# Patient Record
Sex: MALE | URBAN - METROPOLITAN AREA
[De-identification: names, ages, dates, MRNs, and addresses within clinical notes are randomized per-mention and may not be internally consistent; named-entity substitution may affect disease eponyms.]

---

## 2023-10-04 ENCOUNTER — APPOINTMENT (OUTPATIENT)
Dept: RADIOLOGY | Facility: HOSPITAL | Age: 58
DRG: 291 | End: 2023-10-04
Payer: MEDICARE

## 2023-10-04 ENCOUNTER — HOSPITAL ENCOUNTER (INPATIENT)
Facility: HOSPITAL | Age: 58
LOS: 13 days | Discharge: HOME | DRG: 291 | End: 2023-10-17
Attending: SPECIALIST | Admitting: SPECIALIST
Payer: MEDICARE

## 2023-10-04 DIAGNOSIS — I25.10 CORONARY ARTERY DISEASE INVOLVING NATIVE CORONARY ARTERY OF NATIVE HEART WITHOUT ANGINA PECTORIS: ICD-10-CM

## 2023-10-04 DIAGNOSIS — E11.65 TYPE 2 DIABETES MELLITUS WITH HYPERGLYCEMIA, WITH LONG-TERM CURRENT USE OF INSULIN (MULTI): ICD-10-CM

## 2023-10-04 DIAGNOSIS — Z79.4 TYPE 2 DIABETES MELLITUS WITHOUT COMPLICATION, WITH LONG-TERM CURRENT USE OF INSULIN (MULTI): ICD-10-CM

## 2023-10-04 DIAGNOSIS — I50.23 ACUTE ON CHRONIC SYSTOLIC HEART FAILURE (MULTI): Chronic | ICD-10-CM

## 2023-10-04 DIAGNOSIS — I82.412 DVT OF DEEP FEMORAL VEIN, LEFT (MULTI): ICD-10-CM

## 2023-10-04 DIAGNOSIS — Z79.4 TYPE 2 DIABETES MELLITUS WITH OTHER CIRCULATORY COMPLICATION, WITH LONG-TERM CURRENT USE OF INSULIN (MULTI): ICD-10-CM

## 2023-10-04 DIAGNOSIS — R09.89 OTHER SPECIFIED SYMPTOMS AND SIGNS INVOLVING THE CIRCULATORY AND RESPIRATORY SYSTEMS: ICD-10-CM

## 2023-10-04 DIAGNOSIS — I25.5 ISCHEMIC CARDIOMYOPATHY: ICD-10-CM

## 2023-10-04 DIAGNOSIS — E11.9 TYPE 2 DIABETES MELLITUS WITHOUT COMPLICATION, UNSPECIFIED WHETHER LONG TERM INSULIN USE (MULTI): ICD-10-CM

## 2023-10-04 DIAGNOSIS — I50.9 ACUTE DECOMPENSATED HEART FAILURE (MULTI): ICD-10-CM

## 2023-10-04 DIAGNOSIS — Z95.810 ICD (IMPLANTABLE CARDIOVERTER-DEFIBRILLATOR) IN PLACE: ICD-10-CM

## 2023-10-04 DIAGNOSIS — Z01.818 PRE-TRANSPLANT EVALUATION FOR HEART TRANSPLANT: Primary | ICD-10-CM

## 2023-10-04 DIAGNOSIS — I50.22 CHRONIC SYSTOLIC HEART FAILURE (MULTI): ICD-10-CM

## 2023-10-04 DIAGNOSIS — Z79.4 TYPE 2 DIABETES MELLITUS WITH HYPERGLYCEMIA, WITH LONG-TERM CURRENT USE OF INSULIN (MULTI): ICD-10-CM

## 2023-10-04 DIAGNOSIS — Z01.810 ENCOUNTER FOR PREPROCEDURAL CARDIOVASCULAR EXAMINATION: ICD-10-CM

## 2023-10-04 DIAGNOSIS — E11.9 TYPE 2 DIABETES MELLITUS WITHOUT COMPLICATION, WITH LONG-TERM CURRENT USE OF INSULIN (MULTI): ICD-10-CM

## 2023-10-04 DIAGNOSIS — I71.40 ABDOMINAL AORTIC ANEURYSM, WITHOUT RUPTURE, UNSPECIFIED (CMS-HCC): ICD-10-CM

## 2023-10-04 DIAGNOSIS — I73.9 PERIPHERAL VASCULAR DISEASE, UNSPECIFIED (CMS-HCC): ICD-10-CM

## 2023-10-04 DIAGNOSIS — R60.0 LOCALIZED EDEMA: ICD-10-CM

## 2023-10-04 DIAGNOSIS — I50.23 ACUTE ON CHRONIC HFREF (HEART FAILURE WITH REDUCED EJECTION FRACTION) (MULTI): ICD-10-CM

## 2023-10-04 DIAGNOSIS — E11.59 TYPE 2 DIABETES MELLITUS WITH OTHER CIRCULATORY COMPLICATION, WITH LONG-TERM CURRENT USE OF INSULIN (MULTI): ICD-10-CM

## 2023-10-04 LAB
ALBUMIN SERPL BCP-MCNC: 4.2 G/DL (ref 3.4–5)
ANION GAP SERPL CALC-SCNC: 20 MMOL/L (ref 10–20)
BASE EXCESS BLDMV CALC-SCNC: 8.3 MMOL/L (ref -2–3)
BNP SERPL-MCNC: 160 PG/ML (ref 0–99)
BODY TEMPERATURE: 37 DEGREES CELSIUS
BUN SERPL-MCNC: 58 MG/DL (ref 6–23)
CALCIUM SERPL-MCNC: 10.1 MG/DL (ref 8.6–10.6)
CHLORIDE SERPL-SCNC: 93 MMOL/L (ref 98–107)
CO2 SERPL-SCNC: 28 MMOL/L (ref 21–32)
CREAT SERPL-MCNC: 1.79 MG/DL (ref 0.5–1.3)
ERYTHROCYTE [DISTWIDTH] IN BLOOD BY AUTOMATED COUNT: 19.2 % (ref 11.5–14.5)
EST. AVERAGE GLUCOSE BLD GHB EST-MCNC: 186 MG/DL
FERRITIN SERPL-MCNC: 379 NG/ML (ref 20–300)
GFR SERPL CREATININE-BSD FRML MDRD: 44 ML/MIN/1.73M*2
GLUCOSE BLD MANUAL STRIP-MCNC: 220 MG/DL (ref 74–99)
GLUCOSE BLD MANUAL STRIP-MCNC: 245 MG/DL (ref 74–99)
GLUCOSE SERPL-MCNC: 198 MG/DL (ref 74–99)
HBA1C MFR BLD: 8.1 %
HCO3 BLDMV-SCNC: 34 MMOL/L (ref 22–26)
HCT VFR BLD AUTO: 42 % (ref 41–52)
HGB BLD-MCNC: 12.8 G/DL (ref 13.5–17.5)
IRON SATN MFR SERPL: 15 % (ref 25–45)
IRON SERPL-MCNC: 51 UG/DL (ref 35–150)
LACTATE SERPL-SCNC: 1.6 MMOL/L (ref 0.4–2)
MCH RBC QN AUTO: 23.7 PG (ref 26–34)
MCHC RBC AUTO-ENTMCNC: 30.5 G/DL (ref 32–36)
MCV RBC AUTO: 78 FL (ref 80–100)
NRBC BLD-RTO: 0 /100 WBCS (ref 0–0)
OXYHGB MFR BLDMV: 68.7 % (ref 45–75)
PCO2 BLDMV: 50 MM HG (ref 41–51)
PH BLDMV: 7.44 PH (ref 7.33–7.43)
PHOSPHATE SERPL-MCNC: 5.6 MG/DL (ref 2.5–4.9)
PLATELET # BLD AUTO: 275 X10*3/UL (ref 150–450)
PMV BLD AUTO: 10.2 FL (ref 7.5–11.5)
PO2 BLDMV: 49 MM HG (ref 35–45)
POTASSIUM SERPL-SCNC: 4.1 MMOL/L (ref 3.5–5.3)
RBC # BLD AUTO: 5.4 X10*6/UL (ref 4.5–5.9)
SAO2 % BLDMV: 71 % (ref 45–75)
SODIUM SERPL-SCNC: 137 MMOL/L (ref 136–145)
T4 FREE SERPL-MCNC: 1.17 NG/DL (ref 0.78–1.48)
TIBC SERPL-MCNC: 335 UG/DL (ref 240–445)
TSH SERPL-ACNC: 9.31 MIU/L (ref 0.44–3.98)
UIBC SERPL-MCNC: 284 UG/DL (ref 110–370)
VIT B12 SERPL-MCNC: 408 PG/ML (ref 211–911)
WBC # BLD AUTO: 5 X10*3/UL (ref 4.4–11.3)

## 2023-10-04 PROCEDURE — 83550 IRON BINDING TEST: CPT | Performed by: STUDENT IN AN ORGANIZED HEALTH CARE EDUCATION/TRAINING PROGRAM

## 2023-10-04 PROCEDURE — 96372 THER/PROPH/DIAG INJ SC/IM: CPT | Performed by: STUDENT IN AN ORGANIZED HEALTH CARE EDUCATION/TRAINING PROGRAM

## 2023-10-04 PROCEDURE — 82805 BLOOD GASES W/O2 SATURATION: CPT | Performed by: STUDENT IN AN ORGANIZED HEALTH CARE EDUCATION/TRAINING PROGRAM

## 2023-10-04 PROCEDURE — 82746 ASSAY OF FOLIC ACID SERUM: CPT | Performed by: STUDENT IN AN ORGANIZED HEALTH CARE EDUCATION/TRAINING PROGRAM

## 2023-10-04 PROCEDURE — 83605 ASSAY OF LACTIC ACID: CPT | Performed by: STUDENT IN AN ORGANIZED HEALTH CARE EDUCATION/TRAINING PROGRAM

## 2023-10-04 PROCEDURE — 71045 X-RAY EXAM CHEST 1 VIEW: CPT | Performed by: RADIOLOGY

## 2023-10-04 PROCEDURE — 83036 HEMOGLOBIN GLYCOSYLATED A1C: CPT | Performed by: STUDENT IN AN ORGANIZED HEALTH CARE EDUCATION/TRAINING PROGRAM

## 2023-10-04 PROCEDURE — 82607 VITAMIN B-12: CPT | Performed by: STUDENT IN AN ORGANIZED HEALTH CARE EDUCATION/TRAINING PROGRAM

## 2023-10-04 PROCEDURE — 84443 ASSAY THYROID STIM HORMONE: CPT | Performed by: STUDENT IN AN ORGANIZED HEALTH CARE EDUCATION/TRAINING PROGRAM

## 2023-10-04 PROCEDURE — 82728 ASSAY OF FERRITIN: CPT | Performed by: STUDENT IN AN ORGANIZED HEALTH CARE EDUCATION/TRAINING PROGRAM

## 2023-10-04 PROCEDURE — 83880 ASSAY OF NATRIURETIC PEPTIDE: CPT | Performed by: STUDENT IN AN ORGANIZED HEALTH CARE EDUCATION/TRAINING PROGRAM

## 2023-10-04 PROCEDURE — 2500000004 HC RX 250 GENERAL PHARMACY W/ HCPCS (ALT 636 FOR OP/ED)

## 2023-10-04 PROCEDURE — 85027 COMPLETE CBC AUTOMATED: CPT | Performed by: STUDENT IN AN ORGANIZED HEALTH CARE EDUCATION/TRAINING PROGRAM

## 2023-10-04 PROCEDURE — 2500000001 HC RX 250 WO HCPCS SELF ADMINISTERED DRUGS (ALT 637 FOR MEDICARE OP): Performed by: STUDENT IN AN ORGANIZED HEALTH CARE EDUCATION/TRAINING PROGRAM

## 2023-10-04 PROCEDURE — 84439 ASSAY OF FREE THYROXINE: CPT | Performed by: STUDENT IN AN ORGANIZED HEALTH CARE EDUCATION/TRAINING PROGRAM

## 2023-10-04 PROCEDURE — 2020000001 HC ICU ROOM DAILY

## 2023-10-04 PROCEDURE — 80069 RENAL FUNCTION PANEL: CPT | Performed by: STUDENT IN AN ORGANIZED HEALTH CARE EDUCATION/TRAINING PROGRAM

## 2023-10-04 PROCEDURE — 2500000004 HC RX 250 GENERAL PHARMACY W/ HCPCS (ALT 636 FOR OP/ED): Performed by: STUDENT IN AN ORGANIZED HEALTH CARE EDUCATION/TRAINING PROGRAM

## 2023-10-04 PROCEDURE — 71045 X-RAY EXAM CHEST 1 VIEW: CPT

## 2023-10-04 PROCEDURE — 83540 ASSAY OF IRON: CPT | Performed by: STUDENT IN AN ORGANIZED HEALTH CARE EDUCATION/TRAINING PROGRAM

## 2023-10-04 PROCEDURE — 99291 CRITICAL CARE FIRST HOUR: CPT | Performed by: SPECIALIST

## 2023-10-04 PROCEDURE — 82947 ASSAY GLUCOSE BLOOD QUANT: CPT

## 2023-10-04 RX ORDER — GABAPENTIN 300 MG/1
600 CAPSULE ORAL NIGHTLY
Status: DISCONTINUED | OUTPATIENT
Start: 2023-10-04 | End: 2023-10-17 | Stop reason: HOSPADM

## 2023-10-04 RX ORDER — GABAPENTIN 300 MG/1
300 CAPSULE ORAL EVERY MORNING
Status: DISCONTINUED | OUTPATIENT
Start: 2023-10-05 | End: 2023-10-17 | Stop reason: HOSPADM

## 2023-10-04 RX ORDER — ALBUTEROL SULFATE 0.83 MG/ML
2.5 SOLUTION RESPIRATORY (INHALATION) EVERY 6 HOURS PRN
Status: DISCONTINUED | OUTPATIENT
Start: 2023-10-04 | End: 2023-10-17 | Stop reason: HOSPADM

## 2023-10-04 RX ORDER — NAPROXEN SODIUM 220 MG/1
81 TABLET, FILM COATED ORAL DAILY
Status: DISCONTINUED | OUTPATIENT
Start: 2023-10-04 | End: 2023-10-17 | Stop reason: HOSPADM

## 2023-10-04 RX ORDER — ATORVASTATIN CALCIUM 80 MG/1
80 TABLET, FILM COATED ORAL DAILY
Status: DISCONTINUED | OUTPATIENT
Start: 2023-10-04 | End: 2023-10-17 | Stop reason: HOSPADM

## 2023-10-04 RX ORDER — LEVOTHYROXINE SODIUM 50 UG/1
50 TABLET ORAL
Status: DISCONTINUED | OUTPATIENT
Start: 2023-10-04 | End: 2023-10-17 | Stop reason: HOSPADM

## 2023-10-04 RX ORDER — DOBUTAMINE HYDROCHLORIDE 400 MG/100ML
INJECTION INTRAVENOUS
Status: COMPLETED
Start: 2023-10-04 | End: 2023-10-04

## 2023-10-04 RX ORDER — HEPARIN SODIUM 5000 [USP'U]/ML
5000 INJECTION, SOLUTION INTRAVENOUS; SUBCUTANEOUS EVERY 8 HOURS
Status: DISCONTINUED | OUTPATIENT
Start: 2023-10-04 | End: 2023-10-12

## 2023-10-04 RX ORDER — DOCUSATE SODIUM 100 MG/1
100 CAPSULE, LIQUID FILLED ORAL 2 TIMES DAILY PRN
Status: DISCONTINUED | OUTPATIENT
Start: 2023-10-04 | End: 2023-10-07

## 2023-10-04 RX ORDER — QUETIAPINE FUMARATE 25 MG/1
50 TABLET, FILM COATED ORAL NIGHTLY
Status: DISCONTINUED | OUTPATIENT
Start: 2023-10-04 | End: 2023-10-17 | Stop reason: HOSPADM

## 2023-10-04 RX ORDER — PANTOPRAZOLE SODIUM 40 MG/1
40 TABLET, DELAYED RELEASE ORAL
Status: DISCONTINUED | OUTPATIENT
Start: 2023-10-05 | End: 2023-10-17 | Stop reason: HOSPADM

## 2023-10-04 RX ADMIN — DEXTROSE MONOHYDRATE 3.75 MCG/KG/MIN: 50 INJECTION, SOLUTION INTRAVENOUS at 17:00

## 2023-10-04 RX ADMIN — DOBUTAMINE HYDROCHLORIDE: 400 INJECTION INTRAVENOUS at 12:00

## 2023-10-04 RX ADMIN — FUROSEMIDE 5 MG/HR: 10 INJECTION, SOLUTION INTRAMUSCULAR; INTRAVENOUS at 15:15

## 2023-10-04 RX ADMIN — HEPARIN SODIUM 5000 UNITS: 5000 INJECTION INTRAVENOUS; SUBCUTANEOUS at 14:00

## 2023-10-04 RX ADMIN — HEPARIN SODIUM 5000 UNITS: 5000 INJECTION INTRAVENOUS; SUBCUTANEOUS at 21:34

## 2023-10-04 RX ADMIN — QUETIAPINE FUMARATE 50 MG: 25 TABLET ORAL at 20:21

## 2023-10-04 RX ADMIN — GABAPENTIN 600 MG: 300 CAPSULE ORAL at 20:20

## 2023-10-04 SDOH — ECONOMIC STABILITY: HOUSING INSECURITY
IN THE LAST 12 MONTHS, WAS THERE A TIME WHEN YOU DID NOT HAVE A STEADY PLACE TO SLEEP OR SLEPT IN A SHELTER (INCLUDING NOW)?: NO

## 2023-10-04 SDOH — SOCIAL STABILITY: SOCIAL INSECURITY: DO YOU FEEL ANYONE HAS EXPLOITED OR TAKEN ADVANTAGE OF YOU FINANCIALLY OR OF YOUR PERSONAL PROPERTY?: NO

## 2023-10-04 SDOH — ECONOMIC STABILITY: TRANSPORTATION INSECURITY: IN THE PAST 12 MONTHS, HAS LACK OF TRANSPORTATION KEPT YOU FROM MEDICAL APPOINTMENTS OR FROM GETTING MEDICATIONS?: NO

## 2023-10-04 SDOH — ECONOMIC STABILITY: INCOME INSECURITY: IN THE LAST 12 MONTHS, WAS THERE A TIME WHEN YOU WERE NOT ABLE TO PAY THE MORTGAGE OR RENT ON TIME?: NO

## 2023-10-04 SDOH — ECONOMIC STABILITY: FOOD INSECURITY: HOW HARD IS IT FOR YOU TO PAY FOR THE VERY BASICS LIKE FOOD, HOUSING, MEDICAL CARE, AND HEATING?: NOT VERY HARD

## 2023-10-04 SDOH — SOCIAL STABILITY: SOCIAL INSECURITY: HAVE YOU HAD THOUGHTS OF HARMING ANYONE ELSE?: NO

## 2023-10-04 SDOH — ECONOMIC STABILITY: HOUSING INSECURITY: IN THE LAST 12 MONTHS, HOW MANY PLACES HAVE YOU LIVED?: 1

## 2023-10-04 SDOH — ECONOMIC STABILITY: HOUSING INSECURITY: IN THE LAST 12 MONTHS, WAS THERE A TIME WHEN YOU WERE NOT ABLE TO PAY THE MORTGAGE OR RENT ON TIME?: NO

## 2023-10-04 SDOH — SOCIAL STABILITY: SOCIAL INSECURITY: ABUSE: ADULT

## 2023-10-04 SDOH — ECONOMIC STABILITY: INCOME INSECURITY: HOW HARD IS IT FOR YOU TO PAY FOR THE VERY BASICS LIKE FOOD, HOUSING, MEDICAL CARE, AND HEATING?: NOT VERY HARD

## 2023-10-04 SDOH — SOCIAL STABILITY: SOCIAL INSECURITY: ARE THERE ANY APPARENT SIGNS OF INJURIES/BEHAVIORS THAT COULD BE RELATED TO ABUSE/NEGLECT?: NO

## 2023-10-04 SDOH — SOCIAL STABILITY: SOCIAL INSECURITY: ARE YOU OR HAVE YOU BEEN THREATENED OR ABUSED PHYSICALLY, EMOTIONALLY, OR SEXUALLY BY ANYONE?: NO

## 2023-10-04 SDOH — SOCIAL STABILITY: SOCIAL INSECURITY: HAS ANYONE EVER THREATENED TO HURT YOUR FAMILY OR YOUR PETS?: NO

## 2023-10-04 SDOH — SOCIAL STABILITY: SOCIAL INSECURITY: DOES ANYONE TRY TO KEEP YOU FROM HAVING/CONTACTING OTHER FRIENDS OR DOING THINGS OUTSIDE YOUR HOME?: NO

## 2023-10-04 SDOH — SOCIAL STABILITY: SOCIAL INSECURITY: DO YOU FEEL UNSAFE GOING BACK TO THE PLACE WHERE YOU ARE LIVING?: NO

## 2023-10-04 SDOH — ECONOMIC STABILITY: TRANSPORTATION INSECURITY
IN THE PAST 12 MONTHS, HAS THE LACK OF TRANSPORTATION KEPT YOU FROM MEDICAL APPOINTMENTS OR FROM GETTING MEDICATIONS?: NO

## 2023-10-04 ASSESSMENT — PAIN - FUNCTIONAL ASSESSMENT
PAIN_FUNCTIONAL_ASSESSMENT: 0-10

## 2023-10-04 ASSESSMENT — PAIN SCALES - GENERAL
PAINLEVEL_OUTOF10: 0 - NO PAIN

## 2023-10-04 ASSESSMENT — ENCOUNTER SYMPTOMS
DIARRHEA: 0
HEMATURIA: 0
DIZZINESS: 0
SEIZURES: 0
ABDOMINAL DISTENTION: 1
PALPITATIONS: 0
CHILLS: 0
WHEEZING: 0
NAUSEA: 0
FREQUENCY: 0
FLANK PAIN: 0
COUGH: 0
DYSPHORIC MOOD: 1
SHORTNESS OF BREATH: 1
FEVER: 0
ABDOMINAL PAIN: 0
DIAPHORESIS: 0
LIGHT-HEADEDNESS: 0
DYSURIA: 0

## 2023-10-04 ASSESSMENT — LIFESTYLE VARIABLES
HOW OFTEN DO YOU HAVE 6 OR MORE DRINKS ON ONE OCCASION: NEVER
AUDIT-C TOTAL SCORE: 0
PRESCIPTION_ABUSE_PAST_12_MONTHS: NO
SKIP TO QUESTIONS 9-10: 1
AUDIT-C TOTAL SCORE: 0
HOW MANY STANDARD DRINKS CONTAINING ALCOHOL DO YOU HAVE ON A TYPICAL DAY: PATIENT DOES NOT DRINK
HOW OFTEN DO YOU HAVE A DRINK CONTAINING ALCOHOL: NEVER
SUBSTANCE_ABUSE_PAST_12_MONTHS: NO

## 2023-10-04 ASSESSMENT — ACTIVITIES OF DAILY LIVING (ADL)
ADEQUATE_TO_COMPLETE_ADL: YES
BATHING: INDEPENDENT
WALKS IN HOME: INDEPENDENT
FEEDING YOURSELF: INDEPENDENT
DRESSING YOURSELF: INDEPENDENT
TOILETING: INDEPENDENT
HEARING - LEFT EAR: FUNCTIONAL
GROOMING: INDEPENDENT
HEARING - RIGHT EAR: FUNCTIONAL
JUDGMENT_ADEQUATE_SAFELY_COMPLETE_DAILY_ACTIVITIES: YES
PATIENT'S MEMORY ADEQUATE TO SAFELY COMPLETE DAILY ACTIVITIES?: YES

## 2023-10-04 ASSESSMENT — COGNITIVE AND FUNCTIONAL STATUS - GENERAL
HELP NEEDED FOR BATHING: A LITTLE
PATIENT BASELINE BEDBOUND: NO
PERSONAL GROOMING: A LITTLE
MOVING TO AND FROM BED TO CHAIR: A LITTLE
DRESSING REGULAR LOWER BODY CLOTHING: A LITTLE
TURNING FROM BACK TO SIDE WHILE IN FLAT BAD: A LITTLE
MOVING FROM LYING ON BACK TO SITTING ON SIDE OF FLAT BED WITH BEDRAILS: A LITTLE
DRESSING REGULAR UPPER BODY CLOTHING: A LITTLE
TOILETING: A LITTLE
STANDING UP FROM CHAIR USING ARMS: A LITTLE
MOBILITY SCORE: 18
WALKING IN HOSPITAL ROOM: A LITTLE
CLIMB 3 TO 5 STEPS WITH RAILING: A LITTLE
DAILY ACTIVITIY SCORE: 18
EATING MEALS: A LITTLE

## 2023-10-04 ASSESSMENT — PATIENT HEALTH QUESTIONNAIRE - PHQ9
7. TROUBLE CONCENTRATING ON THINGS, SUCH AS READING THE NEWSPAPER OR WATCHING TELEVISION: NOT AT ALL
9. THOUGHTS THAT YOU WOULD BE BETTER OFF DEAD, OR OF HURTING YOURSELF: NOT AT ALL
5. POOR APPETITE OR OVEREATING: NOT AT ALL
2. FEELING DOWN, DEPRESSED OR HOPELESS: MORE THAN HALF THE DAYS
SUM OF ALL RESPONSES TO PHQ9 QUESTIONS 1 & 2: 4
8. MOVING OR SPEAKING SO SLOWLY THAT OTHER PEOPLE COULD HAVE NOTICED. OR THE OPPOSITE, BEING SO FIGETY OR RESTLESS THAT YOU HAVE BEEN MOVING AROUND A LOT MORE THAN USUAL: NOT AT ALL
4. FEELING TIRED OR HAVING LITTLE ENERGY: NOT AT ALL
3. TROUBLE FALLING OR STAYING ASLEEP OR SLEEPING TOO MUCH: NOT AT ALL
1. LITTLE INTEREST OR PLEASURE IN DOING THINGS: MORE THAN HALF THE DAYS
6. FEELING BAD ABOUT YOURSELF - OR THAT YOU ARE A FAILURE OR HAVE LET YOURSELF OR YOUR FAMILY DOWN: SEVERAL DAYS
SUM OF ALL RESPONSES TO PHQ QUESTIONS 1-9: 5
10. IF YOU CHECKED OFF ANY PROBLEMS, HOW DIFFICULT HAVE THESE PROBLEMS MADE IT FOR YOU TO DO YOUR WORK, TAKE CARE OF THINGS AT HOME, OR GET ALONG WITH OTHER PEOPLE: SOMEWHAT DIFFICULT

## 2023-10-04 ASSESSMENT — COLUMBIA-SUICIDE SEVERITY RATING SCALE - C-SSRS
4. HAVE YOU HAD THESE THOUGHTS AND HAD SOME INTENTION OF ACTING ON THEM?: NO
5. HAVE YOU STARTED TO WORK OUT OR WORKED OUT THE DETAILS OF HOW TO KILL YOURSELF? DO YOU INTEND TO CARRY OUT THIS PLAN?: NO
1. IN THE PAST MONTH, HAVE YOU WISHED YOU WERE DEAD OR WISHED YOU COULD GO TO SLEEP AND NOT WAKE UP?: NO
2. HAVE YOU ACTUALLY HAD ANY THOUGHTS OF KILLING YOURSELF?: NO
6. HAVE YOU EVER DONE ANYTHING, STARTED TO DO ANYTHING, OR PREPARED TO DO ANYTHING TO END YOUR LIFE?: NO

## 2023-10-04 NOTE — H&P
Referring/Heart failure Cardiologist: [facility]/Dr. José Luis Cortes    HPI:  58 year old Iranian male with history of HFrEF/ICM (LVEF 20% 8/14/23), CAD s/p PCI and CABG (LIMA-LAD with balloon pump support April 2023), Hx of cardiac arrest s/p VT ablation + ICD, HTN/HLD, CKD, Hx of DVT (L femoral) on Eliquis, hypothyroidism, pulmonary hypertension, T2DM, JAKE/OHS, former tobacco abuse (24 pack year, quit 2017), presented to [facility] on 10/1 with exertional left sided chest pain and SOB x 3 days. He reported orthopnea  as well. His home diuretic regimen with lasix and diamox was not controlling his symptoms. He noticed swelling up to his thighs and abdominal distention without pain. He also reports having worsening urine output for a few days before admission as well.      He has been admitted 8 times this year for acutely decompensated heart failure. His last hospital stay was 9/16-9/26, and weight on discharge was 230lbs.  He was admitted to [facility] this presentation in cardiogenic shock despite reporting compliance with his HF medications. His oral GDMT regimen was held due to worsening kidney function. His home regimen includes Entresto 97/103mg, Toprol XL 150mg, Spironolactone 25mg, Dapagliflozin 10mg. Weight on admission was 246lbs. He was treated IV lasix 80mg and with IV bumex 4mg BID + metolazone with some response, but was transitioned to lasix infusion 5mg/hr.  He was also placed on dobutamine infusion as evidence of cardiogenic shock was noted to be worsening. Advanced heart failure service was consulted while he was admitted there and they recommended SGC-guided therapy. RHC on dobutamine 2.5mcg showed RA pressure: 20, RV: 55/20, PCWP: 35, PA pressure: 52/37 (40 - 42), CO/CI 3.6/1.5, SVR: 1373 dynes, PVR: 414 dynes, AO: 84, and lactates were increasing up to 2.4 during that admission.  ACS was ruled out with a non-ischemic appearing EKG and troponin levels (43->63->72->66). Despite inotrope therapy,  his clinical status was not significantly improving and he referred for transfer to [facility] for advanced therapy evaluation.     Cardiac Tests    EKG: Rhythm, QRS    Echo ([facility] 8/23)  Left ventricular systolic function is severely globally reduced.  The left ventricular ejection fraction (LVEF) is 20% +/- 5%.  Globally abnormal RV systolic function.  Noninvasive hemodynamic assessment is consistent with severe pulmonary  hypertension (>60 mmHg), an elevated CVP, an elevated LVEDP.     LHC/RHC: 12/22  1. Severe diffuse three-vessel coronary artery disease.  2. Severe proximal diffuse LAD disease up to 70%; IFR measured 0.8 and  hemodynamically significant.  3. Severe diffuse proximal and mid first diagonal disease. IFR measured at  0.81 consistent with hemodynamically significant disease.  4. Chronic total occlusion of OM1 with left-to-left collaterals.  5. Chronic total occlusion of mid RCA with left-to-right collaterals.  6. Markedly elevated LVEDP at 40 mm of Hg.  7. Findings consistent with ischemic cardiomyopathy.     PMH:   Past Medical History:   Diagnosis Date    CHF (congestive heart failure) (CMS/Formerly [facility])     COPD (chronic obstructive pulmonary disease) (CMS/Formerly [facility])     Coronary artery disease     Diabetes mellitus (CMS/Formerly [facility])     Disease of thyroid gland     Hypertension         PSH:  Past Surgical History:   Procedure Laterality Date    CARDIAC DEFIBRILLATOR PLACEMENT      CORONARY ANGIOPLASTY WITH STENT PLACEMENT      CORONARY ARTERY BYPASS GRAFT           Allergies:  Allergies   Allergen Reactions    Azithromycin Dizziness        Social Hx:  Social History     Socioeconomic History    Marital status: Unknown     Spouse name: Not on file    Number of children: Not on file    Years of education: Not on file    Highest education level: Not on file   Occupational History    Not on file   Tobacco Use    Smoking status: Former     Packs/day: 1.00     Years: 24.00     Additional pack years: 0.00     Total pack years:  24.00     Types: Cigarettes     Quit date:      Years since quittin.7    Smokeless tobacco: Not on file   Vaping Use    Vaping Use: Never used   Substance and Sexual Activity    Alcohol use: Never    Drug use: Never    Sexual activity: Not on file   Other Topics Concern    Not on file   Social History Narrative    Not on file     Social Determinants of Health     Financial Resource Strain: Not on file   Food Insecurity: Not on file   Transportation Needs: Not on file   Physical Activity: Not on file   Stress: Not on file   Social Connections: Not on file   Intimate Partner Violence: Not on file   Housing Stability: Not on file      Substance abuse disorder (Mild/moderate/severe-daily or withdrawal symptoms)    FHx:  No family history on file.     Home Medication:  Prior to Admission medications    Not on File        Review of Systems   Constitutional:  Negative for chills, diaphoresis and fever.   Respiratory:  Positive for shortness of breath. Negative for cough and wheezing.    Cardiovascular:  Positive for chest pain and leg swelling. Negative for palpitations.   Gastrointestinal:  Positive for abdominal distention. Negative for abdominal pain, diarrhea and nausea.   Genitourinary:  Positive for decreased urine volume. Negative for dysuria, flank pain, frequency, hematuria and scrotal swelling.   Neurological:  Negative for dizziness, seizures and light-headedness.   Psychiatric/Behavioral:  Positive for dysphoric mood. Negative for self-injury and suicidal ideas.         Physical Exam  Constitutional:       General: He is not in acute distress.     Appearance: Normal appearance. He is not toxic-appearing.   HENT:      Head: Normocephalic and atraumatic.      Mouth/Throat:      Mouth: Mucous membranes are moist.      Pharynx: Oropharynx is clear. No posterior oropharyngeal erythema.   Cardiovascular:      Rate and Rhythm: Normal rate and regular rhythm.      Heart sounds: No murmur heard.     No friction  rub. No gallop.   Pulmonary:      Effort: Pulmonary effort is normal. No respiratory distress.      Breath sounds: No wheezing or rhonchi.      Comments: Diminished breath sounds bibasilarly  Abdominal:      General: Bowel sounds are normal. There is distension.      Tenderness: There is no abdominal tenderness. There is no guarding or rebound.      Hernia: No hernia is present.   Musculoskeletal:         General: No tenderness. Normal range of motion.      Right lower leg: Edema (2+ pitting) present.      Left lower leg: Edema (2+ pitting) present.   Skin:     General: Skin is warm and dry.      Capillary Refill: Capillary refill takes less than 2 seconds.      Coloration: Skin is not jaundiced.      Findings: Lesion (LLE venous stasis) present.      Comments: Peripherally warm   Neurological:      General: No focal deficit present.      Mental Status: He is alert and oriented to person, place, and time. Mental status is at baseline.        Assessment & Plan:    Neuro:  #Depression  - Will need a detailed med review to determine if medical therapy is needed.  - Serial neuro and pain assessments   - PO Tylenol PRN for pain  - PT/OT Consult, OOB to chair  - CAM ICU score every shift  - Sleep/wake cycle normalization    # Physical Status  -Obesity  -Functional at home.   -Weight on admission to [facility] 246lbs (up 16lbs from dry weight).  -Weight today 235lbs.  -Obtain daily weights in ICU.      Cardiovascular:  # Acute on chronic HFrEF/ICM systolic and diastolic heart failure  - TTE at [facility] 8/2023 with EF ~20%, globally abnormal RV systolic function. Noninvasive hemodynamic assessment is consistent with severe pulmonary hypertension (>60 mmHg), an elevated CVP, an elevated LVEDP.  - admit weight 246lbs.  - admit BNP here pending.  - ASA and atorvastatin 80mg daily are home meds.  - Holding ARNI, BB, MRA, SGLT2i in setting of worsening renal function, GFR <30.  - Diuresis with lasix gtt 5mg/hr for now.  - Continue  inotropic support with dobutamine 3.75mcg for now.  - Daily SGC #s: CVP 12, PA 32/23, W 20, CO/CI 6.41/2.84, SVR 1010, PVR 87, SvO2 71%.  - Daily standing weights, 2gm sodium diet, 2L fluid restriction, strict I&Os  - Plan to initiate advanced therapy evaluation today.     #CAD s/p CABG LIMA-LAD 4/2023  -[facility] December 2022: severe diffuse three-vessel coronary artery disease. Severe proximal diffuse LAD disease up to 70%; IFR measured 0.8 and hemodynamically significant. Severe diffuse proximal and mid first diagonal disease. IFR measured at 0.81 consistent with hemodynamically significant disease. Chronic total occlusion of OM1 with left-to-left collaterals. Chronic total occlusion of mid RCA with left-to-right collaterals. Markedly elevated LVEDP at 40 mm of Hg.  - Continue Aspirin 81mg.  - Continue Atorvastatin 80mg.      #Arrhythmias - Hx of VT s/p ablation + ICD  -Device - Medtronic ICD; interrogation ordered.   -Not on antiarrhythmic outpatient.    #Electrolytes  -No electrolyte abnormalities on BMP from [facility] on transfer.  -Admit RFP pending.  -Obtain daily RFP.    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  -Monitor PA pressures continuously via SGC. Titrating inotrope therapy, should improve pulmonary pressures.    GI:  - Colace BID PRN ordered.  - Continue Protonix 40mg daily.     :  #CKD  -No records in [facility] for prior labs.  -Admit BUN/Cr 67/2.00  -strict I/Os  -avoid hypotension and nephrotoxic agents    Heme:  #Anemia in the setting of CKD  - Labs: CBC, TIBC, ferritin, serum Fe, folate, B12     - If %sat low, order venofer    #Hx of L femoral DVT  - Holding Eliquis for this.  - subcutaneous heparin 5000 units q8h.      Endo:  #T2DM  - Chemistry panel pending.  - Check accuchecks AC/HS.  - Insulin requirement at home is Glargine 85 units qAM, Humalog 28-34 units TID,  Trulicity 1.5mg weekly.  - Will consult endocrine to assist with glycemic control.   - hgbA1c ordered.    #Thyroid  -Repeat TSH with T4 pending     ID:  -afebrile, nontoxic   -no s/s infx  -trend temps q4h      Feeding/fluids: 2g Na diet, 2L fluid restriction  Thromboprophylaxis: SCDs   VAP bundle: n/a  Ulcer prophylaxis: PPI  Glycemic control: n/a  Bowel care: Colace & miralax prn  Indwelling catheter: None    Lines:   PIVs   [place] RIJ    PT/OT: consulted    Code Status: Full Code    Family Primary Contact/Next of Kin: Mother.    Dispo: Admit to HF ICU    A. -G. reviewed     Dr. Barlow aware of patient's admission

## 2023-10-05 ENCOUNTER — APPOINTMENT (OUTPATIENT)
Dept: RADIOLOGY | Facility: HOSPITAL | Age: 58
DRG: 291 | End: 2023-10-05
Payer: MEDICARE

## 2023-10-05 PROBLEM — I25.84 CORONARY ARTERY DISEASE DUE TO CALCIFIED CORONARY LESION: Status: ACTIVE | Noted: 2022-12-01

## 2023-10-05 PROBLEM — G47.33 OSA (OBSTRUCTIVE SLEEP APNEA): Status: ACTIVE | Noted: 2023-10-05

## 2023-10-05 PROBLEM — I25.10 CORONARY ARTERY DISEASE DUE TO CALCIFIED CORONARY LESION: Status: ACTIVE | Noted: 2022-12-01

## 2023-10-05 PROBLEM — E03.9 HYPOTHYROID: Chronic | Status: ACTIVE | Noted: 2023-10-05

## 2023-10-05 PROBLEM — R57.0 CARDIOGENIC SHOCK (MULTI): Status: ACTIVE | Noted: 2023-10-01

## 2023-10-05 PROBLEM — R26.2 DIFFICULTY IN WALKING INVOLVING ANKLE AND FOOT JOINT: Status: ACTIVE | Noted: 2020-02-18

## 2023-10-05 PROBLEM — H25.13 NUCLEAR AGE-RELATED CATARACT, BOTH EYES: Status: ACTIVE | Noted: 2022-02-18

## 2023-10-05 PROBLEM — N20.0 NEPHROLITHIASIS: Status: ACTIVE | Noted: 2019-10-18

## 2023-10-05 PROBLEM — I50.23 ACUTE ON CHRONIC SYSTOLIC HEART FAILURE (MULTI): Chronic | Status: ACTIVE | Noted: 2019-08-01

## 2023-10-05 PROBLEM — E78.2 MIXED HYPERLIPIDEMIA: Status: ACTIVE | Noted: 2022-12-01

## 2023-10-05 PROBLEM — R06.3 CENTRAL SLEEP APNEA WITH CHEYNE-STOKES RESPIRATION: Chronic | Status: ACTIVE | Noted: 2020-02-05

## 2023-10-05 PROBLEM — E55.9 VITAMIN D DEFICIENCY: Status: ACTIVE | Noted: 2019-10-22

## 2023-10-05 PROBLEM — N18.30 STAGE 3 CHRONIC KIDNEY DISEASE (MULTI): Status: ACTIVE | Noted: 2019-10-01

## 2023-10-05 PROBLEM — R07.9 CHEST PAIN: Status: ACTIVE | Noted: 2023-10-01

## 2023-10-05 PROBLEM — K80.20 CALCULUS OF GALLBLADDER WITHOUT CHOLECYSTITIS WITHOUT OBSTRUCTION: Status: ACTIVE | Noted: 2021-02-10

## 2023-10-05 PROBLEM — Z95.5 S/P DRUG ELUTING CORONARY STENT PLACEMENT: Status: ACTIVE | Noted: 2019-08-01

## 2023-10-05 PROBLEM — K74.60 HEPATIC CIRRHOSIS (MULTI): Chronic | Status: ACTIVE | Noted: 2021-02-10

## 2023-10-05 PROBLEM — E66.9 CLASS 1 OBESITY: Status: ACTIVE | Noted: 2022-12-01

## 2023-10-05 PROBLEM — D50.9 MICROCYTIC ANEMIA: Status: ACTIVE | Noted: 2019-10-18

## 2023-10-05 PROBLEM — G47.00 INSOMNIA: Status: ACTIVE | Noted: 2023-10-05

## 2023-10-05 PROBLEM — Z95.810 ICD (IMPLANTABLE CARDIOVERTER-DEFIBRILLATOR) IN PLACE: Status: ACTIVE | Noted: 2019-08-01

## 2023-10-05 PROBLEM — J44.9 COPD (CHRONIC OBSTRUCTIVE PULMONARY DISEASE) (MULTI): Chronic | Status: ACTIVE | Noted: 2023-10-05

## 2023-10-05 PROBLEM — K62.5 RECTAL BLEEDING: Status: ACTIVE | Noted: 2021-02-10

## 2023-10-05 PROBLEM — M21.372 FOOT DROP, BILATERAL: Status: ACTIVE | Noted: 2020-02-18

## 2023-10-05 PROBLEM — K76.0 FATTY LIVER: Status: ACTIVE | Noted: 2021-08-11

## 2023-10-05 PROBLEM — I27.20 PULMONARY HYPERTENSION (MULTI): Status: ACTIVE | Noted: 2021-08-11

## 2023-10-05 PROBLEM — I10 HTN (HYPERTENSION): Chronic | Status: ACTIVE | Noted: 2023-10-05

## 2023-10-05 PROBLEM — G62.9 NEUROPATHY: Status: ACTIVE | Noted: 2023-10-05

## 2023-10-05 PROBLEM — R31.29 MICROSCOPIC HEMATURIA: Status: ACTIVE | Noted: 2020-02-11

## 2023-10-05 PROBLEM — H40.003 GLAUCOMA SUSPECT OF BOTH EYES: Status: ACTIVE | Noted: 2019-08-30

## 2023-10-05 PROBLEM — E66.811 CLASS 1 OBESITY: Status: ACTIVE | Noted: 2022-12-01

## 2023-10-05 PROBLEM — Z95.1 S/P CABG X 1: Status: ACTIVE | Noted: 2023-04-17

## 2023-10-05 PROBLEM — Z86.74 HX OF CARDIAC ARREST: Status: ACTIVE | Noted: 2019-08-01

## 2023-10-05 PROBLEM — R79.89 ELEVATED TROPONIN LEVEL NOT DUE MYOCARDIAL INFARCTION: Status: ACTIVE | Noted: 2023-08-14

## 2023-10-05 PROBLEM — R06.02 SOB (SHORTNESS OF BREATH): Status: ACTIVE | Noted: 2023-10-01

## 2023-10-05 PROBLEM — E11.9 DIABETES MELLITUS (MULTI): Status: ACTIVE | Noted: 2023-05-03

## 2023-10-05 PROBLEM — I82.412 DVT OF DEEP FEMORAL VEIN, LEFT (MULTI): Status: ACTIVE | Noted: 2023-08-14

## 2023-10-05 PROBLEM — I21.A1 TYPE 2 MI (MYOCARDIAL INFARCTION) (MULTI): Status: ACTIVE | Noted: 2023-07-04

## 2023-10-05 PROBLEM — M21.371 FOOT DROP, BILATERAL: Status: ACTIVE | Noted: 2020-02-18

## 2023-10-05 PROBLEM — I25.5 ISCHEMIC CARDIOMYOPATHY: Status: ACTIVE | Noted: 2022-10-11

## 2023-10-05 LAB
ALBUMIN SERPL BCP-MCNC: 3.9 G/DL (ref 3.4–5)
ALBUMIN SERPL BCP-MCNC: 4.1 G/DL (ref 3.4–5)
ALP SERPL-CCNC: 63 U/L (ref 33–120)
ALT SERPL W P-5'-P-CCNC: 11 U/L (ref 10–52)
ANION GAP SERPL CALC-SCNC: 17 MMOL/L (ref 10–20)
ANION GAP SERPL CALC-SCNC: 18 MMOL/L (ref 10–20)
APTT PPP: 32 SECONDS (ref 27–38)
AST SERPL W P-5'-P-CCNC: 13 U/L (ref 9–39)
B-HCG SERPL-ACNC: <3 MIU/ML
BASE EXCESS BLDMV CALC-SCNC: 4 MMOL/L (ref -2–3)
BASE EXCESS BLDMV CALC-SCNC: 6.1 MMOL/L (ref -2–3)
BASE EXCESS BLDMV CALC-SCNC: 8.8 MMOL/L (ref -2–3)
BASOPHILS # BLD AUTO: 0.03 X10*3/UL (ref 0–0.1)
BASOPHILS NFR BLD AUTO: 0.4 %
BILIRUB SERPL-MCNC: 1.8 MG/DL (ref 0–1.2)
BNP SERPL-MCNC: 157 PG/ML (ref 0–99)
BODY TEMPERATURE: 37 DEGREES CELSIUS
BUN SERPL-MCNC: 55 MG/DL (ref 6–23)
BUN SERPL-MCNC: 60 MG/DL (ref 6–23)
CALCIUM SERPL-MCNC: 10 MG/DL (ref 8.6–10.6)
CALCIUM SERPL-MCNC: 10.2 MG/DL (ref 8.6–10.6)
CARDIOLIPIN IGA SERPL-ACNC: 0.9 APL U/ML
CARDIOLIPIN IGG SER IA-ACNC: <1.6 GPL U/ML
CARDIOLIPIN IGM SER IA-ACNC: 1.5 MPL U/ML
CHLORIDE SERPL-SCNC: 90 MMOL/L (ref 98–107)
CHLORIDE SERPL-SCNC: 93 MMOL/L (ref 98–107)
CHOLEST SERPL-MCNC: 106 MG/DL (ref 0–199)
CHOLESTEROL/HDL RATIO: 4.3
CO2 SERPL-SCNC: 28 MMOL/L (ref 21–32)
CO2 SERPL-SCNC: 31 MMOL/L (ref 21–32)
CREAT SERPL-MCNC: 1.65 MG/DL (ref 0.5–1.3)
CREAT SERPL-MCNC: 1.87 MG/DL (ref 0.5–1.3)
EOSINOPHIL # BLD AUTO: 0.21 X10*3/UL (ref 0–0.7)
EOSINOPHIL NFR BLD AUTO: 2.7 %
ERYTHROCYTE [DISTWIDTH] IN BLOOD BY AUTOMATED COUNT: 18.8 % (ref 11.5–14.5)
ERYTHROCYTE [DISTWIDTH] IN BLOOD BY AUTOMATED COUNT: 19.2 % (ref 11.5–14.5)
FOLATE SERPL-MCNC: 11.7 NG/ML
GFR SERPL CREATININE-BSD FRML MDRD: 41 ML/MIN/1.73M*2
GFR SERPL CREATININE-BSD FRML MDRD: 48 ML/MIN/1.73M*2
GLUCOSE BLD MANUAL STRIP-MCNC: 227 MG/DL (ref 74–99)
GLUCOSE BLD MANUAL STRIP-MCNC: 228 MG/DL (ref 74–99)
GLUCOSE BLD MANUAL STRIP-MCNC: 267 MG/DL (ref 74–99)
GLUCOSE BLD MANUAL STRIP-MCNC: 307 MG/DL (ref 74–99)
GLUCOSE SERPL-MCNC: 240 MG/DL (ref 74–99)
GLUCOSE SERPL-MCNC: 296 MG/DL (ref 74–99)
HBV SURFACE AG SERPL QL IA: NONREACTIVE
HCO3 BLDMV-SCNC: 29.2 MMOL/L (ref 22–26)
HCO3 BLDMV-SCNC: 31.2 MMOL/L (ref 22–26)
HCO3 BLDMV-SCNC: 34.6 MMOL/L (ref 22–26)
HCT VFR BLD AUTO: 40.8 % (ref 41–52)
HCT VFR BLD AUTO: 41 % (ref 41–52)
HCV AB SER QL: NONREACTIVE
HDLC SERPL-MCNC: 24.5 MG/DL
HERPES SIMPLEX VIRUS 1 IGG: >8 INDEX
HERPES SIMPLEX VIRUS 2 IGG: <0.2 INDEX
HGB BLD-MCNC: 12.8 G/DL (ref 13.5–17.5)
HGB BLD-MCNC: 12.9 G/DL (ref 13.5–17.5)
HIV 1+2 AB+HIV1 P24 AG SERPL QL IA: NONREACTIVE
IMM GRANULOCYTES # BLD AUTO: 0.03 X10*3/UL (ref 0–0.7)
IMM GRANULOCYTES NFR BLD AUTO: 0.4 % (ref 0–0.9)
INHALED O2 CONCENTRATION: 21 %
INR PPP: 1.2 (ref 0.9–1.1)
LACTATE SERPL-SCNC: 0.9 MMOL/L (ref 0.4–2)
LDH SERPL L TO P-CCNC: 240 U/L (ref 84–246)
LDLC SERPL CALC-MCNC: 32 MG/DL (ref 140–190)
LYMPHOCYTES # BLD AUTO: 0.57 X10*3/UL (ref 1.2–4.8)
LYMPHOCYTES NFR BLD AUTO: 7.3 %
MAGNESIUM SERPL-MCNC: 1.76 MG/DL (ref 1.6–2.4)
MCH RBC QN AUTO: 24.1 PG (ref 26–34)
MCH RBC QN AUTO: 24.2 PG (ref 26–34)
MCHC RBC AUTO-ENTMCNC: 31.2 G/DL (ref 32–36)
MCHC RBC AUTO-ENTMCNC: 31.6 G/DL (ref 32–36)
MCV RBC AUTO: 76 FL (ref 80–100)
MCV RBC AUTO: 77 FL (ref 80–100)
MEV IGG SER QL IA: POSITIVE
MONOCYTES # BLD AUTO: 0.81 X10*3/UL (ref 0.1–1)
MONOCYTES NFR BLD AUTO: 10.4 %
MUMPS IGG ANTIBODY INDEX: 4 IA
MUV IGG SER IA-ACNC: POSITIVE
NEUTROPHILS # BLD AUTO: 6.17 X10*3/UL (ref 1.2–7.7)
NEUTROPHILS NFR BLD AUTO: 78.8 %
NON HDL CHOLESTEROL: 82 MG/DL (ref 0–149)
NRBC BLD-RTO: 0 /100 WBCS (ref 0–0)
NRBC BLD-RTO: 0 /100 WBCS (ref 0–0)
OXYHGB MFR BLDMV: 56.2 % (ref 45–75)
OXYHGB MFR BLDMV: 60.1 % (ref 45–75)
OXYHGB MFR BLDMV: 71 % (ref 45–75)
PCO2 BLDMV: 45 MM HG (ref 41–51)
PCO2 BLDMV: 46 MM HG (ref 41–51)
PCO2 BLDMV: 51 MM HG (ref 41–51)
PH BLDMV: 7.42 PH (ref 7.33–7.43)
PH BLDMV: 7.44 PH (ref 7.33–7.43)
PH BLDMV: 7.44 PH (ref 7.33–7.43)
PHOSPHATE SERPL-MCNC: 5.5 MG/DL (ref 2.5–4.9)
PLATELET # BLD AUTO: 255 X10*3/UL (ref 150–450)
PLATELET # BLD AUTO: 258 X10*3/UL (ref 150–450)
PMV BLD AUTO: 10 FL (ref 7.5–11.5)
PMV BLD AUTO: 10.4 FL (ref 7.5–11.5)
PO2 BLDMV: 42 MM HG (ref 35–45)
PO2 BLDMV: 44 MM HG (ref 35–45)
PO2 BLDMV: 50 MM HG (ref 35–45)
POTASSIUM SERPL-SCNC: 4 MMOL/L (ref 3.5–5.3)
POTASSIUM SERPL-SCNC: 4.1 MMOL/L (ref 3.5–5.3)
PREALB SERPL-MCNC: 25.1 MG/DL (ref 18–40)
PROT SERPL-MCNC: 7.9 G/DL (ref 6.4–8.2)
PROTHROMBIN TIME: 13.7 SECONDS (ref 9.8–12.8)
PSA SERPL-MCNC: 0.22 NG/ML
RBC # BLD AUTO: 5.32 X10*6/UL (ref 4.5–5.9)
RBC # BLD AUTO: 5.34 X10*6/UL (ref 4.5–5.9)
RUBEOLA IGG ANTIBODY INDEX: >8 IA
RUBV IGG SERPL IA-ACNC: 2.3 IA
RUBV IGG SERPL QL IA: POSITIVE
SAO2 % BLDMV: 58 % (ref 45–75)
SAO2 % BLDMV: 62 % (ref 45–75)
SAO2 % BLDMV: 72 % (ref 45–75)
SODIUM SERPL-SCNC: 132 MMOL/L (ref 136–145)
SODIUM SERPL-SCNC: 137 MMOL/L (ref 136–145)
T GONDII IGG SER-ACNC: REACTIVE
T3 SERPL-MCNC: 107 NG/DL (ref 60–200)
T4 SERPL-MCNC: 8.7 UG/DL (ref 4.5–11.1)
TRIGL SERPL-MCNC: 247 MG/DL (ref 0–149)
VARICELLA ZOSTER IGG INDEX: >8 IA
VLDL: 49 MG/DL (ref 0–40)
VZV IGG SER QL IA: POSITIVE
WBC # BLD AUTO: 6.5 X10*3/UL (ref 4.4–11.3)
WBC # BLD AUTO: 7.8 X10*3/UL (ref 4.4–11.3)

## 2023-10-05 PROCEDURE — 86481 TB AG RESPONSE T-CELL SUSP: CPT | Performed by: INTERNAL MEDICINE

## 2023-10-05 PROCEDURE — 96372 THER/PROPH/DIAG INJ SC/IM: CPT | Performed by: STUDENT IN AN ORGANIZED HEALTH CARE EDUCATION/TRAINING PROGRAM

## 2023-10-05 PROCEDURE — 86765 RUBEOLA ANTIBODY: CPT | Performed by: INTERNAL MEDICINE

## 2023-10-05 PROCEDURE — 81001 URINALYSIS AUTO W/SCOPE: CPT | Performed by: INTERNAL MEDICINE

## 2023-10-05 PROCEDURE — 76700 US EXAM ABDOM COMPLETE: CPT

## 2023-10-05 PROCEDURE — 86645 CMV ANTIBODY IGM: CPT | Performed by: INTERNAL MEDICINE

## 2023-10-05 PROCEDURE — 86778 TOXOPLASMA ANTIBODY IGM: CPT | Performed by: INTERNAL MEDICINE

## 2023-10-05 PROCEDURE — 82805 BLOOD GASES W/O2 SATURATION: CPT | Performed by: STUDENT IN AN ORGANIZED HEALTH CARE EDUCATION/TRAINING PROGRAM

## 2023-10-05 PROCEDURE — 99291 CRITICAL CARE FIRST HOUR: CPT | Performed by: INTERNAL MEDICINE

## 2023-10-05 PROCEDURE — 86695 HERPES SIMPLEX TYPE 1 TEST: CPT | Performed by: INTERNAL MEDICINE

## 2023-10-05 PROCEDURE — 86147 CARDIOLIPIN ANTIBODY EA IG: CPT | Performed by: INTERNAL MEDICINE

## 2023-10-05 PROCEDURE — 86787 VARICELLA-ZOSTER ANTIBODY: CPT | Performed by: INTERNAL MEDICINE

## 2023-10-05 PROCEDURE — 71250 CT THORAX DX C-: CPT | Performed by: RADIOLOGY

## 2023-10-05 PROCEDURE — 84702 CHORIONIC GONADOTROPIN TEST: CPT | Performed by: INTERNAL MEDICINE

## 2023-10-05 PROCEDURE — 97130 THER IVNTJ EA ADDL 15 MIN: CPT | Mod: GO

## 2023-10-05 PROCEDURE — 86780 TREPONEMA PALLIDUM: CPT | Performed by: INTERNAL MEDICINE

## 2023-10-05 PROCEDURE — 84480 ASSAY TRIIODOTHYRONINE (T3): CPT | Performed by: INTERNAL MEDICINE

## 2023-10-05 PROCEDURE — 86317 IMMUNOASSAY INFECTIOUS AGENT: CPT | Performed by: INTERNAL MEDICINE

## 2023-10-05 PROCEDURE — 2500000001 HC RX 250 WO HCPCS SELF ADMINISTERED DRUGS (ALT 637 FOR MEDICARE OP): Performed by: NURSE PRACTITIONER

## 2023-10-05 PROCEDURE — 2500000004 HC RX 250 GENERAL PHARMACY W/ HCPCS (ALT 636 FOR OP/ED): Performed by: STUDENT IN AN ORGANIZED HEALTH CARE EDUCATION/TRAINING PROGRAM

## 2023-10-05 PROCEDURE — 86704 HEP B CORE ANTIBODY TOTAL: CPT | Performed by: INTERNAL MEDICINE

## 2023-10-05 PROCEDURE — 81241 F5 GENE: CPT | Performed by: INTERNAL MEDICINE

## 2023-10-05 PROCEDURE — 86663 EPSTEIN-BARR ANTIBODY: CPT | Performed by: INTERNAL MEDICINE

## 2023-10-05 PROCEDURE — 82784 ASSAY IGA/IGD/IGG/IGM EACH: CPT | Performed by: INTERNAL MEDICINE

## 2023-10-05 PROCEDURE — 96372 THER/PROPH/DIAG INJ SC/IM: CPT | Performed by: PHYSICIAN ASSISTANT

## 2023-10-05 PROCEDURE — 86318 IA INFECTIOUS AGENT ANTIBODY: CPT | Performed by: INTERNAL MEDICINE

## 2023-10-05 PROCEDURE — 71045 X-RAY EXAM CHEST 1 VIEW: CPT | Performed by: RADIOLOGY

## 2023-10-05 PROCEDURE — 80307 DRUG TEST PRSMV CHEM ANLYZR: CPT | Performed by: INTERNAL MEDICINE

## 2023-10-05 PROCEDURE — 85302 CLOT INHIBIT PROT C ANTIGEN: CPT | Performed by: INTERNAL MEDICINE

## 2023-10-05 PROCEDURE — 82805 BLOOD GASES W/O2 SATURATION: CPT

## 2023-10-05 PROCEDURE — 70355 PANORAMIC X-RAY OF JAWS: CPT | Performed by: INTERNAL MEDICINE

## 2023-10-05 PROCEDURE — 86665 EPSTEIN-BARR CAPSID VCA: CPT | Performed by: INTERNAL MEDICINE

## 2023-10-05 PROCEDURE — 2500000002 HC RX 250 W HCPCS SELF ADMINISTERED DRUGS (ALT 637 FOR MEDICARE OP, ALT 636 FOR OP/ED): Performed by: INTERNAL MEDICINE

## 2023-10-05 PROCEDURE — 2500000002 HC RX 250 W HCPCS SELF ADMINISTERED DRUGS (ALT 637 FOR MEDICARE OP, ALT 636 FOR OP/ED): Performed by: STUDENT IN AN ORGANIZED HEALTH CARE EDUCATION/TRAINING PROGRAM

## 2023-10-05 PROCEDURE — 97165 OT EVAL LOW COMPLEX 30 MIN: CPT | Mod: GO

## 2023-10-05 PROCEDURE — 97129 THER IVNTJ 1ST 15 MIN: CPT | Mod: GO

## 2023-10-05 PROCEDURE — 83735 ASSAY OF MAGNESIUM: CPT | Performed by: INTERNAL MEDICINE

## 2023-10-05 PROCEDURE — 84153 ASSAY OF PSA TOTAL: CPT | Performed by: INTERNAL MEDICINE

## 2023-10-05 PROCEDURE — 85305 CLOT INHIBIT PROT S TOTAL: CPT | Performed by: INTERNAL MEDICINE

## 2023-10-05 PROCEDURE — 2500000004 HC RX 250 GENERAL PHARMACY W/ HCPCS (ALT 636 FOR OP/ED)

## 2023-10-05 PROCEDURE — 80323 ALKALOIDS NOS: CPT | Performed by: INTERNAL MEDICINE

## 2023-10-05 PROCEDURE — 86777 TOXOPLASMA ANTIBODY: CPT | Performed by: INTERNAL MEDICINE

## 2023-10-05 PROCEDURE — 86706 HEP B SURFACE ANTIBODY: CPT | Performed by: INTERNAL MEDICINE

## 2023-10-05 PROCEDURE — 71045 X-RAY EXAM CHEST 1 VIEW: CPT

## 2023-10-05 PROCEDURE — 2020000001 HC ICU ROOM DAILY

## 2023-10-05 PROCEDURE — 85027 COMPLETE CBC AUTOMATED: CPT | Performed by: STUDENT IN AN ORGANIZED HEALTH CARE EDUCATION/TRAINING PROGRAM

## 2023-10-05 PROCEDURE — 83615 LACTATE (LD) (LDH) ENZYME: CPT | Performed by: INTERNAL MEDICINE

## 2023-10-05 PROCEDURE — 2500000001 HC RX 250 WO HCPCS SELF ADMINISTERED DRUGS (ALT 637 FOR MEDICARE OP): Performed by: STUDENT IN AN ORGANIZED HEALTH CARE EDUCATION/TRAINING PROGRAM

## 2023-10-05 PROCEDURE — 86644 CMV ANTIBODY: CPT | Performed by: INTERNAL MEDICINE

## 2023-10-05 PROCEDURE — 84436 ASSAY OF TOTAL THYROXINE: CPT | Performed by: INTERNAL MEDICINE

## 2023-10-05 PROCEDURE — 87389 HIV-1 AG W/HIV-1&-2 AB AG IA: CPT | Performed by: INTERNAL MEDICINE

## 2023-10-05 PROCEDURE — 84134 ASSAY OF PREALBUMIN: CPT | Performed by: INTERNAL MEDICINE

## 2023-10-05 PROCEDURE — 86708 HEPATITIS A ANTIBODY: CPT | Performed by: INTERNAL MEDICINE

## 2023-10-05 PROCEDURE — 83605 ASSAY OF LACTIC ACID: CPT

## 2023-10-05 PROCEDURE — 86803 HEPATITIS C AB TEST: CPT | Performed by: INTERNAL MEDICINE

## 2023-10-05 PROCEDURE — 85610 PROTHROMBIN TIME: CPT | Performed by: INTERNAL MEDICINE

## 2023-10-05 PROCEDURE — 86901 BLOOD TYPING SEROLOGIC RH(D): CPT | Performed by: INTERNAL MEDICINE

## 2023-10-05 PROCEDURE — 83880 ASSAY OF NATRIURETIC PEPTIDE: CPT | Performed by: INTERNAL MEDICINE

## 2023-10-05 PROCEDURE — 99221 1ST HOSP IP/OBS SF/LOW 40: CPT | Performed by: PHYSICIAN ASSISTANT

## 2023-10-05 PROCEDURE — 2500000002 HC RX 250 W HCPCS SELF ADMINISTERED DRUGS (ALT 637 FOR MEDICARE OP, ALT 636 FOR OP/ED): Performed by: PHYSICIAN ASSISTANT

## 2023-10-05 PROCEDURE — 96372 THER/PROPH/DIAG INJ SC/IM: CPT | Performed by: INTERNAL MEDICINE

## 2023-10-05 PROCEDURE — 80061 LIPID PANEL: CPT | Performed by: STUDENT IN AN ORGANIZED HEALTH CARE EDUCATION/TRAINING PROGRAM

## 2023-10-05 PROCEDURE — 80053 COMPREHEN METABOLIC PANEL: CPT | Performed by: STUDENT IN AN ORGANIZED HEALTH CARE EDUCATION/TRAINING PROGRAM

## 2023-10-05 PROCEDURE — 85025 COMPLETE CBC W/AUTO DIFF WBC: CPT | Performed by: INTERNAL MEDICINE

## 2023-10-05 PROCEDURE — 87522 HEPATITIS C REVRS TRNSCRPJ: CPT | Performed by: INTERNAL MEDICINE

## 2023-10-05 PROCEDURE — 87340 HEPATITIS B SURFACE AG IA: CPT | Performed by: INTERNAL MEDICINE

## 2023-10-05 PROCEDURE — 86735 MUMPS ANTIBODY: CPT | Performed by: INTERNAL MEDICINE

## 2023-10-05 PROCEDURE — 82947 ASSAY GLUCOSE BLOOD QUANT: CPT

## 2023-10-05 PROCEDURE — 76700 US EXAM ABDOM COMPLETE: CPT | Performed by: RADIOLOGY

## 2023-10-05 RX ORDER — ACETAMINOPHEN 325 MG/1
650 TABLET ORAL EVERY 4 HOURS PRN
Status: DISCONTINUED | OUTPATIENT
Start: 2023-10-05 | End: 2023-10-06

## 2023-10-05 RX ORDER — INSULIN LISPRO 100 [IU]/ML
0-15 INJECTION, SOLUTION INTRAVENOUS; SUBCUTANEOUS
Status: DISCONTINUED | OUTPATIENT
Start: 2023-10-05 | End: 2023-10-06

## 2023-10-05 RX ORDER — DULAGLUTIDE 1.5 MG/.5ML
0.5 INJECTION, SOLUTION SUBCUTANEOUS
COMMUNITY
Start: 2023-07-19 | End: 2023-10-23 | Stop reason: DRUGHIGH

## 2023-10-05 RX ORDER — LANCETS
EACH MISCELLANEOUS
COMMUNITY
Start: 2023-03-10 | End: 2024-04-08 | Stop reason: SDUPTHER

## 2023-10-05 RX ORDER — ACETAMINOPHEN 160 MG/5ML
650 SOLUTION ORAL EVERY 4 HOURS PRN
Status: DISCONTINUED | OUTPATIENT
Start: 2023-10-05 | End: 2023-10-06

## 2023-10-05 RX ORDER — DEXTROSE MONOHYDRATE 100 MG/ML
0.3 INJECTION, SOLUTION INTRAVENOUS ONCE AS NEEDED
Status: DISCONTINUED | OUTPATIENT
Start: 2023-10-05 | End: 2023-10-17 | Stop reason: HOSPADM

## 2023-10-05 RX ORDER — METOPROLOL SUCCINATE 50 MG/1
150 TABLET, EXTENDED RELEASE ORAL
COMMUNITY
Start: 2023-09-27 | End: 2023-10-17 | Stop reason: HOSPADM

## 2023-10-05 RX ORDER — INSULIN LISPRO 100 [IU]/ML
0-5 INJECTION, SOLUTION INTRAVENOUS; SUBCUTANEOUS
Status: DISCONTINUED | OUTPATIENT
Start: 2023-10-05 | End: 2023-10-05

## 2023-10-05 RX ORDER — SIMETHICONE 80 MG
80 TABLET,CHEWABLE ORAL EVERY 6 HOURS PRN
COMMUNITY
Start: 2023-03-10 | End: 2023-12-19 | Stop reason: WASHOUT

## 2023-10-05 RX ORDER — INSULIN GLARGINE 100 [IU]/ML
40 INJECTION, SOLUTION SUBCUTANEOUS EVERY 24 HOURS
Status: DISCONTINUED | OUTPATIENT
Start: 2023-10-06 | End: 2023-10-06

## 2023-10-05 RX ORDER — ACETAMINOPHEN 650 MG/1
650 SUPPOSITORY RECTAL EVERY 4 HOURS PRN
Status: DISCONTINUED | OUTPATIENT
Start: 2023-10-05 | End: 2023-10-06

## 2023-10-05 RX ORDER — HYDRALAZINE HYDROCHLORIDE 25 MG/1
25 TABLET, FILM COATED ORAL 3 TIMES DAILY
Status: DISCONTINUED | OUTPATIENT
Start: 2023-10-05 | End: 2023-10-06

## 2023-10-05 RX ORDER — INSULIN GLARGINE 100 [IU]/ML
20 INJECTION, SOLUTION SUBCUTANEOUS ONCE
Status: COMPLETED | OUTPATIENT
Start: 2023-10-05 | End: 2023-10-05

## 2023-10-05 RX ORDER — BLOOD SUGAR DIAGNOSTIC
STRIP MISCELLANEOUS
COMMUNITY
Start: 2023-06-23 | End: 2024-04-08 | Stop reason: ALTCHOICE

## 2023-10-05 RX ORDER — SODIUM NITROPRUSSIDE IN 0.9% SODIUM CHLORIDE 0.5 MG/ML
0-5 INJECTION INTRAVENOUS CONTINUOUS
Status: DISCONTINUED | OUTPATIENT
Start: 2023-10-05 | End: 2023-10-06

## 2023-10-05 RX ORDER — INSULIN LISPRO 100 [IU]/ML
5 INJECTION, SOLUTION INTRAVENOUS; SUBCUTANEOUS
Status: DISCONTINUED | OUTPATIENT
Start: 2023-10-05 | End: 2023-10-07

## 2023-10-05 RX ORDER — DEXTROSE 50 % IN WATER (D50W) INTRAVENOUS SYRINGE
25
Status: DISCONTINUED | OUTPATIENT
Start: 2023-10-05 | End: 2023-10-17 | Stop reason: HOSPADM

## 2023-10-05 RX ORDER — FUROSEMIDE 40 MG/1
80 TABLET ORAL 2 TIMES DAILY
COMMUNITY
Start: 2023-09-26 | End: 2023-10-17 | Stop reason: HOSPADM

## 2023-10-05 RX ORDER — SPIRONOLACTONE 25 MG/1
25 TABLET ORAL
COMMUNITY
Start: 2023-09-27 | End: 2023-12-19 | Stop reason: SDUPTHER

## 2023-10-05 RX ORDER — INSULIN GLARGINE 100 [IU]/ML
10 INJECTION, SOLUTION SUBCUTANEOUS ONCE
Status: DISCONTINUED | OUTPATIENT
Start: 2023-10-05 | End: 2023-10-05

## 2023-10-05 RX ADMIN — SODIUM NITROPRUSSIDE 0.5 MCG/KG/MIN: 0.5 INJECTION, SOLUTION INTRAVENOUS at 08:07

## 2023-10-05 RX ADMIN — GABAPENTIN 300 MG: 300 CAPSULE ORAL at 08:08

## 2023-10-05 RX ADMIN — HEPARIN SODIUM 5000 UNITS: 5000 INJECTION INTRAVENOUS; SUBCUTANEOUS at 21:31

## 2023-10-05 RX ADMIN — HEPARIN SODIUM 5000 UNITS: 5000 INJECTION INTRAVENOUS; SUBCUTANEOUS at 06:20

## 2023-10-05 RX ADMIN — INSULIN LISPRO 5 UNITS: 100 INJECTION, SOLUTION INTRAVENOUS; SUBCUTANEOUS at 13:14

## 2023-10-05 RX ADMIN — ACETAMINOPHEN 650 MG: 325 TABLET ORAL at 18:25

## 2023-10-05 RX ADMIN — ASPIRIN 81 MG CHEWABLE TABLET 81 MG: 81 TABLET CHEWABLE at 08:08

## 2023-10-05 RX ADMIN — LEVOTHYROXINE SODIUM 50 MCG: 50 TABLET ORAL at 06:20

## 2023-10-05 RX ADMIN — INSULIN LISPRO 6 UNITS: 100 INJECTION, SOLUTION INTRAVENOUS; SUBCUTANEOUS at 11:44

## 2023-10-05 RX ADMIN — ATORVASTATIN CALCIUM 80 MG: 80 TABLET ORAL at 08:08

## 2023-10-05 RX ADMIN — INSULIN LISPRO 5 UNITS: 100 INJECTION, SOLUTION INTRAVENOUS; SUBCUTANEOUS at 17:13

## 2023-10-05 RX ADMIN — INSULIN GLARGINE 20 UNITS: 100 INJECTION, SOLUTION SUBCUTANEOUS at 18:42

## 2023-10-05 RX ADMIN — GABAPENTIN 600 MG: 300 CAPSULE ORAL at 21:31

## 2023-10-05 RX ADMIN — HEPARIN SODIUM 5000 UNITS: 5000 INJECTION INTRAVENOUS; SUBCUTANEOUS at 14:00

## 2023-10-05 RX ADMIN — INSULIN LISPRO 12 UNITS: 100 INJECTION, SOLUTION INTRAVENOUS; SUBCUTANEOUS at 17:15

## 2023-10-05 RX ADMIN — HYDRALAZINE HYDROCHLORIDE 25 MG: 25 TABLET, FILM COATED ORAL at 21:32

## 2023-10-05 RX ADMIN — SODIUM NITROPRUSSIDE 1.5 MCG/KG/MIN: 0.5 INJECTION, SOLUTION INTRAVENOUS at 21:30

## 2023-10-05 RX ADMIN — QUETIAPINE FUMARATE 50 MG: 25 TABLET ORAL at 21:32

## 2023-10-05 RX ADMIN — INSULIN GLARGINE 20 UNITS: 100 INJECTION, SOLUTION SUBCUTANEOUS at 09:30

## 2023-10-05 RX ADMIN — PANTOPRAZOLE SODIUM 40 MG: 40 TABLET, DELAYED RELEASE ORAL at 06:20

## 2023-10-05 ASSESSMENT — COGNITIVE AND FUNCTIONAL STATUS - GENERAL
DAILY ACTIVITIY SCORE: 21
DRESSING REGULAR LOWER BODY CLOTHING: A LITTLE
TOILETING: A LITTLE
HELP NEEDED FOR BATHING: A LITTLE

## 2023-10-05 ASSESSMENT — PAIN SCALES - GENERAL
PAINLEVEL_OUTOF10: 0 - NO PAIN
PAINLEVEL_OUTOF10: 4
PAINLEVEL_OUTOF10: 4
PAINLEVEL_OUTOF10: 0 - NO PAIN

## 2023-10-05 ASSESSMENT — PAIN - FUNCTIONAL ASSESSMENT
PAIN_FUNCTIONAL_ASSESSMENT: 0-10

## 2023-10-05 ASSESSMENT — ENCOUNTER SYMPTOMS
MUSCULOSKELETAL NEGATIVE: 1
CARDIOVASCULAR NEGATIVE: 1
GASTROINTESTINAL NEGATIVE: 1
FATIGUE: 1
EYES NEGATIVE: 1
ENDOCRINE NEGATIVE: 1
SHORTNESS OF BREATH: 1
APPETITE CHANGE: 1

## 2023-10-05 ASSESSMENT — ACTIVITIES OF DAILY LIVING (ADL)
ADL_ASSISTANCE: INDEPENDENT
BATHING_ASSISTANCE: STAND BY

## 2023-10-05 NOTE — PROGRESS NOTES
Transplant/LVAD Education Consent:   Topic: Pre Advanced Therapy Patient Education   Attendees: Ralph Law (patient); Mesha Sotelo (RN Coordinator)     Patient given the [facility] Pre Advanced Therapy Education binder. Patient received education regarding the following topics for their pre Heart Transplant and pre Left Ventricular Assist Device evaluation:    The evaluation process including advanced therapy team members and roles, required testing and consults, selection criteria and suitability for OHT and LVAD, OHT listing process and organ offer, hands on LVAD equipment review, psychological and financial considerations for a successful outcome with advanced therapies, and patient responsibilities including adherence to a strict medical regimen.    An overview of the surgical procedure for OHT and LVAD.    The potential for certain risk factors including infection, pneumonia, blood clot formation, organ rejection, failure, and possibility of re transplantation, lifetime immunosuppression and associated risks with OHT, arrhythmias and cardiovascular collapse, multi-organ system failure, depression, post- traumatic stress disorder, anxiety, issues of dependence or feelings of guilt, right ventricular failure with LVAD, and death.   National and Transplant Reading outcomes for one year patient and graft survival from the most recent SRTR program specific report.    Donor risk factors that could affect the success of the transplant and health of the patient including donor age, donor medical and social history, condition of the organ, and the risk of oswaldo cancer, HIV, Hepatitis B or C, and/or malaria if the infection is not detectable at the time of donation.    Transplants not performed in a Medicare-approved transplant center could affect the patient's ability to have immunosuppression medication paid for under Medicare part B.    STS Intermacs and  LVAD implant outcomes for one year  patient survival.    The medical necessity of electricity and a working telephone with implantation of the LVAD.    Available alternatives to OHT and LVAD.    The patient's right to withdraw consent for the evaluations at any time during the process.      Patient was given the opportunity to have questions answered.   Consent signed for Heart Transplant and LVAD? : Yes  If no, which consent was signed? : OHT/VAD  Current barriers: language; education given via MARRTI  Consent obtained by: Mesha Sotelo RN BSN

## 2023-10-05 NOTE — PROGRESS NOTES
Occupational Therapy    Evaluation/Treatment    Patient Name: Felice Law  MRN: [id number]  : 1965  Today's Date: 10/05/23  Time Calculation  Start Time: 1203  Stop Time: 1251  Time Calculation (min): 48 min       Assessment:  OT Assessment: impaired ADLs/transfers  Prognosis: Excellent  Medical Staff Made Aware: Yes (RN)  OT Assessment Results: Decreased ADL status, Decreased upper extremity strength, Decreased cognition, Decreased endurance, Decreased functional mobility, Decreased IADLs  Prognosis: Excellent  Medical Staff Made Aware: Yes (RN)  Plan:  Treatment Interventions: ADL retraining, Functional transfer training, UE strengthening/ROM, Endurance training, Cognitive reorientation, Patient/family training  OT Frequency: 2 times per week  OT Discharge Recommendations: Low intensity level of continued care  OT Recommended Transfer Status:  (CGA x1)  Treatment Interventions: ADL retraining, Functional transfer training, UE strengthening/ROM, Endurance training, Cognitive reorientation, Patient/family training    Subjective   Current Problem:  1. Acute decompensated heart failure (CMS/HCC)  Vascular US carotid artery duplex bilateral    Vascular US aorta iliac duplex limited    Vascular US carotid artery duplex bilateral    Vascular US aorta iliac duplex limited    Vascular US Ankle Brachial Index (MARK) Without Exercise    Vascular US Ankle Brachial Index (MARK) Without Exercise    CANCELED: Vascular US lower extremity arterial duplex bilateral with MARK    CANCELED: Vascular US lower extremity arterial duplex bilateral with MARK      2. Other specified symptoms and signs involving the circulatory and respiratory systems  Vascular US carotid artery duplex bilateral    Vascular US aorta iliac duplex limited    Vascular US carotid artery duplex bilateral    Vascular US aorta iliac duplex limited    Vascular US Ankle Brachial Index (MARK) Without Exercise    Vascular US Ankle Brachial Index (MARK) Without  Exercise    CANCELED: Vascular US lower extremity arterial duplex bilateral with MARK    CANCELED: Vascular US lower extremity arterial duplex bilateral with MARK        General:   OT Received On: 10/05/23  General  Reason for Referral: SOB, LE edema, advanced therapy work-up  Past Medical History Relevant to Rehab: HFrEF/ICM (LVEF 20% 8/14/23), CAD s/p PCI and CABG (LIMA-LAD with balloon pump support April 2023), Hx of cardiac arrest s/p VT ablation + ICD, HTN/HLD, CKD, Hx of DVT (L femoral) on Eliquis, hypothyroidism, pulmonary hypertension, T2DM, JAKE/OHS, former tobacco abuse (24 pack year, quit 2017)  Family/Caregiver Present: No  Prior to Session Communication: Bedside nurse (HF team)  Patient Position Received: Alarm off, not on at start of session (sitting in recliner chair)  General Comment: Patient pleasant and agreeable to OT. Patient able to communicate with basic English, utilized rest[place]polis translate to assist with Surinamese translation during session as needed  Precautions:  Hearing/Visual Limitations: hearing WFL  Medical Precautions: Cardiac precautions, Fall precautions  Vital Signs:  Heart Rate:  (pre 115, post 117)  Resp:  (pre 14, post 18)  SpO2:  (pre 91, post 95; on room air)  BP:  (pre 99/62, during 90/72, post 102/69)  MAP (mmHg):  (pre 74)  BP Location: Left arm  BP Method: Automatic  Pain:  Pain Assessment  Pain Assessment: 0-10  Pain Score: 0 - No pain    Objective   Cognition:  Overall Cognitive Status: Within Functional Limits  Orientation Level:  (oriented x3)     Confusion Assessment Method (CAM)  Acute Onset and Fluctuating Course (1A):  (CAM-ICU (-))     Home Living:  Type of Home: House  Lives With:  (mother, brother and sister)  Home Adaptive Equipment:  (cane, walker, shower chair, (B)LE AFOs)  Home Layout: One level  Home Access:  (3 SHEA, no rail)  Bathroom Shower/Tub: Tub/shower unit  Prior Function:  Level of [place]: Independent with ADLs and functional transfers, Independent  with homemaking with ambulation  Receives Help From: Family  ADL Assistance: Independent  Homemaking Assistance:  (patient is independent in cooking and cleaning, reports able to assist with yard work using the anuj but unable to use the lawnmower because of reducted activity tolerance)  Ambulatory Assistance: Independent (no AD in his house, uses cane in community, wears (B)LE AFOs in shoes)  Vocational:  (does not work)  Leisure: enjoys cooking  Hand Dominance: Right  IADL History:  Current License:  (patient reports he drives)  ADL:  Eating Assistance: Independent  Eating Deficit: Setup  Grooming Assistance: Independent  Grooming Deficit: Setup (sitting)  Bathing Assistance: Stand by  Bathing Deficit: Setup, Supervision/safety (anticipated)  UE Dressing Assistance: Independent  UE Dressing Deficit:  (to manage gown)  LE Dressing Assistance: Stand by  LE Dressing Deficit: Don/doff R sock, Don/doff L sock (sitting in chair)  Toileting Assistance with Device: Stand by  Toileting Deficit: Supervison/safety (anticipated)  Activities of Daily Living:   Activity Tolerance:  Activity Tolerance Comments: static sitting: good, dynamic sitting: good, static standing: CGA-SBA, dynamic standing: CGA, x1 episode of min A for slight LOB  Functional Standing Tolerance:     Bed Mobility/Transfers:      Transfers  Transfer: No (anticipate SBA)  Transfer 1  Transfer From 1: Sit to, Stand to  Transfer to 1: Sit, Stand  Technique 1: Sit to stand, Stand to sit  Transfer Device 1:  (patient used IV pole)  Transfer Level of Assistance 1: Contact guard, Minimal verbal cues  Trials/Comments 1: patient completed functional mobility short household distances with patient pushing IV pole and CGA, x1 episode of slight LOB requiring min A       Cognitive Skill Development:  Cognitive Skill Development Activity 1: TREATMENT: patient completed MOCA v 8.3 as part of advanced therapies work-up; normal cognition is a score of >/= 26/30;  "patient's score = 20/30 indicating deficit; patient with deficits in visuospatial/exective, attention, language fluency and delayed recall; utilized English and use of google translate Romanian to complete MOCA and patient demonstrated understanding when using google translate. Increased time to complete to use Romanian translation. Patient stated not being able to complete serial 7's due to not having paper or a calculator and when given language fluency task patient stated 3 words then said \"they all left my brain\" and could not think of any other words. Patient was able to recall 4/5 words in delayed recall activity and recalled 5th word with multiple choice. Patient would benefit from cognitive retraining activities.  Community/Work Reintegration Training:     Community Mobility:     Vision:Vision - Basic Assessment  Current Vision: Wears glasses all the time  Sensation:     Strength:  Strength Comments:  (Using dynamometer: (R)  avg = 30 kg, (L)  avg = 20.3 kg; normal for males ages 55-59 is 30.7-48.5 kg; patient with weakness (B) )  Other Activity:     Home Environment:     Perception:     Coordination:  Movements are Fluid and Coordinated: Yes   Hand Function:  Hand Function  Gross Grasp:  ((B)  weak however functional)  Extremities: RUE   RUE :  ((R) shoulder 0-90 degrees 2/2 [place], (R) distal joints WFL), LUE   LUE: Within Functional Limits (AROM), RLE   RLE :  (patient unable to dorsiflex (R) ankle), and LLE   LLE :  (patient unable to dorsiflex (L) ankle)      Outcome Measures: [facility] Daily Activity  Putting on and taking off regular lower body clothing: A little  Bathing (including washing, rinsing, drying): A little  Putting on and taking off regular upper body clothing: None  Toileting, which includes using toilet, bedpan or urinal: A little  Taking care of personal grooming such as brushing teeth: None  Eating Meals: None  Daily Activity - Total Score: 21        , Brief Confusion " Assessment Method (bCAM)  CAM Result: CAM -    ,  ,    ,      , and E = Exercise and Early Mobility  Current Activity: Ambulating in room    Education Documentation  ADL Training, taught by Yumi Ram OT at 10/5/2023  3:36 PM.  Learner: Patient  Readiness: Eager  Method: Explanation  Response: Verbalizes Understanding, Needs Reinforcement    Education Comments  No comments found.        OP EDUCATION:       Goals:  Encounter Problems       Encounter Problems (Active)       ADLs       Patient with complete lower body dressing with modified independent level of assistance donning and doffing all LE clothes  with PRN adaptive equipment. (Progressing)       Start:  10/05/23    Expected End:  10/19/23            Patient will complete daily grooming tasks with independent level of assistance and PRN adaptive equipment while standing. (Progressing)       Start:  10/05/23    Expected End:  10/19/23            Patient will complete toileting including hygiene clothing management/hygiene with independent level of assistance. (Progressing)       Start:  10/05/23    Expected End:  10/19/23            Patient will perform simulated IADL/homemaking tasks including item retrieval and transport utilizing EC/WS principles as needed with mod (I). (Progressing)       Start:  10/05/23    Expected End:  10/19/23               BALANCE       Pt will maintain dynamic standing balance during ADL task with modified independent level of assistance in order to demonstrate decreased risk of falling and improved postural control. (Progressing)       Start:  10/05/23    Expected End:  10/19/23               COGNITION/SAFETY       Patient will participate in cognitive activities to score WFL on standardized cognitive assessment with no cues and within reasonable time frame (Progressing)       Start:  10/05/23    Expected End:  10/19/23               EXERCISE/STRENGTHENING       Patient with increase (B)  to WFL strength. (Progressing)        Start:  10/05/23    Expected End:  10/19/23               MOBILITY       Patient will perform Functional mobility Household distances/Community Distances with modified independent level of assistance and least restrictive device in order to improve safety and functional mobility. (Progressing)       Start:  10/05/23    Expected End:  10/19/23               TRANSFERS       Patient will perform bed mobility independent level of assistance in order to improve safety and independence with mobility (Progressing)       Start:  10/05/23    Expected End:  10/19/23            Patient will complete functional transfers with least restrictive device with modified independent level of assistance. (Progressing)       Start:  10/05/23    Expected End:  10/19/23

## 2023-10-05 NOTE — CARE PLAN
Problem: COGNITION/SAFETY  Goal: Patient will participate in cognitive activities to score WFL on standardized cognitive assessment with no cues and within reasonable time frame  Outcome: Progressing     Problem: ADLs  Goal: Patient with complete lower body dressing with modified independent level of assistance donning and doffing all LE clothes  with PRN adaptive equipment.  Outcome: Progressing  Goal: Patient will complete daily grooming tasks with independent level of assistance and PRN adaptive equipment while standing.  Outcome: Progressing  Goal: Patient will complete toileting including hygiene clothing management/hygiene with independent level of assistance.  Outcome: Progressing  Goal: Patient will perform simulated IADL/homemaking tasks including item retrieval and transport utilizing EC/WS principles as needed with mod (I).  Outcome: Progressing     Problem: EXERCISE/STRENGTHENING  Goal: Patient with increase (B)  to WFL strength.  Outcome: Progressing     Problem: TRANSFERS  Goal: Patient will perform bed mobility independent level of assistance in order to improve safety and independence with mobility  Outcome: Progressing  Goal: Patient will complete functional transfers with least restrictive device with modified independent level of assistance.  Outcome: Progressing     Problem: MOBILITY  Goal: Patient will perform Functional mobility Household distances/Community Distances with modified independent level of assistance and least restrictive device in order to improve safety and functional mobility.  Outcome: Progressing     Problem: BALANCE  Goal: Pt will maintain dynamic standing balance during ADL task with modified independent level of assistance in order to demonstrate decreased risk of falling and improved postural control.  Outcome: Progressing

## 2023-10-05 NOTE — CONSULTS
Inpatient consult to Endocrinology  Consult performed by: Angelic Grier PA-C  Consult ordered by: Omar Barlow MD  Reason for consult: Daiabetes management          Reason For Consult  DM2 with hyperglycemia  - A1C 8.1% 10/4/23  - home DM regimen : trulicity 1.5mg qweek  - managed by: PCP    History Of Present Illness  Felice Law is a 58 y.o. male presenting with 58 year old [place] male with history of HFrEF/ICM (LVEF 20% 8/14/23), CAD s/p PCI and CABG (LIMA-LAD with balloon pump support April 2023), Hx of cardiac arrest s/p VT ablation + ICD, HTN/HLD, CKD, Hx of DVT (L femoral) on Eliquis, hypothyroidism, pulmonary hypertension, T2DM, JAKE/OHS, former tobacco abuse (24 pack year, quit 2017), presented to [facility] on 10/1 with exertional left sided chest pain and SOB x 3 days. He reported orthopnea  as well. His home diuretic regimen with lasix and diamox was not controlling his symptoms. He noticed swelling up to his thighs and abdominal distention without pain. He also reports having worsening urine output for a few days before admission as well.       He referred for transfer to [facility] for advanced therapy evaluation.        Past Medical History  He has a past medical history of CHF (congestive heart failure) (CMS/[facility]), COPD (chronic obstructive pulmonary disease) (CMS/[facility]), Coronary artery disease, Diabetes mellitus (CMS/[facility]), Disease of thyroid gland, and Hypertension.    Surgical History  He has a past surgical history that includes Coronary artery bypass graft; Cardiac defibrillator placement; and Coronary angioplasty with stent.     Social History  He reports that he quit smoking about 6 years ago. His smoking use included cigarettes. He has a 24.00 pack-year smoking history. He does not have any smokeless tobacco history on file. He reports that he does not drink alcohol and does not use drugs.    Family History  Mother and sister have type 2 diabetes, father had depression, sister had  multiple hemorrhaging strokes     Allergies  Azithromycin    Review of Systems   Constitutional:  Positive for appetite change and fatigue.   HENT: Negative.     Eyes: Negative.    Respiratory:  Positive for shortness of breath.    Cardiovascular: Negative.    Gastrointestinal: Negative.    Endocrine: Negative.    Genitourinary: Negative.    Musculoskeletal: Negative.    Skin: Negative.         Physical Exam  Constitutional:       Appearance: Normal appearance. He is normal weight. He is ill-appearing.      Comments: Interview conducted in English, patient was specifically asked if he prefers to use  service when he has medical questions. he does prefer use of [employer]  service to address more complex medical questions   HENT:      Head: Normocephalic and atraumatic.      Nose: Nose normal.      Mouth/Throat:      Mouth: Mucous membranes are moist.      Pharynx: Oropharynx is clear.   Eyes:      Extraocular Movements: Extraocular movements intact.      Conjunctiva/sclera: Conjunctivae normal.   Neck:      Comments: Catheter in right neck  Cardiovascular:      Rate and Rhythm: Normal rate and regular rhythm.      Pulses: Normal pulses.      Heart sounds: Normal heart sounds.   Pulmonary:      Effort: Respiratory distress present.      Breath sounds: Normal breath sounds.   Abdominal:      General: Abdomen is flat.      Palpations: Abdomen is soft.   Musculoskeletal:         General: No swelling. Normal range of motion.      Cervical back: Neck supple.   Skin:     General: Skin is warm and dry.   Neurological:      General: No focal deficit present.      Mental Status: He is alert and oriented to person, place, and time. Mental status is at baseline.   Psychiatric:         Mood and Affect: Mood normal.         Behavior: Behavior normal.          ROS, PMH, FH/SH, surgical history and allergies have been reviewed.    Last Recorded Vitals  Blood pressure 94/71, pulse (!) 123, temperature 36.2 °C (97.2  "°F), resp. rate 18, height 1.803 m (5' 11\"), weight 107 kg (234 lb 12.6 oz), SpO2 93 %.    Relevant Results  Results from last 7 days   Lab Units 10/05/23  1713 10/05/23  1647 10/05/23  1131 10/05/23  0829 10/05/23  0354 10/05/23  0013 10/04/23  1950 10/04/23  1538 10/04/23  1518   POCT GLUCOSE mg/dL 307*  --  227* 228*  --  267* 245*   < >  --    GLUCOSE mg/dL  --  296*  --   --  240*  --   --   --  198*    < > = values in this interval not displayed.        Scheduled medications  aspirin, 81 mg, oral, Daily  atorvastatin, 80 mg, oral, Daily  gabapentin, 300 mg, oral, q AM  gabapentin, 600 mg, oral, Nightly  heparin (porcine), 5,000 Units, subcutaneous, q8h  hydrALAZINE, 25 mg, oral, TID  [START ON 10/6/2023] insulin glargine, 40 Units, subcutaneous, q24h  insulin lispro, 0-15 Units, subcutaneous, TID with meals  insulin lispro, 5 Units, subcutaneous, TID with meals  levothyroxine, 50 mcg, oral, Daily  pantoprazole, 40 mg, oral, Daily before breakfast  QUEtiapine, 50 mg, oral, Nightly      Continuous medications  DOBUTamine, 2.5 mcg/kg/min, Last Rate: Stopped (10/05/23 1754)  furosemide, 5 mg/hr, Last Rate: 5 mg/hr (10/05/23 1754)  nitroprusside, 0-5 mcg/kg/min, Last Rate: 1.5 mcg/kg/min (10/05/23 1744)      PRN medications  PRN medications: acetaminophen **OR** acetaminophen **OR** acetaminophen, albuterol, dextrose 10 % in water (D10W), dextrose, docusate sodium, glucagon    Results for orders placed or performed during the hospital encounter of 10/04/23 (from the past 24 hour(s))   POCT GLUCOSE   Result Value Ref Range    POCT Glucose 267 (H) 74 - 99 mg/dL   Renal function panel   Result Value Ref Range    Glucose 240 (H) 74 - 99 mg/dL    Sodium 137 136 - 145 mmol/L    Potassium 4.0 3.5 - 5.3 mmol/L    Chloride 93 (L) 98 - 107 mmol/L    Bicarbonate 31 21 - 32 mmol/L    Anion Gap 17 10 - 20 mmol/L    Urea Nitrogen 55 (H) 6 - 23 mg/dL    Creatinine 1.65 (H) 0.50 - 1.30 mg/dL    eGFR 48 (L) >60 mL/min/1.73m*2    " Calcium 10.0 8.6 - 10.6 mg/dL    Phosphorus 5.5 (H) 2.5 - 4.9 mg/dL    Albumin 3.9 3.4 - 5.0 g/dL   CBC   Result Value Ref Range    WBC 6.5 4.4 - 11.3 x10*3/uL    nRBC 0.0 0.0 - 0.0 /100 WBCs    RBC 5.32 4.50 - 5.90 x10*6/uL    Hemoglobin 12.8 (L) 13.5 - 17.5 g/dL    Hematocrit 41.0 41.0 - 52.0 %    MCV 77 (L) 80 - 100 fL    MCH 24.1 (L) 26.0 - 34.0 pg    MCHC 31.2 (L) 32.0 - 36.0 g/dL    RDW 18.8 (H) 11.5 - 14.5 %    Platelets 258 150 - 450 x10*3/uL    MPV 10.0 7.5 - 11.5 fL   Lipid Panel   Result Value Ref Range    Cholesterol 106 0 - 199 mg/dL    HDL-Cholesterol 24.5 mg/dL    Cholesterol/HDL Ratio 4.3     LDL Calculated 32 (L) 140 - 190 mg/dL    VLDL 49 (H) 0 - 40 mg/dL    Triglycerides 247 (H) 0 - 149 mg/dL    Non HDL Cholesterol 82 0 - 149 mg/dL   Lactate   Result Value Ref Range    Lactate 0.9 0.4 - 2.0 mmol/L   BLOOD GAS MIXED VENOUS   Result Value Ref Range    POCT pH, Mixed 7.44 (H) 7.33 - 7.43 pH    POCT pCO2, Mixed 51 41 - 51 mm Hg    POCT pO2, Mixed 50 (H) 35 - 45 mm Hg    POCT SO2, Mixed 72 45 - 75 %    POCT Oxy Hemoglobin, Mixed 71.0 45.0 - 75.0 %    POCT Base Excess, Mixed 8.8 (H) -2.0 - 3.0 mmol/L    POCT HCO3 Calculated, Mixed 34.6 (H) 22.0 - 26.0 mmol/L    Patient Temperature 37.0 degrees Celsius    FiO2 21 %   POCT GLUCOSE   Result Value Ref Range    POCT Glucose 228 (H) 74 - 99 mg/dL   POCT GLUCOSE   Result Value Ref Range    POCT Glucose 227 (H) 74 - 99 mg/dL   Blood Gas Mixed Venous Unsolicited   Result Value Ref Range    POCT pH, Mixed 7.44 (H) 7.33 - 7.43 pH    POCT pCO2, Mixed 46 41 - 51 mm Hg    POCT pO2, Mixed 44 35 - 45 mm Hg    POCT SO2, Mixed 62 45 - 75 %    POCT Oxy Hemoglobin, Mixed 60.1 45.0 - 75.0 %    POCT Base Excess, Mixed 6.1 (H) -2.0 - 3.0 mmol/L    POCT HCO3 Calculated, Mixed 31.2 (H) 22.0 - 26.0 mmol/L    Patient Temperature 37.0 degrees Celsius   B-Type Natriuretic Peptide   Result Value Ref Range     (H) 0 - 99 pg/mL   CBC and Auto Differential   Result Value Ref  Range    WBC 7.8 4.4 - 11.3 x10*3/uL    nRBC 0.0 0.0 - 0.0 /100 WBCs    RBC 5.34 4.50 - 5.90 x10*6/uL    Hemoglobin 12.9 (L) 13.5 - 17.5 g/dL    Hematocrit 40.8 (L) 41.0 - 52.0 %    MCV 76 (L) 80 - 100 fL    MCH 24.2 (L) 26.0 - 34.0 pg    MCHC 31.6 (L) 32.0 - 36.0 g/dL    RDW 19.2 (H) 11.5 - 14.5 %    Platelets 255 150 - 450 x10*3/uL    MPV 10.4 7.5 - 11.5 fL    Neutrophils % 78.8 40.0 - 80.0 %    Immature Granulocytes %, Automated 0.4 0.0 - 0.9 %    Lymphocytes % 7.3 13.0 - 44.0 %    Monocytes % 10.4 2.0 - 10.0 %    Eosinophils % 2.7 0.0 - 6.0 %    Basophils % 0.4 0.0 - 2.0 %    Neutrophils Absolute 6.17 1.20 - 7.70 x10*3/uL    Immature Granulocytes Absolute, Automated 0.03 0.00 - 0.70 x10*3/uL    Lymphocytes Absolute 0.57 (L) 1.20 - 4.80 x10*3/uL    Monocytes Absolute 0.81 0.10 - 1.00 x10*3/uL    Eosinophils Absolute 0.21 0.00 - 0.70 x10*3/uL    Basophils Absolute 0.03 0.00 - 0.10 x10*3/uL   Coagulation Screen   Result Value Ref Range    Protime 13.7 (H) 9.8 - 12.8 seconds    INR 1.2 (H) 0.9 - 1.1    aPTT 32 27 - 38 seconds   Comprehensive Metabolic Panel   Result Value Ref Range    Glucose 296 (H) 74 - 99 mg/dL    Sodium 132 (L) 136 - 145 mmol/L    Potassium 4.1 3.5 - 5.3 mmol/L    Chloride 90 (L) 98 - 107 mmol/L    Bicarbonate 28 21 - 32 mmol/L    Anion Gap 18 10 - 20 mmol/L    Urea Nitrogen 60 (H) 6 - 23 mg/dL    Creatinine 1.87 (H) 0.50 - 1.30 mg/dL    eGFR 41 (L) >60 mL/min/1.73m*2    Calcium 10.2 8.6 - 10.6 mg/dL    Albumin 4.1 3.4 - 5.0 g/dL    Alkaline Phosphatase 63 33 - 120 U/L    Total Protein 7.9 6.4 - 8.2 g/dL    AST 13 9 - 39 U/L    Bilirubin, Total 1.8 (H) 0.0 - 1.2 mg/dL    ALT 11 10 - 52 U/L   Hepatitis B Surface Antigen   Result Value Ref Range    Hepatitis B Surface AG Nonreactive Nonreactive   Hepatitis C Antibody   Result Value Ref Range    Hepatitis C AB Nonreactive Nonreactive   HIV 1/2 Antigen/Antibody Screen with Reflex to Confirmation   Result Value Ref Range    HIV 1/2 Antigen/Antibody  Screen with Reflex to Confirmation Nonreactive Nonreactive   Human Chorionic Gonadotropin, Serum Quantitative   Result Value Ref Range    HCG, Beta-Quantitative <3 <5 mIU/mL   Lactate Dehydrogenase   Result Value Ref Range     84 - 246 U/L   Magnesium   Result Value Ref Range    Magnesium 1.76 1.60 - 2.40 mg/dL   Prealbumin   Result Value Ref Range    Prealbumin 25.1 18.0 - 40.0 mg/dL   Prostate Specific Antigen, Screen   Result Value Ref Range    Prostate Specific Antigen,Screen 0.22 <=4.00 ng/mL   Triiodothyronine, Total   Result Value Ref Range    Triiodothyronine 107 60 - 200 ng/dL   Thyroxine, Total   Result Value Ref Range    Thyroxine 8.7 4.5 - 11.1 ug/dL   Toxoplasma IgG   Result Value Ref Range    Toxoplasma IgG Reactive (A) Nonreactive   POCT GLUCOSE   Result Value Ref Range    POCT Glucose 307 (H) 74 - 99 mg/dL   Blood Gas Mixed Venous Unsolicited   Result Value Ref Range    POCT pH, Mixed 7.42 7.33 - 7.43 pH    POCT pCO2, Mixed 45 41 - 51 mm Hg    POCT pO2, Mixed 42 35 - 45 mm Hg    POCT SO2, Mixed 58 45 - 75 %    POCT Oxy Hemoglobin, Mixed 56.2 45.0 - 75.0 %    POCT Base Excess, Mixed 4.0 (H) -2.0 - 3.0 mmol/L    POCT HCO3 Calculated, Mixed 29.2 (H) 22.0 - 26.0 mmol/L    Patient Temperature 37.0 degrees Celsius        Assessment/Plan   Principal Problem:    Acute decompensated heart failure (CMS/[facility])      Type 2 Diabetes   PLAN  - Goal BG <180  - continue glargine 20 units once daily  - start lispro 5 units with meals plus scale, do not hold unless pt is not eating or glucoses less than 90 mg/dL within 1 hour before mealtime  - continue lispro corrective scale #3 with meals   = 0u  151-200 = 3u  201-250 = 6u  251-300 = 9u  301-350 = 12u  351-400 = 15u    -Accuchecks (not BMP) TIDAC and QHS- kindly ensure QHS Accucheck is drawn; it is often missed   -Hypoglycemia protocol  -Diabetes Diet- low carb 60 CHO  -Will continue to follow and titrate insulin accordingly         I spent 45 minutes in  the professional and overall care of this patient.      Angelic Grier PA-C

## 2023-10-05 NOTE — PROGRESS NOTES
SOCIAL WORK NOTE  SW attempted to meet with patient on multiple occasions, but he was with other treatment providers. Per team, patient presents for advanced therapy work up. Social work to follow.  DONNA Bishop, KEL-S

## 2023-10-05 NOTE — PROGRESS NOTES
"            Felice Law is a 58 y.o. male on day 1 of admission presenting with Acute decompensated heart failure (CMS/HCC).    Subjective   Patient seen and examined at bedside. No acute events overnight. Reports feeling ok, denies chest pain, fever, chills, headache, nausea, vomiting, diarrhea.       Objective     Physical Exam  Constitutional:       General: He is not in acute distress.     Appearance: Normal appearance.   HENT:      Head: Normocephalic.      Mouth/Throat:      Mouth: Mucous membranes are moist.   Eyes:      Extraocular Movements: Extraocular movements intact.      Conjunctiva/sclera: Conjunctivae normal.   Neck:      Comments: Right internal jugular swan in place  Cardiovascular:      Rate and Rhythm: Regular rhythm. Tachycardia present.      Heart sounds: No murmur heard.  Pulmonary:      Effort: Pulmonary effort is normal.      Breath sounds: No wheezing or rales.   Abdominal:      General: Abdomen is flat.      Palpations: Abdomen is soft.   Musculoskeletal:         General: Normal range of motion.      Cervical back: Neck supple.   Skin:     General: Skin is warm and dry.   Neurological:      General: No focal deficit present.      Mental Status: He is alert.   Psychiatric:         Mood and Affect: Mood normal.         Last Recorded Vitals  Blood pressure 100/63, pulse (!) 119, temperature 35.9 °C (96.7 °F), resp. rate 18, height 1.803 m (5' 11\"), weight 107 kg (234 lb 12.6 oz), SpO2 99 %.  PAP: (32-59)/(20-37) 41/25  PCWP:  [16 mmHg-20 mmHg] 16 mmHg  CO:  [5.8 L/min-6.41 L/min] 5.8 L/min  CI:  [2.6 L/min/m2-2.84 L/min/m2] 2.6 L/min/m2     Intake/Output last 3 Shifts:  I/O last 3 completed shifts:  In: 1105.6 (10.4 mL/kg) [P.O.:880; I.V.:225.6 (2.1 mL/kg)]  Out: 1900 (17.8 mL/kg) [Urine:1900 (0.5 mL/kg/hr)]  Weight: 106.5 kg     Relevant Results  Results for orders placed or performed during the hospital encounter of 10/04/23 (from the past 24 hour(s))   BLOOD GAS MIXED VENOUS "   Result Value Ref Range    POCT pH, Mixed 7.44 (H) 7.33 - 7.43 pH    POCT pCO2, Mixed 50 41 - 51 mm Hg    POCT pO2, Mixed 49 (H) 35 - 45 mm Hg    POCT SO2, Mixed 71 45 - 75 %    POCT Oxy Hemoglobin, Mixed 68.7 45.0 - 75.0 %    POCT Base Excess, Mixed 8.3 (H) -2.0 - 3.0 mmol/L    POCT HCO3 Calculated, Mixed 34.0 (H) 22.0 - 26.0 mmol/L    Patient Temperature 37.0 degrees Celsius   Renal function panel   Result Value Ref Range    Glucose 198 (H) 74 - 99 mg/dL    Sodium 137 136 - 145 mmol/L    Potassium 4.1 3.5 - 5.3 mmol/L    Chloride 93 (L) 98 - 107 mmol/L    Bicarbonate 28 21 - 32 mmol/L    Anion Gap 20 10 - 20 mmol/L    Urea Nitrogen 58 (H) 6 - 23 mg/dL    Creatinine 1.79 (H) 0.50 - 1.30 mg/dL    eGFR 44 (L) >60 mL/min/1.73m*2    Calcium 10.1 8.6 - 10.6 mg/dL    Phosphorus 5.6 (H) 2.5 - 4.9 mg/dL    Albumin 4.2 3.4 - 5.0 g/dL   CBC   Result Value Ref Range    WBC 5.0 4.4 - 11.3 x10*3/uL    nRBC 0.0 0.0 - 0.0 /100 WBCs    RBC 5.40 4.50 - 5.90 x10*6/uL    Hemoglobin 12.8 (L) 13.5 - 17.5 g/dL    Hematocrit 42.0 41.0 - 52.0 %    MCV 78 (L) 80 - 100 fL    MCH 23.7 (L) 26.0 - 34.0 pg    MCHC 30.5 (L) 32.0 - 36.0 g/dL    RDW 19.2 (H) 11.5 - 14.5 %    Platelets 275 150 - 450 x10*3/uL    MPV 10.2 7.5 - 11.5 fL   Iron and TIBC   Result Value Ref Range    Iron 51 35 - 150 ug/dL    UIBC 284 110 - 370 ug/dL    TIBC 335 240 - 445 ug/dL    % Saturation 15 (L) 25 - 45 %   Ferritin   Result Value Ref Range    Ferritin 379 (H) 20 - 300 ng/mL   Folate   Result Value Ref Range    Folate, Serum 11.7 >5.0 ng/mL   Vitamin B12   Result Value Ref Range    Vitamin B12 408 211 - 911 pg/mL   TSH   Result Value Ref Range    Thyroid Stimulating Hormone 9.31 (H) 0.44 - 3.98 mIU/L   T4, free   Result Value Ref Range    Thyroxine, Free 1.17 0.78 - 1.48 ng/dL   Hemoglobin A1c   Result Value Ref Range    Hemoglobin A1C 8.1 (H) see below %    Estimated Average Glucose 186 Not Established mg/dL   B-type natriuretic peptide   Result Value Ref Range      (H) 0 - 99 pg/mL   Lactate   Result Value Ref Range    Lactate 1.6 0.4 - 2.0 mmol/L   POCT GLUCOSE   Result Value Ref Range    POCT Glucose 220 (H) 74 - 99 mg/dL   POCT GLUCOSE   Result Value Ref Range    POCT Glucose 245 (H) 74 - 99 mg/dL   POCT GLUCOSE   Result Value Ref Range    POCT Glucose 267 (H) 74 - 99 mg/dL   Renal function panel   Result Value Ref Range    Glucose 240 (H) 74 - 99 mg/dL    Sodium 137 136 - 145 mmol/L    Potassium 4.0 3.5 - 5.3 mmol/L    Chloride 93 (L) 98 - 107 mmol/L    Bicarbonate 31 21 - 32 mmol/L    Anion Gap 17 10 - 20 mmol/L    Urea Nitrogen 55 (H) 6 - 23 mg/dL    Creatinine 1.65 (H) 0.50 - 1.30 mg/dL    eGFR 48 (L) >60 mL/min/1.73m*2    Calcium 10.0 8.6 - 10.6 mg/dL    Phosphorus 5.5 (H) 2.5 - 4.9 mg/dL    Albumin 3.9 3.4 - 5.0 g/dL   CBC   Result Value Ref Range    WBC 6.5 4.4 - 11.3 x10*3/uL    nRBC 0.0 0.0 - 0.0 /100 WBCs    RBC 5.32 4.50 - 5.90 x10*6/uL    Hemoglobin 12.8 (L) 13.5 - 17.5 g/dL    Hematocrit 41.0 41.0 - 52.0 %    MCV 77 (L) 80 - 100 fL    MCH 24.1 (L) 26.0 - 34.0 pg    MCHC 31.2 (L) 32.0 - 36.0 g/dL    RDW 18.8 (H) 11.5 - 14.5 %    Platelets 258 150 - 450 x10*3/uL    MPV 10.0 7.5 - 11.5 fL   Lipid Panel   Result Value Ref Range    Cholesterol 106 0 - 199 mg/dL    HDL-Cholesterol 24.5 mg/dL    Cholesterol/HDL Ratio 4.3     LDL Calculated 32 (L) 140 - 190 mg/dL    VLDL 49 (H) 0 - 40 mg/dL    Triglycerides 247 (H) 0 - 149 mg/dL    Non HDL Cholesterol 82 0 - 149 mg/dL   Lactate   Result Value Ref Range    Lactate 0.9 0.4 - 2.0 mmol/L   BLOOD GAS MIXED VENOUS   Result Value Ref Range    POCT pH, Mixed 7.44 (H) 7.33 - 7.43 pH    POCT pCO2, Mixed 51 41 - 51 mm Hg    POCT pO2, Mixed 50 (H) 35 - 45 mm Hg    POCT SO2, Mixed 72 45 - 75 %    POCT Oxy Hemoglobin, Mixed 71.0 45.0 - 75.0 %    POCT Base Excess, Mixed 8.8 (H) -2.0 - 3.0 mmol/L    POCT HCO3 Calculated, Mixed 34.6 (H) 22.0 - 26.0 mmol/L    Patient Temperature 37.0 degrees Celsius    FiO2 21 %   POCT GLUCOSE    Result Value Ref Range    POCT Glucose 228 (H) 74 - 99 mg/dL          Assessment & Plan:  58 year old Portuguese male with history of HFrEF/ICM (LVEF 20% 8/14/23), CAD s/p PCI and CABG (LIMA-LAD with balloon pump support April 2023), Hx of cardiac arrest s/p VT ablation + ICD, HTN/HLD, CKD, Hx of DVT (L femoral) on Eliquis, hypothyroidism, pulmonary hypertension, T2DM, JAKE/OHS, former tobacco abuse (24 pack year, quit 2017), presented to [facility] on 10/1 with exertional left sided chest pain and SOB x 3 days. He reported orthopnea  as well. His home diuretic regimen with lasix and diamox was not controlling his symptoms. He has been admitted 8 times this year for acutely decompensated heart failure. He was admitted to [facility] this presentation in cardiogenic shock despite reporting compliance with his HF medications. Advanced heart failure service was consulted while he was admitted there and they recommended SGC-guided therapy. RHC on dobutamine 2.5mcg showed RA pressure: 20, RV: 55/20, PCWP: 35, PA pressure: 52/37 (40 - 42), CO/CI 3.6/1.5, SVR: 1373 dynes, PVR: 414 dynes, AO: 84, and lactates were increasing up to 2.4 during that admission. Despite inotrope therapy, his clinical status was not significantly improving and he referred for transfer to [facility] for advanced therapy evaluation.    Neuro:  #Depression  - Will need a detailed med review to determine if medical therapy is needed.  - Serial neuro and pain assessments   - PO Tylenol PRN for pain  - PT/OT Consult, OOB to chair  - CAM ICU score every shift  - Sleep/wake cycle normalization     # Physical Status  -Obesity  -Functional at home.   -Weight on admission to [facility] 246lbs (up 16lbs from dry weight).  -Weight today 235lbs.  -Obtain daily weights in ICU.      Cardiovascular:  # Acute on chronic HFrEF/ICM systolic and diastolic heart failure  - TTE at [facility] 8/2023 with EF ~20%, globally abnormal RV systolic function. Noninvasive hemodynamic assessment is  consistent with severe pulmonary hypertension (>60 mmHg), an elevated CVP, an elevated LVEDP.  - admit weight 246lbs.  - admit BNP here pending.  - ASA and atorvastatin 80mg daily are home meds.  - Holding ARNI, BB, MRA, SGLT2i in setting of worsening renal function, GFR <30.  - Diuresis with lasix gtt 5mg/hr for now.  - Continue inotropic support with dobutamine, reduce to 2.5 mcg now.  - Daily SGC #s: CVP 9, PA 48/29, W 16, CO/CI 5.8/2.6, SVR 1103, .  - Daily standing weights, 2gm sodium diet, 2L fluid restriction, strict I&Os  - Plan to initiate advanced therapy evaluation today after initial education and consents signed     #CAD s/p CABG LIMA-LAD 4/2023  -[facility] December 2022: severe diffuse three-vessel coronary artery disease. Severe proximal diffuse LAD disease up to 70%; IFR measured 0.8 and hemodynamically significant. Severe diffuse proximal and mid first diagonal disease. IFR measured at 0.81 consistent with hemodynamically significant disease. Chronic total occlusion of OM1 with left-to-left collaterals. Chronic total occlusion of mid RCA with left-to-right collaterals. Markedly elevated LVEDP at 40 mm of Hg.  - Continue Aspirin 81mg.  - Continue Atorvastatin 80mg.      #Arrhythmias - Hx of VT s/p ablation + ICD  -Device - Medtronic ICD; interrogation ordered.   -Not on antiarrhythmic outpatient.     #Electrolytes  -No electrolyte abnormalities on BMP from [facility] on transfer.  -Admit RFP pending.  -Obtain daily RFP.     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  -Monitor PA pressures continuously via SGC. Titrating inotrope therapy, should improve pulmonary pressures.     GI:  - Colace BID PRN ordered.  - Continue Protonix 40mg daily.     :  #CKD  -No records in [facility] for prior labs.  -Admit BUN/Cr 67/2.00  -strict I/Os  -avoid hypotension and nephrotoxic agents      Heme:  #Anemia in the setting of CKD  - Labs: CBC, TIBC, ferritin, serum Fe, folate, B12     - If %sat low, order venofer     #Hx of L femoral DVT  - Holding Eliquis for this.  - subcutaneous heparin 5000 units q8h.      Endo:  #IDDM2  - Check accuchecks AC/HS.  - Insulin requirement at home is Glargine 85 units qAM, Humalog 28-34 units TID, Trulicity 1.5mg weekly.  - hgbA1c ordered.  -Will start lantus 20 units and ssi, assess response and adjust as needed     #Thyroid  -Repeat TSH with T4 pending      ID:  -afebrile, nontoxic   -no s/s infx  -trend temps q4h        Feeding/fluids: 2g Na diet, 2L fluid restriction  Thromboprophylaxis: SCDs   VAP bundle: n/a  Ulcer prophylaxis: PPI  Glycemic control: Resume lantus 20 and prandial sliding scale  Bowel care: Colace & miralax prn  Indwelling catheter: None     Lines:   PIVs   [place] RIJ     PT/OT: consulted     Code Status: Full Code     Family Primary Contact/Next of Kin: Mother.    Patient seen and assessed with Dr. Yen Salinas DO PGY-4  Heart Failure Fellow

## 2023-10-05 NOTE — PROGRESS NOTES
ENCOUNTER    Visit Type Initial Visit  Location: [facility]  room 14    Barriers to Communication / Understanding:   [x] Language [] Vision [] Hearing [] Other      [x]  Present via sfilatino System     Accompanied By: mom and sister via speaker phone    Organ For Transplant: Heart    Device For Implant: VAD    Ethnicity:   /     ADLs Fully Independent      Instrumental ADLs Fully Independent      Level of Activity Active      DME has a walker, cane and shower chair available to use    Knowledge of Health Fair  Good recall of health hx.    Why Do You Have End Stage Organ Disease hx of heart attack in 2017    Knowledge of Transplant / VAD:  Yes Patient Is Able To Make An Informed Decision      Patient Understands the Risks of Transplant / VAD  Yes Rejection  Yes Infection Yes Complications  Yes Death  Pt had a good understanding overall of the process, could benefit from further education as process continues    Patient Understands Recovery and Follow-Up from Transplant / VAD  Yes Length Of State Yes Appointments  Yes Labs  Yes Rehabilitation  Pt potentially has transportation concerns for follow up appts. There are specific times when he is able to get a ride from a support person. Pt shared he typically uses medical transport to get to dr appts now    Patient Has Identified Goals of Transplant / VAD Yes  Pt shared better health and better quality of life    Any Potential Donors? N/a    Overall Compliance  fair       Compliance With Medications good    Managed By Patient    Understanding Of Medication Poor does not know the names of medications, what they are for, or the dosage.   Compliance With Appointments Good rarely misses appts    How Does Patient Handle Health Problems  Pt would go about his usual daily activities. If continues, pt would let sister or mom know what he is feeling and they may call the dr office.     Pt demonstrated understanding that post advanced therapy, it is  expected for pt to call team with new symptoms.     Organ Heart    Heart    Cardiac Rehab: Active was partaking at [facility]     IOP: Not Needed    Fluid Restriction:   Yes [] Compliant  Is challenging for pt    Anticoagulation Service:   No [] Compliant   [] INR    Medical And Clinic Appointment Compliant Yes      SOCIAL HISTORY  No   ?  No    Education:  education: Some College    Literate Yes ESL Pt is literate but in Maltese, pt does not speak much English  Computer literate Yes  Internet access Yes   Pt shared he is so so with using a computer    Sources of Income: SSD, pt has been unemployed since he was 41 years old.   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    Will patient have paid status from employment during recovery No  None    Spouse / SO Current Employment Pt does not have and SO        Other Sources of Income Other Snap program      Does patient have financial concerns Yes Pt shared concerns regarding rent, food. Pt stated he is able to pay for things yet new bills come.      Is patient able to meet current monthly expenses Pt shared it comes and goes, has times where he is able and times where he is not.     Resources:  Food Applegate    Patient was provided information on transplant fundraising No      Insurance:  Primary Insurance Self [employer] Dual Complete    Secondary Insurance none    Prescription Coverage unknown    Medicare coverage due to Disabled    Medicare Part A unknown Effective date    Medicare Part B unknown Effective date    Medicare Part C unknown Deductable  Out of Pocket Max    Medicare Part D unknown Extra Help?    Medicaid No Waiver No Redetermination Date    HMO       FAMILY SYSTEM    Single Yes   No How long   Describe  Relationship   No How long   Describe Relationship   No When   No When  In a Relationship No  How Long  Describe Relationship    Spouse / SO Name   Age   Health   Other Caregiver Responsibilities  Spouse / Significant others reaction to donation    Children:  No # Biological   # Adopted   No # Step Children        Child #1 Name  Age   Health    Lives   How Much Contact       Child #2 Name  Age   Health    Lives   How Much Contact     Parents:  Raised By One Biological Parent    Did the patient have contact with the other parent No    Mother  No Age   Cause of Death   Father  unknown Age   Cause of Death    Living Parent #1 Sabiha turns 80 this month, lives with pt  Living Parent #2  Living Parent #3  Living Parent #4    Additional Information    Siblings:  [x] # Biological 4 siblings [] # Half Siblings [] # Step Siblings         Sibling #1 Zahraa lives with pt  Sibling #2 Any lives in [place]  Sibling #3 Solis lives in [place]  Sibling #4 Luis lives in [place]      Support & Recovery Plan:  No Adequate    Primary Support:  Name Dale Phone [phone]  Age   Relationship to Patient uncle  If employed, can they take time off work   If so, is it paid time off   If not, will this impact your finances   Did they attend education classes   Do they have other caregiver responsibilities (child or eldercare)   Do they have their own conditions which may prevent them from providing care for you   (Medical, psychological, physical limitations)    Are they available on short notice   Are they reliable   Are they responsible   Are they able to understand and process new information   Do they have reliable transportation or will you allow them to use your vehicle   Are they currently involved in your care   Comments  speaks English, only available after 330 pm    Secondary Support Name Robinson Phone   Age   Relationship to Patient friend  If employed, can they take time off work   If  so, is it paid time off   If not, will this impact your finances   Did they attend education classes   Do they have other caregiver responsibilities (child or eldercare)   Do they have their own conditions which may prevent them from providing care for you   (Medical, psychological, physical limitations)    Are they available on short notice   Are they reliable   Are they responsible   Are they able to understand and process new information   Do they have reliable transportation or will you allow them to use your vehicle   Are they currently involved in your care   Comments would be available before 330 other contact information not available at this time. Phone number in not active.     Alternate Support Pt mom, only speaks Romanian, 80 years old, does not drive  Alternate Support Pt sister, only speaks Romanian, does not drive    Housing:  Yes Adequate Rents home  Type of Home House  Distance to [facility] 20 minutes  Pets 2 cats he calls his babies. Jade and Jaelyn  Does Patient Feel Safe in Home Yes   1 floor home    Transportation:  Yes Adequate  # Licensed Drivers in the Home 1  Does Patient Drive Yes If not, why  # Reliable Vehicles 1  Does Patient use Public Transportation No  Does Patient use Medical Transportation Yes  Comments    MENTAL HEALTH  The patient reports their mood as better, feeling depressed.     Reported Mental Health Diagnosis  Depression  Family History of Mental Health Concerns believes sister also has depression, not diagnosed  What are patient psychosocial stressors being in the hospital again, going thru this evaluation process    Cognition:  No impairment observed / reported       Current Medications:  Yes  Mental Health Meds   Rx'd by PCP  Sleep Meds none  Rx'd by   Pain Meds none  Rx'd by     OTC Meds Tylenol  Past Medications none    Counseling Never      IOP  Has patient ever been hospitalized for mental health reasons No   Was the hospitalization voluntary  Duration   Where     When  Describe situation    Discharge Plan for Follow Up  Was Discharge Plan Completed   Referral to Transplant Psych Yes  Mental Health Follow Up Required No     Suicide Assessment:  History of Suicide Ideation No  [] Timeframe [] Frequency   Frequency   Plan Created  Intent to Follow Through  Outcome      History of Suicide Attempt No     History of Suicidal Ideation in the past 3 months No Intensity   Duration     Description of Plan      Plans thought of  Intent to Follow Through  Highest Level of Intent to Follow Through    Current Plan for Safety    Plan for Follow-Up    Patient's Reported Trauma History Health concerns he is going thru right now.    What are patient's coping behaviors watch tv, playing on the computer, and cooking any kind of meal    Holiness / Spirituality none    Attitude toward interviewer Cooperative    Eye Contact Patient maintained good eye contact throughout appointment    Appearance The patient was neatly groomed, appropriately dressed and adequately nourished    Affect Appropriate    Thought Process Appropriate    Substance Use /Abuse History:  Current Tobacco User No    Patient uses   Tobacco Frequency   For How Long  Is Patient Required to Quit   Former Tobacco User Yes  Describe past tobacco use and date quit worked up to a ppd, quit in 2017 when he had his heart attack    Current Alcohol User No  Type of Alcohol Used   Amount  Frequency   Pattern of Alcohol Use    Is Patient Required to Quit   Continued to use the substance despite being told the substance is affecting their health none    History of problems at work, school or home due to substance use none      Former Alcohol User No  Describe past alcohol use and date quit      Has patient ever gone to CD treatment No  If yes, When, Where and What type of Program  Attends AA meetings    Sponsor  Do support people drink alcohol No  If yes, describe support people's use  Is alcohol kept in the home No  Does Patient need to sign  a CD contract No    Current Illegal / Unprescribed Drug User No  Type of Illegal Drug Used   Frequency  Pattern of Drug Use    Is Patient Required to Quit     Illegal / Unprescribed Drug #2  Type of Illegal Drug Used   Frequency  Pattern of Drug Use      Continue to use the substance despite being told the substance is affecting their health none    History of problems at work, school or home due to substance use none      Former Illegal / Unprescribed Drug User No  Describe past illegal drug use and date quit    Has patient ever gone to CD treatment No  If yes, When, Where and What type of Program   Attends AA/NA  meetings    Is patient on a Methadone / Suboxone regiment No  Do support people use illegal drugs No  If yes, describe support people's use  Are illegal drugs kept in the home No  Does Patient need to sign a CD contract No    Illegal / Unprescribed Drug #2  Type of Illegal Drug Used   Frequency  Pattern of Drug Use    Prescription Drug Abuse:  No Has patient experienced feelings of addiction  No Has patient experienced symptoms of withdrawal  No Has patient experienced any side effects? e.g.  hallucinations or delusions    Does Patient Meet the Criteria for Alcohol Use Disorder No Diagnosis  Does Patient Meet [facility]OTC guidelines Yes  Does Patient Meet the Criteria for Illegal Drug Use Disorder No Diagnosis  Does Patient Meet OSOTC guidelines Yes    [facility]OTC Substance Relapse Risk Factors   DSM-5 Severity Factors:     Plan    LEGAL ISSUES  Yes Arrests  No Currently probation or parole    senior care No  When   How long   Where   Pt shared when he was in [place], he took the blame for a crime his bother committed. Pt stated he is no longer on probation for this crime, everything he needed to complete is done.     Citizenship:  Yes [place] Citizen  No Green Card No Visa    Advance Directives: No  HCPOA       Guardian:    PARMINDER PENDLETON met with pt at bedside on [place] [address] room 14 for initial evaluation for  potential heart transplant of LVAD placement. There was a  available for the entire visit via the Kleen Extreme System. Pt was awake and sitting up in a chair at bedside. Pt confirmed he was fully independent at home, did have a walker, cane, and shower chair available for him to utilize as needed. Pt confirmed completing tasks takes him a little longer than it use to. Pt demonstrated good understanding of risks and follow up for advanced therapy. Pt shared hi manages his medications himself using the bottles. Pt stated he doesn't really know what the names of his medications are, what they are for or the dosage of them. Pt shared he rarely misses his dr baxter, he uses his insurance transportation. Pt shared if he has a new health problem, he would go about his day as usual and if symptoms continue, and shared with his mom or sister if symptoms continue. Pt shared he was taking part in cardiac [facility]hab [facility] and was on a fluid restriction at home but had some trouble keeping to it. Pt confirmed he completed some college in [place], is able to read and write in Croatian. Pt confirmed he is unemployed currently and has been receiving disability since he was 41 years old. 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 Pt confirmed he is not  and has no children. Pt named his uncle Dale and mom and sister are his primary supports. SW to follow up with pt uncle who pt stated speaks English. SW attempted to contact pt uncle, phone did not connect. SW to follow up with pt for additional contact information. Pt confirmed he stays in a house that he rents which is a one story home. Pt confirmed he lives about 20 minutes away and has 2 cats at home that he considers to be his babies. Pt confirmed he has his drivers license and one car, he is the only one that has his drivers license in his home. Pt confirmed he does use insurance transportation sometimes to get to his   appts. Pt shared his mood has been better, he does have depression. Pt shares he takes medication from his pcp. SW had pt complete the PHQ-9 and EROS. Pt scored a 5 on PHQ-9 and 10 on EROS meaning pt demonstrated mild depression and moderate anxiety at this time. Pt shared his biggest stress at this time is being hospitalized and going thru this evaluation process. Pt denied any memory or focus concerns. Pt denied ever attending counseling services. Pt denied any hx of being hospitalized for mental health reasons, hx of SI, hx of suicide attempt or thoughts in the past 3 months. Pt shared he enjoys watching tv, playing on the computer, and cooking many different meals. Pt denied any Quaker or spirituality. Pt denied any current tobacco, alcohol, or illegal drug use. Pt shared he use to smoke cigarettes, would have about 1 ppd. Pt stated he stopped using in 2017 when he had his heart attack. Pt denied having any feelings of addiction, withdrawal or side effects from prescribed medications. Pt shared he was previously on probation in [place], stated he had taken blame for a crime his bother had committed. Pt confirmed he has no current legal concerns going on. Pt stated he has not completed advanced directives at this time. SW shared can work to provide forms for pt to complete. SW confirmed pt has no further questions or concerns at this time. SW provided contact information.     PLAN  SW to follow up with pt as evaluation continues. SW to present pt to committee. SIPAT score is 31 which means minimally acceptable. Pt is a moderate psychosocial risk at this time. Pt has potential support concerns, financial concerns, moderate anxiety, mild depression, and language barrier.     Znia LOPEZ

## 2023-10-06 ENCOUNTER — APPOINTMENT (OUTPATIENT)
Dept: RADIOLOGY | Facility: HOSPITAL | Age: 58
DRG: 291 | End: 2023-10-06
Payer: MEDICARE

## 2023-10-06 ENCOUNTER — APPOINTMENT (OUTPATIENT)
Dept: VASCULAR MEDICINE | Facility: HOSPITAL | Age: 58
DRG: 291 | End: 2023-10-06
Payer: MEDICARE

## 2023-10-06 ENCOUNTER — APPOINTMENT (OUTPATIENT)
Dept: CARDIOLOGY | Facility: HOSPITAL | Age: 58
DRG: 291 | End: 2023-10-06
Payer: MEDICARE

## 2023-10-06 LAB
ABO GROUP (TYPE) IN BLOOD: NORMAL
ALBUMIN SERPL BCP-MCNC: 3.6 G/DL (ref 3.4–5)
ANION GAP SERPL CALC-SCNC: 12 MMOL/L (ref 10–20)
APPEARANCE UR: CLEAR
BASE EXCESS BLDMV CALC-SCNC: 2.8 MMOL/L (ref -2–3)
BASE EXCESS BLDMV CALC-SCNC: 3.2 MMOL/L (ref -2–3)
BASE EXCESS BLDMV CALC-SCNC: 5.7 MMOL/L (ref -2–3)
BILIRUB UR STRIP.AUTO-MCNC: NEGATIVE MG/DL
BODY TEMPERATURE: 37 DEGREES CELSIUS
BUN SERPL-MCNC: 65 MG/DL (ref 6–23)
CALCIUM SERPL-MCNC: 9.5 MG/DL (ref 8.6–10.6)
CHLORIDE SERPL-SCNC: 92 MMOL/L (ref 98–107)
CMV IGG AVIDITY SERPL IA-RTO: REACTIVE %
CO2 SERPL-SCNC: 30 MMOL/L (ref 21–32)
COLOR UR: YELLOW
CREAT SERPL-MCNC: 2.09 MG/DL (ref 0.5–1.3)
EBV EA IGG SER QL: NEGATIVE
EBV NA AB SER QL: POSITIVE
EBV VCA IGG SER IA-ACNC: POSITIVE
EBV VCA IGM SER IA-ACNC: NEGATIVE
ERYTHROCYTE [DISTWIDTH] IN BLOOD BY AUTOMATED COUNT: 18.3 % (ref 11.5–14.5)
GFR SERPL CREATININE-BSD FRML MDRD: 36 ML/MIN/1.73M*2
GLUCOSE BLD MANUAL STRIP-MCNC: 278 MG/DL (ref 74–99)
GLUCOSE BLD MANUAL STRIP-MCNC: 316 MG/DL (ref 74–99)
GLUCOSE BLD MANUAL STRIP-MCNC: 330 MG/DL (ref 74–99)
GLUCOSE BLD MANUAL STRIP-MCNC: 359 MG/DL (ref 74–99)
GLUCOSE SERPL-MCNC: 287 MG/DL (ref 74–99)
GLUCOSE UR STRIP.AUTO-MCNC: ABNORMAL MG/DL
HAV AB SER QL IA: REACTIVE
HBV CORE AB SER QL: NONREACTIVE
HBV SURFACE AB SER-ACNC: 11.2 MIU/ML
HCO3 BLDMV-SCNC: 28.5 MMOL/L (ref 22–26)
HCO3 BLDMV-SCNC: 28.5 MMOL/L (ref 22–26)
HCO3 BLDMV-SCNC: 32.1 MMOL/L (ref 22–26)
HCT VFR BLD AUTO: 36.8 % (ref 41–52)
HCV RNA SERPL NAA+PROBE-ACNC: NOT DETECTED K[IU]/ML
HCV RNA SERPL NAA+PROBE-LOG IU: NORMAL {LOG_IU}/ML
HGB BLD-MCNC: 11.6 G/DL (ref 13.5–17.5)
HYALINE CASTS #/AREA URNS AUTO: ABNORMAL /LPF
IGA SERPL-MCNC: 173 MG/DL (ref 70–400)
IGG SERPL-MCNC: 1620 MG/DL (ref 700–1600)
IGG1 SER-MCNC: 1050 MG/DL (ref 490–1140)
IGG2 SER-MCNC: 417 MG/DL (ref 150–640)
IGG3 SER-MCNC: 64 MG/DL (ref 11–85)
IGG4 SER-MCNC: 139 MG/DL (ref 3–200)
IGM SERPL-MCNC: 76 MG/DL (ref 40–230)
KETONES UR STRIP.AUTO-MCNC: NEGATIVE MG/DL
LEUKOCYTE ESTERASE UR QL STRIP.AUTO: NEGATIVE
MCH RBC QN AUTO: 24.4 PG (ref 26–34)
MCHC RBC AUTO-ENTMCNC: 31.5 G/DL (ref 32–36)
MCV RBC AUTO: 77 FL (ref 80–100)
NITRITE UR QL STRIP.AUTO: NEGATIVE
NRBC BLD-RTO: 0 /100 WBCS (ref 0–0)
OXYHGB MFR BLDMV: 54.3 % (ref 45–75)
OXYHGB MFR BLDMV: 58.4 % (ref 45–75)
OXYHGB MFR BLDMV: 65.1 % (ref 45–75)
PCO2 BLDMV: 45 MM HG (ref 41–51)
PCO2 BLDMV: 47 MM HG (ref 41–51)
PCO2 BLDMV: 53 MM HG (ref 41–51)
PH BLDMV: 7.39 PH (ref 7.33–7.43)
PH BLDMV: 7.39 PH (ref 7.33–7.43)
PH BLDMV: 7.41 PH (ref 7.33–7.43)
PH UR STRIP.AUTO: 5 [PH]
PHOSPHATE SERPL-MCNC: 4.7 MG/DL (ref 2.5–4.9)
PLATELET # BLD AUTO: 215 X10*3/UL (ref 150–450)
PMV BLD AUTO: 9.9 FL (ref 7.5–11.5)
PO2 BLDMV: 41 MM HG (ref 35–45)
PO2 BLDMV: 43 MM HG (ref 35–45)
PO2 BLDMV: 46 MM HG (ref 35–45)
POTASSIUM SERPL-SCNC: 4.3 MMOL/L (ref 3.5–5.3)
PROT UR STRIP.AUTO-MCNC: ABNORMAL MG/DL
RBC # BLD AUTO: 4.76 X10*6/UL (ref 4.5–5.9)
RBC # UR STRIP.AUTO: NEGATIVE /UL
RBC #/AREA URNS AUTO: ABNORMAL /HPF
RH FACTOR (ANTIGEN D): NORMAL
SAO2 % BLDMV: 56 % (ref 45–75)
SAO2 % BLDMV: 60 % (ref 45–75)
SAO2 % BLDMV: 67 % (ref 45–75)
SODIUM SERPL-SCNC: 130 MMOL/L (ref 136–145)
SP GR UR STRIP.AUTO: 1.01
SQUAMOUS #/AREA URNS AUTO: ABNORMAL /HPF
UROBILINOGEN UR STRIP.AUTO-MCNC: <2 MG/DL
WBC # BLD AUTO: 6.5 X10*3/UL (ref 4.4–11.3)
WBC #/AREA URNS AUTO: ABNORMAL /HPF

## 2023-10-06 PROCEDURE — 71250 CT THORAX DX C-: CPT | Mod: MG

## 2023-10-06 PROCEDURE — 96372 THER/PROPH/DIAG INJ SC/IM: CPT | Performed by: STUDENT IN AN ORGANIZED HEALTH CARE EDUCATION/TRAINING PROGRAM

## 2023-10-06 PROCEDURE — 2500000002 HC RX 250 W HCPCS SELF ADMINISTERED DRUGS (ALT 637 FOR MEDICARE OP, ALT 636 FOR OP/ED): Performed by: INTERNAL MEDICINE

## 2023-10-06 PROCEDURE — 82947 ASSAY GLUCOSE BLOOD QUANT: CPT

## 2023-10-06 PROCEDURE — 99222 1ST HOSP IP/OBS MODERATE 55: CPT | Performed by: INTERNAL MEDICINE

## 2023-10-06 PROCEDURE — 70355 PANORAMIC X-RAY OF JAWS: CPT

## 2023-10-06 PROCEDURE — 2020000001 HC ICU ROOM DAILY

## 2023-10-06 PROCEDURE — 2500000001 HC RX 250 WO HCPCS SELF ADMINISTERED DRUGS (ALT 637 FOR MEDICARE OP): Performed by: STUDENT IN AN ORGANIZED HEALTH CARE EDUCATION/TRAINING PROGRAM

## 2023-10-06 PROCEDURE — 86833 HLA CLASS II HIGH DEFIN QUAL: CPT | Performed by: INTERNAL MEDICINE

## 2023-10-06 PROCEDURE — 2500000004 HC RX 250 GENERAL PHARMACY W/ HCPCS (ALT 636 FOR OP/ED)

## 2023-10-06 PROCEDURE — 82805 BLOOD GASES W/O2 SATURATION: CPT

## 2023-10-06 PROCEDURE — 2500000004 HC RX 250 GENERAL PHARMACY W/ HCPCS (ALT 636 FOR OP/ED): Performed by: INTERNAL MEDICINE

## 2023-10-06 PROCEDURE — 71045 X-RAY EXAM CHEST 1 VIEW: CPT | Performed by: RADIOLOGY

## 2023-10-06 PROCEDURE — 2500000002 HC RX 250 W HCPCS SELF ADMINISTERED DRUGS (ALT 637 FOR MEDICARE OP, ALT 636 FOR OP/ED): Performed by: PHYSICIAN ASSISTANT

## 2023-10-06 PROCEDURE — 93289 INTERROG DEVICE EVAL HEART: CPT | Performed by: STUDENT IN AN ORGANIZED HEALTH CARE EDUCATION/TRAINING PROGRAM

## 2023-10-06 PROCEDURE — 80069 RENAL FUNCTION PANEL: CPT | Performed by: STUDENT IN AN ORGANIZED HEALTH CARE EDUCATION/TRAINING PROGRAM

## 2023-10-06 PROCEDURE — 99232 SBSQ HOSP IP/OBS MODERATE 35: CPT | Performed by: PHYSICIAN ASSISTANT

## 2023-10-06 PROCEDURE — 2500000001 HC RX 250 WO HCPCS SELF ADMINISTERED DRUGS (ALT 637 FOR MEDICARE OP): Performed by: NURSE PRACTITIONER

## 2023-10-06 PROCEDURE — 2500000001 HC RX 250 WO HCPCS SELF ADMINISTERED DRUGS (ALT 637 FOR MEDICARE OP): Performed by: INTERNAL MEDICINE

## 2023-10-06 PROCEDURE — 85027 COMPLETE CBC AUTOMATED: CPT | Performed by: STUDENT IN AN ORGANIZED HEALTH CARE EDUCATION/TRAINING PROGRAM

## 2023-10-06 PROCEDURE — 71045 X-RAY EXAM CHEST 1 VIEW: CPT | Mod: FY

## 2023-10-06 PROCEDURE — 71046 X-RAY EXAM CHEST 2 VIEWS: CPT | Mod: FY

## 2023-10-06 PROCEDURE — 96372 THER/PROPH/DIAG INJ SC/IM: CPT | Performed by: PHYSICIAN ASSISTANT

## 2023-10-06 PROCEDURE — 71046 X-RAY EXAM CHEST 2 VIEWS: CPT | Performed by: RADIOLOGY

## 2023-10-06 PROCEDURE — 96372 THER/PROPH/DIAG INJ SC/IM: CPT | Performed by: INTERNAL MEDICINE

## 2023-10-06 PROCEDURE — 93289 INTERROG DEVICE EVAL HEART: CPT

## 2023-10-06 PROCEDURE — 97162 PT EVAL MOD COMPLEX 30 MIN: CPT | Mod: GP

## 2023-10-06 PROCEDURE — 2500000004 HC RX 250 GENERAL PHARMACY W/ HCPCS (ALT 636 FOR OP/ED): Performed by: STUDENT IN AN ORGANIZED HEALTH CARE EDUCATION/TRAINING PROGRAM

## 2023-10-06 PROCEDURE — 86832 HLA CLASS I HIGH DEFIN QUAL: CPT | Performed by: INTERNAL MEDICINE

## 2023-10-06 RX ORDER — HYDRALAZINE HYDROCHLORIDE 50 MG/1
50 TABLET, FILM COATED ORAL 3 TIMES DAILY
Status: DISCONTINUED | OUTPATIENT
Start: 2023-10-06 | End: 2023-10-07

## 2023-10-06 RX ORDER — METOPROLOL TARTRATE 25 MG/1
12.5 TABLET, FILM COATED ORAL ONCE
Status: COMPLETED | OUTPATIENT
Start: 2023-10-06 | End: 2023-10-06

## 2023-10-06 RX ORDER — METOPROLOL TARTRATE 25 MG/1
12.5 TABLET, FILM COATED ORAL 3 TIMES DAILY
Status: DISCONTINUED | OUTPATIENT
Start: 2023-10-06 | End: 2023-10-07

## 2023-10-06 RX ORDER — HYDRALAZINE HYDROCHLORIDE 10 MG/1
10 TABLET, FILM COATED ORAL 3 TIMES DAILY
Status: DISCONTINUED | OUTPATIENT
Start: 2023-10-06 | End: 2023-10-06

## 2023-10-06 RX ORDER — INSULIN GLARGINE 100 [IU]/ML
60 INJECTION, SOLUTION SUBCUTANEOUS EVERY 24 HOURS
Status: DISCONTINUED | OUTPATIENT
Start: 2023-10-06 | End: 2023-10-06

## 2023-10-06 RX ORDER — INSULIN GLARGINE 100 [IU]/ML
40 INJECTION, SOLUTION SUBCUTANEOUS EVERY 24 HOURS
Status: DISCONTINUED | OUTPATIENT
Start: 2023-10-07 | End: 2023-10-08

## 2023-10-06 RX ORDER — INSULIN LISPRO 100 [IU]/ML
0-15 INJECTION, SOLUTION INTRAVENOUS; SUBCUTANEOUS EVERY 4 HOURS
Status: DISCONTINUED | OUTPATIENT
Start: 2023-10-06 | End: 2023-10-07

## 2023-10-06 RX ORDER — ACETAMINOPHEN 325 MG/1
975 TABLET ORAL EVERY 6 HOURS PRN
Status: DISCONTINUED | OUTPATIENT
Start: 2023-10-06 | End: 2023-10-17 | Stop reason: HOSPADM

## 2023-10-06 RX ADMIN — GABAPENTIN 600 MG: 300 CAPSULE ORAL at 20:35

## 2023-10-06 RX ADMIN — METOPROLOL TARTRATE 12.5 MG: 25 TABLET, FILM COATED ORAL at 12:15

## 2023-10-06 RX ADMIN — INSULIN LISPRO 5 UNITS: 100 INJECTION, SOLUTION INTRAVENOUS; SUBCUTANEOUS at 08:38

## 2023-10-06 RX ADMIN — PANTOPRAZOLE SODIUM 40 MG: 40 TABLET, DELAYED RELEASE ORAL at 08:38

## 2023-10-06 RX ADMIN — GABAPENTIN 300 MG: 300 CAPSULE ORAL at 08:38

## 2023-10-06 RX ADMIN — METOPROLOL TARTRATE 12.5 MG: 25 TABLET, FILM COATED ORAL at 16:27

## 2023-10-06 RX ADMIN — INSULIN LISPRO 12 UNITS: 100 INJECTION, SOLUTION INTRAVENOUS; SUBCUTANEOUS at 12:00

## 2023-10-06 RX ADMIN — HEPARIN SODIUM 5000 UNITS: 5000 INJECTION INTRAVENOUS; SUBCUTANEOUS at 22:37

## 2023-10-06 RX ADMIN — INSULIN LISPRO 5 UNITS: 100 INJECTION, SOLUTION INTRAVENOUS; SUBCUTANEOUS at 16:27

## 2023-10-06 RX ADMIN — INSULIN LISPRO 12 UNITS: 100 INJECTION, SOLUTION INTRAVENOUS; SUBCUTANEOUS at 16:30

## 2023-10-06 RX ADMIN — IRON SUCROSE 100 MG: 20 INJECTION, SOLUTION INTRAVENOUS at 12:14

## 2023-10-06 RX ADMIN — HYDRALAZINE HYDROCHLORIDE 50 MG: 50 TABLET, FILM COATED ORAL at 16:27

## 2023-10-06 RX ADMIN — METOPROLOL TARTRATE 12.5 MG: 25 TABLET, FILM COATED ORAL at 20:35

## 2023-10-06 RX ADMIN — HEPARIN SODIUM 5000 UNITS: 5000 INJECTION INTRAVENOUS; SUBCUTANEOUS at 05:16

## 2023-10-06 RX ADMIN — ASPIRIN 81 MG CHEWABLE TABLET 81 MG: 81 TABLET CHEWABLE at 08:38

## 2023-10-06 RX ADMIN — INSULIN LISPRO 5 UNITS: 100 INJECTION, SOLUTION INTRAVENOUS; SUBCUTANEOUS at 13:10

## 2023-10-06 RX ADMIN — INSULIN GLARGINE 60 UNITS: 100 INJECTION, SOLUTION SUBCUTANEOUS at 08:40

## 2023-10-06 RX ADMIN — ATORVASTATIN CALCIUM 80 MG: 80 TABLET ORAL at 08:38

## 2023-10-06 RX ADMIN — HYDRALAZINE HYDROCHLORIDE 25 MG: 25 TABLET, FILM COATED ORAL at 08:38

## 2023-10-06 RX ADMIN — QUETIAPINE FUMARATE 50 MG: 25 TABLET ORAL at 20:35

## 2023-10-06 RX ADMIN — INSULIN LISPRO 15 UNITS: 100 INJECTION, SOLUTION INTRAVENOUS; SUBCUTANEOUS at 20:40

## 2023-10-06 RX ADMIN — HEPARIN SODIUM 5000 UNITS: 5000 INJECTION INTRAVENOUS; SUBCUTANEOUS at 13:12

## 2023-10-06 RX ADMIN — SODIUM NITROPRUSSIDE 1 MCG/KG/MIN: 0.5 INJECTION, SOLUTION INTRAVENOUS at 15:18

## 2023-10-06 RX ADMIN — INSULIN LISPRO 9 UNITS: 100 INJECTION, SOLUTION INTRAVENOUS; SUBCUTANEOUS at 08:39

## 2023-10-06 RX ADMIN — LEVOTHYROXINE SODIUM 50 MCG: 50 TABLET ORAL at 06:00

## 2023-10-06 RX ADMIN — SODIUM NITROPRUSSIDE 1.5 MCG/KG/MIN: 0.5 INJECTION, SOLUTION INTRAVENOUS at 07:32

## 2023-10-06 RX ADMIN — HYDRALAZINE HYDROCHLORIDE 50 MG: 50 TABLET, FILM COATED ORAL at 20:35

## 2023-10-06 RX ADMIN — SODIUM NITROPRUSSIDE 1.5 MCG/KG/MIN: 0.5 INJECTION, SOLUTION INTRAVENOUS at 02:31

## 2023-10-06 SDOH — ECONOMIC STABILITY: FOOD INSECURITY: WITHIN THE PAST 12 MONTHS, YOU WORRIED THAT YOUR FOOD WOULD RUN OUT BEFORE YOU GOT MONEY TO BUY MORE.: NEVER TRUE

## 2023-10-06 SDOH — SOCIAL STABILITY: SOCIAL INSECURITY
WITHIN THE LAST YEAR, HAVE YOU BEEN RAPED OR FORCED TO HAVE ANY KIND OF SEXUAL ACTIVITY BY YOUR PARTNER OR EX-PARTNER?: NO

## 2023-10-06 SDOH — SOCIAL STABILITY: SOCIAL INSECURITY: WITHIN THE LAST YEAR, HAVE YOU BEEN HUMILIATED OR EMOTIONALLY ABUSED IN OTHER WAYS BY YOUR PARTNER OR EX-PARTNER?: NO

## 2023-10-06 SDOH — SOCIAL STABILITY: SOCIAL INSECURITY
WITHIN THE LAST YEAR, HAVE TO BEEN RAPED OR FORCED TO HAVE ANY KIND OF SEXUAL ACTIVITY BY YOUR PARTNER OR EX-PARTNER?: NO

## 2023-10-06 SDOH — ECONOMIC STABILITY: HOUSING INSECURITY: IN THE LAST 12 MONTHS, HOW MANY PLACES HAVE YOU LIVED?: 1

## 2023-10-06 SDOH — SOCIAL STABILITY: SOCIAL INSECURITY: WITHIN THE LAST YEAR, HAVE YOU BEEN AFRAID OF YOUR PARTNER OR EX-PARTNER?: NO

## 2023-10-06 SDOH — ECONOMIC STABILITY: HOUSING INSECURITY: IN THE LAST 12 MONTHS, WAS THERE A TIME WHEN YOU WERE NOT ABLE TO PAY THE MORTGAGE OR RENT ON TIME?: NO

## 2023-10-06 SDOH — SOCIAL STABILITY: SOCIAL INSECURITY
WITHIN THE LAST YEAR, HAVE YOU BEEN KICKED, HIT, SLAPPED, OR OTHERWISE PHYSICALLY HURT BY YOUR PARTNER OR EX-PARTNER?: NO

## 2023-10-06 SDOH — ECONOMIC STABILITY: INCOME INSECURITY: IN THE PAST 12 MONTHS, HAS THE ELECTRIC, GAS, OIL, OR WATER COMPANY THREATENED TO SHUT OFF SERVICE IN YOUR HOME?: NO

## 2023-10-06 SDOH — ECONOMIC STABILITY: FOOD INSECURITY: WITHIN THE PAST 12 MONTHS, THE FOOD YOU BOUGHT JUST DIDN'T LAST AND YOU DIDN'T HAVE MONEY TO GET MORE.: NEVER TRUE

## 2023-10-06 SDOH — ECONOMIC STABILITY: INCOME INSECURITY: IN THE PAST 12 MONTHS HAS THE ELECTRIC, GAS, OIL, OR WATER COMPANY THREATENED TO SHUT OFF SERVICES IN YOUR HOME?: NO

## 2023-10-06 SDOH — ECONOMIC STABILITY: FOOD INSECURITY: WITHIN THE PAST 12 MONTHS, YOU WORRIED THAT YOUR FOOD WOULD RUN OUT BEFORE YOU GOT THE MONEY TO BUY MORE.: NEVER TRUE

## 2023-10-06 SDOH — ECONOMIC STABILITY: INCOME INSECURITY: IN THE LAST 12 MONTHS, WAS THERE A TIME WHEN YOU WERE NOT ABLE TO PAY THE MORTGAGE OR RENT ON TIME?: NO

## 2023-10-06 SDOH — ECONOMIC STABILITY: TRANSPORTATION INSECURITY
IN THE PAST 12 MONTHS, HAS LACK OF TRANSPORTATION KEPT YOU FROM MEETINGS, WORK, OR FROM GETTING THINGS NEEDED FOR DAILY LIVING?: NO

## 2023-10-06 SDOH — ECONOMIC STABILITY: TRANSPORTATION INSECURITY: IN THE PAST 12 MONTHS, HAS LACK OF TRANSPORTATION KEPT YOU FROM MEDICAL APPOINTMENTS OR FROM GETTING MEDICATIONS?: NO

## 2023-10-06 ASSESSMENT — PAIN SCALES - GENERAL
PAINLEVEL_OUTOF10: 0 - NO PAIN

## 2023-10-06 ASSESSMENT — ANXIETY QUESTIONNAIRES
4. TROUBLE RELAXING: NOT AT ALL
5. BEING SO RESTLESS THAT IT IS HARD TO SIT STILL: NOT AT ALL
2. NOT BEING ABLE TO STOP OR CONTROL WORRYING: NEARLY EVERY DAY
6. BECOMING EASILY ANNOYED OR IRRITABLE: NOT AT ALL
7. FEELING AFRAID AS IF SOMETHING AWFUL MIGHT HAPPEN: SEVERAL DAYS
1. FEELING NERVOUS, ANXIOUS, OR ON EDGE: NEARLY EVERY DAY
3. WORRYING TOO MUCH ABOUT DIFFERENT THINGS: NEARLY EVERY DAY
GAD7 TOTAL SCORE: 10
IF YOU CHECKED OFF ANY PROBLEMS ON THIS QUESTIONNAIRE, HOW DIFFICULT HAVE THESE PROBLEMS MADE IT FOR YOU TO DO YOUR WORK, TAKE CARE OF THINGS AT HOME, OR GET ALONG WITH OTHER PEOPLE: SOMEWHAT DIFFICULT

## 2023-10-06 ASSESSMENT — COGNITIVE AND FUNCTIONAL STATUS - GENERAL
MOVING FROM LYING ON BACK TO SITTING ON SIDE OF FLAT BED WITH BEDRAILS: A LITTLE
CLIMB 3 TO 5 STEPS WITH RAILING: A LOT
WALKING IN HOSPITAL ROOM: A LITTLE
EATING MEALS: A LITTLE
MOBILITY SCORE: 20
WALKING IN HOSPITAL ROOM: A LITTLE
TURNING FROM BACK TO SIDE WHILE IN FLAT BAD: A LITTLE
MOVING TO AND FROM BED TO CHAIR: A LITTLE
CLIMB 3 TO 5 STEPS WITH RAILING: A LITTLE
STANDING UP FROM CHAIR USING ARMS: A LITTLE
DAILY ACTIVITIY SCORE: 17
DRESSING REGULAR LOWER BODY CLOTHING: A LITTLE
MOBILITY SCORE: 18
PERSONAL GROOMING: A LITTLE
HELP NEEDED FOR BATHING: A LITTLE
STANDING UP FROM CHAIR USING ARMS: A LITTLE
DRESSING REGULAR UPPER BODY CLOTHING: TOTAL

## 2023-10-06 ASSESSMENT — ENCOUNTER SYMPTOMS
FATIGUE: 1
ENDOCRINE NEGATIVE: 1
MUSCULOSKELETAL NEGATIVE: 1
CARDIOVASCULAR NEGATIVE: 1
ALLERGIC/IMMUNOLOGIC NEGATIVE: 1
PSYCHIATRIC NEGATIVE: 1
GASTROINTESTINAL NEGATIVE: 1
SHORTNESS OF BREATH: 1
APPETITE CHANGE: 0
EYES NEGATIVE: 1
NEUROLOGICAL NEGATIVE: 1

## 2023-10-06 ASSESSMENT — PAIN - FUNCTIONAL ASSESSMENT
PAIN_FUNCTIONAL_ASSESSMENT: 0-10

## 2023-10-06 ASSESSMENT — ACTIVITIES OF DAILY LIVING (ADL)
LACK_OF_TRANSPORTATION: NO
ADL_ASSISTANCE: INDEPENDENT

## 2023-10-06 NOTE — CONSULTS
"Nutrition Assessment Note  Assessment    Assessment:  Reason for Assessment  Reason for Assessment: Provider consult order as part of pre-VAD/transplant orderset.    History:     Pt admitted from an OSH for advanced therapy work-up.  He went to OSH on 10/1 for chest pain, SOB, orthopnea and lower extremity edema.  On nitroprusside drip.    Pt is on a cardiac diet.  Malnutrition Screening Tool was negative.      Attempted to meet with patient a few times today but was other providers were with patient and then RN reported that he was exhausted this afternoon from the busy day.  Will re-attempt in-person visit at follow-up.        Biochemical data:  Na+ 130  K+ 4.3  Chloride 92  Bicarb 30  BUN 65  Creatinine 2.09  Calcium 9.5  Phos 4.7  Sugars 287, 296, 240  A1c 8.1%    Anthropometrics:  Height: 180.3 cm (5' 10.98\")  Weight: 108 kg (238 lb 1.6 oz)  BMI (Calculated): 33.22    Weight Change: 0              IBW/kg (Dietitian Calculated): 78.2 kg  Percent of IBW: 138 %                Energy Needs:  Calculated Energy Needs Using Equations  Height: 180.3 cm (5' 10.98\")  Temp: 36.4 °C (97.5 °F)    Estimated Energy Needs  Total Energy Estimated Needs (kCal):  (6742-5760)  Method for Estimating Needs:  (MSJ= 1930)    Nutrition Focused Physical Findings:    Visual only from outside of room:    Subcutaneous Fat Loss  Orbital Fat Pads: Well nourshed (slightly bulging fat pads)  Buccal Fat Pads: Well nourished (full, rounded cheeks)  Triceps: Well nourished (ample fat tissue)    Muscle Wasting  Temporalis: Well nourished (well-defined muscle)  Pectoralis (Clavicular Region): Well nourished (clavicle not visible)  Quadriceps: Well nourished (well developed, well rounded)  Gastrocnemius: Well nourished (well developed bulbous muscle)    Edema  Edema: +2 mild  Edema Location:  (generalized)              Diagnosis   Diagnosis:           Based on anthropometrics and brief visual assessment, does not appear that pt is malnourished.  " Will continue to follow while admitted.        Interventions/Recommendations   Interventions/Recommendations:       Food and/or Nutrient Delivery Interventions            Education Documentation  No documentation found.        Monitoring and Evaluation   Monitoring/Evaluation:  Food and Nutrient Related History                 Follow Up  Last Date of Nutrition Visit: 10/06/23  Nutrition Follow-Up Needed?: Dietitian to reassess per policy  Follow up Comment: unable to see in-person today

## 2023-10-06 NOTE — PROGRESS NOTES
Physical Therapy    Physical Therapy Evaluation    Patient Name: Felice Law  MRN: [id number]  Today's Date: 10/6/2023   Time Calculation  Start Time: 1201  Stop Time: 1232  Time Calculation (min): 31 min    Assessment/Plan   PT Assessment  PT Assessment Results: Decreased strength, Decreased range of motion, Decreased endurance, Impaired balance, Decreased mobility  Rehab Prognosis: Good  End of Session Communication: Bedside nurse (HF team)  End of Session Patient Position: Bed, 4 rail up, Alarm off, caregiver present  IP OR SWING BED PT PLAN  Inpatient or Swing Bed: Inpatient  PT Plan  Treatment/Interventions: Bed mobility, Transfer training, Gait training, Stair training, Balance training  PT Plan: Skilled PT  PT Frequency: 5 times per week  PT Recommended Transfer Status: Assist x1      Subjective   General Visit Information:  General  Reason for Referral: SOB, LE edema, advanced therapy work-up  Past Medical History Relevant to Rehab: HFrEF/ICM (LVEF 20% 8/14/23), CAD s/p PCI and CABG (LIMA-LAD with balloon pump support April 2023), Hx of cardiac arrest s/p VT ablation + ICD, HTN/HLD, CKD, Hx of DVT (L femoral) on Eliquis, hypothyroidism, pulmonary hypertension, T2DM, JAKE/OHS, former tobacco abuse (24 pack year, quit 2017)  Family/Caregiver Present: No  Prior to Session Communication: Bedside nurse  Patient Position Received: Alarm off, not on at start of session, Up in chair  General Comment: Patient pleasant and agreeable to PT. Patient able to communicate with basic English, utilized MARTII in beginning of session to ensure good understanding of PT  POC.    Home Living:  Home Living  Type of Home: House  Lives With:  (family)  Home Layout: One level  Bathroom Shower/Tub: Tub/shower unit    Prior Level of Function:  Prior Function Per Pt/Caregiver Report  Level of [place]: Independent with ADLs and functional transfers, Independent with homemaking with ambulation. Does not use a device for  ambulation. No falls.   Receives Help From: Family  ADL Assistance: Independent  Ambulatory Assistance: Independent  Leisure: enjoys cooking, not working.  Hand Dominance: Right    Precautions:  Precautions  Medical Precautions: Cardiac precautions, Fall precautions  Precautions Comment: Pt usually wears B/L AFO's 2/2 chronic foot drop. Pt reports he does not need them for shorter distances, only out in the community.    Vital Signs:  Vital Signs  Heart Rate:  (PRE: 112 bpm; DURING: max  bpm; POST : 117)  SpO2:  (PRE: 91%; DURING: range 91-95%; POST 94%)  BP:  (PRE: 98/72; POST: 116/87)  BP Method: Automatic  Patient Position: Sitting    Objective   Pain:  Pain Assessment  Pain Assessment:  (incr L side sciatic pain. did not limit participation)  Cognition:  Cognition  Overall Cognitive Status: Within Functional Limits  Orientation Level: Oriented X4    General Assessments:      Activity Tolerance  Endurance: Tolerates 10 - 20 min exercise with multiple rests    Static Sitting Balance  Static Sitting-Level of Assistance:  (indep)  Dynamic Sitting Balance  Dynamic Sitting-Balance:  (indep)    Static Standing Balance  Static Standing-Level of Assistance:  (CGA)  Dynamic Standing Balance  Dynamic Standing-Balance:  (CGA to Min A)  Functional Assessments:  Bed Mobility  Bed Mobility:  (indep)    Transfers  Transfer:  (sit<>stand, SBA with no AD)    Ambulation/Gait Training  Ambulation/Gait Training Performed:  (CGA to  Min A (during turns) x 110 ft, pt managed IV pole, and used it to assist with balance. PT provided Min A during turns 2/2 pt with decr heel strike and some 'shuffling' gait pattern likely 2/2 chronic foot drop. Pt compensated with incr hip flex.)    Extremity/Trunk Assessments:  RLE   RLE :  (R LE WFL, except AROM ankle (unable 2/2 foot drop/decr strength). PROM WFL)  LLE   LLE :  (L LE WFL, except AROM ankle (unable 2/2 foot drop/decr strength). PROM WFL)    MMT:  B/L hip flex 4/5, B/L knee ext  5/5, B/L knee flex 4/5  R ankle DF/PF 0/5  L ankle DF/PF 0/5  *chronic foot drop since 2017 as per pt, from surgery (?). Unclear etiology.     Outcome Measures:  [facility] Basic Mobility  Turning from your back to your side while in a flat bed without using bedrails: None  Moving from lying on your back to sitting on the side of a flat bed without using bedrails: None  Moving to and from bed to chair (including a wheelchair): None  Standing up from a chair using your arms (e.g. wheelchair or bedside chair): A little  To walk in hospital room: A little  Climbing 3-5 steps with railing: A lot  Basic Mobility - Total Score: 20    FSS-ICU  Ambulation: Walks <50 feet with any assistance x1 or walks any distance with assistance x2 people  Rolling: Complete independence  Sitting: Complete independence  Transfer Sit-to-Stand: Minimal assistance (performs 75% or more of task)  Transfer Supine-to-Sit: Supervision or set-up only  Total Score: 24         E = Exercise and Early Mobility  Current Activity: Ambulating in phan    Other Measures  5x Sit to Stand: Pt required use of UE to assist with stand.                   time: 13.72,   bpm, spO2 92% on room air.    Encounter Problems       Encounter Problems (Active)       General       Pt will amb x 550 ft, no rest breaks, with stable vitals, RPD 3/10 or less, RPE 13/20 or less      indep with transfers: sit <>stand, with LRD (Progressing)       Start:  10/06/23    Expected End:  10/20/23            Score 24/28 on Tinetti  (Progressing)       Start:  10/06/23    Expected End:  10/20/23               General        Pt will perform a 5x STS, in 10 seconds or less, with RPD 3/10 or less, RPE 13/20 or less with stable vitals.       Pt will perform a 6MWT, with distance greater than 950 ft  with stable vitals, RPD 3/10 or less, RPE 13/20 or less.  (Progressing)       Start:  10/06/23    Expected End:  10/20/23               Pain - Adult          Transfers       Pt will perform a 5x  STS, in 10 seconds or less, with RPD 3/10 or less, RPE 13/20 or less with stable vitals.  (Progressing)       Start:  10/06/23    Expected End:  10/20/23            Goal 2       Start:  10/06/23

## 2023-10-06 NOTE — CARE PLAN
Problem: General   Description: Pt will perform a 5x STS, in 10 seconds or less, with RPD 3/10 or less, RPE 13/20 or less with stable vitals.   Goal: Pt will perform a 6MWT, with distance greater than 950 ft  with stable vitals, RPD 3/10 or less, RPE 13/20 or less.   Outcome: Progressing     Problem: General  Description: Pt will amb x 550 ft, no rest breaks, with stable vitals, RPD 3/10 or less, RPE 13/20 or less  Goal: indep with transfers: sit <>stand, with LRD  Outcome: Progressing  Goal: Score 24/28 on Tinetti   Outcome: Progressing     Problem: Transfers  Goal: Pt will perform a 5x STS, in 10 seconds or less, with RPD 3/10 or less, RPE 13/20 or less with stable vitals.   Outcome: Progressing

## 2023-10-06 NOTE — PROGRESS NOTES
10/06/23 1346   Discharge Planning   Living Arrangements Alone   Support Systems Friends/neighbors;Parent   Assistance Needed indedenedent   Type of Residence Private residence   Home or Post Acute Services   (Patient presents for advanced therapy evaluation, TBD)   Patient expects to be discharged to: To be determined         SW met with patient at bedside to complete assessment. ISABEL assisted (Bengali). Patient lives at home alone and is independent at baseline. Patient denied current needs for social work. Social work to follow.   Deanna Medrano, DONNA, LISW-S ([id number])

## 2023-10-06 NOTE — PROGRESS NOTES
Felice Law is a 58 y.o. male on day 2 of admission presenting with Acute decompensated heart failure (CMS/HCC).    Subjective   Patient was informed of plan to increase frequency of sliding scale doses, and reduce dose of Lantus tomorrow morning to 40 units.  Patient reports his appetite is roughly normal  I have reviewed histories, allergies and medications have been reviewed        Objective   Review of Systems   Constitutional:  Positive for fatigue. Negative for appetite change.   HENT: Negative.     Eyes: Negative.    Respiratory:  Positive for shortness of breath.    Cardiovascular: Negative.    Gastrointestinal: Negative.    Endocrine: Negative.    Genitourinary: Negative.    Musculoskeletal: Negative.    Allergic/Immunologic: Negative.    Neurological: Negative.    Psychiatric/Behavioral: Negative.       Physical Exam  Constitutional:       Appearance: He is normal weight. He is ill-appearing.   HENT:      Head: Normocephalic and atraumatic.      Nose: Nose normal.      Mouth/Throat:      Mouth: Mucous membranes are moist.      Pharynx: Oropharynx is clear.   Eyes:      Extraocular Movements: Extraocular movements intact.      Conjunctiva/sclera: Conjunctivae normal.   Cardiovascular:      Rate and Rhythm: Normal rate and regular rhythm.      Pulses: Normal pulses.      Heart sounds: Normal heart sounds.   Pulmonary:      Breath sounds: Normal breath sounds.   Abdominal:      General: Abdomen is flat.      Palpations: Abdomen is soft.   Musculoskeletal:      Cervical back: Normal range of motion.   Skin:     General: Skin is warm and dry.   Neurological:      General: No focal deficit present.      Mental Status: He is alert and oriented to person, place, and time. Mental status is at baseline.   Psychiatric:         Mood and Affect: Mood normal.         Behavior: Behavior normal.         Last Recorded Vitals  Blood pressure 102/77, pulse (!) 114, temperature 36.4 °C (97.5 °F), resp. rate 13,  "height 1.803 m (5' 10.98\"), weight 108 kg (238 lb 1.6 oz), SpO2 93 %.  Intake/Output last 3 Shifts:  I/O last 3 completed shifts:  In: 1223.8 (11.3 mL/kg) [P.O.:640; I.V.:583.8 (5.4 mL/kg)]  Out: 1350 (12.5 mL/kg) [Urine:1350 (0.3 mL/kg/hr)]  Weight: 108 kg     Relevant Results  Results from last 7 days   Lab Units 10/06/23  1259 10/06/23  0819 10/06/23  0515 10/05/23  1713 10/05/23  1647 10/05/23  1131 10/05/23  0829 10/05/23  0354 10/04/23  1538 10/04/23  1518   POCT GLUCOSE mg/dL 330* 278*  --  307*  --  227* 228*  --    < >  --    GLUCOSE mg/dL  --   --  287*  --  296*  --   --  240*  --  198*    < > = values in this interval not displayed.     Scheduled medications  aspirin, 81 mg, oral, Daily  atorvastatin, 80 mg, oral, Daily  gabapentin, 300 mg, oral, q AM  gabapentin, 600 mg, oral, Nightly  heparin (porcine), 5,000 Units, subcutaneous, q8h  hydrALAZINE, 50 mg, oral, TID  [START ON 10/7/2023] insulin glargine, 40 Units, subcutaneous, q24h  insulin lispro, 0-15 Units, subcutaneous, q4h  insulin lispro, 5 Units, subcutaneous, TID with meals  iron sucrose, 100 mg, intravenous, Daily  levothyroxine, 50 mcg, oral, Daily  metoprolol tartrate, 12.5 mg, oral, TID  pantoprazole, 40 mg, oral, Daily before breakfast  QUEtiapine, 50 mg, oral, Nightly      Continuous medications  nitroprusside, 0-5 mcg/kg/min, Last Rate: 1 mcg/kg/min (10/06/23 1153)      PRN medications  PRN medications: acetaminophen, albuterol, dextrose 10 % in water (D10W), dextrose, docusate sodium, glucagon      Results from last 7 days   Lab Units 10/06/23  0515 10/05/23  1647 10/05/23  0354 10/04/23  1518   HEMOGLOBIN A1C %  --   --   --  8.1*   SODIUM mmol/L 130* 132* 137 137   POTASSIUM mmol/L 4.3 4.1 4.0 4.1   CHLORIDE mmol/L 92* 90* 93* 93*   CO2 mmol/L 30 28 31 28   BUN mg/dL 65* 60* 55* 58*   CREATININE mg/dL 2.09* 1.87* 1.65* 1.79*   CALCIUM mg/dL 9.5 10.2 10.0 10.1   ALBUMIN g/dL 3.6 4.1 3.9 4.2   PROTEIN TOTAL g/dL  --  7.9  --   --  "   BILIRUBIN TOTAL mg/dL  --  1.8*  --   --    ALK PHOS U/L  --  63  --   --    ALT U/L  --  11  --   --    AST U/L  --  13  --   --    GLUCOSE mg/dL 287* 296* 240* 198*   CHOLESTEROL mg/dL  --   --  106  --    TRIGLYCERIDES mg/dL  --   --  247*  --    HDL mg/dL  --   --  24.5  --                     Assessment/Plan   Principal Problem:    Acute decompensated heart failure (CMS/LTAC, located within [facility])    Principal Problem:    Acute decompensated heart failure (CMS/LTAC, located within [facility])        Type 2 Diabetes   PLAN  - Goal BG <180  - decrease glargine to 40 units once daily  - continue lispro 5 units with meals plus scale, do not hold unless pt is not eating or glucoses less than 90 mg/dL within 1 hour before mealtime  -  adjust lispro corrective scale #3 to q4hr, when glucose is consistently <200 please reduce frequency to ACHS   = 0u  151-200 = 3u  201-250 = 6u  251-300 = 9u  301-350 = 12u  351-400 = 15u     -Accuchecks (not BMP) TIDAC and QHS- kindly ensure QHS Accucheck is drawn; it is often missed   -Hypoglycemia protocol  -Diabetes Diet- low carb 60 CHO  -Will continue to follow and titrate insulin accordingly        I spent 30 minutes in the professional and overall care of this patient.      Angelic Grier PA-C

## 2023-10-06 NOTE — CONSULTS
"Consults    Reason For Consult  Reason for Consult: other and Advanced Therapies evaluation and preparedness plan     History Of Present Illness  Felice Law is a 58 y.o. male with past medical history of Congestive Heart Failure is presenting with ADHF. PMH significant for CAD with recent CABG in 4/2023 at [facility], IDDM2, HTN, HLD,  and recurrent admissions for ADHF refractory to medical management.      Symptoms (0 - 10, Best to Worst)  [place] Symptom Assessment System  Pain Score: 0 - No pain    BM in last 48 hours? yes    Emotional/Psychological/Spiritual Needs  Over the past two weeks, how often has the patient been bothered by having little interest or pleasure in doing things?  occurrence (last two weeks): not at all    Over the past two weeks, how often has the patient been bothered by feeling down, depressed, or hopeless?  occurrence (last two weeks): several days    Screening for spiritual needs?  Yes  Amish and importance of Rastafari: Not Temple  Source for spiritual support/meaning: Private reflection with God and interpersonal connections.     Spiritual Hx:  Are you spiritual or Temple?  No but I believe in God.   What’s your carl background? Judaism, not Temple.   During difficult times in your life, what have you relied on for strength?    Music Hx:   Do you enjoy music? What type of music do you enjoy?  All types of music, no preference.      Serious Illness Conversation  What is your understanding now of where you are with your illness:  Reported as \"very good\" but poor to fair.   How much information about what is likely to be ahead with your illness would you like from me:  As much as I need to make my own decisions. I want to know about the amount of time I have left.   What are your most important goals if your health situation worsens:  Not losing my independence and not being a burden on my family.   What are your biggest fears and worries about the future with your " health:  Being a burden on my family.   What gives you strength as you think about the future with your illness:  Wanting more time with my loved ones   What abilities are so critical to your life that you can’t imagine living without them:  Walking is critical. 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   If you become sicker, how much are you willing to go through for the possibility of gaining more time:  LVAD or Heart transplant  How much does your family know about your priorities and wishes:  Some, he doesn't want to worry his mother and his sister has had 4 strokes.     Preparedness Plan:   I had an extensive conversation with the patient in the presence of virtual . Palliative care was introduced to both the patient and  and it was explained what a preparedness plan entails. All questions were addressed and answered. Patient gave a verbal understanding of the answers provided.     Goals/expectations of LVAD implantation: To have more time with his mother.     Hemodialysis: Patient verbalized understanding that preexisting renal disease or renal injury during/after VAD implantation puts patients at risk for dialysis dependent renal failure.  He is accepting of longterm dialysis if need arises.     ICH/stroke: Patient is aware of risk for bleeding or stroke.    LVAD failure: Patient verbalized he would NEEDS TIME TO REFLECT regarding heroic measures, compressions and intubation, in the event of LVAD failure.     LVAD infection/need for long term antibiotics: Patient is aware of the risk for infection of driveline, device, and blood. He is..... to being treated with long term antibiotics if this were to occur     Artifical nutrition and hydration: Patient would ACCEPT a long term feeding tube in the event complications would occur that would require placement .    Blood transfusions: Patient ACCEPTS receiving blood  transfusions if needed.     Organ Donation: NOT DISCUSSED    Mechanical ventilation: Patient ACCEPTS short term ventilation. He only wants to be on mechainical ventilation if there is a chance for recovery and liberation.     Postoperative rehab plan: Patient states he has been informed about what his post-op period would look like. He accepts the level of monitoring, increased medication list and possible post-discharge SNF/Acute Rehab were he to receive a procedure.     Spiritual preferences: None Scientology but spiritual.     If device implantation over time does not help him meet his goals of care and complications affect his view on quality of life, he was asked if he would be comfortable in discontinuing therapy. He stated that HE NEEDS MORE TIME TO REFLECT about this.     Family History:  Denies heart disease in his siblings or parents.   Father:  from complications of high blood pressure (stroke after transfusion). DM2 and HTN.   Mother: Alive, controlled DM2 and HTN.     Personal/Social History  Lives in [address] [place] home with mother and sister. Performs all IADLs/ADLs, and drives his mother to her appointments. 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   Former Smoker  He reports that he quit smoking about 6 years ago. His smoking use included cigarettes. He has a 24.00 pack-year smoking history. He does not have any smokeless tobacco history on file. He reports that he does not drink alcohol and does not use drugs.    Functional Status  Activities of Daily Living:  Basic ADLs: (I= independent, A= assistance, D= dependent)  Bathing: I, Dressing: I, Toileting: I, Transferring: I, Continence: I, Feeding: I    Rahman Index:  A  Instrumental ADLs: (I= independent, A= assistance, D= dependent)  Ability to use phone: 3, Shoppin, Cookin, Housekeepin, Laundry: 2, Transportation: 2, Medications: I, Handle Finances: 3   [place] Scale:  Mostly independent.        Caregiving/Caregiver Support  Does the patient require assistance in some or all components of his care, including coordination of medical care? No  If Yes, which person serves that role?  friend, mother, and neighbor   Caregiver emotional or practical needs:      Past Medical History  He has a past medical history of CHF (congestive heart failure) (CMS/[facility]), COPD (chronic obstructive pulmonary disease) (CMS/[facility]), Coronary artery disease, Diabetes mellitus (CMS/[facility]), Disease of thyroid gland, and Hypertension.    Surgical History  He has a past surgical history that includes Coronary artery bypass graft; Cardiac defibrillator placement; and Coronary angioplasty with stent.     Family History  No family history on file.  Allergies  Azithromycin    Review of Systems     Physical Exam  Vitals reviewed.   Constitutional:       Appearance: Normal appearance. He is obese.   HENT:      Head: Normocephalic and atraumatic.      Mouth/Throat:      Mouth: Mucous membranes are moist.      Pharynx: Oropharynx is clear.   Eyes:      Extraocular Movements: Extraocular movements intact.      Conjunctiva/sclera: Conjunctivae normal.      Pupils: Pupils are equal, round, and reactive to light.   Cardiovascular:      Rate and Rhythm: Regular rhythm. Tachycardia present.      Pulses: Normal pulses.   Abdominal:      General: Bowel sounds are normal.      Palpations: Abdomen is soft.   Musculoskeletal:      Cervical back: Neck supple.      Right lower leg: Edema present.      Left lower leg: Edema present.   Skin:     General: Skin is warm and dry.   Neurological:      General: No focal deficit present.      Mental Status: He is alert. Mental status is at baseline.   Psychiatric:         Mood and Affect: Mood normal.         Thought Content: Thought content normal.       Current Facility-Administered Medications   Medication Dose Route Frequency Provider Last Rate Last Admin    acetaminophen (Tylenol) tablet 975 mg  975 mg oral  q6h PRN Phillip T Brad, DO        albuterol 2.5 mg /3 mL (0.083 %) nebulizer solution 2.5 mg  2.5 mg nebulization q6h PRN Radisa Tosanovic, DO        aspirin chewable tablet 81 mg  81 mg oral Daily Radisa Tosanovic, DO   81 mg at 10/06/23 0838    atorvastatin (Lipitor) tablet 80 mg  80 mg oral Daily Radisa Tosanovic, DO   80 mg at 10/06/23 0838    dextrose 10 % in water (D10W) infusion  0.3 g/kg/hr intravenous Once PRN Phillip T Brad, DO        dextrose 50 % injection 25 g  25 g intravenous q15 min PRN Jordan T Felice, DO        docusate sodium (Colace) capsule 100 mg  100 mg oral BID PRN Radisa Tosanovic, DO        gabapentin (Neurontin) capsule 300 mg  300 mg oral q AM Radisa Tosanovic, DO   300 mg at 10/06/23 0838    gabapentin (Neurontin) capsule 600 mg  600 mg oral Nightly Radisa Tosanovic, DO   600 mg at 10/05/23 2131    glucagon (Glucagen) injection 1 mg  1 mg intramuscular q15 min PRN Phillip T Brad, DO        heparin (porcine) injection 5,000 Units  5,000 Units subcutaneous q8h Radisa Tosanovic, DO   5,000 Units at 10/06/23 0516    hydrALAZINE (Apresoline) tablet 25 mg  25 mg oral TID MARLON Velazquez-CNP   25 mg at 10/06/23 0838    insulin glargine (Lantus) injection 60 Units  60 Units subcutaneous q24h Jordan T Felice, DO   60 Units at 10/06/23 0840    insulin lispro (HumaLOG) injection 0-15 Units  0-15 Units subcutaneous q4h Angelic Grier PA-C        insulin lispro (HumaLOG) injection 5 Units  5 Units subcutaneous TID with meals Angelic Grier PA-C   5 Units at 10/06/23 0838    iron sucrose (Venofer) injection 100 mg  100 mg intravenous Daily Jordan T Felice, DO   Stopped at 10/06/23 1219    levothyroxine (Synthroid, Levoxyl) tablet 50 mcg  50 mcg oral Daily Radisa Tosanovic, DO   50 mcg at 10/06/23 0600    nitroprusside (Nipride) infusion 500 mcg/mL (100 mL vial) (adult)  0-5 mcg/kg/min intravenous Continuous BRAD Delcid 12.84 mL/hr at 10/06/23 1153 1 mcg/kg/min at  "10/06/23 1153    pantoprazole (ProtoNix) EC tablet 40 mg  40 mg oral Daily before breakfast Radisa Tosanovic, DO   40 mg at 10/06/23 0838    QUEtiapine (SEROquel) tablet 50 mg  50 mg oral Nightly Radisa Tosanovic, DO   50 mg at 10/05/23 2132       Last Recorded Vitals  Blood pressure 100/72, pulse (!) 115, temperature 36.5 °C (97.7 °F), resp. rate 14, height 1.803 m (5' 11\"), weight 108 kg (238 lb 1.6 oz), SpO2 95 %.    Relevant Results  Results for orders placed or performed during the hospital encounter of 10/04/23 (from the past 24 hour(s))   POCT GLUCOSE   Result Value Ref Range    POCT Glucose 227 (H) 74 - 99 mg/dL   Blood Gas Mixed Venous Unsolicited   Result Value Ref Range    POCT pH, Mixed 7.44 (H) 7.33 - 7.43 pH    POCT pCO2, Mixed 46 41 - 51 mm Hg    POCT pO2, Mixed 44 35 - 45 mm Hg    POCT SO2, Mixed 62 45 - 75 %    POCT Oxy Hemoglobin, Mixed 60.1 45.0 - 75.0 %    POCT Base Excess, Mixed 6.1 (H) -2.0 - 3.0 mmol/L    POCT HCO3 Calculated, Mixed 31.2 (H) 22.0 - 26.0 mmol/L    Patient Temperature 37.0 degrees Celsius   Abo/Rh   Result Value Ref Range    ABO TYPE A     Rh TYPE POS    Anti-Cardiolipin Antibody (IgA, IgG, and IgM)   Result Value Ref Range    Anticardiolipin IgA 0.9 <20.0 APL U/mL    Anticardiolipin IgG <1.6 <20.0 GPL U/mL    Anticardiolipin IgM 1.5 <20.0 MPL U/mL   B-Type Natriuretic Peptide   Result Value Ref Range     (H) 0 - 99 pg/mL   CBC and Auto Differential   Result Value Ref Range    WBC 7.8 4.4 - 11.3 x10*3/uL    nRBC 0.0 0.0 - 0.0 /100 WBCs    RBC 5.34 4.50 - 5.90 x10*6/uL    Hemoglobin 12.9 (L) 13.5 - 17.5 g/dL    Hematocrit 40.8 (L) 41.0 - 52.0 %    MCV 76 (L) 80 - 100 fL    MCH 24.2 (L) 26.0 - 34.0 pg    MCHC 31.6 (L) 32.0 - 36.0 g/dL    RDW 19.2 (H) 11.5 - 14.5 %    Platelets 255 150 - 450 x10*3/uL    MPV 10.4 7.5 - 11.5 fL    Neutrophils % 78.8 40.0 - 80.0 %    Immature Granulocytes %, Automated 0.4 0.0 - 0.9 %    Lymphocytes % 7.3 13.0 - 44.0 %    Monocytes % 10.4 2.0 - " 10.0 %    Eosinophils % 2.7 0.0 - 6.0 %    Basophils % 0.4 0.0 - 2.0 %    Neutrophils Absolute 6.17 1.20 - 7.70 x10*3/uL    Immature Granulocytes Absolute, Automated 0.03 0.00 - 0.70 x10*3/uL    Lymphocytes Absolute 0.57 (L) 1.20 - 4.80 x10*3/uL    Monocytes Absolute 0.81 0.10 - 1.00 x10*3/uL    Eosinophils Absolute 0.21 0.00 - 0.70 x10*3/uL    Basophils Absolute 0.03 0.00 - 0.10 x10*3/uL   Coagulation Screen   Result Value Ref Range    Protime 13.7 (H) 9.8 - 12.8 seconds    INR 1.2 (H) 0.9 - 1.1    aPTT 32 27 - 38 seconds   Comprehensive Metabolic Panel   Result Value Ref Range    Glucose 296 (H) 74 - 99 mg/dL    Sodium 132 (L) 136 - 145 mmol/L    Potassium 4.1 3.5 - 5.3 mmol/L    Chloride 90 (L) 98 - 107 mmol/L    Bicarbonate 28 21 - 32 mmol/L    Anion Gap 18 10 - 20 mmol/L    Urea Nitrogen 60 (H) 6 - 23 mg/dL    Creatinine 1.87 (H) 0.50 - 1.30 mg/dL    eGFR 41 (L) >60 mL/min/1.73m*2    Calcium 10.2 8.6 - 10.6 mg/dL    Albumin 4.1 3.4 - 5.0 g/dL    Alkaline Phosphatase 63 33 - 120 U/L    Total Protein 7.9 6.4 - 8.2 g/dL    AST 13 9 - 39 U/L    Bilirubin, Total 1.8 (H) 0.0 - 1.2 mg/dL    ALT 11 10 - 52 U/L   Hepatitis B Surface Antigen   Result Value Ref Range    Hepatitis B Surface AG Nonreactive Nonreactive   Hepatitis C Antibody   Result Value Ref Range    Hepatitis C AB Nonreactive Nonreactive   HIV 1/2 Antigen/Antibody Screen with Reflex to Confirmation   Result Value Ref Range    HIV 1/2 Antigen/Antibody Screen with Reflex to Confirmation Nonreactive Nonreactive   HSV1 IgG and HSV2 IgG   Result Value Ref Range    HSV 1, IgG >8.0 (H) <0.9 INDEX    HSV 2, IgG <0.2 <0.9 INDEX   Human Chorionic Gonadotropin, Serum Quantitative   Result Value Ref Range    HCG, Beta-Quantitative <3 <5 mIU/mL   Lactate Dehydrogenase   Result Value Ref Range     84 - 246 U/L   Magnesium   Result Value Ref Range    Magnesium 1.76 1.60 - 2.40 mg/dL   Mumps Antibody, IgG   Result Value Ref Range    Mumps, IgG Positive (A)  Negative    Mumps, IgG Index 4.0 (H) <=0.8 IA   Prealbumin   Result Value Ref Range    Prealbumin 25.1 18.0 - 40.0 mg/dL   Prostate Specific Antigen, Screen   Result Value Ref Range    Prostate Specific Antigen,Screen 0.22 <=4.00 ng/mL   Rubella Antibody, IgG   Result Value Ref Range    Rubella, IgG Positive (A) Negative    Rubella, IgG Index 2.3 (H) <=0.7 IA   Rubeola Antibody, IgG   Result Value Ref Range    Rubeola, IgG Positive     Rubeola, IgG Index >8.0 (H) <=0.8 IA   Syphilis Screen with Reflex   Result Value Ref Range    Syphilis Total Ab Reactive (A) Nonreactive   Triiodothyronine, Total   Result Value Ref Range    Triiodothyronine 107 60 - 200 ng/dL   Thyroxine, Total   Result Value Ref Range    Thyroxine 8.7 4.5 - 11.1 ug/dL   Toxoplasma IgG   Result Value Ref Range    Toxoplasma IgG Reactive (A) Nonreactive   Varicella Zoster Antibody, IgG   Result Value Ref Range    Varicella Zoster, IgG Positive (A) Negative    Varicella Zoster, IgG Index >8.0 (H) <=0.8 IA   POCT GLUCOSE   Result Value Ref Range    POCT Glucose 307 (H) 74 - 99 mg/dL   Blood Gas Mixed Venous Unsolicited   Result Value Ref Range    POCT pH, Mixed 7.42 7.33 - 7.43 pH    POCT pCO2, Mixed 45 41 - 51 mm Hg    POCT pO2, Mixed 42 35 - 45 mm Hg    POCT SO2, Mixed 58 45 - 75 %    POCT Oxy Hemoglobin, Mixed 56.2 45.0 - 75.0 %    POCT Base Excess, Mixed 4.0 (H) -2.0 - 3.0 mmol/L    POCT HCO3 Calculated, Mixed 29.2 (H) 22.0 - 26.0 mmol/L    Patient Temperature 37.0 degrees Celsius   Urinalysis with Reflex Microscopic and Culture   Result Value Ref Range    Color, Urine Yellow Straw, Yellow    Appearance, Urine Clear Clear    Specific Gravity, Urine 1.013 1.005 - 1.035    pH, Urine 5.0 5.0, 5.5, 6.0, 6.5, 7.0, 7.5, 8.0    Protein, Urine 30 (1+) (N) NEGATIVE mg/dL    Glucose, Urine >=500 (3+) (A) NEGATIVE mg/dL    Blood, Urine NEGATIVE NEGATIVE    Ketones, Urine NEGATIVE NEGATIVE mg/dL    Bilirubin, Urine NEGATIVE NEGATIVE    Urobilinogen, Urine <2.0  <2.0 mg/dL    Nitrite, Urine NEGATIVE NEGATIVE    Leukocyte Esterase, Urine NEGATIVE NEGATIVE   Urinalysis Microscopic   Result Value Ref Range    WBC, Urine 1-5 1-5, NONE /HPF    RBC, Urine 1-2 NONE, 1-2, 3-5 /HPF    Squamous Epithelial Cells, Urine 1-9 (SPARSE) Reference range not established. /HPF    Hyaline Casts, Urine 2+ (A) NONE /LPF   Cytomegalovirus IgG   Result Value Ref Range    Cytomegalovirus IgG Reactive (A) Nonreactive   Hepatitis A Antibody, Total   Result Value Ref Range    Hepatitis A  AB-Total Reactive (A) Nonreactive   Hepatitis B Core Antibody, Total   Result Value Ref Range    Hepatitis B Core AB- Total Nonreactive Nonreactive   Hepatitis B Surface Antibody   Result Value Ref Range    Hepatitis B Surface AB 11.2 (H) <10.0 mIU/mL   Renal function panel   Result Value Ref Range    Glucose 287 (H) 74 - 99 mg/dL    Sodium 130 (L) 136 - 145 mmol/L    Potassium 4.3 3.5 - 5.3 mmol/L    Chloride 92 (L) 98 - 107 mmol/L    Bicarbonate 30 21 - 32 mmol/L    Anion Gap 12 10 - 20 mmol/L    Urea Nitrogen 65 (H) 6 - 23 mg/dL    Creatinine 2.09 (H) 0.50 - 1.30 mg/dL    eGFR 36 (L) >60 mL/min/1.73m*2    Calcium 9.5 8.6 - 10.6 mg/dL    Phosphorus 4.7 2.5 - 4.9 mg/dL    Albumin 3.6 3.4 - 5.0 g/dL   CBC   Result Value Ref Range    WBC 6.5 4.4 - 11.3 x10*3/uL    nRBC 0.0 0.0 - 0.0 /100 WBCs    RBC 4.76 4.50 - 5.90 x10*6/uL    Hemoglobin 11.6 (L) 13.5 - 17.5 g/dL    Hematocrit 36.8 (L) 41.0 - 52.0 %    MCV 77 (L) 80 - 100 fL    MCH 24.4 (L) 26.0 - 34.0 pg    MCHC 31.5 (L) 32.0 - 36.0 g/dL    RDW 18.3 (H) 11.5 - 14.5 %    Platelets 215 150 - 450 x10*3/uL    MPV 9.9 7.5 - 11.5 fL   Blood Gas Mixed Venous Unsolicited   Result Value Ref Range    POCT pH, Mixed 7.39 7.33 - 7.43 pH    POCT pCO2, Mixed 53 (H) 41 - 51 mm Hg    POCT pO2, Mixed 46 (H) 35 - 45 mm Hg    POCT SO2, Mixed 67 45 - 75 %    POCT Oxy Hemoglobin, Mixed 65.1 45.0 - 75.0 %    POCT Base Excess, Mixed 5.7 (H) -2.0 - 3.0 mmol/L    POCT HCO3 Calculated, Mixed  32.1 (H) 22.0 - 26.0 mmol/L    Patient Temperature 37.0 degrees Celsius   POCT GLUCOSE   Result Value Ref Range    POCT Glucose 278 (H) 74 - 99 mg/dL        Assessment/Plan   IMP:    Felice Law is a 58 y.o. male with past medical history of Congestive Heart Failure is presenting with ADHF. PMH significant for CAD with recent CABG in 4/2023 at [facility], IDDM2, HTN, HLD,  and recurrent admissions for ADHF refractory to medical management.  Palliative care was  consulted for advanced therapy palliative evaluation and preparedness plan discussion.   Mr Mona Law is a quiet man who lives with his mother and sister. He is developing an understanding for his disease course and pathology and prefers large concept explanations. He is very close to his mother  and spending time with her brings him the most katie. HE want s to withhold information from her because he doesn't want to have her worry. Also, if his health deteriorates, he does not want to burden her. He became tearful when discussing this with me.  WE will need to discuss this further as she is his NOK and his sister may not be a good proxy since she had several strokes.   No obvious barriers to compliance from a Palliative perspective.     Problems:  ADHF, improving  Ischemic cardiomyopathy with chronic systolic HF  DM2, sub-optimal control and neuropathy  HLD  HTN  Former Smoker (quit in 2017)  Sciatic pain       Symptom Management  Pain: Sciatica, does not want any medications currently.   Medications recommended for pain?  Yes  Tiredness: 5  Nausea: Denies  Depression: Worries about the future.   Anxiety: Related to his mother and the future.  Drowsiness: Denies  Appetite: Good  Wellbeing: Stressed about disease but otherwise OK.   Dyspnea: Denies when at rest. Otherwise positive  Intervention recommended for dyspnea?  no  Other:   Intervention recommended for constipation?  Yes,   Please start Miralax daily and add bisacodyl once now and then after  breakfast everyday until bowel movements return to normal nick and consistency.      Provider estimate of survival: unknown  Patient Prognostic Awareness: Yes - congruent with provider estimation    Patient/proxy preference for information  Mother: Prefers limited information    Goals of Care  Currently Full Code while being evaluated for AT.     Is the patient hospice-eligible?   No  Was a discussion held re hospice services?   no  Was a decision made re hospice services?  No        Other Palliative Support Recommendations  Music Therapy requested.  Declines Spiritual Care presently.   Constipation: Regimen as above  We will continue to follow Mr Dunn during his inpatient evaluation.   Please page or reach out via halo with any specific questions you may have.         I spent 90 minutes in the professional and overall care of this patient.      Lobo Milian MD

## 2023-10-06 NOTE — PROGRESS NOTES
Felice Law is a 58 y.o. male on day 2 of admission presenting with Acute decompensated heart failure (CMS/HCC).       Subjective Data:  Patient seen and examined at bedside. No acute events overnight. Reports feeling ok, denies chest pain, fever, chills, headache, nausea, vomiting, diarrhea.    Plan today:  -Hold diuretics for today  -Wean off nipride  -Start PO hydralazine  -Trial metoprolol  -Advanced therapy evaluation ongoing    Overnight Events:    No acute events overnight.     Objective Data:  Last Recorded Vitals:  Vitals:    10/06/23 0300 10/06/23 0400 10/06/23 0500 10/06/23 0600   BP: 98/65 101/70 99/67 98/67   Pulse: (!) 118 (!) 121 (!) 116 (!) 114   Resp: 14 14 14 15   Temp: 36.1 °C (97 °F)      TempSrc: Temporal      SpO2: 94% 91% 94% 96%   Weight:    108 kg (238 lb 1.6 oz)   Height:           Last Labs:  CBC - 10/6/2023:  5:15 AM  6.5 11.6 215    36.8      CMP - 10/6/2023:  5:15 AM  9.5 7.9 13 --- 1.8   4.7 3.6 11 63      PTT - 10/5/2023:  4:47 PM  1.2   13.7 32     BNP   Date/Time Value Ref Range Status   10/05/2023 04:47  0 - 99 pg/mL Final     Comment:     result   10/04/2023 03:18  0 - 99 pg/mL Final     Comment:     result     HGBA1C   Date/Time Value Ref Range Status   10/04/2023 03:18 PM 8.1 see below % Final   05/26/2023 02:03 AM 7.9 4.0 - 5.6 % Final   05/23/2023 08:38 PM 7.8 4.0 - 5.6 % Final     LDLCALC   Date/Time Value Ref Range Status   10/05/2023 03:54 AM 32 140 - 190 mg/dL Final     Comment:                                 Near   Borderline      AGE      Desirable  Optimal    High     High     Very High     0-19 Y     0 - 109     ---    110-129   >/= 130     ----    20-24 Y     0 - 119     ---    120-159   >/= 160     ----      >24 Y     0 -  99   100-129  130-159   160-189     >/=190       VLDL   Date/Time Value Ref Range Status   10/05/2023 03:54 AM 49 0 - 40 mg/dL Final      Last I/O:  I/O last 3 completed shifts:  In: 1223.8 (11.3 mL/kg) [P.O.:640; I.V.:583.8  "(5.4 mL/kg)]  Out: 1350 (12.5 mL/kg) [Urine:1350 (0.3 mL/kg/hr)]  Weight: 108 kg     Past Cardiology Tests (Last 3 Years):  EKG:  No results found for this or any previous visit from the past 1095 days.    Echo:  No results found for this or any previous visit from the past 1095 days.    Ejection Fractions:  No results found for: \"EF\"  Cath:  No results found for this or any previous visit from the past 1095 days.    Stress Test:  No results found for this or any previous visit from the past 1095 days.    Cardiac Imaging:  MR cardiac morphology and function w and wo IV contrast 10/4/2023      Inpatient Medications:  Scheduled medications   Medication Dose Route Frequency    aspirin  81 mg oral Daily    atorvastatin  80 mg oral Daily    gabapentin  300 mg oral q AM    gabapentin  600 mg oral Nightly    heparin (porcine)  5,000 Units subcutaneous q8h    hydrALAZINE  25 mg oral TID    insulin glargine  40 Units subcutaneous q24h    insulin lispro  0-15 Units subcutaneous TID with meals    insulin lispro  5 Units subcutaneous TID with meals    levothyroxine  50 mcg oral Daily    pantoprazole  40 mg oral Daily before breakfast    QUEtiapine  50 mg oral Nightly     PRN medications   Medication    acetaminophen    Or    acetaminophen    Or    acetaminophen    albuterol    dextrose 10 % in water (D10W)    dextrose    docusate sodium    glucagon     Continuous Medications   Medication Dose Last Rate    DOBUTamine  2.5 mcg/kg/min Stopped (10/05/23 1797)    furosemide  5 mg/hr 5 mg/hr (10/06/23 0653)    nitroprusside  0-5 mcg/kg/min 1.5 mcg/kg/min (10/06/23 0732)       Physical Exam:  Physical Exam  Constitutional:       General: He is not in acute distress.     Appearance: Normal appearance.   HENT:      Head: Normocephalic.      Mouth/Throat:      Mouth: Mucous membranes are moist.   Eyes:      Extraocular Movements: Extraocular movements intact.      Conjunctiva/sclera: Conjunctivae normal.   Neck:      Comments: Right " internal jugular swan in place  Cardiovascular:      Rate and Rhythm: Regular rhythm. Tachycardia present.      Heart sounds: No murmur heard.  Pulmonary:      Effort: Pulmonary effort is normal.      Breath sounds: No wheezing or rales.   Abdominal:      General: Abdomen is flat.      Palpations: Abdomen is soft.   Musculoskeletal:         General: Normal range of motion.      Cervical back: Neck supple.   Skin:     General: Skin is warm and dry.   Neurological:      General: No focal deficit present.      Mental Status: He is alert.   Psychiatric:         Mood and Affect: Mood normal.      Assessment/Plan   58 year old [place] male with history of HFrEF/ICM (LVEF 20% 8/14/23), CAD s/p PCI and CABG (LIMA-LAD with balloon pump support April 2023), Hx of cardiac arrest s/p VT ablation + ICD, HTN/HLD, CKD, Hx of DVT (L femoral) on Eliquis, hypothyroidism, pulmonary hypertension, T2DM, JAKE/OHS, former tobacco abuse (24 pack year, quit 2017), presented to [facility] on 10/1 with exertional left sided chest pain and SOB x 3 days. He reported orthopnea  as well. His home diuretic regimen with lasix and diamox was not controlling his symptoms. He has been admitted 8 times this year for acutely decompensated heart failure. He was admitted to [facility] this presentation in cardiogenic shock despite reporting compliance with his HF medications. Advanced heart failure service was consulted while he was admitted there and they recommended SGC-guided therapy. RHC on dobutamine 2.5mcg showed RA pressure: 20, RV: 55/20, PCWP: 35, PA pressure: 52/37 (40 - 42), CO/CI 3.6/1.5, SVR: 1373 dynes, PVR: 414 dynes, AO: 84, and lactates were increasing up to 2.4 during that admission. Despite inotrope therapy, his clinical status was not significantly improving and he referred for transfer to [facility] for advanced therapy evaluation.    Neuro:  #Depression  - Serial neuro and pain assessments   - PO Tylenol PRN for pain  - PT/OT Consult, OOB to  chair  - CAM ICU score every shift  - Sleep/wake cycle normalization     # Physical Status  -Obesity  -Functional at home.   -Weight on admission to [facility] 246lbs (up 16lbs from dry weight).  -Weight today 238 lbs.  -Obtain daily weights in ICU.      Cardiovascular:  # Acute on chronic HFrEF/ICM systolic and diastolic heart failure  - TTE at [facility] 8/2023 with EF ~20%, globally abnormal RV systolic function. Noninvasive hemodynamic assessment is consistent with severe pulmonary hypertension (>60 mmHg), an elevated CVP, an elevated LVEDP.  - admit weight 246lbs.  - admit   - ASA and atorvastatin 80mg daily are home meds.  - Holding ARNI, BB, MRA, SGLT2i in setting of worsening renal function, GFR <30.  - Diuretic holiday today  - Dobutamine discontinued last evening  - Daily SGC #s: CVP 6, PA 40/18, W 16, CO/CI 6.2/2.7, , .  - Daily standing weights, 2gm sodium diet, 2L fluid restriction, strict I&Os  - Advanced therapy evaluation started     #CAD s/p CABG LIMA-LAD 4/2023  -[facility] December 2022: severe diffuse three-vessel coronary artery disease. Severe proximal diffuse LAD disease up to 70%. Severe diffuse proximal and mid first diagonal disease. Chronic total occlusion of OM1 with left-to-left collaterals. Chronic total occlusion of mid RCA with left-to-right collaterals. Markedly elevated LVEDP at 40 mm of Hg.  - Continue Aspirin 81mg.  - Continue Atorvastatin 80mg.      #Arrhythmias - Hx of VT s/p ablation + ICD  -Device - Medtronic ICD; interrogation ordered.   -Not on antiarrhythmic outpatient.     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  -Monitor PA pressures continuously via SGC.      GI:  - Colace BID PRN ordered.  - Continue Protonix 40mg daily.     :  #CKD  -Admit BUN/Cr 67/2.00  -strict I/Os  -avoid hypotension and nephrotoxic agents     Heme:  #Anemia  in the setting of CKD  - Labs: Iron saturation 15%  - Venofer ordered     #Hx of L femoral DVT  - Holding Eliquis for now  - subcutaneous heparin 5000 units q8h.      Endo:  #IDDM2  - Check accuchecks AC/HS.  - Insulin requirement at home is Glargine 85 units qAM, Humalog 28-34 units TID, Trulicity 1.5mg weekly.  - hgbA1c 8.1  -Increase lantus to 60 units and lispro 5 units + ssi, assess response and adjust as needed     #Thyroid  -Repeat TSH with T4 pending      ID:  -afebrile, nontoxic   -no s/s infx  -trend temps q4h     Feeding/fluids: 2g Na diet, 2L fluid restriction  Thromboprophylaxis: SCDs   VAP bundle: n/a  Ulcer prophylaxis: PPI  Glycemic control: Resume lantus 60 and prandial sliding scale  Bowel care: Colace & miralax prn  Indwelling catheter: None     Lines:   PIVs   Central/[place] RIJ     PT/OT: consulted     Code Status: Full Code     Family Primary Contact/Next of Kin: Mother.    Patient seen and assessed with Dr. Yen Salinas DO   Peripheral IV 10/04/23 20 G Left;Anterior Forearm (Active)   Site Assessment Clean;Dry;Intact 10/06/23 0400   Dressing Type Transparent 10/06/23 0400   Dressing Status Clean;Dry;Occlusive 10/06/23 0400   Number of days: 2       Introducer 10/04/23 Internal jugular Right (Active)   Specific Qualities [place]ita in place;Flushed;Infusing 10/06/23 0400   Dressing Status Clean;Dry;Occlusive 10/06/23 0400   Number of days: 2       Code Status:  Full Code      Phillip Salinas DO PGY-4  Heart Failure Fellow

## 2023-10-06 NOTE — CONSULTS
"Reason For Consult  Presurgical Clearance    History Of Present Illness  Felice Law is a 58 y.o. male presenting with CHF. Dental consulted for presurgical clearance. Imaging ([facility]) shows no sign of Periapical Radiolucency. Clinical Exam showed Extraoral and intraoral soft tissue had no significant findings, no sign of swelling infection, or purulence. Patient with several missing teeth and an upper partial, negative to percussion and palpation on all surfaces with no mobility other than #12. Positive to percussion, palpation and presents with Fracture of the crown running Mesial distal through the entire crown surface.       Past Medical History  He has a past medical history of CHF (congestive heart failure) (CMS/[facility]), COPD (chronic obstructive pulmonary disease) (CMS/HCC), Coronary artery disease, Diabetes mellitus (CMS/HCC), Disease of thyroid gland, and Hypertension.    Surgical History  He has a past surgical history that includes Coronary artery bypass graft; Cardiac defibrillator placement; and Coronary angioplasty with stent.     Social History  He reports that he quit smoking about 6 years ago. His smoking use included cigarettes. He has a 24.00 pack-year smoking history. He does not have any smokeless tobacco history on file. He reports that he does not drink alcohol and does not use drugs.    Family History  No family history on file.     Allergies  Azithromycin    Review of Systems       Physical Exam       Last Recorded Vitals  Blood pressure 94/71, pulse (!) 123, temperature 36.2 °C (97.2 °F), resp. rate 18, height 1.803 m (5' 11\"), weight 107 kg (234 lb 12.6 oz), SpO2 93 %.    Relevant Results  Patient has a fracture through the mesial distal of #12 cauing pain to percussion and palpation, Otherwise theres no sign of infection clinically or radiographically.     Assessment/Plan   Recommend Pt see a general dentist in outpatient setting for a Periapical radiograph and evaluation to " determine the restorability of #12. Pt is cleared by dental to undergo cardiac procedure at this time.        Cliff Godwin DDS

## 2023-10-07 ENCOUNTER — APPOINTMENT (OUTPATIENT)
Dept: RADIOLOGY | Facility: HOSPITAL | Age: 58
DRG: 291 | End: 2023-10-07
Payer: MEDICARE

## 2023-10-07 LAB
ALBUMIN SERPL BCP-MCNC: 3.6 G/DL (ref 3.4–5)
ALBUMIN SERPL BCP-MCNC: 3.7 G/DL (ref 3.4–5)
ANION GAP SERPL CALC-SCNC: 15 MMOL/L (ref 10–20)
ANION GAP SERPL CALC-SCNC: 16 MMOL/L (ref 10–20)
BASE EXCESS BLDMV CALC-SCNC: 1 MMOL/L (ref -2–3)
BODY TEMPERATURE: 37 DEGREES CELSIUS
BUN SERPL-MCNC: 75 MG/DL (ref 6–23)
BUN SERPL-MCNC: 81 MG/DL (ref 6–23)
CALCIUM SERPL-MCNC: 8.8 MG/DL (ref 8.6–10.6)
CALCIUM SERPL-MCNC: 9 MG/DL (ref 8.6–10.6)
CHLORIDE SERPL-SCNC: 91 MMOL/L (ref 98–107)
CHLORIDE SERPL-SCNC: 93 MMOL/L (ref 98–107)
CO2 SERPL-SCNC: 26 MMOL/L (ref 21–32)
CO2 SERPL-SCNC: 28 MMOL/L (ref 21–32)
CREAT SERPL-MCNC: 1.78 MG/DL (ref 0.5–1.3)
CREAT SERPL-MCNC: 2.17 MG/DL (ref 0.5–1.3)
ERYTHROCYTE [DISTWIDTH] IN BLOOD BY AUTOMATED COUNT: 18.7 % (ref 11.5–14.5)
GFR SERPL CREATININE-BSD FRML MDRD: 35 ML/MIN/1.73M*2
GFR SERPL CREATININE-BSD FRML MDRD: 44 ML/MIN/1.73M*2
GLUCOSE BLD MANUAL STRIP-MCNC: 150 MG/DL (ref 74–99)
GLUCOSE BLD MANUAL STRIP-MCNC: 196 MG/DL (ref 74–99)
GLUCOSE BLD MANUAL STRIP-MCNC: 270 MG/DL (ref 74–99)
GLUCOSE BLD MANUAL STRIP-MCNC: 294 MG/DL (ref 74–99)
GLUCOSE BLD MANUAL STRIP-MCNC: 319 MG/DL (ref 74–99)
GLUCOSE BLD MANUAL STRIP-MCNC: 320 MG/DL (ref 74–99)
GLUCOSE SERPL-MCNC: 258 MG/DL (ref 74–99)
GLUCOSE SERPL-MCNC: 342 MG/DL (ref 74–99)
HCO3 BLDMV-SCNC: 26.6 MMOL/L (ref 22–26)
HCT VFR BLD AUTO: 36.3 % (ref 41–52)
HGB BLD-MCNC: 11.7 G/DL (ref 13.5–17.5)
INHALED O2 CONCENTRATION: 21 %
MAGNESIUM SERPL-MCNC: 1.96 MG/DL (ref 1.6–2.4)
MAGNESIUM SERPL-MCNC: 2.08 MG/DL (ref 1.6–2.4)
MCH RBC QN AUTO: 24.4 PG (ref 26–34)
MCHC RBC AUTO-ENTMCNC: 32.2 G/DL (ref 32–36)
MCV RBC AUTO: 76 FL (ref 80–100)
NRBC BLD-RTO: 0 /100 WBCS (ref 0–0)
OXYHGB MFR BLDMV: 50 % (ref 45–75)
PCO2 BLDMV: 45 MM HG (ref 41–51)
PH BLDMV: 7.38 PH (ref 7.33–7.43)
PHOSPHATE SERPL-MCNC: 3.3 MG/DL (ref 2.5–4.9)
PHOSPHATE SERPL-MCNC: 4 MG/DL (ref 2.5–4.9)
PLATELET # BLD AUTO: 200 X10*3/UL (ref 150–450)
PMV BLD AUTO: 10.9 FL (ref 7.5–11.5)
PO2 BLDMV: 39 MM HG (ref 35–45)
POTASSIUM SERPL-SCNC: 3.7 MMOL/L (ref 3.5–5.3)
POTASSIUM SERPL-SCNC: 3.8 MMOL/L (ref 3.5–5.3)
RBC # BLD AUTO: 4.8 X10*6/UL (ref 4.5–5.9)
SAO2 % BLDMV: 51 % (ref 45–75)
SODIUM SERPL-SCNC: 130 MMOL/L (ref 136–145)
SODIUM SERPL-SCNC: 131 MMOL/L (ref 136–145)
WBC # BLD AUTO: 5.9 X10*3/UL (ref 4.4–11.3)

## 2023-10-07 PROCEDURE — 2500000004 HC RX 250 GENERAL PHARMACY W/ HCPCS (ALT 636 FOR OP/ED)

## 2023-10-07 PROCEDURE — 85027 COMPLETE CBC AUTOMATED: CPT | Performed by: STUDENT IN AN ORGANIZED HEALTH CARE EDUCATION/TRAINING PROGRAM

## 2023-10-07 PROCEDURE — 2500000002 HC RX 250 W HCPCS SELF ADMINISTERED DRUGS (ALT 637 FOR MEDICARE OP, ALT 636 FOR OP/ED)

## 2023-10-07 PROCEDURE — 71045 X-RAY EXAM CHEST 1 VIEW: CPT | Performed by: STUDENT IN AN ORGANIZED HEALTH CARE EDUCATION/TRAINING PROGRAM

## 2023-10-07 PROCEDURE — 96372 THER/PROPH/DIAG INJ SC/IM: CPT | Performed by: STUDENT IN AN ORGANIZED HEALTH CARE EDUCATION/TRAINING PROGRAM

## 2023-10-07 PROCEDURE — 2020000001 HC ICU ROOM DAILY

## 2023-10-07 PROCEDURE — 2500000001 HC RX 250 WO HCPCS SELF ADMINISTERED DRUGS (ALT 637 FOR MEDICARE OP): Performed by: STUDENT IN AN ORGANIZED HEALTH CARE EDUCATION/TRAINING PROGRAM

## 2023-10-07 PROCEDURE — 80069 RENAL FUNCTION PANEL: CPT | Performed by: STUDENT IN AN ORGANIZED HEALTH CARE EDUCATION/TRAINING PROGRAM

## 2023-10-07 PROCEDURE — 99232 SBSQ HOSP IP/OBS MODERATE 35: CPT

## 2023-10-07 PROCEDURE — 82947 ASSAY GLUCOSE BLOOD QUANT: CPT

## 2023-10-07 PROCEDURE — 96372 THER/PROPH/DIAG INJ SC/IM: CPT

## 2023-10-07 PROCEDURE — 2500000002 HC RX 250 W HCPCS SELF ADMINISTERED DRUGS (ALT 637 FOR MEDICARE OP, ALT 636 FOR OP/ED): Performed by: PHYSICIAN ASSISTANT

## 2023-10-07 PROCEDURE — 82805 BLOOD GASES W/O2 SATURATION: CPT | Performed by: STUDENT IN AN ORGANIZED HEALTH CARE EDUCATION/TRAINING PROGRAM

## 2023-10-07 PROCEDURE — 2500000004 HC RX 250 GENERAL PHARMACY W/ HCPCS (ALT 636 FOR OP/ED): Performed by: STUDENT IN AN ORGANIZED HEALTH CARE EDUCATION/TRAINING PROGRAM

## 2023-10-07 PROCEDURE — 96372 THER/PROPH/DIAG INJ SC/IM: CPT | Performed by: INTERNAL MEDICINE

## 2023-10-07 PROCEDURE — 2500000001 HC RX 250 WO HCPCS SELF ADMINISTERED DRUGS (ALT 637 FOR MEDICARE OP)

## 2023-10-07 PROCEDURE — 99291 CRITICAL CARE FIRST HOUR: CPT | Performed by: SPECIALIST

## 2023-10-07 PROCEDURE — 80069 RENAL FUNCTION PANEL: CPT

## 2023-10-07 PROCEDURE — 83735 ASSAY OF MAGNESIUM: CPT

## 2023-10-07 PROCEDURE — 99233 SBSQ HOSP IP/OBS HIGH 50: CPT

## 2023-10-07 PROCEDURE — 96372 THER/PROPH/DIAG INJ SC/IM: CPT | Performed by: PHYSICIAN ASSISTANT

## 2023-10-07 PROCEDURE — 71045 X-RAY EXAM CHEST 1 VIEW: CPT

## 2023-10-07 PROCEDURE — 2500000002 HC RX 250 W HCPCS SELF ADMINISTERED DRUGS (ALT 637 FOR MEDICARE OP, ALT 636 FOR OP/ED): Performed by: INTERNAL MEDICINE

## 2023-10-07 RX ORDER — DOCUSATE SODIUM 100 MG/1
200 CAPSULE, LIQUID FILLED ORAL 2 TIMES DAILY
Status: DISCONTINUED | OUTPATIENT
Start: 2023-10-07 | End: 2023-10-17 | Stop reason: HOSPADM

## 2023-10-07 RX ORDER — INSULIN LISPRO 100 [IU]/ML
0-15 INJECTION, SOLUTION INTRAVENOUS; SUBCUTANEOUS
Status: DISCONTINUED | OUTPATIENT
Start: 2023-10-07 | End: 2023-10-17 | Stop reason: HOSPADM

## 2023-10-07 RX ORDER — SODIUM NITROPRUSSIDE IN 0.9% SODIUM CHLORIDE 0.5 MG/ML
.25-5 INJECTION INTRAVENOUS CONTINUOUS
Status: DISCONTINUED | OUTPATIENT
Start: 2023-10-07 | End: 2023-10-12

## 2023-10-07 RX ORDER — DIGOXIN 125 MCG
125 TABLET ORAL DAILY
Status: DISCONTINUED | OUTPATIENT
Start: 2023-10-07 | End: 2023-10-17 | Stop reason: HOSPADM

## 2023-10-07 RX ORDER — INSULIN LISPRO 100 [IU]/ML
12 INJECTION, SOLUTION INTRAVENOUS; SUBCUTANEOUS
Status: DISCONTINUED | OUTPATIENT
Start: 2023-10-07 | End: 2023-10-08

## 2023-10-07 RX ORDER — HYDRALAZINE HYDROCHLORIDE 25 MG/1
75 TABLET, FILM COATED ORAL 3 TIMES DAILY
Status: DISCONTINUED | OUTPATIENT
Start: 2023-10-07 | End: 2023-10-08

## 2023-10-07 RX ORDER — POLYETHYLENE GLYCOL 3350 17 G/17G
17 POWDER, FOR SOLUTION ORAL DAILY PRN
Status: DISCONTINUED | OUTPATIENT
Start: 2023-10-07 | End: 2023-10-17 | Stop reason: HOSPADM

## 2023-10-07 RX ORDER — CALCIUM CARBONATE 200(500)MG
500 TABLET,CHEWABLE ORAL 2 TIMES DAILY PRN
Status: DISCONTINUED | OUTPATIENT
Start: 2023-10-07 | End: 2023-10-17 | Stop reason: HOSPADM

## 2023-10-07 RX ORDER — METOPROLOL TARTRATE 25 MG/1
12.5 TABLET, FILM COATED ORAL 2 TIMES DAILY
Status: DISCONTINUED | OUTPATIENT
Start: 2023-10-07 | End: 2023-10-11

## 2023-10-07 RX ADMIN — INSULIN LISPRO 12 UNITS: 100 INJECTION, SOLUTION INTRAVENOUS; SUBCUTANEOUS at 12:00

## 2023-10-07 RX ADMIN — METOPROLOL TARTRATE 12.5 MG: 25 TABLET, FILM COATED ORAL at 20:14

## 2023-10-07 RX ADMIN — GABAPENTIN 600 MG: 300 CAPSULE ORAL at 20:13

## 2023-10-07 RX ADMIN — HYDRALAZINE HYDROCHLORIDE 75 MG: 25 TABLET ORAL at 09:00

## 2023-10-07 RX ADMIN — HYDRALAZINE HYDROCHLORIDE 75 MG: 25 TABLET ORAL at 15:00

## 2023-10-07 RX ADMIN — HYDRALAZINE HYDROCHLORIDE 75 MG: 25 TABLET ORAL at 20:13

## 2023-10-07 RX ADMIN — HEPARIN SODIUM 5000 UNITS: 5000 INJECTION INTRAVENOUS; SUBCUTANEOUS at 06:22

## 2023-10-07 RX ADMIN — QUETIAPINE FUMARATE 50 MG: 25 TABLET ORAL at 20:14

## 2023-10-07 RX ADMIN — METOPROLOL TARTRATE 12.5 MG: 25 TABLET, FILM COATED ORAL at 10:30

## 2023-10-07 RX ADMIN — DOCUSATE SODIUM 200 MG: 100 CAPSULE, LIQUID FILLED ORAL at 20:14

## 2023-10-07 RX ADMIN — INSULIN LISPRO 9 UNITS: 100 INJECTION, SOLUTION INTRAVENOUS; SUBCUTANEOUS at 00:58

## 2023-10-07 RX ADMIN — DIGOXIN 125 MCG: 125 TABLET ORAL at 13:36

## 2023-10-07 RX ADMIN — ASPIRIN 81 MG CHEWABLE TABLET 81 MG: 81 TABLET CHEWABLE at 09:00

## 2023-10-07 RX ADMIN — ATORVASTATIN CALCIUM 80 MG: 80 TABLET ORAL at 09:00

## 2023-10-07 RX ADMIN — SODIUM NITROPRUSSIDE 0.25 MCG/KG/MIN: 0.5 INJECTION, SOLUTION INTRAVENOUS at 13:39

## 2023-10-07 RX ADMIN — INSULIN LISPRO 12 UNITS: 100 INJECTION, SOLUTION INTRAVENOUS; SUBCUTANEOUS at 18:32

## 2023-10-07 RX ADMIN — PANTOPRAZOLE SODIUM 40 MG: 40 TABLET, DELAYED RELEASE ORAL at 06:27

## 2023-10-07 RX ADMIN — LEVOTHYROXINE SODIUM 50 MCG: 50 TABLET ORAL at 06:23

## 2023-10-07 RX ADMIN — INSULIN LISPRO 12 UNITS: 100 INJECTION, SOLUTION INTRAVENOUS; SUBCUTANEOUS at 16:11

## 2023-10-07 RX ADMIN — HEPARIN SODIUM 5000 UNITS: 5000 INJECTION INTRAVENOUS; SUBCUTANEOUS at 22:03

## 2023-10-07 RX ADMIN — GABAPENTIN 300 MG: 300 CAPSULE ORAL at 09:00

## 2023-10-07 RX ADMIN — INSULIN LISPRO 9 UNITS: 100 INJECTION, SOLUTION INTRAVENOUS; SUBCUTANEOUS at 04:05

## 2023-10-07 RX ADMIN — INSULIN GLARGINE 40 UNITS: 100 INJECTION, SOLUTION SUBCUTANEOUS at 09:00

## 2023-10-07 RX ADMIN — HEPARIN SODIUM 5000 UNITS: 5000 INJECTION INTRAVENOUS; SUBCUTANEOUS at 13:36

## 2023-10-07 ASSESSMENT — PAIN SCALES - GENERAL
PAINLEVEL_OUTOF10: 0 - NO PAIN
PAINLEVEL_OUTOF10: 0 - NO PAIN

## 2023-10-07 ASSESSMENT — COGNITIVE AND FUNCTIONAL STATUS - GENERAL
MOBILITY SCORE: 24
DAILY ACTIVITIY SCORE: 24

## 2023-10-07 ASSESSMENT — PAIN - FUNCTIONAL ASSESSMENT
PAIN_FUNCTIONAL_ASSESSMENT: 0-10
PAIN_FUNCTIONAL_ASSESSMENT: 0-10

## 2023-10-07 NOTE — PROGRESS NOTES
"Felice Law is a 58 y.o. male on day 3 of admission presenting with Acute decompensated heart failure (CMS/[facility]).       Subjective Data:  Patient seen and examined at bedside. No acute events overnight. No acute complaints, denies chest pain, fever, chills, headache, nausea, vomiting, diarrhea.    Plan today:  - Continue to hold diuresis   - Keep PO hydralazine at 75 mg Q8 hrs   - Add low dose nipride gtt   - If hemos do not improve by afternoon STOP metoprolol   - Add digoxin 125 mcg daily with a level check on Monday (ordered)   - Advanced therapy evaluation ongoing, discuss on Tuesday     Overnight Events:    No acute events overnight.     Objective Data:    Latest [facility]#  [id number]  MAP 80  CVP 8  PAP 59/29 (40)  CO/CI 4.24/1.87  SVR 1358  SVO2 51%   On Hydralazine 75 Q8, Metoprolol 12.5 mg BID    Last Recorded Vitals:  Blood pressure 107/80, pulse (!) 113, temperature 35.9 °C (96.6 °F), temperature source Temporal, resp. rate 16, height 1.803 m (5' 10.98\"), weight 109 kg (240 lb 4.8 oz), SpO2 92 %.     PAP: (42-70)/(22-50) 62/32  PCWP:  [16 mmHg-18 mmHg] 16 mmHg  CO:  [3.5 L/min-5.3 L/min] 4.2 L/min  CI:  [1.9 L/min/m2-2.3 L/min/m2] 1.9 L/min/m2     Last Labs:  Results for orders placed or performed during the hospital encounter of 10/04/23 (from the past 24 hour(s))   POCT GLUCOSE   Result Value Ref Range    POCT Glucose 316 (H) 74 - 99 mg/dL   Blood Gas Mixed Venous Unsolicited   Result Value Ref Range    POCT pH, Mixed 7.39 7.33 - 7.43 pH    POCT pCO2, Mixed 47 41 - 51 mm Hg    POCT pO2, Mixed 41 35 - 45 mm Hg    POCT SO2, Mixed 56 45 - 75 %    POCT Oxy Hemoglobin, Mixed 54.3 45.0 - 75.0 %    POCT Base Excess, Mixed 2.8 -2.0 - 3.0 mmol/L    POCT HCO3 Calculated, Mixed 28.5 (H) 22.0 - 26.0 mmol/L    Patient Temperature 37.0 degrees Celsius   POCT GLUCOSE   Result Value Ref Range    POCT Glucose 359 (H) 74 - 99 mg/dL   POCT GLUCOSE   Result Value Ref Range    POCT Glucose 270 (H) 74 - 99 mg/dL   Renal " function panel   Result Value Ref Range    Glucose 258 (H) 74 - 99 mg/dL    Sodium 131 (L) 136 - 145 mmol/L    Potassium 3.8 3.5 - 5.3 mmol/L    Chloride 91 (L) 98 - 107 mmol/L    Bicarbonate 28 21 - 32 mmol/L    Anion Gap 16 10 - 20 mmol/L    Urea Nitrogen 75 (H) 6 - 23 mg/dL    Creatinine 1.78 (H) 0.50 - 1.30 mg/dL    eGFR 44 (L) >60 mL/min/1.73m*2    Calcium 9.0 8.6 - 10.6 mg/dL    Phosphorus 3.3 2.5 - 4.9 mg/dL    Albumin 3.7 3.4 - 5.0 g/dL   CBC   Result Value Ref Range    WBC 5.9 4.4 - 11.3 x10*3/uL    nRBC 0.0 0.0 - 0.0 /100 WBCs    RBC 4.80 4.50 - 5.90 x10*6/uL    Hemoglobin 11.7 (L) 13.5 - 17.5 g/dL    Hematocrit 36.3 (L) 41.0 - 52.0 %    MCV 76 (L) 80 - 100 fL    MCH 24.4 (L) 26.0 - 34.0 pg    MCHC 32.2 32.0 - 36.0 g/dL    RDW 18.7 (H) 11.5 - 14.5 %    Platelets 200 150 - 450 x10*3/uL    MPV 10.9 7.5 - 11.5 fL   Magnesium   Result Value Ref Range    Magnesium 2.08 1.60 - 2.40 mg/dL   POCT GLUCOSE   Result Value Ref Range    POCT Glucose 196 (H) 74 - 99 mg/dL   POCT GLUCOSE   Result Value Ref Range    POCT Glucose 150 (H) 74 - 99 mg/dL   POCT GLUCOSE   Result Value Ref Range    POCT Glucose 294 (H) 74 - 99 mg/dL   BLOOD GAS MIXED VENOUS   Result Value Ref Range    POCT pH, Mixed 7.38 7.33 - 7.43 pH    POCT pCO2, Mixed 45 41 - 51 mm Hg    POCT pO2, Mixed 39 35 - 45 mm Hg    POCT SO2, Mixed 51 45 - 75 %    POCT Oxy Hemoglobin, Mixed 50.0 45.0 - 75.0 %    POCT Base Excess, Mixed 1.0 -2.0 - 3.0 mmol/L    POCT HCO3 Calculated, Mixed 26.6 (H) 22.0 - 26.0 mmol/L    Patient Temperature 37.0 degrees Celsius    FiO2 21 %       Last I/O:  I/O last 3 completed shifts:  In: 1214.2 (11.1 mL/kg) [P.O.:880; I.V.:334.2 (3.1 mL/kg)]  Out: 900 (8.3 mL/kg) [Urine:900 (0.2 mL/kg/hr)]  Weight: 109 kg       Inpatient Medications:  Scheduled medications   Medication Dose Route Frequency    aspirin  81 mg oral Daily    atorvastatin  80 mg oral Daily    digoxin  125 mcg oral Daily    gabapentin  300 mg oral q AM    gabapentin  600  mg oral Nightly    heparin (porcine)  5,000 Units subcutaneous q8h    hydrALAZINE  75 mg oral TID    insulin glargine  40 Units subcutaneous q24h    insulin lispro  0-15 Units subcutaneous TID with meals    insulin lispro  12 Units subcutaneous TID with meals    iron sucrose  100 mg intravenous Daily    levothyroxine  50 mcg oral Daily    metoprolol tartrate  12.5 mg oral BID    pantoprazole  40 mg oral Daily before breakfast    QUEtiapine  50 mg oral Nightly     PRN medications   Medication    acetaminophen    albuterol    calcium carbonate    dextrose 10 % in water (D10W)    dextrose    docusate sodium    glucagon     Continuous Medications   Medication Dose Last Rate    nitroprusside  0.25-5 mcg/kg/min 0.25 mcg/kg/min (10/07/23 1339)       Physical Exam  Constitutional:       Appearance: Normal appearance.   Eyes:      Pupils: Pupils are equal, round, and reactive to light.   Neck:      Comments: RIJ SGC in place, c/d/i  Cardiovascular:      Rate and Rhythm: Regular rhythm. Tachycardia present.      Pulses: Normal pulses.      Heart sounds: Normal heart sounds.   Pulmonary:      Effort: Pulmonary effort is normal.      Breath sounds: Normal breath sounds.   Abdominal:      General: Bowel sounds are normal. There is no distension.      Palpations: Abdomen is soft.   Musculoskeletal:         General: Normal range of motion.      Cervical back: Normal range of motion.      Right lower leg: Edema present.      Left lower leg: Edema present.   Skin:     General: Skin is warm and dry.      Capillary Refill: Capillary refill takes 2 to 3 seconds.   Neurological:      General: No focal deficit present.      Mental Status: He is alert and oriented to person, place, and time.      Motor: No weakness.   Psychiatric:         Mood and Affect: Mood normal.         Behavior: Behavior normal.           Assessment/Plan   58 year old [place] male with history of HFrEF/ICM (LVEF 20% 8/14/23), CAD s/p PCI and CABG (LIMA-LAD  with balloon pump support April 2023), Hx of cardiac arrest s/p VT ablation + ICD, HTN/HLD, CKD, Hx of DVT (L femoral) on Eliquis, hypothyroidism, pulmonary hypertension, T2DM, JAKE/OHS, former tobacco abuse (24 pack year, quit 2017), presented to [facility] on 10/1 with exertional left sided chest pain and SOB x 3 days. He was admitted in cardiogenic shock, started on dobutamine under the guidance of SGC therapy. Patient was than transferred to [facility] for advanced therapy evaluation on 10/4. Advanced therapies evaluation was initiated on 10/4 with plans to discuss on 10/10 during committee meeting.     Neuro:  #Depression  - Serial neuro and pain assessments   - PO Tylenol PRN for pain  - PT/OT Consult, OOB to chair  - CAM ICU score every shift  - Sleep/wake cycle normalization     # Physical Status  -Obesity  -Functional at home.   -Weight on admission to [facility] 246lbs (up 16lbs from dry weight).       Cardiovascular:  # Acute on chronic HFrEF/ICM systolic and diastolic heart failure  - TTE at [facility] 8/2023 with EF ~20%, globally abnormal RV systolic function. Noninvasive hemodynamic assessment is consistent with severe pulmonary hypertension (>60 mmHg), an elevated CVP, an elevated LVEDP.  - admit weight 246lbs  - Daily weight 10/7: 109 kg   - admit   - Holding ARNI, MRA, SGLT2i in setting of worsening renal function, GFR <30.  - Continue diuretic holiday   - Dobutamine discontinued 10/5   - Metoprolol tartrate 12.5 BID started 10/6 -> discontinue if evening numbers do not improve per Dr Barlow 10/7  - Initiate digoxin 125 mcg daily, dig level check on Monday 10/9 (ordered)   - C/w PO hydral 75 mg Q8   - Initiate low dose nipride gtt 10/7   - Daily SGC #s 10/7: MAP 80, CVP 8, PA 59/29 (40), CO/CI 4.24/1.87, SVR 1358, SvO2 51% on hydral 75, metop 12.5   - Daily standing weights, 2gm sodium diet, 2L fluid restriction, strict I&Os  - Advanced therapy evaluation started 10/4     #Advanced Therapies Evaluation   LVAD  or Transplant   -Email sent on 10/4   -Labs 10/5  -RHC complete  -C 12/22  -ECHO 8/23  -CT chest 10/5  -US abdomen/aorta/iliac/IVC 10/5  -Carotid US 10/5  -MARK (lower) 10/5    -2 view CXR 10/5   -Device interrogation 10/6   -PFTs 10/6   -Colonoscopy/Occult stool - needs completed, liquid diet tomorrow 10/8   -LVAD/TX education 10/5  -CT surgery consult - needs completed   -Palliative consult 10/5  -Nutrition consult 10/6    -Dental consult/Orthopantogram 10/6 no lucency   -Physical and Occupational Therapy ordered and following      #CAD s/p CABG LIMA-LAD 4/2023  -[facility] December 2022: severe diffuse three-vessel coronary artery disease. Severe proximal diffuse LAD disease up to 70%. Severe diffuse proximal and mid first diagonal disease. Chronic total occlusion of OM1 with left-to-left collaterals. Chronic total occlusion of mid RCA with left-to-right collaterals. Markedly elevated LVEDP at 40 mm of Hg.  - Continue Aspirin 81mg.  - Continue Atorvastatin 80mg.      #Arrhythmias - Hx of VT s/p ablation + ICD  -Device - Medtronic ICD; interrogation completed 10/6   -Not on antiarrhythmic outpatient.     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  #?COPD (24 pack year smoking hx)  -Monitor and maintain SpO2 > 92%  - PRN albuterol nebs for wheezing      GI:  - Colace BID, Miralax PRN ordered for bowel regimen   - Continue Protonix 40mg daily     :  #CKD  -Admit BUN/Cr 67/2.00  - C/w diuretic holiday 10/7   - Strict I/Os  - Avoid hypotension and nephrotoxic agents     Heme:  #Anemia in the setting of CKD  - Labs: Iron saturation 15%  - Venofer ordered 200 mg x3 days      #Hx of L femoral DVT  - Holding Eliquis for now  - Subcutaneous heparin 5000 units q8h for prophylaxis       Endo:  #IDDM2  - A1C 8.1% 10/4/23  - home DM regimen : trulicity 1.5mg qweek, glargine 85u once daily, lispro 34u with meals and 26u at bedtime  - Goal BG <180  - C/w glargine to 40 units once daily  - Increase lispro to 12u with meals   -  Adjust lispro corrective scale #3 to with meals  - Adjust diet to low carb 75 g   - Endo following, appreciate recs     #Thyroid  -TSH 9.31 on 10/4, ordered T4 on 10/7 pending       ID:  -afebrile, nontoxic   -no s/s infx  -trend temps q4h     Feeding/fluids: low carb 75g diet, 2L fluid restriction  Thromboprophylaxis: SCDs   VAP bundle: n/a  Ulcer prophylaxis: PPI  Glycemic control: Lantus and SSI   Bowel care: Colace & miralax prn  Indwelling catheter: None     Lines:   PIVs   Central/[place] RIJ from OSH 10/3     PT/OT: consulted and following      Code Status: Full Code     Family Primary Contact/Next of Kin: Mother    Patient seen and assessed with MARLON Peres-CNP    Peripheral IV 10/04/23 20 G Left;Anterior Forearm (Active)   Site Assessment Clean;Dry;Intact 10/06/23 0400   Dressing Type Transparent 10/06/23 0400   Dressing Status Clean;Dry;Occlusive 10/06/23 0400   Number of days: 2       Introducer 10/04/23 Internal jugular Right (Active)   Specific Qualities [place] in place;Flushed;Infusing 10/06/23 0400   Dressing Status Clean;Dry;Occlusive 10/06/23 0400   Number of days: 2       Code Status:  Full Code

## 2023-10-07 NOTE — PROGRESS NOTES
"Felice Law is a 58 y.o. male on day 3 of admission presenting with Acute decompensated heart failure (CMS/HCC).    Subjective   Patient was informed of plan to increase mealtime lispro as his appetite is strong, will observe glargine decrease today  Reviewed plan with pt, all questions answered  I have reviewed histories, allergies and medications have been reviewed        Objective   Review of Systems   Constitutional:  Positive for fatigue. Negative for appetite change.   HENT: Negative.     Eyes: Negative.    Respiratory:  Positive for shortness of breath.    Cardiovascular: Negative.    Gastrointestinal: Negative.    Endocrine: Negative.    Genitourinary: Negative.    Musculoskeletal: Negative.    Allergic/Immunologic: Negative.    Neurological: Negative.    Psychiatric/Behavioral: Negative.     Constitutional:       Appearance: He is normal weight. He is ill-appearing.   HENT:      Head: Normocephalic and atraumatic.      Nose: Nose normal.      Mouth: Mucous membranes are moist.      Pharynx: Oropharynx is clear.   Eyes:      Extraocular Movements: Extraocular movements intact.      Conjunctiva/sclera: Conjunctivae normal.   Cardiovascular:       Pulses: Normal pulses.     Pulmonary:      Breath sounds: Normal breath sounds.   Abdominal:      General: Abdomen is flat.      Palpations: Abdomen is soft.   Musculoskeletal:      Cervical back: Normal range of motion.   Skin:     General: Skin is warm and dry.   Neurological:      General: No focal deficit present.      Mental Status: He is alert and oriented to person, place, and time. Mental status is at baseline.   Psychiatric:         Mood and Affect: Mood normal.         Behavior: Behavior normal.       Last Recorded Vitals  Blood pressure 107/80, pulse (!) 113, temperature 35.9 °C (96.6 °F), temperature source Temporal, resp. rate 16, height 1.803 m (5' 10.98\"), weight 109 kg (240 lb 4.8 oz), SpO2 92 %.  Intake/Output last 3 Shifts:  I/O last 3 " completed shifts:  In: 1214.2 (11.1 mL/kg) [P.O.:880; I.V.:334.2 (3.1 mL/kg)]  Out: 900 (8.3 mL/kg) [Urine:900 (0.2 mL/kg/hr)]  Weight: 109 kg     Relevant Results  Results from last 7 days   Lab Units 10/07/23  1139 10/07/23  0817 10/07/23  0355 10/07/23  0105 10/07/23  0031 10/06/23  2014 10/06/23  0819 10/06/23  0515 10/05/23  1713 10/05/23  1647 10/05/23  0829 10/05/23  0354 10/04/23  1538 10/04/23  1518   POCT GLUCOSE mg/dL 294* 150* 196*  --  270* 359*   < >  --    < >  --    < >  --    < >  --    GLUCOSE mg/dL  --   --   --  258*  --   --   --  287*  --  296*  --  240*  --  198*    < > = values in this interval not displayed.       Scheduled medications  aspirin, 81 mg, oral, Daily  atorvastatin, 80 mg, oral, Daily  digoxin, 125 mcg, oral, Daily  gabapentin, 300 mg, oral, q AM  gabapentin, 600 mg, oral, Nightly  heparin (porcine), 5,000 Units, subcutaneous, q8h  hydrALAZINE, 75 mg, oral, TID  insulin glargine, 40 Units, subcutaneous, q24h  insulin lispro, 0-15 Units, subcutaneous, TID with meals  insulin lispro, 12 Units, subcutaneous, TID with meals  iron sucrose, 100 mg, intravenous, Daily  levothyroxine, 50 mcg, oral, Daily  metoprolol tartrate, 12.5 mg, oral, BID  pantoprazole, 40 mg, oral, Daily before breakfast  QUEtiapine, 50 mg, oral, Nightly      Continuous medications  nitroprusside, 0.25-5 mcg/kg/min, Last Rate: 0.25 mcg/kg/min (10/07/23 1339)      PRN medications  PRN medications: acetaminophen, albuterol, calcium carbonate, dextrose 10 % in water (D10W), dextrose, docusate sodium, glucagon      Results from last 7 days   Lab Units 10/07/23  0105 10/06/23  0515 10/05/23  1647 10/05/23  0354 10/04/23  1518   HEMOGLOBIN A1C %  --   --   --   --  8.1*   SODIUM mmol/L 131*   < > 132* 137 137   POTASSIUM mmol/L 3.8   < > 4.1 4.0 4.1   CHLORIDE mmol/L 91*   < > 90* 93* 93*   CO2 mmol/L 28   < > 28 31 28   BUN mg/dL 75*   < > 60* 55* 58*   CREATININE mg/dL 1.78*   < > 1.87* 1.65* 1.79*   CALCIUM mg/dL  9.0   < > 10.2 10.0 10.1   ALBUMIN g/dL 3.7   < > 4.1 3.9 4.2   PROTEIN TOTAL g/dL  --   --  7.9  --   --    BILIRUBIN TOTAL mg/dL  --   --  1.8*  --   --    ALK PHOS U/L  --   --  63  --   --    ALT U/L  --   --  11  --   --    AST U/L  --   --  13  --   --    GLUCOSE mg/dL 258*   < > 296* 240* 198*   CHOLESTEROL mg/dL  --   --   --  106  --    TRIGLYCERIDES mg/dL  --   --   --  247*  --    HDL mg/dL  --   --   --  24.5  --     < > = values in this interval not displayed.                    Assessment/Plan   Principal Problem:    Acute decompensated heart failure (CMS/HCC)    Principal Problem:    Acute decompensated heart failure (CMS/[facility])        Type 2 Diabetes   DM2 with hyperglycemia  - A1C 8.1% 10/4/23  - home DM regimen : trulicity 1.5mg qweek, glargine 85u once daily, lispro 34u with meals and 26u at bedtime  - managed by: PCP  PLAN  - Goal BG <180  - decrease glargine to 40 units once daily  -increase lispro to 12u with meals   - adjust lispro corrective scale #3 to with meals  151-200 = 3u  201-250 = 6u  251-300 = 9u  301-350 = 12u  351-400 = 15u     -Accuchecks TIDAC and QHS- kindly ensure QHS Accucheck is drawn; it is often missed   -Hypoglycemia protocol  -Start Diabetes Diet- low carb 75g CHO  -Will continue to follow and titrate insulin accordingly        I spent 30 minutes in the professional and overall care of this patient.      Tomasa Larry PA-C

## 2023-10-08 ENCOUNTER — APPOINTMENT (OUTPATIENT)
Dept: RADIOLOGY | Facility: HOSPITAL | Age: 58
DRG: 291 | End: 2023-10-08
Payer: MEDICARE

## 2023-10-08 LAB
ALBUMIN SERPL BCP-MCNC: 3.7 G/DL (ref 3.4–5)
AMPHETAMINES SERPL QL SCN: NEGATIVE NG/ML
ANION GAP SERPL CALC-SCNC: 14 MMOL/L (ref 10–20)
ANNOTATION COMMENT IMP: NORMAL
BARBITURATES SERPL QL SCN: NEGATIVE NG/ML
BASE EXCESS BLDMV CALC-SCNC: 0.3 MMOL/L (ref -2–3)
BASE EXCESS BLDMV CALC-SCNC: 0.3 MMOL/L (ref -2–3)
BASE EXCESS BLDMV CALC-SCNC: 2.5 MMOL/L (ref -2–3)
BENZODIAZ SERPL QL SCN: NEGATIVE NG/ML
BODY TEMPERATURE: 37 DEGREES CELSIUS
BUN SERPL-MCNC: 78 MG/DL (ref 6–23)
BUPRENORPHINE SERPL-MCNC: NEGATIVE NG/ML
CALCIUM SERPL-MCNC: 8.8 MG/DL (ref 8.6–10.6)
CANNABINOIDS SERPL QL SCN: NEGATIVE NG/ML
CHLORIDE SERPL-SCNC: 95 MMOL/L (ref 98–107)
CMV IGM SERPL-ACNC: 9.5 AU/ML
CO2 SERPL-SCNC: 29 MMOL/L (ref 21–32)
COCAINE SERPL QL SCN: NEGATIVE NG/ML
CREAT SERPL-MCNC: 1.74 MG/DL (ref 0.5–1.3)
ERYTHROCYTE [DISTWIDTH] IN BLOOD BY AUTOMATED COUNT: 18.5 % (ref 11.5–14.5)
GFR SERPL CREATININE-BSD FRML MDRD: 45 ML/MIN/1.73M*2
GLUCOSE BLD MANUAL STRIP-MCNC: 136 MG/DL (ref 74–99)
GLUCOSE BLD MANUAL STRIP-MCNC: 210 MG/DL (ref 74–99)
GLUCOSE BLD MANUAL STRIP-MCNC: 234 MG/DL (ref 74–99)
GLUCOSE BLD MANUAL STRIP-MCNC: 266 MG/DL (ref 74–99)
GLUCOSE BLD MANUAL STRIP-MCNC: 368 MG/DL (ref 74–99)
GLUCOSE SERPL-MCNC: 205 MG/DL (ref 74–99)
HCO3 BLDMV-SCNC: 26 MMOL/L (ref 22–26)
HCO3 BLDMV-SCNC: 26 MMOL/L (ref 22–26)
HCO3 BLDMV-SCNC: 28.4 MMOL/L (ref 22–26)
HCT VFR BLD AUTO: 36.7 % (ref 41–52)
HGB BLD-MCNC: 11.7 G/DL (ref 13.5–17.5)
INHALED O2 CONCENTRATION: 21 %
INHALED O2 CONCENTRATION: 21 %
INHALED O2 CONCENTRATION: 32 %
MAGNESIUM SERPL-MCNC: 2.18 MG/DL (ref 1.6–2.4)
MCH RBC QN AUTO: 24.2 PG (ref 26–34)
MCHC RBC AUTO-ENTMCNC: 31.9 G/DL (ref 32–36)
MCV RBC AUTO: 76 FL (ref 80–100)
METHADONE SERPL QL SCN: NEGATIVE NG/ML
METHAMPHET SERPL QL: NEGATIVE NG/ML
NRBC BLD-RTO: 0 /100 WBCS (ref 0–0)
OPIATES SERPL QL SCN: NEGATIVE NG/ML
OXYCODONE SERPL QL: NEGATIVE NG/ML
OXYHGB MFR BLDMV: 55.5 % (ref 45–75)
OXYHGB MFR BLDMV: 59.4 % (ref 45–75)
OXYHGB MFR BLDMV: 61.3 % (ref 45–75)
PCO2 BLDMV: 45 MM HG (ref 41–51)
PCO2 BLDMV: 45 MM HG (ref 41–51)
PCO2 BLDMV: 48 MM HG (ref 41–51)
PCP SERPL QL SCN: NEGATIVE NG/ML
PH BLDMV: 7.37 PH (ref 7.33–7.43)
PH BLDMV: 7.37 PH (ref 7.33–7.43)
PH BLDMV: 7.38 PH (ref 7.33–7.43)
PHOSPHATE SERPL-MCNC: 3.6 MG/DL (ref 2.5–4.9)
PLATELET # BLD AUTO: 211 X10*3/UL (ref 150–450)
PMV BLD AUTO: 10.1 FL (ref 7.5–11.5)
PO2 BLDMV: 42 MM HG (ref 35–45)
PO2 BLDMV: 43 MM HG (ref 35–45)
PO2 BLDMV: 44 MM HG (ref 35–45)
POTASSIUM SERPL-SCNC: 3.7 MMOL/L (ref 3.5–5.3)
PROT C AG ACT/NOR PPP IA: 95 % (ref 63–153)
PROT S AG ACT/NOR PPP IA: 152 % (ref 84–134)
RBC # BLD AUTO: 4.83 X10*6/UL (ref 4.5–5.9)
SAO2 % BLDMV: 57 % (ref 45–75)
SAO2 % BLDMV: 61 % (ref 45–75)
SAO2 % BLDMV: 63 % (ref 45–75)
SARS-COV-2 RNA RESP QL NAA+PROBE: NOT DETECTED
SODIUM SERPL-SCNC: 134 MMOL/L (ref 136–145)
T GONDII IGM SER-ACNC: <3 AU/ML
T4 FREE SERPL-MCNC: 1.04 NG/DL (ref 0.78–1.48)
WBC # BLD AUTO: 5.6 X10*3/UL (ref 4.4–11.3)

## 2023-10-08 PROCEDURE — 2500000004 HC RX 250 GENERAL PHARMACY W/ HCPCS (ALT 636 FOR OP/ED): Performed by: NURSE PRACTITIONER

## 2023-10-08 PROCEDURE — 2500000004 HC RX 250 GENERAL PHARMACY W/ HCPCS (ALT 636 FOR OP/ED)

## 2023-10-08 PROCEDURE — 71045 X-RAY EXAM CHEST 1 VIEW: CPT | Performed by: STUDENT IN AN ORGANIZED HEALTH CARE EDUCATION/TRAINING PROGRAM

## 2023-10-08 PROCEDURE — 82805 BLOOD GASES W/O2 SATURATION: CPT | Performed by: STUDENT IN AN ORGANIZED HEALTH CARE EDUCATION/TRAINING PROGRAM

## 2023-10-08 PROCEDURE — 82947 ASSAY GLUCOSE BLOOD QUANT: CPT

## 2023-10-08 PROCEDURE — 2500000004 HC RX 250 GENERAL PHARMACY W/ HCPCS (ALT 636 FOR OP/ED): Performed by: STUDENT IN AN ORGANIZED HEALTH CARE EDUCATION/TRAINING PROGRAM

## 2023-10-08 PROCEDURE — 2500000001 HC RX 250 WO HCPCS SELF ADMINISTERED DRUGS (ALT 637 FOR MEDICARE OP)

## 2023-10-08 PROCEDURE — 83735 ASSAY OF MAGNESIUM: CPT

## 2023-10-08 PROCEDURE — 2500000002 HC RX 250 W HCPCS SELF ADMINISTERED DRUGS (ALT 637 FOR MEDICARE OP, ALT 636 FOR OP/ED)

## 2023-10-08 PROCEDURE — 99232 SBSQ HOSP IP/OBS MODERATE 35: CPT

## 2023-10-08 PROCEDURE — 2500000001 HC RX 250 WO HCPCS SELF ADMINISTERED DRUGS (ALT 637 FOR MEDICARE OP): Performed by: STUDENT IN AN ORGANIZED HEALTH CARE EDUCATION/TRAINING PROGRAM

## 2023-10-08 PROCEDURE — 99291 CRITICAL CARE FIRST HOUR: CPT | Performed by: SPECIALIST

## 2023-10-08 PROCEDURE — 96372 THER/PROPH/DIAG INJ SC/IM: CPT

## 2023-10-08 PROCEDURE — 96372 THER/PROPH/DIAG INJ SC/IM: CPT | Performed by: NURSE PRACTITIONER

## 2023-10-08 PROCEDURE — 80069 RENAL FUNCTION PANEL: CPT | Performed by: STUDENT IN AN ORGANIZED HEALTH CARE EDUCATION/TRAINING PROGRAM

## 2023-10-08 PROCEDURE — 71045 X-RAY EXAM CHEST 1 VIEW: CPT

## 2023-10-08 PROCEDURE — 87635 SARS-COV-2 COVID-19 AMP PRB: CPT | Performed by: NURSE PRACTITIONER

## 2023-10-08 PROCEDURE — 99233 SBSQ HOSP IP/OBS HIGH 50: CPT | Performed by: NURSE PRACTITIONER

## 2023-10-08 PROCEDURE — 2500000002 HC RX 250 W HCPCS SELF ADMINISTERED DRUGS (ALT 637 FOR MEDICARE OP, ALT 636 FOR OP/ED): Performed by: NURSE PRACTITIONER

## 2023-10-08 PROCEDURE — 2020000001 HC ICU ROOM DAILY

## 2023-10-08 PROCEDURE — 85027 COMPLETE CBC AUTOMATED: CPT | Performed by: STUDENT IN AN ORGANIZED HEALTH CARE EDUCATION/TRAINING PROGRAM

## 2023-10-08 PROCEDURE — 96372 THER/PROPH/DIAG INJ SC/IM: CPT | Performed by: STUDENT IN AN ORGANIZED HEALTH CARE EDUCATION/TRAINING PROGRAM

## 2023-10-08 PROCEDURE — 84439 ASSAY OF FREE THYROXINE: CPT

## 2023-10-08 RX ORDER — ONDANSETRON HYDROCHLORIDE 2 MG/ML
4 INJECTION, SOLUTION INTRAVENOUS EVERY 4 HOURS PRN
Status: DISCONTINUED | OUTPATIENT
Start: 2023-10-08 | End: 2023-10-17 | Stop reason: HOSPADM

## 2023-10-08 RX ORDER — POLYETHYLENE GLYCOL 3350, SODIUM CHLORIDE, SODIUM BICARBONATE, POTASSIUM CHLORIDE 420; 11.2; 5.72; 1.48 G/4L; G/4L; G/4L; G/4L
4000 POWDER, FOR SOLUTION ORAL ONCE
Status: DISCONTINUED | OUTPATIENT
Start: 2023-10-08 | End: 2023-10-17 | Stop reason: HOSPADM

## 2023-10-08 RX ORDER — INSULIN LISPRO 100 [IU]/ML
20 INJECTION, SOLUTION INTRAVENOUS; SUBCUTANEOUS
Status: DISCONTINUED | OUTPATIENT
Start: 2023-10-08 | End: 2023-10-11

## 2023-10-08 RX ORDER — INSULIN GLARGINE 100 [IU]/ML
50 INJECTION, SOLUTION SUBCUTANEOUS EVERY 24 HOURS
Status: DISCONTINUED | OUTPATIENT
Start: 2023-10-08 | End: 2023-10-13

## 2023-10-08 RX ORDER — HYDRALAZINE HYDROCHLORIDE 50 MG/1
50 TABLET, FILM COATED ORAL 3 TIMES DAILY
Status: DISCONTINUED | OUTPATIENT
Start: 2023-10-08 | End: 2023-10-11

## 2023-10-08 RX ORDER — FUROSEMIDE 10 MG/ML
80 INJECTION INTRAMUSCULAR; INTRAVENOUS ONCE
Status: COMPLETED | OUTPATIENT
Start: 2023-10-08 | End: 2023-10-08

## 2023-10-08 RX ORDER — POLYETHYLENE GLYCOL 3350 17 G/17G
238 POWDER, FOR SOLUTION ORAL ONCE AS NEEDED
Status: DISCONTINUED | OUTPATIENT
Start: 2023-10-08 | End: 2023-10-11

## 2023-10-08 RX ORDER — POLYETHYLENE GLYCOL 3350, SODIUM CHLORIDE, SODIUM BICARBONATE, POTASSIUM CHLORIDE 420; 11.2; 5.72; 1.48 G/4L; G/4L; G/4L; G/4L
2000 POWDER, FOR SOLUTION ORAL ONCE AS NEEDED
Status: DISCONTINUED | OUTPATIENT
Start: 2023-10-08 | End: 2023-10-11

## 2023-10-08 RX ORDER — POTASSIUM CHLORIDE 20 MEQ/1
40 TABLET, EXTENDED RELEASE ORAL DAILY
Status: DISCONTINUED | OUTPATIENT
Start: 2023-10-08 | End: 2023-10-16

## 2023-10-08 RX ADMIN — HYDRALAZINE HYDROCHLORIDE 50 MG: 50 TABLET, FILM COATED ORAL at 17:28

## 2023-10-08 RX ADMIN — GABAPENTIN 600 MG: 300 CAPSULE ORAL at 20:43

## 2023-10-08 RX ADMIN — LEVOTHYROXINE SODIUM 50 MCG: 50 TABLET ORAL at 06:31

## 2023-10-08 RX ADMIN — INSULIN LISPRO 15 UNITS: 100 INJECTION, SOLUTION INTRAVENOUS; SUBCUTANEOUS at 11:28

## 2023-10-08 RX ADMIN — INSULIN LISPRO 6 UNITS: 100 INJECTION, SOLUTION INTRAVENOUS; SUBCUTANEOUS at 08:38

## 2023-10-08 RX ADMIN — DOCUSATE SODIUM 200 MG: 100 CAPSULE, LIQUID FILLED ORAL at 20:44

## 2023-10-08 RX ADMIN — POTASSIUM CHLORIDE 40 MEQ: 1500 TABLET, EXTENDED RELEASE ORAL at 08:44

## 2023-10-08 RX ADMIN — HYDRALAZINE HYDROCHLORIDE 50 MG: 50 TABLET, FILM COATED ORAL at 20:43

## 2023-10-08 RX ADMIN — INSULIN LISPRO 20 UNITS: 100 INJECTION, SOLUTION INTRAVENOUS; SUBCUTANEOUS at 18:55

## 2023-10-08 RX ADMIN — HYDRALAZINE HYDROCHLORIDE 75 MG: 25 TABLET ORAL at 08:43

## 2023-10-08 RX ADMIN — INSULIN LISPRO 6 UNITS: 100 INJECTION, SOLUTION INTRAVENOUS; SUBCUTANEOUS at 18:56

## 2023-10-08 RX ADMIN — INSULIN LISPRO 20 UNITS: 100 INJECTION, SOLUTION INTRAVENOUS; SUBCUTANEOUS at 11:35

## 2023-10-08 RX ADMIN — SODIUM NITROPRUSSIDE 0.25 MCG/KG/MIN: 0.5 INJECTION, SOLUTION INTRAVENOUS at 08:45

## 2023-10-08 RX ADMIN — PANTOPRAZOLE SODIUM 40 MG: 40 TABLET, DELAYED RELEASE ORAL at 06:31

## 2023-10-08 RX ADMIN — INSULIN GLARGINE 50 UNITS: 100 INJECTION, SOLUTION SUBCUTANEOUS at 08:37

## 2023-10-08 RX ADMIN — HEPARIN SODIUM 5000 UNITS: 5000 INJECTION INTRAVENOUS; SUBCUTANEOUS at 22:44

## 2023-10-08 RX ADMIN — INSULIN LISPRO 12 UNITS: 100 INJECTION, SOLUTION INTRAVENOUS; SUBCUTANEOUS at 08:40

## 2023-10-08 RX ADMIN — IRON SUCROSE 200 MG: 20 INJECTION, SOLUTION INTRAVENOUS at 09:00

## 2023-10-08 RX ADMIN — METOPROLOL TARTRATE 12.5 MG: 25 TABLET, FILM COATED ORAL at 20:45

## 2023-10-08 RX ADMIN — QUETIAPINE FUMARATE 50 MG: 25 TABLET ORAL at 20:44

## 2023-10-08 RX ADMIN — HEPARIN SODIUM 5000 UNITS: 5000 INJECTION INTRAVENOUS; SUBCUTANEOUS at 06:31

## 2023-10-08 RX ADMIN — SODIUM NITROPRUSSIDE 0.5 MCG/KG/MIN: 0.5 INJECTION, SOLUTION INTRAVENOUS at 23:27

## 2023-10-08 RX ADMIN — METOPROLOL TARTRATE 12.5 MG: 25 TABLET, FILM COATED ORAL at 08:44

## 2023-10-08 RX ADMIN — DIGOXIN 125 MCG: 125 TABLET ORAL at 11:28

## 2023-10-08 RX ADMIN — FUROSEMIDE 80 MG: 10 INJECTION, SOLUTION INTRAMUSCULAR; INTRAVENOUS at 11:27

## 2023-10-08 RX ADMIN — ASPIRIN 81 MG CHEWABLE TABLET 81 MG: 81 TABLET CHEWABLE at 08:43

## 2023-10-08 RX ADMIN — ATORVASTATIN CALCIUM 80 MG: 80 TABLET ORAL at 08:44

## 2023-10-08 RX ADMIN — GABAPENTIN 300 MG: 300 CAPSULE ORAL at 08:44

## 2023-10-08 ASSESSMENT — PAIN - FUNCTIONAL ASSESSMENT
PAIN_FUNCTIONAL_ASSESSMENT: 0-10

## 2023-10-08 ASSESSMENT — PAIN SCALES - GENERAL
PAINLEVEL_OUTOF10: 0 - NO PAIN

## 2023-10-08 NOTE — PROGRESS NOTES
HFICU Attending Note    Subjectively, he feels well.  He feels back to what he describes as his baseline.  However, his hemodynamics are not ideal.  He continues to have relatively high filling pressures, and is persistently tachycardic.  In the past 24 hours, attempts of beta-blockade have caused his cardiac indices to drop.  In addition, his creatinine appears to be rising but is close to his baseline.  Plan.  He continues to have hemodynamic instability, with the persistent tachycardia being a marker of advanced heart failure.  We will attempt to treat him over the next 24 hours with afterload reduction, combined with low-dose beta-blockade.  We will complete the transplant/ventricular device evaluation.  If his numbers do not improve, we will have a low threshold to escalate to mechanical circulatory support.    This critically ill patient continues to be at-risk for clinically significant deterioration / failure due to the above mentioned dysfunctional, unstable organ systems.  I have personally identified and managed all complex critical care issues to prevent aforementioned clinical deterioration.  Critical care time is spent at bedside and/or the immediate area and has included, but is not limited to, the review of diagnostic tests, labs, radiographs, serial assessments of hemodynamics, respiratory status, ventilatory management, and family updates.  Time spent in procedures and teaching are reported separately.    Critical care time: ____ minutes

## 2023-10-08 NOTE — CARE PLAN
Problem: Pain - Adult  Goal: Verbalizes/displays adequate comfort level or baseline comfort level  Outcome: Progressing     Problem: Safety - Adult  Goal: Free from fall injury  Outcome: Progressing     Problem: Discharge Planning  Goal: Discharge to home or other facility with appropriate resources  Outcome: Progressing     Problem: Chronic Conditions and Co-morbidities  Goal: Patient's chronic conditions and co-morbidity symptoms are monitored and maintained or improved  Outcome: Progressing     Problem: Heart Failure  Goal: Improved gas exchange this shift  Outcome: Progressing  Goal: Improved urinary output this shift  Outcome: Progressing  Goal: Reduction in peripheral edema within 24 hours  Outcome: Progressing  Goal: Report improvement of dyspnea/breathlessness this shift  Outcome: Progressing  Goal: Weight from fluid excess reduced over 2-3 days, then stabilize  Outcome: Progressing  Goal: Increase self care and/or family involvement in 24 hours  Outcome: Progressing   The patient's goals for the shift include      The clinical goals for the shift include get sleep tonight. O2 as needed for JAKE.

## 2023-10-08 NOTE — PROGRESS NOTES
HFICU Attending Note    Feels well subjectively,, but on exam still has some volume overload.  He continues to have hemodynamic instability, with the persistent tachycardia being a marker of advanced heart failure.  In addition, his BUN is high.  Plan  Overall, I feel that he is at high risk for decompensation, with few remaining decompensations before there is permanent additional organ dysfunction, particularly his kidneys.   We will resume uptitrating his nitroprusside, combined with low-dose beta-blockade for heart rate control.  We will complete the transplant/ventricular device evaluation.  If his numbers do not improve, we will have a low threshold to escalate to mechanical circulatory support.    This critically ill patient continues to be at-risk for clinically significant deterioration / failure due to the above mentioned dysfunctional, unstable organ systems.  I have personally identified and managed all complex critical care issues to prevent aforementioned clinical deterioration.  Critical care time is spent at bedside and/or the immediate area and has included, but is not limited to, the review of diagnostic tests, labs, radiographs, serial assessments of hemodynamics, respiratory status, ventilatory management, and family updates.  Time spent in procedures and teaching are reported separately.    Critical care time: 45 minutes

## 2023-10-08 NOTE — PROGRESS NOTES
Felice Law is a 58 y.o. male on day 5 of admission presenting with Acute decompensated heart failure (CMS/HCC).    Subjective   Pt on CLD for colonoscopy tomorrow. Hyperglycemia persists with multiple glasses of juice and high sugar intake with CLD, encouraged pt to please choose clear liquids with lower carb content (broth, diet sodas).   Pt family member brought in Broad[employer]heet 2 + reader, will place on pt prior to discharge  Reviewed plan with pt, all questions answered  I have reviewed histories, allergies and medications have been reviewed        Objective   Review of Systems   Constitutional:  Positive for fatigue. Negative for appetite change.   HENT: Negative.     Eyes: Negative.    Respiratory:  Positive for shortness of breath.    Cardiovascular: Negative.    Gastrointestinal: Negative.    Endocrine: Negative.    Genitourinary: Negative.    Musculoskeletal: Negative.    Allergic/Immunologic: Negative.    Neurological: Negative.    Psychiatric/Behavioral: Negative.     Constitutional:       Appearance: He is normal weight. He is ill-appearing.   HENT:      Head: Normocephalic and atraumatic.      Nose: Nose normal.      Mouth: Mucous membranes are moist.      Pharynx: Oropharynx is clear.   Eyes:      Extraocular Movements: Extraocular movements intact.      Conjunctiva/sclera: Conjunctivae normal.   Cardiovascular:       Pulses: Normal pulses.     Pulmonary:      Breath sounds: Normal breath sounds.   Abdominal:      General: Abdomen is flat.      Palpations: Abdomen is soft.   Musculoskeletal:      Cervical back: Normal range of motion.   Skin:     General: Skin is warm and dry.   Neurological:      General: No focal deficit present.      Mental Status: He is alert and oriented to person, place, and time. Mental status is at baseline.   Psychiatric:         Mood and Affect: Mood normal.         Behavior: Behavior normal.       Last Recorded Vitals  Blood pressure 111/84, pulse 107, temperature 36.1 °C  "(97 °F), temperature source Temporal, resp. rate 16, height 1.803 m (5' 10.98\"), weight 109 kg (239 lb 6.7 oz), SpO2 96 %.  Intake/Output last 3 Shifts:  I/O last 3 completed shifts:  In: 1258.4 (11.6 mL/kg) [P.O.:960; I.V.:298.4 (2.7 mL/kg)]  Out: 1800 (16.6 mL/kg) [Urine:1800 (0.5 mL/kg/hr)]  Weight: 108.6 kg     Relevant Results  Results from last 7 days   Lab Units 10/09/23  0802 10/09/23  0338 10/08/23  1938 10/08/23  1820 10/08/23  1643 10/08/23  1126 10/08/23  0747 10/08/23  0258 10/07/23  1928 10/07/23  1704 10/07/23  0355 10/07/23  0105 10/06/23  0819 10/06/23  0515   POCT GLUCOSE mg/dL 140*  --  210* 266* 136* 368*   < >  --    < >  --    < >  --    < >  --    GLUCOSE mg/dL  --  90  --   --   --   --   --  205*  --  342*  --  258*  --  287*    < > = values in this interval not displayed.     Scheduled medications  aspirin, 81 mg, oral, Daily  atorvastatin, 80 mg, oral, Daily  digoxin, 125 mcg, oral, Daily  docusate sodium, 200 mg, oral, BID  gabapentin, 300 mg, oral, q AM  gabapentin, 600 mg, oral, Nightly  heparin (porcine), 5,000 Units, subcutaneous, q8h  hydrALAZINE, 50 mg, oral, TID  insulin glargine, 50 Units, subcutaneous, q24h  insulin lispro, 0-15 Units, subcutaneous, TID with meals  insulin lispro, 20 Units, subcutaneous, TID with meals  iron sucrose (Venofer) 200 mg in sodium chloride 0.9% 100 mL IVPB, 200 mg, intravenous, q24h  levothyroxine, 50 mcg, oral, Daily  metoprolol tartrate, 12.5 mg, oral, BID  pantoprazole, 40 mg, oral, Daily before breakfast  polyethylene glycol-electrolytes, 4,000 mL, oral, Once  potassium chloride CR, 40 mEq, oral, Daily  QUEtiapine, 50 mg, oral, Nightly      Continuous medications  nitroprusside, 0.25-5 mcg/kg/min, Last Rate: 0.5 mcg/kg/min (10/09/23 0600)      PRN medications  PRN medications: acetaminophen, albuterol, calcium carbonate, dextrose 10 % in water (D10W), dextrose, glucagon, ondansetron, polyethylene glycol, polyethylene glycol, polyethylene " glycol-electrolytes      Results from last 7 days   Lab Units 10/09/23  0338 10/06/23  0515 10/05/23  1647 10/05/23  0354 10/04/23  1518   HEMOGLOBIN A1C %  --   --   --   --  8.1*   SODIUM mmol/L 135*   < > 132* 137 137   POTASSIUM mmol/L 3.8   < > 4.1 4.0 4.1   CHLORIDE mmol/L 97*   < > 90* 93* 93*   CO2 mmol/L 28   < > 28 31 28   BUN mg/dL 68*   < > 60* 55* 58*   CREATININE mg/dL 1.35*   < > 1.87* 1.65* 1.79*   CALCIUM mg/dL 9.2   < > 10.2 10.0 10.1   ALBUMIN g/dL 3.8   < > 4.1 3.9 4.2   PROTEIN TOTAL g/dL  --   --  7.9  --   --    BILIRUBIN TOTAL mg/dL  --   --  1.8*  --   --    ALK PHOS U/L  --   --  63  --   --    ALT U/L  --   --  11  --   --    AST U/L  --   --  13  --   --    GLUCOSE mg/dL 90   < > 296* 240* 198*   CHOLESTEROL mg/dL  --   --   --  106  --    TRIGLYCERIDES mg/dL  --   --   --  247*  --    HDL mg/dL  --   --   --  24.5  --     < > = values in this interval not displayed.                  Assessment/Plan   Principal Problem:    Acute decompensated heart failure (CMS/[facility])    Principal Problem:    Acute decompensated heart failure (CMS/[facility])        Type 2 Diabetes   DM2 with hyperglycemia  - A1C 8.1% 10/4/23  - home DM regimen : trulicity 1.5mg qweek, glargine 85u once daily, lispro 34u with meals and 26u at bedtime  - managed by: PCP  PLAN  - Goal BG <180  - increase glargine to 50 units once daily- please reduce to 40u if pt is NPO  -increase lispro to 20u with meals - may hold if glc prior to meal is <90 or NPO  - continue lispro corrective scale #3 to with meals- adjust to lispro scale #2 q6h if pt is NPO  151-200 = 3u  201-250 = 6u  251-300 = 9u  301-350 = 12u  351-400 = 15u       -Accuchecks TIDAC and QHS- kindly ensure QHS Accucheck is drawn; it is often missed   -Hypoglycemia protocol  -adjust clear liquid diet to sugar free if possible please   -Will continue to follow and titrate insulin accordingly    Discharge planning-   Insulin dose TBD by titration, may resume home trulicity at  d/c  Pt sees PCP at [facility], recommend  or [facility] endocrinology follow up at d/c        I spent 30 minutes in the professional and overall care of this patient.      OMAR CouchC

## 2023-10-08 NOTE — PROGRESS NOTES
"Felice Law is a 58 y.o. male on day 4 of admission presenting with Acute decompensated heart failure (CMS/HCC).       Subjective Data:  No acute events overnight, no c/o chest pain or SOB.  States he slept well overnight     Plan today:  -80 IV lasix  -Colonoscopy tentatively for Monday, NPO at MN  -Lantus increased to 50 and lispro increased to 20   -decrease hydralazine to 50, utilize nipride      Objective Data:    Last Recorded Vitals:  Blood pressure 115/79, pulse 103, temperature 35.9 °C (96.6 °F), temperature source Temporal, resp. rate 18, height 1.803 m (5' 10.98\"), weight 109 kg (239 lb 6.7 oz), SpO2 96 %.     PAP: (44-69)/(20-46) 58/30  CO:  [4.46 L/min-5.3 L/min] 4.46 L/min  CI:  [1.96 L/min/m2-2.3 L/min/m2] 1.96 L/min/m2     Last Labs:  Results for orders placed or performed during the hospital encounter of 10/04/23 (from the past 24 hour(s))   POCT GLUCOSE   Result Value Ref Range    POCT Glucose 319 (H) 74 - 99 mg/dL   Renal Function Panel   Result Value Ref Range    Glucose 342 (H) 74 - 99 mg/dL    Sodium 130 (L) 136 - 145 mmol/L    Potassium 3.7 3.5 - 5.3 mmol/L    Chloride 93 (L) 98 - 107 mmol/L    Bicarbonate 26 21 - 32 mmol/L    Anion Gap 15 10 - 20 mmol/L    Urea Nitrogen 81 (H) 6 - 23 mg/dL    Creatinine 2.17 (H) 0.50 - 1.30 mg/dL    eGFR 35 (L) >60 mL/min/1.73m*2    Calcium 8.8 8.6 - 10.6 mg/dL    Phosphorus 4.0 2.5 - 4.9 mg/dL    Albumin 3.6 3.4 - 5.0 g/dL   Magnesium   Result Value Ref Range    Magnesium 1.96 1.60 - 2.40 mg/dL   POCT GLUCOSE   Result Value Ref Range    POCT Glucose 320 (H) 74 - 99 mg/dL   Renal function panel   Result Value Ref Range    Glucose 205 (H) 74 - 99 mg/dL    Sodium 134 (L) 136 - 145 mmol/L    Potassium 3.7 3.5 - 5.3 mmol/L    Chloride 95 (L) 98 - 107 mmol/L    Bicarbonate 29 21 - 32 mmol/L    Anion Gap 14 10 - 20 mmol/L    Urea Nitrogen 78 (H) 6 - 23 mg/dL    Creatinine 1.74 (H) 0.50 - 1.30 mg/dL    eGFR 45 (L) >60 mL/min/1.73m*2    Calcium 8.8 8.6 - " 10.6 mg/dL    Phosphorus 3.6 2.5 - 4.9 mg/dL    Albumin 3.7 3.4 - 5.0 g/dL   CBC   Result Value Ref Range    WBC 5.6 4.4 - 11.3 x10*3/uL    nRBC 0.0 0.0 - 0.0 /100 WBCs    RBC 4.83 4.50 - 5.90 x10*6/uL    Hemoglobin 11.7 (L) 13.5 - 17.5 g/dL    Hematocrit 36.7 (L) 41.0 - 52.0 %    MCV 76 (L) 80 - 100 fL    MCH 24.2 (L) 26.0 - 34.0 pg    MCHC 31.9 (L) 32.0 - 36.0 g/dL    RDW 18.5 (H) 11.5 - 14.5 %    Platelets 211 150 - 450 x10*3/uL    MPV 10.1 7.5 - 11.5 fL   BLOOD GAS MIXED VENOUS   Result Value Ref Range    POCT pH, Mixed 7.38 7.33 - 7.43 pH    POCT pCO2, Mixed 48 41 - 51 mm Hg    POCT pO2, Mixed 44 35 - 45 mm Hg    POCT SO2, Mixed 63 45 - 75 %    POCT Oxy Hemoglobin, Mixed 61.3 45.0 - 75.0 %    POCT Base Excess, Mixed 2.5 -2.0 - 3.0 mmol/L    POCT HCO3 Calculated, Mixed 28.4 (H) 22.0 - 26.0 mmol/L    Patient Temperature 37.0 degrees Celsius    FiO2 32 %   T4, free   Result Value Ref Range    Thyroxine, Free 1.04 0.78 - 1.48 ng/dL   Magnesium   Result Value Ref Range    Magnesium 2.18 1.60 - 2.40 mg/dL   POCT GLUCOSE   Result Value Ref Range    POCT Glucose 234 (H) 74 - 99 mg/dL   POCT GLUCOSE   Result Value Ref Range    POCT Glucose 368 (H) 74 - 99 mg/dL   BLOOD GAS MIXED VENOUS   Result Value Ref Range    POCT pH, Mixed 7.37 7.33 - 7.43 pH    POCT pCO2, Mixed 45 41 - 51 mm Hg    POCT pO2, Mixed 42 35 - 45 mm Hg    POCT SO2, Mixed 57 45 - 75 %    POCT Oxy Hemoglobin, Mixed 55.5 45.0 - 75.0 %    POCT Base Excess, Mixed 0.3 -2.0 - 3.0 mmol/L    POCT HCO3 Calculated, Mixed 26.0 22.0 - 26.0 mmol/L    Patient Temperature 37.0 degrees Celsius    FiO2 21 %       Last I/O:  I/O last 3 completed shifts:  In: 553.1 (5.1 mL/kg) [P.O.:400; I.V.:153.1 (1.4 mL/kg)]  Out: 3150 (28.9 mL/kg) [Urine:3150 (0.8 mL/kg/hr)]  Weight: 109 kg       Inpatient Medications:  Scheduled medications   Medication Dose Route Frequency    aspirin  81 mg oral Daily    atorvastatin  80 mg oral Daily    digoxin  125 mcg oral Daily    docusate sodium   200 mg oral BID    gabapentin  300 mg oral q AM    gabapentin  600 mg oral Nightly    heparin (porcine)  5,000 Units subcutaneous q8h    hydrALAZINE  50 mg oral TID    insulin glargine  50 Units subcutaneous q24h    insulin lispro  0-15 Units subcutaneous TID with meals    insulin lispro  20 Units subcutaneous TID with meals    iron sucrose (Venofer) 200 mg in sodium chloride 0.9% 100 mL IVPB  200 mg intravenous q24h    levothyroxine  50 mcg oral Daily    metoprolol tartrate  12.5 mg oral BID    pantoprazole  40 mg oral Daily before breakfast    potassium chloride CR  40 mEq oral Daily    QUEtiapine  50 mg oral Nightly     PRN medications   Medication    acetaminophen    albuterol    calcium carbonate    dextrose 10 % in water (D10W)    dextrose    glucagon    polyethylene glycol     Continuous Medications   Medication Dose Last Rate    nitroprusside  0.25-5 mcg/kg/min 0.5 mcg/kg/min (10/08/23 0900)         Physical Exam  Constitutional:       Appearance: Normal appearance.   Eyes:      Pupils: Pupils are equal, round, and reactive to light.   Neck:      Comments: [facility] in place, c/d/i  Cardiovascular:      Rate and Rhythm: Regular rhythm. Tachycardia present.      Pulses: Normal pulses.      Heart sounds: Normal heart sounds.   Pulmonary:      Effort: Pulmonary effort is normal.      Breath sounds: Normal breath sounds.   Abdominal:      General: Bowel sounds are normal. There is no distension.      Palpations: Abdomen is soft.   Musculoskeletal:         General: Normal range of motion.      Cervical back: Normal range of motion.      Right lower leg: Edema present.      Left lower leg: Edema present.   Skin:     General: Skin is warm and dry.      Capillary Refill: Capillary refill takes 2 to 3 seconds.   Neurological:      General: No focal deficit present.      Mental Status: He is alert and oriented to person, place, and time.      Motor: No weakness.   Psychiatric:         Mood and Affect: Mood normal.          Behavior: Behavior normal.       Diagnostics:  === 10/08/23 ===    XR CHEST 1 VIEW    - Impression -  1. Similar mild interstitial pulmonary edema and mild bibasilar  atelectasis.  2. Medical devices as above. No pneumothorax.    Signed by: Claudio Yancey 10/8/2023 9:37 AM  Dictation workstation:   [id number]      Assessment/Plan   58 year old [place]ese male with history of HFrEF/ICM (LVEF 20% 8/14/23), CAD s/p PCI and CABG (LIMA-LAD with balloon pump support April 2023), Hx of cardiac arrest s/p VT ablation + ICD, HTN/HLD, CKD, Hx of DVT (L femoral) on Eliquis, hypothyroidism, pulmonary hypertension, T2DM, JAKE/OHS, former tobacco abuse (24 pack year, quit 2017), presented to [facility] on 10/1 with exertional left sided chest pain and SOB x 3 days. He was admitted in cardiogenic shock, started on dobutamine under the guidance of SGC therapy. Patient was than transferred to [facility] for advanced therapy evaluation on 10/4. Advanced therapies evaluation was initiated on 10/4 with plans to discuss in committee meeting on 10/10.     Neuro:  #Depression  - Serial neuro and pain assessments   - PO Tylenol PRN for pain  - c/w renally dosed gabapentin   - PT/OT Consult, OOB to chair  - CAM ICU score every shift  - Sleep/wake cycle normalization     # Physical Status  -Obesity  -Functional at home.   -Weight on admission to [facility] 246lbs (up 16lbs from dry weight).       Cardiovascular:  # Acute on chronic HFrEF/ICM systolic and diastolic heart failure  - TTE at [facility] 8/2023 with EF ~20%, globally abnormal RV systolic function. Noninvasive hemodynamic assessment is consistent with severe pulmonary hypertension (>60 mmHg), an elevated CVP, an elevated LVEDP.  - admit weight 246lbs  - Daily weight 10/8: 108.6 kg   - admit   - Holding ARNI, MRA, SGLT2i in setting of worsening renal function, GFR <30.  - Daily SGC #s 10/8: /77(86), CVP 13, PA 58/28 (37), CO/CI 5.3/2.3, SVR 1099, SvO2 63% on Nipride 0.25mcg/kg/min,  hydral 75mg TID, digoxin 125mcg daily, metop 12.5mg BID   - increase nipride as neded to maintain MAP 65-70  - decreased hydral to 50mg TID to make room for increased nipride  - c/w po metoprolol   - c/w po digoixn   - Daily standing weights, 2gm sodium diet, 2L fluid restriction, strict I&Os  - Advanced therapy evaluation started 10/4     #Advanced Therapies Evaluation   LVAD or Transplant   -Email sent on 10/4   -Labs 10/5  -RHC complete  -[facility] 12/22  -ECHO 8/23  -CT chest 10/5  -US abdomen/aorta/iliac/IVC 10/5  -Carotid US 10/5  -MARK (lower) 10/5    -2 view CXR 10/5   -Device interrogation 10/6   -PFTs 10/6   -Colonoscopy/Occult stool - needs completed, liquid diet today for colonoscopy Monday   -LVAD/TX education 10/5  -CT surgery consult - needs completed   -Palliative consult 10/5  -Nutrition consult 10/6    -Dental consult/Orthopantogram 10/6 no lucency   -Physical and Occupational Therapy ordered and following      #CAD s/p CABG LIMA-LAD 4/2023  -[facility] December 2022: severe diffuse three-vessel coronary artery disease. Severe proximal diffuse LAD disease up to 70%. Severe diffuse proximal and mid first diagonal disease. Chronic total occlusion of OM1 with left-to-left collaterals. Chronic total occlusion of mid RCA with left-to-right collaterals. Markedly elevated LVEDP at 40 mm of Hg.  - Continue Aspirin 81mg.  - Continue Atorvastatin 80mg.      #Arrhythmias - Hx of VT s/p ablation + ICD  -Device - Medtronic ICD; interrogation completed 10/6   -Not on antiarrhythmic outpatient.     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  #?COPD (24 pack year smoking hx)  -Monitor and maintain SpO2 > 92%  - PRN albuterol nebs for wheezing      GI:  - Colace BID, Miralax PRN ordered for bowel regimen   - Continue Protonix 40mg daily  - clear liquid diet today, NPO at MN   - bowel prep to begin this evening      :  #CKD  -Admit BUN/Cr 67/2.00  - daily BUN/Cr (10/8): 78/1.74  - diurese as hemodynamics and physical exam  dictate   - Strict I/Os  - Avoid hypotension and nephrotoxic agents     Heme:  #Anemia in the setting of CKD  - Labs: Iron saturation 15%  - Venofer ordered 200 mg x3 days      #Hx of L femoral DVT  - Holding Eliquis for now  - Subcutaneous heparin 5000 units q8h for prophylaxis       Endo:  #IDDM2  - A1C 8.1% 10/4/23  - home DM regimen : trulicity 1.5mg qweek, glargine 85u once daily, lispro 34u with meals and 26u at bedtime  - Goal BG <180  - glargine increased to 50 units once daily  - Increase lispro to 20u with meals   - c/w lispro corrective scale #3 to with meals  - Adjust diet to low carb 75 g   - Endo following, appreciate recs     #Thyroid  -TSH 9.31 on 10/4, T4 10/8 1.04      ID:  -afebrile, nontoxic   -no s/s infx  -trend temps q4h     Feeding/fluids: low carb 75g diet, 2L fluid restriction  Thromboprophylaxis: SCDs   VAP bundle: n/a  Ulcer prophylaxis: PPI  Glycemic control: Lantus and SSI   Bowel care: Colace & miralax prn  Indwelling catheter: None     Lines:   PIVs   [facility] from [facility] 10/3     PT/OT: consulted and following      Code Status: Full Code     Family Primary Contact/Next of Kin: Mother    Patient seen and assessed with Dr. Yen Uribe, APRN-CNP

## 2023-10-08 NOTE — CONSULTS
Gastroenterology Consult Service  Department of Gastroenterology & Hepatology  [facility]    [facility]  Date of Service  October 8, 2023   Patient: Felice Law    Medical Record: [id number]  Reason for Consult: Colorectal cancer screening for preheart transplant/LVAD placement  Requesting Service: Heart failure    History of Present Illness:  Felice Law is a 58 y.o. male with a past medical history of HFrEF/ICM (LVEF 20% 8/14/23), CAD s/p PCI and CABG (LIMA-LAD with balloon pump support April 2023), Hx of cardiac arrest s/p VT ablation + ICD, HTN/HLD, CKD, Hx of DVT (L femoral) on Eliquis, hypothyroidism, pulmonary hypertension, T2DM, JAKE/OHS, former tobacco abuse (24 pack year, quit 2017), presented to [facility] on 10/1 with exertional left sided chest pain and SOB x 3 days. He was admitted in cardiogenic shock, started on dobutamine under the guidance of SGC therapy. Patient was than transferred to [facility] for advanced therapy evaluation on 10/4. Advanced therapies evaluation was initiated on 10/4 with plans to discuss on 10/10 during committee meeting for possible heart transplant/LVAD placement.    Gastroenterology is consulted for colorectal cancer screening for pre-heart transplant/LVAD placement work-up    Patient speaks Faroese and history is obtained using [employer] ???? translation service.  Patient denied any family history of colorectal cancer, he had colonoscopy more than 10 years ago in [place] which was normal according to him.  He denied any active or past GI issues.  He denied any nausea, vomiting, abdominal pain, dysphagia, anorexia, heartburn, regurgitation, change in bowel habits, early satiety, unintentional weight loss, fatigue, hematochezia, hematemesis, melena or fevers/chills.    Prev endoscopic eval: Colonoscopy 10 years ago in [place].    Past Medical History:    Past Medical History:   Diagnosis Date    CHF (congestive heart  "failure) (CMS/[facility])     COPD (chronic obstructive pulmonary disease) (CMS/[facility])     Coronary artery disease     Diabetes mellitus (CMS/[facility])     Disease of thyroid gland     Hypertension        Past Surgical History:  Past Surgical History:   Procedure Laterality Date    CARDIAC DEFIBRILLATOR PLACEMENT      CORONARY ANGIOPLASTY WITH STENT PLACEMENT      CORONARY ARTERY BYPASS GRAFT         Family History:  No family history on file.    Social History:  Social History     Tobacco Use    Smoking status: Former     Packs/day: 1.00     Years: 24.00     Additional pack years: 0.00     Total pack years: 24.00     Types: Cigarettes     Quit date:      Years since quittin.7    Smokeless tobacco: Not on file   Substance Use Topics    Alcohol use: Never       Allergies:  Allergies   Allergen Reactions    Azithromycin Dizziness       Medications:  Prior to Admission Medications:  Current Outpatient Medications   Medication Instructions    dulaglutide (Trulicity) 1.5 mg/0.5 mL pen injector injection 0.5 mL, subcutaneous, Weekly    furosemide (LASIX) 80 mg, oral, 2 times daily    lancets (Lancets,Thin) misc  1 Each 4 times daily (before meals and at bedtime). use as directed to test blood sugar<BR>    metoprolol succinate XL (TOPROL-XL) 150 mg, oral, Daily RT    OneTouch Ultra Test strip  REVISE EL NIVEL DE AZUCAR EN LA PEARL CUATRO VECES AL CUBA<BR>    pen needle, diabetic, safety 29 gauge x 1/2\" needle  Three times before meals and at bedtime    sacubitriL-valsartan (Entresto)  mg tablet 1 tablet, oral, 2 times daily    simethicone (MYLICON) 80 mg, oral, Every 6 hours PRN    spironolactone (ALDACTONE) 25 mg, oral, Daily RT         Current Facility-Administered Medications:     acetaminophen (Tylenol) tablet 975 mg, 975 mg, oral, q6h PRN, Phillip Salinas, DO    albuterol 2.5 mg /3 mL (0.083 %) nebulizer solution 2.5 mg, 2.5 mg, nebulization, q6h PRN, Jose Quiroga, DO    aspirin chewable tablet 81 mg, 81 mg, oral, " Daily, Radisa Tosanovic, DO, 81 mg at 10/08/23 0843    atorvastatin (Lipitor) tablet 80 mg, 80 mg, oral, Daily, Radisa Tosanovic, DO, 80 mg at 10/08/23 0844    calcium carbonate (Tums) chewable tablet 500 mg, 500 mg, oral, BID PRN, BRAD Delcid    dextrose 10 % in water (D10W) infusion, 0.3 g/kg/hr, intravenous, Once PRN, Phillip Salinas DO    dextrose 50 % injection 25 g, 25 g, intravenous, q15 min PRN, Phillip Salinas DO    digoxin (Lanoxin) tablet 125 mcg, 125 mcg, oral, Daily, BRAD Delcid, 125 mcg at 10/07/23 1336    docusate sodium (Colace) capsule 200 mg, 200 mg, oral, BID, BRAD Delcid, 200 mg at 10/07/23 2014    gabapentin (Neurontin) capsule 300 mg, 300 mg, oral, q AM, Nayelysa Tosanovic, DO, 300 mg at 10/08/23 0844    gabapentin (Neurontin) capsule 600 mg, 600 mg, oral, Nightly, Nayelysa Tosanovic, DO, 600 mg at 10/07/23 2013    glucagon (Glucagen) injection 1 mg, 1 mg, intramuscular, q15 min PRN, Phillip Salinas DO    heparin (porcine) injection 5,000 Units, 5,000 Units, subcutaneous, q8h, Jose Quiroga, DO, 5,000 Units at 10/08/23 0631    hydrALAZINE (Apresoline) tablet 50 mg, 50 mg, oral, TID, Emmett Oliver PA-C    insulin glargine (Lantus) injection 50 Units, 50 Units, subcutaneous, q24h, BRAD Velazquez, 50 Units at 10/08/23 0837    insulin lispro (HumaLOG) injection 0-15 Units, 0-15 Units, subcutaneous, TID with meals, BRAD Delcid, 15 Units at 10/08/23 1128    insulin lispro (HumaLOG) injection 20 Units, 20 Units, subcutaneous, TID with meals, BRAD Velazquez, 20 Units at 10/08/23 1135    iron sucrose (Venofer) 200 mg in sodium chloride 0.9% 100 mL IVPB, 200 mg, intravenous, q24h, Vivienne Ferrell, APRN-CNP, Stopped at 10/08/23 0930    levothyroxine (Synthroid, Levoxyl) tablet 50 mcg, 50 mcg, oral, Daily, Radisa Tosanovic, DO, 50 mcg at 10/08/23 0631    metoprolol tartrate (Lopressor) tablet 12.5 mg, 12.5  mg, oral, BID, Lonnie West PA-C, 12.5 mg at 10/08/23 0844    nitroprusside (Nipride) infusion 500 mcg/mL (100 mL vial) (adult), 0.25-5 mcg/kg/min, intravenous, Continuous, BRAD Delcid, Last Rate: 3.27 mL/hr at 10/08/23 0845, 0.25 mcg/kg/min at 10/08/23 0845    pantoprazole (ProtoNix) EC tablet 40 mg, 40 mg, oral, Daily before breakfast, Radisa Tosanovic, DO, 40 mg at 10/08/23 0631    polyethylene glycol (Glycolax, Miralax) packet 17 g, 17 g, oral, Daily PRN, BRAD Delcid    potassium chloride CR (Klor-Con M20) ER tablet 40 mEq, 40 mEq, oral, Daily, BRAD Velazquez, 40 mEq at 10/08/23 0844    QUEtiapine (SEROquel) tablet 50 mg, 50 mg, oral, Nightly, Radisa Tosanovic, DO, 50 mg at 10/07/23 2014    Pertinent Review of Systems:    Review of Systems    Physical Exam:    Vital Signs:    Vitals:    10/08/23 1100 10/08/23 1130 10/08/23 1200 10/08/23 1300   BP: 106/73  116/79 120/86   Pulse: 105  104 102   Resp: 19  17 21   Temp:  35.9 °C (96.6 °F)     TempSrc:  Temporal     SpO2: 95%  92% 99%   Weight:       Height:           Physical Exam  Vitals signs reviewed.    General: not in acute distress  HEENT: atraumatic, normocephalic, no oral lesions, CVP lines in place  CV: regular rate and rhythm, no murmur  Resp: clear to auscultation bilaterally  GI: soft, active bowel sounds, non-tender to palpation, no rebound or guarding, no ascites  Rectal: no perianal lesion or purulence  Msk: mild lower extremity edema  Derm: no jaundice  Psych: normal affect      Diagnostic Testing:    Labs:  Lab Results   Component Value Date    WBC 5.6 10/08/2023    HGB 11.7 (L) 10/08/2023    HCT 36.7 (L) 10/08/2023    MCV 76 (L) 10/08/2023     10/08/2023     Lab Results   Component Value Date    GLUCOSE 205 (H) 10/08/2023    CALCIUM 8.8 10/08/2023     (L) 10/08/2023    K 3.7 10/08/2023    CO2 29 10/08/2023    CL 95 (L) 10/08/2023    BUN 78 (H) 10/08/2023    CREATININE 1.74 (H) 10/08/2023        Reviewed:  Recent pertinent imaging/procedures    Assessment:     Felice Law is a 58 y.o. male with a past medical history of HFrEF/ICM (LVEF 20% 8/14/23), CAD s/p PCI and CABG (LIMA-LAD with balloon pump support April 2023), Hx of cardiac arrest s/p VT ablation + ICD, HTN/HLD, CKD, Hx of DVT (L femoral) on Eliquis, hypothyroidism, pulmonary hypertension, T2DM, JAKE/OHS, former tobacco abuse (24 pack year, quit 2017), presented to [facility] on 10/1 with exertional left sided chest pain and SOB x 3 days. He was admitted in cardiogenic shock, started on dobutamine under the guidance of SGC therapy. Patient was than transferred to [facility] for advanced therapy evaluation on 10/4. Advanced therapies evaluation was initiated on 10/4 with plans to discuss on 10/10 during committee meeting for possible heart transplant/LVAD placement.    Gastroenterology is consulted for colorectal cancer screening for pre-heart transplant/LVAD placement work-up    Plan:        -Plan for colonoscopy tomorrow.  - If on heparin, hold at 4am the day of the procedure  - If on lovenox, omit the dose on the night prior to the colonoscopy and on the morning of colonoscopy  - If no COVID within 7 days of procedure date, please obtain repeat testing  - Clear liquid diet on the day prior to the procedure  - Prep with GoLytely, 2L around 5-6p on the day prior to the procedure and another 2L around 10-11pm on the day prior to the procedure  - NPO to clear liquid diet at midnight before the procedure. NPO to bowel prep at 6am the day of the procedure  - Helpful tips for successful prep:  --- Please preemptively order an antiemetic (unless medically contraindicated) such as zofran 4mg IV q4-8 hr prn for nausea that may occur while prepping, and inform bedside RN and patient that this is available.  --- Please remind the patient and bedside RN they can mix GoLytely with crystal light powder for improved tolerance  --- Please sign out to your  night coverage to check with patient or bedside RN 2-4 hours after they start prepping to see if they tolerated the recommended amount. If not, please order Miralax/Gatorade prep (238gm of miralax mixed into 2L of Gatorade. Drink 1.5L tonight and 0.5L at 4am)    Patient staffed with Dr. Arguello. And will be seen by Dr. Hyman next AM with GI team.   Thank you for the consultation. Gastroenterology will continue to the follow the patient.   Please do not hesitate to contact me or page [phone] if there are any further questions between the weekday hours of 7 AM - 5 PM.   If there is an urgent concern during the weekend, after-hours, or holidays; then please page the [place] GI [place] [phone]. Thank you.    SIGNATURE: Mary Arana MD PATIENT NAME: Felice Law   DATE: October 8, 2023 MRN: [id number]

## 2023-10-09 ENCOUNTER — APPOINTMENT (OUTPATIENT)
Dept: RADIOLOGY | Facility: HOSPITAL | Age: 58
DRG: 291 | End: 2023-10-09
Payer: MEDICARE

## 2023-10-09 ENCOUNTER — APPOINTMENT (OUTPATIENT)
Dept: GASTROENTEROLOGY | Facility: HOSPITAL | Age: 58
DRG: 291 | End: 2023-10-09
Payer: MEDICARE

## 2023-10-09 ENCOUNTER — APPOINTMENT (OUTPATIENT)
Dept: VASCULAR MEDICINE | Facility: HOSPITAL | Age: 58
DRG: 291 | End: 2023-10-09
Payer: MEDICARE

## 2023-10-09 PROBLEM — R06.02 SOB (SHORTNESS OF BREATH): Status: RESOLVED | Noted: 2023-10-01 | Resolved: 2023-10-09

## 2023-10-09 PROBLEM — N18.30 CKD (CHRONIC KIDNEY DISEASE) STAGE 3, GFR 30-59 ML/MIN (MULTI): Status: ACTIVE | Noted: 2019-10-01

## 2023-10-09 PROBLEM — I50.23 ACUTE ON CHRONIC HFREF (HEART FAILURE WITH REDUCED EJECTION FRACTION) (MULTI): Status: ACTIVE | Noted: 2021-01-04

## 2023-10-09 PROBLEM — E66.9 OBESITY, CLASS I, BMI 30-34.9: Status: ACTIVE | Noted: 2020-11-19

## 2023-10-09 PROBLEM — E78.00 HIGH BLOOD CHOLESTEROL: Status: ACTIVE | Noted: 2020-11-19

## 2023-10-09 PROBLEM — D64.9 ANEMIA: Status: ACTIVE | Noted: 2019-10-18

## 2023-10-09 PROBLEM — I25.10 CORONARY ARTERY DISEASE INVOLVING NATIVE CORONARY ARTERY OF NATIVE HEART WITHOUT ANGINA PECTORIS: Status: ACTIVE | Noted: 2019-09-05

## 2023-10-09 PROBLEM — E11.9 TYPE 2 DIABETES MELLITUS (MULTI): Status: ACTIVE | Noted: 2021-01-04

## 2023-10-09 PROBLEM — I50.22 CHRONIC SYSTOLIC HEART FAILURE (MULTI): Status: ACTIVE | Noted: 2019-08-01

## 2023-10-09 PROBLEM — R79.89 ELEVATED TROPONIN LEVEL NOT DUE MYOCARDIAL INFARCTION: Status: RESOLVED | Noted: 2023-08-14 | Resolved: 2023-10-09

## 2023-10-09 PROBLEM — E66.9 OBESITY (BMI 30-39.9): Status: ACTIVE | Noted: 2019-10-01

## 2023-10-09 PROBLEM — E66.811 OBESITY, CLASS I, BMI 30-34.9: Status: ACTIVE | Noted: 2020-11-19

## 2023-10-09 LAB
ALBUMIN SERPL BCP-MCNC: 3.8 G/DL (ref 3.4–5)
ANION GAP BLDMV CALCULATED.4IONS-SCNC: 7 MMO/L (ref 10–25)
ANION GAP SERPL CALC-SCNC: 14 MMOL/L (ref 10–20)
BASE EXCESS BLDMV CALC-SCNC: -2.6 MMOL/L (ref -2–3)
BASE EXCESS BLDMV CALC-SCNC: 2.5 MMOL/L (ref -2–3)
BASE EXCESS BLDMV CALC-SCNC: 3.4 MMOL/L (ref -2–3)
BASE EXCESS BLDMV CALC-SCNC: 3.6 MMOL/L (ref -2–3)
BASE EXCESS BLDMV CALC-SCNC: 4 MMOL/L (ref -2–3)
BASE EXCESS BLDMV CALC-SCNC: 5.3 MMOL/L (ref -2–3)
BASE EXCESS BLDMV CALC-SCNC: 5.7 MMOL/L (ref -2–3)
BODY TEMPERATURE: 37 DEGREES CELSIUS
BUN SERPL-MCNC: 68 MG/DL (ref 6–23)
CA-I BLDMV-SCNC: 0.98 MMOL/L (ref 1.1–1.33)
CALCIUM SERPL-MCNC: 9.2 MG/DL (ref 8.6–10.6)
CHLORIDE BLD-SCNC: 108 MMOL/L (ref 98–107)
CHLORIDE SERPL-SCNC: 97 MMOL/L (ref 98–107)
CO2 SERPL-SCNC: 28 MMOL/L (ref 21–32)
CREAT SERPL-MCNC: 1.35 MG/DL (ref 0.5–1.3)
DIGOXIN SERPL-MCNC: 0.47 NG/ML (ref 0.8–?)
ELECTRONICALLY SIGNED BY: NORMAL
ERYTHROCYTE [DISTWIDTH] IN BLOOD BY AUTOMATED COUNT: 18.6 % (ref 11.5–14.5)
F5 P.R506Q BLD/T QL: NORMAL
F5 P.R506Q BLD/T QL: NORMAL
FLOW: 5 LPM
GFR SERPL CREATININE-BSD FRML MDRD: 61 ML/MIN/1.73M*2
GLUCOSE BLD MANUAL STRIP-MCNC: 140 MG/DL (ref 74–99)
GLUCOSE BLD MANUAL STRIP-MCNC: 144 MG/DL (ref 74–99)
GLUCOSE BLD MANUAL STRIP-MCNC: 213 MG/DL (ref 74–99)
GLUCOSE BLD MANUAL STRIP-MCNC: 222 MG/DL (ref 74–99)
GLUCOSE BLD-MCNC: 87 MG/DL (ref 74–99)
GLUCOSE SERPL-MCNC: 90 MG/DL (ref 74–99)
HCO3 BLDMV-SCNC: 21.4 MMOL/L (ref 22–26)
HCO3 BLDMV-SCNC: 27.9 MMOL/L (ref 22–26)
HCO3 BLDMV-SCNC: 28.5 MMOL/L (ref 22–26)
HCO3 BLDMV-SCNC: 29.2 MMOL/L (ref 22–26)
HCO3 BLDMV-SCNC: 30.1 MMOL/L (ref 22–26)
HCO3 BLDMV-SCNC: 30.5 MMOL/L (ref 22–26)
HCO3 BLDMV-SCNC: 32.1 MMOL/L (ref 22–26)
HCT VFR BLD AUTO: 38.6 % (ref 41–52)
HCT VFR BLD EST: 30 % (ref 41–52)
HGB BLD-MCNC: 11.8 G/DL (ref 13.5–17.5)
HGB BLDMV-MCNC: 9.9 G/DL (ref 13.5–17.5)
INHALED O2 CONCENTRATION: 0 %
INHALED O2 CONCENTRATION: 21 %
INHALED O2 CONCENTRATION: 4 %
INHALED O2 CONCENTRATION: 4 %
LACTATE BLDMV-SCNC: 0.9 MMOL/L (ref 0.4–2)
MAGNESIUM SERPL-MCNC: 2.2 MG/DL (ref 1.6–2.4)
MCH RBC QN AUTO: 24.2 PG (ref 26–34)
MCHC RBC AUTO-ENTMCNC: 30.6 G/DL (ref 32–36)
MCV RBC AUTO: 79 FL (ref 80–100)
NIL(NEG) CONTROL SPOT COUNT: NORMAL
NRBC BLD-RTO: 0 /100 WBCS (ref 0–0)
OXYHGB MFR BLDMV: 58.5 % (ref 45–75)
OXYHGB MFR BLDMV: 58.6 % (ref 45–75)
OXYHGB MFR BLDMV: 58.9 % (ref 45–75)
OXYHGB MFR BLDMV: 60 % (ref 45–75)
OXYHGB MFR BLDMV: 60 % (ref 45–75)
OXYHGB MFR BLDMV: 68.3 % (ref 45–75)
OXYHGB MFR BLDMV: ABNORMAL %
PANEL A SPOT COUNT: 0
PANEL B SPOT COUNT: 0
PCO2 BLDMV: 33 MM HG (ref 41–51)
PCO2 BLDMV: 44 MM HG (ref 41–51)
PCO2 BLDMV: 45 MM HG (ref 41–51)
PCO2 BLDMV: 45 MM HG (ref 41–51)
PCO2 BLDMV: 46 MM HG (ref 41–51)
PCO2 BLDMV: 52 MM HG (ref 41–51)
PCO2 BLDMV: 53 MM HG (ref 41–51)
PH BLDMV: 7.37 PH (ref 7.33–7.43)
PH BLDMV: 7.39 PH (ref 7.33–7.43)
PH BLDMV: 7.4 PH (ref 7.33–7.43)
PH BLDMV: 7.42 PH (ref 7.33–7.43)
PH BLDMV: 7.43 PH (ref 7.33–7.43)
PHOSPHATE SERPL-MCNC: 3.5 MG/DL (ref 2.5–4.9)
PLATELET # BLD AUTO: 240 X10*3/UL (ref 150–450)
PMV BLD AUTO: 10.2 FL (ref 7.5–11.5)
PO2 BLDMV: 40 MM HG (ref 35–45)
PO2 BLDMV: 41 MM HG (ref 35–45)
PO2 BLDMV: 42 MM HG (ref 35–45)
PO2 BLDMV: 42 MM HG (ref 35–45)
PO2 BLDMV: 43 MM HG (ref 35–45)
PO2 BLDMV: 44 MM HG (ref 35–45)
PO2 BLDMV: 63 MM HG (ref 35–45)
POS CONTROL SPOT COUNT: NORMAL
POTASSIUM BLDMV-SCNC: 2.8 MMOL/L (ref 3.5–5.3)
POTASSIUM SERPL-SCNC: 3.8 MMOL/L (ref 3.5–5.3)
RBC # BLD AUTO: 4.88 X10*6/UL (ref 4.5–5.9)
SAO2 % BLDMV: 60 % (ref 45–75)
SAO2 % BLDMV: 60 % (ref 45–75)
SAO2 % BLDMV: 61 % (ref 45–75)
SAO2 % BLDMV: 62 % (ref 45–75)
SAO2 % BLDMV: 62 % (ref 45–75)
SAO2 % BLDMV: 69 % (ref 45–75)
SAO2 % BLDMV: ABNORMAL %
SODIUM BLDMV-SCNC: 134 MMOL/L (ref 136–145)
SODIUM SERPL-SCNC: 135 MMOL/L (ref 136–145)
T-SPOT. TB INTERPRETATION: NEGATIVE
WBC # BLD AUTO: 5.6 X10*3/UL (ref 4.4–11.3)

## 2023-10-09 PROCEDURE — 93922 UPR/L XTREMITY ART 2 LEVELS: CPT

## 2023-10-09 PROCEDURE — 37799 UNLISTED PX VASCULAR SURGERY: CPT | Performed by: STUDENT IN AN ORGANIZED HEALTH CARE EDUCATION/TRAINING PROGRAM

## 2023-10-09 PROCEDURE — 88305 TISSUE EXAM BY PATHOLOGIST: CPT | Mod: TC,SUR,CMCLAB | Performed by: INTERNAL MEDICINE

## 2023-10-09 PROCEDURE — 96372 THER/PROPH/DIAG INJ SC/IM: CPT | Performed by: STUDENT IN AN ORGANIZED HEALTH CARE EDUCATION/TRAINING PROGRAM

## 2023-10-09 PROCEDURE — 0DJD8ZZ INSPECTION OF LOWER INTESTINAL TRACT, VIA NATURAL OR ARTIFICIAL OPENING ENDOSCOPIC: ICD-10-PCS | Performed by: STUDENT IN AN ORGANIZED HEALTH CARE EDUCATION/TRAINING PROGRAM

## 2023-10-09 PROCEDURE — 2500000002 HC RX 250 W HCPCS SELF ADMINISTERED DRUGS (ALT 637 FOR MEDICARE OP, ALT 636 FOR OP/ED): Performed by: NURSE PRACTITIONER

## 2023-10-09 PROCEDURE — 71045 X-RAY EXAM CHEST 1 VIEW: CPT | Performed by: RADIOLOGY

## 2023-10-09 PROCEDURE — 82947 ASSAY GLUCOSE BLOOD QUANT: CPT

## 2023-10-09 PROCEDURE — 2500000004 HC RX 250 GENERAL PHARMACY W/ HCPCS (ALT 636 FOR OP/ED)

## 2023-10-09 PROCEDURE — 2500000004 HC RX 250 GENERAL PHARMACY W/ HCPCS (ALT 636 FOR OP/ED): Performed by: NURSE PRACTITIONER

## 2023-10-09 PROCEDURE — 99291 CRITICAL CARE FIRST HOUR: CPT | Performed by: STUDENT IN AN ORGANIZED HEALTH CARE EDUCATION/TRAINING PROGRAM

## 2023-10-09 PROCEDURE — 80162 ASSAY OF DIGOXIN TOTAL: CPT

## 2023-10-09 PROCEDURE — 3700000013 HC SEDATION LEVEL 5+ TIME - EACH ADDITIONAL 15 MINUTES: Performed by: STUDENT IN AN ORGANIZED HEALTH CARE EDUCATION/TRAINING PROGRAM

## 2023-10-09 PROCEDURE — 2500000001 HC RX 250 WO HCPCS SELF ADMINISTERED DRUGS (ALT 637 FOR MEDICARE OP)

## 2023-10-09 PROCEDURE — 82805 BLOOD GASES W/O2 SATURATION: CPT | Performed by: STUDENT IN AN ORGANIZED HEALTH CARE EDUCATION/TRAINING PROGRAM

## 2023-10-09 PROCEDURE — 96372 THER/PROPH/DIAG INJ SC/IM: CPT | Performed by: NURSE PRACTITIONER

## 2023-10-09 PROCEDURE — 93922 UPR/L XTREMITY ART 2 LEVELS: CPT | Performed by: SURGERY

## 2023-10-09 PROCEDURE — 88305 TISSUE EXAM BY PATHOLOGIST: CPT | Performed by: STUDENT IN AN ORGANIZED HEALTH CARE EDUCATION/TRAINING PROGRAM

## 2023-10-09 PROCEDURE — 2500000001 HC RX 250 WO HCPCS SELF ADMINISTERED DRUGS (ALT 637 FOR MEDICARE OP): Performed by: STUDENT IN AN ORGANIZED HEALTH CARE EDUCATION/TRAINING PROGRAM

## 2023-10-09 PROCEDURE — 85027 COMPLETE CBC AUTOMATED: CPT | Performed by: STUDENT IN AN ORGANIZED HEALTH CARE EDUCATION/TRAINING PROGRAM

## 2023-10-09 PROCEDURE — 2020000001 HC ICU ROOM DAILY

## 2023-10-09 PROCEDURE — 93880 EXTRACRANIAL BILAT STUDY: CPT | Performed by: INTERNAL MEDICINE

## 2023-10-09 PROCEDURE — 99222 1ST HOSP IP/OBS MODERATE 55: CPT | Performed by: INTERNAL MEDICINE

## 2023-10-09 PROCEDURE — 93880 EXTRACRANIAL BILAT STUDY: CPT

## 2023-10-09 PROCEDURE — 71045 X-RAY EXAM CHEST 1 VIEW: CPT | Mod: FY

## 2023-10-09 PROCEDURE — 84132 ASSAY OF SERUM POTASSIUM: CPT | Performed by: SPECIALIST

## 2023-10-09 PROCEDURE — 3700000012 HC SEDATION LEVEL 5+ TIME - INITIAL 15 MINUTES 5/> YEARS: Performed by: STUDENT IN AN ORGANIZED HEALTH CARE EDUCATION/TRAINING PROGRAM

## 2023-10-09 PROCEDURE — 99232 SBSQ HOSP IP/OBS MODERATE 35: CPT | Performed by: INTERNAL MEDICINE

## 2023-10-09 PROCEDURE — 2500000001 HC RX 250 WO HCPCS SELF ADMINISTERED DRUGS (ALT 637 FOR MEDICARE OP): Performed by: INTERNAL MEDICINE

## 2023-10-09 PROCEDURE — 2500000004 HC RX 250 GENERAL PHARMACY W/ HCPCS (ALT 636 FOR OP/ED): Performed by: STUDENT IN AN ORGANIZED HEALTH CARE EDUCATION/TRAINING PROGRAM

## 2023-10-09 PROCEDURE — 80069 RENAL FUNCTION PANEL: CPT | Performed by: STUDENT IN AN ORGANIZED HEALTH CARE EDUCATION/TRAINING PROGRAM

## 2023-10-09 PROCEDURE — 83735 ASSAY OF MAGNESIUM: CPT

## 2023-10-09 PROCEDURE — 74176 CT ABD & PELVIS W/O CONTRAST: CPT | Performed by: RADIOLOGY

## 2023-10-09 PROCEDURE — 45380 COLONOSCOPY AND BIOPSY: CPT

## 2023-10-09 PROCEDURE — 45380 COLONOSCOPY AND BIOPSY: CPT | Performed by: INTERNAL MEDICINE

## 2023-10-09 PROCEDURE — 99232 SBSQ HOSP IP/OBS MODERATE 35: CPT | Performed by: PHYSICIAN ASSISTANT

## 2023-10-09 RX ORDER — FENTANYL CITRATE 50 UG/ML
INJECTION, SOLUTION INTRAMUSCULAR; INTRAVENOUS
Status: COMPLETED
Start: 2023-10-09 | End: 2023-10-09

## 2023-10-09 RX ORDER — POTASSIUM CHLORIDE 20 MEQ/1
40 TABLET, EXTENDED RELEASE ORAL ONCE
Status: COMPLETED | OUTPATIENT
Start: 2023-10-09 | End: 2023-10-09

## 2023-10-09 RX ORDER — FENTANYL CITRATE 50 UG/ML
100 INJECTION, SOLUTION INTRAMUSCULAR; INTRAVENOUS ONCE
Status: COMPLETED | OUTPATIENT
Start: 2023-10-09 | End: 2023-10-09

## 2023-10-09 RX ORDER — ISOSORBIDE DINITRATE 20 MG/1
20 TABLET ORAL
Status: DISCONTINUED | OUTPATIENT
Start: 2023-10-09 | End: 2023-10-11

## 2023-10-09 RX ORDER — MIDAZOLAM HYDROCHLORIDE 1 MG/ML
4 INJECTION INTRAMUSCULAR; INTRAVENOUS ONCE
Status: COMPLETED | OUTPATIENT
Start: 2023-10-09 | End: 2023-10-09

## 2023-10-09 RX ORDER — MIDAZOLAM HYDROCHLORIDE 1 MG/ML
INJECTION INTRAMUSCULAR; INTRAVENOUS
Status: COMPLETED
Start: 2023-10-09 | End: 2023-10-09

## 2023-10-09 RX ADMIN — GABAPENTIN 300 MG: 300 CAPSULE ORAL at 08:52

## 2023-10-09 RX ADMIN — HYDRALAZINE HYDROCHLORIDE 50 MG: 50 TABLET, FILM COATED ORAL at 08:53

## 2023-10-09 RX ADMIN — INSULIN LISPRO 6 UNITS: 100 INJECTION, SOLUTION INTRAVENOUS; SUBCUTANEOUS at 17:28

## 2023-10-09 RX ADMIN — ASPIRIN 81 MG CHEWABLE TABLET 81 MG: 81 TABLET CHEWABLE at 08:52

## 2023-10-09 RX ADMIN — ATORVASTATIN CALCIUM 80 MG: 80 TABLET ORAL at 08:52

## 2023-10-09 RX ADMIN — MIDAZOLAM HYDROCHLORIDE 4 MG: 1 INJECTION, SOLUTION INTRAMUSCULAR; INTRAVENOUS at 12:15

## 2023-10-09 RX ADMIN — SODIUM NITROPRUSSIDE 1 MCG/KG/MIN: 0.5 INJECTION, SOLUTION INTRAVENOUS at 20:13

## 2023-10-09 RX ADMIN — ISOSORBIDE DINITRATE 20 MG: 20 TABLET ORAL at 20:23

## 2023-10-09 RX ADMIN — POTASSIUM CHLORIDE 40 MEQ: 1500 TABLET, EXTENDED RELEASE ORAL at 08:53

## 2023-10-09 RX ADMIN — QUETIAPINE FUMARATE 50 MG: 25 TABLET ORAL at 20:10

## 2023-10-09 RX ADMIN — FENTANYL CITRATE 100 MCG: 50 INJECTION, SOLUTION INTRAMUSCULAR; INTRAVENOUS at 12:15

## 2023-10-09 RX ADMIN — HEPARIN SODIUM 5000 UNITS: 5000 INJECTION INTRAVENOUS; SUBCUTANEOUS at 20:23

## 2023-10-09 RX ADMIN — METOPROLOL TARTRATE 12.5 MG: 25 TABLET, FILM COATED ORAL at 08:53

## 2023-10-09 RX ADMIN — POTASSIUM CHLORIDE 40 MEQ: 1500 TABLET, EXTENDED RELEASE ORAL at 21:10

## 2023-10-09 RX ADMIN — Medication 2 L/MIN: at 23:57

## 2023-10-09 RX ADMIN — GABAPENTIN 600 MG: 300 CAPSULE ORAL at 20:10

## 2023-10-09 RX ADMIN — HYDRALAZINE HYDROCHLORIDE 50 MG: 50 TABLET, FILM COATED ORAL at 20:13

## 2023-10-09 RX ADMIN — HEPARIN SODIUM 5000 UNITS: 5000 INJECTION INTRAVENOUS; SUBCUTANEOUS at 15:43

## 2023-10-09 RX ADMIN — DIGOXIN 125 MCG: 125 TABLET ORAL at 12:00

## 2023-10-09 RX ADMIN — HYDRALAZINE HYDROCHLORIDE 50 MG: 50 TABLET, FILM COATED ORAL at 15:43

## 2023-10-09 RX ADMIN — PANTOPRAZOLE SODIUM 40 MG: 40 TABLET, DELAYED RELEASE ORAL at 08:06

## 2023-10-09 RX ADMIN — MIDAZOLAM HYDROCHLORIDE 4 MG: 1 INJECTION INTRAMUSCULAR; INTRAVENOUS at 12:15

## 2023-10-09 RX ADMIN — INSULIN GLARGINE 50 UNITS: 100 INJECTION, SOLUTION SUBCUTANEOUS at 09:00

## 2023-10-09 RX ADMIN — HEPARIN SODIUM 5000 UNITS: 5000 INJECTION INTRAVENOUS; SUBCUTANEOUS at 06:38

## 2023-10-09 RX ADMIN — IRON SUCROSE 200 MG: 20 INJECTION, SOLUTION INTRAVENOUS at 09:00

## 2023-10-09 RX ADMIN — ISOSORBIDE DINITRATE 20 MG: 20 TABLET ORAL at 15:43

## 2023-10-09 RX ADMIN — INSULIN LISPRO 20 UNITS: 100 INJECTION, SOLUTION INTRAVENOUS; SUBCUTANEOUS at 17:30

## 2023-10-09 RX ADMIN — METOPROLOL TARTRATE 12.5 MG: 25 TABLET, FILM COATED ORAL at 20:10

## 2023-10-09 ASSESSMENT — COGNITIVE AND FUNCTIONAL STATUS - GENERAL
MOBILITY SCORE: 23
DAILY ACTIVITIY SCORE: 24
CLIMB 3 TO 5 STEPS WITH RAILING: A LITTLE

## 2023-10-09 ASSESSMENT — PAIN SCALES - GENERAL
PAINLEVEL_OUTOF10: 0 - NO PAIN
PAINLEVEL_OUTOF10: 0 - NO PAIN

## 2023-10-09 ASSESSMENT — PAIN - FUNCTIONAL ASSESSMENT
PAIN_FUNCTIONAL_ASSESSMENT: 0-10
PAIN_FUNCTIONAL_ASSESSMENT: 0-10

## 2023-10-09 NOTE — PROGRESS NOTES
[facility]  HFICU PROGRESS NOTE    Felice Law/[id number]    Admit Date: 10/4/2023  Hospital Length of Stay: 5   ICU Length of Stay: 5    INTERVAL EVENTS / PERTINENT ROS:   Patient seen and examined at bedside. Reports feeling well. Tolerated bowel prep for colonoscopy well. Denied fever, chills, headache, nausea, vomiting, chest pain.    Plan:  -Start isordil 20 mg TID  -Colonoscopy today  -Reschedule PFTs  -To be presented to selection committee tomorrow    MEDICATIONS  Infusions:  nitroprusside, Last Rate: 0.5 mcg/kg/min (10/09/23 0600)      Scheduled:  aspirin, 81 mg, Daily  atorvastatin, 80 mg, Daily  digoxin, 125 mcg, Daily  docusate sodium, 200 mg, BID  gabapentin, 300 mg, q AM  gabapentin, 600 mg, Nightly  heparin (porcine), 5,000 Units, q8h  hydrALAZINE, 50 mg, TID  insulin glargine, 50 Units, q24h  insulin lispro, 0-15 Units, TID with meals  insulin lispro, 20 Units, TID with meals  iron sucrose (Venofer) 200 mg in sodium chloride 0.9% 100 mL IVPB, 200 mg, q24h  levothyroxine, 50 mcg, Daily  metoprolol tartrate, 12.5 mg, BID  pantoprazole, 40 mg, Daily before breakfast  polyethylene glycol-electrolytes, 4,000 mL, Once  potassium chloride CR, 40 mEq, Daily  QUEtiapine, 50 mg, Nightly      PRN:  acetaminophen, 975 mg, q6h PRN  albuterol, 2.5 mg, q6h PRN  calcium carbonate, 500 mg, BID PRN  dextrose 10 % in water (D10W), 0.3 g/kg/hr, Once PRN  dextrose, 25 g, q15 min PRN  glucagon, 1 mg, q15 min PRN  ondansetron, 4 mg, q4h PRN  polyethylene glycol, 17 g, Daily PRN  polyethylene glycol, 238 g, Once PRN  polyethylene glycol-electrolytes, 2,000 mL, Once PRN      Invasive Hemodynamics:    Most Recent Range Past 24hrs   BP (Art)   No data recorded   MAP(Art)   No data recorded   RA/CVP 17 mmHg CVP (mean)  Min: 15 mmHg  Max: 17 mmHg   PA 20/10 PAP  Min: 13/6  Max: 69/42   PA(mean) 15 mmHg PAP (Mean)  Min: 10 mmHg  Max: 52 mmHg   PCWP 16 mmHg No data recorded   CO 4.82 L/min  "(BLANCA) CO (L/min)  Min: 4.46 L/min  Max: 4.82 L/min   CI 2.12 L/min/m2 (BLANCA) CI (L/min/m2)  Min: 1.96 L/min/m2  Max: 2.12 L/min/m2   Mixed Venous   No data recorded   SVR  979 (dyne*sec)/cm5 SVR (dyne*sec)/cm5  Min: 979 (dyne*sec)/cm5  Max: 1327 (dyne*sec)/cm5    (dyne*sec)/cm5 No data recorded     PHYSICAL EXAM:   Visit Vitals  BP 98/77   Pulse 99   Temp 36.1 °C (97 °F) (Temporal)   Resp 12   Ht 1.803 m (5' 10.98\")   Wt 109 kg (239 lb 6.7 oz)   SpO2 96%   BMI 33.41 kg/m²   Smoking Status Former   BSA 2.34 m²     Wt Readings from Last 5 Encounters:   10/08/23 109 kg (239 lb 6.7 oz)     INTAKE/OUTPUT:  I/O last 3 completed shifts:  In: 1258.4 (11.6 mL/kg) [P.O.:960; I.V.:298.4 (2.7 mL/kg)]  Out: 1800 (16.6 mL/kg) [Urine:1800 (0.5 mL/kg/hr)]  Weight: 108.6 kg      Physical Exam  Constitutional:       General: He is not in acute distress.     Appearance: Normal appearance.   HENT:      Head: Normocephalic.      Mouth/Throat:      Mouth: Mucous membranes are moist.   Eyes:      Extraocular Movements: Extraocular movements intact.      Conjunctiva/sclera: Conjunctivae normal.   Cardiovascular:      Rate and Rhythm: Regular rhythm. Tachycardia present.      Heart sounds: Normal heart sounds. No murmur heard.  Pulmonary:      Effort: Pulmonary effort is normal.      Breath sounds: Normal breath sounds.   Abdominal:      General: Bowel sounds are normal.      Palpations: Abdomen is soft.   Musculoskeletal:         General: Normal range of motion.   Skin:     General: Skin is warm and dry.   Neurological:      General: No focal deficit present.      Mental Status: He is alert and oriented to person, place, and time.   Psychiatric:         Mood and Affect: Mood normal.       DATA:  CMP:  Results from last 7 days   Lab Units 10/09/23  0338 10/08/23  0258 10/07/23  1704 10/07/23  0105 10/06/23  0515 10/05/23  1647 10/05/23  0354 10/04/23  1518   SODIUM mmol/L 135* 134* 130* 131* 130* 132* 137 137   POTASSIUM mmol/L 3.8 " "3.7 3.7 3.8 4.3 4.1 4.0 4.1   CHLORIDE mmol/L 97* 95* 93* 91* 92* 90* 93* 93*   CO2 mmol/L 28 29 26 28 30 28 31 28   ANION GAP mmol/L 14 14 15 16 12 18 17 20   BUN mg/dL 68* 78* 81* 75* 65* 60* 55* 58*   CREATININE mg/dL 1.35* 1.74* 2.17* 1.78* 2.09* 1.87* 1.65* 1.79*   EGFR mL/min/1.73m*2 61 45* 35* 44* 36* 41* 48* 44*   MAGNESIUM mg/dL 2.20 2.18 1.96 2.08  --  1.76  --   --    ALBUMIN g/dL 3.8 3.7 3.6 3.7 3.6 4.1 3.9 4.2   ALT U/L  --   --   --   --   --  11  --   --    AST U/L  --   --   --   --   --  13  --   --    BILIRUBIN TOTAL mg/dL  --   --   --   --   --  1.8*  --   --      CBC:  Results from last 7 days   Lab Units 10/09/23  0338 10/08/23  0258 10/07/23  0105 10/06/23  0515 10/05/23  1647 10/05/23  0354 10/04/23  1518   WBC AUTO x10*3/uL 5.6 5.6 5.9 6.5 7.8 6.5 5.0   HEMOGLOBIN g/dL 11.8* 11.7* 11.7* 11.6* 12.9* 12.8* 12.8*   HEMATOCRIT % 38.6* 36.7* 36.3* 36.8* 40.8* 41.0 42.0   PLATELETS AUTO x10*3/uL 240 211 200 215 255 258 275   MCV fL 79* 76* 76* 77* 76* 77* 78*     COAG:   Results from last 7 days   Lab Units 10/05/23  1647   INR  1.2*     ABO: No results found for: \"ABO\"  HEME/ENDO:  Results from last 7 days   Lab Units 10/04/23  1518   FERRITIN ng/mL 379*   IRON SATURATION % 15*   TSH mIU/L 9.31*   HEMOGLOBIN A1C % 8.1*      CARDIAC:   Results from last 7 days   Lab Units 10/05/23  1647 10/04/23  1518   LD U/L 240  --    BNP pg/mL 157* 160*     Chest Radiograph  === 10/04/23 ===    XR CHEST 1 VIEW    - Impression -  1. Similar mild interstitial pulmonary edema and mild bibasilar  atelectasis.  2. Medical devices as above. No pneumothorax.    Signed by: Claudio Yancey 10/8/2023 9:37 AM  Dictation workstation:   [id number]    CT:   === 10/04/23 ===    CT CHEST WO IV CONTRAST    - Impression -  1.  Mild cardiomegaly with severe coronary artery calcification.  Status post median sternotomy and CABG.  2. Close proximity of the inner table of sternum and cardiovascular  structures as described above. " "Medical devices as described above.  3. Bandlike atelectasis in bilateral mid and lower lungs. Trace  bilateral pleural effusion. No focal consolidation in the lungs.    MACRO:  None    Signed by: Ke Lau 10/6/2023 10:45 AM  Dictation workstation:   [id number]     Prior to Admission Meds:  Medications Prior to Admission   Medication Sig Dispense Refill Last Dose    dulaglutide (Trulicity) 1.5 mg/0.5 mL pen injector injection Inject 1.5 mg under the skin 1 (one) time per week.   Unknown    furosemide (Lasix) 40 mg tablet Take 2 tablets (80 mg) by mouth twice a day.   Unknown    lancets (Lancets,Thin) misc 1 Each 4 times daily (before meals and at bedtime). use as directed to test blood sugar   Unknown    metoprolol succinate XL (Toprol-XL) 50 mg 24 hr tablet Take 3 tablets (150 mg) by mouth once daily.   Unknown    OneTouch Ultra Test strip REVISE EL NIVEL DE AZUCAR EN LA PEARL CUATRO VECES AL CUBA   Unknown    pen needle, diabetic, safety 29 gauge x 1/2\" needle Three times before meals and at bedtime   Unknown    sacubitriL-valsartan (Entresto)  mg tablet Take 1 tablet by mouth twice a day.   Unknown    simethicone (Mylicon) 80 mg chewable tablet Chew 1 tablet (80 mg) every 6 hours if needed for flatulence.   Unknown    spironolactone (Aldactone) 25 mg tablet Take 1 tablet (25 mg) by mouth once daily.   Unknown       ASSESSMENT AND PLAN:     58 year old Citizen of [place] male with history of HFrEF/ICM (LVEF 20% 8/14/23), CAD s/p PCI and CABG (LIMA-LAD with balloon pump support April 2023), Hx of cardiac arrest s/p VT ablation + ICD, HTN/HLD, CKD, Hx of DVT (L femoral) on Eliquis, hypothyroidism, pulmonary hypertension, T2DM, JAKE/OHS, former tobacco abuse (24 pack year, quit 2017), presented to [facility] on 10/1 with exertional left sided chest pain and SOB x 3 days. He was admitted in cardiogenic shock, started on dobutamine under the guidance of SGC therapy. Patient was than transferred to [facility] for " advanced therapy evaluation on 10/4. Advanced therapies evaluation was initiated on 10/4 with plans to discuss in committee meeting on 10/10.      Neuro:  #Depression  - Serial neuro and pain assessments   - PO Tylenol PRN for pain  - c/w renally dosed gabapentin   - PT/OT Consult, OOB to chair  - CAM ICU score every shift  - Sleep/wake cycle normalization     # Physical Status  -Obesity  -Functional at home.   -Weight on admission to [facility] 246lbs (up 16lbs from dry weight).     Cardiovascular:  # Acute on chronic HFrEF/ICM systolic and diastolic heart failure  - TTE at [facility] 8/2023 with EF ~20%, globally abnormal RV systolic function. Noninvasive hemodynamic assessment is consistent with severe pulmonary hypertension (>60 mmHg), an elevated CVP, an elevated LVEDP.  - admit weight 246lbs  - Daily weight 10/8: 108.6 kg   - admit   - Holding ARNI, MRA, SGLT2i in setting of worsening renal function, GFR <30.  - Daily SGC #s 10/9: /84 (93), CVP 13, PA 22/8 (15), CO/CI , SVR , SvO2 63% on Nipride 0.25mcg/kg/min, hydral 75mg TID, digoxin 125mcg daily, metop 12.5mg BID   - increase nipride as neded to maintain MAP 65-70  - Continue hydral 50mg TID, start isordil 20 mg TID today  - Continue po metoprolol tartrate 12.5 mg BID   - Continue po digoixn 125 mcg   - Daily standing weights, 2gm sodium diet, 2L fluid restriction, strict I&Os  - Advanced therapy evaluation started 10/4      #Advanced Therapies Evaluation   LVAD or Transplant   -Email sent on 10/4   -Labs 10/5  -RHC complete  -LHC 12/22  -ECHO 8/23  -CT chest 10/5  -US abdomen/aorta/iliac/IVC 10/5  -Carotid US 10/5  -MARK (lower) 10/5    -2 view CXR 10/5   -Device interrogation 10/6   -PFTs 10/6, will need rescheduled  -Colonoscopy/Occult stool - to be completed today   -LVAD/TX education 10/5  -CT surgery consult - needs completed   -Palliative consult 10/5  -Nutrition consult 10/6    -Dental consult/Orthopantogram 10/6 no lucency   -Physical and  Occupational Therapy ordered and following      #CAD s/p CABG LIMA-LAD 4/2023  -[facility] December 2022: severe diffuse three-vessel coronary artery disease. Severe proximal diffuse LAD disease up to 70%. Severe diffuse proximal and mid first diagonal disease. Chronic total occlusion of OM1 with left-to-left collaterals. Chronic total occlusion of mid RCA with left-to-right collaterals. Markedly elevated LVEDP at 40 mm of Hg.  - Continue Aspirin 81mg.  - Continue Atorvastatin 80mg.      #Arrhythmias - Hx of VT s/p ablation + ICD  -Device - Medtronic ICD; interrogation completed 10/6   -Not on antiarrhythmic outpatient.     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  #?COPD (24 pack year smoking hx)  -Monitor and maintain SpO2 > 92%  - PRN albuterol nebs for wheezing      GI:  - Colace BID, Miralax PRN ordered for bowel regimen   - Continue Protonix 40mg daily  - clear liquid diet today, NPO at MN   - bowel prep to begin this evening      :  #CKD  -Admit BUN/Cr 67/2.00  - daily BUN/Cr (10/8): 78/1.74  - diurese as hemodynamics and physical exam dictate   - Strict I/Os  - Avoid hypotension and nephrotoxic agents     Heme:  #Anemia in the setting of CKD  - Labs: Iron saturation 15%  - Venofer ordered 200 mg x3 days      #Hx of L femoral DVT  - Holding Eliquis for now  - Subcutaneous heparin 5000 units q8h for prophylaxis       Endo:  #IDDM2  - A1C 8.1% 10/4/23  - home DM regimen : trulicity 1.5mg qweek, glargine 85u once daily, lispro 34u with meals and 26u at bedtime  - Goal BG <180  - glargine 50 units once daily  - lispro to 20u with meals   - c/w lispro corrective scale #3 to with meals  - Low carb 75 g diet  - Endo following, appreciate recs     #Thyroid  -TSH 9.31 on 10/4, T4 10/8 1.04      ID:  -afebrile, nontoxic   -no s/s infx  -trend temps q4h     Feeding/fluids: low carb 75g diet, 2L fluid restriction  Thromboprophylaxis: SCDs, subQ heparin  VAP bundle: n/a  Ulcer prophylaxis: PPI  Glycemic control: Lantus and  SSI   Bowel care: Colace & miralax prn  Indwelling catheter: None     Lines:   PIVs   Central/Chromo RIJ from OSH 10/3    DVT:  NUTRITION: NPO Diet; Effective 0500 (breakfast)  EMERGENCY CONTACT: Extended Emergency Contact Information  Primary Emergency Contact: mother,mother  Mobile Phone: [phone]  Relation: Mother  Secondary Emergency Contact: Robinson Contreras  Mobile Phone: [phone]  Relation: Friend  CODE STATUS: Full Code  DISPO:  Continue care in HF ICU  FOLLOWUP:   Future Appointments   Date Time Provider Department Center   10/9/2023  4:30 PM [facility] PORTABLE [id number] [facility]   10/9/2023  5:00 PM [facility] [id number] [facility]   10/9/2023  5:30 PM [facility] VASC INPATIENT PORTABLE [id number] [facility]     _________________________________________________  Phillip Salinas DO PGY-4  Heart Failure Fellow

## 2023-10-09 NOTE — PROGRESS NOTES
SW met with pt at bedside on [facility] 7 room 14.  utilized via [employer]. Pt shared he has some questions and concerns regarding his medical insurance. Pt shared concerns about being able to afford his hospital stay. SW provided encouragement and support to pt. Pt confirmed his uncle is not available to be a support person and his friend Robinson is not able to move around employment schedule. Pt confirmed his sister,  whom he lives with, is also not employed and is not able to work currently. SW confirmed with pt that sw will reach out to  to review his insurance. SW confirmed pt has no other questions or concerns at this time. SW to follow up routinely with pt.       Zina LOPEZ

## 2023-10-09 NOTE — PROGRESS NOTES
"Felice Law is a 58 y.o. male on day 5 of admission presenting with Acute decompensated heart failure (CMS/HCC).    Subjective   Patient scheduled to have bedside colonoscopy today. Has been sleeping well and appetite has been appropriate. Patient endorsing some sadness with everything that has been going on with his health, but said he was okay. Requested to have SW come by and see him again later today. Denies any nausea, vomiting, SOB, and pain.       Objective     Vitals reviewed.   Constitutional:       Appearance: Normal appearance. He is obese.   HENT:      Head: Normocephalic and atraumatic.      Mouth/Throat:      Mouth: Mucous membranes are moist.      Pharynx: Oropharynx is clear.   Eyes:      Extraocular Movements: Extraocular movements intact.      Conjunctiva/sclera: Conjunctivae normal.      Pupils: Pupils are equal, round, and reactive to light.   Cardiovascular:      Rate and Rhythm: Regular rhythm. Tachycardia present.      Pulses: Normal pulses.   Abdominal:      General: Bowel sounds are normal.      Palpations: Abdomen is soft.   Musculoskeletal:      Cervical back: Neck supple.      Right lower leg: Edema present.      Left lower leg: Edema present.   Skin:     General: Skin is warm and dry.   Neurological:      General: No focal deficit present.      Mental Status: He is alert. Mental status is at baseline.   Psychiatric:         Mood and Affect: Mood normal.         Thought Content: Thought content normal.        Last Recorded Vitals  Blood pressure (!) 139/94, pulse 107, temperature 36.1 °C (97 °F), temperature source Temporal, resp. rate 11, height 1.803 m (5' 10.98\"), weight 109 kg (239 lb 6.7 oz), SpO2 96 %.  Intake/Output last 3 Shifts:  I/O last 3 completed shifts:  In: 1258.4 (11.6 mL/kg) [P.O.:960; I.V.:298.4 (2.7 mL/kg)]  Out: 1800 (16.6 mL/kg) [Urine:1800 (0.5 mL/kg/hr)]  Weight: 108.6 kg     Relevant Results    Current Facility-Administered Medications:     acetaminophen " (Tylenol) tablet 975 mg, 975 mg, oral, q6h PRN, Phillip RENDON Brad, DO    albuterol 2.5 mg /3 mL (0.083 %) nebulizer solution 2.5 mg, 2.5 mg, nebulization, q6h PRN, Jose Tosanovic, DO    aspirin chewable tablet 81 mg, 81 mg, oral, Daily, Radisa Tosanovic, DO, 81 mg at 10/09/23 0852    atorvastatin (Lipitor) tablet 80 mg, 80 mg, oral, Daily, Radisa Tosanovic, DO, 80 mg at 10/09/23 0852    calcium carbonate (Tums) chewable tablet 500 mg, 500 mg, oral, BID PRN, BRAD Delcid    dextrose 10 % in water (D10W) infusion, 0.3 g/kg/hr, intravenous, Once PRN, Phillip Salinas,     dextrose 50 % injection 25 g, 25 g, intravenous, q15 min PRN, Phillip Salinas DO    digoxin (Lanoxin) tablet 125 mcg, 125 mcg, oral, Daily, BRAD Delcid, 125 mcg at 10/08/23 1128    docusate sodium (Colace) capsule 200 mg, 200 mg, oral, BID, BRAD Delcid, 200 mg at 10/08/23 2044    fentaNYL PF (Sublimaze) 50 mcg/mL injection  - Omnice Override Pull, , , ,     gabapentin (Neurontin) capsule 300 mg, 300 mg, oral, q AM, Radisa Tosanovic, DO, 300 mg at 10/09/23 0852    gabapentin (Neurontin) capsule 600 mg, 600 mg, oral, Nightly, Radisa Tosanovic, DO, 600 mg at 10/08/23 2043    glucagon (Glucagen) injection 1 mg, 1 mg, intramuscular, q15 min PRN, Phillip Salinas, DO    heparin (porcine) injection 5,000 Units, 5,000 Units, subcutaneous, q8h, Radisa Tosanovic, DO, 5,000 Units at 10/09/23 0638    hydrALAZINE (Apresoline) tablet 50 mg, 50 mg, oral, TID, Emmett Oliver PA-C, 50 mg at 10/09/23 0853    insulin glargine (Lantus) injection 50 Units, 50 Units, subcutaneous, q24h, Mary Uribe, MARLON-CNP, 50 Units at 10/09/23 0900    insulin lispro (HumaLOG) injection 0-15 Units, 0-15 Units, subcutaneous, TID with meals, Vivienne Ferrell, MARLON-CNP, 6 Units at 10/08/23 1856    insulin lispro (HumaLOG) injection 20 Units, 20 Units, subcutaneous, TID with meals, Mary Uribe, BRAD, 20 Units at  10/08/23 1855    iron sucrose (Venofer) 200 mg in sodium chloride 0.9% 100 mL IVPB, 200 mg, intravenous, q24h, BRAD Delcid, Stopped at 10/09/23 0930    isosorbide dinitrate (Isordil) tablet 20 mg, 20 mg, oral, TID, Phillip RENDON Brad, DO    levothyroxine (Synthroid, Levoxyl) tablet 50 mcg, 50 mcg, oral, Daily, Radisa Tosanovic, DO, 50 mcg at 10/08/23 0631    metoprolol tartrate (Lopressor) tablet 12.5 mg, 12.5 mg, oral, BID, Lonnie West PA-C, 12.5 mg at 10/09/23 0853    midazolam (Versed) 1 mg/mL injection  - Omnicell Override Pull, , , ,     nitroprusside (Nipride) infusion 500 mcg/mL (100 mL vial) (adult), 0.25-5 mcg/kg/min, intravenous, Continuous, BRAD Delcid, Last Rate: 6.54 mL/hr at 10/09/23 0600, 0.5 mcg/kg/min at 10/09/23 0600    ondansetron (Zofran) injection 4 mg, 4 mg, intravenous, q4h PRN, BRAD Velazquez    pantoprazole (ProtoNix) EC tablet 40 mg, 40 mg, oral, Daily before breakfast, Radisa Tosanovic, DO, 40 mg at 10/09/23 0806    polyethylene glycol (Glycolax, Miralax) packet 17 g, 17 g, oral, Daily PRN, BRAD Delcid    polyethylene glycol (Glycolax, Miralax) powder 238 g, 238 g, oral, Once PRN, BRAD Velazquez    polyethylene glycol-electrolytes (Nulytely) solution 2,000 mL, 2,000 mL, oral, Once PRN, BRAD Velazquez    polyethylene glycol-electrolytes (Nulytely) solution 4,000 mL, 4,000 mL, oral, Once, BRAD Velazquez    potassium chloride CR (Klor-Con M20) ER tablet 40 mEq, 40 mEq, oral, Daily, MARLON Velazquez-CNP, 40 mEq at 10/09/23 0853    QUEtiapine (SEROquel) tablet 50 mg, 50 mg, oral, Nightly, Jose Quiroga DO, 50 mg at 10/08/23 2044     Results for orders placed or performed during the hospital encounter of 10/04/23 (from the past 24 hour(s))   POCT GLUCOSE   Result Value Ref Range    POCT Glucose 136 (H) 74 - 99 mg/dL   SARS-CoV-2 RT PCR   Result Value Ref Range    Coronavirus 2019, PCR Not  Detected Not Detected   POCT GLUCOSE   Result Value Ref Range    POCT Glucose 266 (H) 74 - 99 mg/dL   BLOOD GAS MIXED VENOUS   Result Value Ref Range    POCT pH, Mixed 7.37 7.33 - 7.43 pH    POCT pCO2, Mixed 45 41 - 51 mm Hg    POCT pO2, Mixed 43 35 - 45 mm Hg    POCT SO2, Mixed 61 45 - 75 %    POCT Oxy Hemoglobin, Mixed 59.4 45.0 - 75.0 %    POCT Base Excess, Mixed 0.3 -2.0 - 3.0 mmol/L    POCT HCO3 Calculated, Mixed 26.0 22.0 - 26.0 mmol/L    Patient Temperature 37.0 degrees Celsius    FiO2 21 %   POCT GLUCOSE   Result Value Ref Range    POCT Glucose 210 (H) 74 - 99 mg/dL   Renal function panel   Result Value Ref Range    Glucose 90 74 - 99 mg/dL    Sodium 135 (L) 136 - 145 mmol/L    Potassium 3.8 3.5 - 5.3 mmol/L    Chloride 97 (L) 98 - 107 mmol/L    Bicarbonate 28 21 - 32 mmol/L    Anion Gap 14 10 - 20 mmol/L    Urea Nitrogen 68 (H) 6 - 23 mg/dL    Creatinine 1.35 (H) 0.50 - 1.30 mg/dL    eGFR 61 >60 mL/min/1.73m*2    Calcium 9.2 8.6 - 10.6 mg/dL    Phosphorus 3.5 2.5 - 4.9 mg/dL    Albumin 3.8 3.4 - 5.0 g/dL   CBC   Result Value Ref Range    WBC 5.6 4.4 - 11.3 x10*3/uL    nRBC 0.0 0.0 - 0.0 /100 WBCs    RBC 4.88 4.50 - 5.90 x10*6/uL    Hemoglobin 11.8 (L) 13.5 - 17.5 g/dL    Hematocrit 38.6 (L) 41.0 - 52.0 %    MCV 79 (L) 80 - 100 fL    MCH 24.2 (L) 26.0 - 34.0 pg    MCHC 30.6 (L) 32.0 - 36.0 g/dL    RDW 18.6 (H) 11.5 - 14.5 %    Platelets 240 150 - 450 x10*3/uL    MPV 10.2 7.5 - 11.5 fL   Digoxin   Result Value Ref Range    Digoxin  0.47 (L) 0.80 - <2.00 ng/mL   Magnesium   Result Value Ref Range    Magnesium 2.20 1.60 - 2.40 mg/dL   BLOOD GAS MIXED VENOUS   Result Value Ref Range    POCT pH, Mixed 7.43 7.33 - 7.43 pH    POCT pCO2, Mixed 46 41 - 51 mm Hg    POCT pO2, Mixed 63 (H) 35 - 45 mm Hg    POCT SO2, Mixed      POCT Oxy Hemoglobin, Mixed      POCT Base Excess, Mixed 5.3 (H) -2.0 - 3.0 mmol/L    POCT HCO3 Calculated, Mixed 30.5 (H) 22.0 - 26.0 mmol/L    Patient Temperature 37.0 degrees Celsius    FiO2 21  %    Flow 5.0 LPM   BLOOD GAS MIXED VENOUS FULL PANEL   Result Value Ref Range    POCT pH, Mixed 7.42 7.33 - 7.43 pH    POCT pCO2, Mixed 33 (L) 41 - 51 mm Hg    POCT pO2, Mixed 44 35 - 45 mm Hg    POCT SO2, Mixed 69 45 - 75 %    POCT Oxy Hemoglobin, Mixed 68.3 45.0 - 75.0 %    POCT Hematocrit Calculated, Mixed 30.0 (L) 41.0 - 52.0 %    POCT Sodium, Mixed 134 (L) 136 - 145 mmol/L    POCT Potassium, Mixed 2.8 (LL) 3.5 - 5.3 mmol/L    POCT Chloride, Mixed 108 (H) 98 - 107 mmol/L    POCT Ionized Calcium, Mixed 0.98 (L) 1.10 - 1.33 mmol/L    POCT Glucose, Mixed 87 74 - 99 mg/dL    POCT Lactate, Mixed 0.9 0.4 - 2.0 mmol/L    POCT Base Excess, Mixed -2.6 (L) -2.0 - 3.0 mmol/L    POCT HCO3 Calculated, Mixed 21.4 (L) 22.0 - 26.0 mmol/L    POCT Hemoglobin, Mixed 9.9 (L) 13.5 - 17.5 g/dL    POCT Anion Gap, Mixed 7 (L) 10 - 25 mmo/L    Patient Temperature 37.0 degrees Celsius    FiO2 21 %   POCT GLUCOSE   Result Value Ref Range    POCT Glucose 140 (H) 74 - 99 mg/dL      Assessment/Plan   Felice Law is a 58 y.o. male with past medical history of Congestive Heart Failure is presenting with ADHF. PMH significant for CAD with recent CABG in 4/2023 at [facility], IDDM2, HTN, HLD,  and recurrent admissions for ADHF refractory to medical management.  Palliative care was  consulted for advanced therapy palliative evaluation and preparedness plan discussion.   Mr Mona Law is a quiet man who lives with his mother and sister. He is developing an understanding for his disease course and pathology and prefers large concept explanations. He is very close to his mother  and spending time with her brings him the most katie. HE want s to withhold information from her because he doesn't want to have her worry. Also, if his health deteriorates, he does not want to burden her. He became tearful when discussing this with me.  WE will need to discuss this further as she is his NOK and his sister may not be a good proxy since she had several  strokes.   No obvious barriers to compliance from a Palliative perspective.      Problems:  ADHF, improving  Ischemic cardiomyopathy with chronic systolic HF  DM2, sub-optimal control and neuropathy  HLD  HTN  Former Smoker (quit in 2017)  Sciatic pain     Symptom Management  Pain: Sciatica, does not want any medications currently.   Medications recommended for pain?  Yes  Tiredness: 5  Nausea: Denies  Depression: Worries about the future.   Anxiety: Related to his mother and the future.  Drowsiness: Denies  Appetite: Good  Wellbeing: Stressed about disease but otherwise OK.   Dyspnea: Denies when at rest. Otherwise positive  Intervention recommended for dyspnea?  no     Recommendations:  - ACP planning per SW  - Continue with miralax daily and bisacodyl for constipation.   - Continue with Seroquel per primary, patient sleeping well.   - Continue with Music therapy as needed per patient request.       Will sign off. Please reach out for any concerns or additional questions you may have.     Bertin Fournier MD    I saw and evaluated the patient. I personally obtained the key and critical portions of the history and physical exam or was physically present for key and critical portions performed by the resident/fellow. I reviewed the resident/fellow's documentation and discussed the patient with the resident/fellow. I agree with the resident/fellow's medical decision making as documented in the note.    Salina Salazar MD

## 2023-10-09 NOTE — PROGRESS NOTES
Physical Therapy                 Therapy Communication Note    Patient Name: Felice Law  MRN: [id number]  Today's Date: 10/9/2023     Discipline: Physical Therapy    Missed Visit Reason: Missed Visit Reason: Patient in a medical procedure (PT attempted 2x today. First attempt , pt getting bedside colonoscopy. Second attempt: still feeling effects of anesthesia, will defer therapy until tomorrow.)    Missed Time: Attempt    Comment:

## 2023-10-10 ENCOUNTER — APPOINTMENT (OUTPATIENT)
Dept: RADIOLOGY | Facility: HOSPITAL | Age: 58
DRG: 291 | End: 2023-10-10
Payer: MEDICARE

## 2023-10-10 ENCOUNTER — COMMITTEE REVIEW (OUTPATIENT)
Dept: TRANSPLANT | Facility: HOSPITAL | Age: 58
End: 2023-10-10
Payer: COMMERCIAL

## 2023-10-10 ENCOUNTER — APPOINTMENT (OUTPATIENT)
Dept: VASCULAR MEDICINE | Facility: HOSPITAL | Age: 58
DRG: 291 | End: 2023-10-10
Payer: MEDICARE

## 2023-10-10 LAB
ALBUMIN SERPL BCP-MCNC: 3.6 G/DL (ref 3.4–5)
ANION GAP BLDMV CALCULATED.4IONS-SCNC: 7 MMO/L (ref 10–25)
ANION GAP SERPL CALC-SCNC: 15 MMOL/L (ref 10–20)
BASE EXCESS BLDA CALC-SCNC: -0.1 MMOL/L (ref -2–3)
BASE EXCESS BLDA CALC-SCNC: -0.1 MMOL/L (ref -2–3)
BASE EXCESS BLDMV CALC-SCNC: -0.2 MMOL/L (ref -2–3)
BASE EXCESS BLDMV CALC-SCNC: -1.6 MMOL/L (ref -2–3)
BASE EXCESS BLDMV CALC-SCNC: 3.1 MMOL/L (ref -2–3)
BASE EXCESS BLDMV CALC-SCNC: 3.9 MMOL/L (ref -2–3)
BASE EXCESS BLDMV CALC-SCNC: 6.1 MMOL/L (ref -2–3)
BODY TEMPERATURE: 37 DEGREES CELSIUS
BODY TEMPERATURE: ABNORMAL
BUN SERPL-MCNC: 64 MG/DL (ref 6–23)
CA-I BLDMV-SCNC: 1.02 MMOL/L (ref 1.1–1.33)
CALCIUM SERPL-MCNC: 8.6 MG/DL (ref 8.6–10.6)
CHLORIDE BLD-SCNC: 108 MMOL/L (ref 98–107)
CHLORIDE SERPL-SCNC: 100 MMOL/L (ref 98–107)
CO2 SERPL-SCNC: 27 MMOL/L (ref 21–32)
COHGB MFR BLDA: 1.7 %
CREAT SERPL-MCNC: 1.47 MG/DL (ref 0.5–1.3)
DO-HGB MFR BLDA: 4.7 % (ref 0–5)
ERYTHROCYTE [DISTWIDTH] IN BLOOD BY AUTOMATED COUNT: 18.9 % (ref 11.5–14.5)
GFR SERPL CREATININE-BSD FRML MDRD: 55 ML/MIN/1.73M*2
GLUCOSE BLD MANUAL STRIP-MCNC: 102 MG/DL (ref 74–99)
GLUCOSE BLD MANUAL STRIP-MCNC: 104 MG/DL (ref 74–99)
GLUCOSE BLD MANUAL STRIP-MCNC: 157 MG/DL (ref 74–99)
GLUCOSE BLD MANUAL STRIP-MCNC: 213 MG/DL (ref 74–99)
GLUCOSE BLD-MCNC: 173 MG/DL (ref 74–99)
GLUCOSE SERPL-MCNC: 156 MG/DL (ref 74–99)
HCO3 BLDA-SCNC: 23.9 MMOL/L (ref 22–26)
HCO3 BLDA-SCNC: 23.9 MMOL/L (ref 22–26)
HCO3 BLDMV-SCNC: 22.9 MMOL/L (ref 22–26)
HCO3 BLDMV-SCNC: 24.8 MMOL/L (ref 22–26)
HCO3 BLDMV-SCNC: 27.9 MMOL/L (ref 22–26)
HCO3 BLDMV-SCNC: 28.5 MMOL/L (ref 22–26)
HCO3 BLDMV-SCNC: 31.8 MMOL/L (ref 22–26)
HCT VFR BLD AUTO: 29.9 % (ref 41–52)
HCT VFR BLD EST: 29 % (ref 41–52)
HGB BLD-MCNC: 9 G/DL (ref 13.5–17.5)
HGB BLDA-MCNC: 11.8 G/DL (ref 13.5–17.5)
HGB BLDMV-MCNC: 9.7 G/DL (ref 13.5–17.5)
HLA RESULTS: NORMAL
HLA-A+B+C AB NFR SER: NORMAL %
HLA-DP+DQ+DR AB NFR SER: NORMAL %
INHALED O2 CONCENTRATION: 0 %
INHALED O2 CONCENTRATION: 2 %
INHALED O2 CONCENTRATION: 21 %
LACTATE BLDMV-SCNC: 1.1 MMOL/L (ref 0.4–2)
MAGNESIUM SERPL-MCNC: 2.19 MG/DL (ref 1.6–2.4)
MCH RBC QN AUTO: 23.9 PG (ref 26–34)
MCHC RBC AUTO-ENTMCNC: 30.1 G/DL (ref 32–36)
MCV RBC AUTO: 80 FL (ref 80–100)
METHGB MFR BLDA: 0.9 % (ref 0–1.5)
NRBC BLD-RTO: 0 /100 WBCS (ref 0–0)
OXYHGB MFR BLDA: 92.7 % (ref 94–98)
OXYHGB MFR BLDA: 92.7 % (ref 94–98)
OXYHGB MFR BLDMV: 56.5 % (ref 45–75)
OXYHGB MFR BLDMV: 60.2 % (ref 45–75)
OXYHGB MFR BLDMV: 60.4 % (ref 45–75)
OXYHGB MFR BLDMV: 63.6 % (ref 45–75)
OXYHGB MFR BLDMV: 66.7 % (ref 45–75)
PCO2 BLDA: 36 MM HG (ref 38–42)
PCO2 BLDA: 36 MM HG (ref 38–42)
PCO2 BLDMV: 37 MM HG (ref 41–51)
PCO2 BLDMV: 41 MM HG (ref 41–51)
PCO2 BLDMV: 42 MM HG (ref 41–51)
PCO2 BLDMV: 42 MM HG (ref 41–51)
PCO2 BLDMV: 49 MM HG (ref 41–51)
PH BLDA: 7.43 PH (ref 7.38–7.42)
PH BLDA: 7.43 PH (ref 7.38–7.42)
PH BLDMV: 7.39 PH (ref 7.33–7.43)
PH BLDMV: 7.4 PH (ref 7.33–7.43)
PH BLDMV: 7.42 PH (ref 7.33–7.43)
PH BLDMV: 7.43 PH (ref 7.33–7.43)
PH BLDMV: 7.44 PH (ref 7.33–7.43)
PHOSPHATE SERPL-MCNC: 3.3 MG/DL (ref 2.5–4.9)
PLATELET # BLD AUTO: 252 X10*3/UL (ref 150–450)
PMV BLD AUTO: 10.2 FL (ref 7.5–11.5)
PO2 BLDA: 75 MM HG (ref 85–95)
PO2 BLDA: 75 MM HG (ref 85–95)
PO2 BLDMV: 40 MM HG (ref 35–45)
PO2 BLDMV: 43 MM HG (ref 35–45)
PO2 BLDMV: 44 MM HG (ref 35–45)
PO2 BLDMV: 44 MM HG (ref 35–45)
PO2 BLDMV: 47 MM HG (ref 35–45)
POTASSIUM BLDMV-SCNC: 3.8 MMOL/L (ref 3.5–5.3)
POTASSIUM SERPL-SCNC: 4.5 MMOL/L (ref 3.5–5.3)
RBC # BLD AUTO: 3.76 X10*6/UL (ref 4.5–5.9)
SAO2 % BLDA: 95 % (ref 94–100)
SAO2 % BLDA: 95 % (ref 94–100)
SAO2 % BLDMV: 58 % (ref 45–75)
SAO2 % BLDMV: 62 % (ref 45–75)
SAO2 % BLDMV: 62 % (ref 45–75)
SAO2 % BLDMV: 65 % (ref 45–75)
SAO2 % BLDMV: 69 % (ref 45–75)
SODIUM BLDMV-SCNC: 134 MMOL/L (ref 136–145)
SODIUM SERPL-SCNC: 137 MMOL/L (ref 136–145)
WBC # BLD AUTO: 6.1 X10*3/UL (ref 4.4–11.3)

## 2023-10-10 PROCEDURE — 2500000001 HC RX 250 WO HCPCS SELF ADMINISTERED DRUGS (ALT 637 FOR MEDICARE OP)

## 2023-10-10 PROCEDURE — 80069 RENAL FUNCTION PANEL: CPT | Performed by: STUDENT IN AN ORGANIZED HEALTH CARE EDUCATION/TRAINING PROGRAM

## 2023-10-10 PROCEDURE — 2500000004 HC RX 250 GENERAL PHARMACY W/ HCPCS (ALT 636 FOR OP/ED): Performed by: STUDENT IN AN ORGANIZED HEALTH CARE EDUCATION/TRAINING PROGRAM

## 2023-10-10 PROCEDURE — 2020000001 HC ICU ROOM DAILY

## 2023-10-10 PROCEDURE — 93979 VASCULAR STUDY: CPT | Performed by: SURGERY

## 2023-10-10 PROCEDURE — 71045 X-RAY EXAM CHEST 1 VIEW: CPT | Performed by: RADIOLOGY

## 2023-10-10 PROCEDURE — 2500000004 HC RX 250 GENERAL PHARMACY W/ HCPCS (ALT 636 FOR OP/ED): Performed by: NURSE PRACTITIONER

## 2023-10-10 PROCEDURE — 82805 BLOOD GASES W/O2 SATURATION: CPT | Performed by: NURSE PRACTITIONER

## 2023-10-10 PROCEDURE — 82805 BLOOD GASES W/O2 SATURATION: CPT | Performed by: INTERNAL MEDICINE

## 2023-10-10 PROCEDURE — 96372 THER/PROPH/DIAG INJ SC/IM: CPT | Performed by: NURSE PRACTITIONER

## 2023-10-10 PROCEDURE — 85027 COMPLETE CBC AUTOMATED: CPT | Performed by: STUDENT IN AN ORGANIZED HEALTH CARE EDUCATION/TRAINING PROGRAM

## 2023-10-10 PROCEDURE — 82805 BLOOD GASES W/O2 SATURATION: CPT | Performed by: STUDENT IN AN ORGANIZED HEALTH CARE EDUCATION/TRAINING PROGRAM

## 2023-10-10 PROCEDURE — 82947 ASSAY GLUCOSE BLOOD QUANT: CPT

## 2023-10-10 PROCEDURE — 97110 THERAPEUTIC EXERCISES: CPT | Mod: GP

## 2023-10-10 PROCEDURE — 97116 GAIT TRAINING THERAPY: CPT | Mod: GP

## 2023-10-10 PROCEDURE — 93979 VASCULAR STUDY: CPT

## 2023-10-10 PROCEDURE — 2500000001 HC RX 250 WO HCPCS SELF ADMINISTERED DRUGS (ALT 637 FOR MEDICARE OP): Performed by: STUDENT IN AN ORGANIZED HEALTH CARE EDUCATION/TRAINING PROGRAM

## 2023-10-10 PROCEDURE — 96372 THER/PROPH/DIAG INJ SC/IM: CPT | Performed by: STUDENT IN AN ORGANIZED HEALTH CARE EDUCATION/TRAINING PROGRAM

## 2023-10-10 PROCEDURE — 2500000001 HC RX 250 WO HCPCS SELF ADMINISTERED DRUGS (ALT 637 FOR MEDICARE OP): Performed by: NURSE PRACTITIONER

## 2023-10-10 PROCEDURE — 2500000001 HC RX 250 WO HCPCS SELF ADMINISTERED DRUGS (ALT 637 FOR MEDICARE OP): Performed by: INTERNAL MEDICINE

## 2023-10-10 PROCEDURE — 2500000002 HC RX 250 W HCPCS SELF ADMINISTERED DRUGS (ALT 637 FOR MEDICARE OP, ALT 636 FOR OP/ED): Performed by: NURSE PRACTITIONER

## 2023-10-10 PROCEDURE — 74176 CT ABD & PELVIS W/O CONTRAST: CPT | Mod: MG

## 2023-10-10 PROCEDURE — 84132 ASSAY OF SERUM POTASSIUM: CPT | Performed by: STUDENT IN AN ORGANIZED HEALTH CARE EDUCATION/TRAINING PROGRAM

## 2023-10-10 PROCEDURE — 71045 X-RAY EXAM CHEST 1 VIEW: CPT

## 2023-10-10 PROCEDURE — 37799 UNLISTED PX VASCULAR SURGERY: CPT | Performed by: NURSE PRACTITIONER

## 2023-10-10 PROCEDURE — 82375 ASSAY CARBOXYHB QUANT: CPT | Performed by: STUDENT IN AN ORGANIZED HEALTH CARE EDUCATION/TRAINING PROGRAM

## 2023-10-10 PROCEDURE — 99291 CRITICAL CARE FIRST HOUR: CPT | Performed by: STUDENT IN AN ORGANIZED HEALTH CARE EDUCATION/TRAINING PROGRAM

## 2023-10-10 PROCEDURE — 83735 ASSAY OF MAGNESIUM: CPT

## 2023-10-10 PROCEDURE — 2500000004 HC RX 250 GENERAL PHARMACY W/ HCPCS (ALT 636 FOR OP/ED)

## 2023-10-10 PROCEDURE — 85018 HEMOGLOBIN: CPT | Performed by: STUDENT IN AN ORGANIZED HEALTH CARE EDUCATION/TRAINING PROGRAM

## 2023-10-10 RX ADMIN — INSULIN LISPRO 20 UNITS: 100 INJECTION, SOLUTION INTRAVENOUS; SUBCUTANEOUS at 10:07

## 2023-10-10 RX ADMIN — HYDRALAZINE HYDROCHLORIDE 50 MG: 50 TABLET, FILM COATED ORAL at 21:20

## 2023-10-10 RX ADMIN — ATORVASTATIN CALCIUM 80 MG: 80 TABLET ORAL at 10:03

## 2023-10-10 RX ADMIN — HEPARIN SODIUM 5000 UNITS: 5000 INJECTION INTRAVENOUS; SUBCUTANEOUS at 06:12

## 2023-10-10 RX ADMIN — METOPROLOL TARTRATE 12.5 MG: 25 TABLET, FILM COATED ORAL at 10:04

## 2023-10-10 RX ADMIN — LEVOTHYROXINE SODIUM 50 MCG: 50 TABLET ORAL at 06:12

## 2023-10-10 RX ADMIN — DOCUSATE SODIUM 200 MG: 100 CAPSULE, LIQUID FILLED ORAL at 21:19

## 2023-10-10 RX ADMIN — SACUBITRIL AND VALSARTAN 0.5 TABLET: 24; 26 TABLET, FILM COATED ORAL at 21:19

## 2023-10-10 RX ADMIN — INSULIN LISPRO 20 UNITS: 100 INJECTION, SOLUTION INTRAVENOUS; SUBCUTANEOUS at 18:40

## 2023-10-10 RX ADMIN — HYDRALAZINE HYDROCHLORIDE 50 MG: 50 TABLET, FILM COATED ORAL at 10:05

## 2023-10-10 RX ADMIN — GABAPENTIN 600 MG: 300 CAPSULE ORAL at 21:19

## 2023-10-10 RX ADMIN — POTASSIUM CHLORIDE 40 MEQ: 1500 TABLET, EXTENDED RELEASE ORAL at 10:03

## 2023-10-10 RX ADMIN — ISOSORBIDE DINITRATE 20 MG: 20 TABLET ORAL at 14:41

## 2023-10-10 RX ADMIN — SODIUM NITROPRUSSIDE 2.5 MCG/KG/MIN: 0.5 INJECTION, SOLUTION INTRAVENOUS at 05:43

## 2023-10-10 RX ADMIN — GABAPENTIN 300 MG: 300 CAPSULE ORAL at 10:03

## 2023-10-10 RX ADMIN — ISOSORBIDE DINITRATE 20 MG: 20 TABLET ORAL at 21:20

## 2023-10-10 RX ADMIN — DIGOXIN 125 MCG: 125 TABLET ORAL at 11:03

## 2023-10-10 RX ADMIN — SODIUM NITROPRUSSIDE 2.5 MCG/KG/MIN: 0.5 INJECTION, SOLUTION INTRAVENOUS at 14:41

## 2023-10-10 RX ADMIN — METOPROLOL TARTRATE 12.5 MG: 25 TABLET, FILM COATED ORAL at 21:21

## 2023-10-10 RX ADMIN — INSULIN GLARGINE 50 UNITS: 100 INJECTION, SOLUTION SUBCUTANEOUS at 10:06

## 2023-10-10 RX ADMIN — HEPARIN SODIUM 5000 UNITS: 5000 INJECTION INTRAVENOUS; SUBCUTANEOUS at 21:21

## 2023-10-10 RX ADMIN — SODIUM NITROPRUSSIDE 2 MCG/KG/MIN: 0.5 INJECTION, SOLUTION INTRAVENOUS at 01:00

## 2023-10-10 RX ADMIN — INSULIN LISPRO 3 UNITS: 100 INJECTION, SOLUTION INTRAVENOUS; SUBCUTANEOUS at 18:40

## 2023-10-10 RX ADMIN — QUETIAPINE FUMARATE 50 MG: 25 TABLET ORAL at 21:20

## 2023-10-10 RX ADMIN — HEPARIN SODIUM 5000 UNITS: 5000 INJECTION INTRAVENOUS; SUBCUTANEOUS at 14:42

## 2023-10-10 RX ADMIN — SODIUM NITROPRUSSIDE 2.5 MCG/KG/MIN: 0.5 INJECTION, SOLUTION INTRAVENOUS at 19:41

## 2023-10-10 RX ADMIN — ISOSORBIDE DINITRATE 20 MG: 20 TABLET ORAL at 10:05

## 2023-10-10 RX ADMIN — HYDRALAZINE HYDROCHLORIDE 50 MG: 50 TABLET, FILM COATED ORAL at 14:42

## 2023-10-10 RX ADMIN — SODIUM NITROPRUSSIDE 2.5 MCG/KG/MIN: 0.5 INJECTION, SOLUTION INTRAVENOUS at 22:33

## 2023-10-10 RX ADMIN — ASPIRIN 81 MG CHEWABLE TABLET 81 MG: 81 TABLET CHEWABLE at 10:03

## 2023-10-10 RX ADMIN — IRON SUCROSE 200 MG: 20 INJECTION, SOLUTION INTRAVENOUS at 10:10

## 2023-10-10 RX ADMIN — PANTOPRAZOLE SODIUM 40 MG: 40 TABLET, DELAYED RELEASE ORAL at 06:12

## 2023-10-10 RX ADMIN — SACUBITRIL AND VALSARTAN 0.5 TABLET: 24; 26 TABLET, FILM COATED ORAL at 11:01

## 2023-10-10 RX ADMIN — INSULIN LISPRO 3 UNITS: 100 INJECTION, SOLUTION INTRAVENOUS; SUBCUTANEOUS at 10:09

## 2023-10-10 ASSESSMENT — COGNITIVE AND FUNCTIONAL STATUS - GENERAL
MOVING TO AND FROM BED TO CHAIR: A LITTLE
MOBILITY SCORE: 20
CLIMB 3 TO 5 STEPS WITH RAILING: A LITTLE
WALKING IN HOSPITAL ROOM: A LITTLE
STANDING UP FROM CHAIR USING ARMS: A LITTLE

## 2023-10-10 ASSESSMENT — PAIN - FUNCTIONAL ASSESSMENT
PAIN_FUNCTIONAL_ASSESSMENT: 0-10

## 2023-10-10 ASSESSMENT — PAIN SCALES - GENERAL
PAINLEVEL_OUTOF10: 0 - NO PAIN

## 2023-10-10 NOTE — PROGRESS NOTES
SW met with pt at bedside on [place] [address] room 14 during tx/heart failure rounds. Pt was awake and sitting up in bed.  was utilized via Naytev system. Team shared with pt next steps regarding evaluation status. Pt confirmed he understood. SW shared with pt regarding his insurance, is correct and up to date in system, should cover most of costs of hospital stays and medications. Pt demonstrated good understanding and relief with this information. SW shared that pt support is a potential barrier at this time for advanced therapies. Pt confirmed he is concerned about his mom, and if will have support from her. SW inquired about pt sister that is local. Pt stated she suffered from 4 strokes and has a few other medical problems that have arose. Pt confirmed his other siblings do not visit or help provide support and there are no other family or friends or communities they are part of that are available to help at this time.  SW provided encouragement and support, reminding pt that it is one thing at a time, and we will continue to work towards his health. SW confirmed pt has no further questions or concerns at this time. SW to follow up with pt routinely.    Zina LOPEZ

## 2023-10-10 NOTE — SIGNIFICANT EVENT
Clinical update:    Pt underwent colonoscopy this morning. No polyps or masses found, appendiceal orifice biopsied.      Felice Law is a 58 y.o. male with a past medical history of HFrEF/ICM (LVEF 20% 8/14/23), CAD s/p PCI and CABG (LIMA-LAD with balloon pump support April 2023), Hx of cardiac arrest s/p VT ablation + ICD, HTN/HLD, CKD, Hx of DVT (L femoral) on Eliquis, hypothyroidism, pulmonary hypertension, T2DM, JAKE/OHS, former tobacco abuse (24 pack year, quit 2017), presented to [facility] on 10/1 with exertional left sided chest pain and SOB x 3 days. He was admitted in cardiogenic shock, started on dobutamine under the guidance of SGC therapy. Patient was than transferred to [facility] for advanced therapy evaluation on 10/4. Advanced therapies evaluation was initiated on 10/4 with plans to discuss on 10/10 during committee meeting for possible heart transplant/LVAD placement.     Gastroenterology is consulted for colorectal cancer screening for pre-heart transplant/LVAD placement work-up     Plan:        -s/p screening colonoscopy today   - repeat screening in 10 years, based on co morbidities and clinical status   - resume diet  - we will follow up pathology     Thank you for the consultation.  The consulting team will sign off now.  Please do not hesitate to contact us again on by paging the consultation team again between the weekday hours of 7 AM - 5 PM.  If there is an urgent concern during the weekend, after-hours, or holidays; then please page the on-call GI fellow at [phone]. Thank you.

## 2023-10-10 NOTE — PROGRESS NOTES
SOCIAL WORK NOTE   SW met with HFICU team for updates. Pateint is pending continues work up for potential transplant. Social work to follow.  DONNA Bishop, LISW-S ([id number])

## 2023-10-10 NOTE — COMMITTEE REVIEW
[facility] Advanced Heart Failure Therapeutics Committee     Patient's name: Felice Law  Referring provider: [facility]  Primary HF cardiologist: Jono Duque   Primary diagnosis: Ischemic cardiomyopathy and Stage D HFrEF    Background information: He is a 58 year old [place] male with history of HFrEF/ICM (LVEF 20% 8/14/23), CAD s/p PCI and 1vCABG (LIMA-LAD with balloon pump support April 2023), Hx of cardiac arrest s/p VT ablation + ICD, HTN/HLD, CKD, Hx of DVT (L femoral) on Eliquis, hypothyroidism, pulmonary hypertension, T2DM, JAKE/OHS, former tobacco abuse (24 pack year, quit 2017), who presented to [facility] on 10/1 with exertional left sided chest pain and SOB x 3 days.    He has been admitted 8 times this year for acutely decompensated heart failure. His last hospital stay was 9/16-9/26, and weight on discharge was 230lbs.  He was admitted to [facility] this presentation in cardiogenic shock despite reporting compliance with his HF medications. His oral GDMT regimen was held due to worsening kidney function. His home regimen includes Entresto 97/103mg, Toprol XL 150mg, Spironolactone 25mg, Dapagliflozin 10mg. Weight on admission was 246lbs. He was treated IV lasix 80mg and with IV bumex 4mg BID + metolazone with some response, but was transitioned to lasix infusion 5mg/hr.  He was also placed on dobutamine infusion as evidence of cardiogenic shock was noted to be worsening. Advanced heart failure service was consulted while he was admitted there and they recommended SGC-guided therapy. RHC on dobutamine 2.5mcg showed RA pressure: 20, RV: 55/20, PCWP: 35, PA pressure: 52/37 (40 - 42), CO/CI 3.6/1.5, SVR: 1373 dynes, PVR: 414 dynes, AO: 84, and lactates were increasing up to 2.4 during that admission. He was referred to us by Dr. Cortes at [facility] for advanced therapy evaluation. He was transferred to us on dobutamine 3.75mcg and lasix gtt 5mg/hr.       Committee decision and plan of care  (Please discuss modifiable/non-modifiable barriers, plan to address barriers, and any relevant plan for reassessment if applicable): Declined for LVAD implant and cardiac transplantation at this time. Patient is not approved d/t poorly controlled diabetes, poor kidney function, bilateral foot drop, and currently lacking adequate financial and social support. ICU team will attempt weaning off vasoactive infused medications and switch to oral GDMT for HFrEF, and see Dr. Duque in the outpatient setting to further assess social support.     The Committee is open to re-evaluating his candidacy again in the future assuming several of these issues are addressed and better understood. At that time he would also need completion of the following tests: vascular abdominal US, PFTs, and abdominal and pelvic CT.

## 2023-10-10 NOTE — PROGRESS NOTES
Physical Therapy    Physical Therapy Treatment    Patient Name: Felice Law  MRN: [id number]  Today's Date: 10/10/2023  Time Calculation  Start Time: 1207  Stop Time: 1247  Time Calculation (min): 40 min        PT Plan  Treatment/Interventions: Bed mobility, Transfer training, Gait training, Stair training, Balance training  PT Plan: Skilled PT  PT Frequency: 5 times per week  PT Recommended Transfer Status: Assist x1      General Visit Information:   PT  Visit  PT Received On: 10/10/23  Response to Previous Treatment: Patient with no complaints from previous session.    General  Reason for Referral: SOB, LE edema, advanced therapy work-up  Past Medical History Relevant to Rehab: HFrEF/ICM (LVEF 20% 23), CAD s/p PCI and CABG (LIMA-LAD with balloon pump support 2023), Hx of cardiac arrest s/p VT ablation + ICD, HTN/HLD, CKD, Hx of DVT (L femoral) on Eliquis, hypothyroidism, pulmonary hypertension, T2DM, JAKE/OHS, former tobacco abuse (24 pack year, quit )  Family/Caregiver Present: No  Prior to Session Communication: Bedside nurse  Patient Position Received: Up in chair, Alarm off, not on at start of session  General Comment: Patient pleasant and agreeable to PT. Patient able to communicate with basic English, did not need to utilze MARTII today, able to follow and return demo exercises with no issues.    Subjective     Precautions:  Precautions  Medical Precautions: Cardiac precautions  Precautions Comment: Pt usually wears B/L AFO's 2/2 chronic foot drop. Pt reports he does not need them for shorter distances, only out in the community.    Vital Signs:  Vital Signs  Heart Rate:  (PRE: 108; DURING ambulation/bike ride: max  ; POST: 107)  Resp:  (PRE: 15; POST: 27)  SpO2:  (PRE: 96%; DURIN% and greater; POST: 92%)  BP:  (PRE (seated): 97/71; POST (seated): 99/66)  BP Method: Automatic  Patient Position: Sitting    Objective     Pain:  Pain Assessment  Pain Assessment: 0-10  Pain  Score:  (reporting some 'gas pain', likely 2/2 colonoscopy yesterday.)    Cognition:  Cognition  Overall Cognitive Status: Within Functional Limits  Orientation Level: Oriented X4       Activity Tolerance:  Activity Tolerance  Endurance: Tolerates 10 - 20 min exercise with multiple rests  Activity Tolerance Comments: able to tolerate all activity today with no issues. VSS    Treatments:  Therapeutic Exercise  Therapeutic Exercise Performed: Yes  Therapeutic Exercise Activity 1: LAQ, B/L LE x 10 reps, 2 sets  Therapeutic Exercise Activity 2: Recumbent bike x 7 min: Pt used bike, with assist from PT to place feet in pedals 2/2 foot drop. level 2.0. HR max 121 bpm, spO2 95% and greater on room air. Pt reported feeling 'good' while using the bike. No complaints of chest pain or dizziness during session. vitals stable throughout.    Bed Mobility  Bed Mobility:  (not performed today 2/2 pt  being OOB pre/post visit.)    Ambulation/Gait Training  Ambulation/Gait Training Performed: Yes  Ambulation/Gait Training 1  Surface 1: Level tile  Device 1: No device  Comments/Distance (ft) 1: Pt amb x 160 ft, and managed IV pole. Pt amb with B/L decr heel strike and toe off 2/2 chronic foot drop. Able to compensate with incr B/L hip flex to clear foot.  Some unsteady gait noted; however, likely pts baseline level of ambulation, and no acute LOB noted during therapy.  Transfers  Transfer:  (sit <>stand CGA)    Outcome Measures:  [facility] Basic Mobility  Turning from your back to your side while in a flat bed without using bedrails: None  Moving from lying on your back to sitting on the side of a flat bed without using bedrails: None  Moving to and from bed to chair (including a wheelchair): A little  Standing up from a chair using your arms (e.g. wheelchair or bedside chair): A little  To walk in hospital room: A little  Climbing 3-5 steps with railing: A little  Basic Mobility - Total Score: 20    FSS-ICU  Ambulation: Walks >/ or equal  to 150 feet with minimal assistance x1  Rolling: Complete independence  Sitting: Complete independence  Transfer Sit-to-Stand: Supervision or set-up only  Transfer Supine-to-Sit: Complete independence  Total Score: 30       E = Exercise and Early Mobility  Current Activity: Ambulating in phan        EDUCATION:  Education  Individual(s) Educated: Patient  Education Provided: Fall Risk, Body Mechanics  Patient Response to Education: Patient/Caregiver Verbalized Understanding of Information, Patient/Caregiver Performed Return Demonstration of Exercises/Activities    Encounter Problems       Encounter Problems (Active)       General       Pt will amb x 550 ft, no rest breaks, with stable vitals, RPD 3/10 or less, RPE 13/20 or less      indep with transfers: sit <>stand, with LRD (Progressing)       Start:  10/06/23    Expected End:  10/20/23            Score 24/28 on Tinetti  (Progressing)       Start:  10/06/23    Expected End:  10/20/23               General        Pt will perform a 5x STS, in 10 seconds or less, with RPD 3/10 or less, RPE 13/20 or less with stable vitals.       Pt will perform a 6MWT, with distance greater than 950 ft  with stable vitals, RPD 3/10 or less, RPE 13/20 or less.  (Progressing)       Start:  10/06/23    Expected End:  10/20/23               Pain - Adult          Transfers       Pt will perform a 5x STS, in 10 seconds or less, with RPD 3/10 or less, RPE 13/20 or less with stable vitals.  (Progressing)       Start:  10/06/23    Expected End:  10/20/23            Goal 2       Start:  10/06/23

## 2023-10-10 NOTE — LETTER
October 10, 2023    Felice Law  [address] [facility] [address]      Dear Mr. Mona Law:    Our multi-disciplinary transplant team completed a review of your medical records on 10/10/2023.  I regret to inform you that the decision was not to proceed with placing you on the [facility] ([facility]) waiting list for a Heart transplant.    Our transplant program consists of surgeons and medical doctors who provide coverage 365 days a year, 24 hours a day.     If you have any questions or concerns regarding your insurance coverage or billing issues, a  is available to speak with you.     It is important to keep us updated of any major changes in your medical condition, contact information and health insurance coverage.     Please don't hesitate to contact us at Dept: [phone] with any questions or concerns. We look forward to working with you through this process.      Sincerely,      Jacinto Orozco MD MPH          The UNOS Toll-free Patient Services Line:  Your Resource for Organ Transplant Information    If you have a question regarding your own medical care, you always should call your transplant hospital first. However, for general organ transplant-related information, you should call the [facility] [employer] [facility] (UNOS) toll-free patient services line at [phone].  Anyone, including potential transplant candidates, candidates, recipients, family members, friends, living donors, and donor family members, can call this number to:    Talk about organ donation, living donation, the transplant process, the donation process, and transplant policies.  Get a free patient information kit with helpful booklets, waiting list and transplant information, and a list of all transplant hospitals.  Ask questions about the Organ Procurement and Transplantation Network (OPTN) web site (http://optn.transplant.hrsa.gov/), the UNOS Web site (http://unos.org/),  or the UNOS web site for living donors and transplant recipients. (http://www.transplantliving.org/).  Learn how [facility] and the OPTN can help you.  Talk about any concerns that you may have with a transplant hospital.    [facility] is a not-for-profit organization that provides the administrative services for the national OPTN under federal contract to the Health Resources and Services Administration (HRSA), an agency under the U.S. Department of Health and Human Services (HHS).    [facility] and the OPTN are responsible for:    Providing educational material for patients, the public, and professionals.  Raising awareness of the need for donated organs and tissue.  Writing organ transplant policy with help from transplant professionals, transplant patients, transplant candidates, donor families, living donors, and the public.  Coordinating organ procurement, matching, and placement.  Collecting information about every organ transplant and donation that occurs in the [place].    Remember, you should contact your transplant hospital directly if you have questions or concerns about your own medical care including medical records, work-up progress, and test results.    [facility] is not your transplant hospital, and staff at [facility] will not be able to transfer you to your transplant hospital, so keep your transplant hospital’s phone number handy.    However, while you research your transplant needs and learn as much as you can about transplantation and donation, we welcome your call to our toll-free patient services line at [phone].      [facility] PIL Final Rev 1-

## 2023-10-10 NOTE — PROGRESS NOTES
[facility]  HFICU PROGRESS NOTE    Felice Law/[id number]    Admit Date: 10/4/2023  Hospital Length of Stay: 6   ICU Length of Stay: 6    INTERVAL EVENTS / PERTINENT ROS:   Patient seen and examined at bedside. Reports feeling well. Denied fever, chills, headache, nausea, vomiting, chest pain.    Plan:  -Discuss PVR results with VM  -CT abdomen, UA aorta, PFTs today  -Start entresto 12-13 mg BID    MEDICATIONS  Infusions:  nitroprusside, Last Rate: 2.5 mcg/kg/min (10/10/23 0543)      Scheduled:  aspirin, 81 mg, Daily  atorvastatin, 80 mg, Daily  digoxin, 125 mcg, Daily  docusate sodium, 200 mg, BID  gabapentin, 300 mg, q AM  gabapentin, 600 mg, Nightly  heparin (porcine), 5,000 Units, q8h  hydrALAZINE, 50 mg, TID  insulin glargine, 50 Units, q24h  insulin lispro, 0-15 Units, TID with meals  insulin lispro, 20 Units, TID with meals  isosorbide dinitrate, 20 mg, TID  levothyroxine, 50 mcg, Daily  metoprolol tartrate, 12.5 mg, BID  pantoprazole, 40 mg, Daily before breakfast  polyethylene glycol-electrolytes, 4,000 mL, Once  potassium chloride CR, 40 mEq, Daily  QUEtiapine, 50 mg, Nightly  sacubitriL-valsartan, 0.5 tablet, BID      PRN:  acetaminophen, 975 mg, q6h PRN  albuterol, 2.5 mg, q6h PRN  calcium carbonate, 500 mg, BID PRN  dextrose 10 % in water (D10W), 0.3 g/kg/hr, Once PRN  dextrose, 25 g, q15 min PRN  glucagon, 1 mg, q15 min PRN  ondansetron, 4 mg, q4h PRN  oxygen, , Continuous PRN - O2/gases  polyethylene glycol, 17 g, Daily PRN  polyethylene glycol, 238 g, Once PRN  polyethylene glycol-electrolytes, 2,000 mL, Once PRN      Invasive Hemodynamics:    Most Recent Range Past 24hrs   BP (Art)   No data recorded   MAP(Art)   No data recorded   RA/CVP 12 mmHg CVP (mean)  Min: 10 mmHg  Max: 14 mmHg   PA 34/18 PAP  Min: 13/0  Max: 56/53   PA(mean) 27 mmHg PAP (Mean)  Min: 7 mmHg  Max: 41 mmHg   PCWP 19 mmHg PCWP (mmHg)  Min: 19 mmHg  Max: 23 mmHg   CO 5.8 L/min (thermo) CO  "(L/min)  Min: 5.07 L/min  Max: 6.3 L/min   CI 2.5 L/min/m2 (thermo) CI (L/min/m2)  Min: 2.22 L/min/m2  Max: 2.8 L/min/m2   Mixed Venous   No data recorded   SVR  1057 (dyne*sec)/cm5 SVR (dyne*sec)/cm5  Min: 1057 (dyne*sec)/cm5  Max: 1151 (dyne*sec)/cm5    (dyne*sec)/cm5 PVR (dyne*sec)/cm5  Min: 153 (dyne*sec)/cm5  Max: 252 (dyne*sec)/cm5     PHYSICAL EXAM:   Visit Vitals  /86   Pulse 109   Temp 37.1 °C (98.8 °F) (Temporal)   Resp 15   Ht 1.803 m (5' 10.98\")   Wt 110 kg (241 lb 6.5 oz)   SpO2 96%   BMI 33.68 kg/m²   Smoking Status Former   BSA 2.35 m²     Wt Readings from Last 5 Encounters:   10/09/23 110 kg (241 lb 6.5 oz)     INTAKE/OUTPUT:  I/O last 3 completed shifts:  In: 1299.8 (11.9 mL/kg) [P.O.:960; I.V.:339.8 (3.1 mL/kg)]  Out: 500 (4.6 mL/kg) [Urine:500 (0.1 mL/kg/hr)]  Weight: 109.5 kg      Physical Exam  Constitutional:       General: He is not in acute distress.     Appearance: Normal appearance.   HENT:      Head: Normocephalic.      Mouth/Throat:      Mouth: Mucous membranes are moist.   Eyes:      Extraocular Movements: Extraocular movements intact.      Conjunctiva/sclera: Conjunctivae normal.   Cardiovascular:      Rate and Rhythm: Regular rhythm. Tachycardia present.      Heart sounds: Normal heart sounds. No murmur heard.  Pulmonary:      Effort: Pulmonary effort is normal.      Breath sounds: Normal breath sounds.   Abdominal:      General: Bowel sounds are normal.      Palpations: Abdomen is soft.   Musculoskeletal:         General: Normal range of motion.   Skin:     General: Skin is warm and dry.   Neurological:      General: No focal deficit present.      Mental Status: He is alert and oriented to person, place, and time.   Psychiatric:         Mood and Affect: Mood normal.       DATA:  CMP:  Results from last 7 days   Lab Units 10/10/23  0539 10/09/23  0338 10/08/23  0258 10/07/23  1704 10/07/23  0105 10/06/23  0515 10/05/23  1647 10/05/23  0354 10/04/23  1518   SODIUM mmol/L " "137 135* 134* 130* 131* 130* 132* 137 137   POTASSIUM mmol/L 4.5 3.8 3.7 3.7 3.8 4.3 4.1 4.0 4.1   CHLORIDE mmol/L 100 97* 95* 93* 91* 92* 90* 93* 93*   CO2 mmol/L 27 28 29 26 28 30 28 31 28   ANION GAP mmol/L 15 14 14 15 16 12 18 17 20   BUN mg/dL 64* 68* 78* 81* 75* 65* 60* 55* 58*   CREATININE mg/dL 1.47* 1.35* 1.74* 2.17* 1.78* 2.09* 1.87* 1.65* 1.79*   EGFR mL/min/1.73m*2 55* 61 45* 35* 44* 36* 41* 48* 44*   MAGNESIUM mg/dL 2.19 2.20 2.18 1.96 2.08  --  1.76  --   --    ALBUMIN g/dL 3.6 3.8 3.7 3.6 3.7 3.6 4.1 3.9 4.2   ALT U/L  --   --   --   --   --   --  11  --   --    AST U/L  --   --   --   --   --   --  13  --   --    BILIRUBIN TOTAL mg/dL  --   --   --   --   --   --  1.8*  --   --      CBC:  Results from last 7 days   Lab Units 10/10/23  0539 10/09/23  0338 10/08/23  0258 10/07/23  0105 10/06/23  0515 10/05/23  1647 10/05/23  0354 10/04/23  1518   WBC AUTO x10*3/uL 6.1 5.6 5.6 5.9 6.5 7.8 6.5 5.0   HEMOGLOBIN g/dL 9.0* 11.8* 11.7* 11.7* 11.6* 12.9* 12.8* 12.8*   HEMATOCRIT % 29.9* 38.6* 36.7* 36.3* 36.8* 40.8* 41.0 42.0   PLATELETS AUTO x10*3/uL 252 240 211 200 215 255 258 275   MCV fL 80 79* 76* 76* 77* 76* 77* 78*     COAG:   Results from last 7 days   Lab Units 10/05/23  1647   INR  1.2*     ABO: No results found for: \"ABO\"  HEME/ENDO:  Results from last 7 days   Lab Units 10/04/23  1518   FERRITIN ng/mL 379*   IRON SATURATION % 15*   TSH mIU/L 9.31*   HEMOGLOBIN A1C % 8.1*      CARDIAC:   Results from last 7 days   Lab Units 10/05/23  1647 10/04/23  1518   LD U/L 240  --    BNP pg/mL 157* 160*     Chest Radiograph  === 10/04/23 ===    XR CHEST 1 VIEW    - Impression -  1. Similar mild interstitial pulmonary edema and mild bibasilar  atelectasis.  2. Medical devices as above. No pneumothorax.    Signed by: Claudio Yancey 10/8/2023 9:37 AM  Dictation workstation:   [id number]    CT:   === 10/04/23 ===    CT CHEST WO IV CONTRAST    - Impression -  1.  Mild cardiomegaly with severe coronary artery " "calcification.  Status post median sternotomy and CABG.  2. Close proximity of the inner table of sternum and cardiovascular  structures as described above. Medical devices as described above.  3. Bandlike atelectasis in bilateral mid and lower lungs. Trace  bilateral pleural effusion. No focal consolidation in the lungs.    MACRO:  None    Signed by: Ke Lau 10/6/2023 10:45 AM  Dictation workstation:   [id number]     Prior to Admission Meds:  Medications Prior to Admission   Medication Sig Dispense Refill Last Dose    dulaglutide (Trulicity) 1.5 mg/0.5 mL pen injector injection Inject 1.5 mg under the skin 1 (one) time per week.   Unknown    furosemide (Lasix) 40 mg tablet Take 2 tablets (80 mg) by mouth twice a day.   Unknown    lancets (Lancets,Thin) misc 1 Each 4 times daily (before meals and at bedtime). use as directed to test blood sugar   Unknown    metoprolol succinate XL (Toprol-XL) 50 mg 24 hr tablet Take 3 tablets (150 mg) by mouth once daily.   Unknown    OneTouch Ultra Test strip REVISE EL NIVEL DE AZUCAR EN LA PEARL CUATRO VECES AL CUBA   Unknown    pen needle, diabetic, safety 29 gauge x 1/2\" needle Three times before meals and at bedtime   Unknown    sacubitriL-valsartan (Entresto)  mg tablet Take 1 tablet by mouth twice a day.   Unknown    simethicone (Mylicon) 80 mg chewable tablet Chew 1 tablet (80 mg) every 6 hours if needed for flatulence.   Unknown    spironolactone (Aldactone) 25 mg tablet Take 1 tablet (25 mg) by mouth once daily.   Unknown       ASSESSMENT AND PLAN:     58 year old French male with history of HFrEF/ICM (LVEF 20% 8/14/23), CAD s/p CABG (LIMA-LAD with balloon pump support April 2023), Hx of cardiac arrest s/p VT ablation + ICD, HTN/HLD, CKD, Hx of DVT (L femoral) on Eliquis, hypothyroidism, pulmonary hypertension, T2DM, JAKE/OHS, former tobacco abuse (24 pack year, quit 2017), presented to [facility] on 10/1 with exertional left sided chest pain and SOB x 3 " days. He was admitted in cardiogenic shock, started on dobutamine under the guidance of SGC therapy. Patient was than transferred to [facility] for advanced therapy evaluation on 10/4. Advanced therapies evaluation was initiated on 10/4 and presented in committee meeting on 10/10. Not approved for LVAD or transplant at this time. Plan to optimize medical therapy and follow up closely in HF clinic. May be brought back to the committee in the future.     Neuro:  #Depression  - Serial neuro and pain assessments   - PO Tylenol PRN for pain  - c/w renally dosed gabapentin   - PT/OT Consult, OOB to chair  - CAM ICU score every shift  - Sleep/wake cycle normalization     # Physical Status  -Obesity  -Functional at home.   -Weight on admission to [facility] 246lbs (up 16lbs from dry weight).     Cardiovascular:  # Acute on chronic HFrEF/ICM systolic and diastolic heart failure  - TTE at [facility] 8/2023 with EF ~20%, globally abnormal RV systolic function. Noninvasive hemodynamic assessment is consistent with severe pulmonary hypertension (>60 mmHg), an elevated CVP, an elevated LVEDP.  - admit weight 246lbs  - Daily weight 10/9: 110 kg   - admit   - Daily SGC #s 10/10: BP 89/58, CVP 12, PA 40/28 (35), CO/CI 5.8/2.5  - Wean Nipride as neded to maintain MAP 65-70  - Continue hydral 50mg TID and isordil 20 mg TID  - Continue po metoprolol tartrate 12.5 mg BID   - Continue po digoixn 125 mcg   - Start Entresto 12-13 mg BID  - Daily standing weights, 2gm sodium diet, 2L fluid restriction, strict I&Os  - Advanced therapy evaluation started 10/4      #Advanced Therapies Evaluation   -Presented to selection committee 10/10. Determined to be very high risk for advanced therapies at this time given poorly controlled diabetes, renal function, peripheral vascular questions, questionable psychosocial support, financial concerns. Plan will be to medically optimize him this admission. Follow up with HF clinic for further decision on LVAD  candidacy in the future.  -CT abdomen and pelvis, Aortic US, and PFTs still pending completion.  -Discussed with Vascular medicine about the PVR report. Poor perfusion likely from his heart failure, no evidence of PAD noted.     #H/o VT arrest s/o VT ablation, ICD (2017 in [facility])  #CAD s/p CABG LIMA-LAD 4/2023 Dr Logan   -[facility] December 2022: severe diffuse three-vessel coronary artery disease. Severe proximal diffuse LAD disease up to 70%. Severe diffuse proximal and mid first diagonal disease. Chronic total occlusion of OM1 with left-to-left collaterals. Chronic total occlusion of mid RCA with left-to-right collaterals. Markedly elevated LVEDP at 40 mm of Hg.  -Elective CABG (LIMA-LAD) 4/2023 by Dr Logan   - Continue Aspirin 81mg.  - Continue Atorvastatin 80mg.      #Arrhythmias - Hx of VT s/p ablation + ICD  -Device - Medtronic ICD; interrogation completed 10/6   -Not on antiarrhythmic outpatient.     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  #Tobacco use history (24 pack year smoking hx)  -Monitor and maintain SpO2 > 92%  - PRN albuterol nebs for wheezing      GI:  - Colace BID, Miralax PRN ordered for bowel regimen   - Continue Protonix 40mg daily  - clear liquid diet today, NPO at MN   - bowel prep to begin this evening      :  #CKD  -Admit BUN/Cr 67/2.00  - daily BUN/Cr (10/10): 64/1.47  - diurese as hemodynamics and physical exam dictate   - Strict I/Os  - Avoid hypotension and nephrotoxic agents     Heme:  #Anemia in the setting of CKD  - Labs: Iron saturation 15%  - Venofer ordered 200 mg x3 days      #Hx of L femoral DVT  - Holding Eliquis for now  - Subcutaneous heparin 5000 units q8h for prophylaxis       Endo:  #IDDM2  - A1C 8.1% 10/4/23  - home DM regimen : trulicity 1.5mg qweek, glargine 85u once daily, lispro 34u with meals and 26u at bedtime  - Goal BG <180  - glargine 50 units once daily  - lispro to 20u with meals   - c/w lispro corrective scale #3 to with meals  - Low carb 75 g  diet  - Endo following, appreciate recs     #Hypothyroidism  -TSH 9.31 on 10/4, T4 10/8 1.04  -Continue home levothyroxine 50 mcg      ID:  -afebrile, nontoxic   -no s/s infx  -trend temps q4h     Feeding/fluids: low carb 75g diet, 2L fluid restriction  Thromboprophylaxis: SCDs, subQ heparin  VAP bundle: n/a  Ulcer prophylaxis: PPI  Glycemic control: Lantus and SSI   Bowel care: Colace & miralax prn  Indwelling catheter: None     Lines:   PIVs   Central/Deal RIJ from OSH 10/3    DVT:  NUTRITION: Adult diet Carb Controlled; 75 gram carb/meal, 45 gram Carb evening snack  EMERGENCY CONTACT: Extended Emergency Contact Information  Primary Emergency Contact: mother,mother  Mobile Phone: [phone]  Relation: Mother  Secondary Emergency Contact: Robinson Contreras  Mobile Phone: [phone]  Relation: Friend  CODE STATUS: Full Code  DISPO:  Continue care in HF ICU  FOLLOWUP:   No future appointments.    _________________________________________________  Phillip Salinas DO PGY-4  Heart Failure Fellow

## 2023-10-11 ENCOUNTER — APPOINTMENT (OUTPATIENT)
Dept: RADIOLOGY | Facility: HOSPITAL | Age: 58
DRG: 291 | End: 2023-10-11
Payer: MEDICARE

## 2023-10-11 LAB
ALBUMIN SERPL BCP-MCNC: 3.5 G/DL (ref 3.4–5)
ANABASINE UR-MCNC: <5 NG/ML
ANION GAP BLDMV CALCULATED.4IONS-SCNC: 9 MMO/L (ref 10–25)
ANION GAP SERPL CALC-SCNC: 11 MMOL/L (ref 10–20)
BASE EXCESS BLDMV CALC-SCNC: -0.2 MMOL/L (ref -2–3)
BODY TEMPERATURE: 37 DEGREES CELSIUS
BUN SERPL-MCNC: 60 MG/DL (ref 6–23)
CA-I BLDMV-SCNC: 1.14 MMOL/L (ref 1.1–1.33)
CALCIUM SERPL-MCNC: 8.3 MG/DL (ref 8.6–10.6)
CHLORIDE BLD-SCNC: 102 MMOL/L (ref 98–107)
CHLORIDE SERPL-SCNC: 103 MMOL/L (ref 98–107)
CO2 SERPL-SCNC: 26 MMOL/L (ref 21–32)
COTININE UR-MCNC: <15 NG/ML
CREAT SERPL-MCNC: 1.4 MG/DL (ref 0.5–1.3)
ERYTHROCYTE [DISTWIDTH] IN BLOOD BY AUTOMATED COUNT: 19.1 % (ref 11.5–14.5)
GFR SERPL CREATININE-BSD FRML MDRD: 59 ML/MIN/1.73M*2
GLUCOSE BLD MANUAL STRIP-MCNC: 184 MG/DL (ref 74–99)
GLUCOSE BLD MANUAL STRIP-MCNC: 195 MG/DL (ref 74–99)
GLUCOSE BLD MANUAL STRIP-MCNC: 205 MG/DL (ref 74–99)
GLUCOSE BLD MANUAL STRIP-MCNC: 233 MG/DL (ref 74–99)
GLUCOSE BLD-MCNC: 237 MG/DL (ref 74–99)
GLUCOSE SERPL-MCNC: 163 MG/DL (ref 74–99)
HCO3 BLDMV-SCNC: 24.8 MMOL/L (ref 22–26)
HCT VFR BLD AUTO: 33.6 % (ref 41–52)
HCT VFR BLD EST: 35 % (ref 41–52)
HGB BLD-MCNC: 10.2 G/DL (ref 13.5–17.5)
HGB BLDMV-MCNC: 11.5 G/DL (ref 13.5–17.5)
INHALED O2 CONCENTRATION: 0 %
LACTATE BLDMV-SCNC: 1.2 MMOL/L (ref 0.4–2)
MAGNESIUM SERPL-MCNC: 2.05 MG/DL (ref 1.6–2.4)
MCH RBC QN AUTO: 23.7 PG (ref 26–34)
MCHC RBC AUTO-ENTMCNC: 30.4 G/DL (ref 32–36)
MCV RBC AUTO: 78 FL (ref 80–100)
NICOTINE UR-MCNC: <15 NG/ML
NRBC BLD-RTO: 0 /100 WBCS (ref 0–0)
OXYHGB MFR BLDMV: 58.9 % (ref 45–75)
PCO2 BLDMV: 41 MM HG (ref 41–51)
PH BLDMV: 7.39 PH (ref 7.33–7.43)
PHOSPHATE SERPL-MCNC: 2.6 MG/DL (ref 2.5–4.9)
PLATELET # BLD AUTO: 206 X10*3/UL (ref 150–450)
PMV BLD AUTO: 10.1 FL (ref 7.5–11.5)
PO2 BLDMV: 42 MM HG (ref 35–45)
POTASSIUM BLDMV-SCNC: 4.8 MMOL/L (ref 3.5–5.3)
POTASSIUM SERPL-SCNC: 4.3 MMOL/L (ref 3.5–5.3)
RBC # BLD AUTO: 4.3 X10*6/UL (ref 4.5–5.9)
SAO2 % BLDMV: 61 % (ref 45–75)
SODIUM BLDMV-SCNC: 131 MMOL/L (ref 136–145)
SODIUM SERPL-SCNC: 136 MMOL/L (ref 136–145)
TRANS-3-OH-COTININE UR-MCNC: <50 NG/ML
WBC # BLD AUTO: 4.9 X10*3/UL (ref 4.4–11.3)

## 2023-10-11 PROCEDURE — 84132 ASSAY OF SERUM POTASSIUM: CPT | Performed by: STUDENT IN AN ORGANIZED HEALTH CARE EDUCATION/TRAINING PROGRAM

## 2023-10-11 PROCEDURE — 2500000001 HC RX 250 WO HCPCS SELF ADMINISTERED DRUGS (ALT 637 FOR MEDICARE OP): Performed by: STUDENT IN AN ORGANIZED HEALTH CARE EDUCATION/TRAINING PROGRAM

## 2023-10-11 PROCEDURE — 85027 COMPLETE CBC AUTOMATED: CPT | Performed by: STUDENT IN AN ORGANIZED HEALTH CARE EDUCATION/TRAINING PROGRAM

## 2023-10-11 PROCEDURE — 2500000004 HC RX 250 GENERAL PHARMACY W/ HCPCS (ALT 636 FOR OP/ED): Performed by: NURSE PRACTITIONER

## 2023-10-11 PROCEDURE — 2500000001 HC RX 250 WO HCPCS SELF ADMINISTERED DRUGS (ALT 637 FOR MEDICARE OP)

## 2023-10-11 PROCEDURE — 2500000004 HC RX 250 GENERAL PHARMACY W/ HCPCS (ALT 636 FOR OP/ED): Performed by: STUDENT IN AN ORGANIZED HEALTH CARE EDUCATION/TRAINING PROGRAM

## 2023-10-11 PROCEDURE — 71045 X-RAY EXAM CHEST 1 VIEW: CPT | Performed by: RADIOLOGY

## 2023-10-11 PROCEDURE — 99291 CRITICAL CARE FIRST HOUR: CPT | Performed by: STUDENT IN AN ORGANIZED HEALTH CARE EDUCATION/TRAINING PROGRAM

## 2023-10-11 PROCEDURE — 96372 THER/PROPH/DIAG INJ SC/IM: CPT | Performed by: STUDENT IN AN ORGANIZED HEALTH CARE EDUCATION/TRAINING PROGRAM

## 2023-10-11 PROCEDURE — 83735 ASSAY OF MAGNESIUM: CPT

## 2023-10-11 PROCEDURE — 82947 ASSAY GLUCOSE BLOOD QUANT: CPT

## 2023-10-11 PROCEDURE — 2500000002 HC RX 250 W HCPCS SELF ADMINISTERED DRUGS (ALT 637 FOR MEDICARE OP, ALT 636 FOR OP/ED): Performed by: NURSE PRACTITIONER

## 2023-10-11 PROCEDURE — 96372 THER/PROPH/DIAG INJ SC/IM: CPT | Performed by: NURSE PRACTITIONER

## 2023-10-11 PROCEDURE — 80069 RENAL FUNCTION PANEL: CPT | Performed by: STUDENT IN AN ORGANIZED HEALTH CARE EDUCATION/TRAINING PROGRAM

## 2023-10-11 PROCEDURE — 96372 THER/PROPH/DIAG INJ SC/IM: CPT

## 2023-10-11 PROCEDURE — 2500000004 HC RX 250 GENERAL PHARMACY W/ HCPCS (ALT 636 FOR OP/ED): Performed by: INTERNAL MEDICINE

## 2023-10-11 PROCEDURE — 2020000001 HC ICU ROOM DAILY

## 2023-10-11 PROCEDURE — 2500000002 HC RX 250 W HCPCS SELF ADMINISTERED DRUGS (ALT 637 FOR MEDICARE OP, ALT 636 FOR OP/ED)

## 2023-10-11 PROCEDURE — 37799 UNLISTED PX VASCULAR SURGERY: CPT | Performed by: STUDENT IN AN ORGANIZED HEALTH CARE EDUCATION/TRAINING PROGRAM

## 2023-10-11 PROCEDURE — 99232 SBSQ HOSP IP/OBS MODERATE 35: CPT | Performed by: PHYSICIAN ASSISTANT

## 2023-10-11 PROCEDURE — 84132 ASSAY OF SERUM POTASSIUM: CPT | Mod: CMCLAB | Performed by: STUDENT IN AN ORGANIZED HEALTH CARE EDUCATION/TRAINING PROGRAM

## 2023-10-11 PROCEDURE — 2500000004 HC RX 250 GENERAL PHARMACY W/ HCPCS (ALT 636 FOR OP/ED)

## 2023-10-11 PROCEDURE — 71045 X-RAY EXAM CHEST 1 VIEW: CPT | Mod: FY

## 2023-10-11 PROCEDURE — 2500000001 HC RX 250 WO HCPCS SELF ADMINISTERED DRUGS (ALT 637 FOR MEDICARE OP): Performed by: INTERNAL MEDICINE

## 2023-10-11 RX ORDER — FUROSEMIDE 10 MG/ML
80 INJECTION INTRAMUSCULAR; INTRAVENOUS 3 TIMES DAILY
Status: DISCONTINUED | OUTPATIENT
Start: 2023-10-11 | End: 2023-10-12

## 2023-10-11 RX ORDER — INSULIN LISPRO 100 [IU]/ML
22 INJECTION, SOLUTION INTRAVENOUS; SUBCUTANEOUS
Status: DISCONTINUED | OUTPATIENT
Start: 2023-10-11 | End: 2023-10-12

## 2023-10-11 RX ORDER — DAPAGLIFLOZIN 10 MG/1
10 TABLET, FILM COATED ORAL DAILY
Status: DISCONTINUED | OUTPATIENT
Start: 2023-10-11 | End: 2023-10-17 | Stop reason: HOSPADM

## 2023-10-11 RX ORDER — METOLAZONE 5 MG/1
2.5 TABLET ORAL ONCE
Status: COMPLETED | OUTPATIENT
Start: 2023-10-11 | End: 2023-10-11

## 2023-10-11 RX ORDER — METOPROLOL TARTRATE 25 MG/1
25 TABLET, FILM COATED ORAL 2 TIMES DAILY
Status: DISCONTINUED | OUTPATIENT
Start: 2023-10-11 | End: 2023-10-13

## 2023-10-11 RX ORDER — FUROSEMIDE 10 MG/ML
40 INJECTION INTRAMUSCULAR; INTRAVENOUS ONCE
Status: COMPLETED | OUTPATIENT
Start: 2023-10-11 | End: 2023-10-11

## 2023-10-11 RX ADMIN — SACUBITRIL AND VALSARTAN 1 TABLET: 24; 26 TABLET, FILM COATED ORAL at 20:11

## 2023-10-11 RX ADMIN — ISOSORBIDE DINITRATE 30 MG: 20 TABLET ORAL at 13:23

## 2023-10-11 RX ADMIN — ATORVASTATIN CALCIUM 80 MG: 80 TABLET ORAL at 08:20

## 2023-10-11 RX ADMIN — PANTOPRAZOLE SODIUM 40 MG: 40 TABLET, DELAYED RELEASE ORAL at 06:34

## 2023-10-11 RX ADMIN — INSULIN LISPRO 22 UNITS: 100 INJECTION, SOLUTION INTRAVENOUS; SUBCUTANEOUS at 18:22

## 2023-10-11 RX ADMIN — HEPARIN SODIUM 5000 UNITS: 5000 INJECTION INTRAVENOUS; SUBCUTANEOUS at 06:34

## 2023-10-11 RX ADMIN — HYDRALAZINE HYDROCHLORIDE 75 MG: 50 TABLET, FILM COATED ORAL at 16:31

## 2023-10-11 RX ADMIN — DIGOXIN 125 MCG: 125 TABLET ORAL at 11:48

## 2023-10-11 RX ADMIN — HYDRALAZINE HYDROCHLORIDE 75 MG: 50 TABLET, FILM COATED ORAL at 20:11

## 2023-10-11 RX ADMIN — GABAPENTIN 300 MG: 300 CAPSULE ORAL at 08:20

## 2023-10-11 RX ADMIN — SODIUM NITROPRUSSIDE 3 MCG/KG/MIN: 0.5 INJECTION, SOLUTION INTRAVENOUS at 08:19

## 2023-10-11 RX ADMIN — INSULIN LISPRO 3 UNITS: 100 INJECTION, SOLUTION INTRAVENOUS; SUBCUTANEOUS at 08:25

## 2023-10-11 RX ADMIN — INSULIN LISPRO 20 UNITS: 100 INJECTION, SOLUTION INTRAVENOUS; SUBCUTANEOUS at 08:24

## 2023-10-11 RX ADMIN — SODIUM NITROPRUSSIDE 2.5 MCG/KG/MIN: 0.5 INJECTION, SOLUTION INTRAVENOUS at 11:15

## 2023-10-11 RX ADMIN — SACUBITRIL AND VALSARTAN 1 TABLET: 24; 26 TABLET, FILM COATED ORAL at 08:31

## 2023-10-11 RX ADMIN — METOLAZONE 2.5 MG: 5 TABLET ORAL at 18:21

## 2023-10-11 RX ADMIN — HEPARIN SODIUM 5000 UNITS: 5000 INJECTION INTRAVENOUS; SUBCUTANEOUS at 13:23

## 2023-10-11 RX ADMIN — FUROSEMIDE 40 MG: 10 INJECTION, SOLUTION INTRAMUSCULAR; INTRAVENOUS at 08:20

## 2023-10-11 RX ADMIN — FUROSEMIDE 80 MG: 10 INJECTION, SOLUTION INTRAMUSCULAR; INTRAVENOUS at 13:23

## 2023-10-11 RX ADMIN — GABAPENTIN 600 MG: 300 CAPSULE ORAL at 20:12

## 2023-10-11 RX ADMIN — DOCUSATE SODIUM 200 MG: 100 CAPSULE, LIQUID FILLED ORAL at 20:12

## 2023-10-11 RX ADMIN — SODIUM NITROPRUSSIDE 2 MCG/KG/MIN: 0.5 INJECTION, SOLUTION INTRAVENOUS at 20:40

## 2023-10-11 RX ADMIN — INSULIN LISPRO 22 UNITS: 100 INJECTION, SOLUTION INTRAVENOUS; SUBCUTANEOUS at 13:22

## 2023-10-11 RX ADMIN — SODIUM NITROPRUSSIDE 2 MCG/KG/MIN: 0.5 INJECTION, SOLUTION INTRAVENOUS at 18:29

## 2023-10-11 RX ADMIN — ASPIRIN 81 MG CHEWABLE TABLET 81 MG: 81 TABLET CHEWABLE at 08:21

## 2023-10-11 RX ADMIN — FUROSEMIDE 80 MG: 10 INJECTION, SOLUTION INTRAMUSCULAR; INTRAVENOUS at 20:11

## 2023-10-11 RX ADMIN — LEVOTHYROXINE SODIUM 50 MCG: 50 TABLET ORAL at 06:34

## 2023-10-11 RX ADMIN — SODIUM NITROPRUSSIDE 2.5 MCG/KG/MIN: 0.5 INJECTION, SOLUTION INTRAVENOUS at 01:47

## 2023-10-11 RX ADMIN — METOPROLOL TARTRATE 25 MG: 25 TABLET, FILM COATED ORAL at 20:12

## 2023-10-11 RX ADMIN — SODIUM NITROPRUSSIDE 3 MCG/KG/MIN: 0.5 INJECTION, SOLUTION INTRAVENOUS at 05:01

## 2023-10-11 RX ADMIN — ISOSORBIDE DINITRATE 30 MG: 20 TABLET ORAL at 20:12

## 2023-10-11 RX ADMIN — INSULIN LISPRO 6 UNITS: 100 INJECTION, SOLUTION INTRAVENOUS; SUBCUTANEOUS at 13:24

## 2023-10-11 RX ADMIN — INSULIN GLARGINE 50 UNITS: 100 INJECTION, SOLUTION SUBCUTANEOUS at 08:26

## 2023-10-11 RX ADMIN — POTASSIUM CHLORIDE 40 MEQ: 1500 TABLET, EXTENDED RELEASE ORAL at 08:20

## 2023-10-11 RX ADMIN — INSULIN LISPRO 3 UNITS: 100 INJECTION, SOLUTION INTRAVENOUS; SUBCUTANEOUS at 18:24

## 2023-10-11 RX ADMIN — ISOSORBIDE DINITRATE 20 MG: 20 TABLET ORAL at 08:21

## 2023-10-11 RX ADMIN — METOPROLOL TARTRATE 25 MG: 25 TABLET, FILM COATED ORAL at 08:20

## 2023-10-11 RX ADMIN — SODIUM NITROPRUSSIDE 2 MCG/KG/MIN: 0.5 INJECTION, SOLUTION INTRAVENOUS at 13:32

## 2023-10-11 RX ADMIN — HYDRALAZINE HYDROCHLORIDE 50 MG: 50 TABLET, FILM COATED ORAL at 08:20

## 2023-10-11 RX ADMIN — QUETIAPINE FUMARATE 50 MG: 25 TABLET ORAL at 20:12

## 2023-10-11 RX ADMIN — HEPARIN SODIUM 5000 UNITS: 5000 INJECTION INTRAVENOUS; SUBCUTANEOUS at 22:00

## 2023-10-11 RX ADMIN — SODIUM NITROPRUSSIDE 2.5 MCG/KG/MIN: 0.5 INJECTION, SOLUTION INTRAVENOUS at 23:50

## 2023-10-11 RX ADMIN — DAPAGLIFLOZIN 10 MG: 10 TABLET, FILM COATED ORAL at 08:20

## 2023-10-11 ASSESSMENT — COGNITIVE AND FUNCTIONAL STATUS - GENERAL
CLIMB 3 TO 5 STEPS WITH RAILING: A LITTLE
MOVING TO AND FROM BED TO CHAIR: A LITTLE
WALKING IN HOSPITAL ROOM: A LITTLE
STANDING UP FROM CHAIR USING ARMS: A LITTLE
MOBILITY SCORE: 20

## 2023-10-11 ASSESSMENT — ENCOUNTER SYMPTOMS
FATIGUE: 1
ENDOCRINE NEGATIVE: 1
EYES NEGATIVE: 1
ALLERGIC/IMMUNOLOGIC NEGATIVE: 1

## 2023-10-11 ASSESSMENT — PAIN - FUNCTIONAL ASSESSMENT
PAIN_FUNCTIONAL_ASSESSMENT: 0-10

## 2023-10-11 ASSESSMENT — PAIN SCALES - GENERAL
PAINLEVEL_OUTOF10: 0 - NO PAIN

## 2023-10-11 NOTE — PROGRESS NOTES
[facility]  HFICU PROGRESS NOTE    Felice Law/[id number]    Admit Date: 10/4/2023  Hospital Length of Stay: 7   ICU Length of Stay: 7    INTERVAL EVENTS / PERTINENT ROS:   Patient seen and examined at bedside. Overnight had some transient discomfort near his ICD but it self resolved. This AM he is feeling well. Denied fever, chills, headache, nausea, vomiting, chest pain.    Plan:  -Increased entresto 24-26 mg BID  -Increase metoprolol tartrate 25 mg BID  -Increase hydralazine to 75 mg TID, isordil to 30 mg TID  -Start dapagliflozin 10 mg  -Wean Nipride  -Remove swan this afternoon    MEDICATIONS  Infusions:  nitroprusside, Last Rate: 2.5 mcg/kg/min (10/11/23 0900)      Scheduled:  aspirin, 81 mg, Daily  atorvastatin, 80 mg, Daily  dapagliflozin propanediol, 10 mg, Daily  digoxin, 125 mcg, Daily  docusate sodium, 200 mg, BID  gabapentin, 300 mg, q AM  gabapentin, 600 mg, Nightly  heparin (porcine), 5,000 Units, q8h  hydrALAZINE, 50 mg, TID  insulin glargine, 50 Units, q24h  insulin lispro, 0-15 Units, TID with meals  insulin lispro, 20 Units, TID with meals  isosorbide dinitrate, 20 mg, TID  levothyroxine, 50 mcg, Daily  metoprolol tartrate, 25 mg, BID  pantoprazole, 40 mg, Daily before breakfast  polyethylene glycol-electrolytes, 4,000 mL, Once  potassium chloride CR, 40 mEq, Daily  QUEtiapine, 50 mg, Nightly  sacubitriL-valsartan, 1 tablet, BID      PRN:  acetaminophen, 975 mg, q6h PRN  albuterol, 2.5 mg, q6h PRN  calcium carbonate, 500 mg, BID PRN  dextrose 10 % in water (D10W), 0.3 g/kg/hr, Once PRN  dextrose, 25 g, q15 min PRN  glucagon, 1 mg, q15 min PRN  ondansetron, 4 mg, q4h PRN  oxygen, , Continuous PRN - O2/gases  polyethylene glycol, 17 g, Daily PRN      Invasive Hemodynamics:    Most Recent Range Past 24hrs   BP (Art)   No data recorded   MAP(Art)   No data recorded   RA/CVP 16 mmHg CVP (mean)  Min: 12 mmHg  Max: 16 mmHg   PA 61/33 PAP  Min: 41/17  Max: 69/39  "  PA(mean) 42 mmHg PAP (Mean)  Min: 27 mmHg  Max: 50 mmHg   PCWP 23 mmHg PCWP (mmHg)  Min: 14 mmHg  Max: 25 mmHg   CO 7.9 L/min CO (L/min)  Min: 6.32 L/min  Max: 7.9 L/min   CI 3.5 L/min/m2 CI (L/min/m2)  Min: 3.1 L/min/m2  Max: 3.5 L/min/m2   Mixed Venous   No data recorded   SVR  544 (dyne*sec)/cm5 SVR (dyne*sec)/cm5  Min: 544 (dyne*sec)/cm5  Max: 860 (dyne*sec)/cm5    (dyne*sec)/cm5 PVR (dyne*sec)/cm5  Min: 127 (dyne*sec)/cm5  Max: 151 (dyne*sec)/cm5     PHYSICAL EXAM:   Visit Vitals  BP 94/54   Pulse 106   Temp 37 °C (98.6 °F) (Temporal)   Resp 15   Ht 1.803 m (5' 10.98\")   Wt 110 kg (241 lb 6.5 oz)   SpO2 93%   BMI 33.68 kg/m²   Smoking Status Former   BSA 2.35 m²     Wt Readings from Last 5 Encounters:   10/09/23 110 kg (241 lb 6.5 oz)     INTAKE/OUTPUT:  I/O last 3 completed shifts:  In: 1303.6 (11.9 mL/kg) [I.V.:1003.6 (9.2 mL/kg); IV Piggyback:300]  Out: 1252 (11.4 mL/kg) [Urine:1252 (0.3 mL/kg/hr)]  Weight: 109.5 kg      Physical Exam  Constitutional:       General: He is not in acute distress.     Appearance: Normal appearance.   HENT:      Head: Normocephalic.      Mouth/Throat:      Mouth: Mucous membranes are moist.   Eyes:      Extraocular Movements: Extraocular movements intact.      Conjunctiva/sclera: Conjunctivae normal.   Cardiovascular:      Rate and Rhythm: Regular rhythm. Tachycardia present.      Heart sounds: Normal heart sounds. No murmur heard.  Pulmonary:      Effort: Pulmonary effort is normal.      Breath sounds: Normal breath sounds.   Abdominal:      General: Bowel sounds are normal.      Palpations: Abdomen is soft.   Musculoskeletal:         General: Normal range of motion.   Skin:     General: Skin is warm and dry.   Neurological:      General: No focal deficit present.      Mental Status: He is alert and oriented to person, place, and time.   Psychiatric:         Mood and Affect: Mood normal.       DATA:  CMP:  Results from last 7 days   Lab Units 10/11/23  0223 " "10/10/23  0539 10/09/23  0338 10/08/23  0258 10/07/23  1704 10/07/23  0105 10/06/23  0515 10/05/23  1647 10/05/23  0354 10/04/23  1518   SODIUM mmol/L 136 137 135* 134* 130* 131* 130* 132* 137 137   POTASSIUM mmol/L 4.3 4.5 3.8 3.7 3.7 3.8 4.3 4.1 4.0 4.1   CHLORIDE mmol/L 103 100 97* 95* 93* 91* 92* 90* 93* 93*   CO2 mmol/L 26 27 28 29 26 28 30 28 31 28   ANION GAP mmol/L 11 15 14 14 15 16 12 18 17 20   BUN mg/dL 60* 64* 68* 78* 81* 75* 65* 60* 55* 58*   CREATININE mg/dL 1.40* 1.47* 1.35* 1.74* 2.17* 1.78* 2.09* 1.87* 1.65* 1.79*   EGFR mL/min/1.73m*2 59* 55* 61 45* 35* 44* 36* 41* 48* 44*   MAGNESIUM mg/dL 2.05 2.19 2.20 2.18 1.96 2.08  --  1.76  --   --    ALBUMIN g/dL 3.5 3.6 3.8 3.7 3.6 3.7 3.6 4.1 3.9 4.2   ALT U/L  --   --   --   --   --   --   --  11  --   --    AST U/L  --   --   --   --   --   --   --  13  --   --    BILIRUBIN TOTAL mg/dL  --   --   --   --   --   --   --  1.8*  --   --      CBC:  Results from last 7 days   Lab Units 10/11/23  0223 10/10/23  0539 10/09/23  0338 10/08/23  0258 10/07/23  0105 10/06/23  0515 10/05/23  1647 10/05/23  0354   WBC AUTO x10*3/uL 4.9 6.1 5.6 5.6 5.9 6.5 7.8 6.5   HEMOGLOBIN g/dL 10.2* 9.0* 11.8* 11.7* 11.7* 11.6* 12.9* 12.8*   HEMATOCRIT % 33.6* 29.9* 38.6* 36.7* 36.3* 36.8* 40.8* 41.0   PLATELETS AUTO x10*3/uL 206 252 240 211 200 215 255 258   MCV fL 78* 80 79* 76* 76* 77* 76* 77*     COAG:   Results from last 7 days   Lab Units 10/05/23  1647   INR  1.2*     ABO: No results found for: \"ABO\"  HEME/ENDO:  Results from last 7 days   Lab Units 10/04/23  1518   FERRITIN ng/mL 379*   IRON SATURATION % 15*   TSH mIU/L 9.31*   HEMOGLOBIN A1C % 8.1*      CARDIAC:   Results from last 7 days   Lab Units 10/05/23  1647 10/04/23  1518   LD U/L 240  --    BNP pg/mL 157* 160*     Chest Radiograph  === 10/04/23 ===    XR CHEST 1 VIEW    - Impression -  1. Similar mild interstitial pulmonary edema and mild bibasilar  atelectasis.  2. Medical devices as above. No " "pneumothorax.    Signed by: Claudio Yancey 10/8/2023 9:37 AM  Dictation workstation:   [id number]    CT:   === 10/04/23 ===    CT CHEST WO IV CONTRAST    - Impression -  1.  Mild cardiomegaly with severe coronary artery calcification.  Status post median sternotomy and CABG.  2. Close proximity of the inner table of sternum and cardiovascular  structures as described above. Medical devices as described above.  3. Bandlike atelectasis in bilateral mid and lower lungs. Trace  bilateral pleural effusion. No focal consolidation in the lungs.    MACRO:  None    Signed by: Ke Lau 10/6/2023 10:45 AM  Dictation workstation:   [id number]     Prior to Admission Meds:  Medications Prior to Admission   Medication Sig Dispense Refill Last Dose    dulaglutide (Trulicity) 1.5 mg/0.5 mL pen injector injection Inject 1.5 mg under the skin 1 (one) time per week.   Unknown    furosemide (Lasix) 40 mg tablet Take 2 tablets (80 mg) by mouth twice a day.   Unknown    lancets (Lancets,Thin) misc 1 Each 4 times daily (before meals and at bedtime). use as directed to test blood sugar   Unknown    metoprolol succinate XL (Toprol-XL) 50 mg 24 hr tablet Take 3 tablets (150 mg) by mouth once daily.   Unknown    OneTouch Ultra Test strip REVISE EL NIVEL DE AZUCAR EN LA PEARL CUATRO VECES AL CUBA   Unknown    pen needle, diabetic, safety 29 gauge x 1/2\" needle Three times before meals and at bedtime   Unknown    sacubitriL-valsartan (Entresto)  mg tablet Take 1 tablet by mouth twice a day.   Unknown    simethicone (Mylicon) 80 mg chewable tablet Chew 1 tablet (80 mg) every 6 hours if needed for flatulence.   Unknown    spironolactone (Aldactone) 25 mg tablet Take 1 tablet (25 mg) by mouth once daily.   Unknown       ASSESSMENT AND PLAN:     58 year old [place] male with history of HFrEF/ICM (LVEF 20% 8/14/23), CAD s/p CABG (LIMA-LAD with balloon pump support April 2023), Hx of cardiac arrest s/p VT ablation + ICD, HTN/HLD, " CKD, Hx of DVT (L femoral) on Eliquis, hypothyroidism, pulmonary hypertension, T2DM, JAKE/OHS, former tobacco abuse (24 pack year, quit 2017), presented to [facility] on 10/1 with exertional left sided chest pain and SOB x 3 days. He was admitted in cardiogenic shock, started on dobutamine under the guidance of SGC therapy. Patient was than transferred to [facility] for advanced therapy evaluation on 10/4. Advanced therapies evaluation was initiated on 10/4 and presented in committee meeting on 10/10. Not approved for LVAD or transplant at this time. Plan to optimize medical therapy and follow up closely in HF clinic. May be brought back to the committee in the future.     Neuro:  #Depression  - Serial neuro and pain assessments   - PO Tylenol PRN for pain  - c/w renally dosed gabapentin   - PT/OT Consult, OOB to chair  - CAM ICU score every shift  - Sleep/wake cycle normalization     # Physical Status  -Obesity  -Functional at home.      Cardiovascular:  # Acute on chronic HFrEF/ICM systolic and diastolic heart failure  - TTE at [facility] 8/2023 with EF ~20%, globally abnormal RV systolic function. Noninvasive hemodynamic assessment is consistent with severe pulmonary hypertension (>60 mmHg), an elevated CVP, an elevated LVEDP.  - admit weight 246lbs  - Daily weight 10/9: 110 kg   - admit   - Daily SGC #s 10/11: BP MAP 77, CVP 16, PA 58/25 (38), CO/CI 7.04/3.47  - Wean Nipride  - Increase hydralazine 75 mg TID and isordil 30 mg TID  - Increase metoprolol tartrate to 25 mg BID   - Continue po digoixn 125 mcg   - Increased Entresto 24-26 mg BID  - Start dapagliflozin 10 mg  - Lasix 80 TID mg  - Daily standing weights, 2gm sodium diet, 2L fluid restriction, strict I&Os     #Advanced Therapies Evaluation   -Presented to selection committee 10/10. Determined to be very high risk for advanced therapies at this time given poorly controlled diabetes, renal function, peripheral vascular questions, questionable psychosocial  support, financial concerns. Plan will be to medically optimize him this admission. Follow up with HF clinic for further decision on LVAD candidacy in the future.  -Completed evaluation 10/10.  -Discussed with Vascular medicine about the PVR report. Poor perfusion likely from his heart failure, no evidence of PAD noted.     #H/o VT arrest s/o VT ablation, ICD (2017 in [facility])  #CAD s/p CABG LIMA-LAD 4/2023 Dr Logan   - [facility] December 2022: severe diffuse three-vessel coronary artery disease. Severe proximal diffuse LAD disease up to 70%. Severe diffuse proximal and mid first diagonal disease. Chronic total occlusion of OM1 with left-to-left collaterals. Chronic total occlusion of mid RCA with left-to-right collaterals. Markedly elevated LVEDP at 40 mm of Hg.  - Elective CABG (LIMA-LAD) 4/2023 by Dr Logan   - Continue Aspirin 81mg.  - Continue Atorvastatin 80mg.      #Arrhythmias - Hx of VT s/p ablation + ICD  -Device - Medtronic ICD; interrogation completed 10/6   -Not on antiarrhythmic outpatient.     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  #Tobacco use history (24 pack year smoking hx)  -Monitor and maintain SpO2 > 92%  - PRN albuterol nebs for wheezing      GI:  - Colace BID, Miralax PRN ordered for bowel regimen   - Continue Protonix 40mg daily     :  #CKD  -Admit BUN/Cr 67/2.00  - daily BUN/Cr (10/11): 60/1.40  - diurese as hemodynamics and physical exam dictate   - Strict I/Os  - Avoid hypotension and nephrotoxic agents     Heme:  #Anemia in the setting of CKD  - Labs: Iron saturation 15%  - Venofer ordered 200 mg x3 days      #Hx of L femoral DVT  - Holding Eliquis for now  - Subcutaneous heparin 5000 units q8h for prophylaxis       Endo:  #IDDM2  - A1C 8.1% 10/4/23  - home DM regimen : trulicity 1.5mg qweek, glargine 85u once daily, lispro 34u with meals and 26u at bedtime  - Goal BG <180  - glargine 50 units once daily  - lispro to 20u with meals   - c/w lispro corrective scale #3 to with  meals  - Low carb 75 g diet  - Endo following, appreciate recs     #Hypothyroidism  -TSH 9.31 on 10/4, T4 10/8 1.04  -Continue home levothyroxine 50 mcg      ID:  -afebrile, nontoxic   -no s/s infx  -trend temps q4h     Feeding/fluids: low carb 75g diet, 2L fluid restriction  Thromboprophylaxis: SCDs, subQ heparin  VAP bundle: n/a  Ulcer prophylaxis: PPI  Glycemic control: Lantus and SSI   Bowel care: Colace & miralax prn  Indwelling catheter: None     Lines:   PIVs   [facility] RIJ from OSH 10/3    DVT:  NUTRITION: Adult diet Carb Controlled; 75 gram carb/meal, 45 gram Carb evening snack  EMERGENCY CONTACT: Extended Emergency Contact Information  Primary Emergency Contact: mother,mother  Mobile Phone: [phone]  Relation: Mother  Secondary Emergency Contact: Robinson Contreras  Mobile Phone: [phone]  Relation: Friend  CODE STATUS: Full Code  DISPO:    FOLLOWUP:   No future appointments.    _________________________________________________  Phillip Salinas,  PGY-4  Heart Failure Fellow

## 2023-10-11 NOTE — PROGRESS NOTES
SW met with pt on [place] [address] room 14 at bedside.  utilized via [employer] system. Pt inquired sw about committee team decision. SW provided information and plan for pt next steps. SW explained to pt that he was denied at this time  but can be represented once some barriers are met and overcome. Team is working to optimize him at this time. Pt then described how he has not been urinating like like he has been and feeling extra puffy on his left side, potential fluid. SW to follow up with team. SW confirmed pt has no other questions at this time. SW to follow up routinely.    Zina LOPEZ

## 2023-10-11 NOTE — PROGRESS NOTES
"Felice Law is a 58 y.o. male on day 7 of admission presenting with Acute decompensated heart failure (CMS/HCC).    Subjective   Pt requires Diabetes Management following his admission for heart failure.  Patient was informed of plan for modest increase in his Premeal lispro insulin     I have reviewed histories, allergies and medications have been reviewed        Objective   Review of Systems   Constitutional:  Positive for fatigue.   HENT: Negative.     Eyes: Negative.    Endocrine: Negative.    Genitourinary: Negative.    Skin: Negative.    Allergic/Immunologic: Negative.      Physical Exam  Vitals reviewed.   HENT:      Head: Normocephalic.      Nose: Nose normal.   Eyes:      Pupils: Pupils are equal, round, and reactive to light.   Cardiovascular:      Rate and Rhythm: Normal rate and regular rhythm.   Pulmonary:      Effort: Pulmonary effort is normal.      Breath sounds: Normal breath sounds.   Abdominal:      General: Abdomen is flat.   Neurological:      General: No focal deficit present.      Mental Status: He is alert.   Psychiatric:         Mood and Affect: Mood normal.         Behavior: Behavior normal.         Last Recorded Vitals  Blood pressure 94/54, pulse 106, temperature 37 °C (98.6 °F), temperature source Temporal, resp. rate 15, height 1.803 m (5' 10.98\"), weight 110 kg (241 lb 6.5 oz), SpO2 93 %.  Intake/Output last 3 Shifts:  I/O last 3 completed shifts:  In: 1303.6 (11.9 mL/kg) [I.V.:1003.6 (9.2 mL/kg); IV Piggyback:300]  Out: 1252 (11.4 mL/kg) [Urine:1252 (0.3 mL/kg/hr)]  Weight: 109.5 kg     Relevant Results  Results from last 7 days   Lab Units 10/11/23  0824 10/11/23  0223 10/10/23  1812 10/10/23  1440 10/10/23  1243 10/10/23  0821 10/10/23  0539 10/09/23  0802 10/09/23  0338 10/08/23  0747 10/08/23  0258 10/07/23  1928 10/07/23  1704   POCT GLUCOSE mg/dL 195*  --  213* 102* 104* 157*  --    < >  --    < >  --    < >  --    GLUCOSE mg/dL  --  163*  --   --   --   --  156*  --  " 90  --  205*  --  342*    < > = values in this interval not displayed.     Scheduled medications  aspirin, 81 mg, oral, Daily  atorvastatin, 80 mg, oral, Daily  dapagliflozin propanediol, 10 mg, oral, Daily  digoxin, 125 mcg, oral, Daily  docusate sodium, 200 mg, oral, BID  gabapentin, 300 mg, oral, q AM  gabapentin, 600 mg, oral, Nightly  heparin (porcine), 5,000 Units, subcutaneous, q8h  hydrALAZINE, 50 mg, oral, TID  insulin glargine, 50 Units, subcutaneous, q24h  insulin lispro, 0-15 Units, subcutaneous, TID with meals  insulin lispro, 20 Units, subcutaneous, TID with meals  isosorbide dinitrate, 20 mg, oral, TID  levothyroxine, 50 mcg, oral, Daily  metoprolol tartrate, 25 mg, oral, BID  pantoprazole, 40 mg, oral, Daily before breakfast  polyethylene glycol-electrolytes, 4,000 mL, oral, Once  potassium chloride CR, 40 mEq, oral, Daily  QUEtiapine, 50 mg, oral, Nightly  sacubitriL-valsartan, 1 tablet, oral, BID      Continuous medications  nitroprusside, 0.25-5 mcg/kg/min, Last Rate: 2.5 mcg/kg/min (10/11/23 0900)      PRN medications  PRN medications: acetaminophen, albuterol, calcium carbonate, dextrose 10 % in water (D10W), dextrose, glucagon, ondansetron, oxygen, polyethylene glycol    Results for orders placed or performed during the hospital encounter of 10/04/23 (from the past 24 hour(s))   BLOOD GAS MIXED VENOUS   Result Value Ref Range    POCT pH, Mixed 7.44 (H) 7.33 - 7.43 pH    POCT pCO2, Mixed 42 41 - 51 mm Hg    POCT pO2, Mixed 40 35 - 45 mm Hg    POCT SO2, Mixed 58 45 - 75 %    POCT Oxy Hemoglobin, Mixed 56.5 45.0 - 75.0 %    POCT Base Excess, Mixed 3.9 (H) -2.0 - 3.0 mmol/L    POCT HCO3 Calculated, Mixed 28.5 (H) 22.0 - 26.0 mmol/L    Patient Temperature 37.0 degrees Celsius    FiO2 21 %   POCT GLUCOSE   Result Value Ref Range    POCT Glucose 104 (H) 74 - 99 mg/dL   POCT GLUCOSE   Result Value Ref Range    POCT Glucose 102 (H) 74 - 99 mg/dL   Blood Gas Arterial   Result Value Ref Range    POCT pH,  Arterial 7.43 (H) 7.38 - 7.42 pH    POCT pCO2, Arterial 36 (L) 38 - 42 mm Hg    POCT pO2, Arterial 75 (L) 85 - 95 mm Hg    POCT SO2, Arterial 95 94 - 100 %    POCT Oxy Hemoglobin, Arterial 92.7 (L) 94.0 - 98.0 %    POCT Base Excess, Arterial -0.1 -2.0 - 3.0 mmol/L    POCT HCO3 Calculated, Arterial 23.9 22.0 - 26.0 mmol/L    Patient Temperature      FiO2 21 %   BLOOD GAS ARTERIAL, COOX   Result Value Ref Range    POCT pH, Arterial 7.43 (H) 7.38 - 7.42 pH    POCT pCO2, Arterial 36 (L) 38 - 42 mm Hg    POCT pO2, Arterial 75 (L) 85 - 95 mm Hg    POCT SO2, Arterial 95 94 - 100 %    POCT Oxy Hemoglobin, Arterial 92.7 (L) 94.0 - 98.0 %    POCT Base Excess, Arterial -0.1 -2.0 - 3.0 mmol/L    POCT HCO3 Calculated, Arterial 23.9 22.0 - 26.0 mmol/L    POCT Hemoglobin, Arterial 11.8 (L) 13.5 - 17.5 g/dL    POCT Carboxyhemoglobin, Arterial 1.7 %    POCT Methemoglobin, Arterial 0.9 0.0 - 1.5 %    POCT Deoxy Hemoglobin, Arterial 4.7 0.0 - 5.0 %    Patient Temperature 37.0 degrees Celsius    FiO2 21 %   POCT GLUCOSE   Result Value Ref Range    POCT Glucose 213 (H) 74 - 99 mg/dL   BLOOD GAS MIXED VENOUS   Result Value Ref Range    POCT pH, Mixed 7.39 7.33 - 7.43 pH    POCT pCO2, Mixed 41 41 - 51 mm Hg    POCT pO2, Mixed 44 35 - 45 mm Hg    POCT SO2, Mixed 62 45 - 75 %    POCT Oxy Hemoglobin, Mixed 60.2 45.0 - 75.0 %    POCT Base Excess, Mixed -0.2 -2.0 - 3.0 mmol/L    POCT HCO3 Calculated, Mixed 24.8 22.0 - 26.0 mmol/L    Patient Temperature 37.0 degrees Celsius    FiO2 21 %   BLOOD GAS MIXED VENOUS FULL PANEL   Result Value Ref Range    POCT pH, Mixed 7.40 7.33 - 7.43 pH    POCT pCO2, Mixed 37 (L) 41 - 51 mm Hg    POCT pO2, Mixed 43 35 - 45 mm Hg    POCT SO2, Mixed 62 45 - 75 %    POCT Oxy Hemoglobin, Mixed 60.4 45.0 - 75.0 %    POCT Hematocrit Calculated, Mixed 29.0 (L) 41.0 - 52.0 %    POCT Sodium, Mixed 134 (L) 136 - 145 mmol/L    POCT Potassium, Mixed 3.8 3.5 - 5.3 mmol/L    POCT Chloride, Mixed 108 (H) 98 - 107 mmol/L    POCT  Ionized Calcium, Mixed 1.02 (L) 1.10 - 1.33 mmol/L    POCT Glucose, Mixed 173 (H) 74 - 99 mg/dL    POCT Lactate, Mixed 1.1 0.4 - 2.0 mmol/L    POCT Base Excess, Mixed -1.6 -2.0 - 3.0 mmol/L    POCT HCO3 Calculated, Mixed 22.9 22.0 - 26.0 mmol/L    POCT Hemoglobin, Mixed 9.7 (L) 13.5 - 17.5 g/dL    POCT Anion Gap, Mixed 7 (L) 10 - 25 mmo/L    Patient Temperature 37.0 degrees Celsius    FiO2 0 %   Renal function panel   Result Value Ref Range    Glucose 163 (H) 74 - 99 mg/dL    Sodium 136 136 - 145 mmol/L    Potassium 4.3 3.5 - 5.3 mmol/L    Chloride 103 98 - 107 mmol/L    Bicarbonate 26 21 - 32 mmol/L    Anion Gap 11 10 - 20 mmol/L    Urea Nitrogen 60 (H) 6 - 23 mg/dL    Creatinine 1.40 (H) 0.50 - 1.30 mg/dL    eGFR 59 (L) >60 mL/min/1.73m*2    Calcium 8.3 (L) 8.6 - 10.6 mg/dL    Phosphorus 2.6 2.5 - 4.9 mg/dL    Albumin 3.5 3.4 - 5.0 g/dL   CBC   Result Value Ref Range    WBC 4.9 4.4 - 11.3 x10*3/uL    nRBC 0.0 0.0 - 0.0 /100 WBCs    RBC 4.30 (L) 4.50 - 5.90 x10*6/uL    Hemoglobin 10.2 (L) 13.5 - 17.5 g/dL    Hematocrit 33.6 (L) 41.0 - 52.0 %    MCV 78 (L) 80 - 100 fL    MCH 23.7 (L) 26.0 - 34.0 pg    MCHC 30.4 (L) 32.0 - 36.0 g/dL    RDW 19.1 (H) 11.5 - 14.5 %    Platelets 206 150 - 450 x10*3/uL    MPV 10.1 7.5 - 11.5 fL   Magnesium   Result Value Ref Range    Magnesium 2.05 1.60 - 2.40 mg/dL   POCT GLUCOSE   Result Value Ref Range    POCT Glucose 195 (H) 74 - 99 mg/dL       Results from last 7 days   Lab Units 10/11/23  0223 10/06/23  0515 10/05/23  1647 10/05/23  0354 10/04/23  1518   HEMOGLOBIN A1C %  --   --   --   --  8.1*   SODIUM mmol/L 136   < > 132* 137 137   POTASSIUM mmol/L 4.3   < > 4.1 4.0 4.1   CHLORIDE mmol/L 103   < > 90* 93* 93*   CO2 mmol/L 26   < > 28 31 28   BUN mg/dL 60*   < > 60* 55* 58*   CREATININE mg/dL 1.40*   < > 1.87* 1.65* 1.79*   CALCIUM mg/dL 8.3*   < > 10.2 10.0 10.1   ALBUMIN g/dL 3.5   < > 4.1 3.9 4.2   PROTEIN TOTAL g/dL  --   --  7.9  --   --    BILIRUBIN TOTAL mg/dL  --   --   1.8*  --   --    ALK PHOS U/L  --   --  63  --   --    ALT U/L  --   --  11  --   --    AST U/L  --   --  13  --   --    GLUCOSE mg/dL 163*   < > 296* 240* 198*   CHOLESTEROL mg/dL  --   --   --  106  --    TRIGLYCERIDES mg/dL  --   --   --  247*  --    HDL mg/dL  --   --   --  24.5  --     < > = values in this interval not displayed.       Assessment/Plan   Principal Problem:    Acute decompensated heart failure (CMS/HCC)  Active Problems:    Cardiogenic shock (CMS/HCC)    Central sleep apnea with Cheyne-Chirinos respiration    Coronary artery disease due to calcified coronary lesion    Diabetes mellitus (CMS/HCC)    JAKE (obstructive sleep apnea)    Ischemic cardiomyopathy    Type 2 Diabetes   DM2 with hyperglycemia  - A1C 8.1% 10/4/23    - home DM regimen : trulicity 1.5mg qweek, glargine 85u once daily, lispro 34u with meals and 26u at bedtime  - managed by: PCP    PLAN  - Goal BG <180  - continue glargine 50 units once daily- reduce to 40u if pt is NPO  - increase lispro to 22u with meals - may hold if glc prior to meal is <90 or NPO  - continue lispro corrective scale #3 to with meals- adjust to lispro scale #2 q6h if pt is NPO  151-200 = 3u  201-250 = 6u  251-300 = 9u  301-350 = 12u  351-400 = 15u        -Accuchecks TIDAC and QHS- kindly ensure QHS Accucheck is drawn; it is often missed   -Hypoglycemia protocol  -pt should choose clears and have meals with lower carb content (ex. Broth/diet soda)   -Will continue to follow and titrate insulin accordingly     Discharge planning-   Insulin dose TBD by titration, may resume home trulicity at d/c  Pt sees PCP at [facility] [place], recommend  or [facility] [place] endocrinology follow up at d/c      SAADIA Ventura-S2  OMAR BenderC

## 2023-10-12 LAB
ALBUMIN SERPL BCP-MCNC: 3.5 G/DL (ref 3.4–5)
ALBUMIN SERPL BCP-MCNC: 4.1 G/DL (ref 3.4–5)
ANION GAP BLDMV CALCULATED.4IONS-SCNC: 4 MMO/L (ref 10–25)
ANION GAP BLDMV CALCULATED.4IONS-SCNC: 9 MMO/L (ref 10–25)
ANION GAP SERPL CALC-SCNC: 10 MMOL/L (ref 10–20)
ANION GAP SERPL CALC-SCNC: 16 MMOL/L (ref 10–20)
BASE EXCESS BLDMV CALC-SCNC: 0.3 MMOL/L (ref -2–3)
BASE EXCESS BLDMV CALC-SCNC: 2.9 MMOL/L (ref -2–3)
BODY TEMPERATURE: 37 DEGREES CELSIUS
BODY TEMPERATURE: 37 DEGREES CELSIUS
BUN SERPL-MCNC: 60 MG/DL (ref 6–23)
BUN SERPL-MCNC: 66 MG/DL (ref 6–23)
CA-I BLDMV-SCNC: 1.11 MMOL/L (ref 1.1–1.33)
CA-I BLDMV-SCNC: 1.15 MMOL/L (ref 1.1–1.33)
CALCIUM SERPL-MCNC: 8.7 MG/DL (ref 8.6–10.6)
CALCIUM SERPL-MCNC: 9 MG/DL (ref 8.6–10.6)
CHLORIDE BLD-SCNC: 103 MMOL/L (ref 98–107)
CHLORIDE BLD-SCNC: 107 MMOL/L (ref 98–107)
CHLORIDE SERPL-SCNC: 101 MMOL/L (ref 98–107)
CHLORIDE SERPL-SCNC: 103 MMOL/L (ref 98–107)
CO2 SERPL-SCNC: 24 MMOL/L (ref 21–32)
CO2 SERPL-SCNC: 26 MMOL/L (ref 21–32)
CREAT SERPL-MCNC: 1.54 MG/DL (ref 0.5–1.3)
CREAT SERPL-MCNC: 1.6 MG/DL (ref 0.5–1.3)
ERYTHROCYTE [DISTWIDTH] IN BLOOD BY AUTOMATED COUNT: 19.3 % (ref 11.5–14.5)
GFR SERPL CREATININE-BSD FRML MDRD: 50 ML/MIN/1.73M*2
GFR SERPL CREATININE-BSD FRML MDRD: 52 ML/MIN/1.73M*2
GLUCOSE BLD MANUAL STRIP-MCNC: 154 MG/DL (ref 74–99)
GLUCOSE BLD MANUAL STRIP-MCNC: 158 MG/DL (ref 74–99)
GLUCOSE BLD MANUAL STRIP-MCNC: 238 MG/DL (ref 74–99)
GLUCOSE BLD-MCNC: 176 MG/DL (ref 74–99)
GLUCOSE BLD-MCNC: 183 MG/DL (ref 74–99)
GLUCOSE SERPL-MCNC: 135 MG/DL (ref 74–99)
GLUCOSE SERPL-MCNC: 138 MG/DL (ref 74–99)
HCO3 BLDMV-SCNC: 25.4 MMOL/L (ref 22–26)
HCO3 BLDMV-SCNC: 27.1 MMOL/L (ref 22–26)
HCT VFR BLD AUTO: 33 % (ref 41–52)
HCT VFR BLD EST: 32 % (ref 41–52)
HCT VFR BLD EST: 34 % (ref 41–52)
HGB BLD-MCNC: 10.2 G/DL (ref 13.5–17.5)
HGB BLDMV-MCNC: 10.5 G/DL (ref 13.5–17.5)
HGB BLDMV-MCNC: 11.2 G/DL (ref 13.5–17.5)
INHALED O2 CONCENTRATION: 0 %
INHALED O2 CONCENTRATION: 2 %
LACTATE BLDMV-SCNC: 1 MMOL/L (ref 0.4–2)
LACTATE BLDMV-SCNC: 1.3 MMOL/L (ref 0.4–2)
MAGNESIUM SERPL-MCNC: 2.01 MG/DL (ref 1.6–2.4)
MCH RBC QN AUTO: 23.9 PG (ref 26–34)
MCHC RBC AUTO-ENTMCNC: 30.9 G/DL (ref 32–36)
MCV RBC AUTO: 78 FL (ref 80–100)
NRBC BLD-RTO: 0 /100 WBCS (ref 0–0)
OXYHGB MFR BLDMV: 58.7 % (ref 45–75)
OXYHGB MFR BLDMV: 66.3 % (ref 45–75)
PCO2 BLDMV: 39 MM HG (ref 41–51)
PCO2 BLDMV: 42 MM HG (ref 41–51)
PH BLDMV: 7.39 PH (ref 7.33–7.43)
PH BLDMV: 7.45 PH (ref 7.33–7.43)
PHOSPHATE SERPL-MCNC: 3.7 MG/DL (ref 2.5–4.9)
PHOSPHATE SERPL-MCNC: 3.9 MG/DL (ref 2.5–4.9)
PLATELET # BLD AUTO: 226 X10*3/UL (ref 150–450)
PMV BLD AUTO: 9.7 FL (ref 7.5–11.5)
PO2 BLDMV: 39 MM HG (ref 35–45)
PO2 BLDMV: 42 MM HG (ref 35–45)
POTASSIUM BLDMV-SCNC: 4.6 MMOL/L (ref 3.5–5.3)
POTASSIUM BLDMV-SCNC: 4.9 MMOL/L (ref 3.5–5.3)
POTASSIUM SERPL-SCNC: 4.2 MMOL/L (ref 3.5–5.3)
POTASSIUM SERPL-SCNC: 4.3 MMOL/L (ref 3.5–5.3)
RBC # BLD AUTO: 4.26 X10*6/UL (ref 4.5–5.9)
SAO2 % BLDMV: 60 % (ref 45–75)
SAO2 % BLDMV: 68 % (ref 45–75)
SODIUM BLDMV-SCNC: 132 MMOL/L (ref 136–145)
SODIUM BLDMV-SCNC: 133 MMOL/L (ref 136–145)
SODIUM SERPL-SCNC: 135 MMOL/L (ref 136–145)
SODIUM SERPL-SCNC: 137 MMOL/L (ref 136–145)
WBC # BLD AUTO: 5.3 X10*3/UL (ref 4.4–11.3)

## 2023-10-12 PROCEDURE — 80069 RENAL FUNCTION PANEL: CPT | Performed by: STUDENT IN AN ORGANIZED HEALTH CARE EDUCATION/TRAINING PROGRAM

## 2023-10-12 PROCEDURE — 36415 COLL VENOUS BLD VENIPUNCTURE: CPT | Performed by: INTERNAL MEDICINE

## 2023-10-12 PROCEDURE — 82947 ASSAY GLUCOSE BLOOD QUANT: CPT

## 2023-10-12 PROCEDURE — 83735 ASSAY OF MAGNESIUM: CPT

## 2023-10-12 PROCEDURE — 96372 THER/PROPH/DIAG INJ SC/IM: CPT | Performed by: STUDENT IN AN ORGANIZED HEALTH CARE EDUCATION/TRAINING PROGRAM

## 2023-10-12 PROCEDURE — 99232 SBSQ HOSP IP/OBS MODERATE 35: CPT

## 2023-10-12 PROCEDURE — 2500000001 HC RX 250 WO HCPCS SELF ADMINISTERED DRUGS (ALT 637 FOR MEDICARE OP): Performed by: INTERNAL MEDICINE

## 2023-10-12 PROCEDURE — 85027 COMPLETE CBC AUTOMATED: CPT | Performed by: STUDENT IN AN ORGANIZED HEALTH CARE EDUCATION/TRAINING PROGRAM

## 2023-10-12 PROCEDURE — 76937 US GUIDE VASCULAR ACCESS: CPT

## 2023-10-12 PROCEDURE — 36415 COLL VENOUS BLD VENIPUNCTURE: CPT | Performed by: STUDENT IN AN ORGANIZED HEALTH CARE EDUCATION/TRAINING PROGRAM

## 2023-10-12 PROCEDURE — 2500000004 HC RX 250 GENERAL PHARMACY W/ HCPCS (ALT 636 FOR OP/ED): Performed by: STUDENT IN AN ORGANIZED HEALTH CARE EDUCATION/TRAINING PROGRAM

## 2023-10-12 PROCEDURE — 2020000001 HC ICU ROOM DAILY

## 2023-10-12 PROCEDURE — 2500000001 HC RX 250 WO HCPCS SELF ADMINISTERED DRUGS (ALT 637 FOR MEDICARE OP)

## 2023-10-12 PROCEDURE — 2500000001 HC RX 250 WO HCPCS SELF ADMINISTERED DRUGS (ALT 637 FOR MEDICARE OP): Performed by: STUDENT IN AN ORGANIZED HEALTH CARE EDUCATION/TRAINING PROGRAM

## 2023-10-12 PROCEDURE — 99291 CRITICAL CARE FIRST HOUR: CPT | Performed by: STUDENT IN AN ORGANIZED HEALTH CARE EDUCATION/TRAINING PROGRAM

## 2023-10-12 PROCEDURE — 97530 THERAPEUTIC ACTIVITIES: CPT | Mod: GP,CQ

## 2023-10-12 PROCEDURE — 2500000004 HC RX 250 GENERAL PHARMACY W/ HCPCS (ALT 636 FOR OP/ED): Performed by: NURSE PRACTITIONER

## 2023-10-12 PROCEDURE — 2500000004 HC RX 250 GENERAL PHARMACY W/ HCPCS (ALT 636 FOR OP/ED)

## 2023-10-12 PROCEDURE — 80069 RENAL FUNCTION PANEL: CPT | Performed by: INTERNAL MEDICINE

## 2023-10-12 RX ORDER — LEVOTHYROXINE SODIUM 50 UG/1
50 TABLET ORAL
COMMUNITY
Start: 2023-07-12 | End: 2024-04-08 | Stop reason: ALTCHOICE

## 2023-10-12 RX ORDER — LANCETS 33 GAUGE
EACH MISCELLANEOUS
COMMUNITY
Start: 2023-07-20

## 2023-10-12 RX ORDER — HYDRALAZINE HYDROCHLORIDE 25 MG/1
TABLET, FILM COATED ORAL
COMMUNITY
Start: 2023-08-01 | End: 2023-10-17 | Stop reason: HOSPADM

## 2023-10-12 RX ORDER — APIXABAN 5 MG/1
TABLET, FILM COATED ORAL
COMMUNITY
Start: 2023-08-18 | End: 2023-10-20 | Stop reason: ALTCHOICE

## 2023-10-12 RX ORDER — FUROSEMIDE 10 MG/ML
80 INJECTION INTRAMUSCULAR; INTRAVENOUS ONCE
Status: COMPLETED | OUTPATIENT
Start: 2023-10-12 | End: 2023-10-12

## 2023-10-12 RX ORDER — ISOSORBIDE DINITRATE 20 MG/1
40 TABLET ORAL
Status: DISCONTINUED | OUTPATIENT
Start: 2023-10-12 | End: 2023-10-17 | Stop reason: HOSPADM

## 2023-10-12 RX ORDER — HYDRALAZINE HYDROCHLORIDE 100 MG/1
100 TABLET, FILM COATED ORAL 3 TIMES DAILY
Status: DISCONTINUED | OUTPATIENT
Start: 2023-10-12 | End: 2023-10-17 | Stop reason: HOSPADM

## 2023-10-12 RX ORDER — METOLAZONE 5 MG/1
5 TABLET ORAL ONCE
Status: COMPLETED | OUTPATIENT
Start: 2023-10-12 | End: 2023-10-12

## 2023-10-12 RX ORDER — IPRATROPIUM BROMIDE AND ALBUTEROL SULFATE 2.5; .5 MG/3ML; MG/3ML
3 SOLUTION RESPIRATORY (INHALATION) 3 TIMES DAILY PRN
COMMUNITY
End: 2024-04-26 | Stop reason: SDUPTHER

## 2023-10-12 RX ORDER — ALBUTEROL SULFATE 0.83 MG/ML
SOLUTION RESPIRATORY (INHALATION)
COMMUNITY
Start: 2023-08-04 | End: 2024-04-26 | Stop reason: SDUPTHER

## 2023-10-12 RX ORDER — ISOSORBIDE MONONITRATE 30 MG/1
30 TABLET, EXTENDED RELEASE ORAL
COMMUNITY
End: 2023-10-17 | Stop reason: HOSPADM

## 2023-10-12 RX ORDER — ATORVASTATIN CALCIUM 80 MG/1
80 TABLET, FILM COATED ORAL DAILY
COMMUNITY
Start: 2023-07-20 | End: 2023-12-19 | Stop reason: SDUPTHER

## 2023-10-12 RX ORDER — SODIUM NITROPRUSSIDE IN 0.9% SODIUM CHLORIDE 0.5 MG/ML
0-5 INJECTION INTRAVENOUS CONTINUOUS
Status: DISCONTINUED | OUTPATIENT
Start: 2023-10-12 | End: 2023-10-14

## 2023-10-12 RX ORDER — INSULIN LISPRO 100 [IU]/ML
24 INJECTION, SOLUTION INTRAVENOUS; SUBCUTANEOUS
Status: DISCONTINUED | OUTPATIENT
Start: 2023-10-12 | End: 2023-10-14

## 2023-10-12 RX ORDER — DAPAGLIFLOZIN 10 MG/1
10 TABLET, FILM COATED ORAL DAILY
COMMUNITY
Start: 2023-08-02 | End: 2023-12-19 | Stop reason: SDUPTHER

## 2023-10-12 RX ORDER — METOPROLOL TARTRATE 50 MG/1
TABLET ORAL
COMMUNITY
Start: 2023-07-11 | End: 2023-10-17 | Stop reason: HOSPADM

## 2023-10-12 RX ORDER — QUETIAPINE FUMARATE 50 MG/1
50 TABLET, FILM COATED ORAL NIGHTLY
COMMUNITY
Start: 2023-08-04 | End: 2023-12-29 | Stop reason: SDUPTHER

## 2023-10-12 RX ORDER — BUMETANIDE 2 MG/1
TABLET ORAL
COMMUNITY
Start: 2023-08-23 | End: 2023-10-24 | Stop reason: SDUPTHER

## 2023-10-12 RX ORDER — GABAPENTIN 300 MG/1
300 CAPSULE ORAL EVERY MORNING
COMMUNITY
Start: 2023-08-04 | End: 2023-12-29 | Stop reason: SDUPTHER

## 2023-10-12 RX ORDER — SERTRALINE HYDROCHLORIDE 50 MG/1
50 TABLET, FILM COATED ORAL
COMMUNITY
End: 2023-10-17 | Stop reason: HOSPADM

## 2023-10-12 RX ADMIN — POTASSIUM CHLORIDE 40 MEQ: 1500 TABLET, EXTENDED RELEASE ORAL at 09:08

## 2023-10-12 RX ADMIN — SODIUM NITROPRUSSIDE 2.5 MCG/KG/MIN: 0.5 INJECTION, SOLUTION INTRAVENOUS at 05:57

## 2023-10-12 RX ADMIN — INSULIN LISPRO 3 UNITS: 100 INJECTION, SOLUTION INTRAVENOUS; SUBCUTANEOUS at 18:10

## 2023-10-12 RX ADMIN — SODIUM NITROPRUSSIDE 1 MCG/KG/MIN: 0.5 INJECTION, SOLUTION INTRAVENOUS at 16:56

## 2023-10-12 RX ADMIN — LEVOTHYROXINE SODIUM 50 MCG: 50 TABLET ORAL at 05:50

## 2023-10-12 RX ADMIN — FUROSEMIDE 20 MG/HR: 10 INJECTION, SOLUTION INTRAMUSCULAR; INTRAVENOUS at 09:00

## 2023-10-12 RX ADMIN — FUROSEMIDE 80 MG: 10 INJECTION, SOLUTION INTRAMUSCULAR; INTRAVENOUS at 09:22

## 2023-10-12 RX ADMIN — INSULIN GLARGINE 50 UNITS: 100 INJECTION, SOLUTION SUBCUTANEOUS at 09:26

## 2023-10-12 RX ADMIN — APIXABAN 5 MG: 5 TABLET, FILM COATED ORAL at 21:41

## 2023-10-12 RX ADMIN — ASPIRIN 81 MG CHEWABLE TABLET 81 MG: 81 TABLET CHEWABLE at 09:09

## 2023-10-12 RX ADMIN — PANTOPRAZOLE SODIUM 40 MG: 40 TABLET, DELAYED RELEASE ORAL at 09:23

## 2023-10-12 RX ADMIN — INSULIN LISPRO 24 UNITS: 100 INJECTION, SOLUTION INTRAVENOUS; SUBCUTANEOUS at 09:26

## 2023-10-12 RX ADMIN — GABAPENTIN 300 MG: 300 CAPSULE ORAL at 09:24

## 2023-10-12 RX ADMIN — INSULIN LISPRO 24 UNITS: 100 INJECTION, SOLUTION INTRAVENOUS; SUBCUTANEOUS at 18:10

## 2023-10-12 RX ADMIN — QUETIAPINE FUMARATE 50 MG: 25 TABLET ORAL at 21:41

## 2023-10-12 RX ADMIN — DOCUSATE SODIUM 200 MG: 100 CAPSULE, LIQUID FILLED ORAL at 09:08

## 2023-10-12 RX ADMIN — METOLAZONE 5 MG: 5 TABLET ORAL at 09:08

## 2023-10-12 RX ADMIN — SACUBITRIL AND VALSARTAN 1 TABLET: 24; 26 TABLET, FILM COATED ORAL at 09:08

## 2023-10-12 RX ADMIN — INSULIN LISPRO 24 UNITS: 100 INJECTION, SOLUTION INTRAVENOUS; SUBCUTANEOUS at 12:39

## 2023-10-12 RX ADMIN — METOPROLOL TARTRATE 25 MG: 25 TABLET, FILM COATED ORAL at 21:41

## 2023-10-12 RX ADMIN — ISOSORBIDE DINITRATE 40 MG: 20 TABLET ORAL at 09:08

## 2023-10-12 RX ADMIN — HYDRALAZINE HYDROCHLORIDE 100 MG: 100 TABLET, FILM COATED ORAL at 21:41

## 2023-10-12 RX ADMIN — DAPAGLIFLOZIN 10 MG: 10 TABLET, FILM COATED ORAL at 09:08

## 2023-10-12 RX ADMIN — DIGOXIN 125 MCG: 125 TABLET ORAL at 13:23

## 2023-10-12 RX ADMIN — SODIUM NITROPRUSSIDE 1.5 MCG/KG/MIN: 0.5 INJECTION, SOLUTION INTRAVENOUS at 18:30

## 2023-10-12 RX ADMIN — GABAPENTIN 600 MG: 300 CAPSULE ORAL at 21:41

## 2023-10-12 RX ADMIN — HEPARIN SODIUM 5000 UNITS: 5000 INJECTION INTRAVENOUS; SUBCUTANEOUS at 05:50

## 2023-10-12 RX ADMIN — ISOSORBIDE DINITRATE 40 MG: 20 TABLET ORAL at 20:13

## 2023-10-12 RX ADMIN — SODIUM NITROPRUSSIDE 2.5 MCG/KG/MIN: 0.5 INJECTION, SOLUTION INTRAVENOUS at 03:20

## 2023-10-12 RX ADMIN — INSULIN LISPRO 6 UNITS: 100 INJECTION, SOLUTION INTRAVENOUS; SUBCUTANEOUS at 12:40

## 2023-10-12 RX ADMIN — HYDRALAZINE HYDROCHLORIDE 100 MG: 100 TABLET, FILM COATED ORAL at 09:08

## 2023-10-12 RX ADMIN — ATORVASTATIN CALCIUM 80 MG: 80 TABLET ORAL at 09:08

## 2023-10-12 RX ADMIN — SACUBITRIL AND VALSARTAN 1 TABLET: 49; 51 TABLET, FILM COATED ORAL at 21:41

## 2023-10-12 RX ADMIN — INSULIN LISPRO 3 UNITS: 100 INJECTION, SOLUTION INTRAVENOUS; SUBCUTANEOUS at 09:28

## 2023-10-12 RX ADMIN — HYDRALAZINE HYDROCHLORIDE 100 MG: 100 TABLET, FILM COATED ORAL at 16:56

## 2023-10-12 RX ADMIN — APIXABAN 5 MG: 5 TABLET, FILM COATED ORAL at 09:09

## 2023-10-12 RX ADMIN — ISOSORBIDE DINITRATE 40 MG: 20 TABLET ORAL at 13:23

## 2023-10-12 RX ADMIN — METOPROLOL TARTRATE 25 MG: 25 TABLET, FILM COATED ORAL at 09:08

## 2023-10-12 RX ADMIN — FUROSEMIDE 20 MG/HR: 10 INJECTION, SOLUTION INTRAMUSCULAR; INTRAVENOUS at 23:58

## 2023-10-12 ASSESSMENT — COGNITIVE AND FUNCTIONAL STATUS - GENERAL
CLIMB 3 TO 5 STEPS WITH RAILING: A LOT
STANDING UP FROM CHAIR USING ARMS: A LITTLE
EATING MEALS: A LITTLE
MOVING FROM LYING ON BACK TO SITTING ON SIDE OF FLAT BED WITH BEDRAILS: A LITTLE
WALKING IN HOSPITAL ROOM: A LITTLE
CLIMB 3 TO 5 STEPS WITH RAILING: A LITTLE
DRESSING REGULAR UPPER BODY CLOTHING: A LITTLE
WALKING IN HOSPITAL ROOM: A LOT
MOVING TO AND FROM BED TO CHAIR: A LITTLE
DAILY ACTIVITIY SCORE: 18
PERSONAL GROOMING: A LITTLE
TOILETING: A LITTLE
MOBILITY SCORE: 20
STANDING UP FROM CHAIR USING ARMS: A LITTLE
TURNING FROM BACK TO SIDE WHILE IN FLAT BAD: A LITTLE
MOBILITY SCORE: 16
MOVING TO AND FROM BED TO CHAIR: A LITTLE
DRESSING REGULAR LOWER BODY CLOTHING: A LITTLE
HELP NEEDED FOR BATHING: A LITTLE

## 2023-10-12 ASSESSMENT — PAIN SCALES - GENERAL
PAINLEVEL_OUTOF10: 0 - NO PAIN

## 2023-10-12 ASSESSMENT — ENCOUNTER SYMPTOMS
ALLERGIC/IMMUNOLOGIC NEGATIVE: 1
EYES NEGATIVE: 1
ENDOCRINE NEGATIVE: 1
FATIGUE: 1

## 2023-10-12 ASSESSMENT — PAIN - FUNCTIONAL ASSESSMENT
PAIN_FUNCTIONAL_ASSESSMENT: 0-10

## 2023-10-12 ASSESSMENT — ACTIVITIES OF DAILY LIVING (ADL)
BATHING_ASSISTANCE: STAND BY
BATHING_DEFICIT: SETUP;SUPERVISION/SAFETY

## 2023-10-12 NOTE — PROGRESS NOTES
Felice Law is a 58 y.o. male on day 8 of admission presenting with Acute decompensated heart failure (CMS/HCC).    Subjective   Pt requires Diabetes Management following his admission for heart failure.  Patient was informed of plan to increase in his Premeal lispro insulin  Started on dapagliflozin by cardiology team, this will aid in HF and DM glc control  I have reviewed histories, allergies and medications have been reviewed        Objective   Review of Systems   Constitutional:  Positive for fatigue.   HENT: Negative.     Eyes: Negative.    Endocrine: Negative.    Genitourinary: Negative.    Skin: Negative.    Allergic/Immunologic: Negative.    Respiratory: SOB     Physical Exam  Vitals reviewed.   Constitutional:       General: He is not in acute distress.     Appearance: Normal appearance. He is obese.   HENT:      Head: Normocephalic and atraumatic.      Nose: Nose normal.      Mouth/Throat:      Mouth: Mucous membranes are moist.   Eyes:      Extraocular Movements: Extraocular movements intact.      Conjunctiva/sclera: Conjunctivae normal.      Pupils: Pupils are equal, round, and reactive to light.   Cardiovascular:      Rate and Rhythm: Normal rate and regular rhythm.      Pulses: Normal pulses.   Pulmonary:      Effort: Pulmonary effort is normal. No respiratory distress.      Breath sounds: Normal breath sounds.      Comments: SOB  Abdominal:      General: Abdomen is flat. There is no distension.   Musculoskeletal:         General: Normal range of motion.      Right lower leg: Edema present.      Left lower leg: Edema present.   Skin:     General: Skin is warm and dry.      Findings: No rash.   Neurological:      General: No focal deficit present.      Mental Status: He is alert and oriented to person, place, and time.   Psychiatric:         Mood and Affect: Mood normal.         Behavior: Behavior normal.         Last Recorded Vitals  Blood pressure 106/78, pulse 96, temperature 36.1 °C (97  "°F), resp. rate 17, height 1.803 m (5' 10.98\"), weight 122 kg (269 lb 6.4 oz), SpO2 90 %.  Intake/Output last 3 Shifts:  I/O last 3 completed shifts:  In: 964.9 (7.9 mL/kg) [P.O.:120; I.V.:844.9 (6.9 mL/kg)]  Out: 2550 (20.9 mL/kg) [Urine:2550 (0.6 mL/kg/hr)]  Weight: 122.2 kg     Relevant Results  Results from last 7 days   Lab Units 10/12/23  1236 10/12/23  0745 10/12/23  0309 10/11/23  1952 10/11/23  1623 10/11/23  1318 10/11/23  0824 10/11/23  0223 10/10/23  0821 10/10/23  0539 10/09/23  0802 10/09/23  0338 10/08/23  0747 10/08/23  0258   POCT GLUCOSE mg/dL 238* 158*  --  205* 184* 233*   < >  --    < >  --    < >  --    < >  --    GLUCOSE mg/dL  --   --  138*  --   --   --   --  163*  --  156*  --  90  --  205*    < > = values in this interval not displayed.       Scheduled medications  apixaban, 5 mg, oral, BID  aspirin, 81 mg, oral, Daily  atorvastatin, 80 mg, oral, Daily  dapagliflozin propanediol, 10 mg, oral, Daily  digoxin, 125 mcg, oral, Daily  docusate sodium, 200 mg, oral, BID  gabapentin, 300 mg, oral, q AM  gabapentin, 600 mg, oral, Nightly  hydrALAZINE, 100 mg, oral, TID  insulin glargine, 50 Units, subcutaneous, q24h  insulin lispro, 0-15 Units, subcutaneous, TID with meals  insulin lispro, 24 Units, subcutaneous, TID with meals  isosorbide dinitrate, 40 mg, oral, TID  levothyroxine, 50 mcg, oral, Daily  metoprolol tartrate, 25 mg, oral, BID  pantoprazole, 40 mg, oral, Daily before breakfast  polyethylene glycol-electrolytes, 4,000 mL, oral, Once  potassium chloride CR, 40 mEq, oral, Daily  QUEtiapine, 50 mg, oral, Nightly  sacubitriL-valsartan, 1 tablet, oral, BID      Continuous medications  furosemide, 20 mg/hr, Last Rate: 20 mg/hr (10/12/23 1317)  nitroprusside, 0.25-5 mcg/kg/min, Last Rate: 0.5 mcg/kg/min (10/12/23 1317)      PRN medications  PRN medications: acetaminophen, albuterol, calcium carbonate, dextrose 10 % in water (D10W), dextrose, glucagon, ondansetron, oxygen, polyethylene " glycol    Results for orders placed or performed during the hospital encounter of 10/04/23 (from the past 24 hour(s))   POCT GLUCOSE   Result Value Ref Range    POCT Glucose 184 (H) 74 - 99 mg/dL   BLOOD GAS MIXED VENOUS FULL PANEL   Result Value Ref Range    POCT pH, Mixed 7.39 7.33 - 7.43 pH    POCT pCO2, Mixed 42 41 - 51 mm Hg    POCT pO2, Mixed 39 35 - 45 mm Hg    POCT SO2, Mixed 60 45 - 75 %    POCT Oxy Hemoglobin, Mixed 58.7 45.0 - 75.0 %    POCT Hematocrit Calculated, Mixed 34.0 (L) 41.0 - 52.0 %    POCT Sodium, Mixed 132 (L) 136 - 145 mmol/L    POCT Potassium, Mixed 4.9 3.5 - 5.3 mmol/L    POCT Chloride, Mixed 103 98 - 107 mmol/L    POCT Ionized Calcium, Mixed 1.11 1.10 - 1.33 mmol/L    POCT Glucose, Mixed 183 (H) 74 - 99 mg/dL    POCT Lactate, Mixed 1.3 0.4 - 2.0 mmol/L    POCT Base Excess, Mixed 0.3 -2.0 - 3.0 mmol/L    POCT HCO3 Calculated, Mixed 25.4 22.0 - 26.0 mmol/L    POCT Hemoglobin, Mixed 11.2 (L) 13.5 - 17.5 g/dL    POCT Anion Gap, Mixed 9 (L) 10 - 25 mmo/L    Patient Temperature 37.0 degrees Celsius    FiO2 0 %   POCT GLUCOSE   Result Value Ref Range    POCT Glucose 205 (H) 74 - 99 mg/dL   Renal function panel   Result Value Ref Range    Glucose 138 (H) 74 - 99 mg/dL    Sodium 135 (L) 136 - 145 mmol/L    Potassium 4.3 3.5 - 5.3 mmol/L    Chloride 103 98 - 107 mmol/L    Bicarbonate 26 21 - 32 mmol/L    Anion Gap 10 10 - 20 mmol/L    Urea Nitrogen 66 (H) 6 - 23 mg/dL    Creatinine 1.60 (H) 0.50 - 1.30 mg/dL    eGFR 50 (L) >60 mL/min/1.73m*2    Calcium 8.7 8.6 - 10.6 mg/dL    Phosphorus 3.7 2.5 - 4.9 mg/dL    Albumin 3.5 3.4 - 5.0 g/dL   CBC   Result Value Ref Range    WBC 5.3 4.4 - 11.3 x10*3/uL    nRBC 0.0 0.0 - 0.0 /100 WBCs    RBC 4.26 (L) 4.50 - 5.90 x10*6/uL    Hemoglobin 10.2 (L) 13.5 - 17.5 g/dL    Hematocrit 33.0 (L) 41.0 - 52.0 %    MCV 78 (L) 80 - 100 fL    MCH 23.9 (L) 26.0 - 34.0 pg    MCHC 30.9 (L) 32.0 - 36.0 g/dL    RDW 19.3 (H) 11.5 - 14.5 %    Platelets 226 150 - 450 x10*3/uL     MPV 9.7 7.5 - 11.5 fL   Magnesium   Result Value Ref Range    Magnesium 2.01 1.60 - 2.40 mg/dL   POCT GLUCOSE   Result Value Ref Range    POCT Glucose 158 (H) 74 - 99 mg/dL   POCT GLUCOSE   Result Value Ref Range    POCT Glucose 238 (H) 74 - 99 mg/dL       Results from last 7 days   Lab Units 10/12/23  0309 10/06/23  0515 10/05/23  1647   SODIUM mmol/L 135*   < > 132*   POTASSIUM mmol/L 4.3   < > 4.1   CHLORIDE mmol/L 103   < > 90*   CO2 mmol/L 26   < > 28   BUN mg/dL 66*   < > 60*   CREATININE mg/dL 1.60*   < > 1.87*   CALCIUM mg/dL 8.7   < > 10.2   ALBUMIN g/dL 3.5   < > 4.1   PROTEIN TOTAL g/dL  --   --  7.9   BILIRUBIN TOTAL mg/dL  --   --  1.8*   ALK PHOS U/L  --   --  63   ALT U/L  --   --  11   AST U/L  --   --  13   GLUCOSE mg/dL 138*   < > 296*    < > = values in this interval not displayed.         Assessment/Plan   Principal Problem:    Acute decompensated heart failure (CMS/HCC)  Active Problems:    Cardiogenic shock (CMS/HCC)    Central sleep apnea with Cheyne-Chirinos respiration    Coronary artery disease due to calcified coronary lesion    Diabetes mellitus (CMS/HCC)    JAKE (obstructive sleep apnea)    Ischemic cardiomyopathy    Type 2 Diabetes   DM2 with hyperglycemia  - A1C 8.1% 10/4/23    - home DM regimen : trulicity 1.5mg qweek, glargine 85u once daily, lispro 34u with meals and 26u at bedtime  - managed by: PCP    PLAN  - Goal BG <180  -continue farxiga 10mg once daily  - continue glargine 50 units once daily- reduce to 40u if pt is NPO  - increase lispro to 25u with meals - may hold if glc prior to meal is <90 or NPO  - continue lispro corrective scale #3 to with meals- adjust to lispro scale #2 q6h if pt is NPO  151-200 = 3u  201-250 = 6u  251-300 = 9u  301-350 = 12u  351-400 = 15u        -Accuchecks TIDAC and QHS- kindly ensure QHS Accucheck is drawn; it is often missed   -Hypoglycemia protocol  -DM diet 75g CHO  -Will continue to follow and titrate insulin accordingly     Discharge planning-    Insulin dose TBD by titration, may resume home trulicity at d/c  Pt sees PCP at [facility], recommend  or [facility] endocrinology follow up at discharge- pt states he would like  endo follow up, will ensure central sched request is placed prior to discharge    I spent 30 minutes on the care and coordination of this patient.     OMAR AlvaradoC

## 2023-10-12 NOTE — PROGRESS NOTES
[facility]  HFICU PROGRESS NOTE    Felice Law/[id number]    Admit Date: 10/4/2023  Hospital Length of Stay: 8   ICU Length of Stay: 8    INTERVAL EVENTS / PERTINENT ROS:   Patient seen and examined at bedside. Had some transient shortness of breath today. Otherwise he feels well.    Plan:  -Lasix 80 mg IV push then start infusion at 20 mg/hr  -Increase entresto to 49-51 mg BID  -Increase hydralazine to 100 mg TID, isordil to 40 mg TID  -Start dapagliflozin 10 mg  -Wean Nipride    MEDICATIONS  Infusions:  furosemide  nitroprusside, Last Rate: 2.5 mcg/kg/min (10/12/23 0926)      Scheduled:  apixaban, 5 mg, BID  aspirin, 81 mg, Daily  atorvastatin, 80 mg, Daily  dapagliflozin propanediol, 10 mg, Daily  digoxin, 125 mcg, Daily  docusate sodium, 200 mg, BID  gabapentin, 300 mg, q AM  gabapentin, 600 mg, Nightly  hydrALAZINE, 100 mg, TID  insulin glargine, 50 Units, q24h  insulin lispro, 0-15 Units, TID with meals  insulin lispro, 24 Units, TID with meals  isosorbide dinitrate, 40 mg, TID  levothyroxine, 50 mcg, Daily  metoprolol tartrate, 25 mg, BID  pantoprazole, 40 mg, Daily before breakfast  polyethylene glycol-electrolytes, 4,000 mL, Once  potassium chloride CR, 40 mEq, Daily  QUEtiapine, 50 mg, Nightly  sacubitriL-valsartan, 1 tablet, BID      PRN:  acetaminophen, 975 mg, q6h PRN  albuterol, 2.5 mg, q6h PRN  calcium carbonate, 500 mg, BID PRN  dextrose 10 % in water (D10W), 0.3 g/kg/hr, Once PRN  dextrose, 25 g, q15 min PRN  glucagon, 1 mg, q15 min PRN  ondansetron, 4 mg, q4h PRN  oxygen, , Continuous PRN - O2/gases  polyethylene glycol, 17 g, Daily PRN      Invasive Hemodynamics:    Most Recent Range Past 24hrs   BP (Art)   No data recorded   MAP(Art)   No data recorded   RA/CVP 16 mmHg CVP (mean)  Min: 16 mmHg  Max: 16 mmHg   PA 48/28 PAP  Min: 48/28  Max: 48/28   PA(mean) 35 mmHg PAP (Mean)  Min: 35 mmHg  Max: 35 mmHg   PCWP 16 mmHg PCWP (mmHg)  Min: 16 mmHg  Max: 16 mmHg  "  CO 6.3 L/min CO (L/min)  Min: 6.3 L/min  Max: 6.3 L/min   CI 2.75 L/min/m2 CI (L/min/m2)  Min: 2.75 L/min/m2  Max: 2.75 L/min/m2   Mixed Venous   No data recorded   SVR  711 (dyne*sec)/cm5 SVR (dyne*sec)/cm5  Min: 711 (dyne*sec)/cm5  Max: 711 (dyne*sec)/cm5    (dyne*sec)/cm5 PVR (dyne*sec)/cm5  Min: 241 (dyne*sec)/cm5  Max: 241 (dyne*sec)/cm5     PHYSICAL EXAM:   Visit Vitals  /71   Pulse 102   Temp 36.1 °C (97 °F)   Resp 18   Ht 1.803 m (5' 10.98\")   Wt 122 kg (269 lb 6.4 oz)   SpO2 94%   BMI 37.59 kg/m²   Smoking Status Former   BSA 2.47 m²     Wt Readings from Last 5 Encounters:   10/12/23 122 kg (269 lb 6.4 oz)     INTAKE/OUTPUT:  I/O last 3 completed shifts:  In: 964.9 (7.9 mL/kg) [P.O.:120; I.V.:844.9 (6.9 mL/kg)]  Out: 2550 (20.9 mL/kg) [Urine:2550 (0.6 mL/kg/hr)]  Weight: 122.2 kg      Physical Exam  Constitutional:       General: He is not in acute distress.     Appearance: Normal appearance.   HENT:      Head: Normocephalic.      Mouth/Throat:      Mouth: Mucous membranes are moist.   Eyes:      Extraocular Movements: Extraocular movements intact.      Conjunctiva/sclera: Conjunctivae normal.   Cardiovascular:      Rate and Rhythm: Normal rate and regular rhythm.      Heart sounds: Normal heart sounds. No murmur heard.  Pulmonary:      Effort: Pulmonary effort is normal.      Breath sounds: Normal breath sounds.   Abdominal:      General: Bowel sounds are normal.      Palpations: Abdomen is soft.   Musculoskeletal:         General: Normal range of motion.   Skin:     General: Skin is warm and dry.   Neurological:      General: No focal deficit present.      Mental Status: He is alert and oriented to person, place, and time.   Psychiatric:         Mood and Affect: Mood normal.       DATA:  CMP:  Results from last 7 days   Lab Units 10/12/23  0309 10/11/23  0223 10/10/23  0539 10/09/23  0338 10/08/23  0258 10/07/23  1704 10/07/23  0105 10/06/23  0515 10/05/23  1647   SODIUM mmol/L 135* 136 137 " "135* 134* 130* 131* 130* 132*   POTASSIUM mmol/L 4.3 4.3 4.5 3.8 3.7 3.7 3.8 4.3 4.1   CHLORIDE mmol/L 103 103 100 97* 95* 93* 91* 92* 90*   CO2 mmol/L 26 26 27 28 29 26 28 30 28   ANION GAP mmol/L 10 11 15 14 14 15 16 12 18   BUN mg/dL 66* 60* 64* 68* 78* 81* 75* 65* 60*   CREATININE mg/dL 1.60* 1.40* 1.47* 1.35* 1.74* 2.17* 1.78* 2.09* 1.87*   EGFR mL/min/1.73m*2 50* 59* 55* 61 45* 35* 44* 36* 41*   MAGNESIUM mg/dL 2.01 2.05 2.19 2.20 2.18 1.96 2.08  --  1.76   ALBUMIN g/dL 3.5 3.5 3.6 3.8 3.7 3.6 3.7 3.6 4.1   ALT U/L  --   --   --   --   --   --   --   --  11   AST U/L  --   --   --   --   --   --   --   --  13   BILIRUBIN TOTAL mg/dL  --   --   --   --   --   --   --   --  1.8*       CBC:  Results from last 7 days   Lab Units 10/12/23  0309 10/11/23  0223 10/10/23  0539 10/09/23  0338 10/08/23  0258 10/07/23  0105 10/06/23  0515 10/05/23  1647   WBC AUTO x10*3/uL 5.3 4.9 6.1 5.6 5.6 5.9 6.5 7.8   HEMOGLOBIN g/dL 10.2* 10.2* 9.0* 11.8* 11.7* 11.7* 11.6* 12.9*   HEMATOCRIT % 33.0* 33.6* 29.9* 38.6* 36.7* 36.3* 36.8* 40.8*   PLATELETS AUTO x10*3/uL 226 206 252 240 211 200 215 255   MCV fL 78* 78* 80 79* 76* 76* 77* 76*       COAG:   Results from last 7 days   Lab Units 10/05/23  1647   INR  1.2*       ABO: No results found for: \"ABO\"  HEME/ENDO:         CARDIAC:   Results from last 7 days   Lab Units 10/05/23  1647   LD U/L 240   BNP pg/mL 157*       Chest Radiograph  === 10/04/23 ===    XR CHEST 1 VIEW    - Impression -  1. Similar mild interstitial pulmonary edema and mild bibasilar  atelectasis.  2. Medical devices as above. No pneumothorax.    Signed by: Claudio Yancey 10/8/2023 9:37 AM  Dictation workstation:   [id number]    CT:   === 10/04/23 ===    CT CHEST WO IV CONTRAST    - Impression -  1.  Mild cardiomegaly with severe coronary artery calcification.  Status post median sternotomy and CABG.  2. Close proximity of the inner table of sternum and cardiovascular  structures as described above. Medical devices " "as described above.  3. Bandlike atelectasis in bilateral mid and lower lungs. Trace  bilateral pleural effusion. No focal consolidation in the lungs.    MACRO:  None    Signed by: Ke Lau 10/6/2023 10:45 AM  Dictation workstation:   [id number]     Prior to Admission Meds:  Medications Prior to Admission   Medication Sig Dispense Refill Last Dose    dulaglutide (Trulicity) 1.5 mg/0.5 mL pen injector injection Inject 1.5 mg under the skin 1 (one) time per week.   Unknown    furosemide (Lasix) 40 mg tablet Take 2 tablets (80 mg) by mouth twice a day.   Unknown    lancets (Lancets,Thin) misc 1 Each 4 times daily (before meals and at bedtime). use as directed to test blood sugar   Unknown    metoprolol succinate XL (Toprol-XL) 50 mg 24 hr tablet Take 3 tablets (150 mg) by mouth once daily.   Unknown    OneTouch Ultra Test strip REVISE EL NIVEL DE AZUCAR EN LA PEARL CUATRO VECES AL CUBA   Unknown    pen needle, diabetic, safety 29 gauge x 1/2\" needle Three times before meals and at bedtime   Unknown    sacubitriL-valsartan (Entresto)  mg tablet Take 1 tablet by mouth twice a day.   Unknown    simethicone (Mylicon) 80 mg chewable tablet Chew 1 tablet (80 mg) every 6 hours if needed for flatulence.   Unknown    spironolactone (Aldactone) 25 mg tablet Take 1 tablet (25 mg) by mouth once daily.   Unknown       ASSESSMENT AND PLAN:     58 year old [place] male with history of HFrEF/ICM (LVEF 20% 8/14/23), CAD s/p CABG (LIMA-LAD with balloon pump support April 2023), Hx of cardiac arrest s/p VT ablation + ICD, HTN/HLD, CKD, Hx of DVT (L femoral) on Eliquis, hypothyroidism, pulmonary hypertension, T2DM, JAKE/OHS, former tobacco abuse (24 pack year, quit 2017), presented to [facility] on 10/1 with exertional left sided chest pain and SOB x 3 days. He was admitted in cardiogenic shock, started on dobutamine under the guidance of SGC therapy. Patient was than transferred to [facility] for advanced therapy evaluation " on 10/4. Advanced therapies evaluation was initiated on 10/4 and presented in committee meeting on 10/10. Not approved for LVAD or transplant at this time. Plan to optimize medical therapy and follow up closely in HF clinic. May be brought back to the committee in the future.     Neuro:  #Depression  - Serial neuro and pain assessments   - PO Tylenol PRN for pain  - c/w renally dosed gabapentin   - PT/OT Consult, OOB to chair  - CAM ICU score every shift  - Sleep/wake cycle normalization     # Physical Status  -Obesity  -Functional at home.      Cardiovascular:  # Acute on chronic HFrEF/ICM systolic and diastolic heart failure  - TTE at [facility] 8/2023 with EF ~20%, globally abnormal RV systolic function. Noninvasive hemodynamic assessment is consistent with severe pulmonary hypertension (>60 mmHg), an elevated CVP, an elevated LVEDP.  - admit weight 246lbs  - Daily weight   - admit   - Wean Nipride  - Increase hydralazine 100 mg TID and isordil 40 mg TID  - Continue metoprolol tartrate 25 mg BID   - Continue po digoixn 125 mcg   - Increased Entresto 49-51 mg BID  - Start dapagliflozin 10 mg  - Lasix 80 mg iv once and then start infusion at 20 mg/hr  - Daily standing weights, 2gm sodium diet, 2L fluid restriction, strict I&Os     #Advanced Therapies Evaluation   -Presented to selection committee 10/10. Determined to be very high risk for advanced therapies at this time given poorly controlled diabetes, renal function, peripheral vascular questions, questionable psychosocial support, financial concerns. Plan will be to medically optimize him this admission. Follow up with HF clinic for further decision on LVAD candidacy in the future.  -Completed evaluation 10/10.  -Discussed with Vascular medicine about the PVR report. Poor perfusion likely from his heart failure, no evidence of PAD noted.     #H/o VT arrest s/o VT ablation, ICD (2017 in [facility])  #CAD s/p CABG LIMA-LAD 4/2023 Dr Logan   - [facility] December  2022: severe diffuse three-vessel coronary artery disease. Severe proximal diffuse LAD disease up to 70%. Severe diffuse proximal and mid first diagonal disease. Chronic total occlusion of OM1 with left-to-left collaterals. Chronic total occlusion of mid RCA with left-to-right collaterals. Markedly elevated LVEDP at 40 mm of Hg.  - Elective CABG (LIMA-LAD) 4/2023 by Dr Logan   - Continue Aspirin 81mg.  - Continue Atorvastatin 80mg.      #Arrhythmias - Hx of VT s/p ablation + ICD  -Device - Medtronic ICD; interrogation completed 10/6   -Not on antiarrhythmic outpatient.     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  #Tobacco use history (24 pack year smoking hx)  -Monitor and maintain SpO2 > 92%  - PRN albuterol nebs for wheezing      GI:  - Colace BID, Miralax PRN ordered for bowel regimen   - Continue Protonix 40mg daily     :  #CKD  -Admit BUN/Cr 67/2.00  - daily BUN/Cr (10/11): 60/1.40  - diurese as hemodynamics and physical exam dictate   - Strict I/Os  - Avoid hypotension and nephrotoxic agents     Heme:  #Anemia in the setting of CKD  - Labs: Iron saturation 15%  - Venofer ordered 200 mg x3 days      #Hx of L femoral DVT  - Resume Eliquis today after swan was removed      Endo:  #IDDM2  - A1C 8.1% 10/4/23  - home DM regimen : trulicity 1.5mg qweek, glargine 85u once daily, lispro 34u with meals and 26u at bedtime  - Goal BG <180  - glargine 50 units once daily  - lispro to 24u with meals   - c/w lispro corrective scale #3 to with meals  - Low carb 75 g diet  - Endo following, appreciate recs     #Hypothyroidism  -TSH 9.31 on 10/4, T4 10/8 1.04  -Continue home levothyroxine 50 mcg      ID:  -afebrile, nontoxic   -no s/s infx  -trend temps q4h     Feeding/fluids: low carb 75g diet, 2L fluid restriction  Thromboprophylaxis: SCDs, eliquis  VAP bundle: n/a  Ulcer prophylaxis: PPI  Glycemic control: Lantus and SSI   Bowel care: Colace & miralax prn  Indwelling catheter: None     Lines:   PIVs      DVT:  NUTRITION: Adult diet Carb Controlled; 75 gram carb/meal, 45 gram Carb evening snack  EMERGENCY CONTACT: Extended Emergency Contact Information  Primary Emergency Contact: mother,mother  Mobile Phone: [phone]  Relation: Mother  Secondary Emergency Contact: FriendRobinson  Mobile Phone: [phone]  Relation: Friend  CODE STATUS: Full Code  DISPO:  Continue HF ICU management  FOLLOWUP:   No future appointments.    _________________________________________________  Phillip Salinas, DO PGY-4  Heart Failure Fellow

## 2023-10-12 NOTE — CARE PLAN
Problem: Pain - Adult  Goal: Verbalizes/displays adequate comfort level or baseline comfort level  Outcome: Progressing     Problem: Safety - Adult  Goal: Free from fall injury  Outcome: Progressing     Problem: Discharge Planning  Goal: Discharge to home or other facility with appropriate resources  Outcome: Progressing     Problem: Chronic Conditions and Co-morbidities  Goal: Patient's chronic conditions and co-morbidity symptoms are monitored and maintained or improved  Outcome: Progressing     Problem: Heart Failure  Goal: Improved gas exchange this shift  Outcome: Progressing  Goal: Improved urinary output this shift  Outcome: Progressing  Goal: Reduction in peripheral edema within 24 hours  Outcome: Progressing  Goal: Report improvement of dyspnea/breathlessness this shift  Outcome: Progressing  Goal: Weight from fluid excess reduced over 2-3 days, then stabilize  Outcome: Progressing  Goal: Increase self care and/or family involvement in 24 hours  Outcome: Progressing   The patient's goals for the shift include      The clinical goals for the shift include sleep and o2 as needed

## 2023-10-12 NOTE — PROGRESS NOTES
"Physical Therapy    Physical Therapy Treatment    Patient Name: Felice Law  MRN: [id number]  Today's Date: 10/12/2023  Time Calculation  Start Time: 1102  Stop Time: 1120  Time Calculation (min): 18 min       Assessment/Plan   PT Assessment  PT Assessment Results: Decreased strength, Decreased endurance, Impaired balance, Decreased mobility  Rehab Prognosis: Good  End of Session Communication: Bedside nurse  End of Session Patient Position: Up in chair, Alarm off, not on at start of session  PT Plan  Inpatient/Swing Bed or Outpatient: Inpatient  PT Plan  Treatment/Interventions: Bed mobility, Transfer training, Gait training, Balance training, Strengthening  PT Plan: Skilled PT  PT Frequency: 5 times per week  PT Discharge Recommendations: Low intensity level of continued care  PT Recommended Transfer Status: Assist x1      General Visit Information:   PT  Visit  PT Received On: 11/02/23  Response to Previous Treatment: Patient with no complaints from previous session.  General  Prior to Session Communication: Bedside nurse  Patient Position Received: Up in chair, Alarm off, not on at start of session  General Comment: Upon standing and ambulating in room, pt reports significant dizziness and states \"I can't walk today.\" Returned to chair and dizziness subsided, but pt continued to decline to amb, CHEIKH, RN notified.    Subjective   Precautions:  Precautions  Medical Precautions: Cardiac precautions  Precautions Comment: Pt usually wears B/L AFO's 2/2 chronic foot drop. Pt reports he does not need them for shorter distances, only out in the community.  Vital Signs:  Vital Signs  Heart Rate: 103  Heart Rate Source: Monitor  Resp: 21  BP: 103/72  BP Method: Automatic    Objective   Pain:  Pain Assessment  Pain Assessment: 0-10  Pain Score: 0 - No pain    Treatments:  Therapeutic Exercise  Therapeutic Exercise Performed: Yes  Therapeutic Exercise Activity 1: B LE seated AP, LAQs 2x10, alt LE seated march " 8x/LE    Balance/Neuromuscular Re-Education  Balance/Neuromuscular Re-Education Activity Performed: Yes  Balance/Neuromuscular Re-Education Activity 1: Static stand with single UE support on IV pole 2x~3min with SBA    Ambulation/Gait Training  Ambulation/Gait Training Performed: Yes  Ambulation/Gait Training 1  Surface 1: Level tile  Device 1: No device  Assistance 1: Close supervision, Contact guard  Comments/Distance (ft) 1: 8' away from chair, pt then c/o dizziness and reports need to return to chair. CGA provided after c/o dizziness for safety, but no LOB noted.  Transfer 1  Transfer From 1: Sit to, Stand to  Transfer to 1: Sit  Technique 1: Sit to stand, Stand to sit  Transfer Level of Assistance 1: Close supervision       Outcome Measures:       [facility] Basic Mobility  Turning from your back to your side while in a flat bed without using bedrails: None  Moving from lying on your back to sitting on the side of a flat bed without using bedrails: None  Moving to and from bed to chair (including a wheelchair): A little  Standing up from a chair using your arms (e.g. wheelchair or bedside chair): A little  To walk in hospital room: A little  Climbing 3-5 steps with railing: A little  Basic Mobility - Total Score: 20    FSS-ICU  Ambulation: Walks <50 feet with any assistance x1 or walks any distance with assistance x2 people (Limited by c/o dizziness this date.)  Rolling: Complete independence  Sitting: Complete independence  Transfer Sit-to-Stand: Supervision or set-up only  Transfer Supine-to-Sit: Modified independence, requires use of assistive device  Total Score: 26    Education Documentation  No documentation found.  Education Comments  No comments found.        OP EDUCATION:  Education  Individual(s) Educated: Patient  Education Provided: Body Mechanics  Patient Response to Education: Patient/Caregiver Verbalized Understanding of Information, Patient/Caregiver Performed Return Demonstration of  Exercises/Activities    Encounter Problems       Encounter Problems (Active)       General       Pt will amb x 550 ft, no rest breaks, with stable vitals, RPD 3/10 or less, RPE 13/20 or less      indep with transfers: sit <>stand, with LRD (Progressing)       Start:  10/06/23    Expected End:  10/20/23            Score 24/28 on Tinetti  (Progressing)       Start:  10/06/23    Expected End:  10/20/23               General        Pt will perform a 5x STS, in 10 seconds or less, with RPD 3/10 or less, RPE 13/20 or less with stable vitals.       Pt will perform a 6MWT, with distance greater than 950 ft  with stable vitals, RPD 3/10 or less, RPE 13/20 or less.  (Progressing)       Start:  10/06/23    Expected End:  10/20/23               Pain - Adult          Transfers       Pt will perform a 5x STS, in 10 seconds or less, with RPD 3/10 or less, RPE 13/20 or less with stable vitals.  (Progressing)       Start:  10/06/23    Expected End:  10/20/23            Goal 2 (Progressing)       Start:  10/06/23

## 2023-10-12 NOTE — CARE PLAN
Problem: General   Description: Pt will perform a 5x STS, in 10 seconds or less, with RPD 3/10 or less, RPE 13/20 or less with stable vitals.   Goal: Pt will perform a 6MWT, with distance greater than 950 ft  with stable vitals, RPD 3/10 or less, RPE 13/20 or less.   Outcome: Progressing     Problem: General  Description: Pt will amb x 550 ft, no rest breaks, with stable vitals, RPD 3/10 or less, RPE 13/20 or less  Goal: indep with transfers: sit <>stand, with LRD  Outcome: Progressing  Goal: Score 24/28 on Tinetti   Outcome: Progressing     Problem: Transfers  Goal: Pt will perform a 5x STS, in 10 seconds or less, with RPD 3/10 or less, RPE 13/20 or less with stable vitals.   Outcome: Progressing  Goal: Goal 2  Outcome: Progressing

## 2023-10-12 NOTE — CARE PLAN
The patient's goals for the shift include      The clinical goals for the shift include sleep and o2 as needed    Over the shift, the patient did not make progress toward the following goals. Barriers to progression include . Recommendations to address these barriers include .

## 2023-10-13 LAB
ALBUMIN SERPL BCP-MCNC: 3.8 G/DL (ref 3.4–5)
ANION GAP SERPL CALC-SCNC: 13 MMOL/L (ref 10–20)
BUN SERPL-MCNC: 64 MG/DL (ref 6–23)
CALCIUM SERPL-MCNC: 9.2 MG/DL (ref 8.6–10.6)
CHLORIDE SERPL-SCNC: 98 MMOL/L (ref 98–107)
CO2 SERPL-SCNC: 28 MMOL/L (ref 21–32)
CREAT SERPL-MCNC: 1.66 MG/DL (ref 0.5–1.3)
DIGOXIN SERPL-MCNC: 0.39 NG/ML (ref 0.8–?)
ERYTHROCYTE [DISTWIDTH] IN BLOOD BY AUTOMATED COUNT: 19.1 % (ref 11.5–14.5)
GFR SERPL CREATININE-BSD FRML MDRD: 48 ML/MIN/1.73M*2
GLUCOSE BLD MANUAL STRIP-MCNC: 197 MG/DL (ref 74–99)
GLUCOSE BLD MANUAL STRIP-MCNC: 245 MG/DL (ref 74–99)
GLUCOSE BLD MANUAL STRIP-MCNC: 277 MG/DL (ref 74–99)
GLUCOSE SERPL-MCNC: 218 MG/DL (ref 74–99)
HCT VFR BLD AUTO: 35.6 % (ref 41–52)
HGB BLD-MCNC: 10.7 G/DL (ref 13.5–17.5)
MAGNESIUM SERPL-MCNC: 2.03 MG/DL (ref 1.6–2.4)
MCH RBC QN AUTO: 24 PG (ref 26–34)
MCHC RBC AUTO-ENTMCNC: 30.1 G/DL (ref 32–36)
MCV RBC AUTO: 80 FL (ref 80–100)
NRBC BLD-RTO: 0 /100 WBCS (ref 0–0)
PHOSPHATE SERPL-MCNC: 4.7 MG/DL (ref 2.5–4.9)
PLATELET # BLD AUTO: 264 X10*3/UL (ref 150–450)
PMV BLD AUTO: 9.8 FL (ref 7.5–11.5)
POTASSIUM SERPL-SCNC: 4.1 MMOL/L (ref 3.5–5.3)
RBC # BLD AUTO: 4.46 X10*6/UL (ref 4.5–5.9)
SODIUM SERPL-SCNC: 135 MMOL/L (ref 136–145)
WBC # BLD AUTO: 5.8 X10*3/UL (ref 4.4–11.3)

## 2023-10-13 PROCEDURE — 2500000001 HC RX 250 WO HCPCS SELF ADMINISTERED DRUGS (ALT 637 FOR MEDICARE OP): Performed by: INTERNAL MEDICINE

## 2023-10-13 PROCEDURE — 2020000001 HC ICU ROOM DAILY

## 2023-10-13 PROCEDURE — 36415 COLL VENOUS BLD VENIPUNCTURE: CPT | Performed by: STUDENT IN AN ORGANIZED HEALTH CARE EDUCATION/TRAINING PROGRAM

## 2023-10-13 PROCEDURE — 2500000004 HC RX 250 GENERAL PHARMACY W/ HCPCS (ALT 636 FOR OP/ED): Performed by: STUDENT IN AN ORGANIZED HEALTH CARE EDUCATION/TRAINING PROGRAM

## 2023-10-13 PROCEDURE — 83735 ASSAY OF MAGNESIUM: CPT

## 2023-10-13 PROCEDURE — 2500000001 HC RX 250 WO HCPCS SELF ADMINISTERED DRUGS (ALT 637 FOR MEDICARE OP): Performed by: STUDENT IN AN ORGANIZED HEALTH CARE EDUCATION/TRAINING PROGRAM

## 2023-10-13 PROCEDURE — 82947 ASSAY GLUCOSE BLOOD QUANT: CPT

## 2023-10-13 PROCEDURE — 80069 RENAL FUNCTION PANEL: CPT | Performed by: STUDENT IN AN ORGANIZED HEALTH CARE EDUCATION/TRAINING PROGRAM

## 2023-10-13 PROCEDURE — 97110 THERAPEUTIC EXERCISES: CPT | Mod: GP

## 2023-10-13 PROCEDURE — 2500000004 HC RX 250 GENERAL PHARMACY W/ HCPCS (ALT 636 FOR OP/ED): Performed by: NURSE PRACTITIONER

## 2023-10-13 PROCEDURE — 80162 ASSAY OF DIGOXIN TOTAL: CPT | Performed by: INTERNAL MEDICINE

## 2023-10-13 PROCEDURE — 85027 COMPLETE CBC AUTOMATED: CPT | Mod: CMCLAB | Performed by: STUDENT IN AN ORGANIZED HEALTH CARE EDUCATION/TRAINING PROGRAM

## 2023-10-13 PROCEDURE — 2500000001 HC RX 250 WO HCPCS SELF ADMINISTERED DRUGS (ALT 637 FOR MEDICARE OP)

## 2023-10-13 PROCEDURE — 97116 GAIT TRAINING THERAPY: CPT | Mod: GP

## 2023-10-13 PROCEDURE — 99231 SBSQ HOSP IP/OBS SF/LOW 25: CPT

## 2023-10-13 PROCEDURE — 99291 CRITICAL CARE FIRST HOUR: CPT | Performed by: STUDENT IN AN ORGANIZED HEALTH CARE EDUCATION/TRAINING PROGRAM

## 2023-10-13 RX ORDER — INSULIN GLARGINE 100 [IU]/ML
55 INJECTION, SOLUTION SUBCUTANEOUS EVERY 24 HOURS
Status: DISCONTINUED | OUTPATIENT
Start: 2023-10-13 | End: 2023-10-17 | Stop reason: HOSPADM

## 2023-10-13 RX ORDER — BUMETANIDE 2 MG/1
2 TABLET ORAL DAILY
Status: DISCONTINUED | OUTPATIENT
Start: 2023-10-14 | End: 2023-10-17 | Stop reason: HOSPADM

## 2023-10-13 RX ADMIN — METOPROLOL SUCCINATE 75 MG: 50 TABLET, EXTENDED RELEASE ORAL at 21:21

## 2023-10-13 RX ADMIN — SODIUM NITROPRUSSIDE 1.6 MCG/KG/MIN: 0.5 INJECTION, SOLUTION INTRAVENOUS at 14:35

## 2023-10-13 RX ADMIN — POTASSIUM CHLORIDE 40 MEQ: 1500 TABLET, EXTENDED RELEASE ORAL at 09:01

## 2023-10-13 RX ADMIN — METOPROLOL TARTRATE 25 MG: 25 TABLET, FILM COATED ORAL at 09:00

## 2023-10-13 RX ADMIN — INSULIN GLARGINE 55 UNITS: 100 INJECTION, SOLUTION SUBCUTANEOUS at 09:02

## 2023-10-13 RX ADMIN — SODIUM NITROPRUSSIDE 1.5 MCG/KG/MIN: 0.5 INJECTION, SOLUTION INTRAVENOUS at 09:11

## 2023-10-13 RX ADMIN — SACUBITRIL AND VALSARTAN 1 TABLET: 97; 103 TABLET, FILM COATED ORAL at 21:21

## 2023-10-13 RX ADMIN — INSULIN LISPRO 24 UNITS: 100 INJECTION, SOLUTION INTRAVENOUS; SUBCUTANEOUS at 17:00

## 2023-10-13 RX ADMIN — APIXABAN 5 MG: 5 TABLET, FILM COATED ORAL at 09:01

## 2023-10-13 RX ADMIN — DIGOXIN 125 MCG: 125 TABLET ORAL at 12:32

## 2023-10-13 RX ADMIN — HYDRALAZINE HYDROCHLORIDE 100 MG: 100 TABLET, FILM COATED ORAL at 14:35

## 2023-10-13 RX ADMIN — APIXABAN 5 MG: 5 TABLET, FILM COATED ORAL at 21:21

## 2023-10-13 RX ADMIN — QUETIAPINE FUMARATE 50 MG: 25 TABLET ORAL at 21:21

## 2023-10-13 RX ADMIN — PANTOPRAZOLE SODIUM 40 MG: 40 TABLET, DELAYED RELEASE ORAL at 07:00

## 2023-10-13 RX ADMIN — INSULIN LISPRO 24 UNITS: 100 INJECTION, SOLUTION INTRAVENOUS; SUBCUTANEOUS at 09:04

## 2023-10-13 RX ADMIN — INSULIN LISPRO 9 UNITS: 100 INJECTION, SOLUTION INTRAVENOUS; SUBCUTANEOUS at 17:00

## 2023-10-13 RX ADMIN — INSULIN LISPRO 3 UNITS: 100 INJECTION, SOLUTION INTRAVENOUS; SUBCUTANEOUS at 09:02

## 2023-10-13 RX ADMIN — SODIUM NITROPRUSSIDE 1.5 MCG/KG/MIN: 0.5 INJECTION, SOLUTION INTRAVENOUS at 05:14

## 2023-10-13 RX ADMIN — ISOSORBIDE DINITRATE 40 MG: 20 TABLET ORAL at 09:00

## 2023-10-13 RX ADMIN — HYDRALAZINE HYDROCHLORIDE 100 MG: 100 TABLET, FILM COATED ORAL at 09:01

## 2023-10-13 RX ADMIN — HYDRALAZINE HYDROCHLORIDE 100 MG: 100 TABLET, FILM COATED ORAL at 21:21

## 2023-10-13 RX ADMIN — ISOSORBIDE DINITRATE 40 MG: 20 TABLET ORAL at 19:50

## 2023-10-13 RX ADMIN — ISOSORBIDE DINITRATE 40 MG: 20 TABLET ORAL at 14:35

## 2023-10-13 RX ADMIN — GABAPENTIN 600 MG: 300 CAPSULE ORAL at 21:21

## 2023-10-13 RX ADMIN — SACUBITRIL AND VALSARTAN 1 TABLET: 97; 103 TABLET, FILM COATED ORAL at 09:01

## 2023-10-13 RX ADMIN — ATORVASTATIN CALCIUM 80 MG: 80 TABLET ORAL at 09:00

## 2023-10-13 RX ADMIN — DAPAGLIFLOZIN 10 MG: 10 TABLET, FILM COATED ORAL at 09:00

## 2023-10-13 RX ADMIN — INSULIN LISPRO 9 UNITS: 100 INJECTION, SOLUTION INTRAVENOUS; SUBCUTANEOUS at 12:32

## 2023-10-13 RX ADMIN — ASPIRIN 81 MG CHEWABLE TABLET 81 MG: 81 TABLET CHEWABLE at 09:01

## 2023-10-13 RX ADMIN — GABAPENTIN 300 MG: 300 CAPSULE ORAL at 09:00

## 2023-10-13 RX ADMIN — LEVOTHYROXINE SODIUM 50 MCG: 50 TABLET ORAL at 05:14

## 2023-10-13 RX ADMIN — INSULIN LISPRO 24 UNITS: 100 INJECTION, SOLUTION INTRAVENOUS; SUBCUTANEOUS at 12:36

## 2023-10-13 RX ADMIN — SODIUM NITROPRUSSIDE 1.6 MCG/KG/MIN: 0.5 INJECTION, SOLUTION INTRAVENOUS at 19:50

## 2023-10-13 ASSESSMENT — COGNITIVE AND FUNCTIONAL STATUS - GENERAL
MOBILITY SCORE: 21
STANDING UP FROM CHAIR USING ARMS: A LITTLE
WALKING IN HOSPITAL ROOM: A LITTLE
CLIMB 3 TO 5 STEPS WITH RAILING: A LITTLE

## 2023-10-13 ASSESSMENT — PAIN - FUNCTIONAL ASSESSMENT
PAIN_FUNCTIONAL_ASSESSMENT: 0-10

## 2023-10-13 ASSESSMENT — ENCOUNTER SYMPTOMS
ENDOCRINE NEGATIVE: 1
FATIGUE: 1
EYES NEGATIVE: 1
ALLERGIC/IMMUNOLOGIC NEGATIVE: 1

## 2023-10-13 ASSESSMENT — PAIN SCALES - GENERAL
PAINLEVEL_OUTOF10: 0 - NO PAIN

## 2023-10-13 NOTE — PROGRESS NOTES
[facility]  HFICU PROGRESS NOTE    Felice Law/[id number]    Admit Date: 10/4/2023  Hospital Length of Stay: 9   ICU Length of Stay: 9    INTERVAL EVENTS / PERTINENT ROS:   Patient seen and examined at bedside. He feels well today.    Plan:  -Continue Lasix infusion at 20 mg/hr  -Increase entresto to  mg BID  -Change metoprolol to succinate 75 mg daily  -Wean Nipride    MEDICATIONS  Infusions:  furosemide, Last Rate: 20 mg/hr (10/13/23 0800)  nitroprusside, Last Rate: 2 mcg/kg/min (10/13/23 1011)      Scheduled:  apixaban, 5 mg, BID  aspirin, 81 mg, Daily  atorvastatin, 80 mg, Daily  dapagliflozin propanediol, 10 mg, Daily  digoxin, 125 mcg, Daily  docusate sodium, 200 mg, BID  gabapentin, 300 mg, q AM  gabapentin, 600 mg, Nightly  hydrALAZINE, 100 mg, TID  insulin glargine, 55 Units, q24h  insulin lispro, 0-15 Units, TID with meals  insulin lispro, 24 Units, TID with meals  isosorbide dinitrate, 40 mg, TID  levothyroxine, 50 mcg, Daily  metoprolol succinate XL, 75 mg, Daily  pantoprazole, 40 mg, Daily before breakfast  polyethylene glycol-electrolytes, 4,000 mL, Once  potassium chloride CR, 40 mEq, Daily  QUEtiapine, 50 mg, Nightly  sacubitriL-valsartan, 1 tablet, BID      PRN:  acetaminophen, 975 mg, q6h PRN  albuterol, 2.5 mg, q6h PRN  calcium carbonate, 500 mg, BID PRN  dextrose 10 % in water (D10W), 0.3 g/kg/hr, Once PRN  dextrose, 25 g, q15 min PRN  glucagon, 1 mg, q15 min PRN  ondansetron, 4 mg, q4h PRN  oxygen, , Continuous PRN - O2/gases  polyethylene glycol, 17 g, Daily PRN      Invasive Hemodynamics:    Most Recent Range Past 24hrs   BP (Art)   No data recorded   MAP(Art)   No data recorded   RA/CVP 16 mmHg No data recorded   PA 48/28 No data recorded   PA(mean) 35 mmHg No data recorded   PCWP 16 mmHg No data recorded   CO 6.3 L/min No data recorded   CI 2.75 L/min/m2 No data recorded   Mixed Venous   No data recorded   SVR  711 (dyne*sec)/cm5 No data recorded  "   (dyne*sec)/cm5 No data recorded     PHYSICAL EXAM:   Visit Vitals  BP 97/75   Pulse 106   Temp 35.8 °C (96.4 °F)   Resp 18   Ht 1.803 m (5' 10.98\")   Wt 122 kg (269 lb 6.4 oz)   SpO2 93%   BMI 37.59 kg/m²   Smoking Status Former   BSA 2.47 m²     Wt Readings from Last 5 Encounters:   10/12/23 122 kg (269 lb 6.4 oz)     INTAKE/OUTPUT:  I/O last 3 completed shifts:  In: 562.9 (4.6 mL/kg) [P.O.:120; I.V.:442.9 (3.6 mL/kg)]  Out: 6775 (55.4 mL/kg) [Urine:6775 (1.5 mL/kg/hr)]  Weight: 122.2 kg      Physical Exam  Constitutional:       General: He is not in acute distress.     Appearance: Normal appearance.   HENT:      Head: Normocephalic.      Mouth/Throat:      Mouth: Mucous membranes are moist.   Eyes:      Extraocular Movements: Extraocular movements intact.      Conjunctiva/sclera: Conjunctivae normal.   Cardiovascular:      Rate and Rhythm: Normal rate and regular rhythm.      Heart sounds: Normal heart sounds. No murmur heard.  Pulmonary:      Effort: Pulmonary effort is normal.      Breath sounds: Normal breath sounds.   Abdominal:      General: Bowel sounds are normal.      Palpations: Abdomen is soft.   Musculoskeletal:         General: Normal range of motion.   Skin:     General: Skin is warm and dry.   Neurological:      General: No focal deficit present.      Mental Status: He is alert and oriented to person, place, and time.   Psychiatric:         Mood and Affect: Mood normal.       DATA:  CMP:  Results from last 7 days   Lab Units 10/13/23  0257 10/12/23  1815 10/12/23  0309 10/11/23  0223 10/10/23  0539 10/09/23  0338 10/08/23  0258 10/07/23  1704 10/07/23  0105   SODIUM mmol/L 135* 137 135* 136 137 135* 134* 130* 131*   POTASSIUM mmol/L 4.1 4.2 4.3 4.3 4.5 3.8 3.7 3.7 3.8   CHLORIDE mmol/L 98 101 103 103 100 97* 95* 93* 91*   CO2 mmol/L 28 24 26 26 27 28 29 26 28   ANION GAP mmol/L 13 16 10 11 15 14 14 15 16   BUN mg/dL 64* 60* 66* 60* 64* 68* 78* 81* 75*   CREATININE mg/dL 1.66* 1.54* 1.60* " "1.40* 1.47* 1.35* 1.74* 2.17* 1.78*   EGFR mL/min/1.73m*2 48* 52* 50* 59* 55* 61 45* 35* 44*   MAGNESIUM mg/dL 2.03  --  2.01 2.05 2.19 2.20 2.18 1.96 2.08   ALBUMIN g/dL 3.8 4.1 3.5 3.5 3.6 3.8 3.7 3.6 3.7       CBC:  Results from last 7 days   Lab Units 10/13/23  0257 10/12/23  0309 10/11/23  0223 10/10/23  0539 10/09/23  0338 10/08/23  0258 10/07/23  0105   WBC AUTO x10*3/uL 5.8 5.3 4.9 6.1 5.6 5.6 5.9   HEMOGLOBIN g/dL 10.7* 10.2* 10.2* 9.0* 11.8* 11.7* 11.7*   HEMATOCRIT % 35.6* 33.0* 33.6* 29.9* 38.6* 36.7* 36.3*   PLATELETS AUTO x10*3/uL 264 226 206 252 240 211 200   MCV fL 80 78* 78* 80 79* 76* 76*       COAG:         ABO: No results found for: \"ABO\"  HEME/ENDO:         CARDIAC:         No lab exists for component: \"CK\", \"CKMBP\"    Chest Radiograph  === 10/04/23 ===    XR CHEST 1 VIEW    - Impression -  1. Similar mild interstitial pulmonary edema and mild bibasilar  atelectasis.  2. Medical devices as above. No pneumothorax.    Signed by: Claudio Yancey 10/8/2023 9:37 AM  Dictation workstation:   [id number]    CT:   === 10/04/23 ===    CT CHEST WO IV CONTRAST    - Impression -  1.  Mild cardiomegaly with severe coronary artery calcification.  Status post median sternotomy and CABG.  2. Close proximity of the inner table of sternum and cardiovascular  structures as described above. Medical devices as described above.  3. Bandlike atelectasis in bilateral mid and lower lungs. Trace  bilateral pleural effusion. No focal consolidation in the lungs.    MACRO:  None    Signed by: Ke Lau 10/6/2023 10:45 AM  Dictation workstation:   [id number]     Prior to Admission Meds:  Medications Prior to Admission   Medication Sig Dispense Refill Last Dose    albuterol 2.5 mg /3 mL (0.083 %) nebulizer solution    Unknown    atorvastatin (Lipitor) 80 mg tablet    Unknown    bumetanide (Bumex) 2 mg tablet TAKE ONE TABLET BY MOUTH ONCE DAILY **TAKE AN ADDITIONAL TABLET I...  (REFER TO PRESCRIPTION NOTES).   Unknown " "   dulaglutide (Trulicity) 1.5 mg/0.5 mL pen injector injection Inject 1.5 mg under the skin 1 (one) time per week.   Unknown    Eliquis 5 mg tablet    Unknown    Farxiga 10 mg    Unknown    furosemide (Lasix) 40 mg tablet Take 2 tablets (80 mg) by mouth twice a day.   Unknown    gabapentin (Neurontin) 300 mg capsule    Unknown    hydrALAZINE (Apresoline) 25 mg tablet    Unknown    ipratropium-albuteroL (Duo-Neb) 0.5-2.5 mg/3 mL nebulizer solution Inhale 3 mL 3 times a day.   Unknown    isosorbide mononitrate ER (Imdur) 30 mg 24 hr tablet Take 1 tablet (30 mg) by mouth once daily.   Unknown    lancets (Lancets,Thin) misc 1 Each 4 times daily (before meals and at bedtime). use as directed to test blood sugar   Unknown    levothyroxine (Synthroid, Levoxyl) 50 mcg tablet    Unknown    metoprolol succinate XL (Toprol-XL) 50 mg 24 hr tablet Take 3 tablets (150 mg) by mouth once daily.   Unknown    metoprolol tartrate (Lopressor) 50 mg tablet    Unknown    OneTouch Delica Plus Lancet 30 gauge misc    Unknown    OneTouch Ultra Test strip REVISE EL NIVEL DE AZUCAR EN LA PEARL CUATRO VECES AL CUBA   Unknown    pen needle, diabetic, safety 29 gauge x 1/2\" needle Three times before meals and at bedtime   Unknown    QUEtiapine (SEROquel) 50 mg tablet    Unknown    sacubitriL-valsartan (Entresto)  mg tablet Take 1 tablet by mouth twice a day.   Unknown    sertraline (Zoloft) 50 mg tablet Take 1 tablet (50 mg) by mouth once daily.   Unknown    simethicone (Mylicon) 80 mg chewable tablet Chew 1 tablet (80 mg) every 6 hours if needed for flatulence.   Unknown    spironolactone (Aldactone) 25 mg tablet Take 1 tablet (25 mg) by mouth once daily.   Unknown       ASSESSMENT AND PLAN:     58 year old [place] male with history of HFrEF/ICM (LVEF 20% 8/14/23), CAD s/p CABG (LIMA-LAD with balloon pump support April 2023), Hx of cardiac arrest s/p VT ablation + ICD, HTN/HLD, CKD, Hx of DVT (L femoral) on Eliquis, hypothyroidism, " pulmonary hypertension, T2DM, JAKE/OHS, former tobacco abuse (24 pack year, quit 2017), presented to [facility], [place], operated by [facility] on 10/1 with exertional left sided chest pain and SOB x 3 days. He was admitted in cardiogenic shock, started on dobutamine under the guidance of [facility] therapy. Patient was than transferred to [facility] for advanced therapy evaluation on 10/4. Advanced therapies evaluation was initiated on 10/4 and presented in committee meeting on 10/10. Not approved for LVAD or transplant at this time. Plan to optimize medical therapy and follow up closely in HF clinic. May be brought back to the committee in the future.     Neuro:  #Depression  - Serial neuro and pain assessments   - PO Tylenol PRN for pain  - c/w renally dosed gabapentin   - PT/OT Consult, OOB to chair  - CAM ICU score every shift  - Sleep/wake cycle normalization     # Physical Status  -Obesity  -Functional at home.      Cardiovascular:  # Acute on chronic HFrEF/ICM systolic and diastolic heart failure  - TTE at [facility], [place], operated by [facility] 8/2023 with EF ~20%, globally abnormal RV systolic function. Noninvasive hemodynamic assessment is consistent with severe pulmonary hypertension (>60 mmHg), an elevated CVP, an elevated LVEDP.  - admit weight 246lbs  - Daily weight   - admit   - Wean Nipride  - Continue hydralazine 100 mg TID and isordil 40 mg TID  - Change metoprolol tartrate to metoprolol succinate 75 mg daily   - Continue po digoixn 125 mcg   - Increased Entresto  mg BID  - Continue dapagliflozin 10 mg  - Lasix infusion at 20 mg/hr  - Daily standing weights, 2gm sodium diet, 2L fluid restriction, strict I&Os     #Advanced Therapies Evaluation   -Presented to selection committee 10/10. Determined to be very high risk for advanced therapies at this time given poorly controlled diabetes, renal function, peripheral vascular questions, questionable psychosocial support, financial concerns. Plan will be to medically optimize him this admission. Follow up with HF clinic  for further decision on LVAD candidacy in the future.  -Completed evaluation 10/10. To be followed up as OP in HF clinic  -Discussed with Vascular medicine about the PVR report. Poor perfusion likely from his heart failure, no evidence of PAD noted.     #H/o VT arrest s/o VT ablation, ICD (2017 in [facility])  #CAD s/p CABG LIMA-LAD 4/2023 Dr Logan   - [facility] December 2022: severe diffuse three-vessel coronary artery disease. Severe proximal diffuse LAD disease up to 70%. Severe diffuse proximal and mid first diagonal disease. Chronic total occlusion of OM1 with left-to-left collaterals. Chronic total occlusion of mid RCA with left-to-right collaterals. Markedly elevated LVEDP at 40 mm of Hg.  - Elective CABG (LIMA-LAD) 4/2023 by Dr Logan   - Continue Aspirin 81mg.  - Continue Atorvastatin 80mg.      #Arrhythmias - Hx of VT s/p ablation + ICD  -Device - Medtronic ICD; interrogation completed 10/6   -Not on antiarrhythmic outpatient.     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  #Tobacco use history (24 pack year smoking hx)  -Monitor and maintain SpO2 > 92%  - PRN albuterol nebs for wheezing      GI:  - Colace BID, Miralax PRN ordered for bowel regimen   - Continue Protonix 40mg daily     :  #CKD  -Admit BUN/Cr 67/2.00  - daily BUN/Cr (10/13): 64/1.66  - diurese as hemodynamics and physical exam dictate   - Strict I/Os  - Avoid hypotension and nephrotoxic agents     Heme:  #Anemia in the setting of CKD  - Labs: Iron saturation 15%  - Venofer ordered 200 mg x3 days completed     #Hx of L femoral DVT  - Resume Eliquis      Endo:  #IDDM2  - A1C 8.1% 10/4/23  - home DM regimen : trulicity 1.5mg qweek, glargine 85u once daily, lispro 34u with meals and 26u at bedtime  - Goal BG <180  - glargine 55 units once daily  - lispro to 24u with meals   - c/w lispro corrective scale #3 to with meals  - Low carb 75 g diet  - Endo following, appreciate recs  - Refer to  endocrine for follow up after discharge      #Hypothyroidism  -TSH 9.31 on 10/4, T4 10/8 1.04  -Continue home levothyroxine 50 mcg      ID:  -afebrile, nontoxic   -no s/s infx  -trend temps q4h     VAP bundle: n/a  Ulcer prophylaxis: PPI  Glycemic control: Lantus and SSI   Bowel care: Colace & miralax prn  Indwelling catheter: None     Lines:   PIVs     DVT: Eliquis  NUTRITION: Adult diet Carb Controlled; 75 gram carb/meal, 45 gram Carb evening snack  EMERGENCY CONTACT: Extended Emergency Contact Information  Primary Emergency Contact: mother,mother  Mobile Phone: [phone]  Relation: Mother  Secondary Emergency Contact: FriendRobinson  Mobile Phone: [phone]  Relation: Friend  CODE STATUS: Full Code  DISPO:  Continue HF ICU management while on nipride infusion.  FOLLOWUP:   No future appointments.    _________________________________________________  Phillip Salinas DO PGY-4  Heart Failure Fellow

## 2023-10-13 NOTE — PROGRESS NOTES
Felice Law is a 58 y.o. male on day 9 of admission presenting with Acute decompensated heart failure (CMS/HCC).    Subjective   Pt requires Diabetes Management following his admission for heart failure.  Appetite good, patient was informed of plan to increase in his glargine  Started on dapagliflozin by cardiology team, this will aid in HF and DM glc control  Pt brought freestyle abbi 2 CGM from home to place prior to discharge  I have reviewed histories, allergies and medications have been reviewed        Objective   Review of Systems   Constitutional:  Positive for fatigue.   HENT: Negative.     Eyes: Negative.    Endocrine: Negative.    Genitourinary: Negative.    Skin: Negative.    Allergic/Immunologic: Negative.    Respiratory: SOB     Physical Exam  Vitals reviewed.   Constitutional:       General: He is not in acute distress.     Appearance: Normal appearance. He is obese.   HENT:      Head: Normocephalic and atraumatic.      Nose: Nose normal.      Mouth/Throat:      Mouth: Mucous membranes are moist.   Eyes:      Extraocular Movements: Extraocular movements intact.      Conjunctiva/sclera: Conjunctivae normal.      Pupils: Pupils are equal, round, and reactive to light.   Cardiovascular:      Rate and Rhythm: Normal rate and regular rhythm.      Pulses: Normal pulses.   Pulmonary:      Effort: Pulmonary effort is normal. No respiratory distress.      Breath sounds: Normal breath sounds.      Comments: SOB  Abdominal:      General: Abdomen is flat. There is no distension.   Musculoskeletal:         General: Normal range of motion.      Right lower leg: Edema present.      Left lower leg: Edema present.   Skin:     General: Skin is warm and dry.      Findings: No rash.   Neurological:      General: No focal deficit present.      Mental Status: He is alert and oriented to person, place, and time.   Psychiatric:         Mood and Affect: Mood normal.         Behavior: Behavior normal.         Last  "Recorded Vitals  Blood pressure 97/63, pulse 96, temperature 36.3 °C (97.3 °F), resp. rate 18, height 1.803 m (5' 10.98\"), weight 122 kg (269 lb 6.4 oz), SpO2 93 %.  Intake/Output last 3 Shifts:  I/O last 3 completed shifts:  In: 562.9 (4.6 mL/kg) [P.O.:120; I.V.:442.9 (3.6 mL/kg)]  Out: 6775 (55.4 mL/kg) [Urine:6775 (1.5 mL/kg/hr)]  Weight: 122.2 kg     Relevant Results  Results from last 7 days   Lab Units 10/13/23  1543 10/13/23  1223 10/13/23  0836 10/13/23  0257 10/12/23  1815 10/12/23  1708 10/12/23  1236 10/12/23  0745 10/12/23  0309 10/11/23  0824 10/11/23  0223 10/10/23  0821 10/10/23  0539   POCT GLUCOSE mg/dL 245* 277* 197*  --   --  154* 238*   < >  --    < >  --    < >  --    GLUCOSE mg/dL  --   --   --  218* 135*  --   --   --  138*  --  163*  --  156*    < > = values in this interval not displayed.     Scheduled medications  apixaban, 5 mg, oral, BID  aspirin, 81 mg, oral, Daily  atorvastatin, 80 mg, oral, Daily  dapagliflozin propanediol, 10 mg, oral, Daily  digoxin, 125 mcg, oral, Daily  docusate sodium, 200 mg, oral, BID  gabapentin, 300 mg, oral, q AM  gabapentin, 600 mg, oral, Nightly  hydrALAZINE, 100 mg, oral, TID  insulin glargine, 55 Units, subcutaneous, q24h  insulin lispro, 0-15 Units, subcutaneous, TID with meals  insulin lispro, 24 Units, subcutaneous, TID with meals  isosorbide dinitrate, 40 mg, oral, TID  levothyroxine, 50 mcg, oral, Daily  metoprolol succinate XL, 75 mg, oral, Daily  pantoprazole, 40 mg, oral, Daily before breakfast  polyethylene glycol-electrolytes, 4,000 mL, oral, Once  potassium chloride CR, 40 mEq, oral, Daily  QUEtiapine, 50 mg, oral, Nightly  sacubitriL-valsartan, 1 tablet, oral, BID      Continuous medications  furosemide, 20 mg/hr, Last Rate: 20 mg/hr (10/13/23 0800)  nitroprusside, 0-5 mcg/kg/min, Last Rate: 1.2 mcg/kg/min (10/13/23 1459)      PRN medications  PRN medications: acetaminophen, albuterol, calcium carbonate, dextrose 10 % in water (D10W), " dextrose, glucagon, ondansetron, oxygen, polyethylene glycol    Results for orders placed or performed during the hospital encounter of 10/04/23 (from the past 24 hour(s))   POCT GLUCOSE   Result Value Ref Range    POCT Glucose 154 (H) 74 - 99 mg/dL   Renal function panel   Result Value Ref Range    Glucose 135 (H) 74 - 99 mg/dL    Sodium 137 136 - 145 mmol/L    Potassium 4.2 3.5 - 5.3 mmol/L    Chloride 101 98 - 107 mmol/L    Bicarbonate 24 21 - 32 mmol/L    Anion Gap 16 10 - 20 mmol/L    Urea Nitrogen 60 (H) 6 - 23 mg/dL    Creatinine 1.54 (H) 0.50 - 1.30 mg/dL    eGFR 52 (L) >60 mL/min/1.73m*2    Calcium 9.0 8.6 - 10.6 mg/dL    Phosphorus 3.9 2.5 - 4.9 mg/dL    Albumin 4.1 3.4 - 5.0 g/dL   Renal function panel   Result Value Ref Range    Glucose 218 (H) 74 - 99 mg/dL    Sodium 135 (L) 136 - 145 mmol/L    Potassium 4.1 3.5 - 5.3 mmol/L    Chloride 98 98 - 107 mmol/L    Bicarbonate 28 21 - 32 mmol/L    Anion Gap 13 10 - 20 mmol/L    Urea Nitrogen 64 (H) 6 - 23 mg/dL    Creatinine 1.66 (H) 0.50 - 1.30 mg/dL    eGFR 48 (L) >60 mL/min/1.73m*2    Calcium 9.2 8.6 - 10.6 mg/dL    Phosphorus 4.7 2.5 - 4.9 mg/dL    Albumin 3.8 3.4 - 5.0 g/dL   CBC   Result Value Ref Range    WBC 5.8 4.4 - 11.3 x10*3/uL    nRBC 0.0 0.0 - 0.0 /100 WBCs    RBC 4.46 (L) 4.50 - 5.90 x10*6/uL    Hemoglobin 10.7 (L) 13.5 - 17.5 g/dL    Hematocrit 35.6 (L) 41.0 - 52.0 %    MCV 80 80 - 100 fL    MCH 24.0 (L) 26.0 - 34.0 pg    MCHC 30.1 (L) 32.0 - 36.0 g/dL    RDW 19.1 (H) 11.5 - 14.5 %    Platelets 264 150 - 450 x10*3/uL    MPV 9.8 7.5 - 11.5 fL   Magnesium   Result Value Ref Range    Magnesium 2.03 1.60 - 2.40 mg/dL   Digoxin   Result Value Ref Range    Digoxin  0.39 (L) 0.80 - <2.00 ng/mL   POCT GLUCOSE   Result Value Ref Range    POCT Glucose 197 (H) 74 - 99 mg/dL   POCT GLUCOSE   Result Value Ref Range    POCT Glucose 277 (H) 74 - 99 mg/dL   POCT GLUCOSE   Result Value Ref Range    POCT Glucose 245 (H) 74 - 99 mg/dL       Results from last 7  days   Lab Units 10/13/23  0257   SODIUM mmol/L 135*   POTASSIUM mmol/L 4.1   CHLORIDE mmol/L 98   CO2 mmol/L 28   BUN mg/dL 64*   CREATININE mg/dL 1.66*   CALCIUM mg/dL 9.2   ALBUMIN g/dL 3.8   GLUCOSE mg/dL 218*       Assessment/Plan   Principal Problem:    Acute decompensated heart failure (CMS/HCC)  Active Problems:    Cardiogenic shock (CMS/HCC)    Central sleep apnea with Cheyne-Chirinos respiration    Coronary artery disease due to calcified coronary lesion    Diabetes mellitus (CMS/HCC)    JAKE (obstructive sleep apnea)    Ischemic cardiomyopathy    Type 2 Diabetes   DM2 with hyperglycemia  - A1C 8.1% 10/4/23    - home DM regimen : trulicity 1.5mg qweek, glargine 85u once daily, lispro 34u with meals and 26u at bedtime  - managed by: PCP    PLAN  - Goal BG <180  -continue farxiga 10mg once daily  - increase glargine 55 units once daily- reduce to 45u if pt is NPO  - continue lispro to 25u with meals - may hold if glc prior to meal is <90 or NPO  - continue lispro corrective scale #3 to with meals- adjust to lispro scale #2 q6h if pt is NPO  151-200 = 3u  201-250 = 6u  251-300 = 9u  301-350 = 12u  351-400 = 15u        -Accuchecks TIDAC and QHS- kindly ensure QHS Accucheck is drawn; it is often missed   -Hypoglycemia protocol  -DM diet 75g CHO  -Will continue to follow and titrate insulin accordingly     Discharge planning-   Insulin dose TBD by titration, may resume home trulicity at d/c  Pt sees PCP at [facility], [place], operated by [facility], recommend  or [facility] [place], [facility] by [facility] endocrinology follow up at discharge- pt states he would like  [facility] follow up, primary team to please place central sched request for  [facility] close to home for pt    I spent 30 minutes on the care and coordination of this patient.     OMAR AlvaradoC

## 2023-10-13 NOTE — PROGRESS NOTES
Physical Therapy    Physical Therapy Treatment    Patient Name: Felice Law  MRN: [id number]  Today's Date: 10/13/2023  Time Calculation  Start Time: 1138  Stop Time: 1206  Time Calculation (min): 28 min       Assessment/Plan   PT Assessment  PT Assessment Results: Decreased strength, Decreased endurance, Impaired balance, Decreased mobility  Rehab Prognosis: Good  End of Session Communication: Bedside nurse  End of Session Patient Position: Up in chair, Alarm off, not on at start of session  PT Plan  Inpatient/Swing Bed or Outpatient: Inpatient  PT Plan  Treatment/Interventions: Bed mobility, Transfer training, Gait training, Balance training, Strengthening  PT Plan: Skilled PT  PT Frequency: 5 times per week  PT Discharge Recommendations: Low intensity level of continued care  PT Recommended Transfer Status: Assist x1      General Visit Information:   PT  Visit  PT Received On: 10/13/23  Response to Previous Treatment: Patient with no complaints from previous session.  General  Reason for Referral: SOB, LE edema, advanced therapy work-up  Past Medical History Relevant to Rehab: HFrEF/ICM (LVEF 20% 8/14/23), CAD s/p PCI and CABG (LIMA-LAD with balloon pump support April 2023), Hx of cardiac arrest s/p VT ablation + ICD, HTN/HLD, CKD, Hx of DVT (L femoral) on Eliquis, hypothyroidism, pulmonary hypertension, T2DM, JAKE/OHS, former tobacco abuse (24 pack year, quit 2017)    Family/Caregiver Present: No  Prior to Session Communication: Bedside nurse  Patient Position Received: Up in chair, Alarm off, not on at start of session  General Comment: Patient pleasant and agreeable to PT. Patient able to communicate with basic English, did not need to utilze MARTII today, able to follow and return demo exercises with no issues.    Subjective   Precautions:  Precautions  Medical Precautions: Cardiac precautions  Precautions Comment: Pt usually wears B/L AFO's 2/2 chronic foot drop. Pt reports he does not need them for  shorter distances, only out in the community.       On Nipride and Lasix IV.    Vital Signs:  Vital Signs  Heart Rate:  (PRE: 98; DURING exercise 112; POST; 98)  Resp:  (PRE: 22; POST: 21)  SpO2:  (PRE: 96%; DURING: spo2 min 93% on room air; POST: 96%)  BP:  (PRE: 93/64; POST: 95/61)  BP Method: Automatic  Patient Position: Sitting    Objective   Pain:  Pain Assessment  Pain Assessment: 0-10  Pain Score: 0 - No pain    Cognition:  Cognition  Overall Cognitive Status: Within Functional Limits  Orientation Level: Oriented X4  Attention: Within Functional Limits       Activity Tolerance:  Activity Tolerance  Endurance: Tolerates 10 - 20 min exercise with multiple rests  Activity Tolerance Comments: able to tolerate all activity today with no issues. VSS    Treatments:  Therapeutic Exercise  Therapeutic Exercise Performed: Yes  Therapeutic Exercise Activity 1: LAQ with no added weight, 5 sec hold in full ext, x 10 reps, 2 sets  Therapeutic Exercise Activity 2: Recumbent bike x 6 min (rest break after 4 min): Pt used bike, with assist from PT to place feet in pedals 2/2 foot drop. level 2.0-2.6. HR max 112 bpm, spO2 93% and greater on room air. Pt reported feeling 'good' while using the bike. No complaints of chest pain or dizziness during session. vitals stable throughout.    Bed Mobility  Bed Mobility:  (not performed today 2/2 pt  being OOB pre/post visit.)    Ambulation/Gait Training  Ambulation/Gait Training Performed: Yes  Ambulation/Gait Training 1  Surface 1: Level tile  Device 1: No device  Assistance 1: Contact guard  Quality of Gait 1: Foot slap (chronic foot drop.)  Comments/Distance (ft) 1: 210 ft, no rest breaks. pt managed IV pole.  Transfers  Transfer:  (sit <>stand CGA)    Outcome Measures:  [facility] Basic Mobility  Turning from your back to your side while in a flat bed without using bedrails: None  Moving from lying on your back to sitting on the side of a flat bed without using bedrails: None  Moving to  and from bed to chair (including a wheelchair): None  Standing up from a chair using your arms (e.g. wheelchair or bedside chair): A little  To walk in hospital room: A little  Climbing 3-5 steps with railing: A little  Basic Mobility - Total Score: 21    FSS-ICU  Ambulation: Walks >/ or equal to 150 feet with minimal assistance x1  Rolling: Complete independence  Sitting: Complete independence  Transfer Sit-to-Stand: Complete independence  Transfer Supine-to-Sit: Complete independence  Total Score: 32         E = Exercise and Early Mobility  Current Activity: Ambulating in phan      EDUCATION:  Education  Individual(s) Educated: Patient  Education Provided: Body Mechanics  Patient Response to Education: Patient/Caregiver Verbalized Understanding of Information, Patient/Caregiver Performed Return Demonstration of Exercises/Activities    Encounter Problems       Encounter Problems (Active)       General       Pt will amb x 550 ft, no rest breaks, with stable vitals, RPD 3/10 or less, RPE 13/20 or less      indep with transfers: sit <>stand, with LRD (Progressing)       Start:  10/06/23    Expected End:  10/20/23            Score 24/28 on Tinetti  (Progressing)       Start:  10/06/23    Expected End:  10/20/23               General        Pt will perform a 5x STS, in 10 seconds or less, with RPD 3/10 or less, RPE 13/20 or less with stable vitals.       Pt will perform a 6MWT, with distance greater than 950 ft  with stable vitals, RPD 3/10 or less, RPE 13/20 or less.  (Progressing)       Start:  10/06/23    Expected End:  10/20/23               Pain - Adult          Transfers       Pt will perform a 5x STS, in 10 seconds or less, with RPD 3/10 or less, RPE 13/20 or less with stable vitals.  (Progressing)       Start:  10/06/23    Expected End:  10/20/23            Goal 2 (Progressing)       Start:  10/06/23

## 2023-10-13 NOTE — CARE PLAN
The patient's goals for the shift include      The clinical goals for the shift include sleep and o2 as needed      Problem: Diabetes  Goal: Achieve decreasing blood glucose levels by end of shift  Outcome: Progressing  Goal: Increase stability of blood glucose readings by end of shift  Outcome: Progressing  Goal: Decrease in ketones present in urine by end of shift  Outcome: Met  Goal: Maintain electrolyte levels within acceptable range throughout shift  Outcome: Progressing  Goal: Maintain glucose levels >70mg/dl to <250mg/dl throughout shift  Outcome: Met  Goal: No changes in neurological exam by end of shift  Outcome: Met  Goal: Learn about and adhere to nutrition recommendations by end of shift  Outcome: Progressing  Goal: Vital signs within normal range for age by end of shift  Outcome: Met  Goal: Increase self care and/or family involovement by end of shift  Outcome: Progressing  Goal: Receive DSME education by end of shift  Outcome: Progressing

## 2023-10-14 ENCOUNTER — APPOINTMENT (OUTPATIENT)
Dept: CARDIOLOGY | Facility: HOSPITAL | Age: 58
DRG: 291 | End: 2023-10-14
Payer: MEDICARE

## 2023-10-14 LAB
ALBUMIN SERPL BCP-MCNC: 3.9 G/DL (ref 3.4–5)
ALBUMIN SERPL BCP-MCNC: 4 G/DL (ref 3.4–5)
ANION GAP SERPL CALC-SCNC: 13 MMOL/L (ref 10–20)
ANION GAP SERPL CALC-SCNC: 14 MMOL/L (ref 10–20)
APTT PPP: 35 SECONDS (ref 27–38)
BODY SURFACE AREA: 2.35 M2
BUN SERPL-MCNC: 69 MG/DL (ref 6–23)
BUN SERPL-MCNC: 75 MG/DL (ref 6–23)
CALCIUM SERPL-MCNC: 9.3 MG/DL (ref 8.6–10.6)
CALCIUM SERPL-MCNC: 9.8 MG/DL (ref 8.6–10.6)
CHLORIDE SERPL-SCNC: 96 MMOL/L (ref 98–107)
CHLORIDE SERPL-SCNC: 97 MMOL/L (ref 98–107)
CO2 SERPL-SCNC: 28 MMOL/L (ref 21–32)
CO2 SERPL-SCNC: 29 MMOL/L (ref 21–32)
CREAT SERPL-MCNC: 2.05 MG/DL (ref 0.5–1.3)
CREAT SERPL-MCNC: 2.23 MG/DL (ref 0.5–1.3)
EJECTION FRACTION APICAL 4 CHAMBER: 19.3
ERYTHROCYTE [DISTWIDTH] IN BLOOD BY AUTOMATED COUNT: 18.6 % (ref 11.5–14.5)
ERYTHROCYTE [DISTWIDTH] IN BLOOD BY AUTOMATED COUNT: 18.8 % (ref 11.5–14.5)
GFR SERPL CREATININE-BSD FRML MDRD: 34 ML/MIN/1.73M*2
GFR SERPL CREATININE-BSD FRML MDRD: 37 ML/MIN/1.73M*2
GLUCOSE BLD MANUAL STRIP-MCNC: 143 MG/DL (ref 74–99)
GLUCOSE BLD MANUAL STRIP-MCNC: 256 MG/DL (ref 74–99)
GLUCOSE SERPL-MCNC: 125 MG/DL (ref 74–99)
GLUCOSE SERPL-MCNC: 296 MG/DL (ref 74–99)
HCT VFR BLD AUTO: 36.5 % (ref 41–52)
HCT VFR BLD AUTO: 38.6 % (ref 41–52)
HGB BLD-MCNC: 11.1 G/DL (ref 13.5–17.5)
HGB BLD-MCNC: 11.9 G/DL (ref 13.5–17.5)
MAGNESIUM SERPL-MCNC: 1.99 MG/DL (ref 1.6–2.4)
MCH RBC QN AUTO: 23.9 PG (ref 26–34)
MCH RBC QN AUTO: 24.1 PG (ref 26–34)
MCHC RBC AUTO-ENTMCNC: 30.4 G/DL (ref 32–36)
MCHC RBC AUTO-ENTMCNC: 30.8 G/DL (ref 32–36)
MCV RBC AUTO: 78 FL (ref 80–100)
MCV RBC AUTO: 79 FL (ref 80–100)
NRBC BLD-RTO: 0 /100 WBCS (ref 0–0)
NRBC BLD-RTO: 0 /100 WBCS (ref 0–0)
PHOSPHATE SERPL-MCNC: 4.6 MG/DL (ref 2.5–4.9)
PHOSPHATE SERPL-MCNC: 4.9 MG/DL (ref 2.5–4.9)
PLATELET # BLD AUTO: 292 X10*3/UL (ref 150–450)
PLATELET # BLD AUTO: 305 X10*3/UL (ref 150–450)
PMV BLD AUTO: 10 FL (ref 7.5–11.5)
PMV BLD AUTO: 10 FL (ref 7.5–11.5)
POTASSIUM SERPL-SCNC: 4.2 MMOL/L (ref 3.5–5.3)
POTASSIUM SERPL-SCNC: 4.3 MMOL/L (ref 3.5–5.3)
RBC # BLD AUTO: 4.6 X10*6/UL (ref 4.5–5.9)
RBC # BLD AUTO: 4.97 X10*6/UL (ref 4.5–5.9)
SODIUM SERPL-SCNC: 134 MMOL/L (ref 136–145)
SODIUM SERPL-SCNC: 135 MMOL/L (ref 136–145)
UFH PPP CHRO-ACNC: 0.9 IU/ML
WBC # BLD AUTO: 5.8 X10*3/UL (ref 4.4–11.3)
WBC # BLD AUTO: 6.7 X10*3/UL (ref 4.4–11.3)

## 2023-10-14 PROCEDURE — 2500000004 HC RX 250 GENERAL PHARMACY W/ HCPCS (ALT 636 FOR OP/ED): Performed by: STUDENT IN AN ORGANIZED HEALTH CARE EDUCATION/TRAINING PROGRAM

## 2023-10-14 PROCEDURE — 99291 CRITICAL CARE FIRST HOUR: CPT | Performed by: STUDENT IN AN ORGANIZED HEALTH CARE EDUCATION/TRAINING PROGRAM

## 2023-10-14 PROCEDURE — 36415 COLL VENOUS BLD VENIPUNCTURE: CPT | Mod: CMCLAB | Performed by: STUDENT IN AN ORGANIZED HEALTH CARE EDUCATION/TRAINING PROGRAM

## 2023-10-14 PROCEDURE — 2500000004 HC RX 250 GENERAL PHARMACY W/ HCPCS (ALT 636 FOR OP/ED)

## 2023-10-14 PROCEDURE — 2020000001 HC ICU ROOM DAILY

## 2023-10-14 PROCEDURE — 2500000001 HC RX 250 WO HCPCS SELF ADMINISTERED DRUGS (ALT 637 FOR MEDICARE OP): Performed by: INTERNAL MEDICINE

## 2023-10-14 PROCEDURE — 36415 COLL VENOUS BLD VENIPUNCTURE: CPT

## 2023-10-14 PROCEDURE — 2500000002 HC RX 250 W HCPCS SELF ADMINISTERED DRUGS (ALT 637 FOR MEDICARE OP, ALT 636 FOR OP/ED): Performed by: INTERNAL MEDICINE

## 2023-10-14 PROCEDURE — 82947 ASSAY GLUCOSE BLOOD QUANT: CPT

## 2023-10-14 PROCEDURE — 96372 THER/PROPH/DIAG INJ SC/IM: CPT | Performed by: INTERNAL MEDICINE

## 2023-10-14 PROCEDURE — 80069 RENAL FUNCTION PANEL: CPT | Mod: MUE

## 2023-10-14 PROCEDURE — 93306 TTE W/DOPPLER COMPLETE: CPT | Performed by: INTERNAL MEDICINE

## 2023-10-14 PROCEDURE — 2500000004 HC RX 250 GENERAL PHARMACY W/ HCPCS (ALT 636 FOR OP/ED): Performed by: NURSE PRACTITIONER

## 2023-10-14 PROCEDURE — 93306 TTE W/DOPPLER COMPLETE: CPT

## 2023-10-14 PROCEDURE — 85027 COMPLETE CBC AUTOMATED: CPT

## 2023-10-14 PROCEDURE — 85520 HEPARIN ASSAY: CPT | Mod: CMCLAB

## 2023-10-14 PROCEDURE — 99233 SBSQ HOSP IP/OBS HIGH 50: CPT

## 2023-10-14 PROCEDURE — 80069 RENAL FUNCTION PANEL: CPT | Mod: CMCLAB | Performed by: STUDENT IN AN ORGANIZED HEALTH CARE EDUCATION/TRAINING PROGRAM

## 2023-10-14 PROCEDURE — 85730 THROMBOPLASTIN TIME PARTIAL: CPT

## 2023-10-14 PROCEDURE — 2500000001 HC RX 250 WO HCPCS SELF ADMINISTERED DRUGS (ALT 637 FOR MEDICARE OP): Performed by: STUDENT IN AN ORGANIZED HEALTH CARE EDUCATION/TRAINING PROGRAM

## 2023-10-14 PROCEDURE — 2500000001 HC RX 250 WO HCPCS SELF ADMINISTERED DRUGS (ALT 637 FOR MEDICARE OP)

## 2023-10-14 PROCEDURE — 83735 ASSAY OF MAGNESIUM: CPT

## 2023-10-14 PROCEDURE — 85027 COMPLETE CBC AUTOMATED: CPT | Performed by: STUDENT IN AN ORGANIZED HEALTH CARE EDUCATION/TRAINING PROGRAM

## 2023-10-14 PROCEDURE — 99232 SBSQ HOSP IP/OBS MODERATE 35: CPT | Performed by: PHYSICIAN ASSISTANT

## 2023-10-14 RX ORDER — HEPARIN SODIUM 10000 [USP'U]/100ML
0-4500 INJECTION, SOLUTION INTRAVENOUS CONTINUOUS
Status: DISCONTINUED | OUTPATIENT
Start: 2023-10-14 | End: 2023-10-15

## 2023-10-14 RX ORDER — INSULIN LISPRO 100 [IU]/ML
28 INJECTION, SOLUTION INTRAVENOUS; SUBCUTANEOUS
Status: DISCONTINUED | OUTPATIENT
Start: 2023-10-14 | End: 2023-10-15

## 2023-10-14 RX ORDER — HEPARIN SODIUM 5000 [USP'U]/ML
3000-6000 INJECTION, SOLUTION INTRAVENOUS; SUBCUTANEOUS EVERY 4 HOURS PRN
Status: DISCONTINUED | OUTPATIENT
Start: 2023-10-14 | End: 2023-10-15

## 2023-10-14 RX ADMIN — INSULIN LISPRO 28 UNITS: 100 INJECTION, SOLUTION INTRAVENOUS; SUBCUTANEOUS at 12:00

## 2023-10-14 RX ADMIN — HYDRALAZINE HYDROCHLORIDE 100 MG: 100 TABLET, FILM COATED ORAL at 15:12

## 2023-10-14 RX ADMIN — DAPAGLIFLOZIN 10 MG: 10 TABLET, FILM COATED ORAL at 08:05

## 2023-10-14 RX ADMIN — DIGOXIN 125 MCG: 125 TABLET ORAL at 13:13

## 2023-10-14 RX ADMIN — ISOSORBIDE DINITRATE 40 MG: 20 TABLET ORAL at 20:07

## 2023-10-14 RX ADMIN — PERFLUTREN 3 ML OF DILUTION: 6.52 INJECTION, SUSPENSION INTRAVENOUS at 14:57

## 2023-10-14 RX ADMIN — INSULIN LISPRO 24 UNITS: 100 INJECTION, SOLUTION INTRAVENOUS; SUBCUTANEOUS at 08:10

## 2023-10-14 RX ADMIN — INSULIN GLARGINE 55 UNITS: 100 INJECTION, SOLUTION SUBCUTANEOUS at 08:06

## 2023-10-14 RX ADMIN — GABAPENTIN 600 MG: 300 CAPSULE ORAL at 20:07

## 2023-10-14 RX ADMIN — INSULIN LISPRO 28 UNITS: 100 INJECTION, SOLUTION INTRAVENOUS; SUBCUTANEOUS at 16:57

## 2023-10-14 RX ADMIN — ATORVASTATIN CALCIUM 80 MG: 80 TABLET ORAL at 08:05

## 2023-10-14 RX ADMIN — ACETAMINOPHEN 975 MG: 325 TABLET ORAL at 08:16

## 2023-10-14 RX ADMIN — INSULIN LISPRO 6 UNITS: 100 INJECTION, SOLUTION INTRAVENOUS; SUBCUTANEOUS at 12:40

## 2023-10-14 RX ADMIN — INSULIN LISPRO 9 UNITS: 100 INJECTION, SOLUTION INTRAVENOUS; SUBCUTANEOUS at 08:05

## 2023-10-14 RX ADMIN — GABAPENTIN 300 MG: 300 CAPSULE ORAL at 08:05

## 2023-10-14 RX ADMIN — SACUBITRIL AND VALSARTAN 1 TABLET: 97; 103 TABLET, FILM COATED ORAL at 20:07

## 2023-10-14 RX ADMIN — APIXABAN 5 MG: 5 TABLET, FILM COATED ORAL at 08:05

## 2023-10-14 RX ADMIN — HYDRALAZINE HYDROCHLORIDE 100 MG: 100 TABLET, FILM COATED ORAL at 08:05

## 2023-10-14 RX ADMIN — BUMETANIDE 2 MG: 2 TABLET ORAL at 10:18

## 2023-10-14 RX ADMIN — QUETIAPINE FUMARATE 50 MG: 25 TABLET ORAL at 20:07

## 2023-10-14 RX ADMIN — POTASSIUM CHLORIDE 40 MEQ: 1500 TABLET, EXTENDED RELEASE ORAL at 08:05

## 2023-10-14 RX ADMIN — PANTOPRAZOLE SODIUM 40 MG: 40 TABLET, DELAYED RELEASE ORAL at 08:05

## 2023-10-14 RX ADMIN — ISOSORBIDE DINITRATE 40 MG: 20 TABLET ORAL at 08:05

## 2023-10-14 RX ADMIN — ISOSORBIDE DINITRATE 40 MG: 20 TABLET ORAL at 13:13

## 2023-10-14 RX ADMIN — HEPARIN SODIUM 2000 UNITS/HR: 10000 INJECTION, SOLUTION INTRAVENOUS at 18:11

## 2023-10-14 RX ADMIN — SODIUM NITROPRUSSIDE 1.3 MCG/KG/MIN: 0.5 INJECTION, SOLUTION INTRAVENOUS at 00:28

## 2023-10-14 RX ADMIN — ASPIRIN 81 MG CHEWABLE TABLET 81 MG: 81 TABLET CHEWABLE at 08:05

## 2023-10-14 RX ADMIN — HYDRALAZINE HYDROCHLORIDE 100 MG: 100 TABLET, FILM COATED ORAL at 20:07

## 2023-10-14 RX ADMIN — CALCIUM CARBONATE (ANTACID) CHEW TAB 500 MG 500 MG: 500 CHEW TAB at 08:06

## 2023-10-14 RX ADMIN — SACUBITRIL AND VALSARTAN 1 TABLET: 97; 103 TABLET, FILM COATED ORAL at 08:05

## 2023-10-14 RX ADMIN — LEVOTHYROXINE SODIUM 50 MCG: 50 TABLET ORAL at 06:10

## 2023-10-14 ASSESSMENT — ENCOUNTER SYMPTOMS
GASTROINTESTINAL NEGATIVE: 1
HEMATOLOGIC/LYMPHATIC NEGATIVE: 1
ALLERGIC/IMMUNOLOGIC NEGATIVE: 1
CONSTITUTIONAL NEGATIVE: 1
MUSCULOSKELETAL NEGATIVE: 1
CARDIOVASCULAR NEGATIVE: 1
NEUROLOGICAL NEGATIVE: 1
EYES NEGATIVE: 1
RESPIRATORY NEGATIVE: 1

## 2023-10-14 ASSESSMENT — PAIN SCALES - GENERAL
PAINLEVEL_OUTOF10: 0 - NO PAIN
PAINLEVEL_OUTOF10: 3
PAINLEVEL_OUTOF10: 0 - NO PAIN

## 2023-10-14 ASSESSMENT — PAIN - FUNCTIONAL ASSESSMENT
PAIN_FUNCTIONAL_ASSESSMENT: 0-10

## 2023-10-14 ASSESSMENT — PAIN DESCRIPTION - DESCRIPTORS: DESCRIPTORS: ACHING

## 2023-10-14 NOTE — PROGRESS NOTES
[facility]  HFICU PROGRESS NOTE    Felice Law/[id number]    Admit Date: 10/4/2023  Hospital Length of Stay: 10   ICU Length of Stay: 9    INTERVAL EVENTS / PERTINENT ROS:   No acute events overnight. Patient states he feels better today than yesterday.     Plan:  - Discontinue nipride gtt   - DVT scan to reassess LLE DVT   - Repeat ECHO   - Afternoon labs to reassess kidney function, if worsening STOP apixiban and START heparin gtt   - If Cr improving can transfer to [facility]     MEDICATIONS  Infusions:       Scheduled:  apixaban, 5 mg, BID  aspirin, 81 mg, Daily  atorvastatin, 80 mg, Daily  bumetanide, 2 mg, Daily  dapagliflozin propanediol, 10 mg, Daily  digoxin, 125 mcg, Daily  docusate sodium, 200 mg, BID  gabapentin, 300 mg, q AM  gabapentin, 600 mg, Nightly  hydrALAZINE, 100 mg, TID  insulin glargine, 55 Units, q24h  insulin lispro, 0-15 Units, TID with meals  insulin lispro, 28 Units, TID with meals  isosorbide dinitrate, 40 mg, TID  levothyroxine, 50 mcg, Daily  metoprolol succinate XL, 75 mg, Daily  pantoprazole, 40 mg, Daily before breakfast  polyethylene glycol-electrolytes, 4,000 mL, Once  potassium chloride CR, 40 mEq, Daily  QUEtiapine, 50 mg, Nightly  sacubitriL-valsartan, 1 tablet, BID      PRN:  acetaminophen, 975 mg, q6h PRN  albuterol, 2.5 mg, q6h PRN  calcium carbonate, 500 mg, BID PRN  dextrose 10 % in water (D10W), 0.3 g/kg/hr, Once PRN  dextrose, 25 g, q15 min PRN  glucagon, 1 mg, q15 min PRN  ondansetron, 4 mg, q4h PRN  oxygen, , Continuous PRN - O2/gases  polyethylene glycol, 17 g, Daily PRN      Invasive Hemodynamics:    Most Recent Range Past 24hrs   BP (Art)   No data recorded   MAP(Art)   No data recorded   RA/CVP 16 mmHg No data recorded   PA 48/28 No data recorded   PA(mean) 35 mmHg No data recorded   PCWP 16 mmHg No data recorded   CO 6.3 L/min No data recorded   CI 2.75 L/min/m2 No data recorded   Mixed Venous   No data recorded   SVR  711  "(dyne*sec)/cm5 No data recorded    (dyne*sec)/cm5 No data recorded     PHYSICAL EXAM:   Visit Vitals  /63   Pulse 91   Temp 36.1 °C (97 °F)   Resp 16   Ht 1.803 m (5' 10.98\")   Wt 111 kg (244 lb 0.8 oz)   SpO2 99%   BMI 34.05 kg/m²   Smoking Status Former   BSA 2.36 m²     Wt Readings from Last 5 Encounters:   10/14/23 111 kg (244 lb 0.8 oz)     INTAKE/OUTPUT:  I/O last 3 completed shifts:  In: 711.6 (6.4 mL/kg) [I.V.:711.6 (6.4 mL/kg)]  Out: 6525 (58.9 mL/kg) [Urine:6525 (1.6 mL/kg/hr)]  Weight: 110.7 kg      Physical Exam  Constitutional:       General: He is not in acute distress.     Appearance: Normal appearance.   HENT:      Head: Normocephalic.      Mouth/Throat:      Mouth: Mucous membranes are moist.   Eyes:      Extraocular Movements: Extraocular movements intact.      Conjunctiva/sclera: Conjunctivae normal.   Cardiovascular:      Rate and Rhythm: Normal rate and regular rhythm.      Heart sounds: Normal heart sounds. No murmur heard.  Pulmonary:      Effort: Pulmonary effort is normal.      Breath sounds: Normal breath sounds.   Abdominal:      General: Bowel sounds are normal.      Palpations: Abdomen is soft.   Musculoskeletal:         General: Normal range of motion.   Skin:     General: Skin is warm and dry.   Neurological:      General: No focal deficit present.      Mental Status: He is alert and oriented to person, place, and time.   Psychiatric:         Mood and Affect: Mood normal.     DATA:  CMP:  Results from last 7 days   Lab Units 10/14/23  0257 10/13/23  0257 10/12/23  1815 10/12/23  0309 10/11/23  0223 10/10/23  0539 10/09/23  0338 10/08/23  0258 10/07/23  1704   SODIUM mmol/L 134* 135* 137 135* 136 137 135* 134* 130*   POTASSIUM mmol/L 4.3 4.1 4.2 4.3 4.3 4.5 3.8 3.7 3.7   CHLORIDE mmol/L 96* 98 101 103 103 100 97* 95* 93*   CO2 mmol/L 29 28 24 26 26 27 28 29 26   ANION GAP mmol/L 13 13 16 10 11 15 14 14 15   BUN mg/dL 69* 64* 60* 66* 60* 64* 68* 78* 81*   CREATININE mg/dL " 2.05* 1.66* 1.54* 1.60* 1.40* 1.47* 1.35* 1.74* 2.17*   EGFR mL/min/1.73m*2 37* 48* 52* 50* 59* 55* 61 45* 35*   MAGNESIUM mg/dL 1.99 2.03  --  2.01 2.05 2.19 2.20 2.18 1.96   ALBUMIN g/dL 3.9 3.8 4.1 3.5 3.5 3.6 3.8 3.7 3.6     CBC:  Results from last 7 days   Lab Units 10/14/23  0257 10/13/23  0257 10/12/23  0309 10/11/23  0223 10/10/23  0539 10/09/23  0338 10/08/23  0258   WBC AUTO x10*3/uL 5.8 5.8 5.3 4.9 6.1 5.6 5.6   HEMOGLOBIN g/dL 11.1* 10.7* 10.2* 10.2* 9.0* 11.8* 11.7*   HEMATOCRIT % 36.5* 35.6* 33.0* 33.6* 29.9* 38.6* 36.7*   PLATELETS AUTO x10*3/uL 292 264 226 206 252 240 211   MCV fL 79* 80 78* 78* 80 79* 76*       Chest Radiograph  === 10/04/23 ===    XR CHEST 1 VIEW    - Impression -  1. Similar mild interstitial pulmonary edema and mild bibasilar  atelectasis.  2. Medical devices as above. No pneumothorax.    Signed by: Claudio Yancey 10/8/2023 9:37 AM  Dictation workstation:   [id number]    CT:   === 10/04/23 ===    CT CHEST WO IV CONTRAST    - Impression -  1.  Mild cardiomegaly with severe coronary artery calcification.  Status post median sternotomy and CABG.  2. Close proximity of the inner table of sternum and cardiovascular  structures as described above. Medical devices as described above.  3. Bandlike atelectasis in bilateral mid and lower lungs. Trace  bilateral pleural effusion. No focal consolidation in the lungs.    MACRO:  None    Signed by: Ke Lau 10/6/2023 10:45 AM  Dictation workstation:   [id number]     Prior to Admission Meds:  Medications Prior to Admission   Medication Sig Dispense Refill Last Dose   • albuterol 2.5 mg /3 mL (0.083 %) nebulizer solution    Unknown   • atorvastatin (Lipitor) 80 mg tablet    Unknown   • bumetanide (Bumex) 2 mg tablet TAKE ONE TABLET BY MOUTH ONCE DAILY **TAKE AN ADDITIONAL TABLET I...  (REFER TO PRESCRIPTION NOTES).   Unknown   • dulaglutide (Trulicity) 1.5 mg/0.5 mL pen injector injection Inject 1.5 mg under the skin 1 (one) time per  "week.   Unknown   • Eliquis 5 mg tablet    Unknown   • Farxiga 10 mg    Unknown   • furosemide (Lasix) 40 mg tablet Take 2 tablets (80 mg) by mouth twice a day.   Unknown   • gabapentin (Neurontin) 300 mg capsule    Unknown   • hydrALAZINE (Apresoline) 25 mg tablet    Unknown   • ipratropium-albuteroL (Duo-Neb) 0.5-2.5 mg/3 mL nebulizer solution Inhale 3 mL 3 times a day.   Unknown   • isosorbide mononitrate ER (Imdur) 30 mg 24 hr tablet Take 1 tablet (30 mg) by mouth once daily.   Unknown   • lancets (Lancets,Thin) misc 1 Each 4 times daily (before meals and at bedtime). use as directed to test blood sugar   Unknown   • levothyroxine (Synthroid, Levoxyl) 50 mcg tablet    Unknown   • metoprolol succinate XL (Toprol-XL) 50 mg 24 hr tablet Take 3 tablets (150 mg) by mouth once daily.   Unknown   • metoprolol tartrate (Lopressor) 50 mg tablet    Unknown   • OneTouch Delica Plus Lancet 30 gauge misc    Unknown   • OneTouch Ultra Test strip REVISE EL NIVEL DE AZUCAR EN LA PEARL CUATRO VECES AL CUBA   Unknown   • pen needle, diabetic, safety 29 gauge x 1/2\" needle Three times before meals and at bedtime   Unknown   • QUEtiapine (SEROquel) 50 mg tablet    Unknown   • sacubitriL-valsartan (Entresto)  mg tablet Take 1 tablet by mouth twice a day.   Unknown   • sertraline (Zoloft) 50 mg tablet Take 1 tablet (50 mg) by mouth once daily.   Unknown   • simethicone (Mylicon) 80 mg chewable tablet Chew 1 tablet (80 mg) every 6 hours if needed for flatulence.   Unknown   • spironolactone (Aldactone) 25 mg tablet Take 1 tablet (25 mg) by mouth once daily.   Unknown       ASSESSMENT AND PLAN:     58 year old [place] male with history of HFrEF/ICM (LVEF 20% 8/14/23), CAD s/p CABG (LIMA-LAD with balloon pump support April 2023), Hx of cardiac arrest s/p VT ablation + ICD, HTN/HLD, CKD, Hx of DVT (L femoral) on Eliquis, hypothyroidism, pulmonary hypertension, T2DM, JAKE/OHS, former tobacco abuse (24 pack year, quit 2017), " presented to [facility] on 10/1 with exertional left sided chest pain and SOB x 3 days. He was admitted in cardiogenic shock, started on dobutamine under the guidance of SGC therapy. Patient was than transferred to [facility] for advanced therapy evaluation on 10/4. Advanced therapies evaluation was initiated on 10/4 and presented in committee meeting on 10/10. Not approved for LVAD or transplant at this time. Plan to optimize medical therapy and follow up closely in HF clinic. May be brought back to the committee in the future.     Neuro:  #Depression  - Serial neuro and pain assessments   - PO Tylenol PRN for pain  - c/w renally dosed gabapentin   - PT/OT Consult, OOB to chair  - CAM ICU score every shift  - Sleep/wake cycle normalization     # Physical Status  -Obesity  -Functional at home.      Cardiovascular:  # Acute on chronic HFrEF/ICM systolic and diastolic heart failure  - TTE at [facility] 8/2023 with EF ~20%, globally abnormal RV systolic function. Noninvasive hemodynamic assessment is consistent with severe pulmonary hypertension (>60 mmHg), an elevated CVP, an elevated LVEDP.  - admit weight 246lbs  - Daily weight: 10/14: 111 kg   - admit   - STOP nipride gtt 10/14  - C/w Hydralazine 100 mg TID and isordil 40 mg TID  - C/w Metoprolol tartrate to metoprolol succinate 75 mg daily   - C/w Digoixn 125 mcg daily, level 10/14 0.39   - C/w Entresto  mg BID  - C/w Dapagliflozin 10 mg  - STOP lasix infusion, c/w bumex 2 mg daily - increase as tolerated   - Repeat ECHO ordered 10/14 pending results   - Daily standing weights, 2gm sodium diet, 2L fluid restriction, strict I&Os     #Advanced Therapies Evaluation   -Presented to selection committee 10/10. Determined to be very high risk for advanced therapies at this time given poorly controlled diabetes, renal function, peripheral vascular questions, questionable psychosocial support, financial concerns. Plan will be to medically optimize him this admission. Follow  up with HF clinic for further decision on LVAD candidacy in the future.  -Completed evaluation 10/10. To be followed up as OP in HF clinic  -Discussed with Vascular medicine about the PVR report. Poor perfusion likely from his heart failure, no evidence of PAD noted.     #H/o VT arrest s/o VT ablation, ICD (2017 in [facility])  #CAD s/p CABG LIMA-LAD 4/2023 Dr Logan   - [facility] December 2022: severe diffuse three-vessel coronary artery disease. Severe proximal diffuse LAD disease up to 70%. Severe diffuse proximal and mid first diagonal disease. Chronic total occlusion of OM1 with left-to-left collaterals. Chronic total occlusion of mid RCA with left-to-right collaterals. Markedly elevated LVEDP at 40 mm of Hg.  - Elective CABG (LIMA-LAD) 4/2023 by Dr Logan   - Continue Aspirin 81mg.  - Continue Atorvastatin 80mg.      #Arrhythmias - Hx of VT s/p ablation + ICD  -Device - Medtronic ICD; interrogation completed 10/6   -Not on antiarrhythmic outpatient.     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  #Tobacco use history (24 pack year smoking hx)  -Monitor and maintain SpO2 > 92%  - PRN albuterol nebs for wheezing      GI:  - Colace BID, Miralax PRN ordered for bowel regimen   - Continue Protonix 40mg daily     :  #CKD  -Admit BUN/Cr 67/2.00  - daily BUN/Cr (10/13): 64/1.66 -> 10/14 69/2.05  - Diurese as hemodynamics and physical exam dictate   - C/w PO Bumex 2 mg daily, increase as tolerated   - Strict I/Os  - Avoid hypotension and nephrotoxic agents     Heme:  #Anemia in the setting of CKD  - Labs: Iron saturation 15%  - Venofer ordered 200 mg x3 days completed     #Hx of L femoral DVT  - C/w Eliquis  - Repeat DVT scan ordered 10/14 pending results       Endo:  #IDDM2  - A1C 8.1% 10/4/23  - home DM regimen : trulicity 1.5mg qweek, glargine 85u once daily, lispro 34u with meals and 26u at bedtime  - Goal BG <180  - glargine 55 units once daily  - lispro increased to 28u with meals 10/14   - c/w lispro corrective  scale #3 to with meals  - Low carb 75 g diet  - Endo following, appreciate recs  - Refer to  endocrine for follow up after discharge     #Hypothyroidism  -TSH 9.31 on 10/4, T4 10/8 1.04  -Continue home levothyroxine 50 mcg      ID:  -afebrile, nontoxic   -no s/s infx  -trend temps q4h     VAP bundle: n/a  Ulcer prophylaxis: PPI  Glycemic control: Lantus and SSI   Bowel care: Colace & miralax prn  Indwelling catheter: None     Lines:   PIVs     DVT: Eliquis  NUTRITION: Adult diet Carb Controlled; 75 gram carb/meal, 45 gram Carb evening snack  EMERGENCY CONTACT: Extended Emergency Contact Information  Primary Emergency Contact: mother,mother  Mobile Phone: [phone]  Relation: Mother  Secondary Emergency Contact: FriendRobinson  Mobile Phone: [phone]  Relation: Friend  CODE STATUS: Full Code  DISPO:  HFICU   FOLLOWUP:   No future appointments.    _________________________________________________  Vivienne Ferrell, APRN-CNP

## 2023-10-14 NOTE — PROGRESS NOTES
HFICU Attending Note    58 y.o. male [place] referred from [facility] for management of decompensated HFrEF.  Recurrent HF undergoing evaluation for advanced therapies with current concerns of poorly controlled DM and social/financial support(his own concerns-though insurance would cover) identified.  He is improving with medical therapy with robust cardiac output though remains with high filling pressures and may be optimized with close followup for consideration of VAD as an outpatient if continues progressing. If failing medical therapy LVAD strong consideration.     - Discontinue nipride  - DVT scan/echocardiogram in coming days  - Monitor renal function  - ?transition to heparin from apixaban if renal function  - apprecaite endocrine evaluation    This critically ill patient continues to be at-risk for clinically significant deterioration / failure due to the above mentioned dysfunctional, unstable organ systems.  I have personally identified and managed all complex critical care issues to prevent aforementioned clinical deterioration.  Critical care time is spent at bedside and/or the immediate area and has included, but is not limited to, the review of diagnostic tests, labs, radiographs, serial assessments of hemodynamics, respiratory status, ventilatory management, and family updates.  Time spent in procedures and teaching are reported separately.    Critical care time: ___35_ minutes     _________________________________________________  Jono Duque MD     Objective   Admit Date: 10/4/2023  Hospital Length of Stay: 10   ICU Length of Stay: 9d 19h     PROBLEMS:  Active Hospital Problems    JAKE (obstructive sleep apnea)      *Acute decompensated heart failure (CMS/HCC)      Cardiogenic shock (CMS/HCC)      Diabetes mellitus (CMS/HCC)      Coronary artery disease due to calcified coronary lesion      Ischemic cardiomyopathy      Central sleep apnea with Cheyne-Chirinos  respiration        MEDICATIONS  Infusions:  nitroprusside, Last Rate: Stopped (10/14/23 0800)      Scheduled:  apixaban, 5 mg, BID  aspirin, 81 mg, Daily  atorvastatin, 80 mg, Daily  bumetanide, 2 mg, Daily  dapagliflozin propanediol, 10 mg, Daily  digoxin, 125 mcg, Daily  docusate sodium, 200 mg, BID  gabapentin, 300 mg, q AM  gabapentin, 600 mg, Nightly  hydrALAZINE, 100 mg, TID  insulin glargine, 55 Units, q24h  insulin lispro, 0-15 Units, TID with meals  insulin lispro, 24 Units, TID with meals  isosorbide dinitrate, 40 mg, TID  levothyroxine, 50 mcg, Daily  metoprolol succinate XL, 75 mg, Daily  pantoprazole, 40 mg, Daily before breakfast  polyethylene glycol-electrolytes, 4,000 mL, Once  potassium chloride CR, 40 mEq, Daily  QUEtiapine, 50 mg, Nightly  sacubitriL-valsartan, 1 tablet, BID      PRN:  acetaminophen, 975 mg, q6h PRN  albuterol, 2.5 mg, q6h PRN  calcium carbonate, 500 mg, BID PRN  dextrose 10 % in water (D10W), 0.3 g/kg/hr, Once PRN  dextrose, 25 g, q15 min PRN  glucagon, 1 mg, q15 min PRN  ondansetron, 4 mg, q4h PRN  oxygen, , Continuous PRN - O2/gases  polyethylene glycol, 17 g, Daily PRN      Invasive Hemodynamics:    Most Recent Range Past 24hrs   BP (Art)   No data recorded   MAP(Art)   No data recorded   RA/CVP 16 mmHg No data recorded   PA 48/28 No data recorded   PA(mean) 35 mmHg No data recorded   PCWP 16 mmHg No data recorded   CO 6.3 L/min No data recorded   CI 2.75 L/min/m2 No data recorded   Mixed Venous   No data recorded   SVR  711 (dyne*sec)/cm5 No data recorded    (dyne*sec)/cm5 No data recorded     PHYSICAL EXAM:   Vitals:    10/14/23 0400 10/14/23 0500 10/14/23 0600 10/14/23 0800   BP: 96/59 85/64 84/65    Pulse: 92 92 94    Resp: 13      Temp: 36 °C (96.8 °F)   36.3 °C (97.3 °F)   TempSrc:       SpO2: 95% 92% 93%    Weight: 111 kg (244 lb 0.8 oz)      Height:         Wt Readings from Last 5 Encounters:   10/14/23 111 kg (244 lb 0.8 oz)     INTAKE/OUTPUT:  I/O last 3  completed shifts:  In: 711.6 (6.4 mL/kg) [I.V.:711.6 (6.4 mL/kg)]  Out: 6525 (58.9 mL/kg) [Urine:6525 (1.6 mL/kg/hr)]  Weight: 110.7 kg      DATA:  CMP:  Recent Labs     10/05/23  1647 10/06/23  0515 10/07/23  0105 10/07/23  1704 10/08/23  0258 10/09/23  0338 10/10/23  0539 10/11/23  0223 10/12/23  0309 10/12/23  1815 10/13/23  0257 10/14/23  0257   *   < > 131* 130* 134* 135* 137 136 135* 137 135* 134*   K 4.1   < > 3.8 3.7 3.7 3.8 4.5 4.3 4.3 4.2 4.1 4.3   CL 90*   < > 91* 93* 95* 97* 100 103 103 101 98 96*   CO2 28   < > 28 26 29 28 27 26 26 24 28 29   ANIONGAP 18   < > 16 15 14 14 15 11 10 16 13 13   BUN 60*   < > 75* 81* 78* 68* 64* 60* 66* 60* 64* 69*   CREATININE 1.87*   < > 1.78* 2.17* 1.74* 1.35* 1.47* 1.40* 1.60* 1.54* 1.66* 2.05*   EGFR 41*   < > 44* 35* 45* 61 55* 59* 50* 52* 48* 37*   MG 1.76  --  2.08 1.96 2.18 2.20 2.19 2.05 2.01  --  2.03 1.99    < > = values in this interval not displayed.     Recent Labs     10/05/23  1647 10/06/23  0515 10/08/23  0258 10/09/23  0338 10/10/23  0539 10/11/23  0223 10/12/23  0309 10/12/23  1815 10/13/23  0257 10/14/23  0257   ALBUMIN 4.1   < > 3.7 3.8 3.6 3.5 3.5 4.1 3.8 3.9   ALT 11  --   --   --   --   --   --   --   --   --    AST 13  --   --   --   --   --   --   --   --   --    BILITOT 1.8*  --   --   --   --   --   --   --   --   --     < > = values in this interval not displayed.     CBC:  Recent Labs     10/07/23  0105 10/08/23  0258 10/09/23  0338 10/10/23  0539 10/11/23  0223 10/12/23  0309 10/13/23  0257 10/14/23  0257   WBC 5.9 5.6 5.6 6.1 4.9 5.3 5.8 5.8   HGB 11.7* 11.7* 11.8* 9.0* 10.2* 10.2* 10.7* 11.1*   HCT 36.3* 36.7* 38.6* 29.9* 33.6* 33.0* 35.6* 36.5*    211 240 252 206 226 264 292   MCV 76* 76* 79* 80 78* 78* 80 79*     COAG:   Recent Labs     10/05/23  1647   INR 1.2*     ABO:   Recent Labs     10/05/23  1647   ABO A     HEME/ENDO:  Recent Labs     05/26/23  0203 10/04/23  1518   FERRITIN  --  379*   IRONSAT  --  15*   TSH  --   "9.31*   HGBA1C 7.9* 8.1*      CARDIAC:   Recent Labs     10/04/23  1518 10/05/23  1647   LDH  --  240   * 157*     Recent Labs     10/09/23  0645 10/09/23  1755 10/10/23  1815 10/10/23  2330 10/11/23  0527 10/11/23  1143 10/11/23  1632   LACMX 0.9  --   --  1.1 1.0 1.2 1.3   SO2MV 69   < > 62 62 68 61 60   O2CMX 68.3   < > 60.2 60.4 66.3 58.9 58.7    < > = values in this interval not displayed.     No results for input(s): \"TACROLIMUS\", \"CYCLOSPORINE\" in the last 30236 hours.    Prior to Admission Meds:  Medications Prior to Admission   Medication Sig Dispense Refill Last Dose    albuterol 2.5 mg /3 mL (0.083 %) nebulizer solution    Unknown    atorvastatin (Lipitor) 80 mg tablet    Unknown    bumetanide (Bumex) 2 mg tablet TAKE ONE TABLET BY MOUTH ONCE DAILY **TAKE AN ADDITIONAL TABLET I...  (REFER TO PRESCRIPTION NOTES).   Unknown    dulaglutide (Trulicity) 1.5 mg/0.5 mL pen injector injection Inject 1.5 mg under the skin 1 (one) time per week.   Unknown    Eliquis 5 mg tablet    Unknown    Farxiga 10 mg    Unknown    furosemide (Lasix) 40 mg tablet Take 2 tablets (80 mg) by mouth twice a day.   Unknown    gabapentin (Neurontin) 300 mg capsule    Unknown    hydrALAZINE (Apresoline) 25 mg tablet    Unknown    ipratropium-albuteroL (Duo-Neb) 0.5-2.5 mg/3 mL nebulizer solution Inhale 3 mL 3 times a day.   Unknown    isosorbide mononitrate ER (Imdur) 30 mg 24 hr tablet Take 1 tablet (30 mg) by mouth once daily.   Unknown    lancets (Lancets,Thin) misc 1 Each 4 times daily (before meals and at bedtime). use as directed to test blood sugar   Unknown    levothyroxine (Synthroid, Levoxyl) 50 mcg tablet    Unknown    metoprolol succinate XL (Toprol-XL) 50 mg 24 hr tablet Take 3 tablets (150 mg) by mouth once daily.   Unknown    metoprolol tartrate (Lopressor) 50 mg tablet    Unknown    OneTouch Delica Plus Lancet 30 gauge misc    Unknown    OneTouch Ultra Test strip REVISE EL NIVEL DE AZUCAR EN LA PEARL CUATRO " "DAVID AL CUBA   Unknown    pen needle, diabetic, safety 29 gauge x 1/2\" needle Three times before meals and at bedtime   Unknown    QUEtiapine (SEROquel) 50 mg tablet    Unknown    sacubitriL-valsartan (Entresto)  mg tablet Take 1 tablet by mouth twice a day.   Unknown    sertraline (Zoloft) 50 mg tablet Take 1 tablet (50 mg) by mouth once daily.   Unknown    simethicone (Mylicon) 80 mg chewable tablet Chew 1 tablet (80 mg) every 6 hours if needed for flatulence.   Unknown    spironolactone (Aldactone) 25 mg tablet Take 1 tablet (25 mg) by mouth once daily.   Unknown       NUTRITION: Adult diet Carb Controlled; 75 gram carb/meal, 45 gram Carb evening snack  EMERGENCY CONTACT: Extended Emergency Contact Information  Primary Emergency Contact: mother,mother  Mobile Phone: [phone]  Relation: Mother  Secondary Emergency Contact: Robinson Contreras  Mobile Phone: [phone]  Relation: Friend  CODE STATUS: Full Code  FOLLOWUP: No future appointments.    "

## 2023-10-14 NOTE — PROGRESS NOTES
"Felice Law is a 58 y.o. male on day 10 of admission presenting with Acute decompensated heart failure (CMS/HCC).      Subjective   Pt requires Diabetes Management following his admission for heart failure. Patient was informed of plan to increase in his lispro.     I have reviewed histories, allergies and medications have been reviewed and there are no changes       Objective   Review of Systems   Constitutional: Negative.    HENT: Negative.     Eyes: Negative.    Respiratory: Negative.     Cardiovascular: Negative.    Gastrointestinal: Negative.    Genitourinary: Negative.    Musculoskeletal: Negative.    Allergic/Immunologic: Negative.    Neurological: Negative.    Hematological: Negative.      Physical Exam  Constitutional:       Appearance: He is obese.   HENT:      Head: Normocephalic.      Nose: Nose normal.      Mouth/Throat:      Mouth: Mucous membranes are dry.   Eyes:      Pupils: Pupils are equal, round, and reactive to light.   Cardiovascular:      Rate and Rhythm: Normal rate and regular rhythm.   Pulmonary:      Effort: Pulmonary effort is normal.      Breath sounds: Normal breath sounds.   Abdominal:      General: Abdomen is flat.      Palpations: Abdomen is soft.   Musculoskeletal:      Cervical back: Normal range of motion and neck supple.   Skin:     General: Skin is warm and dry.   Neurological:      General: No focal deficit present.      Mental Status: He is alert.   Psychiatric:         Mood and Affect: Mood normal.         Behavior: Behavior normal.         Judgment: Judgment normal.         Last Recorded Vitals  Blood pressure 102/61, pulse 95, temperature 36.3 °C (97.3 °F), resp. rate 13, height 1.803 m (5' 10.98\"), weight 111 kg (244 lb 0.8 oz), SpO2 91 %.  Intake/Output last 3 Shifts:  I/O last 3 completed shifts:  In: 711.6 (6.4 mL/kg) [I.V.:711.6 (6.4 mL/kg)]  Out: 6525 (58.9 mL/kg) [Urine:6525 (1.6 mL/kg/hr)]  Weight: 110.7 kg     Relevant Results  Results from last 7 days "   Lab Units 10/14/23  0732 10/14/23  0257 10/13/23  1543 10/13/23  1223 10/13/23  0836 10/13/23  0257 10/12/23  1815 10/12/23  1708 10/12/23  0745 10/12/23  0309 10/11/23  0824 10/11/23  0223   POCT GLUCOSE mg/dL 256*  --  245* 277* 197*  --   --  154*   < >  --    < >  --    GLUCOSE mg/dL  --  296*  --   --   --  218* 135*  --   --  138*  --  163*    < > = values in this interval not displayed.     No current facility-administered medications on file prior to encounter.     Current Outpatient Medications on File Prior to Encounter   Medication Sig Dispense Refill    albuterol 2.5 mg /3 mL (0.083 %) nebulizer solution       atorvastatin (Lipitor) 80 mg tablet       bumetanide (Bumex) 2 mg tablet TAKE ONE TABLET BY MOUTH ONCE DAILY **TAKE AN ADDITIONAL TABLET I...  (REFER TO PRESCRIPTION NOTES).      dulaglutide (Trulicity) 1.5 mg/0.5 mL pen injector injection Inject 1.5 mg under the skin 1 (one) time per week.      Eliquis 5 mg tablet       Farxiga 10 mg       furosemide (Lasix) 40 mg tablet Take 2 tablets (80 mg) by mouth twice a day.      gabapentin (Neurontin) 300 mg capsule       hydrALAZINE (Apresoline) 25 mg tablet       ipratropium-albuteroL (Duo-Neb) 0.5-2.5 mg/3 mL nebulizer solution Inhale 3 mL 3 times a day.      isosorbide mononitrate ER (Imdur) 30 mg 24 hr tablet Take 1 tablet (30 mg) by mouth once daily.      levothyroxine (Synthroid, Levoxyl) 50 mcg tablet       metoprolol succinate XL (Toprol-XL) 50 mg 24 hr tablet Take 3 tablets (150 mg) by mouth once daily.      metoprolol tartrate (Lopressor) 50 mg tablet       OneTouch Delica Plus Lancet 30 gauge misc       QUEtiapine (SEROquel) 50 mg tablet       sacubitriL-valsartan (Entresto)  mg tablet Take 1 tablet by mouth twice a day.      sertraline (Zoloft) 50 mg tablet Take 1 tablet (50 mg) by mouth once daily.      simethicone (Mylicon) 80 mg chewable tablet Chew 1 tablet (80 mg) every 6 hours if needed for flatulence.      spironolactone  (Aldactone) 25 mg tablet Take 1 tablet (25 mg) by mouth once daily.       Results for orders placed or performed during the hospital encounter of 10/04/23 (from the past 24 hour(s))   POCT GLUCOSE   Result Value Ref Range    POCT Glucose 277 (H) 74 - 99 mg/dL   POCT GLUCOSE   Result Value Ref Range    POCT Glucose 245 (H) 74 - 99 mg/dL   Renal function panel   Result Value Ref Range    Glucose 296 (H) 74 - 99 mg/dL    Sodium 134 (L) 136 - 145 mmol/L    Potassium 4.3 3.5 - 5.3 mmol/L    Chloride 96 (L) 98 - 107 mmol/L    Bicarbonate 29 21 - 32 mmol/L    Anion Gap 13 10 - 20 mmol/L    Urea Nitrogen 69 (H) 6 - 23 mg/dL    Creatinine 2.05 (H) 0.50 - 1.30 mg/dL    eGFR 37 (L) >60 mL/min/1.73m*2    Calcium 9.3 8.6 - 10.6 mg/dL    Phosphorus 4.9 2.5 - 4.9 mg/dL    Albumin 3.9 3.4 - 5.0 g/dL   CBC   Result Value Ref Range    WBC 5.8 4.4 - 11.3 x10*3/uL    nRBC 0.0 0.0 - 0.0 /100 WBCs    RBC 4.60 4.50 - 5.90 x10*6/uL    Hemoglobin 11.1 (L) 13.5 - 17.5 g/dL    Hematocrit 36.5 (L) 41.0 - 52.0 %    MCV 79 (L) 80 - 100 fL    MCH 24.1 (L) 26.0 - 34.0 pg    MCHC 30.4 (L) 32.0 - 36.0 g/dL    RDW 18.8 (H) 11.5 - 14.5 %    Platelets 292 150 - 450 x10*3/uL    MPV 10.0 7.5 - 11.5 fL   Magnesium   Result Value Ref Range    Magnesium 1.99 1.60 - 2.40 mg/dL   POCT GLUCOSE   Result Value Ref Range    POCT Glucose 256 (H) 74 - 99 mg/dL     Results from last 7 days   Lab Units 10/14/23  0257   SODIUM mmol/L 134*   POTASSIUM mmol/L 4.3   CHLORIDE mmol/L 96*   CO2 mmol/L 29   BUN mg/dL 69*   CREATININE mg/dL 2.05*   CALCIUM mg/dL 9.3   ALBUMIN g/dL 3.9   GLUCOSE mg/dL 296*       Assessment/Plan   Principal Problem:    Acute decompensated heart failure (CMS/HCC)  Active Problems:    Cardiogenic shock (CMS/HCC)    Central sleep apnea with Cheyne-Chirinos respiration    Coronary artery disease due to calcified coronary lesion    Diabetes mellitus (CMS/HCC)    JAKE (obstructive sleep apnea)    Ischemic cardiomyopathy    Type 2 Diabetes   DM2 with  hyperglycemia  - A1C 8.1% 10/4/23     - home DM regimen : trulicity 1.5mg qweek, glargine 85u once daily, lispro 34u with meals and 26u at bedtime  - managed by: PCP     PLAN  - Goal BG <180  - continue farxiga 10mg once daily  - continue glargine 55 units once daily- reduce to 45u if pt is NPO  - increase lispro to 28u with meals - may hold if glc prior to meal is <90 or NPO  - continue lispro corrective scale #3 to with meals- adjust to lispro scale #2 q6h if pt is NPO  151-200 = 3u  201-250 = 6u  251-300 = 9u  301-350 = 12u  351-400 = 15u        -Accuchecks TIDAC and QHS- kindly ensure QHS Accucheck is drawn; it is often missed   -Hypoglycemia protocol  -DM diet 75g CHO  -Will continue to follow and titrate insulin accordingly     Discharge planning-   Insulin dose TBD by titration, may resume home trulicity at d/c  Pt sees PCP at [facility], recommend  or [facility] endocrinology follow up at discharge- pt states he would like  endo follow up, placed central sched request for  endo close to home for pt     I spent 30 minutes on the care and coordination of this patient.       SAADIA Ventura-S2  Angelic Grier PA-C

## 2023-10-15 LAB
ALBUMIN SERPL BCP-MCNC: 3.6 G/DL (ref 3.4–5)
ANION GAP SERPL CALC-SCNC: 13 MMOL/L (ref 10–20)
BUN SERPL-MCNC: 77 MG/DL (ref 6–23)
CALCIUM SERPL-MCNC: 9 MG/DL (ref 8.6–10.6)
CHLORIDE SERPL-SCNC: 97 MMOL/L (ref 98–107)
CO2 SERPL-SCNC: 28 MMOL/L (ref 21–32)
CREAT SERPL-MCNC: 1.88 MG/DL (ref 0.5–1.3)
ERYTHROCYTE [DISTWIDTH] IN BLOOD BY AUTOMATED COUNT: 18.6 % (ref 11.5–14.5)
GFR SERPL CREATININE-BSD FRML MDRD: 41 ML/MIN/1.73M*2
GLUCOSE BLD MANUAL STRIP-MCNC: 225 MG/DL (ref 74–99)
GLUCOSE BLD MANUAL STRIP-MCNC: 225 MG/DL (ref 74–99)
GLUCOSE BLD MANUAL STRIP-MCNC: 227 MG/DL (ref 74–99)
GLUCOSE BLD MANUAL STRIP-MCNC: 234 MG/DL (ref 74–99)
GLUCOSE BLD MANUAL STRIP-MCNC: 301 MG/DL (ref 74–99)
GLUCOSE SERPL-MCNC: 263 MG/DL (ref 74–99)
HCT VFR BLD AUTO: 36.3 % (ref 41–52)
HGB BLD-MCNC: 11.2 G/DL (ref 13.5–17.5)
MAGNESIUM SERPL-MCNC: 1.98 MG/DL (ref 1.6–2.4)
MCH RBC QN AUTO: 23.9 PG (ref 26–34)
MCHC RBC AUTO-ENTMCNC: 30.9 G/DL (ref 32–36)
MCV RBC AUTO: 78 FL (ref 80–100)
NRBC BLD-RTO: 0 /100 WBCS (ref 0–0)
PHOSPHATE SERPL-MCNC: 4.9 MG/DL (ref 2.5–4.9)
PLATELET # BLD AUTO: 298 X10*3/UL (ref 150–450)
PMV BLD AUTO: 9.6 FL (ref 7.5–11.5)
POTASSIUM SERPL-SCNC: 4.2 MMOL/L (ref 3.5–5.3)
RBC # BLD AUTO: 4.68 X10*6/UL (ref 4.5–5.9)
SODIUM SERPL-SCNC: 134 MMOL/L (ref 136–145)
UFH PPP CHRO-ACNC: 0.7 IU/ML
UFH PPP CHRO-ACNC: 0.8 IU/ML
WBC # BLD AUTO: 5.5 X10*3/UL (ref 4.4–11.3)

## 2023-10-15 PROCEDURE — 85027 COMPLETE CBC AUTOMATED: CPT | Mod: CMCLAB | Performed by: STUDENT IN AN ORGANIZED HEALTH CARE EDUCATION/TRAINING PROGRAM

## 2023-10-15 PROCEDURE — 36415 COLL VENOUS BLD VENIPUNCTURE: CPT | Performed by: STUDENT IN AN ORGANIZED HEALTH CARE EDUCATION/TRAINING PROGRAM

## 2023-10-15 PROCEDURE — 99233 SBSQ HOSP IP/OBS HIGH 50: CPT

## 2023-10-15 PROCEDURE — 36415 COLL VENOUS BLD VENIPUNCTURE: CPT | Mod: CMCLAB

## 2023-10-15 PROCEDURE — 2500000002 HC RX 250 W HCPCS SELF ADMINISTERED DRUGS (ALT 637 FOR MEDICARE OP, ALT 636 FOR OP/ED): Performed by: INTERNAL MEDICINE

## 2023-10-15 PROCEDURE — 99291 CRITICAL CARE FIRST HOUR: CPT | Performed by: STUDENT IN AN ORGANIZED HEALTH CARE EDUCATION/TRAINING PROGRAM

## 2023-10-15 PROCEDURE — 83735 ASSAY OF MAGNESIUM: CPT | Mod: CMCLAB

## 2023-10-15 PROCEDURE — 85520 HEPARIN ASSAY: CPT | Performed by: STUDENT IN AN ORGANIZED HEALTH CARE EDUCATION/TRAINING PROGRAM

## 2023-10-15 PROCEDURE — 1200000002 HC GENERAL ROOM WITH TELEMETRY DAILY

## 2023-10-15 PROCEDURE — 2500000004 HC RX 250 GENERAL PHARMACY W/ HCPCS (ALT 636 FOR OP/ED): Performed by: STUDENT IN AN ORGANIZED HEALTH CARE EDUCATION/TRAINING PROGRAM

## 2023-10-15 PROCEDURE — 96372 THER/PROPH/DIAG INJ SC/IM: CPT | Performed by: INTERNAL MEDICINE

## 2023-10-15 PROCEDURE — 2500000001 HC RX 250 WO HCPCS SELF ADMINISTERED DRUGS (ALT 637 FOR MEDICARE OP): Performed by: INTERNAL MEDICINE

## 2023-10-15 PROCEDURE — 2500000001 HC RX 250 WO HCPCS SELF ADMINISTERED DRUGS (ALT 637 FOR MEDICARE OP): Performed by: STUDENT IN AN ORGANIZED HEALTH CARE EDUCATION/TRAINING PROGRAM

## 2023-10-15 PROCEDURE — 2500000004 HC RX 250 GENERAL PHARMACY W/ HCPCS (ALT 636 FOR OP/ED)

## 2023-10-15 PROCEDURE — 82947 ASSAY GLUCOSE BLOOD QUANT: CPT | Mod: CMCLAB

## 2023-10-15 PROCEDURE — 85520 HEPARIN ASSAY: CPT

## 2023-10-15 PROCEDURE — 80069 RENAL FUNCTION PANEL: CPT | Performed by: STUDENT IN AN ORGANIZED HEALTH CARE EDUCATION/TRAINING PROGRAM

## 2023-10-15 PROCEDURE — 2500000004 HC RX 250 GENERAL PHARMACY W/ HCPCS (ALT 636 FOR OP/ED): Performed by: NURSE PRACTITIONER

## 2023-10-15 PROCEDURE — 2500000001 HC RX 250 WO HCPCS SELF ADMINISTERED DRUGS (ALT 637 FOR MEDICARE OP)

## 2023-10-15 RX ORDER — INSULIN LISPRO 100 [IU]/ML
34 INJECTION, SOLUTION INTRAVENOUS; SUBCUTANEOUS
Status: DISCONTINUED | OUTPATIENT
Start: 2023-10-15 | End: 2023-10-17 | Stop reason: HOSPADM

## 2023-10-15 RX ORDER — LANOLIN ALCOHOL/MO/W.PET/CERES
400 CREAM (GRAM) TOPICAL DAILY
Status: CANCELLED | OUTPATIENT
Start: 2023-10-15 | End: 2023-10-20

## 2023-10-15 RX ADMIN — ATORVASTATIN CALCIUM 80 MG: 80 TABLET ORAL at 09:39

## 2023-10-15 RX ADMIN — INSULIN LISPRO 34 UNITS: 100 INJECTION, SOLUTION INTRAVENOUS; SUBCUTANEOUS at 12:00

## 2023-10-15 RX ADMIN — ISOSORBIDE DINITRATE 40 MG: 20 TABLET ORAL at 09:40

## 2023-10-15 RX ADMIN — HEPARIN SODIUM 1600 UNITS/HR: 10000 INJECTION, SOLUTION INTRAVENOUS at 06:31

## 2023-10-15 RX ADMIN — APIXABAN 5 MG: 5 TABLET, FILM COATED ORAL at 11:42

## 2023-10-15 RX ADMIN — HYDRALAZINE HYDROCHLORIDE 100 MG: 100 TABLET, FILM COATED ORAL at 20:31

## 2023-10-15 RX ADMIN — INSULIN LISPRO 6 UNITS: 100 INJECTION, SOLUTION INTRAVENOUS; SUBCUTANEOUS at 09:03

## 2023-10-15 RX ADMIN — INSULIN LISPRO 28 UNITS: 100 INJECTION, SOLUTION INTRAVENOUS; SUBCUTANEOUS at 08:30

## 2023-10-15 RX ADMIN — HYDRALAZINE HYDROCHLORIDE 100 MG: 100 TABLET, FILM COATED ORAL at 09:40

## 2023-10-15 RX ADMIN — ASPIRIN 81 MG CHEWABLE TABLET 81 MG: 81 TABLET CHEWABLE at 09:40

## 2023-10-15 RX ADMIN — SACUBITRIL AND VALSARTAN 1 TABLET: 97; 103 TABLET, FILM COATED ORAL at 09:40

## 2023-10-15 RX ADMIN — METOPROLOL SUCCINATE 75 MG: 50 TABLET, EXTENDED RELEASE ORAL at 09:40

## 2023-10-15 RX ADMIN — BUMETANIDE 2 MG: 2 TABLET ORAL at 09:40

## 2023-10-15 RX ADMIN — INSULIN LISPRO 34 UNITS: 100 INJECTION, SOLUTION INTRAVENOUS; SUBCUTANEOUS at 16:53

## 2023-10-15 RX ADMIN — INSULIN LISPRO 12 UNITS: 100 INJECTION, SOLUTION INTRAVENOUS; SUBCUTANEOUS at 12:48

## 2023-10-15 RX ADMIN — LEVOTHYROXINE SODIUM 50 MCG: 50 TABLET ORAL at 06:33

## 2023-10-15 RX ADMIN — GABAPENTIN 300 MG: 300 CAPSULE ORAL at 09:39

## 2023-10-15 RX ADMIN — PANTOPRAZOLE SODIUM 40 MG: 40 TABLET, DELAYED RELEASE ORAL at 07:00

## 2023-10-15 RX ADMIN — DIGOXIN 125 MCG: 125 TABLET ORAL at 11:42

## 2023-10-15 RX ADMIN — SACUBITRIL AND VALSARTAN 1 TABLET: 97; 103 TABLET, FILM COATED ORAL at 20:21

## 2023-10-15 RX ADMIN — GABAPENTIN 600 MG: 300 CAPSULE ORAL at 20:21

## 2023-10-15 RX ADMIN — HYDRALAZINE HYDROCHLORIDE 100 MG: 100 TABLET, FILM COATED ORAL at 15:00

## 2023-10-15 RX ADMIN — ISOSORBIDE DINITRATE 40 MG: 20 TABLET ORAL at 14:43

## 2023-10-15 RX ADMIN — INSULIN LISPRO 6 UNITS: 100 INJECTION, SOLUTION INTRAVENOUS; SUBCUTANEOUS at 16:55

## 2023-10-15 RX ADMIN — DAPAGLIFLOZIN 10 MG: 10 TABLET, FILM COATED ORAL at 09:40

## 2023-10-15 RX ADMIN — QUETIAPINE FUMARATE 50 MG: 25 TABLET ORAL at 20:21

## 2023-10-15 RX ADMIN — APIXABAN 5 MG: 5 TABLET, FILM COATED ORAL at 20:21

## 2023-10-15 RX ADMIN — POTASSIUM CHLORIDE 40 MEQ: 1500 TABLET, EXTENDED RELEASE ORAL at 09:40

## 2023-10-15 RX ADMIN — ISOSORBIDE DINITRATE 40 MG: 20 TABLET ORAL at 18:49

## 2023-10-15 RX ADMIN — INSULIN GLARGINE 55 UNITS: 100 INJECTION, SOLUTION SUBCUTANEOUS at 09:01

## 2023-10-15 ASSESSMENT — ENCOUNTER SYMPTOMS
GASTROINTESTINAL NEGATIVE: 1
CARDIOVASCULAR NEGATIVE: 1
MUSCULOSKELETAL NEGATIVE: 1
RESPIRATORY NEGATIVE: 1
CONSTITUTIONAL NEGATIVE: 1
NEUROLOGICAL NEGATIVE: 1
ENDOCRINE NEGATIVE: 1
EYES NEGATIVE: 1
PSYCHIATRIC NEGATIVE: 1

## 2023-10-15 ASSESSMENT — PAIN SCALES - GENERAL
PAINLEVEL_OUTOF10: 0 - NO PAIN

## 2023-10-15 ASSESSMENT — PAIN - FUNCTIONAL ASSESSMENT
PAIN_FUNCTIONAL_ASSESSMENT: 0-10

## 2023-10-15 NOTE — PROGRESS NOTES
"Felice Law is a 58 y.o. male on day 11 of admission presenting with Acute decompensated heart failure (CMS/HCC) and type 2 diabetes.     Subjective   Pt requires Diabetes Management following his admission for heart failure. Patient was informed of plan to increase in his premeal lispro. We discussed his travel plans for 11/14-12/14 to be in [place] - he requests that outpt appointments not be made during that time period.    I have reviewed histories, allergies and medications have been reviewed        Objective   Review of Systems   Constitutional: Negative.    HENT: Negative.     Eyes: Negative.    Respiratory: Negative.     Cardiovascular: Negative.    Gastrointestinal: Negative.    Endocrine: Negative.    Genitourinary: Negative.    Musculoskeletal: Negative.    Neurological: Negative.    Psychiatric/Behavioral: Negative.       Physical Exam  Constitutional:       Appearance: Normal appearance.   HENT:      Head: Normocephalic.      Mouth/Throat:      Mouth: Mucous membranes are moist.   Eyes:      Extraocular Movements: Extraocular movements intact.      Pupils: Pupils are equal, round, and reactive to light.   Cardiovascular:      Rate and Rhythm: Normal rate and regular rhythm.      Pulses: Normal pulses.      Heart sounds: Normal heart sounds.   Pulmonary:      Effort: Pulmonary effort is normal.      Breath sounds: Normal breath sounds.   Abdominal:      General: Abdomen is flat.      Palpations: Abdomen is soft.   Skin:     General: Skin is warm and dry.   Neurological:      General: No focal deficit present.      Mental Status: He is alert and oriented to person, place, and time.   Psychiatric:         Mood and Affect: Mood normal.         Judgment: Judgment normal.         Last Recorded Vitals  Blood pressure 81/68, pulse 99, temperature 36.5 °C (97.7 °F), temperature source Temporal, resp. rate 15, height 1.803 m (5' 10.98\"), weight 109 kg (239 lb 3.2 oz), SpO2 98 %.  Intake/Output last " 3 Shifts:  I/O last 3 completed shifts:  In: 749 (6.9 mL/kg) [P.O.:120; I.V.:629 (5.8 mL/kg)]  Out: 4700 (43.3 mL/kg) [Urine:4700 (1.2 mL/kg/hr)]  Weight: 108.5 kg     Relevant Results  Results from last 7 days   Lab Units 10/15/23  0309 10/14/23  1651 10/14/23  1516 10/14/23  0732 10/14/23  0257 10/13/23  1543 10/13/23  1223 10/13/23  0836 10/13/23  0257 10/12/23  1815   POCT GLUCOSE mg/dL  --  143*  --  256*  --  245* 277* 197*  --   --    GLUCOSE mg/dL 263*  --  125*  --  296*  --   --   --  218* 135*     Scheduled medications  apixaban, 5 mg, oral, BID  aspirin, 81 mg, oral, Daily  atorvastatin, 80 mg, oral, Daily  bumetanide, 2 mg, oral, Daily  dapagliflozin propanediol, 10 mg, oral, Daily  digoxin, 125 mcg, oral, Daily  docusate sodium, 200 mg, oral, BID  gabapentin, 300 mg, oral, q AM  gabapentin, 600 mg, oral, Nightly  hydrALAZINE, 100 mg, oral, TID  insulin glargine, 55 Units, subcutaneous, q24h  insulin lispro, 0-15 Units, subcutaneous, TID with meals  insulin lispro, 28 Units, subcutaneous, TID with meals  isosorbide dinitrate, 40 mg, oral, TID  levothyroxine, 50 mcg, oral, Daily  metoprolol succinate XL, 75 mg, oral, Daily  pantoprazole, 40 mg, oral, Daily before breakfast  polyethylene glycol-electrolytes, 4,000 mL, oral, Once  potassium chloride CR, 40 mEq, oral, Daily  QUEtiapine, 50 mg, oral, Nightly  sacubitriL-valsartan, 1 tablet, oral, BID      Continuous medications     PRN medications  PRN medications: acetaminophen, albuterol, calcium carbonate, dextrose 10 % in water (D10W), dextrose, glucagon, ondansetron, oxygen, polyethylene glycol     Results for orders placed or performed during the hospital encounter of 10/04/23 (from the past 24 hour(s))   Transthoracic Echo (TTE) Complete   Result Value Ref Range    LV A4C EF 19.3     BSA 2.35 m2   Renal function panel   Result Value Ref Range    Glucose 125 (H) 74 - 99 mg/dL    Sodium 135 (L) 136 - 145 mmol/L    Potassium 4.2 3.5 - 5.3 mmol/L     Chloride 97 (L) 98 - 107 mmol/L    Bicarbonate 28 21 - 32 mmol/L    Anion Gap 14 10 - 20 mmol/L    Urea Nitrogen 75 (H) 6 - 23 mg/dL    Creatinine 2.23 (H) 0.50 - 1.30 mg/dL    eGFR 34 (L) >60 mL/min/1.73m*2    Calcium 9.8 8.6 - 10.6 mg/dL    Phosphorus 4.6 2.5 - 4.9 mg/dL    Albumin 4.0 3.4 - 5.0 g/dL   POCT GLUCOSE   Result Value Ref Range    POCT Glucose 143 (H) 74 - 99 mg/dL   aPTT - baseline   Result Value Ref Range    aPTT 35 27 - 38 seconds   CBC   Result Value Ref Range    WBC 6.7 4.4 - 11.3 x10*3/uL    nRBC 0.0 0.0 - 0.0 /100 WBCs    RBC 4.97 4.50 - 5.90 x10*6/uL    Hemoglobin 11.9 (L) 13.5 - 17.5 g/dL    Hematocrit 38.6 (L) 41.0 - 52.0 %    MCV 78 (L) 80 - 100 fL    MCH 23.9 (L) 26.0 - 34.0 pg    MCHC 30.8 (L) 32.0 - 36.0 g/dL    RDW 18.6 (H) 11.5 - 14.5 %    Platelets 305 150 - 450 x10*3/uL    MPV 10.0 7.5 - 11.5 fL   Heparin Assay, UFH   Result Value Ref Range    Heparin Unfractionated 0.9 See Comment Below for Therapeutic Ranges IU/mL   Renal function panel   Result Value Ref Range    Glucose 263 (H) 74 - 99 mg/dL    Sodium 134 (L) 136 - 145 mmol/L    Potassium 4.2 3.5 - 5.3 mmol/L    Chloride 97 (L) 98 - 107 mmol/L    Bicarbonate 28 21 - 32 mmol/L    Anion Gap 13 10 - 20 mmol/L    Urea Nitrogen 77 (H) 6 - 23 mg/dL    Creatinine 1.88 (H) 0.50 - 1.30 mg/dL    eGFR 41 (L) >60 mL/min/1.73m*2    Calcium 9.0 8.6 - 10.6 mg/dL    Phosphorus 4.9 2.5 - 4.9 mg/dL    Albumin 3.6 3.4 - 5.0 g/dL   CBC   Result Value Ref Range    WBC 5.5 4.4 - 11.3 x10*3/uL    nRBC 0.0 0.0 - 0.0 /100 WBCs    RBC 4.68 4.50 - 5.90 x10*6/uL    Hemoglobin 11.2 (L) 13.5 - 17.5 g/dL    Hematocrit 36.3 (L) 41.0 - 52.0 %    MCV 78 (L) 80 - 100 fL    MCH 23.9 (L) 26.0 - 34.0 pg    MCHC 30.9 (L) 32.0 - 36.0 g/dL    RDW 18.6 (H) 11.5 - 14.5 %    Platelets 298 150 - 450 x10*3/uL    MPV 9.6 7.5 - 11.5 fL   Magnesium   Result Value Ref Range    Magnesium 1.98 1.60 - 2.40 mg/dL   Heparin Assay, UFH   Result Value Ref Range    Heparin Unfractionated  0.8 See Comment Below for Therapeutic Ranges IU/mL   Heparin Assay   Result Value Ref Range    Heparin Unfractionated 0.7 See Comment Below for Therapeutic Ranges IU/mL       Results from last 7 days   Lab Units 10/15/23  0309   SODIUM mmol/L 134*   POTASSIUM mmol/L 4.2   CHLORIDE mmol/L 97*   CO2 mmol/L 28   BUN mg/dL 77*   CREATININE mg/dL 1.88*   CALCIUM mg/dL 9.0   ALBUMIN g/dL 3.6   GLUCOSE mg/dL 263*       Assessment/Plan   Principal Problem:    Acute decompensated heart failure (CMS/HCC)  Active Problems:    Cardiogenic shock (CMS/HCC)    Central sleep apnea with Cheyne-Chirinos respiration    Coronary artery disease due to calcified coronary lesion    Diabetes mellitus (CMS/HCC)    JAKE (obstructive sleep apnea)    Ischemic cardiomyopathy     Type 2 Diabetes   DM2 with hyperglycemia  - A1C 8.1% 10/4/23     - home DM regimen : trulicity 1.5mg qweek, glargine 85u once daily, lispro 34u with meals and 26u at bedtime  - managed by: PCP     PLAN  - Goal BG <180  - continue farxiga 10mg once daily  - continue glargine 55 units once daily- reduce to 45u if pt is NPO  - increase lispro to 34u with meals - may hold if glc prior to meal is <90 or NPO  - continue lispro corrective scale #3 to with meals- adjust to lispro scale #2 q6h if pt is NPO  151-200 = 3u  201-250 = 6u  251-300 = 9u  301-350 = 12u  351-400 = 15u        -Accuchecks TIDAC and QHS- kindly ensure QHS Accucheck is drawn; it is often missed   -Hypoglycemia protocol  -DM diet 75g CHO  -Will continue to follow and titrate insulin accordingly     Discharge planning-   Insulin dose TBD by titration, may resume home trulicity at d/c  Pt sees PCP at [facility], recommend  or [facility] endocrinology follow up at discharge- pt states he would like  endo follow up, placed central sched request for  endo close to home for pt     I spent 30 minutes on the care and coordination of this patient.         Dawn Ray PA-S2  OMAR BenderC

## 2023-10-15 NOTE — PROGRESS NOTES
[facility]  HFICU PROGRESS NOTE    Felice Law/[id number]    Admit Date: 10/4/2023  Hospital Length of Stay: 11   ICU Length of Stay: 9    INTERVAL EVENTS / PERTINENT ROS:   Pt. Endorses one episode of diarrhea overnight. Denies chest pain, shortness of breath, nausea and vomiting.      Plan:  - Discontinue heparin drip.   - Restart home Eliquis 5mg BID  - Transfer to [facility].     MEDICATIONS  Infusions:       Scheduled:  apixaban, 5 mg, BID  aspirin, 81 mg, Daily  atorvastatin, 80 mg, Daily  bumetanide, 2 mg, Daily  dapagliflozin propanediol, 10 mg, Daily  digoxin, 125 mcg, Daily  docusate sodium, 200 mg, BID  gabapentin, 300 mg, q AM  gabapentin, 600 mg, Nightly  hydrALAZINE, 100 mg, TID  insulin glargine, 55 Units, q24h  insulin lispro, 0-15 Units, TID with meals  insulin lispro, 28 Units, TID with meals  isosorbide dinitrate, 40 mg, TID  levothyroxine, 50 mcg, Daily  metoprolol succinate XL, 75 mg, Daily  pantoprazole, 40 mg, Daily before breakfast  polyethylene glycol-electrolytes, 4,000 mL, Once  potassium chloride CR, 40 mEq, Daily  QUEtiapine, 50 mg, Nightly  sacubitriL-valsartan, 1 tablet, BID      PRN:  acetaminophen, 975 mg, q6h PRN  albuterol, 2.5 mg, q6h PRN  calcium carbonate, 500 mg, BID PRN  dextrose 10 % in water (D10W), 0.3 g/kg/hr, Once PRN  dextrose, 25 g, q15 min PRN  glucagon, 1 mg, q15 min PRN  ondansetron, 4 mg, q4h PRN  oxygen, , Continuous PRN - O2/gases  polyethylene glycol, 17 g, Daily PRN      Invasive Hemodynamics:    Most Recent Range Past 24hrs   BP (Art)   No data recorded   MAP(Art)   No data recorded   RA/CVP 16 mmHg No data recorded   PA 48/28 No data recorded   PA(mean) 35 mmHg No data recorded   PCWP 16 mmHg No data recorded   CO 6.3 L/min No data recorded   CI 2.75 L/min/m2 No data recorded   Mixed Venous   No data recorded   SVR  711 (dyne*sec)/cm5 No data recorded    (dyne*sec)/cm5 No data recorded     PHYSICAL EXAM:   Visit Vitals  BP  "81/68   Pulse 99   Temp 36.5 °C (97.7 °F) (Temporal)   Resp 15   Ht 1.803 m (5' 10.98\")   Wt 109 kg (239 lb 3.2 oz)   SpO2 98%   BMI 33.38 kg/m²   Smoking Status Former   BSA 2.34 m²     Wt Readings from Last 5 Encounters:   10/15/23 109 kg (239 lb 3.2 oz)     INTAKE/OUTPUT:  I/O last 3 completed shifts:  In: 749 (6.9 mL/kg) [P.O.:120; I.V.:629 (5.8 mL/kg)]  Out: 4700 (43.3 mL/kg) [Urine:4700 (1.2 mL/kg/hr)]  Weight: 108.5 kg      Physical Exam  Constitutional:       General: He is not in acute distress.     Appearance: Normal appearance.   HENT:      Head: Normocephalic.      Mouth/Throat:      Mouth: Mucous membranes are moist.   Eyes:      Extraocular Movements: Extraocular movements intact.      Conjunctiva/sclera: Conjunctivae normal.   Cardiovascular:      Rate and Rhythm: Normal rate and regular rhythm.      Heart sounds: Normal heart sounds. No murmur heard.  Pulmonary:      Effort: Pulmonary effort is normal.      Breath sounds: Normal breath sounds.   Abdominal:      General: Bowel sounds are normal.      Palpations: Abdomen is soft.   Musculoskeletal:         General: Normal range of motion.   Skin:     General: Skin is warm and dry.   Neurological:      General: No focal deficit present.      Mental Status: He is alert and oriented to person, place, and time.   Psychiatric:         Mood and Affect: Mood normal.       DATA:  CMP:  Results from last 7 days   Lab Units 10/15/23  0309 10/14/23  1516 10/14/23  0257 10/13/23  0257 10/12/23  1815 10/12/23  0309 10/11/23  0223 10/10/23  0539 10/09/23  0338   SODIUM mmol/L 134* 135* 134* 135* 137 135* 136 137 135*   POTASSIUM mmol/L 4.2 4.2 4.3 4.1 4.2 4.3 4.3 4.5 3.8   CHLORIDE mmol/L 97* 97* 96* 98 101 103 103 100 97*   CO2 mmol/L 28 28 29 28 24 26 26 27 28   ANION GAP mmol/L 13 14 13 13 16 10 11 15 14   BUN mg/dL 77* 75* 69* 64* 60* 66* 60* 64* 68*   CREATININE mg/dL 1.88* 2.23* 2.05* 1.66* 1.54* 1.60* 1.40* 1.47* 1.35*   EGFR mL/min/1.73m*2 41* 34* 37* 48* " 52* 50* 59* 55* 61   MAGNESIUM mg/dL 1.98  --  1.99 2.03  --  2.01 2.05 2.19 2.20   ALBUMIN g/dL 3.6 4.0 3.9 3.8 4.1 3.5 3.5 3.6 3.8     CBC:  Results from last 7 days   Lab Units 10/15/23  0309 10/14/23  1747 10/14/23  0257 10/13/23  0257 10/12/23  0309 10/11/23  0223 10/10/23  0539 10/09/23  0338   WBC AUTO x10*3/uL 5.5 6.7 5.8 5.8 5.3 4.9 6.1 5.6   HEMOGLOBIN g/dL 11.2* 11.9* 11.1* 10.7* 10.2* 10.2* 9.0* 11.8*   HEMATOCRIT % 36.3* 38.6* 36.5* 35.6* 33.0* 33.6* 29.9* 38.6*   PLATELETS AUTO x10*3/uL 298 305 292 264 226 206 252 240   MCV fL 78* 78* 79* 80 78* 78* 80 79*       Chest Radiograph  === 10/04/23 ===    XR CHEST 1 VIEW    - Impression -  1. Similar mild interstitial pulmonary edema and mild bibasilar  atelectasis.  2. Medical devices as above. No pneumothorax.    Signed by: Claudio Yancey 10/8/2023 9:37 AM  Dictation workstation:   [id number]    CT:   === 10/04/23 ===    CT CHEST WO IV CONTRAST    - Impression -  1.  Mild cardiomegaly with severe coronary artery calcification.  Status post median sternotomy and CABG.  2. Close proximity of the inner table of sternum and cardiovascular  structures as described above. Medical devices as described above.  3. Bandlike atelectasis in bilateral mid and lower lungs. Trace  bilateral pleural effusion. No focal consolidation in the lungs.    MACRO:  None    Signed by: Ke Lau 10/6/2023 10:45 AM  Dictation workstation:   [id number]     Prior to Admission Meds:  Medications Prior to Admission   Medication Sig Dispense Refill Last Dose    albuterol 2.5 mg /3 mL (0.083 %) nebulizer solution    Unknown    atorvastatin (Lipitor) 80 mg tablet    Unknown    bumetanide (Bumex) 2 mg tablet TAKE ONE TABLET BY MOUTH ONCE DAILY **TAKE AN ADDITIONAL TABLET I...  (REFER TO PRESCRIPTION NOTES).   Unknown    dulaglutide (Trulicity) 1.5 mg/0.5 mL pen injector injection Inject 1.5 mg under the skin 1 (one) time per week.   Unknown    Eliquis 5 mg tablet    Unknown     "Farxiga 10 mg    Unknown    furosemide (Lasix) 40 mg tablet Take 2 tablets (80 mg) by mouth twice a day.   Unknown    gabapentin (Neurontin) 300 mg capsule    Unknown    hydrALAZINE (Apresoline) 25 mg tablet    Unknown    ipratropium-albuteroL (Duo-Neb) 0.5-2.5 mg/3 mL nebulizer solution Inhale 3 mL 3 times a day.   Unknown    isosorbide mononitrate ER (Imdur) 30 mg 24 hr tablet Take 1 tablet (30 mg) by mouth once daily.   Unknown    lancets (Lancets,Thin) misc 1 Each 4 times daily (before meals and at bedtime). use as directed to test blood sugar   Unknown    levothyroxine (Synthroid, Levoxyl) 50 mcg tablet    Unknown    metoprolol succinate XL (Toprol-XL) 50 mg 24 hr tablet Take 3 tablets (150 mg) by mouth once daily.   Unknown    metoprolol tartrate (Lopressor) 50 mg tablet    Unknown    OneTouch Delica Plus Lancet 30 gauge misc    Unknown    OneTouch Ultra Test strip REVISE EL NIVEL DE AZUCAR EN LA PEARL CUATRO VECES AL CUBA   Unknown    pen needle, diabetic, safety 29 gauge x 1/2\" needle Three times before meals and at bedtime   Unknown    QUEtiapine (SEROquel) 50 mg tablet    Unknown    sacubitriL-valsartan (Entresto)  mg tablet Take 1 tablet by mouth twice a day.   Unknown    sertraline (Zoloft) 50 mg tablet Take 1 tablet (50 mg) by mouth once daily.   Unknown    simethicone (Mylicon) 80 mg chewable tablet Chew 1 tablet (80 mg) every 6 hours if needed for flatulence.   Unknown    spironolactone (Aldactone) 25 mg tablet Take 1 tablet (25 mg) by mouth once daily.   Unknown       ASSESSMENT AND PLAN:     58 year old [place] male with history of HFrEF/ICM (LVEF 20% 8/14/23), CAD s/p CABG (LIMA-LAD with balloon pump support April 2023), Hx of cardiac arrest s/p VT ablation + ICD, HTN/HLD, CKD, Hx of DVT (L femoral) on Eliquis, hypothyroidism, pulmonary hypertension, T2DM, JAKE/OHS, former tobacco abuse (24 pack year, quit 2017), presented to [facility] on 10/1 with exertional left sided chest pain and SOB x 3 " days. He was admitted in cardiogenic shock, started on dobutamine under the guidance of S[facility] therapy. Patient was than transferred to [facility] for advanced therapy evaluation on 10/4. Advanced therapies evaluation was initiated on 10/4 and presented in committee meeting on 10/10. Not approved for LVAD or transplant at this time. Determined to be very high risk for advanced therapies at this time given poorly controlled diabetes, renal function, peripheral vascular questions, questionable psychosocial support, and financial concerns. Plan to optimize medical therapy and follow up closely in HF clinic. May be brought back to the committee in the future. Over the past couple of days, pt's renal function worsened due to likely over diuresis. Lasix drip was discontinued yesterday and pt.'s renal function has since begun to improve. Plan to transfer to [facility].     Neuro:  #Depression  - Serial neuro and pain assessments   - PO Tylenol PRN for pain  - c/w renally dosed gabapentin   - PT/OT Consult, OOB to chair  - CAM ICU score every shift  - Sleep/wake cycle normalization     # Physical Status  -Obesity  -Functional at home.      Cardiovascular:  # Acute on chronic HFrEF/ICM systolic and diastolic heart failure  - TTE at [facility] 8/2023 with EF ~20%, globally abnormal RV systolic function. Noninvasive hemodynamic assessment is consistent with severe pulmonary hypertension (>60 mmHg), an elevated CVP, an elevated LVEDP.  - admit weight 246lbs  - Daily weight: 10/14: 111 kg   - admit   - nipride gtt discontinued 10/14  - C/w Hydralazine 100 mg TID and isordil 40 mg TID  - C/w metoprolol succinate 75 mg daily   - C/w Digoixn 125 mcg daily, level 10/14 0.39   - C/w Entresto  mg BID  - C/w Dapagliflozin 10 mg  - STOP lasix infusion, c/w bumex 2 mg daily - increase as tolerated   - Repeat ECHO ordered 10/14 shows LVEF 20%. Abnormal septal motion consistent with post-operative status. Left ventricular cavity size is moderate  to severely dilated. No obvious LV thrombus seen with the use of ultrasound enhancing agent. There is global hypokinesis of the left ventricle with minor regional variations. There is mild eccentric left ventricular hypertrophy. There is mildly reduced right ventricular systolic function. The left atrium is moderate to severely dilated. Moderately elevated right ventricular systolic pressure. While there are no prior echocardiograms in our system, notes from OSH indicate EF known to be approximately 20%.  - Daily standing weights, 2gm sodium diet, 2L fluid restriction, strict I&Os     #Advanced Therapies Evaluation   -Presented to selection committee 10/10. Determined to be very high risk for advanced therapies at this time given poorly controlled diabetes, renal function, peripheral vascular questions, questionable psychosocial support, financial concerns. Plan will be to medically optimize him this admission. Follow up with HF clinic for further decision on LVAD candidacy in the future.  -Completed evaluation 10/10. To be followed up as OP in HF clinic  -Discussed with Vascular medicine about the PVR report. Poor perfusion likely from his heart failure, no evidence of PAD noted.     #H/o VT arrest s/o VT ablation, ICD (2017 in [facility])  #CAD s/p CABG LIMA-LAD 4/2023 Dr Logan   - [facility] December 2022: severe diffuse three-vessel coronary artery disease. Severe proximal diffuse LAD disease up to 70%. Severe diffuse proximal and mid first diagonal disease. Chronic total occlusion of OM1 with left-to-left collaterals. Chronic total occlusion of mid RCA with left-to-right collaterals. Markedly elevated LVEDP at 40 mm of Hg.  - Elective CABG (LIMA-LAD) 4/2023 by Dr Logan   - Continue Aspirin 81mg.  - Continue Atorvastatin 80mg.      #Arrhythmias - Hx of VT s/p ablation + ICD  -Device - Med[employer] ICD; interrogation completed 10/6   -Not on antiarrhythmic outpatient.     Pulmonary:   #Pulm HTN, likely WHO group  II  #Tobacco use history (24 pack year smoking hx)  -Monitor and maintain SpO2 > 92%  - PRN albuterol nebs for wheezing      GI:  - Colace BID, Miralax PRN ordered for bowel regimen   - Continue Protonix 40mg daily     :  #CKD  -Admit BUN/Cr 67/2.00  - daily BUN/Cr (10/13): 64/1.66 -> 10/14 69/2.05 --> 10/15 77/1.88  - Diurese as hemodynamics and physical exam dictate   - C/w PO Bumex 2 mg daily, increase as tolerated   - Strict I/Os  - Avoid hypotension and nephrotoxic agents     Heme:  #Anemia in the setting of CKD  - Labs: Iron saturation 15%  - Venofer ordered 200 mg x3 days completed     #Hx of L femoral DVT  - C/w Eliquis  - Repeat DVT scan ordered 10/14 pending completion.       Endo:  #IDDM2  - A1C 8.1% 10/4/23  - home DM regimen : trulicity 1.5mg qweek, glargine 85u once daily, lispro 34u with meals and 26u at bedtime  - Goal BG <180  - glargine 55 units once daily  - c/w lispro 28u with meals (started 10/14)  - c/w lispro corrective scale #3 to with meals  - Low carb 75 g diet  - Endo following, appreciate recs  - Refer to  endocrine for follow up after discharge     #Hypothyroidism  -TSH 9.31 on 10/4, T4 10/8 1.04  -Continue home levothyroxine 50 mcg      ID:  -afebrile, nontoxic   -no s/s infx  -trend temps q4h     VAP bundle: n/a  Ulcer prophylaxis: PPI  Glycemic control: Lantus and SSI   Bowel care: Colace & miralax prn  Indwelling catheter: None     Lines:   PIVs     DVT: Eliquis  NUTRITION: Adult diet Carb Controlled; 75 gram carb/meal, 45 gram Carb evening snack  EMERGENCY CONTACT: Extended Emergency Contact Information  Primary Emergency Contact: mother,mother  Mobile Phone: [phone]  Relation: Mother  Secondary Emergency Contact: Robinson Contreras  Mobile Phone: [phone]  Relation: Friend  CODE STATUS: Full Code  DISPO:  HFICU   FOLLOWUP:   No future appointments.    _________________________________________________  BRAD Smith

## 2023-10-15 NOTE — PROGRESS NOTES
HFICU Attending Note    58 y.o. male [place] referred from [facility] for management of decompensated HFrEF.  Recurrent HF undergoing evaluation for advanced therapies with current concerns of poorly controlled DM and social/financial support(his own concerns-though insurance would cover) identified.  He is improving with medical therapy with robust cardiac output though remains with high filling pressures and may be optimized with close followup for consideration of VAD as an outpatient if continues progressing. If failing medical therapy LVAD strong consideration.    - Monitor renal function  - continue current medical therapy  - If renal function stable, initiate MRA  - escalate beta-blocker gradually  - apprecaite endocrine evaluation as A1c elevated and remains dysglycemic in hospital  - will need close interval followup on discharge.     This critically ill patient continues to be at-risk for clinically significant deterioration / failure due to the above mentioned dysfunctional, unstable organ systems.  I have personally identified and managed all complex critical care issues to prevent aforementioned clinical deterioration.  Critical care time is spent at bedside and/or the immediate area and has included, but is not limited to, the review of diagnostic tests, labs, radiographs, serial assessments of hemodynamics, respiratory status, ventilatory management, and family updates.  Time spent in procedures and teaching are reported separately.    Critical care time: ___35_ minutes     _________________________________________________  Jono Duque MD     Objective   Admit Date: 10/4/2023  Hospital Length of Stay: 11   ICU Length of Stay: 10d 20h     PROBLEMS:  Active Hospital Problems    JAKE (obstructive sleep apnea)      *Acute decompensated heart failure (CMS/HCC)      Cardiogenic shock (CMS/HCC)      Diabetes mellitus (CMS/[facility])      Coronary artery disease due to calcified coronary lesion       Ischemic cardiomyopathy      Central sleep apnea with Cheyne-Chirinos respiration        MEDICATIONS  Infusions:  heparin, Last Rate: 1,600 Units/hr (10/15/23 0909)      Scheduled:  [Held by provider] apixaban, 5 mg, BID  aspirin, 81 mg, Daily  atorvastatin, 80 mg, Daily  bumetanide, 2 mg, Daily  dapagliflozin propanediol, 10 mg, Daily  digoxin, 125 mcg, Daily  docusate sodium, 200 mg, BID  gabapentin, 300 mg, q AM  gabapentin, 600 mg, Nightly  hydrALAZINE, 100 mg, TID  insulin glargine, 55 Units, q24h  insulin lispro, 0-15 Units, TID with meals  insulin lispro, 28 Units, TID with meals  isosorbide dinitrate, 40 mg, TID  levothyroxine, 50 mcg, Daily  metoprolol succinate XL, 75 mg, Daily  pantoprazole, 40 mg, Daily before breakfast  polyethylene glycol-electrolytes, 4,000 mL, Once  potassium chloride CR, 40 mEq, Daily  QUEtiapine, 50 mg, Nightly  sacubitriL-valsartan, 1 tablet, BID      PRN:  acetaminophen, 975 mg, q6h PRN  albuterol, 2.5 mg, q6h PRN  calcium carbonate, 500 mg, BID PRN  dextrose 10 % in water (D10W), 0.3 g/kg/hr, Once PRN  dextrose, 25 g, q15 min PRN  glucagon, 1 mg, q15 min PRN  heparin, 3,000-6,000 Units, q4h PRN  ondansetron, 4 mg, q4h PRN  oxygen, , Continuous PRN - O2/gases  polyethylene glycol, 17 g, Daily PRN      Invasive Hemodynamics:    Most Recent Range Past 24hrs   BP (Art)   No data recorded   MAP(Art)   No data recorded   RA/CVP 16 mmHg No data recorded   PA 48/28 No data recorded   PA(mean) 35 mmHg No data recorded   PCWP 16 mmHg No data recorded   CO 6.3 L/min No data recorded   CI 2.75 L/min/m2 No data recorded   Mixed Venous   No data recorded   SVR  711 (dyne*sec)/cm5 No data recorded    (dyne*sec)/cm5 No data recorded     PHYSICAL EXAM:   Vitals:    10/15/23 0815 10/15/23 0830 10/15/23 0845 10/15/23 0900   BP:    81/68   Pulse: 103 98 99 101   Resp: 18 16 13 14   Temp:       TempSrc:       SpO2: 96% 97% 98% 98%   Weight:       Height:         Wt Readings from Last 5  Encounters:   10/15/23 109 kg (239 lb 3.2 oz)     INTAKE/OUTPUT:  I/O last 3 completed shifts:  In: 749 (6.9 mL/kg) [P.O.:120; I.V.:629 (5.8 mL/kg)]  Out: 4700 (43.3 mL/kg) [Urine:4700 (1.2 mL/kg/hr)]  Weight: 108.5 kg      DATA:  CMP:  Recent Labs     10/07/23  0105 10/07/23  1704 10/08/23  0258 10/09/23  0338 10/10/23  0539 10/11/23  0223 10/12/23  0309 10/12/23  1815 10/13/23  0257 10/14/23  0257 10/14/23  1516 10/15/23  0309   * 130* 134* 135* 137 136 135* 137 135* 134* 135* 134*   K 3.8 3.7 3.7 3.8 4.5 4.3 4.3 4.2 4.1 4.3 4.2 4.2   CL 91* 93* 95* 97* 100 103 103 101 98 96* 97* 97*   CO2 28 26 29 28 27 26 26 24 28 29 28 28   ANIONGAP 16 15 14 14 15 11 10 16 13 13 14 13   BUN 75* 81* 78* 68* 64* 60* 66* 60* 64* 69* 75* 77*   CREATININE 1.78* 2.17* 1.74* 1.35* 1.47* 1.40* 1.60* 1.54* 1.66* 2.05* 2.23* 1.88*   EGFR 44* 35* 45* 61 55* 59* 50* 52* 48* 37* 34* 41*   MG 2.08 1.96 2.18 2.20 2.19 2.05 2.01  --  2.03 1.99  --  1.98       Recent Labs     10/05/23  1647 10/06/23  0515 10/10/23  0539 10/11/23  0223 10/12/23  0309 10/12/23  1815 10/13/23  0257 10/14/23  0257 10/14/23  1516 10/15/23  0309   ALBUMIN 4.1   < > 3.6 3.5 3.5 4.1 3.8 3.9 4.0 3.6   ALT 11  --   --   --   --   --   --   --   --   --    AST 13  --   --   --   --   --   --   --   --   --    BILITOT 1.8*  --   --   --   --   --   --   --   --   --     < > = values in this interval not displayed.       CBC:  Recent Labs     10/09/23  0338 10/10/23  0539 10/11/23  0223 10/12/23  0309 10/13/23  0257 10/14/23  0257 10/14/23  1747 10/15/23  0309   WBC 5.6 6.1 4.9 5.3 5.8 5.8 6.7 5.5   HGB 11.8* 9.0* 10.2* 10.2* 10.7* 11.1* 11.9* 11.2*   HCT 38.6* 29.9* 33.6* 33.0* 35.6* 36.5* 38.6* 36.3*    252 206 226 264 292 305 298   MCV 79* 80 78* 78* 80 79* 78* 78*       COAG:   Recent Labs     10/05/23  1647   INR 1.2*       ABO:   Recent Labs     10/05/23  1647   ABO A       HEME/ENDO:  Recent Labs     05/26/23  0203 10/04/23  1518   FERRITIN  --  379*  "  IRONSAT  --  15*   TSH  --  9.31*   HGBA1C 7.9* 8.1*        CARDIAC:   Recent Labs     10/04/23  1518 10/05/23  1647   LDH  --  240   * 157*       Recent Labs     10/09/23  0645 10/09/23  1755 10/10/23  1815 10/10/23  2330 10/11/23  0527 10/11/23  1143 10/11/23  1632   LACMX 0.9  --   --  1.1 1.0 1.2 1.3   SO2MV 69   < > 62 62 68 61 60   O2CMX 68.3   < > 60.2 60.4 66.3 58.9 58.7    < > = values in this interval not displayed.       No results for input(s): \"TACROLIMUS\", \"CYCLOSPORINE\" in the last 95865 hours.    Prior to Admission Meds:  Medications Prior to Admission   Medication Sig Dispense Refill Last Dose    albuterol 2.5 mg /3 mL (0.083 %) nebulizer solution    Unknown    atorvastatin (Lipitor) 80 mg tablet    Unknown    bumetanide (Bumex) 2 mg tablet TAKE ONE TABLET BY MOUTH ONCE DAILY **TAKE AN ADDITIONAL TABLET I...  (REFER TO PRESCRIPTION NOTES).   Unknown    dulaglutide (Trulicity) 1.5 mg/0.5 mL pen injector injection Inject 1.5 mg under the skin 1 (one) time per week.   Unknown    Eliquis 5 mg tablet    Unknown    Farxiga 10 mg    Unknown    furosemide (Lasix) 40 mg tablet Take 2 tablets (80 mg) by mouth twice a day.   Unknown    gabapentin (Neurontin) 300 mg capsule    Unknown    hydrALAZINE (Apresoline) 25 mg tablet    Unknown    ipratropium-albuteroL (Duo-Neb) 0.5-2.5 mg/3 mL nebulizer solution Inhale 3 mL 3 times a day.   Unknown    isosorbide mononitrate ER (Imdur) 30 mg 24 hr tablet Take 1 tablet (30 mg) by mouth once daily.   Unknown    lancets (Lancets,Thin) misc 1 Each 4 times daily (before meals and at bedtime). use as directed to test blood sugar   Unknown    levothyroxine (Synthroid, Levoxyl) 50 mcg tablet    Unknown    metoprolol succinate XL (Toprol-XL) 50 mg 24 hr tablet Take 3 tablets (150 mg) by mouth once daily.   Unknown    metoprolol tartrate (Lopressor) 50 mg tablet    Unknown    OneTouch Delica Plus Lancet 30 gauge misc    Unknown    OneTouch Ultra Test strip REVISE EL " "NIVEL DE AZUCAR EN LA PEARL CUATRO VECES AL CUBA   Unknown    pen needle, diabetic, safety 29 gauge x 1/2\" needle Three times before meals and at bedtime   Unknown    QUEtiapine (SEROquel) 50 mg tablet    Unknown    sacubitriL-valsartan (Entresto)  mg tablet Take 1 tablet by mouth twice a day.   Unknown    sertraline (Zoloft) 50 mg tablet Take 1 tablet (50 mg) by mouth once daily.   Unknown    simethicone (Mylicon) 80 mg chewable tablet Chew 1 tablet (80 mg) every 6 hours if needed for flatulence.   Unknown    spironolactone (Aldactone) 25 mg tablet Take 1 tablet (25 mg) by mouth once daily.   Unknown       NUTRITION: Adult diet Carb Controlled; 75 gram carb/meal, 45 gram Carb evening snack  EMERGENCY CONTACT: Extended Emergency Contact Information  Primary Emergency Contact: mother,mother  Mobile Phone: [phone]  Relation: Mother  Secondary Emergency Contact: FriendRobinson  Mobile Phone: [phone]  Relation: Friend  CODE STATUS: Full Code  FOLLOWUP: No future appointments.    "

## 2023-10-16 ENCOUNTER — APPOINTMENT (OUTPATIENT)
Dept: VASCULAR MEDICINE | Facility: HOSPITAL | Age: 58
DRG: 291 | End: 2023-10-16
Payer: MEDICARE

## 2023-10-16 LAB
ALBUMIN SERPL BCP-MCNC: 3.6 G/DL (ref 3.4–5)
ANION GAP SERPL CALC-SCNC: 12 MMOL/L (ref 10–20)
BUN SERPL-MCNC: 70 MG/DL (ref 6–23)
CALCIUM SERPL-MCNC: 9.4 MG/DL (ref 8.6–10.6)
CHLORIDE SERPL-SCNC: 98 MMOL/L (ref 98–107)
CO2 SERPL-SCNC: 30 MMOL/L (ref 21–32)
CREAT SERPL-MCNC: 1.54 MG/DL (ref 0.5–1.3)
ERYTHROCYTE [DISTWIDTH] IN BLOOD BY AUTOMATED COUNT: 18.5 % (ref 11.5–14.5)
GFR SERPL CREATININE-BSD FRML MDRD: 52 ML/MIN/1.73M*2
GLUCOSE BLD MANUAL STRIP-MCNC: 150 MG/DL (ref 74–99)
GLUCOSE BLD MANUAL STRIP-MCNC: 155 MG/DL (ref 74–99)
GLUCOSE BLD MANUAL STRIP-MCNC: 160 MG/DL (ref 74–99)
GLUCOSE SERPL-MCNC: 150 MG/DL (ref 74–99)
HCT VFR BLD AUTO: 36.6 % (ref 41–52)
HGB BLD-MCNC: 11.4 G/DL (ref 13.5–17.5)
MAGNESIUM SERPL-MCNC: 2 MG/DL (ref 1.6–2.4)
MCH RBC QN AUTO: 24.1 PG (ref 26–34)
MCHC RBC AUTO-ENTMCNC: 31.1 G/DL (ref 32–36)
MCV RBC AUTO: 77 FL (ref 80–100)
NRBC BLD-RTO: 0 /100 WBCS (ref 0–0)
PHOSPHATE SERPL-MCNC: 4.2 MG/DL (ref 2.5–4.9)
PLATELET # BLD AUTO: 300 X10*3/UL (ref 150–450)
PMV BLD AUTO: 10.2 FL (ref 7.5–11.5)
POTASSIUM SERPL-SCNC: 4.2 MMOL/L (ref 3.5–5.3)
RBC # BLD AUTO: 4.73 X10*6/UL (ref 4.5–5.9)
SODIUM SERPL-SCNC: 136 MMOL/L (ref 136–145)
WBC # BLD AUTO: 5.9 X10*3/UL (ref 4.4–11.3)

## 2023-10-16 PROCEDURE — 96372 THER/PROPH/DIAG INJ SC/IM: CPT | Performed by: INTERNAL MEDICINE

## 2023-10-16 PROCEDURE — 2500000002 HC RX 250 W HCPCS SELF ADMINISTERED DRUGS (ALT 637 FOR MEDICARE OP, ALT 636 FOR OP/ED): Performed by: INTERNAL MEDICINE

## 2023-10-16 PROCEDURE — 83735 ASSAY OF MAGNESIUM: CPT | Mod: CMCLAB

## 2023-10-16 PROCEDURE — 2500000001 HC RX 250 WO HCPCS SELF ADMINISTERED DRUGS (ALT 637 FOR MEDICARE OP): Performed by: INTERNAL MEDICINE

## 2023-10-16 PROCEDURE — 93970 EXTREMITY STUDY: CPT | Performed by: INTERNAL MEDICINE

## 2023-10-16 PROCEDURE — 93970 EXTREMITY STUDY: CPT

## 2023-10-16 PROCEDURE — 2500000004 HC RX 250 GENERAL PHARMACY W/ HCPCS (ALT 636 FOR OP/ED): Performed by: NURSE PRACTITIONER

## 2023-10-16 PROCEDURE — 85027 COMPLETE CBC AUTOMATED: CPT | Performed by: STUDENT IN AN ORGANIZED HEALTH CARE EDUCATION/TRAINING PROGRAM

## 2023-10-16 PROCEDURE — 2500000004 HC RX 250 GENERAL PHARMACY W/ HCPCS (ALT 636 FOR OP/ED)

## 2023-10-16 PROCEDURE — 36415 COLL VENOUS BLD VENIPUNCTURE: CPT | Performed by: STUDENT IN AN ORGANIZED HEALTH CARE EDUCATION/TRAINING PROGRAM

## 2023-10-16 PROCEDURE — 1200000002 HC GENERAL ROOM WITH TELEMETRY DAILY

## 2023-10-16 PROCEDURE — 82947 ASSAY GLUCOSE BLOOD QUANT: CPT

## 2023-10-16 PROCEDURE — 82947 ASSAY GLUCOSE BLOOD QUANT: CPT | Mod: CMCLAB

## 2023-10-16 PROCEDURE — 2500000001 HC RX 250 WO HCPCS SELF ADMINISTERED DRUGS (ALT 637 FOR MEDICARE OP)

## 2023-10-16 PROCEDURE — 99232 SBSQ HOSP IP/OBS MODERATE 35: CPT

## 2023-10-16 PROCEDURE — 2500000001 HC RX 250 WO HCPCS SELF ADMINISTERED DRUGS (ALT 637 FOR MEDICARE OP): Performed by: STUDENT IN AN ORGANIZED HEALTH CARE EDUCATION/TRAINING PROGRAM

## 2023-10-16 PROCEDURE — 80069 RENAL FUNCTION PANEL: CPT | Mod: CMCLAB | Performed by: STUDENT IN AN ORGANIZED HEALTH CARE EDUCATION/TRAINING PROGRAM

## 2023-10-16 PROCEDURE — 2500000004 HC RX 250 GENERAL PHARMACY W/ HCPCS (ALT 636 FOR OP/ED): Performed by: STUDENT IN AN ORGANIZED HEALTH CARE EDUCATION/TRAINING PROGRAM

## 2023-10-16 RX ORDER — METOPROLOL SUCCINATE 50 MG/1
100 TABLET, EXTENDED RELEASE ORAL DAILY
Status: DISCONTINUED | OUTPATIENT
Start: 2023-10-17 | End: 2023-10-17 | Stop reason: HOSPADM

## 2023-10-16 RX ORDER — METOPROLOL SUCCINATE 25 MG/1
25 TABLET, EXTENDED RELEASE ORAL ONCE
Status: COMPLETED | OUTPATIENT
Start: 2023-10-16 | End: 2023-10-16

## 2023-10-16 RX ORDER — SPIRONOLACTONE 25 MG/1
25 TABLET ORAL DAILY
Status: DISCONTINUED | OUTPATIENT
Start: 2023-10-16 | End: 2023-10-17 | Stop reason: HOSPADM

## 2023-10-16 RX ADMIN — INSULIN LISPRO 3 UNITS: 100 INJECTION, SOLUTION INTRAVENOUS; SUBCUTANEOUS at 13:00

## 2023-10-16 RX ADMIN — LEVOTHYROXINE SODIUM 50 MCG: 50 TABLET ORAL at 06:15

## 2023-10-16 RX ADMIN — APIXABAN 5 MG: 5 TABLET, FILM COATED ORAL at 20:29

## 2023-10-16 RX ADMIN — ISOSORBIDE DINITRATE 40 MG: 20 TABLET ORAL at 08:54

## 2023-10-16 RX ADMIN — GABAPENTIN 600 MG: 300 CAPSULE ORAL at 20:29

## 2023-10-16 RX ADMIN — DAPAGLIFLOZIN 10 MG: 10 TABLET, FILM COATED ORAL at 08:54

## 2023-10-16 RX ADMIN — SACUBITRIL AND VALSARTAN 1 TABLET: 97; 103 TABLET, FILM COATED ORAL at 20:29

## 2023-10-16 RX ADMIN — HYDRALAZINE HYDROCHLORIDE 100 MG: 100 TABLET, FILM COATED ORAL at 20:30

## 2023-10-16 RX ADMIN — INSULIN LISPRO 34 UNITS: 100 INJECTION, SOLUTION INTRAVENOUS; SUBCUTANEOUS at 17:00

## 2023-10-16 RX ADMIN — INSULIN GLARGINE 55 UNITS: 100 INJECTION, SOLUTION SUBCUTANEOUS at 09:30

## 2023-10-16 RX ADMIN — QUETIAPINE FUMARATE 50 MG: 25 TABLET ORAL at 20:29

## 2023-10-16 RX ADMIN — SPIRONOLACTONE 25 MG: 25 TABLET ORAL at 11:00

## 2023-10-16 RX ADMIN — APIXABAN 5 MG: 5 TABLET, FILM COATED ORAL at 08:54

## 2023-10-16 RX ADMIN — ISOSORBIDE DINITRATE 40 MG: 20 TABLET ORAL at 14:16

## 2023-10-16 RX ADMIN — PANTOPRAZOLE SODIUM 40 MG: 40 TABLET, DELAYED RELEASE ORAL at 08:00

## 2023-10-16 RX ADMIN — ASPIRIN 81 MG CHEWABLE TABLET 81 MG: 81 TABLET CHEWABLE at 08:53

## 2023-10-16 RX ADMIN — METOPROLOL SUCCINATE 75 MG: 50 TABLET, EXTENDED RELEASE ORAL at 08:54

## 2023-10-16 RX ADMIN — HYDRALAZINE HYDROCHLORIDE 100 MG: 100 TABLET, FILM COATED ORAL at 08:53

## 2023-10-16 RX ADMIN — ISOSORBIDE DINITRATE 40 MG: 20 TABLET ORAL at 18:45

## 2023-10-16 RX ADMIN — METOPROLOL SUCCINATE 25 MG: 25 TABLET, EXTENDED RELEASE ORAL at 12:08

## 2023-10-16 RX ADMIN — BUMETANIDE 2 MG: 2 TABLET ORAL at 09:04

## 2023-10-16 RX ADMIN — HYDRALAZINE HYDROCHLORIDE 100 MG: 100 TABLET, FILM COATED ORAL at 15:30

## 2023-10-16 RX ADMIN — DIGOXIN 125 MCG: 125 TABLET ORAL at 12:08

## 2023-10-16 RX ADMIN — GABAPENTIN 300 MG: 300 CAPSULE ORAL at 08:54

## 2023-10-16 RX ADMIN — SACUBITRIL AND VALSARTAN 1 TABLET: 97; 103 TABLET, FILM COATED ORAL at 08:53

## 2023-10-16 RX ADMIN — INSULIN LISPRO 3 UNITS: 100 INJECTION, SOLUTION INTRAVENOUS; SUBCUTANEOUS at 08:30

## 2023-10-16 RX ADMIN — INSULIN LISPRO 34 UNITS: 100 INJECTION, SOLUTION INTRAVENOUS; SUBCUTANEOUS at 08:30

## 2023-10-16 RX ADMIN — INSULIN LISPRO 34 UNITS: 100 INJECTION, SOLUTION INTRAVENOUS; SUBCUTANEOUS at 13:00

## 2023-10-16 RX ADMIN — ATORVASTATIN CALCIUM 80 MG: 80 TABLET ORAL at 08:53

## 2023-10-16 RX ADMIN — POTASSIUM CHLORIDE 40 MEQ: 1500 TABLET, EXTENDED RELEASE ORAL at 08:54

## 2023-10-16 ASSESSMENT — PAIN - FUNCTIONAL ASSESSMENT
PAIN_FUNCTIONAL_ASSESSMENT: 0-10

## 2023-10-16 ASSESSMENT — PAIN SCALES - GENERAL
PAINLEVEL_OUTOF10: 0 - NO PAIN

## 2023-10-16 NOTE — PROGRESS NOTES
[facility]  HFICU PROGRESS NOTE    Felice Law/[id number]    Admit Date: 10/4/2023  Hospital Length of Stay: 12   ICU Length of Stay: 9    INTERVAL EVENTS / PERTINENT ROS:   No acute events overnight. Denies chest pain, shortness of breath, nausea, vomiting and diarrhea.      Plan:  - Restart Spironolactone at 25mg PO daily  - Discontinue PO daily Potassium  - Transfer to [facility].     MEDICATIONS  Infusions:       Scheduled:  apixaban, 5 mg, BID  aspirin, 81 mg, Daily  atorvastatin, 80 mg, Daily  bumetanide, 2 mg, Daily  dapagliflozin propanediol, 10 mg, Daily  digoxin, 125 mcg, Daily  docusate sodium, 200 mg, BID  gabapentin, 300 mg, q AM  gabapentin, 600 mg, Nightly  hydrALAZINE, 100 mg, TID  insulin glargine, 55 Units, q24h  insulin lispro, 0-15 Units, TID with meals  insulin lispro, 34 Units, TID with meals  isosorbide dinitrate, 40 mg, TID  levothyroxine, 50 mcg, Daily  metoprolol succinate XL, 75 mg, Daily  pantoprazole, 40 mg, Daily before breakfast  polyethylene glycol-electrolytes, 4,000 mL, Once  QUEtiapine, 50 mg, Nightly  sacubitriL-valsartan, 1 tablet, BID  spironolactone, 25 mg, Daily      PRN:  acetaminophen, 975 mg, q6h PRN  albuterol, 2.5 mg, q6h PRN  calcium carbonate, 500 mg, BID PRN  dextrose 10 % in water (D10W), 0.3 g/kg/hr, Once PRN  dextrose, 25 g, q15 min PRN  glucagon, 1 mg, q15 min PRN  ondansetron, 4 mg, q4h PRN  oxygen, , Continuous PRN - O2/gases  polyethylene glycol, 17 g, Daily PRN      Invasive Hemodynamics:    Most Recent Range Past 24hrs   BP (Art)   No data recorded   MAP(Art)   No data recorded   RA/CVP 16 mmHg No data recorded   PA 48/28 No data recorded   PA(mean) 35 mmHg No data recorded   PCWP 16 mmHg No data recorded   CO 6.3 L/min No data recorded   CI 2.75 L/min/m2 No data recorded   Mixed Venous   No data recorded   SVR  711 (dyne*sec)/cm5 No data recorded    (dyne*sec)/cm5 No data recorded     PHYSICAL EXAM:   Visit Vitals  BP  "106/69   Pulse 97   Temp 36.1 °C (97 °F)   Resp 11   Ht 1.803 m (5' 10.98\")   Wt 110 kg (241 lb 6.5 oz)   SpO2 100%   BMI 33.68 kg/m²   Smoking Status Former   BSA 2.35 m²     Wt Readings from Last 5 Encounters:   10/16/23 110 kg (241 lb 6.5 oz)     INTAKE/OUTPUT:  I/O last 3 completed shifts:  In: 1466.8 (13.4 mL/kg) [P.O.:1170; I.V.:296.8 (2.7 mL/kg)]  Out: 3560 (32.5 mL/kg) [Urine:3560 (0.9 mL/kg/hr)]  Weight: 109.5 kg      Physical Exam  Constitutional:       General: He is not in acute distress.     Appearance: Normal appearance.   HENT:      Head: Normocephalic.      Mouth/Throat:      Mouth: Mucous membranes are moist.   Eyes:      Extraocular Movements: Extraocular movements intact.      Conjunctiva/sclera: Conjunctivae normal.   Cardiovascular:      Rate and Rhythm: Normal rate and regular rhythm.      Heart sounds: Normal heart sounds. No murmur heard.  Pulmonary:      Effort: Pulmonary effort is normal.      Breath sounds: Normal breath sounds.   Abdominal:      General: Bowel sounds are normal.      Palpations: Abdomen is soft.   Musculoskeletal:         General: Normal range of motion.   Skin:     General: Skin is warm and dry.   Neurological:      General: No focal deficit present.      Mental Status: He is alert and oriented to person, place, and time.   Psychiatric:         Mood and Affect: Mood normal.       DATA:  CMP:  Results from last 7 days   Lab Units 10/16/23  0416 10/15/23  0309 10/14/23  1516 10/14/23  0257 10/13/23  0257 10/12/23  1815 10/12/23  0309 10/11/23  0223 10/10/23  0539   SODIUM mmol/L 136 134* 135* 134* 135* 137 135* 136 137   POTASSIUM mmol/L 4.2 4.2 4.2 4.3 4.1 4.2 4.3 4.3 4.5   CHLORIDE mmol/L 98 97* 97* 96* 98 101 103 103 100   CO2 mmol/L 30 28 28 29 28 24 26 26 27   ANION GAP mmol/L 12 13 14 13 13 16 10 11 15   BUN mg/dL 70* 77* 75* 69* 64* 60* 66* 60* 64*   CREATININE mg/dL 1.54* 1.88* 2.23* 2.05* 1.66* 1.54* 1.60* 1.40* 1.47*   EGFR mL/min/1.73m*2 52* 41* 34* 37* 48* 52* " 50* 59* 55*   MAGNESIUM mg/dL 2.00 1.98  --  1.99 2.03  --  2.01 2.05 2.19   ALBUMIN g/dL 3.6 3.6 4.0 3.9 3.8 4.1 3.5 3.5 3.6     CBC:  Results from last 7 days   Lab Units 10/16/23  0416 10/15/23  0309 10/14/23  1747 10/14/23  0257 10/13/23  0257 10/12/23  0309 10/11/23  0223 10/10/23  0539   WBC AUTO x10*3/uL 5.9 5.5 6.7 5.8 5.8 5.3 4.9 6.1   HEMOGLOBIN g/dL 11.4* 11.2* 11.9* 11.1* 10.7* 10.2* 10.2* 9.0*   HEMATOCRIT % 36.6* 36.3* 38.6* 36.5* 35.6* 33.0* 33.6* 29.9*   PLATELETS AUTO x10*3/uL 300 298 305 292 264 226 206 252   MCV fL 77* 78* 78* 79* 80 78* 78* 80       Chest Radiograph  === 10/04/23 ===    XR CHEST 1 VIEW    - Impression -  1. Similar mild interstitial pulmonary edema and mild bibasilar  atelectasis.  2. Medical devices as above. No pneumothorax.    Signed by: Claudio Yancey 10/8/2023 9:37 AM  Dictation workstation:   [id number]    CT:   === 10/04/23 ===    CT CHEST WO IV CONTRAST    - Impression -  1.  Mild cardiomegaly with severe coronary artery calcification.  Status post median sternotomy and CABG.  2. Close proximity of the inner table of sternum and cardiovascular  structures as described above. Medical devices as described above.  3. Bandlike atelectasis in bilateral mid and lower lungs. Trace  bilateral pleural effusion. No focal consolidation in the lungs.    MACRO:  None    Signed by: Ke Lau 10/6/2023 10:45 AM  Dictation workstation:   [id number]     Prior to Admission Meds:  Medications Prior to Admission   Medication Sig Dispense Refill Last Dose    albuterol 2.5 mg /3 mL (0.083 %) nebulizer solution    Unknown    atorvastatin (Lipitor) 80 mg tablet    Unknown    bumetanide (Bumex) 2 mg tablet TAKE ONE TABLET BY MOUTH ONCE DAILY **TAKE AN ADDITIONAL TABLET I...  (REFER TO PRESCRIPTION NOTES).   Unknown    dulaglutide (Trulicity) 1.5 mg/0.5 mL pen injector injection Inject 1.5 mg under the skin 1 (one) time per week.   Unknown    Eliquis 5 mg tablet    Unknown    Farxiga 10  "mg    Unknown    furosemide (Lasix) 40 mg tablet Take 2 tablets (80 mg) by mouth twice a day.   Unknown    gabapentin (Neurontin) 300 mg capsule    Unknown    hydrALAZINE (Apresoline) 25 mg tablet    Unknown    ipratropium-albuteroL (Duo-Neb) 0.5-2.5 mg/3 mL nebulizer solution Inhale 3 mL 3 times a day.   Unknown    isosorbide mononitrate ER (Imdur) 30 mg 24 hr tablet Take 1 tablet (30 mg) by mouth once daily.   Unknown    lancets (Lancets,Thin) misc 1 Each 4 times daily (before meals and at bedtime). use as directed to test blood sugar   Unknown    levothyroxine (Synthroid, Levoxyl) 50 mcg tablet    Unknown    metoprolol succinate XL (Toprol-XL) 50 mg 24 hr tablet Take 3 tablets (150 mg) by mouth once daily.   Unknown    metoprolol tartrate (Lopressor) 50 mg tablet    Unknown    OneTouch Delica Plus Lancet 30 gauge misc    Unknown    OneTouch Ultra Test strip REVISE EL NIVEL DE AZUCAR EN LA PEARL CUATRO VECES AL CUBA   Unknown    pen needle, diabetic, safety 29 gauge x 1/2\" needle Three times before meals and at bedtime   Unknown    QUEtiapine (SEROquel) 50 mg tablet    Unknown    sacubitriL-valsartan (Entresto)  mg tablet Take 1 tablet by mouth twice a day.   Unknown    sertraline (Zoloft) 50 mg tablet Take 1 tablet (50 mg) by mouth once daily.   Unknown    simethicone (Mylicon) 80 mg chewable tablet Chew 1 tablet (80 mg) every 6 hours if needed for flatulence.   Unknown    spironolactone (Aldactone) 25 mg tablet Take 1 tablet (25 mg) by mouth once daily.   Unknown       ASSESSMENT AND PLAN:     58 year old [place]ian male with history of HFrEF/ICM (LVEF 20% 8/14/23), CAD s/p CABG (LIMA-LAD with balloon pump support April 2023), Hx of cardiac arrest s/p VT ablation + ICD, HTN/HLD, CKD, Hx of DVT (L femoral) on Eliquis, hypothyroidism, pulmonary hypertension, T2DM, JAKE/OHS, former tobacco abuse (24 pack year, quit 2017), presented to [facility] on 10/1 with exertional left sided chest pain and SOB x 3 days. He " was admitted in cardiogenic shock, started on dobutamine under the guidance of S[facility] therapy. Patient was than transferred to [facility] for advanced therapy evaluation on 10/4. Advanced therapies evaluation was initiated on 10/4 and presented in committee meeting on 10/10. Not approved for LVAD or transplant at this time. Determined to be very high risk for advanced therapies at this time given poorly controlled diabetes, renal function, peripheral vascular questions, questionable psychosocial support, and financial concerns. Plan to optimize medical therapy and follow up closely in HF clinic. May be brought back to the committee in the future. Over the past couple of days, pt's renal function worsened due to likely over diuresis. Lasix drip was discontinued 10/14 and pt.'s renal function has since begun to improve. Added spironolactone to medication regimen and discontinued PO potassium. Plan to transfer to [facility].     Neuro:  #Depression  - Serial neuro and pain assessments   - PO Tylenol PRN for pain  - c/w renally dosed gabapentin   - PT/OT Consult, OOB to chair  - CAM ICU score every shift  - Sleep/wake cycle normalization     # Physical Status  -Obesity  -Functional at home.      Cardiovascular:  # Acute on chronic HFrEF/ICM systolic and diastolic heart failure  - TTE at [facility] 8/2023 with EF ~20%, globally abnormal RV systolic function. Noninvasive hemodynamic assessment is consistent with severe pulmonary hypertension (>60 mmHg), an elevated CVP, an elevated LVEDP.  - admit weight 246lbs  - Daily weight: 10/14: 111 kg   - admit   - nipride gtt discontinued 10/14  - C/w Hydralazine 100 mg TID and isordil 40 mg TID  - C/w metoprolol succinate 75 mg daily   - C/w Digoixn 125 mcg daily, level 10/14 0.39   - C/w Entresto  mg BID  - C/w Dapagliflozin 10 mg  - c/w bumex 2 mg daily   - Closing [facility] numbers 10/11: BP 94/54, CVP 16, PAP 48/28, PCWP 16, CO/CI (F) 6.3/2.75, , SvO2 60% on Nipride, hydralazine  50mg PO TID, Isordil 20mg PO TID, Entresto 24-26mg PO BID.    - Repeat ECHO ordered 10/14 shows LVEF 20%. Abnormal septal motion consistent with post-operative status. Left ventricular cavity size is moderate to severely dilated. No obvious LV thrombus seen with the use of ultrasound enhancing agent. There is global hypokinesis of the left ventricle with minor regional variations. There is mild eccentric left ventricular hypertrophy. There is mildly reduced right ventricular systolic function. The left atrium is moderate to severely dilated. Moderately elevated right ventricular systolic pressure. While there are no prior echocardiograms in our system, notes from OSH indicate EF known to be approximately 20%.  - Daily standing weights, 2gm sodium diet, 2L fluid restriction, strict I&Os     #Advanced Therapies Evaluation   -Presented to selection committee 10/10. Determined to be very high risk for advanced therapies at this time given poorly controlled diabetes, renal function, peripheral vascular questions, questionable psychosocial support, financial concerns. Plan will be to medically optimize him this admission. Follow up with HF clinic for further decision on LVAD candidacy in the future.  -Completed evaluation 10/10. To be followed up as OP in HF clinic  -Discussed with Vascular medicine about the PVR report. Poor perfusion likely from his heart failure, no evidence of PAD noted.     #H/o VT arrest s/o VT ablation, ICD (2017 in [facility])  #CAD s/p CABG LIMA-LAD 4/2023 Dr Logan   - [facility] December 2022: severe diffuse three-vessel coronary artery disease. Severe proximal diffuse LAD disease up to 70%. Severe diffuse proximal and mid first diagonal disease. Chronic total occlusion of OM1 with left-to-left collaterals. Chronic total occlusion of mid RCA with left-to-right collaterals. Markedly elevated LVEDP at 40 mm of Hg.  - Elective CABG (LIMA-LAD) 4/2023 by Dr Logan   - Continue Aspirin 81mg.  - Continue  Atorvastatin 80mg.      #Arrhythmias - Hx of VT s/p ablation + ICD  -Device - Medtronic ICD; interrogation completed 10/6   -Not on antiarrhythmic outpatient.     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  #Tobacco use history (24 pack year smoking hx)  #JAKE  -consider outpatient sleep study after discharge  -Monitor and maintain SpO2 > 92%  - PRN albuterol nebs for wheezing      GI:  - Colace BID, Miralax PRN ordered for bowel regimen   - Continue Protonix 40mg daily     :  #CKD  -Admit BUN/Cr 67/2.00  - daily BUN/Cr: 10/14 69/2.05 --> 10/16 70/1.54  - Diurese as hemodynamics and physical exam dictate   - C/w PO Bumex 2 mg daily, increase as tolerated   - Strict I/Os  - Avoid hypotension and nephrotoxic agents     Heme:  #Anemia in the setting of CKD  - Labs: Iron saturation 15%  - Venofer ordered 200 mg x3 days completed     #Hx of L femoral DVT  - C/w Eliquis  - Repeat DVT scan ordered 10/14 pending completion.       Endo:  #IDDM2  - A1C 8.1% 10/4/23  - home DM regimen : trulicity 1.5mg qweek, glargine 85u once daily, lispro 34u with meals and 26u at bedtime  - Goal BG <180  - glargine 55 units once daily  - c/w lispro 34u with meals (started 10/15)  - c/w lispro corrective scale #3 to with meals  - Low carb 75 g diet  - Endo following, appreciate recs  - Refer to  endocrine for follow up after discharge     #Hypothyroidism  -TSH 9.31 on 10/4, T4 10/8 1.04  -Continue home levothyroxine 50 mcg      ID:  -afebrile, nontoxic   -no s/s infx  -trend temps q4h     VAP bundle: n/a  Ulcer prophylaxis: PPI  Glycemic control: Lantus and SSI   Bowel care: Colace & miralax prn  Indwelling catheter: None     Lines:   PIVs     DVT: Eliquis  NUTRITION: Adult diet Carb Controlled; 75 gram carb/meal, 45 gram Carb evening snack  EMERGENCY CONTACT: Extended Emergency Contact Information  Primary Emergency Contact: mother,mother  Mobile Phone: [phone]  Relation: Mother  Secondary Emergency Contact: Friend,Robinson  Mobile  Phone: [phone]  Relation: Friend  CODE STATUS: Full Code  DISPO:  Transfer to [facility]  FOLLOWUP:   No future appointments.    Seen and assessed with Dr. Joe  _________________________________________________  Maile Rahman, APRN-CNP

## 2023-10-16 NOTE — SIGNIFICANT EVENT
Closing [facility] Numbers 10/11/23    BP 94/54, CVP 16, PAP 48/28, PCWP 16, CO/CI (F) 6.3/2.75, , SvO2 60% on Nipride, hydralazine 50mg PO TID, Isordil 20mg PO TID, Entresto 24-26mg PO BID.

## 2023-10-17 ENCOUNTER — HOME HEALTH ADMISSION (OUTPATIENT)
Dept: HOME HEALTH SERVICES | Facility: HOME HEALTH | Age: 58
End: 2023-10-17
Payer: COMMERCIAL

## 2023-10-17 DIAGNOSIS — E11.65 TYPE 2 DIABETES MELLITUS WITH HYPERGLYCEMIA, WITH LONG-TERM CURRENT USE OF INSULIN (MULTI): Primary | ICD-10-CM

## 2023-10-17 DIAGNOSIS — Z79.4 TYPE 2 DIABETES MELLITUS WITH HYPERGLYCEMIA, WITH LONG-TERM CURRENT USE OF INSULIN (MULTI): Primary | ICD-10-CM

## 2023-10-17 PROBLEM — R57.0 CARDIOGENIC SHOCK (MULTI): Status: RESOLVED | Noted: 2023-10-01 | Resolved: 2023-10-17

## 2023-10-17 PROBLEM — R06.3 CENTRAL SLEEP APNEA WITH CHEYNE-STOKES RESPIRATION: Chronic | Status: RESOLVED | Noted: 2020-02-05 | Resolved: 2023-10-17

## 2023-10-17 LAB
ALBUMIN SERPL BCP-MCNC: 3.7 G/DL (ref 3.4–5)
ANION GAP SERPL CALC-SCNC: 13 MMOL/L (ref 10–20)
BUN SERPL-MCNC: 69 MG/DL (ref 6–23)
CALCIUM SERPL-MCNC: 9.4 MG/DL (ref 8.6–10.6)
CHLORIDE SERPL-SCNC: 100 MMOL/L (ref 98–107)
CO2 SERPL-SCNC: 29 MMOL/L (ref 21–32)
CREAT SERPL-MCNC: 1.48 MG/DL (ref 0.5–1.3)
ERYTHROCYTE [DISTWIDTH] IN BLOOD BY AUTOMATED COUNT: 18.7 % (ref 11.5–14.5)
GFR SERPL CREATININE-BSD FRML MDRD: 55 ML/MIN/1.73M*2
GLUCOSE BLD MANUAL STRIP-MCNC: 225 MG/DL (ref 74–99)
GLUCOSE BLD MANUAL STRIP-MCNC: 275 MG/DL (ref 74–99)
GLUCOSE SERPL-MCNC: 166 MG/DL (ref 74–99)
HCT VFR BLD AUTO: 36.5 % (ref 41–52)
HGB BLD-MCNC: 11.6 G/DL (ref 13.5–17.5)
MAGNESIUM SERPL-MCNC: 1.97 MG/DL (ref 1.6–2.4)
MCH RBC QN AUTO: 24.2 PG (ref 26–34)
MCHC RBC AUTO-ENTMCNC: 31.8 G/DL (ref 32–36)
MCV RBC AUTO: 76 FL (ref 80–100)
NRBC BLD-RTO: 0 /100 WBCS (ref 0–0)
PHOSPHATE SERPL-MCNC: 4.6 MG/DL (ref 2.5–4.9)
PLATELET # BLD AUTO: 275 X10*3/UL (ref 150–450)
PMV BLD AUTO: 9.2 FL (ref 7.5–11.5)
POTASSIUM SERPL-SCNC: 4.6 MMOL/L (ref 3.5–5.3)
RBC # BLD AUTO: 4.79 X10*6/UL (ref 4.5–5.9)
SODIUM SERPL-SCNC: 137 MMOL/L (ref 136–145)
WBC # BLD AUTO: 5.2 X10*3/UL (ref 4.4–11.3)

## 2023-10-17 PROCEDURE — 2500000001 HC RX 250 WO HCPCS SELF ADMINISTERED DRUGS (ALT 637 FOR MEDICARE OP)

## 2023-10-17 PROCEDURE — 2500000001 HC RX 250 WO HCPCS SELF ADMINISTERED DRUGS (ALT 637 FOR MEDICARE OP): Performed by: STUDENT IN AN ORGANIZED HEALTH CARE EDUCATION/TRAINING PROGRAM

## 2023-10-17 PROCEDURE — 2500000002 HC RX 250 W HCPCS SELF ADMINISTERED DRUGS (ALT 637 FOR MEDICARE OP, ALT 636 FOR OP/ED): Performed by: INTERNAL MEDICINE

## 2023-10-17 PROCEDURE — 2500000001 HC RX 250 WO HCPCS SELF ADMINISTERED DRUGS (ALT 637 FOR MEDICARE OP): Performed by: INTERNAL MEDICINE

## 2023-10-17 PROCEDURE — 99233 SBSQ HOSP IP/OBS HIGH 50: CPT

## 2023-10-17 PROCEDURE — 80069 RENAL FUNCTION PANEL: CPT | Mod: CMCLAB | Performed by: STUDENT IN AN ORGANIZED HEALTH CARE EDUCATION/TRAINING PROGRAM

## 2023-10-17 PROCEDURE — 97116 GAIT TRAINING THERAPY: CPT | Mod: GP

## 2023-10-17 PROCEDURE — 99232 SBSQ HOSP IP/OBS MODERATE 35: CPT

## 2023-10-17 PROCEDURE — 82947 ASSAY GLUCOSE BLOOD QUANT: CPT | Mod: CMCLAB

## 2023-10-17 PROCEDURE — 36415 COLL VENOUS BLD VENIPUNCTURE: CPT | Performed by: STUDENT IN AN ORGANIZED HEALTH CARE EDUCATION/TRAINING PROGRAM

## 2023-10-17 PROCEDURE — 2500000004 HC RX 250 GENERAL PHARMACY W/ HCPCS (ALT 636 FOR OP/ED): Performed by: STUDENT IN AN ORGANIZED HEALTH CARE EDUCATION/TRAINING PROGRAM

## 2023-10-17 PROCEDURE — 96372 THER/PROPH/DIAG INJ SC/IM: CPT | Performed by: INTERNAL MEDICINE

## 2023-10-17 PROCEDURE — 2500000004 HC RX 250 GENERAL PHARMACY W/ HCPCS (ALT 636 FOR OP/ED)

## 2023-10-17 PROCEDURE — 83735 ASSAY OF MAGNESIUM: CPT | Mod: CMCLAB

## 2023-10-17 PROCEDURE — 85027 COMPLETE CBC AUTOMATED: CPT | Mod: CMCLAB | Performed by: STUDENT IN AN ORGANIZED HEALTH CARE EDUCATION/TRAINING PROGRAM

## 2023-10-17 PROCEDURE — 97530 THERAPEUTIC ACTIVITIES: CPT | Mod: GP

## 2023-10-17 RX ORDER — HYDRALAZINE HYDROCHLORIDE 100 MG/1
100 TABLET, FILM COATED ORAL 3 TIMES DAILY
Qty: 90 TABLET | Refills: 0 | Status: SHIPPED | OUTPATIENT
Start: 2023-10-17 | End: 2023-10-17 | Stop reason: SDUPTHER

## 2023-10-17 RX ORDER — PANTOPRAZOLE SODIUM 40 MG/1
40 TABLET, DELAYED RELEASE ORAL
Qty: 30 TABLET | Refills: 0 | Status: SHIPPED | OUTPATIENT
Start: 2023-10-18 | End: 2023-10-17 | Stop reason: SDUPTHER

## 2023-10-17 RX ORDER — INSULIN GLARGINE 100 [IU]/ML
55 INJECTION, SOLUTION SUBCUTANEOUS EVERY 24 HOURS
Qty: 16.5 ML | Refills: 0 | Status: SHIPPED | OUTPATIENT
Start: 2023-10-18 | End: 2023-12-29 | Stop reason: SDUPTHER

## 2023-10-17 RX ORDER — INSULIN LISPRO 100 [IU]/ML
0-15 INJECTION, SOLUTION INTRAVENOUS; SUBCUTANEOUS
Qty: 13.5 ML | Refills: 0 | Status: SHIPPED | OUTPATIENT
Start: 2023-10-17 | End: 2023-10-17 | Stop reason: SDUPTHER

## 2023-10-17 RX ORDER — INSULIN LISPRO 100 [IU]/ML
34 INJECTION, SOLUTION INTRAVENOUS; SUBCUTANEOUS
Qty: 30.6 ML | Refills: 0 | Status: SHIPPED | OUTPATIENT
Start: 2023-10-17 | End: 2023-10-17 | Stop reason: SDUPTHER

## 2023-10-17 RX ORDER — ISOSORBIDE DINITRATE 40 MG/1
40 TABLET ORAL
Qty: 90 TABLET | Refills: 0 | Status: SHIPPED | OUTPATIENT
Start: 2023-10-17 | End: 2023-10-20 | Stop reason: SDUPTHER

## 2023-10-17 RX ORDER — INSULIN LISPRO 100 [IU]/ML
0-15 INJECTION, SOLUTION INTRAVENOUS; SUBCUTANEOUS
Qty: 13.5 ML | Refills: 0 | Status: SHIPPED | OUTPATIENT
Start: 2023-10-17 | End: 2023-12-29 | Stop reason: SDUPTHER

## 2023-10-17 RX ORDER — HYDRALAZINE HYDROCHLORIDE 100 MG/1
100 TABLET, FILM COATED ORAL 3 TIMES DAILY
Qty: 90 TABLET | Refills: 0 | Status: SHIPPED | OUTPATIENT
Start: 2023-10-17 | End: 2023-10-20 | Stop reason: SDUPTHER

## 2023-10-17 RX ORDER — METOPROLOL SUCCINATE 100 MG/1
100 TABLET, EXTENDED RELEASE ORAL DAILY
Qty: 30 TABLET | Refills: 0 | Status: SHIPPED | OUTPATIENT
Start: 2023-10-17 | End: 2023-10-17 | Stop reason: SDUPTHER

## 2023-10-17 RX ORDER — METOPROLOL SUCCINATE 100 MG/1
100 TABLET, EXTENDED RELEASE ORAL DAILY
Qty: 30 TABLET | Refills: 0 | Status: SHIPPED | OUTPATIENT
Start: 2023-10-17 | End: 2023-12-19 | Stop reason: SDUPTHER

## 2023-10-17 RX ORDER — ISOSORBIDE DINITRATE 40 MG/1
40 TABLET ORAL
Qty: 90 TABLET | Refills: 0 | Status: CANCELLED | OUTPATIENT
Start: 2023-10-17 | End: 2023-11-16

## 2023-10-17 RX ORDER — INSULIN GLARGINE 100 [IU]/ML
55 INJECTION, SOLUTION SUBCUTANEOUS EVERY 24 HOURS
Qty: 16.5 ML | Refills: 0 | Status: SHIPPED | OUTPATIENT
Start: 2023-10-18 | End: 2023-10-17 | Stop reason: SDUPTHER

## 2023-10-17 RX ORDER — NAPROXEN SODIUM 220 MG/1
81 TABLET, FILM COATED ORAL DAILY
Qty: 30 TABLET | Refills: 0 | Status: SHIPPED | OUTPATIENT
Start: 2023-10-18 | End: 2023-11-17

## 2023-10-17 RX ORDER — DIGOXIN 125 MCG
125 TABLET ORAL DAILY
Qty: 30 TABLET | Refills: 0 | Status: SHIPPED | OUTPATIENT
Start: 2023-10-17 | End: 2023-11-03 | Stop reason: SDUPTHER

## 2023-10-17 RX ORDER — INSULIN LISPRO 100 [IU]/ML
34 INJECTION, SOLUTION INTRAVENOUS; SUBCUTANEOUS
Qty: 30.6 ML | Refills: 0 | Status: SHIPPED | OUTPATIENT
Start: 2023-10-17 | End: 2023-12-29 | Stop reason: SDUPTHER

## 2023-10-17 RX ORDER — ISOSORBIDE DINITRATE 20 MG/1
40 TABLET ORAL
Status: DISCONTINUED | OUTPATIENT
Start: 2023-10-17 | End: 2023-10-24

## 2023-10-17 RX ORDER — NAPROXEN SODIUM 220 MG/1
81 TABLET, FILM COATED ORAL DAILY
Qty: 30 TABLET | Refills: 0 | Status: SHIPPED | OUTPATIENT
Start: 2023-10-18 | End: 2023-10-17 | Stop reason: SDUPTHER

## 2023-10-17 RX ORDER — ISOSORBIDE DINITRATE 40 MG/1
40 TABLET ORAL
Qty: 90 TABLET | Refills: 0 | Status: SHIPPED | OUTPATIENT
Start: 2023-10-17 | End: 2023-10-17 | Stop reason: SDUPTHER

## 2023-10-17 RX ORDER — DIGOXIN 125 MCG
125 TABLET ORAL DAILY
Qty: 30 TABLET | Refills: 0 | Status: SHIPPED | OUTPATIENT
Start: 2023-10-17 | End: 2023-10-17 | Stop reason: SDUPTHER

## 2023-10-17 RX ORDER — ISOSORBIDE DINITRATE 20 MG/1
40 TABLET ORAL
Status: DISCONTINUED | OUTPATIENT
Start: 2023-10-17 | End: 2023-10-17

## 2023-10-17 RX ORDER — PANTOPRAZOLE SODIUM 40 MG/1
40 TABLET, DELAYED RELEASE ORAL
Qty: 30 TABLET | Refills: 0 | Status: SHIPPED | OUTPATIENT
Start: 2023-10-18 | End: 2023-11-03 | Stop reason: SDUPTHER

## 2023-10-17 RX ADMIN — HYDRALAZINE HYDROCHLORIDE 100 MG: 100 TABLET, FILM COATED ORAL at 09:00

## 2023-10-17 RX ADMIN — INSULIN LISPRO 34 UNITS: 100 INJECTION, SOLUTION INTRAVENOUS; SUBCUTANEOUS at 12:08

## 2023-10-17 RX ADMIN — SPIRONOLACTONE 25 MG: 25 TABLET ORAL at 09:00

## 2023-10-17 RX ADMIN — DAPAGLIFLOZIN 10 MG: 10 TABLET, FILM COATED ORAL at 09:00

## 2023-10-17 RX ADMIN — HYDRALAZINE HYDROCHLORIDE 100 MG: 100 TABLET, FILM COATED ORAL at 15:21

## 2023-10-17 RX ADMIN — INSULIN LISPRO 34 UNITS: 100 INJECTION, SOLUTION INTRAVENOUS; SUBCUTANEOUS at 09:09

## 2023-10-17 RX ADMIN — LEVOTHYROXINE SODIUM 50 MCG: 50 TABLET ORAL at 06:28

## 2023-10-17 RX ADMIN — CALCIUM CARBONATE (ANTACID) CHEW TAB 500 MG 500 MG: 500 CHEW TAB at 07:30

## 2023-10-17 RX ADMIN — INSULIN LISPRO 6 UNITS: 100 INJECTION, SOLUTION INTRAVENOUS; SUBCUTANEOUS at 09:10

## 2023-10-17 RX ADMIN — SACUBITRIL AND VALSARTAN 1 TABLET: 97; 103 TABLET, FILM COATED ORAL at 09:00

## 2023-10-17 RX ADMIN — GABAPENTIN 300 MG: 300 CAPSULE ORAL at 09:00

## 2023-10-17 RX ADMIN — INSULIN GLARGINE 55 UNITS: 100 INJECTION, SOLUTION SUBCUTANEOUS at 09:09

## 2023-10-17 RX ADMIN — ISOSORBIDE DINITRATE 40 MG: 20 TABLET ORAL at 09:00

## 2023-10-17 RX ADMIN — ISOSORBIDE DINITRATE 40 MG: 20 TABLET ORAL at 15:21

## 2023-10-17 RX ADMIN — INSULIN LISPRO 9 UNITS: 100 INJECTION, SOLUTION INTRAVENOUS; SUBCUTANEOUS at 12:07

## 2023-10-17 RX ADMIN — DOCUSATE SODIUM 200 MG: 100 CAPSULE, LIQUID FILLED ORAL at 09:00

## 2023-10-17 RX ADMIN — ATORVASTATIN CALCIUM 80 MG: 80 TABLET ORAL at 09:00

## 2023-10-17 RX ADMIN — ASPIRIN 81 MG CHEWABLE TABLET 81 MG: 81 TABLET CHEWABLE at 09:00

## 2023-10-17 RX ADMIN — APIXABAN 5 MG: 5 TABLET, FILM COATED ORAL at 09:00

## 2023-10-17 RX ADMIN — METOPROLOL SUCCINATE 100 MG: 50 TABLET, EXTENDED RELEASE ORAL at 12:07

## 2023-10-17 RX ADMIN — DIGOXIN 125 MCG: 125 TABLET ORAL at 12:07

## 2023-10-17 RX ADMIN — BUMETANIDE 2 MG: 2 TABLET ORAL at 09:00

## 2023-10-17 RX ADMIN — PANTOPRAZOLE SODIUM 40 MG: 40 TABLET, DELAYED RELEASE ORAL at 06:28

## 2023-10-17 ASSESSMENT — COGNITIVE AND FUNCTIONAL STATUS - GENERAL
MOBILITY SCORE: 23
PERSONAL GROOMING: A LITTLE
MOBILITY SCORE: 21
DRESSING REGULAR UPPER BODY CLOTHING: A LITTLE
EATING MEALS: A LITTLE
STANDING UP FROM CHAIR USING ARMS: A LITTLE
CLIMB 3 TO 5 STEPS WITH RAILING: A LITTLE
TOILETING: A LITTLE
HELP NEEDED FOR BATHING: A LITTLE
CLIMB 3 TO 5 STEPS WITH RAILING: A LITTLE
DAILY ACTIVITIY SCORE: 18
DRESSING REGULAR LOWER BODY CLOTHING: A LITTLE
WALKING IN HOSPITAL ROOM: A LITTLE

## 2023-10-17 ASSESSMENT — PAIN SCALES - GENERAL
PAINLEVEL_OUTOF10: 0 - NO PAIN

## 2023-10-17 ASSESSMENT — ENCOUNTER SYMPTOMS
PSYCHIATRIC NEGATIVE: 1
CARDIOVASCULAR NEGATIVE: 1
MUSCULOSKELETAL NEGATIVE: 1
CONSTITUTIONAL NEGATIVE: 1
ENDOCRINE NEGATIVE: 1
NEUROLOGICAL NEGATIVE: 1
EYES NEGATIVE: 1
RESPIRATORY NEGATIVE: 1
GASTROINTESTINAL NEGATIVE: 1

## 2023-10-17 ASSESSMENT — PAIN - FUNCTIONAL ASSESSMENT
PAIN_FUNCTIONAL_ASSESSMENT: 0-10

## 2023-10-17 NOTE — PROGRESS NOTES
SOCIAL WORK NOTE   Patient to be discharged.  Recommended for Home Care. Patient is agreeable. [facility] P[facility] Briseyda. Patient to follow up with  HF team, but interested in  PCP. Provider to place referral to [facility]. Referrals placed via [facility]. Social work to follow.  DONNA Bishop, KEL-S ([id number])     UPDATE: no response from outside HC agencies [facility] following up. PCP scheduled 10/20 Dr. Humphrey and HF [facility] 10/24  DONNA Bishop, KEL-S ([id number])

## 2023-10-17 NOTE — NURSING NOTE
Pt removed IV x2, tolerated well. Discharge instructions reviewed with pt and roni (friend) - pt has all personal belongings in his backpack. All questions answered, 8 meds to [facility], he will  at home

## 2023-10-17 NOTE — DISCHARGE SUMMARY
Discharge Diagnosis  Acute decompensated heart failure (CMS/[facility])    Issues Requiring Follow-Up  HFrEF  Diabetes  JAKE    Test Results Pending At Discharge  Pending Labs       Order Current Status    Extra Urine Gray Tube In process    Surgical Pathology Exam In process    Urinalysis with Reflex Microscopic and Culture In process            Hospital Course   58 year old [place] male with history of HFrEF/ICM (LVEF 20% 8/14/23), CAD s/p CABG (LIMA-LAD with balloon pump support April 2023), Hx of cardiac arrest s/p VT ablation + ICD, HTN/HLD, CKD, Hx of DVT (L femoral) on Eliquis, hypothyroidism, pulmonary hypertension, T2DM, JAKE/OHS, former tobacco abuse (24 pack year, quit 2017), presented to [facility] on 10/1 with exertional left sided chest pain and SOB x 3 days. He was admitted in cardiogenic shock, started on dobutamine under the guidance of [facility] therapy. Patient was than transferred to [facility] for advanced therapy evaluation on 10/4. Advanced therapies evaluation was initiated on 10/4 and presented in committee meeting on 10/10. Not approved for LVAD or transplant at this time. Determined to be very high risk for advanced therapies at this time given poorly controlled diabetes, renal function, peripheral vascular questions, questionable psychosocial support, and financial concerns. Plan to optimize medical therapy and follow up closely in HF clinic. May be brought back to the committee in the future. Over the past couple of days, pt's renal function worsened due to likely over diuresis. Lasix drip was discontinued 10/14 and pt.'s renal function has since begun to improve. Added spironolactone to medication regimen and discontinued PO potassium. Increased metoprolol to 100mg PO daily. Pt. Is hemodynamically stable for discharge at this time.     Pertinent Physical Exam At Time of Discharge  Physical Exam  Constitutional:       Appearance: Normal appearance.   HENT:      Head: Normocephalic and atraumatic.   Eyes:       Pupils: Pupils are equal, round, and reactive to light.   Cardiovascular:      Rate and Rhythm: Normal rate and regular rhythm.      Pulses: Normal pulses.      Heart sounds: Normal heart sounds.   Pulmonary:      Effort: Pulmonary effort is normal.      Breath sounds: Normal breath sounds.   Abdominal:      General: Abdomen is flat. Bowel sounds are normal.      Palpations: Abdomen is soft.   Musculoskeletal:      Right lower leg: Edema present.   Skin:     General: Skin is warm and dry.   Neurological:      General: No focal deficit present.      Mental Status: He is alert and oriented to person, place, and time.   Psychiatric:         Mood and Affect: Mood normal.         Behavior: Behavior normal.         Thought Content: Thought content normal.         Judgment: Judgment normal.         Home Medications     Medication List      START taking these medications     aspirin 81 mg chewable tablet; Chew 1 tablet (81 mg) once daily. Do not   start before October 18, 2023.; Start taking on: October 18, 2023   digoxin 125 MCG tablet; Commonly known as: Lanoxin; Take 1 tablet (125   mcg) by mouth once daily.   insulin glargine 100 unit/mL injection; Commonly known as: Lantus;   Inject 55 Units under the skin once every 24 hours. Take as directed per   insulin instructions. Do not start before October 18, 2023.; Start taking   on: October 18, 2023   * insulin lispro 100 unit/mL injection; Commonly known as: HumaLOG;   Inject 0.34 mL (34 Units) under the skin 3 times a day with meals. Take as   directed per insulin instructions.   * insulin lispro 100 unit/mL injection; Commonly known as: HumaLOG;   Inject 0-0.15 mL (0-15 Units) under the skin 3 times a day with meals.   Take as directed per insulin instructions.; Notes to patient: With meals /   con comida   isosorbide dinitrate 40 mg tablet; Commonly known as: Isordil; Take 1   tablet (40 mg) by mouth 3 times a day.   pantoprazole 40 mg EC tablet; Commonly known as:  "ProtoNix; Take 1 tablet   (40 mg) by mouth once daily in the morning. Take before meals. Do not   crush, chew, or split. Do not start before October 18, 2023.; Start taking   on: October 18, 2023  * This list has 2 medication(s) that are the same as other medications   prescribed for you. Read the directions carefully, and ask your doctor or   other care provider to review them with you.     CHANGE how you take these medications     hydrALAZINE 100 mg tablet; Commonly known as: Apresoline; Take 1 tablet   (100 mg) by mouth 3 times a day.; What changed: medication strength, See   the new instructions.   metoprolol succinate  mg 24 hr tablet; Commonly known as:   Toprol-XL; Take 1 tablet (100 mg) by mouth once daily. Do not crush or   chew.; What changed: medication strength, how much to take, when to take   this, additional instructions     CONTINUE taking these medications     albuterol 2.5 mg /3 mL (0.083 %) nebulizer solution   atorvastatin 80 mg tablet; Commonly known as: Lipitor   bumetanide 2 mg tablet; Commonly known as: Bumex   Eliquis 5 mg tablet; Generic drug: apixaban   Farxiga 10 mg; Generic drug: dapagliflozin propanediol   gabapentin 300 mg capsule; Commonly known as: Neurontin   ipratropium-albuteroL 0.5-2.5 mg/3 mL nebulizer solution; Commonly known   as: Duo-Neb   * Lancets,Thin misc; Generic drug: lancets; Notes to patient: Use if   monitor in arm is not working   * OneTouch Delica Plus Lancet 30 gauge misc; Generic drug: lancets   levothyroxine 50 mcg tablet; Commonly known as: Synthroid, Levoxyl   OneTouch Ultra Test strip; Generic drug: blood sugar diagnostic   pen needle, diabetic, safety 29 gauge x 1/2\" needle   QUEtiapine 50 mg tablet; Commonly known as: SEROquel   sacubitriL-valsartan  mg tablet; Commonly known as: Entresto   simethicone 80 mg chewable tablet; Commonly known as: Mylicon; Notes to   patient: For gas   spironolactone 25 mg tablet; Commonly known as: Aldactone   " Trulicity 1.5 mg/0.5 mL pen injector injection; Generic drug:   dulaglutide  * This list has 2 medication(s) that are the same as other medications   prescribed for you. Read the directions carefully, and ask your doctor or   other care provider to review them with you.     STOP taking these medications     furosemide 40 mg tablet; Commonly known as: Lasix   isosorbide mononitrate ER 30 mg 24 hr tablet; Commonly known as: Imdur   metoprolol tartrate 50 mg tablet; Commonly known as: Lopressor   sertraline 50 mg tablet; Commonly known as: Zoloft       Outpatient Follow-Up  Please schedule Heart Failure follow up with [facility] through  scheduling department to follow up on your kidney function and your heart failure.   Please schedule an endocrinology appointment with  scheduling department to further manage your diabetes.  Please schedule a PCP appointment with  scheduling department to further diagnose possible sleep apnea.     Maile Rahman, APRN-CNP

## 2023-10-17 NOTE — PROGRESS NOTES
"Felice Law is a 58 y.o. male on day 13 of admission presenting with Acute decompensated heart failure (CMS/HCC) and type 2 diabetes.     Subjective   Placed cgm on pt  Provided pt with handout for homegoing insulin plan  All questions answered  I spent 30 mins on DM education with pt   We discussed his travel plans for 11/14-12/14 to be in [place] - he requests that outpt appointments not be made during that time period.        I have reviewed histories, allergies and medications have been reviewed        Objective   Review of Systems   Constitutional: Negative.    HENT: Negative.     Eyes: Negative.    Respiratory: Negative.     Cardiovascular: Negative.    Gastrointestinal: Negative.    Endocrine: Negative.    Genitourinary: Negative.    Musculoskeletal: Negative.    Neurological: Negative.    Psychiatric/Behavioral: Negative.       Physical Exam  Constitutional:       Appearance: Normal appearance.   HENT:      Head: Normocephalic.      Mouth/Throat:      Mouth: Mucous membranes are moist.   Eyes:      Extraocular Movements: Extraocular movements intact.      Pupils: Pupils are equal, round, and reactive to light.   Cardiovascular:      Rate and Rhythm: Normal rate and regular rhythm.      Pulses: Normal pulses.      Heart sounds: Normal heart sounds.   Pulmonary:      Effort: Pulmonary effort is normal.      Breath sounds: Normal breath sounds.   Abdominal:      General: Abdomen is flat.      Palpations: Abdomen is soft.   Skin:     General: Skin is warm and dry.   Neurological:      General: No focal deficit present.      Mental Status: He is alert and oriented to person, place, and time.   Psychiatric:         Mood and Affect: Mood normal.         Judgment: Judgment normal.         Last Recorded Vitals  Blood pressure 110/77, pulse 97, temperature 36.4 °C (97.5 °F), temperature source Temporal, resp. rate 16, height 1.803 m (5' 10.98\"), weight 110 kg (241 lb 6.5 oz), SpO2 93 %.  Intake/Output " last 3 Shifts:  I/O last 3 completed shifts:  In: 500 (4.6 mL/kg) [P.O.:500]  Out: 3035 (27.7 mL/kg) [Urine:3035 (0.8 mL/kg/hr)]  Weight: 109.5 kg     Relevant Results  Results from last 7 days   Lab Units 10/17/23  1115 10/17/23  0745 10/17/23  0356 10/16/23  0940 10/16/23  0748 10/16/23  0505 10/16/23  0416 10/15/23  0744 10/15/23  0309 10/14/23  1651 10/14/23  1516 10/14/23  0732 10/14/23  0257   POCT GLUCOSE mg/dL 275* 225*  --  155* 160* 150*  --    < >  --    < >  --    < >  --    GLUCOSE mg/dL  --   --  166*  --   --   --  150*  --  263*  --  125*  --  296*    < > = values in this interval not displayed.       Scheduled medications  apixaban, 5 mg, oral, BID  aspirin, 81 mg, oral, Daily  atorvastatin, 80 mg, oral, Daily  bumetanide, 2 mg, oral, Daily  dapagliflozin propanediol, 10 mg, oral, Daily  digoxin, 125 mcg, oral, Daily  docusate sodium, 200 mg, oral, BID  gabapentin, 300 mg, oral, q AM  gabapentin, 600 mg, oral, Nightly  hydrALAZINE, 100 mg, oral, TID  insulin glargine, 55 Units, subcutaneous, q24h  insulin lispro, 0-15 Units, subcutaneous, TID with meals  insulin lispro, 34 Units, subcutaneous, TID with meals  isosorbide dinitrate, 40 mg, oral, TID  levothyroxine, 50 mcg, oral, Daily  metoprolol succinate XL, 100 mg, oral, Daily  pantoprazole, 40 mg, oral, Daily before breakfast  polyethylene glycol-electrolytes, 4,000 mL, oral, Once  QUEtiapine, 50 mg, oral, Nightly  sacubitriL-valsartan, 1 tablet, oral, BID  spironolactone, 25 mg, oral, Daily      Continuous medications     PRN medications  PRN medications: acetaminophen, albuterol, calcium carbonate, dextrose 10 % in water (D10W), dextrose, glucagon, ondansetron, oxygen, polyethylene glycol     Results for orders placed or performed during the hospital encounter of 10/04/23 (from the past 24 hour(s))   Renal function panel   Result Value Ref Range    Glucose 166 (H) 74 - 99 mg/dL    Sodium 137 136 - 145 mmol/L    Potassium 4.6 3.5 - 5.3 mmol/L     Chloride 100 98 - 107 mmol/L    Bicarbonate 29 21 - 32 mmol/L    Anion Gap 13 10 - 20 mmol/L    Urea Nitrogen 69 (H) 6 - 23 mg/dL    Creatinine 1.48 (H) 0.50 - 1.30 mg/dL    eGFR 55 (L) >60 mL/min/1.73m*2    Calcium 9.4 8.6 - 10.6 mg/dL    Phosphorus 4.6 2.5 - 4.9 mg/dL    Albumin 3.7 3.4 - 5.0 g/dL   CBC   Result Value Ref Range    WBC 5.2 4.4 - 11.3 x10*3/uL    nRBC 0.0 0.0 - 0.0 /100 WBCs    RBC 4.79 4.50 - 5.90 x10*6/uL    Hemoglobin 11.6 (L) 13.5 - 17.5 g/dL    Hematocrit 36.5 (L) 41.0 - 52.0 %    MCV 76 (L) 80 - 100 fL    MCH 24.2 (L) 26.0 - 34.0 pg    MCHC 31.8 (L) 32.0 - 36.0 g/dL    RDW 18.7 (H) 11.5 - 14.5 %    Platelets 275 150 - 450 x10*3/uL    MPV 9.2 7.5 - 11.5 fL   Magnesium   Result Value Ref Range    Magnesium 1.97 1.60 - 2.40 mg/dL   POCT GLUCOSE   Result Value Ref Range    POCT Glucose 225 (H) 74 - 99 mg/dL   POCT GLUCOSE   Result Value Ref Range    POCT Glucose 275 (H) 74 - 99 mg/dL       Results from last 7 days   Lab Units 10/17/23  0356   SODIUM mmol/L 137   POTASSIUM mmol/L 4.6   CHLORIDE mmol/L 100   CO2 mmol/L 29   BUN mg/dL 69*   CREATININE mg/dL 1.48*   CALCIUM mg/dL 9.4   ALBUMIN g/dL 3.7   GLUCOSE mg/dL 166*         Assessment/Plan   Principal Problem:    Acute decompensated heart failure (CMS/HCC)  Active Problems:    Cardiogenic shock (CMS/HCC)    Central sleep apnea with Cheyne-Chirinos respiration    Coronary artery disease due to calcified coronary lesion    Diabetes mellitus (CMS/HCC)    JAKE (obstructive sleep apnea)    Ischemic cardiomyopathy     Type 2 Diabetes   DM2 with hyperglycemia  - A1C 8.1% 10/4/23     - home DM regimen : trulicity 1.5mg qweek, glargine 85u once daily, lispro 34u with meals and 26u at bedtime  - managed by: PCP     PLAN  - Goal BG <180  - continue farxiga 10mg once daily  - continue glargine 55 units once daily- reduce to 45u if pt is NPO  - continue lispro to 34u with meals - may hold if glc prior to meal is <90 or NPO  - continue lispro corrective scale  #3 to with meals- adjust to lispro scale #2 q6h if pt is NPO  151-200 = 3u  201-250 = 6u  251-300 = 9u  301-350 = 12u  351-400 = 15u        -Accuchecks TIDAC and QHS- kindly ensure QHS Accucheck is drawn; it is often missed   -Hypoglycemia protocol  -DM diet 75g CHO  -Will continue to follow and titrate insulin accordingly     Discharge planning:   -pt may discharge home on current insulin regimen, glargine 55u daily, lispro 34u + scale #3 with meals, and farxiga 10mg daily. he can resume home trulicity 1.5mg weekly. Libre2 cgm placed on pt, he uses reader   -please placing central sched request for  endo follow up at [facility] first available  -referring to clinical pharmacy for follow up, pt aware and agreeable to  [place] Plan Pharmacist follow-up  Pt would benefit from the following:  -optimization of insulin regimen   -GLP-1 titration     I spent 40 minutes on the care and coordination of this patient.      Tomasa Larry PA-C

## 2023-10-18 VITALS
RESPIRATION RATE: 16 BRPM | OXYGEN SATURATION: 93 % | TEMPERATURE: 97.5 F | SYSTOLIC BLOOD PRESSURE: 110 MMHG | BODY MASS INDEX: 33.8 KG/M2 | WEIGHT: 241.4 LBS | DIASTOLIC BLOOD PRESSURE: 77 MMHG | HEART RATE: 97 BPM | HEIGHT: 71 IN

## 2023-10-18 LAB
GLUCOSE BLD MANUAL STRIP-MCNC: 115 MG/DL (ref 74–99)
GLUCOSE BLD MANUAL STRIP-MCNC: 193 MG/DL (ref 74–99)

## 2023-10-18 NOTE — PROGRESS NOTES
Physical Therapy    Physical Therapy Evaluation & Treatment    Patient Name: Felice Law  MRN: [id number]  Today's Date: 10/18/2023   Time Calculation  Start Time: 1041  Stop Time: 1104  Time Calculation (min): 23 min    Assessment/Plan   PT Assessment  PT Assessment Results: Decreased strength, Decreased endurance, Impaired balance, Decreased mobility  Rehab Prognosis: Good  End of Session Communication: Bedside nurse  End of Session Patient Position: Up in chair, Alarm off, not on at start of session  IP OR SWING BED PT PLAN  Inpatient or Swing Bed: Inpatient  PT Plan  Treatment/Interventions: Bed mobility, Transfer training, Gait training, Balance training, Strengthening  PT Plan: Skilled PT  PT Frequency: 5 times per week  PT Discharge Recommendations: Low intensity level of continued care  PT Recommended Transfer Status: Assist x1  PT - OK to Discharge: Yes      Subjective     General Visit Information:  General  Reason for Referral: SOB, LE edema, advanced therapy work-up  Past Medical History Relevant to Rehab: HFrEF/ICM (LVEF 20% 8/14/23), CAD s/p PCI and CABG (LIMA-LAD with balloon pump support April 2023), Hx of cardiac arrest s/p VT ablation + ICD, HTN/HLD, CKD, Hx of DVT (L femoral) on Eliquis, hypothyroidism, pulmonary hypertension, T2DM, JAKE/OHS, former tobacco abuse (24 pack year, quit 2017)    Home Living:  Home Living  Type of Home: House  Lives With:  (family)  Home Adaptive Equipment:  (cane, walker, shower chair, (B)LE AFOs)  Home Layout: One level  Home Access:  (3 SHEA, no rail)  Bathroom Shower/Tub: Tub/shower unit    Prior Level of Function:  Prior Function Per Pt/Caregiver Report  Level of [place]: Independent with ADLs and functional transfers, Independent with homemaking with ambulation  Receives Help From: Family  ADL Assistance: Independent  Homemaking Assistance:  (patient is independent in cooking and cleaning, reports able to assist with yard work using the anuj but  unable to use the lawnmower because of reducted activity tolerance)  Ambulatory Assistance: Independent  Vocational:  (does not work)  Leisure: enjoys cooking  Hand Dominance: Right    Precautions:  Precautions  Medical Precautions: Cardiac precautions  Precautions Comment: Pt usually wears B/L AFO's 2/2 chronic foot drop. Pt reports he does not need them for shorter distances, only out in the community.           Vital Signs:  Vital Signs  Heart Rate:  (PRE: 96; DURIN; POST: 95)  Resp:  (PRE: 22; POST: 21)  SpO2:  (PRE: 93%; DURIN-96%; POST: 94%)  BP:  (PRE: 101/69; POST 108/70)  BP Method: Automatic  Patient Position: Sitting    Objective   Pain:  Pain Assessment  Pain Assessment: 0-10  Pain Score: 0 - No pain    Cognition:  Cognition  Overall Cognitive Status: Within Functional Limits  Orientation Level: Oriented X4  Attention: Within Functional Limits    General Assessments:      Activity Tolerance  Endurance: Tolerates 10 - 20 min exercise with multiple rests  Activity Tolerance Comments: able to tolerate all activity today with no issues. VSS    Functional Assessments:  Bed Mobility  Bed Mobility:  (indep)    Transfers  Transfer:  (indep)    Ambulation/Gait Training  Ambulation/Gait Training Performed: Yes  Ambulation/Gait Training 1  Surface 1: Level tile  Device 1: Single point cane  Assistance 1: Close supervision  Quality of Gait 1: Foot slap  Comments/Distance (ft) 1: 220 ft    Treatments:  Therapeutic Activity  Therapeutic Activity Performed: Yes  Therapeutic Activity 1: Pt negotiated stairs x 3, 2 sets, in preparation for d/c home today. Unilateral HR, with step to pattern. PT provided SBA and cues for safety.    Bed Mobility  Bed Mobility:  (indep)    Ambulation/Gait Training  Ambulation/Gait Training Performed: Yes  Ambulation/Gait Training 1  Surface 1: Level tile  Device 1: Single point cane  Assistance 1: Close supervision  Quality of Gait 1: Foot slap  Comments/Distance (ft) 1: 220  ft  Transfers  Transfer:  (indep)    Outcome Measures:  [facility] Basic Mobility  Turning from your back to your side while in a flat bed without using bedrails: None  Moving from lying on your back to sitting on the side of a flat bed without using bedrails: None  Moving to and from bed to chair (including a wheelchair): None  Standing up from a chair using your arms (e.g. wheelchair or bedside chair): None  To walk in hospital room: None  Climbing 3-5 steps with railing: A little  Basic Mobility - Total Score: 23    FSS-ICU  Ambulation: Walks >/ or equal to 150 feet with minimal assistance x1  Rolling: Complete independence  Sitting: Complete independence  Transfer Sit-to-Stand: Complete independence  Transfer Supine-to-Sit: Complete independence  Total Score: 32       E = Exercise and Early Mobility  Current Activity: Ambulating in phan    Encounter Problems       Encounter Problems (Active)       General       Pt will amb x 550 ft, no rest breaks, with stable vitals, RPD 3/10 or less, RPE 13/20 or less      indep with transfers: sit <>stand, with LRD (Progressing)       Start:  10/06/23    Expected End:  10/20/23            Score 24/28 on Tinetti  (Progressing)       Start:  10/06/23    Expected End:  10/20/23               General        Pt will perform a 5x STS, in 10 seconds or less, with RPD 3/10 or less, RPE 13/20 or less with stable vitals.       Pt will perform a 6MWT, with distance greater than 950 ft  with stable vitals, RPD 3/10 or less, RPE 13/20 or less.  (Progressing)       Start:  10/06/23    Expected End:  10/20/23               Pain - Adult          Transfers       Pt will perform a 5x STS, in 10 seconds or less, with RPD 3/10 or less, RPE 13/20 or less with stable vitals.  (Progressing)       Start:  10/06/23    Expected End:  10/20/23            Goal 2 (Progressing)       Start:  10/06/23

## 2023-10-19 LAB
LABORATORY COMMENT REPORT: NORMAL
PATH REPORT.FINAL DX SPEC: NORMAL
PATH REPORT.GROSS SPEC: NORMAL
PATH REPORT.TOTAL CANCER: NORMAL

## 2023-10-20 ENCOUNTER — OFFICE VISIT (OUTPATIENT)
Dept: PRIMARY CARE | Facility: HOSPITAL | Age: 58
DRG: 291 | End: 2023-10-20
Payer: MEDICARE

## 2023-10-20 VITALS
SYSTOLIC BLOOD PRESSURE: 107 MMHG | TEMPERATURE: 96.1 F | DIASTOLIC BLOOD PRESSURE: 73 MMHG | OXYGEN SATURATION: 95 % | WEIGHT: 235.8 LBS | HEIGHT: 71 IN | BODY MASS INDEX: 33.01 KG/M2 | HEART RATE: 98 BPM

## 2023-10-20 DIAGNOSIS — I50.23 ACUTE ON CHRONIC HFREF (HEART FAILURE WITH REDUCED EJECTION FRACTION) (MULTI): ICD-10-CM

## 2023-10-20 DIAGNOSIS — E03.9 HYPOTHYROIDISM, UNSPECIFIED TYPE: Chronic | ICD-10-CM

## 2023-10-20 DIAGNOSIS — N18.30 STAGE 3 CHRONIC KIDNEY DISEASE, UNSPECIFIED WHETHER STAGE 3A OR 3B CKD (MULTI): ICD-10-CM

## 2023-10-20 DIAGNOSIS — I50.22 CHRONIC SYSTOLIC HEART FAILURE (MULTI): ICD-10-CM

## 2023-10-20 DIAGNOSIS — G47.33 OSA (OBSTRUCTIVE SLEEP APNEA): Primary | ICD-10-CM

## 2023-10-20 DIAGNOSIS — E11.9 TYPE 2 DIABETES MELLITUS WITHOUT COMPLICATION, UNSPECIFIED WHETHER LONG TERM INSULIN USE (MULTI): ICD-10-CM

## 2023-10-20 DIAGNOSIS — Z13.5 DIABETIC RETINOPATHY SCREENING: ICD-10-CM

## 2023-10-20 PROBLEM — R07.9 CHEST PAIN: Status: RESOLVED | Noted: 2023-10-01 | Resolved: 2023-10-20

## 2023-10-20 PROBLEM — I50.9 ACUTE DECOMPENSATED HEART FAILURE (MULTI): Status: RESOLVED | Noted: 2023-10-04 | Resolved: 2023-10-20

## 2023-10-20 PROBLEM — I25.10 CORONARY ARTERY DISEASE INVOLVING NATIVE CORONARY ARTERY OF NATIVE HEART WITHOUT ANGINA PECTORIS: Status: RESOLVED | Noted: 2019-09-05 | Resolved: 2023-10-20

## 2023-10-20 LAB — GLUCOSE BLD MANUAL STRIP-MCNC: 188 MG/DL (ref 74–99)

## 2023-10-20 PROCEDURE — 36416 COLLJ CAPILLARY BLOOD SPEC: CPT

## 2023-10-20 PROCEDURE — 3048F LDL-C <100 MG/DL: CPT

## 2023-10-20 PROCEDURE — 99213 OFFICE O/P EST LOW 20 MIN: CPT | Mod: GC

## 2023-10-20 PROCEDURE — 1036F TOBACCO NON-USER: CPT

## 2023-10-20 PROCEDURE — 82947 ASSAY GLUCOSE BLOOD QUANT: CPT

## 2023-10-20 PROCEDURE — 99203 OFFICE O/P NEW LOW 30 MIN: CPT

## 2023-10-20 PROCEDURE — 3078F DIAST BP <80 MM HG: CPT

## 2023-10-20 PROCEDURE — 3052F HG A1C>EQUAL 8.0%<EQUAL 9.0%: CPT

## 2023-10-20 PROCEDURE — 36416 COLLJ CAPILLARY BLOOD SPEC: CPT | Mod: GC

## 2023-10-20 PROCEDURE — 3074F SYST BP LT 130 MM HG: CPT

## 2023-10-20 RX ORDER — GABAPENTIN 600 MG/1
600 TABLET ORAL NIGHTLY
COMMUNITY
End: 2023-12-29 | Stop reason: SDUPTHER

## 2023-10-20 RX ORDER — HYDRALAZINE HYDROCHLORIDE 100 MG/1
100 TABLET, FILM COATED ORAL 3 TIMES DAILY
Qty: 90 TABLET | Refills: 0 | Status: SHIPPED | OUTPATIENT
Start: 2023-10-20 | End: 2023-12-19 | Stop reason: SDUPTHER

## 2023-10-20 RX ORDER — ISOSORBIDE DINITRATE 40 MG/1
40 TABLET ORAL
Qty: 90 TABLET | Refills: 0 | Status: SHIPPED | OUTPATIENT
Start: 2023-10-20 | End: 2023-12-19 | Stop reason: SDUPTHER

## 2023-10-20 SDOH — ECONOMIC STABILITY: FOOD INSECURITY: WITHIN THE PAST 12 MONTHS, YOU WORRIED THAT YOUR FOOD WOULD RUN OUT BEFORE YOU GOT MONEY TO BUY MORE.: NEVER TRUE

## 2023-10-20 SDOH — ECONOMIC STABILITY: FOOD INSECURITY: WITHIN THE PAST 12 MONTHS, THE FOOD YOU BOUGHT JUST DIDN'T LAST AND YOU DIDN'T HAVE MONEY TO GET MORE.: NEVER TRUE

## 2023-10-20 ASSESSMENT — LIFESTYLE VARIABLES
HOW MANY STANDARD DRINKS CONTAINING ALCOHOL DO YOU HAVE ON A TYPICAL DAY: PATIENT DOES NOT DRINK
AUDIT-C TOTAL SCORE: 0
HOW OFTEN DO YOU HAVE SIX OR MORE DRINKS ON ONE OCCASION: NEVER
SKIP TO QUESTIONS 9-10: 1
HOW OFTEN DO YOU HAVE A DRINK CONTAINING ALCOHOL: NEVER

## 2023-10-20 ASSESSMENT — ENCOUNTER SYMPTOMS
BACK PAIN: 0
OCCASIONAL FEELINGS OF UNSTEADINESS: 0
SORE THROAT: 0
DEPRESSION: 0
PSYCHIATRIC NEGATIVE: 1
FEVER: 0
DIZZINESS: 0
WHEEZING: 0
ABDOMINAL PAIN: 0
WEAKNESS: 0
RHINORRHEA: 0
ABDOMINAL DISTENTION: 1
SHORTNESS OF BREATH: 0
LOSS OF SENSATION IN FEET: 0
DIFFICULTY URINATING: 0
CHEST TIGHTNESS: 0
COLOR CHANGE: 0

## 2023-10-20 ASSESSMENT — COLUMBIA-SUICIDE SEVERITY RATING SCALE - C-SSRS
1. IN THE PAST MONTH, HAVE YOU WISHED YOU WERE DEAD OR WISHED YOU COULD GO TO SLEEP AND NOT WAKE UP?: NO
2. HAVE YOU ACTUALLY HAD ANY THOUGHTS OF KILLING YOURSELF?: NO
5. HAVE YOU STARTED TO WORK OUT OR WORKED OUT THE DETAILS OF HOW TO KILL YOURSELF? DO YOU INTEND TO CARRY OUT THIS PLAN?: NO
4. HAVE YOU HAD THESE THOUGHTS AND HAD SOME INTENTION OF ACTING ON THEM?: NO
6. HAVE YOU EVER DONE ANYTHING, STARTED TO DO ANYTHING, OR PREPARED TO DO ANYTHING TO END YOUR LIFE?: NO

## 2023-10-20 ASSESSMENT — PAIN SCALES - GENERAL: PAINLEVEL: 0-NO PAIN

## 2023-10-20 ASSESSMENT — PATIENT HEALTH QUESTIONNAIRE - PHQ9
2. FEELING DOWN, DEPRESSED OR HOPELESS: NOT AT ALL
SUM OF ALL RESPONSES TO PHQ9 QUESTIONS 1 AND 2: 0
1. LITTLE INTEREST OR PLEASURE IN DOING THINGS: NOT AT ALL

## 2023-10-20 NOTE — PATIENT INSTRUCTIONS
Dear Felice,     You were seen at today at the [facility]. We discussed the following items:    You have been referred to a nephrologist for chronic kidney disease  You have been referred to sleep medicine for obstructive sleep apnea  You have been referred to ophthalmology for diabetic retinal exam and general eye exam  Please complete blood test for thyroid before your next visit  I have sent scripts for isosorbide dinitrate 40 mg 3 times a day and for hydralazine 100 mg 3 times a day to your pharmacy  Paperwork for home health care will be completed  Here is a copy of the insulin regimen scale you requested  Please return to clinic in 2 months    Glucose - insulin units  151-200 = 3u  201-250 = 6u  251-300 = 9u  301-350 = 12u  351-400 = 15u    Umer Humphrey MD  Office # [phone]    Lo atendieron hoy en la [facility]. Discutimos los siguientes elementos:    1. Le frausto remitido a un nefrólogo por enfermedad renal crónica.  2. Le frausto remitido a un medicamento para dormir por apnea obstructiva del sueño.  3. Ha sido remitido a oftalmología para un examen de retina para diabéticos y un examen general de la vista.  4. Complete un análisis de emily para detectar tiroides antes de rutherford próxima visita.  5. He enviado recetas de dinitrato de isosorbida 40 mg 3 veces al día y de hidralazina 100 mg 3 veces al día a rutherford farmacia.  6. Se completarán los trámites para la atención médica domiciliaria.  7. Aquí tienes vivienne copia de la escala del régimen de insulina que solicitaste.  8. Por favor regrese a la clínica en 2 meses.    Glucosa - unidades de insulina  151-200 = 3u  201-250 = 6u  251-300 = 9u  301-350 = 12u  351-400 = 15u

## 2023-10-20 NOTE — PROGRESS NOTES
Subjective   Patient ID: Felice Law is a 58 y.o. male who presents to [facility].    Felice Law 58 Y M  PMH: T2DM, HFrEF, CAD, hypothyroidism, asthma, COPD, HTN/HLD, central sleep apnea, CKD III    Visit required virtual [place] interpretation. Previous PCP Dr. Rain at [facility], [place], operated by [facility]. Patient wanted to transfer care here, as he is being followed by  cardiology and endocrinology. Recently hospitalized on 10/1 for cardiogenic shock admitted at [facility], [place], operated by [facility] then transferred to  10/4-10/17 for advanced therapies. HF/cardiology committee meeting on 10/10 and patient was approved for LVAD or heart transplant at that time due to being very high risk with poorly controlled diabetes, renal function, and lack of adequate support and finances. The plan was to optimize medical therapy and follow patient in HF clinic. Appointments with Cardiology on 10/24/23 for heart failure and kidney function and Endocrinology on 2024 for DM2 insulin regimen optimization and GLP-1 titration.    Today, patient requests a referral to nephrologist to follow his chronic kidney disease. He also would like scripts sent to pharmacy for isosorbide dinitrate 40 mg TID and hydralazine 100 mg TID for which he was instructed to take at hospital discharge. Full medication reconciliation completed with patient this visit. Patient reports he feels fine otherwise sent discharged from hospital with no acute complaints. He uses BIPAP at home       Family history significant for father  due to HTN at 55, also had diabetes. Mother is alive and has diabetes and hypertension    Social history: tobacco quit 2017, never etoh, drug use    Lives at home with sister, mom, and friends, good social support at home, has not worked in 40 years         Review of Systems   Constitutional:  Negative for fever.   HENT:  Negative for congestion, rhinorrhea and sore throat.    Eyes:  Negative for visual disturbance.   Respiratory:  Negative for  "chest tightness, shortness of breath and wheezing.    Cardiovascular:  Positive for leg swelling. Negative for chest pain.   Gastrointestinal:  Positive for abdominal distention. Negative for abdominal pain.   Genitourinary:  Negative for difficulty urinating.   Musculoskeletal:  Negative for back pain and gait problem.        Neuropathy in both legs with numbness and tingling   Skin:  Negative for color change.   Neurological:  Negative for dizziness and weakness.   Psychiatric/Behavioral: Negative.         Objective   /73 (BP Location: Left arm, Patient Position: Sitting, BP Cuff Size: Large adult)   Pulse 98   Temp 35.6 °C (96.1 °F) (Temporal)   Ht 1.803 m (5' 11\")   Wt 107 kg (235 lb 12.8 oz)   SpO2 95%   BMI 32.89 kg/m²     Physical Exam  Vitals and nursing note reviewed.   Constitutional:       General: He is not in acute distress.     Appearance: He is not toxic-appearing or diaphoretic.   HENT:      Head: Normocephalic.      Nose: Nose normal.      Mouth/Throat:      Mouth: Mucous membranes are moist.   Eyes:      Extraocular Movements: Extraocular movements intact.      Conjunctiva/sclera: Conjunctivae normal.      Pupils: Pupils are equal, round, and reactive to light.   Cardiovascular:      Rate and Rhythm: Normal rate and regular rhythm.      Pulses: Normal pulses.      Heart sounds: Normal heart sounds.   Pulmonary:      Effort: Pulmonary effort is normal.      Breath sounds: Normal breath sounds.   Abdominal:      General: Bowel sounds are normal. There is distension.      Palpations: Abdomen is soft.      Tenderness: There is no abdominal tenderness. There is no guarding or rebound.   Musculoskeletal:      Right lower leg: Edema (+1 pitting edema) present.      Left lower leg: Edema (+1 pitting edema) present.   Skin:     General: Skin is warm and dry.      Coloration: Skin is not jaundiced.   Neurological:      General: No focal deficit present.      Mental Status: He is alert. "   Psychiatric:         Mood and Affect: Mood normal.         Behavior: Behavior normal.         Assessment/Plan   Felice Law 58 Y M here to establish care. Following  cardiology and endocrinology.    JAKE (central and obstructive sleep apnea)  ::PSG 2/2/2020: evidence of severe sleep apnea, predominantly obstructive but central respiratory events were also noted-Cheyne Chirinos Breathing with prolonged cycle length and circulation time consistent with patient's known HFrEF.   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 Noninvasive HD assessment consistent with severe pHTN >60mmHg, elevated CVP, elevated LVEDP  ::Spirometry: no showed appointment in 10/2023  -albuterol 2.5 mg/3 mL nebulizer solution  -ipratropium-albuterol nebulizer    HFrEF/Ischemic Cardiomyopathy (LVEF 20%, 8/14/23)  CAD s/p PCI and CABG LIMA-LAD 4/2023 Dr. Logan  Hx VT arrest s/p VT ablation + ICD 2017 in [place], [place]  Hyperlipidemia  ::LHC 12/2022: severe diffuse 3-vessel CAD. Severe prox diffuse LAD up to 70%.  ::Device- Medtronic ICD; interrogation completed 10/6  ::Repeat ECHO 10/2023: dilated LV cavity, global hypokinesis of LV, mildly reduced RV sys func. LA mod to sev dilated.   -Follows  cardiology HF clinic. Appt 10/24.  -Lipid Panel 10/2023: LDL 32. TRIG 247  -ASA 81 mg and atorvastatin 80mg  -bumex 2 mg   -digoxin 125 mcg daily  -isosorbide dinitrate 40 mg TID  -metoprolol succinate  mg  -entresto  mg BID  -spironolactone 25 mg    HTN  /73 today HR 98  RFP 10/2023  -hydralazine 100 mg TID    T2DM insulin-dependent  Chronic bilateral leg neuropathy  A1c 8.1% on 10/2023. BG monitored and well controlled at home per patient  Foot exam: scheduled appt 12/22/23  Retinal exam: hx of diabetic retinopathy  U Alb/Cr:    -Follow Endocrinology 1/2024  -Referral to Ophth for retinal eval  -glargine 55u daily  -lispro 34u + scale #3 with meals  -farxiga 10mg daily  -Trulicity 1.5 mg weekly  -gabapentin 300 mg AM and 600 mg HS    Stage 3 chronic kidney disease, unspecified whether stage 3a or 3b CKD  Chronic Anemia, microcytic, iron deficiency  Cr: 1.48, at discharge on 10/17, baseline  Hb 11.6, MCV 76  Ferritin H, Iron 51 N, TIBC 335 N, % saturation L (15)  -Referral to Nephrology per pt request    Hypothyroidism, unspecified type  TSH 9.3H; T4 1.04N 10/2023  -Levothyroxine 50 mcg  -Repeat TSH With Reflex To Free T4 to be completed before next office visit    History of Deep Venous Thrombosis, Left Femoral  -Eliquis 5mg BID    GERD  -Prilosec 20 mg  -simethicone 80 mg    Insomnia  -Seroquel 50 mg    Screening  HIV/Hep C: neg 10/2023  Colonoscopy 10/9/2023: negative  Lung cancer with CT scan: will review    Vaccines  Flu 9/2023  Covid: 3 of 4 doses in 9/2021  HAV: 1 of 2 doses in 2022  HBV: 2 of 3 doses 2019  PCV-20: 10/5/2022  TDAP: 7/2019  Zoster 2-dose: 2019

## 2023-10-21 LAB
RPR SER QL: REACTIVE
RPR SER-TITR: ABNORMAL {TITER}
T PALLIDUM AB SER QL: REACTIVE

## 2023-10-21 NOTE — PROGRESS NOTES
I saw and evaluated the patient. I personally obtained the key and critical portions of the history and physical exam or was physically present for key and critical portions performed by the resident/fellow. I reviewed the resident/fellow's documentation and discussed the patient with the resident/fellow. I agree with the resident/fellow's medical decision making as documented in the note.    Rivera Morales MD MPH

## 2023-10-23 ENCOUNTER — TELEMEDICINE (OUTPATIENT)
Dept: PHARMACY | Facility: HOSPITAL | Age: 58
End: 2023-10-23
Payer: COMMERCIAL

## 2023-10-23 ENCOUNTER — HOME CARE VISIT (OUTPATIENT)
Dept: HOME HEALTH SERVICES | Facility: HOME HEALTH | Age: 58
End: 2023-10-23
Payer: COMMERCIAL

## 2023-10-23 VITALS — TEMPERATURE: 98.1 F | DIASTOLIC BLOOD PRESSURE: 70 MMHG | SYSTOLIC BLOOD PRESSURE: 106 MMHG | HEART RATE: 92 BPM

## 2023-10-23 DIAGNOSIS — E11.65 TYPE 2 DIABETES MELLITUS WITH HYPERGLYCEMIA, WITH LONG-TERM CURRENT USE OF INSULIN (MULTI): Primary | ICD-10-CM

## 2023-10-23 DIAGNOSIS — Z79.4 TYPE 2 DIABETES MELLITUS WITH HYPERGLYCEMIA, WITH LONG-TERM CURRENT USE OF INSULIN (MULTI): Primary | ICD-10-CM

## 2023-10-23 PROCEDURE — 0023 HH SOC

## 2023-10-23 PROCEDURE — G0151 HHCP-SERV OF PT,EA 15 MIN: HCPCS

## 2023-10-23 ASSESSMENT — BALANCE ASSESSMENTS
STANDING UNSUPPORTED ONE FOOT IN FRONT: 0 - LOSES BALANCE WHILE STEPPING OR STANDING
SITTING TO STANDING: 1
STANDING UNSUPPORTED: 1
STANDING TO SITTING: 3 - CONTROLS DESCENT BY USING HANDS
LONG VERSION TOTAL SCORE (MAX 56): 12
STANDING ON ONE LEG: 0 - UNABLE TO TRY OR NEEDS ASSIST TO PREVENT FALL
STANDING UNSUPPORTED: 1 - NEEDS SEVERAL TRIES TO STAND 30 SECONDS UNSUPPORTED
STANDING UNSUPPORTED WITH FEET TOGETHER: 1
STANDING TO SITTING: 3
STANDING ON ONE LEG: 0
STANDING UNSUPPORTED WITH EYES CLOSED: 0
STANDING UNSUPPORTED ONE FOOT IN FRONT: 0
TRANSFERS: 2
TURN 360 DEGREES: 1
TURN 360 DEGREES: 1 - NEEDS CLOSE SUPERVISION OR VERBAL CUING
REACHING FORWARD WITH OUTSTRETCHED ARM WHILE STANDING: 2 - CAN REACH FORWARD 5 CM (2 INCHES)
TRANSFERS: 2 - ABLE TO TRANSFER WITH VERBAL CUING AND/OR SUPERVISION
STANDING UNSUPPORTED WITH FEET TOGETHER: 1 - NEEDS HELP TO ATTAIN POSITION BUT ABLE TO STAND 15 SECONDS FEET TOGETHER
PICK UP OBJECT FROM THE FLOOR FROM A STANDING POSITION: 0 - UNABLE TO TRY/NEEDS ASSIST TO KEEP FROM LOSING BALANCE OR FALLING
STANDING UNSUPPORTED WITH EYES CLOSED: 0 - NEEDS HELP TO KEEP FROM FALLING
SITTING TO STANDING: 1 - NEEDS MINIMAL AID TO STAND OR STABILIZE

## 2023-10-23 ASSESSMENT — ENCOUNTER SYMPTOMS
DENIES PAIN: 1
PERSON REPORTING PAIN: PATIENT
MUSCLE WEAKNESS: 1
DIABETIC ASSOCIATED SYMPTOMS: 0
DIZZINESS: 1

## 2023-10-23 ASSESSMENT — ACTIVITIES OF DAILY LIVING (ADL)
OASIS_M1830: 03
AMBULATION ASSISTANCE ON FLAT SURFACES: 1
ENTERING_EXITING_HOME: SUPERVISION

## 2023-10-23 NOTE — PROGRESS NOTES
Pharmacy Post-Discharge Visit  Felice Law is a 58 y.o. male was referred to Clinical Pharmacy Team to complete a post-discharge medication optimization and monitoring visit.  The patient was referred for their Diabetes. Patient did have medication bottles in front of him during medication reconciliation portion of visit today.  services was used to complete the visit over the phone with patient today.     Admission Date: 10/04/2023  Discharge Date: 10/17/2023  Discharge Diagnosis: Acute decompensated heart failure      Referring Provider: Umer Humphrey MD    Subjective   Allergies   Allergen Reactions    Azithromycin Dizziness and Unknown     Dizziness, Vertigo, Cold, Clammy, Nausea, Sweating, trouble breathing       [id number] - [facility] [address] [address] [address]  Phone: [phone] Fax: [phone]      Social History     Social History Narrative    Not on file      Notable Medication changes following discharge:  Start:    Aspirin 81 mg chewable once daily   Digoxin 125 mcg once daily   Insulin glargine 100 unit/mL 55 units every 24 hours  Insulin lispro 100 unit/mL 0-15 units under the skin 3 times a day with meals per sliding scale   Isosorbide dinitrate 40 mg 3 times a day   Pantoprazole 40 mc once daily in the morning before meals    Stop:   Furosemide 40 mg   Isosorbide mononitrate ER 30 mg   Metoprolol tartrate 50 mg   Sertraline 50 mg   Change:   Hydralazine 100 mg 3 times a day (changed from hydralazine 25 mg)   Metoprolol succinate  mg once daily (changed from metoprolol tartrate 50 mg)    Adherence:   When asked how - never misses doses     Affordability:   Entresto, Eliquis, Trulicity, Farxiga - no issues with costs     Diabetes  He presents for his initial diabetic visit. He has type 2 diabetes mellitus. The initial diagnosis of diabetes was made 40 years ago. His disease course has been worsening. Hypoglycemia symptoms  include dizziness. There are no diabetic associated symptoms. There are no hypoglycemic complications. Symptoms are improving. Diabetic complications include heart disease, nephropathy and peripheral neuropathy. Risk factors for coronary artery disease include diabetes mellitus, dyslipidemia, hypertension, male sex and obesity. Current diabetic treatment includes insulin injections (and GLP-1 once weekly therapy and oral agent (monotherapy)). He is compliant with treatment all of the time. He is currently taking insulin pre-breakfast, pre-lunch, pre-dinner and at bedtime. Insulin injections are given by patient. Rotation sites for injection include the abdominal wall and thighs. When asked about meal planning, he reported none. An ACE inhibitor/angiotensin II receptor blocker is being taken.     Patient rotates injection sites between the abdomen and his thighs     Patient does have a family history of diabetes (mother, father, siblings)     Patient is using: glucometer (Franco 2 system)  The patient is currently checking the blood glucose throughout the day   Patient unsure how to read the Franco reports   Fasting in the mornin mg/dL today, states this varies   Pre-meals: around 180s     Hypoglycemia frequency: has had 2 episodes of blood sugars being in the 50s both in the morning fasting (states did not eat dinner the night before)   Hypoglycemia awareness: Yes     Diet:   Breakfast: scrambled eggs with a cup of coffee (milk and sugar substitute)   Lunch: skips  Dinner: whatever is made for him   Snacks: does not snack  Fluids: water (not a lot, maybe 1 glass of water per day)     Physical Activity    Is currently having physical therapy, today will be his first session     Current Diabetes Medications:   Trulicity 1.5 mg once weekly   Farxiga 10 mg once daily   Insulin glargine 100 unit/mL 55 units every 24 hours  Insulin lispro 100 unit/mL 34 units plus sliding scale 3 times a day with meals    mg/dL:  0 units  151-200 mg/dL: 3 units  201-250 mg/dL: 6 units  251-300 mg/dL: 9 units  301-350 mg/dL: 12 units  351-400 mg/dL: 15 units     Historical Diabetes Medications:  Metformin 500 mg      Secondary Prevention  Statin? Yes, Atorvastatin 80 mg once daily   ACE-I/ARB? Yes, Entresto  mg twice daily   Aspirin? Yes, 81 mg daily     Pertinent PMH Review:  PMH of Pancreatitis: No  PMH of Retinopathy: Yes   PMH of Urinary Tract Infections: No  PMH of MTC: No    Health Maintenance:   Foot Exam: ~2 months ago (follows with podiatry at [facility])  Eye Exam: ~2 years ago   Lipid Panel: LDL 32 mg/dL,  mg/dL 10/05/23  Urine Albumin: None   Influenza Vaccine: 09/05/2023  Pneumonia Vaccine:   PPSV23: 07/18/2019  PCV20: 10/05/2023    Objective     LAB  Lab Results   Component Value Date    BILITOT 1.8 (H) 10/05/2023    CALCIUM 9.4 10/17/2023    CO2 29 10/17/2023     10/17/2023    CREATININE 1.48 (H) 10/17/2023    GLUCOSE 166 (H) 10/17/2023    ALKPHOS 63 10/05/2023    K 4.6 10/17/2023    PROT 7.9 10/05/2023     10/17/2023    AST 13 10/05/2023    ALT 11 10/05/2023    BUN 69 (H) 10/17/2023    ANIONGAP 13 10/17/2023    MG 1.97 10/17/2023    PHOS 4.6 10/17/2023     10/05/2023    ALBUMIN 3.7 10/17/2023     Lab Results   Component Value Date    TRIG 247 (H) 10/05/2023    CHOL 106 10/05/2023    LDLCALC 32 (L) 10/05/2023    HDL 24.5 10/05/2023     Lab Results   Component Value Date    HGBA1C 8.1 (H) 10/04/2023    HGBA1C 7.9 (H) 05/26/2023    HGBA1C 7.8 (H) 05/23/2023     The ASCVD Risk score (Dari DK, et al., 2019) failed to calculate for the following reasons:    The patient has a prior MI or stroke diagnosis    MEDICATION RECONCILIATION  Patient's medication list was confirmed accurate by patient, no changes were made to patient's medication list.   Notes:   Patient was unsure if he was taking isosorbide dinitrate 40 mg 3 times a day, did not have the prescription bottle in front of him     Current Outpatient  Medications on File Prior to Visit   Medication Sig Dispense Refill    albuterol 2.5 mg /3 mL (0.083 %) nebulizer solution       apixaban (Eliquis) 5 mg tablet Take 1 tablet (5 mg) by mouth 2 times a day.      aspirin 81 mg chewable tablet Chew 1 tablet (81 mg) once daily. Do not start before October 18, 2023. 30 tablet 0    atorvastatin (Lipitor) 80 mg tablet Take 1 tablet (80 mg) by mouth once daily.      bumetanide (Bumex) 2 mg tablet TAKE ONE TABLET BY MOUTH ONCE DAILY **TAKE AN ADDITIONAL TABLET I...  (REFER TO PRESCRIPTION NOTES).      digoxin (Lanoxin) 125 MCG tablet Take 1 tablet (125 mcg) by mouth once daily. 30 tablet 0    Farxiga 10 mg Take 1 tablet (10 mg) by mouth once daily.      gabapentin (Neurontin) 300 mg capsule Take 1 capsule (300 mg) by mouth once daily in the morning.      gabapentin (Neurontin) 600 mg tablet Take 1 tablet (600 mg) by mouth once daily at bedtime.      hydrALAZINE (Apresoline) 100 mg tablet Take 1 tablet (100 mg) by mouth 3 times a day. 90 tablet 0    insulin glargine (Lantus) 100 unit/mL injection Inject 55 Units under the skin once every 24 hours. Take as directed per insulin instructions. Do not start before October 18, 2023. 16.5 mL 0    insulin lispro (HumaLOG) 100 unit/mL injection Inject 0.34 mL (34 Units) under the skin 3 times a day with meals. Take as directed per insulin instructions. 30.6 mL 0    insulin lispro (HumaLOG) 100 unit/mL injection Inject 0-0.15 mL (0-15 Units) under the skin 3 times a day with meals. Take as directed per insulin instructions. 13.5 mL 0    ipratropium-albuteroL (Duo-Neb) 0.5-2.5 mg/3 mL nebulizer solution Take 3 mL by nebulization 3 times a day as needed.      lancets (Lancets,Thin) misc 1 Each 4 times daily (before meals and at bedtime). use as directed to test blood sugar      levothyroxine (Synthroid, Levoxyl) 50 mcg tablet Take 1 tablet (50 mcg) by mouth once daily in the morning. Take before meals.      metoprolol succinate XL  "(Toprol-XL) 100 mg 24 hr tablet Take 1 tablet (100 mg) by mouth once daily. Do not crush or chew. 30 tablet 0    OneTouch Delica Plus Lancet 30 gauge misc       OneTouch Ultra Test strip REVISE EL NIVEL DE AZUCAR EN LA PEARL CUATRO VECES AL CUBA      pantoprazole (ProtoNix) 40 mg EC tablet Take 1 tablet (40 mg) by mouth once daily in the morning. Take before meals. Do not crush, chew, or split. Do not start before October 18, 2023. 30 tablet 0    pen needle, diabetic, safety 29 gauge x 1/2\" needle Three times before meals and at bedtime      QUEtiapine (SEROquel) 50 mg tablet Take 1 tablet (50 mg) by mouth once daily at bedtime.      sacubitriL-valsartan (Entresto)  mg tablet Take 1 tablet by mouth twice a day.      simethicone (Mylicon) 80 mg chewable tablet Chew 1 tablet (80 mg) every 6 hours if needed for flatulence.      spironolactone (Aldactone) 25 mg tablet Take 1 tablet (25 mg) by mouth once daily.      [DISCONTINUED] dulaglutide (Trulicity) 1.5 mg/0.5 mL pen injector injection Inject 1.5 mg under the skin 1 (one) time per week.      isosorbide dinitrate (Isordil) 40 mg tablet Take 1 tablet (40 mg) by mouth 3 times a day. (Patient not taking: Reported on 10/23/2023) 90 tablet 0    [DISCONTINUED] aspirin 81 mg chewable tablet Chew 1 tablet (81 mg) once daily. Do not start before October 18, 2023. 30 tablet 0    [DISCONTINUED] digoxin (Lanoxin) 125 MCG tablet Take 1 tablet (125 mcg) by mouth once daily. 30 tablet 0    [DISCONTINUED] Eliquis 5 mg tablet       [DISCONTINUED] furosemide (Lasix) 40 mg tablet Take 2 tablets (80 mg) by mouth twice a day.      [DISCONTINUED] hydrALAZINE (Apresoline) 100 mg tablet Take 1 tablet (100 mg) by mouth 3 times a day. 90 tablet 0    [DISCONTINUED] hydrALAZINE (Apresoline) 100 mg tablet Take 1 tablet (100 mg) by mouth 3 times a day. 90 tablet 0    [DISCONTINUED] hydrALAZINE (Apresoline) 25 mg tablet       [DISCONTINUED] insulin glargine (Lantus) 100 unit/mL injection " Inject 55 Units under the skin once every 24 hours. Take as directed per insulin instructions. Do not start before 2023. 16.5 mL 0    [DISCONTINUED] insulin lispro (HumaLOG) 100 unit/mL injection Inject 0.34 mL (34 Units) under the skin 3 times a day with meals. Take as directed per insulin instructions. 30.6 mL 0    [DISCONTINUED] insulin lispro (HumaLOG) 100 unit/mL injection Inject 0-0.15 mL (0-15 Units) under the skin 3 times a day with meals. Take as directed per insulin instructions. 13.5 mL 0    [DISCONTINUED] isosorbide dinitrate (Isordil) 40 mg tablet Take 1 tablet (40 mg) by mouth 3 times a day. 90 tablet 0    [DISCONTINUED] isosorbide dinitrate (Isordil) 40 mg tablet Take 1 tablet (40 mg) by mouth 3 times a day. 90 tablet 0    [DISCONTINUED] isosorbide mononitrate ER (Imdur) 30 mg 24 hr tablet Take 1 tablet (30 mg) by mouth once daily.      [DISCONTINUED] metoprolol succinate XL (Toprol-XL) 100 mg 24 hr tablet Take 1 tablet (100 mg) by mouth once daily. Do not crush or chew. 30 tablet 0    [DISCONTINUED] metoprolol succinate XL (Toprol-XL) 50 mg 24 hr tablet Take 3 tablets (150 mg) by mouth once daily.      [DISCONTINUED] metoprolol tartrate (Lopressor) 50 mg tablet       [DISCONTINUED] pantoprazole (ProtoNix) 40 mg EC tablet Take 1 tablet (40 mg) by mouth once daily in the morning. Take before meals. Do not crush, chew, or split. Do not start before 2023. 30 tablet 0    [DISCONTINUED] sertraline (Zoloft) 50 mg tablet Take 1 tablet (50 mg) by mouth once daily.       Current Facility-Administered Medications on File Prior to Visit   Medication Dose Route Frequency Provider Last Rate Last Admin    isosorbide dinitrate (Isordil) tablet 40 mg  40 mg oral TID Maile Rahman, APRN-CNP          DRUG INTERACTIONS  There are no significant drug-drug interactions requiring an adjustment to medication therapy.     PATIENT GOALS   Fasting B - 130 mg/dL  Postprandial BG: less than 180  mg/dL  A1c: less than 7%     Assessment/Plan   Problem List Items Addressed This Visit       Diabetes mellitus (CMS/[facility]) - Primary     Felice Law has uncontrolled type 2 diabetes mellitus complicated by hypertension, hyperlipidemia, obesity, with complications including CKD, history of MI, neuropathy, and retinopathy as evidenced by his most recent A1c of 8.1% on 10/04/2023 which had increased from 7.9% on 05/26/2023. He is currently using the Freestyle Franco 2 CGM system for self monitoring of his blood glucose and he reports that his fasting blood sugar this morning was 123 mg/dL and before meals he is typically in the 180s. He uses the reader so I was unable to link my [employer] account to see his readings. While his glucose readings have likely improved since discharge, there is still benefit to increasing his Trulicity and decreasing his insulin doses as tolerated by his blood sugar levels. Patient was agreeable to increasing the Trulicity at this time.   CHANGE: Trulicity 3 mg under the skin once weekly (increased from 1.5 mg once weekly effective after patient finishes the four 1.5 mg doses he has at home)  CONTINUE:   Farxiga 10 mg 1 tablet by mouth once daily   Insulin glargine 100 unit/mL 55 units under the skin every 24 hours  Insulin lispro 100 unit/mL 34 units plus sliding scale under the skin 3 times a day before meals    mg/dL: 0 units  151-200 mg/dL: 3 units  201-250 mg/dL: 6 units  251-300 mg/dL: 9 units  301-350 mg/dL: 12 units  351-400 mg/dL: 15 units  Patient was informed that if he uses his Smart Phone as his Franco reader we can get it connected with my [employer] account so I am able to see his glucose reports as patient does not understand how to find this information in his reader. He stated he is not the best with technology but suggested having his brother look into it to see if they can get the F[employer] 2 anuj set up on his phone. He was encouraged to do this and informed that  he would have to use the phone to read a whole new sensor as the sensors will not allow both the reader and the phone to work on the same sensor at the same time.   Patient was instructed to work on avoiding carbohydrates and sugar and most importantly to drink more water. He reports only drinking ~1 regular size glass of water per day. He was educated on the importance of drinking water to avoid dehydration which can lead to the potential for euglycemic DKA with Farxiga. He will follow any restrictions placed by cardiology for his heart failure regarding restricting fluids if needed.   I recommended patient reach out to schedule his Ophthalmology referral placed by his primary care provider Dr. Humphrey. He was provided with the number to reach out and call. Since patient has a history of diabetic retinopathy he should be monitored during the increase in dose of Trulicity to ensure his retinopathy is not worsening.   I will follow-up with patient in ~6 weeks to assess tolerance of the 3 mg dose of Trulicity at that time and to assess if his insulin doses need adjusted.          Relevant Medications    dulaglutide 3 mg/0.5 mL pen injector    Other Relevant Orders    Follow Up In Clinical Pharmacy     Pharmacy Follow-Up: 12/04/2023  Endocrinology Appointment: 01/16/2024 with Dr. Greco     Continue all meds under the continuation of care with the referring provider and clinical pharmacy team.    Trey Lee PharmD     Verbal consent to manage patient's drug therapy was obtained from the patient. They were informed they may decline to participate or withdraw from participation in pharmacy services at any time.

## 2023-10-23 NOTE — ASSESSMENT & PLAN NOTE
Felice Law has uncontrolled type 2 diabetes mellitus complicated by hypertension, hyperlipidemia, obesity, with complications including CKD, history of MI, neuropathy, and retinopathy as evidenced by his most recent A1c of 8.1% on 10/04/2023 which had increased from 7.9% on 05/26/2023. He is currently using the Freestyle Franco 2 CGM system for self monitoring of his blood glucose and he reports that his fasting blood sugar this morning was 123 mg/dL and before meals he is typically in the 180s. He uses the reader so I was unable to link my [employer] account to see his readings. While his glucose readings have likely improved since discharge, there is still benefit to increasing his Trulicity and decreasing his insulin doses as tolerated by his blood sugar levels. Patient was agreeable to increasing the Trulicity at this time.   CHANGE: Trulicity 3 mg under the skin once weekly (increased from 1.5 mg once weekly effective after patient finishes the four 1.5 mg doses he has at home)  CONTINUE:   Farxiga 10 mg 1 tablet by mouth once daily   Insulin glargine 100 unit/mL 55 units under the skin every 24 hours  Insulin lispro 100 unit/mL 34 units plus sliding scale under the skin 3 times a day before meals    mg/dL: 0 units  151-200 mg/dL: 3 units  201-250 mg/dL: 6 units  251-300 mg/dL: 9 units  301-350 mg/dL: 12 units  351-400 mg/dL: 15 units  Patient was informed that if he uses his Smart Phone as his Franco reader we can get it connected with my [employer] account so I am able to see his glucose reports as patient does not understand how to find this information in his reader. He stated he is not the best with technology but suggested having his brother look into it to see if they can get the Franco 2 anuj set up on his phone. He was encouraged to do this and informed that he would have to use the phone to read a whole new sensor as the sensors will not allow both the reader and the phone to work on the  same sensor at the same time.   Patient was instructed to work on avoiding carbohydrates and sugar and most importantly to drink more water. He reports only drinking ~1 regular size glass of water per day. He was educated on the importance of drinking water to avoid dehydration which can lead to the potential for euglycemic DKA with Farxiga. He will follow any restrictions placed by cardiology for his heart failure regarding restricting fluids if needed.   I recommended patient reach out to schedule his Ophthalmology referral placed by his primary care provider Dr. Humphrey. He was provided with the number to reach out and call. Since patient has a history of diabetic retinopathy he should be monitored during the increase in dose of Trulicity to ensure his retinopathy is not worsening.   I will follow-up with patient in ~6 weeks to assess tolerance of the 3 mg dose of Trulicity at that time and to assess if his insulin doses need adjusted.

## 2023-10-24 ENCOUNTER — OFFICE VISIT (OUTPATIENT)
Dept: CARDIOLOGY | Facility: HOSPITAL | Age: 58
End: 2023-10-24
Payer: COMMERCIAL

## 2023-10-24 VITALS
HEIGHT: 71 IN | HEART RATE: 99 BPM | SYSTOLIC BLOOD PRESSURE: 100 MMHG | DIASTOLIC BLOOD PRESSURE: 67 MMHG | BODY MASS INDEX: 32.65 KG/M2 | WEIGHT: 233.2 LBS | OXYGEN SATURATION: 96 %

## 2023-10-24 DIAGNOSIS — I25.5 ISCHEMIC CARDIOMYOPATHY: ICD-10-CM

## 2023-10-24 DIAGNOSIS — I50.22 CHRONIC SYSTOLIC HEART FAILURE (MULTI): Primary | ICD-10-CM

## 2023-10-24 PROCEDURE — 3048F LDL-C <100 MG/DL: CPT | Performed by: NURSE PRACTITIONER

## 2023-10-24 PROCEDURE — 1036F TOBACCO NON-USER: CPT | Performed by: NURSE PRACTITIONER

## 2023-10-24 PROCEDURE — 3078F DIAST BP <80 MM HG: CPT | Performed by: NURSE PRACTITIONER

## 2023-10-24 PROCEDURE — 3074F SYST BP LT 130 MM HG: CPT | Performed by: NURSE PRACTITIONER

## 2023-10-24 PROCEDURE — 3052F HG A1C>EQUAL 8.0%<EQUAL 9.0%: CPT | Performed by: NURSE PRACTITIONER

## 2023-10-24 PROCEDURE — 99214 OFFICE O/P EST MOD 30 MIN: CPT | Performed by: NURSE PRACTITIONER

## 2023-10-24 RX ORDER — BUMETANIDE 2 MG/1
3 TABLET ORAL DAILY
Qty: 135 TABLET | Refills: 0 | Status: SHIPPED | OUTPATIENT
Start: 2023-10-24 | End: 2023-11-03 | Stop reason: SDUPTHER

## 2023-10-24 ASSESSMENT — PAIN SCALES - GENERAL: PAINLEVEL: 0-NO PAIN

## 2023-10-24 ASSESSMENT — PATIENT HEALTH QUESTIONNAIRE - PHQ9
2. FEELING DOWN, DEPRESSED OR HOPELESS: MORE THAN HALF THE DAYS
SUM OF ALL RESPONSES TO PHQ9 QUESTIONS 1 AND 2: 2
1. LITTLE INTEREST OR PLEASURE IN DOING THINGS: NOT AT ALL

## 2023-10-24 NOTE — PROGRESS NOTES
Chief Complaint:  59yo patient  of Dr. Duque here for post-hospitalization follow up for heart failure. Utilized a [employer] interrupter ([employer]) via telephone ([phone] for the visit.     HPI  59yo presented to [facility] on 10/1 with exertional left sided chest pain and SOB x 3 days. He was admitted in cardiogenic shock, started on dobutamine under the guidance of [facility] therapy. Patient was than transferred to [facility] for advanced therapy evaluation on 10/4. Advanced therapies evaluation was initiated on 10/4 and presented in committee meeting on 10/10. Not approved for LVAD or transplant at this time. Determined to be very high risk for advanced therapies at this time given poorly controlled diabetes, renal function, peripheral vascular questions, questionable psychosocial support, and financial concerns. Plan to optimize medical therapy and follow up closely in HF clinic. May be brought back to the committee in the future. Over the past couple of days, pt's renal function worsened due to likely over diuresis. Lasix drip was discontinued 10/14 and pt.'s renal function has since begun to improve. Added spironolactone to medication regimen and discontinued PO potassium. Increased metoprolol to 100mg PO daily. Pt. Is hemodynamically stable for discharge at this time.      Declined for advanced therapies due to poorly controlled DM, CKD, bilateral foot drop, and current inadequate financial and social support.     PMHx: HFrEF/ICM (LVEF 20% 8/14/23), CAD, Hx of cardiac arrest s/p VT ablation + ICD, HTN/HLD, CKD, Hx of DVT (L femoral) on Eliquis, hypothyroidism, pulmonary hypertension, T2DM, JAKE/OHS  PSHx: CABG (LIMA-LAD with balloon pump support April 2023)  Social Hx: Former tobacco abuse (24 pack year, quit 2017)    Presents alone via motorized wheelchair. Denies chest pain. Denies palpitations. Denies SOB on exertion. Denies orthopnea/PND. Denies lightheadedness, dizziness, and syncope. Denies edema. Medication  "adherent. Eating/drinking well. Denies N/V/D. No home BP monitor. Working with [facility]. Home weight steady at 232-233lbs.     Review of Systems   Constitutional: Negative for decreased appetite, weight gain and weight loss.   HENT:  Negative for nosebleeds.    Cardiovascular:  Negative for chest pain, dyspnea on exertion, irregular heartbeat, leg swelling, near-syncope, orthopnea, palpitations, paroxysmal nocturnal dyspnea and syncope.   Respiratory:  Negative for shortness of breath and sleep disturbances due to breathing.    Hematologic/Lymphatic: Negative for bleeding problem. Does not bruise/bleed easily.   Gastrointestinal:  Negative for anorexia, diarrhea, hematochezia, nausea and vomiting.   Genitourinary:  Negative for hematuria.   Neurological:  Negative for dizziness and light-headedness.       Visit Vitals  /67 (BP Location: Left arm, Patient Position: Sitting, BP Cuff Size: Large adult)   Pulse 99   Ht 1.803 m (5' 11\")   Wt 106 kg (233 lb 3.2 oz)   SpO2 96%   BMI 32.52 kg/m²   Smoking Status Former   BSA 2.3 m²       Objective   Vitals reviewed.   Neck:      Vascular: No hepatojugular reflux or JVD. JVD normal.   Pulmonary:      Effort: Pulmonary effort is normal.      Breath sounds: Normal breath sounds.   Cardiovascular:      Normal rate. Regular rhythm.      Murmurs: There is no murmur.      No gallop.  No click. No rub.   Edema:     Peripheral edema present.     Feet: bilateral 1+ edema of the feet.  Abdominal:      General: There is no distension.   Skin:     General: Skin is warm and dry.   Neurological:      Mental Status: Alert and oriented to person, place and time.         Lab Review:   Lab Results   Component Value Date     10/17/2023    K 4.6 10/17/2023     10/17/2023    CO2 29 10/17/2023    BUN 69 (H) 10/17/2023    CREATININE 1.48 (H) 10/17/2023    GLUCOSE 166 (H) 10/17/2023    CALCIUM 9.4 10/17/2023     Lab Results   Component Value Date    WBC 5.2 10/17/2023    HGB 11.6 (L) " 10/17/2023    HCT 36.5 (L) 10/17/2023    MCV 76 (L) 10/17/2023     10/17/2023         Current Outpatient Medications:     albuterol 2.5 mg /3 mL (0.083 %) nebulizer solution, , Disp: , Rfl:     apixaban (Eliquis) 5 mg tablet, Take 1 tablet (5 mg) by mouth 2 times a day., Disp: , Rfl:     aspirin 81 mg chewable tablet, Chew 1 tablet (81 mg) once daily. Do not start before October 18, 2023., Disp: 30 tablet, Rfl: 0    atorvastatin (Lipitor) 80 mg tablet, Take 1 tablet (80 mg) by mouth once daily., Disp: , Rfl:     bumetanide (Bumex) 2 mg tablet, TAKE ONE TABLET BY MOUTH ONCE DAILY **TAKE AN ADDITIONAL TABLET I...  (REFER TO PRESCRIPTION NOTES)., Disp: , Rfl:     digoxin (Lanoxin) 125 MCG tablet, Take 1 tablet (125 mcg) by mouth once daily., Disp: 30 tablet, Rfl: 0    dulaglutide 3 mg/0.5 mL pen injector, Inject 3 mg under the skin 1 (one) time per week., Disp: 2 mL, Rfl: 1    Farxiga 10 mg, Take 1 tablet (10 mg) by mouth once daily., Disp: , Rfl:     gabapentin (Neurontin) 300 mg capsule, Take 1 capsule (300 mg) by mouth once daily in the morning., Disp: , Rfl:     gabapentin (Neurontin) 600 mg tablet, Take 1 tablet (600 mg) by mouth once daily at bedtime., Disp: , Rfl:     hydrALAZINE (Apresoline) 100 mg tablet, Take 1 tablet (100 mg) by mouth 3 times a day., Disp: 90 tablet, Rfl: 0    insulin glargine (Lantus) 100 unit/mL injection, Inject 55 Units under the skin once every 24 hours. Take as directed per insulin instructions. Do not start before October 18, 2023., Disp: 16.5 mL, Rfl: 0    insulin lispro (HumaLOG) 100 unit/mL injection, Inject 0.34 mL (34 Units) under the skin 3 times a day with meals. Take as directed per insulin instructions., Disp: 30.6 mL, Rfl: 0    isosorbide dinitrate (Isordil) 40 mg tablet, Take 1 tablet (40 mg) by mouth 3 times a day., Disp: 90 tablet, Rfl: 0    lancets (Lancets,Thin) misc, 1 Each 4 times daily (before meals and at bedtime). use as directed to test blood sugar, Disp: ,  "Rfl:     levothyroxine (Synthroid, Levoxyl) 50 mcg tablet, Take 1 tablet (50 mcg) by mouth once daily in the morning. Take before meals., Disp: , Rfl:     metoprolol succinate XL (Toprol-XL) 100 mg 24 hr tablet, Take 1 tablet (100 mg) by mouth once daily. Do not crush or chew., Disp: 30 tablet, Rfl: 0    OneTouch Delica Plus Lancet 30 gauge misc, , Disp: , Rfl:     OneTouch Ultra Test strip, REVISE EL NIVEL DE AZUCAR EN LA PEARL CUATRO VECES AL CUBA, Disp: , Rfl:     pantoprazole (ProtoNix) 40 mg EC tablet, Take 1 tablet (40 mg) by mouth once daily in the morning. Take before meals. Do not crush, chew, or split. Do not start before October 18, 2023., Disp: 30 tablet, Rfl: 0    pen needle, diabetic, safety 29 gauge x 1/2\" needle, Three times before meals and at bedtime, Disp: , Rfl:     QUEtiapine (SEROquel) 50 mg tablet, Take 1 tablet (50 mg) by mouth once daily at bedtime., Disp: , Rfl:     sacubitriL-valsartan (Entresto)  mg tablet, Take 1 tablet by mouth twice a day., Disp: , Rfl:     simethicone (Mylicon) 80 mg chewable tablet, Chew 1 tablet (80 mg) every 6 hours if needed for flatulence., Disp: , Rfl:     spironolactone (Aldactone) 25 mg tablet, Take 1 tablet (25 mg) by mouth once daily., Disp: , Rfl:     insulin lispro (HumaLOG) 100 unit/mL injection, Inject 0-0.15 mL (0-15 Units) under the skin 3 times a day with meals. Take as directed per insulin instructions. (Patient not taking: Reported on 10/24/2023), Disp: 13.5 mL, Rfl: 0    ipratropium-albuteroL (Duo-Neb) 0.5-2.5 mg/3 mL nebulizer solution, Take 3 mL by nebulization 3 times a day as needed., Disp: , Rfl:   No current facility-administered medications for this visit.      Assessment/Plan   Systolic Heart Failure   +1 pitting BLE edema. Appears hypervolemic and normotensive on exam. Most recent TTE 10/14/23 LVEF 20% w/moderately to severely dilated LV. Mild LVH. NYHA Class II Stage C HFrEF s/p ICD. Continue Metoprolol Succiante 100mg every day, " Entresto 97-103mg BID, Spironolactone 25mg every day, and Farxiga 10mg every day. Increase Bumex 3mg QD. RFP in 7-10 days (to be drawn by [facility]).     Previously declined for advanced therapies due to poorly controlled DM, CKD, bilateral foot drop, and current inadequate financial and social support.     HTN  Continue Hydralazine 100mg TID and Isordil 40mg TID.

## 2023-10-24 NOTE — PATIENT INSTRUCTIONS
Increase Bumex 3mg daily.   Please have labs drawn in 7-10 days after you start medication change. We will call you with any abnormal results.   Continue all other medications as ordered.   Call [phone] to schedule 1 month follow up with Dr. Duque.

## 2023-10-25 ENCOUNTER — HOME CARE VISIT (OUTPATIENT)
Dept: HOME HEALTH SERVICES | Facility: HOME HEALTH | Age: 58
End: 2023-10-25
Payer: COMMERCIAL

## 2023-10-25 ENCOUNTER — TELEPHONE (OUTPATIENT)
Dept: PRIMARY CARE | Facility: HOSPITAL | Age: 58
End: 2023-10-25
Payer: COMMERCIAL

## 2023-10-25 PROCEDURE — G0151 HHCP-SERV OF PT,EA 15 MIN: HCPCS

## 2023-10-25 ASSESSMENT — ACTIVITIES OF DAILY LIVING (ADL)
AMBULATION ASSISTANCE ON FLAT SURFACES: 1
AMBULATION_DISTANCE/DURATION_TOLERATED: 60

## 2023-10-25 ASSESSMENT — ENCOUNTER SYMPTOMS
PERSON REPORTING PAIN: PATIENT
PAIN: 1
PAIN LOCATION: LEFT SHOULDER
PAIN LOCATION - PAIN SEVERITY: 5/10

## 2023-10-25 NOTE — TELEPHONE ENCOUNTER
[facility] sent prior authorization request for patients Isosorbide Dinitrate 40 mg   CoverMyMeds:  Key:[id number]  Pt Last Name: [email or web address]

## 2023-10-26 LAB — HOLD SPECIMEN: NORMAL

## 2023-10-27 ENCOUNTER — APPOINTMENT (OUTPATIENT)
Dept: HOME HEALTH SERVICES | Facility: HOME HEALTH | Age: 58
End: 2023-10-27
Payer: COMMERCIAL

## 2023-10-27 ASSESSMENT — ENCOUNTER SYMPTOMS
WEIGHT GAIN: 0
PND: 0
DYSPNEA ON EXERTION: 0
LIGHT-HEADEDNESS: 0
SHORTNESS OF BREATH: 0
DIZZINESS: 0
VOMITING: 0
HEMATOCHEZIA: 0
BRUISES/BLEEDS EASILY: 0
WEIGHT LOSS: 0
NEAR-SYNCOPE: 0
ANOREXIA: 0
NAUSEA: 0
SLEEP DISTURBANCES DUE TO BREATHING: 0
DIARRHEA: 0
DECREASED APPETITE: 0
IRREGULAR HEARTBEAT: 0
ORTHOPNEA: 0
SYNCOPE: 0
HEMATURIA: 0
PALPITATIONS: 0

## 2023-10-27 NOTE — PROGRESS NOTES
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 Time of initial infection is unknown. When he was seen in clinic he had no complaints of skin or genital lesions. No concern for neurological compromise. Plan discussed with attending to treat with Penicillin G benzathine 2.4 million units IM once weekly x 3 consecutive weeks in office. Office notified to contact patient to schedule nursing visit appointments for injection.

## 2023-10-31 ENCOUNTER — HOME CARE VISIT (OUTPATIENT)
Dept: HOME HEALTH SERVICES | Facility: HOME HEALTH | Age: 58
End: 2023-10-31
Payer: COMMERCIAL

## 2023-10-31 PROCEDURE — G0151 HHCP-SERV OF PT,EA 15 MIN: HCPCS

## 2023-11-02 ENCOUNTER — HOME CARE VISIT (OUTPATIENT)
Dept: HOME HEALTH SERVICES | Facility: HOME HEALTH | Age: 58
End: 2023-11-02
Payer: COMMERCIAL

## 2023-11-02 VITALS
DIASTOLIC BLOOD PRESSURE: 76 MMHG | RESPIRATION RATE: 16 BRPM | WEIGHT: 226 LBS | TEMPERATURE: 97.5 F | BODY MASS INDEX: 31.52 KG/M2 | SYSTOLIC BLOOD PRESSURE: 118 MMHG | OXYGEN SATURATION: 97 % | HEART RATE: 77 BPM

## 2023-11-02 DIAGNOSIS — I50.23 ACUTE ON CHRONIC HFREF (HEART FAILURE WITH REDUCED EJECTION FRACTION) (MULTI): Primary | ICD-10-CM

## 2023-11-02 PROCEDURE — G0151 HHCP-SERV OF PT,EA 15 MIN: HCPCS

## 2023-11-02 PROCEDURE — 80069 RENAL FUNCTION PANEL: CPT

## 2023-11-02 PROCEDURE — G0299 HHS/HOSPICE OF RN EA 15 MIN: HCPCS

## 2023-11-02 SDOH — ECONOMIC STABILITY: FOOD INSECURITY: MEALS PER DAY: 3

## 2023-11-02 SDOH — HEALTH STABILITY: PHYSICAL HEALTH: EXERCISE TYPE: HOME EXERCISE PROGRAM

## 2023-11-02 ASSESSMENT — PAIN SCALES - PAIN ASSESSMENT IN ADVANCED DEMENTIA (PAINAD)
FACIALEXPRESSION: 0 - SMILING OR INEXPRESSIVE.
BODYLANGUAGE: 0
BREATHING: 1
BODYLANGUAGE: 0 - RELAXED.
TOTALSCORE: 1
NEGVOCALIZATION: 0 - NONE.
CONSOLABILITY: 0 - NO NEED TO CONSOLE.
NEGVOCALIZATION: 0
FACIALEXPRESSION: 0
CONSOLABILITY: 0

## 2023-11-02 ASSESSMENT — ENCOUNTER SYMPTOMS
FATIGUES EASILY: 1
CHANGE IN APPETITE: UNCHANGED
OCCASIONAL FEELINGS OF UNSTEADINESS: 1
LOSS OF SENSATION IN FEET: 1
FATIGUE: 1
SUBJECTIVE PAIN PROGRESSION: GRADUALLY IMPROVING
MUSCLE WEAKNESS: 1
PERSON REPORTING PAIN: PATIENT
PAIN LOCATION: LEFT SHOULDER
HIGHEST PAIN SEVERITY IN PAST 24 HOURS: 6/10
DENIES PAIN: 1
APPETITE LEVEL: GOOD
DEPRESSION: 0

## 2023-11-02 ASSESSMENT — ACTIVITIES OF DAILY LIVING (ADL)
AMBULATION ASSISTANCE: 1
USING THE TELPHONE: INDEPENDENT
TELEPHONE USE ASSESSED: 1
FEEDING ASSESSED: 1
FEEDING: INDEPENDENT
AMBULATION ASSISTANCE: STAND BY ASSIST

## 2023-11-03 DIAGNOSIS — I50.23 ACUTE ON CHRONIC HFREF (HEART FAILURE WITH REDUCED EJECTION FRACTION) (MULTI): ICD-10-CM

## 2023-11-03 DIAGNOSIS — I50.22 CHRONIC SYSTOLIC HEART FAILURE (MULTI): ICD-10-CM

## 2023-11-06 ENCOUNTER — HOME CARE VISIT (OUTPATIENT)
Dept: HOME HEALTH SERVICES | Facility: HOME HEALTH | Age: 58
End: 2023-11-06
Payer: COMMERCIAL

## 2023-11-07 RX ORDER — BUMETANIDE 2 MG/1
2 TABLET ORAL DAILY
Qty: 30 TABLET | Refills: 0 | Status: SHIPPED | OUTPATIENT
Start: 2023-11-07 | End: 2023-12-19 | Stop reason: SDUPTHER

## 2023-11-07 RX ORDER — PANTOPRAZOLE SODIUM 40 MG/1
40 TABLET, DELAYED RELEASE ORAL
Qty: 30 TABLET | Refills: 0 | Status: SHIPPED | OUTPATIENT
Start: 2023-11-07 | End: 2023-12-19 | Stop reason: SDUPTHER

## 2023-11-07 RX ORDER — DIGOXIN 125 MCG
125 TABLET ORAL DAILY
Qty: 30 TABLET | Refills: 0 | Status: SHIPPED | OUTPATIENT
Start: 2023-11-07 | End: 2023-12-19 | Stop reason: SDUPTHER

## 2023-11-07 NOTE — PROGRESS NOTES
Per Home Care: Pt will be leaving the country to travel to [place] for 1 month, leaving November 14th, returning December 14th.Pt needs refills on digoxin, bumetanide, pantoprazole. Pt does not have office appointment with cardiology until he returns in December (19th).     Per Cardiology note, patient's bumax was increased to 3 mg and RFP obtained 7-10 days after for monitoring. Patient had worsening creatinine. Messaged cardiology regarding maintaining bumax at 3 mg or going back down to 2 mg. Also messaged cardiology regarding patient's isosorbide dinitrate 40 mg TID was denied PA. Responses pending.    Because patient leaves the country soon, refills made for digoxin, bumex 2mg (original dose), and pantoprazole.

## 2023-11-08 ENCOUNTER — HOME CARE VISIT (OUTPATIENT)
Dept: HOME HEALTH SERVICES | Facility: HOME HEALTH | Age: 58
End: 2023-11-08
Payer: COMMERCIAL

## 2023-11-09 ENCOUNTER — TELEPHONE (OUTPATIENT)
Dept: PRIMARY CARE | Facility: HOSPITAL | Age: 58
End: 2023-11-09
Payer: COMMERCIAL

## 2023-11-09 NOTE — TELEPHONE ENCOUNTER
Called patient today to schedule patient to come in for his shots. Patient phone has been busy for days now. Will keep trying to call, to get in touch with someone

## 2023-11-10 ENCOUNTER — TELEPHONE (OUTPATIENT)
Dept: PRIMARY CARE | Facility: HOSPITAL | Age: 58
End: 2023-11-10
Payer: COMMERCIAL

## 2023-11-10 NOTE — TELEPHONE ENCOUNTER
Called patient today to schedule an appointment again to come in to get some shots. Patient phone has been busy for days now, I don't know if its because of the areacode.  I will call patient back again Monday to see if I can get someone on the line.

## 2023-11-11 ENCOUNTER — HOME CARE VISIT (OUTPATIENT)
Dept: HOME HEALTH SERVICES | Facility: HOME HEALTH | Age: 58
End: 2023-11-11
Payer: COMMERCIAL

## 2023-11-11 VITALS
DIASTOLIC BLOOD PRESSURE: 72 MMHG | OXYGEN SATURATION: 98 % | RESPIRATION RATE: 16 BRPM | SYSTOLIC BLOOD PRESSURE: 124 MMHG | TEMPERATURE: 97.4 F | HEART RATE: 96 BPM

## 2023-11-11 PROCEDURE — G0299 HHS/HOSPICE OF RN EA 15 MIN: HCPCS

## 2023-11-11 SDOH — ECONOMIC STABILITY: FOOD INSECURITY: MEALS PER DAY: 3

## 2023-11-11 SDOH — HEALTH STABILITY: PHYSICAL HEALTH: EXERCISE TYPE: WALKING

## 2023-11-11 SDOH — ECONOMIC STABILITY: GENERAL

## 2023-11-11 ASSESSMENT — PAIN SCALES - PAIN ASSESSMENT IN ADVANCED DEMENTIA (PAINAD)
CONSOLABILITY: 0 - NO NEED TO CONSOLE.
BODYLANGUAGE: 0
NEGVOCALIZATION: 0
CONSOLABILITY: 0
BREATHING: 0
TOTALSCORE: 0
NEGVOCALIZATION: 0 - NONE.
FACIALEXPRESSION: 0
BODYLANGUAGE: 0 - RELAXED.
FACIALEXPRESSION: 0 - SMILING OR INEXPRESSIVE.

## 2023-11-11 ASSESSMENT — ACTIVITIES OF DAILY LIVING (ADL)
AMBULATION ASSISTANCE: 1
PHYSICAL TRANSFERS ASSESSED: 1
AMBULATION ASSISTANCE: STAND BY ASSIST
MONEY MANAGEMENT (EXPENSES/BILLS): INDEPENDENT
CURRENT_FUNCTION: SUPERVISION

## 2023-11-11 ASSESSMENT — ENCOUNTER SYMPTOMS
CHANGE IN APPETITE: UNCHANGED
LOSS OF SENSATION IN FEET: 1
HIGHEST PAIN SEVERITY IN PAST 24 HOURS: 0/10
APPETITE LEVEL: GOOD
PERSON REPORTING PAIN: PATIENT
DEPRESSION: 0
OCCASIONAL FEELINGS OF UNSTEADINESS: 1
LOWEST PAIN SEVERITY IN PAST 24 HOURS: 0/10
DENIES PAIN: 1
PAIN SEVERITY GOAL: 0/10

## 2023-11-13 ENCOUNTER — HOME CARE VISIT (OUTPATIENT)
Dept: HOME HEALTH SERVICES | Facility: HOME HEALTH | Age: 58
End: 2023-11-13
Payer: COMMERCIAL

## 2023-11-13 ASSESSMENT — ACTIVITIES OF DAILY LIVING (ADL)
OASIS_M1830: 00
HOME_HEALTH_OASIS: 00

## 2023-12-04 ENCOUNTER — APPOINTMENT (OUTPATIENT)
Dept: PHARMACY | Facility: HOSPITAL | Age: 58
End: 2023-12-04
Payer: COMMERCIAL

## 2023-12-11 ENCOUNTER — APPOINTMENT (OUTPATIENT)
Dept: PRIMARY CARE | Facility: HOSPITAL | Age: 58
End: 2023-12-11
Payer: COMMERCIAL

## 2023-12-15 PROBLEM — H25.13 AGE-RELATED NUCLEAR CATARACT OF BOTH EYES: Status: ACTIVE | Noted: 2022-02-18

## 2023-12-15 PROBLEM — R26.2 WALKING DIFFICULTY DUE TO ANKLE AND FOOT: Status: ACTIVE | Noted: 2020-02-18

## 2023-12-15 PROBLEM — E11.59: Status: ACTIVE | Noted: 2023-10-04

## 2023-12-15 PROBLEM — I71.40 ABDOMINAL AORTIC ANEURYSM, WITHOUT RUPTURE, UNSPECIFIED (CMS-HCC): Status: ACTIVE | Noted: 2023-10-04

## 2023-12-15 PROBLEM — M21.372 BILATERAL FOOT-DROP: Status: ACTIVE | Noted: 2020-02-18

## 2023-12-15 PROBLEM — M21.371 BILATERAL FOOT-DROP: Status: ACTIVE | Noted: 2020-02-18

## 2023-12-15 PROBLEM — J44.9 CHRONIC OBSTRUCTIVE PULMONARY DISEASE (MULTI): Status: ACTIVE | Noted: 2023-10-05

## 2023-12-15 PROBLEM — E66.9 OBESITY WITH BODY MASS INDEX 30 OR GREATER: Status: ACTIVE | Noted: 2019-10-01

## 2023-12-15 PROBLEM — I21.9 MYOCARDIAL INFARCTION (MULTI): Status: ACTIVE | Noted: 2023-07-04

## 2023-12-15 PROBLEM — H40.009 GLAUCOMA SUSPECT: Status: ACTIVE | Noted: 2019-08-30

## 2023-12-15 PROBLEM — N28.9 KIDNEY DISEASE: Status: ACTIVE | Noted: 2023-10-09

## 2023-12-15 PROBLEM — N20.0 CALCULUS OF KIDNEY: Status: ACTIVE | Noted: 2019-10-18

## 2023-12-15 PROBLEM — Z95.810 IMPLANTABLE CARDIOVERTER-DEFIBRILLATOR (ICD) IN SITU: Status: ACTIVE | Noted: 2019-08-01

## 2023-12-15 PROBLEM — E03.9 HYPOTHYROIDISM: Status: ACTIVE | Noted: 2023-10-05

## 2023-12-15 PROBLEM — I82.419: Status: ACTIVE | Noted: 2023-08-14

## 2023-12-15 PROBLEM — Z86.74 HISTORY OF CARDIAC ARREST: Status: ACTIVE | Noted: 2019-08-01

## 2023-12-15 PROBLEM — I10 HYPERTENSION: Status: ACTIVE | Noted: 2023-10-05

## 2023-12-15 PROBLEM — I99.9 CIRCULATORY DISORDER: Status: ACTIVE | Noted: 2023-10-04

## 2023-12-15 PROBLEM — R60.9 EDEMA: Status: ACTIVE | Noted: 2023-10-04

## 2023-12-15 PROBLEM — Z79.4 INSULIN LONG-TERM USE (MULTI): Status: ACTIVE | Noted: 2023-10-04

## 2023-12-15 PROBLEM — E11.9 DIABETES MELLITUS (MULTI): Status: ACTIVE | Noted: 2021-01-04

## 2023-12-15 PROBLEM — G47.33 OBSTRUCTIVE SLEEP APNEA SYNDROME: Status: ACTIVE | Noted: 2023-10-05

## 2023-12-15 PROBLEM — K62.5 RECTAL HEMORRHAGE: Status: ACTIVE | Noted: 2021-02-10

## 2023-12-15 PROBLEM — I73.9 PERIPHERAL VASCULAR DISEASE (CMS-HCC): Status: ACTIVE | Noted: 2023-10-04

## 2023-12-15 PROBLEM — K80.20 CHOLELITHIASIS WITHOUT OBSTRUCTION: Status: ACTIVE | Noted: 2021-02-10

## 2023-12-15 PROBLEM — K76.0 STEATOSIS OF LIVER: Status: ACTIVE | Noted: 2021-08-11

## 2023-12-15 RX ORDER — PEN NEEDLE, DIABETIC 31 GX3/16"
NEEDLE, DISPOSABLE MISCELLANEOUS
COMMUNITY
Start: 2023-10-25

## 2023-12-15 RX ORDER — INSULIN GLARGINE 100 [IU]/ML
INJECTION, SOLUTION SUBCUTANEOUS
COMMUNITY
Start: 2023-11-10 | End: 2024-04-08 | Stop reason: ALTCHOICE

## 2023-12-15 RX ORDER — AMMONIUM LACTATE 12 G/100G
LOTION TOPICAL
COMMUNITY
Start: 2023-11-05 | End: 2024-04-26 | Stop reason: SDUPTHER

## 2023-12-18 NOTE — PROGRESS NOTES
Primary Care Physician: Umer Humphrey MD  Primary Cardiologist: Sebastian at [facility]  Patient's Location: [facility] [id number]    Date of Visit: 12/19/2023 10:30 AM EST  Location of visit: [facility]   Type of Visit: Established    HPI / Summary:   Felice Law is a very pleasant 58 y.o. [place]ian male presenting for management of Stage C-D ICM/HFrEF (LVEF 20% 8/14/23). He otherwise has a history of HTN, HLD, Type II DM, prior tobacco use (24 pack year, quit 2017), obesity (BMI 31), CAD s/p PCI and 1vCABG (LIMA-LAD with balloon pump support April 2023), VT arrest s/p VT ablation + ICD 2017, DVT (L femoral) on Eliquis, hypothyroidism, pulmonary hypertension, central sleep apnea.      Initial CV History:   He presented to [facility] on 10/1 with exertional left sided chest pain and SOB x 3 days. He has been admitted 8 times this year for acutely decompensated heart failure. His last hospital stay was 9/16-9/26, and weight on discharge was 230lbs.  He was admitted to [facility] this presentation in cardiogenic shock despite reporting compliance with his HF medications. His oral GDMT regimen was held due to worsening kidney function. His home regimen includes Entresto 97/103mg, Toprol XL 150mg, Spironolactone 25mg, Dapagliflozin 10mg. Weight on admission was 246lbs. He was treated IV lasix 80mg and with IV bumex 4mg BID + metolazone with some response, but was transitioned to lasix infusion 5mg/hr.  He was also placed on dobutamine infusion as evidence of cardiogenic shock was noted to be worsening. Advanced heart failure service was consulted while he was admitted there and they recommended SGC-guided therapy. RHC on dobutamine 2.5mcg showed RA pressure: 20, RV: 55/20, PCWP: 35, PA pressure: 52/37 (40 - 42), CO/CI 3.6/1.5, SVR: 1373 dynes, PVR: 414 dynes, AO: 84, and lactates were increasing up to 2.4 during that admission. He was referred to us by Dr. Cortes at [facility] for advanced therapy evaluation. He was  transferred to us on dobutamine 3.75mcg and lasix gtt 5mg/hr.  Patient was than transferred to [facility] for advanced therapy evaluation on 10/4. Advanced therapies evaluation was initiated on 10/4 and presented in committee meeting on 10/10. Not approved for LVAD or transplant at this time. Determined to be very high risk for advanced therapies at this time given poorly controlled diabetes, renal function, peripheral vascular questions, questionable psychosocial support, and financial concerns. Plan to optimize medical therapy and follow up closely in HF clinic. May be brought back to the committee in the future. Over the past couple of days, pt's renal function worsened due to likely over diuresis. Lasix drip was discontinued 10/14 and pt.'s renal function has since begun to improve. Added spironolactone to medication regimen and discontinued PO potassium. Increased metoprolol to 100mg PO daily. Pt. Is hemodynamically stable for discharge at this time.      Interval history:   - Saw [facility]. Medications reconciled. Referred to PCP at . Estabilished    Today:  He is accompanied by: alone via motorized scooter.     He reports that he feel well. He had a fall and scraped his shin. Has some reproducible chest wall tenderness.     He denies: dyspnea at rest, LE swelling, syncope, PND, orthopnea, chest pain, palpitation.    ROS: Full 10 pt review of symptoms of negative unless discussed above.     PMHx: HFrEF/ICM (LVEF 20% 8/14/23), CAD, Hx of cardiac arrest s/p VT ablation + ICD, HTN/HLD, CKD, Hx of DVT (L femoral) on Eliquis, hypothyroidism, pulmonary hypertension, T2DM, JAKE/OHS  PSHx: CABG (LIMA-LAD with balloon pump support April 2023)  Social Hx: Former tobacco abuse (24 pack year, quit 2017)    Objective Problems:   Patient Active Problem List   Diagnosis    Calculus of gallbladder without cholecystitis without obstruction    Class 1 obesity    COPD (chronic obstructive pulmonary disease) (CMS/[facility])    Coronary  artery disease due to calcified coronary lesion    Diabetes mellitus (CMS/HCC)    Difficulty in walking involving ankle and foot joint    DVT of deep femoral vein, left (CMS/HCC)    Fatty liver    Foot drop, bilateral    Glaucoma suspect of both eyes    Hepatic cirrhosis (CMS/HCC)    HTN (hypertension)    Hx of cardiac arrest    Vitamin D deficiency    Type 2 MI (myocardial infarction) (CMS/HCC)    Stage 3 chronic kidney disease (CMS/HCC)    S/P drug eluting coronary stent placement    S/P CABG x 1    Rectal bleeding    Pulmonary hypertension (CMS/HCC)    JAKE (obstructive sleep apnea)    Nuclear age-related cataract, both eyes    Neuropathy    Nephrolithiasis    Mixed hyperlipidemia    Microscopic hematuria    Microcytic anemia    Ischemic cardiomyopathy    Insomnia    Hypothyroid    ICD (implantable cardioverter-defibrillator) in place    Chronic systolic heart failure (CMS/HCC)    Obesity (BMI 30-39.9)    Obesity, Class I, BMI 30-34.9    Type 2 diabetes mellitus (CMS/HCC)    Anemia    High blood cholesterol    CKD (chronic kidney disease) stage 3, GFR 30-59 ml/min (CMS/HCC)    Kidney disease    Abdominal aortic aneurysm, without rupture, unspecified (CMS/HCC)    Diabetes mellitus with circulatory complication (CMS/HCC)    Rectal hemorrhage    Obstructive sleep apnea syndrome    Obesity with body mass index 30 or greater    Age-related nuclear cataract of both eyes    Calculus of kidney    Hypothyroidism    History of cardiac arrest    Hypertension    Glaucoma suspect    Bilateral foot-drop    Steatosis of liver    Deep venous thrombosis of profunda femoris vein (CMS/HCC)    Walking difficulty due to ankle and foot    Chronic obstructive pulmonary disease (CMS/HCC)    Cholelithiasis without obstruction    Peripheral vascular disease (CMS/HCC)    Circulatory disorder    Insulin long-term use (CMS/HCC)    Edema    Implantable cardioverter-defibrillator (ICD) in situ    Myocardial infarction (CMS/HCC)     Medical  "History:   Past Medical History:   Diagnosis Date    Chest pain 10/01/2023    CHF (congestive heart failure) (CMS/[facility])     COPD (chronic obstructive pulmonary disease) (CMS/[facility])     Coronary artery disease     Diabetes mellitus (CMS/[facility])     Disease of thyroid gland     Elevated troponin level not due myocardial infarction 2023    Hypertension      Surgical Hx:   Past Surgical History:   Procedure Laterality Date    CARDIAC DEFIBRILLATOR PLACEMENT      CORONARY ANGIOPLASTY WITH STENT PLACEMENT      CORONARY ARTERY BYPASS GRAFT        Social Hx:   Social History     Tobacco Use    Smoking status: Former     Packs/day: 1.00     Years: 24.00     Additional pack years: 0.00     Total pack years: 24.00     Types: Cigarettes     Quit date:      Years since quittin.9    Smokeless tobacco: Never   Vaping Use    Vaping Use: Never used   Substance Use Topics    Alcohol use: Never    Drug use: Never     Family Hx:   No family history on file.  Allergies:   Allergies   Allergen Reactions    Azithromycin Dizziness and Unknown     Dizziness, Vertigo, Cold, Clammy, Nausea, Sweating, trouble breathing     Exam:       2023    10:07 AM 2023     5:18 PM 2023     5:14 PM 10/24/2023    10:58 AM 10/23/2023    12:51 PM 10/20/2023     1:29 PM   Vitals   Systolic 116 124 118 100 106 107   Diastolic 76 72 76 67 70 73   Heart Rate 89 96 77 99 92 98   Temp  36.3 °C (97.4 °F) 36.4 °C (97.5 °F)  36.7 °C (98.1 °F) 35.6 °C (96.1 °F)   Resp  16 16      Height (in) 1.8 m (5' 10.87\")   1.803 m (5' 11\")  1.803 m (5' 11\")   Weight (lb) 227  226 233.2  235.8   BMI 31.78 kg/m2  31.52 kg/m2 32.52 kg/m2  32.89 kg/m2   BSA (m2) 2.27 m2  2.27 m2 2.3 m2  2.32 m2   Visit Report Report   Report  Report     Wt Readings from Last 5 Encounters:   23 103 kg (227 lb)   23 103 kg (226 lb)   10/24/23 106 kg (233 lb 3.2 oz)   10/20/23 107 kg (235 lb 12.8 oz)   10/16/23 110 kg (241 lb 6.5 oz)     GEN: Pleasant, well-appearing, " no acute distress. He uses a mobility scooter. Bilateral foot drop with unsteady gait.   HEENT: JVP not elevated, no icterus. No HJR  CHEST: No wheeze, good air movement.  CV: Normal rate, regular rhythm. Normal S1, inspiratory split S2, no m/r/g  ABD: Soft, ND, NT  EXT: Warm, well perfused, No LE edema. L shin with small ulcer.   NEURO: Pleasant, Oriented to plan.     Labs:   CMP:  Recent Labs     12/19/23  1104 11/02/23  1700 10/17/23  0356 10/16/23  0416 10/15/23  0309 10/14/23  1516 10/14/23  0257 10/13/23  0257    135* 137 136 134*   < > 134* 135*   K 4.2 4.3 4.6 4.2 4.2   < > 4.3 4.1   CL 96* 99 100 98 97*   < > 96* 98   CO2 31 29 29 30 28   < > 29 28   ANIONGAP 13 11 13 12 13   < > 13 13   BUN 33* 64* 69* 70* 77*   < > 69* 64*   CREATININE 1.41* 1.80* 1.48* 1.54* 1.88*   < > 2.05* 1.66*   EGFR 58* 43* 55* 52* 41*   < > 37* 48*   MG  --   --  1.97 2.00 1.98  --  1.99 2.03    < > = values in this interval not displayed.     Recent Labs     12/19/23  1104 11/02/23  1700 10/17/23  0356 10/16/23  0416 10/15/23  0309 10/06/23  0515 10/05/23  1647   ALBUMIN 4.1 4.4 3.7 3.6 3.6   < > 4.1   ALKPHOS 62  --   --   --   --   --  63   ALT 13  --   --   --   --   --  11   AST 10  --   --   --   --   --  13   BILITOT 1.1  --   --   --   --   --  1.8*    < > = values in this interval not displayed.     CBC:  Recent Labs     12/19/23  1104 10/17/23  0356 10/16/23  0416 10/15/23  0309 10/14/23  1747   WBC 5.4 5.2 5.9 5.5 6.7   HGB 14.0 11.6* 11.4* 11.2* 11.9*   HCT 43.2 36.5* 36.6* 36.3* 38.6*    275 300 298 305   MCV 80 76* 77* 78* 78*     COAG:   Recent Labs     10/15/23  0833 10/15/23  0309 10/14/23  2208 10/05/23  1647   INR  --   --   --  1.2*   HAUF 0.7 0.8 0.9  --      ABO:   Recent Labs     10/05/23  1647   ABO A     HEME/ENDO:  Recent Labs     10/04/23  1518 05/26/23  0203 05/23/23 2038 04/05/23  1441   FERRITIN 379*  --   --   --    IRONSAT 15*  --   --   --    TSH 9.31*  --   --   --    HGBA1C 8.1*  "7.9* 7.8* 12.5*      CARDIAC:   Recent Labs     10/05/23  1647 10/04/23  1518     --    * 160*     Recent Labs     10/05/23  0354   CHOL 106   LDLCALC 32*   HDL 24.5   TRIG 247*     MICRO: No results for input(s): \"ESR\", \"CRP\", \"PROCAL\" in the last 30689 hours.  No results found for the last 90 days.    Notable Studies:   EKG: 10/10/2023      Echocardiogram: 10/14/2023   1. Left ventricular systolic function is severely decreased with a 20% estimated ejection fraction.   2. Abnormal septal motion consistent with post-operative status.   3. Left ventricular cavity size is moderate to severely dilated.   4. No obvious LV thrombus seen with the use of ultrasound enhancing agent.   5. There is global hypokinesis of the left ventricle with minor regional variations.   6. There is mild eccentric left ventricular hypertrophy.   7. There is mildly reduced right ventricular systolic function.   8. The left atrium is moderate to severely dilated.   9. Moderately elevated right ventricular systolic pressure.  10. While there are no prior echocardiograms in our system, notes from OSH indicate EF known to be approximately 20%.    IVSd:          0.97 cm    (0.6-1.1cm)  LVPWd:         0.93 cm    (0.6-1.1cm)  LVIDd:         6.60 cm    (3.9-5.9cm)  LA Vol Index BP:   51.9 ml/m2  EF-A4C View: 19.3 % (>=55%)  MV Peak E:    1.14 m/s    (0.7-1.2 m/s)  MV Peak A:    0.53 m/s    (0.42-0.7 m/s)  E/A Ratio:    2.16        (1.0-2.2)  MV e'         0.04 m/s    (>8.0)  MV lateral e' 0.05 m/s  MV medial e'  0.04 m/s  MV A Dur:     100.00 msec  E/e' Ratio:   25.33       (<8.0)  RV Basal 3.20 cm  TAPSE:   13.6 mm  RV s'    0.08 m/s  Est. RA Pressure: 15 mmHg  RV Syst Pressure: 48.6 mmHg (< 30mmHg)    Stress Testing:   Cardiac Catheterization:   [facility] December 2022: severe diffuse three-vessel coronary artery disease. Severe proximal diffuse LAD disease up to 70%. Severe diffuse proximal and mid first diagonal disease. Chronic total " occlusion of OM1 with left-to-left collaterals. Chronic total occlusion of mid RCA with left-to-right collaterals.     RHC: 10/11: BP 94/54, CVP 16, PAP 48/28, PCWP 16, CO/CI (F) 6.3/2.75, , SvO2 60% on Nipride, hydralazine 50mg PO TID, Isordil 20mg PO TID, Entresto 24-26mg PO BID.       PFTS:         Current Outpatient Medications   Medication Instructions    albuterol 2.5 mg /3 mL (0.083 %) nebulizer solution     ammonium lactate (Lac-Hydrin) 12 % lotion Topical    apixaban (ELIQUIS) 5 mg, oral, 2 times daily    atorvastatin (LIPITOR) 80 mg, oral, Daily    bumetanide (BUMEX) 2 mg, oral, Daily    digoxin (LANOXIN) 125 mcg, oral, Daily    dulaglutide 3 mg, subcutaneous, Weekly    Farxiga 10 mg, oral, Daily    gabapentin (Neurontin) 300 mg capsule Take 1 capsule (300 mg) by mouth once daily in the morning.    gabapentin (NEURONTIN) 600 mg, oral, Nightly    hydrALAZINE (APRESOLINE) 100 mg, oral, 3 times daily    insulin glargine (LANTUS) 55 Units, subcutaneous, Every 24 hours, Take as directed per insulin instructions.    insulin lispro (HUMALOG) 34 Units, subcutaneous, 3 times daily with meals, Take as directed per insulin instructions.    insulin lispro (HUMALOG) 0-15 Units, subcutaneous, 3 times daily with meals, Take as directed per insulin instructions.    ipratropium-albuteroL (Duo-Neb) 0.5-2.5 mg/3 mL nebulizer solution 3 mL, nebulization, 3 times daily PRN    isosorbide dinitrate (ISORDIL) 40 mg, oral, 3 times daily (0900,1400,1900)    lancets (Lancets,Thin) misc  1 Each 4 times daily (before meals and at bedtime). use as directed to test blood sugar<BR>    Lantus Solostar U-100 Insulin 100 unit/mL (3 mL) pen     levothyroxine (Synthroid, Levoxyl) 50 mcg tablet Take 1 tablet (50 mcg) by mouth once daily in the morning. Take before meals.    metoprolol succinate XL (TOPROL-XL) 150 mg, oral, Daily, Do not crush or chew.    OneTouch Delica Plus Lancet 30 gauge misc     OneTouch Ultra Test strip  REVISE EL  "NIVEL DE AZUCAR EN LA PEARL CUATRO VECES AL CUBA<BR>    pantoprazole (PROTONIX) 40 mg, oral, Daily before breakfast, Do not crush, chew, or split.    pen needle, diabetic, safety 29 gauge x 1/2\" needle  Three times before meals and at bedtime    QUEtiapine (SEROquel) 50 mg tablet Take 1 tablet (50 mg) by mouth once daily at bedtime.    sacubitriL-valsartan (Entresto)  mg tablet 1 tablet, oral, 2 times daily    spironolactone (ALDACTONE) 25 mg, oral, Daily    TechLITE Pen Needle 31 gauge x 3/16\" needle use 1 PEN NEEDLE to inject MEDICATION subcutaneously three times a day      Notable Therapies   Class  Agent    ARNI / ACE / ARB   Entresto 97-103mg BID    Beta-Blocker   Metoprolol XL 100mg daily -> 150mg daily    MRA   Spironolactone 25mg daily    SGLT2   Farxiga 10mg daily    Digoxin   125mcg daily, last dig level 0.39, 10/13/2023:  2:57 AM    Diuretic      Hydralazine/ISDN   100mg / 40mg TID     Anticoagulation   Apixaban 5mg BID (DVT)    Anti-arrhythmic      Antiplatelet   Aspirin 81mg daily    Lipid-Lowering   Atorvastatin 80mg daily    Other       Device(s)   ICD    Cardiac Rehab, if EF <35/MI/OHS   referred     Problems Addressed:   # HFrEF / Chronic Systolic Heart Failure, last EF 20%, dx'd 2017,   # Ischemic Cardiomyopathy, ACC/AHA Stage C-D, NYHA II, Warm, Euvolemic. Previously discussed for AT during [facility] October 2023 admission but declined with concerns for mobility/frailty (uses motorized scooter and has bilateral foot drop with neuropathy), CKD, poorly controlled diabetes as well as unclear social/financial support).   # CAD s/p PCI and 1v CABG (LIMA-LAD 04/2023).   # VT arrest s/p VT ablation + ICD 2017   # CKD Stage IIIb: Last BUN/Cr (GFR): 33/1.41 (58), 12/19/2023: 11:04 AM.  # Type II Diabetes Mellitus: Last A1c (8.1). With neuropathy/nephropathy  - Management of CV risk factors as a below  - GDMT as above. (Meds refilled)  - complete cardiac rehab    # DVT (L femoral) on Eliquis, " hypothyroidism, pulmonary hypertension, central sleep apnea.     CV Risk Factors:  # Hypertension : Last BP: 116/76. controlled  # Hyperlipidemia: Last Tchol 106 / LDL  32 / HDL 24.5 / TRIG 247 (10/5/2023:  3:54 AM). Continue ASA/statin  # Type II Diabetes Mellitus: Last A1c 8.1 (10/4/2023:  3:18 PM).  # Obesity: Last BMI: 31.78. On GLP1  # Prior tobacco use (24 pack year, quit 2017),     Patient Instructions   If you have any questions or need cardiac medication refills, please call the Heart Failure office at [phone], option 6.   You may also contact the  Heart Failure Nursing team via email at [email or web address] (Please include your name and date of birth).      To reach Dr. Duque's office please call [phone] / Fax: [phone].  To schedule an appointment call [phone].    - Continue current medications with the exception of:   -- incerse metoprolol XL to 150mg daily (from 100mg daily)  - Labwork: today  - Imaging/Procedures: none  - Results: I will send a [employer] message with any notable results. You may also call the HF nursing team (contact details above) for your results.   - Referrals: none  - Followup: 3 months     Orders:  Orders Placed This Encounter   Procedures    B-Type Natriuretic Peptide    CBC    Comprehensive Metabolic Panel      Followup Appts:  Future Appointments   Date Time Provider Department [place]   12/20/2023  3:30 PM Ghislaine Lamb MD [id number] [facility]   12/29/2023  2:00 PM Umer Humphrey MD [id number] [facility]   1/16/2024  2:20 PM Dolly Greco MD [id number] [facility]   3/19/2024 10:30 AM Jono Duque MD [id number] Academic       ____________________________________________________________  Jono Duque MD  Section of Advanced Heart Failure and Cardiac Transplantation  Division of Cardiovascular Medicine  [facility]

## 2023-12-19 ENCOUNTER — LAB (OUTPATIENT)
Dept: LAB | Facility: HOSPITAL | Age: 58
End: 2023-12-19
Payer: COMMERCIAL

## 2023-12-19 ENCOUNTER — OFFICE VISIT (OUTPATIENT)
Dept: CARDIOLOGY | Facility: HOSPITAL | Age: 58
End: 2023-12-19
Payer: COMMERCIAL

## 2023-12-19 VITALS
SYSTOLIC BLOOD PRESSURE: 116 MMHG | WEIGHT: 227 LBS | OXYGEN SATURATION: 93 % | DIASTOLIC BLOOD PRESSURE: 76 MMHG | HEIGHT: 71 IN | HEART RATE: 89 BPM | BODY MASS INDEX: 31.78 KG/M2

## 2023-12-19 DIAGNOSIS — I25.5 ISCHEMIC CARDIOMYOPATHY: ICD-10-CM

## 2023-12-19 DIAGNOSIS — I50.22 CHRONIC SYSTOLIC HEART FAILURE (MULTI): Primary | ICD-10-CM

## 2023-12-19 DIAGNOSIS — I50.23 ACUTE ON CHRONIC HFREF (HEART FAILURE WITH REDUCED EJECTION FRACTION) (MULTI): ICD-10-CM

## 2023-12-19 DIAGNOSIS — I50.22 CHRONIC SYSTOLIC HEART FAILURE (MULTI): ICD-10-CM

## 2023-12-19 DIAGNOSIS — I82.409 DEEP VEIN THROMBOSIS (DVT) OF LOWER EXTREMITY, UNSPECIFIED CHRONICITY, UNSPECIFIED LATERALITY, UNSPECIFIED VEIN (MULTI): ICD-10-CM

## 2023-12-19 LAB
ALBUMIN SERPL BCP-MCNC: 4.1 G/DL (ref 3.4–5)
ALP SERPL-CCNC: 62 U/L (ref 33–120)
ALT SERPL W P-5'-P-CCNC: 13 U/L (ref 10–52)
ANION GAP SERPL CALC-SCNC: 13 MMOL/L (ref 10–20)
AST SERPL W P-5'-P-CCNC: 10 U/L (ref 9–39)
BILIRUB SERPL-MCNC: 1.1 MG/DL (ref 0–1.2)
BNP SERPL-MCNC: 147 PG/ML (ref 0–99)
BUN SERPL-MCNC: 33 MG/DL (ref 6–23)
CALCIUM SERPL-MCNC: 9.3 MG/DL (ref 8.6–10.3)
CHLORIDE SERPL-SCNC: 96 MMOL/L (ref 98–107)
CO2 SERPL-SCNC: 31 MMOL/L (ref 21–32)
CREAT SERPL-MCNC: 1.41 MG/DL (ref 0.5–1.3)
ERYTHROCYTE [DISTWIDTH] IN BLOOD BY AUTOMATED COUNT: 16.8 % (ref 11.5–14.5)
GFR SERPL CREATININE-BSD FRML MDRD: 58 ML/MIN/1.73M*2
GLUCOSE SERPL-MCNC: 245 MG/DL (ref 74–99)
HCT VFR BLD AUTO: 43.2 % (ref 41–52)
HGB BLD-MCNC: 14 G/DL (ref 13.5–17.5)
MCH RBC QN AUTO: 26 PG (ref 26–34)
MCHC RBC AUTO-ENTMCNC: 32.4 G/DL (ref 32–36)
MCV RBC AUTO: 80 FL (ref 80–100)
NRBC BLD-RTO: 0 /100 WBCS (ref 0–0)
PLATELET # BLD AUTO: 224 X10*3/UL (ref 150–450)
POTASSIUM SERPL-SCNC: 4.2 MMOL/L (ref 3.5–5.3)
PROT SERPL-MCNC: 7.3 G/DL (ref 6.4–8.2)
RBC # BLD AUTO: 5.39 X10*6/UL (ref 4.5–5.9)
SODIUM SERPL-SCNC: 136 MMOL/L (ref 136–145)
WBC # BLD AUTO: 5.4 X10*3/UL (ref 4.4–11.3)

## 2023-12-19 PROCEDURE — 80053 COMPREHEN METABOLIC PANEL: CPT

## 2023-12-19 PROCEDURE — 3078F DIAST BP <80 MM HG: CPT | Performed by: STUDENT IN AN ORGANIZED HEALTH CARE EDUCATION/TRAINING PROGRAM

## 2023-12-19 PROCEDURE — 85027 COMPLETE CBC AUTOMATED: CPT

## 2023-12-19 PROCEDURE — 3052F HG A1C>EQUAL 8.0%<EQUAL 9.0%: CPT | Performed by: STUDENT IN AN ORGANIZED HEALTH CARE EDUCATION/TRAINING PROGRAM

## 2023-12-19 PROCEDURE — 3074F SYST BP LT 130 MM HG: CPT | Performed by: STUDENT IN AN ORGANIZED HEALTH CARE EDUCATION/TRAINING PROGRAM

## 2023-12-19 PROCEDURE — 3048F LDL-C <100 MG/DL: CPT | Performed by: STUDENT IN AN ORGANIZED HEALTH CARE EDUCATION/TRAINING PROGRAM

## 2023-12-19 PROCEDURE — 1036F TOBACCO NON-USER: CPT | Performed by: STUDENT IN AN ORGANIZED HEALTH CARE EDUCATION/TRAINING PROGRAM

## 2023-12-19 PROCEDURE — 99215 OFFICE O/P EST HI 40 MIN: CPT | Performed by: STUDENT IN AN ORGANIZED HEALTH CARE EDUCATION/TRAINING PROGRAM

## 2023-12-19 PROCEDURE — 36415 COLL VENOUS BLD VENIPUNCTURE: CPT

## 2023-12-19 PROCEDURE — 83880 ASSAY OF NATRIURETIC PEPTIDE: CPT

## 2023-12-19 RX ORDER — DAPAGLIFLOZIN 10 MG/1
10 TABLET, FILM COATED ORAL DAILY
Qty: 90 TABLET | Refills: 3 | Status: SHIPPED | OUTPATIENT
Start: 2023-12-19 | End: 2023-12-29 | Stop reason: SDUPTHER

## 2023-12-19 RX ORDER — DIGOXIN 125 MCG
125 TABLET ORAL DAILY
Qty: 30 TABLET | Refills: 0 | Status: SHIPPED | OUTPATIENT
Start: 2023-12-19 | End: 2024-02-12

## 2023-12-19 RX ORDER — SPIRONOLACTONE 25 MG/1
25 TABLET ORAL DAILY
Qty: 90 TABLET | Refills: 3 | Status: SHIPPED | OUTPATIENT
Start: 2023-12-19 | End: 2024-03-19 | Stop reason: SDUPTHER

## 2023-12-19 RX ORDER — ATORVASTATIN CALCIUM 80 MG/1
80 TABLET, FILM COATED ORAL DAILY
Qty: 90 TABLET | Refills: 3 | Status: SHIPPED | OUTPATIENT
Start: 2023-12-19 | End: 2023-12-29 | Stop reason: SDUPTHER

## 2023-12-19 RX ORDER — METOPROLOL SUCCINATE 100 MG/1
150 TABLET, EXTENDED RELEASE ORAL DAILY
Qty: 135 TABLET | Refills: 3 | Status: SHIPPED | OUTPATIENT
Start: 2023-12-19 | End: 2024-03-19 | Stop reason: SDUPTHER

## 2023-12-19 RX ORDER — PANTOPRAZOLE SODIUM 40 MG/1
40 TABLET, DELAYED RELEASE ORAL
Qty: 30 TABLET | Refills: 0 | Status: SHIPPED | OUTPATIENT
Start: 2023-12-19 | End: 2024-02-12

## 2023-12-19 RX ORDER — HYDRALAZINE HYDROCHLORIDE 100 MG/1
100 TABLET, FILM COATED ORAL 3 TIMES DAILY
Qty: 270 TABLET | Refills: 3 | Status: SHIPPED | OUTPATIENT
Start: 2023-12-19 | End: 2024-04-26 | Stop reason: SDUPTHER

## 2023-12-19 RX ORDER — ISOSORBIDE DINITRATE 40 MG/1
40 TABLET ORAL
Qty: 270 TABLET | Refills: 3 | Status: SHIPPED | OUTPATIENT
Start: 2023-12-19 | End: 2024-03-19 | Stop reason: SDUPTHER

## 2023-12-19 RX ORDER — BUMETANIDE 2 MG/1
2 TABLET ORAL DAILY
Qty: 90 TABLET | Refills: 3 | Status: SHIPPED | OUTPATIENT
Start: 2023-12-19 | End: 2024-12-18 | Stop reason: SDUPTHER

## 2023-12-19 ASSESSMENT — PAIN SCALES - GENERAL: PAINLEVEL: 0-NO PAIN

## 2023-12-19 NOTE — PATIENT INSTRUCTIONS
If you have any questions or need cardiac medication refills, please call the Heart Failure office at [phone], option 6.   You may also contact the  Heart Failure Nursing team via email at [email or web address] (Please include your name and date of birth).      To reach Dr. Duque's office please call [phone] / Fax: [phone].  To schedule an appointment call [phone].    - Continue current medications with the exception of:   -- incerse metoprolol XL to 150mg daily (from 100mg daily)  - Labwork: today  - Imaging/Procedures: none  - Results: I will send a [employer] message with any notable results. You may also call the HF nursing team (contact details above) for your results.   - Referrals: none  - Followup: 3 months

## 2023-12-20 ENCOUNTER — OFFICE VISIT (OUTPATIENT)
Dept: OPHTHALMOLOGY | Facility: CLINIC | Age: 58
End: 2023-12-20
Payer: COMMERCIAL

## 2023-12-20 DIAGNOSIS — Z79.4 TYPE 2 DIABETES MELLITUS WITH RIGHT EYE AFFECTED BY MILD NONPROLIFERATIVE RETINOPATHY AND MACULAR EDEMA, WITH LONG-TERM CURRENT USE OF INSULIN (MULTI): Primary | ICD-10-CM

## 2023-12-20 DIAGNOSIS — H40.003 GLAUCOMA SUSPECT OF BOTH EYES: ICD-10-CM

## 2023-12-20 DIAGNOSIS — E11.3211 TYPE 2 DIABETES MELLITUS WITH RIGHT EYE AFFECTED BY MILD NONPROLIFERATIVE RETINOPATHY AND MACULAR EDEMA, WITH LONG-TERM CURRENT USE OF INSULIN (MULTI): Primary | ICD-10-CM

## 2023-12-20 DIAGNOSIS — H25.813 COMBINED FORMS OF AGE-RELATED CATARACT OF BOTH EYES: ICD-10-CM

## 2023-12-20 PROCEDURE — 92004 COMPRE OPH EXAM NEW PT 1/>: CPT | Performed by: OPHTHALMOLOGY

## 2023-12-20 ASSESSMENT — VISUAL ACUITY
OS_PH_CC: 20/60
OD_CC: 20/50-2
OS_CC: 20/70
METHOD: SNELLEN - LINEAR

## 2023-12-20 ASSESSMENT — REFRACTION_WEARINGRX
OD_SPHERE: -0.50
OS_CYLINDER: -1.25
OD_AXIS: 125
OS_SPHERE: -0.50
OD_CYLINDER: -0.75
OS_ADD: +2.50
OD_ADD: +2.50
OS_AXIS: 052

## 2023-12-20 ASSESSMENT — REFRACTION_MANIFEST
OD_CYLINDER: -1.25
OD_AXIS: 125
OS_SPHERE: -0.50
OD_ADD: +2.50
OS_AXIS: 050
OS_ADD: +2.50
OS_CYLINDER: -1.00
OD_SPHERE: -0.50

## 2023-12-20 ASSESSMENT — TONOMETRY
OS_IOP_MMHG: 20
OD_IOP_MMHG: 19
IOP_METHOD: GOLDMANN APPLANATION

## 2023-12-20 ASSESSMENT — SLIT LAMP EXAM - LIDS
COMMENTS: NORMAL
COMMENTS: NORMAL

## 2023-12-20 ASSESSMENT — CUP TO DISC RATIO
OS_RATIO: 0.45
OD_RATIO: 0.45

## 2023-12-20 ASSESSMENT — EXTERNAL EXAM - LEFT EYE: OS_EXAM: NORMAL

## 2023-12-20 ASSESSMENT — EXTERNAL EXAM - RIGHT EYE: OD_EXAM: NORMAL

## 2023-12-20 NOTE — PROGRESS NOTES
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 Phillips for cataract evaluation and management  Co-management discussed with patient.  Patient prefers to return to my care for the post-operative management    Glaucoma suspect of both eyes  continue to monitor    Return in  6 month(s) for follow up or sooner if having any problems

## 2023-12-21 ENCOUNTER — APPOINTMENT (OUTPATIENT)
Dept: PRIMARY CARE | Facility: HOSPITAL | Age: 58
End: 2023-12-21
Payer: COMMERCIAL

## 2023-12-27 ENCOUNTER — APPOINTMENT (OUTPATIENT)
Dept: PRIMARY CARE | Facility: HOSPITAL | Age: 58
End: 2023-12-27
Payer: COMMERCIAL

## 2023-12-29 ENCOUNTER — OFFICE VISIT (OUTPATIENT)
Dept: PRIMARY CARE | Facility: HOSPITAL | Age: 58
End: 2023-12-29
Payer: COMMERCIAL

## 2023-12-29 VITALS
OXYGEN SATURATION: 94 % | HEIGHT: 71 IN | DIASTOLIC BLOOD PRESSURE: 74 MMHG | BODY MASS INDEX: 32.06 KG/M2 | WEIGHT: 229 LBS | HEART RATE: 102 BPM | TEMPERATURE: 97.4 F | SYSTOLIC BLOOD PRESSURE: 115 MMHG

## 2023-12-29 DIAGNOSIS — M54.42 LOW BACK PAIN WITH BILATERAL SCIATICA, UNSPECIFIED BACK PAIN LATERALITY, UNSPECIFIED CHRONICITY: Primary | ICD-10-CM

## 2023-12-29 DIAGNOSIS — I50.22 CHRONIC SYSTOLIC HEART FAILURE (MULTI): ICD-10-CM

## 2023-12-29 DIAGNOSIS — Z79.4 TYPE 2 DIABETES MELLITUS WITH HYPERGLYCEMIA, WITH LONG-TERM CURRENT USE OF INSULIN (MULTI): ICD-10-CM

## 2023-12-29 DIAGNOSIS — I25.5 ISCHEMIC CARDIOMYOPATHY: ICD-10-CM

## 2023-12-29 DIAGNOSIS — I82.409 DEEP VEIN THROMBOSIS (DVT) OF LOWER EXTREMITY, UNSPECIFIED CHRONICITY, UNSPECIFIED LATERALITY, UNSPECIFIED VEIN (MULTI): ICD-10-CM

## 2023-12-29 DIAGNOSIS — E11.65 TYPE 2 DIABETES MELLITUS WITH HYPERGLYCEMIA, WITH LONG-TERM CURRENT USE OF INSULIN (MULTI): ICD-10-CM

## 2023-12-29 DIAGNOSIS — I50.23 ACUTE ON CHRONIC HFREF (HEART FAILURE WITH REDUCED EJECTION FRACTION) (MULTI): ICD-10-CM

## 2023-12-29 DIAGNOSIS — M54.41 LOW BACK PAIN WITH BILATERAL SCIATICA, UNSPECIFIED BACK PAIN LATERALITY, UNSPECIFIED CHRONICITY: Primary | ICD-10-CM

## 2023-12-29 DIAGNOSIS — E11.9 TYPE 2 DIABETES MELLITUS WITHOUT COMPLICATION, UNSPECIFIED WHETHER LONG TERM INSULIN USE (MULTI): ICD-10-CM

## 2023-12-29 LAB
CREAT UR-MCNC: 38.8 MG/DL (ref 20–370)
MICROALBUMIN UR-MCNC: 117.8 MG/L
MICROALBUMIN/CREAT UR: 303.6 UG/MG CREAT
T4 FREE SERPL-MCNC: 1 NG/DL (ref 0.78–1.48)
TSH SERPL-ACNC: 7.49 MIU/L (ref 0.44–3.98)

## 2023-12-29 PROCEDURE — 3052F HG A1C>EQUAL 8.0%<EQUAL 9.0%: CPT

## 2023-12-29 PROCEDURE — 3078F DIAST BP <80 MM HG: CPT

## 2023-12-29 PROCEDURE — 86318 IA INFECTIOUS AGENT ANTIBODY: CPT

## 2023-12-29 PROCEDURE — 84439 ASSAY OF FREE THYROXINE: CPT

## 2023-12-29 PROCEDURE — 3060F POS MICROALBUMINURIA REV: CPT

## 2023-12-29 PROCEDURE — 99214 OFFICE O/P EST MOD 30 MIN: CPT

## 2023-12-29 PROCEDURE — 84443 ASSAY THYROID STIM HORMONE: CPT

## 2023-12-29 PROCEDURE — 1036F TOBACCO NON-USER: CPT

## 2023-12-29 PROCEDURE — 3048F LDL-C <100 MG/DL: CPT

## 2023-12-29 PROCEDURE — 36415 COLL VENOUS BLD VENIPUNCTURE: CPT

## 2023-12-29 PROCEDURE — 82043 UR ALBUMIN QUANTITATIVE: CPT

## 2023-12-29 PROCEDURE — 99214 OFFICE O/P EST MOD 30 MIN: CPT | Mod: GC

## 2023-12-29 PROCEDURE — 3074F SYST BP LT 130 MM HG: CPT

## 2023-12-29 RX ORDER — INSULIN LISPRO 100 [IU]/ML
0-15 INJECTION, SOLUTION INTRAVENOUS; SUBCUTANEOUS
Qty: 13.5 ML | Refills: 0 | Status: SHIPPED | OUTPATIENT
Start: 2023-12-29 | End: 2024-04-08 | Stop reason: ALTCHOICE

## 2023-12-29 RX ORDER — INSULIN LISPRO 100 [IU]/ML
34 INJECTION, SOLUTION INTRAVENOUS; SUBCUTANEOUS
Qty: 30.6 ML | Refills: 0 | Status: SHIPPED | OUTPATIENT
Start: 2023-12-29 | End: 2024-04-08 | Stop reason: ALTCHOICE

## 2023-12-29 RX ORDER — BACITRACIN 500 [USP'U]/G
1 OINTMENT TOPICAL ONCE
Status: SHIPPED | OUTPATIENT
Start: 2023-12-29

## 2023-12-29 RX ORDER — ATORVASTATIN CALCIUM 80 MG/1
80 TABLET, FILM COATED ORAL DAILY
Qty: 90 TABLET | Refills: 3 | Status: SHIPPED | OUTPATIENT
Start: 2023-12-29 | End: 2024-04-26 | Stop reason: SDUPTHER

## 2023-12-29 RX ORDER — INSULIN GLARGINE 100 [IU]/ML
55 INJECTION, SOLUTION SUBCUTANEOUS EVERY 24 HOURS
Qty: 16.5 ML | Refills: 0 | Status: SHIPPED | OUTPATIENT
Start: 2023-12-29 | End: 2024-04-08 | Stop reason: ALTCHOICE

## 2023-12-29 RX ORDER — GABAPENTIN 300 MG/1
300 CAPSULE ORAL EVERY MORNING
Qty: 90 CAPSULE | Refills: 0 | Status: SHIPPED | OUTPATIENT
Start: 2023-12-29 | End: 2024-04-26 | Stop reason: ALTCHOICE

## 2023-12-29 RX ORDER — GABAPENTIN 600 MG/1
600 TABLET ORAL NIGHTLY
Qty: 30 TABLET | Refills: 0 | Status: SHIPPED | OUTPATIENT
Start: 2023-12-29 | End: 2024-04-26 | Stop reason: ALTCHOICE

## 2023-12-29 RX ORDER — DAPAGLIFLOZIN 10 MG/1
10 TABLET, FILM COATED ORAL DAILY
Qty: 90 TABLET | Refills: 3 | Status: SHIPPED | OUTPATIENT
Start: 2023-12-29 | End: 2024-03-19 | Stop reason: SDUPTHER

## 2023-12-29 RX ORDER — QUETIAPINE FUMARATE 50 MG/1
50 TABLET, FILM COATED ORAL NIGHTLY
Qty: 90 TABLET | Refills: 0 | Status: SHIPPED | OUTPATIENT
Start: 2023-12-29 | End: 2024-04-26 | Stop reason: SDUPTHER

## 2023-12-29 RX ORDER — LIDOCAINE 50 MG/G
1 PATCH TOPICAL DAILY
Qty: 1 PATCH | Refills: 1 | Status: SHIPPED | OUTPATIENT
Start: 2023-12-29 | End: 2024-04-26 | Stop reason: SDUPTHER

## 2023-12-29 SDOH — ECONOMIC STABILITY: FOOD INSECURITY: WITHIN THE PAST 12 MONTHS, YOU WORRIED THAT YOUR FOOD WOULD RUN OUT BEFORE YOU GOT MONEY TO BUY MORE.: NEVER TRUE

## 2023-12-29 SDOH — ECONOMIC STABILITY: FOOD INSECURITY: WITHIN THE PAST 12 MONTHS, THE FOOD YOU BOUGHT JUST DIDN'T LAST AND YOU DIDN'T HAVE MONEY TO GET MORE.: NEVER TRUE

## 2023-12-29 ASSESSMENT — ENCOUNTER SYMPTOMS
OCCASIONAL FEELINGS OF UNSTEADINESS: 0
CONSTIPATION: 0
HEADACHES: 0
ABDOMINAL PAIN: 0
VOMITING: 0
COUGH: 0
DYSURIA: 0
NAUSEA: 0
DEPRESSION: 0
LOSS OF SENSATION IN FEET: 0
FEVER: 0
SORE THROAT: 0
CHEST TIGHTNESS: 0
DIZZINESS: 0
BACK PAIN: 1
CHILLS: 0
DIARRHEA: 0
WOUND: 1

## 2023-12-29 ASSESSMENT — PATIENT HEALTH QUESTIONNAIRE - PHQ9
SUM OF ALL RESPONSES TO PHQ9 QUESTIONS 1 & 2: 0
2. FEELING DOWN, DEPRESSED OR HOPELESS: NOT AT ALL
1. LITTLE INTEREST OR PLEASURE IN DOING THINGS: NOT AT ALL

## 2023-12-29 ASSESSMENT — PAIN SCALES - GENERAL: PAINLEVEL: 0-NO PAIN

## 2023-12-29 NOTE — PATIENT INSTRUCTIONS
Dear Felice,    It was a pleasure to care for you. As discussed,  -You were referred to the kidney doctor and to complete your sleep study  -You completed blood and urine tests today  -I ordered physical therapy and occupational therapy to come to your home  -Your medications have been refilled.    Please return to clinic in 6 months.

## 2023-12-29 NOTE — PROGRESS NOTES
Subjective   Patient ID: Felice Law is a 58 y.o. male who presents for No chief complaint on file..    Patient reports that he took a trip to [place] mid-November to mid-December. While there on 11/17 he fell on a mar[place]o landing. He denies trauma to head or loss of consciousness. He reports that since the fall, he has had some pain in lower back and right hip and his sciatica has worsened.     Patient otherwise reports he feels fine today. He requests refills on his medications.         Review of Systems   Constitutional:  Negative for chills and fever.   HENT:  Negative for sore throat.    Respiratory:  Negative for cough and chest tightness.    Cardiovascular:  Negative for chest pain and leg swelling.   Gastrointestinal:  Negative for abdominal pain, constipation, diarrhea, nausea and vomiting.   Genitourinary:  Negative for dysuria.   Musculoskeletal:  Positive for back pain and gait problem.   Skin:  Positive for wound.   Neurological:  Negative for dizziness and headaches.       Objective   There were no vitals taken for this visit.    Physical Exam  Constitutional:       Appearance: He is obese.   HENT:      Head: Normocephalic and atraumatic.      Right Ear: External ear normal.      Left Ear: External ear normal.      Nose: Nose normal.      Mouth/Throat:      Mouth: Mucous membranes are moist.   Eyes:      Extraocular Movements: Extraocular movements intact.      Conjunctiva/sclera: Conjunctivae normal.   Cardiovascular:      Rate and Rhythm: Normal rate and regular rhythm.      Pulses: Normal pulses.      Heart sounds: Normal heart sounds.   Pulmonary:      Effort: Pulmonary effort is normal.      Breath sounds: Normal breath sounds.   Abdominal:      General: There is no distension.      Tenderness: There is no abdominal tenderness. There is no guarding.   Musculoskeletal:         General: No deformity.      Cervical back: Normal range of motion. No tenderness.      Right lower leg: No  edema.      Left lower leg: No edema.   Skin:     General: Skin is warm and dry.   Neurological:      General: No focal deficit present.      Mental Status: He is alert. Mental status is at baseline.   Psychiatric:         Mood and Affect: Mood normal.         Behavior: Behavior normal.         Assessment/Plan   There are no diagnoses linked to this encounter.  Felice Law 58 Y M [place] male presenting for follow up primary care visit. He has a history of ICM/HFrEF (LVEF 20% 8/14/23), HTN, HLD, Type II DM, prior tobacco use (24 pack year, quit 2017), obesity (BMI 31), CAD s/p PCI and 1vCABG (LIMA-LAD with balloon pump support April 2023), VT arrest s/p VT ablation + ICD 2017, DVT (L femoral) on Eliquis, hypothyroidism, pulmonary hypertension, central sleep apnea.     #Syphilis, presumed late latent infection (asymptomatic, acquired >1 year ago)  ::10/05 RPR 1:2 and syphilis antibody reactive. Asymptomatic, no skin rash or genital lesion. Contact attempted on 11/09 and 11/10 for patient to come into office to receive shots. Per patient, he was treated at age 15 for syphilis.   PLAN  - RPR/VDRL monitoring    #T2DM insulin-dependent  #Chronic bilateral leg neuropathy  #Diabetic retinopathy  #Obesity, BMI 31   #Hyperlipidemia  ::BG monitored and well controlled at home per patient  -Follows Endocrinology, appointment scheduled for 1/2024  -Follows clinical pharmacy for diabetic medication optimization/monitoring  -Diabetic foot exam: seen by podiatry on 12/22/23  -Diabetic eye exam: seen by ophtho 12/20. RTC 6 months. Referred for cataract surgery evaluation  -A1c 8.1% (10/2023)  -Lipids 10/5: HDL 24.5; LDL 32; Trig 247; cholesterol 106  -Urine Alb/Cr: ordered 12/29  PLAN  -Glargine 55 units daily  -Lispro 34u + scale #3 with meals  -Farxiga 10mg daily  -Trulicity 3 mg weekly, increased from 1.5 mg on 10/23  -ASA 81 mg and atorvastatin 80mg  -Gabapentin 300 mg AM and 600 mg HS  -Needs home OT referral  for poor balance  -Ordered urine alb/cr ratio  -Repeat A1C by next visit    #Low back pain, w/sciatica bilaterally exacerbated by recent mechanical fall  PLAN  -Obtain XR lumbar spine and hips to rule out acute pathology  -Ordered lidocaine patches to apply in pain areas.    #JAKE (central and obstructive sleep apnea)  ::PSG 2/2/2020: evidence of severe sleep apnea, predominantly obstructive but central respiratory events were also noted-Cheyne Chirinos Breathing with prolonged cycle length and circulation time consistent with patient's known HFrEF.   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 Noninvasive HD assessment consistent with severe pHTN >60mmHg, elevated CVP, elevated LVEDP  ::Spirometry: no showed appointment in 10/2023  PLAN  -albuterol 2.5 mg/3 mL nebulizer solution  -ipratropium-albuterol nebulizer    #HFrEF/Ischemic Cardiomyopathy (LVEF 20%, 8/14/23)  #CAD s/p PCI and CABG LIMA-LAD 4/2023 Dr. Logan  #Hx VT arrest s/p VT ablation + ICD 2017 in [place], [place]  ::Presented in committee meeting on 10/10. Not approved for LVAD or transplant at this time. Determined to be very high risk for advanced therapies at this time given poorly controlled diabetes, renal function, peripheral vascular questions, questionable psychosocial support, and financial concerns. Plan to optimize medical therapy and follow up closely in HF clinic.   ::LHC 12/2022: severe diffuse 3-vessel CAD. Severe prox diffuse LAD up to 70%.  ::Device- [employer] ICD; interrogation completed 10/6  ::Repeat ECHO 10/2023: dilated LV cavity, global hypokinesis of LV, mildly reduced RV sys func. LA mod to sev dilated  PLAN  -Follows  HF clinic  -Plan for cardiac rehab starting  1/8 MWF  -bumex 2 mg, (messaged cardiology to restart 3 mg after creatinine bump)  -isosorbide dinitrate 40 mg TID  -metoprolol succinate  mg (increased from 100 mg)  -entresto  mg BID  -spironolactone 25 mg daily  -digoxin 125 mcg daily, patient states he does not take    #HTN  #CKD Stage IIIb  BP WNL   CMP 12/19 WNL.  Last BUN/Cr (GFR): 33/1.41 (58), 12/19/2023. Baseline Creatinine ~1.4  PLAN  -hydralazine 100 mg TID  -Previously referred to Nephrology per pt request, new referral sent    #Hypothyroidism, unspecified type  TSH 9.3H; T4 1.04N 10/2023  PLAN  -Levothyroxine 50 mcg  -Obtain TSH free T4, lab ordered last visit    #Hx DVT, Left Femoral  ::Repeat LE US 10/2023 appeared to show no acute DVTs in either lower extremity  PLAN  -Eliquis 5mg BID    #GERD  Controlled.  PLAN  -Prilosec 20 mg  -simethicone 80 mg    #Insomnia  PLAN  -Seroquel 50 mg QHS    Screening  HIV/Hep C: WNL 10/2023  Colonoscopy 10/9/2023: WNL  Lung cancer with CT scan: CT Chest w/o IV on 10/05/2023 did not report any abnormal lung nodules    Vaccines  Flu: 9/2023  Covid: 3 of 4 doses in 9/2021  HAV: 1 of 2 doses in 2022  HBV: 2 of 3 doses 2019  PCV-20: 10/5/2022  TDAP: 7/2019  Zoster 2-dose: 2019

## 2023-12-30 LAB
RPR SER QL: REACTIVE
RPR SER-TITR: ABNORMAL {TITER}

## 2023-12-30 NOTE — PROGRESS NOTES
I reviewed the resident/fellow's documentation and discussed the patient with the resident/fellow. I agree with the resident/fellow's medical decision making as documented in the note.     Rivera Morales MD MPH

## 2024-01-16 ENCOUNTER — APPOINTMENT (OUTPATIENT)
Dept: ENDOCRINOLOGY | Facility: CLINIC | Age: 59
End: 2024-01-16
Payer: COMMERCIAL

## 2024-02-12 DIAGNOSIS — I50.23 ACUTE ON CHRONIC HFREF (HEART FAILURE WITH REDUCED EJECTION FRACTION) (MULTI): ICD-10-CM

## 2024-02-12 DIAGNOSIS — I25.5 ISCHEMIC CARDIOMYOPATHY: ICD-10-CM

## 2024-02-12 DIAGNOSIS — I50.22 CHRONIC SYSTOLIC HEART FAILURE (MULTI): ICD-10-CM

## 2024-02-12 RX ORDER — PANTOPRAZOLE SODIUM 40 MG/1
TABLET, DELAYED RELEASE ORAL
Qty: 90 TABLET | Refills: 3 | Status: SHIPPED | OUTPATIENT
Start: 2024-02-12 | End: 2024-04-26 | Stop reason: SDUPTHER

## 2024-02-12 RX ORDER — DIGOXIN 125 MCG
125 TABLET ORAL DAILY
Qty: 90 TABLET | Refills: 3 | Status: SHIPPED | OUTPATIENT
Start: 2024-02-12 | End: 2024-04-26 | Stop reason: SDUPTHER

## 2024-03-15 NOTE — PROGRESS NOTES
Primary Care Physician: Umer Humphrey MD  Primary Cardiologist: Sebastian at [facility], [place], operated by [facility]  Patient's Location: [facility] [id number]    Date of Visit: 03/19/2024 10:30 AM EDT  Location of visit: [facility]   Type of Visit: Established  Last seen by me: 12/19/2023  Last seen in department: 12/19/2023     DAJUAN USED FOR CLINIC    HPI / Summary:   Felice Law is a very pleasant 59 y.o. [place] male presenting for management of Stage C-D ICM/HFrEF (LVEF 20% 8/14/23). He otherwise has a history of HTN, HLD, Type II DM, prior tobacco use (24 pack year, quit 2017), obesity (BMI 31), CAD s/p PCI and 1vCABG (LIMA-LAD with balloon pump support April 2023), VT arrest s/p VT ablation + ICD 2017, DVT (L femoral) on Eliquis, hypothyroidism, pulmonary hypertension, central sleep apnea.      Initial CV History:   He presented to [facility], [place], operated by [facility] on 10/1 with exertional left sided chest pain and SOB x 3 days. He has been admitted 8 times this year for acutely decompensated heart failure. His last hospital stay was 9/16-9/26, and weight on discharge was 230lbs.  He was admitted to [facility] [place] by [facility] this presentation in cardiogenic shock despite reporting compliance with his HF medications. His oral GDMT regimen was held due to worsening kidney function. His home regimen includes Entresto 97/103mg, Toprol XL 150mg, Spironolactone 25mg, Dapagliflozin 10mg. Weight on admission was 246lbs. He was treated IV lasix 80mg and with IV bumex 4mg BID + metolazone with some response, but was transitioned to lasix infusion 5mg/hr.  He was also placed on dobutamine infusion as evidence of cardiogenic shock was noted to be worsening. Advanced heart failure service was consulted while he was admitted there and they recommended SGC-guided therapy. RHC on dobutamine 2.5mcg showed RA pressure: 20, RV: 55/20, PCWP: 35, PA pressure: 52/37 (40 - 42), CO/CI 3.6/1.5, SVR: 1373 dynes, PVR: 414 dynes, AO: 84, and lactates were increasing up to 2.4 during that  admission. He was referred to us by Dr. Cortes at [facility] for advanced therapy evaluation. He was transferred to us on dobutamine 3.75mcg and lasix gtt 5mg/hr.  Patient was than transferred to [facility] for advanced therapy evaluation on 10/4. Advanced therapies evaluation was initiated on 10/4 and presented in committee meeting on 10/10. Not approved for LVAD or transplant at this time. Determined to be very high risk for advanced therapies at this time given poorly controlled diabetes, renal function, peripheral vascular questions, questionable psychosocial support, and financial concerns. Plan to optimize medical therapy and follow up closely in HF clinic. May be brought back to the committee in the future. Over the past couple of days, pt's renal function worsened due to likely over diuresis. Lasix drip was discontinued 10/14 and pt.'s renal function has since begun to improve. Added spironolactone to medication regimen and discontinued PO potassium. Increased metoprolol to 100mg PO daily. Pt. Is hemodynamically stable for discharge at this time.      - Saw AYDE Vincent. Medications reconciled. Referred to PCP at . Estabilished    Interval history: Last seen by me 12/19/2023.   - referred to cardiac rehab. Completing.     Today:  He is accompanied by: alone via motorized scooter.     He reports that he feel well. He had a fall and scraped his shin. Has some reproducible chest wall tenderness around the site of his ICD that is chronic since implant.     Asking for medication refills    He denies: dyspnea at rest, LE swelling, syncope, PND, orthopnea, chest pain, palpitation.    ROS: Full 10 pt review of symptoms of negative unless discussed above.     PMHx: HFrEF/ICM (LVEF 20% 8/14/23), CAD, Hx of cardiac arrest s/p VT ablation + ICD, HTN/HLD, CKD, Hx of DVT (L femoral) on Eliquis, hypothyroidism, pulmonary hypertension, T2DM, JAKE/OHS  PSHx: CABG (LIMA-LAD with balloon pump support April 2023)  Social Hx: Former  "tobacco abuse (24 pack year, quit 2017)    Objective   Medical History:   He has a past medical history of Chest pain (10/01/2023), CHF (congestive heart failure) (CMS/[facility]), COPD (chronic obstructive pulmonary disease) (CMS/[facility]), Coronary artery disease, Diabetes mellitus (CMS/[facility]), Disease of thyroid gland, Elevated troponin level not due myocardial infarction (08/14/2023), and Hypertension.  Surgical Hx:   He has a past surgical history that includes Coronary artery bypass graft; Cardiac defibrillator placement; and Coronary angioplasty with stent.   Social Hx:   He reports that he quit smoking about 7 years ago. His smoking use included cigarettes. He has a 24.00 pack-year smoking history. He has never used smokeless tobacco. He reports that he does not drink alcohol and does not use drugs.  Family Hx:   His family history is not on file.   Allergies:   Allergies   Allergen Reactions    Azithromycin Dizziness and Unknown     Dizziness, Vertigo, Cold, Clammy, Nausea, Sweating, trouble breathing     Exam:       3/19/2024    10:11 AM 12/29/2023     1:22 PM 12/19/2023    10:07 AM 11/11/2023     5:18 PM 11/2/2023     5:14 PM 10/24/2023    10:58 AM   Vitals   Systolic 100 115 116 124 118 100   Diastolic 65 74 76 72 76 67   Heart Rate 98 102 89 96 77 99   Temp  36.3 °C (97.4 °F)  36.3 °C (97.4 °F) 36.4 °C (97.5 °F)    Resp    16 16    Height (in) 1.803 m (5' 11\") 1.803 m (5' 11\") 1.8 m (5' 10.87\")   1.803 m (5' 11\")   Weight (lb) 235.2 229 227  226 233.2   BMI 32.8 kg/m2 31.94 kg/m2 31.78 kg/m2  31.52 kg/m2 32.52 kg/m2   BSA (m2) 2.32 m2 2.28 m2 2.27 m2  2.27 m2 2.3 m2   Visit Report Report Report Report   Report     Wt Readings from Last 5 Encounters:   03/19/24 107 kg (235 lb 3.2 oz)   12/29/23 104 kg (229 lb)   12/19/23 103 kg (227 lb)   11/02/23 103 kg (226 lb)   10/24/23 106 kg (233 lb 3.2 oz)     GEN: Pleasant, well-appearing, no acute distress. He uses a mobility scooter. Bilateral foot drop with unsteady gait. "   HEENT: JVP not elevated, no icterus. No HJR  CHEST: No wheeze, good air movement.  CV: Normal rate, regular rhythm. Normal S1, inspiratory split S2, no m/r/g  ABD: Soft, ND, NT  EXT: Warm, well perfused, trace LE edema. L shin with small ulcer.   NEURO: Pleasant, Oriented to plan.     Labs:   CMP:  Recent Labs     12/19/23  1104 11/02/23  1700 10/17/23  0356 10/16/23  0416 10/15/23  0309 10/14/23  1516 10/14/23  0257 10/13/23  0257    135* 137 136 134*   < > 134* 135*   K 4.2 4.3 4.6 4.2 4.2   < > 4.3 4.1   CL 96* 99 100 98 97*   < > 96* 98   CO2 31 29 29 30 28   < > 29 28   ANIONGAP 13 11 13 12 13   < > 13 13   BUN 33* 64* 69* 70* 77*   < > 69* 64*   CREATININE 1.41* 1.80* 1.48* 1.54* 1.88*   < > 2.05* 1.66*   EGFR 58* 43* 55* 52* 41*   < > 37* 48*   MG  --   --  1.97 2.00 1.98  --  1.99 2.03    < > = values in this interval not displayed.     Recent Labs     12/19/23  1104 11/02/23  1700 10/17/23  0356 10/16/23  0416 10/15/23  0309 10/06/23  0515 10/05/23  1647   ALBUMIN 4.1 4.4 3.7 3.6 3.6   < > 4.1   ALKPHOS 62  --   --   --   --   --  63   ALT 13  --   --   --   --   --  11   AST 10  --   --   --   --   --  13   BILITOT 1.1  --   --   --   --   --  1.8*    < > = values in this interval not displayed.     CBC:  Recent Labs     12/19/23  1104 10/17/23  0356 10/16/23  0416 10/15/23  0309 10/14/23  1747   WBC 5.4 5.2 5.9 5.5 6.7   HGB 14.0 11.6* 11.4* 11.2* 11.9*   HCT 43.2 36.5* 36.6* 36.3* 38.6*    275 300 298 305   MCV 80 76* 77* 78* 78*     COAG:   Recent Labs     10/15/23  0833 10/15/23  0309 10/14/23  2208 10/05/23  1647   INR  --   --   --  1.2*   HAUF 0.7 0.8 0.9  --      ABO:   Recent Labs     10/05/23  1647   ABO A     HEME/ENDO:  Recent Labs     12/29/23  1427 10/04/23  1518 05/26/23  0203 05/23/23  2038 04/05/23  1441   FERRITIN  --  379*  --   --   --    IRONSAT  --  15*  --   --   --    TSH 7.49* 9.31*  --   --   --    HGBA1C  --  8.1* 7.9* 7.8* 12.5*      CARDIAC:   Recent Labs      "12/19/23  1104 10/05/23  1647 10/04/23  1518   LDH  --  240  --    * 157* 160*     Recent Labs     10/05/23  0354   CHOL 106   LDLCALC 32*   HDL 24.5   TRIG 247*     MICRO: No results for input(s): \"ESR\", \"CRP\", \"PROCAL\" in the last 47280 hours.  No results found for the last 90 days.    Notable Studies:   EKG: 10/10/2023      Echocardiogram: 10/14/2023   1. Left ventricular systolic function is severely decreased with a 20% estimated ejection fraction.   2. Abnormal septal motion consistent with post-operative status.   3. Left ventricular cavity size is moderate to severely dilated.   4. No obvious LV thrombus seen with the use of ultrasound enhancing agent.   5. There is global hypokinesis of the left ventricle with minor regional variations.   6. There is mild eccentric left ventricular hypertrophy.   7. There is mildly reduced right ventricular systolic function.   8. The left atrium is moderate to severely dilated.   9. Moderately elevated right ventricular systolic pressure.  10. While there are no prior echocardiograms in our system, notes from OSH indicate EF known to be approximately 20%.    IVSd:          0.97 cm    (0.6-1.1cm)  LVPWd:         0.93 cm    (0.6-1.1cm)  LVIDd:         6.60 cm    (3.9-5.9cm)  LA Vol Index BP:   51.9 ml/m2  EF-A4C View: 19.3 % (>=55%)  MV Peak E:    1.14 m/s    (0.7-1.2 m/s)  MV Peak A:    0.53 m/s    (0.42-0.7 m/s)  E/A Ratio:    2.16        (1.0-2.2)  MV e'         0.04 m/s    (>8.0)  MV lateral e' 0.05 m/s  MV medial e'  0.04 m/s  MV A Dur:     100.00 msec  E/e' Ratio:   25.33       (<8.0)  RV Basal 3.20 cm  TAPSE:   13.6 mm  RV s'    0.08 m/s  Est. RA Pressure: 15 mmHg  RV Syst Pressure: 48.6 mmHg (< 30mmHg)    Stress Testing:   Cardiac Catheterization:   [facility] December 2022: severe diffuse three-vessel coronary artery disease. Severe proximal diffuse LAD disease up to 70%. Severe diffuse proximal and mid first diagonal disease. Chronic total occlusion of OM1 with " left-to-left collaterals. Chronic total occlusion of mid RCA with left-to-right collaterals.     RHC: 10/11: BP 94/54, CVP 16, PAP 48/28, PCWP 16, CO/CI (F) 6.3/2.75, , SvO2 60% on Nipride, hydralazine 50mg PO TID, Isordil 20mg PO TID, Entresto 24-26mg PO BID.       Vascular ultrsound:   Right Lower PVR: No evidence of arterial occlusive disease in the right lower extremity at rest. Right pressures of >220 mmHg suggest no compressibility of vessels and may make absolute Segmental Limb Pressures (SLP) unreliable. Decreased digital perfusion noted. Triphasic flow is noted in the right common femoral artery, right posterior tibial artery and right dorsalis pedis artery. Results called based on PVR and Doppler waveforms due to partially/ non-compressible vessels.  Left Lower PVR: No evidence of arterial occlusive disease in the left lower extremity at rest. Decreased digital perfusion noted. Triphasic flow is noted in the left common femoral artery, left posterior tibial artery and left dorsalis pedis artery.    PFTS:         Current Outpatient Medications   Medication Instructions    albuterol 2.5 mg /3 mL (0.083 %) nebulizer solution     ammonium lactate (Lac-Hydrin) 12 % lotion Topical    apixaban (ELIQUIS) 5 mg, oral, 2 times daily    atorvastatin (LIPITOR) 80 mg, oral, Daily    bumetanide (BUMEX) 2 mg, oral, Daily    digoxin (LANOXIN) 125 mcg, oral, Daily    dulaglutide 3 mg, subcutaneous, Weekly    Farxiga 10 mg, oral, Daily    gabapentin (NEURONTIN) 300 mg, oral, Every morning    gabapentin (NEURONTIN) 600 mg, oral, Nightly    hydrALAZINE (APRESOLINE) 100 mg, oral, 3 times daily    insulin glargine (LANTUS) 55 Units, subcutaneous, Every 24 hours, Take as directed per insulin instructions.    insulin lispro (HUMALOG) 34 Units, subcutaneous, 3 times daily with meals, Take as directed per insulin instructions.    insulin lispro (HUMALOG) 0-15 Units, subcutaneous, 3 times daily with meals, Take as directed per  "insulin instructions.    ipratropium-albuteroL (Duo-Neb) 0.5-2.5 mg/3 mL nebulizer solution 3 mL, nebulization, 3 times daily PRN    isosorbide dinitrate (ISORDIL) 40 mg, oral, 3 times daily (0900,1400,1900)    lancets (Lancets,Thin) misc  1 Each 4 times daily (before meals and at bedtime). use as directed to test blood sugar<BR>    Lantus Solostar U-100 Insulin 100 unit/mL (3 mL) pen     levothyroxine (Synthroid, Levoxyl) 50 mcg tablet Take 1 tablet (50 mcg) by mouth once daily in the morning. Take before meals.    lidocaine (Lidoderm) 5 % patch 1 patch, transdermal, Daily, Apply to painful area 12 hours per day, remove for 12 hours.    metoprolol succinate XL (TOPROL-XL) 200 mg, oral, Daily, Do not crush or chew.    OneTouch Delica Plus Lancet 30 gauge misc     OneTouch Ultra Test strip  REVISE EL NIVEL DE AZUCAR EN LA PEARL CUATRO VECES AL CUBA<BR>    pantoprazole (ProtoNix) 40 mg EC tablet TAKE 1 TABLET BY MOUTH ONCE DAILY IN THE MORNING TAKE BEFORE A MEAL DO NOT CRUSH, CHEW, AND/OR DIVIDE    pen needle, diabetic, safety 29 gauge x 1/2\" needle  Three times before meals and at bedtime    QUEtiapine (SEROQUEL) 50 mg, oral, Nightly    sacubitriL-valsartan (Entresto)  mg tablet 1 tablet, oral, 2 times daily    spironolactone (ALDACTONE) 25 mg, oral, Daily    TechLITE Pen Needle 31 gauge x 3/16\" needle use 1 PEN NEEDLE to inject MEDICATION subcutaneously three times a day      Notable Therapies   Class  Agent SAFETY    ARNI / ACE / ARB  Entresto 97-103mg BID Last BP: 100/65, Last BUN/Cr (GFR): 33/1.41 (58), 12/19/2023: 11:04 AM.    Beta-Blocker  Metoprolol XL 150mg daily Last HR: 98    MRA  Spironolactone 25mg daily Last K: 4.2     SGLT2  Farxiga 10mg daily Last A1c 8.1 (10/4/2023:  3:18 PM).    Diuretic  Bumex 2mg daily Last BNP: 147    Hydralazine/ISDN  100mg / 40mg TID      Digoxin 125mcg daily, Last digoxin: 0.39, 10/13/2023:  2:57 AM    Anticoagulation  Apixaban 5mg BID (DVT) Last Hgb 14.0 (12/19/2023: " 11:04 AM).    Anti-arrhythmic       Antiplatelet  Aspirin 81mg daily     Lipid-Lowering  Atorvastatin 80mg daily Last Tchol 106 / LDL  32 / HDL 24.5 / TRIG 247 (10/5/2023:  3:54 AM).    Other        Device(s)  ICD  GIDEON ~1 year, referral to device clinic    Cardiac Rehab,   if EF <35/MI/OHS  completing      Problems Addressed:   # HFrEF / Chronic Systolic Heart Failure, last EF 20%, dx'd 2017,   # Ischemic Cardiomyopathy, ACC/AHA Stage C-D, NYHA II, Warm, Euvolemic. Previously discussed for AT during [facility] October 2023 admission but declined with concerns for mobility/frailty (uses motorized scooter and has bilateral foot drop with neuropathy), CKD, poorly controlled diabetes as well as unclear social/financial support).   # CAD s/p PCI and 1v CABG (LIMA-LAD 04/2023).   # VT arrest s/p VT ablation + ICD 2017   # CKD Stage IIIb: Last BUN/Cr (GFR): 33/1.41 (58), 12/19/2023: 11:04 AM.  # Type II Diabetes Mellitus: Last A1c (8.1). With neuropathy/nephropathy  - Management of CV risk factors as a below  - GDMT as above. -- increase BB  - complete cardiac rehab  - consideration of ivabradine    # DVT (L femoral) on Eliquis,   # Hypothyroidism,   # Pulmonary hypertension, low threshhold for RHC with decompensation.   # Central sleep apnea.     # Bilateral foot drop with LE neuropathy: Able to walk two blocks. Uses motorized scoopter    CV Risk Factors:  # Hypertension : Last BP: 100/65. controlled  # Hyperlipidemia: Last Tchol 106 / LDL  32 / HDL 24.5 / TRIG 247 (10/5/2023:  3:54 AM). Continue ASA/statin  # Type II Diabetes Mellitus: Last A1c 8.1 (10/4/2023:  3:18 PM). On GLP1 + SGLT2  # Obesity: Last BMI: 32.82. On GLP1  # Prior tobacco use (24 pack year, quit 2017),     Patient Instructions   If you have any questions or need cardiac medication refills, please call the Heart Failure office at [phone], option 6.   You may also contact the  Heart Failure Nursing team via email at [email or web address] (Please  include your name and date of birth).      To reach Dr. Duque's office please call [phone] / Fax: [phone].  To schedule an appointment call [phone].    - Continue current medications with the exception of:   -- incerse metoprolol XL to 200mg daily  - Labwork: none today  - Imaging/Procedures: none  - Results: I will send a [employer] message with any notable results. You may also call the HF nursing team (contact details above) for your results.   - Referrals: Device clinic  - Followup: 3 months     Orders:  No orders of the defined types were placed in this encounter.     Followup Appts:  Future Appointments   Date Time Provider Department Center   4/8/2024  2:00 PM Diana Salazar MD [id number] Academic   4/26/2024  2:30 PM Umer Humphrey MD [id number] Academic   6/19/2024  3:15 PM Ghislaine Lamb MD [id number] [facility]     Time Spent: I spent 55 minutes reviewing medical testing, obtaining medical history and counselling and educating on diagnosis and documenting clinical encounter.     ____________________________________________________________  Jono Duque MD  Section of Advanced Heart Failure and Cardiac Transplantation  Division of Cardiovascular Medicine  [facility]

## 2024-03-19 ENCOUNTER — OFFICE VISIT (OUTPATIENT)
Dept: CARDIOLOGY | Facility: HOSPITAL | Age: 59
End: 2024-03-19
Payer: COMMERCIAL

## 2024-03-19 VITALS
SYSTOLIC BLOOD PRESSURE: 100 MMHG | HEIGHT: 71 IN | BODY MASS INDEX: 32.93 KG/M2 | OXYGEN SATURATION: 95 % | WEIGHT: 235.2 LBS | HEART RATE: 98 BPM | DIASTOLIC BLOOD PRESSURE: 65 MMHG

## 2024-03-19 DIAGNOSIS — Z95.810 IMPLANTABLE CARDIOVERTER-DEFIBRILLATOR (ICD) IN SITU: Primary | ICD-10-CM

## 2024-03-19 DIAGNOSIS — I50.23 ACUTE ON CHRONIC HFREF (HEART FAILURE WITH REDUCED EJECTION FRACTION) (MULTI): ICD-10-CM

## 2024-03-19 DIAGNOSIS — I25.5 ISCHEMIC CARDIOMYOPATHY: ICD-10-CM

## 2024-03-19 DIAGNOSIS — I50.22 CHRONIC SYSTOLIC HEART FAILURE (MULTI): ICD-10-CM

## 2024-03-19 DIAGNOSIS — I82.409 DEEP VEIN THROMBOSIS (DVT) OF LOWER EXTREMITY, UNSPECIFIED CHRONICITY, UNSPECIFIED LATERALITY, UNSPECIFIED VEIN (MULTI): ICD-10-CM

## 2024-03-19 PROCEDURE — 99215 OFFICE O/P EST HI 40 MIN: CPT | Performed by: STUDENT IN AN ORGANIZED HEALTH CARE EDUCATION/TRAINING PROGRAM

## 2024-03-19 PROCEDURE — 3074F SYST BP LT 130 MM HG: CPT | Performed by: STUDENT IN AN ORGANIZED HEALTH CARE EDUCATION/TRAINING PROGRAM

## 2024-03-19 PROCEDURE — 1036F TOBACCO NON-USER: CPT | Performed by: STUDENT IN AN ORGANIZED HEALTH CARE EDUCATION/TRAINING PROGRAM

## 2024-03-19 PROCEDURE — 3078F DIAST BP <80 MM HG: CPT | Performed by: STUDENT IN AN ORGANIZED HEALTH CARE EDUCATION/TRAINING PROGRAM

## 2024-03-19 RX ORDER — BUMETANIDE 2 MG/1
2 TABLET ORAL DAILY
Qty: 90 TABLET | Refills: 3 | Status: SHIPPED | OUTPATIENT
Start: 2024-03-19 | End: 2024-04-26 | Stop reason: SDUPTHER

## 2024-03-19 RX ORDER — METOPROLOL SUCCINATE 100 MG/1
200 TABLET, EXTENDED RELEASE ORAL DAILY
Qty: 180 TABLET | Refills: 3 | Status: SHIPPED | OUTPATIENT
Start: 2024-03-19 | End: 2024-04-26 | Stop reason: SDUPTHER

## 2024-03-19 RX ORDER — DAPAGLIFLOZIN 10 MG/1
10 TABLET, FILM COATED ORAL DAILY
Qty: 90 TABLET | Refills: 3 | Status: SHIPPED | OUTPATIENT
Start: 2024-03-19 | End: 2024-04-08 | Stop reason: SDUPTHER

## 2024-03-19 RX ORDER — ISOSORBIDE DINITRATE 40 MG/1
40 TABLET ORAL
Qty: 270 TABLET | Refills: 3 | Status: SHIPPED | OUTPATIENT
Start: 2024-03-19 | End: 2024-04-26 | Stop reason: SDUPTHER

## 2024-03-19 RX ORDER — SPIRONOLACTONE 25 MG/1
25 TABLET ORAL DAILY
Qty: 90 TABLET | Refills: 3 | Status: SHIPPED | OUTPATIENT
Start: 2024-03-19 | End: 2024-04-26 | Stop reason: SDUPTHER

## 2024-03-19 ASSESSMENT — COLUMBIA-SUICIDE SEVERITY RATING SCALE - C-SSRS
2. HAVE YOU ACTUALLY HAD ANY THOUGHTS OF KILLING YOURSELF?: NO
6. HAVE YOU EVER DONE ANYTHING, STARTED TO DO ANYTHING, OR PREPARED TO DO ANYTHING TO END YOUR LIFE?: NO
1. IN THE PAST MONTH, HAVE YOU WISHED YOU WERE DEAD OR WISHED YOU COULD GO TO SLEEP AND NOT WAKE UP?: NO

## 2024-03-19 ASSESSMENT — PAIN SCALES - GENERAL: PAINLEVEL: 0-NO PAIN

## 2024-03-19 ASSESSMENT — PATIENT HEALTH QUESTIONNAIRE - PHQ9
1. LITTLE INTEREST OR PLEASURE IN DOING THINGS: NOT AT ALL
SUM OF ALL RESPONSES TO PHQ9 QUESTIONS 1 AND 2: 0
2. FEELING DOWN, DEPRESSED OR HOPELESS: NOT AT ALL

## 2024-03-19 NOTE — PATIENT INSTRUCTIONS
If you have any questions or need cardiac medication refills, please call the Heart Failure office at [phone], option 6.   You may also contact the  Heart Failure Nursing team via email at [email or web address] (Please include your name and date of birth).      To reach Dr. Duque's office please call [phone] / Fax: [phone].  To schedule an appointment call [phone].    - Continue current medications with the exception of:   -- incerse metoprolol XL to 200mg daily  - Labwork: none today  - Imaging/Procedures: echocardiogram in 3 months  - Results: I will send a [employer] message with any notable results. You may also call the HF nursing team (contact details above) for your results.   - Referrals: Device clinic  - Followup: 3 months

## 2024-03-27 ENCOUNTER — TELEPHONE (OUTPATIENT)
Dept: CARDIOLOGY | Facility: HOSPITAL | Age: 59
End: 2024-03-27
Payer: COMMERCIAL

## 2024-03-27 NOTE — TELEPHONE ENCOUNTER
PA for Isosorbide dinitrate 40mg TID  sent to plan in [facility], KEY: [id number], still pending

## 2024-04-08 ENCOUNTER — OFFICE VISIT (OUTPATIENT)
Dept: ENDOCRINOLOGY | Facility: CLINIC | Age: 59
End: 2024-04-08
Payer: COMMERCIAL

## 2024-04-08 VITALS
BODY MASS INDEX: 31.94 KG/M2 | SYSTOLIC BLOOD PRESSURE: 108 MMHG | HEART RATE: 86 BPM | DIASTOLIC BLOOD PRESSURE: 69 MMHG | WEIGHT: 229 LBS

## 2024-04-08 DIAGNOSIS — E03.9 HYPOTHYROIDISM, UNSPECIFIED TYPE: ICD-10-CM

## 2024-04-08 DIAGNOSIS — I25.5 ISCHEMIC CARDIOMYOPATHY: ICD-10-CM

## 2024-04-08 DIAGNOSIS — E11.65 TYPE 2 DIABETES MELLITUS WITH HYPERGLYCEMIA, WITH LONG-TERM CURRENT USE OF INSULIN (MULTI): Primary | ICD-10-CM

## 2024-04-08 DIAGNOSIS — I50.22 CHRONIC SYSTOLIC HEART FAILURE (MULTI): ICD-10-CM

## 2024-04-08 DIAGNOSIS — Z79.4 TYPE 2 DIABETES MELLITUS WITH HYPERGLYCEMIA, WITH LONG-TERM CURRENT USE OF INSULIN (MULTI): Primary | ICD-10-CM

## 2024-04-08 DIAGNOSIS — E11.9 TYPE 2 DIABETES MELLITUS WITHOUT COMPLICATION, UNSPECIFIED WHETHER LONG TERM INSULIN USE (MULTI): ICD-10-CM

## 2024-04-08 DIAGNOSIS — I50.23 ACUTE ON CHRONIC HFREF (HEART FAILURE WITH REDUCED EJECTION FRACTION) (MULTI): ICD-10-CM

## 2024-04-08 LAB — POC HEMOGLOBIN A1C: 10.6 % (ref 4.2–6.5)

## 2024-04-08 PROCEDURE — 99214 OFFICE O/P EST MOD 30 MIN: CPT | Performed by: STUDENT IN AN ORGANIZED HEALTH CARE EDUCATION/TRAINING PROGRAM

## 2024-04-08 PROCEDURE — 1036F TOBACCO NON-USER: CPT | Performed by: STUDENT IN AN ORGANIZED HEALTH CARE EDUCATION/TRAINING PROGRAM

## 2024-04-08 PROCEDURE — 3078F DIAST BP <80 MM HG: CPT | Performed by: STUDENT IN AN ORGANIZED HEALTH CARE EDUCATION/TRAINING PROGRAM

## 2024-04-08 PROCEDURE — 83036 HEMOGLOBIN GLYCOSYLATED A1C: CPT | Performed by: STUDENT IN AN ORGANIZED HEALTH CARE EDUCATION/TRAINING PROGRAM

## 2024-04-08 PROCEDURE — 3074F SYST BP LT 130 MM HG: CPT | Performed by: STUDENT IN AN ORGANIZED HEALTH CARE EDUCATION/TRAINING PROGRAM

## 2024-04-08 RX ORDER — FLASH GLUCOSE SENSOR
KIT MISCELLANEOUS
Qty: 2 EACH | Refills: 3 | Status: SHIPPED | OUTPATIENT
Start: 2024-04-08

## 2024-04-08 RX ORDER — DULAGLUTIDE 4.5 MG/.5ML
4.5 INJECTION, SOLUTION SUBCUTANEOUS
Qty: 2 ML | Refills: 11 | Status: SHIPPED | OUTPATIENT
Start: 2024-04-08 | End: 2024-04-26 | Stop reason: SDUPTHER

## 2024-04-08 RX ORDER — LANCETS
EACH MISCELLANEOUS
Qty: 100 EACH | Refills: 3 | Status: SHIPPED | OUTPATIENT
Start: 2024-04-08

## 2024-04-08 RX ORDER — DAPAGLIFLOZIN 10 MG/1
10 TABLET, FILM COATED ORAL DAILY
Qty: 90 TABLET | Refills: 3 | Status: SHIPPED | OUTPATIENT
Start: 2024-04-08 | End: 2024-04-26 | Stop reason: SDUPTHER

## 2024-04-08 RX ORDER — LEVOTHYROXINE SODIUM 75 UG/1
75 TABLET ORAL DAILY
Qty: 90 TABLET | Refills: 2 | Status: SHIPPED | OUTPATIENT
Start: 2024-04-08 | End: 2024-04-26 | Stop reason: SDUPTHER

## 2024-04-08 RX ORDER — INSULIN GLARGINE 100 [IU]/ML
INJECTION, SOLUTION SUBCUTANEOUS
Qty: 30 ML | Refills: 3 | Status: SHIPPED | OUTPATIENT
Start: 2024-04-08

## 2024-04-08 RX ORDER — FLASH GLUCOSE SCANNING READER
EACH MISCELLANEOUS
Qty: 1 EACH | Refills: 0 | Status: SHIPPED | OUTPATIENT
Start: 2024-04-08

## 2024-04-08 RX ORDER — DULAGLUTIDE 4.5 MG/.5ML
4.5 INJECTION, SOLUTION SUBCUTANEOUS
Qty: 2 ML | Refills: 11 | Status: SHIPPED | OUTPATIENT
Start: 2024-04-14 | End: 2024-04-08 | Stop reason: SDUPTHER

## 2024-04-08 RX ORDER — INSULIN LISPRO 100 [IU]/ML
INJECTION, SOLUTION INTRAVENOUS; SUBCUTANEOUS
Qty: 30 ML | Refills: 3 | Status: SHIPPED | OUTPATIENT
Start: 2024-04-08

## 2024-04-08 RX ORDER — PEN NEEDLE, DIABETIC 30 GX3/16"
NEEDLE, DISPOSABLE MISCELLANEOUS
Qty: 100 EACH | Refills: 5 | Status: SHIPPED | OUTPATIENT
Start: 2024-04-08

## 2024-04-08 RX ORDER — INSULIN LISPRO 100 [IU]/ML
INJECTION, SUSPENSION SUBCUTANEOUS
COMMUNITY
End: 2024-04-08 | Stop reason: ALTCHOICE

## 2024-04-08 RX ORDER — BLOOD-GLUCOSE METER
EACH MISCELLANEOUS
Qty: 100 STRIP | Refills: 3 | Status: SHIPPED | OUTPATIENT
Start: 2024-04-08

## 2024-04-08 NOTE — PROGRESS NOTES
"59M PMH: DM2, COPD, CKD, CHF with ICD, pulm HTN, JAKE    Guyanese speaking     Diabetes History     DM diagnosed at 40 yo  Complications Micro and Macro-diabetes related foot ulcer, neuropathy, CVD, CKD   A1c:   Lab Results   Component Value Date    HGBA1C 10.6 (A) 2024   IO done today and was 10.6    Regimen   Glargine 55  Lispro 45   Farxiga   Trulicity 3mg     SMBG   No BG readings today  Had a CGM abbi 2     Hypoglycemia none known         Comorbidities and Screening  Eye Exam: 2023 NPDR right eye, had laser  Foot exam: sees podiatry for the foot ulcer     Lipid  Lab Results   Component Value Date    CHOL 106 10/05/2023     Lab Results   Component Value Date    HDL 24.5 10/05/2023     Lab Results   Component Value Date    LDLCALC 32 (L) 10/05/2023     Lab Results   Component Value Date    TRIG 247 (H) 10/05/2023     No components found for: \"CHOLHDL\"      Statin- atorvastatin 80mg   Cr and albuminuria- CKD3   Lab Results   Component Value Date    CREATININE 1.41 (H) 2023    EGFR 58 (L) 2023    ALBUMINUR 117.8 2023      ACE/ARB- entresto    Past Medical History:   Diagnosis Date    Chest pain 10/01/2023    CHF (congestive heart failure) (CMS/[facility])     COPD (chronic obstructive pulmonary disease) (CMS/[facility])     Coronary artery disease     Diabetes mellitus (CMS/[facility])     Disease of thyroid gland     Elevated troponin level not due myocardial infarction 2023    Hypertension      No family history on file.   Social History     Socioeconomic History    Marital status: Unknown     Spouse name: Not on file    Number of children: Not on file    Years of education: Not on file    Highest education level: Not on file   Occupational History    Not on file   Tobacco Use    Smoking status: Former     Packs/day: 1.00     Years: 24.00     Additional pack years: 0.00     Total pack years: 24.00     Types: Cigarettes     Quit date:      Years since quittin.2    Smokeless tobacco: Never "   Vaping Use    Vaping Use: Never used   Substance and Sexual Activity    Alcohol use: Never    Drug use: Never    Sexual activity: Not on file   Other Topics Concern    Not on file   Social History Narrative    Not on file     Social Determinants of Health     Financial Resource Strain: Low Risk  (10/4/2023)    Overall Financial Resource Strain (CARDIA)     Difficulty of Paying Living Expenses: Not very hard   Food Insecurity: No Food Insecurity (12/29/2023)    Hunger Vital Sign     Worried About Running Out of Food in the Last Year: Never true     Ran Out of Food in the Last Year: Never true   Transportation Needs: No Transportation Needs (11/13/2023)    OASIS : Transportation     Lack of Transportation (Medical): No     Lack of Transportation (Non-Medical): No     Patient Unable or Declines to Respond: No   Physical Activity: Not on file   Stress: Not on file   Social Connections: Feeling Socially Integrated (11/13/2023)    OASIS : Social Isolation     Frequency of experiencing loneliness or isolation: Never   Intimate Partner Violence: Not At Risk (10/6/2023)    Humiliation, Afraid, Rape, and Kick questionnaire     Fear of Current or Ex-Partner: No     Emotionally Abused: No     Physically Abused: No     Sexually Abused: No   Housing Stability: Low Risk  (10/6/2023)    Housing Stability Vital Sign     Unable to Pay for Housing in the Last Year: No     Number of Places Lived in the Last Year: 1     Unstable Housing in the Last Year: No        ROS:  Negative except those noted in current and interim history    Physical Exam  Constitutional:       Appearance: Normal appearance.   HENT:      Head: Normocephalic.   Cardiovascular:      Rate and Rhythm: Normal rate and regular rhythm.   Pulmonary:      Breath sounds: Normal breath sounds.   Abdominal:      Palpations: Abdomen is soft.   Musculoskeletal:      Comments: Wound on the left leg    Skin:     General: Skin is warm.      Comments: No lipodystrophy  "  Neurological:      General: No focal deficit present.      Mental Status: He is alert and oriented to person, place, and time.   Psychiatric:         Mood and Affect: Mood normal.         Behavior: Behavior normal.          labs and imaging reviewed, pertinent findings listed on HPI and Impression      Problem List Items Addressed This Visit       Diabetes mellitus (CMS/HCC) - Primary    Relevant Medications    pen needle, diabetic (BD Ultra-Fine Sarah Pen Needle) 32 gauge x 5/32\" needle    FreeStyle Franco 2 [place] misc    FreeStyle Franco 2 Sensor kit    insulin glargine (Lantus) 100 unit/mL injection    insulin lispro (HumaLOG KwikPen Insulin) 100 unit/mL injection    lancets (Lancets,Thin) misc    blood sugar diagnostic (OneTouch Verio test strips) strip    dulaglutide (Trulicity) 4.5 mg/0.5 mL pen injector    Other Relevant Orders    Albumin , Urine Random    Lipid Panel    Renal Function Panel    Hemoglobin A1C    POCT glycosylated hemoglobin (Hb A1C) manually resulted (Completed)    Ischemic cardiomyopathy    Relevant Medications    Farxiga 10 mg    Chronic systolic heart failure (CMS/[facility])    Relevant Medications    Farxiga 10 mg    Hypothyroidism    Relevant Medications    levothyroxine (Synthroid, Levoxyl) 75 mcg tablet    Other Relevant Orders    Thyroid Stimulating Hormone    T4, free     Other Visit Diagnoses       Acute on chronic HFrEF (heart failure with reduced ejection fraction) (CMS/[facility])        Relevant Medications    Farxiga 10 mg          1) DM2 uncontrolled with hyperglycemia     Has complications including neuropathy, CKD, CVD     Unable to make big insulin changes since he doesn't have any BG logs so will base it close to his inpatient requirements     Increase trulicity 4.5mg weekly  [id number]  Lispro 35-35-35 with ISS 3:30 >150  Farxiga  CGM franco 2 (phone not compatible for connection)    2) Hypothryoidism  Increase levothryoxine to 75mcg daily     Labs prior to next visit         "

## 2024-04-08 NOTE — PATIENT INSTRUCTIONS
Increase your trulicity to 4.5mg weekly   Follow the insulin instructions on your sheet  Continue farxiga   Increase levothyroxine to 75mcg daily     Bring your blood sugar logs for the next appointment     Get your labs done sometime in middle July, fasting from midnight    Follow up in 3 months    Diana Salazar MD  Divison of Endocrinology   [facility]   Phone: [phone]    option 4, then option 1  Fax: [phone]

## 2024-04-09 DIAGNOSIS — Z79.4 TYPE 2 DIABETES MELLITUS WITH HYPERGLYCEMIA, WITH LONG-TERM CURRENT USE OF INSULIN (MULTI): ICD-10-CM

## 2024-04-09 DIAGNOSIS — E11.65 TYPE 2 DIABETES MELLITUS WITH HYPERGLYCEMIA, WITH LONG-TERM CURRENT USE OF INSULIN (MULTI): ICD-10-CM

## 2024-04-10 RX ORDER — INSULIN GLARGINE 100 [IU]/ML
INJECTION, SOLUTION SUBCUTANEOUS
Qty: 30 ML | Refills: 11 | Status: SHIPPED | OUTPATIENT
Start: 2024-04-10

## 2024-04-26 ENCOUNTER — OFFICE VISIT (OUTPATIENT)
Dept: PRIMARY CARE | Facility: HOSPITAL | Age: 59
End: 2024-04-26
Payer: COMMERCIAL

## 2024-04-26 VITALS
TEMPERATURE: 97.2 F | BODY MASS INDEX: 32.48 KG/M2 | HEIGHT: 71 IN | OXYGEN SATURATION: 95 % | DIASTOLIC BLOOD PRESSURE: 80 MMHG | SYSTOLIC BLOOD PRESSURE: 121 MMHG | HEART RATE: 89 BPM | WEIGHT: 232 LBS

## 2024-04-26 DIAGNOSIS — Z79.4 TYPE 2 DIABETES MELLITUS WITH HYPERGLYCEMIA, WITH LONG-TERM CURRENT USE OF INSULIN (MULTI): ICD-10-CM

## 2024-04-26 DIAGNOSIS — I50.22 CHRONIC SYSTOLIC HEART FAILURE (MULTI): ICD-10-CM

## 2024-04-26 DIAGNOSIS — E03.9 HYPOTHYROIDISM, UNSPECIFIED TYPE: ICD-10-CM

## 2024-04-26 DIAGNOSIS — E11.65 TYPE 2 DIABETES MELLITUS WITH HYPERGLYCEMIA, WITH LONG-TERM CURRENT USE OF INSULIN (MULTI): ICD-10-CM

## 2024-04-26 DIAGNOSIS — N18.30 STAGE 3 CHRONIC KIDNEY DISEASE, UNSPECIFIED WHETHER STAGE 3A OR 3B CKD (MULTI): ICD-10-CM

## 2024-04-26 DIAGNOSIS — I82.409 DEEP VEIN THROMBOSIS (DVT) OF LOWER EXTREMITY, UNSPECIFIED CHRONICITY, UNSPECIFIED LATERALITY, UNSPECIFIED VEIN (MULTI): ICD-10-CM

## 2024-04-26 DIAGNOSIS — G47.33 OSA (OBSTRUCTIVE SLEEP APNEA): Primary | ICD-10-CM

## 2024-04-26 DIAGNOSIS — I25.5 ISCHEMIC CARDIOMYOPATHY: ICD-10-CM

## 2024-04-26 DIAGNOSIS — I50.23 ACUTE ON CHRONIC HFREF (HEART FAILURE WITH REDUCED EJECTION FRACTION) (MULTI): ICD-10-CM

## 2024-04-26 LAB — GLUCOSE BLD MANUAL STRIP-MCNC: 218 MG/DL (ref 74–99)

## 2024-04-26 PROCEDURE — 3074F SYST BP LT 130 MM HG: CPT

## 2024-04-26 PROCEDURE — 82947 ASSAY GLUCOSE BLOOD QUANT: CPT

## 2024-04-26 PROCEDURE — 99215 OFFICE O/P EST HI 40 MIN: CPT | Mod: GC

## 2024-04-26 PROCEDURE — G0439 PPPS, SUBSEQ VISIT: HCPCS

## 2024-04-26 PROCEDURE — 99214 OFFICE O/P EST MOD 30 MIN: CPT

## 2024-04-26 PROCEDURE — 36416 COLLJ CAPILLARY BLOOD SPEC: CPT | Mod: GC

## 2024-04-26 PROCEDURE — 3079F DIAST BP 80-89 MM HG: CPT

## 2024-04-26 PROCEDURE — 1036F TOBACCO NON-USER: CPT

## 2024-04-26 PROCEDURE — 99214 OFFICE O/P EST MOD 30 MIN: CPT | Mod: GC

## 2024-04-26 RX ORDER — IPRATROPIUM BROMIDE AND ALBUTEROL SULFATE 2.5; .5 MG/3ML; MG/3ML
3 SOLUTION RESPIRATORY (INHALATION) 3 TIMES DAILY PRN
Qty: 180 ML | Refills: 2 | Status: SHIPPED | OUTPATIENT
Start: 2024-04-26 | End: 2024-06-25

## 2024-04-26 RX ORDER — LIDOCAINE 50 MG/G
1 PATCH TOPICAL DAILY
Qty: 1 PATCH | Refills: 1 | Status: SHIPPED | OUTPATIENT
Start: 2024-04-26 | End: 2025-04-26

## 2024-04-26 RX ORDER — DULAGLUTIDE 4.5 MG/.5ML
4.5 INJECTION, SOLUTION SUBCUTANEOUS
Qty: 2 ML | Refills: 11 | Status: SHIPPED | OUTPATIENT
Start: 2024-04-28

## 2024-04-26 RX ORDER — PANTOPRAZOLE SODIUM 40 MG/1
TABLET, DELAYED RELEASE ORAL
Qty: 90 TABLET | Refills: 3 | Status: SHIPPED | OUTPATIENT
Start: 2024-04-26

## 2024-04-26 RX ORDER — ISOSORBIDE DINITRATE 40 MG/1
40 TABLET ORAL
Qty: 270 TABLET | Refills: 3 | Status: SHIPPED | OUTPATIENT
Start: 2024-04-26 | End: 2025-04-26

## 2024-04-26 RX ORDER — AMMONIUM LACTATE 12 G/100G
LOTION TOPICAL AS NEEDED
Qty: 10 G | Refills: 1 | Status: SHIPPED | OUTPATIENT
Start: 2024-04-26 | End: 2024-06-25

## 2024-04-26 RX ORDER — SPIRONOLACTONE 25 MG/1
25 TABLET ORAL DAILY
Qty: 90 TABLET | Refills: 3 | Status: SHIPPED | OUTPATIENT
Start: 2024-04-26 | End: 2025-04-26

## 2024-04-26 RX ORDER — GABAPENTIN 300 MG/1
300 CAPSULE ORAL EVERY MORNING
Qty: 90 CAPSULE | Refills: 0 | Status: SHIPPED | OUTPATIENT
Start: 2024-04-26

## 2024-04-26 RX ORDER — METOPROLOL SUCCINATE 100 MG/1
200 TABLET, EXTENDED RELEASE ORAL DAILY
Qty: 180 TABLET | Refills: 3 | Status: SHIPPED | OUTPATIENT
Start: 2024-04-26 | End: 2025-04-26

## 2024-04-26 RX ORDER — LEVOTHYROXINE SODIUM 75 UG/1
75 TABLET ORAL DAILY
Qty: 90 TABLET | Refills: 2 | Status: SHIPPED | OUTPATIENT
Start: 2024-04-26 | End: 2025-04-26

## 2024-04-26 RX ORDER — GABAPENTIN 600 MG/1
600 TABLET ORAL NIGHTLY
Qty: 30 TABLET | Refills: 0 | Status: SHIPPED | OUTPATIENT
Start: 2024-04-26 | End: 2024-05-26

## 2024-04-26 RX ORDER — DIGOXIN 125 MCG
125 TABLET ORAL DAILY
Qty: 90 TABLET | Refills: 3 | Status: SHIPPED | OUTPATIENT
Start: 2024-04-26

## 2024-04-26 RX ORDER — DAPAGLIFLOZIN 10 MG/1
10 TABLET, FILM COATED ORAL DAILY
Qty: 90 TABLET | Refills: 3 | Status: SHIPPED | OUTPATIENT
Start: 2024-04-26 | End: 2025-04-26

## 2024-04-26 RX ORDER — BUMETANIDE 2 MG/1
2 TABLET ORAL DAILY
Qty: 90 TABLET | Refills: 3 | Status: SHIPPED | OUTPATIENT
Start: 2024-04-26 | End: 2025-04-26

## 2024-04-26 RX ORDER — ATORVASTATIN CALCIUM 80 MG/1
80 TABLET, FILM COATED ORAL DAILY
Qty: 90 TABLET | Refills: 3 | Status: SHIPPED | OUTPATIENT
Start: 2024-04-26 | End: 2025-04-26

## 2024-04-26 RX ORDER — HYDRALAZINE HYDROCHLORIDE 100 MG/1
100 TABLET, FILM COATED ORAL 3 TIMES DAILY
Qty: 270 TABLET | Refills: 3 | Status: SHIPPED | OUTPATIENT
Start: 2024-04-26 | End: 2025-04-26

## 2024-04-26 RX ORDER — ALBUTEROL SULFATE 0.83 MG/ML
SOLUTION RESPIRATORY (INHALATION)
Qty: 75 ML | Refills: 1 | Status: SHIPPED | OUTPATIENT
Start: 2024-04-26

## 2024-04-26 RX ORDER — QUETIAPINE FUMARATE 50 MG/1
50 TABLET, FILM COATED ORAL NIGHTLY
Qty: 90 TABLET | Refills: 0 | Status: SHIPPED | OUTPATIENT
Start: 2024-04-26

## 2024-04-26 ASSESSMENT — ENCOUNTER SYMPTOMS
ACTIVITY CHANGE: 0
DIARRHEA: 0
POLYDIPSIA: 0
TROUBLE SWALLOWING: 0
WHEEZING: 0
DYSPHORIC MOOD: 0
DYSURIA: 0
CHILLS: 0
APPETITE CHANGE: 0
DEPRESSION: 0
CONFUSION: 0
LIGHT-HEADEDNESS: 0
NAUSEA: 0
VOMITING: 0
LOSS OF SENSATION IN FEET: 1
OCCASIONAL FEELINGS OF UNSTEADINESS: 1
DIZZINESS: 0
CONSTIPATION: 0
RHINORRHEA: 0
SORE THROAT: 0
SINUS PAIN: 0
COUGH: 0
SHORTNESS OF BREATH: 0
ABDOMINAL PAIN: 0
AGITATION: 0
FEVER: 0
FATIGUE: 0

## 2024-04-26 ASSESSMENT — PATIENT HEALTH QUESTIONNAIRE - PHQ9
2. FEELING DOWN, DEPRESSED OR HOPELESS: NOT AT ALL
1. LITTLE INTEREST OR PLEASURE IN DOING THINGS: NOT AT ALL
SUM OF ALL RESPONSES TO PHQ9 QUESTIONS 1 AND 2: 0
SUM OF ALL RESPONSES TO PHQ9 QUESTIONS 1 AND 2: 0
2. FEELING DOWN, DEPRESSED OR HOPELESS: NOT AT ALL
1. LITTLE INTEREST OR PLEASURE IN DOING THINGS: NOT AT ALL

## 2024-04-26 ASSESSMENT — ACTIVITIES OF DAILY LIVING (ADL)
DOING_HOUSEWORK: NEEDS ASSISTANCE
BATHING: INDEPENDENT
MANAGING_FINANCES: INDEPENDENT
GROCERY_SHOPPING: INDEPENDENT
DRESSING: INDEPENDENT
TAKING_MEDICATION: INDEPENDENT

## 2024-04-26 ASSESSMENT — PAIN SCALES - GENERAL: PAINLEVEL: 0-NO PAIN

## 2024-04-26 NOTE — PROGRESS NOTES
Subjective   Patient ID: Felice Law is a 59 y.o. male who presents for No chief complaint on file..    Felice Law 59 Y M [place] male presenting for follow up primary care visit. He has a history of ICM/HFrEF (LVEF 20% 10/2023), HTN, HLD, Type II DM, prior tobacco use (24 pack year, quit 2017), obesity (BMI 31), CAD s/p PCI and 1vCABG (LIMA-LAD with balloon pump support April 2023), VT arrest s/p VT ablation + ICD 2017, DVT (L femoral) on Eliquis, hypothyroidism, pulmonary hypertension, central sleep apnea.     He reports to be doing well today. No acute or worsening concerns today. He requests medication refills and new referrals to nephrology and sleep medicine (he was not able to get in touch with offices).           Review of Systems   Constitutional:  Negative for activity change, appetite change, chills, fatigue and fever.   HENT:  Negative for hearing loss, mouth sores, rhinorrhea, sinus pain, sore throat and trouble swallowing.    Eyes:  Negative for visual disturbance.   Respiratory:  Negative for cough, shortness of breath and wheezing.    Cardiovascular:  Negative for chest pain and leg swelling.   Gastrointestinal:  Negative for abdominal pain, constipation, diarrhea, nausea and vomiting.   Endocrine: Negative for polydipsia and polyuria.   Genitourinary:  Negative for dysuria.   Skin:  Negative for rash.   Neurological:  Negative for dizziness and light-headedness.   Psychiatric/Behavioral:  Negative for agitation, confusion and dysphoric mood.        Objective   There were no vitals taken for this visit.  Vitals:    04/26/24 1441   BP: 121/80   Pulse: 89   Temp: 36.2 °C (97.2 °F)   SpO2: 95%         Physical Exam  Vitals and nursing note reviewed.   Constitutional:       General: He is not in acute distress.     Appearance: He is not ill-appearing.   HENT:      Head: Normocephalic and atraumatic.      Right Ear: External ear normal.      Left Ear: External ear normal.       Nose: Nose normal.      Mouth/Throat:      Mouth: Mucous membranes are moist.   Eyes:      Extraocular Movements: Extraocular movements intact.      Conjunctiva/sclera: Conjunctivae normal.      Pupils: Pupils are equal, round, and reactive to light.   Cardiovascular:      Rate and Rhythm: Normal rate and regular rhythm.      Pulses: Normal pulses.      Heart sounds: Normal heart sounds. No murmur heard.  Pulmonary:      Effort: Pulmonary effort is normal. No respiratory distress.      Breath sounds: Normal breath sounds. No wheezing.   Abdominal:      General: There is no distension.      Tenderness: There is no abdominal tenderness. There is no guarding or rebound.   Musculoskeletal:      Right lower leg: No edema.      Left lower leg: No edema.      Comments: Chronic venous stasis in both lower extremity.   Skin:     General: Skin is warm and dry.   Neurological:      Mental Status: He is alert. Mental status is at baseline.   Psychiatric:         Mood and Affect: Mood normal.         Behavior: Behavior normal.         Assessment/Plan   Felice Law 59 Y M Lebanese male presenting for follow up primary care visit. He has a history of ICM/HFrEF (LVEF 20% 10/2023), HTN, HLD, Type II DM, prior tobacco use (24 pack year, quit 2017), obesity (BMI 31), CAD s/p PCI and 1vCABG (LIMA-LAD with balloon pump support April 2023), VT arrest s/p VT ablation + ICD 2017, DVT (L femoral) on Eliquis, hypothyroidism, pulmonary hypertension, central sleep apnea.      #T2DM insulin-dependent  #Chronic bilateral leg neuropathy  #Diabetic retinopathy  #Obesity, BMI 31   #Hyperlipidemia  :: today  -Follows Endocrinology, last seen 4/8  -Diabetic foot exam: seen by podiatry in 2024  -Diabetic eye exam: seen by ophtho 12/20. RTC 6 months. Referred for cataract surgery evaluation. He was contacted by [facility] to schedule appt with surgeon. Next appt 6/19  -A1c 10.6% 4/16/24. Increased from 8.1% (10/2023),   -Lipids  10/5/23: HDL 24.5; LDL 32; Trig 247; cholesterol 106  -Urine Alb/Cr: ordered 4/2024  PLAN  -Glargine 30-30  -Lispro 35-35-35 with ISS 3:30 >150  -Farxiga 10mg daily  -Trulicity 4.5 mg weekly, increased recently 4/8  -ASA 81 mg and atorvastatin 80mg  -Gabapentin 300 mg AM and 600 mg HS  -Active lab orders: A1c, RFP, lipid, urine alb, TSH/T4. Patient plans to complete labs 5 days before next appointment with endocrinology 8/9/24     #Low back pain, w/sciatica bilaterally exacerbated by recent mechanical fall, stable  PLAN  -Ordered XR lumbar spine and hips to rule out acute pathology 10/2023. Not completed patient states his pain is controlled.  -C/w lidocaine patches to apply in pain areas.     #JAKE (central and obstructive sleep apnea)  ::PSG 2/2/2020: evidence of severe sleep apnea, predominantly obstructive but central respiratory events were also noted-Cheyne Chirinos Breathing with prolonged cycle length and circulation time consistent with patient's known HFrEF.   PLAN  -BiPAP nightly at home with no difficulty  -Previously referred to Adult Sleep Medicine, new referral given 4/26 as patient states he never received a call     #COPD/Asthma  #Pulmonary HTN likely WHO group II  #Tobacco use hx (24 Pack-Yr, quit 2017)  ::TTE at [facility] 8/2023: EF 20% globally abnormal RV systolic function. Noninvasive HD assessment consistent with severe pHTN >60mmHg, elevated CVP, elevated LVEDP  ::Spirometry: completed 10/2023. FEV1 . FEV1/FVC:   PLAN  -albuterol 2.5 mg/3 mL nebulizer solution  -ipratropium-albuterol nebulizer     #HFrEF/Ischemic Cardiomyopathy (LVEF 20%, 10/23)  #CAD s/p PCI and CABG LIMA-LAD 4/2023 Dr. Logan  #Hx VT arrest s/p VT ablation + ICD 2017 in [place], [place]  ::Presented in committee meeting on 10/10. Not approved for LVAD or transplant at this time. Determined to be very high risk for advanced therapies at this time given poorly controlled diabetes, renal function, peripheral vascular questions,  questionable psychosocial support, and financial concerns. Plan to optimize medical therapy and follow up closely in HF clinic.   ::LHC 12/2022: severe diffuse 3-vessel CAD. Severe prox diffuse LAD up to 70%.  ::Device- Medtronic ICD; interrogation completed 10/6  ::Repeat ECHO 10/2023: dilated LV cavity, global hypokinesis of LV, mildly reduced RV sys func. LA mod to sev dilated  PLAN  -Follows  HF clinic, last seen 3/19  -Completing cardiac rehab since 1/8 MWF  -bumex 2 mg  -Farxiga 10 mg daily  -hydralazine 100 mg TID/isosorbide dinitrate 40 mg TID  -metoprolol succinate  mg (increased from 150 mg)  -entresto  mg BID  -spironolactone 25 mg daily  -digoxin 125 mcg daily     #HTN  #CKD Stage IIIb  BP WNL   CMP 12/19 WNL.  Last BUN/Cr (GFR): 33/1.41 (58), 12/19/2023. Baseline Creatinine ~1.4  PLAN  -hydralazine 100 mg TID  -Previously referred to Nephrology per pt request, new referral sent 4/26     #Hypothyroidism  TSH 9.3H; T4 1.04N 10/2023  PLAN  -Levothyroxine 75 mcg (increased from 50 mcg recently)  -TSH free T4, active order  -Endo appt 8/9     #Hx DVT, Left Femoral  ::Repeat LE US 10/2023 appeared to show no acute DVTs in either lower extremity. Peroneal vein unable to visualize due to edema at the time of exam.  PLAN  -Eliquis 5mg BID     #Syphilis, presumed late latent infection (asymptomatic, acquired >1 year ago), treated remotely  ::10/05/2023 RPR 1:2 and syphilis antibody reactive. Asymptomatic, no skin rash or genital lesion. Contact attempted on 11/09 and 11/10 for patient to come into office to receive shots. Per patient, he was treated for syphilis at age 15 in [place]. In this case, patient's NTT titers (RPR) can remain elevated despite successful treatment (serofast state). After infection, TT (syphilis antibodies) typically stay positive indefinitely. 12/2023 RPR 1:4  PLAN  - RPR monitoring annually (next 12/2024), if fourfold increase in RPR titer (>=1/8) then this indicates  re-infection and patient would require re-treatment with IM PCN G 2.4 million units x 3 weeks    #GERD  Controlled.  PLAN  -Prilosec 20 mg  -simethicone 80 mg     #Insomnia  PLAN  -Seroquel 50 mg QHS    Screening  HIV/Hep C: WNL 10/2023  Colonoscopy 10/9/2023: WNL. Repeat 2033  Lung cancer with CT scan: CT Chest w/o IV on 10/05/2023 did not report any abnormal lung nodules. Next due 10/2024.  AAA US: starting at age 65     Vaccines  Flu: 9/2023  Covid: 3 of 4 doses in 9/2021  HAV: 1 of 2 doses in 2022  HBV: 2 of 3 doses 2019  PCV-20: 10/5/2022  TDAP: 7/2019  Zoster 2-dose: 2019

## 2024-04-26 NOTE — PATIENT INSTRUCTIONS
Dear Felice,    It was a pleasure to see you today. Your medications were refilled and renewal of referrals to nephrology and sleep medicine were made as requested. We will see you in 6 months.    Chevy,  Umer Humphrey

## 2024-05-16 ENCOUNTER — TELEPHONE (OUTPATIENT)
Dept: CARDIOLOGY | Facility: HOSPITAL | Age: 59
End: 2024-05-16
Payer: COMMERCIAL

## 2024-05-17 ENCOUNTER — TELEPHONE (OUTPATIENT)
Dept: CARDIOLOGY | Facility: HOSPITAL | Age: 59
End: 2024-05-17
Payer: COMMERCIAL

## 2024-06-18 ENCOUNTER — HOSPITAL ENCOUNTER (OUTPATIENT)
Dept: CARDIOLOGY | Facility: HOSPITAL | Age: 59
Discharge: HOME | End: 2024-06-18
Payer: COMMERCIAL

## 2024-06-18 DIAGNOSIS — I82.409 DEEP VEIN THROMBOSIS (DVT) OF LOWER EXTREMITY, UNSPECIFIED CHRONICITY, UNSPECIFIED LATERALITY, UNSPECIFIED VEIN (MULTI): ICD-10-CM

## 2024-06-18 DIAGNOSIS — I50.23 ACUTE ON CHRONIC HFREF (HEART FAILURE WITH REDUCED EJECTION FRACTION) (MULTI): ICD-10-CM

## 2024-06-18 DIAGNOSIS — I50.22 CHRONIC SYSTOLIC HEART FAILURE (MULTI): ICD-10-CM

## 2024-06-18 DIAGNOSIS — I25.5 ISCHEMIC CARDIOMYOPATHY: ICD-10-CM

## 2024-06-18 PROCEDURE — 93283 PRGRMG EVAL IMPLANTABLE DFB: CPT

## 2024-06-18 PROCEDURE — 93283 PRGRMG EVAL IMPLANTABLE DFB: CPT | Performed by: INTERNAL MEDICINE

## 2024-06-19 ENCOUNTER — APPOINTMENT (OUTPATIENT)
Dept: OPHTHALMOLOGY | Facility: CLINIC | Age: 59
End: 2024-06-19
Payer: COMMERCIAL

## 2024-06-19 DIAGNOSIS — E11.3211 TYPE 2 DIABETES MELLITUS WITH RIGHT EYE AFFECTED BY MILD NONPROLIFERATIVE RETINOPATHY AND MACULAR EDEMA, WITH LONG-TERM CURRENT USE OF INSULIN (MULTI): Primary | ICD-10-CM

## 2024-06-19 DIAGNOSIS — Z79.4 TYPE 2 DIABETES MELLITUS WITH RIGHT EYE AFFECTED BY MILD NONPROLIFERATIVE RETINOPATHY AND MACULAR EDEMA, WITH LONG-TERM CURRENT USE OF INSULIN (MULTI): Primary | ICD-10-CM

## 2024-06-19 DIAGNOSIS — H25.813 COMBINED FORMS OF AGE-RELATED CATARACT OF BOTH EYES: ICD-10-CM

## 2024-06-19 DIAGNOSIS — H40.003 GLAUCOMA SUSPECT OF BOTH EYES: ICD-10-CM

## 2024-06-19 PROCEDURE — 92012 INTRM OPH EXAM EST PATIENT: CPT | Performed by: OPHTHALMOLOGY

## 2024-06-19 ASSESSMENT — EXTERNAL EXAM - LEFT EYE: OS_EXAM: NORMAL

## 2024-06-19 ASSESSMENT — TONOMETRY
OD_IOP_MMHG: 13
OS_IOP_MMHG: 14
IOP_METHOD: GOLDMANN APPLANATION

## 2024-06-19 ASSESSMENT — VISUAL ACUITY
OD_CC: 20/40
OS_CC: 20/60-1
METHOD: SNELLEN - LINEAR

## 2024-06-19 ASSESSMENT — EXTERNAL EXAM - RIGHT EYE: OD_EXAM: NORMAL

## 2024-06-19 ASSESSMENT — SLIT LAMP EXAM - LIDS
COMMENTS: NORMAL
COMMENTS: NORMAL

## 2024-06-19 NOTE — PROGRESS NOTES
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 Clarke for cataract evaluation and management  Co-management discussed with patient.  Patient prefers to return to my care for the post-operative management    Reschedule appointment with Dr. Clarke for cataract surgery    Glaucoma suspect of both eyes  continue to monitor    Return to see me after surgery as per Dr. clarke

## 2024-06-21 ENCOUNTER — TELEPHONE (OUTPATIENT)
Dept: ENDOCRINOLOGY | Facility: HOSPITAL | Age: 59
End: 2024-06-21
Payer: COMMERCIAL

## 2024-06-21 DIAGNOSIS — Z79.4 TYPE 2 DIABETES MELLITUS WITH OTHER SPECIFIED COMPLICATION, WITH LONG-TERM CURRENT USE OF INSULIN (MULTI): Primary | ICD-10-CM

## 2024-06-21 DIAGNOSIS — E11.69 TYPE 2 DIABETES MELLITUS WITH OTHER SPECIFIED COMPLICATION, WITH LONG-TERM CURRENT USE OF INSULIN (MULTI): Primary | ICD-10-CM

## 2024-06-21 RX ORDER — DULAGLUTIDE 3 MG/.5ML
INJECTION, SOLUTION SUBCUTANEOUS
Qty: 2 ML | Refills: 11 | Status: SHIPPED | OUTPATIENT
Start: 2024-06-21

## 2024-06-25 ENCOUNTER — OFFICE VISIT (OUTPATIENT)
Dept: CARDIOLOGY | Facility: HOSPITAL | Age: 59
End: 2024-06-25
Payer: COMMERCIAL

## 2024-06-25 ENCOUNTER — LAB (OUTPATIENT)
Dept: LAB | Facility: HOSPITAL | Age: 59
End: 2024-06-25
Payer: COMMERCIAL

## 2024-06-25 ENCOUNTER — HOSPITAL ENCOUNTER (OUTPATIENT)
Dept: CARDIOLOGY | Facility: HOSPITAL | Age: 59
Discharge: HOME | End: 2024-06-25
Payer: COMMERCIAL

## 2024-06-25 VITALS
SYSTOLIC BLOOD PRESSURE: 106 MMHG | HEIGHT: 71 IN | WEIGHT: 227.6 LBS | DIASTOLIC BLOOD PRESSURE: 69 MMHG | OXYGEN SATURATION: 95 % | BODY MASS INDEX: 31.86 KG/M2 | HEART RATE: 79 BPM

## 2024-06-25 DIAGNOSIS — I50.22 CHRONIC SYSTOLIC HEART FAILURE (MULTI): ICD-10-CM

## 2024-06-25 DIAGNOSIS — I25.5 ISCHEMIC CARDIOMYOPATHY: ICD-10-CM

## 2024-06-25 DIAGNOSIS — Z95.810 IMPLANTABLE CARDIOVERTER-DEFIBRILLATOR (ICD) IN SITU: ICD-10-CM

## 2024-06-25 DIAGNOSIS — N18.30 DIABETES MELLITUS DUE TO UNDERLYING CONDITION WITH STAGE 3 CHRONIC KIDNEY DISEASE, WITH LONG-TERM CURRENT USE OF INSULIN, UNSPECIFIED WHETHER STAGE 3A OR 3B CKD (MULTI): ICD-10-CM

## 2024-06-25 DIAGNOSIS — Z79.4 DIABETES MELLITUS DUE TO UNDERLYING CONDITION WITH STAGE 3 CHRONIC KIDNEY DISEASE, WITH LONG-TERM CURRENT USE OF INSULIN, UNSPECIFIED WHETHER STAGE 3A OR 3B CKD (MULTI): ICD-10-CM

## 2024-06-25 DIAGNOSIS — I82.409 DEEP VEIN THROMBOSIS (DVT) OF LOWER EXTREMITY, UNSPECIFIED CHRONICITY, UNSPECIFIED LATERALITY, UNSPECIFIED VEIN (MULTI): ICD-10-CM

## 2024-06-25 DIAGNOSIS — N18.30 STAGE 3 CHRONIC KIDNEY DISEASE, UNSPECIFIED WHETHER STAGE 3A OR 3B CKD (MULTI): ICD-10-CM

## 2024-06-25 DIAGNOSIS — I50.23 ACUTE ON CHRONIC HFREF (HEART FAILURE WITH REDUCED EJECTION FRACTION) (MULTI): ICD-10-CM

## 2024-06-25 DIAGNOSIS — Z79.4 TYPE 2 DIABETES MELLITUS WITH HYPERGLYCEMIA, WITH LONG-TERM CURRENT USE OF INSULIN (MULTI): ICD-10-CM

## 2024-06-25 DIAGNOSIS — E11.65 TYPE 2 DIABETES MELLITUS WITH HYPERGLYCEMIA, WITH LONG-TERM CURRENT USE OF INSULIN (MULTI): ICD-10-CM

## 2024-06-25 DIAGNOSIS — E08.22 DIABETES MELLITUS DUE TO UNDERLYING CONDITION WITH STAGE 3 CHRONIC KIDNEY DISEASE, WITH LONG-TERM CURRENT USE OF INSULIN, UNSPECIFIED WHETHER STAGE 3A OR 3B CKD (MULTI): ICD-10-CM

## 2024-06-25 DIAGNOSIS — I50.22 CHRONIC SYSTOLIC HEART FAILURE (MULTI): Primary | ICD-10-CM

## 2024-06-25 LAB
ALBUMIN SERPL BCP-MCNC: 4.3 G/DL (ref 3.4–5)
ALP SERPL-CCNC: 65 U/L (ref 33–120)
ALT SERPL W P-5'-P-CCNC: 15 U/L (ref 10–52)
ANION GAP SERPL CALC-SCNC: 14 MMOL/L (ref 10–20)
AORTIC VALVE PEAK VELOCITY: 1.5 M/S
AST SERPL W P-5'-P-CCNC: 10 U/L (ref 9–39)
AV PEAK GRADIENT: 9 MMHG
AVA (PEAK VEL): 2.49 CM2
BILIRUB SERPL-MCNC: 1.3 MG/DL (ref 0–1.2)
BNP SERPL-MCNC: 743 PG/ML (ref 0–99)
BUN SERPL-MCNC: 29 MG/DL (ref 6–23)
CALCIUM SERPL-MCNC: 9.5 MG/DL (ref 8.6–10.3)
CHLORIDE SERPL-SCNC: 95 MMOL/L (ref 98–107)
CO2 SERPL-SCNC: 33 MMOL/L (ref 21–32)
CREAT SERPL-MCNC: 1.44 MG/DL (ref 0.5–1.3)
EGFRCR SERPLBLD CKD-EPI 2021: 56 ML/MIN/1.73M*2
EJECTION FRACTION APICAL 4 CHAMBER: 35.5
EJECTION FRACTION: 30 %
ERYTHROCYTE [DISTWIDTH] IN BLOOD BY AUTOMATED COUNT: 16.2 % (ref 11.5–14.5)
EST. AVERAGE GLUCOSE BLD GHB EST-MCNC: 217 MG/DL
FERRITIN SERPL-MCNC: 332 NG/ML (ref 20–300)
GLUCOSE SERPL-MCNC: 304 MG/DL (ref 74–99)
HBA1C MFR BLD: 9.2 %
HCT VFR BLD AUTO: 42.9 % (ref 41–52)
HGB BLD-MCNC: 13.3 G/DL (ref 13.5–17.5)
IRON SATN MFR SERPL: 20 % (ref 25–45)
IRON SERPL-MCNC: 65 UG/DL (ref 35–150)
LEFT ATRIUM VOLUME AREA LENGTH INDEX BSA: 40.6 ML/M2
LEFT VENTRICLE INTERNAL DIMENSION DIASTOLE: 6.65 CM (ref 3.5–6)
LEFT VENTRICULAR OUTFLOW TRACT DIAMETER: 2.2 CM
MCH RBC QN AUTO: 25 PG (ref 26–34)
MCHC RBC AUTO-ENTMCNC: 31 G/DL (ref 32–36)
MCV RBC AUTO: 81 FL (ref 80–100)
MITRAL VALVE E/E' RATIO: 21.68
NRBC BLD-RTO: 0 /100 WBCS (ref 0–0)
PLATELET # BLD AUTO: 273 X10*3/UL (ref 150–450)
POTASSIUM SERPL-SCNC: 4.4 MMOL/L (ref 3.5–5.3)
PROT SERPL-MCNC: 7.5 G/DL (ref 6.4–8.2)
RBC # BLD AUTO: 5.31 X10*6/UL (ref 4.5–5.9)
RIGHT VENTRICLE FREE WALL PEAK S': 8.16 CM/S
RIGHT VENTRICLE PEAK SYSTOLIC PRESSURE: 54.4 MMHG
SODIUM SERPL-SCNC: 138 MMOL/L (ref 136–145)
T4 FREE SERPL-MCNC: 1.46 NG/DL (ref 0.78–1.48)
TIBC SERPL-MCNC: 322 UG/DL (ref 240–445)
TRICUSPID ANNULAR PLANE SYSTOLIC EXCURSION: 1 CM
TSH SERPL-ACNC: 4.23 MIU/L (ref 0.44–3.98)
UIBC SERPL-MCNC: 257 UG/DL (ref 110–370)
WBC # BLD AUTO: 5.4 X10*3/UL (ref 4.4–11.3)

## 2024-06-25 PROCEDURE — 93306 TTE W/DOPPLER COMPLETE: CPT

## 2024-06-25 PROCEDURE — 93306 TTE W/DOPPLER COMPLETE: CPT | Performed by: INTERNAL MEDICINE

## 2024-06-25 PROCEDURE — 83880 ASSAY OF NATRIURETIC PEPTIDE: CPT

## 2024-06-25 PROCEDURE — 3078F DIAST BP <80 MM HG: CPT | Performed by: STUDENT IN AN ORGANIZED HEALTH CARE EDUCATION/TRAINING PROGRAM

## 2024-06-25 PROCEDURE — 83036 HEMOGLOBIN GLYCOSYLATED A1C: CPT

## 2024-06-25 PROCEDURE — 84443 ASSAY THYROID STIM HORMONE: CPT

## 2024-06-25 PROCEDURE — 83540 ASSAY OF IRON: CPT

## 2024-06-25 PROCEDURE — 2500000004 HC RX 250 GENERAL PHARMACY W/ HCPCS (ALT 636 FOR OP/ED): Performed by: STUDENT IN AN ORGANIZED HEALTH CARE EDUCATION/TRAINING PROGRAM

## 2024-06-25 PROCEDURE — 85027 COMPLETE CBC AUTOMATED: CPT

## 2024-06-25 PROCEDURE — 36415 COLL VENOUS BLD VENIPUNCTURE: CPT

## 2024-06-25 PROCEDURE — G2211 COMPLEX E/M VISIT ADD ON: HCPCS | Performed by: STUDENT IN AN ORGANIZED HEALTH CARE EDUCATION/TRAINING PROGRAM

## 2024-06-25 PROCEDURE — 80053 COMPREHEN METABOLIC PANEL: CPT

## 2024-06-25 PROCEDURE — 3046F HEMOGLOBIN A1C LEVEL >9.0%: CPT | Performed by: STUDENT IN AN ORGANIZED HEALTH CARE EDUCATION/TRAINING PROGRAM

## 2024-06-25 PROCEDURE — 3074F SYST BP LT 130 MM HG: CPT | Performed by: STUDENT IN AN ORGANIZED HEALTH CARE EDUCATION/TRAINING PROGRAM

## 2024-06-25 PROCEDURE — 84439 ASSAY OF FREE THYROXINE: CPT

## 2024-06-25 PROCEDURE — 82728 ASSAY OF FERRITIN: CPT

## 2024-06-25 PROCEDURE — RXMED WILLOW AMBULATORY MEDICATION CHARGE

## 2024-06-25 PROCEDURE — 99215 OFFICE O/P EST HI 40 MIN: CPT | Performed by: STUDENT IN AN ORGANIZED HEALTH CARE EDUCATION/TRAINING PROGRAM

## 2024-06-25 RX ORDER — DIGOXIN 125 MCG
125 TABLET ORAL DAILY
Qty: 90 TABLET | Refills: 3 | Status: SHIPPED | OUTPATIENT
Start: 2024-06-25

## 2024-06-25 RX ORDER — ATORVASTATIN CALCIUM 80 MG/1
80 TABLET, FILM COATED ORAL DAILY
Qty: 90 TABLET | Refills: 3 | Status: SHIPPED | OUTPATIENT
Start: 2024-06-25 | End: 2025-06-25

## 2024-06-25 RX ORDER — BUMETANIDE 2 MG/1
2 TABLET ORAL
Qty: 180 TABLET | Refills: 3 | Status: SHIPPED | OUTPATIENT
Start: 2024-06-25 | End: 2025-06-25

## 2024-06-25 RX ORDER — SPIRONOLACTONE 25 MG/1
50 TABLET ORAL DAILY
Qty: 180 TABLET | Refills: 3 | Status: SHIPPED | OUTPATIENT
Start: 2024-06-25 | End: 2025-06-25

## 2024-06-25 RX ORDER — ISOSORBIDE DINITRATE 40 MG/1
40 TABLET ORAL
Qty: 270 TABLET | Refills: 3 | Status: SHIPPED | OUTPATIENT
Start: 2024-06-25 | End: 2025-06-25

## 2024-06-25 RX ORDER — METOPROLOL SUCCINATE 100 MG/1
200 TABLET, EXTENDED RELEASE ORAL DAILY
Qty: 180 TABLET | Refills: 3 | Status: SHIPPED | OUTPATIENT
Start: 2024-06-25 | End: 2025-06-25

## 2024-06-25 RX ORDER — DAPAGLIFLOZIN 10 MG/1
10 TABLET, FILM COATED ORAL DAILY
Qty: 90 TABLET | Refills: 3 | Status: SHIPPED | OUTPATIENT
Start: 2024-06-25 | End: 2025-06-25

## 2024-06-25 RX ORDER — HYDRALAZINE HYDROCHLORIDE 100 MG/1
100 TABLET, FILM COATED ORAL 3 TIMES DAILY
Qty: 270 TABLET | Refills: 3 | Status: SHIPPED | OUTPATIENT
Start: 2024-06-25 | End: 2025-06-25

## 2024-06-25 ASSESSMENT — PAIN SCALES - GENERAL: PAINLEVEL: 0-NO PAIN

## 2024-06-25 NOTE — PROGRESS NOTES
Accompanied by: Self    Denies chest pressure, palpitations, shortness of breath, orthopnea, PND. No edema noted in BLE.   Denies headaches, dizziness, lightheadedness, and falls.    He states he had chest pain last week for three days, but it has resolved itself since.   He reports TAY and fatigue daily.

## 2024-06-25 NOTE — PATIENT INSTRUCTIONS
If you have any questions or need cardiac medication refills, please call the Heart Failure office at [phone], option [phone].   You may also contact the  Heart Failure Nursing team via email at [email or web address] (Please include your name and date of birth).      To reach Dr. Duque's office please call [phone] / Fax: [phone].  To schedule an appointment call [phone].    You are cleared to get your cataract surgery without further cardiac testing.     - Continue current medications with the exception of:   -- start ivabradine 2.5mg daily  -- increase spironolactone to 50mg daily  - Labwork: today  - Imaging/Procedures: none  - Results: I will send a ADIKTIVO message with any notable results. You may also call the HF nursing team (contact details above) for your results.   - Referrals: none  - Followup: 3 months

## 2024-06-25 NOTE — PROGRESS NOTES
Primary Care Physician: Umer Humphrey MD  Primary Cardiologist: Sebastian at [facility]  Patient's Location: [facility] [address]    Date of Visit: 06/25/2024 10:30 AM EDT  Location of visit: [facility]   Type of Visit: Established  Last seen by me: 12/19/2023  Last seen in department: 12/19/2023     HPI / Summary:   Felice Law is a very pleasant 59 y.o. [place]er male presenting for management of Stage C-D ICM/HFrEF (LVEF 20% 8/14/23). He otherwise has a history of HTN, HLD, Type II DM, prior tobacco use (24 pack year, quit 2017), obesity (BMI 31), CAD s/p PCI and 1vCABG (LIMA-LAD with balloon pump support April 2023), VT arrest s/p VT ablation + ICD 2017, DVT (L femoral) on Eliquis, hypothyroidism, pulmonary hypertension, central sleep apnea.      Initial CV History:   He presented to [facility] on 10/1 with exertional left sided chest pain and SOB x 3 days. He has been admitted 8 times this year for acutely decompensated heart failure. His last hospital stay was 9/16-9/26, and weight on discharge was 230lbs.  He was admitted to [facility] this presentation in cardiogenic shock despite reporting compliance with his HF medications. His oral GDMT regimen was held due to worsening kidney function. His home regimen includes Entresto 97/103mg, Toprol XL 150mg, Spironolactone 25mg, Dapagliflozin 10mg. Weight on admission was 246lbs. He was treated IV lasix 80mg and with IV bumex 4mg BID + metolazone with some response, but was transitioned to lasix infusion 5mg/hr.  He was also placed on dobutamine infusion as evidence of cardiogenic shock was noted to be worsening. Advanced heart failure service was consulted while he was admitted there and they recommended SGC-guided therapy. RHC on dobutamine 2.5mcg showed RA pressure: 20, RV: 55/20, PCWP: 35, PA pressure: 52/37 (40 - 42), CO/CI 3.6/1.5, SVR: 1373 dynes, PVR: 414 dynes, AO: 84, and lactates were increasing up to 2.4 during that admission. He was referred to us  by Dr. Cortes at [facility] for advanced therapy evaluation. He was transferred to us on dobutamine 3.75mcg and lasix gtt 5mg/hr.  Patient was than transferred to [facility] for advanced therapy evaluation on 10/4. Advanced therapies evaluation was initiated on 10/4 and presented in committee meeting on 10/10. Not approved for LVAD or transplant at this time. Determined to be very high risk for advanced therapies at this time given poorly controlled diabetes, renal function, peripheral vascular questions, questionable psychosocial support, and financial concerns. Plan to optimize medical therapy and follow up closely in HF clinic. May be brought back to the committee in the future. Over the past couple of days, pt's renal function worsened due to likely over diuresis. Lasix drip was discontinued 10/14 and pt.'s renal function has since begun to improve. Added spironolactone to medication regimen and discontinued PO potassium. Increased metoprolol to 100mg PO daily. Pt. Is hemodynamically stable for discharge at this time.      - Saw AYDE Vincent. Medications reconciled. Referred to PCP at . Established 12/20203  - referred to cardiac rehab. Completed    Interval history: Last seen by me: 3/19/2024  Remains stable  Echo this AM  Planning cataract surgey    Today:  He is accompanied by: alone via motorized scooter.     He reports that he feel well.  HAd increase swelling and SOB 3 weeks ago, improved with increased diuretics. Has some reproducible chest wall tenderness around the site of his ICD that is chronic since implant.       He denies: dyspnea at rest, LE swelling, syncope, PND, orthopnea, chest pain, palpitation.    ROS: Full 10 pt review of symptoms of negative unless discussed above.     PMHx: HFrEF/ICM (LVEF 20% 8/14/23), CAD, Hx of cardiac arrest s/p VT ablation + ICD, HTN/HLD, CKD, Hx of DVT (L femoral) on Eliquis, hypothyroidism, pulmonary hypertension, T2DM, JAKE/OHS  PSHx: CABG (LIMA-LAD with balloon pump  "support April 2023)  Social Hx: Former tobacco abuse (24 pack year, quit 2017)    Objective   Medical History:   He has a past medical history of Chest pain (10/01/2023), CHF (congestive heart failure) (Multi), COPD (chronic obstructive pulmonary disease) (Multi), Coronary artery disease, Diabetes mellitus (Multi), Disease of thyroid gland, Elevated troponin level not due myocardial infarction (08/14/2023), and Hypertension.  Surgical Hx:   He has a past surgical history that includes Coronary artery bypass graft; Cardiac defibrillator placement; and Coronary angioplasty with stent.   Social Hx:   He reports that he quit smoking about 7 years ago. His smoking use included cigarettes. He started smoking about 31 years ago. He has a 24 pack-year smoking history. He has never used smokeless tobacco. He reports that he does not drink alcohol and does not use drugs.  Family Hx:   His family history is not on file.   Allergies:   Allergies   Allergen Reactions    Azithromycin Dizziness and Unknown     Dizziness, Vertigo, Cold, Clammy, Nausea, Sweating, trouble breathing     Exam:       6/25/2024    10:52 AM 4/26/2024     2:41 PM 4/8/2024     2:05 PM 3/19/2024    10:11 AM 12/29/2023     1:22 PM 12/19/2023    10:07 AM   Vitals   Systolic 106 121 108 100 115 116   Diastolic 69 80 69 65 74 76   Heart Rate 79 89 86 98 102 89   Temp  36.2 °C (97.2 °F)   36.3 °C (97.4 °F)    Height (in) 1.803 m (5' 11\") 1.803 m (5' 11\")  1.803 m (5' 11\") 1.803 m (5' 11\") 1.8 m (5' 10.87\")   Weight (lb) 227.6 232 229 235.2 229 227   BMI 31.74 kg/m2 32.36 kg/m2 31.94 kg/m2 32.8 kg/m2 31.94 kg/m2 31.78 kg/m2   BSA (m2) 2.27 m2 2.29 m2 2.28 m2 2.32 m2 2.28 m2 2.27 m2   Visit Report Report Report Report Report Report Report     Wt Readings from Last 5 Encounters:   06/25/24 103 kg (227 lb 9.6 oz)   04/26/24 105 kg (232 lb)   04/08/24 104 kg (229 lb)   03/19/24 107 kg (235 lb 3.2 oz)   12/29/23 104 kg (229 lb)     GEN: Pleasant, well-appearing, no " acute distress. He uses a mobility scooter. Bilateral foot drop with unsteady gait.   HEENT: JVP not elevated, no icterus. No HJR  CHEST: No wheeze, good air movement.  CV: Normal rate, regular rhythm. Normal S1, inspiratory split S2, no m/r/g  ABD: Soft, ND, NT  EXT: Warm, well perfused, trace LE edema. L shin with healed ulcer  NEURO: Pleasant, Oriented to plan.     Labs:   CMP:  Recent Labs     06/25/24  1139 12/19/23  1104 11/02/23  1700 10/17/23  0356 10/16/23  0416 10/15/23  0309 10/14/23  1516 10/14/23  0257 10/13/23  0257    136 135* 137 136 134*   < > 134* 135*   K 4.4 4.2 4.3 4.6 4.2 4.2   < > 4.3 4.1   CL 95* 96* 99 100 98 97*   < > 96* 98   CO2 33* 31 29 29 30 28   < > 29 28   ANIONGAP 14 13 11 13 12 13   < > 13 13   BUN 29* 33* 64* 69* 70* 77*   < > 69* 64*   CREATININE 1.44* 1.41* 1.80* 1.48* 1.54* 1.88*   < > 2.05* 1.66*   EGFR 56* 58* 43* 55* 52* 41*   < > 37* 48*   MG  --   --   --  1.97 2.00 1.98  --  1.99 2.03    < > = values in this interval not displayed.     Recent Labs     06/25/24  1139 12/19/23  1104 11/02/23  1700 10/17/23  0356 10/16/23  0416 10/06/23  0515 10/05/23  1647   ALBUMIN 4.3 4.1 4.4 3.7 3.6   < > 4.1   ALKPHOS 65 62  --   --   --   --  63   ALT 15 13  --   --   --   --  11   AST 10 10  --   --   --   --  13   BILITOT 1.3* 1.1  --   --   --   --  1.8*    < > = values in this interval not displayed.     CBC:  Recent Labs     06/25/24  1139 12/19/23  1104 10/17/23  0356 10/16/23  0416 10/15/23  0309   WBC 5.4 5.4 5.2 5.9 5.5   HGB 13.3* 14.0 11.6* 11.4* 11.2*   HCT 42.9 43.2 36.5* 36.6* 36.3*    224 275 300 298   MCV 81 80 76* 77* 78*     COAG:   Recent Labs     10/15/23  0833 10/15/23  0309 10/14/23  2208 10/05/23  1647   INR  --   --   --  1.2*   HAUF 0.7 0.8 0.9  --      ABO:   Recent Labs     10/05/23  1647   ABO A     HEME/ENDO:  Recent Labs     06/25/24  1139 04/08/24  1550 12/29/23  1427 10/04/23  1518 05/26/23  0203   FERRITIN 332*  --   --  379*  --    IRONSAT  "20*  --   --  15*  --    TSH 4.23*  --  7.49* 9.31*  --    HGBA1C 9.2* 10.6*  --  8.1* 7.9*      CARDIAC:   Recent Labs     06/25/24  1139 12/19/23  1104 10/05/23  1647 10/04/23  1518   LDH  --   --  240  --    * 147* 157* 160*     Recent Labs     10/05/23  0354   CHOL 106   LDLCALC 32*   HDL 24.5   TRIG 247*     MICRO: No results for input(s): \"ESR\", \"CRP\", \"PROCAL\" in the last 55258 hours.  No results found for the last 90 days.    Notable Studies:   EKG: 10/10/2023      Echocardiogram: 10/14/2023   1. Left ventricular systolic function is severely decreased with a 20% estimated ejection fraction.   2. Abnormal septal motion consistent with post-operative status.   3. Left ventricular cavity size is moderate to severely dilated.   4. No obvious LV thrombus seen with the use of ultrasound enhancing agent.   5. There is global hypokinesis of the left ventricle with minor regional variations.   6. There is mild eccentric left ventricular hypertrophy.   7. There is mildly reduced right ventricular systolic function.   8. The left atrium is moderate to severely dilated.   9. Moderately elevated right ventricular systolic pressure.  10. While there are no prior echocardiograms in our system, notes from OSH indicate EF known to be approximately 20%.    IVSd:          0.97 cm    (0.6-1.1cm)  LVPWd:         0.93 cm    (0.6-1.1cm)  LVIDd:         6.60 cm    (3.9-5.9cm)  LA Vol Index BP:   51.9 ml/m2  EF-A4C View: 19.3 % (>=55%)  MV Peak E:    1.14 m/s    (0.7-1.2 m/s)  MV Peak A:    0.53 m/s    (0.42-0.7 m/s)  E/A Ratio:    2.16        (1.0-2.2)  MV e'         0.04 m/s    (>8.0)  MV lateral e' 0.05 m/s  MV medial e'  0.04 m/s  MV A Dur:     100.00 msec  E/e' Ratio:   25.33       (<8.0)  RV Basal 3.20 cm  TAPSE:   13.6 mm  RV s'    0.08 m/s  Est. RA Pressure: 15 mmHg  RV Syst Pressure: 48.6 mmHg (< 30mmHg)    Stress Testing:   Cardiac Catheterization:   [facility] December 2022: severe diffuse three-vessel coronary artery " disease. Severe proximal diffuse LAD disease up to 70%. Severe diffuse proximal and mid first diagonal disease. Chronic total occlusion of OM1 with left-to-left collaterals. Chronic total occlusion of mid RCA with left-to-right collaterals.     RHC: 10/11: BP 94/54, CVP 16, PAP 48/28, PCWP 16, CO/CI (F) 6.3/2.75, , SvO2 60% on Nipride, hydralazine 50mg PO TID, Isordil 20mg PO TID, Entresto 24-26mg PO BID.       Vascular ultrsound:   Right Lower PVR: No evidence of arterial occlusive disease in the right lower extremity at rest. Right pressures of >220 mmHg suggest no compressibility of vessels and may make absolute Segmental Limb Pressures (SLP) unreliable. Decreased digital perfusion noted. Triphasic flow is noted in the right common femoral artery, right posterior tibial artery and right dorsalis pedis artery. Results called based on PVR and Doppler waveforms due to partially/ non-compressible vessels.  Left Lower PVR: No evidence of arterial occlusive disease in the left lower extremity at rest. Decreased digital perfusion noted. Triphasic flow is noted in the left common femoral artery, left posterior tibial artery and left dorsalis pedis artery.    PFTS:         Current Outpatient Medications   Medication Instructions    albuterol 2.5 mg /3 mL (0.083 %) nebulizer solution Use every 4-6 hours as needed for shortness of breath    apixaban (ELIQUIS) 5 mg, oral, 2 times daily    atorvastatin (LIPITOR) 80 mg, oral, Daily    blood sugar diagnostic (OneTouch Verio test strips) strip For BG check 4x/day    bumetanide (BUMEX) 2 mg, oral, 2 times daily (morning and late afternoon)    digoxin (LANOXIN) 125 mcg, oral, Daily    dulaglutide (Trulicity) 4.5 mg/0.5 mL pen injector Inject 0.5 mL under the skin 1 (one) time per week.    Farxiga 10 mg, oral, Daily    FreeStyle Franco 2 Burna misc Use as instructed    FreeStyle Franco 2 Sensor kit Change every 14 days    gabapentin (NEURONTIN) 300 mg, oral, Every morning     "gabapentin (NEURONTIN) 600 mg, oral, Nightly    hydrALAZINE (APRESOLINE) 100 mg, oral, 3 times daily    insulin glargine (Lantus Solostar U-100 Insulin) 100 unit/mL (3 mL) pen Inject 30 units in the am and 30 units in the evening as directed    insulin lispro (HumaLOG KwikPen Insulin) 100 unit/mL injection 35 units with meals plus sliding scale up to 150 units daily    ipratropium-albuteroL (Duo-Neb) 0.5-2.5 mg/3 mL nebulizer solution 3 mL, nebulization, 3 times daily PRN    isosorbide dinitrate (ISORDIL) 40 mg, oral, 3 times daily (0900,1400,1900)    ivabradine (CORLANOR) 2.5 mg, oral, 2 times daily    lancets (Lancets,Thin) misc 4 times daily (before meals and at bedtime). use as directed to test blood sugar    levothyroxine (SYNTHROID, LEVOXYL) 75 mcg, oral, Daily    lidocaine (Lidoderm) 5 % patch 1 patch, transdermal, Daily, Apply to painful area 12 hours per day, remove for 12 hours.    metoprolol succinate XL (TOPROL-XL) 200 mg, oral, Daily, Do not crush or chew.    OneTouch Delica Plus Lancet 30 gauge misc     pantoprazole (ProtoNix) 40 mg EC tablet TAKE 1 TABLET BY MOUTH ONCE DAILY IN THE MORNING TAKE BEFORE A MEAL DO NOT CRUSH, CHEW, AND/OR DIVIDE    pen needle, diabetic (BD Ultra-Fine Sarah Pen Needle) 32 gauge x 5/32\" needle For insulin injection 4x/day    pen needle, diabetic, safety 29 gauge x 1/2\" needle  Three times before meals and at bedtime    QUEtiapine (SEROQUEL) 50 mg, oral, Nightly    sacubitriL-valsartan (Entresto)  mg tablet 1 tablet, oral, 2 times daily    spironolactone (ALDACTONE) 50 mg, oral, Daily    TechLITE Pen Needle 31 gauge x 3/16\" needle use 1 PEN NEEDLE to inject MEDICATION subcutaneously three times a day      Notable Therapies   Class  Agent SAFETY    ARNI / ACE / ARB  Entresto 97-103mg BID Last BP: 106/69, Last BUN/Cr (GFR): 29/1.44 (56), 6/25/2024: 11:39 AM.    Beta-Blocker  Metoprolol  daily Last HR: 79    Ivabradine  Start ivabradine 2.5mg daily     MRA  " Spironolactone 25mg daily -> 50mg daily Last K: 4.4     SGLT2  Farxiga 10mg daily Last A1c 9.2 (6/25/2024: 11:39 AM).    Diuretic  Bumex 2mg daily Last BNP: 743    Hydralazine/ISDN  100mg / 40mg TID      Digoxin 125mcg daily, Last digoxin: 0.39, 10/13/2023:  2:57 AM    Anticoagulation  Apixaban 5mg BID (DVT) Last Hgb 13.3 (6/25/2024: 11:39 AM).    Anti-arrhythmic       Antiplatelet  Aspirin 81mg daily     Lipid-Lowering  Atorvastatin 80mg daily Last Tchol 106 / LDL  32 / HDL 24.5 / TRIG 247 (10/5/2023:  3:54 AM).    Other        Device(s)  ICD  GIDEON ~1 year, referral to device clinic    Cardiac Rehab,   if EF <35/MI/OHS  completing      Problems Addressed:   # HFrEF / Chronic Systolic Heart Failure, last EF 30%, dx'd 2017,   # Ischemic Cardiomyopathy, ACC/AHA Stage C-D, NYHA II, Warm, Euvolemic. Previously discussed for AT during [facility] October 2023 admission but declined with concerns for mobility/frailty (uses motorized scooter and has bilateral foot drop with neuropathy), CKD, poorly controlled diabetes as well as unclear social/financial support).   # CAD s/p PCI and 1v CABG (LIMA-LAD 04/2023).   # VT arrest s/p VT ablation + ICD 2017   # CKD Stage IIIb: Last BUN/Cr (GFR): 29/1.44 (56), 6/25/2024: 11:39 AM.  # Type II Diabetes Mellitus: Last A1c (9.2). With neuropathy/nephropathy  - Management of CV risk factors as a below  - GDMT as above.   - Continue current medications with the exception of:   -- start ivabradine 2.5mg daily  -- increase spironolactone to 50mg daily  - lab work,   - followup echo    # DVT (L femoral) on Eliquis,   # Hypothyroidism,   # Pulmonary hypertension, low threshhold for RHC with decompensation.   # Central sleep apnea.     # Bilateral foot drop with LE neuropathy: Able to walk two blocks. Uses motorized scoopter    # Cataracts: Patient is cleared from a cardiovascular standpoint for cataract surgery. No further cardiovascular testing required.     CV Risk Factors:  # Hypertension : Last  BP: 106/69. controlled  # Hyperlipidemia: Last Tchol 106 / LDL  32 / HDL 24.5 / TRIG 247 (10/5/2023:  3:54 AM). Continue ASA/statin  # Type II Diabetes Mellitus: Last A1c 9.2 (6/25/2024: 11:39 AM). On GLP1 + SGLT2  # Obesity: Last BMI: 31.76. On GLP1  # Prior tobacco use (24 pack year, quit 2017),     Patient Instructions   If you have any questions or need cardiac medication refills, please call the Heart Failure office at [phone], option [phone].   You may also contact the  Heart Failure Nursing team via email at [email or web address] (Please include your name and date of birth).      To reach Dr. Duque's office please call [phone] / Fax: [phone].  To schedule an appointment call [phone].    You are cleared to get your cataract surgery without further cardiac testing.     - Continue current medications with the exception of:   -- start ivabradine 2.5mg daily  -- increase spironolactone to 50mg daily  - Labwork: today  - Imaging/Procedures: none  - Results: I will send a [employer] message with any notable results. You may also call the HF nursing team (contact details above) for your results.   - Referrals: none  - Followup: 3 months     Orders:  Orders Placed This Encounter   Procedures    B-Type Natriuretic Peptide    Comprehensive Metabolic Panel    Ferritin    Hemoglobin A1C    Iron and TIBC    Lipid Panel    TSH with reflex to Free T4 if abnormal    CBC      Followup Appts:  Future Appointments   Date Time Provider Department Center   7/8/2024  2:45 PM Rell Robison MD [id number] [facility]   8/9/2024  1:00 PM Diana Salazar MD [id number] [facility]   8/19/2024  9:00 AM Desire Nails MD [id number] [facility]   9/24/2024 11:00 AM Jono Duque MD [id number] [facility]   12/17/2024  9:00 AM [facility] [id number] [facility]     Time Spent: I spent 55=2 minutes reviewing medical testing, obtaining medical history and counselling and educating  on diagnosis and documenting clinical encounter.     ____________________________________________________________  Jono Duque MD  Section of Advanced Heart Failure and Cardiac Transplantation  Division of Cardiovascular Medicine  [facility]

## 2024-06-26 ENCOUNTER — PHARMACY VISIT (OUTPATIENT)
Dept: PHARMACY | Facility: CLINIC | Age: 59
End: 2024-06-26
Payer: COMMERCIAL

## 2024-07-02 ENCOUNTER — APPOINTMENT (OUTPATIENT)
Dept: CARDIOLOGY | Facility: HOSPITAL | Age: 59
End: 2024-07-02
Payer: COMMERCIAL

## 2024-07-08 ENCOUNTER — APPOINTMENT (OUTPATIENT)
Dept: OPHTHALMOLOGY | Facility: CLINIC | Age: 59
End: 2024-07-08
Payer: COMMERCIAL

## 2024-07-08 DIAGNOSIS — Z79.4 TYPE 2 DIABETES MELLITUS WITH RIGHT EYE AFFECTED BY MILD NONPROLIFERATIVE RETINOPATHY AND MACULAR EDEMA, WITH LONG-TERM CURRENT USE OF INSULIN (MULTI): Primary | ICD-10-CM

## 2024-07-08 DIAGNOSIS — E11.3211 TYPE 2 DIABETES MELLITUS WITH RIGHT EYE AFFECTED BY MILD NONPROLIFERATIVE RETINOPATHY AND MACULAR EDEMA, WITH LONG-TERM CURRENT USE OF INSULIN (MULTI): Primary | ICD-10-CM

## 2024-07-08 PROCEDURE — 92134 CPTRZ OPH DX IMG PST SGM RTA: CPT | Performed by: OPHTHALMOLOGY

## 2024-07-08 PROCEDURE — 99203 OFFICE O/P NEW LOW 30 MIN: CPT | Performed by: OPHTHALMOLOGY

## 2024-07-08 ASSESSMENT — ENCOUNTER SYMPTOMS
CONSTITUTIONAL NEGATIVE: 0
ALLERGIC/IMMUNOLOGIC NEGATIVE: 0
NEUROLOGICAL NEGATIVE: 0
EYES NEGATIVE: 1
HEMATOLOGIC/LYMPHATIC NEGATIVE: 0
RESPIRATORY NEGATIVE: 0
CARDIOVASCULAR NEGATIVE: 0
GASTROINTESTINAL NEGATIVE: 0
MUSCULOSKELETAL NEGATIVE: 0
ENDOCRINE NEGATIVE: 0
PSYCHIATRIC NEGATIVE: 0

## 2024-07-08 ASSESSMENT — EXTERNAL EXAM - RIGHT EYE: OD_EXAM: NORMAL

## 2024-07-08 ASSESSMENT — VISUAL ACUITY
OD_CC: 20/50
OD_PH_CC: 20/40
OS_CC: 20/70
METHOD: SNELLEN - LINEAR
CORRECTION_TYPE: GLASSES

## 2024-07-08 ASSESSMENT — CONF VISUAL FIELD
OD_NORMAL: 1
OS_SUPERIOR_TEMPORAL_RESTRICTION: 0
OD_INFERIOR_NASAL_RESTRICTION: 0
OD_INFERIOR_TEMPORAL_RESTRICTION: 0
OS_NORMAL: 1
OD_SUPERIOR_NASAL_RESTRICTION: 0
OS_INFERIOR_NASAL_RESTRICTION: 0
OS_SUPERIOR_NASAL_RESTRICTION: 0
OD_SUPERIOR_TEMPORAL_RESTRICTION: 0
OS_INFERIOR_TEMPORAL_RESTRICTION: 0

## 2024-07-08 ASSESSMENT — TONOMETRY
IOP_METHOD: GOLDMANN APPLANATION
OS_IOP_MMHG: 16
OD_IOP_MMHG: 14

## 2024-07-08 ASSESSMENT — EXTERNAL EXAM - LEFT EYE: OS_EXAM: NORMAL

## 2024-07-08 ASSESSMENT — SLIT LAMP EXAM - LIDS
COMMENTS: NORMAL
COMMENTS: NORMAL

## 2024-07-08 NOTE — PROGRESS NOTES
Subjective   Patient ID: Felice Law is a 59 y.o. male.    Chief Complaint    Eye Exam       HPI       Eye Exam    In both eyes.  Characterized as blurry vision.  Severity is mild.             Comments    59 year old male present for Diabetic exam referred by Dr. Lamb pt is a type 2 iddm A1c 9.2 blood sugar 200 taken yesterday, pt states that he has laser sx in OD, no hx of any other eye SX, pt has no family hx of glaucoma or macular degeneration, pt has no hx of any Trauma, pt denies any pain, flashes,floaters no light sensitivity.          Last edited by Gali Soler on 7/8/2024  3:25 PM.        No current outpatient medications on file. (Ophthalmology pharm classes)       Current Outpatient Medications (Other)   Medication Sig Dispense Refill    albuterol 2.5 mg /3 mL (0.083 %) nebulizer solution Use every 4-6 hours as needed for shortness of breath 75 mL 1    apixaban (Eliquis) 5 mg tablet Take 1 tablet (5 mg) by mouth 2 times a day. 180 tablet 3    atorvastatin (Lipitor) 80 mg tablet Take 1 tablet (80 mg) by mouth once daily. 90 tablet 3    blood sugar diagnostic (OneTouch Verio test strips) strip For BG check 4x/day 100 strip 3    bumetanide (Bumex) 2 mg tablet Take 1 tablet (2 mg) by mouth 2 times daily (morning and late afternoon). 180 tablet 3    digoxin (Lanoxin) 125 MCG tablet Take 1 tablet (125 mcg) by mouth once daily. 90 tablet 3    dulaglutide (Trulicity) 4.5 mg/0.5 mL pen injector Inject 0.5 mL under the skin 1 (one) time per week. 2 mL 11    Farxiga 10 mg Take 1 tablet (10 mg) by mouth once daily. 90 tablet 3    FreeStyle Franco 2 [place] misc Use as instructed 1 each 0    FreeStyle Franco 2 Sensor kit Change every 14 days 2 each 3    gabapentin (Neurontin) 300 mg capsule Take 1 capsule (300 mg) by mouth once daily in the morning. 90 capsule 0    hydrALAZINE (Apresoline) 100 mg tablet Take 1 tablet (100 mg) by mouth 3 times a day. 270 tablet 3    insulin glargine (Lantus Solostar  "U-100 Insulin) 100 unit/mL (3 mL) pen Inject 30 units in the am and 30 units in the evening as directed 30 mL 11    insulin lispro (HumaLOG KwikPen Insulin) 100 unit/mL injection 35 units with meals plus sliding scale up to 150 units daily 30 mL 3    isosorbide dinitrate (Isordil) 40 mg tablet Take 1 tablet (40 mg) by mouth 3 times a day. 270 tablet 3    ivabradine (Corlanor) 5 mg tablet Take 0.5 tablets (2.5 mg) by mouth 2 times a day. 90 tablet 3    lancets (Lancets,Thin) misc 4 times daily (before meals and at bedtime). use as directed to test blood sugar 100 each 3    levothyroxine (Synthroid, Levoxyl) 75 mcg tablet Take 1 tablet (75 mcg) by mouth once daily. 90 tablet 2    lidocaine (Lidoderm) 5 % patch Place 1 patch over 12 hours on the skin once daily. Apply to painful area 12 hours per day, remove for 12 hours. 1 patch 1    metoprolol succinate XL (Toprol-XL) 100 mg 24 hr tablet Take 2 tablets (200 mg) by mouth once daily. Do not crush or chew. 180 tablet 3    OneTouch Delica Plus Lancet 30 gauge misc       pantoprazole (ProtoNix) 40 mg EC tablet TAKE 1 TABLET BY MOUTH ONCE DAILY IN THE MORNING TAKE BEFORE A MEAL DO NOT CRUSH, CHEW, AND/OR DIVIDE 90 tablet 3    pen needle, diabetic (BD Ultra-Fine Sarah Pen Needle) 32 gauge x 5/32\" needle For insulin injection 4x/day 100 each 5    pen needle, diabetic, safety 29 gauge x 1/2\" needle Three times before meals and at bedtime      QUEtiapine (SEROquel) 50 mg tablet Take 1 tablet (50 mg) by mouth once daily at bedtime. 90 tablet 0    sacubitriL-valsartan (Entresto)  mg tablet Take 1 tablet by mouth 2 times a day. 180 tablet 3    spironolactone (Aldactone) 25 mg tablet Take 2 tablets (50 mg) by mouth once daily. 180 tablet 3    TechLITE Pen Needle 31 gauge x 3/16\" needle use 1 PEN NEEDLE to inject MEDICATION subcutaneously three times a day      gabapentin (Neurontin) 600 mg tablet Take 1 tablet (600 mg) by mouth once daily at bedtime. 30 tablet 0    " ipratropium-albuteroL (Duo-Neb) 0.5-2.5 mg/3 mL nebulizer solution Take 3 mL by nebulization 3 times a day as needed for wheezing. 180 mL 2       Objective   Base Eye Exam       Visual Acuity (Snellen - Linear)         Right Left    Dist cc 20/50 20/70    Dist ph cc 20/40 NI      Correction: Glasses              Tonometry (Goldmann Applanation, 3:32 PM)         Right Left    Pressure 14 16              Pupils         Pupils    Right PERRL, No APD    Left PERRL, No APD              Visual Fields         Left Right     Full Full              Extraocular Movement         Right Left     Full Full              Neuro/Psych       Oriented x3: Yes              Dilation       Both eyes: 1% Mydriacyl & 2.5% Forrest  @ 3:32 PM                  Slit Lamp and Fundus Exam       External Exam         Right Left    External Normal Normal              Slit Lamp Exam         Right Left    Lids/Lashes Normal Normal    Conjunctiva/Sclera White and quiet White and quiet    Cornea Clear Clear    Anterior Chamber Deep and quiet Deep and quiet    Iris Round and reactive Round and reactive    Lens 2+ Nuclear sclerosis, 2+ Cortical cataract 2+ Nuclear sclerosis, 2+ Cortical cataract    Anterior Vitreous Normal Normal                    Imaging    Macula OCT 07/8/24  Right eye (OD): Normal contour and appearance, no subretinal fluid (SRF), trace/minimal IRF  Left eye (OS): Normal contour and appearance, no subretinal fluid (SRF), trace/minimal IRF      Assessment/Plan       Referral from Dr. 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 Lamb and Dr. Phillips

## 2024-07-12 ASSESSMENT — CUP TO DISC RATIO
OS_RATIO: 0.45
OD_RATIO: 0.45

## 2024-07-29 PROCEDURE — RXMED WILLOW AMBULATORY MEDICATION CHARGE

## 2024-07-30 ENCOUNTER — PHARMACY VISIT (OUTPATIENT)
Dept: PHARMACY | Facility: CLINIC | Age: 59
End: 2024-07-30
Payer: COMMERCIAL

## 2024-08-09 ENCOUNTER — APPOINTMENT (OUTPATIENT)
Dept: ENDOCRINOLOGY | Facility: CLINIC | Age: 59
End: 2024-08-09
Payer: COMMERCIAL

## 2024-08-09 VITALS
WEIGHT: 220 LBS | DIASTOLIC BLOOD PRESSURE: 61 MMHG | SYSTOLIC BLOOD PRESSURE: 93 MMHG | HEART RATE: 94 BPM | HEIGHT: 71 IN | BODY MASS INDEX: 30.8 KG/M2

## 2024-08-09 DIAGNOSIS — I25.5 ISCHEMIC CARDIOMYOPATHY: ICD-10-CM

## 2024-08-09 DIAGNOSIS — I50.23 ACUTE ON CHRONIC HFREF (HEART FAILURE WITH REDUCED EJECTION FRACTION) (MULTI): ICD-10-CM

## 2024-08-09 DIAGNOSIS — E03.9 HYPOTHYROIDISM, UNSPECIFIED TYPE: ICD-10-CM

## 2024-08-09 DIAGNOSIS — E11.65 TYPE 2 DIABETES MELLITUS WITH HYPERGLYCEMIA, WITH LONG-TERM CURRENT USE OF INSULIN (MULTI): ICD-10-CM

## 2024-08-09 DIAGNOSIS — E11.9 TYPE 2 DIABETES MELLITUS WITHOUT COMPLICATION, UNSPECIFIED WHETHER LONG TERM INSULIN USE (MULTI): ICD-10-CM

## 2024-08-09 DIAGNOSIS — Z79.4 TYPE 2 DIABETES MELLITUS WITH CHRONIC KIDNEY DISEASE, WITH LONG-TERM CURRENT USE OF INSULIN, UNSPECIFIED CKD STAGE (MULTI): Primary | ICD-10-CM

## 2024-08-09 DIAGNOSIS — I50.22 CHRONIC SYSTOLIC HEART FAILURE (MULTI): ICD-10-CM

## 2024-08-09 DIAGNOSIS — Z79.4 TYPE 2 DIABETES MELLITUS WITH HYPERGLYCEMIA, WITH LONG-TERM CURRENT USE OF INSULIN (MULTI): ICD-10-CM

## 2024-08-09 DIAGNOSIS — E11.22 TYPE 2 DIABETES MELLITUS WITH CHRONIC KIDNEY DISEASE, WITH LONG-TERM CURRENT USE OF INSULIN, UNSPECIFIED CKD STAGE (MULTI): Primary | ICD-10-CM

## 2024-08-09 LAB — POC FINGERSTICK BLOOD GLUCOSE: 323 MG/DL (ref 70–100)

## 2024-08-09 PROCEDURE — 99214 OFFICE O/P EST MOD 30 MIN: CPT | Performed by: STUDENT IN AN ORGANIZED HEALTH CARE EDUCATION/TRAINING PROGRAM

## 2024-08-09 PROCEDURE — G2211 COMPLEX E/M VISIT ADD ON: HCPCS | Performed by: STUDENT IN AN ORGANIZED HEALTH CARE EDUCATION/TRAINING PROGRAM

## 2024-08-09 PROCEDURE — 3008F BODY MASS INDEX DOCD: CPT | Performed by: STUDENT IN AN ORGANIZED HEALTH CARE EDUCATION/TRAINING PROGRAM

## 2024-08-09 PROCEDURE — 3046F HEMOGLOBIN A1C LEVEL >9.0%: CPT | Performed by: STUDENT IN AN ORGANIZED HEALTH CARE EDUCATION/TRAINING PROGRAM

## 2024-08-09 PROCEDURE — 3074F SYST BP LT 130 MM HG: CPT | Performed by: STUDENT IN AN ORGANIZED HEALTH CARE EDUCATION/TRAINING PROGRAM

## 2024-08-09 PROCEDURE — 82962 GLUCOSE BLOOD TEST: CPT | Performed by: STUDENT IN AN ORGANIZED HEALTH CARE EDUCATION/TRAINING PROGRAM

## 2024-08-09 PROCEDURE — 3078F DIAST BP <80 MM HG: CPT | Performed by: STUDENT IN AN ORGANIZED HEALTH CARE EDUCATION/TRAINING PROGRAM

## 2024-08-09 RX ORDER — BLOOD SUGAR DIAGNOSTIC
STRIP MISCELLANEOUS
Qty: 200 STRIP | Refills: 3 | Status: SHIPPED | OUTPATIENT
Start: 2024-08-09

## 2024-08-09 RX ORDER — INSULIN GLARGINE 300 [IU]/ML
INJECTION, SOLUTION SUBCUTANEOUS
Qty: 30 ML | Refills: 3 | Status: SHIPPED | OUTPATIENT
Start: 2024-08-09

## 2024-08-09 RX ORDER — DAPAGLIFLOZIN 10 MG/1
10 TABLET, FILM COATED ORAL DAILY
Qty: 90 TABLET | Refills: 3 | Status: SHIPPED | OUTPATIENT
Start: 2024-08-09 | End: 2025-08-09

## 2024-08-09 RX ORDER — DULAGLUTIDE 4.5 MG/.5ML
4.5 INJECTION, SOLUTION SUBCUTANEOUS
Qty: 2 ML | Refills: 11 | Status: SHIPPED | OUTPATIENT
Start: 2024-08-11

## 2024-08-09 RX ORDER — INSULIN LISPRO 100 [IU]/ML
INJECTION, SOLUTION INTRAVENOUS; SUBCUTANEOUS
Qty: 45 ML | Refills: 3 | Status: SHIPPED | OUTPATIENT
Start: 2024-08-09

## 2024-08-09 RX ORDER — PEN NEEDLE, DIABETIC 30 GX3/16"
NEEDLE, DISPOSABLE MISCELLANEOUS
Qty: 200 EACH | Refills: 5 | Status: SHIPPED | OUTPATIENT
Start: 2024-08-09

## 2024-08-09 ASSESSMENT — PAIN SCALES - GENERAL: PAINLEVEL: 0-NO PAIN

## 2024-08-09 NOTE — PATIENT INSTRUCTIONS
Continue trulicity 4.5mg weekly     Switch from lantus to toujeo 40 units twice a day   Continue the humalog with meals    Continue farxiga    Labs in October, fasting from midnight     We will send freestyle abbi 2 to a device company    Follow up next available    Diana Salazar MD  Divison of Endocrinology   [facility]   Phone: [phone]    option 4, then option 1  Fax: [phone]

## 2024-08-09 NOTE — PROGRESS NOTES
"59M PMH: DM2, COPD, CKD, CHF with ICD, pulm HTN, JAKE    [place]oz[place]ican speaking     Coming in today for DM2 follow up    Diabetes History     DM diagnosed at 42 yo  Complications Micro and Macro-diabetes related foot ulcer, neuropathy, CVD, CKD, NPDR  A1c:   Lab Results   Component Value Date    HGBA1C 9.2 (H) 06/25/2024       Regimen   Glargine 30-30  Lispro 35 with meals- only eats about twice a day  Farxiga   Trulicity 4.5mg    SMBG   No BG readings today  Had a CGM abbi 2 but was unable to get sensor refill     Hypoglycemia none known         Comorbidities and Screening  Eye Exam: 7/2024 NPDR of both eyes, had laser  Foot exam: sees podiatry for the foot ulcer     Lipid  Lab Results   Component Value Date    CHOL 106 10/05/2023     Lab Results   Component Value Date    HDL 24.5 10/05/2023     Lab Results   Component Value Date    LDLCALC 32 (L) 10/05/2023     Lab Results   Component Value Date    TRIG 247 (H) 10/05/2023     No components found for: \"CHOLHDL\"      Statin- atorvastatin 80mg   Cr and albuminuria- CKD3   Lab Results   Component Value Date    CREATININE 1.44 (H) 06/25/2024    EGFR 56 (L) 06/25/2024    ALBUMINUR 117.8 12/29/2023      ACE/ARB- entresto    Past Medical History:   Diagnosis Date    Chest pain 10/01/2023    CHF (congestive heart failure) (Multi)     COPD (chronic obstructive pulmonary disease) (Multi)     Coronary artery disease     Diabetes mellitus (Multi)     Disease of thyroid gland     Elevated troponin level not due myocardial infarction 08/14/2023    Hypertension      No family history on file.   Social History     Socioeconomic History    Marital status: Unknown     Spouse name: Not on file    Number of children: Not on file    Years of education: Not on file    Highest education level: Not on file   Occupational History    Not on file   Tobacco Use    Smoking status: Former     Current packs/day: 0.00     Average packs/day: 1 pack/day for 24.0 years (24.0 ttl pk-yrs)     Types: " Cigarettes     Start date:      Quit date: 2017     Years since quittin.6    Smokeless tobacco: Never   Vaping Use    Vaping status: Never Used   Substance and Sexual Activity    Alcohol use: Never    Drug use: Never    Sexual activity: Not on file   Other Topics Concern    Not on file   Social History Narrative    Not on file     Social Determinants of Health     Financial Resource Strain: Low Risk  (10/4/2023)    Overall Financial Resource Strain (CARDIA)     Difficulty of Paying Living Expenses: Not very hard   Food Insecurity: No Food Insecurity (2023)    Hunger Vital Sign     Worried About Running Out of Food in the Last Year: Never true     Ran Out of Food in the Last Year: Never true   Transportation Needs: No Transportation Needs (2023)    OASIS : Transportation     Lack of Transportation (Medical): No     Lack of Transportation (Non-Medical): No     Patient Unable or Declines to Respond: No   Physical Activity: Not on File (3/13/2021)    Received from [facility]    Physical Activity     Physical Activity: 0   Stress: No Stress Concern Present (2021)    [facility] from [facility]    [place] [employer] - Occupational Stress Questionnaire     Feeling of Stress : Only a little   Social Connections: Feeling Socially Integrated (2023)    OASIS : Social Isolation     Frequency of experiencing loneliness or isolation: Never   Intimate Partner Violence: Not At Risk (10/6/2023)    Humiliation, Afraid, Rape, and Kick questionnaire     Fear of Current or Ex-Partner: No     Emotionally Abused: No     Physically Abused: No     Sexually Abused: No   Housing Stability: Low Risk  (10/6/2023)    Housing Stability Vital Sign     Unable to Pay for Housing in the Last Year: No     Number of Places Lived in the Last Year: 1     Unstable Housing in the Last Year: No        ROS:  Negative except those noted in current and interim history    Physical  "Exam  Constitutional:       Appearance: Normal appearance.   HENT:      Head: Normocephalic.   Cardiovascular:      Rate and Rhythm: Normal rate and regular rhythm.   Pulmonary:      Breath sounds: Normal breath sounds.   Abdominal:      Palpations: Abdomen is soft.   Skin:     General: Skin is warm.      Comments: No lipodystrophy   Neurological:      General: No focal deficit present.      Mental Status: He is alert and oriented to person, place, and time.   Psychiatric:         Mood and Affect: Mood normal.         Behavior: Behavior normal.          labs and imaging reviewed, pertinent findings listed on HPI and Impression      Problem List Items Addressed This Visit       Diabetes mellitus (Multi) - Primary    Relevant Medications    dulaglutide (Trulicity) 4.5 mg/0.5 mL pen injector (Start on 8/11/2024)    Farxiga 10 mg    insulin glargine (Toujeo Max U-300 SoloStar) 300 unit/mL (3 mL) injection    insulin lispro (HumaLOG KwikPen Insulin) 100 unit/mL injection    pen needle, diabetic (BD Ultra-Fine Sarah Pen Needle) 32 gauge x 5/32\" needle    OneTouch Ultra Test strip    Other Relevant Orders    Albumin-Creatinine Ratio, Urine Random    Hemoglobin A1C    Lipid Panel    Renal Function Panel    Fructosamine    POCT fingerstick glucose manually resulted (Completed)    Ischemic cardiomyopathy    Relevant Medications    Farxiga 10 mg    Chronic systolic heart failure (Multi)    Relevant Medications    Farxiga 10 mg    Hypothyroidism    Relevant Orders    Thyroid Stimulating Hormone    Thyroxine, Free     Other Visit Diagnoses       Acute on chronic HFrEF (heart failure with reduced ejection fraction) (Multi)        Relevant Medications    Farxiga 10 mg          1) DM2 uncontrolled with hyperglycemia     Has complications including neuropathy, CKD, CVD     Has lost weight since trulicity was increased    Unable to make big insulin changes since he doesn't have any BG logs, he has also ran out of the short acting " insulin for at least a week so his fingerstick today was in the 300s.  He was unable to get CGM sensors      Continue trulicity 4.5mg weekly, if still with no significant improvement can try to switch to mounjaro   Switch to U300 toujeo, and increase to 40 units BID   Continue Lispro 35-35-35 with ISS 3:30 >150  Farxiga 10mg daily    Will send for CGM through DME    If still with no significant improvement of A1c will need to do return demo because he clinically does not have severe features of insulin resistance     2) Hypothryoidism  levothryoxine to 75mcg daily   Repeat TFTs prior to next visit     Labs prior to next visit

## 2024-08-15 ENCOUNTER — TELEPHONE (OUTPATIENT)
Dept: CARDIOLOGY | Facility: HOSPITAL | Age: 59
End: 2024-08-15
Payer: COMMERCIAL

## 2024-08-18 ASSESSMENT — EXTERNAL EXAM - RIGHT EYE: OD_EXAM: NORMAL

## 2024-08-18 ASSESSMENT — EXTERNAL EXAM - LEFT EYE: OS_EXAM: NORMAL

## 2024-08-19 ENCOUNTER — APPOINTMENT (OUTPATIENT)
Dept: OPHTHALMOLOGY | Facility: CLINIC | Age: 59
End: 2024-08-19
Payer: COMMERCIAL

## 2024-08-19 DIAGNOSIS — H52.203 ASTIGMATISM OF BOTH EYES, UNSPECIFIED TYPE: ICD-10-CM

## 2024-08-19 DIAGNOSIS — H40.003 GLAUCOMA SUSPECT OF BOTH EYES: ICD-10-CM

## 2024-08-19 DIAGNOSIS — H25.811 COMBINED FORM OF AGE-RELATED CATARACT, RIGHT EYE: Primary | ICD-10-CM

## 2024-08-19 DIAGNOSIS — H52.4 PRESBYOPIA: ICD-10-CM

## 2024-08-19 DIAGNOSIS — H25.812 COMBINED FORM OF AGE-RELATED CATARACT, LEFT EYE: ICD-10-CM

## 2024-08-19 DIAGNOSIS — H52.13 MYOPIA OF BOTH EYES: ICD-10-CM

## 2024-08-19 DIAGNOSIS — E11.3213 BOTH EYES AFFECTED BY MILD NONPROLIFERATIVE DIABETIC RETINOPATHY WITH MACULAR EDEMA, ASSOCIATED WITH TYPE 2 DIABETES MELLITUS (MULTI): ICD-10-CM

## 2024-08-19 RX ORDER — CYCLOPENTOLATE HYDROCHLORIDE 10 MG/ML
1 SOLUTION/ DROPS OPHTHALMIC
OUTPATIENT
Start: 2024-08-19 | End: 2024-08-19

## 2024-08-19 RX ORDER — TETRACAINE HYDROCHLORIDE 5 MG/ML
1 SOLUTION OPHTHALMIC ONCE
OUTPATIENT
Start: 2024-08-19 | End: 2024-08-19

## 2024-08-19 RX ORDER — SODIUM CHLORIDE, SODIUM LACTATE, POTASSIUM CHLORIDE, CALCIUM CHLORIDE 600; 310; 30; 20 MG/100ML; MG/100ML; MG/100ML; MG/100ML
20 INJECTION, SOLUTION INTRAVENOUS CONTINUOUS
OUTPATIENT
Start: 2024-08-19

## 2024-08-19 RX ORDER — PHENYLEPHRINE HYDROCHLORIDE 100 MG/ML
1 SOLUTION/ DROPS OPHTHALMIC
OUTPATIENT
Start: 2024-08-19 | End: 2024-08-19

## 2024-08-19 ASSESSMENT — CUP TO DISC RATIO
OS_RATIO: 0.7
OD_RATIO: 0.55

## 2024-08-19 ASSESSMENT — REFRACTION_WEARINGRX
OS_AXIS: 052
OD_AXIS: 125
OS_ADD: +2.50
OD_ADD: +2.50
OD_CYLINDER: -0.75
OS_CYLINDER: -1.25
OD_SPHERE: -0.50
OS_SPHERE: -0.50

## 2024-08-19 ASSESSMENT — SLIT LAMP EXAM - LIDS
COMMENTS: GOOD POSITION, DERMATOCHALASIS
COMMENTS: GOOD POSITION, DERMATOCHALASIS

## 2024-08-19 ASSESSMENT — TONOMETRY
OD_IOP_MMHG: 11
IOP_METHOD: GOLDMANN APPLANATION
OS_IOP_MMHG: 11

## 2024-08-19 ASSESSMENT — ENCOUNTER SYMPTOMS
ALLERGIC/IMMUNOLOGIC NEGATIVE: 0
NEUROLOGICAL NEGATIVE: 0
CARDIOVASCULAR NEGATIVE: 0
ENDOCRINE NEGATIVE: 0
MUSCULOSKELETAL NEGATIVE: 0
EYES NEGATIVE: 1
HEMATOLOGIC/LYMPHATIC NEGATIVE: 0
CONSTITUTIONAL NEGATIVE: 0
PSYCHIATRIC NEGATIVE: 0
RESPIRATORY NEGATIVE: 0
GASTROINTESTINAL NEGATIVE: 0

## 2024-08-19 ASSESSMENT — VISUAL ACUITY
CORRECTION_TYPE: GLASSES
OS_CC: 20/100
OD_CC: 20/70
METHOD: SNELLEN - LINEAR

## 2024-08-19 ASSESSMENT — REFRACTION_MANIFEST
OS_ADD: +2.50
OS_AXIS: 052
OS_CYLINDER: -1.25
OD_ADD: +2.50
OD_SPHERE: -0.50
OD_AXIS: 125
OD_CYLINDER: -0.75
OS_SPHERE: -0.50

## 2024-08-19 NOTE — PROGRESS NOTES
Combined form of age-related cataract, right eyeH25.811  Combined form of age-related cataract, left eyeH25.812  -Visually significant cataract left eye. BCVA: 20/100. Symptoms: Gradual decrease in vision x 2 years. Harder to read small print. More difficulty seeing small words/numbers on TV. Having more trouble seeing road signs when driving. Glare from headlights when driving at night. A change in glasses prescription will not result in significant visual improvement at this time.  Indication/anticipated outcome for cataract surgery: To potentially improve visual acuity and improve quality of life/reduce symptoms. To obtain a better view of the retina/optic nerve. To reduce anisometropia.  Based on a comprehensive eye exam performed 8/19/24, a visually significant cataract appears to be the source of decreased vision, diminished quality of life, and impairment of activities of daily living. Discussed option of cataract surgery vs observation. Patient can no longer function adequately with current best corrected visual acuity and wishes to have cataract surgery at this time. Discussed surgical procedure with patient. Discussed potential risks, benefits, and complications of cataract surgery including but not limited to pain, bleeding, infection, inflammation, edema, increased eye pressure, retinal tear/detachment, lens dislocation, ptosis, iris damage, need for additional surgery, need for glasses after surgery, loss of vision/loss of eye. Patient understands and wishes to proceed. All questions were answered. Will schedule cataract surgery left eye. Will evaluate other eye following cataract surgery left eye. Lenstar done 8/19/24.   -Pentacam (8/19/24) - OD: Irregular. 43.9/44.5 @ 49.1. OS: Irregular. 43.9/44.6 @ 124.0.   Discussed IOL options (standard monofocal, toric, multifocal). Lens chosen: Standard monofocal. Defer/decline toric/multifocal lens at this time.  Aim: [place] to -0.50. Had thorough discussion  with patient re: aim. Discussed that may potentially need glasses for best vision both at distance and at near.   Special considerations: Will plan to use trypan blue due to dense cataract and poor red reflex. May use epishugarcaine and possible Malyugin Ring/iris hooks if poor dilation on day of surgery. Guarded visual prognosis with cataract surgery due to macular pathology.   Guarded visual prognosis due to history of diabetic retinopathy with macular edema and glaucoma suspect.   Best contact number: [phone]  Drops:   -Ketorolac (or Diclofenac) and Ofloxacin 4x/day starting 1 day prior to surgery; Prednisolone acetate 1% 4x/day starting after surgery  -Offered patient option of post-operative care with me vs post-operative care with Dr. Lamb. Discussed that if there are any complications or post-operative concerns will recommend patient to follow under my care. Patient chooses to have post-operative care with Dr. Lamb due to patient choice. Patient understands that he/she and Dr. Lamb may always contact me in the future with any questions or concerns.   **Referred by Dr. Lamb    Mild nonproliferative diabetic retinopathy, mild, with macular edema, bilateral  -OCT macula (8/19/24) - SS: 4/10 OD and 6/10 OS. Normal thickness and contour OU. Patchy disruption of EZ OD>OS. No edema OU. 198/211.  -HbA1c= 9.2 (6/25/24). Mild nonproliferative diabetic retinopathy, bilateral, with minimal edema, both eyes. History of focal laser OD at [facility]. Continue close monitoring of blood glucose, blood pressure, and cholesterol.  -Followed by Dr. Robison - recommend observation. Guarded visual prognosis with cataract surgery due to macular pathology.     Glaucoma suspect, bilateral  -(+)FH glaucoma - sister  -OCT RNFL (8/19/24) - SS: 4/10 OU. OD: Thin S/I. OS: Thin I, bord S. 69/70.   -Per patient, history of using drops for glaucoma about 3 years ago.  -Will need to monitor IOP perioperatively, plan for repeat  OCT RNFL, and do pachymetry/HVF 24-2 following healing from CEIOL before consideration of restarting gtts. Will restart gtts if elevation of IOP in perioperative period.     Myopia  Astigmatism  Presbyopia  -Defer new Rx. No significant improvement in vision with refraction at this time.       No history of intraocular surgery/refractive surgery.   No FH of AMD/glaucoma

## 2024-08-26 ENCOUNTER — OFFICE VISIT (OUTPATIENT)
Dept: PRIMARY CARE | Facility: HOSPITAL | Age: 59
End: 2024-08-26
Payer: COMMERCIAL

## 2024-08-26 VITALS
DIASTOLIC BLOOD PRESSURE: 60 MMHG | HEIGHT: 71 IN | TEMPERATURE: 97.2 F | OXYGEN SATURATION: 99 % | HEART RATE: 95 BPM | SYSTOLIC BLOOD PRESSURE: 85 MMHG | WEIGHT: 216 LBS | BODY MASS INDEX: 30.24 KG/M2

## 2024-08-26 DIAGNOSIS — I50.23 ACUTE ON CHRONIC HFREF (HEART FAILURE WITH REDUCED EJECTION FRACTION) (MULTI): ICD-10-CM

## 2024-08-26 DIAGNOSIS — Z79.4 TYPE 2 DIABETES MELLITUS WITH HYPERGLYCEMIA, WITH LONG-TERM CURRENT USE OF INSULIN (MULTI): ICD-10-CM

## 2024-08-26 DIAGNOSIS — M54.41 LOW BACK PAIN WITH BILATERAL SCIATICA, UNSPECIFIED BACK PAIN LATERALITY, UNSPECIFIED CHRONICITY: ICD-10-CM

## 2024-08-26 DIAGNOSIS — I25.5 ISCHEMIC CARDIOMYOPATHY: ICD-10-CM

## 2024-08-26 DIAGNOSIS — E08.22 DIABETES MELLITUS DUE TO UNDERLYING CONDITION WITH STAGE 3 CHRONIC KIDNEY DISEASE, WITH LONG-TERM CURRENT USE OF INSULIN, UNSPECIFIED WHETHER STAGE 3A OR 3B CKD (MULTI): ICD-10-CM

## 2024-08-26 DIAGNOSIS — Z79.4 DIABETES MELLITUS DUE TO UNDERLYING CONDITION WITH STAGE 3 CHRONIC KIDNEY DISEASE, WITH LONG-TERM CURRENT USE OF INSULIN, UNSPECIFIED WHETHER STAGE 3A OR 3B CKD (MULTI): ICD-10-CM

## 2024-08-26 DIAGNOSIS — I82.409 DEEP VEIN THROMBOSIS (DVT) OF LOWER EXTREMITY, UNSPECIFIED CHRONICITY, UNSPECIFIED LATERALITY, UNSPECIFIED VEIN (MULTI): ICD-10-CM

## 2024-08-26 DIAGNOSIS — N18.30 STAGE 3 CHRONIC KIDNEY DISEASE, UNSPECIFIED WHETHER STAGE 3A OR 3B CKD (MULTI): ICD-10-CM

## 2024-08-26 DIAGNOSIS — L03.116 CELLULITIS AND ABSCESS OF LEFT LEG: ICD-10-CM

## 2024-08-26 DIAGNOSIS — Z95.810 IMPLANTABLE CARDIOVERTER-DEFIBRILLATOR (ICD) IN SITU: ICD-10-CM

## 2024-08-26 DIAGNOSIS — E03.9 HYPOTHYROIDISM, UNSPECIFIED TYPE: ICD-10-CM

## 2024-08-26 DIAGNOSIS — L02.416 CELLULITIS AND ABSCESS OF LEFT LEG: ICD-10-CM

## 2024-08-26 DIAGNOSIS — E11.69 TYPE 2 DIABETES MELLITUS WITH OTHER SPECIFIED COMPLICATION, WITH LONG-TERM CURRENT USE OF INSULIN (MULTI): Primary | ICD-10-CM

## 2024-08-26 DIAGNOSIS — E11.65 TYPE 2 DIABETES MELLITUS WITH HYPERGLYCEMIA, WITH LONG-TERM CURRENT USE OF INSULIN (MULTI): ICD-10-CM

## 2024-08-26 DIAGNOSIS — N18.30 DIABETES MELLITUS DUE TO UNDERLYING CONDITION WITH STAGE 3 CHRONIC KIDNEY DISEASE, WITH LONG-TERM CURRENT USE OF INSULIN, UNSPECIFIED WHETHER STAGE 3A OR 3B CKD (MULTI): ICD-10-CM

## 2024-08-26 DIAGNOSIS — Z79.4 TYPE 2 DIABETES MELLITUS WITH OTHER SPECIFIED COMPLICATION, WITH LONG-TERM CURRENT USE OF INSULIN (MULTI): Primary | ICD-10-CM

## 2024-08-26 DIAGNOSIS — I50.22 CHRONIC SYSTOLIC HEART FAILURE (MULTI): ICD-10-CM

## 2024-08-26 DIAGNOSIS — G47.33 OSA (OBSTRUCTIVE SLEEP APNEA): ICD-10-CM

## 2024-08-26 DIAGNOSIS — M54.42 LOW BACK PAIN WITH BILATERAL SCIATICA, UNSPECIFIED BACK PAIN LATERALITY, UNSPECIFIED CHRONICITY: ICD-10-CM

## 2024-08-26 LAB — GLUCOSE BLD MANUAL STRIP-MCNC: 348 MG/DL (ref 74–99)

## 2024-08-26 PROCEDURE — 3078F DIAST BP <80 MM HG: CPT

## 2024-08-26 PROCEDURE — 99215 OFFICE O/P EST HI 40 MIN: CPT | Mod: GC

## 2024-08-26 PROCEDURE — 87205 SMEAR GRAM STAIN: CPT

## 2024-08-26 PROCEDURE — 3008F BODY MASS INDEX DOCD: CPT

## 2024-08-26 PROCEDURE — 82947 ASSAY GLUCOSE BLOOD QUANT: CPT

## 2024-08-26 PROCEDURE — 99215 OFFICE O/P EST HI 40 MIN: CPT

## 2024-08-26 PROCEDURE — 3074F SYST BP LT 130 MM HG: CPT

## 2024-08-26 PROCEDURE — 3046F HEMOGLOBIN A1C LEVEL >9.0%: CPT

## 2024-08-26 PROCEDURE — 1036F TOBACCO NON-USER: CPT

## 2024-08-26 PROCEDURE — 36416 COLLJ CAPILLARY BLOOD SPEC: CPT | Mod: GC

## 2024-08-26 RX ORDER — GABAPENTIN 300 MG/1
300 CAPSULE ORAL EVERY MORNING
Qty: 90 CAPSULE | Refills: 0 | Status: SHIPPED | OUTPATIENT
Start: 2024-08-26

## 2024-08-26 RX ORDER — ALBUTEROL SULFATE 0.83 MG/ML
SOLUTION RESPIRATORY (INHALATION)
Qty: 75 ML | Refills: 1 | Status: SHIPPED | OUTPATIENT
Start: 2024-08-26

## 2024-08-26 RX ORDER — DIGOXIN 125 MCG
125 TABLET ORAL DAILY
Qty: 90 TABLET | Refills: 3 | Status: SHIPPED | OUTPATIENT
Start: 2024-08-26

## 2024-08-26 RX ORDER — ATORVASTATIN CALCIUM 80 MG/1
80 TABLET, FILM COATED ORAL DAILY
Qty: 90 TABLET | Refills: 3 | Status: SHIPPED | OUTPATIENT
Start: 2024-08-26 | End: 2025-08-26

## 2024-08-26 RX ORDER — ISOSORBIDE DINITRATE 40 MG/1
40 TABLET ORAL
Qty: 270 TABLET | Refills: 3 | Status: SHIPPED | OUTPATIENT
Start: 2024-08-26 | End: 2025-08-26

## 2024-08-26 RX ORDER — IPRATROPIUM BROMIDE AND ALBUTEROL SULFATE 2.5; .5 MG/3ML; MG/3ML
3 SOLUTION RESPIRATORY (INHALATION) 3 TIMES DAILY PRN
Qty: 180 ML | Refills: 2 | Status: SHIPPED | OUTPATIENT
Start: 2024-08-26 | End: 2024-10-25

## 2024-08-26 RX ORDER — METOPROLOL SUCCINATE 100 MG/1
200 TABLET, EXTENDED RELEASE ORAL DAILY
Qty: 180 TABLET | Refills: 3 | Status: SHIPPED | OUTPATIENT
Start: 2024-08-26 | End: 2025-08-26

## 2024-08-26 RX ORDER — DAPAGLIFLOZIN 10 MG/1
10 TABLET, FILM COATED ORAL DAILY
Qty: 90 TABLET | Refills: 3 | Status: SHIPPED | OUTPATIENT
Start: 2024-08-26 | End: 2025-08-26

## 2024-08-26 RX ORDER — CEPHALEXIN 500 MG/1
500 CAPSULE ORAL 4 TIMES DAILY
Qty: 20 CAPSULE | Refills: 0 | Status: SHIPPED | OUTPATIENT
Start: 2024-08-26 | End: 2024-08-26 | Stop reason: SDUPTHER

## 2024-08-26 RX ORDER — LIDOCAINE 50 MG/G
1 PATCH TOPICAL DAILY
Qty: 1 PATCH | Refills: 1 | Status: SHIPPED | OUTPATIENT
Start: 2024-08-26 | End: 2025-08-26

## 2024-08-26 RX ORDER — PANTOPRAZOLE SODIUM 40 MG/1
TABLET, DELAYED RELEASE ORAL
Qty: 90 TABLET | Refills: 3 | Status: SHIPPED | OUTPATIENT
Start: 2024-08-26

## 2024-08-26 RX ORDER — SPIRONOLACTONE 25 MG/1
50 TABLET ORAL DAILY
Qty: 180 TABLET | Refills: 3 | Status: SHIPPED | OUTPATIENT
Start: 2024-08-26 | End: 2025-08-26

## 2024-08-26 RX ORDER — LEVOTHYROXINE SODIUM 75 UG/1
75 TABLET ORAL DAILY
Qty: 90 TABLET | Refills: 2 | Status: SHIPPED | OUTPATIENT
Start: 2024-08-26 | End: 2025-08-26

## 2024-08-26 RX ORDER — GABAPENTIN 600 MG/1
600 TABLET ORAL NIGHTLY
Qty: 30 TABLET | Refills: 0 | Status: SHIPPED | OUTPATIENT
Start: 2024-08-26 | End: 2024-09-25

## 2024-08-26 RX ORDER — HYDRALAZINE HYDROCHLORIDE 100 MG/1
100 TABLET, FILM COATED ORAL 3 TIMES DAILY
Qty: 270 TABLET | Refills: 3 | Status: SHIPPED | OUTPATIENT
Start: 2024-08-26 | End: 2025-08-26

## 2024-08-26 RX ORDER — BUMETANIDE 2 MG/1
2 TABLET ORAL
Qty: 180 TABLET | Refills: 3 | Status: SHIPPED | OUTPATIENT
Start: 2024-08-26 | End: 2025-08-26

## 2024-08-26 RX ORDER — DULAGLUTIDE 4.5 MG/.5ML
4.5 INJECTION, SOLUTION SUBCUTANEOUS
Qty: 2 ML | Refills: 11 | Status: SHIPPED | OUTPATIENT
Start: 2024-08-26

## 2024-08-26 RX ORDER — CEPHALEXIN 500 MG/1
500 CAPSULE ORAL 4 TIMES DAILY
Qty: 20 CAPSULE | Refills: 0 | Status: SHIPPED | OUTPATIENT
Start: 2024-08-26 | End: 2024-08-31

## 2024-08-26 RX ORDER — QUETIAPINE FUMARATE 50 MG/1
50 TABLET, FILM COATED ORAL NIGHTLY
Qty: 90 TABLET | Refills: 0 | Status: SHIPPED | OUTPATIENT
Start: 2024-08-26

## 2024-08-26 ASSESSMENT — PATIENT HEALTH QUESTIONNAIRE - PHQ9
SUM OF ALL RESPONSES TO PHQ9 QUESTIONS 1 AND 2: 0
2. FEELING DOWN, DEPRESSED OR HOPELESS: NOT AT ALL
1. LITTLE INTEREST OR PLEASURE IN DOING THINGS: NOT AT ALL

## 2024-08-26 ASSESSMENT — ENCOUNTER SYMPTOMS
OCCASIONAL FEELINGS OF UNSTEADINESS: 1
DEPRESSION: 0
LOSS OF SENSATION IN FEET: 1

## 2024-08-26 ASSESSMENT — COLUMBIA-SUICIDE SEVERITY RATING SCALE - C-SSRS
6. HAVE YOU EVER DONE ANYTHING, STARTED TO DO ANYTHING, OR PREPARED TO DO ANYTHING TO END YOUR LIFE?: NO
1. IN THE PAST MONTH, HAVE YOU WISHED YOU WERE DEAD OR WISHED YOU COULD GO TO SLEEP AND NOT WAKE UP?: NO
2. HAVE YOU ACTUALLY HAD ANY THOUGHTS OF KILLING YOURSELF?: NO

## 2024-08-26 ASSESSMENT — PAIN SCALES - GENERAL: PAINLEVEL: 10-WORST PAIN EVER

## 2024-08-26 NOTE — PROGRESS NOTES
HPI:   Felice Law is a 59 y.o. year old male patient with PMHx of of ICM/HFrEF (LVEF 20% 10/2023), HTN, HLD, Type II DM, prior tobacco use (24 pack year, quit 2017), obesity (BMI 31), CAD s/p PCI and 1vCABG (LIMA-LAD with balloon pump support April 2023), VT arrest s/p VT ablation + ICD 2017, DVT (L femoral) on Eliquis, hypothyroidism, pulmonary hypertension, and central sleep apnea presents to [facility] clinic for FUV.    Patient presents to clinic with chief complaint of acute on chronic sciatica. He states that his pain has worsened over the past week and his medications are not working at all. He reports poor sleep as a result of his pain. He reports sharp shooting pain from left buttocks down his posterior leg and all the way. He denies any loss of bladder or bowel function.    Patient reports 1 week history of left anterior shin ulcer. He was seen by podiatry who dressed his ulcer with bacitracin, adaptic, les and coban. There is no purulence, however ulcer is erythematous and with evidence of skin breakdown. He reports pain with ambulation, otherwise denies recent trauma to the site.       Prior to Admission medications    Medication Sig Start Date End Date Taking? Authorizing Provider   albuterol 2.5 mg /3 mL (0.083 %) nebulizer solution Use every 4-6 hours as needed for shortness of breath 4/26/24   Umer Humphrey MD   apixaban (Eliquis) 5 mg tablet Take 1 tablet (5 mg) by mouth 2 times a day. 6/25/24 6/25/25  Jono Duque MD   atorvastatin (Lipitor) 80 mg tablet Take 1 tablet (80 mg) by mouth once daily. 6/25/24 6/25/25  Jono Duque MD   bumetanide (Bumex) 2 mg tablet Take 1 tablet (2 mg) by mouth 2 times daily (morning and late afternoon). 6/25/24 6/25/25  Jono Duque MD   digoxin (Lanoxin) 125 MCG tablet Take 1 tablet (125 mcg) by mouth once daily. 6/25/24   Jono Duque MD   dulaglutide (Trulicity) 4.5 mg/0.5 mL pen injector Inject 0.5 mL under the skin 1 (one)  time per week. 8/11/24   Diana Salazar MD   Farxiga 10 mg Take 1 tablet (10 mg) by mouth once daily. 8/9/24 8/9/25  Diana Salazar MD   FreeStyle Franco 2 [place] misc Use as instructed 4/8/24   Diana Salazar MD   FreeStyle Franco 2 Sensor kit Change every 14 days 4/8/24   Diana Salazar MD   gabapentin (Neurontin) 300 mg capsule Take 1 capsule (300 mg) by mouth once daily in the morning. 4/26/24   Umer Humphrey MD   gabapentin (Neurontin) 600 mg tablet Take 1 tablet (600 mg) by mouth once daily at bedtime. 4/26/24 5/26/24  Umer Humphrey MD   hydrALAZINE (Apresoline) 100 mg tablet Take 1 tablet (100 mg) by mouth 3 times a day. 6/25/24 6/25/25  Jono Duque MD   insulin glargine (Toujeo Max U-300 SoloStar) 300 unit/mL (3 mL) injection 40 units twice a day 8/9/24   Diana Salazar MD   insulin lispro (HumaLOG KwikPen Insulin) 100 unit/mL injection 35 units with meals plus sliding scale up to 150 units daily 8/9/24   Diana Salazar MD   ipratropium-albuteroL (Duo-Neb) 0.5-2.5 mg/3 mL nebulizer solution Take 3 mL by nebulization 3 times a day as needed for wheezing. 4/26/24 6/25/24  Umer Humphrey MD   isosorbide dinitrate (Isordil) 40 mg tablet Take 1 tablet (40 mg) by mouth 3 times a day. 6/25/24 6/25/25  Jono Duque MD   ivabradine (Corlanor) 5 mg tablet Take 0.5 tablets (2.5 mg) by mouth 2 times a day. 6/25/24 6/25/25  Jono Duque MD   lancets (Lancets,Thin) misc 4 times daily (before meals and at bedtime). use as directed to test blood sugar 4/8/24   Diana Salazar MD   levothyroxine (Synthroid, Levoxyl) 75 mcg tablet Take 1 tablet (75 mcg) by mouth once daily. 4/26/24 4/26/25  Umer Humphrey MD   lidocaine (Lidoderm) 5 % patch Place 1 patch over 12 hours on the skin once daily. Apply to painful area 12 hours per day, remove for 12 hours. 4/26/24 4/26/25  Umer Humphrey MD   metoprolol succinate XL (Toprol-XL) 100 mg 24 hr tablet Take 2 tablets (200 mg) by mouth  "once daily. Do not crush or chew. 6/25/24 6/25/25  Jono Duque MD   OneTouch Delica Plus Lancet 30 gauge misc  7/20/23   Historical Provider, MD Urbanuch Ultra Test strip For bg check 4x/day 8/9/24   Diana Salazar MD   pantoprazole (ProtoNix) 40 mg EC tablet TAKE 1 TABLET BY MOUTH ONCE DAILY IN THE MORNING TAKE BEFORE A MEAL DO NOT CRUSH, CHEW, AND/OR DIVIDE 4/26/24   Umer Humphrey MD   pen needle, diabetic (BD Ultra-Fine Sarah Pen Needle) 32 gauge x 5/32\" needle For insulin injection 4x/day 8/9/24   Diana Salazar MD   pen needle, diabetic, safety 29 gauge x 1/2\" needle Three times before meals and at bedtime 3/13/23   Historical Provider, MD   QUEtiapine (SEROquel) 50 mg tablet Take 1 tablet (50 mg) by mouth once daily at bedtime. 4/26/24   Umer Humphrey MD   sacubitriL-valsartan (Entresto)  mg tablet Take 1 tablet by mouth 2 times a day. 6/25/24 6/25/25  Jono Duque MD   spironolactone (Aldactone) 25 mg tablet Take 2 tablets (50 mg) by mouth once daily. 6/25/24 6/25/25  Jono Duque MD   TechLITE Pen Needle 31 gauge x 3/16\" needle use 1 PEN NEEDLE to inject MEDICATION subcutaneously three times a day 10/25/23   Historical Provider, MD           Physical Exam  Constitutional:       General: He is not in acute distress.     Appearance: Normal appearance. He is not ill-appearing.   HENT:      Head: Normocephalic and atraumatic.      Nose: Nose normal. No congestion or rhinorrhea.      Mouth/Throat:      Mouth: Mucous membranes are moist.      Pharynx: Oropharynx is clear.   Eyes:      General: No scleral icterus.     Extraocular Movements: Extraocular movements intact.      Conjunctiva/sclera: Conjunctivae normal.      Pupils: Pupils are equal, round, and reactive to light.   Cardiovascular:      Rate and Rhythm: Normal rate and regular rhythm.      Pulses: Normal pulses.      Heart sounds: Normal heart sounds. No murmur heard.     No gallop.   Pulmonary:      Effort: " "Pulmonary effort is normal.      Breath sounds: Normal breath sounds. No wheezing or rales.   Abdominal:      General: Abdomen is flat. Bowel sounds are normal. There is no distension.      Palpations: Abdomen is soft.      Tenderness: There is no abdominal tenderness. There is no guarding.   Musculoskeletal:         General: No swelling. Normal range of motion.      Cervical back: Normal range of motion and neck supple.      Right lower leg: No edema.      Left lower leg: No edema.   Feet:      Left foot:      Skin integrity: Ulcer, skin breakdown, erythema and warmth present.   Skin:     General: Skin is warm and dry.      Capillary Refill: Capillary refill takes less than 2 seconds.      Coloration: Skin is not jaundiced.      Findings: No lesion or rash.   Neurological:      General: No focal deficit present.      Mental Status: He is alert and oriented to person, place, and time.   Psychiatric:         Mood and Affect: Mood normal.         Behavior: Behavior normal.         Thought Content: Thought content normal.       Visit Vitals  BP 85/60 (BP Location: Left arm, Patient Position: Sitting, BP Cuff Size: Adult)   Pulse 95   Temp 36.2 °C (97.2 °F) (Temporal)   Ht 1.803 m (5' 11\")   Wt 98 kg (216 lb)   SpO2 99%   BMI 30.13 kg/m²   Smoking Status Former   BSA 2.22 m²       Assessment/Plan:   Felice Law 59 Y M PMHx of ICM/HFrEF (LVEF 20% 10/2023), HTN, HLD, Type II DM, prior tobacco use (24 pack year, quit 2017), obesity (BMI 31), CAD s/p PCI and 1vCABG (LIMA-LAD with balloon pump support April 2023), VT arrest s/p VT ablation + ICD 2017, DVT (L femoral) on Eliquis, hypothyroidism, pulmonary hypertension, and central sleep apnea presents to [facility] clinic for FUV.       #Cellulitis of left lower extremity   -Anterior shin wound, initially noticed 1 week ago. Was seen by podiatry on 8/16, ulcer was dressed with bacitracin, adaptic, les and coban. He was started on Keflex however never picked up " medication.  -today, ulcer remains unchanged, mildly warm, erythematous and without purulent drainage.  Plan:  -Wound culture swab obtained, will adjust abx based on culture studies   -Wound care clinic referral ordered  - Keflex 500 mg 4 times daily x 5 days      #T2DM insulin-dependent  #Chronic bilateral leg neuropathy  #Diabetic retinopathy  #Obesity, BMI 31   #HLD  :: today, 323 on 8/9/23  -Follows Endocrinology, last seen 4/8  -Diabetic foot exam: seen by podiatry in 2024  -Diabetic eye exam: seen by ophtho 12/20. RTC 6 months. Referred for cataract surgery evaluation. He was contacted by [facility] to schedule appt with surgeon. Next appt 6/19  -A1c 9.2% 6/25/24.   -Lipids 10/5/23: HDL 24.5; LDL 32; Trig 247; cholesterol 106  -Urine Alb/Cr: ordered 4/2024  PLAN  -Switched to U300 toujeo, and increased to 40 units BID (Switched by Endo 8/9)  -CGM sent through [facility]   -Lispro 35-35-35 with ISS 3:30 >150  -Farxiga 10mg daily  -Trulicity 4.5 mg weekly, increased recently 4/8  -ASA 81 mg and atorvastatin 80mg  -Gabapentin 300 mg AM and 600 mg HS    #Low back pain, w/sciatica bilaterally exacerbated by recent mechanical fall, stable  PLAN  -Ordered XR lumbar spine and hips to rule out acute pathology 10/2023. Not completed patient states his pain is controlled.  -C/w lidocaine patches to apply in pain areas.  -Pain medicine referral sent   -MRI Lumbar spine w/o IV con ordered      #JAKE (central and obstructive sleep apnea)  ::PSG 2/2/2020: evidence of severe sleep apnea, predominantly obstructive but central respiratory events were also noted-Cheyne Chirinos Breathing with prolonged cycle length and circulation time consistent with patient's known HFrEF.   PLAN  -BiPAP nightly at home with no difficulty  -Previously referred to Adult Sleep Medicine, new referral given 4/26 as patient states he never received a call     #COPD/Asthma  #Pulmonary HTN likely WHO group II  #Tobacco use hx (24 Pack-Yr, quit  2017)  ::TTE at [facility] 8/2023: EF 20% globally abnormal RV systolic function. Noninvasive HD assessment consistent with severe pHTN >60mmHg, elevated CVP, elevated LVEDP  ::Spirometry: completed 10/2023. FEV1 . FEV1/FVC:   PLAN  -albuterol 2.5 mg/3 mL nebulizer solution  -ipratropium-albuterol nebulizer     #HFrEF/Ischemic Cardiomyopathy (LVEF 20%, 10/23)  #CAD s/p PCI and CABG LIMA-LAD 4/2023 Dr. Logan  #Hx VT arrest s/p VT ablation + ICD 2017 in [place], [place]  ::Presented in committee meeting on 10/10. Not approved for LVAD or transplant at this time. Determined to be very high risk for advanced therapies at this time given poorly controlled diabetes, renal function, peripheral vascular questions, questionable psychosocial support, and financial concerns. Plan to optimize medical therapy and follow up closely in HF clinic.   ::[facility] 12/2022: severe diffuse 3-vessel CAD. Severe prox diffuse LAD up to 70%.  ::Device- Medtronic ICD; interrogation completed 10/6  ::Repeat ECHO 10/2023: dilated LV cavity, global hypokinesis of LV, mildly reduced RV sys func. LA mod to sev dilated  PLAN  -Follows  HF clinic, last seen 3/19  -Completing cardiac rehab since 1/8 MWF  -bumex 2 mg  -Farxiga 10 mg daily  -hydralazine 100 mg TID/isosorbide dinitrate 40 mg TID  -metoprolol succinate  mg (increased from 150 mg)  -entresto  mg BID  -spironolactone 25 mg daily  -digoxin 125 mcg daily   -Dr. Duque messaged regarding Hydralazine and ISDN, given patient also on GDMT.    #HTN  #CKD Stage IIIb  BP WNL   CMP 12/19 WNL.  Last BUN/Cr (GFR): 303/1.41 (58), 12/19/2023. Baseline Creatinine ~1.4  PLAN  -hydralazine 100 mg TID  -Previously referred to Nephrology per pt request, new referral sent 4/26     #Hypothyroidism  TSH 9.3H; T4 1.04N 10/2023  PLAN  -Levothyroxine 75 mcg (increased from 50 mcg recently)  -T4 1.46  -Endo appt 8/9     #Hx DVT, Left Femoral  ::Repeat LE US 10/2023 appeared to show no acute DVTs in either  lower extremity. Peroneal vein unable to visualize due to edema at the time of exam.  PLAN  -Eliquis 5mg BID     #Syphilis, presumed late latent infection (asymptomatic, acquired >1 year ago), treated remotely  ::10/05/2023 RPR 1:2 and syphilis antibody reactive. Asymptomatic, no skin rash or genital lesion. Contact attempted on 11/09 and 11/10 for patient to come into office to receive shots. Per patient, he was treated for syphilis at age 15 in [place]. In this case, patient's NTT titers (RPR) can remain elevated despite successful treatment (serofast state). After infection, TT (syphilis antibodies) typically stay positive indefinitely. 12/2023 RPR 1:4  PLAN  - RPR monitoring annually (next 12/2024), if fourfold increase in RPR titer (>=1/8) then this indicates re-infection and patient would require re-treatment with IM PCN G 2.4 million units x 3 weeks     #GERD  Controlled.  PLAN  -Prilosec 20 mg  -simethicone 80 mg     #Insomnia  PLAN  -Seroquel 50 mg QHS     Screening  HIV/Hep C: WNL 10/2023  Colonoscopy 10/9/2023: WNL. Repeat 2033  Lung cancer with CT scan: CT Chest w/o IV on 10/05/2023 did not report any abnormal lung nodules. Next due 10/2024.  AAA US: starting at age 65     Vaccines  Flu: 9/2023  Covid: 3 of 4 doses in 9/2021  HAV: 1 of 2 doses in 2022  HBV: 2 of 3 doses 2019  PCV-20: 10/5/2022  TDAP: 7/2019  Zoster 2-dose: 2019       Referrals: Pain Medicine  Return to clinic in 2 weeks for follow-up.     Monik Aponte MD. MPH  Internal Medicine  [facility]   [phone]

## 2024-08-26 NOTE — PATIENT INSTRUCTIONS
As discussed today, our plan is:     # Back pain/Sciatica: Referral to pain management, MRI Spine w/o contrast   #Concern for anterior shin cellulitis: wound culture sent, will start antibiotics keflex 500 mg 4 times per day for 5 days    Labs: Please complete all the labs ordered by your diabetes doctor (Endocrinology)   Medication changes: None  Consultations - you were referred to see the following specialists:     Please come back to see me in: 2 weeks   ------  If you have any problems or questions, please contact the clinic at [phone] to leave a message. Our fax number is [phone]. If your issue cannot wait until the next business day, please go to urgent care or the emergency department.     I also strongly urge all of my patients to register for [facility] by going to: [email or web address]  (The  staff can also send you a text/email link to register when you check out).    No shows: It is understandable if you are unable to make it to a visit, but please cancel your appointment instead of not showing up. This helps to give other patients access to primary care and keeps wait times low.      Monik Aponte MD, MPH  Internal Medicine   [facility]   [phone]    
General

## 2024-08-27 NOTE — PROGRESS NOTES
I saw and evaluated the patient. I personally obtained the key and critical portions of the history and physical exam or was physically present for key and critical portions performed by the resident/fellow. I reviewed the resident/fellow's documentation and discussed the patient with the resident/fellow. I agree with the resident/fellow's medical decision making as documented in the note.    Theodore Lozano MD

## 2024-08-28 LAB
BACTERIA SPEC CULT: NORMAL
GRAM STN SPEC: NORMAL
GRAM STN SPEC: NORMAL

## 2024-09-05 DIAGNOSIS — E11.65 TYPE 2 DIABETES MELLITUS WITH HYPERGLYCEMIA, WITH LONG-TERM CURRENT USE OF INSULIN: ICD-10-CM

## 2024-09-05 DIAGNOSIS — Z79.4 TYPE 2 DIABETES MELLITUS WITH HYPERGLYCEMIA, WITH LONG-TERM CURRENT USE OF INSULIN: ICD-10-CM

## 2024-09-09 ENCOUNTER — TELEPHONE (OUTPATIENT)
Dept: PRIMARY CARE | Facility: HOSPITAL | Age: 59
End: 2024-09-09
Payer: COMMERCIAL

## 2024-09-09 DIAGNOSIS — Z95.810 IMPLANTABLE CARDIOVERTER-DEFIBRILLATOR (ICD) IN SITU: Primary | ICD-10-CM

## 2024-09-09 DIAGNOSIS — Z79.4 TYPE 2 DIABETES MELLITUS WITH HYPERGLYCEMIA, WITH LONG-TERM CURRENT USE OF INSULIN (MULTI): ICD-10-CM

## 2024-09-09 DIAGNOSIS — E11.65 TYPE 2 DIABETES MELLITUS WITH HYPERGLYCEMIA, WITH LONG-TERM CURRENT USE OF INSULIN (MULTI): ICD-10-CM

## 2024-09-09 NOTE — PROGRESS NOTES
Ordered 2 view CXR to eval ICD prior to MRI. Tried to call patient to let her know that this order is in but unable to get through

## 2024-09-09 NOTE — TELEPHONE ENCOUNTER
THIS PATIENT IS CURRENTLY GOING THROUGH [facility]ICE [facility]AR[facility] FOR AN MRI AND WILL NEED TO HAVE A 2 VIEW CHEST XRAY DONE.  CAN YOUR OFFICE PLEASE PUT AN ORDER IN THE SYSTEM?

## 2024-09-10 RX ORDER — GABAPENTIN 600 MG/1
TABLET ORAL
Qty: 30 TABLET | Refills: 11 | Status: SHIPPED | OUTPATIENT
Start: 2024-09-10

## 2024-09-24 ENCOUNTER — APPOINTMENT (OUTPATIENT)
Dept: CARDIOLOGY | Facility: HOSPITAL | Age: 59
End: 2024-09-24
Payer: COMMERCIAL

## 2024-10-09 RX ORDER — DULAGLUTIDE 4.5 MG/.5ML
4.5 INJECTION, SOLUTION SUBCUTANEOUS
Qty: 2 ML | Refills: 0 | Status: SHIPPED | OUTPATIENT
Start: 2024-10-13

## 2024-10-15 DIAGNOSIS — E11.65 TYPE 2 DIABETES MELLITUS WITH HYPERGLYCEMIA, WITH LONG-TERM CURRENT USE OF INSULIN: ICD-10-CM

## 2024-10-15 DIAGNOSIS — Z79.4 TYPE 2 DIABETES MELLITUS WITH HYPERGLYCEMIA, WITH LONG-TERM CURRENT USE OF INSULIN: ICD-10-CM

## 2024-10-17 RX ORDER — INSULIN LISPRO 100 [IU]/ML
INJECTION, SOLUTION INTRAVENOUS; SUBCUTANEOUS
Qty: 150 ML | Refills: 2 | Status: SHIPPED | OUTPATIENT
Start: 2024-10-17

## 2024-10-18 DIAGNOSIS — H25.811 COMBINED FORM OF AGE-RELATED CATARACT, RIGHT EYE: Primary | ICD-10-CM

## 2024-10-18 DIAGNOSIS — H25.812 COMBINED FORM OF AGE-RELATED CATARACT, LEFT EYE: ICD-10-CM

## 2024-10-18 RX ORDER — PREDNISOLONE ACETATE 10 MG/ML
SUSPENSION/ DROPS OPHTHALMIC
Qty: 5 ML | Refills: 2 | Status: SHIPPED | OUTPATIENT
Start: 2024-10-18

## 2024-10-18 RX ORDER — OFLOXACIN 3 MG/ML
SOLUTION/ DROPS OPHTHALMIC
Qty: 5 ML | Refills: 2 | Status: SHIPPED | OUTPATIENT
Start: 2024-10-18

## 2024-10-18 RX ORDER — KETOROLAC TROMETHAMINE 5 MG/ML
SOLUTION OPHTHALMIC
Qty: 5 ML | Refills: 2 | Status: SHIPPED | OUTPATIENT
Start: 2024-10-18

## 2024-10-24 ENCOUNTER — APPOINTMENT (OUTPATIENT)
Dept: SLEEP MEDICINE | Facility: CLINIC | Age: 59
End: 2024-10-24
Payer: COMMERCIAL

## 2024-10-24 DIAGNOSIS — J44.9 CHRONIC OBSTRUCTIVE PULMONARY DISEASE, UNSPECIFIED COPD TYPE (MULTI): Chronic | ICD-10-CM

## 2024-10-24 DIAGNOSIS — I50.22 CHRONIC SYSTOLIC HEART FAILURE: Primary | ICD-10-CM

## 2024-10-24 DIAGNOSIS — G47.33 OSA (OBSTRUCTIVE SLEEP APNEA): ICD-10-CM

## 2024-10-24 PROCEDURE — G2211 COMPLEX E/M VISIT ADD ON: HCPCS | Performed by: INTERNAL MEDICINE

## 2024-10-24 PROCEDURE — 99205 OFFICE O/P NEW HI 60 MIN: CPT | Performed by: INTERNAL MEDICINE

## 2024-10-24 PROCEDURE — 3046F HEMOGLOBIN A1C LEVEL >9.0%: CPT | Performed by: INTERNAL MEDICINE

## 2024-10-24 PROCEDURE — 1036F TOBACCO NON-USER: CPT | Performed by: INTERNAL MEDICINE

## 2024-10-24 NOTE — ASSESSMENT & PLAN NOTE
Can contribute to his Cheyne-Chirinos respirations.  Did review the sleep study report myself but cannot review the raw data

## 2024-10-24 NOTE — ASSESSMENT & PLAN NOTE
Likely has overlap syndrome with COPD and JAKE.  If he needs retitration would recommend we do this in lab due to his comorbidities

## 2024-10-24 NOTE — LETTER
2024     Rivera Morales MD MPH  [address]  Department Of Medicine-General Internal  [facility] [address]    Patient: Felice Law   YOB: 1965   Date of Visit: 10/24/2024       Dear Dr. Rivera Morales MD MPH:    Thank you for referring Felice Law to me for evaluation. Below are my notes for this consultation.  If you have questions, please do not hesitate to call me. I look forward to following your patient along with you.       Sincerely,     Tapan Hammond MD      CC: Monik Aponte MD MPH  ______________________________________________________________________________________       Patient: Felice Law    [id number]  : 1965 -- AGE 59 y.o.    Provider: Tapan Hammond MD     Location [facility]   Service Date: 10/24/2024              [facility]  New Visit Note      Subjective    HISTORY OF PRESENT ILLNESS     The patient's referring provider is: Rivera Morales MD *    HISTORY OF PRESENT ILLNESS   Felice Law is a 59 y.o. male who presents to a [facility] for a sleep medicine evaluation with concerns of Sleep Apnea, Pap Adherence Followup, and Needs Pap Supplies/new Pap Machine.     The patient  has a past medical history of Cataract, Chest pain (10/01/2023), CHF (congestive heart failure), COPD (chronic obstructive pulmonary disease) (Multi), Coronary artery disease, Diabetes mellitus (Multi), Disease of thyroid gland, Elevated troponin level not due myocardial infarction (2023), and Hypertension..    PAST SLEEP HISTORY  Split-night sleep study: 2021: Diagnostic portion AHI 49.4, FARRAH 0.9.  ETCO2 did not demonstrate hypoventilation.  Cheyne-Chirinos breathing was not observed during diagnostic portion of study.  Titration portion: Cheyne-Chirinos breathing/periodic breathing present.  CPAP transition of bilevel PAP due to central events  with backup rate of 12 breaths/min added at bilevel PAP 13/9 cm H2O patient was titrated to bilevel PAP 19/13 cm H2O with backup rate of 12 patient slept propped up with 2-3 pillows behind him and took Ambien.  Recommendation bilevel PAP 18/13 cm H2O with backup rate of 12.  REM sleep was not captured at this setting, residual AHI 5.4.  Treatment emergent central apnea.  Auto triggering noted at 18/13 cm H2O with backup rate of 12 and 19/13 cm H2O with backup rate of 12 and persisted to 14/10.  Masslike increase with increase in pressure switch to CPAP 11 cm H2O to reset the breath rate titration essentially incomplete.  Recommended returning to full night bilevel titration beginning at bilevel PAP 15/9 cm H2O with backup rate of 14-16.  Encouraging physical positional therapy and a ResMed AirFit F20 fullface mask    CURRENT HISTORY    On today's visit, the patient presents to discuss Sleep Apnea, Pap Adherence Followup, and Needs Pap Supplies/new Pap Machine     The patient was seen with the assistance of the Christian: #:  [id number]  Referral request by patient's PCP Dr. Monik Aponte: Recommend referral to sleep medicine for history of central respiratory events and Cheyne-Chirinos breathing  He did not bring his machine with him. His doctor PCP he is on seroquel and neurontin and he is sleeping well otherwise  He reports that he takes Seroquel and Neurontin    REVIEW OF SYSTEMS     REVIEW OF SYSTEMS  Review of Systems    Sleep-related ROS:  snoring and witnessed apneas      RLS Screen:  - Sensations: Patient does not have unusual sensations in their extremities that cause an urge to move them     Sleep-related behaviors:   DENIES  dreams upon falling asleep  hypnagogic/hypnopompic hallucinations  sleep paralysis  symptoms of cataplexy  sleep walking  kicking, punching, or acting out dreams    Daytime Symptoms    Patient denies daytime symptoms including: Denies: excessive daytime sleepiness    ALLERGIES AND  MEDICATIONS     ALLERGIES  Allergies   Allergen Reactions   • Azithromycin Dizziness and Unknown     Dizziness, Vertigo, Cold, Clammy, Nausea, Sweating, trouble breathing       MEDICATIONS  Current Outpatient Medications   Medication Sig Dispense Refill   • albuterol 2.5 mg /3 mL (0.083 %) nebulizer solution Use every 4-6 hours as needed for shortness of breath 75 mL 1   • apixaban (Eliquis) 5 mg tablet Take 1 tablet (5 mg) by mouth 2 times a day. 180 tablet 3   • atorvastatin (Lipitor) 80 mg tablet Take 1 tablet (80 mg) by mouth once daily. 90 tablet 3   • bumetanide (Bumex) 2 mg tablet Take 1 tablet (2 mg) by mouth 2 times daily (morning and late afternoon). 180 tablet 3   • digoxin (Lanoxin) 125 MCG tablet Take 1 tablet (125 mcg) by mouth once daily. 90 tablet 3   • dulaglutide (Trulicity) 4.5 mg/0.5 mL pen injector Inject 0.5 mL under the skin 1 (one) time per week. 2 mL 11   • dulaglutide (Trulicity) 4.5 mg/0.5 mL pen injector Inject 0.5 mL under the skin 1 (one) time per week. 2 mL 0   • Farxiga 10 mg Take 1 tablet (10 mg) by mouth once daily. 90 tablet 3   • FreeStyle Franco 2 [place] misc Use as instructed 1 each 0   • FreeStyle Franco 2 Sensor kit Change every 14 days 2 each 3   • gabapentin (Neurontin) 300 mg capsule TOME 1 CAPSULA POR LA BOCA NITIN  VEZ AL CUBA EN LA MANANA 90 capsule 0   • gabapentin (Neurontin) 600 mg tablet TOME 1 TABLETA POR LA BOCA NITIN  VEZ AL CUBA AL ACOSTARSE 30 tablet 11   • hydrALAZINE (Apresoline) 100 mg tablet Take 1 tablet (100 mg) by mouth 3 times a day. 270 tablet 3   • insulin glargine (Toujeo Max U-300 SoloStar) 300 unit/mL (3 mL) injection 40 units twice a day 30 mL 3   • insulin lispro (HumaLOG KwikPen Insulin) 100 unit/mL injection INYECTE DEBAJO DE LA PIEL 35  UNIDADES CON LAS COMIDAS CON LA  ESCALA MOVIL HASTA 150 UNIDADES CADA CUBA 150 mL 2   • ipratropium-albuteroL (Duo-Neb) 0.5-2.5 mg/3 mL nebulizer solution Take 3 mL by nebulization 3 times a day as needed for wheezing.  "180 mL 2   • isosorbide dinitrate (Isordil) 40 mg tablet Take 1 tablet (40 mg) by mouth 3 times a day. 270 tablet 3   • ivabradine (Corlanor) 5 mg tablet Take 0.5 tablets (2.5 mg) by mouth 2 times a day. 90 tablet 3   • ketorolac (Acular) 0.5 % ophthalmic solution 1 drop to surgical eye 4 times a day starting 1 day before surgery 5 mL 2   • lancets (Lancets,Thin) misc 4 times daily (before meals and at bedtime). use as directed to test blood sugar 100 each 3   • levothyroxine (Synthroid, Levoxyl) 75 mcg tablet Take 1 tablet (75 mcg) by mouth once daily. 90 tablet 2   • lidocaine (Lidoderm) 5 % patch Place 1 patch over 12 hours on the skin once daily. Apply to painful area 12 hours per day, remove for 12 hours. 1 patch 1   • metoprolol succinate XL (Toprol-XL) 100 mg 24 hr tablet Take 2 tablets (200 mg) by mouth once daily. Do not crush or chew. 180 tablet 3   • ofloxacin (Ocuflox) 0.3 % ophthalmic solution 1 drop to operative eye 4 times a day starting 1 day before surgery 5 mL 2   • OneTouch Delica Plus Lancet 30 gauge misc      • OneTouch Ultra Test strip For bg check 4x/day 200 strip 3   • pantoprazole (ProtoNix) 40 mg EC tablet TAKE 1 TABLET BY MOUTH ONCE DAILY IN THE MORNING TAKE BEFORE A MEAL DO NOT CRUSH, CHEW, AND/OR DIVIDE 90 tablet 3   • pen needle, diabetic (BD Ultra-Fine Sarah Pen Needle) 32 gauge x 5/32\" needle For insulin injection 4x/day 200 each 5   • pen needle, diabetic, safety 29 gauge x 1/2\" needle Three times before meals and at bedtime     • prednisoLONE acetate (Pred-Forte) 1 % ophthalmic suspension 1 drop to operative eye 4 times a day starting the day of surgery (after surgery is done) 5 mL 2   • QUEtiapine (SEROquel) 50 mg tablet TOME 1 TABLETA POR LA BOCA NITIN  VEZ AL CUBA AL ACOSTARSE 90 tablet 0   • sacubitriL-valsartan (Entresto)  mg tablet Take 1 tablet by mouth 2 times a day. 180 tablet 3   • spironolactone (Aldactone) 25 mg tablet Take 2 tablets (50 mg) by mouth once daily. 180 " "tablet 3   • TechLITE Pen Needle 31 gauge x 3/16\" needle use 1 PEN NEEDLE to inject MEDICATION subcutaneously three times a day       Current Facility-Administered Medications   Medication Dose Route Frequency Provider Last Rate Last Admin   • bacitracin ointment 1 Application  1 Application Topical Once Umer Humphrey MD           PAST HISTORY     PAST MEDICAL HISTORY  He  has a past medical history of Cataract, Chest pain (10/01/2023), CHF (congestive heart failure), COPD (chronic obstructive pulmonary disease) (Multi), Coronary artery disease, Diabetes mellitus (Multi), Disease of thyroid gland, Elevated troponin level not due myocardial infarction (08/14/2023), and Hypertension.    PAST SURGICAL HISTORY:  Past Surgical History:   Procedure Laterality Date   • CARDIAC DEFIBRILLATOR PLACEMENT     • CORONARY ANGIOPLASTY WITH STENT PLACEMENT     • CORONARY ARTERY BYPASS GRAFT         FAMILY HISTORY  Family History   Family history unknown: Yes         SOCIAL HISTORY  He  reports that he quit smoking about 7 years ago. His smoking use included cigarettes. He started smoking about 31 years ago. He has a 24 pack-year smoking history. He has never used smokeless tobacco. He reports that he does not drink alcohol and does not use drugs.      PHYSICAL EXAM   Objective  VITAL SIGNS: There were no vitals taken for this visit.     CURRENT WEIGHT: There were no vitals filed for this visit.   BMI: There is no height or weight on file to calculate BMI.   PREVIOUS WEIGHTS:  Wt Readings from Last 3 Encounters:   08/26/24 98 kg (216 lb)   08/09/24 99.8 kg (220 lb)   06/25/24 103 kg (227 lb 9.6 oz)       Physical Exam  PHYSICAL EXAM: GENERAL: alert pleasant and cooperative no acute distress  nasal mucosa  and normal  MODIFIED PAUL SCORE: III  MODIFIED MALLAMPATI SCORE: IV (only hard palate visible)  UVULA: midline  TONSILLAR SCORE: 0  TONGUE SCALLOPING: enlarged with scalloping  PSYCH EXAM: alert,oriented, in NAD with a full " range of affect, normal behavior and no psychotic features      RESULTS/DATA     Bicarbonate (mmol/L)   Date Value   06/25/2024 33 (H)   12/19/2023 31   11/02/2023 29     Iron (ug/dL)   Date Value   06/25/2024 65   10/04/2023 51     % Saturation (%)   Date Value   06/25/2024 20 (L)   10/04/2023 15 (L)     TIBC (ug/dL)   Date Value   06/25/2024 322   10/04/2023 335     Ferritin (ng/mL)   Date Value   06/25/2024 332 (H)   10/04/2023 379 (H)           ASSESSMENT/PLAN     Mr. Mona Law is a 59 y.o. male and  has a past medical history of Cataract, Chest pain (10/01/2023), CHF (congestive heart failure), COPD (chronic obstructive pulmonary disease) (Multi), Coronary artery disease, Diabetes mellitus (Multi), Disease of thyroid gland, Elevated troponin level not due myocardial infarction (08/14/2023), and Hypertension. He was referred to the [facility] for evaluation of Sleep Apnea, Pap Adherence Followup, and Needs Pap Supplies/new Pap Machine       Problem List and Orders  Problem List Items Addressed This Visit             ICD-10-CM    COPD (chronic obstructive pulmonary disease) (Multi) (Chronic) J44.9     Likely has overlap syndrome with COPD and JAKE.  If he needs retitration would recommend we do this in lab due to his comorbidities         JAKE (obstructive sleep apnea) G47.33     I instructed Felice to bring his PAP therapy machine which I suspect is a bilevel PAP ST, however I do not have any indication of who his DME supplier is and he is not aware of who it is.  So I request that he bring back the machine with the power supply so I can obtain a download of the equipment to see how effective his PAP therapy is and whether or not he would need a retitration.  He reports sleeping well being his Seroquel and Neurontin.  There are some concerns that if he is not have adequate control of his sleep apnea can affect his heart failure in a negative way.  Will reevaluate follow-up  based on his compliance report.   If he requires in lab retitration then I would recommend that we Start with CPAP and titrate pressures to manage obstructive events and add oxygen therapy if central apneas or hypopneas occur prior to trying bilevel PAP ST.  He is not a candidate for auto servo ventilation due to his significant heart failure based on most recent echocardiogram.  He will follow-up with me after I get his compliance report, timing of follow-up is dependent on that report         Chronic systolic heart failure - Primary I50.22     Can contribute to his Cheyne-Chirinos respirations.  Did review the sleep study report myself but cannot review the raw data

## 2024-10-24 NOTE — ASSESSMENT & PLAN NOTE
I instructed Felice to bring his PAP therapy machine which I suspect is a bilevel PAP ST, however I do not have any indication of who his DME supplier is and he is not aware of who it is.  So I request that he bring back the machine with the power supply so I can obtain a download of the equipment to see how effective his PAP therapy is and whether or not he would need a retitration.  He reports sleeping well being his Seroquel and Neurontin.  There are some concerns that if he is not have adequate control of his sleep apnea can affect his heart failure in a negative way.  Will reevaluate follow-up based on his compliance report.   If he requires in lab retitration then I would recommend that we Start with CPAP and titrate pressures to manage obstructive events and add oxygen therapy if central apneas or hypopneas occur prior to trying bilevel PAP ST.  He is not a candidate for auto servo ventilation due to his significant heart failure based on most recent echocardiogram.  He will follow-up with me after I get his compliance report, timing of follow-up is dependent on that report

## 2024-10-24 NOTE — PROGRESS NOTES
Patient: Felice Law    [id number]  : 1965 -- AGE 59 y.o.    Provider: Tapan Hammond MD     Location [facility]   Service Date: 10/24/2024              [facility]  New Visit Note      Subjective     HISTORY OF PRESENT ILLNESS     The patient's referring provider is: Rivera Morales MD *    HISTORY OF PRESENT ILLNESS   Felice Law is a 59 y.o. male who presents to a [facility] for a sleep medicine evaluation with concerns of Sleep Apnea, Pap Adherence Followup, and Needs Pap Supplies/new Pap Machine.     The patient  has a past medical history of Cataract, Chest pain (10/01/2023), CHF (congestive heart failure), COPD (chronic obstructive pulmonary disease) (Multi), Coronary artery disease, Diabetes mellitus (Multi), Disease of thyroid gland, Elevated troponin level not due myocardial infarction (2023), and Hypertension..    PAST SLEEP HISTORY  Split-night sleep study: 2021: Diagnostic portion AHI 49.4, FARRAH 0.9.  ETCO2 did not demonstrate hypoventilation.  Cheyne-Chirinos breathing was not observed during diagnostic portion of study.  Titration portion: Cheyne-Chirinos breathing/periodic breathing present.  CPAP transition of bilevel PAP due to central events with backup rate of 12 breaths/min added at bilevel PAP 13/9 cm H2O patient was titrated to bilevel PAP 19/13 cm H2O with backup rate of 12 patient slept propped up with 2-3 pillows behind him and took Ambien.  Recommendation bilevel PAP 18/13 cm H2O with backup rate of 12.  REM sleep was not captured at this setting, residual AHI 5.4.  Treatment emergent central apnea.  Auto triggering noted at 18/13 cm H2O with backup rate of 12 and 19/13 cm H2O with backup rate of 12 and persisted to 14/10.  Masslike increase with increase in pressure switch to CPAP 11 cm H2O to reset the breath rate titration essentially incomplete.  Recommended  returning to full night bilevel titration beginning at bilevel PAP 15/9 cm H2O with backup rate of 14-16.  Encouraging physical positional therapy and a ResMed AirFit F20 fullface mask    CURRENT HISTORY    On today's visit, the patient presents to discuss Sleep Apnea, Pap Adherence Followup, and Needs Pap Supplies/new Pap Machine     The patient was seen with the assistance of the Christian: #:  [id number]  Referral request by patient's PCP Dr. Monik Aponte: Recommend referral to sleep medicine for history of central respiratory events and Cheyne-Chirinos breathing  He did not bring his machine with him. His doctor PCP he is on seroquel and neurontin and he is sleeping well otherwise  He reports that he takes Seroquel and Neurontin    REVIEW OF SYSTEMS     REVIEW OF SYSTEMS  Review of Systems    Sleep-related ROS:  snoring and witnessed apneas      RLS Screen:  - Sensations: Patient does not have unusual sensations in their extremities that cause an urge to move them     Sleep-related behaviors:   DENIES  dreams upon falling asleep  hypnagogic/hypnopompic hallucinations  sleep paralysis  symptoms of cataplexy  sleep walking  kicking, punching, or acting out dreams    Daytime Symptoms    Patient denies daytime symptoms including: Denies: excessive daytime sleepiness    ALLERGIES AND MEDICATIONS     ALLERGIES  Allergies   Allergen Reactions    Azithromycin Dizziness and Unknown     Dizziness, Vertigo, Cold, Clammy, Nausea, Sweating, trouble breathing       MEDICATIONS  Current Outpatient Medications   Medication Sig Dispense Refill    albuterol 2.5 mg /3 mL (0.083 %) nebulizer solution Use every 4-6 hours as needed for shortness of breath 75 mL 1    apixaban (Eliquis) 5 mg tablet Take 1 tablet (5 mg) by mouth 2 times a day. 180 tablet 3    atorvastatin (Lipitor) 80 mg tablet Take 1 tablet (80 mg) by mouth once daily. 90 tablet 3    bumetanide (Bumex) 2 mg tablet Take 1 tablet (2 mg) by mouth 2 times daily (morning  and late afternoon). 180 tablet 3    digoxin (Lanoxin) 125 MCG tablet Take 1 tablet (125 mcg) by mouth once daily. 90 tablet 3    dulaglutide (Trulicity) 4.5 mg/0.5 mL pen injector Inject 0.5 mL under the skin 1 (one) time per week. 2 mL 11    dulaglutide (Trulicity) 4.5 mg/0.5 mL pen injector Inject 0.5 mL under the skin 1 (one) time per week. 2 mL 0    Farxiga 10 mg Take 1 tablet (10 mg) by mouth once daily. 90 tablet 3    FreeStyle Franco 2 [place] misc Use as instructed 1 each 0    FreeStyle Franco 2 Sensor kit Change every 14 days 2 each 3    gabapentin (Neurontin) 300 mg capsule TOME 1 CAPSULA POR LA BOCA NITIN  VEZ AL CUBA EN LA MANANA 90 capsule 0    gabapentin (Neurontin) 600 mg tablet TOME 1 TABLETA POR LA BOCA NITIN  VEZ AL CUBA AL ACOSTARSE 30 tablet 11    hydrALAZINE (Apresoline) 100 mg tablet Take 1 tablet (100 mg) by mouth 3 times a day. 270 tablet 3    insulin glargine (Toujeo Max U-300 SoloStar) 300 unit/mL (3 mL) injection 40 units twice a day 30 mL 3    insulin lispro (HumaLOG KwikPen Insulin) 100 unit/mL injection INYECTE DEBAJO DE LA PIEL 35  UNIDADES CON LAS COMIDAS CON LA  ESCALA MOVIL HASTA 150 UNIDADES CADA CUBA 150 mL 2    ipratropium-albuteroL (Duo-Neb) 0.5-2.5 mg/3 mL nebulizer solution Take 3 mL by nebulization 3 times a day as needed for wheezing. 180 mL 2    isosorbide dinitrate (Isordil) 40 mg tablet Take 1 tablet (40 mg) by mouth 3 times a day. 270 tablet 3    ivabradine (Corlanor) 5 mg tablet Take 0.5 tablets (2.5 mg) by mouth 2 times a day. 90 tablet 3    ketorolac (Acular) 0.5 % ophthalmic solution 1 drop to surgical eye 4 times a day starting 1 day before surgery 5 mL 2    lancets (Lancets,Thin) misc 4 times daily (before meals and at bedtime). use as directed to test blood sugar 100 each 3    levothyroxine (Synthroid, Levoxyl) 75 mcg tablet Take 1 tablet (75 mcg) by mouth once daily. 90 tablet 2    lidocaine (Lidoderm) 5 % patch Place 1 patch over 12 hours on the skin once daily. Apply to  "painful area 12 hours per day, remove for 12 hours. 1 patch 1    metoprolol succinate XL (Toprol-XL) 100 mg 24 hr tablet Take 2 tablets (200 mg) by mouth once daily. Do not crush or chew. 180 tablet 3    ofloxacin (Ocuflox) 0.3 % ophthalmic solution 1 drop to operative eye 4 times a day starting 1 day before surgery 5 mL 2    OneTouch Delica Plus Lancet 30 gauge misc       OneTouch Ultra Test strip For bg check 4x/day 200 strip 3    pantoprazole (ProtoNix) 40 mg EC tablet TAKE 1 TABLET BY MOUTH ONCE DAILY IN THE MORNING TAKE BEFORE A MEAL DO NOT CRUSH, CHEW, AND/OR DIVIDE 90 tablet 3    pen needle, diabetic (BD Ultra-Fine Sarah Pen Needle) 32 gauge x 5/32\" needle For insulin injection 4x/day 200 each 5    pen needle, diabetic, safety 29 gauge x 1/2\" needle Three times before meals and at bedtime      prednisoLONE acetate (Pred-Forte) 1 % ophthalmic suspension 1 drop to operative eye 4 times a day starting the day of surgery (after surgery is done) 5 mL 2    QUEtiapine (SEROquel) 50 mg tablet TOME 1 TABLETA POR LA BOCA NITIN  VEZ AL CUBA AL ACOSTARSE 90 tablet 0    sacubitriL-valsartan (Entresto)  mg tablet Take 1 tablet by mouth 2 times a day. 180 tablet 3    spironolactone (Aldactone) 25 mg tablet Take 2 tablets (50 mg) by mouth once daily. 180 tablet 3    TechLITE Pen Needle 31 gauge x 3/16\" needle use 1 PEN NEEDLE to inject MEDICATION subcutaneously three times a day       Current Facility-Administered Medications   Medication Dose Route Frequency Provider Last Rate Last Admin    bacitracin ointment 1 Application  1 Application Topical Once Umer Humphrey MD           PAST HISTORY     PAST MEDICAL HISTORY  He  has a past medical history of Cataract, Chest pain (10/01/2023), CHF (congestive heart failure), COPD (chronic obstructive pulmonary disease) (Multi), Coronary artery disease, Diabetes mellitus (Multi), Disease of thyroid gland, Elevated troponin level not due myocardial infarction (08/14/2023), and " Hypertension.    PAST SURGICAL HISTORY:  Past Surgical History:   Procedure Laterality Date    CARDIAC DEFIBRILLATOR PLACEMENT      CORONARY ANGIOPLASTY WITH STENT PLACEMENT      CORONARY ARTERY BYPASS GRAFT         FAMILY HISTORY  Family History   Family history unknown: Yes         SOCIAL HISTORY  He  reports that he quit smoking about 7 years ago. His smoking use included cigarettes. He started smoking about 31 years ago. He has a 24 pack-year smoking history. He has never used smokeless tobacco. He reports that he does not drink alcohol and does not use drugs.      PHYSICAL EXAM   Objective   VITAL SIGNS: There were no vitals taken for this visit.     CURRENT WEIGHT: There were no vitals filed for this visit.   BMI: There is no height or weight on file to calculate BMI.   PREVIOUS WEIGHTS:  Wt Readings from Last 3 Encounters:   08/26/24 98 kg (216 lb)   08/09/24 99.8 kg (220 lb)   06/25/24 103 kg (227 lb 9.6 oz)       Physical Exam  PHYSICAL EXAM: GENERAL: alert pleasant and cooperative no acute distress  nasal mucosa  and normal  MODIFIED PAUL SCORE: III  MODIFIED MALLAMPATI SCORE: IV (only hard palate visible)  UVULA: midline  TONSILLAR SCORE: 0  TONGUE SCALLOPING: enlarged with scalloping  PSYCH EXAM: alert,oriented, in NAD with a full range of affect, normal behavior and no psychotic features      RESULTS/DATA     Bicarbonate (mmol/L)   Date Value   06/25/2024 33 (H)   12/19/2023 31   11/02/2023 29     Iron (ug/dL)   Date Value   06/25/2024 65   10/04/2023 51     % Saturation (%)   Date Value   06/25/2024 20 (L)   10/04/2023 15 (L)     TIBC (ug/dL)   Date Value   06/25/2024 322   10/04/2023 335     Ferritin (ng/mL)   Date Value   06/25/2024 332 (H)   10/04/2023 379 (H)           ASSESSMENT/PLAN     Mr. Mona Law is a 59 y.o. male and  has a past medical history of Cataract, Chest pain (10/01/2023), CHF (congestive heart failure), COPD (chronic obstructive pulmonary disease) (Multi), Coronary artery  disease, Diabetes mellitus (Multi), Disease of thyroid gland, Elevated troponin level not due myocardial infarction (08/14/2023), and Hypertension. He was referred to the [facility] for evaluation of Sleep Apnea, Pap Adherence Followup, and Needs Pap Supplies/new Pap Machine       Problem List and Orders  Problem List Items Addressed This Visit             ICD-10-CM    COPD (chronic obstructive pulmonary disease) (Multi) (Chronic) J44.9     Likely has overlap syndrome with COPD and JAKE.  If he needs retitration would recommend we do this in lab due to his comorbidities         JAKE (obstructive sleep apnea) G47.33     I instructed Felice to bring his PAP therapy machine which I suspect is a bilevel PAP ST, however I do not have any indication of who his DME supplier is and he is not aware of who it is.  So I request that he bring back the machine with the power supply so I can obtain a download of the equipment to see how effective his PAP therapy is and whether or not he would need a retitration.  He reports sleeping well being his Seroquel and Neurontin.  There are some concerns that if he is not have adequate control of his sleep apnea can affect his heart failure in a negative way.  Will reevaluate follow-up based on his compliance report.   If he requires in lab retitration then I would recommend that we Start with CPAP and titrate pressures to manage obstructive events and add oxygen therapy if central apneas or hypopneas occur prior to trying bilevel PAP ST.  He is not a candidate for auto servo ventilation due to his significant heart failure based on most recent echocardiogram.  He will follow-up with me after I get his compliance report, timing of follow-up is dependent on that report         Chronic systolic heart failure - Primary I50.22     Can contribute to his Cheyne-Chirinos respirations.  Did review the sleep study report myself but cannot review the raw data

## 2024-10-30 ENCOUNTER — ANESTHESIA EVENT (OUTPATIENT)
Dept: OPERATING ROOM | Facility: CLINIC | Age: 59
End: 2024-10-30
Payer: COMMERCIAL

## 2024-10-31 ENCOUNTER — ANESTHESIA (OUTPATIENT)
Dept: OPERATING ROOM | Facility: CLINIC | Age: 59
End: 2024-10-31
Payer: COMMERCIAL

## 2024-10-31 ENCOUNTER — HOSPITAL ENCOUNTER (OUTPATIENT)
Facility: CLINIC | Age: 59
Setting detail: OUTPATIENT SURGERY
Discharge: HOME | End: 2024-10-31
Attending: OPHTHALMOLOGY | Admitting: OPHTHALMOLOGY
Payer: COMMERCIAL

## 2024-10-31 VITALS
TEMPERATURE: 97.5 F | RESPIRATION RATE: 16 BRPM | WEIGHT: 212.96 LBS | BODY MASS INDEX: 29.81 KG/M2 | DIASTOLIC BLOOD PRESSURE: 64 MMHG | OXYGEN SATURATION: 100 % | SYSTOLIC BLOOD PRESSURE: 119 MMHG | HEART RATE: 90 BPM | HEIGHT: 71 IN

## 2024-10-31 DIAGNOSIS — H25.811 COMBINED FORM OF AGE-RELATED CATARACT, RIGHT EYE: Primary | ICD-10-CM

## 2024-10-31 DIAGNOSIS — H25.812 COMBINED FORM OF AGE-RELATED CATARACT, LEFT EYE: ICD-10-CM

## 2024-10-31 LAB — GLUCOSE BLD MANUAL STRIP-MCNC: 201 MG/DL (ref 74–99)

## 2024-10-31 PROCEDURE — 7100000010 HC PHASE TWO TIME - EACH INCREMENTAL 1 MINUTE: Performed by: OPHTHALMOLOGY

## 2024-10-31 PROCEDURE — 3700000002 HC GENERAL ANESTHESIA TIME - EACH INCREMENTAL 1 MINUTE: Performed by: OPHTHALMOLOGY

## 2024-10-31 PROCEDURE — 66984 XCAPSL CTRC RMVL W/O ECP: CPT | Performed by: OPHTHALMOLOGY

## 2024-10-31 PROCEDURE — 2500000001 HC RX 250 WO HCPCS SELF ADMINISTERED DRUGS (ALT 637 FOR MEDICARE OP): Performed by: OPHTHALMOLOGY

## 2024-10-31 PROCEDURE — V2632 POST CHMBR INTRAOCULAR LENS: HCPCS | Performed by: OPHTHALMOLOGY

## 2024-10-31 PROCEDURE — 3700000001 HC GENERAL ANESTHESIA TIME - INITIAL BASE CHARGE: Performed by: OPHTHALMOLOGY

## 2024-10-31 PROCEDURE — 2500000004 HC RX 250 GENERAL PHARMACY W/ HCPCS (ALT 636 FOR OP/ED): Performed by: OPHTHALMOLOGY

## 2024-10-31 PROCEDURE — 2760000001 HC OR 276 NO HCPCS: Performed by: OPHTHALMOLOGY

## 2024-10-31 PROCEDURE — 3600000003 HC OR TIME - INITIAL BASE CHARGE - PROCEDURE LEVEL THREE: Performed by: OPHTHALMOLOGY

## 2024-10-31 PROCEDURE — 82947 ASSAY GLUCOSE BLOOD QUANT: CPT

## 2024-10-31 PROCEDURE — 2500000005 HC RX 250 GENERAL PHARMACY W/O HCPCS: Performed by: OPHTHALMOLOGY

## 2024-10-31 PROCEDURE — 3600000008 HC OR TIME - EACH INCREMENTAL 1 MINUTE - PROCEDURE LEVEL THREE: Performed by: OPHTHALMOLOGY

## 2024-10-31 PROCEDURE — 7100000009 HC PHASE TWO TIME - INITIAL BASE CHARGE: Performed by: OPHTHALMOLOGY

## 2024-10-31 RX ORDER — CYCLOPENTOLATE HYDROCHLORIDE 10 MG/ML
1 SOLUTION/ DROPS OPHTHALMIC
Status: COMPLETED | OUTPATIENT
Start: 2024-10-31 | End: 2024-10-31

## 2024-10-31 RX ORDER — SODIUM CHLORIDE, SODIUM LACTATE, POTASSIUM CHLORIDE, CALCIUM CHLORIDE 600; 310; 30; 20 MG/100ML; MG/100ML; MG/100ML; MG/100ML
20 INJECTION, SOLUTION INTRAVENOUS CONTINUOUS
Status: DISCONTINUED | OUTPATIENT
Start: 2024-10-31 | End: 2024-10-31 | Stop reason: HOSPADM

## 2024-10-31 RX ORDER — POVIDONE-IODINE 5 %
SOLUTION, NON-ORAL OPHTHALMIC (EYE) AS NEEDED
Status: DISCONTINUED | OUTPATIENT
Start: 2024-10-31 | End: 2024-10-31 | Stop reason: HOSPADM

## 2024-10-31 RX ORDER — LIDOCAINE HYDROCHLORIDE 10 MG/ML
0.1 INJECTION, SOLUTION EPIDURAL; INFILTRATION; INTRACAUDAL; PERINEURAL ONCE
Status: DISCONTINUED | OUTPATIENT
Start: 2024-10-31 | End: 2024-10-31 | Stop reason: HOSPADM

## 2024-10-31 RX ORDER — EPINEPHRINE 1 MG/ML
INJECTION, SOLUTION, CONCENTRATE INTRAVENOUS AS NEEDED
Status: DISCONTINUED | OUTPATIENT
Start: 2024-10-31 | End: 2024-10-31 | Stop reason: HOSPADM

## 2024-10-31 RX ORDER — PHENYLEPHRINE HYDROCHLORIDE 100 MG/ML
1 SOLUTION/ DROPS OPHTHALMIC
Status: COMPLETED | OUTPATIENT
Start: 2024-10-31 | End: 2024-10-31

## 2024-10-31 RX ORDER — TETRACAINE HYDROCHLORIDE 5 MG/ML
SOLUTION OPHTHALMIC AS NEEDED
Status: DISCONTINUED | OUTPATIENT
Start: 2024-10-31 | End: 2024-10-31 | Stop reason: HOSPADM

## 2024-10-31 RX ORDER — METOCLOPRAMIDE HYDROCHLORIDE 5 MG/ML
10 INJECTION INTRAMUSCULAR; INTRAVENOUS ONCE AS NEEDED
Status: DISCONTINUED | OUTPATIENT
Start: 2024-10-31 | End: 2024-10-31 | Stop reason: HOSPADM

## 2024-10-31 RX ORDER — LABETALOL HYDROCHLORIDE 5 MG/ML
5 INJECTION, SOLUTION INTRAVENOUS ONCE AS NEEDED
Status: DISCONTINUED | OUTPATIENT
Start: 2024-10-31 | End: 2024-10-31 | Stop reason: HOSPADM

## 2024-10-31 RX ORDER — ALBUTEROL SULFATE 0.83 MG/ML
2.5 SOLUTION RESPIRATORY (INHALATION) ONCE AS NEEDED
Status: DISCONTINUED | OUTPATIENT
Start: 2024-10-31 | End: 2024-10-31 | Stop reason: HOSPADM

## 2024-10-31 RX ORDER — ACETAMINOPHEN 325 MG/1
650 TABLET ORAL EVERY 4 HOURS PRN
Status: DISCONTINUED | OUTPATIENT
Start: 2024-10-31 | End: 2024-10-31 | Stop reason: HOSPADM

## 2024-10-31 RX ORDER — FENTANYL CITRATE 50 UG/ML
25 INJECTION, SOLUTION INTRAMUSCULAR; INTRAVENOUS EVERY 5 MIN PRN
Status: DISCONTINUED | OUTPATIENT
Start: 2024-10-31 | End: 2024-10-31 | Stop reason: HOSPADM

## 2024-10-31 RX ORDER — FENTANYL CITRATE 50 UG/ML
50 INJECTION, SOLUTION INTRAMUSCULAR; INTRAVENOUS EVERY 5 MIN PRN
Status: DISCONTINUED | OUTPATIENT
Start: 2024-10-31 | End: 2024-10-31 | Stop reason: HOSPADM

## 2024-10-31 RX ORDER — ONDANSETRON HYDROCHLORIDE 2 MG/ML
4 INJECTION, SOLUTION INTRAVENOUS ONCE AS NEEDED
Status: DISCONTINUED | OUTPATIENT
Start: 2024-10-31 | End: 2024-10-31 | Stop reason: HOSPADM

## 2024-10-31 RX ORDER — TETRACAINE HYDROCHLORIDE 5 MG/ML
1 SOLUTION OPHTHALMIC ONCE
Status: COMPLETED | OUTPATIENT
Start: 2024-10-31 | End: 2024-10-31

## 2024-10-31 SDOH — HEALTH STABILITY: MENTAL HEALTH: CURRENT SMOKER: 0

## 2024-10-31 ASSESSMENT — PAIN SCALES - GENERAL
PAINLEVEL_OUTOF10: 0 - NO PAIN
PAINLEVEL_OUTOF10: 0 - NO PAIN
PAINLEVEL_OUTOF10: 3
PAINLEVEL_OUTOF10: 0 - NO PAIN

## 2024-10-31 ASSESSMENT — PAIN - FUNCTIONAL ASSESSMENT
PAIN_FUNCTIONAL_ASSESSMENT: 0-10

## 2024-11-01 ENCOUNTER — APPOINTMENT (OUTPATIENT)
Dept: OPHTHALMOLOGY | Facility: CLINIC | Age: 59
End: 2024-11-01
Payer: COMMERCIAL

## 2024-11-01 DIAGNOSIS — E11.3213 BOTH EYES AFFECTED BY MILD NONPROLIFERATIVE DIABETIC RETINOPATHY WITH MACULAR EDEMA, ASSOCIATED WITH TYPE 2 DIABETES MELLITUS: ICD-10-CM

## 2024-11-01 DIAGNOSIS — H52.203 ASTIGMATISM OF BOTH EYES, UNSPECIFIED TYPE: ICD-10-CM

## 2024-11-01 DIAGNOSIS — H52.13 MYOPIA OF BOTH EYES: ICD-10-CM

## 2024-11-01 DIAGNOSIS — H25.811 COMBINED FORM OF AGE-RELATED CATARACT, RIGHT EYE: ICD-10-CM

## 2024-11-01 DIAGNOSIS — H52.4 PRESBYOPIA: ICD-10-CM

## 2024-11-01 DIAGNOSIS — H25.812 COMBINED FORM OF AGE-RELATED CATARACT, LEFT EYE: ICD-10-CM

## 2024-11-01 DIAGNOSIS — H40.003 GLAUCOMA SUSPECT OF BOTH EYES: ICD-10-CM

## 2024-11-01 DIAGNOSIS — Z96.1 PSEUDOPHAKIA: Primary | ICD-10-CM

## 2024-11-01 PROCEDURE — 99024 POSTOP FOLLOW-UP VISIT: CPT | Performed by: OPHTHALMOLOGY

## 2024-11-01 RX ORDER — KETOROLAC TROMETHAMINE 5 MG/ML
SOLUTION OPHTHALMIC
Qty: 5 ML | Refills: 2 | Status: SHIPPED | OUTPATIENT
Start: 2024-11-01

## 2024-11-01 ASSESSMENT — VISUAL ACUITY
OS_PH_SC: 20/70
OS_SC+: -1
METHOD: SNELLEN - LINEAR
OS_SC: 20/100

## 2024-11-01 ASSESSMENT — EXTERNAL EXAM - LEFT EYE: OS_EXAM: NORMAL

## 2024-11-01 ASSESSMENT — TONOMETRY
OS_IOP_MMHG: 18
IOP_METHOD: GOLDMANN APPLANATION

## 2024-11-01 ASSESSMENT — EXTERNAL EXAM - RIGHT EYE: OD_EXAM: NORMAL

## 2024-11-01 ASSESSMENT — SLIT LAMP EXAM - LIDS
COMMENTS: GOOD POSITION, DERMATOCHALASIS
COMMENTS: GOOD POSITION, DERMATOCHALASIS

## 2024-11-01 ASSESSMENT — ENCOUNTER SYMPTOMS: EYES NEGATIVE: 1

## 2024-11-04 ENCOUNTER — HOSPITAL ENCOUNTER (OUTPATIENT)
Dept: RADIOLOGY | Facility: HOSPITAL | Age: 59
Discharge: HOME | End: 2024-11-04
Payer: COMMERCIAL

## 2024-11-04 ENCOUNTER — OFFICE VISIT (OUTPATIENT)
Dept: PAIN MEDICINE | Facility: CLINIC | Age: 59
End: 2024-11-04
Payer: COMMERCIAL

## 2024-11-04 VITALS
WEIGHT: 212 LBS | HEART RATE: 99 BPM | HEIGHT: 71 IN | SYSTOLIC BLOOD PRESSURE: 95 MMHG | DIASTOLIC BLOOD PRESSURE: 55 MMHG | BODY MASS INDEX: 29.68 KG/M2 | RESPIRATION RATE: 15 BRPM | OXYGEN SATURATION: 97 % | TEMPERATURE: 96.6 F

## 2024-11-04 DIAGNOSIS — G89.29 CHRONIC LEFT-SIDED LOW BACK PAIN WITH LEFT-SIDED SCIATICA: ICD-10-CM

## 2024-11-04 DIAGNOSIS — M54.16 LUMBAR NEURITIS: ICD-10-CM

## 2024-11-04 DIAGNOSIS — M54.42 LOW BACK PAIN WITH BILATERAL SCIATICA, UNSPECIFIED BACK PAIN LATERALITY, UNSPECIFIED CHRONICITY: ICD-10-CM

## 2024-11-04 DIAGNOSIS — Z95.810 IMPLANTABLE CARDIOVERTER-DEFIBRILLATOR (ICD) IN SITU: ICD-10-CM

## 2024-11-04 DIAGNOSIS — M54.42 CHRONIC LEFT-SIDED LOW BACK PAIN WITH LEFT-SIDED SCIATICA: ICD-10-CM

## 2024-11-04 DIAGNOSIS — R29.898 LEG WEAKNESS, BILATERAL: Primary | ICD-10-CM

## 2024-11-04 DIAGNOSIS — M54.41 LOW BACK PAIN WITH BILATERAL SCIATICA, UNSPECIFIED BACK PAIN LATERALITY, UNSPECIFIED CHRONICITY: ICD-10-CM

## 2024-11-04 PROCEDURE — 71046 X-RAY EXAM CHEST 2 VIEWS: CPT

## 2024-11-04 PROCEDURE — 99215 OFFICE O/P EST HI 40 MIN: CPT | Performed by: ANESTHESIOLOGY

## 2024-11-04 PROCEDURE — 71046 X-RAY EXAM CHEST 2 VIEWS: CPT | Performed by: RADIOLOGY

## 2024-11-04 RX ORDER — MELOXICAM 7.5 MG/1
7.5 TABLET ORAL DAILY PRN
Qty: 30 TABLET | Refills: 2 | Status: SHIPPED | OUTPATIENT
Start: 2024-11-04 | End: 2024-12-04

## 2024-11-04 ASSESSMENT — COLUMBIA-SUICIDE SEVERITY RATING SCALE - C-SSRS
1. IN THE PAST MONTH, HAVE YOU WISHED YOU WERE DEAD OR WISHED YOU COULD GO TO SLEEP AND NOT WAKE UP?: NO
2. HAVE YOU ACTUALLY HAD ANY THOUGHTS OF KILLING YOURSELF?: NO
6. HAVE YOU EVER DONE ANYTHING, STARTED TO DO ANYTHING, OR PREPARED TO DO ANYTHING TO END YOUR LIFE?: NO

## 2024-11-04 ASSESSMENT — ENCOUNTER SYMPTOMS
OCCASIONAL FEELINGS OF UNSTEADINESS: 1
LOSS OF SENSATION IN FEET: 0

## 2024-11-04 ASSESSMENT — PAIN DESCRIPTION - DESCRIPTORS: DESCRIPTORS: TIGHTNESS

## 2024-11-04 ASSESSMENT — PAIN SCALES - GENERAL
PAINLEVEL_OUTOF10: 10 - WORST POSSIBLE PAIN
PAINLEVEL_OUTOF10: 10-WORST PAIN EVER

## 2024-11-04 ASSESSMENT — PAIN - FUNCTIONAL ASSESSMENT: PAIN_FUNCTIONAL_ASSESSMENT: 0-10

## 2024-11-04 NOTE — PROGRESS NOTES
History Of Present Illness  Felice Law is a 59 y.o. male presenting with   Chief Complaint   Patient presents with    Pain       Patient presents with complaints of chronic low back pain to the L anterolateral LE. His pain started in approx May of 2024 and has gotten worse in August of 2024. Pt does report a fall in November of 2023. The pain is constant, worse with activity and better with laying on his side. The pain is sharp, stabbing into his left buttock with N/T in his bilateral LE since Dec of 2017 does have DM.     Denies weakness, saddle anesthesia, bowel or bladder incontinence. To manage this pain the patient has attempted Gabapentin and positional changes with minimal relief. The patients chronic CAD,  HTN, DLD, s/p Pacer implant in 2017 and is on ELIQUIS. May have had PCI x 2.     PAIN SCORE: 7/10.    PCP: Dr. Aponte      Past Medical History  He has a past medical history of Cataract, Chest pain (10/01/2023), CHF (congestive heart failure), Clotting disorder (Multi), COPD (chronic obstructive pulmonary disease) (Multi), Coronary artery disease, Diabetes mellitus (Multi), Disease of thyroid gland, Elevated troponin level not due myocardial infarction (08/14/2023), Hypertension, Ischemic cardiomyopathy, Pulmonary hypertension (Multi), and Venous ulcer of left leg (Multi).    Surgical History  He has a past surgical history that includes Coronary artery bypass graft; Cardiac defibrillator placement; and Coronary angioplasty with stent.     Social History  He reports that he quit smoking about 7 years ago. His smoking use included cigarettes. He started smoking about 31 years ago. He has a 24 pack-year smoking history. He has never used smokeless tobacco. He reports that he does not drink alcohol and does not use drugs.    Family History  Family History   Family history unknown: Yes        Allergies  Azithromycin    Review of Systems    All other systems reviewed and negative for any deficits.  Pertinent positives and negatives were considered in the medical decision making process.        Physical Exam  BP 95/55   Pulse 99   Temp 35.9 °C (96.6 °F)   Resp 15   Wt 96.2 kg (212 lb)   SpO2 97%     General: Pt appears stated age, using a mobility scooter     Eyes: Conjunctiva non-icteric and lids without obvious rash or drooping. Pupils are symmetric.    ENT: External ears and nose appear to be without deformity or rash. No lesions or masses noted. Hearing is grossly intact.    Respiratory: No gasping or shortness of breath noted. No use of accessory muscles noted.    CVS: Extremities show no edema or varicosities    Skin: No rashes or open lesions/ulcers identified on skin. No induration/tightening noted with palpation of skin.     Musculoskeletal: [place] is antalgic also has poor balance    Stability: No subluxation noted on movement of bilateral upper extremities or head/neck.     Strength: 4/5 in RLE and 3/5 in LLE     Range of Motion: DEC HIP FLEXION IN BILATERAL LE, unable to perform bilateral dorsiflexion      Sensation: DEC TO SHARP TOUCH IN HIS LLE along L4    Neurologic: Cranial Nerves II thru XII are grossly intact    Psychiatric: Pt is alert and oriented to time, person, and place       Assessment/Plan       1. Leg weakness, bilateral        2. Low back pain with bilateral sciatica, unspecified back pain laterality, unspecified chronicity  Referral to Pain Medicine      3. Lumbar neuritis        4. Chronic left-sided low back pain with left-sided sciatica             1. I have provided the patient with a list of physical therapy exercises to learn and perform to strengthen core, maintain stabilization, and reduce pain. We reviewed the exercises in detail and I encouraged them to perform them on a regular basis.    2. I would recommend the pt CONTINUE Gabapentin 300mg in the AM and 600mg PM to help with nerve related pain. We discussed the risks, benefits, and side effects to this medication  including the mechanism of action and the pt understands and agrees.    3. The pt had an MRI ordered on 8- and has not completed this as of yet. I advised pt to complete this MRI before his next visit.     4. The patient is a candidate for an LESI L5/S1 to treat low back and LEFT LEG radicular pain. I spent time with the patient discussing the risks, benefits, and alternatives to this measure including, but not limited to worsening pain with injection, no improvement/guarantee with the procedure, numbness in the lower extremity and risk of falls post procedure, vagal reaction/ hypotension, feeling dizzy / lightheaded, spinal infection, worsening pre-existing infections, epidural hematoma, epidural abscess, nerve injury, paralysis, spinal fluid leak and spinal headache. The patient understands all of these risks and agrees to proceed with the planned procedure.     We will contact the patients PCP to determine if it is safe to stop ELIQUIS for 5 days prior to procedure. If Pt is to be bridged on Lovenox they will need to be off of Lovenox for 24 hours prior to the procedure. They will also need to have their INR checked the day of the procedure. We did discuss the risks for stopping anticoagulation including, but not limited to any cardiovascular event, embolism, and even death. The patient understands these risks and would like to proceed with the procedure.    5. I have referred the patient to physical therapy/aqua therapy to learn and perform exercises to strengthen core/extremties, maintain stabilization, increase range of motion, and reduce pain.    6. I would recommend the pt start on Meloxicam to help with nerve related pain. We discussed the risks, benefits, and side effects to this medication including the mechanism of action and the pt understands and agrees.    7. I extensively reviewed the patients MRI findings in detail, including review of the actual images and provided a detailed explanation of  the findings using a spine model.     There is noted disc degeneration at the L4/5 level and retrolisthesis of L4 on L5 and degenerative facet arthropathy at the L5 level.           Madhav Perez MD    I spent time with the patient reviewing their imaging and discussing the risks benefits and alternatives to the above plan. A total of 45 minutes was spent reviewing the data and greater than 50% of that time was with the patient during the face to face encounter discussing treatment options both surgical, non-surgical, and minimally invasive techniques.

## 2024-11-05 ENCOUNTER — LAB (OUTPATIENT)
Dept: LAB | Facility: HOSPITAL | Age: 59
End: 2024-11-05
Payer: COMMERCIAL

## 2024-11-05 ENCOUNTER — OFFICE VISIT (OUTPATIENT)
Dept: CARDIOLOGY | Facility: HOSPITAL | Age: 59
End: 2024-11-05
Payer: COMMERCIAL

## 2024-11-05 VITALS
OXYGEN SATURATION: 96 % | BODY MASS INDEX: 31.04 KG/M2 | HEIGHT: 69 IN | SYSTOLIC BLOOD PRESSURE: 92 MMHG | HEART RATE: 108 BPM | WEIGHT: 209.6 LBS | DIASTOLIC BLOOD PRESSURE: 62 MMHG

## 2024-11-05 DIAGNOSIS — Z79.4 DIABETES MELLITUS DUE TO UNDERLYING CONDITION WITH STAGE 3 CHRONIC KIDNEY DISEASE, WITH LONG-TERM CURRENT USE OF INSULIN, UNSPECIFIED WHETHER STAGE 3A OR 3B CKD (MULTI): ICD-10-CM

## 2024-11-05 DIAGNOSIS — E08.22 DIABETES MELLITUS DUE TO UNDERLYING CONDITION WITH STAGE 3 CHRONIC KIDNEY DISEASE, WITH LONG-TERM CURRENT USE OF INSULIN, UNSPECIFIED WHETHER STAGE 3A OR 3B CKD (MULTI): ICD-10-CM

## 2024-11-05 DIAGNOSIS — Z79.4 TYPE 2 DIABETES MELLITUS WITH CHRONIC KIDNEY DISEASE, WITH LONG-TERM CURRENT USE OF INSULIN, UNSPECIFIED CKD STAGE (MULTI): ICD-10-CM

## 2024-11-05 DIAGNOSIS — I50.22 CHRONIC SYSTOLIC HEART FAILURE: Primary | ICD-10-CM

## 2024-11-05 DIAGNOSIS — E11.22 TYPE 2 DIABETES MELLITUS WITH CHRONIC KIDNEY DISEASE, WITH LONG-TERM CURRENT USE OF INSULIN, UNSPECIFIED CKD STAGE (MULTI): ICD-10-CM

## 2024-11-05 DIAGNOSIS — N18.30 STAGE 3 CHRONIC KIDNEY DISEASE, UNSPECIFIED WHETHER STAGE 3A OR 3B CKD (MULTI): ICD-10-CM

## 2024-11-05 DIAGNOSIS — I50.20 HFREF (HEART FAILURE WITH REDUCED EJECTION FRACTION): ICD-10-CM

## 2024-11-05 DIAGNOSIS — I25.5 CARDIOMYOPATHY, ISCHEMIC: ICD-10-CM

## 2024-11-05 DIAGNOSIS — N18.30 DIABETES MELLITUS DUE TO UNDERLYING CONDITION WITH STAGE 3 CHRONIC KIDNEY DISEASE, WITH LONG-TERM CURRENT USE OF INSULIN, UNSPECIFIED WHETHER STAGE 3A OR 3B CKD (MULTI): ICD-10-CM

## 2024-11-05 DIAGNOSIS — Z95.810 IMPLANTABLE CARDIOVERTER-DEFIBRILLATOR (ICD) IN SITU: ICD-10-CM

## 2024-11-05 DIAGNOSIS — I50.22 CHRONIC SYSTOLIC HEART FAILURE: ICD-10-CM

## 2024-11-05 DIAGNOSIS — E11.65 TYPE 2 DIABETES MELLITUS WITH HYPERGLYCEMIA, WITH LONG-TERM CURRENT USE OF INSULIN: ICD-10-CM

## 2024-11-05 DIAGNOSIS — Z79.4 TYPE 2 DIABETES MELLITUS WITH HYPERGLYCEMIA, WITH LONG-TERM CURRENT USE OF INSULIN: ICD-10-CM

## 2024-11-05 DIAGNOSIS — E03.9 HYPOTHYROIDISM, UNSPECIFIED TYPE: ICD-10-CM

## 2024-11-05 LAB
ALBUMIN SERPL BCP-MCNC: 4.4 G/DL (ref 3.4–5)
ALP SERPL-CCNC: 53 U/L (ref 33–120)
ALT SERPL W P-5'-P-CCNC: 13 U/L (ref 10–52)
ANION GAP SERPL CALC-SCNC: 19 MMOL/L (ref 10–20)
AST SERPL W P-5'-P-CCNC: <3 U/L (ref 9–39)
BILIRUB SERPL-MCNC: 0.5 MG/DL (ref 0–1.2)
BNP SERPL-MCNC: 57 PG/ML (ref 0–99)
BUN SERPL-MCNC: 78 MG/DL (ref 6–23)
CALCIUM SERPL-MCNC: 10.1 MG/DL (ref 8.6–10.3)
CHLORIDE SERPL-SCNC: 86 MMOL/L (ref 98–107)
CHOLEST SERPL-MCNC: 417 MG/DL (ref 0–199)
CHOLESTEROL/HDL RATIO: 11.3
CO2 SERPL-SCNC: 25 MMOL/L (ref 21–32)
CREAT SERPL-MCNC: 2.51 MG/DL (ref 0.5–1.3)
DIGOXIN SERPL-MCNC: 0.86 NG/ML (ref 0.8–?)
EGFRCR SERPLBLD CKD-EPI 2021: 29 ML/MIN/1.73M*2
ERYTHROCYTE [DISTWIDTH] IN BLOOD BY AUTOMATED COUNT: 16.2 % (ref 11.5–14.5)
EST. AVERAGE GLUCOSE BLD GHB EST-MCNC: 263 MG/DL
GLUCOSE SERPL-MCNC: 297 MG/DL (ref 74–99)
HBA1C MFR BLD: 10.8 %
HCT VFR BLD AUTO: 39.7 % (ref 41–52)
HDLC SERPL-MCNC: 36.9 MG/DL
HGB BLD-MCNC: 13 G/DL (ref 13.5–17.5)
IRON SATN MFR SERPL: 37 % (ref 25–45)
IRON SERPL-MCNC: 115 UG/DL (ref 35–150)
LDLC SERPL CALC-MCNC: ABNORMAL MG/DL
MCH RBC QN AUTO: 26.9 PG (ref 26–34)
MCHC RBC AUTO-ENTMCNC: 32.7 G/DL (ref 32–36)
MCV RBC AUTO: 82 FL (ref 80–100)
NON HDL CHOLESTEROL: 380 MG/DL (ref 0–149)
NRBC BLD-RTO: 0 /100 WBCS (ref 0–0)
PHOSPHATE SERPL-MCNC: 5.9 MG/DL (ref 2.5–4.9)
PLATELET # BLD AUTO: 273 X10*3/UL (ref 150–450)
POTASSIUM SERPL-SCNC: 5.5 MMOL/L (ref 3.5–5.3)
PROT SERPL-MCNC: 8.1 G/DL (ref 6.4–8.2)
RBC # BLD AUTO: 4.84 X10*6/UL (ref 4.5–5.9)
SODIUM SERPL-SCNC: 124 MMOL/L (ref 136–145)
T4 FREE SERPL-MCNC: 1.18 NG/DL (ref 0.78–1.48)
TIBC SERPL-MCNC: 315 UG/DL (ref 240–445)
TRIGL SERPL-MCNC: 2017 MG/DL (ref 0–149)
TSH SERPL-ACNC: 4.05 MIU/L (ref 0.44–3.98)
UIBC SERPL-MCNC: 200 UG/DL (ref 110–370)
VLDL: ABNORMAL
WBC # BLD AUTO: 6.1 X10*3/UL (ref 4.4–11.3)

## 2024-11-05 PROCEDURE — 83036 HEMOGLOBIN GLYCOSYLATED A1C: CPT

## 2024-11-05 PROCEDURE — 3074F SYST BP LT 130 MM HG: CPT | Performed by: STUDENT IN AN ORGANIZED HEALTH CARE EDUCATION/TRAINING PROGRAM

## 2024-11-05 PROCEDURE — 80162 ASSAY OF DIGOXIN TOTAL: CPT

## 2024-11-05 PROCEDURE — 99214 OFFICE O/P EST MOD 30 MIN: CPT | Performed by: STUDENT IN AN ORGANIZED HEALTH CARE EDUCATION/TRAINING PROGRAM

## 2024-11-05 PROCEDURE — 85027 COMPLETE CBC AUTOMATED: CPT

## 2024-11-05 PROCEDURE — 80061 LIPID PANEL: CPT

## 2024-11-05 PROCEDURE — 84100 ASSAY OF PHOSPHORUS: CPT

## 2024-11-05 PROCEDURE — 36415 COLL VENOUS BLD VENIPUNCTURE: CPT

## 2024-11-05 PROCEDURE — 1036F TOBACCO NON-USER: CPT | Performed by: STUDENT IN AN ORGANIZED HEALTH CARE EDUCATION/TRAINING PROGRAM

## 2024-11-05 PROCEDURE — 84439 ASSAY OF FREE THYROXINE: CPT

## 2024-11-05 PROCEDURE — 3046F HEMOGLOBIN A1C LEVEL >9.0%: CPT | Performed by: STUDENT IN AN ORGANIZED HEALTH CARE EDUCATION/TRAINING PROGRAM

## 2024-11-05 PROCEDURE — 83880 ASSAY OF NATRIURETIC PEPTIDE: CPT

## 2024-11-05 PROCEDURE — 82985 ASSAY OF GLYCATED PROTEIN: CPT

## 2024-11-05 PROCEDURE — 83540 ASSAY OF IRON: CPT

## 2024-11-05 PROCEDURE — 80053 COMPREHEN METABOLIC PANEL: CPT

## 2024-11-05 PROCEDURE — 3078F DIAST BP <80 MM HG: CPT | Performed by: STUDENT IN AN ORGANIZED HEALTH CARE EDUCATION/TRAINING PROGRAM

## 2024-11-05 PROCEDURE — 84443 ASSAY THYROID STIM HORMONE: CPT

## 2024-11-05 PROCEDURE — 3008F BODY MASS INDEX DOCD: CPT | Performed by: STUDENT IN AN ORGANIZED HEALTH CARE EDUCATION/TRAINING PROGRAM

## 2024-11-05 ASSESSMENT — PAIN SCALES - GENERAL: PAINLEVEL_OUTOF10: 0-NO PAIN

## 2024-11-05 NOTE — PROGRESS NOTES
Currently denies chest pain, palpitations, shortness of breath, dyspnea on exertion, orthopnea, PND. No edema noted in BLE. Patient denies headaches or recent falls.      Patient reports intermittent dizziness.     Patient states he fell 2 weeks ago.     Patient had Cataract procedure on 10/31/2024.     Hospitalizations: Denies

## 2024-11-05 NOTE — PATIENT INSTRUCTIONS
If you have any questions or need cardiac medication refills, please call the Heart Failure office at [phone], option 6.   You may also contact the  Heart Failure Nursing team via email at [email or web address] (Please include your name and date of birth).      To reach Dr. Duque's office please call [phone] / Fax: [phone].  To schedule an appointment call [phone].    - Continue current medications with the exception of:   -- restart ivabradine 2.5mg daily  - Labwork: today  - Imaging/Procedures: none  - Referrals: none  - Followup: 3 months

## 2024-11-05 NOTE — PROGRESS NOTES
Primary Care Physician: Monik Aponte MD MPH  Primary Cardiologist: Sebastian at [facility]  Patient's Location: [facility] [address]    Date of Visit: 11/05/2024 11:30 AM EST  Location of visit: [facility]   Type of Visit: Established  Last visit: 6/25/2024    HPI / Summary:   Felice Law is a very pleasant 59 y.o. Martiniquais male presenting for management of Stage C-D ICM/HFrEF (LVEF 6/25/2024: 30% from 20% 8/14/23). He otherwise has a history of HTN, HLD, Type II DM, prior tobacco use (24 pack year, quit 2017), obesity (BMI 31), CAD s/p PCI and 1vCABG (LIMA-LAD with balloon pump support April 2023), VT arrest s/p VT ablation + ICD 2017, DVT (L femoral) on Eliquis, hypothyroidism, pulmonary hypertension, central sleep apnea.      Initial CV History:   He presented to [facility] on 10/1 with exertional left sided chest pain and SOB x 3 days. He has been admitted 8 times this year for acutely decompensated heart failure. His last hospital stay was 9/16-9/26, and weight on discharge was 230lbs.  He was admitted to [facility] this presentation in cardiogenic shock despite reporting compliance with his HF medications. His oral GDMT regimen was held due to worsening kidney function. His home regimen includes Entresto 97/103mg, Toprol XL 150mg, Spironolactone 25mg, Dapagliflozin 10mg. Weight on admission was 246lbs. He was treated IV lasix 80mg and with IV bumex 4mg BID + metolazone with some response, but was transitioned to lasix infusion 5mg/hr.  He was also placed on dobutamine infusion as evidence of cardiogenic shock was noted to be worsening. Advanced heart failure service was consulted while he was admitted there and they recommended SGC-guided therapy. RHC on dobutamine 2.5mcg showed RA pressure: 20, RV: 55/20, PCWP: 35, PA pressure: 52/37 (40 - 42), CO/CI 3.6/1.5, SVR: 1373 dynes, PVR: 414 dynes, AO: 84, and lactates were increasing up to 2.4 during that admission. He was referred to us by Dr. Cortes  at [facility] for advanced therapy evaluation. He was transferred to us on dobutamine 3.75mcg and lasix gtt 5mg/hr.  Patient was than transferred to [facility] for advanced therapy evaluation on 10/4. Advanced therapies evaluation was initiated on 10/4 and presented in committee meeting on 10/10. Not approved for LVAD or transplant at this time. Determined to be very high risk for advanced therapies at this time given poorly controlled diabetes, renal function, peripheral vascular questions, questionable psychosocial support, and financial concerns. Plan to optimize medical therapy and follow up closely in HF clinic. May be brought back to the committee in the future. Over the past couple of days, pt's renal function worsened due to likely over diuresis. Lasix drip was discontinued 10/14 and pt.'s renal function has since begun to improve. Added spironolactone to medication regimen and discontinued PO potassium. Increased metoprolol to 100mg PO daily. Pt. Is hemodynamically stable for discharge at this time.      - Saw AYDE Vincent. Medications reconciled. Referred to PCP at . Established 12/20203  - referred to cardiac rehab. Completed    Interval history:     Patient states he fell 2 weeks ago.      Patient had Cataract procedure on 10/31/2024.      Hospitalizations: Denies     Last visit: Echo with improved EF 30% from 20%  I recommended that he start ivabradine but he has not started this.     Today:  He is accompanied by: alone via motorized scooter.     Remains stable  Currently denies chest pain, palpitations, shortness of breath, dyspnea on exertion, orthopnea, PND. No edema noted in BLE. Patient denies headaches or recent falls.       Patient reports intermittent dizziness.       He denies: dyspnea at rest, LE swelling, syncope, PND, orthopnea, chest pain, palpitation.    ROS: Full 10 pt review of symptoms of negative unless discussed above.     PMHx: HFrEF/ICM (LVEF 20% 8/14/23), CAD, Hx of cardiac arrest s/p VT  "ablation + ICD, HTN/HLD, CKD, Hx of DVT (L femoral) on Eliquis, hypothyroidism, pulmonary hypertension, T2DM, JAKE/OHS  PSHx: CABG (LIMA-LAD with balloon pump support April 2023)  Social Hx: Former tobacco abuse (24 pack year, quit 2017)    Objective   Medical History:   He has a past medical history of Cataract, Chest pain (10/01/2023), CHF (congestive heart failure), Clotting disorder (Multi), COPD (chronic obstructive pulmonary disease) (Multi), Coronary artery disease, Diabetes mellitus (Multi), Disease of thyroid gland, Elevated troponin level not due myocardial infarction (08/14/2023), Hypertension, Ischemic cardiomyopathy, Pulmonary hypertension (Multi), and Venous ulcer of left leg (Multi).  Surgical Hx:   He has a past surgical history that includes Coronary artery bypass graft; Cardiac defibrillator placement; and Coronary angioplasty with stent.   Social Hx:   He reports that he quit smoking about 7 years ago. His smoking use included cigarettes. He started smoking about 31 years ago. He has a 24 pack-year smoking history. He has never used smokeless tobacco. He reports that he does not drink alcohol and does not use drugs.  Family Hx:   His Family history is unknown by patient.   Allergies:   Allergies   Allergen Reactions    Azithromycin Dizziness and Unknown     Dizziness, Vertigo, Cold, Clammy, Nausea, Sweating, trouble breathing     Exam:       11/5/2024    10:54 AM 11/4/2024     2:05 PM 10/31/2024    10:10 AM 10/31/2024     9:55 AM 10/31/2024     9:44 AM 10/31/2024     8:28 AM   Vitals   Systolic 92 95 119 110 106 93   Diastolic 62 55 64 68 62 51   Heart Rate 108 99 90 87 74 89   Temp  35.9 °C (96.6 °F) 36.4 °C (97.5 °F) 36.4 °C (97.5 °F) 36.2 °C (97.2 °F) 36 °C (96.8 °F)   Resp  15 16 16 16 16   Height (in) 1.753 m (5' 9\") 1.803 m (5' 11\")    1.803 m (5' 11\")   Weight (lb) 209.6 212    212.96   BMI 30.95 kg/m2 29.57 kg/m2    29.7 kg/m2   BSA (m2) 2.15 m2 2.2 m2    2.2 m2   Visit Report Report Report "         Wt Readings from Last 5 Encounters:   11/05/24 95.1 kg (209 lb 9.6 oz)   11/04/24 96.2 kg (212 lb)   10/31/24 96.6 kg (212 lb 15.4 oz)   08/26/24 98 kg (216 lb)   08/09/24 99.8 kg (220 lb)     GEN: Pleasant, well-appearing, no acute distress. He uses a mobility scooter. Bilateral foot drop with unsteady gait.   HEENT: JVP not elevated, no icterus. No HJR  CHEST: No wheeze, good air movement.  CV: Normal rate, regular rhythm. Normal S1, inspiratory split S2, no m/r/g  ABD: Soft, ND, NT  EXT: Warm, well perfused, trace LE edema. L shin with healed ulcer  NEURO: Pleasant, Oriented to plan.     Labs:   CMP:  Recent Labs     06/25/24  1139 12/19/23  1104 11/02/23  1700 10/17/23  0356 10/16/23  0416 10/15/23  0309 10/14/23  1516 10/14/23  0257 10/13/23  0257    136 135* 137 136 134*   < > 134* 135*   K 4.4 4.2 4.3 4.6 4.2 4.2   < > 4.3 4.1   CL 95* 96* 99 100 98 97*   < > 96* 98   CO2 33* 31 29 29 30 28   < > 29 28   ANIONGAP 14 13 11 13 12 13   < > 13 13   BUN 29* 33* 64* 69* 70* 77*   < > 69* 64*   CREATININE 1.44* 1.41* 1.80* 1.48* 1.54* 1.88*   < > 2.05* 1.66*   EGFR 56* 58* 43* 55* 52* 41*   < > 37* 48*   MG  --   --   --  1.97 2.00 1.98  --  1.99 2.03    < > = values in this interval not displayed.     Recent Labs     06/25/24  1139 12/19/23  1104 11/02/23  1700 10/17/23  0356 10/16/23  0416 10/06/23  0515 10/05/23  1647   ALBUMIN 4.3 4.1 4.4 3.7 3.6   < > 4.1   ALKPHOS 65 62  --   --   --   --  63   ALT 15 13  --   --   --   --  11   AST 10 10  --   --   --   --  13   BILITOT 1.3* 1.1  --   --   --   --  1.8*    < > = values in this interval not displayed.     CBC:  Recent Labs     06/25/24  1139 12/19/23  1104 10/17/23  0356 10/16/23  0416 10/15/23  0309   WBC 5.4 5.4 5.2 5.9 5.5   HGB 13.3* 14.0 11.6* 11.4* 11.2*   HCT 42.9 43.2 36.5* 36.6* 36.3*    224 275 300 298   MCV 81 80 76* 77* 78*     COAG:   Recent Labs     10/15/23  0833 10/15/23  0309 10/14/23  2208 10/05/23  1647   INR  --   --    "--  1.2*   HAUF 0.7 0.8 0.9  --      ABO:   Recent Labs     10/05/23  1647   ABO A     HEME/ENDO:  Recent Labs     06/25/24  1139 04/08/24  1550 12/29/23  1427 10/04/23  1518 05/26/23  0203   FERRITIN 332*  --   --  379*  --    IRONSAT 20*  --   --  15*  --    TSH 4.23*  --  7.49* 9.31*  --    HGBA1C 9.2* 10.6*  --  8.1* 7.9*      CARDIAC:   Recent Labs     06/25/24  1139 12/19/23  1104 10/05/23  1647 10/04/23  1518   LDH  --   --  240  --    * 147* 157* 160*     Recent Labs     10/05/23  0354   CHOL 106   LDLCALC 32*   HDL 24.5   TRIG 247*     MICRO: No results for input(s): \"ESR\", \"CRP\", \"PROCAL\" in the last 42731 hours.  No results found for the last 90 days.    Notable Studies:   EKG: 10/10/2023      Echocardiogram:   Recent Labs     06/25/24  1100   EF 30   LVIDD 6.65   RV 54.4   RVFRWALLPKSP 8.16   TAPSE 1.0   Transthoracic Echo (TTE) Complete With Contrast 06/25/2024  Left Ventricle: The left ventricular systolic function is moderately decreased, with a visually estimated ejection fraction of 30%. There is global hypokinesis of the left ventricle with minor regional variations. The left ventricular cavity size is severely dilated. There is mild eccentric left ventricular hypertrophy. Abnormal (paradoxical) septal motion, consistent with RV pacemaker and abnormal (paradoxical) septal motion consistent with post-operative status. Spectral Doppler shows a restrictive pattern of left ventricular diastolic filling. There is no definite left ventricular thrombus visualized.  Left Atrium: The left atrium is moderately dilated.  Right Ventricle: The right ventricle is mildly enlarged. There is moderately reduced right ventricular systolic function. A device is visualized in the right ventricle. TAPSE= 10 mm; RV S'= 8 cm/s.  Right Atrium: The right atrium is mildly dilated. There is a device visualized in the right atrium.  Aortic Valve: The aortic valve is trileaflet. There is mild aortic valve cusp " calcification. There is no evidence of aortic valve regurgitation. The peak instantaneous gradient of the aortic valve is 9.0 mmHg.  Mitral Valve: The mitral valve is mildly thickened. There is trace to mild mitral valve regurgitation.  Tricuspid Valve: The tricuspid valve is structurally normal. There is mild tricuspid regurgitation. The Doppler estimated RVSP is moderately elevated at 54.4 mmHg.  Pulmonic Valve: The pulmonic valve is structurally normal. There is physiologic pulmonic valve regurgitation.  Pericardium: There is a trivial to small pericardial effusion.  Aorta: The aortic root is normal.  Systemic Veins: The inferior vena cava appears dilated. There is less than 50% IVC collapse with inspiration.  In comparison to the previous echocardiogram(s): Compared with study dated 10/14/2023, the LV EF is increased (was 20%).    CONCLUSIONS:  1. Poorly visualized anatomical structures due to suboptimal image quality.  2. The left ventricular systolic function is moderately decreased, with a visually estimated ejection fraction of 30%.  3. There is global hypokinesis of the left ventricle with minor regional variations.  4. Spectral Doppler shows a restrictive pattern of left ventricular diastolic filling.  5. Left ventricular cavity size is severely dilated.  6. Abnormal septal motion consistent with RV pacemaker and abnormal septal motion consistent with post-operative status.  7. No left ventricular thrombus visualized.  8. There is mild eccentric left ventricular hypertrophy.  9. Mildly enlarged right ventricle.  10. There is moderately reduced right ventricular systolic function.  11. The left atrium is moderately dilated.  12. Moderately elevated right ventricular systolic pressure.    QUANTITATIVE DATA SUMMARY:  2D MEASUREMENTS:  Normal Ranges:  Ao Root d:     3.00 cm    (2.0-3.7cm)  LAs:           5.40 cm    (2.7-4.0cm)  IVSd:          0.80 cm    (0.6-1.1cm)  LVPWd:         0.90 cm     (0.6-1.1cm)  LVIDd:         6.65 cm    (3.9-5.9cm)  LVIDs:         5.50 cm  LV Mass Index: 116.3 g/m2  LV % FS        17.3 %    LA VOLUME:  Normal Ranges:  LA Vol A4C:        93.3 ml    (22+/-6mL/m2)  LA Vol A2C:        87.1 ml  LA Vol BP:         91.1 ml  LA Vol Index A4C:  41.5ml/m2  LA Vol Index A2C:  38.8 ml/m2  LA Vol Index BP:   40.6 ml/m2  LA Area A4C:       27.1 cm2  LA Area A2C:       25.9 cm2  LA Major Axis A4C: 6.7 cm  LA Major Axis A2C: 6.6 cm    AORTA MEASUREMENTS:  Normal Ranges:  Asc Ao, d: 3.20 cm (2.1-3.4cm)    LV SYSTOLIC FUNCTION BY 2D PLANIMETRY (MOD):  Normal Ranges:  EF-A4C View:    35 % (>=55%)  EF-A2C View:    28 %  EF-Biplane:     31 %  EF-Visual:      30 %  LV EF Reported: 30 %    LV DIASTOLIC FUNCTION:  Normal Ranges:  MV Peak E:    1.32 m/s (0.7-1.2 m/s)  MV lateral e' 0.06 m/s    AORTIC VALVE:  Normal Ranges:  AoV Vmax:      1.50 m/s (<=1.7m/s)  AoV Peak P.0 mmHg (<20mmHg)  LVOT Max Dylan:  1.03 m/s (<=1.1m/s)  LVOT VTI:      16.50 cm  LVOT Diameter: 2.20 cm  (1.8-2.4cm)  AoV Area,Vmax: 2.49 cm2 (2.5-4.5cm2)    RIGHT VENTRICLE:  RV Basal 4.80 cm  RV Mid   2.50 cm  RV Major 8.8 cm  TAPSE:   10.1 mm  RV s'    0.08 m/s    TRICUSPID VALVE/RVSP:  Normal Ranges:  Peak TR Velocity: 3.14 m/s  RV Syst Pressure: 54.4 mmHg (< 30mmHg)    Echocardiogram: 10/14/2023   1. Left ventricular systolic function is severely decreased with a 20% estimated ejection fraction.   2. Abnormal septal motion consistent with post-operative status.   3. Left ventricular cavity size is moderate to severely dilated.   4. No obvious LV thrombus seen with the use of ultrasound enhancing agent.   5. There is global hypokinesis of the left ventricle with minor regional variations.   6. There is mild eccentric left ventricular hypertrophy.   7. There is mildly reduced right ventricular systolic function.   8. The left atrium is moderate to severely dilated.   9. Moderately elevated right ventricular systolic  pressure.  10. While there are no prior echocardiograms in our system, notes from OSH indicate EF known to be approximately 20%.    IVSd:          0.97 cm    (0.6-1.1cm)  LVPWd:         0.93 cm    (0.6-1.1cm)  LVIDd:         6.60 cm    (3.9-5.9cm)  LA Vol Index BP:   51.9 ml/m2  EF-A4C View: 19.3 % (>=55%)  MV Peak E:    1.14 m/s    (0.7-1.2 m/s)  MV Peak A:    0.53 m/s    (0.42-0.7 m/s)  E/A Ratio:    2.16        (1.0-2.2)  MV e'         0.04 m/s    (>8.0)  MV lateral e' 0.05 m/s  MV medial e'  0.04 m/s  MV A Dur:     100.00 msec  E/e' Ratio:   25.33       (<8.0)  RV Basal 3.20 cm  TAPSE:   13.6 mm  RV s'    0.08 m/s  Est. RA Pressure: 15 mmHg  RV Syst Pressure: 48.6 mmHg (< 30mmHg)    Stress Testing:   Cardiac Catheterization:   [facility] December 2022: severe diffuse three-vessel coronary artery disease. Severe proximal diffuse LAD disease up to 70%. Severe diffuse proximal and mid first diagonal disease. Chronic total occlusion of OM1 with left-to-left collaterals. Chronic total occlusion of mid RCA with left-to-right collaterals.     RHC: 10/11: BP 94/54, CVP 16, PAP 48/28, PCWP 16, CO/CI (F) 6.3/2.75, , SvO2 60% on Nipride, hydralazine 50mg PO TID, Isordil 20mg PO TID, Entresto 24-26mg PO BID.       Vascular ultrsound:   Right Lower PVR: No evidence of arterial occlusive disease in the right lower extremity at rest. Right pressures of >220 mmHg suggest no compressibility of vessels and may make absolute Segmental Limb Pressures (SLP) unreliable. Decreased digital perfusion noted. Triphasic flow is noted in the right common femoral artery, right posterior tibial artery and right dorsalis pedis artery. Results called based on PVR and Doppler waveforms due to partially/ non-compressible vessels.  Left Lower PVR: No evidence of arterial occlusive disease in the left lower extremity at rest. Decreased digital perfusion noted. Triphasic flow is noted in the left common femoral artery, left posterior tibial artery  and left dorsalis pedis artery.    PFTS:         Current Outpatient Medications   Medication Instructions    albuterol 2.5 mg /3 mL (0.083 %) nebulizer solution Use every 4-6 hours as needed for shortness of breath    apixaban (ELIQUIS) 5 mg, oral, 2 times daily    atorvastatin (LIPITOR) 80 mg, oral, Daily    bumetanide (BUMEX) 2 mg, oral, 2 times daily (morning and late afternoon)    digoxin (LANOXIN) 125 mcg, oral, Daily    dulaglutide (Trulicity) 4.5 mg/0.5 mL pen injector Inject 0.5 mL under the skin 1 (one) time per week.    dulaglutide (Trulicity) 4.5 mg/0.5 mL pen injector Inject 0.5 mL under the skin 1 (one) time per week.    Farxiga 10 mg, oral, Daily    FreeStyle Franco 2 [place] misc Use as instructed    FreeStyle Franco 2 Sensor kit Change every 14 days    gabapentin (Neurontin) 300 mg capsule TOME 1 CAPSULA POR LA BOCA NITIN  VEZ AL CUBA EN LA MANANA    gabapentin (Neurontin) 600 mg tablet TOME 1 TABLETA POR LA BOCA NITIN  VEZ AL CUBA AL ACOSTARSE    hydrALAZINE (APRESOLINE) 100 mg, oral, 3 times daily    insulin glargine (Toujeo Max U-300 SoloStar) 300 unit/mL (3 mL) injection 40 units twice a day    insulin lispro (HumaLOG KwikPen Insulin) 100 unit/mL injection INYECTE DEBAJO DE LA PIEL 35  UNIDADES CON LAS COMIDAS CON LA  ESCALA MOVIL HASTA 150 UNIDADES CADA CUBA    ipratropium-albuteroL (Duo-Neb) 0.5-2.5 mg/3 mL nebulizer solution 3 mL, nebulization, 3 times daily PRN    isosorbide dinitrate (ISORDIL) 40 mg, oral, 3 times daily (0900,1400,1900)    ivabradine (CORLANOR) 2.5 mg, oral, 2 times daily    ketorolac (Acular) 0.5 % ophthalmic solution 1 drop to surgical eye 4 times a day starting 1 day before surgery    lancets (Lancets,Thin) misc 4 times daily (before meals and at bedtime). use as directed to test blood sugar    levothyroxine (SYNTHROID, LEVOXYL) 75 mcg, oral, Daily    lidocaine (Lidoderm) 5 % patch 1 patch, transdermal, Daily, Apply to painful area 12 hours per day, remove for 12 hours.     "meloxicam (MOBIC) 7.5 mg, oral, Daily PRN, Do NOT take any other NSAID's while taking this medication. (Ibuprofen, Naproxen, Aleve)    metoprolol succinate XL (TOPROL-XL) 200 mg, oral, Daily, Do not crush or chew.    ofloxacin (Ocuflox) 0.3 % ophthalmic solution 1 drop to operative eye 4 times a day starting 1 day before surgery    OneTouch Delica Plus Lancet 30 gauge misc     OneTouch Ultra Test strip For bg check 4x/day    pantoprazole (ProtoNix) 40 mg EC tablet TAKE 1 TABLET BY MOUTH ONCE DAILY IN THE MORNING TAKE BEFORE A MEAL DO NOT CRUSH, CHEW, AND/OR DIVIDE    pen needle, diabetic (BD Ultra-Fine Sarah Pen Needle) 32 gauge x 5/32\" needle For insulin injection 4x/day    pen needle, diabetic, safety 29 gauge x 1/2\" needle Three times before meals and at bedtime    prednisoLONE acetate (Pred-Forte) 1 % ophthalmic suspension 1 drop to operative eye 4 times a day starting the day of surgery (after surgery is done)    QUEtiapine (SEROquel) 50 mg tablet TOME 1 TABLETA POR LA BOCA NITIN  VEZ AL CUBA AL ACOSTARSE    sacubitriL-valsartan (Entresto)  mg tablet 1 tablet, oral, 2 times daily    spironolactone (ALDACTONE) 50 mg, oral, Daily    TechLITE Pen Needle 31 gauge x 3/16\" needle use 1 PEN NEEDLE to inject MEDICATION subcutaneously three times a day      Notable Therapies   Class  Agent SAFETY    ARNI / ACE / ARB  Entresto 97-103mg BID Last BP: 92/62, Last BUN/Cr (GFR): 29/1.44 (56), 6/25/2024: 11:39 AM.    Beta-Blocker  Metoprolol  daily Last HR: 108    Ivabradine  Start ivabradine 2.5mg daily     MRA  Spironolactone 50mg daily Last K: 4.4     SGLT2  Farxiga 10mg daily Last A1c 9.2 (6/25/2024: 11:39 AM).    Diuretic  Bumex 2mg daily Last BNP: 743    Hydralazine/ISDN  100mg / 40mg TID      Digoxin 125mcg daily, Last digoxin: 10/13/2023: 0.39    Anticoagulation  Apixaban 5mg BID (DVT) Last Hgb 13.3 (6/25/2024: 11:39 AM).    Anti-arrhythmic       Antiplatelet  Aspirin 81mg daily     Lipid-Lowering  Atorvastatin " 80mg daily Last Tchol No results found for requested labs within last 365 days. / LDL  No results found for requested labs within last 365 days. / HDL No results found for requested labs within last 365 days. / TRIG No results found for requested labs within last 365 days. (No results in last year.).    Other        Device(s)  ICD  GIDEON ~1 year, referral to device clinic    Cardiac Rehab,   if EF <35/MI/OHS  completing      Problems Addressed:   # HFrEF / Chronic Systolic Heart Failure, last EF 30%, dx'd 2017,   # Ischemic Cardiomyopathy, ACC/AHA Stage C-D, NYHA II, Warm, Euvolemic. Previously discussed for AT during [facility] [place] admission but declined with concerns for mobility/frailty (uses motorized scooter and has bilateral foot drop with neuropathy), CKD, poorly controlled diabetes as well as unclear social/financial support).   # CAD s/p PCI and 1v CABG (LIMA-LAD 04/2023).   # VT arrest s/p VT ablation + ICD 2017   # CKD Stage IIIb: Last BUN/Cr (GFR): 29/1.44 (56), 6/25/2024: 11:39 AM.  # Type II Diabetes Mellitus: Last A1c (9.2). With neuropathy/nephropathy  - Management of CV risk factors as a below  - GDMT as above.   - Continue current medications with the exception of:   -- restart ivabradine 2.5mg daily (HR still above foal)  - lab work,     # DVT (L femoral) on Eliquis,   # Hypothyroidism,   # Pulmonary hypertension, low threshhold for RHC with decompensation.   # Central sleep apnea.     # Bilateral foot drop with LE neuropathy: Able to walk two blocks. Uses motorized scoopter    # Cataracts: Patient is cleared from a cardiovascular standpoint for cataract surgery. No further cardiovascular testing required.     CV Risk Factors:  # Hypertension : Last BP: 92/62. controlled  # Hyperlipidemia: Last Tchol No results found for requested labs within last 365 days. / LDL  No results found for requested labs within last 365 days. / HDL No results found for requested labs within last 365 days. / TRIG No  results found for requested labs within last 365 days. (No results in last year.). Recheck Continue ASA/statin  # Type II Diabetes Mellitus: Last A1c 9.2 (6/25/2024: 11:39 AM). On GLP1 + SGLT2  # Obesity: Last BMI: 30.94. On GLP1  # Prior tobacco use (24 pack year, quit 2017),     Patient Instructions   If you have any questions or need cardiac medication refills, please call the Heart Failure office at [phone], option 6.   You may also contact the  Heart Failure Nursing team via email at [email or web address] (Please include your name and date of birth).      To reach Dr. Duque's office please call [phone] / Fax: [phone].  To schedule an appointment call [phone].    - Continue current medications with the exception of:   -- restart ivabradine 2.5mg daily  - Labwork: today  - Imaging/Procedures: none  - Referrals: none  - Followup: 3 months     Orders:  No orders of the defined types were placed in this encounter.     Followup Appts:  Future Appointments   Date Time Provider Department Center   11/5/2024 11:30 AM Jono Duque MD [id number] [facility]   11/7/2024  1:00 PM Ghislaine Lamb MD [id number] [facility]   11/11/2024  2:30 PM Rell Robison MD [id number] [facility]   11/19/2024  1:20 PM Nick Cheney MD [id number] [facility]   12/9/2024  2:30 PM Madhav Perez MD PAROPCPNM [place]   12/17/2024  9:00 AM [facility] [place] [place] [facility] [id number] [facility] [place] [place] [facility]   1/6/2025  1:00 PM Diana Salazar MD [id number] [facility]   1/15/2025  1:30 PM Harini Marie MD [id number] [facility]     The encounter involved a comprehensive review of extensive data, including prior medical records, imaging studies, laboratory results, and consultations, followed by in-depth counseling regarding the patient's complex cardiac condition and comorbidities. We discussed treatment options, risks and benefits, and recommendations for ongoing care and careful monitoring of adverse  effects from medications.       ____________________________________________________________  Jono Duque MD  Section of Advanced Heart Failure and Cardiac Transplantation  Division of Cardiovascular Medicine  [place] [facility]

## 2024-11-06 LAB — FRUCTOSAMINE SERPL-SCNC: 465 UMOL/L (ref 205–285)

## 2024-11-07 ENCOUNTER — APPOINTMENT (OUTPATIENT)
Dept: OPHTHALMOLOGY | Facility: CLINIC | Age: 59
End: 2024-11-07
Payer: COMMERCIAL

## 2024-11-07 DIAGNOSIS — Z96.1 PSEUDOPHAKIA: Primary | ICD-10-CM

## 2024-11-07 DIAGNOSIS — H25.811 COMBINED FORM OF AGE-RELATED CATARACT, RIGHT EYE: ICD-10-CM

## 2024-11-07 DIAGNOSIS — E11.3213 BOTH EYES AFFECTED BY MILD NONPROLIFERATIVE DIABETIC RETINOPATHY WITH MACULAR EDEMA, ASSOCIATED WITH TYPE 2 DIABETES MELLITUS: ICD-10-CM

## 2024-11-07 DIAGNOSIS — H40.003 GLAUCOMA SUSPECT OF BOTH EYES: ICD-10-CM

## 2024-11-07 PROCEDURE — 99024 POSTOP FOLLOW-UP VISIT: CPT | Performed by: OPHTHALMOLOGY

## 2024-11-07 ASSESSMENT — REFRACTION_MANIFEST
OS_SPHERE: -1.25
OD_AXIS: 125
OS_AXIS: 023
OS_CYLINDER: -0.75
OD_SPHERE: -0.50
OS_AXIS: 023
OS_CYLINDER: -1.00
OD_CYLINDER: -0.50
METHOD_AUTOREFRACTION: 1
OS_SPHERE: -1.50
OD_SPHERE: UNABLE

## 2024-11-07 ASSESSMENT — SLIT LAMP EXAM - LIDS
COMMENTS: GOOD POSITION, DERMATOCHALASIS
COMMENTS: GOOD POSITION, DERMATOCHALASIS

## 2024-11-07 ASSESSMENT — VISUAL ACUITY
METHOD: SNELLEN - LINEAR
OS_PH_SC: 20/50
OS_SC: 20/70
OD_SC: 20/60-1

## 2024-11-07 ASSESSMENT — TONOMETRY
IOP_METHOD: GOLDMANN APPLANATION
OS_IOP_MMHG: 15
OD_IOP_MMHG: 14

## 2024-11-07 ASSESSMENT — EXTERNAL EXAM - RIGHT EYE: OD_EXAM: NORMAL

## 2024-11-07 ASSESSMENT — EXTERNAL EXAM - LEFT EYE: OS_EXAM: NORMAL

## 2024-11-07 NOTE — PROGRESS NOTES
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 Robison for diabetic retinopathy    Return for a dilated exam in  2 weeks  or sooner if having any problems    Co-Management with Dr. Phillips after Cataract surgery left eye  Date of surgery:  10/31  /2024  Date care was transferred:  11/7 /2024

## 2024-11-09 DIAGNOSIS — I50.22 CHRONIC SYSTOLIC HEART FAILURE: Primary | ICD-10-CM

## 2024-11-09 DIAGNOSIS — Z95.810 IMPLANTABLE CARDIOVERTER-DEFIBRILLATOR (ICD) IN SITU: ICD-10-CM

## 2024-11-09 DIAGNOSIS — I25.5 CARDIOMYOPATHY, ISCHEMIC: ICD-10-CM

## 2024-11-10 NOTE — PROGRESS NOTES
Subjective   Patient ID: Felice Law is a 59 y.o. male.      Current Outpatient Medications (Ophthalmology pharm classes)   Medication Sig Dispense Refill    ketorolac (Acular) 0.5 % ophthalmic solution 1 drop to surgical eye 4 times a day starting 1 day before surgery 5 mL 2    ofloxacin (Ocuflox) 0.3 % ophthalmic solution 1 drop to operative eye 4 times a day starting 1 day before surgery 5 mL 2    prednisoLONE acetate (Pred-Forte) 1 % ophthalmic suspension 1 drop to operative eye 4 times a day starting the day of surgery (after surgery is done) 5 mL 2     Current Outpatient Medications (Other)   Medication Sig Dispense Refill    albuterol 2.5 mg /3 mL (0.083 %) nebulizer solution Use every 4-6 hours as needed for shortness of breath 75 mL 1    apixaban (Eliquis) 5 mg tablet Take 1 tablet (5 mg) by mouth 2 times a day. 180 tablet 3    atorvastatin (Lipitor) 80 mg tablet Take 1 tablet (80 mg) by mouth once daily. 90 tablet 3    bumetanide (Bumex) 2 mg tablet Take 1 tablet (2 mg) by mouth 2 times daily (morning and late afternoon). 180 tablet 3    digoxin (Lanoxin) 125 MCG tablet Take 1 tablet (125 mcg) by mouth once daily. 90 tablet 3    dulaglutide (Trulicity) 4.5 mg/0.5 mL pen injector Inject 0.5 mL under the skin 1 (one) time per week. 2 mL 11    dulaglutide (Trulicity) 4.5 mg/0.5 mL pen injector Inject 0.5 mL under the skin 1 (one) time per week. 2 mL 0    Farxiga 10 mg Take 1 tablet (10 mg) by mouth once daily. 90 tablet 3    FreeStyle Franco 2 [place] misc Use as instructed 1 each 0    FreeStyle Franco 2 Sensor kit Change every 14 days 2 each 3    gabapentin (Neurontin) 300 mg capsule TOME 1 CAPSULA POR LA BOCA NITIN  VEZ AL CUBA EN LA MANANA 90 capsule 0    gabapentin (Neurontin) 600 mg tablet TOME 1 TABLETA POR LA BOCA NITIN  VEZ AL CUBA AL ACOSTARSE 30 tablet 11    hydrALAZINE (Apresoline) 100 mg tablet Take 1 tablet (100 mg) by mouth 3 times a day. 270 tablet 3    insulin glargine (Toujeo Max U-300  "SoloStar) 300 unit/mL (3 mL) injection 40 units twice a day 30 mL 3    insulin lispro (HumaLOG KwikPen Insulin) 100 unit/mL injection INYECTE DEBAJO DE LA PIEL 35  UNIDADES CON LAS COMIDAS CON LA  ESCALA MOVIL HASTA 150 UNIDADES CADA CUBA 150 mL 2    ipratropium-albuteroL (Duo-Neb) 0.5-2.5 mg/3 mL nebulizer solution Take 3 mL by nebulization 3 times a day as needed for wheezing. 180 mL 2    isosorbide dinitrate (Isordil) 40 mg tablet Take 1 tablet (40 mg) by mouth 3 times a day. 270 tablet 3    ivabradine (Corlanor) 5 mg tablet Take 0.5 tablets (2.5 mg) by mouth 2 times a day. (Patient not taking: Reported on 11/5/2024) 90 tablet 3    lancets (Lancets,Thin) misc 4 times daily (before meals and at bedtime). use as directed to test blood sugar 100 each 3    levothyroxine (Synthroid, Levoxyl) 75 mcg tablet Take 1 tablet (75 mcg) by mouth once daily. 90 tablet 2    lidocaine (Lidoderm) 5 % patch Place 1 patch over 12 hours on the skin once daily. Apply to painful area 12 hours per day, remove for 12 hours. 1 patch 1    meloxicam (Mobic) 7.5 mg tablet Take 1 tablet (7.5 mg) by mouth once daily as needed for moderate pain (4 - 6). Do NOT take any other NSAID's while taking this medication. (Ibuprofen, Naproxen, Aleve) 30 tablet 2    metoprolol succinate XL (Toprol-XL) 100 mg 24 hr tablet Take 2 tablets (200 mg) by mouth once daily. Do not crush or chew. 180 tablet 3    OneTouch Delica Plus Lancet 30 gauge misc       OneTouch Ultra Test strip For bg check 4x/day 200 strip 3    pantoprazole (ProtoNix) 40 mg EC tablet TAKE 1 TABLET BY MOUTH ONCE DAILY IN THE MORNING TAKE BEFORE A MEAL DO NOT CRUSH, CHEW, AND/OR DIVIDE 90 tablet 3    pen needle, diabetic (BD Ultra-Fine Sarah Pen Needle) 32 gauge x 5/32\" needle For insulin injection 4x/day 200 each 5    pen needle, diabetic, safety 29 gauge x 1/2\" needle Three times before meals and at bedtime      QUEtiapine (SEROquel) 50 mg tablet TOME 1 TABLETA POR LA BOCA NITIN  VEZ AL CUBA AL " "ACOSTARSE 90 tablet 0    sacubitriL-valsartan (Entresto)  mg tablet Take 1 tablet by mouth 2 times a day. 180 tablet 3    spironolactone (Aldactone) 25 mg tablet Take 2 tablets (50 mg) by mouth once daily. 180 tablet 3    TechLITE Pen Needle 31 gauge x 3/16\" needle use 1 PEN NEEDLE to inject MEDICATION subcutaneously three times a day         Objective   Not recorded         Imaging    Macula OCT 11/11/2024  Right eye (OD): Normal contour and appearance, no subretinal fluid (SRF), trace IRF  Left eye (OS): Normal contour and appearance, no subretinal fluid (SRF), trace IRF      Assessment/Plan     Last saw me in July    Now status post (s/p) cataract extraction (CE)/intraocular lens (IOL) left eye 10/31/24      Type 2 diabetes mellitus with right eye affected by mild-moderate nonproliferative retinopathy and macular edema, with long-term current use of insulin (Multi)    - (+) T2DM 19 year hx (diagnosed age 41), (+) Insulin, Last A1c 10.8% (11/5/24)   - No edema both eyes, good foveal contour with normal thickness  -Subjective blurring both eyes about 6 months, has been about the same  -Mild non-proliferative diabetic retinopathy (NPDR) both eyes with minimal edema both eyes, but does not appear visually sig  - significant visual improvement OS after cataract surgery, suspect OD cataract is also visually significant with cortical spoking within visual axis     Combined forms of age-related cataract of both eyes  -Being managed by Dr. Lamb and Dr. Phillips  - will send back to Dr. Phillips for cataract eval OD    "

## 2024-11-11 ENCOUNTER — APPOINTMENT (OUTPATIENT)
Dept: OPHTHALMOLOGY | Facility: CLINIC | Age: 59
End: 2024-11-11
Payer: COMMERCIAL

## 2024-11-11 ENCOUNTER — LAB (OUTPATIENT)
Dept: LAB | Facility: LAB | Age: 59
End: 2024-11-11
Payer: COMMERCIAL

## 2024-11-11 DIAGNOSIS — I50.22 CHRONIC SYSTOLIC HEART FAILURE: ICD-10-CM

## 2024-11-11 DIAGNOSIS — N18.30 STAGE 3 CHRONIC KIDNEY DISEASE, UNSPECIFIED WHETHER STAGE 3A OR 3B CKD (MULTI): ICD-10-CM

## 2024-11-11 DIAGNOSIS — Z95.810 IMPLANTABLE CARDIOVERTER-DEFIBRILLATOR (ICD) IN SITU: ICD-10-CM

## 2024-11-11 DIAGNOSIS — E11.3211 TYPE 2 DIABETES MELLITUS WITH RIGHT EYE AFFECTED BY MILD NONPROLIFERATIVE RETINOPATHY AND MACULAR EDEMA, WITH LONG-TERM CURRENT USE OF INSULIN: Primary | ICD-10-CM

## 2024-11-11 DIAGNOSIS — I25.5 ISCHEMIC CARDIOMYOPATHY: ICD-10-CM

## 2024-11-11 DIAGNOSIS — Z79.4 TYPE 2 DIABETES MELLITUS WITH RIGHT EYE AFFECTED BY MILD NONPROLIFERATIVE RETINOPATHY AND MACULAR EDEMA, WITH LONG-TERM CURRENT USE OF INSULIN: Primary | ICD-10-CM

## 2024-11-11 DIAGNOSIS — E11.65 TYPE 2 DIABETES MELLITUS WITH HYPERGLYCEMIA, WITH LONG-TERM CURRENT USE OF INSULIN: ICD-10-CM

## 2024-11-11 DIAGNOSIS — I25.5 CARDIOMYOPATHY, ISCHEMIC: ICD-10-CM

## 2024-11-11 DIAGNOSIS — E03.9 HYPOTHYROIDISM, UNSPECIFIED TYPE: ICD-10-CM

## 2024-11-11 DIAGNOSIS — Z79.4 TYPE 2 DIABETES MELLITUS WITH HYPERGLYCEMIA, WITH LONG-TERM CURRENT USE OF INSULIN: ICD-10-CM

## 2024-11-11 LAB
ALBUMIN SERPL BCP-MCNC: 4 G/DL (ref 3.4–5)
ALP SERPL-CCNC: 44 U/L (ref 33–120)
ALT SERPL W P-5'-P-CCNC: 12 U/L (ref 10–52)
ANION GAP SERPL CALC-SCNC: 16 MMOL/L (ref 10–20)
AST SERPL W P-5'-P-CCNC: 12 U/L (ref 9–39)
BILIRUB SERPL-MCNC: 0.5 MG/DL (ref 0–1.2)
BUN SERPL-MCNC: 63 MG/DL (ref 6–23)
CALCIUM SERPL-MCNC: 9.4 MG/DL (ref 8.6–10.6)
CHLORIDE SERPL-SCNC: 91 MMOL/L (ref 98–107)
CO2 SERPL-SCNC: 22 MMOL/L (ref 21–32)
CREAT SERPL-MCNC: 1.36 MG/DL (ref 0.5–1.3)
CREAT UR-MCNC: 100.4 MG/DL (ref 20–370)
EGFRCR SERPLBLD CKD-EPI 2021: 60 ML/MIN/1.73M*2
GLUCOSE SERPL-MCNC: 233 MG/DL (ref 74–99)
MICROALBUMIN UR-MCNC: 499.4 MG/L
MICROALBUMIN/CREAT UR: 497.4 UG/MG CREAT
PHOSPHATE SERPL-MCNC: 3 MG/DL (ref 2.5–4.9)
POTASSIUM SERPL-SCNC: 5.3 MMOL/L (ref 3.5–5.3)
PROT SERPL-MCNC: 7.2 G/DL (ref 6.4–8.2)
SODIUM SERPL-SCNC: 124 MMOL/L (ref 136–145)
TSH SERPL-ACNC: 3.26 MIU/L (ref 0.44–3.98)

## 2024-11-11 PROCEDURE — 84100 ASSAY OF PHOSPHORUS: CPT

## 2024-11-11 PROCEDURE — 82043 UR ALBUMIN QUANTITATIVE: CPT

## 2024-11-11 PROCEDURE — 99213 OFFICE O/P EST LOW 20 MIN: CPT | Performed by: OPHTHALMOLOGY

## 2024-11-11 PROCEDURE — 82570 ASSAY OF URINE CREATININE: CPT

## 2024-11-11 PROCEDURE — 80053 COMPREHEN METABOLIC PANEL: CPT

## 2024-11-11 PROCEDURE — 84443 ASSAY THYROID STIM HORMONE: CPT

## 2024-11-11 PROCEDURE — 36415 COLL VENOUS BLD VENIPUNCTURE: CPT

## 2024-11-11 PROCEDURE — 83036 HEMOGLOBIN GLYCOSYLATED A1C: CPT

## 2024-11-11 PROCEDURE — 92134 CPTRZ OPH DX IMG PST SGM RTA: CPT | Performed by: OPHTHALMOLOGY

## 2024-11-11 PROCEDURE — 80061 LIPID PANEL: CPT

## 2024-11-11 ASSESSMENT — EXTERNAL EXAM - RIGHT EYE: OD_EXAM: NORMAL

## 2024-11-11 ASSESSMENT — CUP TO DISC RATIO
OD_RATIO: 0.3
OS_RATIO: 0.3

## 2024-11-11 ASSESSMENT — ENCOUNTER SYMPTOMS
RESPIRATORY NEGATIVE: 0
MUSCULOSKELETAL NEGATIVE: 0
CARDIOVASCULAR NEGATIVE: 0
ENDOCRINE NEGATIVE: 0
EYES NEGATIVE: 1
PSYCHIATRIC NEGATIVE: 0
HEMATOLOGIC/LYMPHATIC NEGATIVE: 0
GASTROINTESTINAL NEGATIVE: 0
NEUROLOGICAL NEGATIVE: 0
ALLERGIC/IMMUNOLOGIC NEGATIVE: 0
CONSTITUTIONAL NEGATIVE: 0

## 2024-11-11 ASSESSMENT — VISUAL ACUITY
OD_PH_SC: 20/60
OD_SC: 20/100
METHOD: SNELLEN - LINEAR
OS_PH_SC: 20/30
OS_SC+: -2
OS_PH_SC+: -2
OS_SC: 20/50
OD_PH_SC+: +2

## 2024-11-11 ASSESSMENT — CONF VISUAL FIELD
OS_NORMAL: 1
OD_INFERIOR_TEMPORAL_RESTRICTION: 0
OS_SUPERIOR_NASAL_RESTRICTION: 0
OS_INFERIOR_NASAL_RESTRICTION: 0
OD_INFERIOR_NASAL_RESTRICTION: 0
OD_SUPERIOR_TEMPORAL_RESTRICTION: 0
OS_SUPERIOR_TEMPORAL_RESTRICTION: 0
OS_INFERIOR_TEMPORAL_RESTRICTION: 0
OD_SUPERIOR_NASAL_RESTRICTION: 0
OD_NORMAL: 1

## 2024-11-11 ASSESSMENT — EXTERNAL EXAM - LEFT EYE: OS_EXAM: NORMAL

## 2024-11-11 ASSESSMENT — SLIT LAMP EXAM - LIDS
COMMENTS: GOOD POSITION, DERMATOCHALASIS
COMMENTS: GOOD POSITION, DERMATOCHALASIS

## 2024-11-12 LAB
CHOLEST SERPL-MCNC: 630 MG/DL (ref 0–199)
CHOLESTEROL/HDL RATIO: 21.6
EST. AVERAGE GLUCOSE BLD GHB EST-MCNC: 269 MG/DL
HBA1C MFR BLD: 11 %
HDLC SERPL-MCNC: 29.2 MG/DL
LDLC SERPL CALC-MCNC: ABNORMAL MG/DL
NON HDL CHOLESTEROL: 601 MG/DL (ref 0–149)
TRIGL SERPL-MCNC: 2663 MG/DL (ref 0–149)
VLDL: ABNORMAL

## 2024-11-13 DIAGNOSIS — E78.2 ELEVATED CHOLESTEROL WITH ELEVATED TRIGLYCERIDES: Primary | ICD-10-CM

## 2024-11-13 RX ORDER — ICOSAPENT ETHYL 1 G/1
2 CAPSULE ORAL
Qty: 120 CAPSULE | Refills: 11 | Status: SHIPPED | OUTPATIENT
Start: 2024-11-13 | End: 2025-11-13

## 2024-11-15 DIAGNOSIS — I50.22 CHRONIC SYSTOLIC HEART FAILURE: Primary | ICD-10-CM

## 2024-11-15 DIAGNOSIS — Z79.4 TYPE 2 DIABETES MELLITUS WITH RIGHT EYE AFFECTED BY MILD NONPROLIFERATIVE RETINOPATHY AND MACULAR EDEMA, WITH LONG-TERM CURRENT USE OF INSULIN: ICD-10-CM

## 2024-11-15 DIAGNOSIS — I25.5 ISCHEMIC CARDIOMYOPATHY: ICD-10-CM

## 2024-11-15 DIAGNOSIS — E11.3211 TYPE 2 DIABETES MELLITUS WITH RIGHT EYE AFFECTED BY MILD NONPROLIFERATIVE RETINOPATHY AND MACULAR EDEMA, WITH LONG-TERM CURRENT USE OF INSULIN: ICD-10-CM

## 2024-11-18 DIAGNOSIS — E11.65 TYPE 2 DIABETES MELLITUS WITH HYPERGLYCEMIA, WITH LONG-TERM CURRENT USE OF INSULIN: Primary | ICD-10-CM

## 2024-11-18 DIAGNOSIS — Z79.4 TYPE 2 DIABETES MELLITUS WITH HYPERGLYCEMIA, WITH LONG-TERM CURRENT USE OF INSULIN: Primary | ICD-10-CM

## 2024-11-19 ENCOUNTER — HOME HEALTH ADMISSION (OUTPATIENT)
Dept: HOME HEALTH SERVICES | Facility: HOME HEALTH | Age: 59
End: 2024-11-19
Payer: COMMERCIAL

## 2024-11-19 ENCOUNTER — OFFICE VISIT (OUTPATIENT)
Dept: PRIMARY CARE | Facility: HOSPITAL | Age: 59
End: 2024-11-19
Payer: COMMERCIAL

## 2024-11-19 VITALS
SYSTOLIC BLOOD PRESSURE: 97 MMHG | BODY MASS INDEX: 31.36 KG/M2 | WEIGHT: 224 LBS | OXYGEN SATURATION: 97 % | HEART RATE: 101 BPM | HEIGHT: 71 IN | DIASTOLIC BLOOD PRESSURE: 69 MMHG | TEMPERATURE: 97 F

## 2024-11-19 DIAGNOSIS — Z72.0 TOBACCO ABUSE DISORDER: Primary | ICD-10-CM

## 2024-11-19 DIAGNOSIS — E03.9 HYPOTHYROIDISM, UNSPECIFIED TYPE: ICD-10-CM

## 2024-11-19 DIAGNOSIS — I82.409 DEEP VEIN THROMBOSIS (DVT) OF LOWER EXTREMITY, UNSPECIFIED CHRONICITY, UNSPECIFIED LATERALITY, UNSPECIFIED VEIN (MULTI): ICD-10-CM

## 2024-11-19 DIAGNOSIS — Z87.891 PERSONAL HISTORY OF NICOTINE DEPENDENCE: ICD-10-CM

## 2024-11-19 DIAGNOSIS — I25.5 ISCHEMIC CARDIOMYOPATHY: ICD-10-CM

## 2024-11-19 DIAGNOSIS — I50.23 ACUTE ON CHRONIC HFREF (HEART FAILURE WITH REDUCED EJECTION FRACTION): ICD-10-CM

## 2024-11-19 DIAGNOSIS — I50.22 CHRONIC SYSTOLIC HEART FAILURE: ICD-10-CM

## 2024-11-19 DIAGNOSIS — N18.30 STAGE 3 CHRONIC KIDNEY DISEASE, UNSPECIFIED WHETHER STAGE 3A OR 3B CKD (MULTI): ICD-10-CM

## 2024-11-19 DIAGNOSIS — G47.33 OSA (OBSTRUCTIVE SLEEP APNEA): ICD-10-CM

## 2024-11-19 DIAGNOSIS — E11.65 TYPE 2 DIABETES MELLITUS WITH HYPERGLYCEMIA, WITH LONG-TERM CURRENT USE OF INSULIN: ICD-10-CM

## 2024-11-19 DIAGNOSIS — Z79.4 TYPE 2 DIABETES MELLITUS WITH HYPERGLYCEMIA, WITH LONG-TERM CURRENT USE OF INSULIN: ICD-10-CM

## 2024-11-19 DIAGNOSIS — M54.30 SCIATIC LEG PAIN: ICD-10-CM

## 2024-11-19 LAB — GLUCOSE BLD MANUAL STRIP-MCNC: 255 MG/DL (ref 74–99)

## 2024-11-19 PROCEDURE — 3008F BODY MASS INDEX DOCD: CPT

## 2024-11-19 PROCEDURE — 36416 COLLJ CAPILLARY BLOOD SPEC: CPT | Mod: GC

## 2024-11-19 PROCEDURE — 3046F HEMOGLOBIN A1C LEVEL >9.0%: CPT

## 2024-11-19 PROCEDURE — 3062F POS MACROALBUMINURIA REV: CPT

## 2024-11-19 PROCEDURE — 82947 ASSAY GLUCOSE BLOOD QUANT: CPT

## 2024-11-19 PROCEDURE — 99214 OFFICE O/P EST MOD 30 MIN: CPT

## 2024-11-19 PROCEDURE — 99214 OFFICE O/P EST MOD 30 MIN: CPT | Mod: GC

## 2024-11-19 PROCEDURE — 3078F DIAST BP <80 MM HG: CPT

## 2024-11-19 PROCEDURE — 1036F TOBACCO NON-USER: CPT

## 2024-11-19 PROCEDURE — 3074F SYST BP LT 130 MM HG: CPT

## 2024-11-19 RX ORDER — IPRATROPIUM BROMIDE AND ALBUTEROL SULFATE 2.5; .5 MG/3ML; MG/3ML
3 SOLUTION RESPIRATORY (INHALATION) 3 TIMES DAILY PRN
Qty: 180 ML | Refills: 2 | Status: SHIPPED | OUTPATIENT
Start: 2024-11-19 | End: 2025-01-18

## 2024-11-19 RX ORDER — PANTOPRAZOLE SODIUM 40 MG/1
TABLET, DELAYED RELEASE ORAL
Qty: 90 TABLET | Refills: 3 | Status: SHIPPED | OUTPATIENT
Start: 2024-11-19

## 2024-11-19 RX ORDER — DAPAGLIFLOZIN 10 MG/1
10 TABLET, FILM COATED ORAL DAILY
Qty: 90 TABLET | Refills: 3 | Status: SHIPPED | OUTPATIENT
Start: 2024-11-19 | End: 2025-11-19

## 2024-11-19 RX ORDER — BUMETANIDE 2 MG/1
2 TABLET ORAL
Qty: 180 TABLET | Refills: 3 | Status: SHIPPED | OUTPATIENT
Start: 2024-11-19 | End: 2025-11-19

## 2024-11-19 RX ORDER — METOPROLOL SUCCINATE 100 MG/1
200 TABLET, EXTENDED RELEASE ORAL DAILY
Qty: 180 TABLET | Refills: 3 | Status: SHIPPED | OUTPATIENT
Start: 2024-11-19 | End: 2025-11-19

## 2024-11-19 RX ORDER — IPRATROPIUM BROMIDE AND ALBUTEROL SULFATE 2.5; .5 MG/3ML; MG/3ML
3 SOLUTION RESPIRATORY (INHALATION) 3 TIMES DAILY PRN
Qty: 180 ML | Refills: 2 | Status: SHIPPED | OUTPATIENT
Start: 2024-11-19 | End: 2024-11-19 | Stop reason: SDUPTHER

## 2024-11-19 RX ORDER — ISOSORBIDE DINITRATE 40 MG/1
40 TABLET ORAL
Qty: 270 TABLET | Refills: 3 | Status: SHIPPED | OUTPATIENT
Start: 2024-11-19 | End: 2025-11-19

## 2024-11-19 RX ORDER — ATORVASTATIN CALCIUM 80 MG/1
80 TABLET, FILM COATED ORAL DAILY
Qty: 90 TABLET | Refills: 3 | Status: SHIPPED | OUTPATIENT
Start: 2024-11-19 | End: 2025-11-19

## 2024-11-19 RX ORDER — DIGOXIN 125 MCG
125 TABLET ORAL DAILY
Qty: 90 TABLET | Refills: 3 | Status: SHIPPED | OUTPATIENT
Start: 2024-11-19

## 2024-11-19 RX ORDER — GABAPENTIN 300 MG/1
300 CAPSULE ORAL NIGHTLY
Qty: 90 CAPSULE | Refills: 2 | Status: SHIPPED | OUTPATIENT
Start: 2024-11-19

## 2024-11-19 RX ORDER — HYDRALAZINE HYDROCHLORIDE 100 MG/1
100 TABLET, FILM COATED ORAL 3 TIMES DAILY
Qty: 270 TABLET | Refills: 3 | Status: SHIPPED | OUTPATIENT
Start: 2024-11-19 | End: 2025-11-19

## 2024-11-19 RX ORDER — DICLOFENAC SODIUM 10 MG/G
4 GEL TOPICAL 2 TIMES DAILY PRN
Qty: 50 G | Refills: 2 | Status: SHIPPED | OUTPATIENT
Start: 2024-11-19 | End: 2025-01-18

## 2024-11-19 RX ORDER — GABAPENTIN 600 MG/1
600 TABLET ORAL NIGHTLY
Qty: 30 TABLET | Refills: 2 | Status: SHIPPED | OUTPATIENT
Start: 2024-11-19 | End: 2025-02-17

## 2024-11-19 RX ORDER — QUETIAPINE FUMARATE 50 MG/1
50 TABLET, FILM COATED ORAL NIGHTLY
Qty: 90 TABLET | Refills: 2 | Status: SHIPPED | OUTPATIENT
Start: 2024-11-19

## 2024-11-19 RX ORDER — LIDOCAINE 50 MG/G
1 PATCH TOPICAL DAILY
Qty: 1 PATCH | Refills: 1 | Status: SHIPPED | OUTPATIENT
Start: 2024-11-19 | End: 2025-11-19

## 2024-11-19 RX ORDER — SPIRONOLACTONE 25 MG/1
50 TABLET ORAL DAILY
Qty: 180 TABLET | Refills: 3 | Status: SHIPPED | OUTPATIENT
Start: 2024-11-19 | End: 2025-11-19

## 2024-11-19 ASSESSMENT — ENCOUNTER SYMPTOMS
ALLERGIC/IMMUNOLOGIC NEGATIVE: 1
PSYCHIATRIC NEGATIVE: 1
CARDIOVASCULAR NEGATIVE: 1
LOSS OF SENSATION IN FEET: 0
EYES NEGATIVE: 1
OCCASIONAL FEELINGS OF UNSTEADINESS: 0
BACK PAIN: 1
HEMATOLOGIC/LYMPHATIC NEGATIVE: 1
GASTROINTESTINAL NEGATIVE: 1
DEPRESSION: 0
ENDOCRINE NEGATIVE: 1

## 2024-11-19 ASSESSMENT — PATIENT HEALTH QUESTIONNAIRE - PHQ9
10. IF YOU CHECKED OFF ANY PROBLEMS, HOW DIFFICULT HAVE THESE PROBLEMS MADE IT FOR YOU TO DO YOUR WORK, TAKE CARE OF THINGS AT HOME, OR GET ALONG WITH OTHER PEOPLE: SOMEWHAT DIFFICULT
SUM OF ALL RESPONSES TO PHQ9 QUESTIONS 1 AND 2: 2
2. FEELING DOWN, DEPRESSED OR HOPELESS: SEVERAL DAYS
1. LITTLE INTEREST OR PLEASURE IN DOING THINGS: SEVERAL DAYS

## 2024-11-19 ASSESSMENT — PAIN SCALES - GENERAL: PAINLEVEL_OUTOF10: 4

## 2024-11-19 NOTE — PATIENT INSTRUCTIONS
As discussed today, our plan is:     Medication changes: We add lidocaine patch, diclofenac topical. Please  Vascepa medication at your pharmacy.    Consultations - you were referred to see the following specialists:  - Continue your follow up with Dr. Salazar (Endocrine), please schedule your appointment at  [phone]  - Schedule your appointment with [facility] (multidisciplinary clinic at ): [phone]  - Home health: We are sending skilled nurses, PT and OT to help you with your meds and chronic sciatica pain to complete exercises  4.   If you smoke or use other tobacco products, take steps to quit. Call [phone] for more information or to set up an appointment with  [facility]. The [place] Tobacco Quit Line is a free resource for people who don’t have insurance, receive Medicaid, pregnant women, or members of the [place] [facility]. Call 4-846-QUIT-NOW or 1-974.984.6583.    Please come back to see us in: Between January 13 and January 23   ------  If you have any problems or questions, please contact the clinic at [phone] to leave a message. Our fax number is [phone]. If your issue cannot wait until the next business day, please go to urgent care or the emergency department.     I also strongly urge all of my patients to register for [employer] [facility] by going to: [email or web address]  (The  staff can also send you a text/email link to register when you check out).    No shows: It is understandable if you are unable to make it to a visit, but please cancel your appointment instead of not showing up. This helps to give other patients access to primary care and keeps wait times low.        Juan Carlos Callahan [facility]   [phone]

## 2024-11-19 NOTE — PROGRESS NOTES
Chief complaint: Follow up     HPI:  Felice Law is a 59 y.o. year old male patient with PMHx of of ICM/HFrEF (LVEF 20% 10/2023), HTN, HLD, Type II DM, prior tobacco use (24 pack year, quit 2017), obesity (BMI 31), CAD s/p PCI and 1vCABG (LIMA-LAD with balloon pump support April 2023), VT arrest s/p VT ablation + ICD 2017, DVT (L femoral) on Eliquis, hypothyroidism, pulmonary hypertension, and central sleep apnea presents to [facility] clinic for FUV.     Patient reported that still is having issues with back pain radiated to both of his legs, but especially to his left leg, which limits ambulation and daily activities. It has improved a little bit with gabapentin at night. He saw pain medicine 2 weeks ago and suggested to get MRI and that patient is a candidate for steroid injection. He was also referred to aqua therapy and PT. Patient had cellulitis and ulcer on left leg which looks healed today, no ulcer reported and finished antibiotic as prescribed.     Patient does no report chest pain or dyspnea with usual activities. He thinks is doing better putting his insulin pre meals, he had issues getting access to his sliding scale but still remember some of the doses based on his glucose.      Medications:  Current Outpatient Medications   Medication Instructions    albuterol 2.5 mg /3 mL (0.083 %) nebulizer solution Use every 4-6 hours as needed for shortness of breath    apixaban (ELIQUIS) 5 mg, oral, 2 times daily    atorvastatin (LIPITOR) 80 mg, oral, Daily    bumetanide (BUMEX) 2 mg, oral, 2 times daily (morning and late afternoon)    diclofenac sodium (VOLTAREN) 4 g, Topical, 2 times daily PRN    digoxin (LANOXIN) 125 mcg, oral, Daily    dulaglutide (Trulicity) 4.5 mg/0.5 mL pen injector Inject 0.5 mL under the skin 1 (one) time per week.    Farxiga 10 mg, oral, Daily    FreeStyle Franco 2 Roff misc Use as instructed    FreeStyle Franco 2 Sensor kit Change every 14 days    gabapentin (NEURONTIN) 300 mg,  "oral, Nightly    hydrALAZINE (APRESOLINE) 100 mg, oral, 3 times daily    icosapent ethyL (VASCEPA) 2 g, oral, 2 times daily (morning and late afternoon)    insulin glargine (Toujeo Max U-300 SoloStar) 300 unit/mL (3 mL) injection 40 units twice a day    insulin lispro (HumaLOG KwikPen Insulin) 100 unit/mL injection INYECTE DEBAJO DE LA PIEL 35  UNIDADES CON LAS COMIDAS CON LA  ESCALA MOVIL HASTA 150 UNIDADES CADA CUBA    ipratropium-albuteroL (Duo-Neb) 0.5-2.5 mg/3 mL nebulizer solution 3 mL, nebulization, 3 times daily PRN    isosorbide dinitrate (ISORDIL) 40 mg, oral, 3 times daily (0900,1400,1900)    ivabradine (CORLANOR) 2.5 mg, oral, 2 times daily    ketorolac (Acular) 0.5 % ophthalmic solution 1 drop to surgical eye 4 times a day starting 1 day before surgery    lancets (Lancets,Thin) misc 4 times daily (before meals and at bedtime). use as directed to test blood sugar    levothyroxine (SYNTHROID, LEVOXYL) 75 mcg, oral, Daily    lidocaine (Lidoderm) 5 % patch 1 patch, transdermal, Daily, Apply to painful area 12 hours per day, remove for 12 hours.    metoprolol succinate XL (TOPROL-XL) 200 mg, oral, Daily, Do not crush or chew.    ofloxacin (Ocuflox) 0.3 % ophthalmic solution 1 drop to operative eye 4 times a day starting 1 day before surgery    OneTouch Delica Plus Lancet 30 gauge misc     OneTouch Ultra Test strip For bg check 4x/day    pantoprazole (ProtoNix) 40 mg EC tablet TAKE 1 TABLET BY MOUTH ONCE DAILY IN THE MORNING TAKE BEFORE A MEAL DO NOT CRUSH, CHEW, AND/OR DIVIDE    pen needle, diabetic (BD Ultra-Fine Sarah Pen Needle) 32 gauge x 5/32\" needle For insulin injection 4x/day    pen needle, diabetic, safety 29 gauge x 1/2\" needle Three times before meals and at bedtime    prednisoLONE acetate (Pred-Forte) 1 % ophthalmic suspension 1 drop to operative eye 4 times a day starting the day of surgery (after surgery is done)    QUEtiapine (SEROQUEL) 50 mg, oral, Nightly    sacubitriL-valsartan (Entresto) " " mg tablet 1 tablet, oral, 2 times daily    spironolactone (ALDACTONE) 50 mg, oral, Daily    TechLITE Pen Needle 31 gauge x 3/16\" needle use 1 PEN NEEDLE to inject MEDICATION subcutaneously three times a day       Allergies:  Allergies   Allergen Reactions    Azithromycin Dizziness and Unknown     Dizziness, Vertigo, Cold, Clammy, Nausea, Sweating, trouble breathing       Past medical history:  Past Medical History:   Diagnosis Date    Cataract     Chest pain 10/01/2023    CHF (congestive heart failure)     Clotting disorder (Multi)     COPD (chronic obstructive pulmonary disease) (Multi)     Coronary artery disease     Diabetes mellitus (Multi)     Disease of thyroid gland     Elevated troponin level not due myocardial infarction 08/14/2023    Hypertension     Ischemic cardiomyopathy     Pulmonary hypertension (Multi)     Venous ulcer of left leg (Multi)        Surgical history:  Past Surgical History:   Procedure Laterality Date    CARDIAC DEFIBRILLATOR PLACEMENT      CORONARY ANGIOPLASTY WITH STENT PLACEMENT      CORONARY ARTERY BYPASS GRAFT         Family history:  Family History   Family history unknown: Yes       Social history:   reports that he quit smoking about 7 years ago. His smoking use included cigarettes. He started smoking about 31 years ago. He has a 24 pack-year smoking history. He has never used smokeless tobacco. He reports that he does not drink alcohol and does not use drugs.    Health maintenance:  Health Maintenance   Topic Date Due    MMR Vaccines (1 of 1 - Standard series) Never done    Hepatitis B Vaccines (3 of 3 - 19+ 3-dose series) 05/13/2020    Hepatitis A Vaccines (2 of 2 - Risk 2-dose series) 03/28/2023    COVID-19 Vaccine (6 - 2024-25 season) 09/01/2024    Lung Cancer Screening  10/06/2024    Diabetes: Hemoglobin A1C  02/11/2025    Medicare Annual Wellness Visit (AWV)  04/27/2025    Echocardiogram  06/25/2025    Creatinine Level  11/11/2025    Potassium Level  11/11/2025    " TSH Level  11/11/2025    Lipid Panel  11/11/2025    Diabetes: Retinopathy Screening  11/11/2025    DTaP/Tdap/Td Vaccines (2 - Td or Tdap) 07/21/2029    Abdominal Aortic Aneurysm (AAA) Screening  01/09/2031    Colorectal Cancer Screening  10/06/2033    Influenza Vaccine  Completed    Pneumococcal Vaccine: Pediatrics (0 to 5 Years) and At-Risk Patients (6 to 64 Years)  Completed    Zoster Vaccines  Completed    HIV Screening  Completed    Hepatitis C Screening  Completed    HIB Vaccines  Aged Out    IPV Vaccines  Aged Out    Meningococcal Vaccine  Aged Out    Rotavirus Vaccines  Aged Out    HPV Vaccines  Aged Out    Medicare Initial Physical (IPPE)  Discontinued       Sexual History  Currently Sexually Active: No  Partners: Female  LMP: N/A Male  Contraception: No  STI Concern/Hx: No History      Review of systems:  Review of Systems   Eyes: Negative.    Cardiovascular: Negative.    Gastrointestinal: Negative.    Endocrine: Negative.    Genitourinary: Negative.    Musculoskeletal:  Positive for back pain and gait problem.   Allergic/Immunologic: Negative.    Hematological: Negative.    Psychiatric/Behavioral: Negative.          Vitals:  Vitals:    11/19/24 1259   BP: 97/69   Pulse: 101   Temp: 36.1 °C (97 °F)   SpO2: 97%       Physical exam:  Physical Exam  Constitutional:       Appearance: Normal appearance.   HENT:      Head: Normocephalic.      Nose: Nose normal.      Mouth/Throat:      Mouth: Mucous membranes are moist.   Eyes:      Pupils: Pupils are equal, round, and reactive to light.   Cardiovascular:      Rate and Rhythm: Normal rate and regular rhythm.      Pulses: Normal pulses.   Pulmonary:      Effort: Pulmonary effort is normal.      Breath sounds: Normal breath sounds.   Abdominal:      General: Abdomen is flat.      Palpations: Abdomen is soft.   Musculoskeletal:      Cervical back: Normal range of motion.   Neurological:      Mental Status: He is alert and oriented to person, place, and time.       Gait: Gait abnormal.   Psychiatric:         Mood and Affect: Mood normal.         Labs:  Lab Results   Component Value Date    WBC 6.1 11/05/2024    HGB 13.0 (L) 11/05/2024    HCT 39.7 (L) 11/05/2024    MCV 82 11/05/2024     11/05/2024       Lab Results   Component Value Date    GLUCOSE 233 (H) 11/11/2024    CALCIUM 9.4 11/11/2024     (L) 11/11/2024    K 5.3 11/11/2024    CO2 22 11/11/2024    CL 91 (L) 11/11/2024    BUN 63 (H) 11/11/2024    CREATININE 1.36 (H) 11/11/2024       Lab Results   Component Value Date    HGBA1C 11.0 (H) 11/11/2024        Lab Results   Component Value Date    CHOL 630 (H) 11/11/2024    CHOL 417 (H) 11/05/2024    CHOL 106 10/05/2023     Lab Results   Component Value Date    HDL 29.2 11/11/2024    HDL 36.9 11/05/2024    HDL 24.5 10/05/2023     Lab Results   Component Value Date    LDLCALC  11/11/2024      Comment:      The calculation of LDL and VLDL are inaccurate when the Triglycerides are greater than 400 mg/dL or when the patient is non-fasting. If LDL measurement is necessary contact the testing laboratory for an alternative LDL assay.                                Near   Borderline      AGE      Desirable  Optimal    High     High     Very High     0-19 Y     0 - 109     ---    110-129   >/= 130     ----    20-24 Y     0 - 119     ---    120-159   >/= 160     ----      >24 Y     0 -  99   100-129  130-159   160-189     >/=190      LDLCALC  11/05/2024      Comment:      The calculation of LDL and VLDL are inaccurate when the Triglycerides are greater than 400 mg/dL or when the patient is non-fasting. If LDL measurement is necessary contact the testing laboratory for an alternative LDL assay.                                  Near   Borderline      AGE      Desirable  Optimal    High     High     Very High     0-19 Y     0 - 109     ---    110-129   >/= 130     ----    20-24 Y     0 - 119     ---    120-159   >/= 160     ----      >24 Y     0 -  99   100-129  130-159    "160-189     >/=190      LDLCALC 32 (L) 10/05/2023     Lab Results   Component Value Date    TRIG 2,663 (H) 11/11/2024    TRIG 2,017 (H) 11/05/2024    TRIG 247 (H) 10/05/2023     No components found for: \"CHOLHDL\"    Imaging:  OCT, Retina - OU - Both Eyes    Result Date: 11/12/2024  Right Eye Quality was good. Scan locations included subfoveal. Progression has been stable. Findings include abnormal foveal contour. Left Eye Quality was good. Scan locations included subfoveal. Progression has been stable. Findings include abnormal foveal contour. Notes Retinal multimodal imaging including photography was completed, and the findings are described in the examination.    XR chest 2 views    Result Date: 11/7/2024  Interpreted By:  Lobo Castaneda, STUDY: XR CHEST 2 VIEWS   INDICATION: Signs/Symptoms:eval of ICD prior to MRI.   COMPARISON: October 11, 2023   ACCESSION NUMBER(S): [id number]   ORDERING CLINICIAN: WILLIAM BRANHAM   FINDINGS: Cardiomegaly with pacemaker. No consolidation, effusion, edema, or pneumothorax.       No evidence of acute intrathoracic abnormality.   Signed by: Lobo Castaneda 11/7/2024 5:42 PM Dictation workstation:   [id number]      Assessment and plan:  Felice Law is a 59 y.o. year old male patient with PMHx of of ICM/HFrEF (LVEF 20% 10/2023), HTN, HLD, Type II DM, prior tobacco use (24 pack year, quit 2017), obesity (BMI 31), CAD s/p PCI and 1vCABG (LIMA-LAD with balloon pump support April 2023), VT arrest s/p VT ablation + ICD 2017, DVT (L femoral) on Eliquis, hypothyroidism, pulmonary hypertension, and central sleep apnea [place] [facility] [place].     Patient reports having still sciatic like symptoms, was seen by pain medicine who recommended PT and aqua therapy. For now, we recommend topical pain management and PT. We will hold on MRI for now, and will try with conservative management. If there is no improvement we will explore if current ICD precludes MRI or not, if it does " we will proceed with CT scan.      We have ordered home health some several reasons: 1. Patient has limitation to transport and go to several appointments due to his heart failure and now sciatica and diabetic neuropathy, so he needs PT at home to work towards improving his strength and help with sciatica pain. 2. Patient with a lot of medications, sometimes loses instructions, prescriptions and we suspect that adherence could be jeopardize sometimes, so we are requesting skilled nurses that helping with medications and instructions for the prescriptions. Patient has a language barrier, he would benefit of Croatian speakers providers.    Diabetes is poor controlled per last HbA1c. Patient is having issues with premeals insulin, patient is doing better since 2 weeks by his reports. So we explain to see some improvement in the upcoming months. He will see his endocrine provider soon, but we will communicate with Dr. Salazar to confirm his sliding scale. However, he knows that can go up to 35 units pre meals per prescription. We encourage him to follow up different appointment and providers with cardiology, CINEMA, endocrine, pain medicine.     Very high cholesterol and triglycerides, instructed to start medication prescribed by endocrine office, Isra, patient has not claimed it yet.      #T2DM insulin-dependent  #Chronic bilateral leg neuropathy  #Diabetic retinopathy  #Obesity, BMI 31   #Dyslipidemia   :: today  -Follows Endocrinology, next appointment 12/16 (patient needs to schedule it)  -Diabetic foot exam: seen by podiatry in 2024  -Diabetic eye exam: seen by ophtho 11/11. RTC 6 months. Had recent cataract surgery. Follows up with ophthalmology  -HbA1c 11% 11/11/2024.   -Lipids 11/11/2024 cholesterol 630, triglycerides 2663  -Urine Alb/Cr: 497 (11/11/2024)  PLAN  -Switched to U300 toujeo 40 units BID- should follow up with endocrine   -Using CGM  -Lispro 35-35-35 with ISS 3:30 >150  -Farxiga 10mg  daily  -Trulicity 4.5 mg weekly  -ASA 81 mg and atorvastatin 80mg  -Gabapentin 300 mg AM and 600 mg HS  - Will communicate with Dr. Salazar office to re send sliding scale for patient and to consider Mounjaro or Ozempic due to obesity   - Patient with very high cholesterol and triglycerides - Prescribed with Vescepa, patient has not claimed medication yet, but instructed to start it as soon as possible.       #Low back pain, w/sciatica bilaterally exacerbated by recent mechanical fall, stable  PLAN  -Ordered XR lumbar spine and hips to rule out acute pathology 10/2023. Not completed patient states his pain is controlled.  -C/w lidocaine patches, gel voltaren and gabapentin  -Pain medicine following   -Will hold on MRI Lumbar spine w/o IV. Will try topical management and PT with home health. If patient is not improving with pain management and physical therapy would consider CT scan. Patient has ICD that could preclude MRI.      #JAKE (central and obstructive sleep apnea)  ::PSG 2/2/2020: evidence of severe sleep apnea, predominantly obstructive but central respiratory events were also noted-Cheyne Chirinos Breathing with prolonged cycle length and circulation time consistent with patient's known HFrEF.   PLAN  -BiPAP nightly at home with no difficulty  -Previously referred to Adult Sleep Medicine - instructed to take his machine to next appointment with sleep medicine      #COPD/Asthma  #Pulmonary HTN likely WHO group II  #Tobacco use hx (24 Pack-Yr, quit 2017)  ::TTE at [facility] 8/2023: EF 20% globally abnormal RV systolic function. Noninvasive HD assessment consistent with severe pHTN >60mmHg, elevated CVP, elevated LVEDP  ::Spirometry: completed 10/2023. FEV1:1.61 . FEV1/FVC: 0.78  PLAN  -albuterol 2.5 mg/3 mL nebulizer solution  -ipratropium-albuterol nebulizer     #HFrEF/Ischemic Cardiomyopathy (LVEF 20%, 10/23)  #CAD s/p PCI and CABG LIMA-LAD 4/2023 Dr. Logan  #Hx VT arrest s/p VT ablation + ICD 2017 in [facility]  [place]  ::Presented in committee meeting on 10/10. Not approved for LVAD or transplant at this time. Determined to be very high risk for advanced therapies at this time given poorly controlled diabetes, renal function, peripheral vascular questions, questionable psychosocial support, and financial concerns. Plan to optimize medical therapy and follow up closely in HF clinic.   ::[facility] 12/2022: severe diffuse 3-vessel CAD. Severe prox diffuse LAD up to 70%.  ::Device- Medtronic ICD; interrogation completed 10/6  ::Repeat ECHO 10/2023: dilated LV cavity, global hypokinesis of LV, mildly reduced RV sys func. LA mod to sev dilated  :: Echo The left ventricular systolic function is moderately decreased, with a visually estimated ejection fraction of 30%. There is global hypokinesis of the left ventricle with minor regional variations.  PLAN  -Follows  HF clinic, last seen 11/5 - prescription to start with ivabradine 2.5mg daily   -Completing cardiac rehab since 1/8 MWF  -bumex 2 mg  -Farxiga 10 mg daily  -hydralazine 100 mg TID/isosorbide dinitrate 40 mg TID (patient not taking frequently nitrate de to headache)  -metoprolol succinate  mg  -entresto  mg BID  -spironolactone 25 mg daily  -digoxin 125 mcg daily     #HTN  #CKD Stage IIIb  Last BUN/Cr (GFR): 63/1.36 (60), 11/11/2024. Baseline Creatinine ~1.4  No hyperkalemia. Asymptomatic hyponatremia from a week ago. Patient is taking bumex as needed since the last 2 weeks.  PLAN  -hydralazine 100 mg TID  -Previously referred to Nephrology per pt request. Patient encouraged to follow up with nephrology referral      #Hypothyroidism  TSH 3.26 (11/11/2024)  PLAN  -Levothyroxine 75 mcg  -Follows with Endo     #Hx DVT, Left Femoral  ::Repeat LE US 10/2023 appeared to show no acute DVTs in either lower extremity. Peroneal vein unable to visualize due to edema at the time of exam.  PLAN  -Eliquis 5mg BID     #Syphilis, presumed late latent infection (asymptomatic,  acquired >1 year ago), treated remotely  ::10/05/2023 RPR 1:2 and syphilis antibody reactive. Asymptomatic, no skin rash or genital lesion. Contact attempted on 11/09 and 11/10 for patient to come into office to receive shots. Per patient, he was treated for syphilis at age 15 in [place]. In this case, patient's NTT titers (RPR) can remain elevated despite successful treatment (serofast state). After infection, TT (syphilis antibodies) typically stay positive indefinitely. 12/2023 RPR 1:4  PLAN  - RPR monitoring annually (next 12/2024), if fourfold increase in RPR titer (>=1/8) then this indicates re-infection and patient would require re-treatment with IM PCN G 2.4 million units x 3 weeks  - Patient declined testing for this at this time. He states that the treatment and diagnose was several years ago and since then has not required further treatment.      #GERD  Controlled.  PLAN  -Pantoprazole      #Insomnia  PLAN  -Seroquel 50 mg QHS    HEALTH MAINTENANCE   Vaccines updated  Cancer screening   Colonoscopy: to be repeated on 2023   Low dose Chest CT:  Due, ordered was placed for CT scan 2024  Plan     Follow-up in 2 months.    Patient and plan discussed with attending physician Dr. Montoya.    Nick Cheney MD  PGY1 Internal Medicine

## 2024-11-20 ENCOUNTER — DOCUMENTATION (OUTPATIENT)
Dept: HOME HEALTH SERVICES | Facility: HOME HEALTH | Age: 59
End: 2024-11-20

## 2024-11-20 ENCOUNTER — APPOINTMENT (OUTPATIENT)
Dept: OPHTHALMOLOGY | Facility: CLINIC | Age: 59
End: 2024-11-20
Payer: COMMERCIAL

## 2024-11-20 NOTE — HH CARE COORDINATION
Home Care received a Referral for Nursing, Physical Therapy, Occupational Therapy, and Medical Social Work, Dietition. We have processed the referral for a Start of Care on 11/22 - 11/23/24.     If you have any questions or concerns, please feel free to contact us at [phone]. Follow the prompts, enter your five digit zip code, and you will be directed to your care team on WEST 3.

## 2024-11-20 NOTE — PROGRESS NOTES
I reviewed the resident/fellow's documentation and saw and discussed the patient with the resident/fellow. I agree with the resident/fellow's medical decision making as documented in the note.  NAME: Felice Dunn  HPI:  59 year old Luxembourger speaking male with poorly controlled DM, hypertriglyceridemia, HTN, CHFrEF, CAD here for followup noting that he has not got his new medication for lipids as unable to manage all his health needs.  His resident today is Luxembourger speaking and able to clarify some of his health challenges.  He lost his insulin sliding scale and does not know how to subsidize his insulin regimen which is lantus 40 BID and Humalog 35 units with each meal.    PMX:  DM, hypertriclyceridemia, HTN, CHFrEF, CAD, DVT, COPD, pulmonary hypertension   MEDS:    ALLERGIES:  SOC HX: His challenges are financial as well as related to literacy.  Needs social work assistance.   FAM HX:    PE:  97/59, BMI=31, lungs:  clear to ausculatation,  left eye ptosis, Extrem:  no edema  RECOMMEND:  Very challenging situation of gentleman overwhelmed with health and social issues.  He is limited in his ability to walk due to SOB and limited in ability to get his meds and get his sugars under control related to his finances and inability to understand how to get his meds.   He has urgent appointment with his endocrinologist in December and we will reach out to her  HE has amazing care from his heart failure doctor Neto  Home nurse sent to help with medication adherence,  dietician, social work assistance,  physical therapy for his sciatica  [employer] [facility] referral  Followup with current resident who speaks Luxembourger and has a chance at guiding his care.    Referral to our  her at clinic for any ideas to support him    Moira Montoya MD

## 2024-11-21 ENCOUNTER — APPOINTMENT (OUTPATIENT)
Dept: OPHTHALMOLOGY | Facility: CLINIC | Age: 59
End: 2024-11-21
Payer: COMMERCIAL

## 2024-11-21 ENCOUNTER — TELEPHONE (OUTPATIENT)
Dept: OPHTHALMOLOGY | Facility: CLINIC | Age: 59
End: 2024-11-21

## 2024-11-21 NOTE — TELEPHONE ENCOUNTER
Post op patient rescheduled appt on 11/20.2024 via [employer],patient was called no answer and a voicemail was left with a appt time and date for the following date 11/21/24 patient was a no show for that appt as well so I rescheduled the patient to the next available which is after the holidays as well as after other appts he already had. I left thee patient a voicemail with the new date,time and call back number.

## 2024-11-23 ENCOUNTER — HOME CARE VISIT (OUTPATIENT)
Dept: HOME HEALTH SERVICES | Facility: HOME HEALTH | Age: 59
End: 2024-11-23
Payer: COMMERCIAL

## 2024-11-23 VITALS
SYSTOLIC BLOOD PRESSURE: 98 MMHG | DIASTOLIC BLOOD PRESSURE: 56 MMHG | OXYGEN SATURATION: 100 % | RESPIRATION RATE: 18 BRPM | HEART RATE: 95 BPM | TEMPERATURE: 97.3 F

## 2024-11-23 PROCEDURE — G0299 HHS/HOSPICE OF RN EA 15 MIN: HCPCS

## 2024-11-23 ASSESSMENT — ACTIVITIES OF DAILY LIVING (ADL)
CURRENT_FUNCTION: STAND BY ASSIST
ENTERING_EXITING_HOME: STAND BY ASSIST
AMBULATION ASSISTANCE: STAND BY ASSIST
OASIS_M1830: 03

## 2024-11-23 ASSESSMENT — ENCOUNTER SYMPTOMS
MUSCLE WEAKNESS: 1
APPETITE LEVEL: GOOD
CHANGE IN APPETITE: UNCHANGED
PERSON REPORTING PAIN: PATIENT
DENIES PAIN: 1
HYPOTENSION: 1

## 2024-11-25 ENCOUNTER — HOME CARE VISIT (OUTPATIENT)
Dept: HOME HEALTH SERVICES | Facility: HOME HEALTH | Age: 59
End: 2024-11-25
Payer: COMMERCIAL

## 2024-11-25 VITALS — DIASTOLIC BLOOD PRESSURE: 60 MMHG | HEART RATE: 95 BPM | SYSTOLIC BLOOD PRESSURE: 100 MMHG | OXYGEN SATURATION: 97 %

## 2024-11-25 PROCEDURE — G0152 HHCP-SERV OF OT,EA 15 MIN: HCPCS

## 2024-11-25 ASSESSMENT — ENCOUNTER SYMPTOMS
PAIN LOCATION - PAIN FREQUENCY: FREQUENT
SUBJECTIVE PAIN PROGRESSION: UNCHANGED
PAIN: 1
LOWEST PAIN SEVERITY IN PAST 24 HOURS: 3/10
PAIN LOCATION - PAIN SEVERITY: 9/10
PERSON REPORTING PAIN: PATIENT
HIGHEST PAIN SEVERITY IN PAST 24 HOURS: 9/10
PAIN LOCATION: LEFT LEG

## 2024-11-25 ASSESSMENT — ACTIVITIES OF DAILY LIVING (ADL)
TOILETING: INDEPENDENT
TOILETING: 1
BATHING_CURRENT_FUNCTION: INDEPENDENT
PREPARING MEALS: INDEPENDENT
BATHING ASSESSED: 1
DRESSING_UB_CURRENT_FUNCTION: INDEPENDENT
DRESSING_LB_CURRENT_FUNCTION: INDEPENDENT

## 2024-11-26 ENCOUNTER — HOME CARE VISIT (OUTPATIENT)
Dept: HOME HEALTH SERVICES | Facility: HOME HEALTH | Age: 59
End: 2024-11-26
Payer: COMMERCIAL

## 2024-11-26 PROCEDURE — G0151 HHCP-SERV OF PT,EA 15 MIN: HCPCS

## 2024-11-26 PROCEDURE — G0155 HHCP-SVS OF CSW,EA 15 MIN: HCPCS

## 2024-11-26 SDOH — SOCIAL STABILITY: SOCIAL NETWORK: HELP FROM FAMILY/FRIENDS: MOM, MY SISTER AND A FRIEND

## 2024-11-26 ASSESSMENT — ENCOUNTER SYMPTOMS
PAIN: 1
PAIN LOCATION: LEFT LEG
PERSON REPORTING PAIN: PATIENT
LIMITED RANGE OF MOTION: 1
SUBJECTIVE PAIN PROGRESSION: UNCHANGED
PAIN LOCATION - PAIN SEVERITY: 9/10
HIGHEST PAIN SEVERITY IN PAST 24 HOURS: 10/10

## 2024-11-26 ASSESSMENT — ACTIVITIES OF DAILY LIVING (ADL): LAUNDRY_REQUIRES_ASSISTANCE: 1

## 2024-11-27 ENCOUNTER — HOME CARE VISIT (OUTPATIENT)
Dept: HOME HEALTH SERVICES | Facility: HOME HEALTH | Age: 59
End: 2024-11-27
Payer: COMMERCIAL

## 2024-11-27 VITALS
DIASTOLIC BLOOD PRESSURE: 72 MMHG | HEART RATE: 87 BPM | SYSTOLIC BLOOD PRESSURE: 110 MMHG | RESPIRATION RATE: 18 BRPM | OXYGEN SATURATION: 98 % | TEMPERATURE: 97.7 F

## 2024-11-27 PROCEDURE — G0300 HHS/HOSPICE OF LPN EA 15 MIN: HCPCS

## 2024-11-27 SDOH — ECONOMIC STABILITY: GENERAL

## 2024-11-27 ASSESSMENT — ENCOUNTER SYMPTOMS
HIGHEST PAIN SEVERITY IN PAST 24 HOURS: 10/10
LOWEST PAIN SEVERITY IN PAST 24 HOURS: 3/10
LAST BOWEL MOVEMENT: 67171
PAIN LOCATION - PAIN QUALITY: ACHING
PAIN: 1
PAIN LOCATION - PAIN SEVERITY: 5/10
SUBJECTIVE PAIN PROGRESSION: UNCHANGED
APPETITE LEVEL: GOOD
PAIN LOCATION: PELVIS
PAIN LOCATION - PAIN FREQUENCY: CONSTANT

## 2024-11-27 ASSESSMENT — ACTIVITIES OF DAILY LIVING (ADL): MONEY MANAGEMENT (EXPENSES/BILLS): INDEPENDENT

## 2024-11-29 ENCOUNTER — HOME CARE VISIT (OUTPATIENT)
Dept: HOME HEALTH SERVICES | Facility: HOME HEALTH | Age: 59
End: 2024-11-29
Payer: COMMERCIAL

## 2024-12-02 ENCOUNTER — HOME CARE VISIT (OUTPATIENT)
Dept: HOME HEALTH SERVICES | Facility: HOME HEALTH | Age: 59
End: 2024-12-02
Payer: COMMERCIAL

## 2024-12-02 PROCEDURE — G0155 HHCP-SVS OF CSW,EA 15 MIN: HCPCS

## 2024-12-03 ENCOUNTER — HOME CARE VISIT (OUTPATIENT)
Dept: HOME HEALTH SERVICES | Facility: HOME HEALTH | Age: 59
End: 2024-12-03
Payer: COMMERCIAL

## 2024-12-03 ASSESSMENT — ACTIVITIES OF DAILY LIVING (ADL): LAUNDRY_REQUIRES_ASSISTANCE: 1

## 2024-12-04 ENCOUNTER — HOME CARE VISIT (OUTPATIENT)
Dept: HOME HEALTH SERVICES | Facility: HOME HEALTH | Age: 59
End: 2024-12-04
Payer: COMMERCIAL

## 2024-12-05 ENCOUNTER — APPOINTMENT (OUTPATIENT)
Dept: OPHTHALMOLOGY | Facility: CLINIC | Age: 59
End: 2024-12-05
Payer: COMMERCIAL

## 2024-12-05 DIAGNOSIS — Z79.4 TYPE 2 DIABETES MELLITUS WITH RIGHT EYE AFFECTED BY MILD NONPROLIFERATIVE RETINOPATHY AND MACULAR EDEMA, WITH LONG-TERM CURRENT USE OF INSULIN: ICD-10-CM

## 2024-12-05 DIAGNOSIS — H25.811 COMBINED FORM OF AGE-RELATED CATARACT, RIGHT EYE: ICD-10-CM

## 2024-12-05 DIAGNOSIS — Z96.1 PSEUDOPHAKIA: Primary | ICD-10-CM

## 2024-12-05 DIAGNOSIS — H25.811 COMBINED FORMS OF AGE-RELATED CATARACT, RIGHT EYE: ICD-10-CM

## 2024-12-05 DIAGNOSIS — E11.3211 TYPE 2 DIABETES MELLITUS WITH RIGHT EYE AFFECTED BY MILD NONPROLIFERATIVE RETINOPATHY AND MACULAR EDEMA, WITH LONG-TERM CURRENT USE OF INSULIN: ICD-10-CM

## 2024-12-05 PROCEDURE — 99024 POSTOP FOLLOW-UP VISIT: CPT | Performed by: OPHTHALMOLOGY

## 2024-12-05 ASSESSMENT — REFRACTION_MANIFEST
OS_SPHERE: -0.50
OS_SPHERE: -0.75
OS_AXIS: 066
OS_CYLINDER: -1.00
OD_SPHERE: UNABLE
OS_AXIS: 025
METHOD_AUTOREFRACTION: 1
OD_AXIS: 133
OS_CYLINDER: -1.00
OD_CYLINDER: -1.00
OD_SPHERE: -0.50

## 2024-12-05 ASSESSMENT — VISUAL ACUITY
OD_CC: 20/40
CORRECTION_TYPE: GLASSES
OS_CC: 20/30
METHOD: SNELLEN - LINEAR

## 2024-12-05 ASSESSMENT — EXTERNAL EXAM - RIGHT EYE: OD_EXAM: NORMAL

## 2024-12-05 ASSESSMENT — TONOMETRY
OD_IOP_MMHG: 14
IOP_METHOD: GOLDMANN APPLANATION
OS_IOP_MMHG: 14

## 2024-12-05 ASSESSMENT — REFRACTION_WEARINGRX
OS_SPHERE: -0.50
OS_CYLINDER: -1.25
OS_ADD: +2.50
OD_AXIS: 125
OD_CYLINDER: -0.75
OS_AXIS: 052
OD_ADD: +2.50
OD_SPHERE: -0.50

## 2024-12-05 ASSESSMENT — SLIT LAMP EXAM - LIDS
COMMENTS: GOOD POSITION, DERMATOCHALASIS
COMMENTS: GOOD POSITION, DERMATOCHALASIS

## 2024-12-05 ASSESSMENT — CUP TO DISC RATIO
OS_RATIO: 0.3
OD_RATIO: 0.3

## 2024-12-05 ASSESSMENT — EXTERNAL EXAM - LEFT EYE: OS_EXAM: NORMAL

## 2024-12-05 ASSESSMENT — ENCOUNTER SYMPTOMS: EYES NEGATIVE: 1

## 2024-12-05 NOTE — PROGRESS NOTES
Assessment/Plan   Diagnoses and all orders for this visit:  Pseudophakia left eye  -doing well, pt is very happy with result of surgery  -off of all eyedrops  continue to monitor  -will hold off on new glasses until after right eye surgery    Combined form of age-related cataract, right eye  -pt is interested in cataract surgery right eye  Refer to Dr. Phillips for cataract evaluation and management  Co-management discussed with patient.  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 Robison  -keep appointment with Dr. Robison    Co-Management with Dr. Phillips after Cataract surgery left  eye  Date of surgery:  10/31  /2024  Date care was transferred:  11/07 /2024

## 2024-12-06 ENCOUNTER — HOME CARE VISIT (OUTPATIENT)
Dept: HOME HEALTH SERVICES | Facility: HOME HEALTH | Age: 59
End: 2024-12-06
Payer: COMMERCIAL

## 2024-12-06 PROCEDURE — G0151 HHCP-SERV OF PT,EA 15 MIN: HCPCS

## 2024-12-06 ASSESSMENT — ENCOUNTER SYMPTOMS
PAIN LOCATION - PAIN SEVERITY: 3/10
PERSON REPORTING PAIN: PATIENT
PAIN LOCATION: LEFT LEG
SUBJECTIVE PAIN PROGRESSION: GRADUALLY IMPROVING
PAIN: 1
HIGHEST PAIN SEVERITY IN PAST 24 HOURS: 5/10
LOWEST PAIN SEVERITY IN PAST 24 HOURS: 3/10

## 2024-12-09 ENCOUNTER — APPOINTMENT (OUTPATIENT)
Dept: PAIN MEDICINE | Facility: CLINIC | Age: 59
End: 2024-12-09
Payer: COMMERCIAL

## 2024-12-10 ENCOUNTER — TELEPHONE (OUTPATIENT)
Dept: OPHTHALMOLOGY | Facility: CLINIC | Age: 59
End: 2024-12-10
Payer: COMMERCIAL

## 2024-12-10 NOTE — TELEPHONE ENCOUNTER
Called pt and unable to leave message: due to both phone numbers not working.  please call me back at [phone] to schedule Cataract eval with Dr. Phillips at the request of Dr. Lamb.

## 2024-12-12 ENCOUNTER — HOME CARE VISIT (OUTPATIENT)
Dept: HOME HEALTH SERVICES | Facility: HOME HEALTH | Age: 59
End: 2024-12-12
Payer: COMMERCIAL

## 2024-12-13 ENCOUNTER — HOME CARE VISIT (OUTPATIENT)
Dept: HOME HEALTH SERVICES | Facility: HOME HEALTH | Age: 59
End: 2024-12-13
Payer: COMMERCIAL

## 2024-12-13 VITALS
DIASTOLIC BLOOD PRESSURE: 60 MMHG | HEART RATE: 77 BPM | RESPIRATION RATE: 20 BRPM | TEMPERATURE: 97.9 F | SYSTOLIC BLOOD PRESSURE: 102 MMHG | OXYGEN SATURATION: 96 %

## 2024-12-13 PROCEDURE — G0300 HHS/HOSPICE OF LPN EA 15 MIN: HCPCS

## 2024-12-13 SDOH — ECONOMIC STABILITY: GENERAL

## 2024-12-13 ASSESSMENT — ENCOUNTER SYMPTOMS
CHANGE IN APPETITE: UNCHANGED
LAST BOWEL MOVEMENT: 67187
LOWER EXTREMITY EDEMA: 1
DENIES PAIN: 1
APPETITE LEVEL: GOOD

## 2024-12-13 ASSESSMENT — ACTIVITIES OF DAILY LIVING (ADL): MONEY MANAGEMENT (EXPENSES/BILLS): INDEPENDENT

## 2024-12-16 ENCOUNTER — APPOINTMENT (OUTPATIENT)
Dept: ENDOCRINOLOGY | Facility: CLINIC | Age: 59
End: 2024-12-16
Payer: COMMERCIAL

## 2024-12-16 VITALS
BODY MASS INDEX: 31.66 KG/M2 | WEIGHT: 227 LBS | DIASTOLIC BLOOD PRESSURE: 60 MMHG | HEART RATE: 75 BPM | SYSTOLIC BLOOD PRESSURE: 89 MMHG

## 2024-12-16 DIAGNOSIS — I25.5 ISCHEMIC CARDIOMYOPATHY: ICD-10-CM

## 2024-12-16 DIAGNOSIS — E11.65 TYPE 2 DIABETES MELLITUS WITH HYPERGLYCEMIA, WITH LONG-TERM CURRENT USE OF INSULIN: ICD-10-CM

## 2024-12-16 DIAGNOSIS — Z79.4 TYPE 2 DIABETES MELLITUS WITH HYPERGLYCEMIA, WITH LONG-TERM CURRENT USE OF INSULIN: ICD-10-CM

## 2024-12-16 DIAGNOSIS — I50.22 CHRONIC SYSTOLIC HEART FAILURE: ICD-10-CM

## 2024-12-16 DIAGNOSIS — I50.23 ACUTE ON CHRONIC HFREF (HEART FAILURE WITH REDUCED EJECTION FRACTION): ICD-10-CM

## 2024-12-16 PROCEDURE — 3074F SYST BP LT 130 MM HG: CPT | Performed by: STUDENT IN AN ORGANIZED HEALTH CARE EDUCATION/TRAINING PROGRAM

## 2024-12-16 PROCEDURE — 3078F DIAST BP <80 MM HG: CPT | Performed by: STUDENT IN AN ORGANIZED HEALTH CARE EDUCATION/TRAINING PROGRAM

## 2024-12-16 PROCEDURE — 99215 OFFICE O/P EST HI 40 MIN: CPT | Performed by: STUDENT IN AN ORGANIZED HEALTH CARE EDUCATION/TRAINING PROGRAM

## 2024-12-16 PROCEDURE — 3046F HEMOGLOBIN A1C LEVEL >9.0%: CPT | Performed by: STUDENT IN AN ORGANIZED HEALTH CARE EDUCATION/TRAINING PROGRAM

## 2024-12-16 PROCEDURE — 3062F POS MACROALBUMINURIA REV: CPT | Performed by: STUDENT IN AN ORGANIZED HEALTH CARE EDUCATION/TRAINING PROGRAM

## 2024-12-16 PROCEDURE — 95251 CONT GLUC MNTR ANALYSIS I&R: CPT | Performed by: STUDENT IN AN ORGANIZED HEALTH CARE EDUCATION/TRAINING PROGRAM

## 2024-12-16 RX ORDER — DAPAGLIFLOZIN 10 MG/1
10 TABLET, FILM COATED ORAL DAILY
Qty: 90 TABLET | Refills: 3 | Status: SHIPPED | OUTPATIENT
Start: 2024-12-16 | End: 2025-12-16

## 2024-12-16 RX ORDER — INSULIN GLARGINE 300 [IU]/ML
INJECTION, SOLUTION SUBCUTANEOUS
Qty: 30 ML | Refills: 3 | Status: SHIPPED | OUTPATIENT
Start: 2024-12-16

## 2024-12-16 RX ORDER — PEN NEEDLE, DIABETIC 31 GX3/16"
NEEDLE, DISPOSABLE MISCELLANEOUS
Qty: 200 EACH | Refills: 3 | Status: SHIPPED | OUTPATIENT
Start: 2024-12-16

## 2024-12-16 RX ORDER — FENOFIBRATE 145 MG/1
145 TABLET, FILM COATED ORAL DAILY
Qty: 90 TABLET | Refills: 3 | Status: SHIPPED | OUTPATIENT
Start: 2024-12-16 | End: 2025-12-16

## 2024-12-16 RX ORDER — TIRZEPATIDE 5 MG/.5ML
5 INJECTION, SOLUTION SUBCUTANEOUS
Qty: 2 ML | Refills: 11 | Status: SHIPPED | OUTPATIENT
Start: 2024-12-16

## 2024-12-16 RX ORDER — INSULIN LISPRO 100 [IU]/ML
INJECTION, SOLUTION INTRAVENOUS; SUBCUTANEOUS
Qty: 30 ML | Refills: 4 | Status: SHIPPED | OUTPATIENT
Start: 2024-12-16

## 2024-12-16 NOTE — PATIENT INSTRUCTIONS
Stop trulicity switch to mounjaro 5 mg weekly - we are sending this through [facility]  Follow the insulin instruction in your sheet:  Toujeo 40 units twice a day  Humalog/lispro 15 units with meals plus sliding scale as needed   Continue farxiga    For your triglyceride:  Atorvastatin 80mg daily   Fenofibrate 145mg daily   Can stop the vascepa    Repeat your labs in February, fasting from midnight    Follow up in 3-4 months    Diana Salazar MD  Divison of Endocrinology   [facility]   Phone: [phone]    option 4, then option 1  Fax: [phone]

## 2024-12-16 NOTE — PROGRESS NOTES
59M PMH: DM2, COPD, CKD, CHF with ICD, pulm HTN, JAKE    Macedonian speaking     Coming in today for DM2 follow up    Diabetes History     DM diagnosed at 40 yo  Complications Micro and Macro-diabetes related foot ulcer, neuropathy, CVD, CKD, NPDR  A1c:   Lab Results   Component Value Date    HGBA1C 11.0 (H) 11/11/2024       Regimen   U300 toujeo, and increase to 40 units BID   Continue Lispro 20-25 with meals   Farxiga   Trulicity 4.5mg    SMBG   Franco 2        Hypoglycemia none known         Comorbidities and Screening  Eye Exam: 7/2024 NPDR of both eyes, had laser  Foot exam: sees podiatry for the foot ulcer     Lipid  Lab Results   Component Value Date    CHOL 630 (H) 11/11/2024    CHOL 417 (H) 11/05/2024    CHOL 106 10/05/2023     Lab Results   Component Value Date    HDL 29.2 11/11/2024    HDL 36.9 11/05/2024    HDL 24.5 10/05/2023     Lab Results   Component Value Date    LDLCALC  11/11/2024      Comment:      The calculation of LDL and VLDL are inaccurate when the Triglycerides are greater than 400 mg/dL or when the patient is non-fasting. If LDL measurement is necessary contact the testing laboratory for an alternative LDL assay.                                Near   Borderline      AGE      Desirable  Optimal    High     High     Very High     0-19 Y     0 - 109     ---    110-129   >/= 130     ----    20-24 Y     0 - 119     ---    120-159   >/= 160     ----      >24 Y     0 -  99   100-129  130-159   160-189     >/=190      LDLCALC  11/05/2024      Comment:      The calculation of LDL and VLDL are inaccurate when the Triglycerides are greater than 400 mg/dL or when the patient is non-fasting. If LDL measurement is necessary contact the testing laboratory for an alternative LDL assay.                                  Near   Borderline      AGE      Desirable  Optimal    High     High     Very High     0-19 Y     0 - 109     ---    110-129   >/= 130     ----    20-24 Y     0 - 119     ---    120-159   >/= 160   "   ----      >24 Y     0 -  99   100-129  130-159   160-189     >/=190      LDLCALC 32 (L) 10/05/2023     Lab Results   Component Value Date    TRIG 2,663 (H) 2024    TRIG 2,017 (H) 2024    TRIG 247 (H) 10/05/2023     No components found for: \"CHOLHDL\"      Statin- atorvastatin 80mg   Cr and albuminuria- CKD3   Lab Results   Component Value Date    CREATININE 1.36 (H) 2024    EGFR 60 (L) 2024    ALBUMINUR 499.4 2024      ACE/ARB- entresto    Past Medical History:   Diagnosis Date    Cataract     Chest pain 10/01/2023    CHF (congestive heart failure)     Clotting disorder (Multi)     COPD (chronic obstructive pulmonary disease) (Multi)     Coronary artery disease     Diabetes mellitus (Multi)     Disease of thyroid gland     Elevated troponin level not due myocardial infarction 2023    Hypertension     Ischemic cardiomyopathy     Pulmonary hypertension (Multi)     Venous ulcer of left leg (Multi)      Family History   Family history unknown: Yes      Social History     Socioeconomic History    Marital status: Single     Spouse name: Not on file    Number of children: Not on file    Years of education: Not on file    Highest education level: Not on file   Occupational History    Not on file   Tobacco Use    Smoking status: Former     Current packs/day: 0.00     Average packs/day: 1 pack/day for 24.0 years (24.0 ttl pk-yrs)     Types: Cigarettes     Start date:      Quit date: 2017     Years since quittin.9    Smokeless tobacco: Never   Vaping Use    Vaping status: Never Used   Substance and Sexual Activity    Alcohol use: Never    Drug use: Never    Sexual activity: Defer   Other Topics Concern    Not on file   Social History Narrative    Not on file     Social Drivers of Health     Financial Resource Strain: High Risk (10/22/2024)    Received from [facility] SDOH Screening     In the past year, have you or any family members you live with been unable to get " any of the following when you really needed them? choose all that apply.: Smartphone/tablet   Food Insecurity: Not on File (9/26/2024)    Received from [facility]    Food Insecurity     Food: 0   Transportation Needs: No Transportation Needs (11/23/2024)    OASIS : Transportation     Lack of Transportation (Medical): No     Lack of Transportation (Non-Medical): No     Patient Unable or Declines to Respond: No   Physical Activity: Not on File (3/13/2021)    Received from [facility]    Physical Activity     Physical Activity: 0   Stress: No Stress Concern Present (7/30/2021)    [facility] [place] of Occupational Health - Occupational Stress Questionnaire     Feeling of Stress : Only a little   Social Connections: Feeling Socially Integrated (11/23/2024)    OASIS : Social Isolation     Frequency of experiencing loneliness or isolation: Never   Intimate Partner Violence: Not At Risk (10/6/2023)    Humiliation, Afraid, Rape, and Kick questionnaire     Fear of Current or Ex-Partner: No     Emotionally Abused: No     Physically Abused: No     Sexually Abused: No   Housing Stability: Low Risk  (10/6/2023)    Housing Stability Vital Sign     Unable to Pay for Housing in the Last Year: No     Number of Places Lived in the Last Year: 1     Unstable Housing in the Last Year: No        ROS:  Negative except those noted in current and interim history    Physical Exam  Constitutional:       Appearance: Normal appearance.   HENT:      Head: Normocephalic.   Cardiovascular:      Rate and Rhythm: Normal rate and regular rhythm.   Pulmonary:      Breath sounds: Normal breath sounds.   Abdominal:      Palpations: Abdomen is soft.   Skin:     General: Skin is warm.      Comments: No lipodystrophy   Neurological:      General: No focal deficit present.      Mental Status: He is alert and oriented to person, place, and time.   Psychiatric:         Mood and Affect: Mood normal.          "Behavior: Behavior normal.          labs and imaging reviewed, pertinent findings listed on HPI and Impression      Problem List Items Addressed This Visit       Diabetes mellitus (Multi)    Relevant Medications    tirzepatide (Mounjaro) 5 mg/0.5 mL pen injector    fenofibrate (Tricor) 145 mg tablet    insulin lispro (HumaLOG KwikPen Insulin) 100 unit/mL injection    insulin glargine (Toujeo Max U-300 SoloStar) 300 unit/mL (3 mL) injection    TechLITE Pen Needle 31 gauge x 3/16\" needle    Farxiga 10 mg    Other Relevant Orders    Hemoglobin A1C    Lipid Panel    Ischemic cardiomyopathy    Relevant Medications    Farxiga 10 mg    Chronic systolic heart failure    Relevant Medications    Farxiga 10 mg     Other Visit Diagnoses       Acute on chronic HFrEF (heart failure with reduced ejection fraction)        Relevant Medications    Farxiga 10 mg            1) DM2 uncontrolled with hyperglycemia     Has complications including neuropathy, CKD, CVD     Has lost weight since trulicity was increased    CGM readings much different from last A1c   Per patient BG much improved after receiving sensors in October, also improved diet   CGM reviewed, minimum of 72 hrs of data reviewed, CGM data reviewed to influence glucose treatment plan   75% in range with no significant lows     Stop trulicity, switch to mounjaro 5mg weekly   Switch to U300 toujeo 40units BID  Lispro 15-15-15 with ISS 2:50 >150  Farxiga 10mg daily    Franco 2-through DME    2) Hypothryoidism  levothryoxine to 75mcg daily     3)Hypertriglyceridemia  Unable to tolerate vascepa--> noticed leg swelling that improved with discontinuation  -switch to fenofibrate   -continue statin  -discussed pancreatitis precautions   -this should also improve with improvement of BGs    Repeat A1c and Lipid panel in February     Has ff up in Jan         "

## 2024-12-17 ENCOUNTER — HOSPITAL ENCOUNTER (OUTPATIENT)
Dept: CARDIOLOGY | Facility: HOSPITAL | Age: 59
Discharge: HOME | End: 2024-12-17
Payer: COMMERCIAL

## 2024-12-17 DIAGNOSIS — I47.20 VT (VENTRICULAR TACHYCARDIA) (MULTI): ICD-10-CM

## 2024-12-17 PROCEDURE — 93283 PRGRMG EVAL IMPLANTABLE DFB: CPT

## 2024-12-18 ENCOUNTER — HOME CARE VISIT (OUTPATIENT)
Dept: HOME HEALTH SERVICES | Facility: HOME HEALTH | Age: 59
End: 2024-12-18
Payer: COMMERCIAL

## 2024-12-18 DIAGNOSIS — Z79.4 TYPE 2 DIABETES MELLITUS WITH HYPERGLYCEMIA, WITH LONG-TERM CURRENT USE OF INSULIN: Primary | ICD-10-CM

## 2024-12-18 DIAGNOSIS — E11.65 TYPE 2 DIABETES MELLITUS WITH HYPERGLYCEMIA, WITH LONG-TERM CURRENT USE OF INSULIN: Primary | ICD-10-CM

## 2024-12-19 ENCOUNTER — HOME CARE VISIT (OUTPATIENT)
Dept: HOME HEALTH SERVICES | Facility: HOME HEALTH | Age: 59
End: 2024-12-19
Payer: COMMERCIAL

## 2024-12-19 VITALS — SYSTOLIC BLOOD PRESSURE: 118 MMHG | OXYGEN SATURATION: 98 % | HEART RATE: 76 BPM | DIASTOLIC BLOOD PRESSURE: 78 MMHG

## 2024-12-19 PROCEDURE — G0151 HHCP-SERV OF PT,EA 15 MIN: HCPCS

## 2024-12-19 ASSESSMENT — ENCOUNTER SYMPTOMS
PAIN LOCATION: LEFT LEG
SUBJECTIVE PAIN PROGRESSION: RESOLVED
PAIN: 1
PAIN LOCATION - PAIN SEVERITY: 1/10
PERSON REPORTING PAIN: PATIENT

## 2024-12-20 DIAGNOSIS — Z79.4 TYPE 2 DIABETES MELLITUS WITH HYPERGLYCEMIA, WITH LONG-TERM CURRENT USE OF INSULIN: ICD-10-CM

## 2024-12-20 DIAGNOSIS — E11.65 TYPE 2 DIABETES MELLITUS WITH HYPERGLYCEMIA, WITH LONG-TERM CURRENT USE OF INSULIN: ICD-10-CM

## 2024-12-27 ENCOUNTER — HOME CARE VISIT (OUTPATIENT)
Dept: HOME HEALTH SERVICES | Facility: HOME HEALTH | Age: 59
End: 2024-12-27
Payer: COMMERCIAL

## 2024-12-27 ENCOUNTER — DOCUMENTATION (OUTPATIENT)
Dept: PRIMARY CARE | Facility: HOSPITAL | Age: 59
End: 2024-12-27
Payer: COMMERCIAL

## 2024-12-27 VITALS
DIASTOLIC BLOOD PRESSURE: 80 MMHG | RESPIRATION RATE: 20 BRPM | TEMPERATURE: 97.5 F | HEART RATE: 75 BPM | SYSTOLIC BLOOD PRESSURE: 120 MMHG | OXYGEN SATURATION: 97 %

## 2024-12-27 PROCEDURE — G0300 HHS/HOSPICE OF LPN EA 15 MIN: HCPCS

## 2024-12-27 SDOH — ECONOMIC STABILITY: GENERAL

## 2024-12-27 ASSESSMENT — ENCOUNTER SYMPTOMS
VOMITING: 1
CHEST TIGHTNESS: 1
DENIES PAIN: 1
APPETITE LEVEL: GOOD
LAST BOWEL MOVEMENT: 67201
CHANGE IN APPETITE: UNCHANGED
LOWER EXTREMITY EDEMA: 1

## 2024-12-27 ASSESSMENT — ACTIVITIES OF DAILY LIVING (ADL): MONEY MANAGEMENT (EXPENSES/BILLS): INDEPENDENT

## 2024-12-27 NOTE — PROGRESS NOTES
"1130 AM secure chat note routed to me . Home Care Nurse Erwin Apodaca contacted the C through the chat line after Dr Aponte told her to contact the clinic ablut pt's c/o chest pain x 1 one week. Called phone numbers in chart but hey all were \"busy.\" X 2 @1155. 1215 PM and 315PMDr Morales notified an said she would tell BELK[facility] brown  Planned to notify [facility] [place]: Resident  Chris Morfin later found out he was working from home and unable to use [employer] system ( pt speaks [place] citizen) to cll pt.  Dr Strickland and I set up the "[employer]ii and called pt unsuccessfully. Just found out patient went to a Podiatry appointment at [facility] this afternoon. No mention of chest pain in the Podiatry visit note. Used my cell phone to dial number and call and texted went through and left message to go to the ER with chest pain and the number of the [facility].  "

## 2025-01-06 ENCOUNTER — APPOINTMENT (OUTPATIENT)
Dept: ENDOCRINOLOGY | Facility: CLINIC | Age: 60
End: 2025-01-06
Payer: COMMERCIAL

## 2025-01-15 ENCOUNTER — APPOINTMENT (OUTPATIENT)
Dept: PRIMARY CARE | Facility: CLINIC | Age: 60
End: 2025-01-15
Payer: COMMERCIAL

## 2025-01-15 ENCOUNTER — APPOINTMENT (OUTPATIENT)
Dept: TRANSPLANT | Facility: HOSPITAL | Age: 60
End: 2025-01-15
Payer: COMMERCIAL

## 2025-01-18 DIAGNOSIS — Z79.4 TYPE 2 DIABETES MELLITUS WITH HYPERGLYCEMIA, WITH LONG-TERM CURRENT USE OF INSULIN: ICD-10-CM

## 2025-01-18 DIAGNOSIS — E11.65 TYPE 2 DIABETES MELLITUS WITH HYPERGLYCEMIA, WITH LONG-TERM CURRENT USE OF INSULIN: ICD-10-CM

## 2025-01-20 ENCOUNTER — HOME CARE VISIT (OUTPATIENT)
Dept: HOME HEALTH SERVICES | Facility: HOME HEALTH | Age: 60
End: 2025-01-20
Payer: COMMERCIAL

## 2025-01-20 RX ORDER — BLOOD SUGAR DIAGNOSTIC
STRIP MISCELLANEOUS
Qty: 400 STRIP | Refills: 1 | Status: SHIPPED | OUTPATIENT
Start: 2025-01-20

## 2025-01-20 ASSESSMENT — PATIENT HEALTH QUESTIONNAIRE - PHQ9: PATIENT UNABLE TO COMPLETE PHQ: NO VISIT

## 2025-01-20 ASSESSMENT — ACTIVITIES OF DAILY LIVING (ADL)
OASIS_M1830: 03
HOME_HEALTH_OASIS: 01

## 2025-01-29 ENCOUNTER — HOSPITAL ENCOUNTER (OUTPATIENT)
Dept: RADIOLOGY | Facility: HOSPITAL | Age: 60
Discharge: HOME | End: 2025-01-29
Payer: COMMERCIAL

## 2025-01-29 VITALS — SYSTOLIC BLOOD PRESSURE: 120 MMHG | OXYGEN SATURATION: 96 % | HEART RATE: 75 BPM | DIASTOLIC BLOOD PRESSURE: 70 MMHG

## 2025-01-29 DIAGNOSIS — M54.41 LOW BACK PAIN WITH BILATERAL SCIATICA, UNSPECIFIED BACK PAIN LATERALITY, UNSPECIFIED CHRONICITY: ICD-10-CM

## 2025-01-29 DIAGNOSIS — M54.42 LOW BACK PAIN WITH BILATERAL SCIATICA, UNSPECIFIED BACK PAIN LATERALITY, UNSPECIFIED CHRONICITY: ICD-10-CM

## 2025-01-29 PROCEDURE — 72148 MRI LUMBAR SPINE W/O DYE: CPT

## 2025-01-29 PROCEDURE — 72148 MRI LUMBAR SPINE W/O DYE: CPT | Performed by: RADIOLOGY

## 2025-01-29 NOTE — NURSING NOTE
Patient is alert and able to make needs known; has an ICD and at approximately 10:22 the device clinical nurse and [employer] staff arrived to assist with the device check. The MRI tech checked patient's ICD, interrogated it and ICD set at DOO 85 beats per minute, no signs of distress or discomfort note and patient voiced no c/o discomfort. ZIGGY

## 2025-01-29 NOTE — NURSING NOTE
Patient completed MRI and the MRI tech along with [employer] and device nurse checked patient's ICD and it was set back to the routine that it was before MRI. ZIGGY

## 2025-01-31 ENCOUNTER — DOCUMENTATION (OUTPATIENT)
Dept: INTERNAL MEDICINE | Facility: HOSPITAL | Age: 60
End: 2025-01-31
Payer: COMMERCIAL

## 2025-01-31 NOTE — PROGRESS NOTES
"I reviewed the results of Mr. Law's spine MRI scan.     The scan shows: Multilevel lumbar spondylosis as described above, most pronounced at  L4-L5 with marked spinal canal stenosis and crowding of the cauda  equina nerve roots and moderate bilateral neural foraminal narrowing.    Attempted to call patient x2 but was unable to get through. I attempted to leave a HIPAA-compliant voice mail but was unable to reach the voice mail system.     I do note that the patient was last seen in the clinic on 11/19/2024. Note from the visit states \"Will hold on MRI Lumbar spine w/o IV. Will try topical management and PT with home health. If patient is not improving with pain management and physical therapy would consider CT scan. Patient has ICD that could preclude MRI. \"     I will communicate this to the ordering provider. The patient is due to be seen in our clinic on 2/4/2025.     Maira Howard MD  PGY2 Internal Medicine  [facility]  "

## 2025-02-02 DIAGNOSIS — Z79.4 TYPE 2 DIABETES MELLITUS WITH HYPERGLYCEMIA, WITH LONG-TERM CURRENT USE OF INSULIN: ICD-10-CM

## 2025-02-02 DIAGNOSIS — E11.65 TYPE 2 DIABETES MELLITUS WITH HYPERGLYCEMIA, WITH LONG-TERM CURRENT USE OF INSULIN: ICD-10-CM

## 2025-02-03 NOTE — PROGRESS NOTES
"Felice Law is a 60 y.o. with history of DM2 (following with endo clinic) c/b neuropathy, CKD, CVD. Also with history of COPD, CHF with ICD, pulm HTN and JAKE on BIPAP seen at [facility] on 02/04/25  here for \" Face to face\" for supplies visit and follow-up on chronic medical conditions.     Patient Lithuanian speaking --> Martii used during patient interview and exam.       Subjective:  HPI:      Acute concerns:  - L ear trauma --->Patient reports possible trauma to his L ear while using a q-tip recently. Patient denies any change in hearing, tinnitis or ear pain. Patient notes that he has itching in both ears but denies any ear symptoms today.       Chronic issues -   -Back pain - Patient reports still having sciatica pain but reports that it is a little bit better. Discussed with patient his MRI results.     ROS:   Review of Systems   Constitutional:  Negative for appetite change, chills and fever.   Respiratory:  Negative for cough, chest tightness and shortness of breath.    Cardiovascular:  Negative for chest pain and leg swelling.   Gastrointestinal:  Negative for abdominal pain, blood in stool, constipation, diarrhea, nausea and vomiting.   Genitourinary:  Negative for difficulty urinating, dysuria and hematuria.   Musculoskeletal:  Positive for back pain (chronic sciatica pain).   Skin:  Negative for rash.   Neurological:  Negative for dizziness and numbness.          Past Medical History:   Past Medical History:   Diagnosis Date    Cataract     Chest pain 10/01/2023    CHF (congestive heart failure)     Clotting disorder (Multi)     COPD (chronic obstructive pulmonary disease) (Multi)     Coronary artery disease     Diabetes mellitus (Multi)     Disease of thyroid gland     Elevated troponin level not due myocardial infarction 08/14/2023    Hypertension     Ischemic cardiomyopathy     Pulmonary hypertension (Multi)     Venous ulcer of left leg (Multi)        Past Surgical History:   Past " Surgical History:   Procedure Laterality Date    CARDIAC DEFIBRILLATOR PLACEMENT      CATARACT EXTRACTION Right         CORONARY ANGIOPLASTY WITH STENT PLACEMENT      CORONARY ARTERY BYPASS GRAFT           Medications:     Current Outpatient Medications:     albuterol 2.5 mg /3 mL (0.083 %) nebulizer solution, Use every 4-6 hours as needed for shortness of breath, Disp: 75 mL, Rfl: 1    apixaban (Eliquis) 5 mg tablet, Take 1 tablet (5 mg) by mouth 2 times a day., Disp: 180 tablet, Rfl: 3    atorvastatin (Lipitor) 80 mg tablet, Take 1 tablet (80 mg) by mouth once daily., Disp: 90 tablet, Rfl: 3    bumetanide (Bumex) 2 mg tablet, Take 1 tablet (2 mg) by mouth 2 times daily (morning and late afternoon)., Disp: 180 tablet, Rfl: 3    digoxin (Lanoxin) 125 MCG tablet, Take 1 tablet (125 mcg) by mouth once daily., Disp: 90 tablet, Rfl: 3    Farxiga 10 mg, Take 1 tablet (10 mg) by mouth once daily., Disp: 90 tablet, Rfl: 3    fenofibrate (Tricor) 145 mg tablet, Take 1 tablet (145 mg) by mouth once daily., Disp: 90 tablet, Rfl: 3    FreeStyle Franco 2 [place] misc, Use as instructed, Disp: 1 each, Rfl: 0    FreeStyle Franco 2 Sensor kit, Change every 14 days, Disp: 2 each, Rfl: 3    gabapentin (Neurontin) 300 mg capsule, TOME 1 CAPSULA POR LA BOCA NITIN  VEZ AL CUBA EN LA MANANA, Disp: 90 capsule, Rfl: 3    gabapentin (Neurontin) 600 mg tablet, Take 1 tablet (600 mg) by mouth once daily at bedtime., Disp: 30 tablet, Rfl: 2    hydrALAZINE (Apresoline) 100 mg tablet, Take 1 tablet (100 mg) by mouth 3 times a day., Disp: 270 tablet, Rfl: 3    hydrocortisone 1 % cream, Apply topically 2 times a day., Disp: 112 g, Rfl: 3    insulin glargine (Toujeo Max U-300 SoloStar) 300 unit/mL (3 mL) injection, 40 units twice a day, Disp: 30 mL, Rfl: 3    insulin lispro (HumaLOG KwikPen Insulin) 100 unit/mL injection, 15 units with each meals plus sliding scale 100 units daily, Disp: 30 mL, Rfl: 4    ipratropium-albuteroL (Duo-Neb) 0.5-2.5  "mg/3 mL nebulizer solution, Take 3 mL by nebulization 3 times a day as needed for wheezing., Disp: 180 mL, Rfl: 2    isosorbide dinitrate (Isordil) 40 mg tablet, Take 1 tablet (40 mg) by mouth 3 times a day., Disp: 270 tablet, Rfl: 3    ivabradine (Corlanor) 5 mg tablet, Take 0.5 tablets (2.5 mg) by mouth 2 times a day. (Patient not taking: Reported on 11/5/2024), Disp: 90 tablet, Rfl: 3    ketorolac (Acular) 0.5 % ophthalmic solution, 1 drop to surgical eye 4 times a day starting 1 day before surgery (Patient not taking: Reported on 11/23/2024), Disp: 5 mL, Rfl: 2    lancets (Lancets,Thin) misc, 4 times daily (before meals and at bedtime). use as directed to test blood sugar, Disp: 100 each, Rfl: 3    levothyroxine (Synthroid, Levoxyl) 75 mcg tablet, Take 1 tablet (75 mcg) by mouth once daily., Disp: 90 tablet, Rfl: 2    lidocaine (Lidoderm) 5 % patch, Place 1 patch over 12 hours on the skin once daily. Apply to painful area 12 hours per day, remove for 12 hours., Disp: 1 patch, Rfl: 1    metoprolol succinate XL (Toprol-XL) 100 mg 24 hr tablet, Take 2 tablets (200 mg) by mouth once daily. Do not crush or chew., Disp: 180 tablet, Rfl: 3    ofloxacin (Ocuflox) 0.3 % ophthalmic solution, 1 drop to operative eye 4 times a day starting 1 day before surgery (Patient not taking: Reported on 11/23/2024), Disp: 5 mL, Rfl: 2    OneTouch Delica Plus Lancet 30 gauge misc, , Disp: , Rfl:     OneTouch Ultra Test strip, REVISE EL NIVEL DE GLUCOSA EN LA PEARL CUATRO VECES AL CUBA, Disp: 400 strip, Rfl: 1    pantoprazole (ProtoNix) 40 mg EC tablet, TAKE 1 TABLET BY MOUTH ONCE DAILY IN THE MORNING TAKE BEFORE A MEAL DO NOT CRUSH, CHEW, AND/OR DIVIDE, Disp: 90 tablet, Rfl: 3    pen needle, diabetic (BD Ultra-Fine Sarah Pen Needle) 32 gauge x 5/32\" needle, For insulin injection 4x/day, Disp: 200 each, Rfl: 5    pen needle, diabetic, safety 29 gauge x 1/2\" needle, For insulin injections 5 times per day, Disp: 500 each, Rfl: 4    " "prednisoLONE acetate (Pred-Forte) 1 % ophthalmic suspension, 1 drop to operative eye 4 times a day starting the day of surgery (after surgery is done) (Patient not taking: Reported on 11/23/2024), Disp: 5 mL, Rfl: 2    QUEtiapine (SEROquel) 50 mg tablet, Take 1 tablet (50 mg) by mouth once daily at bedtime., Disp: 90 tablet, Rfl: 2    sacubitriL-valsartan (Entresto)  mg tablet, Take 1 tablet by mouth 2 times a day., Disp: 180 tablet, Rfl: 3    spironolactone (Aldactone) 25 mg tablet, Take 2 tablets (50 mg) by mouth once daily., Disp: 180 tablet, Rfl: 3    TechLITE Pen Needle 31 gauge x 3/16\" needle, For insulin injection 5x/day, Disp: 200 each, Rfl: 3    tirzepatide (Mounjaro) 5 mg/0.5 mL pen injector, Inject 5 mg under the skin every 7 days., Disp: 2 mL, Rfl: 11      Allergies:   Allergies   Allergen Reactions    Azithromycin Dizziness and Unknown     Dizziness, Vertigo, Cold, Clammy, Nausea, Sweating, trouble breathing         Family History:   Family History   Family history unknown: Yes       Social History:   Activities: Not discussed at this visit.   Drug Use: quit smoking about 7 years ago. His smoking use included cigarettes. (24 pack-year smoking history)   Sexual history: Not discussed at this visit   Sleep: Not discussed at this visit       Objective:  Visit Vitals  BP 94/52   Pulse 81   Temp 35.8 °C (96.4 °F) (Temporal)   Ht 1.803 m (5' 11\")   Wt 101 kg (223 lb)   SpO2 94%   BMI 31.10 kg/m²   Smoking Status Former   BSA 2.25 m²        PHYSICAL EXAM:   General: well appearing, NAD, pleasant and engaged in encounter    HEENT: NCAT, MMM, L ear with small amount of blood in external canal. L TM clear, no sings of injury. R TM normal   CV: RRR, no m/r/g, cap refill <2 secs  PULM: CTAB, non-labored respirations, no wheezes/crackles/rhonchi auscultated  ABD: soft, NT, ND, no guarding or rebound tenderness  EXT: WWP, no significant edema noted in b/l LE's  SKIN: no rashes or lesions noted on trunk or " "extremities   NEURO: A&Ox4, symmetric facies, no gross motor or sensory deficits, normal gait      Assessment & Plan:  Felice Law is a 60 y.o. history of DM2 (following with endo clinic) c/b neuropathy, CKD, CVD. Also with history of COPD, CHF with ICD, pulm HTN and JAKE on BIPAP seen at [facility] on 02/04/25  here for \" Face to face\" for supplies visit and follow-up on chronic medical conditions. Patient has been unable to schedule a nephrology appointment, called and scheduled one for him today. Patient overall well today, some blood in L ear canal 2/2 trauma caused by q-tip. Discussed with patient the option to use Hydrogen peroxide for cerumen softening and hydrocortisone for bilateral ear itching. Patient to return to clinic in 3 months for chronic conditions follow-up and can get lipid panel and repeat HbA1c at that time.       #T2DM insulin-dependent (dx at age 41)   :: today  -Follows Endocrinology, next appointment May 2025   -Diabetic foot exam: Podiatry appointment scheduled for 3/31/25  -Diabetic eye exam: Optho appointment scheduled for 3/15/25  -HbA1c 11% 11/11/2024.   -Lipids 11/11/2024 cholesterol 630, triglycerides 2663  -Urine Alb/Cr: 497 (11/11/2024)  PLAN  -Continue U300 toujeo 40 units BID- should follow up with endocrine   -Using CGM  Stop trulicity, switched to mounjaro 5mg weekly (by Endocrine)   Switch to U300 toujeo 40units BID (By Endocrine)   Lispro 15-15-15 with ISS 2:50 >150  Farxiga 10mg daily  -ASA 81 mg and atorvastatin 80mg  -Gabapentin 300 mg AM and 600 mg HS       #Low back pain, w/sciatica bilaterally exacerbated by recent mechanical fall, stable  PLAN  -Ordered XR lumbar spine and hips to rule out acute pathology 10/2023. Not completed patient states his pain is controlled.  -C/w lidocaine patches, gel voltaren and gabapentin  -Pain medicine following   - MRI completed --> Multilevel lumbar spondylosis as described above, most pronounced atnL4-L5 with " marked spinal canal stenosis and crowding of the cauda equina nerve roots and moderate bilateral neural foraminal narrowing.      #JAKE (central and obstructive sleep apnea)  ::PSG 2/2/2020: evidence of severe sleep apnea, predominantly obstructive but central respiratory events were also noted-Cheyne Chirinos Breathing with prolonged cycle length and circulation time consistent with patient's known HFrEF.   PLAN  - Continue BiPAP nightly        #COPD/Asthma  #Pulmonary HTN likely WHO group II  #Tobacco use hx (24 Pack-Yr, quit 2017)  ::TTE at [facility] 8/2023: EF 20% globally abnormal RV systolic function. Noninvasive HD assessment consistent with severe pHTN >60mmHg, elevated CVP, elevated LVEDP  ::Spirometry: completed 10/2023. FEV1:1.61 . FEV1/FVC: 0.78  PLAN  Continue albuterol 2.5 mg/3 mL nebulizer solution   Continue ipratropium-albuterol nebulizer     #HFrEF/Ischemic Cardiomyopathy (LVEF 20%, 10/23)  #CAD s/p PCI and CABG LIMA-LAD 4/2023 Dr. Logan  #Hx VT arrest s/p VT ablation + ICD 2017 in [place], [place]  ::Presented in committee meeting on 10/10. Not approved for LVAD or transplant at this time. Determined to be very high risk for advanced therapies at this time given poorly controlled diabetes, renal function, peripheral vascular questions, questionable psychosocial support, and financial concerns. Plan to optimize medical therapy and follow up closely in HF clinic.   ::[facility] 12/2022: severe diffuse 3-vessel CAD. Severe prox diffuse LAD up to 70%.  ::Device- Medtronic ICD; interrogation completed 10/6  ::Repeat ECHO 10/2023: dilated LV cavity, global hypokinesis of LV, mildly reduced RV sys func. LA mod to sev dilated  :: Echo The left ventricular systolic function is moderately decreased, with a visually estimated ejection fraction of 30%. There is global hypokinesis of the left ventricle with minor regional variations.  PLAN  -Continue:   Bumex 2 mg   Farxiga 10 mg daily   hydralazine 100 mg TID/isosorbide  dinitrate 40 mg TID (patient not taking frequently nitrate de to headache)  metoprolol succinate  mg BID   entresto  mg BID  spironolactone 25 mg daily  digoxin 125 mcg daily (refilled today)     #HTN  #CKD Stage IIIb  Last BUN/Cr (GFR): 63/1.36 (60), 11/11/2024. Baseline Creatinine ~1.4, last 1.36   No hyperkalemia. Asymptomatic hyponatremia from a week ago. Patient is taking bumex as needed since the last 2 weeks.  PLAN  -Previously referred to Nephrology per pt request. Patient encouraged to follow up with nephrology referral --> they provided the phone number, he called and no one answers --> I will call and make an appointment      #Hypothyroidism  TSH 3.26 (11/11/2024)  - Follows with endocrine   PLAN  Continue Levothyroxine 75 mcg       #Hx DVT, Left Femoral  ::Repeat LE US 10/2023 appeared to show no acute DVTs in either lower extremity. Peroneal vein unable to visualize due to edema at the time of exam.  PLAN   Continue Eliquis 5mg BID     #Syphilis, presumed late latent infection (asymptomatic, acquired >1 year ago), treated remotely  ::10/05/2023 RPR 1:2 and syphilis antibody reactive. Asymptomatic, no skin rash or genital lesion. Contact attempted on 11/09 and 11/10 for patient to come into office to receive shots. Per patient, he was treated for syphilis at age 15 in [place]. In this case, patient's NTT titers (RPR) can remain elevated despite successful treatment (serofast state). After infection, TT (syphilis antibodies) typically stay positive indefinitely. 12/2023 RPR 1:4      #GERD  Controlled.  PLAN   Continue Pantoprazole      #Insomnia  PLAN   Continue Seroquel 50 mg at bedtime      # Hypertriglyceridemia  Unable to tolerate vascepa--> noticed leg swelling that improved with discontinuation  -switched to fenofibrate --> Taking daily 125 mg   -continue statin  PLAN   Continue Fenofibrate 125 mg daily   Continue Statin      HEALTH MAINTENANCE   Vaccines updated  Cancer screening    Colonoscopy: to be repeated --> completed in 2024    Low dose Chest CT:  Due, ordered was placed for CT scan 2024 --> told patient today       RTC in 3 months for follow-up of chronic medical conditions      Catracho George   Internal Medicine- Pediatrics   PGY2

## 2025-02-04 ENCOUNTER — OFFICE VISIT (OUTPATIENT)
Dept: PRIMARY CARE | Facility: HOSPITAL | Age: 60
End: 2025-02-04
Payer: COMMERCIAL

## 2025-02-04 VITALS
BODY MASS INDEX: 31.22 KG/M2 | WEIGHT: 223 LBS | TEMPERATURE: 96.4 F | HEART RATE: 81 BPM | DIASTOLIC BLOOD PRESSURE: 52 MMHG | HEIGHT: 71 IN | OXYGEN SATURATION: 94 % | SYSTOLIC BLOOD PRESSURE: 94 MMHG

## 2025-02-04 DIAGNOSIS — I25.5 ISCHEMIC CARDIOMYOPATHY: ICD-10-CM

## 2025-02-04 DIAGNOSIS — Z79.4 TYPE 2 DIABETES MELLITUS WITH HYPERGLYCEMIA, WITH LONG-TERM CURRENT USE OF INSULIN: ICD-10-CM

## 2025-02-04 DIAGNOSIS — I50.23 ACUTE ON CHRONIC HFREF (HEART FAILURE WITH REDUCED EJECTION FRACTION): ICD-10-CM

## 2025-02-04 DIAGNOSIS — E11.65 TYPE 2 DIABETES MELLITUS WITH HYPERGLYCEMIA, WITH LONG-TERM CURRENT USE OF INSULIN: ICD-10-CM

## 2025-02-04 DIAGNOSIS — I50.22 CHRONIC SYSTOLIC HEART FAILURE: ICD-10-CM

## 2025-02-04 DIAGNOSIS — L29.9 PRURITIC CONDITION: Primary | ICD-10-CM

## 2025-02-04 LAB — GLUCOSE BLD MANUAL STRIP-MCNC: 143 MG/DL (ref 74–99)

## 2025-02-04 PROCEDURE — 99214 OFFICE O/P EST MOD 30 MIN: CPT

## 2025-02-04 PROCEDURE — 82947 ASSAY GLUCOSE BLOOD QUANT: CPT

## 2025-02-04 PROCEDURE — 3078F DIAST BP <80 MM HG: CPT

## 2025-02-04 PROCEDURE — 3074F SYST BP LT 130 MM HG: CPT

## 2025-02-04 PROCEDURE — G2211 COMPLEX E/M VISIT ADD ON: HCPCS

## 2025-02-04 PROCEDURE — 1036F TOBACCO NON-USER: CPT

## 2025-02-04 PROCEDURE — 36416 COLLJ CAPILLARY BLOOD SPEC: CPT | Mod: GC

## 2025-02-04 PROCEDURE — 99214 OFFICE O/P EST MOD 30 MIN: CPT | Mod: GC

## 2025-02-04 PROCEDURE — 3008F BODY MASS INDEX DOCD: CPT

## 2025-02-04 RX ORDER — GABAPENTIN 300 MG/1
CAPSULE ORAL
Qty: 90 CAPSULE | Refills: 3 | Status: SHIPPED | OUTPATIENT
Start: 2025-02-04

## 2025-02-04 RX ORDER — DIGOXIN 125 MCG
125 TABLET ORAL DAILY
Qty: 90 TABLET | Refills: 3 | Status: SHIPPED | OUTPATIENT
Start: 2025-02-04

## 2025-02-04 RX ORDER — QUETIAPINE FUMARATE 50 MG/1
TABLET, FILM COATED ORAL
Qty: 90 TABLET | Refills: 3 | OUTPATIENT
Start: 2025-02-04

## 2025-02-04 RX ORDER — QUETIAPINE FUMARATE 50 MG/1
50 TABLET, FILM COATED ORAL NIGHTLY
Qty: 90 TABLET | Refills: 2 | Status: SHIPPED | OUTPATIENT
Start: 2025-02-04

## 2025-02-04 RX ORDER — HYDROCORTISONE 1 %
CREAM (GRAM) TOPICAL 2 TIMES DAILY
Qty: 112 G | Refills: 3 | Status: SHIPPED | OUTPATIENT
Start: 2025-02-04

## 2025-02-04 ASSESSMENT — ENCOUNTER SYMPTOMS
NUMBNESS: 0
LOSS OF SENSATION IN FEET: 1
HEMATURIA: 0
NAUSEA: 0
DYSURIA: 0
CONSTIPATION: 0
DIARRHEA: 0
DIFFICULTY URINATING: 0
DEPRESSION: 0
CHILLS: 0
BLOOD IN STOOL: 0
COUGH: 0
SHORTNESS OF BREATH: 0
OCCASIONAL FEELINGS OF UNSTEADINESS: 0
CHEST TIGHTNESS: 0
DIZZINESS: 0
ABDOMINAL PAIN: 0
BACK PAIN: 1
VOMITING: 0
APPETITE CHANGE: 0
FEVER: 0

## 2025-02-04 ASSESSMENT — PATIENT HEALTH QUESTIONNAIRE - PHQ9
2. FEELING DOWN, DEPRESSED OR HOPELESS: NOT AT ALL
1. LITTLE INTEREST OR PLEASURE IN DOING THINGS: NOT AT ALL
SUM OF ALL RESPONSES TO PHQ9 QUESTIONS 1 AND 2: 0

## 2025-02-04 ASSESSMENT — PAIN SCALES - GENERAL: PAINLEVEL_OUTOF10: 2

## 2025-02-04 NOTE — PATIENT INSTRUCTIONS
Dear Felice Law,    You were seen today for follow-up. I will fill out your medical supply paper work and fax it where it needs to go.     Medication changes:  Start these medications:Hydrocortisone - can put this on your outer ear to help with itching. Hydrogen peroxide - Use this to help with wax build-up in your ears. I suggest you not use q-tips anymore to prevent any further wounds in your ears.       Appointments/follow-up:  [facility] - Here in 3 months for follow-up     It was a pleasure taking care of you and we wish you all the best with your recovery,    Take care!  Catracho George   Internal Medicine- Pediatrics   PGY2

## 2025-02-07 NOTE — PROGRESS NOTES
I saw and evaluated the patient. I personally obtained the key and critical portions of the history and physical exam or was physically present for key and critical portions performed by the resident/fellow. I reviewed the resident/fellow's documentation and discussed the patient with the resident/fellow. I agree with the resident/fellow's medical decision making as documented in the note.    Ni Mattson MD

## 2025-02-11 ENCOUNTER — APPOINTMENT (OUTPATIENT)
Dept: CARDIOLOGY | Facility: HOSPITAL | Age: 60
End: 2025-02-11
Payer: COMMERCIAL

## 2025-02-11 ENCOUNTER — APPOINTMENT (OUTPATIENT)
Dept: OPHTHALMOLOGY | Facility: CLINIC | Age: 60
End: 2025-02-11
Payer: COMMERCIAL

## 2025-02-12 DIAGNOSIS — Z79.4 TYPE 2 DIABETES MELLITUS WITH HYPERGLYCEMIA, WITH LONG-TERM CURRENT USE OF INSULIN: ICD-10-CM

## 2025-02-12 DIAGNOSIS — E11.65 TYPE 2 DIABETES MELLITUS WITH HYPERGLYCEMIA, WITH LONG-TERM CURRENT USE OF INSULIN: ICD-10-CM

## 2025-02-12 RX ORDER — GABAPENTIN 600 MG/1
600 TABLET ORAL NIGHTLY
Qty: 30 TABLET | Refills: 2 | Status: SHIPPED | OUTPATIENT
Start: 2025-02-12 | End: 2025-05-13

## 2025-03-04 ENCOUNTER — OFFICE VISIT (OUTPATIENT)
Dept: CARDIOLOGY | Facility: CLINIC | Age: 60
End: 2025-03-04
Payer: COMMERCIAL

## 2025-03-04 VITALS
HEART RATE: 96 BPM | HEIGHT: 71 IN | OXYGEN SATURATION: 93 % | WEIGHT: 224 LBS | SYSTOLIC BLOOD PRESSURE: 98 MMHG | DIASTOLIC BLOOD PRESSURE: 62 MMHG | BODY MASS INDEX: 31.36 KG/M2

## 2025-03-04 DIAGNOSIS — I25.5 ISCHEMIC CARDIOMYOPATHY: ICD-10-CM

## 2025-03-04 DIAGNOSIS — E08.22 DIABETES MELLITUS DUE TO UNDERLYING CONDITION WITH STAGE 3 CHRONIC KIDNEY DISEASE, WITH LONG-TERM CURRENT USE OF INSULIN, UNSPECIFIED WHETHER STAGE 3A OR 3B CKD (MULTI): ICD-10-CM

## 2025-03-04 DIAGNOSIS — N18.30 STAGE 3 CHRONIC KIDNEY DISEASE, UNSPECIFIED WHETHER STAGE 3A OR 3B CKD (MULTI): ICD-10-CM

## 2025-03-04 DIAGNOSIS — N18.30 DIABETES MELLITUS DUE TO UNDERLYING CONDITION WITH STAGE 3 CHRONIC KIDNEY DISEASE, WITH LONG-TERM CURRENT USE OF INSULIN, UNSPECIFIED WHETHER STAGE 3A OR 3B CKD (MULTI): ICD-10-CM

## 2025-03-04 DIAGNOSIS — I50.23 ACUTE ON CHRONIC HFREF (HEART FAILURE WITH REDUCED EJECTION FRACTION): ICD-10-CM

## 2025-03-04 DIAGNOSIS — Z79.4 DIABETES MELLITUS DUE TO UNDERLYING CONDITION WITH STAGE 3 CHRONIC KIDNEY DISEASE, WITH LONG-TERM CURRENT USE OF INSULIN, UNSPECIFIED WHETHER STAGE 3A OR 3B CKD (MULTI): ICD-10-CM

## 2025-03-04 DIAGNOSIS — Z95.810 IMPLANTABLE CARDIOVERTER-DEFIBRILLATOR (ICD) IN SITU: ICD-10-CM

## 2025-03-04 DIAGNOSIS — E11.65 TYPE 2 DIABETES MELLITUS WITH HYPERGLYCEMIA, WITH LONG-TERM CURRENT USE OF INSULIN: ICD-10-CM

## 2025-03-04 DIAGNOSIS — I50.22 CHRONIC SYSTOLIC HEART FAILURE: ICD-10-CM

## 2025-03-04 DIAGNOSIS — Z79.4 TYPE 2 DIABETES MELLITUS WITH HYPERGLYCEMIA, WITH LONG-TERM CURRENT USE OF INSULIN: ICD-10-CM

## 2025-03-04 PROCEDURE — 3078F DIAST BP <80 MM HG: CPT | Performed by: STUDENT IN AN ORGANIZED HEALTH CARE EDUCATION/TRAINING PROGRAM

## 2025-03-04 PROCEDURE — 99215 OFFICE O/P EST HI 40 MIN: CPT | Performed by: STUDENT IN AN ORGANIZED HEALTH CARE EDUCATION/TRAINING PROGRAM

## 2025-03-04 PROCEDURE — 3008F BODY MASS INDEX DOCD: CPT | Performed by: STUDENT IN AN ORGANIZED HEALTH CARE EDUCATION/TRAINING PROGRAM

## 2025-03-04 PROCEDURE — G2211 COMPLEX E/M VISIT ADD ON: HCPCS | Performed by: STUDENT IN AN ORGANIZED HEALTH CARE EDUCATION/TRAINING PROGRAM

## 2025-03-04 PROCEDURE — 3074F SYST BP LT 130 MM HG: CPT | Performed by: STUDENT IN AN ORGANIZED HEALTH CARE EDUCATION/TRAINING PROGRAM

## 2025-03-04 RX ORDER — METOPROLOL SUCCINATE 200 MG/1
200 TABLET, EXTENDED RELEASE ORAL DAILY
Qty: 90 TABLET | Refills: 3 | Status: SHIPPED | OUTPATIENT
Start: 2025-03-04 | End: 2026-03-04

## 2025-03-04 RX ORDER — QUETIAPINE FUMARATE 50 MG/1
50 TABLET, FILM COATED ORAL NIGHTLY
Qty: 100 TABLET | Refills: 2 | Status: SHIPPED | OUTPATIENT
Start: 2025-03-04

## 2025-03-04 RX ORDER — BUMETANIDE 1 MG/1
1 TABLET ORAL DAILY
Qty: 90 TABLET | Refills: 3 | Status: SHIPPED | OUTPATIENT
Start: 2025-03-04 | End: 2026-03-04

## 2025-03-04 RX ORDER — IVABRADINE 5 MG/1
2.5 TABLET, FILM COATED ORAL 2 TIMES DAILY
Qty: 90 TABLET | Refills: 3 | Status: SHIPPED | OUTPATIENT
Start: 2025-03-04 | End: 2026-03-04

## 2025-03-04 NOTE — PROGRESS NOTES
Primary Care Physician: Monik Aponte MD MPH  Primary Cardiologist: Sebastian at [facility] (Previous), now [facility]  Patient's Location: [place] [id number]    Date of Visit: 03/04/2025  3:30 PM EST  Location of visit: [facility]   Type of Visit: Established  Last visit: 11/5/2024    HPI / Summary:   Felice Law is a very pleasant 60 y.o. [place]ian male presenting for management of Stage C-D ICM/HFrEF (LVEF 6/25/2024: 30% from 20% 8/14/23). He otherwise has a history of HTN, HLD, Type II DM, prior tobacco use (24 pack year, quit 2017), obesity (BMI 31), CAD s/p PCI and 1vCABG (LIMA-LAD with balloon pump support April 2023), VT arrest s/p VT ablation + ICD 2017, DVT (L femoral) on Eliquis, hypothyroidism, pulmonary hypertension, central sleep apnea.      Initial CV History:   He presented to [facility] on 10/1 with exertional left sided chest pain and SOB x 3 days. He has been admitted 8 times this year for acutely decompensated heart failure. His last hospital stay was 9/16-9/26, and weight on discharge was 230lbs.  He was admitted to [facility] this presentation in cardiogenic shock despite reporting compliance with his HF medications. His oral GDMT regimen was held due to worsening kidney function. His home regimen includes Entresto 97/103mg, Toprol XL 150mg, Spironolactone 25mg, Dapagliflozin 10mg. Weight on admission was 246lbs. He was treated IV lasix 80mg and with IV bumex 4mg BID + metolazone with some response, but was transitioned to lasix infusion 5mg/hr.  He was also placed on dobutamine infusion as evidence of cardiogenic shock was noted to be worsening. Advanced heart failure service was consulted while he was admitted there and they recommended SGC-guided therapy. RHC on dobutamine 2.5mcg showed RA pressure: 20, RV: 55/20, PCWP: 35, PA pressure: 52/37 (40 - 42), CO/CI 3.6/1.5, SVR: 1373 dynes, PVR: 414 dynes, AO: 84, and lactates were increasing up to 2.4 during that admission. He was referred  to us by Dr. Cortes at [facility] for advanced therapy evaluation. He was transferred to us on dobutamine 3.75mcg and lasix gtt 5mg/hr.  Patient was than transferred to [facility] for advanced therapy evaluation on 10/4. Advanced therapies evaluation was initiated on 10/4 and presented in committee meeting on 10/10. Not approved for LVAD or transplant at this time. Determined to be very high risk for advanced therapies at this time given poorly controlled diabetes, renal function, peripheral vascular questions, questionable psychosocial support, and financial concerns. Plan to optimize medical therapy and follow up closely in HF clinic. May be brought back to the committee in the future. Over the past couple of days, pt's renal function worsened due to likely over diuresis. Lasix drip was discontinued 10/14 and pt.'s renal function has since begun to improve. Added spironolactone to medication regimen and discontinued PO potassium. Increased metoprolol to 100mg PO daily. Pt. Is hemodynamically stable for discharge at this time.      - Saw AYDE Vincent. Medications reconciled. Referred to PCP at . Established 12/20203  - referred to cardiac rehab. Completed      Today:  He is accompanied by: alone via motorized scooter.     Remains stable    He notes worsening exertional chest pain. Relieved with rest. No rest pain. Pain is isolated to left upper chest    He denies: dyspnea at rest, LE swelling, syncope, PND, orthopnea, palpitation.    ROS: Full 10 pt review of symptoms of negative unless discussed above.     PMHx: HFrEF/ICM (LVEF 20% 8/14/23), CAD, Hx of cardiac arrest s/p VT ablation + ICD, HTN/HLD, CKD, Hx of DVT (L femoral) on Eliquis, hypothyroidism, pulmonary hypertension, T2DM, JAKE/OHS  PSHx: CABG (LIMA-LAD with balloon pump support April 2023)  Social Hx: Former tobacco abuse (24 pack year, quit 2017)    Objective   Medical History:   He has a past medical history of Cataract, Chest pain (10/01/2023), CHF  "(congestive heart failure), Clotting disorder (Multi), COPD (chronic obstructive pulmonary disease) (Multi), Coronary artery disease, Diabetes mellitus (Multi), Disease of thyroid gland, Elevated troponin level not due myocardial infarction (08/14/2023), Hypertension, Ischemic cardiomyopathy, Pulmonary hypertension (Multi), and Venous ulcer of left leg (Multi).  Surgical Hx:   He has a past surgical history that includes Coronary artery bypass graft; Cardiac defibrillator placement; Coronary angioplasty with stent; and Cataract extraction (Right).   Social Hx:   He reports that he quit smoking about 8 years ago. His smoking use included cigarettes. He started smoking about 32 years ago. He has a 24 pack-year smoking history. He has never used smokeless tobacco. He reports that he does not drink alcohol and does not use drugs.  Family Hx:   His Family history is unknown by patient.   Allergies:   Allergies   Allergen Reactions    Azithromycin Dizziness and Unknown     Dizziness, Vertigo, Cold, Clammy, Nausea, Sweating, trouble breathing     Exam:       3/4/2025     3:34 PM 2/4/2025     9:27 AM 1/29/2025    11:06 AM 1/29/2025    10:56 AM 1/29/2025    10:46 AM 1/29/2025    10:36 AM   Vitals   Systolic 98 94 120 111 110 107   Diastolic 62 52 70 70 68 65   BP Location Right arm  Right arm Right arm Right arm Right arm   Heart Rate 96 81 75 84 84 84   Temp  35.8 °C (96.4 °F)       Height 1.803 m (5' 11\") 1.803 m (5' 11\")       Weight (lb) 224 223       BMI 31.24 kg/m2 31.1 kg/m2       BSA (m2) 2.26 m2 2.25 m2       Visit Report Report Report         Wt Readings from Last 5 Encounters:   03/04/25 102 kg (224 lb)   02/04/25 101 kg (223 lb)   12/16/24 103 kg (227 lb)   11/19/24 102 kg (224 lb)   11/05/24 95.1 kg (209 lb 9.6 oz)     GEN: Pleasant, well-appearing, no acute distress. He uses a mobility scooter. Bilateral foot drop with unsteady gait.   HEENT: JVP not elevated, no icterus. No HJR  CHEST: No wheeze, good air " movement.  CV: Normal rate, regular rhythm. Normal S1, inspiratory split S2, no m/r/g  ABD: Soft, ND, NT  EXT: Warm, well perfused, trace LE edema on left. None on Right  NEURO: Pleasant, Oriented to plan.     Labs:   CMP:  Recent Labs     11/11/24  1558 11/05/24  1146 06/25/24  1139 12/19/23  1104 11/02/23  1700 10/17/23  0356 10/16/23  0416 10/15/23  0309 10/14/23  1516 10/14/23  0257 10/13/23  0257   * 124* 138 136 135* 137 136 134*   < > 134* 135*   K 5.3 5.5* 4.4 4.2 4.3 4.6 4.2 4.2   < > 4.3 4.1   CL 91* 86* 95* 96* 99 100 98 97*   < > 96* 98   CO2 22 25 33* 31 29 29 30 28   < > 29 28   ANIONGAP 16 19 14 13 11 13 12 13   < > 13 13   BUN 63* 78* 29* 33* 64* 69* 70* 77*   < > 69* 64*   CREATININE 1.36* 2.51* 1.44* 1.41* 1.80* 1.48* 1.54* 1.88*   < > 2.05* 1.66*   EGFR 60* 29* 56* 58* 43* 55* 52* 41*   < > 37* 48*   MG  --   --   --   --   --  1.97 2.00 1.98  --  1.99 2.03    < > = values in this interval not displayed.     Recent Labs     11/11/24  1558 11/05/24  1146 06/25/24  1139 12/19/23  1104 11/02/23  1700 10/06/23  0515 10/05/23  1647   ALBUMIN 4.0 4.4 4.3 4.1 4.4   < > 4.1   ALKPHOS 44 53 65 62  --   --  63   ALT 12 13 15 13  --   --  11   AST 12 <3* 10 10  --   --  13   BILITOT 0.5 0.5 1.3* 1.1  --   --  1.8*    < > = values in this interval not displayed.     CBC:  Recent Labs     11/05/24  1146 06/25/24  1139 12/19/23  1104 10/17/23  0356 10/16/23  0416   WBC 6.1 5.4 5.4 5.2 5.9   HGB 13.0* 13.3* 14.0 11.6* 11.4*   HCT 39.7* 42.9 43.2 36.5* 36.6*    273 224 275 300   MCV 82 81 80 76* 77*     COAG:   Recent Labs     10/15/23  0833 10/15/23  0309 10/14/23  2208 10/05/23  1647   INR  --   --   --  1.2*   HAUF 0.7 0.8 0.9  --      ABO:   Recent Labs     10/05/23  1647   ABO A     HEME/ENDO:  Recent Labs     11/11/24  1558 11/05/24  1146 06/25/24  1139 04/08/24  1550 12/29/23  1427 10/04/23  1518   FERRITIN  --   --  332*  --   --  379*   IRONSAT  --  37 20*  --   --  15*   TSH 3.26 4.05*  "4.23*  --  7.49* 9.31*   HGBA1C 11.0* 10.8* 9.2* 10.6*  --  8.1*      CARDIAC:   Recent Labs     11/05/24  1146 06/25/24  1139 12/19/23  1104 10/05/23  1647 10/04/23  1518   LDH  --   --   --  240  --    BNP 57 743* 147* 157* 160*     Recent Labs     11/11/24  1558 11/05/24  1146 10/05/23  0354   CHOL 630* 417* 106   LDLCALC  --   --  32*   HDL 29.2 36.9 24.5   TRIG 2,663* 2,017* 247*     MICRO: No results for input(s): \"ESR\", \"CRP\", \"PROCAL\" in the last 27603 hours.  No results found for the last 90 days.    Notable Studies:   EKG: 10/10/2023      Echocardiogram:   Recent Labs     06/25/24  1100   EF 30   LVIDD 6.65   RV 54.4   RVFRWALLPKSP 8.16   TAPSE 1.0   Transthoracic Echo (TTE) Complete With Contrast 06/25/2024  Left Ventricle: The left ventricular systolic function is moderately decreased, with a visually estimated ejection fraction of 30%. There is global hypokinesis of the left ventricle with minor regional variations. The left ventricular cavity size is severely dilated. There is mild eccentric left ventricular hypertrophy. Abnormal (paradoxical) septal motion, consistent with RV pacemaker and abnormal (paradoxical) septal motion consistent with post-operative status. Spectral Doppler shows a restrictive pattern of left ventricular diastolic filling. There is no definite left ventricular thrombus visualized.  Left Atrium: The left atrium is moderately dilated.  Right Ventricle: The right ventricle is mildly enlarged. There is moderately reduced right ventricular systolic function. A device is visualized in the right ventricle. TAPSE= 10 mm; RV S'= 8 cm/s.  Right Atrium: The right atrium is mildly dilated. There is a device visualized in the right atrium.  Aortic Valve: The aortic valve is trileaflet. There is mild aortic valve cusp calcification. There is no evidence of aortic valve regurgitation. The peak instantaneous gradient of the aortic valve is 9.0 mmHg.  Mitral Valve: The mitral valve is mildly " thickened. There is trace to mild mitral valve regurgitation.  Tricuspid Valve: The tricuspid valve is structurally normal. There is mild tricuspid regurgitation. The Doppler estimated RVSP is moderately elevated at 54.4 mmHg.  Pulmonic Valve: The pulmonic valve is structurally normal. There is physiologic pulmonic valve regurgitation.  Pericardium: There is a trivial to small pericardial effusion.  Aorta: The aortic root is normal.  Systemic Veins: The inferior vena cava appears dilated. There is less than 50% IVC collapse with inspiration.  In comparison to the previous echocardiogram(s): Compared with study dated 10/14/2023, the LV EF is increased (was 20%).    CONCLUSIONS:  1. Poorly visualized anatomical structures due to suboptimal image quality.  2. The left ventricular systolic function is moderately decreased, with a visually estimated ejection fraction of 30%.  3. There is global hypokinesis of the left ventricle with minor regional variations.  4. Spectral Doppler shows a restrictive pattern of left ventricular diastolic filling.  5. Left ventricular cavity size is severely dilated.  6. Abnormal septal motion consistent with RV pacemaker and abnormal septal motion consistent with post-operative status.  7. No left ventricular thrombus visualized.  8. There is mild eccentric left ventricular hypertrophy.  9. Mildly enlarged right ventricle.  10. There is moderately reduced right ventricular systolic function.  11. The left atrium is moderately dilated.  12. Moderately elevated right ventricular systolic pressure.    QUANTITATIVE DATA SUMMARY:  2D MEASUREMENTS:  Normal Ranges:  Ao Root d:     3.00 cm    (2.0-3.7cm)  LAs:           5.40 cm    (2.7-4.0cm)  IVSd:          0.80 cm    (0.6-1.1cm)  LVPWd:         0.90 cm    (0.6-1.1cm)  LVIDd:         6.65 cm    (3.9-5.9cm)  LVIDs:         5.50 cm  LV Mass Index: 116.3 g/m2  LV % FS        17.3 %    LA VOLUME:  Normal Ranges:  LA Vol A4C:        93.3 ml     (22+/-6mL/m2)  LA Vol A2C:        87.1 ml  LA Vol BP:         91.1 ml  LA Vol Index A4C:  41.5ml/m2  LA Vol Index A2C:  38.8 ml/m2  LA Vol Index BP:   40.6 ml/m2  LA Area A4C:       27.1 cm2  LA Area A2C:       25.9 cm2  LA Major Axis A4C: 6.7 cm  LA Major Axis A2C: 6.6 cm    AORTA MEASUREMENTS:  Normal Ranges:  Asc Ao, d: 3.20 cm (2.1-3.4cm)    LV SYSTOLIC FUNCTION BY 2D PLANIMETRY (MOD):  Normal Ranges:  EF-A4C View:    35 % (>=55%)  EF-A2C View:    28 %  EF-Biplane:     31 %  EF-Visual:      30 %  LV EF Reported: 30 %    LV DIASTOLIC FUNCTION:  Normal Ranges:  MV Peak E:    1.32 m/s (0.7-1.2 m/s)  MV lateral e' 0.06 m/s    AORTIC VALVE:  Normal Ranges:  AoV Vmax:      1.50 m/s (<=1.7m/s)  AoV Peak P.0 mmHg (<20mmHg)  LVOT Max Dylan:  1.03 m/s (<=1.1m/s)  LVOT VTI:      16.50 cm  LVOT Diameter: 2.20 cm  (1.8-2.4cm)  AoV Area,Vmax: 2.49 cm2 (2.5-4.5cm2)    RIGHT VENTRICLE:  RV Basal 4.80 cm  RV Mid   2.50 cm  RV Major 8.8 cm  TAPSE:   10.1 mm  RV s'    0.08 m/s    TRICUSPID VALVE/RVSP:  Normal Ranges:  Peak TR Velocity: 3.14 m/s  RV Syst Pressure: 54.4 mmHg (< 30mmHg)    Echocardiogram: 10/14/2023   1. Left ventricular systolic function is severely decreased with a 20% estimated ejection fraction.   2. Abnormal septal motion consistent with post-operative status.   3. Left ventricular cavity size is moderate to severely dilated.   4. No obvious LV thrombus seen with the use of ultrasound enhancing agent.   5. There is global hypokinesis of the left ventricle with minor regional variations.   6. There is mild eccentric left ventricular hypertrophy.   7. There is mildly reduced right ventricular systolic function.   8. The left atrium is moderate to severely dilated.   9. Moderately elevated right ventricular systolic pressure.  10. While there are no prior echocardiograms in our system, notes from OSH indicate EF known to be approximately 20%.    IVSd:          0.97 cm    (0.6-1.1cm)  LVPWd:         0.93 cm     (0.6-1.1cm)  LVIDd:         6.60 cm    (3.9-5.9cm)  LA Vol Index BP:   51.9 ml/m2  EF-A4C View: 19.3 % (>=55%)  MV Peak E:    1.14 m/s    (0.7-1.2 m/s)  MV Peak A:    0.53 m/s    (0.42-0.7 m/s)  E/A Ratio:    2.16        (1.0-2.2)  MV e'         0.04 m/s    (>8.0)  MV lateral e' 0.05 m/s  MV medial e'  0.04 m/s  MV A Dur:     100.00 msec  E/e' Ratio:   25.33       (<8.0)  RV Basal 3.20 cm  TAPSE:   13.6 mm  RV s'    0.08 m/s  Est. RA Pressure: 15 mmHg  RV Syst Pressure: 48.6 mmHg (< 30mmHg)    Stress Testing:   Cardiac Catheterization:   [facility] December 2022: severe diffuse three-vessel coronary artery disease. Severe proximal diffuse LAD disease up to 70%. Severe diffuse proximal and mid first diagonal disease. Chronic total occlusion of OM1 with left-to-left collaterals. Chronic total occlusion of mid RCA with left-to-right collaterals.     RHC: 10/11: BP 94/54, CVP 16, PAP 48/28, PCWP 16, CO/CI (F) 6.3/2.75, , SvO2 60% on Nipride, hydralazine 50mg PO TID, Isordil 20mg PO TID, Entresto 24-26mg PO BID.       Vascular ultrsound:   Right Lower PVR: No evidence of arterial occlusive disease in the right lower extremity at rest. Right pressures of >220 mmHg suggest no compressibility of vessels and may make absolute Segmental Limb Pressures (SLP) unreliable. Decreased digital perfusion noted. Triphasic flow is noted in the right common femoral artery, right posterior tibial artery and right dorsalis pedis artery. Results called based on PVR and Doppler waveforms due to partially/ non-compressible vessels.  Left Lower PVR: No evidence of arterial occlusive disease in the left lower extremity at rest. Decreased digital perfusion noted. Triphasic flow is noted in the left common femoral artery, left posterior tibial artery and left dorsalis pedis artery.    PFTS:         Current Outpatient Medications   Medication Instructions    albuterol 2.5 mg /3 mL (0.083 %) nebulizer solution Use every 4-6 hours as needed for  shortness of breath    apixaban (ELIQUIS) 5 mg, oral, 2 times daily    atorvastatin (LIPITOR) 80 mg, oral, Daily    bumetanide (BUMEX) 1 mg, oral, Daily    digoxin (LANOXIN) 125 mcg, oral, Daily    Farxiga 10 mg, oral, Daily    fenofibrate (TRICOR) 145 mg, oral, Daily    FreeStyle Franco 2 [place] misc Use as instructed    FreeStyle Franco 2 Sensor kit Change every 14 days    gabapentin (Neurontin) 300 mg capsule TOME 1 CAPSULA POR LA BOCA NITIN  VEZ AL CUBA EN LA MANANA    gabapentin (NEURONTIN) 600 mg, oral, Nightly    hydrALAZINE (APRESOLINE) 100 mg, oral, 3 times daily    hydrocortisone 1 % cream Topical, 2 times daily    insulin glargine (Toujeo Max U-300 SoloStar) 300 unit/mL (3 mL) injection 40 units twice a day    insulin lispro (HumaLOG KwikPen Insulin) 100 unit/mL injection 15 units with each meals plus sliding scale 100 units daily    ipratropium-albuteroL (Duo-Neb) 0.5-2.5 mg/3 mL nebulizer solution 3 mL, nebulization, 3 times daily PRN    isosorbide dinitrate (ISORDIL) 40 mg, oral, 3 times daily (0900,1400,1900)    ivabradine (CORLANOR) 2.5 mg, oral, 2 times daily    ketorolac (Acular) 0.5 % ophthalmic solution 1 drop to surgical eye 4 times a day starting 1 day before surgery    lancets (Lancets,Thin) misc 4 times daily (before meals and at bedtime). use as directed to test blood sugar    levothyroxine (SYNTHROID, LEVOXYL) 75 mcg, oral, Daily    lidocaine (Lidoderm) 5 % patch 1 patch, transdermal, Daily, Apply to painful area 12 hours per day, remove for 12 hours.    metoprolol succinate XL (TOPROL-XL) 200 mg, oral, Daily, Do not crush or chew.    Mounjaro 5 mg, subcutaneous, Every 7 days    ofloxacin (Ocuflox) 0.3 % ophthalmic solution 1 drop to operative eye 4 times a day starting 1 day before surgery    OneTouch Delica Plus Lancet 30 gauge misc     OneTouch Ultra Test strip REVISE EL NIVEL DE GLUCOSA EN LA PEARL CUATRO VECES AL CUBA    pantoprazole (ProtoNix) 40 mg EC tablet TAKE 1 TABLET BY MOUTH ONCE  "DAILY IN THE MORNING TAKE BEFORE A MEAL DO NOT CRUSH, CHEW, AND/OR DIVIDE    pen needle, diabetic (BD Ultra-Fine Sarah Pen Needle) 32 gauge x 5/32\" needle For insulin injection 4x/day    pen needle, diabetic, safety 29 gauge x 1/2\" needle For insulin injections 5 times per day    prednisoLONE acetate (Pred-Forte) 1 % ophthalmic suspension 1 drop to operative eye 4 times a day starting the day of surgery (after surgery is done)    QUEtiapine (SEROQUEL) 50 mg, oral, Nightly    sacubitriL-valsartan (Entresto)  mg tablet 1 tablet, oral, 2 times daily    spironolactone (ALDACTONE) 50 mg, oral, Daily    TechLITE Pen Needle 31 gauge x 3/16\" needle For insulin injection 5x/day      Notable Therapies   Class  Agent SAFETY    ARNI / ACE / ARB  Entresto 97-103mg BID Last BP: 98/62, Last BUN/Cr (GFR): 63/1.36 (60), 11/11/2024:  3:58 PM.    Beta-Blocker  Metoprolol  daily Last HR: 96    Ivabradine  Ivabradine 2.5mg daily      MRA  Spironolactone 50mg daily Last K: 5.3     SGLT2  Farxiga 10mg daily Last A1c 11.0 (11/11/2024:  3:58 PM).    Diuretic  Bumex 1mg daily Last BNP: 57    Hydralazine/ISDN  100mg / 40mg TID      Digoxin  125mcg daily, Last digoxin: 11/5/2024: 0.86    Anticoagulation  Apixaban 5mg BID (DVT) Last Hgb 13.0 (11/5/2024: 11:46 AM).    Anti-arrhythmic       Antiplatelet  Aspirin 81mg daily     Lipid-Lowering  Atorvastatin 80mg daily   Fenofibrate Last Tchol 630 / LDL  No results found for requested labs within last 365 days. / HDL 29.2 / TRIG 2,663 (11/11/2024:  3:58 PM).    Other   Mounjaro     Device(s)  ICD  GIDEON ~1 year, referral to device clinic.    Last EF: 6/25/2024: 30    Cardiac Rehab,   if EF <35/MI/OHS  completing      Problems Addressed:   # HFrEF / Chronic Systolic Heart Failure, last EF 30%, dx'd 2017,   # Ischemic Cardiomyopathy, ACC/AHA Stage C-D, NYHA II, Warm, Euvolemic. Previously discussed for AT during [facility] October 2023 admission but declined with concerns for mobility/frailty (uses " motorized scooter and has bilateral foot drop with neuropathy), CKD, poorly controlled diabetes as well as unclear social/financial support).   # CAD s/p PCI and 1v CABG (LIMA-LAD 04/2023).   # VT arrest s/p VT ablation + ICD 2017   # CKD Stage IIIb: Last BUN/Cr (GFR): 63/1.36 (60), 11/11/2024:  3:58 PM.  # Type II Diabetes Mellitus: Last A1c (11.0). With neuropathy/nephropathy. On Mounjaro.   - Management of CV risk factors as a below  - GDMT as above.   - Continue current medications with the exception of:   -- restart corlanor  - lab work    # Chest pain: notes new exertional chest discomfort. REsolves with rest. Upper chest location. Will obtain NM stress/viability for ischemic assessment.    # DVT (L femoral) on Eliquis,   # Hypothyroidism:  # Pulmonary hypertension, low threshhold for RHC with decompensation.   # Central sleep apnea.     # Bilateral foot drop with LE neuropathy: Able to walk two blocks. Uses motorized scoopter    # Cataracts: Patient is cleared from a cardiovascular standpoint for cataract surgery. No further cardiovascular testing required.     CV Risk Factors:  # Hypertension : Last BP: 98/62. controlled  # Hyperlipidemia: Last Tchol 630 / LDL  No results found for requested labs within last 365 days. / HDL 29.2 / TRIG 2,663 (11/11/2024:  3:58 PM). Recheck Continue ASA/statin  # Type II Diabetes Mellitus: Last A1c 11.0 (11/11/2024:  3:58 PM). On GLP1 + SGLT2  # Obesity: Last BMI: 31.26. On GLP1  # Prior tobacco use (24 pack year, quit 2017),     Patient Instructions   If you have any questions or need cardiac medication refills, please call the Heart Failure office at [phone], option 6.   You may also contact the  Heart Failure Nursing team via email at [email or web address] (Please include your name and date of birth).      To reach Dr. Duque's office please call [phone] / Fax: [phone].  To schedule an appointment call [phone].    - Continue current  medications with the exception of:   -- restart ivabradine 2.5mg daily  - Labwork: none  - Imaging/Procedures:   -Please obtain a Nuclear PET Stress Test. Call [phone] to schedule.  - Referrals: none  - Followup: 3 months     Orders:  Orders Placed This Encounter   Procedures    NM PET CT myocardial metabolic evaluation    NM PET CT myocardial perfusion multiple      Followup Appts:  Future Appointments   Date Time Provider Department Center   3/17/2025 10:30 AM Rell Robison MD [id number] [facility]   4/2/2025  4:00 PM Harini Marie MD [id number] None   5/12/2025  3:20 PM Diana Salazar MD [id number] [facility]   6/17/2025  3:30 PM Jono Duque MD [id number] [facility]   6/26/2025 11:00 AM Randy Brunson MD [id number] [facility]     Time Spent: I spent 52 minutes reviewing medical testing, obtaining medical history and counselling and educating on diagnosis and documenting clinical encounter. The encounter involved a comprehensive review of extensive data, including prior medical records, imaging studies, laboratory results, and consultations, followed by in-depth counseling regarding the patient's complex cardiac condition and comorbidities. We discussed treatment options, risks and benefits, and recommendations for ongoing care and careful monitoring of adverse effects from medications.         ____________________________________________________________  Jono Duque MD  Section of Advanced Heart Failure and Cardiac Transplantation  Division of Cardiovascular Medicine  [facility]  [facility]

## 2025-03-04 NOTE — PATIENT INSTRUCTIONS
If you have any questions or need cardiac medication refills, please call the Heart Failure office at [phone], option 6.   You may also contact the  Heart Failure Nursing team via email at [email or web address] (Please include your name and date of birth).      To reach Dr. Duque's office please call [phone] / Fax: [phone].  To schedule an appointment call [phone].    - Continue current medications with the exception of:   -- restart ivabradine 2.5mg daily  - Labwork: none  - Imaging/Procedures:   -Please obtain a Nuclear PET Stress Test. Call [phone] to schedule.  - Referrals: none  - Followup: 3 months

## 2025-03-08 DIAGNOSIS — Z79.4 TYPE 2 DIABETES MELLITUS WITH HYPERGLYCEMIA, WITH LONG-TERM CURRENT USE OF INSULIN: ICD-10-CM

## 2025-03-08 DIAGNOSIS — E11.65 TYPE 2 DIABETES MELLITUS WITH HYPERGLYCEMIA, WITH LONG-TERM CURRENT USE OF INSULIN: ICD-10-CM

## 2025-03-10 RX ORDER — INSULIN LISPRO 100 [IU]/ML
INJECTION, SOLUTION INTRAVENOUS; SUBCUTANEOUS
Qty: 105 ML | Refills: 1 | Status: SHIPPED | OUTPATIENT
Start: 2025-03-10

## 2025-03-15 NOTE — PROGRESS NOTES
Subjective   Patient ID: Felice Law is a 60 y.o. male.      Current Outpatient Medications (Ophthalmology pharm classes)   Medication Sig Dispense Refill    ketorolac (Acular) 0.5 % ophthalmic solution 1 drop to surgical eye 4 times a day starting 1 day before surgery (Patient not taking: Reported on 3/4/2025) 5 mL 2    ofloxacin (Ocuflox) 0.3 % ophthalmic solution 1 drop to operative eye 4 times a day starting 1 day before surgery (Patient not taking: Reported on 3/4/2025) 5 mL 2    prednisoLONE acetate (Pred-Forte) 1 % ophthalmic suspension 1 drop to operative eye 4 times a day starting the day of surgery (after surgery is done) (Patient not taking: Reported on 3/4/2025) 5 mL 2     Current Outpatient Medications (Other)   Medication Sig Dispense Refill    albuterol 2.5 mg /3 mL (0.083 %) nebulizer solution Use every 4-6 hours as needed for shortness of breath 75 mL 1    apixaban (Eliquis) 5 mg tablet Take 1 tablet (5 mg) by mouth 2 times a day. 180 tablet 3    atorvastatin (Lipitor) 80 mg tablet Take 1 tablet (80 mg) by mouth once daily. 90 tablet 3    bumetanide (Bumex) 1 mg tablet Take 1 tablet (1 mg) by mouth once daily. 90 tablet 3    digoxin (Lanoxin) 125 MCG tablet Take 1 tablet (125 mcg) by mouth once daily. 90 tablet 3    Farxiga 10 mg Take 1 tablet (10 mg) by mouth once daily. 90 tablet 3    fenofibrate (Tricor) 145 mg tablet Take 1 tablet (145 mg) by mouth once daily. 90 tablet 3    FreeStyle Franco 2 [place] misc Use as instructed 1 each 0    FreeStyle Franco 2 Sensor kit Change every 14 days 2 each 3    gabapentin (Neurontin) 300 mg capsule TOME 1 CAPSULA POR LA BOCA NITIN  VEZ AL CUBA EN LA MANANA 90 capsule 3    gabapentin (Neurontin) 600 mg tablet Take 1 tablet (600 mg) by mouth once daily at bedtime. 30 tablet 2    hydrALAZINE (Apresoline) 100 mg tablet Take 1 tablet (100 mg) by mouth 3 times a day. 270 tablet 3    hydrocortisone 1 % cream Apply topically 2 times a day. 112 g 3    insulin  "glargine (Toujeo Max U-300 SoloStar) 300 unit/mL (3 mL) injection 40 units twice a day 30 mL 3    insulin lispro (HumaLOG KwikPen Insulin) 100 unit/mL pen INYECTE DEBAJO DE LA PIEL 15  UNIDADES CON CADA COMIDA CON LA  ESCALA MOVIL , HASTA 100  UNIDADES CADA CUBA 105 mL 1    ipratropium-albuteroL (Duo-Neb) 0.5-2.5 mg/3 mL nebulizer solution Take 3 mL by nebulization 3 times a day as needed for wheezing. 180 mL 2    isosorbide dinitrate (Isordil) 40 mg tablet Take 1 tablet (40 mg) by mouth 3 times a day. 270 tablet 3    ivabradine (Corlanor) 5 mg tablet Take 0.5 tablets (2.5 mg) by mouth 2 times a day. 90 tablet 3    lancets (Lancets,Thin) misc 4 times daily (before meals and at bedtime). use as directed to test blood sugar 100 each 3    levothyroxine (Synthroid, Levoxyl) 75 mcg tablet Take 1 tablet (75 mcg) by mouth once daily. 90 tablet 2    lidocaine (Lidoderm) 5 % patch Place 1 patch over 12 hours on the skin once daily. Apply to painful area 12 hours per day, remove for 12 hours. 1 patch 1    metoprolol succinate XL (Toprol-XL) 200 mg 24 hr tablet Take 1 tablet (200 mg) by mouth once daily. Do not crush or chew. 90 tablet 3    OneTouch Delica Plus Lancet 30 gauge misc       OneTouch Ultra Test strip REVISE EL NIVEL DE GLUCOSA EN LA PEARL CUATRO VECES AL CUBA 400 strip 1    pantoprazole (ProtoNix) 40 mg EC tablet TAKE 1 TABLET BY MOUTH ONCE DAILY IN THE MORNING TAKE BEFORE A MEAL DO NOT CRUSH, CHEW, AND/OR DIVIDE 90 tablet 3    pen needle, diabetic (BD Ultra-Fine Sarah Pen Needle) 32 gauge x 5/32\" needle For insulin injection 4x/day 200 each 5    pen needle, diabetic, safety 29 gauge x 1/2\" needle For insulin injections 5 times per day 500 each 4    QUEtiapine (SEROquel) 50 mg tablet Take 1 tablet (50 mg) by mouth once daily at bedtime. 100 tablet 2    sacubitriL-valsartan (Entresto)  mg tablet Take 1 tablet by mouth 2 times a day. 180 tablet 3    spironolactone (Aldactone) 25 mg tablet Take 2 tablets (50 mg) " "by mouth once daily. 180 tablet 3    TechLITE Pen Needle 31 gauge x 3/16\" needle For insulin injection 5x/day 200 each 3    tirzepatide (Mounjaro) 5 mg/0.5 mL pen injector Inject 5 mg under the skin every 7 days. 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 Lamb and Dr. Phillips  - will send back to Dr. Phillips for cataract eval OD    Ohyin next [facility]  Retina 5-6 mon    "

## 2025-03-17 ENCOUNTER — APPOINTMENT (OUTPATIENT)
Dept: OPHTHALMOLOGY | Facility: CLINIC | Age: 60
End: 2025-03-17
Payer: COMMERCIAL

## 2025-03-17 DIAGNOSIS — E11.3211 TYPE 2 DIABETES MELLITUS WITH RIGHT EYE AFFECTED BY MILD NONPROLIFERATIVE RETINOPATHY AND MACULAR EDEMA, WITH LONG-TERM CURRENT USE OF INSULIN: Primary | ICD-10-CM

## 2025-03-17 DIAGNOSIS — Z79.4 TYPE 2 DIABETES MELLITUS WITH RIGHT EYE AFFECTED BY MILD NONPROLIFERATIVE RETINOPATHY AND MACULAR EDEMA, WITH LONG-TERM CURRENT USE OF INSULIN: Primary | ICD-10-CM

## 2025-03-17 PROCEDURE — 92134 CPTRZ OPH DX IMG PST SGM RTA: CPT | Performed by: OPHTHALMOLOGY

## 2025-03-17 PROCEDURE — 99213 OFFICE O/P EST LOW 20 MIN: CPT | Performed by: OPHTHALMOLOGY

## 2025-03-17 ASSESSMENT — ENCOUNTER SYMPTOMS
CONSTITUTIONAL NEGATIVE: 0
CARDIOVASCULAR NEGATIVE: 0
MUSCULOSKELETAL NEGATIVE: 0
PSYCHIATRIC NEGATIVE: 0
RESPIRATORY NEGATIVE: 0
ALLERGIC/IMMUNOLOGIC NEGATIVE: 0
GASTROINTESTINAL NEGATIVE: 0
EYES NEGATIVE: 1
ENDOCRINE NEGATIVE: 0
NEUROLOGICAL NEGATIVE: 0
HEMATOLOGIC/LYMPHATIC NEGATIVE: 0

## 2025-03-17 ASSESSMENT — CONF VISUAL FIELD
OS_SUPERIOR_NASAL_RESTRICTION: 0
OS_INFERIOR_TEMPORAL_RESTRICTION: 0
OD_NORMAL: 1
OD_INFERIOR_NASAL_RESTRICTION: 0
OD_INFERIOR_TEMPORAL_RESTRICTION: 0
OD_SUPERIOR_NASAL_RESTRICTION: 0
OS_SUPERIOR_TEMPORAL_RESTRICTION: 0
OD_SUPERIOR_TEMPORAL_RESTRICTION: 0
OS_NORMAL: 1
OS_INFERIOR_NASAL_RESTRICTION: 0

## 2025-03-17 ASSESSMENT — EXTERNAL EXAM - RIGHT EYE: OD_EXAM: NORMAL

## 2025-03-17 ASSESSMENT — TONOMETRY
IOP_METHOD: TONOPEN
OD_IOP_MMHG: 13
OS_IOP_MMHG: 10

## 2025-03-17 ASSESSMENT — VISUAL ACUITY
OD_CC: 20/60
OD_PH_CC+: +2
OD_PH_CC: 20/50
OS_CC: 20/40
METHOD: SNELLEN - LINEAR
CORRECTION_TYPE: GLASSES
OS_PH_CC: 20/30
OD_CC+: -1

## 2025-03-17 ASSESSMENT — CUP TO DISC RATIO
OD_RATIO: 0.3
OS_RATIO: 0.3

## 2025-03-17 ASSESSMENT — SLIT LAMP EXAM - LIDS
COMMENTS: GOOD POSITION, DERMATOCHALASIS
COMMENTS: GOOD POSITION, DERMATOCHALASIS

## 2025-03-17 ASSESSMENT — EXTERNAL EXAM - LEFT EYE: OS_EXAM: NORMAL

## 2025-03-22 DIAGNOSIS — E03.9 HYPOTHYROIDISM, UNSPECIFIED TYPE: ICD-10-CM

## 2025-03-24 RX ORDER — LEVOTHYROXINE SODIUM 75 UG/1
TABLET ORAL
Qty: 100 TABLET | Refills: 3 | Status: SHIPPED | OUTPATIENT
Start: 2025-03-24

## 2025-03-28 ENCOUNTER — DOCUMENTATION (OUTPATIENT)
Dept: PRIMARY CARE | Facility: HOSPITAL | Age: 60
End: 2025-03-28
Payer: COMMERCIAL

## 2025-03-28 NOTE — PROGRESS NOTES
Felice Law was called 1:26 PM (03/28/2025) by phone regarding [facility] [place] HCM cancer screening.     Patient answered: No unable to get a hold of patient via phone. Both home and mobile numbers are not connecting; busy tones.  VM without HPI:  No    Updated chart as follows:   Family history: unable to assess  Mother:unable to assess  Father:unable to assess  Siblings:unable to assess  Children:unable to assess    Social Hx:unable to assess  Tobacco:unable to assess  Nicotine:unable to assess  EtOH:unable to assess  Illicit:unable to assess    Prefers _(campus/Time)___ for appointments: unable to assess

## 2025-04-02 ENCOUNTER — APPOINTMENT (OUTPATIENT)
Facility: HOSPITAL | Age: 60
End: 2025-04-02
Payer: COMMERCIAL

## 2025-04-11 DIAGNOSIS — I50.23 ACUTE ON CHRONIC HFREF (HEART FAILURE WITH REDUCED EJECTION FRACTION): ICD-10-CM

## 2025-04-11 DIAGNOSIS — I50.22 CHRONIC SYSTOLIC HEART FAILURE: ICD-10-CM

## 2025-04-11 DIAGNOSIS — I25.5 ISCHEMIC CARDIOMYOPATHY: ICD-10-CM

## 2025-04-14 RX ORDER — SPIRONOLACTONE 25 MG/1
TABLET ORAL
Qty: 200 TABLET | Refills: 2 | Status: SHIPPED | OUTPATIENT
Start: 2025-04-14

## 2025-04-21 DIAGNOSIS — E11.65 TYPE 2 DIABETES MELLITUS WITH HYPERGLYCEMIA, WITH LONG-TERM CURRENT USE OF INSULIN: ICD-10-CM

## 2025-04-21 DIAGNOSIS — Z79.4 TYPE 2 DIABETES MELLITUS WITH HYPERGLYCEMIA, WITH LONG-TERM CURRENT USE OF INSULIN: ICD-10-CM

## 2025-04-22 RX ORDER — GABAPENTIN 600 MG/1
TABLET ORAL
Qty: 90 TABLET | Refills: 3 | Status: SHIPPED | OUTPATIENT
Start: 2025-04-22

## 2025-04-29 DIAGNOSIS — I50.22 CHRONIC SYSTOLIC HEART FAILURE: ICD-10-CM

## 2025-04-29 DIAGNOSIS — I25.5 ISCHEMIC CARDIOMYOPATHY: ICD-10-CM

## 2025-04-29 DIAGNOSIS — I50.23 ACUTE ON CHRONIC HFREF (HEART FAILURE WITH REDUCED EJECTION FRACTION): ICD-10-CM

## 2025-04-29 DIAGNOSIS — I82.409 DEEP VEIN THROMBOSIS (DVT) OF LOWER EXTREMITY, UNSPECIFIED CHRONICITY, UNSPECIFIED LATERALITY, UNSPECIFIED VEIN: ICD-10-CM

## 2025-04-30 RX ORDER — APIXABAN 5 MG/1
TABLET, FILM COATED ORAL
Qty: 200 TABLET | Refills: 2 | Status: ON HOLD | OUTPATIENT
Start: 2025-04-30

## 2025-05-04 ENCOUNTER — HOSPITAL ENCOUNTER (INPATIENT)
Facility: HOSPITAL | Age: 60
End: 2025-05-04
Attending: EMERGENCY MEDICINE | Admitting: INTERNAL MEDICINE
Payer: COMMERCIAL

## 2025-05-04 ENCOUNTER — HOSPITAL ENCOUNTER (OUTPATIENT)
Dept: CARDIOLOGY | Facility: HOSPITAL | Age: 60
Discharge: HOME | End: 2025-05-04
Payer: COMMERCIAL

## 2025-05-04 ENCOUNTER — APPOINTMENT (OUTPATIENT)
Dept: RADIOLOGY | Facility: HOSPITAL | Age: 60
DRG: 291 | End: 2025-05-04
Payer: COMMERCIAL

## 2025-05-04 VITALS
SYSTOLIC BLOOD PRESSURE: 116 MMHG | BODY MASS INDEX: 31.36 KG/M2 | WEIGHT: 224 LBS | HEART RATE: 82 BPM | TEMPERATURE: 96.6 F | OXYGEN SATURATION: 92 % | RESPIRATION RATE: 18 BRPM | HEIGHT: 71 IN | DIASTOLIC BLOOD PRESSURE: 75 MMHG

## 2025-05-04 DIAGNOSIS — I25.5 ISCHEMIC CARDIOMYOPATHY: ICD-10-CM

## 2025-05-04 DIAGNOSIS — Z86.74 HISTORY OF CARDIAC ARREST: ICD-10-CM

## 2025-05-04 DIAGNOSIS — I25.84 CORONARY ARTERY DISEASE DUE TO CALCIFIED CORONARY LESION: ICD-10-CM

## 2025-05-04 DIAGNOSIS — I50.9 ACUTE ON CHRONIC CONGESTIVE HEART FAILURE, UNSPECIFIED HEART FAILURE TYPE: Primary | ICD-10-CM

## 2025-05-04 DIAGNOSIS — I25.10 CORONARY ARTERY DISEASE DUE TO CALCIFIED CORONARY LESION: ICD-10-CM

## 2025-05-04 DIAGNOSIS — R60.0 BILATERAL LOWER EXTREMITY EDEMA: ICD-10-CM

## 2025-05-04 DIAGNOSIS — I10 PRIMARY HYPERTENSION: ICD-10-CM

## 2025-05-04 DIAGNOSIS — I50.9 ACUTE ON CHRONIC CONGESTIVE HEART FAILURE, UNSPECIFIED HEART FAILURE TYPE: ICD-10-CM

## 2025-05-04 DIAGNOSIS — Z86.74 HX OF CARDIAC ARREST: ICD-10-CM

## 2025-05-04 DIAGNOSIS — I50.20 UNSPECIFIED SYSTOLIC (CONGESTIVE) HEART FAILURE: ICD-10-CM

## 2025-05-04 LAB
ACANTHOCYTES BLD QL SMEAR: NORMAL
ALBUMIN SERPL BCP-MCNC: 4.1 G/DL (ref 3.4–5)
ALP SERPL-CCNC: 35 U/L (ref 33–120)
ALT SERPL W P-5'-P-CCNC: 26 U/L (ref 10–52)
ANION GAP SERPL CALC-SCNC: 13 MMOL/L (ref 10–20)
AST SERPL W P-5'-P-CCNC: 22 U/L (ref 9–39)
BASOPHILS # BLD AUTO: 0.06 X10*3/UL (ref 0–0.1)
BASOPHILS NFR BLD AUTO: 1.1 %
BILIRUB DIRECT SERPL-MCNC: 1.2 MG/DL (ref 0–0.3)
BILIRUB SERPL-MCNC: 2.1 MG/DL (ref 0–1.2)
BNP SERPL-MCNC: 2032 PG/ML (ref 0–99)
BUN SERPL-MCNC: 48 MG/DL (ref 6–23)
CALCIUM SERPL-MCNC: 9.3 MG/DL (ref 8.6–10.3)
CARDIAC TROPONIN I PNL SERPL HS: 40 NG/L (ref 0–20)
CARDIAC TROPONIN I PNL SERPL HS: 41 NG/L (ref 0–20)
CHLORIDE SERPL-SCNC: 97 MMOL/L (ref 98–107)
CO2 SERPL-SCNC: 27 MMOL/L (ref 21–32)
CREAT SERPL-MCNC: 2.15 MG/DL (ref 0.5–1.3)
EGFRCR SERPLBLD CKD-EPI 2021: 35 ML/MIN/1.73M*2
EOSINOPHIL # BLD AUTO: 0.14 X10*3/UL (ref 0–0.7)
EOSINOPHIL NFR BLD AUTO: 2.5 %
ERYTHROCYTE [DISTWIDTH] IN BLOOD BY AUTOMATED COUNT: 21.2 % (ref 11.5–14.5)
FLUAV RNA RESP QL NAA+PROBE: NOT DETECTED
FLUBV RNA RESP QL NAA+PROBE: NOT DETECTED
GLUCOSE BLD MANUAL STRIP-MCNC: 329 MG/DL (ref 74–99)
GLUCOSE SERPL-MCNC: 310 MG/DL (ref 74–99)
HCT VFR BLD AUTO: 43.3 % (ref 41–52)
HGB BLD-MCNC: 12.7 G/DL (ref 13.5–17.5)
IMM GRANULOCYTES # BLD AUTO: 0.01 X10*3/UL (ref 0–0.7)
IMM GRANULOCYTES NFR BLD AUTO: 0.2 % (ref 0–0.9)
LACTATE SERPL-SCNC: 1.6 MMOL/L (ref 0.4–2)
LYMPHOCYTES # BLD AUTO: 0.73 X10*3/UL (ref 1.2–4.8)
LYMPHOCYTES NFR BLD AUTO: 13.1 %
MAGNESIUM SERPL-MCNC: 2.43 MG/DL (ref 1.6–2.4)
MCH RBC QN AUTO: 23.4 PG (ref 26–34)
MCHC RBC AUTO-ENTMCNC: 29.3 G/DL (ref 32–36)
MCV RBC AUTO: 80 FL (ref 80–100)
MONOCYTES # BLD AUTO: 0.45 X10*3/UL (ref 0.1–1)
MONOCYTES NFR BLD AUTO: 8.1 %
NEUTROPHILS # BLD AUTO: 4.19 X10*3/UL (ref 1.2–7.7)
NEUTROPHILS NFR BLD AUTO: 75 %
NRBC BLD-RTO: 0 /100 WBCS (ref 0–0)
OVALOCYTES BLD QL SMEAR: NORMAL
PLATELET # BLD AUTO: 294 X10*3/UL (ref 150–450)
POLYCHROMASIA BLD QL SMEAR: NORMAL
POTASSIUM SERPL-SCNC: 4.5 MMOL/L (ref 3.5–5.3)
PROT SERPL-MCNC: 7.4 G/DL (ref 6.4–8.2)
RBC # BLD AUTO: 5.42 X10*6/UL (ref 4.5–5.9)
RBC MORPH BLD: NORMAL
SARS-COV-2 RNA RESP QL NAA+PROBE: NOT DETECTED
SODIUM SERPL-SCNC: 132 MMOL/L (ref 136–145)
TARGETS BLD QL SMEAR: NORMAL
WBC # BLD AUTO: 5.6 X10*3/UL (ref 4.4–11.3)

## 2025-05-04 PROCEDURE — 2500000005 HC RX 250 GENERAL PHARMACY W/O HCPCS: Performed by: INTERNAL MEDICINE

## 2025-05-04 PROCEDURE — 80048 BASIC METABOLIC PNL TOTAL CA: CPT | Performed by: PHYSICIAN ASSISTANT

## 2025-05-04 PROCEDURE — 71046 X-RAY EXAM CHEST 2 VIEWS: CPT | Performed by: RADIOLOGY

## 2025-05-04 PROCEDURE — 2500000004 HC RX 250 GENERAL PHARMACY W/ HCPCS (ALT 636 FOR OP/ED): Mod: JZ

## 2025-05-04 PROCEDURE — 84484 ASSAY OF TROPONIN QUANT: CPT | Performed by: PHYSICIAN ASSISTANT

## 2025-05-04 PROCEDURE — 83605 ASSAY OF LACTIC ACID: CPT | Performed by: PHYSICIAN ASSISTANT

## 2025-05-04 PROCEDURE — 83880 ASSAY OF NATRIURETIC PEPTIDE: CPT | Performed by: PHYSICIAN ASSISTANT

## 2025-05-04 PROCEDURE — 99223 1ST HOSP IP/OBS HIGH 75: CPT | Performed by: INTERNAL MEDICINE

## 2025-05-04 PROCEDURE — 2500000002 HC RX 250 W HCPCS SELF ADMINISTERED DRUGS (ALT 637 FOR MEDICARE OP, ALT 636 FOR OP/ED): Performed by: INTERNAL MEDICINE

## 2025-05-04 PROCEDURE — 36415 COLL VENOUS BLD VENIPUNCTURE: CPT | Performed by: PHYSICIAN ASSISTANT

## 2025-05-04 PROCEDURE — 2500000001 HC RX 250 WO HCPCS SELF ADMINISTERED DRUGS (ALT 637 FOR MEDICARE OP): Performed by: INTERNAL MEDICINE

## 2025-05-04 PROCEDURE — 99285 EMERGENCY DEPT VISIT HI MDM: CPT | Mod: 25 | Performed by: EMERGENCY MEDICINE

## 2025-05-04 PROCEDURE — 96375 TX/PRO/DX INJ NEW DRUG ADDON: CPT

## 2025-05-04 PROCEDURE — 82248 BILIRUBIN DIRECT: CPT | Performed by: PHYSICIAN ASSISTANT

## 2025-05-04 PROCEDURE — 85025 COMPLETE CBC W/AUTO DIFF WBC: CPT | Performed by: PHYSICIAN ASSISTANT

## 2025-05-04 PROCEDURE — 2500000001 HC RX 250 WO HCPCS SELF ADMINISTERED DRUGS (ALT 637 FOR MEDICARE OP): Performed by: EMERGENCY MEDICINE

## 2025-05-04 PROCEDURE — 96374 THER/PROPH/DIAG INJ IV PUSH: CPT

## 2025-05-04 PROCEDURE — 87636 SARSCOV2 & INF A&B AMP PRB: CPT | Performed by: EMERGENCY MEDICINE

## 2025-05-04 PROCEDURE — 82947 ASSAY GLUCOSE BLOOD QUANT: CPT

## 2025-05-04 PROCEDURE — 83735 ASSAY OF MAGNESIUM: CPT | Performed by: PHYSICIAN ASSISTANT

## 2025-05-04 PROCEDURE — 80053 COMPREHEN METABOLIC PANEL: CPT | Performed by: PHYSICIAN ASSISTANT

## 2025-05-04 PROCEDURE — 71046 X-RAY EXAM CHEST 2 VIEWS: CPT

## 2025-05-04 PROCEDURE — 2500000004 HC RX 250 GENERAL PHARMACY W/ HCPCS (ALT 636 FOR OP/ED): Mod: JZ | Performed by: INTERNAL MEDICINE

## 2025-05-04 PROCEDURE — 1200000002 HC GENERAL ROOM WITH TELEMETRY DAILY

## 2025-05-04 RX ORDER — QUETIAPINE FUMARATE 25 MG/1
50 TABLET, FILM COATED ORAL NIGHTLY
Status: DISPENSED | OUTPATIENT
Start: 2025-05-04

## 2025-05-04 RX ORDER — DEXTROSE 50 % IN WATER (D50W) INTRAVENOUS SYRINGE
12.5
Status: ACTIVE | OUTPATIENT
Start: 2025-05-04

## 2025-05-04 RX ORDER — ONDANSETRON HYDROCHLORIDE 2 MG/ML
4 INJECTION, SOLUTION INTRAVENOUS EVERY 6 HOURS PRN
Status: ACTIVE | OUTPATIENT
Start: 2025-05-04

## 2025-05-04 RX ORDER — FUROSEMIDE 10 MG/ML
80 INJECTION INTRAMUSCULAR; INTRAVENOUS ONCE
Status: COMPLETED | OUTPATIENT
Start: 2025-05-04 | End: 2025-05-04

## 2025-05-04 RX ORDER — PANTOPRAZOLE SODIUM 40 MG/1
40 TABLET, DELAYED RELEASE ORAL
Status: ACTIVE | OUTPATIENT
Start: 2025-05-05

## 2025-05-04 RX ORDER — DIGOXIN 125 MCG
125 TABLET ORAL DAILY
Status: DISPENSED | OUTPATIENT
Start: 2025-05-04

## 2025-05-04 RX ORDER — INSULIN LISPRO 100 [IU]/ML
10 INJECTION, SOLUTION INTRAVENOUS; SUBCUTANEOUS
Status: DISPENSED | OUTPATIENT
Start: 2025-05-04

## 2025-05-04 RX ORDER — LIDOCAINE 560 MG/1
1 PATCH PERCUTANEOUS; TOPICAL; TRANSDERMAL DAILY
Status: DISPENSED | OUTPATIENT
Start: 2025-05-04

## 2025-05-04 RX ORDER — DIPHENHYDRAMINE HCL 25 MG
50 CAPSULE ORAL ONCE
Status: COMPLETED | OUTPATIENT
Start: 2025-05-04 | End: 2025-05-04

## 2025-05-04 RX ORDER — IVABRADINE 5 MG/1
2.5 TABLET, FILM COATED ORAL 2 TIMES DAILY
Status: DISPENSED | OUTPATIENT
Start: 2025-05-04

## 2025-05-04 RX ORDER — FUROSEMIDE 10 MG/ML
40 INJECTION INTRAMUSCULAR; INTRAVENOUS EVERY 12 HOURS
Status: DISPENSED | OUTPATIENT
Start: 2025-05-04

## 2025-05-04 RX ORDER — ONDANSETRON HYDROCHLORIDE 2 MG/ML
4 INJECTION, SOLUTION INTRAVENOUS ONCE
Status: COMPLETED | OUTPATIENT
Start: 2025-05-04 | End: 2025-05-04

## 2025-05-04 RX ORDER — METOPROLOL SUCCINATE 100 MG/1
200 TABLET, EXTENDED RELEASE ORAL DAILY
Status: ON HOLD | COMMUNITY

## 2025-05-04 RX ORDER — DAPAGLIFLOZIN 10 MG/1
10 TABLET, FILM COATED ORAL DAILY
Status: DISPENSED | OUTPATIENT
Start: 2025-05-04

## 2025-05-04 RX ORDER — DEXTROSE 50 % IN WATER (D50W) INTRAVENOUS SYRINGE
25
Status: ACTIVE | OUTPATIENT
Start: 2025-05-04

## 2025-05-04 RX ORDER — ISOSORBIDE DINITRATE 10 MG/1
40 TABLET ORAL
Status: DISPENSED | OUTPATIENT
Start: 2025-05-04

## 2025-05-04 RX ORDER — INSULIN GLARGINE 100 [IU]/ML
24 INJECTION, SOLUTION SUBCUTANEOUS
Status: DISPENSED | OUTPATIENT
Start: 2025-05-05

## 2025-05-04 RX ORDER — ATORVASTATIN CALCIUM 80 MG/1
80 TABLET, FILM COATED ORAL DAILY
Status: DISPENSED | OUTPATIENT
Start: 2025-05-04

## 2025-05-04 RX ORDER — LEVOTHYROXINE SODIUM 75 UG/1
75 TABLET ORAL DAILY
Status: ACTIVE | OUTPATIENT
Start: 2025-05-05

## 2025-05-04 RX ORDER — GABAPENTIN 300 MG/1
600 CAPSULE ORAL NIGHTLY
Status: DISPENSED | OUTPATIENT
Start: 2025-05-04

## 2025-05-04 RX ORDER — METOPROLOL SUCCINATE 50 MG/1
200 TABLET, EXTENDED RELEASE ORAL DAILY
Status: DISPENSED | OUTPATIENT
Start: 2025-05-04

## 2025-05-04 RX ORDER — INSULIN LISPRO 100 [IU]/ML
0-15 INJECTION, SOLUTION INTRAVENOUS; SUBCUTANEOUS
Status: ACTIVE | OUTPATIENT
Start: 2025-05-05

## 2025-05-04 RX ORDER — ACETAMINOPHEN 325 MG/1
975 TABLET ORAL EVERY 6 HOURS PRN
Status: ACTIVE | OUTPATIENT
Start: 2025-05-04

## 2025-05-04 RX ORDER — FENOFIBRATE 160 MG/1
160 TABLET ORAL DAILY
Status: DISPENSED | OUTPATIENT
Start: 2025-05-04

## 2025-05-04 RX ORDER — GABAPENTIN 300 MG/1
300 CAPSULE ORAL EVERY MORNING
Status: ACTIVE | OUTPATIENT
Start: 2025-05-05

## 2025-05-04 RX ORDER — SPIRONOLACTONE 25 MG/1
50 TABLET ORAL
Status: ACTIVE | OUTPATIENT
Start: 2025-05-04

## 2025-05-04 RX ORDER — HYDRALAZINE HYDROCHLORIDE 50 MG/1
100 TABLET, FILM COATED ORAL 3 TIMES DAILY
Status: DISPENSED | OUTPATIENT
Start: 2025-05-04

## 2025-05-04 RX ADMIN — GABAPENTIN 600 MG: 300 CAPSULE ORAL at 20:40

## 2025-05-04 RX ADMIN — METOPROLOL SUCCINATE 200 MG: 50 TABLET, EXTENDED RELEASE ORAL at 20:40

## 2025-05-04 RX ADMIN — QUETIAPINE FUMARATE 50 MG: 25 TABLET ORAL at 20:41

## 2025-05-04 RX ADMIN — ONDANSETRON 4 MG: 2 INJECTION INTRAMUSCULAR; INTRAVENOUS at 15:48

## 2025-05-04 RX ADMIN — APIXABAN 5 MG: 5 TABLET, FILM COATED ORAL at 20:35

## 2025-05-04 RX ADMIN — ATORVASTATIN CALCIUM 80 MG: 40 TABLET, FILM COATED ORAL at 20:35

## 2025-05-04 RX ADMIN — DAPAGLIFLOZIN 10 MG: 10 TABLET, FILM COATED ORAL at 22:53

## 2025-05-04 RX ADMIN — HYDROMORPHONE HYDROCHLORIDE 0.5 MG: 1 INJECTION, SOLUTION INTRAMUSCULAR; INTRAVENOUS; SUBCUTANEOUS at 15:48

## 2025-05-04 RX ADMIN — FUROSEMIDE 80 MG: 10 INJECTION, SOLUTION INTRAMUSCULAR; INTRAVENOUS at 15:48

## 2025-05-04 RX ADMIN — DIPHENHYDRAMINE HYDROCHLORIDE 50 MG: 25 CAPSULE ORAL at 16:53

## 2025-05-04 RX ADMIN — ISOSORBIDE DINITRATE 40 MG: 10 TABLET ORAL at 22:53

## 2025-05-04 RX ADMIN — HYDRALAZINE HYDROCHLORIDE 100 MG: 50 TABLET ORAL at 22:53

## 2025-05-04 RX ADMIN — FUROSEMIDE 40 MG: 10 INJECTION, SOLUTION INTRAMUSCULAR; INTRAVENOUS at 20:42

## 2025-05-04 RX ADMIN — INSULIN LISPRO 10 UNITS: 100 INJECTION, SOLUTION INTRAVENOUS; SUBCUTANEOUS at 20:53

## 2025-05-04 RX ADMIN — LIDOCAINE 1 PATCH: 4 PATCH TOPICAL at 20:36

## 2025-05-04 RX ADMIN — DIGOXIN 125 MCG: 125 TABLET ORAL at 20:54

## 2025-05-04 SDOH — SOCIAL STABILITY: SOCIAL INSECURITY: DOES ANYONE TRY TO KEEP YOU FROM HAVING/CONTACTING OTHER FRIENDS OR DOING THINGS OUTSIDE YOUR HOME?: NO

## 2025-05-04 SDOH — SOCIAL STABILITY: SOCIAL INSECURITY: ARE THERE ANY APPARENT SIGNS OF INJURIES/BEHAVIORS THAT COULD BE RELATED TO ABUSE/NEGLECT?: NO

## 2025-05-04 SDOH — SOCIAL STABILITY: SOCIAL INSECURITY: DO YOU FEEL UNSAFE GOING BACK TO THE PLACE WHERE YOU ARE LIVING?: NO

## 2025-05-04 SDOH — SOCIAL STABILITY: SOCIAL INSECURITY: WERE YOU ABLE TO COMPLETE ALL THE BEHAVIORAL HEALTH SCREENINGS?: YES

## 2025-05-04 SDOH — SOCIAL STABILITY: SOCIAL INSECURITY: ABUSE: ADULT

## 2025-05-04 SDOH — SOCIAL STABILITY: SOCIAL INSECURITY: DO YOU FEEL ANYONE HAS EXPLOITED OR TAKEN ADVANTAGE OF YOU FINANCIALLY OR OF YOUR PERSONAL PROPERTY?: NO

## 2025-05-04 SDOH — SOCIAL STABILITY: SOCIAL INSECURITY: ARE YOU OR HAVE YOU BEEN THREATENED OR ABUSED PHYSICALLY, EMOTIONALLY, OR SEXUALLY BY ANYONE?: NO

## 2025-05-04 SDOH — SOCIAL STABILITY: SOCIAL INSECURITY: HAVE YOU HAD THOUGHTS OF HARMING ANYONE ELSE?: NO

## 2025-05-04 SDOH — SOCIAL STABILITY: SOCIAL INSECURITY: HAVE YOU HAD ANY THOUGHTS OF HARMING ANYONE ELSE?: NO

## 2025-05-04 SDOH — SOCIAL STABILITY: SOCIAL INSECURITY: HAS ANYONE EVER THREATENED TO HURT YOUR FAMILY OR YOUR PETS?: NO

## 2025-05-04 ASSESSMENT — PATIENT HEALTH QUESTIONNAIRE - PHQ9
2. FEELING DOWN, DEPRESSED OR HOPELESS: SEVERAL DAYS
SUM OF ALL RESPONSES TO PHQ9 QUESTIONS 1 & 2: 2
1. LITTLE INTEREST OR PLEASURE IN DOING THINGS: SEVERAL DAYS

## 2025-05-04 ASSESSMENT — PAIN SCALES - GENERAL
PAINLEVEL_OUTOF10: 7
PAINLEVEL_OUTOF10: 7

## 2025-05-04 ASSESSMENT — LIFESTYLE VARIABLES
AUDIT-C TOTAL SCORE: 0
HOW OFTEN DO YOU HAVE A DRINK CONTAINING ALCOHOL: NEVER
HOW OFTEN DO YOU HAVE 6 OR MORE DRINKS ON ONE OCCASION: NEVER
HOW MANY STANDARD DRINKS CONTAINING ALCOHOL DO YOU HAVE ON A TYPICAL DAY: PATIENT DOES NOT DRINK
AUDIT-C TOTAL SCORE: 0
SKIP TO QUESTIONS 9-10: 1

## 2025-05-04 ASSESSMENT — COLUMBIA-SUICIDE SEVERITY RATING SCALE - C-SSRS
1. IN THE PAST MONTH, HAVE YOU WISHED YOU WERE DEAD OR WISHED YOU COULD GO TO SLEEP AND NOT WAKE UP?: NO
6. HAVE YOU EVER DONE ANYTHING, STARTED TO DO ANYTHING, OR PREPARED TO DO ANYTHING TO END YOUR LIFE?: NO
2. HAVE YOU ACTUALLY HAD ANY THOUGHTS OF KILLING YOURSELF?: NO

## 2025-05-04 ASSESSMENT — ACTIVITIES OF DAILY LIVING (ADL)
PATIENT'S MEMORY ADEQUATE TO SAFELY COMPLETE DAILY ACTIVITIES?: YES
DRESSING YOURSELF: INDEPENDENT
BATHING: INDEPENDENT
ADEQUATE_TO_COMPLETE_ADL: YES
ASSISTIVE_DEVICE: OTHER (COMMENT);EYEGLASSES
JUDGMENT_ADEQUATE_SAFELY_COMPLETE_DAILY_ACTIVITIES: YES
GROOMING: INDEPENDENT
FEEDING YOURSELF: INDEPENDENT
HEARING - RIGHT EAR: FUNCTIONAL
TOILETING: INDEPENDENT
HEARING - LEFT EAR: FUNCTIONAL
WALKS IN HOME: INDEPENDENT

## 2025-05-04 ASSESSMENT — PAIN DESCRIPTION - LOCATION: LOCATION: CHEST

## 2025-05-04 ASSESSMENT — PAIN DESCRIPTION - DESCRIPTORS
DESCRIPTORS: PRESSURE
DESCRIPTORS: PRESSURE

## 2025-05-04 ASSESSMENT — PAIN - FUNCTIONAL ASSESSMENT: PAIN_FUNCTIONAL_ASSESSMENT: 0-10

## 2025-05-04 NOTE — H&P
"                                             Hospital Medicine History & Physical       Patient Name: Felice Law   YOB: 1965    Subjective:    Felice Law is a 60 y.o. male who presents to the hospital with complaints of CP, SOB and LE welling. Secondarily he has had some nausea and diarrhea recently. Patient recently visited [facility] [place], where he is from, got back yesterday. He reports developing SOB over the past several days, predating the flight. He has also noted swelling in his legs and some sores on his shins.  He also reports several days of GI upset. No fevers or chills. No blood in stools or vomit.     Note patient preference to have family translate for him rather than use the DAJUAN.     ED work up showed a elevated BNP and CXR findings consistent with CHF. Afebrile without WBC elevation.     A 10 point ROS was completed and is negative expect as stated in HPI.     Past Medical History:  ICM/HFrEF (30% 6/25/24)   CAD s/p PCI and CABG (LIMA-LAD with balloon pump support April 2023)   VT arrest s/p VT ablation + ICD 2017   HTN  HLD  DM II  CKD IIIb  Hx L femoral DVT on Eliquis   Hypothyroidism    COPD  JAKE  Pulm HTN   Former Smoker    Past Surgical History:  CABG  ICD implant  PCI  Cataract extraction     Social History: Tobacco - Former User, Alcohol - social drinker, Recreational Drugs - none    Family History: Family history is unknown by patient.     Objective:    /77   Pulse 82   Temp 36 °C (96.8 °F)   Resp 16   Ht 1.803 m (5' 11\")   Wt 102 kg (224 lb)   SpO2 (!) 93%   BMI 31.24 kg/m²     Physical Exam:    GENERAL: well developed, non-toxic, NAD, alert & cooperative  HEENT: normocephalic, atraumatic, sclera clear  CARDIAC: regular rate & rhythm, S1/S2  PULMONARY: CTA b/l, no respiratory distress, - wheezes  ABDOMEN: soft, non-tender, non-distended  MSK: 1+ pitting b/l LE edema, there is a sore on his R shin with a clean dressing   NEURO: A&O X 3, " non-focal, CN II-XII grossly intact  SKIN: Warm and dry, no lesions, no rashes.  PSYCH: appropriate mood & affect    CXR personally reviewed consistent with bilateral edema and a small R effusion      Assessment/Plan:    Decompensated HFrEF  CHARLOTTE on CKD IIIb  Gastroenteritis   ICM/HFrEF (30% 6/25/24)   CAD s/p PCI and CABG (LIMA-LAD with balloon pump support April 2023)   VT arrest s/p VT ablation + ICD 2017   HTN  HLD  DM II  Hx L femoral DVT on Eliquis   Hypothyroidism    COPD  JAKE  Pulm HTN   Former Smoker      Patient appears to be in decompensation of his CHF. He received 80mg IV Lasix in the ED. Will start 40mg BID. Will track renal function and volume status. Will resume as much of his GDMT as he will tolerated with exception of anything that might exacerbate his CHARLOTTE. Last echo was almost 1 year ago so will repeat. Cont Statin, Eliquis, Digoxin, Hydralazine, Isosorbide, Farxiga, Corlanor, Toprol XL. Hold Entresto and Aldactone for now.    Home insulin regimen of Glargine 40U BID, Lispro 15U WM + SS, Farxiga 10mg and Mounjaro. Will need to adjust doses of insulin to account for stricter diet control in hospital   Monitor GI symptoms, check for stool pathogens and O&P if diarrhea persists       DVT Prophylaxis: Eliquis  Code Status: FULL CODE  Disposition: TBD     Lane Alaniz, [facility]

## 2025-05-04 NOTE — PROGRESS NOTES
Pharmacy Medication History Review    Felice Law is a 60 y.o. male admitted for No Principal Problem: There is no principal problem currently on the Problem List. Please update the Problem List and refresh.. Pharmacy reviewed the patient's [id number] medications and allergies for accuracy.    The list below reflectives the updated PTA list. Please review each medication in order reconciliation for additional clarification and justification.  Prior to Admission medications    Medication Sig Start Date End Date Authorizing Provider   atorvastatin (Lipitor) 80 mg tablet Take 1 tablet (80 mg) by mouth once daily.   Nick Cheney MD   bumetanide (Bumex) 1 mg tablet Take 1 tablet (1 mg) by mouth once daily.   Jono Duque MD   digoxin (Lanoxin) 125 MCG tablet Take 1 tablet (125 mcg) by mouth once daily.   Catracho George DO   Eliquis 5 mg tablet TOME 1 TABLETA POR LA BOCA DOS  VECES AL CUBA   Kyle RICKEY Carlson MD   Farxiga 10 mg Take 1 tablet (10 mg) by mouth once daily.   Diana Salazar MD   fenofibrate (Tricor) 145 mg tablet Take 1 tablet (145 mg) by mouth once daily.   Diana Salazar MD   gabapentin (Neurontin) 300 mg capsule TOME 1 CAPSULA POR LA BOCA NITIN  VEZ AL CUBA EN LA SUMAN Wooten MD   gabapentin (Neurontin) 600 mg tablet TOME 1 TABLETA POR LA BOCA NITIN  VEZ AL CUBA AL ACOSTARSE   Garret Rainey MD   hydrALAZINE (Apresoline) 100 mg tablet Take 1 tablet (100 mg) by mouth 3 times a day.   Nick Cheney MD   hydrocortisone 1 % cream Apply 1 Application topically once daily.   Catracho George DO   insulin glargine (Toujeo Max U-300 SoloStar) 300 unit/mL (3 mL) injection 40 units twice a day   Diana Salazar MD   insulin lispro (HumaLOG KwikPen Insulin) 100 unit/mL pen INYECTE DEBAJO DE LA PIEL 15  UNIDADES CON CADA COMIDA CON LA  ESCALA MOVIL , HASTA 100  UNIDADES CADA CUBA   Diana Salazar MD   ipratropium-albuteroL (Duo-Neb) 0.5-2.5 mg/3 mL  nebulizer solution Take 3 mL by nebulization 3 times a day as needed for wheezing.   Nick Cheney MD   isosorbide dinitrate (Isordil) 40 mg tablet Take 1 tablet (40 mg) by mouth 3 times a day.   Nick Cheney MD   ivabradine (Corlanor) 5 mg tablet Take 0.5 tablets (2.5 mg) by mouth 2 times a day.   Jono Duque MD   levothyroxine (Synthroid, Levoxyl) 75 mcg tablet TOME 1 TABLETA POR LA BOCA NITIN  VEZ AL CUBA Byers MD   lidocaine (Lidoderm) 5 % patch Place 1 patch on the skin once daily. To right shoulder. 12 hours per day, remove for 12 hours.   Nick Cheney MD   metoprolol succinate XL (Toprol-XL) 100 mg 24 hr tablet Take 2 tablets (200 mg) by mouth once daily.   Historical Provider, MD   pantoprazole (ProtoNix) 40 mg EC tablet TAKE 1 TABLET BY MOUTH ONCE DAILY IN THE MORNING TAKE BEFORE A MEAL DO NOT CRUSH, CHEW, AND/OR DIVIDE   Nick Cheney MD   QUEtiapine (SEROquel) 50 mg tablet Take 1 tablet (50 mg) by mouth once daily at bedtime.   Jono Duque MD   sacubitriL-valsartan (Entresto)  mg tablet Take 1 tablet by mouth 2 times a day.   Nick Cheney MD   spironolactone (Aldactone) 25 mg tablet TOME 2 TABLETAS POR LA BOCA NITIN Romero MD   albuterol 2.5 mg /3 mL (0.083 %) nebulizer solution Use every 4-6 hours as needed for shortness of breath   Monik Aponte MD [facility]        The list below reflectives the updated allergy list. Please review each documented allergy for additional clarification and justification.  Allergies  Reviewed by Sammi Jiménez PA-C on 5/4/2025        Severity Reactions Comments    Azithromycin Medium Other, Dizziness, Nausea/vomiting Cold, clammy, sweating, trouble breathing            Below are additional concerns with the patient's PTA list.      Debra Hernandez

## 2025-05-04 NOTE — ED TRIAGE NOTES
Patient states vomiting, diarrhea and rash x1 month while in [place]. Patient states chest pain x3 days, just got home yesterday. Patient also states he feels short of breath. Patient states while in the plane ride here he needed oxygen.

## 2025-05-04 NOTE — ED PROVIDER NOTES
Emergency Department Provider Note          History of Present Illness     CC: Chest Pain     History provided by: Patient  Limitations to History: None    HPI:   Felice Law is a 60 y.o.male with PMH DM2, COPD, CKD, CHF with ICD, pulm HTN, JAKE, presenting to the Emergency Department for chest pain and shortness of breath.  Patient reports for the past 3 to 4 days now has had midsternal chest pain that does not radiate.  Associated shortness of breath that is progressively getting worse as well.  Just got back from [place] yesterday and has been having nausea and vomiting for the past couple days now.  Does not think he ate anything out of the ordinary to be causing foodborne illness.  Takes Bumex 2 mg daily but feels like his lower extremity edema has gotten significantly worse in the past couple days.  Denies fevers, chills, abdominal pain, or changes in urination.    Records Reviewed: Recent available ED and inpatient notes reviewed in EMR.    PMHx/PSHx:  Per HPI.   - has a past medical history of Cataract, Chest pain, CHF (congestive heart failure), Clotting disorder (Multi), COPD (chronic obstructive pulmonary disease) (Multi), Coronary artery disease, Diabetes mellitus (Multi), Disease of thyroid gland, Elevated troponin level not due myocardial infarction, Hypertension, Ischemic cardiomyopathy, Pulmonary hypertension (Multi), and Venous ulcer of left leg (Multi).  - has a past surgical history that includes Coronary artery bypass graft; Cardiac defibrillator placement; Coronary angioplasty with stent; and Cataract extraction (Right).  - has Calculus of gallbladder without cholecystitis without obstruction; Class 1 obesity; COPD (chronic obstructive pulmonary disease) (Multi); Coronary artery disease due to calcified coronary lesion; Diabetes mellitus (Multi); Difficulty in walking involving ankle and foot joint; DVT of deep femoral vein, left; Fatty liver; Foot drop, bilateral; Glaucoma suspect  of both eyes; Hepatic cirrhosis (Multi); HTN (hypertension); Hx of cardiac arrest; Vitamin D deficiency; Type 2 MI (myocardial infarction) (Multi); Stage 3 chronic kidney disease (Multi); S/P drug eluting coronary stent placement; S/P CABG x 1; Rectal bleeding; Pulmonary hypertension (Multi); JAKE (obstructive sleep apnea); Nuclear age-related cataract, both eyes; Neuropathy; Nephrolithiasis; Mixed hyperlipidemia; Microscopic hematuria; Microcytic anemia; Ischemic cardiomyopathy; Insomnia; Hypothyroid; ICD (implantable cardioverter-defibrillator) in place; Chronic systolic heart failure; Obesity (BMI 30-39.9); Obesity, Class I, BMI 30-34.9; Type 2 diabetes mellitus; Anemia; High blood cholesterol; CKD (chronic kidney disease) stage 3, GFR 30-59 ml/min (Multi); Kidney disease; Abdominal aortic aneurysm, without rupture, unspecified; Diabetes mellitus with circulatory complication (Multi); Rectal hemorrhage; Obesity with body mass index 30 or greater; Age-related nuclear cataract of both eyes; Calculus of kidney; Hypothyroidism; History of cardiac arrest; Hypertension; Glaucoma suspect; Bilateral foot-drop; Steatosis of liver; Deep venous thrombosis of profunda femoris vein; Walking difficulty due to ankle and foot; Chronic obstructive pulmonary disease (Multi); Cholelithiasis without obstruction; Peripheral vascular disease (CMS-HCC); Circulatory disorder; Insulin long-term use (Multi); Edema; Implantable cardioverter-defibrillator (ICD) in situ; Myocardial infarction (Multi); Combined form of age-related cataract, right eye; Combined form of age-related cataract, left eye; Both eyes affected by mild nonproliferative diabetic retinopathy with macular edema, associated with type 2 diabetes mellitus; Myopia of both eyes; Astigmatism of both eyes; Presbyopia; Pseudophakia; and Acute on chronic congestive heart failure, unspecified heart failure type on their problem list.    Medications:  Current Outpatient Medications    Medication Instructions    albuterol 2.5 mg /3 mL (0.083 %) nebulizer solution Use every 4-6 hours as needed for shortness of breath    atorvastatin (LIPITOR) 80 mg, oral, Daily    bumetanide (BUMEX) 1 mg, oral, Daily    digoxin (LANOXIN) 125 mcg, oral, Daily    Eliquis 5 mg tablet TOME 1 TABLETA POR LA BOCA DOS  VECES AL CUBA    Farxiga 10 mg, oral, Daily    fenofibrate (TRICOR) 145 mg, oral, Daily    FreeStyle Franco 2 Turrell misc Use as instructed    FreeStyle Franco 2 Sensor kit Change every 14 days    gabapentin (Neurontin) 300 mg capsule TOME 1 CAPSULA POR LA BOCA NITIN  VEZ AL CUBA EN LA MANANA    gabapentin (Neurontin) 600 mg tablet TOME 1 TABLETA POR LA BOCA NITIN  VEZ AL CUBA AL ACOSTARSE    hydrALAZINE (APRESOLINE) 100 mg, oral, 3 times daily    hydrocortisone 1 % cream Topical, 2 times daily    insulin glargine (Toujeo Max U-300 SoloStar) 300 unit/mL (3 mL) injection 40 units twice a day    insulin lispro (HumaLOG KwikPen Insulin) 100 unit/mL pen INYECTE DEBAJO DE LA PIEL 15  UNIDADES CON CADA COMIDA CON LA  ESCALA MOVIL , HASTA 100  UNIDADES CADA CUBA    ipratropium-albuteroL (Duo-Neb) 0.5-2.5 mg/3 mL nebulizer solution 3 mL, nebulization, 3 times daily PRN    isosorbide dinitrate (ISORDIL) 40 mg, oral, 3 times daily (0900,1400,1900)    ivabradine (CORLANOR) 2.5 mg, oral, 2 times daily    ketorolac (Acular) 0.5 % ophthalmic solution 1 drop to surgical eye 4 times a day starting 1 day before surgery    lancets (Lancets,Thin) misc 4 times daily (before meals and at bedtime). use as directed to test blood sugar    levothyroxine (Synthroid, Levoxyl) 75 mcg tablet TOME 1 TABLETA POR LA BOCA NITIN  VEZ AL CUBA    lidocaine (Lidoderm) 5 % patch 1 patch, transdermal, Daily, Apply to painful area 12 hours per day, remove for 12 hours.    metoprolol succinate XL (TOPROL-XL) 200 mg, oral, Daily, Do not crush or chew.    metoprolol succinate XL (TOPROL-XL) 200 mg, Daily    Mounjaro 5 mg, subcutaneous, Every 7 days     "ofloxacin (Ocuflox) 0.3 % ophthalmic solution 1 drop to operative eye 4 times a day starting 1 day before surgery    OneTouch Ultra Test strip REVISE EL NIVEL DE GLUCOSA EN LA PEARL CUATRO VECES AL CUBA    pantoprazole (ProtoNix) 40 mg EC tablet TAKE 1 TABLET BY MOUTH ONCE DAILY IN THE MORNING TAKE BEFORE A MEAL DO NOT CRUSH, CHEW, AND/OR DIVIDE    pen needle, diabetic (BD Ultra-Fine Sarah Pen Needle) 32 gauge x 5/32\" needle For insulin injection 4x/day    pen needle, diabetic, safety 29 gauge x 1/2\" needle For insulin injections 5 times per day    prednisoLONE acetate (Pred-Forte) 1 % ophthalmic suspension 1 drop to operative eye 4 times a day starting the day of surgery (after surgery is done)    QUEtiapine (SEROQUEL) 50 mg, oral, Nightly    sacubitriL-valsartan (Entresto)  mg tablet 1 tablet, oral, 2 times daily    spironolactone (Aldactone) 25 mg tablet TOME 2 TABLETAS POR LA BOCA NITIN  VEZ AL CUBA    TechLITE Pen Needle 31 gauge x 3/16\" needle For insulin injection 5x/day        Allergies:  Azithromycin    Social History:  - Tobacco:  reports that he quit smoking about 8 years ago. His smoking use included cigarettes. He started smoking about 32 years ago. He has a 24 pack-year smoking history. He has never used smokeless tobacco.   - Alcohol:  reports no history of alcohol use.   - Illicit Drugs:  reports no history of drug use.     ROS:  Per HPI.       Physical Exam     Triage Vitals:  T 36 °C (96.8 °F)  HR 84  /74  RR 19  O2 94 % None (Room air)    General: Awake, alert, in no acute distress  Eyes: Gaze conjugate.  No scleral icterus or injection  HENT: Normo-cephalic, atraumatic. No stridor  CV: Regular rate, regular rhythm. Radial pulses 2+ bilaterally  Resp: Breathing non-labored, speaking in full sentences.  Clear to auscultation bilaterally  GI: Soft, non-distended, non-tender. No rebound or guarding.  MSK/Extremities: No gross bony deformities. Moving all extremities  Skin: LE 2+ pitting " edema. LLE with mild skin changes and weeping.  Neuro: Alert. Oriented. Face symmetric. Speech is fluent.  Gross strength and sensation intact in b/l UE and LEs  Psych: Appropriate mood and affect          Castell Coma Scale Score: 15                    Medical Decision Making & ED Course     EKG: EKG interpreted by myself. If applicable, please see ED Course for full interpretation.    Medical Decision Making   Felice Law is a 60 y.o.male presenting to the Emergency Department for chest pain and shortness of breath.  On arrival, blood pressure 122/74, rest of vital signs within normal limits.  Afebrile for spent examination, patient appears otherwise clinically well.  Clear heart and lung sounds bilaterally.  EKG on arrival showed labs performed Emergency Department showed a white count of 5.6, lower concern for systemic infectious process.  Hemoglobin stable at 12.7, lower concern for acute blood loss anemia.  Chemistries notable for creatinine of 2.15 consistent with mild CHARLOTTE on CKD.  Glucose notably elevated at 310 today without anion gap or evidence of DKA.  BNP severely elevated at 2000 concerning for CHF exacerbation.  Started on diuresis with 80 mg Lasix in the ED.  Troponin elevated at 40 and stable on repeat consistent with patient's known heart failure.  Lower concern for acute coronary occlusion at this time.  Chest x-ray did show evidence of cardiomegaly as well as bilateral pulmonary edema.  Given patient's overall poor appearance and worsening chest pain and shortness of breath for the past couple days, I think he would benefit from inpatient admission for diuresis and further evaluation.  After Dilaudid/Zofran patient is symptomatically proved.  Admitted to medicine service.      ED Course as of 05/04/25 1735   Sun May 04, 2025   1457 EKG interpreted by me: Tachycardic to 108, regular rhythm.  AL, QRS, intervals within normal limits.  QTc prolonged at 498.  No obvious ST elevations,  depressions, or T wave versions.  Mild artifact seen [AS]   1547 Pt seen with resident - 60yM recently traveled from [place] presents with chest pain, also n/v/d.  Pt with hx CHF and has been taking his bumex but still feels overloaded (leg swollen, dyspnea, feels better with O2).  Pt hemodynamically stable on supplemental O2 with mild tachypnea.  Has wounds to b/l shins, sheyla L which are now draining and complains of rash to b/l forearms and posterior occiput which is itchy. [EK]      ED Course User Index  [AS] Anderson Foreman MD  [EK] Elyse H Klerman, MD         Diagnoses as of 05/04/25 1735   Acute on chronic congestive heart failure, unspecified heart failure type       Independent Result Review and Interpretation: Relevant laboratory and radiographic results were reviewed and independently interpreted by myself.  As necessary, they are commented on in the ED Course.    Chronic conditions affecting the patient's care: As documented above in MDM.      Disposition   Admit    Anderson Foreman MD  Emergency Medicine PGY3      Procedures     Procedures ? SmartLinks last updated 5/4/2025 5:35 PM        Anderson Foreman MD  Resident  05/04/25 1735

## 2025-05-05 ENCOUNTER — APPOINTMENT (OUTPATIENT)
Dept: CARDIOLOGY | Facility: HOSPITAL | Age: 60
DRG: 291 | End: 2025-05-05
Payer: COMMERCIAL

## 2025-05-05 PROBLEM — R60.0 BILATERAL LOWER EXTREMITY EDEMA: Status: ACTIVE | Noted: 2025-05-05

## 2025-05-05 PROBLEM — L97.221: Status: ACTIVE | Noted: 2025-05-05

## 2025-05-05 PROBLEM — L97.211: Status: ACTIVE | Noted: 2025-05-05

## 2025-05-05 PROBLEM — I87.2 PERIPHERAL VENOUS INSUFFICIENCY: Status: ACTIVE | Noted: 2025-05-05

## 2025-05-05 LAB
ANION GAP SERPL CALC-SCNC: 10 MMOL/L (ref 10–20)
ANION GAP SERPL CALC-SCNC: 12 MMOL/L (ref 10–20)
AORTIC VALVE PEAK VELOCITY: 1.19 M/S
AV PEAK GRADIENT: 6 MMHG
BUN SERPL-MCNC: 46 MG/DL (ref 6–23)
BUN SERPL-MCNC: 49 MG/DL (ref 6–23)
CALCIUM SERPL-MCNC: 8.8 MG/DL (ref 8.6–10.3)
CALCIUM SERPL-MCNC: 9 MG/DL (ref 8.6–10.3)
CHLORIDE SERPL-SCNC: 100 MMOL/L (ref 98–107)
CHLORIDE SERPL-SCNC: 99 MMOL/L (ref 98–107)
CO2 SERPL-SCNC: 27 MMOL/L (ref 21–32)
CO2 SERPL-SCNC: 31 MMOL/L (ref 21–32)
CREAT SERPL-MCNC: 2.02 MG/DL (ref 0.5–1.3)
CREAT SERPL-MCNC: 2.28 MG/DL (ref 0.5–1.3)
EGFRCR SERPLBLD CKD-EPI 2021: 32 ML/MIN/1.73M*2
EGFRCR SERPLBLD CKD-EPI 2021: 37 ML/MIN/1.73M*2
EJECTION FRACTION APICAL 4 CHAMBER: 21.3
EJECTION FRACTION: 18 %
ERYTHROCYTE [DISTWIDTH] IN BLOOD BY AUTOMATED COUNT: 21 % (ref 11.5–14.5)
GLUCOSE BLD MANUAL STRIP-MCNC: 140 MG/DL (ref 74–99)
GLUCOSE BLD MANUAL STRIP-MCNC: 150 MG/DL (ref 74–99)
GLUCOSE BLD MANUAL STRIP-MCNC: 165 MG/DL (ref 74–99)
GLUCOSE BLD MANUAL STRIP-MCNC: 189 MG/DL (ref 74–99)
GLUCOSE SERPL-MCNC: 126 MG/DL (ref 74–99)
GLUCOSE SERPL-MCNC: 190 MG/DL (ref 74–99)
HCT VFR BLD AUTO: 37.3 % (ref 41–52)
HGB BLD-MCNC: 11 G/DL (ref 13.5–17.5)
MCH RBC QN AUTO: 23.6 PG (ref 26–34)
MCHC RBC AUTO-ENTMCNC: 29.5 G/DL (ref 32–36)
MCV RBC AUTO: 80 FL (ref 80–100)
NRBC BLD-RTO: 0 /100 WBCS (ref 0–0)
PLATELET # BLD AUTO: 247 X10*3/UL (ref 150–450)
POTASSIUM SERPL-SCNC: 4.1 MMOL/L (ref 3.5–5.3)
POTASSIUM SERPL-SCNC: 4.1 MMOL/L (ref 3.5–5.3)
RBC # BLD AUTO: 4.67 X10*6/UL (ref 4.5–5.9)
RIGHT VENTRICLE FREE WALL PEAK S': 9.14 CM/S
RIGHT VENTRICLE PEAK SYSTOLIC PRESSURE: 58.3 MMHG
SODIUM SERPL-SCNC: 135 MMOL/L (ref 136–145)
SODIUM SERPL-SCNC: 136 MMOL/L (ref 136–145)
TRICUSPID ANNULAR PLANE SYSTOLIC EXCURSION: 1.6 CM
WBC # BLD AUTO: 4.7 X10*3/UL (ref 4.4–11.3)

## 2025-05-05 PROCEDURE — 82947 ASSAY GLUCOSE BLOOD QUANT: CPT

## 2025-05-05 PROCEDURE — 36415 COLL VENOUS BLD VENIPUNCTURE: CPT | Performed by: STUDENT IN AN ORGANIZED HEALTH CARE EDUCATION/TRAINING PROGRAM

## 2025-05-05 PROCEDURE — 93308 TTE F-UP OR LMTD: CPT | Performed by: STUDENT IN AN ORGANIZED HEALTH CARE EDUCATION/TRAINING PROGRAM

## 2025-05-05 PROCEDURE — 93321 DOPPLER ECHO F-UP/LMTD STD: CPT | Performed by: STUDENT IN AN ORGANIZED HEALTH CARE EDUCATION/TRAINING PROGRAM

## 2025-05-05 PROCEDURE — 2500000005 HC RX 250 GENERAL PHARMACY W/O HCPCS: Performed by: INTERNAL MEDICINE

## 2025-05-05 PROCEDURE — 36415 COLL VENOUS BLD VENIPUNCTURE: CPT | Performed by: INTERNAL MEDICINE

## 2025-05-05 PROCEDURE — 99221 1ST HOSP IP/OBS SF/LOW 40: CPT | Performed by: STUDENT IN AN ORGANIZED HEALTH CARE EDUCATION/TRAINING PROGRAM

## 2025-05-05 PROCEDURE — 2500000004 HC RX 250 GENERAL PHARMACY W/ HCPCS (ALT 636 FOR OP/ED): Mod: JZ | Performed by: STUDENT IN AN ORGANIZED HEALTH CARE EDUCATION/TRAINING PROGRAM

## 2025-05-05 PROCEDURE — 2500000004 HC RX 250 GENERAL PHARMACY W/ HCPCS (ALT 636 FOR OP/ED): Mod: JZ | Performed by: INTERNAL MEDICINE

## 2025-05-05 PROCEDURE — 80048 BASIC METABOLIC PNL TOTAL CA: CPT | Performed by: STUDENT IN AN ORGANIZED HEALTH CARE EDUCATION/TRAINING PROGRAM

## 2025-05-05 PROCEDURE — 1200000002 HC GENERAL ROOM WITH TELEMETRY DAILY

## 2025-05-05 PROCEDURE — 93325 DOPPLER ECHO COLOR FLOW MAPG: CPT | Performed by: STUDENT IN AN ORGANIZED HEALTH CARE EDUCATION/TRAINING PROGRAM

## 2025-05-05 PROCEDURE — 2500000001 HC RX 250 WO HCPCS SELF ADMINISTERED DRUGS (ALT 637 FOR MEDICARE OP): Performed by: INTERNAL MEDICINE

## 2025-05-05 PROCEDURE — 85027 COMPLETE CBC AUTOMATED: CPT | Performed by: INTERNAL MEDICINE

## 2025-05-05 PROCEDURE — 2500000002 HC RX 250 W HCPCS SELF ADMINISTERED DRUGS (ALT 637 FOR MEDICARE OP, ALT 636 FOR OP/ED): Performed by: INTERNAL MEDICINE

## 2025-05-05 PROCEDURE — 99222 1ST HOSP IP/OBS MODERATE 55: CPT | Performed by: NURSE PRACTITIONER

## 2025-05-05 PROCEDURE — 2500000004 HC RX 250 GENERAL PHARMACY W/ HCPCS (ALT 636 FOR OP/ED): Mod: JW | Performed by: INTERNAL MEDICINE

## 2025-05-05 PROCEDURE — 99233 SBSQ HOSP IP/OBS HIGH 50: CPT

## 2025-05-05 PROCEDURE — 93321 DOPPLER ECHO F-UP/LMTD STD: CPT

## 2025-05-05 PROCEDURE — 80048 BASIC METABOLIC PNL TOTAL CA: CPT | Performed by: INTERNAL MEDICINE

## 2025-05-05 PROCEDURE — 93325 DOPPLER ECHO COLOR FLOW MAPG: CPT

## 2025-05-05 RX ADMIN — GABAPENTIN 300 MG: 300 CAPSULE ORAL at 08:52

## 2025-05-05 RX ADMIN — DAPAGLIFLOZIN 10 MG: 10 TABLET, FILM COATED ORAL at 08:54

## 2025-05-05 RX ADMIN — IVABRADINE 2.5 MG: 5 TABLET, FILM COATED ORAL at 08:55

## 2025-05-05 RX ADMIN — LEVOTHYROXINE SODIUM 75 MCG: 0.07 TABLET ORAL at 06:49

## 2025-05-05 RX ADMIN — IVABRADINE 2.5 MG: 5 TABLET, FILM COATED ORAL at 21:57

## 2025-05-05 RX ADMIN — LIDOCAINE 1 PATCH: 4 PATCH TOPICAL at 08:54

## 2025-05-05 RX ADMIN — INSULIN LISPRO 3 UNITS: 100 INJECTION, SOLUTION INTRAVENOUS; SUBCUTANEOUS at 08:45

## 2025-05-05 RX ADMIN — ATORVASTATIN CALCIUM 80 MG: 40 TABLET, FILM COATED ORAL at 08:52

## 2025-05-05 RX ADMIN — INSULIN LISPRO 10 UNITS: 100 INJECTION, SOLUTION INTRAVENOUS; SUBCUTANEOUS at 17:11

## 2025-05-05 RX ADMIN — INSULIN LISPRO 10 UNITS: 100 INJECTION, SOLUTION INTRAVENOUS; SUBCUTANEOUS at 13:21

## 2025-05-05 RX ADMIN — QUETIAPINE FUMARATE 50 MG: 25 TABLET ORAL at 21:13

## 2025-05-05 RX ADMIN — METOPROLOL SUCCINATE 200 MG: 50 TABLET, EXTENDED RELEASE ORAL at 08:53

## 2025-05-05 RX ADMIN — INSULIN GLARGINE 24 UNITS: 100 INJECTION, SOLUTION SUBCUTANEOUS at 17:11

## 2025-05-05 RX ADMIN — FUROSEMIDE 5 MG/HR: 10 INJECTION, SOLUTION INTRAMUSCULAR; INTRAVENOUS at 14:45

## 2025-05-05 RX ADMIN — PERFLUTREN 2 ML OF DILUTION: 6.52 INJECTION, SUSPENSION INTRAVENOUS at 12:06

## 2025-05-05 RX ADMIN — HYDRALAZINE HYDROCHLORIDE 100 MG: 50 TABLET ORAL at 14:45

## 2025-05-05 RX ADMIN — APIXABAN 5 MG: 5 TABLET, FILM COATED ORAL at 21:13

## 2025-05-05 RX ADMIN — FUROSEMIDE 40 MG: 10 INJECTION, SOLUTION INTRAMUSCULAR; INTRAVENOUS at 08:56

## 2025-05-05 RX ADMIN — GABAPENTIN 600 MG: 300 CAPSULE ORAL at 21:13

## 2025-05-05 RX ADMIN — INSULIN LISPRO 10 UNITS: 100 INJECTION, SOLUTION INTRAVENOUS; SUBCUTANEOUS at 08:45

## 2025-05-05 RX ADMIN — ISOSORBIDE DINITRATE 40 MG: 10 TABLET ORAL at 18:58

## 2025-05-05 RX ADMIN — PANTOPRAZOLE SODIUM 40 MG: 40 TABLET, DELAYED RELEASE ORAL at 06:50

## 2025-05-05 RX ADMIN — DIGOXIN 125 MCG: 125 TABLET ORAL at 08:54

## 2025-05-05 RX ADMIN — ISOSORBIDE DINITRATE 40 MG: 10 TABLET ORAL at 14:45

## 2025-05-05 RX ADMIN — APIXABAN 5 MG: 5 TABLET, FILM COATED ORAL at 08:52

## 2025-05-05 RX ADMIN — ISOSORBIDE DINITRATE 40 MG: 10 TABLET ORAL at 08:53

## 2025-05-05 RX ADMIN — HYDRALAZINE HYDROCHLORIDE 100 MG: 50 TABLET ORAL at 08:53

## 2025-05-05 RX ADMIN — INSULIN GLARGINE 24 UNITS: 100 INJECTION, SOLUTION SUBCUTANEOUS at 08:45

## 2025-05-05 RX ADMIN — FENOFIBRATE 160 MG: 160 TABLET ORAL at 08:55

## 2025-05-05 ASSESSMENT — COGNITIVE AND FUNCTIONAL STATUS - GENERAL
CLIMB 3 TO 5 STEPS WITH RAILING: A LOT
DAILY ACTIVITIY SCORE: 24
WALKING IN HOSPITAL ROOM: A LOT
MOVING TO AND FROM BED TO CHAIR: A LITTLE
PATIENT BASELINE BEDBOUND: NO
STANDING UP FROM CHAIR USING ARMS: A LITTLE
MOBILITY SCORE: 18

## 2025-05-05 ASSESSMENT — PAIN SCALES - GENERAL
PAINLEVEL_OUTOF10: 0 - NO PAIN

## 2025-05-05 ASSESSMENT — PAIN - FUNCTIONAL ASSESSMENT
PAIN_FUNCTIONAL_ASSESSMENT: 0-10

## 2025-05-05 NOTE — CONSULTS
Consults  Vascular surgery  Reason For Consult  Bilateral venous stasis    History Of Present Illness  Felice Law is a 60 y.o. male presenting withwho presents to the hospital with complaints of CP, SOB and LE welling. Secondarily he has had some nausea and diarrhea recently. Patient recently visited [facility], where he is from, got back yesterday. He reports developing SOB over the past several days, predating the flight. He has also noted swelling in his legs and some sores on his shins.  He also reports several days of GI upset. No fevers or chills. No blood in stools or vomit.       ED work up showed a elevated BNP and CXR findings consistent with CHF. Afebrile without WBC elevation.      A 10 point ROS was completed and is negative expect as stated in HPI.      Past Medical History:  ICM/HFrEF (30% 6/25/24)   CAD s/p PCI and CABG (LIMA-LAD with balloon pump support April 2023)   VT arrest s/p VT ablation + ICD 2017   HTN  HLD  DM II  CKD IIIb  Hx L femoral DVT on Eliquis   Hypothyroidism    COPD  JAKE  Pulm HTN   Former Smoker     Past Surgical History:  CABG  ICD implant  PCI  Cataract extraction      Social History: Tobacco - Former User, Alcohol - social drinker, Recreational Drugs - none .  I evaluated patient at bedside.  There was difficulty due to language barrier but patient was able to understand wound care and dressing change.  Information obtained from chart review patient interview  Past Medical History  He has a past medical history of Cataract, Chest pain (10/01/2023), CHF (congestive heart failure), Clotting disorder (Multi), COPD (chronic obstructive pulmonary disease) (Multi), Coronary artery disease, Diabetes mellitus (Multi), Disease of thyroid gland, Elevated troponin level not due myocardial infarction (08/14/2023), Hypertension, Ischemic cardiomyopathy, Pulmonary hypertension (Multi), and Venous ulcer of left leg (Multi).    Surgical History  He has a past surgical history that  "includes Coronary artery bypass graft; Cardiac defibrillator placement; Coronary angioplasty with stent; and Cataract extraction (Right).     Social History  He reports that he quit smoking about 8 years ago. His smoking use included cigarettes. He started smoking about 32 years ago. He has a 24 pack-year smoking history. He has never used smokeless tobacco. He reports that he does not drink alcohol and does not use drugs.    Family History  Family History[1]     Allergies  Azithromycin    Review of Systems language barrier.     Physical Exam  Constitutional: Well developed , awake/alert/, in no distress,  Eyes: Clear sclera  ENMT: mucous membranes are moist, no apparent injury, no lesions seen,   Head/neck: Neck supple, trachea  is midline, no apparent injury, no bruits, no mass, no stridor  Respiratory/thorax: Breath sounds clear and equal diminished bilaterally throughout, thorax symmetric  Cardiac/Vascular: Regular, rate and rhythm, no murmurs, 2+ radial pulses, left DP monophasic right DP multiphasic, PTs bilaterally multiphasic dopplerable signal  Gastrointestinal: Nondistended soft nontender, positive bowel sounds, no bruits.  Musculoskeletal: Moves all extremities, limited range of motion , no joint swelling,   Extremities: No cyanosis, bilateral lower extremity superficial wounds from venous stasis, 1-2+ pitting edema.  Right tibial bullae ruptured when removing dressing for examination.  Neurological: Alert and oriented x3,   Lymphatic: No significant lymphadenopathy  Skin: Warm and dry, no lesions, no rashes  Psychological: Appropriate mood and behavior  Last Recorded Vitals  Blood pressure 91/55, pulse 71, temperature 35.8 °C (96.4 °F), temperature source Temporal, resp. rate 18, height 1.803 m (5' 11\"), weight 102 kg (224 lb), SpO2 92%.    Relevant Results     Results for orders placed or performed during the hospital encounter of 05/04/25 (from the past 24 hours)   POCT GLUCOSE   Result Value Ref Range "    POCT Glucose 329 (H) 74 - 99 mg/dL   CBC   Result Value Ref Range    WBC 4.7 4.4 - 11.3 x10*3/uL    nRBC 0.0 0.0 - 0.0 /100 WBCs    RBC 4.67 4.50 - 5.90 x10*6/uL    Hemoglobin 11.0 (L) 13.5 - 17.5 g/dL    Hematocrit 37.3 (L) 41.0 - 52.0 %    MCV 80 80 - 100 fL    MCH 23.6 (L) 26.0 - 34.0 pg    MCHC 29.5 (L) 32.0 - 36.0 g/dL    RDW 21.0 (H) 11.5 - 14.5 %    Platelets 247 150 - 450 x10*3/uL   Basic Metabolic Panel   Result Value Ref Range    Glucose 190 (H) 74 - 99 mg/dL    Sodium 135 (L) 136 - 145 mmol/L    Potassium 4.1 3.5 - 5.3 mmol/L    Chloride 100 98 - 107 mmol/L    Bicarbonate 27 21 - 32 mmol/L    Anion Gap 12 10 - 20 mmol/L    Urea Nitrogen 46 (H) 6 - 23 mg/dL    Creatinine 2.02 (H) 0.50 - 1.30 mg/dL    eGFR 37 (L) >60 mL/min/1.73m*2    Calcium 8.8 8.6 - 10.3 mg/dL   POCT GLUCOSE   Result Value Ref Range    POCT Glucose 189 (H) 74 - 99 mg/dL   Transthoracic Echo Complete   Result Value Ref Range    AV pk pedro 1.19 m/s    Tricuspid annular plane systolic excursion 1.6 cm    LV EF 18 %    RV free wall pk S' 9.14 cm/s    RVSP 58.3 mmHg    AV pk grad 6 mmHg    LV A4C EF 21.3    POCT GLUCOSE   Result Value Ref Range    POCT Glucose 150 (H) 74 - 99 mg/dL       Imaging  XR chest 2 views  Result Date: 5/4/2025  Mild cardiomegaly and pulmonary vascular congestion.   Signed by: Jigar John 5/4/2025 2:07 PM Dictation workstation:   [id number]      Cardiology, Vascular, and Other Imaging  Transthoracic Echo Complete  Result Date: 5/5/2025   [facility], [address] [place] [address], [facility] [id number]           Tel [phone] and Fax [phone] TRANSTHORACIC ECHOCARDIOGRAM REPORT  Patient Name:       NEGRITA BERNARD    Gayla Physician:    [id number] Bird Askew MD Study Date:         5/5/2025            Ordering Provider:    [id number] BENNY CARTWRIGHT MRN/PID:            [id number]            Fellow: Accession#:         [id number]        Nurse:                 Tres Barros RN Date of Birth/Age:  1965 / 60 years Sonographer:          Alyssa Monroy RDCS Gender assigned at  M                   Additional Staff: Birth: Height:             180.34 cm           Admit Date:           5/4/2025 Weight:             101.61 kg           Admission Status:     Inpatient -                                                               Routine BSA / BMI:          2.21 m2 / 31.24     Encounter#:           [id number]                     kg/m2 Blood Pressure:     116/75 mmHg         Department Location:  [facility] Study Type:    TRANSTHORACIC ECHO (TTE) COMPLETE Diagnosis/ICD: Unspecified systolic (congestive) heart failure (CHF)-I50.20;                Ischemic cardiomyopathy-I25.5 Indication:    SOB CP Edema CPT Code:      Echo Limited-36698; Doppler Limited-07338; Color Doppler-14092 Patient History: CABG:              CABG x 1. Smoker:            Former. Pacer/Defib:       AICD Pertinent History: CAD, Cardiomyopathy, CHF, COPD, PVD, Previous DVT, HTN and                    Hyperlipidemia. PCI-Stent, S/P CABG x1 2023, CKD III, JAKE. Study Detail: The following Echo studies were performed: 2D, Doppler and color               flow. Technically challenging study due to body habitus. Definity               used as a contrast agent for endocardial border definition. Total               contrast used for this procedure was 2 mL via IV push.  PHYSICIAN INTERPRETATION: Left Ventricle: Left ventricular ejection fraction is severely decreased, by visual estimate at 15-20%. There is global hypokinesis of the left ventricle with minor regional variations. The left ventricular cavity size is moderately dilated. Abnormal (paradoxical) septal motion, consistent with RV pacemaker. Spectral Doppler shows a Grade II (pseudonormal pattern) of left ventricular diastolic filling with an elevated left atrial pressure. Left Atrium: The  left atrium is mildly dilated. Right Ventricle: The right ventricle is mildly enlarged. There is moderately reduced right ventricular systolic function. Right Atrium: The right atrium is normal in size. Aortic Valve: The aortic valve is trileaflet. There is minimal aortic valve cusp calcification. There is mild aortic valve thickening. There is no evidence of aortic valve regurgitation. The peak instantaneous gradient of the aortic valve is 6 mmHg. Mitral Valve: The mitral valve is mildly thickened. There is mild to moderate mitral valve regurgitation. Tricuspid Valve: The tricuspid valve is structurally normal. There is mild to moderate tricuspid regurgitation. The Doppler estimated RVSP is moderately elevated at 58.3 mmHg. Pulmonic Valve: The pulmonic valve is structurally normal. There is physiologic pulmonic valve regurgitation. Pericardium: Trivial pericardial effusion. Aorta: The aortic root is normal. Systemic Veins: The inferior vena cava appears dilated. In comparison to the previous echocardiogram(s): Compared with study dated 6/25/2024, LVEF 15-20%.  CONCLUSIONS:  1. Left ventricular ejection fraction is severely decreased, by visual estimate at 15-20%.  2. There is global hypokinesis of the left ventricle with minor regional variations.  3. Spectral Doppler shows a Grade II (pseudonormal pattern) of left ventricular diastolic filling with an elevated left atrial pressure.  4. Left ventricular cavity size is moderately dilated.  5. Abnormal septal motion consistent with RV pacemaker.  6. There is moderately reduced right ventricular systolic function.  7. Mildly enlarged right ventricle.  8. The left atrium is mildly dilated.  9. Mild to moderate mitral valve regurgitation. 10. Mild to moderate tricuspid regurgitation visualized. 11. Moderately elevated right ventricular systolic pressure. QUANTITATIVE DATA SUMMARY:  2D MEASUREMENTS:           Normal Ranges: LVEDV Index:     119 ml/m2  LV SYSTOLIC  FUNCTION:                      Normal Ranges: EF-A4C View:    21 % (>=55%) EF-A2C View:    8 % EF-Biplane:     16 % EF-Visual:      18 % LV EF Reported: 18 %  LV DIASTOLIC FUNCTION:           Normal Ranges: MV Peak A:             0.65 m/s  (0.42-0.7 m/s) MV e'                  0.067 m/s (>8.0) MV lateral e'          0.09 m/s MV medial e'           0.04 m/s  MITRAL VALVE:          Normal Ranges: MV DT:        198 msec (150-240msec)  MITRAL INSUFFICIENCY:             Normal Ranges: MR Vmax:              342.00 cm/s  AORTIC VALVE:          Normal Ranges: AoV Vmax:     1.19 m/s (<=1.7m/s) AoV Peak P.7 mmHg (<20mmHg) LVOT Max Dylan: 0.65 m/s (<=1.1m/s) LVOT VTI:     6.72 cm  RIGHT VENTRICLE: TAPSE: 15.5 mm RV s'  0.09 m/s  TRICUSPID VALVE/RVSP:          Normal Ranges: Peak TR Velocity:     3.29 m/s RV Syst Pressure:     58 mmHg  (< 30mmHg) IVC Diam:             2.40 cm  [id number] Bird Askew MD Electronically signed on 2025 at 1:38:04 PM  ** Final **       Assessment/Plan  Acute on chronic heart failure  Venous insufficiency  Cirrhosis of the liver  Superficial bilateral venous ulcers  Obesity    This is a 60-year-old male who was evaluated for his lower extremity chronic venous wounds.  There is serous drainage from the right tibial wound and the left tibial wound.  Patient has risk factors including congestive heart failure EF 15 to 20%, obesity, fatty liver disease, foot drop, JAKE which all contribute to his venous hypertension.  Patient's primary language is  but able to understand some English.  Informed to elevate legs on pillows, will order dressing changes daily, weight reduction, treat heart failure and maximize cardiac function.  Imaging reviewed from  negative for DVT bilateral lower extremities.  Patient has 1-2+ pitting edema, patient could wear very light compression to lower extremities.  Will follow-up patient's hospitalized.    Thank you very much for allowing Vascular Surgery to be  involved in the care of your patient sincerely Mahamed SALINAS .  (This note was generated with voice recognition software and may contain errors including spelling, grammar, syntax and missed recognition of what was dictated, of which may not have been fully corrected)                [1]   Family History  Family history unknown: Yes

## 2025-05-05 NOTE — DISCHARGE INSTRUCTIONS
- Please hold off on taking your Corlanor 5 mg, Entresto, Aldactone until you follow-up with your cardiologist  - Continue taking your Bumex twice daily for 3 days starting tomorrow and then take it once daily  - Please follow-up with your cardiologist within 2 weeks    HEART FAILURE DISCHARGE INSTRUCTIONS:  1. Weigh yourself daily and record on your weight log.  2. If you gain more than 2 or 3 pounds overnight, call your cardiologist.  3. Follow a low sodium diet. No more than 2000 mg in one day, or more than 650 mg per meal.  4. Limit total fluids to no more than 8 cups (or 2 liters) per day - this includes all fluids (water, coffee, juice, milk, tea, etc.)  5. Monitor your blood pressure daily and record on your weight log.  6. Call to schedule your follow-up appointments when you get home if they were not already scheduled for you.  7. Keep your follow-up appointments! Bring your weight log with you so the doctors can see your weight trend and blood pressure readings.  8. Be sure to  any new prescriptions and take them as directed. If unsure of the medications, be sure to call your cardiologist.  9. Stay as active as you can tolerate.   10. If you notice subtle change of symptoms (slight increase in swelling, slight shortness of breath, a new intolerance to laying flat, a new cough), be sure to call your cardiologist.

## 2025-05-05 NOTE — CONSULTS
Consults  History Of Present Illness:    Felice Law is a 60 y.o. male past medical history notable for HFrEF/ischemic cardiomyopathy with a EF of 30%, CAD status post PCI and CABG LIMA to LAD 2023, history of DVT on Eliquis, history of VT arrest status post ICD in 2017, COPD, JAKE, CKD stage III hypertension, hyperlipidemia, pulm hypertension former smoker presents to hospital complaints of chest pain shortness of breath and leg swelling.  Apparently patient recently visited [place] and got back the previous day.  He has had increased shortness of breath predating the flight with swelling is noted.    Of note patient saw his cardiologist in March of this year had atypical chest discomfort was ordered stress test at that time.     in ED initial vital signs notable for heart rate of 84 blood pressure 122/74 saturations of 94% on room air.  Presenting labs notable for BUN/creatinine 48/2.15, BNP of 2000, troponin 41 and 40 and a normocytic anemia with a hemoglobin 12.7.  COVID and flu negative.  Chest x-ray notable for pulmonary congestion.  Cardiology is consulted for heart failure.  Patient received a dose of IV Lasix x 1 with no documented urine output. This initial weight was 102 kg.     Last Recorded Vitals:  Vitals:    05/04/25 1600 05/04/25 1800 05/04/25 2000 05/04/25 2146   BP: 129/77  126/80 116/75   Pulse: 82 83 84 82   Resp: 16 (!) 27 17 18   Temp:    35.9 °C (96.6 °F)   SpO2: (!) 93% 98% 95% 92%   Weight:       Height:           Last Labs:  CBC - 5/5/2025:  6:51 AM  4.7 11.0 247    37.3      CMP - 5/5/2025:  6:51 AM  8.8 7.4 22 --- 2.1   3.0 4.1 26 35      PTT - No results in last year.  _   _ _     Troponin I, High Sensitivity   Date/Time Value Ref Range Status   05/04/2025 03:40 PM 40 (H) 0 - 20 ng/L Final   05/04/2025 02:47 PM 41 (H) 0 - 20 ng/L Final     BNP   Date/Time Value Ref Range Status   05/04/2025 02:47 PM 2,032 (H) 0 - 99 pg/mL Final   11/05/2024 11:46 AM 57 0 - 99 pg/mL Final      POC HEMOGLOBIN A1c   Date/Time Value Ref Range Status   04/08/2024 03:50 PM 10.6 (A) 4.2 - 6.5 % Final     Hemoglobin A1C   Date/Time Value Ref Range Status   11/11/2024 03:58 PM 11.0 (H) See comment % Final   11/05/2024 11:46 AM 10.8 (H) See comment % Final     LDL Calculated   Date/Time Value Ref Range Status   11/11/2024 03:58 PM   Final     Comment:     The calculation of LDL and VLDL are inaccurate when the Triglycerides are greater than 400 mg/dL or when the patient is non-fasting. If LDL measurement is necessary contact the testing laboratory for an alternative LDL assay.                                Near   Borderline      AGE      Desirable  Optimal    High     High     Very High     0-19 Y     0 - 109     ---    110-129   >/= 130     ----    20-24 Y     0 - 119     ---    120-159   >/= 160     ----      >24 Y     0 -  99   100-129  130-159   160-189     >/=190     11/05/2024 11:46 AM   Final     Comment:     The calculation of LDL and VLDL are inaccurate when the Triglycerides are greater than 400 mg/dL or when the patient is non-fasting. If LDL measurement is necessary contact the testing laboratory for an alternative LDL assay.                                  Near   Borderline      AGE      Desirable  Optimal    High     High     Very High     0-19 Y     0 - 109     ---    110-129   >/= 130     ----    20-24 Y     0 - 119     ---    120-159   >/= 160     ----      >24 Y     0 -  99   100-129  130-159   160-189     >/=190     10/05/2023 03:54 AM 32 (L) 140 - 190 mg/dL Final     Comment:                                 Near   Borderline      AGE      Desirable  Optimal    High     High     Very High     0-19 Y     0 - 109     ---    110-129   >/= 130     ----    20-24 Y     0 - 119     ---    120-159   >/= 160     ----      >24 Y     0 -  99   100-129  130-159   160-189     >/=190       VLDL   Date/Time Value Ref Range Status   11/11/2024 03:58 PM   Final     Comment:     Unable to calculate VLDL.    11/05/2024 11:46 AM   Final     Comment:     Unable to calculate VLDL.   10/05/2023 03:54 AM 49 (H) 0 - 40 mg/dL Final      Last I/O:  No intake/output data recorded.    Past Cardiology Tests (Last 3 Years):  EKG:  No results found for this or any previous visit from the past 1095 days.    Echo:  Transthoracic Echo (TTE) Complete 06/25/2024      Transthoracic Echo (TTE) Complete 10/14/2023    Ejection Fractions:  EF   Date/Time Value Ref Range Status   06/25/2024 11:00 AM 30 %      Cath:  No results found for this or any previous visit from the past 1095 days.    Stress Test:  No results found for this or any previous visit from the past 1095 days.    Cardiac Imaging:  MR cardiac morphology and function w and wo IV contrast 03/28/2023      Past Medical History:  He has a past medical history of Cataract, Chest pain (10/01/2023), CHF (congestive heart failure), Clotting disorder (Multi), COPD (chronic obstructive pulmonary disease) (Multi), Coronary artery disease, Diabetes mellitus (Multi), Disease of thyroid gland, Elevated troponin level not due myocardial infarction (08/14/2023), Hypertension, Ischemic cardiomyopathy, Pulmonary hypertension (Multi), and Venous ulcer of left leg (Multi).    Past Surgical History:  He has a past surgical history that includes Coronary artery bypass graft; Cardiac defibrillator placement; Coronary angioplasty with stent; and Cataract extraction (Right).      Social History:  He reports that he quit smoking about 8 years ago. His smoking use included cigarettes. He started smoking about 32 years ago. He has a 24 pack-year smoking history. He has never used smokeless tobacco. He reports that he does not drink alcohol and does not use drugs.    Family History:  Family History[1]     Allergies:  Azithromycin    Inpatient Medications:  Scheduled Medications[2]  PRN Medications[3]  Continuous Medications[4]  Outpatient Medications:  Current Outpatient Medications   Medication  Instructions    albuterol 2.5 mg /3 mL (0.083 %) nebulizer solution Use every 4-6 hours as needed for shortness of breath    atorvastatin (LIPITOR) 80 mg, oral, Daily    bumetanide (BUMEX) 1 mg, oral, Daily    digoxin (LANOXIN) 125 mcg, oral, Daily    Eliquis 5 mg tablet TOME 1 TABLETA POR LA BOCA DOS  VECES AL CUBA    Farxiga 10 mg, oral, Daily    fenofibrate (TRICOR) 145 mg, oral, Daily    FreeStyle Franco 2 Tyler misc Use as instructed    FreeStyle Franco 2 Sensor kit Change every 14 days    gabapentin (Neurontin) 300 mg capsule TOME 1 CAPSULA POR LA BOCA NITIN  VEZ AL CUBA EN LA MANANA    gabapentin (Neurontin) 600 mg tablet TOME 1 TABLETA POR LA BOCA NITIN  VEZ AL CUBA AL ACOSTARSE    hydrALAZINE (APRESOLINE) 100 mg, oral, 3 times daily    hydrocortisone 1 % cream Topical, 2 times daily    insulin glargine (Toujeo Max U-300 SoloStar) 300 unit/mL (3 mL) injection 40 units twice a day    insulin lispro (HumaLOG KwikPen Insulin) 100 unit/mL pen INYECTE DEBAJO DE LA PIEL 15  UNIDADES CON CADA COMIDA CON LA  ESCALA MOVIL , HASTA 100  UNIDADES CADA CUBA    ipratropium-albuteroL (Duo-Neb) 0.5-2.5 mg/3 mL nebulizer solution 3 mL, nebulization, 3 times daily PRN    isosorbide dinitrate (ISORDIL) 40 mg, oral, 3 times daily (0900,1400,1900)    ivabradine (CORLANOR) 2.5 mg, oral, 2 times daily    ketorolac (Acular) 0.5 % ophthalmic solution 1 drop to surgical eye 4 times a day starting 1 day before surgery    lancets (Lancets,Thin) misc 4 times daily (before meals and at bedtime). use as directed to test blood sugar    levothyroxine (Synthroid, Levoxyl) 75 mcg tablet TOME 1 TABLETA POR LA BOCA NITIN  VEZ AL CUBA    lidocaine (Lidoderm) 5 % patch 1 patch, transdermal, Daily, Apply to painful area 12 hours per day, remove for 12 hours.    metoprolol succinate XL (TOPROL-XL) 200 mg, oral, Daily, Do not crush or chew.    metoprolol succinate XL (TOPROL-XL) 200 mg, Daily    Mounjaro 5 mg, subcutaneous, Every 7 days    ofloxacin (Ocuflox)  "0.3 % ophthalmic solution 1 drop to operative eye 4 times a day starting 1 day before surgery    OneTouch Ultra Test strip REVISE EL NIVEL DE GLUCOSA EN LA PEARL CUATRO VECES AL CUBA    pantoprazole (ProtoNix) 40 mg EC tablet TAKE 1 TABLET BY MOUTH ONCE DAILY IN THE MORNING TAKE BEFORE A MEAL DO NOT CRUSH, CHEW, AND/OR DIVIDE    pen needle, diabetic (BD Ultra-Fine Sarah Pen Needle) 32 gauge x 5/32\" needle For insulin injection 4x/day    pen needle, diabetic, safety 29 gauge x 1/2\" needle For insulin injections 5 times per day    prednisoLONE acetate (Pred-Forte) 1 % ophthalmic suspension 1 drop to operative eye 4 times a day starting the day of surgery (after surgery is done)    QUEtiapine (SEROQUEL) 50 mg, oral, Nightly    sacubitriL-valsartan (Entresto)  mg tablet 1 tablet, oral, 2 times daily    spironolactone (Aldactone) 25 mg tablet TOME 2 TABLETAS POR LA BOCA NITIN  VEZ AL CUBA    TechLITE Pen Needle 31 gauge x 3/16\" needle For insulin injection 5x/day       Physical Exam:  General: No acute distress,  A&O x3, Kyrgyz speaking  Skin: Warm and dry  Neck: JVD appears to be above the clavicles at a 90 degree angle  ENT: Moist mucous membranes no lesions appreciated  Pulmonary: Bilateral rhonchi noted  Cards: Regular rate rhythm, no murmurs gallops or rubs appreciated normal S1-S2  Abdomen: Soft nontender nondistended  Extremities: Bilateral pitting edema 1+  Psych: Appropriate mood and affect        Assessment/Plan     1.  HFrEF/ischemic cardiomyopathy: Previous EF of 30%: NYHA class III stage C status post ICD secondary to VT.  Presenting with heart failure symptoms.  Agree with IV Lasix.  Aim for goal of -1 to 2 L over next 24 hours.  Low threshold to transition to Lasix infusion to improve urine output.  Okay to hold Entresto today in the setting of CHARLOTTE  Continue Farxiga 10 mg daily    2.  CAD/CABG: Previous stents as well as LIMA to LAD.  No chest pain.  Troponin is slightly elevated with no significant " delta.  Suspect demand in the setting of heart failure.      3.  Acute on chronic renal injury: Management as described above.    4.  VT status post VT ablation and ICD.    (This note was generated with voice recognition software and may contain errors including spelling, grammar, syntax and missed recognition of what was dictated, of which may not have been fully corrected)     Code Status:  Full Code    Bird Askew MD PhD         [1]   Family History  Family history unknown: Yes   [2]   Scheduled medications   Medication Dose Route Frequency    apixaban  5 mg oral BID    atorvastatin  80 mg oral Daily    dapagliflozin propanediol  10 mg oral Daily    digoxin  125 mcg oral Daily    fenofibrate  160 mg oral Daily    furosemide  40 mg intravenous q12h    gabapentin  300 mg oral q AM    gabapentin  600 mg oral Nightly    hydrALAZINE  100 mg oral TID    insulin glargine  24 Units subcutaneous BID AC    insulin lispro  0-15 Units subcutaneous TID AC    insulin lispro  10 Units subcutaneous TID AC    isosorbide dinitrate  40 mg oral TID    ivabradine  2.5 mg oral BID    levothyroxine  75 mcg oral Daily    lidocaine  1 patch transdermal Daily    metoprolol succinate XL  200 mg oral Daily    pantoprazole  40 mg oral Daily before breakfast    perflutren lipid microspheres  0.5-10 mL of dilution intravenous Once in imaging    perflutren protein A microsphere  0.5 mL intravenous Once in imaging    QUEtiapine  50 mg oral Nightly    [Held by provider] sacubitriL-valsartan  1 tablet oral BID    [Held by provider] spironolactone  50 mg oral q24h SUSI    sulfur hexafluoride microsphr  2 mL intravenous Once in imaging   [3]   PRN medications   Medication    acetaminophen    dextrose    dextrose    glucagon    glucagon    ondansetron   [4]   Continuous Medications   Medication Dose Last Rate

## 2025-05-05 NOTE — PROGRESS NOTES
Addended by: LY CHOI on: 4/15/2025 09:53 AM     Modules accepted: Orders     Felice Law is a 60 y.o. male on day 1 of admission presenting with Acute on chronic congestive heart failure, unspecified heart failure type.      Subjective   Patient was seen and examined, no complaints overnight, states coughing and breathing has improved since yesterday.       Objective     Last Recorded Vitals  BP 91/55   Pulse 71   Temp 35.8 °C (96.4 °F) (Temporal)   Resp 18   Wt 102 kg (224 lb)   SpO2 92%   Intake/Output last 3 Shifts:    Intake/Output Summary (Last 24 hours) at 5/5/2025 1305  Last data filed at 5/5/2025 1254  Gross per 24 hour   Intake 830 ml   Output --   Net 830 ml       Admission Weight  Weight: 102 kg (224 lb) (05/04/25 1332)    Daily Weight  05/04/25 : 102 kg (224 lb)    Image Results  XR chest 2 views  Narrative: Interpreted By:  Jigar John,   STUDY:  XR CHEST 2 VIEWS; 5/4/2025 1:53 pm      INDICATION:  Signs/Symptoms:chest pain      COMPARISON:  November 2024.      ACCESSION NUMBER(S):  [id number]      ORDERING CLINICIAN:  ZACKERY LEUNG      TECHNIQUE:  Number of films: Two-view radiographs of the chest were obtained.      FINDINGS:  An ICD is again noted, with the leads overlying the right atrium and  right ventricle. Sternal wires are also seen.  The cardiac silhouette is mildly prominent.  There are mild bilateral perihilar interstitial infiltrates.  Mild atelectatic changes are noted in the right mid lung laterally.  There is also small right pleural effusion. The osseous structures  are unremarkable.      Impression: Mild cardiomegaly and pulmonary vascular congestion.      Signed by: Jigar John 5/4/2025 2:07 PM  Dictation workstation:   [id number]      Physical Exam  Constitutional:       Appearance: Normal appearance.   HENT:      Head: Normocephalic.   Eyes:      Pupils: Pupils are equal, round, and reactive to light.   Cardiovascular:      Rate and Rhythm: Normal rate and regular rhythm.      Pulses: Normal pulses.   Pulmonary:      Effort:  Pulmonary effort is normal.      Comments: Crackles heard bilaterally  Abdominal:      General: Abdomen is flat.      Palpations: Abdomen is soft.   Skin:     General: Skin is warm and dry.      Capillary Refill: Capillary refill takes less than 2 seconds.   Neurological:      General: No focal deficit present.      Mental Status: He is alert. He is disoriented.   Psychiatric:         Mood and Affect: Mood normal.       Patient is a 60-year-old male with past medical history of HFrEF, CAD status post CABG, V. tach arrest status post VT ablation and ICD, type 2 diabetes, left femoral DVT on Eliquis, hypothyroidism, pulmonary hypertension who presents to [facility] ED with chest pain, shortness of breath and bilateral lower extremity edema and pain.    Daily progress  -VSS.  Creatinine slightly improved with current diuretic regimen, patient states he had improvement of cough since yesterday.  Ordered vascular surgery consult for management of chronic venous stasis ulcers.  Appreciate cardiology consult recommendations.  Will continue to monitor insulin regimen and renal function.    Acute Medical Conditions  Decompensated HFrEF (30% 6/25/2024)  CHARLOTTE on CKD  -Subjective reports of chest pain, shortness of breath, BLE edema  - CXR significant for pulmonary vascular congestion  - Received IV Lasix 80 mg one-time in ED  Plan:  - Continue diuresis with IV Lasix 40 mg twice daily, trend renal function  - Continue statin, Eliquis, Digoxin, Hydralazine, Isosorbide, Farxiga, Corlanor, Toprol XL.  - Hold Entresto and Aldactone in setting of soft BP and CHARLOTTE  - Strict I/Os  - Wean oxygen as tolerated  - Cardiology consult    Chronic BLE venous stasis dermatitis  -no signs of infection during this admission upon evaluation, will apply dressing changes as needed  Plan:  -Ordered vascular surgery consult for continued management    Chronic Medical Conditions  Type 2 diabetes- Home insulin regimen of Glargine 40U BID, Lispro 15U WM + SS,  Farxiga 10mg and Mounjaro.  Continue glargine 24 units twice daily before meals, lispro 10 units 3 times daily before meals, SSI #3, adjust as needed  CAD s/p CABG and PCI  VT arrest s/p VT ablation and ICD  Hypertension  Hyperlipidemia  Hypothyroidism  Left femoral DVT on Eliquis  COPD  JAKE  Pulmonary hypertension  Former smoker    DVT-Eliquis  Diet-cardiac diet  Code Status-full code  Consults-cardiology, vascular surgery    Dr. Kevin Velasquez   Internal Medicine PGY-1  Available on [employer] for any questions.    This is a preliminary note pending signature from the attending physician.

## 2025-05-06 LAB
ANION GAP SERPL CALC-SCNC: 11 MMOL/L (ref 10–20)
ANION GAP SERPL CALC-SCNC: 14 MMOL/L (ref 10–20)
BUN SERPL-MCNC: 47 MG/DL (ref 6–23)
BUN SERPL-MCNC: 48 MG/DL (ref 6–23)
CALCIUM SERPL-MCNC: 8.8 MG/DL (ref 8.6–10.3)
CALCIUM SERPL-MCNC: 9.4 MG/DL (ref 8.6–10.3)
CHLORIDE SERPL-SCNC: 100 MMOL/L (ref 98–107)
CHLORIDE SERPL-SCNC: 98 MMOL/L (ref 98–107)
CO2 SERPL-SCNC: 28 MMOL/L (ref 21–32)
CO2 SERPL-SCNC: 28 MMOL/L (ref 21–32)
CREAT SERPL-MCNC: 2.2 MG/DL (ref 0.5–1.3)
CREAT SERPL-MCNC: 2.23 MG/DL (ref 0.5–1.3)
EGFRCR SERPLBLD CKD-EPI 2021: 33 ML/MIN/1.73M*2
EGFRCR SERPLBLD CKD-EPI 2021: 34 ML/MIN/1.73M*2
ERYTHROCYTE [DISTWIDTH] IN BLOOD BY AUTOMATED COUNT: 20.6 % (ref 11.5–14.5)
GLUCOSE BLD MANUAL STRIP-MCNC: 125 MG/DL (ref 74–99)
GLUCOSE BLD MANUAL STRIP-MCNC: 138 MG/DL (ref 74–99)
GLUCOSE BLD MANUAL STRIP-MCNC: 191 MG/DL (ref 74–99)
GLUCOSE BLD MANUAL STRIP-MCNC: 198 MG/DL (ref 74–99)
GLUCOSE SERPL-MCNC: 125 MG/DL (ref 74–99)
GLUCOSE SERPL-MCNC: 133 MG/DL (ref 74–99)
HCT VFR BLD AUTO: 39.8 % (ref 41–52)
HGB BLD-MCNC: 11.6 G/DL (ref 13.5–17.5)
MCH RBC QN AUTO: 23.7 PG (ref 26–34)
MCHC RBC AUTO-ENTMCNC: 29.1 G/DL (ref 32–36)
MCV RBC AUTO: 81 FL (ref 80–100)
NRBC BLD-RTO: 0 /100 WBCS (ref 0–0)
PLATELET # BLD AUTO: 268 X10*3/UL (ref 150–450)
POTASSIUM SERPL-SCNC: 3.9 MMOL/L (ref 3.5–5.3)
POTASSIUM SERPL-SCNC: 4.2 MMOL/L (ref 3.5–5.3)
RBC # BLD AUTO: 4.9 X10*6/UL (ref 4.5–5.9)
SODIUM SERPL-SCNC: 135 MMOL/L (ref 136–145)
SODIUM SERPL-SCNC: 136 MMOL/L (ref 136–145)
WBC # BLD AUTO: 4.4 X10*3/UL (ref 4.4–11.3)

## 2025-05-06 PROCEDURE — 2500000001 HC RX 250 WO HCPCS SELF ADMINISTERED DRUGS (ALT 637 FOR MEDICARE OP): Performed by: STUDENT IN AN ORGANIZED HEALTH CARE EDUCATION/TRAINING PROGRAM

## 2025-05-06 PROCEDURE — 2500000001 HC RX 250 WO HCPCS SELF ADMINISTERED DRUGS (ALT 637 FOR MEDICARE OP): Performed by: INTERNAL MEDICINE

## 2025-05-06 PROCEDURE — 36415 COLL VENOUS BLD VENIPUNCTURE: CPT | Performed by: STUDENT IN AN ORGANIZED HEALTH CARE EDUCATION/TRAINING PROGRAM

## 2025-05-06 PROCEDURE — 82565 ASSAY OF CREATININE: CPT

## 2025-05-06 PROCEDURE — 1200000002 HC GENERAL ROOM WITH TELEMETRY DAILY

## 2025-05-06 PROCEDURE — 85027 COMPLETE CBC AUTOMATED: CPT

## 2025-05-06 PROCEDURE — 82947 ASSAY GLUCOSE BLOOD QUANT: CPT

## 2025-05-06 PROCEDURE — 99233 SBSQ HOSP IP/OBS HIGH 50: CPT | Performed by: STUDENT IN AN ORGANIZED HEALTH CARE EDUCATION/TRAINING PROGRAM

## 2025-05-06 PROCEDURE — 99232 SBSQ HOSP IP/OBS MODERATE 35: CPT | Performed by: NURSE PRACTITIONER

## 2025-05-06 PROCEDURE — 2500000005 HC RX 250 GENERAL PHARMACY W/O HCPCS: Performed by: INTERNAL MEDICINE

## 2025-05-06 PROCEDURE — 36415 COLL VENOUS BLD VENIPUNCTURE: CPT

## 2025-05-06 PROCEDURE — 80048 BASIC METABOLIC PNL TOTAL CA: CPT | Performed by: STUDENT IN AN ORGANIZED HEALTH CARE EDUCATION/TRAINING PROGRAM

## 2025-05-06 PROCEDURE — 99232 SBSQ HOSP IP/OBS MODERATE 35: CPT

## 2025-05-06 PROCEDURE — 2500000002 HC RX 250 W HCPCS SELF ADMINISTERED DRUGS (ALT 637 FOR MEDICARE OP, ALT 636 FOR OP/ED): Performed by: INTERNAL MEDICINE

## 2025-05-06 RX ORDER — METOPROLOL SUCCINATE 50 MG/1
100 TABLET, EXTENDED RELEASE ORAL 2 TIMES DAILY
Status: DISCONTINUED | OUTPATIENT
Start: 2025-05-06 | End: 2025-05-08 | Stop reason: HOSPADM

## 2025-05-06 RX ADMIN — INSULIN LISPRO 10 UNITS: 100 INJECTION, SOLUTION INTRAVENOUS; SUBCUTANEOUS at 12:01

## 2025-05-06 RX ADMIN — DAPAGLIFLOZIN 10 MG: 10 TABLET, FILM COATED ORAL at 09:41

## 2025-05-06 RX ADMIN — PANTOPRAZOLE SODIUM 40 MG: 40 TABLET, DELAYED RELEASE ORAL at 06:40

## 2025-05-06 RX ADMIN — LEVOTHYROXINE SODIUM 75 MCG: 0.07 TABLET ORAL at 06:40

## 2025-05-06 RX ADMIN — GABAPENTIN 600 MG: 300 CAPSULE ORAL at 20:32

## 2025-05-06 RX ADMIN — INSULIN GLARGINE 24 UNITS: 100 INJECTION, SOLUTION SUBCUTANEOUS at 09:21

## 2025-05-06 RX ADMIN — METOPROLOL SUCCINATE 100 MG: 50 TABLET, EXTENDED RELEASE ORAL at 09:40

## 2025-05-06 RX ADMIN — APIXABAN 5 MG: 5 TABLET, FILM COATED ORAL at 09:39

## 2025-05-06 RX ADMIN — FENOFIBRATE 160 MG: 160 TABLET ORAL at 09:42

## 2025-05-06 RX ADMIN — ATORVASTATIN CALCIUM 80 MG: 40 TABLET, FILM COATED ORAL at 09:41

## 2025-05-06 RX ADMIN — DIGOXIN 125 MCG: 125 TABLET ORAL at 09:41

## 2025-05-06 RX ADMIN — GABAPENTIN 300 MG: 300 CAPSULE ORAL at 09:41

## 2025-05-06 RX ADMIN — INSULIN GLARGINE 24 UNITS: 100 INJECTION, SOLUTION SUBCUTANEOUS at 17:42

## 2025-05-06 RX ADMIN — INSULIN LISPRO 3 UNITS: 100 INJECTION, SOLUTION INTRAVENOUS; SUBCUTANEOUS at 12:01

## 2025-05-06 RX ADMIN — METOPROLOL SUCCINATE 100 MG: 50 TABLET, EXTENDED RELEASE ORAL at 20:33

## 2025-05-06 RX ADMIN — QUETIAPINE FUMARATE 50 MG: 25 TABLET ORAL at 20:33

## 2025-05-06 ASSESSMENT — PAIN - FUNCTIONAL ASSESSMENT
PAIN_FUNCTIONAL_ASSESSMENT: 0-10
PAIN_FUNCTIONAL_ASSESSMENT: 0-10

## 2025-05-06 ASSESSMENT — PAIN SCALES - GENERAL
PAINLEVEL_OUTOF10: 0 - NO PAIN
PAINLEVEL_OUTOF10: 0 - NO PAIN

## 2025-05-06 NOTE — NURSING NOTE
"Heart Failure Nurse Navigator      Assessment    I met with Felice Law at the bedside    Patients Cardiologist(s): Dr. Duque    Patients Primary Care Provider:    1. Medical Domain  What is the patient's most recent LVEF? 15-20%  HFrEF (LVEF <= 40%) Quadruple therapy recommended  HFmrEF (LVEF 41-49%) Quadruple therapy recommended  HFpEF (LVEF >= 50%) Minimum recommendations: SGLT2i and MRA  Is the patient on OP GDMT for their condition?   ARNI/ACEI/ARB: Yes Sacubitril-valsartan  mg BID  BB: Yes Metoprolol succinate 200 mg daily  MRA: Yes spironolactone 25mg daily  SGLT2i: Yes dapaglifozin 10 mg daily  Is the patient prescribed a diuretic? Yes  Bumetanide 1 mg daily  Does this patient have an implanted device (ie cardioMEMS, ICD, CRT-D)?  Device type:   Could this patient have advanced heart failure (Stage D heart failure)?: Yes   If yes, the potential markers of advanced heart failure include: 1    REFERENCE: Potential markers of advanced HF   Inotrope (dobutamine or milrinone) used during this admission?   LVEF<=25%?   2.   >=2 hospitalizations for ADHF in the last year?   3.   Severe symptoms of HF (fatigue, dyspnea, confusion, edema) despite medical therapy?   4.  Downtitration of GDMT as compared to home medications?   5.  Discontinuation of GDMT because of hypotension or renal intolerance?   6.  Recurrent arrhythmias (AF, VT with ICD shocks)?   7.  Cardiac cachexia (i.e., unintentional weight loss due to HF)?   8.  High-risk biomarker profile (e.g., hyponatremia [Na<135], very elevated BNP, worsening kidney function)   9.  Escalating doses of diuretics or persistent edema despite escalation     2. Mind and Emotion  Does this patient have possible cognitive impairment?: No (The Mini-Cog score   Ask the patient to memorize these 3 words: banana, sunrise, chair  Ask the patient to draw a clock with hands pointing at \"20 minutes after 8\"  Ask the patient to recall the 3 words  Score: Add " number of words recalled + clock drawing (0 points for any errors, 2 points if correct)  Interpretation: A score of 0-2 suggests cognitive impairment is present, a score of 3-5 suggests cognitive impairment is absent  Does this patient have major depression?: No (PH-Q2 score )  Over the last 2 weeks: Little interest or pleasure in doing things? (Not at all +0, Several days +1, More than half the days +2, Nearly every day +3)  Over the last 2 weeks: Feeling down, depressed or hopeless? (Not at all +0, Several days +1, More than half the days +2, Nearly every day +3)  Score: Add points  Interpretation: A score of 3 or more suggests that major depression is likely.     3. Physical Function  Could this patient be frail?: No   Defined by presence of all of these: slowness, weakness, shrinking, inactivity, exhaustion  Is this patient at risk for falling?: No   Defined by having experienced a fall in the last 12 months.    4. Social Determinants of Health  Transportation deficits?: No   Lack of insurance?: No   Living conditions (homelessness, unstable home)?: No   Poor family/social support?: No     I provided the following heart failure education:  - Heart Failure education packet provided.  - HF signs and symptoms, heart failure zones and when to call cardiologist.   - Controlling Heart Failure at Home: medication adherence, following up with cardiologist at least once yearly, staying healthy and active, limiting sodium and fluid intake as directed by cardiologist.  - Daily Weight Education  -Low Sodium Diet Education  -Fluid Restriction Education      *Discharge Appointment Follow-up Plan:  Follow up with Dr. Duque      Additional Comments: Family sees Dr. Duque for all of his cardiac needs. Family at bedside and participated in teaching. Patient speaks Macanese and Martti used. Discussed CHF signs and symptoms and when to Call Dr. Duque. Discussed the importance of following a low sodium diet and  limiting sodium intake to 2,000 mg per day to prevent fluid retention. Discussed reading food labels and foods to avoid including processed foods and packaged foods like potato chips. Patient and family appreciated education and verbalized understanding of teaching provided. CHF materials  provided in English and Kenyan. Patient's sister took CHF materials home to review.

## 2025-05-06 NOTE — PROGRESS NOTES
Negrita Law is a 60 y.o. male on day 2 of admission presenting with Acute on chronic congestive heart failure, unspecified heart failure type.      Subjective   Patient was seen and examined, no complaints overnight, states coughing and breathing has improved since yesterday.       Objective     Last Recorded Vitals  /60 (BP Location: Left arm, Patient Position: Lying)   Pulse 60   Temp 36 °C (96.8 °F) (Temporal)   Resp 18   Wt 102 kg (224 lb)   SpO2 92%   Intake/Output last 3 Shifts:    Intake/Output Summary (Last 24 hours) at 5/6/2025 0802  Last data filed at 5/6/2025 0603  Gross per 24 hour   Intake 717.65 ml   Output 850 ml   Net -132.35 ml       Admission Weight  Weight: 102 kg (224 lb) (05/04/25 1332)    Daily Weight  05/04/25 : 102 kg (224 lb)    Image Results  Transthoracic Echo Complete     [facility], [address] [place] [address]            Tel [phone] and Fax [phone]    TRANSTHORACIC ECHOCARDIOGRAM REPORT       Patient Name:       NEGRITA BERNARD    Gayla Physician:    [id number] Bird Askew MD  Study Date:         5/5/2025            Ordering Provider:    [id number] BENNY CARTWRIGHT  MRN/PID:            [id number]            Fellow:  Accession#:         [id number]        Nurse:                Tres Barros RN  Date of Birth/Age:  1965 / 60 years Sonographer:          Alyssa Monroy RDCS  Gender assigned at  M                   Additional Staff:  Birth:  Height:             180.34 cm           Admit Date:           5/4/2025  Weight:             101.61 kg           Admission Status:     Inpatient -                                                                Routine  BSA / BMI:          2.21 m2 / 31.24     Encounter#:           [id number]                      kg/m2  Blood Pressure:     116/75 mmHg         Department Location:  [facility]    Study Type:    TRANSTHORACIC ECHO (TTE) COMPLETE  Diagnosis/ICD: Unspecified systolic (congestive) heart failure (CHF)-I50.20;                 Ischemic cardiomyopathy-I25.5  Indication:    SOB CP Edema  CPT Code:      Echo Limited-16997; Doppler Limited-26438; Color Doppler-98525    Patient History:  CABG:              CABG x 1.  Smoker:            Former.  Pacer/Defib:       AICD  Pertinent History: CAD, Cardiomyopathy, CHF, COPD, PVD, Previous DVT, HTN and                     Hyperlipidemia. PCI-Stent, S/P CABG x1 2023, CKD III, JAKE.    Study Detail: The following Echo studies were performed: 2D, Doppler and color                flow. Technically challenging study due to body habitus. Definity                used as a contrast agent for endocardial border definition. Total                contrast used for this procedure was 2 mL via IV push.       PHYSICIAN INTERPRETATION:  Left Ventricle: Left ventricular ejection fraction is severely decreased, by visual estimate at 15-20%. There is global hypokinesis of the left ventricle with minor regional variations. The left ventricular cavity size is moderately dilated. Abnormal (paradoxical) septal motion, consistent with RV pacemaker. Spectral Doppler shows a Grade II (pseudonormal pattern) of left ventricular diastolic filling with an elevated left atrial pressure.  Left Atrium: The left atrium is mildly dilated.  Right Ventricle: The right ventricle is mildly enlarged. There is moderately reduced right ventricular systolic function.  Right Atrium: The right atrium is normal in size.  Aortic Valve: The aortic valve is trileaflet. There is minimal aortic valve cusp calcification. There is mild aortic valve thickening. There is no evidence of aortic valve regurgitation. The peak instantaneous gradient of the aortic valve is 6 mmHg.  Mitral Valve: The mitral valve is mildly thickened. There is mild to moderate mitral valve regurgitation.  Tricuspid Valve: The  tricuspid valve is structurally normal. There is mild to moderate tricuspid regurgitation. The Doppler estimated RVSP is moderately elevated at 58.3 mmHg.  Pulmonic Valve: The pulmonic valve is structurally normal. There is physiologic pulmonic valve regurgitation.  Pericardium: Trivial pericardial effusion.  Aorta: The aortic root is normal.  Systemic Veins: The inferior vena cava appears dilated.  In comparison to the previous echocardiogram(s): Compared with study dated 6/25/2024, LVEF 15-20%.       CONCLUSIONS:   1. Left ventricular ejection fraction is severely decreased, by visual estimate at 15-20%.   2. There is global hypokinesis of the left ventricle with minor regional variations.   3. Spectral Doppler shows a Grade II (pseudonormal pattern) of left ventricular diastolic filling with an elevated left atrial pressure.   4. Left ventricular cavity size is moderately dilated.   5. Abnormal septal motion consistent with RV pacemaker.   6. There is moderately reduced right ventricular systolic function.   7. Mildly enlarged right ventricle.   8. The left atrium is mildly dilated.   9. Mild to moderate mitral valve regurgitation.  10. Mild to moderate tricuspid regurgitation visualized.  11. Moderately elevated right ventricular systolic pressure.    QUANTITATIVE DATA SUMMARY:     2D MEASUREMENTS:           Normal Ranges:  LVEDV Index:     119 ml/m2       LV SYSTOLIC FUNCTION:                       Normal Ranges:  EF-A4C View:    21 % (>=55%)  EF-A2C View:    8 %  EF-Biplane:     16 %  EF-Visual:      18 %  LV EF Reported: 18 %       LV DIASTOLIC FUNCTION:           Normal Ranges:  MV Peak A:             0.65 m/s  (0.42-0.7 m/s)  MV e'                  0.067 m/s (>8.0)  MV lateral e'          0.09 m/s  MV medial e'           0.04 m/s       MITRAL VALVE:          Normal Ranges:  MV DT:        198 msec (150-240msec)       MITRAL INSUFFICIENCY:             Normal Ranges:  MR Vmax:              342.00 cm/s        AORTIC VALVE:          Normal Ranges:  AoV Vmax:     1.19 m/s (<=1.7m/s)  AoV Peak P.7 mmHg (<20mmHg)  LVOT Max Dylan: 0.65 m/s (<=1.1m/s)  LVOT VTI:     6.72 cm       RIGHT VENTRICLE:  TAPSE: 15.5 mm  RV s'  0.09 m/s       TRICUSPID VALVE/RVSP:          Normal Ranges:  Peak TR Velocity:     3.29 m/s  RV Syst Pressure:     58 mmHg  (< 30mmHg)  IVC Diam:             2.40 cm       [id number] Bird Askew MD  Electronically signed on 2025 at 1:38:04 PM       ** Final **      Physical Exam  Constitutional:       Appearance: Normal appearance.   HENT:      Head: Normocephalic.   Eyes:      Pupils: Pupils are equal, round, and reactive to light.   Cardiovascular:      Rate and Rhythm: Normal rate and regular rhythm.      Pulses: Normal pulses.   Pulmonary:      Effort: Pulmonary effort is normal.   Abdominal:      General: Abdomen is flat.      Palpations: Abdomen is soft.   Musculoskeletal:      Right lower leg: Edema present.      Left lower leg: Edema present.   Skin:     General: Skin is warm and dry.      Capillary Refill: Capillary refill takes less than 2 seconds.   Neurological:      General: No focal deficit present.      Mental Status: He is alert. He is disoriented.   Psychiatric:         Mood and Affect: Mood normal.       Patient is a 60-year-old male with past medical history of HFrEF, CAD status post CABG, V. tach arrest status post VT ablation and ICD, type 2 diabetes, left femoral DVT on Eliquis, hypothyroidism, pulmonary hypertension who presents to [facility] ED with chest pain, shortness of breath and bilateral lower extremity edema and pain.    Daily progress  -VSS.  Per cardiology consult, begin Lasix drip today as patient still symptomatic, possible right heart cath tomorrow, Eliquis held.  Appreciate vascular surgery consult for BLE venous insufficiency.    Acute Medical Conditions  Decompensated HFrEF (15-20% 2025)  Elevated troponin  CHARLOTTE on CKD  -Subjective reports of chest pain,  shortness of breath, BLE edema  - CXR significant for pulmonary vascular congestion  - Received IV Lasix 80 mg one-time in ED  - Echocardiogram 5/5/2025 shows EF 15 to 20%  Plan:  - Continue diuresis with Lasix drip, trend renal function  - Continue statin, Digoxin, Hydralazine, Isosorbide, Farxiga, Corlanor, Toprol XL.  - Hold Entresto and Aldactone in setting of soft BP and CHARLOTTE  - Eliquis held in anticipation of right heart cath  - Strict I/Os, cardiac diet  - Wean oxygen as tolerated  - Cardiology consult    Chronic BLE venous stasis dermatitis  -no signs of infection during this admission upon evaluation, will apply dressing changes as needed  Plan:  - Vascular surgery consult following    Chronic Medical Conditions  Type 2 diabetes- Home insulin regimen of Glargine 40U BID, Lispro 15U WM + SS, Farxiga 10mg and Mounjaro.  Continue glargine 24 units twice daily before meals, lispro 10 units 3 times daily before meals, SSI #3, adjust as needed  CAD s/p CABG and PCI  VT arrest s/p VT ablation and ICD  Hypertension  Hyperlipidemia  Hypothyroidism  Left femoral DVT on Eliquis  COPD  JAKE  Pulmonary hypertension  Former smoker    DVT-SCDs  Diet-cardiac diet  Code Status-full code  Consults-cardiology, vascular surgery    Dr. Kevin Velasquez   Internal Medicine PGY-1  Available on [employer] for any questions.    This is a preliminary note pending signature from the attending physician.

## 2025-05-06 NOTE — PROGRESS NOTES
05/06/25 1714   Discharge Planning   Living Arrangements Parent;Family members   Support Systems Family members;Parent   Type of Residence Private residence   Expected Discharge Disposition Home H     I met with patient at the bedside, verified insurance and demographics.  Patient lives with his mother and sister.  He ambulates with a cane or walker, also uses a wheelchair.  He denies falls, drives and is independent with ADLs.  Patient has BLE wounds/blisters which are currently wrapped/bandaged.  Patient reported he is interested in home care for skilled nursing for BLE wound care.  He could not recall the name of his PCP but did report having a  physician.  Care Coordination Team following for assistance with discharge planning.  Bria MARIN TCC

## 2025-05-06 NOTE — CARE PLAN
The patient's goals for the shift include      The clinical goals for the shift include Decrease in GI discomfort, no s/s of respiratory distress and maintain safety    Over the shift, the patient did not make progress toward the following goals. Barriers to progression include wound to ble. Recommendations to address these barriers include follow poc.

## 2025-05-06 NOTE — PROGRESS NOTES
Subjective Data:  Modest urine output with Lasix infusion.  Renal function stable.     Objective Data:  Last Recorded Vitals:  Vitals:    05/05/25 2122 05/05/25 2136 05/06/25 0350 05/06/25 0934   BP: 91/51 91/51 106/60 120/73   BP Location: Right arm  Left arm    Patient Position:   Lying    Pulse: 64  60 73   Resp:       Temp:   36 °C (96.8 °F) 36.5 °C (97.7 °F)   TempSrc:   Temporal    SpO2:   92% 95%   Weight:       Height:           Last Labs:  CBC - 5/6/2025:  7:32 AM  4.4 11.6 268    39.8      CMP - 5/6/2025:  7:32 AM  8.8 7.4 22 --- 2.1   3.0 4.1 26 35      PTT - No results in last year.  _   _ _     TROPHS   Date/Time Value Ref Range Status   05/04/2025 03:40 PM 40 0 - 20 ng/L Final   05/04/2025 02:47 PM 41 0 - 20 ng/L Final     BNP   Date/Time Value Ref Range Status   05/04/2025 02:47 PM 2,032 0 - 99 pg/mL Final   11/05/2024 11:46 AM 57 0 - 99 pg/mL Final     HGBA1C   Date/Time Value Ref Range Status   11/11/2024 03:58 PM 11.0 See comment % Final   11/05/2024 11:46 AM 10.8 See comment % Final   04/08/2024 03:50 PM 10.6 4.2 - 6.5 % Final     LDLCALC   Date/Time Value Ref Range Status   11/11/2024 03:58 PM   Final     Comment:     The calculation of LDL and VLDL are inaccurate when the Triglycerides are greater than 400 mg/dL or when the patient is non-fasting. If LDL measurement is necessary contact the testing laboratory for an alternative LDL assay.                                Near   Borderline      AGE      Desirable  Optimal    High     High     Very High     0-19 Y     0 - 109     ---    110-129   >/= 130     ----    20-24 Y     0 - 119     ---    120-159   >/= 160     ----      >24 Y     0 -  99   100-129  130-159   160-189     >/=190     11/05/2024 11:46 AM   Final     Comment:     The calculation of LDL and VLDL are inaccurate when the Triglycerides are greater than 400 mg/dL or when the patient is non-fasting. If LDL measurement is necessary contact the testing laboratory for an alternative LDL  assay.                                  Near   Borderline      AGE      Desirable  Optimal    High     High     Very High     0-19 Y     0 - 109     ---    110-129   >/= 130     ----    20-24 Y     0 - 119     ---    120-159   >/= 160     ----      >24 Y     0 -  99   100-129  130-159   160-189     >/=190     10/05/2023 03:54 AM 32 140 - 190 mg/dL Final     Comment:                                 Near   Borderline      AGE      Desirable  Optimal    High     High     Very High     0-19 Y     0 - 109     ---    110-129   >/= 130     ----    20-24 Y     0 - 119     ---    120-159   >/= 160     ----      >24 Y     0 -  99   100-129  130-159   160-189     >/=190       VLDL   Date/Time Value Ref Range Status   11/11/2024 03:58 PM   Final     Comment:     Unable to calculate VLDL.   11/05/2024 11:46 AM   Final     Comment:     Unable to calculate VLDL.   10/05/2023 03:54 AM 49 0 - 40 mg/dL Final      Last I/O:  I/O last 3 completed shifts:  In: 957.7 (9.4 mL/kg) [P.O.:950; I.V.:7.7 (0.1 mL/kg)]  Out: 850 (8.4 mL/kg) [Urine:850 (0.2 mL/kg/hr)]  Weight: 101.6 kg     Past Cardiology Tests (Last 3 Years):  EKG:  No results found for this or any previous visit from the past 1095 days.    Echo:  Transthoracic Echo Complete 05/05/2025      Transthoracic Echo (TTE) Complete 06/25/2024      Transthoracic Echo (TTE) Complete 10/14/2023    Ejection Fractions:  EF   Date/Time Value Ref Range Status   05/05/2025 12:23 PM 18 %    06/25/2024 11:00 AM 30 %      Cath:  No results found for this or any previous visit from the past 1095 days.    Stress Test:  No results found for this or any previous visit from the past 1095 days.    Cardiac Imaging:  MR cardiac morphology and function w and wo IV contrast 03/28/2023      Inpatient Medications:  Scheduled Medications[1]  PRN Medications[2]  Continuous Medications[3]    Physical Exam:  General: No acute distress,  A&O x3, Yakut speaking  Skin: Warm and dry  Neck: JVD appears to be above  the clavicles at a 90 degree angle  ENT: Moist mucous membranes no lesions appreciated  Pulmonary: Bilateral rhonchi noted  Cards: Regular rate rhythm, no murmurs gallops or rubs appreciated normal S1-S2  Abdomen: Soft nontender nondistended  Extremities: Bilateral pitting edema 1+ but improving compared to before.  Yesterday  Psych: Appropriate mood and affect         Assessment/Plan  1.  HFrEF/ischemic cardiomyopathy: Previous EF of 30%: NYHA class III stage C status post ICD secondary to VT.  Presenting with heart failure symptoms.    - Increase Lasix infusion to 10 mg/kg/min  -Maintain strict ins and outs  -Replete electrolytes as needed to maintain potassium greater than 4 magnesium greater than 2  -Monitor renal function  -Low-salt diet fluid restriction  -Hold oral anticoagulation  -Possible right heart cath tomorrow based on response to diuresis    2.  CAD/CABG: Previous stents as well as LIMA to LAD.  No chest pain.  Troponin is slightly elevated with no significant delta.  Suspect demand in the setting of heart failure.       3.  Acute on chronic renal injury: Management as described above.     4.  VT status post VT ablation and ICD.    5.  History of DVT on Eliquis: Hold Eliquis in anticipation for right heart cath     (This note was generated with voice recognition software and may contain errors including spelling, grammar, syntax and missed recognition of what was dictated, of which may not have been fully corrected)     Code Status:  Full Code    Bird Askew MD PhD       [1]   Scheduled medications   Medication Dose Route Frequency    [Held by provider] apixaban  5 mg oral BID    atorvastatin  80 mg oral Daily    dapagliflozin propanediol  10 mg oral Daily    digoxin  125 mcg oral Daily    fenofibrate  160 mg oral Daily    gabapentin  300 mg oral q AM    gabapentin  600 mg oral Nightly    [Held by provider] hydrALAZINE  100 mg oral TID    insulin glargine  24 Units subcutaneous BID AC     insulin lispro  0-15 Units subcutaneous TID AC    insulin lispro  10 Units subcutaneous TID AC    [Held by provider] isosorbide dinitrate  40 mg oral TID    [Held by provider] ivabradine  2.5 mg oral BID    levothyroxine  75 mcg oral Daily    lidocaine  1 patch transdermal Daily    metoprolol succinate XL  100 mg oral BID    pantoprazole  40 mg oral Daily before breakfast    perflutren protein A microsphere  0.5 mL intravenous Once in imaging    QUEtiapine  50 mg oral Nightly    [Held by provider] sacubitriL-valsartan  1 tablet oral BID    [Held by provider] spironolactone  50 mg oral q24h SUSI    sulfur hexafluoride microsphr  2 mL intravenous Once in imaging   [2]   PRN medications   Medication    acetaminophen    dextrose    dextrose    glucagon    glucagon    ondansetron   [3]   Continuous Medications   Medication Dose Last Rate    furosemide  10 mg/hr 10 mg/hr (05/06/25 0089)

## 2025-05-06 NOTE — PROGRESS NOTES
"Felice Law is a 60 y.o. male on day 2 of admission presenting with Acute on chronic congestive heart failure, unspecified heart failure type.    Subjective   Patient evaluated at bedside with Dr. Ferrell.  Patient's overall lower extremity edema is improved.  Patient denies any current needs stable.         Objective     Vitals:    05/06/25 1146   BP: 120/78   Pulse: 72   Resp:    Temp: 35.8 °C (96.4 °F)   SpO2: 97%      Physical Exam  Constitutional: Well developed , awake/alert/, in no distress,  Eyes: Clear sclera  ENMT: mucous membranes are moist, no apparent injury, no lesions seen,   Head/neck: Neck supple, trachea  is midline, no apparent injury, no bruits, no mass, no stridor  Respiratory/thorax: Breath sounds clear and equal diminished bilaterally throughout, thorax symmetric  Cardiac/Vascular: Regular, rate and rhythm, no murmurs, 2+ radial pulses, left DP monophasic right DP multiphasic, PTs bilaterally multiphasic dopplerable signal  Gastrointestinal: Nondistended soft nontender, positive bowel sounds, no bruits.  Musculoskeletal: Moves all extremities, limited range of motion , no joint swelling,   Extremities: No cyanosis, bilateral lower extremity superficial wounds from venous stasis, 1-2+ pitting edema.    Neurological: Alert and oriented x3,   Lymphatic: No significant lymphadenopathy  Skin: Warm and dry, no lesions, no rashes  Psychological: Appropriate mood and behavior  Blood pressure 120/78, pulse 72, temperature 35.8 °C (96.4 °F), resp. rate 18, height 1.803 m (5' 11\"), weight 102 kg (224 lb), SpO2 97%.    Intake/Output last 3 Shifts:  I/O last 3 completed shifts:  In: 957.7 (9.4 mL/kg) [P.O.:950; I.V.:7.7 (0.1 mL/kg)]  Out: 850 (8.4 mL/kg) [Urine:850 (0.2 mL/kg/hr)]  Weight: 101.6 kg     Patient Active Problem List    Diagnosis Date Noted    Non-pressure chronic ulcer of left calf, limited to breakdown of skin 05/05/2025    Peripheral venous insufficiency 05/05/2025    " Non-pressure chronic ulcer of right calf, limited to breakdown of skin 05/05/2025    Bilateral lower extremity edema 05/05/2025    Acute on chronic congestive heart failure, unspecified heart failure type 05/04/2025    Pseudophakia 11/01/2024    Combined form of age-related cataract, right eye 08/19/2024    Combined form of age-related cataract, left eye 08/19/2024    Both eyes affected by mild nonproliferative diabetic retinopathy with macular edema, associated with type 2 diabetes mellitus 08/19/2024    Myopia of both eyes 08/19/2024    Astigmatism of both eyes 08/19/2024    Presbyopia 08/19/2024    Kidney disease 10/09/2023    COPD (chronic obstructive pulmonary disease) (Multi) 10/05/2023    HTN (hypertension) 10/05/2023    JAKE (obstructive sleep apnea) 10/05/2023    Neuropathy 10/05/2023    Insomnia 10/05/2023    Hypothyroid 10/05/2023    Hypothyroidism 10/05/2023    Hypertension 10/05/2023    Chronic obstructive pulmonary disease (Multi) 10/05/2023    Abdominal aortic aneurysm, without rupture, unspecified 10/04/2023    Diabetes mellitus with circulatory complication (Multi) 10/04/2023    Peripheral vascular disease (CMS-[facility]) 10/04/2023    Circulatory disorder 10/04/2023    Insulin long-term use (Multi) 10/04/2023    Edema 10/04/2023    DVT of deep femoral vein, left 08/14/2023    Deep venous thrombosis of profunda femoris vein 08/14/2023    Type 2 MI (myocardial infarction) (Multi) 07/04/2023    Myocardial infarction (Multi) 07/04/2023    S/P CABG x 1 04/17/2023    Class 1 obesity 12/01/2022    Coronary artery disease due to calcified coronary lesion 12/01/2022    Mixed hyperlipidemia 12/01/2022    Ischemic cardiomyopathy 10/11/2022    Nuclear age-related cataract, both eyes 02/18/2022    Age-related nuclear cataract of both eyes 02/18/2022    Fatty liver 08/11/2021    Pulmonary hypertension (Multi) 08/11/2021    Steatosis of liver 08/11/2021    Calculus of gallbladder without cholecystitis without  obstruction 02/10/2021    Hepatic cirrhosis (Multi) 02/10/2021    Rectal bleeding 02/10/2021    Rectal hemorrhage 02/10/2021    Cholelithiasis without obstruction 02/10/2021    Diabetes mellitus (Multi) 01/04/2021    Chronic systolic heart failure 01/04/2021    Type 2 diabetes mellitus 01/04/2021    Obesity, Class I, BMI 30-34.9 11/19/2020    High blood cholesterol 11/19/2020    Difficulty in walking involving ankle and foot joint 02/18/2020    Foot drop, bilateral 02/18/2020    Bilateral foot-drop 02/18/2020    Walking difficulty due to ankle and foot 02/18/2020    Microscopic hematuria 02/11/2020    Vitamin D deficiency 10/22/2019    Nephrolithiasis 10/18/2019    Microcytic anemia 10/18/2019    Anemia 10/18/2019    Calculus of kidney 10/18/2019    Stage 3 chronic kidney disease (Multi) 10/01/2019    Obesity (BMI 30-39.9) 10/01/2019    CKD (chronic kidney disease) stage 3, GFR 30-59 ml/min (Multi) 10/01/2019    Obesity with body mass index 30 or greater 10/01/2019    Glaucoma suspect of both eyes 08/30/2019    Glaucoma suspect 08/30/2019    Hx of cardiac arrest 08/01/2019    S/P drug eluting coronary stent placement 08/01/2019    ICD (implantable cardioverter-defibrillator) in place 08/01/2019    History of cardiac arrest 08/01/2019    Implantable cardioverter-defibrillator (ICD) in situ 08/01/2019        Current Medications[1]     Lab Results   Component Value Date    WBC 4.4 05/06/2025    HGB 11.6 (L) 05/06/2025    HCT 39.8 (L) 05/06/2025     05/06/2025    CHOL 630 (H) 11/11/2024    TRIG 2,663 (H) 11/11/2024    HDL 29.2 11/11/2024    ALT 26 05/04/2025    AST 22 05/04/2025     (L) 05/06/2025    K 3.9 05/06/2025     05/06/2025    CREATININE 2.23 (H) 05/06/2025    BUN 47 (H) 05/06/2025    CO2 28 05/06/2025    TSH 3.26 11/11/2024    INR 1.2 (H) 10/05/2023    HGBA1C 11.0 (H) 11/11/2024       Transthoracic Echo Complete  Result Date: 5/5/2025   [facility] [address] [facility]  [id number]           Tel [phone] and Fax [phone] TRANSTHORACIC ECHOCARDIOGRAM REPORT  Patient Name:       NEGRITA BERNARD    Gayla Physician:    [id number] Bird Askew MD Study Date:         5/5/2025            Ordering Provider:    [id number] BENNY FERREREL MRN/PID:            [id number]            Fellow: Accession#:         [id number]        Nurse:                Tres Barros RN Date of Birth/Age:  1965 / 60 years Sonographer:          Alyssa Monroy RDCS Gender assigned at                     Additional Staff: Birth: Height:             180.34 cm           Admit Date:           5/4/2025 Weight:             101.61 kg           Admission Status:     Inpatient -                                                               Routine BSA / BMI:          2.21 m2 / 31.24     Encounter#:           [id number]                     kg/m2 Blood Pressure:     116/75 mmHg         Department Location:  [facility] Study Type:    TRANSTHORACIC ECHO (TTE) COMPLETE Diagnosis/ICD: Unspecified systolic (congestive) heart failure (CHF)-I50.20;                Ischemic cardiomyopathy-I25.5 Indication:    SOB CP Edema CPT Code:      Echo Limited-64998; Doppler Limited-53935; Color Doppler-48009 Patient History: CABG:              CABG x 1. Smoker:            Former. Pacer/Defib:       AICD Pertinent History: CAD, Cardiomyopathy, CHF, COPD, PVD, Previous DVT, HTN and                    Hyperlipidemia. PCI-Stent, S/P CABG x1 2023, CKD III, JAKE. Study Detail: The following Echo studies were performed: 2D, Doppler and color               flow. Technically challenging study due to body habitus. Definity               used as a contrast agent for endocardial border definition. Total               contrast used for this procedure was 2 mL via IV push.  PHYSICIAN INTERPRETATION: Left Ventricle: Left ventricular  ejection fraction is severely decreased, by visual estimate at 15-20%. There is global hypokinesis of the left ventricle with minor regional variations. The left ventricular cavity size is moderately dilated. Abnormal (paradoxical) septal motion, consistent with RV pacemaker. Spectral Doppler shows a Grade II (pseudonormal pattern) of left ventricular diastolic filling with an elevated left atrial pressure. Left Atrium: The left atrium is mildly dilated. Right Ventricle: The right ventricle is mildly enlarged. There is moderately reduced right ventricular systolic function. Right Atrium: The right atrium is normal in size. Aortic Valve: The aortic valve is trileaflet. There is minimal aortic valve cusp calcification. There is mild aortic valve thickening. There is no evidence of aortic valve regurgitation. The peak instantaneous gradient of the aortic valve is 6 mmHg. Mitral Valve: The mitral valve is mildly thickened. There is mild to moderate mitral valve regurgitation. Tricuspid Valve: The tricuspid valve is structurally normal. There is mild to moderate tricuspid regurgitation. The Doppler estimated RVSP is moderately elevated at 58.3 mmHg. Pulmonic Valve: The pulmonic valve is structurally normal. There is physiologic pulmonic valve regurgitation. Pericardium: Trivial pericardial effusion. Aorta: The aortic root is normal. Systemic Veins: The inferior vena cava appears dilated. In comparison to the previous echocardiogram(s): Compared with study dated 6/25/2024, LVEF 15-20%.  CONCLUSIONS:  1. Left ventricular ejection fraction is severely decreased, by visual estimate at 15-20%.  2. There is global hypokinesis of the left ventricle with minor regional variations.  3. Spectral Doppler shows a Grade II (pseudonormal pattern) of left ventricular diastolic filling with an elevated left atrial pressure.  4. Left ventricular cavity size is moderately dilated.  5. Abnormal septal motion consistent with RV pacemaker.   6. There is moderately reduced right ventricular systolic function.  7. Mildly enlarged right ventricle.  8. The left atrium is mildly dilated.  9. Mild to moderate mitral valve regurgitation. 10. Mild to moderate tricuspid regurgitation visualized. 11. Moderately elevated right ventricular systolic pressure. QUANTITATIVE DATA SUMMARY:  2D MEASUREMENTS:           Normal Ranges: LVEDV Index:     119 ml/m2  LV SYSTOLIC FUNCTION:                      Normal Ranges: EF-A4C View:    21 % (>=55%) EF-A2C View:    8 % EF-Biplane:     16 % EF-Visual:      18 % LV EF Reported: 18 %  LV DIASTOLIC FUNCTION:           Normal Ranges: MV Peak A:             0.65 m/s  (0.42-0.7 m/s) MV e'                  0.067 m/s (>8.0) MV lateral e'          0.09 m/s MV medial e'           0.04 m/s  MITRAL VALVE:          Normal Ranges: MV DT:        198 msec (150-240msec)  MITRAL INSUFFICIENCY:             Normal Ranges: MR Vmax:              342.00 cm/s  AORTIC VALVE:          Normal Ranges: AoV Vmax:     1.19 m/s (<=1.7m/s) AoV Peak P.7 mmHg (<20mmHg) LVOT Max Dylan: 0.65 m/s (<=1.1m/s) LVOT VTI:     6.72 cm  RIGHT VENTRICLE: TAPSE: 15.5 mm RV s'  0.09 m/s  TRICUSPID VALVE/RVSP:          Normal Ranges: Peak TR Velocity:     3.29 m/s RV Syst Pressure:     58 mmHg  (< 30mmHg) IVC Diam:             2.40 cm  [id number] Bird Askew MD Electronically signed on 2025 at 1:38:04 PM  ** Final **     XR chest 2 views  Result Date: 2025  Interpreted By:  Jigar John, STUDY: XR CHEST 2 VIEWS; 2025 1:53 pm   INDICATION: Signs/Symptoms:chest pain   COMPARISON: 2024.   ACCESSION NUMBER(S): [id number]   ORDERING CLINICIAN: ZACKERY LEUNG   TECHNIQUE: Number of films: Two-view radiographs of the chest were obtained.   FINDINGS: An ICD is again noted, with the leads overlying the right atrium and right ventricle. Sternal wires are also seen. The cardiac silhouette is mildly prominent. There are mild bilateral perihilar  interstitial infiltrates. Mild atelectatic changes are noted in the right mid lung laterally. There is also small right pleural effusion. The osseous structures are unremarkable.       Mild cardiomegaly and pulmonary vascular congestion.   Signed by: Jigar John 5/4/2025 2:07 PM Dictation workstation:   [id number]                Assessment/Plan   Assessment & Plan  Acute on chronic congestive heart failure, unspecified heart failure type    Class 1 obesity    Hepatic cirrhosis (Multi)    Obesity, Class I, BMI 30-34.9    Non-pressure chronic ulcer of left calf, limited to breakdown of skin    Peripheral venous insufficiency    Non-pressure chronic ulcer of right calf, limited to breakdown of skin    Bilateral lower extremity edema    Patient was evaluated bedside.  Overall edema of lower extremities is improved patient is asymptomatic of lower extremity wounds.  Continue wound care as ordered.  Monitor for increased heart failure and increased edema.  Patient informed to elevate lower extremities to reduce edema and facilitate healing.  Patient should wear some form of mild compression to his lower extremities.  From a vascular surgery standpoint there are no objections or barriers for this patient to be discharged.    Thank you very much for allowing Vascular Surgery to be involved in the care of your patient sincerely Mahamed MASON-CNP .  (This note was generated with voice recognition software and may contain errors including spelling, grammar, syntax and missed recognition of what was dictated, of which may not have been fully corrected)      MARLON High-CHANDA        [1]   Current Facility-Administered Medications:     acetaminophen (Tylenol) tablet 975 mg, 975 mg, oral, q6h PRN, Lane Alaniz DO    [Held by provider] apixaban (Eliquis) tablet 5 mg, 5 mg, oral, BID, Lane Alaniz DO, 5 mg at 05/06/25 0939    atorvastatin (Lipitor) tablet 80 mg, 80 mg, oral, Daily, Lane Alaniz DO, 80 mg at  05/06/25 0941    dapagliflozin propanediol (Farxiga) tablet 10 mg, 10 mg, oral, Daily, Lane Alaniz, DO, 10 mg at 05/06/25 0941    dextrose 50 % injection 12.5 g, 12.5 g, intravenous, q15 min PRN, Lane Alaniz, DO    dextrose 50 % injection 25 g, 25 g, intravenous, q15 min PRN, Lane Alaniz, DO    digoxin (Lanoxin) tablet 125 mcg, 125 mcg, oral, Daily, Lane Alaniz, DO, 125 mcg at 05/06/25 0941    fenofibrate (Triglide) tablet 160 mg, 160 mg, oral, Daily, Lane Alaniz, DO, 160 mg at 05/06/25 0942    furosemide (Lasix) 500 mg in 50 mL (10 mg/mL) infusion, 10 mg/hr, intravenous, Continuous, Bird Askew MD PhD, Last Rate: 1 mL/hr at 05/06/25 0944, 10 mg/hr at 05/06/25 0944    gabapentin (Neurontin) capsule 300 mg, 300 mg, oral, q AM, Lane Alaniz, DO, 300 mg at 05/06/25 0941    gabapentin (Neurontin) capsule 600 mg, 600 mg, oral, Nightly, Lane Alaniz, DO, 600 mg at 05/05/25 2113    glucagon (Glucagen) injection 1 mg, 1 mg, intramuscular, q15 min PRN, Lane Alaniz, DO    glucagon (Glucagen) injection 1 mg, 1 mg, intramuscular, q15 min PRN, Lane Alaniz, DO    [Held by provider] hydrALAZINE (Apresoline) tablet 100 mg, 100 mg, oral, TID, Lane Alaniz, DO, 100 mg at 05/05/25 1445    insulin glargine (Lantus) injection 24 Units, 24 Units, subcutaneous, BID AC, Lane Alaniz, DO, 24 Units at 05/06/25 0921    insulin lispro injection 0-15 Units, 0-15 Units, subcutaneous, TID AC, Lane Alaniz, DO, 3 Units at 05/06/25 1201    insulin lispro injection 10 Units, 10 Units, subcutaneous, TID AC, Lane Alaniz, DO, 10 Units at 05/06/25 1201    [Held by provider] isosorbide dinitrate (Isordil) tablet 40 mg, 40 mg, oral, TID, Lane Alaniz DO, 40 mg at 05/05/25 1858    [Held by provider] ivabradine (Corlanor) tablet 2.5 mg, 2.5 mg, oral, BID, Lane Alaniz DO, 2.5 mg at 05/05/25 2157    levothyroxine (Synthroid, Levoxyl) tablet 75 mcg, 75 mcg, oral, Daily, Lane Alaniz DO, 75 mcg at 05/06/25 0640    lidocaine 4 % patch 1 patch, 1 patch,  transdermal, Daily, Lane Alaniz DO, 1 patch at 05/05/25 0854    metoprolol succinate XL (Toprol-XL) 24 hr tablet 100 mg, 100 mg, oral, BID, Bird Askew MD PhD, 100 mg at 05/06/25 0940    ondansetron (Zofran) injection 4 mg, 4 mg, intravenous, q6h PRN, Lane Alaniz DO    pantoprazole (ProtoNix) EC tablet 40 mg, 40 mg, oral, Daily before breakfast, Lane Alaniz DO, 40 mg at 05/06/25 0640    perflutren protein A microsphere (Optison) injection 0.5 mL, 0.5 mL, intravenous, Once in imaging, Lane Alaniz DO    QUEtiapine (SEROquel) tablet 50 mg, 50 mg, oral, Nightly, Lane Alaniz DO, 50 mg at 05/05/25 2113    [Held by provider] sacubitriL-valsartan (Entresto)  mg per tablet 1 tablet, 1 tablet, oral, BID, Lane Alaniz DO    [Held by provider] spironolactone (Aldactone) tablet 50 mg, 50 mg, oral, q24h SUSI, Lane Alaniz DO    sulfur hexafluoride microsphr (Lumason) injection 24.28 mg, 2 mL, intravenous, Once in imaging, Lane Alaniz DO

## 2025-05-07 LAB
ANION GAP SERPL CALC-SCNC: 13 MMOL/L (ref 10–20)
BUN SERPL-MCNC: 48 MG/DL (ref 6–23)
CALCIUM SERPL-MCNC: 9.5 MG/DL (ref 8.6–10.3)
CHLORIDE SERPL-SCNC: 97 MMOL/L (ref 98–107)
CO2 SERPL-SCNC: 31 MMOL/L (ref 21–32)
CREAT SERPL-MCNC: 2.19 MG/DL (ref 0.5–1.3)
EGFRCR SERPLBLD CKD-EPI 2021: 34 ML/MIN/1.73M*2
ERYTHROCYTE [DISTWIDTH] IN BLOOD BY AUTOMATED COUNT: 21.2 % (ref 11.5–14.5)
GLUCOSE BLD MANUAL STRIP-MCNC: 138 MG/DL (ref 74–99)
GLUCOSE BLD MANUAL STRIP-MCNC: 180 MG/DL (ref 74–99)
GLUCOSE BLD MANUAL STRIP-MCNC: 187 MG/DL (ref 74–99)
GLUCOSE SERPL-MCNC: 137 MG/DL (ref 74–99)
HCT VFR BLD AUTO: 41.9 % (ref 41–52)
HGB BLD-MCNC: 12.2 G/DL (ref 13.5–17.5)
MAGNESIUM SERPL-MCNC: 2.55 MG/DL (ref 1.6–2.4)
MCH RBC QN AUTO: 23.3 PG (ref 26–34)
MCHC RBC AUTO-ENTMCNC: 29.1 G/DL (ref 32–36)
MCV RBC AUTO: 80 FL (ref 80–100)
NRBC BLD-RTO: 0 /100 WBCS (ref 0–0)
PLATELET # BLD AUTO: 273 X10*3/UL (ref 150–450)
POTASSIUM SERPL-SCNC: 4.2 MMOL/L (ref 3.5–5.3)
RBC # BLD AUTO: 5.24 X10*6/UL (ref 4.5–5.9)
SODIUM SERPL-SCNC: 137 MMOL/L (ref 136–145)
WBC # BLD AUTO: 4.4 X10*3/UL (ref 4.4–11.3)

## 2025-05-07 PROCEDURE — 99232 SBSQ HOSP IP/OBS MODERATE 35: CPT

## 2025-05-07 PROCEDURE — 83735 ASSAY OF MAGNESIUM: CPT

## 2025-05-07 PROCEDURE — 85027 COMPLETE CBC AUTOMATED: CPT

## 2025-05-07 PROCEDURE — 1200000002 HC GENERAL ROOM WITH TELEMETRY DAILY

## 2025-05-07 PROCEDURE — 99232 SBSQ HOSP IP/OBS MODERATE 35: CPT | Performed by: NURSE PRACTITIONER

## 2025-05-07 PROCEDURE — 36415 COLL VENOUS BLD VENIPUNCTURE: CPT

## 2025-05-07 PROCEDURE — 2500000001 HC RX 250 WO HCPCS SELF ADMINISTERED DRUGS (ALT 637 FOR MEDICARE OP): Performed by: INTERNAL MEDICINE

## 2025-05-07 PROCEDURE — 99232 SBSQ HOSP IP/OBS MODERATE 35: CPT | Performed by: STUDENT IN AN ORGANIZED HEALTH CARE EDUCATION/TRAINING PROGRAM

## 2025-05-07 PROCEDURE — 2500000001 HC RX 250 WO HCPCS SELF ADMINISTERED DRUGS (ALT 637 FOR MEDICARE OP)

## 2025-05-07 PROCEDURE — 82947 ASSAY GLUCOSE BLOOD QUANT: CPT

## 2025-05-07 PROCEDURE — 2500000001 HC RX 250 WO HCPCS SELF ADMINISTERED DRUGS (ALT 637 FOR MEDICARE OP): Performed by: STUDENT IN AN ORGANIZED HEALTH CARE EDUCATION/TRAINING PROGRAM

## 2025-05-07 PROCEDURE — 80048 BASIC METABOLIC PNL TOTAL CA: CPT

## 2025-05-07 PROCEDURE — 2500000004 HC RX 250 GENERAL PHARMACY W/ HCPCS (ALT 636 FOR OP/ED): Mod: JZ | Performed by: STUDENT IN AN ORGANIZED HEALTH CARE EDUCATION/TRAINING PROGRAM

## 2025-05-07 PROCEDURE — 2500000002 HC RX 250 W HCPCS SELF ADMINISTERED DRUGS (ALT 637 FOR MEDICARE OP, ALT 636 FOR OP/ED): Performed by: INTERNAL MEDICINE

## 2025-05-07 RX ORDER — ISOSORBIDE DINITRATE 10 MG/1
10 TABLET ORAL
Status: DISCONTINUED | OUTPATIENT
Start: 2025-05-07 | End: 2025-05-08 | Stop reason: HOSPADM

## 2025-05-07 RX ORDER — HYDRALAZINE HYDROCHLORIDE 25 MG/1
25 TABLET, FILM COATED ORAL 3 TIMES DAILY
Status: DISCONTINUED | OUTPATIENT
Start: 2025-05-07 | End: 2025-05-08 | Stop reason: HOSPADM

## 2025-05-07 RX ADMIN — HYDRALAZINE HYDROCHLORIDE 25 MG: 25 TABLET ORAL at 14:03

## 2025-05-07 RX ADMIN — ISOSORBIDE DINITRATE 10 MG: 10 TABLET ORAL at 14:03

## 2025-05-07 RX ADMIN — DAPAGLIFLOZIN 10 MG: 10 TABLET, FILM COATED ORAL at 09:02

## 2025-05-07 RX ADMIN — HYDRALAZINE HYDROCHLORIDE 25 MG: 25 TABLET ORAL at 20:42

## 2025-05-07 RX ADMIN — METOPROLOL SUCCINATE 100 MG: 50 TABLET, EXTENDED RELEASE ORAL at 09:02

## 2025-05-07 RX ADMIN — INSULIN LISPRO 3 UNITS: 100 INJECTION, SOLUTION INTRAVENOUS; SUBCUTANEOUS at 11:41

## 2025-05-07 RX ADMIN — LEVOTHYROXINE SODIUM 75 MCG: 0.07 TABLET ORAL at 05:48

## 2025-05-07 RX ADMIN — ISOSORBIDE DINITRATE 10 MG: 10 TABLET ORAL at 20:44

## 2025-05-07 RX ADMIN — PANTOPRAZOLE SODIUM 40 MG: 40 TABLET, DELAYED RELEASE ORAL at 06:16

## 2025-05-07 RX ADMIN — APIXABAN 5 MG: 5 TABLET, FILM COATED ORAL at 20:42

## 2025-05-07 RX ADMIN — FUROSEMIDE 10 MG/HR: 10 INJECTION, SOLUTION INTRAMUSCULAR; INTRAVENOUS at 08:18

## 2025-05-07 RX ADMIN — INSULIN GLARGINE 24 UNITS: 100 INJECTION, SOLUTION SUBCUTANEOUS at 08:58

## 2025-05-07 RX ADMIN — GABAPENTIN 300 MG: 300 CAPSULE ORAL at 09:02

## 2025-05-07 RX ADMIN — DIGOXIN 125 MCG: 125 TABLET ORAL at 09:03

## 2025-05-07 RX ADMIN — METOPROLOL SUCCINATE 100 MG: 50 TABLET, EXTENDED RELEASE ORAL at 20:42

## 2025-05-07 RX ADMIN — QUETIAPINE FUMARATE 50 MG: 25 TABLET ORAL at 20:42

## 2025-05-07 RX ADMIN — INSULIN GLARGINE 24 UNITS: 100 INJECTION, SOLUTION SUBCUTANEOUS at 16:50

## 2025-05-07 RX ADMIN — GABAPENTIN 600 MG: 300 CAPSULE ORAL at 20:42

## 2025-05-07 RX ADMIN — ATORVASTATIN CALCIUM 80 MG: 40 TABLET, FILM COATED ORAL at 09:02

## 2025-05-07 RX ADMIN — INSULIN LISPRO 3 UNITS: 100 INJECTION, SOLUTION INTRAVENOUS; SUBCUTANEOUS at 16:50

## 2025-05-07 RX ADMIN — FENOFIBRATE 160 MG: 160 TABLET ORAL at 09:02

## 2025-05-07 SDOH — SOCIAL STABILITY: SOCIAL INSECURITY: WITHIN THE LAST YEAR, HAVE YOU BEEN HUMILIATED OR EMOTIONALLY ABUSED IN OTHER WAYS BY YOUR PARTNER OR EX-PARTNER?: NO

## 2025-05-07 SDOH — ECONOMIC STABILITY: FOOD INSECURITY: WITHIN THE PAST 12 MONTHS, THE FOOD YOU BOUGHT JUST DIDN'T LAST AND YOU DIDN'T HAVE MONEY TO GET MORE.: NEVER TRUE

## 2025-05-07 SDOH — ECONOMIC STABILITY: HOUSING INSECURITY: IN THE LAST 12 MONTHS, WAS THERE A TIME WHEN YOU WERE NOT ABLE TO PAY THE MORTGAGE OR RENT ON TIME?: NO

## 2025-05-07 SDOH — HEALTH STABILITY: PHYSICAL HEALTH: ON AVERAGE, HOW MANY DAYS PER WEEK DO YOU ENGAGE IN MODERATE TO STRENUOUS EXERCISE (LIKE A BRISK WALK)?: 0 DAYS

## 2025-05-07 SDOH — SOCIAL STABILITY: SOCIAL INSECURITY: WITHIN THE LAST YEAR, HAVE YOU BEEN AFRAID OF YOUR PARTNER OR EX-PARTNER?: NO

## 2025-05-07 SDOH — ECONOMIC STABILITY: TRANSPORTATION INSECURITY: IN THE PAST 12 MONTHS, HAS LACK OF TRANSPORTATION KEPT YOU FROM MEDICAL APPOINTMENTS OR FROM GETTING MEDICATIONS?: NO

## 2025-05-07 SDOH — ECONOMIC STABILITY: HOUSING INSECURITY: AT ANY TIME IN THE PAST 12 MONTHS, WERE YOU HOMELESS OR LIVING IN A SHELTER (INCLUDING NOW)?: NO

## 2025-05-07 SDOH — ECONOMIC STABILITY: INCOME INSECURITY: IN THE PAST 12 MONTHS HAS THE ELECTRIC, GAS, OIL, OR WATER COMPANY THREATENED TO SHUT OFF SERVICES IN YOUR HOME?: NO

## 2025-05-07 SDOH — ECONOMIC STABILITY: FOOD INSECURITY: WITHIN THE PAST 12 MONTHS, YOU WORRIED THAT YOUR FOOD WOULD RUN OUT BEFORE YOU GOT THE MONEY TO BUY MORE.: NEVER TRUE

## 2025-05-07 SDOH — ECONOMIC STABILITY: FOOD INSECURITY: HOW HARD IS IT FOR YOU TO PAY FOR THE VERY BASICS LIKE FOOD, HOUSING, MEDICAL CARE, AND HEATING?: NOT HARD AT ALL

## 2025-05-07 SDOH — ECONOMIC STABILITY: HOUSING INSECURITY: IN THE PAST 12 MONTHS, HOW MANY TIMES HAVE YOU MOVED WHERE YOU WERE LIVING?: 0

## 2025-05-07 ASSESSMENT — ACTIVITIES OF DAILY LIVING (ADL): LACK_OF_TRANSPORTATION: NO

## 2025-05-07 ASSESSMENT — PAIN SCALES - GENERAL
PAINLEVEL_OUTOF10: 0 - NO PAIN
PAINLEVEL_OUTOF10: 0 - NO PAIN

## 2025-05-07 ASSESSMENT — PAIN - FUNCTIONAL ASSESSMENT: PAIN_FUNCTIONAL_ASSESSMENT: 0-10

## 2025-05-07 NOTE — PROGRESS NOTES
Negrita Law is a 60 y.o. male on day 3 of admission presenting with Acute on chronic congestive heart failure, unspecified heart failure type.      Subjective   Patient was seen and examined, no complaints overnight.       Objective     Last Recorded Vitals  /66 (BP Location: Left arm, Patient Position: Lying)   Pulse 65   Temp 35.9 °C (96.6 °F) (Temporal)   Resp 18   Wt 102 kg (224 lb)   SpO2 94%   Intake/Output last 3 Shifts:    Intake/Output Summary (Last 24 hours) at 5/7/2025 1155  Last data filed at 5/7/2025 0950  Gross per 24 hour   Intake 99.72 ml   Output 4250 ml   Net -4150.28 ml       Admission Weight  Weight: 102 kg (224 lb) (05/04/25 1332)    Daily Weight  05/04/25 : 102 kg (224 lb)    Image Results  Transthoracic Echo Complete     [facility], [address] [place] [address]            Tel [phone] and Fax [phone]    TRANSTHORACIC ECHOCARDIOGRAM REPORT       Patient Name:       NEGRITA BERNARD    Gayla Physician:    [id number] Bird Askew MD  Study Date:         5/5/2025            Ordering Provider:    [id number] BENNY CARTWRIGHT  MRN/PID:            [id number]            Fellow:  Accession#:         [id number]        Nurse:                Tres Barros RN  Date of Birth/Age:  1965 / 60 years Sonographer:          Alyssa Monroy RDCS  Gender assigned at  M                   Additional Staff:  Birth:  Height:             180.34 cm           Admit Date:           5/4/2025  Weight:             101.61 kg           Admission Status:     Inpatient -                                                                Routine  BSA / BMI:          2.21 m2 / 31.24     Encounter#:           [id number]                      kg/m2  Blood Pressure:     116/75 mmHg         Department Location:  [facility]    Study Type:    TRANSTHORACIC ECHO (TTE)  COMPLETE  Diagnosis/ICD: Unspecified systolic (congestive) heart failure (CHF)-I50.20;                 Ischemic cardiomyopathy-I25.5  Indication:    SOB CP Edema  CPT Code:      Echo Limited-24465; Doppler Limited-99268; Color Doppler-83345    Patient History:  CABG:              CABG x 1.  Smoker:            Former.  Pacer/Defib:       AICD  Pertinent History: CAD, Cardiomyopathy, CHF, COPD, PVD, Previous DVT, HTN and                     Hyperlipidemia. PCI-Stent, S/P CABG x1 2023, CKD III, JAKE.    Study Detail: The following Echo studies were performed: 2D, Doppler and color                flow. Technically challenging study due to body habitus. Definity                used as a contrast agent for endocardial border definition. Total                contrast used for this procedure was 2 mL via IV push.       PHYSICIAN INTERPRETATION:  Left Ventricle: Left ventricular ejection fraction is severely decreased, by visual estimate at 15-20%. There is global hypokinesis of the left ventricle with minor regional variations. The left ventricular cavity size is moderately dilated. Abnormal (paradoxical) septal motion, consistent with RV pacemaker. Spectral Doppler shows a Grade II (pseudonormal pattern) of left ventricular diastolic filling with an elevated left atrial pressure.  Left Atrium: The left atrium is mildly dilated.  Right Ventricle: The right ventricle is mildly enlarged. There is moderately reduced right ventricular systolic function.  Right Atrium: The right atrium is normal in size.  Aortic Valve: The aortic valve is trileaflet. There is minimal aortic valve cusp calcification. There is mild aortic valve thickening. There is no evidence of aortic valve regurgitation. The peak instantaneous gradient of the aortic valve is 6 mmHg.  Mitral Valve: The mitral valve is mildly thickened. There is mild to moderate mitral valve regurgitation.  Tricuspid Valve: The tricuspid valve is structurally normal. There is mild  to moderate tricuspid regurgitation. The Doppler estimated RVSP is moderately elevated at 58.3 mmHg.  Pulmonic Valve: The pulmonic valve is structurally normal. There is physiologic pulmonic valve regurgitation.  Pericardium: Trivial pericardial effusion.  Aorta: The aortic root is normal.  Systemic Veins: The inferior vena cava appears dilated.  In comparison to the previous echocardiogram(s): Compared with study dated 6/25/2024, LVEF 15-20%.       CONCLUSIONS:   1. Left ventricular ejection fraction is severely decreased, by visual estimate at 15-20%.   2. There is global hypokinesis of the left ventricle with minor regional variations.   3. Spectral Doppler shows a Grade II (pseudonormal pattern) of left ventricular diastolic filling with an elevated left atrial pressure.   4. Left ventricular cavity size is moderately dilated.   5. Abnormal septal motion consistent with RV pacemaker.   6. There is moderately reduced right ventricular systolic function.   7. Mildly enlarged right ventricle.   8. The left atrium is mildly dilated.   9. Mild to moderate mitral valve regurgitation.  10. Mild to moderate tricuspid regurgitation visualized.  11. Moderately elevated right ventricular systolic pressure.    QUANTITATIVE DATA SUMMARY:     2D MEASUREMENTS:           Normal Ranges:  LVEDV Index:     119 ml/m2       LV SYSTOLIC FUNCTION:                       Normal Ranges:  EF-A4C View:    21 % (>=55%)  EF-A2C View:    8 %  EF-Biplane:     16 %  EF-Visual:      18 %  LV EF Reported: 18 %       LV DIASTOLIC FUNCTION:           Normal Ranges:  MV Peak A:             0.65 m/s  (0.42-0.7 m/s)  MV e'                  0.067 m/s (>8.0)  MV lateral e'          0.09 m/s  MV medial e'           0.04 m/s       MITRAL VALVE:          Normal Ranges:  MV DT:        198 msec (150-240msec)       MITRAL INSUFFICIENCY:             Normal Ranges:  MR Vmax:              342.00 cm/s       AORTIC VALVE:          Normal Ranges:  AoV Vmax:      1.19 m/s (<=1.7m/s)  AoV Peak P.7 mmHg (<20mmHg)  LVOT Max Dylan: 0.65 m/s (<=1.1m/s)  LVOT VTI:     6.72 cm       RIGHT VENTRICLE:  TAPSE: 15.5 mm  RV s'  0.09 m/s       TRICUSPID VALVE/RVSP:          Normal Ranges:  Peak TR Velocity:     3.29 m/s  RV Syst Pressure:     58 mmHg  (< 30mmHg)  IVC Diam:             2.40 cm       [id number] Bird Askew MD  Electronically signed on 2025 at 1:38:04 PM       ** Final **      Physical Exam  Constitutional:       Appearance: Normal appearance.   HENT:      Head: Normocephalic.   Eyes:      Pupils: Pupils are equal, round, and reactive to light.   Cardiovascular:      Rate and Rhythm: Normal rate and regular rhythm.      Pulses: Normal pulses.   Pulmonary:      Effort: Pulmonary effort is normal.   Abdominal:      General: Abdomen is flat.      Palpations: Abdomen is soft.   Musculoskeletal:      Right lower leg: Edema present.      Left lower leg: Edema present.   Skin:     General: Skin is warm and dry.      Capillary Refill: Capillary refill takes less than 2 seconds.   Neurological:      General: No focal deficit present.      Mental Status: He is alert. He is disoriented.   Psychiatric:         Mood and Affect: Mood normal.       Patient is a 60-year-old male with past medical history of HFrEF, CAD status post CABG, V. tach arrest status post VT ablation and ICD, type 2 diabetes, left femoral DVT on Eliquis, hypothyroidism, pulmonary hypertension who presents to [facility] ED with chest pain, shortness of breath and bilateral lower extremity edema and pain.    Daily progress  -VSS.  Creatinine stable, patient had about 4 L of urine output yesterday, continue Lasix drip probably for at least 1 more day according to cardiology consult, also okay to restart Eliquis.  BLE edema improving.  Appreciate vascular surgery consult for BLE venous insufficiency.    Acute Medical Conditions  Decompensated HFrEF (15-20% 2025)  Elevated troponin  CHARLOTTE on CKD  -Subjective reports  of chest pain, shortness of breath, BLE edema  - CXR significant for pulmonary vascular congestion  - Received IV Lasix 80 mg one-time in ED  - Echocardiogram 5/5/2025 shows EF 15 to 20%  Plan:  - Continue diuresis with Lasix drip, trend renal function  - Continue statin, Digoxin, Hydralazine, Isosorbide, Farxiga, Corlanor, Toprol XL.  - Hold Entresto and Aldactone in setting of soft BP and CHARLOTTE  - Strict I/Os, cardiac diet  - Wean oxygen as tolerated  - Cardiology consult    Chronic BLE venous stasis dermatitis  -no signs of infection during this admission upon evaluation, will apply dressing changes as needed  Plan:  - Vascular surgery consult following    Chronic Medical Conditions  Type 2 diabetes- Home insulin regimen of Glargine 40U BID, Lispro 15U WM + SS, Farxiga 10mg and Mounjaro.  Continue glargine 24 units twice daily before meals, lispro 10 units 3 times daily before meals, SSI #3, adjust as needed  CAD s/p CABG and PCI  VT arrest s/p VT ablation and ICD  Hypertension  Hyperlipidemia  Hypothyroidism  Left femoral DVT on Eliquis  COPD  JAKE  Pulmonary hypertension  Former smoker    DVT-SCDs, Eliquis  Diet-cardiac diet  Code Status-full code  Consults-cardiology, vascular surgery    Dr. Kevin Velasquez   Internal Medicine PGY-1  Available on [employer] for any questions.    This is a preliminary note pending signature from the attending physician.

## 2025-05-07 NOTE — PROGRESS NOTES
"Felice Law is a 60 y.o. male on day 3 of admission presenting with Acute on chronic congestive heart failure, unspecified heart failure type.    Subjective    Evaluated patient at bedside and performed his dressing change at this visit.  Patient has no new complaints.    Objective     Vitals:    05/07/25 0352   BP: 102/66   Pulse: 65   Resp:    Temp: 35.9 °C (96.6 °F)   SpO2: 94%      Physical Exam  Constitutional: Well developed , awake/alert/, in no distress,  Eyes: Clear sclera  ENMT: mucous membranes are moist, no apparent injury, no lesions seen,   Head/neck: Neck supple, trachea  is midline, no apparent injury, no bruits, no mass, no stridor  Respiratory/thorax: Breath sounds clear and equal diminished bilaterally throughout, thorax symmetric  Cardiac/Vascular: Regular, rate and rhythm, no murmurs, 2+ radial pulses, left DP monophasic right DP multiphasic, PTs bilaterally multiphasic dopplerable signal  Gastrointestinal: Nondistended soft nontender, positive bowel sounds, no bruits.  Musculoskeletal: Moves all extremities, limited range of motion , no joint swelling,   Extremities: No cyanosis, bilateral lower extremity superficial wounds from venous stasis, 1-2+ pitting edema.    Neurological: Alert and oriented x3,   Lymphatic: No significant lymphadenopathy  Skin: Warm and dry, no lesions, no rashes  Psychological: Appropriate mood and behavior  Blood pressure 102/66, pulse 65, temperature 35.9 °C (96.6 °F), temperature source Temporal, resp. rate 18, height 1.803 m (5' 11\"), weight 102 kg (224 lb), SpO2 94%.        Intake/Output last 3 Shifts:  I/O last 3 completed shifts:  In: 247.7 (2.4 mL/kg) [P.O.:240; I.V.:7.7 (0.1 mL/kg)]  Out: 4650 (45.8 mL/kg) [Urine:4650 (1.3 mL/kg/hr)]  Weight: 101.6 kg     Patient Active Problem List    Diagnosis Date Noted    Non-pressure chronic ulcer of left calf, limited to breakdown of skin 05/05/2025    Peripheral venous insufficiency 05/05/2025    Non-pressure " chronic ulcer of right calf, limited to breakdown of skin 05/05/2025    Bilateral lower extremity edema 05/05/2025    Acute on chronic congestive heart failure, unspecified heart failure type 05/04/2025    Pseudophakia 11/01/2024    Combined form of age-related cataract, right eye 08/19/2024    Combined form of age-related cataract, left eye 08/19/2024    Both eyes affected by mild nonproliferative diabetic retinopathy with macular edema, associated with type 2 diabetes mellitus 08/19/2024    Myopia of both eyes 08/19/2024    Astigmatism of both eyes 08/19/2024    Presbyopia 08/19/2024    Kidney disease 10/09/2023    COPD (chronic obstructive pulmonary disease) (Multi) 10/05/2023    HTN (hypertension) 10/05/2023    JAKE (obstructive sleep apnea) 10/05/2023    Neuropathy 10/05/2023    Insomnia 10/05/2023    Hypothyroid 10/05/2023    Hypothyroidism 10/05/2023    Hypertension 10/05/2023    Chronic obstructive pulmonary disease (Multi) 10/05/2023    Abdominal aortic aneurysm, without rupture, unspecified 10/04/2023    Diabetes mellitus with circulatory complication (Multi) 10/04/2023    Peripheral vascular disease (CMS-[facility]) 10/04/2023    Circulatory disorder 10/04/2023    Insulin long-term use (Multi) 10/04/2023    Edema 10/04/2023    DVT of deep femoral vein, left 08/14/2023    Deep venous thrombosis of profunda femoris vein 08/14/2023    Type 2 MI (myocardial infarction) (Multi) 07/04/2023    Myocardial infarction (Multi) 07/04/2023    S/P CABG x 1 04/17/2023    Class 1 obesity 12/01/2022    Coronary artery disease due to calcified coronary lesion 12/01/2022    Mixed hyperlipidemia 12/01/2022    Ischemic cardiomyopathy 10/11/2022    Nuclear age-related cataract, both eyes 02/18/2022    Age-related nuclear cataract of both eyes 02/18/2022    Fatty liver 08/11/2021    Pulmonary hypertension (Multi) 08/11/2021    Steatosis of liver 08/11/2021    Calculus of gallbladder without cholecystitis without obstruction 02/10/2021     Hepatic cirrhosis (Multi) 02/10/2021    Rectal bleeding 02/10/2021    Rectal hemorrhage 02/10/2021    Cholelithiasis without obstruction 02/10/2021    Diabetes mellitus (Multi) 01/04/2021    Chronic systolic heart failure 01/04/2021    Type 2 diabetes mellitus 01/04/2021    Obesity, Class I, BMI 30-34.9 11/19/2020    High blood cholesterol 11/19/2020    Difficulty in walking involving ankle and foot joint 02/18/2020    Foot drop, bilateral 02/18/2020    Bilateral foot-drop 02/18/2020    Walking difficulty due to ankle and foot 02/18/2020    Microscopic hematuria 02/11/2020    Vitamin D deficiency 10/22/2019    Nephrolithiasis 10/18/2019    Microcytic anemia 10/18/2019    Anemia 10/18/2019    Calculus of kidney 10/18/2019    Stage 3 chronic kidney disease (Multi) 10/01/2019    Obesity (BMI 30-39.9) 10/01/2019    CKD (chronic kidney disease) stage 3, GFR 30-59 ml/min (Multi) 10/01/2019    Obesity with body mass index 30 or greater 10/01/2019    Glaucoma suspect of both eyes 08/30/2019    Glaucoma suspect 08/30/2019    Hx of cardiac arrest 08/01/2019    S/P drug eluting coronary stent placement 08/01/2019    ICD (implantable cardioverter-defibrillator) in place 08/01/2019    History of cardiac arrest 08/01/2019    Implantable cardioverter-defibrillator (ICD) in situ 08/01/2019        Current Medications[1]     Lab Results   Component Value Date    WBC 4.4 05/07/2025    HGB 12.2 (L) 05/07/2025    HCT 41.9 05/07/2025     05/07/2025    CHOL 630 (H) 11/11/2024    TRIG 2,663 (H) 11/11/2024    HDL 29.2 11/11/2024    ALT 26 05/04/2025    AST 22 05/04/2025     05/07/2025    K 4.2 05/07/2025    CL 97 (L) 05/07/2025    CREATININE 2.19 (H) 05/07/2025    BUN 48 (H) 05/07/2025    CO2 31 05/07/2025    TSH 3.26 11/11/2024    INR 1.2 (H) 10/05/2023    HGBA1C 11.0 (H) 11/11/2024       Transthoracic Echo Complete  Result Date: 5/5/2025   [facility], [address], [facility] [address]           Tel [phone]  and Fax [phone] TRANSTHORACIC ECHOCARDIOGRAM REPORT  Patient Name:       NEGRITA Shukla Physician:    [id number] Bird Askew MD Study Date:         5/5/2025            Ordering Provider:    [id number] BENNY CARTWRIGHT MRN/PID:            [id number]            Fellow: Accession#:         [id number]        Nurse:                Tres Barros RN Date of Birth/Age:  1965 / 60 years Sonographer:          Alyssa Monroy RDCS Gender assigned at  M                   Additional Staff: Birth: Height:             180.34 cm           Admit Date:           5/4/2025 Weight:             101.61 kg           Admission Status:     Inpatient -                                                               Routine BSA / BMI:          2.21 m2 / 31.24     Encounter#:           [id number]                     kg/m2 Blood Pressure:     116/75 mmHg         Department Location:  [facility] Study Type:    TRANSTHORACIC ECHO (TTE) COMPLETE Diagnosis/ICD: Unspecified systolic (congestive) heart failure (CHF)-I50.20;                Ischemic cardiomyopathy-I25.5 Indication:    SOB CP Edema CPT Code:      Echo Limited-67516; Doppler Limited-81314; Color Doppler-28942 Patient History: CABG:              CABG x 1. Smoker:            Former. Pacer/Defib:       AICD Pertinent History: CAD, Cardiomyopathy, CHF, COPD, PVD, Previous DVT, HTN and                    Hyperlipidemia. PCI-Stent, S/P CABG x1 2023, CKD III, JAKE. Study Detail: The following Echo studies were performed: 2D, Doppler and color               flow. Technically challenging study due to body habitus. Definity               used as a contrast agent for endocardial border definition. Total               contrast used for this procedure was 2 mL via IV push.  PHYSICIAN INTERPRETATION: Left Ventricle: Left ventricular ejection fraction is severely  decreased, by visual estimate at 15-20%. There is global hypokinesis of the left ventricle with minor regional variations. The left ventricular cavity size is moderately dilated. Abnormal (paradoxical) septal motion, consistent with RV pacemaker. Spectral Doppler shows a Grade II (pseudonormal pattern) of left ventricular diastolic filling with an elevated left atrial pressure. Left Atrium: The left atrium is mildly dilated. Right Ventricle: The right ventricle is mildly enlarged. There is moderately reduced right ventricular systolic function. Right Atrium: The right atrium is normal in size. Aortic Valve: The aortic valve is trileaflet. There is minimal aortic valve cusp calcification. There is mild aortic valve thickening. There is no evidence of aortic valve regurgitation. The peak instantaneous gradient of the aortic valve is 6 mmHg. Mitral Valve: The mitral valve is mildly thickened. There is mild to moderate mitral valve regurgitation. Tricuspid Valve: The tricuspid valve is structurally normal. There is mild to moderate tricuspid regurgitation. The Doppler estimated RVSP is moderately elevated at 58.3 mmHg. Pulmonic Valve: The pulmonic valve is structurally normal. There is physiologic pulmonic valve regurgitation. Pericardium: Trivial pericardial effusion. Aorta: The aortic root is normal. Systemic Veins: The inferior vena cava appears dilated. In comparison to the previous echocardiogram(s): Compared with study dated 6/25/2024, LVEF 15-20%.  CONCLUSIONS:  1. Left ventricular ejection fraction is severely decreased, by visual estimate at 15-20%.  2. There is global hypokinesis of the left ventricle with minor regional variations.  3. Spectral Doppler shows a Grade II (pseudonormal pattern) of left ventricular diastolic filling with an elevated left atrial pressure.  4. Left ventricular cavity size is moderately dilated.  5. Abnormal septal motion consistent with RV pacemaker.  6. There is moderately reduced  right ventricular systolic function.  7. Mildly enlarged right ventricle.  8. The left atrium is mildly dilated.  9. Mild to moderate mitral valve regurgitation. 10. Mild to moderate tricuspid regurgitation visualized. 11. Moderately elevated right ventricular systolic pressure. QUANTITATIVE DATA SUMMARY:  2D MEASUREMENTS:           Normal Ranges: LVEDV Index:     119 ml/m2  LV SYSTOLIC FUNCTION:                      Normal Ranges: EF-A4C View:    21 % (>=55%) EF-A2C View:    8 % EF-Biplane:     16 % EF-Visual:      18 % LV EF Reported: 18 %  LV DIASTOLIC FUNCTION:           Normal Ranges: MV Peak A:             0.65 m/s  (0.42-0.7 m/s) MV e'                  0.067 m/s (>8.0) MV lateral e'          0.09 m/s MV medial e'           0.04 m/s  MITRAL VALVE:          Normal Ranges: MV DT:        198 msec (150-240msec)  MITRAL INSUFFICIENCY:             Normal Ranges: MR Vmax:              342.00 cm/s  AORTIC VALVE:          Normal Ranges: AoV Vmax:     1.19 m/s (<=1.7m/s) AoV Peak P.7 mmHg (<20mmHg) LVOT Max Dylan: 0.65 m/s (<=1.1m/s) LVOT VTI:     6.72 cm  RIGHT VENTRICLE: TAPSE: 15.5 mm RV s'  0.09 m/s  TRICUSPID VALVE/RVSP:          Normal Ranges: Peak TR Velocity:     3.29 m/s RV Syst Pressure:     58 mmHg  (< 30mmHg) IVC Diam:             2.40 cm  [id number] Bird Askew MD Electronically signed on 2025 at 1:38:04 PM  ** Final **     XR chest 2 views  Result Date: 2025  Interpreted By:  Jigar John, STUDY: XR CHEST 2 VIEWS; 2025 1:53 pm   INDICATION: Signs/Symptoms:chest pain   COMPARISON: 2024.   ACCESSION NUMBER(S): [id number]   ORDERING CLINICIAN: ZACKERY LEUNG   TECHNIQUE: Number of films: Two-view radiographs of the chest were obtained.   FINDINGS: An ICD is again noted, with the leads overlying the right atrium and right ventricle. Sternal wires are also seen. The cardiac silhouette is mildly prominent. There are mild bilateral perihilar interstitial infiltrates. Mild atelectatic  changes are noted in the right mid lung laterally. There is also small right pleural effusion. The osseous structures are unremarkable.       Mild cardiomegaly and pulmonary vascular congestion.   Signed by: Jigar John 5/4/2025 2:07 PM Dictation workstation:   [id number]                Assessment/Plan   Assessment & Plan  Acute on chronic congestive heart failure, unspecified heart failure type    Class 1 obesity    Hepatic cirrhosis (Multi)    Obesity, Class I, BMI 30-34.9    Non-pressure chronic ulcer of left calf, limited to breakdown of skin    Peripheral venous insufficiency    Non-pressure chronic ulcer of right calf, limited to breakdown of skin    Bilateral lower extremity edema    Evaluated patient at bedside and did perform his wound care.  Will discuss case with Dr. Ferrell.  Right lower extremity wound appears to be cohabitating well no significant drainage.  Left lower extremity wounds have some slough which was removed with wound cleanser and sterile 4 x 4.  Dressing reapplied.  Patient informed to continue elevation of lower extremities when possible.  Patient's wounds are stable.  From a vascular surgery standpoint there are no objections or barriers for this patient to be discharged.    Thank you very much for allowing Vascular Surgery to be involved in the care of your patient sincerely Mahamed MASON-CNP .  (This note was generated with voice recognition software and may contain errors including spelling, grammar, syntax and missed recognition of what was dictated, of which may not have been fully corrected)      Mahamed Ruiz, MARLON-CNP        [1]   Current Facility-Administered Medications:     acetaminophen (Tylenol) tablet 975 mg, 975 mg, oral, q6h PRN, Lane Alaniz DO    [Held by provider] apixaban (Eliquis) tablet 5 mg, 5 mg, oral, BID, Lane Alaniz DO, 5 mg at 05/06/25 0939    atorvastatin (Lipitor) tablet 80 mg, 80 mg, oral, Daily, Lane Alaniz DO, 80 mg at 05/07/25  0902    dapagliflozin propanediol (Farxiga) tablet 10 mg, 10 mg, oral, Daily, Lane Yangel, DO, 10 mg at 05/07/25 0902    dextrose 50 % injection 12.5 g, 12.5 g, intravenous, q15 min PRN, Lane Alaniz, DO    dextrose 50 % injection 25 g, 25 g, intravenous, q15 min PRN, Lane Yangel, DO    digoxin (Lanoxin) tablet 125 mcg, 125 mcg, oral, Daily, Lane Alaniz, DO, 125 mcg at 05/07/25 0903    fenofibrate (Triglide) tablet 160 mg, 160 mg, oral, Daily, Lanewendi Yangel, DO, 160 mg at 05/07/25 0902    furosemide (Lasix) 500 mg in 50 mL (10 mg/mL) infusion, 10 mg/hr, intravenous, Continuous, Bird Askew MD PhD, Last Rate: 1 mL/hr at 05/07/25 0950, 10 mg/hr at 05/07/25 0950    gabapentin (Neurontin) capsule 300 mg, 300 mg, oral, q AM, Lane Alaniz, DO, 300 mg at 05/07/25 0902    gabapentin (Neurontin) capsule 600 mg, 600 mg, oral, Nightly, Lane Alaniz, DO, 600 mg at 05/06/25 2032    glucagon (Glucagen) injection 1 mg, 1 mg, intramuscular, q15 min PRN, Lane Yangel, DO    glucagon (Glucagen) injection 1 mg, 1 mg, intramuscular, q15 min PRN, Lane Yangel, DO    hydrALAZINE (Apresoline) tablet 25 mg, 25 mg, oral, TID, Bird Askew MD PhD    insulin glargine (Lantus) injection 24 Units, 24 Units, subcutaneous, BID ACLane DO, 24 Units at 05/07/25 0858    insulin lispro injection 0-15 Units, 0-15 Units, subcutaneous, TID ACLane DO, 3 Units at 05/06/25 1201    [Held by provider] insulin lispro injection 10 Units, 10 Units, subcutaneous, TID ACLane DO, 10 Units at 05/06/25 1201    isosorbide dinitrate (Isordil) tablet 10 mg, 10 mg, oral, TID, Bird Askew MD PhD    [Held by provider] ivabradine (Corlanor) tablet 2.5 mg, 2.5 mg, oral, BID, Lane Alaniz DO, 2.5 mg at 05/05/25 2157    levothyroxine (Synthroid, Levoxyl) tablet 75 mcg, 75 mcg, oral, Daily, Lane Alaniz DO, 75 mcg at 05/07/25 0548    lidocaine 4 % patch 1 patch, 1 patch, transdermal, Daily, Lane Alaniz DO, 1 patch at 05/05/25  Maximum Treatment Setting: 10 0854    metoprolol succinate XL (Toprol-XL) 24 hr tablet 100 mg, 100 mg, oral, BID, Bird Askew MD PhD, 100 mg at 05/07/25 0902    ondansetron (Zofran) injection 4 mg, 4 mg, intravenous, q6h PRN, Lane Alaniz DO    pantoprazole (ProtoNix) EC tablet 40 mg, 40 mg, oral, Daily before breakfast, Lane Alaniz DO, 40 mg at 05/07/25 0616    perflutren protein A microsphere (Optison) injection 0.5 mL, 0.5 mL, intravenous, Once in imaging, Lane Alaniz DO    QUEtiapine (SEROquel) tablet 50 mg, 50 mg, oral, Nightly, Lane Alaniz DO, 50 mg at 05/06/25 2033    [Held by provider] sacubitriL-valsartan (Entresto)  mg per tablet 1 tablet, 1 tablet, oral, BID, Lane Alaniz DO    [Held by provider] spironolactone (Aldactone) tablet 50 mg, 50 mg, oral, q24h SUSI, Lane Alaniz DO    sulfur hexafluoride microsphr (Lumason) injection 24.28 mg, 2 mL, intravenous, Once in imaging, Lane Alaniz DO

## 2025-05-07 NOTE — CARE PLAN
The patient's goals for the shift include      The clinical goals for the shift include hemodynamically stable.    Over the shift, the patient did not make progress toward the following goals. Barriers to progression include   . Recommendations to address these barriers include   .

## 2025-05-07 NOTE — PROGRESS NOTES
Subjective Data:  -4 L of last 24 hours.  Patient is feels better overall.  Symptomatically improved.     Objective Data:  Last Recorded Vitals:  Vitals:    05/06/25 0934 05/06/25 1146 05/06/25 2001 05/07/25 0352   BP: 120/73 120/78 124/75 102/66   BP Location:   Left arm Left arm   Patient Position:   Lying Lying   Pulse: 73 72 74 65   Resp:       Temp: 36.5 °C (97.7 °F) 35.8 °C (96.4 °F) 36 °C (96.8 °F) 35.9 °C (96.6 °F)   TempSrc:   Temporal Temporal   SpO2: 95% 97% 94% 94%   Weight:       Height:           Last Labs:  CBC - 5/7/2025:  7:05 AM  4.4 12.2 273    41.9      CMP - 5/7/2025:  7:05 AM  9.5 7.4 22 --- 2.1   3.0 4.1 26 35      PTT - No results in last year.  _   _ _     TROPHS   Date/Time Value Ref Range Status   05/04/2025 03:40 PM 40 0 - 20 ng/L Final   05/04/2025 02:47 PM 41 0 - 20 ng/L Final     BNP   Date/Time Value Ref Range Status   05/04/2025 02:47 PM 2,032 0 - 99 pg/mL Final   11/05/2024 11:46 AM 57 0 - 99 pg/mL Final     HGBA1C   Date/Time Value Ref Range Status   11/11/2024 03:58 PM 11.0 See comment % Final   11/05/2024 11:46 AM 10.8 See comment % Final   04/08/2024 03:50 PM 10.6 4.2 - 6.5 % Final     LDLCALC   Date/Time Value Ref Range Status   11/11/2024 03:58 PM   Final     Comment:     The calculation of LDL and VLDL are inaccurate when the Triglycerides are greater than 400 mg/dL or when the patient is non-fasting. If LDL measurement is necessary contact the testing laboratory for an alternative LDL assay.                                Near   Borderline      AGE      Desirable  Optimal    High     High     Very High     0-19 Y     0 - 109     ---    110-129   >/= 130     ----    20-24 Y     0 - 119     ---    120-159   >/= 160     ----      >24 Y     0 -  99   100-129  130-159   160-189     >/=190     11/05/2024 11:46 AM   Final     Comment:     The calculation of LDL and VLDL are inaccurate when the Triglycerides are greater than 400 mg/dL or when the patient is non-fasting. If LDL  measurement is necessary contact the testing laboratory for an alternative LDL assay.                                  Near   Borderline      AGE      Desirable  Optimal    High     High     Very High     0-19 Y     0 - 109     ---    110-129   >/= 130     ----    20-24 Y     0 - 119     ---    120-159   >/= 160     ----      >24 Y     0 -  99   100-129  130-159   160-189     >/=190     10/05/2023 03:54 AM 32 140 - 190 mg/dL Final     Comment:                                 Near   Borderline      AGE      Desirable  Optimal    High     High     Very High     0-19 Y     0 - 109     ---    110-129   >/= 130     ----    20-24 Y     0 - 119     ---    120-159   >/= 160     ----      >24 Y     0 -  99   100-129  130-159   160-189     >/=190       VLDL   Date/Time Value Ref Range Status   11/11/2024 03:58 PM   Final     Comment:     Unable to calculate VLDL.   11/05/2024 11:46 AM   Final     Comment:     Unable to calculate VLDL.   10/05/2023 03:54 AM 49 0 - 40 mg/dL Final      Last I/O:  I/O last 3 completed shifts:  In: 247.7 (2.4 mL/kg) [P.O.:240; I.V.:7.7 (0.1 mL/kg)]  Out: 4650 (45.8 mL/kg) [Urine:4650 (1.3 mL/kg/hr)]  Weight: 101.6 kg     Past Cardiology Tests (Last 3 Years):  EKG:  No results found for this or any previous visit from the past 1095 days.    Echo:  Transthoracic Echo Complete 05/05/2025      Transthoracic Echo (TTE) Complete 06/25/2024      Transthoracic Echo (TTE) Complete 10/14/2023    Ejection Fractions:  EF   Date/Time Value Ref Range Status   05/05/2025 12:23 PM 18 %    06/25/2024 11:00 AM 30 %      Cath:  No results found for this or any previous visit from the past 1095 days.    Stress Test:  No results found for this or any previous visit from the past 1095 days.    Cardiac Imaging:  MR cardiac morphology and function w and wo IV contrast 03/28/2023      Inpatient Medications:  Scheduled Medications[1]  PRN Medications[2]  Continuous Medications[3]    Physical Exam:  General: No acute  distress,  A&O x3, Bel[place]ian speaking  Skin: Warm and dry  Neck: JVD appears to be above the clavicles at a 90 degree angle  ENT: Moist mucous membranes no lesions appreciated  Pulmonary: Bilateral rhonchi noted  Cards: Regular rate rhythm, no murmurs gallops or rubs appreciated normal S1-S2  Abdomen: Soft nontender nondistended  Extremities: Bilateral pitting edema 1+ but improving compared to before.  Yesterday  Psych: Appropriate mood and affect         Assessment/Plan  1.  HFrEF/ischemic cardiomyopathy: Previous EF of 30%: NYHA class III stage C status post ICD secondary to VT.  Presenting with heart failure symptoms.    - Continue Lasix infusion to 10 mg/kg/min -suspect patient require 1 more day of diuresis  -Maintain strict ins and outs  -Replete electrolytes as needed to maintain potassium greater than 4 magnesium greater than 2  -Monitor renal function  -Low-salt diet fluid restriction  - Slow reintroduction of goal-directed medical therapy  - Restart hydralazine at 25 mg 3 times daily and Isordil to 10 mg 3 times daily     2.  CAD/CABG: Previous stents as well as LIMA to LAD.  No chest pain.  Troponin is slightly elevated with no significant delta.  Suspect demand in the setting of heart failure.       3.  Acute on chronic renal injury: Management as described above.     4.  VT status post VT ablation and ICD.     5.  History of DVT on Eliquis: Okay to reinitiate Eliquis.     (This note was generated with voice recognition software and may contain errors including spelling, grammar, syntax and missed recognition of what was dictated, of which may not have been fully corrected)        Code Status:  Full Code      Bird Askew MD PhD         [1]   Scheduled medications   Medication Dose Route Frequency    [Held by provider] apixaban  5 mg oral BID    atorvastatin  80 mg oral Daily    dapagliflozin propanediol  10 mg oral Daily    digoxin  125 mcg oral Daily    fenofibrate  160 mg oral Daily     gabapentin  300 mg oral q AM    gabapentin  600 mg oral Nightly    [Held by provider] hydrALAZINE  100 mg oral TID    insulin glargine  24 Units subcutaneous BID AC    insulin lispro  0-15 Units subcutaneous TID AC    [Held by provider] insulin lispro  10 Units subcutaneous TID AC    [Held by provider] isosorbide dinitrate  40 mg oral TID    [Held by provider] ivabradine  2.5 mg oral BID    levothyroxine  75 mcg oral Daily    lidocaine  1 patch transdermal Daily    metoprolol succinate XL  100 mg oral BID    pantoprazole  40 mg oral Daily before breakfast    perflutren protein A microsphere  0.5 mL intravenous Once in imaging    QUEtiapine  50 mg oral Nightly    [Held by provider] sacubitriL-valsartan  1 tablet oral BID    [Held by provider] spironolactone  50 mg oral q24h SUSI    sulfur hexafluoride microsphr  2 mL intravenous Once in imaging   [2]   PRN medications   Medication    acetaminophen    dextrose    dextrose    glucagon    glucagon    ondansetron   [3]   Continuous Medications   Medication Dose Last Rate    furosemide  10 mg/hr 10 mg/hr (05/07/25 1050)

## 2025-05-07 NOTE — CONSULTS
"Nutrition Initial Assessment:   Nutrition Assessment    Reason for Assessment: Admission nursing screening    Patient is a 60 y.o. male presenting with acute on chronic congestive heart failure.       Nutrition History:  Energy Intake: Fair 50-75 %  Pain affecting nutrition status: N/A  Food and Nutrient History: Pt reports he had a 2 week period of eating less, but states he is eating well now. Meal intakes this admission include 33% x 2, 75% x 1, and 100% x 2. Pt requesting an evening snack (sandwich) - will send half a turkey sandwich with dinner trays for pt to save for evening snack. Per H&P, pt reported diarrhea and nausea on admission but pt currently denies. No issues chewing/swallowing.  Vitamin/Herbal Supplement Use: none listed in home med list       Anthropometrics:  Height: 180.3 cm (5' 11\")   Weight: 102 kg (224 lb)   BMI (Calculated): 31.26  IBW/kg (Dietitian Calculated): 78.2 kg                         Weight History:   Wt Readings from Last 10 Encounters:   05/04/25 102 kg (224 lb)   03/04/25 102 kg (224 lb)   02/04/25 101 kg (223 lb)   12/16/24 103 kg (227 lb)   11/19/24 102 kg (224 lb)   11/05/24 95.1 kg (209 lb 9.6 oz)   11/04/24 96.2 kg (212 lb)   10/31/24 96.6 kg (212 lb 15.4 oz)   01/29/25 90.7 kg (200 lb)   08/26/24 98 kg (216 lb)      Weight Change %:  Weight History / % Weight Change: Pt reprots a 5-6# wt loss x 2 weeks due to eating less. Based on available wt records, pt has had stable weight x 6 months. Pt does have edema which may be masking weight loss.  Significant Weight Loss: No    Nutrition Focused Physical Exam Findings:    Subcutaneous Fat Loss:   Defer Subcutaneous Fat Loss Assessment: Defer all  Defer All Reason: Not indicated  Muscle Wasting:  Defer Muscle Wasting Assessment: Defer all  Defer All Reason: Not indicated  Edema:  Edema: +2 mild  Edema Location: BLE per nursing assessment  Physical Findings:  Skin: Positive (bilateral leg wounds, sacrum wound per nursing " assessment)  Positive Skin Findings: Impaired wound healing    Nutrition Significant Labs:    Reviewed   Nutrition Specific Medications:  Reviewed     I/O:   Last BM Date: 05/04/25;      Dietary Orders (From admission, onward)       Start     Ordered    05/06/25 1005  Adult diet Consistent Carb, 2-3 grams sodium; 1500 mL fluid; CCD 75 gm/meal  Diet effective now        Question Answer Comment   Diet type Consistent Carb    Diet type 2-3 grams sodium    Dietary fluid restriction / 24h: 1500 mL fluid    Carb diet selection: CCD 75 gm/meal        05/06/25 1005    05/04/25 2129  May Participate in Room Service  ( ROOM SERVICE MAY PARTICIPATE)  Once        Question:  .  Answer:  Yes    05/04/25 2128                     Estimated Needs:      Method for Estimating Needs: 8030-0361 kcal/d (25-30 kcal/kg IBW)     Method for Estimating 24 Hour Protein Needs: 63-78 g/d (0.8-1 g/kg IBW)     Method for Estimating 24 Hour Fluid Needs: 1500 ml fluid restriction per MD  Patient on Order Fluid Restriction: Yes        Nutrition Diagnosis   Malnutrition Diagnosis  Patient has Malnutrition Diagnosis: No    Nutrition Diagnosis  Patient has Nutrition Diagnosis: Yes  Diagnosis Status (1): New  Nutrition Diagnosis 1: Decreased nutrient needs  Related to (1): multiple chronic illnesses requiring decrease in sodium  As Evidenced by (1): heart failure, CKD, CAD       Nutrition Interventions/Recommendations   Nutrition prescription for oral nutrition    Nutrition Recommendations:  Individualized Nutrition Prescription Provided for : 75 g CHO/meal consistent carbohydrate, low sodium diet with 1500 ml fluid restriction; half turkey sandwich in evening for snack    Nutrition Interventions/Goals:   Meals and Snacks: Carbohydrate-modified diet, Mineral-modified diet, Fluid-modified diet  Goal: Consumes 3 meals per day      Education Documentation  No documentation found.     Patient with no diet related questions at this time.         Nutrition  Monitoring and Evaluation   Food/Nutrient Related History Monitoring  Monitoring and Evaluation Plan: Intake / amount of food, Estimated Energy Intake  Estimated Energy Intake: Energy intake greater or equal to 75% of estimated energy needs  Intake / Amount of food: Consumes at least 75% or more of meals/snacks/supplements, Meets > 75% estimated energy needs    Anthropometric Measurements  Monitoring and Evaluation Plan: Body weight  Body Weight: Body weight - Weight reduction from fluids, as needed         Physical Exam Findings  Monitoring and Evaluation Plan: Skin  Skin Finding: Impaired wound healing - Skin to heal    Goal Status: New goal(s) identified    Time Spent (min): 45 minutes

## 2025-05-07 NOTE — CARE PLAN
The patient's goals for the shift include      The clinical goals for the shift include [id number]    Problem: Pain - Adult  Goal: Verbalizes/displays adequate comfort level or baseline comfort level  Outcome: Progressing     Problem: Safety - Adult  Goal: Free from fall injury  Outcome: Progressing     Problem: Discharge Planning  Goal: Discharge to home or other facility with appropriate resources  Outcome: Progressing     Problem: Chronic Conditions and Co-morbidities  Goal: Patient's chronic conditions and co-morbidity symptoms are monitored and maintained or improved  Outcome: Progressing     Problem: Nutrition  Goal: Nutrient intake appropriate for maintaining nutritional needs  Outcome: Progressing     Problem: Skin  Goal: Decreased wound size/increased tissue granulation at next dressing change  Outcome: Progressing  Goal: Participates in plan/prevention/treatment measures  Outcome: Progressing  Goal: Prevent/manage excess moisture  Outcome: Progressing  Goal: Prevent/minimize sheer/friction injuries  Outcome: Progressing  Goal: Promote/optimize nutrition  Outcome: Progressing  Goal: Promote skin healing  Outcome: Progressing     Problem: Diabetes  Goal: Achieve decreasing blood glucose levels by end of shift  Outcome: Progressing  Goal: Increase stability of blood glucose readings by end of shift  Outcome: Progressing  Goal: Decrease in ketones present in urine by end of shift  Outcome: Progressing  Goal: Maintain electrolyte levels within acceptable range throughout shift  Outcome: Progressing  Goal: Maintain glucose levels >70mg/dl to <250mg/dl throughout shift  Outcome: Progressing  Goal: No changes in neurological exam by end of shift  Outcome: Progressing  Goal: Learn about and adhere to nutrition recommendations by end of shift  Outcome: Progressing  Goal: Vital signs within normal range for age by end of shift  Outcome: Progressing  Goal: Increase self care and/or family involovement by end of  shift  Outcome: Progressing  Goal: Receive DSME education by end of shift  Outcome: Progressing     Problem: Fall/Injury  Goal: Not fall by end of shift  Outcome: Progressing  Goal: Be free from injury by end of the shift  Outcome: Progressing  Goal: Verbalize understanding of personal risk factors for fall in the hospital  Outcome: Progressing  Goal: Verbalize understanding of risk factor reduction measures to prevent injury from fall in the home  Outcome: Progressing  Goal: Use assistive devices by end of the shift  Outcome: Progressing  Goal: Pace activities to prevent fatigue by end of the shift  Outcome: Progressing     Problem: Heart Failure  Goal: Improved gas exchange this shift  Outcome: Progressing  Goal: Improved urinary output this shift  Outcome: Progressing  Goal: Reduction in peripheral edema within 24 hours  Outcome: Progressing  Goal: Report improvement of dyspnea/breathlessness this shift  Outcome: Progressing  Goal: Weight from fluid excess reduced over 2-3 days, then stabilize  Outcome: Progressing  Goal: Increase self care and/or family involvement in 24 hours  Outcome: Progressing

## 2025-05-08 VITALS
RESPIRATION RATE: 20 BRPM | OXYGEN SATURATION: 90 % | DIASTOLIC BLOOD PRESSURE: 69 MMHG | HEIGHT: 71 IN | WEIGHT: 224 LBS | HEART RATE: 75 BPM | SYSTOLIC BLOOD PRESSURE: 120 MMHG | TEMPERATURE: 97 F | BODY MASS INDEX: 31.36 KG/M2

## 2025-05-08 LAB
ANION GAP SERPL CALC-SCNC: 14 MMOL/L (ref 10–20)
BUN SERPL-MCNC: 49 MG/DL (ref 6–23)
CALCIUM SERPL-MCNC: 9.3 MG/DL (ref 8.6–10.3)
CHLORIDE SERPL-SCNC: 94 MMOL/L (ref 98–107)
CO2 SERPL-SCNC: 32 MMOL/L (ref 21–32)
CREAT SERPL-MCNC: 2.24 MG/DL (ref 0.5–1.3)
EGFRCR SERPLBLD CKD-EPI 2021: 33 ML/MIN/1.73M*2
ERYTHROCYTE [DISTWIDTH] IN BLOOD BY AUTOMATED COUNT: 21 % (ref 11.5–14.5)
GLUCOSE BLD MANUAL STRIP-MCNC: 228 MG/DL (ref 74–99)
GLUCOSE BLD MANUAL STRIP-MCNC: 257 MG/DL (ref 74–99)
GLUCOSE SERPL-MCNC: 211 MG/DL (ref 74–99)
HCT VFR BLD AUTO: 40.4 % (ref 41–52)
HGB BLD-MCNC: 12 G/DL (ref 13.5–17.5)
MAGNESIUM SERPL-MCNC: 2.49 MG/DL (ref 1.6–2.4)
MCH RBC QN AUTO: 23.8 PG (ref 26–34)
MCHC RBC AUTO-ENTMCNC: 29.7 G/DL (ref 32–36)
MCV RBC AUTO: 80 FL (ref 80–100)
NRBC BLD-RTO: 0 /100 WBCS (ref 0–0)
PLATELET # BLD AUTO: 289 X10*3/UL (ref 150–450)
POTASSIUM SERPL-SCNC: 3.9 MMOL/L (ref 3.5–5.3)
RBC # BLD AUTO: 5.05 X10*6/UL (ref 4.5–5.9)
SODIUM SERPL-SCNC: 136 MMOL/L (ref 136–145)
WBC # BLD AUTO: 5.1 X10*3/UL (ref 4.4–11.3)

## 2025-05-08 PROCEDURE — 36415 COLL VENOUS BLD VENIPUNCTURE: CPT

## 2025-05-08 PROCEDURE — 2500000001 HC RX 250 WO HCPCS SELF ADMINISTERED DRUGS (ALT 637 FOR MEDICARE OP): Performed by: INTERNAL MEDICINE

## 2025-05-08 PROCEDURE — 99232 SBSQ HOSP IP/OBS MODERATE 35: CPT | Performed by: NURSE PRACTITIONER

## 2025-05-08 PROCEDURE — 2500000001 HC RX 250 WO HCPCS SELF ADMINISTERED DRUGS (ALT 637 FOR MEDICARE OP): Performed by: STUDENT IN AN ORGANIZED HEALTH CARE EDUCATION/TRAINING PROGRAM

## 2025-05-08 PROCEDURE — 2500000001 HC RX 250 WO HCPCS SELF ADMINISTERED DRUGS (ALT 637 FOR MEDICARE OP)

## 2025-05-08 PROCEDURE — 99239 HOSP IP/OBS DSCHRG MGMT >30: CPT

## 2025-05-08 PROCEDURE — 83735 ASSAY OF MAGNESIUM: CPT

## 2025-05-08 PROCEDURE — 2500000002 HC RX 250 W HCPCS SELF ADMINISTERED DRUGS (ALT 637 FOR MEDICARE OP, ALT 636 FOR OP/ED): Performed by: INTERNAL MEDICINE

## 2025-05-08 PROCEDURE — 99233 SBSQ HOSP IP/OBS HIGH 50: CPT | Performed by: STUDENT IN AN ORGANIZED HEALTH CARE EDUCATION/TRAINING PROGRAM

## 2025-05-08 PROCEDURE — 2500000004 HC RX 250 GENERAL PHARMACY W/ HCPCS (ALT 636 FOR OP/ED): Mod: JZ | Performed by: STUDENT IN AN ORGANIZED HEALTH CARE EDUCATION/TRAINING PROGRAM

## 2025-05-08 PROCEDURE — 80048 BASIC METABOLIC PNL TOTAL CA: CPT

## 2025-05-08 PROCEDURE — 85027 COMPLETE CBC AUTOMATED: CPT

## 2025-05-08 PROCEDURE — 82947 ASSAY GLUCOSE BLOOD QUANT: CPT

## 2025-05-08 RX ORDER — BUMETANIDE 1 MG/1
1 TABLET ORAL
Status: DISCONTINUED | OUTPATIENT
Start: 2025-05-08 | End: 2025-05-08 | Stop reason: HOSPADM

## 2025-05-08 RX ADMIN — DAPAGLIFLOZIN 10 MG: 10 TABLET, FILM COATED ORAL at 08:21

## 2025-05-08 RX ADMIN — INSULIN LISPRO 9 UNITS: 100 INJECTION, SOLUTION INTRAVENOUS; SUBCUTANEOUS at 12:04

## 2025-05-08 RX ADMIN — APIXABAN 5 MG: 5 TABLET, FILM COATED ORAL at 08:21

## 2025-05-08 RX ADMIN — METOPROLOL SUCCINATE 100 MG: 50 TABLET, EXTENDED RELEASE ORAL at 09:28

## 2025-05-08 RX ADMIN — BUMETANIDE 1 MG: 1 TABLET ORAL at 09:58

## 2025-05-08 RX ADMIN — INSULIN GLARGINE 24 UNITS: 100 INJECTION, SOLUTION SUBCUTANEOUS at 08:23

## 2025-05-08 RX ADMIN — HYDRALAZINE HYDROCHLORIDE 25 MG: 25 TABLET ORAL at 15:25

## 2025-05-08 RX ADMIN — ATORVASTATIN CALCIUM 80 MG: 40 TABLET, FILM COATED ORAL at 08:21

## 2025-05-08 RX ADMIN — GABAPENTIN 300 MG: 300 CAPSULE ORAL at 08:21

## 2025-05-08 RX ADMIN — LEVOTHYROXINE SODIUM 75 MCG: 0.07 TABLET ORAL at 06:16

## 2025-05-08 RX ADMIN — DIGOXIN 125 MCG: 125 TABLET ORAL at 09:28

## 2025-05-08 RX ADMIN — PANTOPRAZOLE SODIUM 40 MG: 40 TABLET, DELAYED RELEASE ORAL at 06:16

## 2025-05-08 RX ADMIN — ISOSORBIDE DINITRATE 10 MG: 10 TABLET ORAL at 09:28

## 2025-05-08 RX ADMIN — ISOSORBIDE DINITRATE 10 MG: 10 TABLET ORAL at 13:38

## 2025-05-08 RX ADMIN — INSULIN LISPRO 6 UNITS: 100 INJECTION, SOLUTION INTRAVENOUS; SUBCUTANEOUS at 08:26

## 2025-05-08 RX ADMIN — HYDRALAZINE HYDROCHLORIDE 25 MG: 25 TABLET ORAL at 09:28

## 2025-05-08 RX ADMIN — FUROSEMIDE 10 MG/HR: 10 INJECTION, SOLUTION INTRAMUSCULAR; INTRAVENOUS at 06:16

## 2025-05-08 RX ADMIN — FENOFIBRATE 160 MG: 160 TABLET ORAL at 08:23

## 2025-05-08 ASSESSMENT — PAIN SCALES - WONG BAKER: WONGBAKER_NUMERICALRESPONSE: NO HURT

## 2025-05-08 ASSESSMENT — PAIN SCALES - GENERAL: PAINLEVEL_OUTOF10: 0 - NO PAIN

## 2025-05-08 NOTE — PROGRESS NOTES
"Felice Law is a 60 y.o. male on day 4 of admission presenting with Acute on chronic congestive heart failure, unspecified heart failure type.    Subjective   I evaluated patient at bedside.  Patient's overall is stable.  Patient had dressing change no issues.    Objective     Vitals:    05/08/25 0928   BP: 107/68   Pulse: 70   Resp:    Temp:    SpO2:       Physical Exam  Constitutional: Well developed , awake/alert/, in no distress,  Eyes: Clear sclera  ENMT: mucous membranes are moist, no apparent injury, no lesions seen,   Head/neck: Neck supple, trachea  is midline, no apparent injury, no bruits, no mass, no stridor  Respiratory/thorax: Breath sounds clear and equal diminished bilaterally throughout, thorax symmetric  Cardiac/Vascular: Regular, rate and rhythm, no murmurs, 2+ radial pulses, left DP monophasic right DP multiphasic, PTs bilaterally multiphasic dopplerable signal  Gastrointestinal: Nondistended soft nontender, positive bowel sounds, no bruits.  Musculoskeletal: Moves all extremities, limited range of motion , no joint swelling,   Extremities: No cyanosis, bilateral lower extremity superficial wounds from venous stasis, 1+ pitting edema.    Neurological: Alert and oriented x3,   Lymphatic: No significant lymphadenopathy  Skin: Warm and dry, no lesions, no rashes  Psychological: Appropriate mood and behavior  Blood pressure 107/68, pulse 70, temperature 36.4 °C (97.5 °F), resp. rate 18, height 1.803 m (5' 11\"), weight 102 kg (224 lb), SpO2 95%.        Intake/Output last 3 Shifts:  I/O last 3 completed shifts:  In: 1061.1 (10.4 mL/kg) [P.O.:1031; I.V.:30.1 (0.3 mL/kg)]  Out: 4700 (46.3 mL/kg) [Urine:4700 (1.3 mL/kg/hr)]  Weight: 101.6 kg     Patient Active Problem List    Diagnosis Date Noted    Non-pressure chronic ulcer of left calf, limited to breakdown of skin 05/05/2025    Peripheral venous insufficiency 05/05/2025    Non-pressure chronic ulcer of right calf, limited to breakdown of " skin 05/05/2025    Bilateral lower extremity edema 05/05/2025    Acute on chronic congestive heart failure, unspecified heart failure type 05/04/2025    Pseudophakia 11/01/2024    Combined form of age-related cataract, right eye 08/19/2024    Combined form of age-related cataract, left eye 08/19/2024    Both eyes affected by mild nonproliferative diabetic retinopathy with macular edema, associated with type 2 diabetes mellitus 08/19/2024    Myopia of both eyes 08/19/2024    Astigmatism of both eyes 08/19/2024    Presbyopia 08/19/2024    Kidney disease 10/09/2023    COPD (chronic obstructive pulmonary disease) (Multi) 10/05/2023    HTN (hypertension) 10/05/2023    JAKE (obstructive sleep apnea) 10/05/2023    Neuropathy 10/05/2023    Insomnia 10/05/2023    Hypothyroid 10/05/2023    Hypothyroidism 10/05/2023    Hypertension 10/05/2023    Chronic obstructive pulmonary disease (Multi) 10/05/2023    Abdominal aortic aneurysm, without rupture, unspecified 10/04/2023    Diabetes mellitus with circulatory complication (Multi) 10/04/2023    Peripheral vascular disease (CMS-[facility]) 10/04/2023    Circulatory disorder 10/04/2023    Insulin long-term use (Multi) 10/04/2023    Edema 10/04/2023    DVT of deep femoral vein, left 08/14/2023    Deep venous thrombosis of profunda femoris vein 08/14/2023    Type 2 MI (myocardial infarction) (Multi) 07/04/2023    Myocardial infarction (Multi) 07/04/2023    S/P CABG x 1 04/17/2023    Class 1 obesity 12/01/2022    Coronary artery disease due to calcified coronary lesion 12/01/2022    Mixed hyperlipidemia 12/01/2022    Ischemic cardiomyopathy 10/11/2022    Nuclear age-related cataract, both eyes 02/18/2022    Age-related nuclear cataract of both eyes 02/18/2022    Fatty liver 08/11/2021    Pulmonary hypertension (Multi) 08/11/2021    Steatosis of liver 08/11/2021    Calculus of gallbladder without cholecystitis without obstruction 02/10/2021    Hepatic cirrhosis (Multi) 02/10/2021    Rectal  bleeding 02/10/2021    Rectal hemorrhage 02/10/2021    Cholelithiasis without obstruction 02/10/2021    Diabetes mellitus (Multi) 01/04/2021    Chronic systolic heart failure 01/04/2021    Type 2 diabetes mellitus 01/04/2021    Obesity, Class I, BMI 30-34.9 11/19/2020    High blood cholesterol 11/19/2020    Difficulty in walking involving ankle and foot joint 02/18/2020    Foot drop, bilateral 02/18/2020    Bilateral foot-drop 02/18/2020    Walking difficulty due to ankle and foot 02/18/2020    Microscopic hematuria 02/11/2020    Vitamin D deficiency 10/22/2019    Nephrolithiasis 10/18/2019    Microcytic anemia 10/18/2019    Anemia 10/18/2019    Calculus of kidney 10/18/2019    Stage 3 chronic kidney disease (Multi) 10/01/2019    Obesity (BMI 30-39.9) 10/01/2019    CKD (chronic kidney disease) stage 3, GFR 30-59 ml/min (Multi) 10/01/2019    Obesity with body mass index 30 or greater 10/01/2019    Glaucoma suspect of both eyes 08/30/2019    Glaucoma suspect 08/30/2019    Hx of cardiac arrest 08/01/2019    S/P drug eluting coronary stent placement 08/01/2019    ICD (implantable cardioverter-defibrillator) in place 08/01/2019    History of cardiac arrest 08/01/2019    Implantable cardioverter-defibrillator (ICD) in situ 08/01/2019        Current Medications[1]     Lab Results   Component Value Date    WBC 5.1 05/08/2025    HGB 12.0 (L) 05/08/2025    HCT 40.4 (L) 05/08/2025     05/08/2025    CHOL 630 (H) 11/11/2024    TRIG 2,663 (H) 11/11/2024    HDL 29.2 11/11/2024    ALT 26 05/04/2025    AST 22 05/04/2025     05/08/2025    K 3.9 05/08/2025    CL 94 (L) 05/08/2025    CREATININE 2.24 (H) 05/08/2025    BUN 49 (H) 05/08/2025    CO2 32 05/08/2025    TSH 3.26 11/11/2024    INR 1.2 (H) 10/05/2023    HGBA1C 11.0 (H) 11/11/2024       Transthoracic Echo Complete  Result Date: 5/5/2025   [facility], [address], [place] [id number]           Tel [phone] and Fax [phone] TRANSTHORACIC  ECHOCARDIOGRAM REPORT  Patient Name:       NEGRITA BERNARD    Gayla Physician:    [id number] Bird Askew MD Study Date:         5/5/2025            Ordering Provider:    [id number] BNENY CARTWRIGHT MRN/PID:            [id number]            Fellow: Accession#:         [id number]        Nurse:                Tres Barros RN Date of Birth/Age:  1965 / 60 years Sonographer:          Alyssa Monroy RDCS Gender assigned at  M                   Additional Staff: Birth: Height:             180.34 cm           Admit Date:           5/4/2025 Weight:             101.61 kg           Admission Status:     Inpatient -                                                               Routine BSA / BMI:          2.21 m2 / 31.24     Encounter#:           [id number]                     kg/m2 Blood Pressure:     116/75 mmHg         Department Location:  [facility] Study Type:    TRANSTHORACIC ECHO (TTE) COMPLETE Diagnosis/ICD: Unspecified systolic (congestive) heart failure (CHF)-I50.20;                Ischemic cardiomyopathy-I25.5 Indication:    SOB CP Edema CPT Code:      Echo Limited-04124; Doppler Limited-58867; Color Doppler-26617 Patient History: CABG:              CABG x 1. Smoker:            Former. Pacer/Defib:       AICD Pertinent History: CAD, Cardiomyopathy, CHF, COPD, PVD, Previous DVT, HTN and                    Hyperlipidemia. PCI-Stent, S/P CABG x1 2023, CKD III, JAKE. Study Detail: The following Echo studies were performed: 2D, Doppler and color               flow. Technically challenging study due to body habitus. Definity               used as a contrast agent for endocardial border definition. Total               contrast used for this procedure was 2 mL via IV push.  PHYSICIAN INTERPRETATION: Left Ventricle: Left ventricular ejection fraction is severely decreased, by visual estimate at  15-20%. There is global hypokinesis of the left ventricle with minor regional variations. The left ventricular cavity size is moderately dilated. Abnormal (paradoxical) septal motion, consistent with RV pacemaker. Spectral Doppler shows a Grade II (pseudonormal pattern) of left ventricular diastolic filling with an elevated left atrial pressure. Left Atrium: The left atrium is mildly dilated. Right Ventricle: The right ventricle is mildly enlarged. There is moderately reduced right ventricular systolic function. Right Atrium: The right atrium is normal in size. Aortic Valve: The aortic valve is trileaflet. There is minimal aortic valve cusp calcification. There is mild aortic valve thickening. There is no evidence of aortic valve regurgitation. The peak instantaneous gradient of the aortic valve is 6 mmHg. Mitral Valve: The mitral valve is mildly thickened. There is mild to moderate mitral valve regurgitation. Tricuspid Valve: The tricuspid valve is structurally normal. There is mild to moderate tricuspid regurgitation. The Doppler estimated RVSP is moderately elevated at 58.3 mmHg. Pulmonic Valve: The pulmonic valve is structurally normal. There is physiologic pulmonic valve regurgitation. Pericardium: Trivial pericardial effusion. Aorta: The aortic root is normal. Systemic Veins: The inferior vena cava appears dilated. In comparison to the previous echocardiogram(s): Compared with study dated 6/25/2024, LVEF 15-20%.  CONCLUSIONS:  1. Left ventricular ejection fraction is severely decreased, by visual estimate at 15-20%.  2. There is global hypokinesis of the left ventricle with minor regional variations.  3. Spectral Doppler shows a Grade II (pseudonormal pattern) of left ventricular diastolic filling with an elevated left atrial pressure.  4. Left ventricular cavity size is moderately dilated.  5. Abnormal septal motion consistent with RV pacemaker.  6. There is moderately reduced right ventricular systolic  function.  7. Mildly enlarged right ventricle.  8. The left atrium is mildly dilated.  9. Mild to moderate mitral valve regurgitation. 10. Mild to moderate tricuspid regurgitation visualized. 11. Moderately elevated right ventricular systolic pressure. QUANTITATIVE DATA SUMMARY:  2D MEASUREMENTS:           Normal Ranges: LVEDV Index:     119 ml/m2  LV SYSTOLIC FUNCTION:                      Normal Ranges: EF-A4C View:    21 % (>=55%) EF-A2C View:    8 % EF-Biplane:     16 % EF-Visual:      18 % LV EF Reported: 18 %  LV DIASTOLIC FUNCTION:           Normal Ranges: MV Peak A:             0.65 m/s  (0.42-0.7 m/s) MV e'                  0.067 m/s (>8.0) MV lateral e'          0.09 m/s MV medial e'           0.04 m/s  MITRAL VALVE:          Normal Ranges: MV DT:        198 msec (150-240msec)  MITRAL INSUFFICIENCY:             Normal Ranges: MR Vmax:              342.00 cm/s  AORTIC VALVE:          Normal Ranges: AoV Vmax:     1.19 m/s (<=1.7m/s) AoV Peak P.7 mmHg (<20mmHg) LVOT Max Dylan: 0.65 m/s (<=1.1m/s) LVOT VTI:     6.72 cm  RIGHT VENTRICLE: TAPSE: 15.5 mm RV s'  0.09 m/s  TRICUSPID VALVE/RVSP:          Normal Ranges: Peak TR Velocity:     3.29 m/s RV Syst Pressure:     58 mmHg  (< 30mmHg) IVC Diam:             2.40 cm  [id number] Bird Askew MD Electronically signed on 2025 at 1:38:04 PM  ** Final **     XR chest 2 views  Result Date: 2025  Interpreted By:  Jigar John, STUDY: XR CHEST 2 VIEWS; 2025 1:53 pm   INDICATION: Signs/Symptoms:chest pain   COMPARISON: 2024.   ACCESSION NUMBER(S): [id number]   ORDERING CLINICIAN: ZACKERY LEUNG   TECHNIQUE: Number of films: Two-view radiographs of the chest were obtained.   FINDINGS: An ICD is again noted, with the leads overlying the right atrium and right ventricle. Sternal wires are also seen. The cardiac silhouette is mildly prominent. There are mild bilateral perihilar interstitial infiltrates. Mild atelectatic changes are noted in the  right mid lung laterally. There is also small right pleural effusion. The osseous structures are unremarkable.       Mild cardiomegaly and pulmonary vascular congestion.   Signed by: Jigar John 5/4/2025 2:07 PM Dictation workstation:   [id number]                Assessment/Plan   Assessment & Plan  Acute on chronic congestive heart failure, unspecified heart failure type    Class 1 obesity    Hepatic cirrhosis (Multi)    Obesity, Class I, BMI 30-34.9    Non-pressure chronic ulcer of left calf, limited to breakdown of skin    Peripheral venous insufficiency    Non-pressure chronic ulcer of right calf, limited to breakdown of skin    Bilateral lower extremity edema  Patient overall condition is improving.  Continue wound care as ordered.  Patient is being diuresed and edema is greatly improved of lower extremities.  Patient advised to continue elevation of lower extremities when possible especially when laying in bed.  Avoid putting legs in a dependent position for prolonged periods of time and less ambulating or eating.  Patient's wounds are stable.  Will observe while hospitalized.  From a vascular surgery standpoint there are no objections or barriers for this patient to be discharged.    Thank you very much for allowing Vascular Surgery to be involved in the care of your patient sincerely Mahamed SALINAS .  (This note was generated with voice recognition software and may contain errors including spelling, grammar, syntax and missed recognition of what was dictated, of which may not have been fully corrected)    MARLON High-CHANDA        [1]   Current Facility-Administered Medications:     acetaminophen (Tylenol) tablet 975 mg, 975 mg, oral, q6h PRN, Lane Alaniz DO    apixaban (Eliquis) tablet 5 mg, 5 mg, oral, BID, Kevin Velasquez DO, 5 mg at 05/08/25 0821    atorvastatin (Lipitor) tablet 80 mg, 80 mg, oral, Daily, Lane Alaniz DO, 80 mg at 05/08/25 0821    bumetanide (Bumex) tablet 1  mg, 1 mg, oral, BID, Bird Askew MD PhD, 1 mg at 05/08/25 0958    dapagliflozin propanediol (Farxiga) tablet 10 mg, 10 mg, oral, Daily, Lane Alaniz DO, 10 mg at 05/08/25 0821    dextrose 50 % injection 12.5 g, 12.5 g, intravenous, q15 min PRN, Lane Alaniz, DO    dextrose 50 % injection 25 g, 25 g, intravenous, q15 min PRN, Lane Alaniz DO    digoxin (Lanoxin) tablet 125 mcg, 125 mcg, oral, Daily, Lane Alaniz, DO, 125 mcg at 05/08/25 0928    fenofibrate (Triglide) tablet 160 mg, 160 mg, oral, Daily, Lane Alaniz DO, 160 mg at 05/08/25 0823    gabapentin (Neurontin) capsule 300 mg, 300 mg, oral, q AM, Lane Alaniz DO, 300 mg at 05/08/25 0821    gabapentin (Neurontin) capsule 600 mg, 600 mg, oral, Nightly, Lane Alaniz DO, 600 mg at 05/07/25 2042    glucagon (Glucagen) injection 1 mg, 1 mg, intramuscular, q15 min PRN, Lane Alaniz DO    glucagon (Glucagen) injection 1 mg, 1 mg, intramuscular, q15 min PRN, Lane Alaniz DO    hydrALAZINE (Apresoline) tablet 25 mg, 25 mg, oral, TID, Bird Askew MD PhD, 25 mg at 05/08/25 0928    insulin glargine (Lantus) injection 24 Units, 24 Units, subcutaneous, BID Lane ENRIQUEZ DO, 24 Units at 05/08/25 0823    insulin lispro injection 0-15 Units, 0-15 Units, subcutaneous, TID Lane ENRIQUEZ DO, 6 Units at 05/08/25 0826    [Held by provider] insulin lispro injection 10 Units, 10 Units, subcutaneous, TID Lane ENRIQUEZ DO, 10 Units at 05/06/25 1201    isosorbide dinitrate (Isordil) tablet 10 mg, 10 mg, oral, TID, Bird Askew MD PhD, 10 mg at 05/08/25 0928    levothyroxine (Synthroid, Levoxyl) tablet 75 mcg, 75 mcg, oral, Daily, Lane Alaniz DO, 75 mcg at 05/08/25 0616    lidocaine 4 % patch 1 patch, 1 patch, transdermal, Daily, Lane Alaniz DO, 1 patch at 05/05/25 0854    metoprolol succinate XL (Toprol-XL) 24 hr tablet 100 mg, 100 mg, oral, BID, Bird Askew MD PhD, 100 mg at 05/08/25 0928    ondansetron (Zofran) injection 4 mg, 4 mg, intravenous,  q6h PRN, Lane Alaniz DO    pantoprazole (ProtoNix) EC tablet 40 mg, 40 mg, oral, Daily before breakfast, Lane Alaniz DO, 40 mg at 05/08/25 0616    perflutren protein A microsphere (Optison) injection 0.5 mL, 0.5 mL, intravenous, Once in imaging, Lane Alaniz DO    QUEtiapine (SEROquel) tablet 50 mg, 50 mg, oral, Nightly, Lane Alaniz DO, 50 mg at 05/07/25 2042    sulfur hexafluoride microsphr (Lumason) injection 24.28 mg, 2 mL, intravenous, Once in imaging, Lane Alaniz DO

## 2025-05-08 NOTE — PROGRESS NOTES
Subjective Data:  -1.7 L urine output over last 24 hours.  Feeling better overall.     Objective Data:  Last Recorded Vitals:  Vitals:    05/07/25 0352 05/07/25 1202 05/07/25 2045 05/08/25 0431   BP: 102/66 116/73 118/78 92/53   BP Location: Left arm  Left arm    Patient Position: Lying  Sitting    Pulse: 65 69 72 70   Resp:       Temp: 35.9 °C (96.6 °F) 36 °C (96.8 °F) 35.8 °C (96.4 °F) 36.4 °C (97.5 °F)   TempSrc: Temporal  Temporal    SpO2: 94% 92% 95% 95%   Weight:       Height:           Last Labs:  CBC - 5/8/2025:  6:30 AM  5.1 12.0 289    40.4      CMP - 5/7/2025:  7:05 AM  9.5 7.4 22 --- 2.1   3.0 4.1 26 35      PTT - No results in last year.  _   _ _     TROPHS   Date/Time Value Ref Range Status   05/04/2025 03:40 PM 40 0 - 20 ng/L Final   05/04/2025 02:47 PM 41 0 - 20 ng/L Final     BNP   Date/Time Value Ref Range Status   05/04/2025 02:47 PM 2,032 0 - 99 pg/mL Final   11/05/2024 11:46 AM 57 0 - 99 pg/mL Final     HGBA1C   Date/Time Value Ref Range Status   11/11/2024 03:58 PM 11.0 See comment % Final   11/05/2024 11:46 AM 10.8 See comment % Final   04/08/2024 03:50 PM 10.6 4.2 - 6.5 % Final     LDLCALC   Date/Time Value Ref Range Status   11/11/2024 03:58 PM   Final     Comment:     The calculation of LDL and VLDL are inaccurate when the Triglycerides are greater than 400 mg/dL or when the patient is non-fasting. If LDL measurement is necessary contact the testing laboratory for an alternative LDL assay.                                Near   Borderline      AGE      Desirable  Optimal    High     High     Very High     0-19 Y     0 - 109     ---    110-129   >/= 130     ----    20-24 Y     0 - 119     ---    120-159   >/= 160     ----      >24 Y     0 -  99   100-129  130-159   160-189     >/=190     11/05/2024 11:46 AM   Final     Comment:     The calculation of LDL and VLDL are inaccurate when the Triglycerides are greater than 400 mg/dL or when the patient is non-fasting. If LDL measurement is  necessary contact the testing laboratory for an alternative LDL assay.                                  Near   Borderline      AGE      Desirable  Optimal    High     High     Very High     0-19 Y     0 - 109     ---    110-129   >/= 130     ----    20-24 Y     0 - 119     ---    120-159   >/= 160     ----      >24 Y     0 -  99   100-129  130-159   160-189     >/=190     10/05/2023 03:54 AM 32 140 - 190 mg/dL Final     Comment:                                 Near   Borderline      AGE      Desirable  Optimal    High     High     Very High     0-19 Y     0 - 109     ---    110-129   >/= 130     ----    20-24 Y     0 - 119     ---    120-159   >/= 160     ----      >24 Y     0 -  99   100-129  130-159   160-189     >/=190       VLDL   Date/Time Value Ref Range Status   11/11/2024 03:58 PM   Final     Comment:     Unable to calculate VLDL.   11/05/2024 11:46 AM   Final     Comment:     Unable to calculate VLDL.   10/05/2023 03:54 AM 49 0 - 40 mg/dL Final      Last I/O:  I/O last 3 completed shifts:  In: 1061.1 (10.4 mL/kg) [P.O.:1031; I.V.:30.1 (0.3 mL/kg)]  Out: 4700 (46.3 mL/kg) [Urine:4700 (1.3 mL/kg/hr)]  Weight: 101.6 kg     Past Cardiology Tests (Last 3 Years):  EKG:  No results found for this or any previous visit from the past 1095 days.    Echo:  Transthoracic Echo Complete 05/05/2025      Transthoracic Echo (TTE) Complete 06/25/2024      Transthoracic Echo (TTE) Complete 10/14/2023    Ejection Fractions:  EF   Date/Time Value Ref Range Status   05/05/2025 12:23 PM 18 %    06/25/2024 11:00 AM 30 %      Cath:  No results found for this or any previous visit from the past 1095 days.    Stress Test:  No results found for this or any previous visit from the past 1095 days.    Cardiac Imaging:  MR cardiac morphology and function w and wo IV contrast 03/28/2023      Inpatient Medications:  Scheduled Medications[1]  PRN Medications[2]  Continuous Medications[3]    Physical Exam:  General: No acute distress,  A&O  x3, Ghanaian speaking  Skin: Warm and dry  Neck: JVD appears to be above the clavicles at a 90 degree angle  ENT: Moist mucous membranes no lesions appreciated  Pulmonary: Bilateral rhonchi noted  Cards: Regular rate rhythm, no murmurs gallops or rubs appreciated normal S1-S2  Abdomen: Soft nontender nondistended  Extremities: Bilateral pitting edema 1+ but improving compared to before.  Yesterday  Psych: Appropriate mood and affect      Assessment/Plan  1.  HFrEF/ischemic cardiomyopathy: Previous EF of 30%: NYHA class III stage C status post ICD secondary to VT.  Presenting with heart failure symptoms.    - Discontinue Lasix infusion  -Start Bumex 1 mg twice daily for the next 3 days then 1 mg daily thereafter  -Maintain strict ins and outs  -Replete electrolytes as needed to maintain potassium greater than 4 magnesium greater than 2  -Monitor renal function  -Low-salt diet fluid restriction  -Continue Farxiga 10 mg daily  - Continue hydralazine at 25 mg 3 times daily and Isordil to 10 mg 3 times daily  -Resting heart rate at 60: Will hold off reinitiation of ivabradine  -Blood pressures are soft so we will hold off on reinitiation of Entresto: Recent CHARLOTTE we will hold off on restarting spironolactone  -Outpatient follow-up with cardiologist for reassessment and potential reinitiation of neurohormonal blockade     2.  CAD/CABG: Previous stents as well as LIMA to LAD.  No chest pain.  Troponin is slightly elevated with no significant delta.  Suspect demand in the setting of heart failure.       3.  Acute on chronic renal injury: Management as described above.     4.  VT status post VT ablation and ICD.     5.  History of DVT on Eliquis: Okay to reinitiate Eliquis.     (This note was generated with voice recognition software and may contain errors including spelling, grammar, syntax and missed recognition of what was dictated, of which may not have been fully corrected)        Code Status:  Full Code      Brid BARNETT  MD Jamilah PhD         [1]   Scheduled medications   Medication Dose Route Frequency    apixaban  5 mg oral BID    atorvastatin  80 mg oral Daily    bumetanide  1 mg oral BID    dapagliflozin propanediol  10 mg oral Daily    digoxin  125 mcg oral Daily    fenofibrate  160 mg oral Daily    gabapentin  300 mg oral q AM    gabapentin  600 mg oral Nightly    hydrALAZINE  25 mg oral TID    insulin glargine  24 Units subcutaneous BID AC    insulin lispro  0-15 Units subcutaneous TID AC    [Held by provider] insulin lispro  10 Units subcutaneous TID AC    isosorbide dinitrate  10 mg oral TID    [Held by provider] ivabradine  2.5 mg oral BID    levothyroxine  75 mcg oral Daily    lidocaine  1 patch transdermal Daily    metoprolol succinate XL  100 mg oral BID    pantoprazole  40 mg oral Daily before breakfast    perflutren protein A microsphere  0.5 mL intravenous Once in imaging    QUEtiapine  50 mg oral Nightly    [Held by provider] sacubitriL-valsartan  1 tablet oral BID    [Held by provider] spironolactone  50 mg oral q24h SUSI    sulfur hexafluoride microsphr  2 mL intravenous Once in imaging   [2]   PRN medications   Medication    acetaminophen    dextrose    dextrose    glucagon    glucagon    ondansetron   [3]   Continuous Medications   Medication Dose Last Rate

## 2025-05-08 NOTE — CARE PLAN
The patient's goals for the shift include      The clinical goals for the shift include Patient will remain free from injury

## 2025-05-08 NOTE — DISCHARGE SUMMARY
Discharge Diagnosis  Acute on chronic congestive heart failure, unspecified heart failure type       Issues Requiring Follow-Up  Decompensated HFrEF (15-20% 5/5/2025)  Elevated troponin  CHARLOTTE on CKD  Chronic BLE venous stasis dermatitis  Type 2 diabetes  CAD s/p CABG and PCI  VT arrest s/p VT ablation and ICD  Hypertension  Hyperlipidemia  Hypothyroidism  Left femoral DVT on Eliquis  COPD  JAKE  Pulmonary hypertension    Discharge Meds     Medication List      PAUSE taking these medications     ivabradine 5 mg tablet; Wait to take this until your doctor or other   care provider tells you to start again.; Commonly known as: Corlanor; Take   0.5 tablets (2.5 mg) by mouth 2 times a day.   sacubitriL-valsartan  mg tablet; Wait to take this until your   doctor or other care provider tells you to start again.; Commonly known   as: Entresto; Take 1 tablet by mouth 2 times a day.   spironolactone 25 mg tablet; Wait to take this until your doctor or   other care provider tells you to start again.; Commonly known as:   Aldactone; TOME 2 TABLETAS POR LA BOCA NITIN KOHLI AL CUBA     CHANGE how you take these medications     metoprolol succinate  mg 24 hr tablet; Commonly known as:   Toprol-XL; What changed: Another medication with the same name was   removed. Continue taking this medication, and follow the directions you   see here.     CONTINUE taking these medications     albuterol 2.5 mg /3 mL (0.083 %) nebulizer solution; Use every 4-6 hours   as needed for shortness of breath   atorvastatin 80 mg tablet; Commonly known as: Lipitor; Take 1 tablet (80   mg) by mouth once daily.   bumetanide 1 mg tablet; Commonly known as: Bumex; Take 1 tablet (1 mg)   by mouth once daily.   digoxin 125 MCG tablet; Commonly known as: Lanoxin; Take 1 tablet (125   mcg) by mouth once daily.   Eliquis 5 mg tablet; Generic drug: apixaban; TOME 1 TABLETA POR LA BOCA   DOS  VECSUNITHA AL CUBA   Farxiga 10 mg tablet; Generic drug: dapagliflozin  "propanediol; Take 1   tablet (10 mg) by mouth once daily.   fenofibrate 145 mg tablet; Commonly known as: Tricor; Take 1 tablet (145   mg) by mouth once daily.   FreeStyle Franco 2 Lawrence misc; Generic drug: flash glucose scanning   reader; Use as instructed   FreeStyle Franco 2 Sensor kit; Generic drug: flash glucose sensor kit;   Change every 14 days   * gabapentin 300 mg capsule; Commonly known as: Neurontin; TOME 1   CAPSULA POR LA BOCA NITIN  VEZ AL CUBA EN LA MANANA   * gabapentin 600 mg tablet; Commonly known as: Neurontin; TOME 1 TABLETA   POR LA BOCA NITIN  VEZ AL CUBA AL ACOSTARSE   hydrALAZINE 100 mg tablet; Commonly known as: Apresoline; Take 1 tablet   (100 mg) by mouth 3 times a day.   insulin glargine 300 unit/mL (3 mL) pen; Commonly known as: Toujeo Max   U-300 SoloStar; 40 units twice a day   insulin lispro 100 unit/mL pen; Commonly known as: HumaLOG KwikPen   Insulin; INYECTE DEBAJO DE LA PIEL 15  UNIDADES CON CADA COMIDA CON LA    ESCALA MOVIL , HASTA 100  UNIDADES CADA CUBA   ipratropium-albuteroL 0.5-2.5 mg/3 mL nebulizer solution; Commonly known   as: Duo-Neb; Take 3 mL by nebulization 3 times a day as needed for   wheezing.   isosorbide dinitrate 40 mg tablet; Commonly known as: Isordil; Take 1   tablet (40 mg) by mouth 3 times a day.   lancets misc; Commonly known as: Lancets,Thin; 4 times daily (before   meals and at bedtime). use as directed to test blood sugar   levothyroxine 75 mcg tablet; Commonly known as: Synthroid, Levoxyl; TOME   1 TABLETA POR LA BOCA NITIN  VEZ AL CUBA   OneTouch Ultra Test; Generic drug: blood sugar diagnostic; REVISE EL   NIVEL DE GLUCOSA EN LA PEARL CUATRO VECES AL CUBA   pantoprazole 40 mg EC tablet; Commonly known as: ProtoNix; TAKE 1 TABLET   BY MOUTH ONCE DAILY IN THE MORNING TAKE BEFORE A MEAL DO NOT CRUSH, CHEW,   AND/OR DIVIDE   * pen needle, diabetic 32 gauge x 5/32\" needle; Commonly known as: BD   Ultra-Fine Sarah Pen Needle; For insulin injection 4x/day   * TechLITE " "Pen Needle 31 gauge x 3/16\" needle; Generic drug: pen needle,   diabetic; For insulin injection 5x/day   pen needle, diabetic, safety 29 gauge x 1/2\" needle; For insulin   injections 5 times per day   QUEtiapine 50 mg tablet; Commonly known as: SEROquel; Take 1 tablet (50   mg) by mouth once daily at bedtime.  * This list has 4 medication(s) that are the same as other medications   prescribed for you. Read the directions carefully, and ask your doctor or   other care provider to review them with you.     STOP taking these medications     hydrocortisone 1 % cream   ketorolac 0.5 % ophthalmic solution; Commonly known as: Acular   lidocaine 5 % patch; Commonly known as: Lidoderm   Mounjaro 5 mg/0.5 mL pen injector; Generic drug: tirzepatide   ofloxacin 0.3 % ophthalmic solution; Commonly known as: Ocuflox   prednisoLONE acetate 1 % ophthalmic suspension; Commonly known as:   Pred-Forte       Test Results Pending At Discharge  Pending Labs       No current pending labs.            Hospital Course  Patient is a 60-year-old male with past medical history of HFrEF, CAD status post CABG, V. tach arrest status post VT ablation and ICD, type 2 diabetes, left femoral DVT on Eliquis, hypothyroidism, pulmonary hypertension who presents to [facility] ED with chest pain, shortness of breath and bilateral lower extremity edema and pain.  On admission, patient was requiring supplemental oxygen, CXR showed pulmonary vascular congestion.  Patient was given IV Lasix in the ED and started on Lasix drip by cardiology consult.  Vascular surgery was also consulted for his BLE venous stasis which showed no signs of infection.  Patient was transition to Bumex as his heart failure exacerbation improved.  He was weaned off of oxygen and is stable for discharge from cardiology standpoint.  Patient is to take Bumex 1 mg twice daily for 3 days and then transition to his home dose 1 mg daily.  Patient is to hold off on taking his Entresto, Aldactone, " Kimberly until cardiology sees him. Patient is stable for discharge to home and is to follow-up with his cardiologist within 2 weeks    Pertinent Physical Exam At Time of Discharge  Physical Exam  Constitutional:       Appearance: Normal appearance.   HENT:      Head: Normocephalic.   Eyes:      Pupils: Pupils are equal, round, and reactive to light.   Cardiovascular:      Rate and Rhythm: Normal rate and regular rhythm.      Pulses: Normal pulses.      Heart sounds: Normal heart sounds.   Pulmonary:      Effort: Pulmonary effort is normal.      Breath sounds: Normal breath sounds.   Abdominal:      General: Abdomen is flat.      Palpations: Abdomen is soft.   Skin:     General: Skin is warm and dry.      Capillary Refill: Capillary refill takes less than 2 seconds.   Neurological:      General: No focal deficit present.      Mental Status: He is alert and oriented to person, place, and time.   Psychiatric:         Mood and Affect: Mood normal.         Behavior: Behavior normal.         Outpatient Follow-Up  Future Appointments   Date Time Provider Department Center   5/12/2025  3:20 PM Diana Salazar MD [id number] Academic   5/14/2025 10:30 AM [facility] ADMIN ROOM PET CT 1 [facility]   5/14/2025 11:30 AM [facility] PET CT 1 [facility]   5/14/2025 12:15 PM [facility] NUC STRESS ROOM [id number] [facility]   5/28/2025  2:30 PM Desire Nails MD [id number] None   6/2/2025  1:30 PM Monik Aponte MD [facility] [id number] Academic   6/17/2025  3:30 PM Jono Duque MD [id number] [facility]   6/26/2025 11:00 AM Randy Brunson MD [id number] Academic   9/8/2025  1:15 PM Rell Robison MD [id number] [facility]         Kevin Velasquez DO

## 2025-05-08 NOTE — CARE PLAN
The patient's goals for the shift include      The clinical goals for the shift include pt will remain hemodynamically stable    Over the shift, the patient did  make progress toward the following goals.

## 2025-05-08 NOTE — ASSESSMENT & PLAN NOTE
Patient overall condition is improving.  Continue wound care as ordered.  Patient is being diuresed and edema is greatly improved of lower extremities.  Patient advised to continue elevation of lower extremities when possible especially when laying in bed.  Avoid putting legs in a dependent position for prolonged periods of time and less ambulating or eating.  Patient's wounds are stable.  Will observe while hospitalized.  From a vascular surgery standpoint there are no objections or barriers for this patient to be discharged.    Thank you very much for allowing Vascular Surgery to be involved in the care of your patient sincerely Mahamed SALINAS .  (This note was generated with voice recognition software and may contain errors including spelling, grammar, syntax and missed recognition of what was dictated, of which may not have been fully corrected)

## 2025-05-08 NOTE — PROGRESS NOTES
05/08/25 1522   Discharge Planning   Assistance Needed Received message from [facility] that pt had financial needs. SW met with pt at bedside with Norma . Pt tells SW that he is concerned about his hospital bill. SW provided pt with [facility] financial application. Pt denies any further SW or financial needs.

## 2025-05-09 ENCOUNTER — HOME HEALTH ADMISSION (OUTPATIENT)
Dept: HOME HEALTH SERVICES | Facility: HOME HEALTH | Age: 60
End: 2025-05-09
Payer: COMMERCIAL

## 2025-05-09 ENCOUNTER — DOCUMENTATION (OUTPATIENT)
Dept: HOME HEALTH SERVICES | Facility: HOME HEALTH | Age: 60
End: 2025-05-09
Payer: COMMERCIAL

## 2025-05-09 NOTE — HH CARE COORDINATION
Home Care received a Referral for Nursing. We have processed the referral for a Start of Care on 05-11-25, 24-48 HOURS.     If you have any questions or concerns, please feel free to contact us at [phone]. Follow the prompts, enter your five digit zip code, and you will be directed to your care team on WE[facility] 3.

## 2025-05-12 ENCOUNTER — APPOINTMENT (OUTPATIENT)
Dept: ENDOCRINOLOGY | Facility: CLINIC | Age: 60
End: 2025-05-12
Payer: COMMERCIAL

## 2025-05-12 VITALS
BODY MASS INDEX: 29.82 KG/M2 | HEART RATE: 76 BPM | HEIGHT: 71 IN | SYSTOLIC BLOOD PRESSURE: 108 MMHG | DIASTOLIC BLOOD PRESSURE: 73 MMHG | WEIGHT: 213 LBS

## 2025-05-12 DIAGNOSIS — I25.5 ISCHEMIC CARDIOMYOPATHY: ICD-10-CM

## 2025-05-12 DIAGNOSIS — E78.5 HYPERLIPIDEMIA, UNSPECIFIED HYPERLIPIDEMIA TYPE: ICD-10-CM

## 2025-05-12 DIAGNOSIS — I50.23 ACUTE ON CHRONIC HFREF (HEART FAILURE WITH REDUCED EJECTION FRACTION): ICD-10-CM

## 2025-05-12 DIAGNOSIS — Z79.4 TYPE 2 DIABETES MELLITUS WITH HYPERGLYCEMIA, WITH LONG-TERM CURRENT USE OF INSULIN: ICD-10-CM

## 2025-05-12 DIAGNOSIS — I50.22 CHRONIC SYSTOLIC HEART FAILURE: ICD-10-CM

## 2025-05-12 DIAGNOSIS — E11.65 TYPE 2 DIABETES MELLITUS WITH HYPERGLYCEMIA, WITH LONG-TERM CURRENT USE OF INSULIN: ICD-10-CM

## 2025-05-12 DIAGNOSIS — E78.1 HYPERTRIGLYCERIDEMIA: ICD-10-CM

## 2025-05-12 LAB
POC FINGERSTICK BLOOD GLUCOSE: 255 MG/DL (ref 70–100)
POC HEMOGLOBIN A1C: 8.9 % (ref 4.2–6.5)

## 2025-05-12 PROCEDURE — 82962 GLUCOSE BLOOD TEST: CPT | Performed by: STUDENT IN AN ORGANIZED HEALTH CARE EDUCATION/TRAINING PROGRAM

## 2025-05-12 PROCEDURE — 3052F HG A1C>EQUAL 8.0%<EQUAL 9.0%: CPT | Performed by: STUDENT IN AN ORGANIZED HEALTH CARE EDUCATION/TRAINING PROGRAM

## 2025-05-12 PROCEDURE — 99214 OFFICE O/P EST MOD 30 MIN: CPT | Performed by: STUDENT IN AN ORGANIZED HEALTH CARE EDUCATION/TRAINING PROGRAM

## 2025-05-12 PROCEDURE — 3008F BODY MASS INDEX DOCD: CPT | Performed by: STUDENT IN AN ORGANIZED HEALTH CARE EDUCATION/TRAINING PROGRAM

## 2025-05-12 PROCEDURE — G2211 COMPLEX E/M VISIT ADD ON: HCPCS | Performed by: STUDENT IN AN ORGANIZED HEALTH CARE EDUCATION/TRAINING PROGRAM

## 2025-05-12 PROCEDURE — 83036 HEMOGLOBIN GLYCOSYLATED A1C: CPT | Performed by: STUDENT IN AN ORGANIZED HEALTH CARE EDUCATION/TRAINING PROGRAM

## 2025-05-12 PROCEDURE — 3078F DIAST BP <80 MM HG: CPT | Performed by: STUDENT IN AN ORGANIZED HEALTH CARE EDUCATION/TRAINING PROGRAM

## 2025-05-12 PROCEDURE — 1036F TOBACCO NON-USER: CPT | Performed by: STUDENT IN AN ORGANIZED HEALTH CARE EDUCATION/TRAINING PROGRAM

## 2025-05-12 PROCEDURE — 3074F SYST BP LT 130 MM HG: CPT | Performed by: STUDENT IN AN ORGANIZED HEALTH CARE EDUCATION/TRAINING PROGRAM

## 2025-05-12 RX ORDER — DAPAGLIFLOZIN 10 MG/1
10 TABLET, FILM COATED ORAL DAILY
Qty: 90 TABLET | Refills: 3 | Status: SHIPPED | OUTPATIENT
Start: 2025-05-12 | End: 2026-05-12

## 2025-05-12 RX ORDER — FENOFIBRATE 145 MG/1
145 TABLET, FILM COATED ORAL DAILY
Qty: 90 TABLET | Refills: 3 | Status: SHIPPED | OUTPATIENT
Start: 2025-05-12 | End: 2026-05-12

## 2025-05-12 RX ORDER — PEN NEEDLE, DIABETIC 31 GX3/16"
NEEDLE, DISPOSABLE MISCELLANEOUS
Qty: 200 EACH | Refills: 3 | Status: SHIPPED | OUTPATIENT
Start: 2025-05-12 | End: 2025-05-13 | Stop reason: SDUPTHER

## 2025-05-12 RX ORDER — DULAGLUTIDE 4.5 MG/.5ML
4.5 INJECTION, SOLUTION SUBCUTANEOUS WEEKLY
Qty: 6 ML | Refills: 3 | Status: SHIPPED | OUTPATIENT
Start: 2025-05-12 | End: 2025-05-14 | Stop reason: ALTCHOICE

## 2025-05-12 RX ORDER — ATORVASTATIN CALCIUM 80 MG/1
TABLET, FILM COATED ORAL
Qty: 90 TABLET | Refills: 2 | Status: SHIPPED | OUTPATIENT
Start: 2025-05-12

## 2025-05-12 RX ORDER — INSULIN LISPRO 100 [IU]/ML
INJECTION, SOLUTION INTRAVENOUS; SUBCUTANEOUS
Qty: 45 ML | Refills: 3 | Status: SHIPPED | OUTPATIENT
Start: 2025-05-12

## 2025-05-12 RX ORDER — INSULIN GLARGINE 300 [IU]/ML
INJECTION, SOLUTION SUBCUTANEOUS
Qty: 30 ML | Refills: 3 | Status: SHIPPED | OUTPATIENT
Start: 2025-05-12

## 2025-05-12 NOTE — PROGRESS NOTES
59M PMH: DM2, COPD, CKD, CHF with ICD, pulm HTN, JAKE    Bulgarian speaking     Interval: recently admitted in 4/2025 due to acute on chronic CHF     Coming in today for DM2 follow up    Diabetes History     DM diagnosed at 40 yo  Complications Micro and Macro-diabetes related foot ulcer, neuropathy, CVD, CKD, NPDR  A1c:   Lab Results   Component Value Date    HGBA1C 8.9 (A) 05/12/2025       Regimen   Stop trulicity, switch to mounjaro 5mg weekly   Switch to U300 toujeo 40units BID  Lispro 15-15-15 with ISS 2:50 >150  Farxiga 10mg daily    SMBG   Franco 2        Hypoglycemia none known         Comorbidities and Screening  Eye Exam: 3/2025 NPDR of both eyes, had laser, has cataract  Foot exam: sees podiatry for the foot ulcer     Lipid  Lab Results   Component Value Date    CHOL 630 (H) 11/11/2024    CHOL 417 (H) 11/05/2024    CHOL 106 10/05/2023     Lab Results   Component Value Date    HDL 29.2 11/11/2024    HDL 36.9 11/05/2024    HDL 24.5 10/05/2023     Lab Results   Component Value Date    LDLCALC  11/11/2024      Comment:      The calculation of LDL and VLDL are inaccurate when the Triglycerides are greater than 400 mg/dL or when the patient is non-fasting. If LDL measurement is necessary contact the testing laboratory for an alternative LDL assay.                                Near   Borderline      AGE      Desirable  Optimal    High     High     Very High     0-19 Y     0 - 109     ---    110-129   >/= 130     ----    20-24 Y     0 - 119     ---    120-159   >/= 160     ----      >24 Y     0 -  99   100-129  130-159   160-189     >/=190      LDLCALC  11/05/2024      Comment:      The calculation of LDL and VLDL are inaccurate when the Triglycerides are greater than 400 mg/dL or when the patient is non-fasting. If LDL measurement is necessary contact the testing laboratory for an alternative LDL assay.                                  Near   Borderline      AGE      Desirable  Optimal    High     High     Very  "High     0-19 Y     0 - 109     ---    110-129   >/= 130     ----    20-24 Y     0 - 119     ---    120-159   >/= 160     ----      >24 Y     0 -  99   100-129  130-159   160-189     >/=190      LDLCALC 32 (L) 10/05/2023     Lab Results   Component Value Date    TRIG 2,663 (H) 2024    TRIG 2,017 (H) 2024    TRIG 247 (H) 10/05/2023     No components found for: \"CHOLHDL\"      Statin- atorvastatin 80mg, fenofibrate 145mg daily-started on 2024   Unable to tolerate vascepa-swelling  Cr and albuminuria- CKD3   Lab Results   Component Value Date    CREATININE 2.24 (H) 2025    EGFR 33 (L) 2025    ALBUMINUR 499.4 2024      ACE/ARB- entresto    Past Medical History:   Diagnosis Date    Cataract     Chest pain 10/01/2023    CHF (congestive heart failure)     Clotting disorder (Multi)     COPD (chronic obstructive pulmonary disease) (Multi)     Coronary artery disease     Diabetes mellitus (Multi)     Disease of thyroid gland     Elevated troponin level not due myocardial infarction 2023    Hypertension     Ischemic cardiomyopathy     Pulmonary hypertension (Multi)     Venous ulcer of left leg (Multi)      Family History   Family history unknown: Yes      Social History     Socioeconomic History    Marital status: Single     Spouse name: Not on file    Number of children: Not on file    Years of education: Not on file    Highest education level: Not on file   Occupational History    Not on file   Tobacco Use    Smoking status: Former     Current packs/day: 0.00     Average packs/day: 1 pack/day for 24.0 years (24.0 ttl pk-yrs)     Types: Cigarettes     Start date:      Quit date: 2017     Years since quittin.3    Smokeless tobacco: Never   Vaping Use    Vaping status: Never Used   Substance and Sexual Activity    Alcohol use: Never    Drug use: Never    Sexual activity: Defer   Other Topics Concern    Not on file   Social History Narrative    Not on file     Social Drivers of " Health     Financial Resource Strain: Low Risk  (5/7/2025)    Overall Financial Resource Strain (CARDIA)     Difficulty of Paying Living Expenses: Not hard at all   Food Insecurity: No Food Insecurity (5/7/2025)    Hunger Vital Sign     Worried About Running Out of Food in the Last Year: Never true     Ran Out of Food in the Last Year: Never true   Transportation Needs: No Transportation Needs (5/7/2025)    PRAPARE - Transportation     Lack of Transportation (Medical): No     Lack of Transportation (Non-Medical): No   Physical Activity: Unknown (5/7/2025)    Exercise Vital Sign     Days of Exercise per Week: 0 days     Minutes of Exercise per Session: Not on file   Stress: No Stress Concern Present (7/30/2021)    Received from [facility]    [employer] [place] [employer] - Occupational Stress Questionnaire     Feeling of Stress : Only a little   Social Connections: Unknown (1/20/2025)    OASIS : Social Isolation     Frequency of experiencing loneliness or isolation: Patient unable to respond   Intimate Partner Violence: Not At Risk (5/7/2025)    Humiliation, Afraid, Rape, and Kick questionnaire     Fear of Current or Ex-Partner: No     Emotionally Abused: No     Physically Abused: No     Sexually Abused: No   Housing Stability: Low Risk  (5/7/2025)    Housing Stability Vital Sign     Unable to Pay for Housing in the Last Year: No     Number of Times Moved in the Last Year: 0     Homeless in the Last Year: No        ROS:  Negative except those noted in current and interim history    Physical Exam  Constitutional:       Appearance: Normal appearance.   HENT:      Head: Normocephalic.   Cardiovascular:      Rate and Rhythm: Normal rate and regular rhythm.   Abdominal:      Palpations: Abdomen is soft.   Musculoskeletal:      Comments: In a scooter, trace bilateral LE edema   Skin:     General: Skin is warm.      Comments: Bilateral leg wraps    Neurological:      Mental Status: He is alert and oriented  "to person, place, and time.   Psychiatric:         Mood and Affect: Mood normal.         Behavior: Behavior normal.          labs and imaging reviewed, pertinent findings listed on HPI and Impression      Problem List Items Addressed This Visit       Diabetes mellitus (Multi)    Relevant Medications    dulaglutide (Trulicity) 4.5 mg/0.5 mL pen injector    insulin lispro (HumaLOG KwikPen Insulin) 100 unit/mL pen    insulin glargine (Toujeo Max U-300 SoloStar) 300 unit/mL (3 mL) pen    fenofibrate (Tricor) 145 mg tablet    Farxiga 10 mg tablet    TechLITE Pen Needle 31 gauge x 3/16\" needle    Other Relevant Orders    Albumin-Creatinine Ratio, Urine Random    Hemoglobin A1C    Renal Function Panel    POCT glycosylated hemoglobin (Hb A1C) manually resulted (Completed)    POCT glucose manually resulted (Completed)    Ischemic cardiomyopathy    Relevant Medications    Farxiga 10 mg tablet    atorvastatin (Lipitor) 80 mg tablet    Chronic systolic heart failure    Relevant Medications    Farxiga 10 mg tablet    atorvastatin (Lipitor) 80 mg tablet     Other Visit Diagnoses         Acute on chronic HFrEF (heart failure with reduced ejection fraction)        Relevant Medications    Farxiga 10 mg tablet    atorvastatin (Lipitor) 80 mg tablet      Hyperlipidemia, unspecified hyperlipidemia type        Relevant Orders    Lipid panel      Hypertriglyceridemia        Relevant Orders    Lipid panel              1) DM2 uncontrolled with hyperglycemia     Has complications including neuropathy, CKD, CVD     Has not checked sugars since discharge from the hospital. Also has not been connected to the franco so unable to make big insulin changes today.  He did have a random BG today of 255 and took 15 units with his breakfast dose    Restart trulicity 4.5mg weekly, he never received the mounjaro. Will follow up on covrerage   Switch to U300 toujeo 40units BID  Increase Lispro 18-18-18 with ISS 2:50 >150  Farxiga 10mg daily    Franco " 2-through DME    2) Hypothryoidism  levothryoxine to 75mcg daily     3)Hypertriglyceridemia  Continue fenofibrate   -continue statin  -discussed pancreatitis precautions     Hospital records reviewed     Follow up in 4 months

## 2025-05-13 ENCOUNTER — TELEPHONE (OUTPATIENT)
Dept: ENDOCRINOLOGY | Facility: CLINIC | Age: 60
End: 2025-05-13
Payer: COMMERCIAL

## 2025-05-13 DIAGNOSIS — E11.65 TYPE 2 DIABETES MELLITUS WITH HYPERGLYCEMIA, WITH LONG-TERM CURRENT USE OF INSULIN: ICD-10-CM

## 2025-05-13 DIAGNOSIS — Z79.4 TYPE 2 DIABETES MELLITUS WITH HYPERGLYCEMIA, WITH LONG-TERM CURRENT USE OF INSULIN: ICD-10-CM

## 2025-05-13 RX ORDER — PEN NEEDLE, DIABETIC 31 GX3/16"
NEEDLE, DISPOSABLE MISCELLANEOUS
Qty: 500 EACH | Refills: 3 | Status: SHIPPED | OUTPATIENT
Start: 2025-05-13 | End: 2025-05-14 | Stop reason: ALTCHOICE

## 2025-05-13 NOTE — TELEPHONE ENCOUNTER
No PA on file. Ran PA for Mounjaro through [facility] KEY: [id number] and came back that PA is not neeeded. Patient eligible to get mounjaro at this time.      Prior Auth for abbi 3 reader pending determination  Submitted on 5/14 via cmm    KEY: [id number]

## 2025-05-14 ENCOUNTER — HOSPITAL ENCOUNTER (OUTPATIENT)
Dept: RADIOLOGY | Facility: HOSPITAL | Age: 60
Discharge: HOME | End: 2025-05-14
Payer: COMMERCIAL

## 2025-05-14 ENCOUNTER — APPOINTMENT (OUTPATIENT)
Dept: OPHTHALMOLOGY | Age: 60
End: 2025-05-14
Payer: COMMERCIAL

## 2025-05-14 DIAGNOSIS — I25.5 ISCHEMIC CARDIOMYOPATHY: ICD-10-CM

## 2025-05-14 DIAGNOSIS — E08.22 DIABETES MELLITUS DUE TO UNDERLYING CONDITION WITH STAGE 3 CHRONIC KIDNEY DISEASE, WITH LONG-TERM CURRENT USE OF INSULIN, UNSPECIFIED WHETHER STAGE 3A OR 3B CKD (MULTI): ICD-10-CM

## 2025-05-14 DIAGNOSIS — I50.22 CHRONIC SYSTOLIC HEART FAILURE: ICD-10-CM

## 2025-05-14 DIAGNOSIS — Z79.4 TYPE 2 DIABETES MELLITUS WITH HYPERGLYCEMIA, WITH LONG-TERM CURRENT USE OF INSULIN: Primary | ICD-10-CM

## 2025-05-14 DIAGNOSIS — Z95.810 IMPLANTABLE CARDIOVERTER-DEFIBRILLATOR (ICD) IN SITU: ICD-10-CM

## 2025-05-14 DIAGNOSIS — Z79.4 TYPE 2 DIABETES MELLITUS WITH HYPERGLYCEMIA, WITH LONG-TERM CURRENT USE OF INSULIN: ICD-10-CM

## 2025-05-14 DIAGNOSIS — E11.65 TYPE 2 DIABETES MELLITUS WITH HYPERGLYCEMIA, WITH LONG-TERM CURRENT USE OF INSULIN: Primary | ICD-10-CM

## 2025-05-14 DIAGNOSIS — E11.69 TYPE 2 DIABETES MELLITUS WITH OTHER SPECIFIED COMPLICATION, WITH LONG-TERM CURRENT USE OF INSULIN: Primary | ICD-10-CM

## 2025-05-14 DIAGNOSIS — E11.65 TYPE 2 DIABETES MELLITUS WITH HYPERGLYCEMIA, WITH LONG-TERM CURRENT USE OF INSULIN: ICD-10-CM

## 2025-05-14 DIAGNOSIS — I25.5 ISCHEMIC CARDIOMYOPATHY: Primary | ICD-10-CM

## 2025-05-14 DIAGNOSIS — N18.30 STAGE 3 CHRONIC KIDNEY DISEASE, UNSPECIFIED WHETHER STAGE 3A OR 3B CKD (MULTI): ICD-10-CM

## 2025-05-14 DIAGNOSIS — N18.30 DIABETES MELLITUS DUE TO UNDERLYING CONDITION WITH STAGE 3 CHRONIC KIDNEY DISEASE, WITH LONG-TERM CURRENT USE OF INSULIN, UNSPECIFIED WHETHER STAGE 3A OR 3B CKD (MULTI): ICD-10-CM

## 2025-05-14 DIAGNOSIS — I50.23 ACUTE ON CHRONIC HFREF (HEART FAILURE WITH REDUCED EJECTION FRACTION): ICD-10-CM

## 2025-05-14 DIAGNOSIS — Z79.4 TYPE 2 DIABETES MELLITUS WITH OTHER SPECIFIED COMPLICATION, WITH LONG-TERM CURRENT USE OF INSULIN: Primary | ICD-10-CM

## 2025-05-14 DIAGNOSIS — Z79.4 DIABETES MELLITUS DUE TO UNDERLYING CONDITION WITH STAGE 3 CHRONIC KIDNEY DISEASE, WITH LONG-TERM CURRENT USE OF INSULIN, UNSPECIFIED WHETHER STAGE 3A OR 3B CKD (MULTI): ICD-10-CM

## 2025-05-14 PROCEDURE — 78492 MYOCRD IMG PET MLT RST&STRS: CPT

## 2025-05-14 PROCEDURE — 2500000004 HC RX 250 GENERAL PHARMACY W/ HCPCS (ALT 636 FOR OP/ED): Performed by: STUDENT IN AN ORGANIZED HEALTH CARE EDUCATION/TRAINING PROGRAM

## 2025-05-14 PROCEDURE — 93018 CV STRESS TEST I&R ONLY: CPT | Performed by: INTERNAL MEDICINE

## 2025-05-14 PROCEDURE — 3430000001 HC RX 343 DIAGNOSTIC RADIOPHARMACEUTICALS: Performed by: STUDENT IN AN ORGANIZED HEALTH CARE EDUCATION/TRAINING PROGRAM

## 2025-05-14 PROCEDURE — 93016 CV STRESS TEST SUPVJ ONLY: CPT | Performed by: INTERNAL MEDICINE

## 2025-05-14 PROCEDURE — A9526 NITROGEN N-13 AMMONIA: HCPCS | Performed by: STUDENT IN AN ORGANIZED HEALTH CARE EDUCATION/TRAINING PROGRAM

## 2025-05-14 PROCEDURE — 93017 CV STRESS TEST TRACING ONLY: CPT

## 2025-05-14 RX ORDER — REGADENOSON 0.08 MG/ML
0.4 INJECTION, SOLUTION INTRAVENOUS ONCE
Status: COMPLETED | OUTPATIENT
Start: 2025-05-14 | End: 2025-05-14

## 2025-05-14 RX ORDER — PEN NEEDLE, DIABETIC 30 GX3/16"
NEEDLE, DISPOSABLE MISCELLANEOUS
Qty: 500 EACH | Refills: 3 | Status: SHIPPED | OUTPATIENT
Start: 2025-05-14

## 2025-05-14 RX ORDER — TIRZEPATIDE 5 MG/.5ML
5 INJECTION, SOLUTION SUBCUTANEOUS
Qty: 2 ML | Refills: 11 | Status: SHIPPED | OUTPATIENT
Start: 2025-05-14

## 2025-05-14 RX ORDER — AMMONIA N-13 37.5 MCI/ML
13.3 INJECTION INTRAVENOUS
Status: COMPLETED | OUTPATIENT
Start: 2025-05-14 | End: 2025-05-14

## 2025-05-14 RX ORDER — AMMONIA N-13 37.5 MCI/ML
10.9 INJECTION INTRAVENOUS
Status: COMPLETED | OUTPATIENT
Start: 2025-05-14 | End: 2025-05-14

## 2025-05-14 RX ADMIN — AMMONIA N-13 10.9 MILLICURIE: 37.5 INJECTION INTRAVENOUS at 11:04

## 2025-05-14 RX ADMIN — AMMONIA N-13 13.3 MILLICURIE: 37.5 INJECTION INTRAVENOUS at 11:33

## 2025-05-14 RX ADMIN — REGADENOSON 0.4 MG: 0.08 INJECTION, SOLUTION INTRAVENOUS at 12:43

## 2025-05-15 DIAGNOSIS — Z79.4 TYPE 2 DIABETES MELLITUS WITH HYPERGLYCEMIA, WITH LONG-TERM CURRENT USE OF INSULIN: Primary | ICD-10-CM

## 2025-05-15 DIAGNOSIS — E11.65 TYPE 2 DIABETES MELLITUS WITH HYPERGLYCEMIA, WITH LONG-TERM CURRENT USE OF INSULIN: Primary | ICD-10-CM

## 2025-05-15 RX ORDER — BLOOD-GLUCOSE,RECEIVER,CONT
EACH MISCELLANEOUS
Qty: 1 EACH | Refills: 0 | Status: SHIPPED | OUTPATIENT
Start: 2025-05-15

## 2025-05-15 RX ORDER — BLOOD-GLUCOSE SENSOR
EACH MISCELLANEOUS
Qty: 2 EACH | Refills: 11 | Status: SHIPPED | OUTPATIENT
Start: 2025-05-15

## 2025-05-16 DIAGNOSIS — I21.4 NON-ST ELEVATION MYOCARDIAL INFARCTION (NSTEMI) (MULTI): ICD-10-CM

## 2025-05-16 DIAGNOSIS — I50.9 ACUTE ON CHRONIC CONGESTIVE HEART FAILURE, UNSPECIFIED HEART FAILURE TYPE: ICD-10-CM

## 2025-05-16 DIAGNOSIS — I50.22 CHRONIC SYSTOLIC HEART FAILURE: Primary | ICD-10-CM

## 2025-05-16 DIAGNOSIS — I25.5 ISCHEMIC CARDIOMYOPATHY: ICD-10-CM

## 2025-05-16 DIAGNOSIS — I25.84 CORONARY ARTERY DISEASE DUE TO CALCIFIED CORONARY LESION: ICD-10-CM

## 2025-05-16 DIAGNOSIS — E78.00 HIGH BLOOD CHOLESTEROL: ICD-10-CM

## 2025-05-16 DIAGNOSIS — Z86.74 HISTORY OF CARDIAC ARREST: ICD-10-CM

## 2025-05-16 DIAGNOSIS — I25.10 CORONARY ARTERY DISEASE DUE TO CALCIFIED CORONARY LESION: ICD-10-CM

## 2025-05-16 DIAGNOSIS — I73.9 PERIPHERAL VASCULAR DISEASE: ICD-10-CM

## 2025-05-16 DIAGNOSIS — I21.9 MYOCARDIAL INFARCTION, UNSPECIFIED MI TYPE, UNSPECIFIED ARTERY (MULTI): ICD-10-CM

## 2025-05-16 NOTE — RESULT ENCOUNTER NOTE
Seems reasonable to me, and I'm more than happy to do the procedure.     Thank you so much for letting me know about the case.     Can you have someone on your team place a case request order and we'll get him on the schedule ASAP.  Regards,  Edson

## 2025-05-17 DIAGNOSIS — I25.5 ISCHEMIC CARDIOMYOPATHY: ICD-10-CM

## 2025-05-17 DIAGNOSIS — I50.22 CHRONIC SYSTOLIC HEART FAILURE: ICD-10-CM

## 2025-05-17 DIAGNOSIS — I50.23 ACUTE ON CHRONIC HFREF (HEART FAILURE WITH REDUCED EJECTION FRACTION): ICD-10-CM

## 2025-05-19 RX ORDER — HYDRALAZINE HYDROCHLORIDE 100 MG/1
TABLET, FILM COATED ORAL
Qty: 300 TABLET | Refills: 2 | Status: SHIPPED | OUTPATIENT
Start: 2025-05-19

## 2025-05-22 PROCEDURE — 93005 ELECTROCARDIOGRAM TRACING: CPT

## 2025-05-24 LAB
ATRIAL RATE: 86 BPM
P AXIS: 65 DEGREES
P OFFSET: 188 MS
P ONSET: 117 MS
PR INTERVAL: 194 MS
Q ONSET: 214 MS
QRS COUNT: 14 BEATS
QRS DURATION: 118 MS
QT INTERVAL: 390 MS
QTC CALCULATION(BAZETT): 466 MS
QTC FREDERICIA: 440 MS
R AXIS: 102 DEGREES
T AXIS: 43 DEGREES
T OFFSET: 409 MS
VENTRICULAR RATE: 86 BPM

## 2025-05-28 ENCOUNTER — APPOINTMENT (OUTPATIENT)
Dept: OPHTHALMOLOGY | Age: 60
End: 2025-05-28
Payer: COMMERCIAL

## 2025-05-29 ENCOUNTER — HOSPITAL ENCOUNTER (OUTPATIENT)
Facility: HOSPITAL | Age: 60
Setting detail: OUTPATIENT SURGERY
Discharge: HOME | End: 2025-05-29
Attending: INTERNAL MEDICINE | Admitting: INTERNAL MEDICINE
Payer: COMMERCIAL

## 2025-05-29 DIAGNOSIS — Z86.74 HISTORY OF CARDIAC ARREST: ICD-10-CM

## 2025-05-29 DIAGNOSIS — I50.22 CHRONIC SYSTOLIC HEART FAILURE: Primary | ICD-10-CM

## 2025-05-29 DIAGNOSIS — I50.9 ACUTE ON CHRONIC CONGESTIVE HEART FAILURE, UNSPECIFIED HEART FAILURE TYPE: ICD-10-CM

## 2025-05-29 DIAGNOSIS — I25.5 ISCHEMIC CARDIOMYOPATHY: ICD-10-CM

## 2025-05-29 DIAGNOSIS — I73.9 PERIPHERAL VASCULAR DISEASE: ICD-10-CM

## 2025-05-29 DIAGNOSIS — E78.00 HIGH BLOOD CHOLESTEROL: ICD-10-CM

## 2025-05-29 RX ORDER — DEXTROSE 50 % IN WATER (D50W) INTRAVENOUS SYRINGE
25
Status: DISCONTINUED | OUTPATIENT
Start: 2025-05-29 | End: 2025-05-29 | Stop reason: HOSPADM

## 2025-05-29 RX ORDER — SODIUM CHLORIDE 9 MG/ML
50 INJECTION, SOLUTION INTRAVENOUS CONTINUOUS
Status: DISCONTINUED | OUTPATIENT
Start: 2025-05-29 | End: 2025-05-29 | Stop reason: HOSPADM

## 2025-05-29 RX ORDER — NAPROXEN SODIUM 220 MG/1
324 TABLET, FILM COATED ORAL ONCE
Status: DISCONTINUED | OUTPATIENT
Start: 2025-05-29 | End: 2025-05-29 | Stop reason: HOSPADM

## 2025-05-29 RX ORDER — DEXTROSE 50 % IN WATER (D50W) INTRAVENOUS SYRINGE
12.5
Status: DISCONTINUED | OUTPATIENT
Start: 2025-05-29 | End: 2025-05-29 | Stop reason: HOSPADM

## 2025-05-29 NOTE — SIGNIFICANT EVENT
Patient presented for elective RHC/LHC in setting of HFrEF/ischemic CM, recent abnormal nuclear PET scan. He is on Eliquis for hx DVT.     On medication reconciliation today, patient reports that he last took Eliquis last night. Discussed with Dr. Bryan.  Given risk for bleeding with procedure with last dose of Eliquis last night, RHC/LHC canceled. Patient advised to hold Eliquis for 2 days prior to RHC/LHC. He has been rescheduled for 6/10/25. Patient verbalized understanding with plan of care.     Eva Orona APRN-CNP  Interventional Lab/Cardiology   [facility]

## 2025-06-02 ENCOUNTER — APPOINTMENT (OUTPATIENT)
Dept: PRIMARY CARE | Facility: HOSPITAL | Age: 60
End: 2025-06-02
Payer: COMMERCIAL

## 2025-06-10 ENCOUNTER — HOSPITAL ENCOUNTER (OUTPATIENT)
Facility: HOSPITAL | Age: 60
Setting detail: OUTPATIENT SURGERY
Discharge: HOME | End: 2025-06-10
Attending: INTERNAL MEDICINE | Admitting: INTERNAL MEDICINE
Payer: COMMERCIAL

## 2025-06-10 VITALS
DIASTOLIC BLOOD PRESSURE: 84 MMHG | RESPIRATION RATE: 16 BRPM | WEIGHT: 214 LBS | OXYGEN SATURATION: 98 % | HEIGHT: 71 IN | TEMPERATURE: 97.5 F | BODY MASS INDEX: 29.96 KG/M2 | HEART RATE: 74 BPM | SYSTOLIC BLOOD PRESSURE: 124 MMHG

## 2025-06-10 DIAGNOSIS — E78.00 HIGH BLOOD CHOLESTEROL: ICD-10-CM

## 2025-06-10 DIAGNOSIS — I50.22 CHRONIC SYSTOLIC HEART FAILURE: Primary | ICD-10-CM

## 2025-06-10 DIAGNOSIS — I50.9 ACUTE ON CHRONIC CONGESTIVE HEART FAILURE, UNSPECIFIED HEART FAILURE TYPE: ICD-10-CM

## 2025-06-10 DIAGNOSIS — I25.5 ISCHEMIC CARDIOMYOPATHY: ICD-10-CM

## 2025-06-10 DIAGNOSIS — I42.9 CARDIOMYOPATHY, UNSPECIFIED: ICD-10-CM

## 2025-06-10 DIAGNOSIS — I25.10 CAD (CORONARY ARTERY DISEASE): ICD-10-CM

## 2025-06-10 DIAGNOSIS — I73.9 PERIPHERAL VASCULAR DISEASE: ICD-10-CM

## 2025-06-10 DIAGNOSIS — Z86.74 HISTORY OF CARDIAC ARREST: ICD-10-CM

## 2025-06-10 LAB
ANION GAP SERPL CALC-SCNC: 17 MMOL/L (ref 10–20)
BUN SERPL-MCNC: 33 MG/DL (ref 6–23)
CALCIUM SERPL-MCNC: 9 MG/DL (ref 8.6–10.3)
CHLORIDE SERPL-SCNC: 99 MMOL/L (ref 98–107)
CHOLEST SERPL-MCNC: 82 MG/DL (ref 0–199)
CHOLESTEROL/HDL RATIO: 3.1
CO2 SERPL-SCNC: 24 MMOL/L (ref 21–32)
CREAT SERPL-MCNC: 2.07 MG/DL (ref 0.5–1.3)
EGFRCR SERPLBLD CKD-EPI 2021: 36 ML/MIN/1.73M*2
ERYTHROCYTE [DISTWIDTH] IN BLOOD BY AUTOMATED COUNT: 22 % (ref 11.5–14.5)
GLUCOSE SERPL-MCNC: 179 MG/DL (ref 74–99)
HCT VFR BLD AUTO: 41.6 % (ref 41–52)
HDLC SERPL-MCNC: 26.8 MG/DL
HGB BLD-MCNC: 12.5 G/DL (ref 13.5–17.5)
LDLC SERPL CALC-MCNC: 24 MG/DL
MCH RBC QN AUTO: 24.1 PG (ref 26–34)
MCHC RBC AUTO-ENTMCNC: 30 G/DL (ref 32–36)
MCV RBC AUTO: 80 FL (ref 80–100)
NON HDL CHOLESTEROL: 55 MG/DL (ref 0–149)
NRBC BLD-RTO: 0 /100 WBCS (ref 0–0)
PLATELET # BLD AUTO: 242 X10*3/UL (ref 150–450)
POTASSIUM SERPL-SCNC: 4.4 MMOL/L (ref 3.5–5.3)
RBC # BLD AUTO: 5.19 X10*6/UL (ref 4.5–5.9)
SODIUM SERPL-SCNC: 136 MMOL/L (ref 136–145)
TRIGL SERPL-MCNC: 154 MG/DL (ref 0–149)
VLDL: 31 MG/DL (ref 0–40)
WBC # BLD AUTO: 4.9 X10*3/UL (ref 4.4–11.3)

## 2025-06-10 PROCEDURE — 7100000009 HC PHASE TWO TIME - INITIAL BASE CHARGE: Performed by: INTERNAL MEDICINE

## 2025-06-10 PROCEDURE — 2550000001 HC RX 255 CONTRASTS: Performed by: INTERNAL MEDICINE

## 2025-06-10 PROCEDURE — 2500000004 HC RX 250 GENERAL PHARMACY W/ HCPCS (ALT 636 FOR OP/ED): Performed by: STUDENT IN AN ORGANIZED HEALTH CARE EDUCATION/TRAINING PROGRAM

## 2025-06-10 PROCEDURE — 2500000005 HC RX 250 GENERAL PHARMACY W/O HCPCS: Performed by: INTERNAL MEDICINE

## 2025-06-10 PROCEDURE — 2720000007 HC OR 272 NO HCPCS: Performed by: INTERNAL MEDICINE

## 2025-06-10 PROCEDURE — 2500000001 HC RX 250 WO HCPCS SELF ADMINISTERED DRUGS (ALT 637 FOR MEDICARE OP): Performed by: NURSE PRACTITIONER

## 2025-06-10 PROCEDURE — C1760 CLOSURE DEV, VASC: HCPCS | Performed by: INTERNAL MEDICINE

## 2025-06-10 PROCEDURE — C1887 CATHETER, GUIDING: HCPCS | Performed by: INTERNAL MEDICINE

## 2025-06-10 PROCEDURE — 93461 R&L HRT ART/VENTRICLE ANGIO: CPT | Performed by: INTERNAL MEDICINE

## 2025-06-10 PROCEDURE — 93460 R&L HRT ART/VENTRICLE ANGIO: CPT | Performed by: INTERNAL MEDICINE

## 2025-06-10 PROCEDURE — C1769 GUIDE WIRE: HCPCS | Performed by: INTERNAL MEDICINE

## 2025-06-10 PROCEDURE — 85027 COMPLETE CBC AUTOMATED: CPT | Performed by: NURSE PRACTITIONER

## 2025-06-10 PROCEDURE — 36415 COLL VENOUS BLD VENIPUNCTURE: CPT | Performed by: NURSE PRACTITIONER

## 2025-06-10 PROCEDURE — 99152 MOD SED SAME PHYS/QHP 5/>YRS: CPT | Performed by: INTERNAL MEDICINE

## 2025-06-10 PROCEDURE — 96373 THER/PROPH/DIAG INJ IA: CPT | Performed by: STUDENT IN AN ORGANIZED HEALTH CARE EDUCATION/TRAINING PROGRAM

## 2025-06-10 PROCEDURE — 99223 1ST HOSP IP/OBS HIGH 75: CPT | Performed by: NURSE PRACTITIONER

## 2025-06-10 PROCEDURE — 2500000004 HC RX 250 GENERAL PHARMACY W/ HCPCS (ALT 636 FOR OP/ED): Performed by: NURSE PRACTITIONER

## 2025-06-10 PROCEDURE — 99153 MOD SED SAME PHYS/QHP EA: CPT | Performed by: INTERNAL MEDICINE

## 2025-06-10 PROCEDURE — C1894 INTRO/SHEATH, NON-LASER: HCPCS | Performed by: INTERNAL MEDICINE

## 2025-06-10 PROCEDURE — 2500000004 HC RX 250 GENERAL PHARMACY W/ HCPCS (ALT 636 FOR OP/ED): Performed by: INTERNAL MEDICINE

## 2025-06-10 PROCEDURE — 80061 LIPID PANEL: CPT | Performed by: NURSE PRACTITIONER

## 2025-06-10 PROCEDURE — 7100000010 HC PHASE TWO TIME - EACH INCREMENTAL 1 MINUTE: Performed by: INTERNAL MEDICINE

## 2025-06-10 PROCEDURE — 80048 BASIC METABOLIC PNL TOTAL CA: CPT | Performed by: NURSE PRACTITIONER

## 2025-06-10 RX ORDER — SODIUM CHLORIDE 9 MG/ML
50 INJECTION, SOLUTION INTRAVENOUS CONTINUOUS
Status: DISCONTINUED | OUTPATIENT
Start: 2025-06-10 | End: 2025-06-10

## 2025-06-10 RX ORDER — NAPROXEN SODIUM 220 MG/1
324 TABLET, FILM COATED ORAL ONCE
Status: COMPLETED | OUTPATIENT
Start: 2025-06-10 | End: 2025-06-10

## 2025-06-10 RX ORDER — HEPARIN SODIUM 1000 [USP'U]/ML
INJECTION, SOLUTION INTRAVENOUS; SUBCUTANEOUS AS NEEDED
Status: DISCONTINUED | OUTPATIENT
Start: 2025-06-10 | End: 2025-06-10 | Stop reason: HOSPADM

## 2025-06-10 RX ORDER — DEXTROSE 50 % IN WATER (D50W) INTRAVENOUS SYRINGE
25
Status: DISCONTINUED | OUTPATIENT
Start: 2025-06-10 | End: 2025-06-10 | Stop reason: HOSPADM

## 2025-06-10 RX ORDER — FUROSEMIDE 10 MG/ML
100 INJECTION INTRAMUSCULAR; INTRAVENOUS ONCE
Status: COMPLETED | OUTPATIENT
Start: 2025-06-10 | End: 2025-06-10

## 2025-06-10 RX ORDER — MIDAZOLAM HYDROCHLORIDE 1 MG/ML
INJECTION, SOLUTION INTRAMUSCULAR; INTRAVENOUS AS NEEDED
Status: DISCONTINUED | OUTPATIENT
Start: 2025-06-10 | End: 2025-06-10 | Stop reason: HOSPADM

## 2025-06-10 RX ORDER — NITROGLYCERIN 40 MG/100ML
INJECTION INTRAVENOUS AS NEEDED
Status: DISCONTINUED | OUTPATIENT
Start: 2025-06-10 | End: 2025-06-10 | Stop reason: HOSPADM

## 2025-06-10 RX ORDER — DEXTROSE 50 % IN WATER (D50W) INTRAVENOUS SYRINGE
12.5
Status: DISCONTINUED | OUTPATIENT
Start: 2025-06-10 | End: 2025-06-10 | Stop reason: HOSPADM

## 2025-06-10 RX ORDER — ACETAMINOPHEN 325 MG/1
650 TABLET ORAL EVERY 6 HOURS PRN
Status: DISCONTINUED | OUTPATIENT
Start: 2025-06-10 | End: 2025-06-10 | Stop reason: HOSPADM

## 2025-06-10 RX ORDER — ACETAMINOPHEN 160 MG/5ML
650 SOLUTION ORAL EVERY 6 HOURS PRN
Status: DISCONTINUED | OUTPATIENT
Start: 2025-06-10 | End: 2025-06-10 | Stop reason: HOSPADM

## 2025-06-10 RX ORDER — FENTANYL CITRATE 50 UG/ML
INJECTION, SOLUTION INTRAMUSCULAR; INTRAVENOUS AS NEEDED
Status: DISCONTINUED | OUTPATIENT
Start: 2025-06-10 | End: 2025-06-10 | Stop reason: HOSPADM

## 2025-06-10 RX ORDER — LIDOCAINE HYDROCHLORIDE 10 MG/ML
INJECTION, SOLUTION EPIDURAL; INFILTRATION; INTRACAUDAL; PERINEURAL AS NEEDED
Status: DISCONTINUED | OUTPATIENT
Start: 2025-06-10 | End: 2025-06-10 | Stop reason: HOSPADM

## 2025-06-10 RX ORDER — SODIUM CHLORIDE 9 MG/ML
1.5 INJECTION, SOLUTION INTRAVENOUS CONTINUOUS
Status: DISCONTINUED | OUTPATIENT
Start: 2025-06-10 | End: 2025-06-10 | Stop reason: HOSPADM

## 2025-06-10 RX ORDER — ACETAMINOPHEN 650 MG/1
650 SUPPOSITORY RECTAL EVERY 6 HOURS PRN
Status: DISCONTINUED | OUTPATIENT
Start: 2025-06-10 | End: 2025-06-10 | Stop reason: HOSPADM

## 2025-06-10 RX ADMIN — FUROSEMIDE 100 MG: 10 INJECTION, SOLUTION INTRAMUSCULAR; INTRAVENOUS at 13:35

## 2025-06-10 RX ADMIN — ASPIRIN 324 MG: 81 TABLET, CHEWABLE ORAL at 09:16

## 2025-06-10 RX ADMIN — SODIUM CHLORIDE 50 ML/HR: 0.9 INJECTION, SOLUTION INTRAVENOUS at 09:18

## 2025-06-10 RX ADMIN — SODIUM CHLORIDE 1.5 ML/KG/HR: 0.9 INJECTION, SOLUTION INTRAVENOUS at 12:30

## 2025-06-10 ASSESSMENT — ENCOUNTER SYMPTOMS
EYES NEGATIVE: 1
RESPIRATORY NEGATIVE: 1
HEMATOLOGIC/LYMPHATIC NEGATIVE: 1
PSYCHIATRIC NEGATIVE: 1
MUSCULOSKELETAL NEGATIVE: 1
ENDOCRINE NEGATIVE: 1
NEUROLOGICAL NEGATIVE: 1
CARDIOVASCULAR NEGATIVE: 1
ALLERGIC/IMMUNOLOGIC NEGATIVE: 1
GASTROINTESTINAL NEGATIVE: 1
CONSTITUTIONAL NEGATIVE: 1

## 2025-06-10 ASSESSMENT — PAIN SCALES - GENERAL
PAINLEVEL_OUTOF10: 0 - NO PAIN
PAINLEVEL_OUTOF10: 2
PAINLEVEL_OUTOF10: 0 - NO PAIN

## 2025-06-10 ASSESSMENT — PAIN - FUNCTIONAL ASSESSMENT: PAIN_FUNCTIONAL_ASSESSMENT: 0-10

## 2025-06-10 NOTE — H&P
History Of Present Illness  Felice Law is a 60 y.o. male presenting with ICM, HFrEF, here for R/LHC. PMH includes ICM, Decompensated HFrEF (15-20% 5/5/2025), CKD III, Chronic BLE venous stasis dermatitis, Insulin dependent Type 2 diabetes, CAD s/p CABG (LIMA- LAD in 2023) and PCI, VT arrest s/p VT ablation and ICD in 2017, Hypertension, Hyperlipidemia, Hypothyroidism, Left femoral DVT on Eliquis, COPD, JAKE, Pulmonary hypertension. Patient's last dose of eliquis was 6/6/25 AM.    Past Medical History:  Medical History[1]     Past Surgical History:  Surgical History[2]       Social History:  Social History[3]    Family History:  Family History[4]     Allergies:  RX Allergies[5]     Home Medications:  Current Outpatient Medications   Medication Instructions    albuterol 2.5 mg /3 mL (0.083 %) nebulizer solution Use every 4-6 hours as needed for shortness of breath    atorvastatin (Lipitor) 80 mg tablet 80mg nightly    blood-glucose sensor (FreeStyle Franco 3 Plus Sensor) device Change every 15 days    bumetanide (BUMEX) 1 mg, oral, Daily    digoxin (LANOXIN) 125 mcg, oral, Daily    Eliquis 5 mg tablet TOME 1 TABLETA POR LA BOCA DOS  VECES AL CUBA    Farxiga 10 mg, oral, Daily    fenofibrate (TRICOR) 145 mg, oral, Daily    FreeStyle Franco 3 [place] misc Use as instructed    gabapentin (Neurontin) 300 mg capsule TOME 1 CAPSULA POR LA BOCA NITIN  VEZ AL CUBA EN LA MANANA    gabapentin (Neurontin) 600 mg tablet TOME 1 TABLETA POR LA BOCA NITIN  VEZ AL CUBA AL ACOSTARSE    hydrALAZINE (Apresoline) 100 mg tablet TOME 1 TABLETA POR LA BOCA 3  VECES AL CUBA    insulin glargine (Toujeo Max U-300 SoloStar) 300 unit/mL (3 mL) pen 40 units twice a day    insulin lispro (HumaLOG KwikPen Insulin) 100 unit/mL pen 18 units with meals plus sliding scale up to 80 units daily    ipratropium-albuteroL (Duo-Neb) 0.5-2.5 mg/3 mL nebulizer solution 3 mL, nebulization, 3 times daily PRN    isosorbide dinitrate (ISORDIL) 40 mg, oral, 3 times  "daily (0900,1400,1900)    [Paused] ivabradine (CORLANOR) 2.5 mg, oral, 2 times daily    lancets (Lancets,Thin) misc 4 times daily (before meals and at bedtime). use as directed to test blood sugar    levothyroxine (Synthroid, Levoxyl) 75 mcg tablet TOME 1 TABLETA POR LA BOCA NITIN  VEZ AL CUBA    metoprolol succinate XL (TOPROL-XL) 200 mg, Daily    Mounjaro 5 mg, subcutaneous, Every 7 days    OneTouch Ultra Test strip REVISE EL NIVEL DE GLUCOSA EN LA PEARL CUATRO VECES AL CUBA    pantoprazole (ProtoNix) 40 mg EC tablet TAKE 1 TABLET BY MOUTH ONCE DAILY IN THE MORNING TAKE BEFORE A MEAL DO NOT CRUSH, CHEW, AND/OR DIVIDE    pen needle, diabetic 31 gauge x 5/16\" needle For insulin injection 5x/day    QUEtiapine (SEROQUEL) 50 mg, oral, Nightly    [Paused] sacubitriL-valsartan (Entresto)  mg tablet 1 tablet, oral, 2 times daily    [Paused] spironolactone (Aldactone) 25 mg tablet TOME 2 TABLETAS POR LA BOCA NITIN  VEZ AL CUBA       Inpatient Medications:  Scheduled Medications[6]  PRN Medications[7]  Continuous Medications[8]      Review of Systems   Constitutional: Negative.    HENT: Negative.     Eyes: Negative.    Respiratory: Negative.     Cardiovascular: Negative.    Gastrointestinal: Negative.    Endocrine: Negative.    Genitourinary: Negative.    Musculoskeletal: Negative.    Skin: Negative.    Allergic/Immunologic: Negative.    Neurological: Negative.    Hematological: Negative.    Psychiatric/Behavioral: Negative.            Physical Exam  Constitutional:       General: He is awake. He is not in acute distress.     Appearance: He is not ill-appearing.   Cardiovascular:      Rate and Rhythm: Normal rate and regular rhythm.      Pulses:           Radial pulses are 1+ on the right side and 1+ on the left side.        Dorsalis pedis pulses are 1+ on the right side and 1+ on the left side.      Heart sounds: Murmur heard.   Pulmonary:      Effort: Pulmonary effort is normal.      Breath sounds: Normal breath sounds " and air entry.   Abdominal:      General: Bowel sounds are normal.      Palpations: Abdomen is soft.      Tenderness: There is no abdominal tenderness.   Musculoskeletal:      Right lower le+ Edema present.      Left lower le+ Edema present.   Skin:     General: Skin is warm and dry.   Neurological:      General: No focal deficit present.      Mental Status: He is alert and oriented to person, place, and time.      GCS: GCS eye subscore is 4. GCS verbal subscore is 5. GCS motor subscore is 6.   Psychiatric:         Mood and Affect: Mood normal.         Behavior: Behavior is cooperative.        Sedation Plan    ASA 3     Mallampati class: III.           NPO since last night around     Last Recorded Vitals  There were no vitals taken for this visit.         Vitals from the Past 24 Hours            Relevant Results    Labs      CBC:   Recent Labs     25  0630 25  0705 25  0732 25  0651 25  1447 24  1146   WBC 5.1 4.4 4.4 4.7 5.6 6.1   HGB 12.0* 12.2* 11.6* 11.0* 12.7* 13.0*   HCT 40.4* 41.9 39.8* 37.3* 43.3 39.7*    273 268 247 294 273   MCV 80 80 81 80 80 82     BMP/CMP:   Recent Labs     25  0630 25  0705 25  1757 25  0732 25  1628 25  0651 25  1447 24  1558 24  1146 24  1139 23  1104 10/06/23  0515 10/05/23  1647    137 136 135* 136 135* 132* 124* 124* 138 136   < > 132*   K 3.9 4.2 4.2 3.9 4.1 4.1 4.5 5.3 5.5* 4.4 4.2   < > 4.1   CL 94* 97* 98 100 99 100 97* 91* 86* 95* 96*   < > 90*   BUN 49* 48* 48* 47* 49* 46* 48* 63* 78* 29* 33*   < > 60*   CREATININE 2.24* 2.19* 2.20* 2.23* 2.28* 2.02* 2.15* 1.36* 2.51* 1.44* 1.41*   < > 1.87*   CO2 32 31 28 28 31 27 27 22 25 33* 31   < > 28   CALCIUM 9.3 9.5 9.4 8.8 9.0 8.8 9.3 9.4 10.1 9.5 9.3   < > 10.2   PROT  --   --   --   --   --   --  7.4 7.2 8.1 7.5 7.3  --  7.9   BILITOT  --   --   --   --   --   --  2.1* 0.5 0.5 1.3* 1.1  --  1.8*   ALKPHOS   "--   --   --   --   --   --  35 44 53 65 62  --  63   ALT  --   --   --   --   --   --  26 12 13 15 13  --  11   AST  --   --   --   --   --   --  22 12 <3* 10 10  --  13   GLUCOSE 211* 137* 133* 125* 126* 190* 310* 233* 297* 304* 245*   < > 296*    < > = values in this interval not displayed.      Magnesium:   Recent Labs     05/08/25  0630 05/07/25  0705 05/04/25  1447 10/17/23  0356 10/16/23  0416 10/15/23  0309   MG 2.49* 2.55* 2.43* 1.97 2.00 1.98     Lipid Panel:   Recent Labs     11/11/24  1558 11/05/24  1146 10/05/23  0354   CHOL 630* 417* 106   HDL 29.2 36.9 24.5   CHHDL 21.6 11.3 4.3   VLDL  --   --  49*   TRIG 2,663* 2,017* 247*   NHDL 601* 380* 82     Cardiac       No lab exists for component: \"CK\", \"CKMBP\"   Hemoglobin A1C:   Recent Labs     05/12/25  1656 11/11/24  1558 11/05/24  1146 06/25/24  1139 04/08/24  1550 10/04/23  1518 05/26/23  0203 05/23/23  2038 04/05/23  1441 03/28/23  1533 12/01/22  0719 08/04/22  1347   HGBA1C 8.9* 11.0* 10.8* 9.2* 10.6* 8.1* 7.9* 7.8* 12.5* 12.4* 11.8* 12.5*     TSH/ Free T4:   Recent Labs     11/11/24  1558 11/05/24  1146 06/25/24  1139 12/29/23  1427 10/08/23  0258 10/04/23  1518   TSH 3.26 4.05* 4.23* 7.49*  --  9.31*   FREET4  --  1.18 1.46 1.00 1.04 1.17     Iron:   Recent Labs     05/04/25  1447 11/05/24  1146 06/25/24  1139 12/19/23  1104 10/05/23  1647 10/04/23  1518   FERRITIN  --   --  332*  --   --  379*   TIBC  --  315 322  --   --  335   IRONSAT  --  37 20*  --   --  15*   BNP 2,032* 57 743* 147* 157* 160*     Coag:     ABO: No results found for: \"ABO\"    Past Cardiology Tests (Last 3 Years):    EKG:  Recent Labs     05/22/25  0857   ATRRATE 86   VENTRATE 86   PRINT 194   QRSDUR 118   QTCFRED 440   QTCCALCB 466     Encounter Date: 05/04/25   ECG 12 lead   Result Value    Ventricular Rate 86    Atrial Rate 86    MD Interval 194    QRS Duration 118    QT Interval 390    QTC Calculation(Bazett) 466    P Axis 65    R Axis 102    T Axis 43    QRS Count 14    Q " Onset 214    P Onset 117    P Offset 188    T Offset 409    QTC Fredericia 440    Narrative    Normal sinus rhythm  Possible Left atrial enlargement  Low voltage QRS  Possible Anterolateral infarct (cited on or before 10-OCT-2023)  Abnormal ECG  When compared with ECG of 10-OCT-2023 20:33,  Inverted T waves have replaced nonspecific T wave abnormality in Lateral leads  See ED provider note for full interpretation and clinical correlation  Confirmed by Paola Vang ([id number]) on 5/24/2025 12:07:38 PM     Echo:  Echocardiogram:   Transthoracic Echo (TTE) Limited With Doppler, Color And Contrast 05/05/2025    PHYSICIAN INTERPRETATION:  Left Ventricle: Left ventricular ejection fraction is severely decreased, by visual estimate at 15-20%. There is global hypokinesis of the left ventricle with minor regional variations. The left ventricular cavity size is moderately dilated. Abnormal (paradoxical) septal motion, consistent with RV pacemaker. Spectral Doppler shows a Grade II (pseudonormal pattern) of left ventricular diastolic filling with an elevated left atrial pressure.  Left Atrium: The left atrium is mildly dilated.  Right Ventricle: The right ventricle is mildly enlarged. There is moderately reduced right ventricular systolic function.  Right Atrium: The right atrium is normal in size.  Aortic Valve: The aortic valve is trileaflet. There is minimal aortic valve cusp calcification. There is mild aortic valve thickening. There is no evidence of aortic valve regurgitation. The peak instantaneous gradient of the aortic valve is 6 mmHg.  Mitral Valve: The mitral valve is mildly thickened. There is mild to moderate mitral valve regurgitation.  Tricuspid Valve: The tricuspid valve is structurally normal. There is mild to moderate tricuspid regurgitation. The Doppler estimated RVSP is moderately elevated at 58.3 mmHg.  Pulmonic Valve: The pulmonic valve is structurally normal. There is physiologic pulmonic valve  regurgitation.  Pericardium: Trivial pericardial effusion.  Aorta: The aortic root is normal.  Systemic Veins: The inferior vena cava appears dilated.  In comparison to the previous echocardiogram(s): Compared with study dated 6/25/2024, LVEF 15-20%.      CONCLUSIONS:  1. Left ventricular ejection fraction is severely decreased, by visual estimate at 15-20%.  2. There is global hypokinesis of the left ventricle with minor regional variations.  3. Spectral Doppler shows a Grade II (pseudonormal pattern) of left ventricular diastolic filling with an elevated left atrial pressure.  4. Left ventricular cavity size is moderately dilated.  5. Abnormal septal motion consistent with RV pacemaker.  6. There is moderately reduced right ventricular systolic function.  7. Mildly enlarged right ventricle.  8. The left atrium is mildly dilated.  9. Mild to moderate mitral valve regurgitation.  10. Mild to moderate tricuspid regurgitation visualized.  11. Moderately elevated right ventricular systolic pressure.    Ejection Fractions:  LV EF   Date/Time Value Ref Range Status   05/05/2025 12:23 PM 18 %    06/25/2024 11:00 AM 30 %      Cath:  Coronary Angiography: No results found for this or any previous visit from the past 1800 days.    Right Heart Cath: No results found for this or any previous visit from the past 1800 days.    Stress Test:  Nuclear:  NM PET CT myocardial perfusion multiple 05/14/2025    FINDINGS:  The left ventricular cavity is severely dilated in both rest and post  stress images. There is abnormal dilation and thickening and  visualization of right ventricular cavity in both rest and stress  images. The stress tomographic images demonstrate markedly abnormal  myocardial perfusion. There is a large moderate to severe perfusion  defect involving the apex, anterior, lateral, and inferolateral wall,  there is relatively preserved perfusion of the septum. On rest images  there is a significantly reversibility noted  involving the anterior  wall, lateral wall and inferoseptal wall, however, there is no  significant reversibility of the apex The left ventricular cavity is  dilated on gated images with severe global hypokinesis with severely  impaired left ventricular systolic function in both rest and stress  images, 17%    Impression  High-risk N13 PET-CT myocardial perfusion study  This finding consistent with severe ischemic cardiomyopathy with  significantly induced ischemia involving the anterior wall, lateral  wall and inferolateral wall with small apical infarction, a pattern  consistent with triple-vessel disease, the presence of significant  reversibility on rest images may favoring good response to coronary  revascularization The possibility of subendocardial infarction can  not be totally excluded, further evaluation of myocardial viability  by FDG PET or cardiac MRI could be considered, if this will change  patient management    Metabolic Stress: No results found for this or any previous visit from the past 1800 days.    Cardiac Imaging:  Cardiac Scoring: No results found for this or any previous visit from the past 1800 days.    Cardiac MRI:   MR cardiac morphology and function w and wo IV contrast 03/28/2023    FINDINGS:      CARDIAC MRI    CARDIAC CHAMBER MORPHOLOGY  Left ventricle: Dilated cavity size, mixed hypertrophy, no mass/thrombus  Right ventricle: Normal contractility, normal cavity size, no mass/thrombus  Left atrium: Dilated cavity size, no mass/thrombus  Right atrium: Normal cavity size, no mass/thrombus    CARDIAC VALVE MORPHOLOGY  Mitral valve: Normal leaflets, fully mobile, no regurgitation, no stenosis  Tricuspid valve: Normal leaflets, fully mobile, no regurgitation, no stenosis  Aortic valve: Normal leaflets, fully mobile, no regurgitation, no stenosis  Pulmonic Valve: Normal leaflets, fully mobile, no regurgitation, no stenosis    MISCELLANEOUS MORPHOLOGY  Pericardium: Normal    CARDIAC  MEASUREMENTS  Anterior left ventricular wall thickness: 1.2 cm (Normal < 1.2 cm)  Posterior left ventricular wall thickness: 1.2 cm (Normal < 1.2 cm)  Left ventricular end-diastolic dimension: 6.3 cm (Normal < 5.7 cm)  Left ventricular end-systolic dimension: 5.3 cm (Normal < 4 cm)  Right ventricular end-diastolic dimension: 3.6 cm (Normal < 4.3 cm)  Left atrial end-diastolic dimension: 4.6 cm (Normal < 3.9 cm)  Left atrial end-diastolic area: 29 cm^2 (Normal < 22 cm^2)  Right atrial end-diastolic dimension: 4.8 cm (Normal < 4.7 cm)  Right atrial end-diastolic area: 20 cm^2 (Normal < 20 cm^2)    Ascending aorta (at the level of the main pulmonary artery): 2.8 cm  Pulmonary Artery: 2.1 cm (Normal < 2.9 cm)      LEFT VENTRICULAR FUNCTION  The left ventricle is severely dilated with focally upon globally abnormal systolic function. Focal left ventricular systolic dysfunction consists of dyskinesia (aneurysm) of the apex  LV volumes absolute and normalized to body surface area with the age and gender matched normals in parenthesis are listed as follows.  LVEF: 22 % (62 +/- 6 (51-73)%)  SV: 77 ml  SVI: 33 ml/m2 (46 +/- 8 (30-62) ml/m2)  CO: 5.5 L/min  CI: 2.4 L/min/m2  EDV: 346 ml  EDVI: 149 ml/m2 (74 +/- 13 ()ml/m2)  ESV: 269 ml  ESVI: 116 ml/m2 (28 +/- 7 (14-43) ml/m2)  LVM: 218 g  LVMI: 96 g/m2 (62 +/- 10 (43-81) g/m2)    RIGHT VENTRICULAR FUNCTION  The right ventricle is normal in size and systolic function.  Right ventricular volumes absolute and normalized to body surface area with the age and gender matched normals in parenthesis are listed as follows.  RVEF: 47 % (53 +/- 6 (41-66) %)  EDV: 154 ml  EDVI: 66 ml/m2 (86 +/- 17 () ml/m2)  ESV: 81 ml  ESVI: 35 ml/m2 (41 +/- 11 (19-63) ml/m2)    There is marked susceptibility artifact which limits delayed postgadolinium imaging/scar assessment. This is especially important. In the LAD distribution. Scar is seen at the left ventricular apex including the  lateral and inferior walls and appears to be more pronounced in the mid to apical lateral segments. The LAD distribution is not adequately visualized because of marked susceptibility artifact from the implanted cardiac device.      Velocity flow mapping: Completed at the aortic valve. There is no evidence of aortic valve stenosis or regurgitation.    NONCARDIAC FINDINGS: Not significant      IMPRESSION:  1. Severe left ventricular dilatation with focally upon globally abnormal left ventricular systolic function with an ejection fraction of 22%. Infarctions seen in the left circumflex and RCA distribution and the apex. Full assessment of viability especially in the LAD distribution could not be performed because of marked susceptibility artifact from the implanted cardiac device which involves the entire anterior, anterolateral and anteroseptal regions. Consider PET scanning.  2. Dilated left atrium  3. Normal right ventricular size and systolic function    The pulse sequences used were designed for imaging cardiovascular structures and are suboptimal for imaging other structures and organs.       Assessment/Plan  Assessment/Plan   Assessment & Plan    ICM, HFrEF, CAD s/p CABG x1  -R/LHC with Dr. Gillombardo on 6/10/25  -asa prior to procedure       NP discussed with Dr. Gillombardo regarding plan of care/ discharge plan      I spent 30 minutes in the professional and overall care of this patient.      Alon Parsons, APRN-CNP, DNP         [1]   Past Medical History:  Diagnosis Date    Cataract     Chest pain 10/01/2023    CHF (congestive heart failure)     Clotting disorder (Multi)     COPD (chronic obstructive pulmonary disease) (Multi)     Coronary artery disease     Diabetes mellitus (Multi)     Disease of thyroid gland     Elevated troponin level not due myocardial infarction 08/14/2023    Hypertension     Ischemic cardiomyopathy     Pulmonary hypertension (Multi)     Venous ulcer of left leg (Multi)    [2]    Past Surgical History:  Procedure Laterality Date    CARDIAC DEFIBRILLATOR PLACEMENT      CATARACT EXTRACTION Right         CORONARY ANGIOPLASTY WITH STENT PLACEMENT      CORONARY ARTERY BYPASS GRAFT     [3]   Social History  Tobacco Use    Smoking status: Former     Current packs/day: 0.00     Average packs/day: 1 pack/day for 24.0 years (24.0 ttl pk-yrs)     Types: Cigarettes     Start date:      Quit date:      Years since quittin.4    Smokeless tobacco: Never   Vaping Use    Vaping status: Never Used   Substance Use Topics    Alcohol use: Never    Drug use: Never   [4]   Family History  Family history unknown: Yes   [5]   Allergies  Allergen Reactions    Azithromycin Other, Dizziness and Nausea/vomiting     Cold, clammy, sweating, trouble breathing   [6]   Scheduled medications   Medication Dose Route Frequency    aspirin  324 mg oral Once   [7]   PRN medications   Medication    dextrose    dextrose    glucagon    glucagon   [8]   Continuous Medications   Medication Dose Last Rate    sodium chloride 0.9%  50 mL/hr

## 2025-06-10 NOTE — POST-PROCEDURE NOTE
Physician Transition of Care Summary  Invasive Cardiovascular Lab    Procedure Date: 6/10/2025  Attending:    * Carl B Gillombardo - Primary  Resident/Fellow/Other Assistant: Surgeons and Role:  * No surgeons found with a matching role *    Indications:   Pre-op Diagnosis      * Chronic systolic heart failure [I50.22]     * Acute on chronic congestive heart failure, unspecified heart failure type [I50.9]     * Peripheral vascular disease [I73.9]     * History of cardiac arrest [Z86.74]     * High blood cholesterol [E78.00]     * Ischemic cardiomyopathy [I25.5]    Post-procedure diagnosis:   Post-op Diagnosis     * Chronic systolic heart failure [I50.22]     * Acute on chronic congestive heart failure, unspecified heart failure type [I50.9]     * Peripheral vascular disease [I73.9]     * History of cardiac arrest [Z86.74]     * High blood cholesterol [E78.00]     * Ischemic cardiomyopathy [I25.5]    Procedure(s):   Left & Right Heart Cath w Angiography & LV  25586 - RI R & L HRT CATH WINJX HRT ART& L VENTR IMG        Procedure Findings:   Right Radial 6 Fr -- TR Band  Left Radial 6 Fr -- TR band  Right Brachial 5 Fr -- MP    LHC:  LM: no obstructive CAD  Native LAD: prox 70%  D1: 90%  LIMA to LAD: widely patent  RCA: mid to distal 100%   LCX: 90% mid stenosis    RHC:  RA: 25  RV: 63/9  PA: 72/34  PCWP: 32  PA-SAT: 36%  SVC-SAT: 41%  FA-SAT: 71%  CO: 4.84  CI: 2.24  SVR: 1041  Severely elevated right and left sided filling pressures with preserved CO/CI.     Description of the Procedure:   As above    Complications:   NA    Stents/Implants:   Implants       No implant documentation for this case.            Anticoagulation/Antiplatelet Plan:   As before    Estimated Blood Loss:   5 mL    Anesthesia: Moderate Sedation Anesthesia Staff: No anesthesia staff entered.    Any Specimen(s) Removed:   Order Name Source Comment Collection Info Order Time   CBC Blood, Venous  Collected By: Brit Luciano RN 6/10/2025  8:29 AM      [facility]   Immediate        BASIC METABOLIC PANEL Blood, Venous  Collected By: Brit Luciano RN 6/10/2025  8:29 AM     [facility]   Immediate        LIPID PANEL Blood, Venous  Collected By: Brit Luciano RN 6/10/2025  9:12 AM     [facility]   Im[facility]            Disposition:   Home    Electronically signed by: Kyler Chow MD, 6/10/2025 11:58 AM

## 2025-06-10 NOTE — DISCHARGE INSTRUCTIONS
-----PLEASE RESUME ELIQUIS TOMORROW MORNING 6/11/25 ------          CARDIAC CATHETERIZATION DISCHARGE INSTRUCTIONS     FOR SUDDEN AND SEVERE CHEST PAIN, SHORTNESS OF BREATH, EXCESSIVE BLEEDING, SIGNS OF STROKE, OR CHANGES IN MENTAL STATUS YOU SHOULD CALL 911 IMMEDIATELY.     If your provider has prescribed aspirin and/or clopidogrel (Plavix), or prasugrel (Effient), or ticagrelor (Brilinta), DO NOT STOP THESE MEDICATIONS for any reason without talking to your cardiologist first. If any of these were prescribed, you must take them every day without missing a single dose. If you are getting low on these medications, contact your provider immediately for a refill.     FOR NEXT 24 HOURS  - Upon discharge, you should return home and rest for the remainder of the day and evening. You do not have to stay on bed rest but should not be very active.  It is recommended a responsible adult be with you for the first 24 hours after the procedure.    - No driving for 24 hours after procedure. Please arrange for someone to drive you home from the hospital today.     - Do not drive, operate machinery, or use power tools for 24 hours after your procedure.     - Do not make any legal decisions for 24 hours after your procedure.     - Do not drink alcoholic beverages for 24 hours after your procedure.    WOUND CARE   *FOR FEMORAL (LEG) ACCESS*  ·      Avoid heavy lifting (over 10 pounds) for 7 days, squatting or excessive bending for 2 days, and strenuous exercise for 7 days.  ·      No submerged bathing, swimming, or hot tubs for the next 7 days, or until fully healed.  ·      Avoid sexual activity for 3-4 days until any groin discomfort has ceased.     *FOR RADIAL (WRIST) ACCESS*  ·      No lifting more than 5 pounds or excessive use of the wrist for 24 hours - for example, treat your wrist as if it is sprained.  ·      Do not engage in vigorous activities (tennis, golf, bowling, weights) for at least 48 hours after the  procedure.  ·      Do not submerge the wrist for 7 days after the procedure.  ·      You should expect mild tingling in your hand and tenderness at the puncture site for up to 3 days.    - The transparent dressing should be removed from the site 24 hours after the procedure.  Wash the site gently with soap and water. Rinse well and pat dry. Keep the area clean and dry. You may apply a Band-Aid to the site. Avoid lotions, ointments, or powders until fully healed.     - You may shower the day after your procedure.      - It is normal to notice a small bruise around the puncture site and/or a small grape sized or smaller lump. Any large bruising or large lump warrants a call to the office.     - If bleeding should occur, lay down and apply pressure to the affected area for 10 minutes.  If the bleeding stops notify your physician.  If there is a large amount of bleeding or spurting of blood CALL 911 immediately.  DO NOT drive yourself to the hospital.    - You may experience some tenderness, bruising or minimal inflammation.  If you have any concerns, you may contact the Cath Lab or if any of these symptoms become excessive, contact your cardiologist or go to the emergency room.     OTHER INSTRUCTIONS  - You may take acetaminophen (Tylenol) as directed for discomfort.  If pain is not relieved with acetaminophen (Tylenol), contact your doctor.    - If you notice or experience any of the following, you should notify your doctor or seek medical attention  Chest pain or discomfort  Change in mental status or weakness in extremities.  Dizziness, light headedness, or feeling faint.  Change in the site where the procedure was performed, such as bleeding or an increased area of bruising or swelling.  Tingling, numbness, pain, or coolness in the leg/arm beyond the site where the procedure was performed.  Signs of infection (i.e. shaking chills, temperature > 100 degrees Fahrenheit, warmth, redness) in the leg/arm area where the  procedure was performed.  Changes in urination   Bloody or black stools  Vomiting blood  Severe nose bleeds  Any excessive bleeding    - If you DO NOT have an appointment with your cardiologist within 2-4 weeks following your procedure, please contact their office.

## 2025-06-13 DIAGNOSIS — I50.22 CHRONIC SYSTOLIC HEART FAILURE: ICD-10-CM

## 2025-06-13 DIAGNOSIS — I25.5 ISCHEMIC CARDIOMYOPATHY: ICD-10-CM

## 2025-06-13 DIAGNOSIS — K21.9 GASTROESOPHAGEAL REFLUX DISEASE, UNSPECIFIED WHETHER ESOPHAGITIS PRESENT: Primary | ICD-10-CM

## 2025-06-13 DIAGNOSIS — I50.23 ACUTE ON CHRONIC HFREF (HEART FAILURE WITH REDUCED EJECTION FRACTION): ICD-10-CM

## 2025-06-16 RX ORDER — PANTOPRAZOLE SODIUM 40 MG/1
40 TABLET, DELAYED RELEASE ORAL
Qty: 90 TABLET | Refills: 1 | Status: SHIPPED | OUTPATIENT
Start: 2025-06-16 | End: 2025-12-13

## 2025-06-16 RX ORDER — SACUBITRIL AND VALSARTAN 97; 103 MG/1; MG/1
TABLET, FILM COATED ORAL
Qty: 200 TABLET | Refills: 2 | OUTPATIENT
Start: 2025-06-16

## 2025-06-16 RX ORDER — PANTOPRAZOLE SODIUM 40 MG/1
40 TABLET, DELAYED RELEASE ORAL
Qty: 90 TABLET | Refills: 1 | Status: SHIPPED | OUTPATIENT
Start: 2025-06-16 | End: 2025-06-16

## 2025-06-16 RX ORDER — PANTOPRAZOLE SODIUM 40 MG/1
TABLET, DELAYED RELEASE ORAL
Qty: 100 TABLET | Refills: 2 | OUTPATIENT
Start: 2025-06-16

## 2025-06-16 NOTE — TELEPHONE ENCOUNTER
Received pharamcy request to refill pantoprazole and entresto. Per chart review entresto has been held since hospitalization until fu with cards. Pantoprazole refilled.

## 2025-06-17 ENCOUNTER — APPOINTMENT (OUTPATIENT)
Dept: CARDIOLOGY | Facility: CLINIC | Age: 60
End: 2025-06-17
Payer: COMMERCIAL

## 2025-06-18 ENCOUNTER — APPOINTMENT (OUTPATIENT)
Dept: OPHTHALMOLOGY | Age: 60
End: 2025-06-18
Payer: COMMERCIAL

## 2025-06-26 ENCOUNTER — APPOINTMENT (OUTPATIENT)
Dept: NEPHROLOGY | Facility: CLINIC | Age: 60
End: 2025-06-26
Payer: COMMERCIAL

## 2025-06-29 DIAGNOSIS — E11.65 TYPE 2 DIABETES MELLITUS WITH HYPERGLYCEMIA, WITH LONG-TERM CURRENT USE OF INSULIN: ICD-10-CM

## 2025-06-29 DIAGNOSIS — Z79.4 TYPE 2 DIABETES MELLITUS WITH HYPERGLYCEMIA, WITH LONG-TERM CURRENT USE OF INSULIN: ICD-10-CM

## 2025-06-30 RX ORDER — BLOOD SUGAR DIAGNOSTIC
STRIP MISCELLANEOUS
Qty: 400 STRIP | Refills: 1 | Status: SHIPPED | OUTPATIENT
Start: 2025-06-30

## 2025-07-01 ENCOUNTER — TELEPHONE (OUTPATIENT)
Dept: PRIMARY CARE | Facility: HOSPITAL | Age: 60
End: 2025-07-01
Payer: COMMERCIAL

## 2025-07-01 NOTE — TELEPHONE ENCOUNTER
[facility] of [employer] would like to clarification of the overlapping on Gabapentin for this patient.       Jeremie Minaya can be contacted at [phone]

## 2025-07-02 NOTE — TELEPHONE ENCOUNTER
I attempted to call Jeremie Minaya at [phone] at the [facility] [employer] on 7/2/2025 at 14:28. The call went to voice mail saying Jeremie Minaya is out of office and will be returning Monday, July 7. I left a message stating that I was calling regarding the request to clarify Ms Law's gabapentin orders, and I asked for Mr. Minaya to call the [facility] [place] at [phone] at his earliest convenience once he is back in office. No HPI information was disclosed in the voice message.     Gris Sebastian  [id number]

## 2025-07-12 ENCOUNTER — APPOINTMENT (OUTPATIENT)
Dept: CARDIOLOGY | Facility: HOSPITAL | Age: 60
End: 2025-07-12
Payer: COMMERCIAL

## 2025-07-12 ENCOUNTER — HOSPITAL ENCOUNTER (INPATIENT)
Facility: HOSPITAL | Age: 60
End: 2025-07-12
Attending: STUDENT IN AN ORGANIZED HEALTH CARE EDUCATION/TRAINING PROGRAM | Admitting: INTERNAL MEDICINE
Payer: COMMERCIAL

## 2025-07-12 ENCOUNTER — APPOINTMENT (OUTPATIENT)
Dept: RADIOLOGY | Facility: HOSPITAL | Age: 60
End: 2025-07-12
Payer: COMMERCIAL

## 2025-07-12 DIAGNOSIS — I50.23 ACUTE ON CHRONIC SYSTOLIC CONGESTIVE HEART FAILURE: Primary | ICD-10-CM

## 2025-07-12 LAB
ALBUMIN SERPL BCP-MCNC: 3.8 G/DL (ref 3.4–5)
ALP SERPL-CCNC: 45 U/L (ref 33–120)
ALT SERPL W P-5'-P-CCNC: 10 U/L (ref 10–52)
ANION GAP SERPL CALC-SCNC: 17 MMOL/L (ref 10–20)
AST SERPL W P-5'-P-CCNC: 12 U/L (ref 9–39)
BASOPHILS # BLD AUTO: 0.05 X10*3/UL (ref 0–0.1)
BASOPHILS NFR BLD AUTO: 0.9 %
BILIRUB SERPL-MCNC: 1.6 MG/DL (ref 0–1.2)
BNP SERPL-MCNC: 1299 PG/ML (ref 0–99)
BUN SERPL-MCNC: 53 MG/DL (ref 6–23)
CALCIUM SERPL-MCNC: 8.7 MG/DL (ref 8.6–10.3)
CARDIAC TROPONIN I PNL SERPL HS: 53 NG/L (ref 0–20)
CARDIAC TROPONIN I PNL SERPL HS: 53 NG/L (ref 0–20)
CHLORIDE SERPL-SCNC: 95 MMOL/L (ref 98–107)
CO2 SERPL-SCNC: 25 MMOL/L (ref 21–32)
CREAT SERPL-MCNC: 2.55 MG/DL (ref 0.5–1.3)
DIGOXIN SERPL-MCNC: 1.09 NG/ML (ref 0.8–?)
EGFRCR SERPLBLD CKD-EPI 2021: 28 ML/MIN/1.73M*2
EOSINOPHIL # BLD AUTO: 0.19 X10*3/UL (ref 0–0.7)
EOSINOPHIL NFR BLD AUTO: 3.3 %
ERYTHROCYTE [DISTWIDTH] IN BLOOD BY AUTOMATED COUNT: 21.2 % (ref 11.5–14.5)
GLUCOSE SERPL-MCNC: 313 MG/DL (ref 74–99)
HCT VFR BLD AUTO: 42.4 % (ref 41–52)
HGB BLD-MCNC: 12.4 G/DL (ref 13.5–17.5)
IMM GRANULOCYTES # BLD AUTO: 0.03 X10*3/UL (ref 0–0.7)
IMM GRANULOCYTES NFR BLD AUTO: 0.5 % (ref 0–0.9)
LYMPHOCYTES # BLD AUTO: 0.91 X10*3/UL (ref 1.2–4.8)
LYMPHOCYTES NFR BLD AUTO: 15.9 %
MAGNESIUM SERPL-MCNC: 2.48 MG/DL (ref 1.6–2.4)
MCH RBC QN AUTO: 23.7 PG (ref 26–34)
MCHC RBC AUTO-ENTMCNC: 29.2 G/DL (ref 32–36)
MCV RBC AUTO: 81 FL (ref 80–100)
MONOCYTES # BLD AUTO: 0.75 X10*3/UL (ref 0.1–1)
MONOCYTES NFR BLD AUTO: 13.1 %
NEUTROPHILS # BLD AUTO: 3.81 X10*3/UL (ref 1.2–7.7)
NEUTROPHILS NFR BLD AUTO: 66.3 %
NRBC BLD-RTO: 0.3 /100 WBCS (ref 0–0)
PLATELET # BLD AUTO: 267 X10*3/UL (ref 150–450)
POTASSIUM SERPL-SCNC: 4 MMOL/L (ref 3.5–5.3)
PROT SERPL-MCNC: 7.1 G/DL (ref 6.4–8.2)
RBC # BLD AUTO: 5.23 X10*6/UL (ref 4.5–5.9)
SODIUM SERPL-SCNC: 133 MMOL/L (ref 136–145)
WBC # BLD AUTO: 5.7 X10*3/UL (ref 4.4–11.3)

## 2025-07-12 PROCEDURE — 93005 ELECTROCARDIOGRAM TRACING: CPT

## 2025-07-12 PROCEDURE — 85025 COMPLETE CBC W/AUTO DIFF WBC: CPT | Performed by: STUDENT IN AN ORGANIZED HEALTH CARE EDUCATION/TRAINING PROGRAM

## 2025-07-12 PROCEDURE — 84484 ASSAY OF TROPONIN QUANT: CPT | Performed by: STUDENT IN AN ORGANIZED HEALTH CARE EDUCATION/TRAINING PROGRAM

## 2025-07-12 PROCEDURE — 71046 X-RAY EXAM CHEST 2 VIEWS: CPT

## 2025-07-12 PROCEDURE — 80053 COMPREHEN METABOLIC PANEL: CPT | Performed by: STUDENT IN AN ORGANIZED HEALTH CARE EDUCATION/TRAINING PROGRAM

## 2025-07-12 PROCEDURE — 96374 THER/PROPH/DIAG INJ IV PUSH: CPT

## 2025-07-12 PROCEDURE — 83880 ASSAY OF NATRIURETIC PEPTIDE: CPT | Performed by: STUDENT IN AN ORGANIZED HEALTH CARE EDUCATION/TRAINING PROGRAM

## 2025-07-12 PROCEDURE — 36415 COLL VENOUS BLD VENIPUNCTURE: CPT | Performed by: STUDENT IN AN ORGANIZED HEALTH CARE EDUCATION/TRAINING PROGRAM

## 2025-07-12 PROCEDURE — 99285 EMERGENCY DEPT VISIT HI MDM: CPT | Mod: 25 | Performed by: STUDENT IN AN ORGANIZED HEALTH CARE EDUCATION/TRAINING PROGRAM

## 2025-07-12 PROCEDURE — 99223 1ST HOSP IP/OBS HIGH 75: CPT | Performed by: INTERNAL MEDICINE

## 2025-07-12 PROCEDURE — 80162 ASSAY OF DIGOXIN TOTAL: CPT | Performed by: STUDENT IN AN ORGANIZED HEALTH CARE EDUCATION/TRAINING PROGRAM

## 2025-07-12 PROCEDURE — 1200000002 HC GENERAL ROOM WITH TELEMETRY DAILY

## 2025-07-12 PROCEDURE — 71046 X-RAY EXAM CHEST 2 VIEWS: CPT | Performed by: RADIOLOGY

## 2025-07-12 PROCEDURE — 2500000004 HC RX 250 GENERAL PHARMACY W/ HCPCS (ALT 636 FOR OP/ED): Performed by: STUDENT IN AN ORGANIZED HEALTH CARE EDUCATION/TRAINING PROGRAM

## 2025-07-12 PROCEDURE — 2500000001 HC RX 250 WO HCPCS SELF ADMINISTERED DRUGS (ALT 637 FOR MEDICARE OP): Performed by: STUDENT IN AN ORGANIZED HEALTH CARE EDUCATION/TRAINING PROGRAM

## 2025-07-12 PROCEDURE — 83735 ASSAY OF MAGNESIUM: CPT | Performed by: STUDENT IN AN ORGANIZED HEALTH CARE EDUCATION/TRAINING PROGRAM

## 2025-07-12 RX ORDER — FUROSEMIDE 10 MG/ML
40 INJECTION INTRAMUSCULAR; INTRAVENOUS ONCE
Status: COMPLETED | OUTPATIENT
Start: 2025-07-12 | End: 2025-07-12

## 2025-07-12 RX ORDER — NAPROXEN SODIUM 220 MG/1
324 TABLET, FILM COATED ORAL ONCE
Status: COMPLETED | OUTPATIENT
Start: 2025-07-12 | End: 2025-07-12

## 2025-07-12 RX ADMIN — FUROSEMIDE 40 MG: 10 INJECTION, SOLUTION INTRAVENOUS at 19:39

## 2025-07-12 RX ADMIN — FUROSEMIDE 40 MG: 10 INJECTION, SOLUTION INTRAVENOUS at 22:49

## 2025-07-12 RX ADMIN — ASPIRIN 81 MG CHEWABLE TABLET 324 MG: 81 TABLET CHEWABLE at 20:51

## 2025-07-12 ASSESSMENT — ENCOUNTER SYMPTOMS
SORE THROAT: 0
DIZZINESS: 0
DIARRHEA: 0
SHORTNESS OF BREATH: 0
CHILLS: 0
VOMITING: 0
COUGH: 0
WOUND: 0
CONSTIPATION: 0
HALLUCINATIONS: 0
ARTHRALGIAS: 0
HEMATURIA: 0
MYALGIAS: 0
CONFUSION: 0
RHINORRHEA: 0
FEVER: 0
NAUSEA: 0
DYSURIA: 0
PALPITATIONS: 0

## 2025-07-12 ASSESSMENT — PAIN DESCRIPTION - PAIN TYPE: TYPE: ACUTE PAIN

## 2025-07-12 ASSESSMENT — PAIN - FUNCTIONAL ASSESSMENT: PAIN_FUNCTIONAL_ASSESSMENT: 0-10

## 2025-07-12 ASSESSMENT — PAIN DESCRIPTION - LOCATION: LOCATION: CHEST

## 2025-07-12 ASSESSMENT — PAIN SCALES - GENERAL
PAINLEVEL_OUTOF10: 10 - WORST POSSIBLE PAIN
PAINLEVEL_OUTOF10: 0 - NO PAIN

## 2025-07-12 NOTE — ED PROVIDER NOTES
History of Present Illness     History provided by: Patient  Limitations to History: None  External Records Reviewed with Brief Summary: Discharge Summary from 2025 which showed admission for CHF exacerbation.     HPI:  Felice Law is a 60 y.o. male with PMHx HTN, HLD, DM, DVT on eliquis, COPD, pulmonary HTN, HFrEF w/ EF 15%, CAD s/p CABG and prior PCI, prior VT arrest s/p ICD who is presenting with shortness of breath.  Symptoms for 3 to 4 days.  Worse on exertion, and unable to lie flat.  Increased lower extremity swelling that extends up to his abdomen.  He is not sure if he has gained weight.  No fevers, chills, cough.  Does report constant substernal chest pain that is worse on exertion.  Associated with nausea but no vomiting or abdominal pain.  Compliant with medications.  He was recently in [place], flew back today.  Symptoms started prior to his flight from [place].    Physical Exam   Triage vitals:  T 36.9 °C (98.4 °F)  HR 71  /73  RR 20  O2 96 %      GEN: Chronically ill but nontoxic appearing, appears uncomfortable.  HEENT: Normocephalic and atraumatic. Moist mucous membranes.  NECK: Normal ROM.   CARDIAC: Regular rate and rhythm.  RESP: No increased work of breathing. Diffuse crackles but good air movement bilaterally.  ABD: Distended but soft and nontender.  EXTREMITIES: Normal ROM. 3+ pitting edema to bilateral calves with weeping over anterior shins with chronic venous statis dermatitis changes. No erythema, warmth or tenderness. 1+ pitting edema of bilateral thighs.   SKIN: Warm, dry.   NEURO: Alert and oriented x4. Moving all extremities symmetrically and spontaneously.   PSYCH: Calm, cooperative.    Medical Decision Making & ED Course   Medical Decision Makin y.o. male with PMHx HTN, HLD, DM, DVT on eliquis, COPD, pulmonary HTN, HFrEF w/ EF 15%, CAD s/p CABG and prior PCI, prior VT arrest s/p ICD who is presenting with shortness of breath x 3-4 days.  On  arrival, vitals unremarkable.  Exam shows an uncomfortable but nontoxic-appearing male in no respiratory distress with diffuse crackles, distended but soft and nontender abdomen, and 3+ bilateral symmetric pitting edema up to the knees with 1+ pitting edema of thighs.  Has weeping anteriorly over the calves with no erythema, warmth or tenderness.    Based on patient's history and exam, highest concern for CHF exacerbation.  PE is a consideration, however vital signs are reassuring from that standpoint, he is already on anticoagulation, and symptoms started prior to his travel from [place].  With chest pain and his significant comorbidities, also concerned for acute coronary syndrome.  Will obtain EKG, labs, and chest x-ray.  Anticipate requiring admission for IV diuresis.  ----     Social Determinants of Health which Significantly Impact Care: None identified     EKG Independent Interpretation: EKG interpreted by myself. Please see ED Course for full interpretation.    Independent Result Review and Interpretation: Relevant laboratory and radiographic results were reviewed and independently interpreted by myself.  As necessary, they are commented on in the ED Course.    The patient was discussed with the following consultants/services: None    ED Course:  ED Course as of 07/12/25 1908   Sat Jul 12, 2025 1905 ECG 12 lead  On my independent interpretation, normal sinus rhythm with heart rate 78, normal axis and intervals, low voltage in limb leads, T wave inversions in V6, 1, and aVL with no ST segment changes.  EKG unchanged compared to prior. [SS]      ED Course User Index  [SS] Valarie Mcallister MD     Disposition   Admit    Procedures   Procedures    Valarie Mcallister MD  Emergency Medicine

## 2025-07-13 VITALS
TEMPERATURE: 96.6 F | WEIGHT: 239.5 LBS | DIASTOLIC BLOOD PRESSURE: 64 MMHG | SYSTOLIC BLOOD PRESSURE: 113 MMHG | RESPIRATION RATE: 24 BRPM | OXYGEN SATURATION: 92 % | BODY MASS INDEX: 33.53 KG/M2 | HEART RATE: 64 BPM | HEIGHT: 71 IN

## 2025-07-13 LAB
ANION GAP SERPL CALC-SCNC: 16 MMOL/L (ref 10–20)
BUN SERPL-MCNC: 52 MG/DL (ref 6–23)
CALCIUM SERPL-MCNC: 9.1 MG/DL (ref 8.6–10.3)
CARDIAC TROPONIN I PNL SERPL HS: 53 NG/L (ref 0–20)
CHLORIDE SERPL-SCNC: 98 MMOL/L (ref 98–107)
CO2 SERPL-SCNC: 28 MMOL/L (ref 21–32)
CREAT SERPL-MCNC: 2.13 MG/DL (ref 0.5–1.3)
EGFRCR SERPLBLD CKD-EPI 2021: 35 ML/MIN/1.73M*2
ERYTHROCYTE [DISTWIDTH] IN BLOOD BY AUTOMATED COUNT: 21.6 % (ref 11.5–14.5)
GLUCOSE BLD MANUAL STRIP-MCNC: 118 MG/DL (ref 74–99)
GLUCOSE BLD MANUAL STRIP-MCNC: 127 MG/DL (ref 74–99)
GLUCOSE BLD MANUAL STRIP-MCNC: 135 MG/DL (ref 74–99)
GLUCOSE BLD MANUAL STRIP-MCNC: 92 MG/DL (ref 74–99)
GLUCOSE SERPL-MCNC: 90 MG/DL (ref 74–99)
HCT VFR BLD AUTO: 43.4 % (ref 41–52)
HGB BLD-MCNC: 12.9 G/DL (ref 13.5–17.5)
MAGNESIUM SERPL-MCNC: 2.61 MG/DL (ref 1.6–2.4)
MCH RBC QN AUTO: 24 PG (ref 26–34)
MCHC RBC AUTO-ENTMCNC: 29.7 G/DL (ref 32–36)
MCV RBC AUTO: 81 FL (ref 80–100)
NRBC BLD-RTO: 0 /100 WBCS (ref 0–0)
PLATELET # BLD AUTO: 285 X10*3/UL (ref 150–450)
POTASSIUM SERPL-SCNC: 3.8 MMOL/L (ref 3.5–5.3)
RBC # BLD AUTO: 5.38 X10*6/UL (ref 4.5–5.9)
SODIUM SERPL-SCNC: 138 MMOL/L (ref 136–145)
WBC # BLD AUTO: 5.6 X10*3/UL (ref 4.4–11.3)

## 2025-07-13 PROCEDURE — 2500000005 HC RX 250 GENERAL PHARMACY W/O HCPCS: Performed by: INTERNAL MEDICINE

## 2025-07-13 PROCEDURE — 99232 SBSQ HOSP IP/OBS MODERATE 35: CPT | Performed by: INTERNAL MEDICINE

## 2025-07-13 PROCEDURE — 2500000001 HC RX 250 WO HCPCS SELF ADMINISTERED DRUGS (ALT 637 FOR MEDICARE OP): Performed by: INTERNAL MEDICINE

## 2025-07-13 PROCEDURE — 36415 COLL VENOUS BLD VENIPUNCTURE: CPT | Performed by: INTERNAL MEDICINE

## 2025-07-13 PROCEDURE — 82947 ASSAY GLUCOSE BLOOD QUANT: CPT

## 2025-07-13 PROCEDURE — 2500000002 HC RX 250 W HCPCS SELF ADMINISTERED DRUGS (ALT 637 FOR MEDICARE OP, ALT 636 FOR OP/ED): Performed by: INTERNAL MEDICINE

## 2025-07-13 PROCEDURE — 1200000002 HC GENERAL ROOM WITH TELEMETRY DAILY

## 2025-07-13 PROCEDURE — 2500000004 HC RX 250 GENERAL PHARMACY W/ HCPCS (ALT 636 FOR OP/ED): Mod: JZ | Performed by: INTERNAL MEDICINE

## 2025-07-13 PROCEDURE — 85027 COMPLETE CBC AUTOMATED: CPT | Performed by: INTERNAL MEDICINE

## 2025-07-13 PROCEDURE — 84484 ASSAY OF TROPONIN QUANT: CPT | Performed by: INTERNAL MEDICINE

## 2025-07-13 PROCEDURE — 83735 ASSAY OF MAGNESIUM: CPT | Performed by: INTERNAL MEDICINE

## 2025-07-13 PROCEDURE — 80048 BASIC METABOLIC PNL TOTAL CA: CPT | Performed by: INTERNAL MEDICINE

## 2025-07-13 RX ORDER — QUETIAPINE FUMARATE 25 MG/1
50 TABLET, FILM COATED ORAL NIGHTLY
Status: DISCONTINUED | OUTPATIENT
Start: 2025-07-13 | End: 2025-07-17 | Stop reason: HOSPADM

## 2025-07-13 RX ORDER — DAPAGLIFLOZIN 10 MG/1
10 TABLET, FILM COATED ORAL DAILY
Status: DISCONTINUED | OUTPATIENT
Start: 2025-07-13 | End: 2025-07-16

## 2025-07-13 RX ORDER — BUMETANIDE 1 MG/1
1 TABLET ORAL DAILY
Status: DISCONTINUED | OUTPATIENT
Start: 2025-07-13 | End: 2025-07-16

## 2025-07-13 RX ORDER — HYDRALAZINE HYDROCHLORIDE 50 MG/1
100 TABLET, FILM COATED ORAL 3 TIMES DAILY
Status: DISCONTINUED | OUTPATIENT
Start: 2025-07-13 | End: 2025-07-17 | Stop reason: HOSPADM

## 2025-07-13 RX ORDER — ATORVASTATIN CALCIUM 80 MG/1
80 TABLET, FILM COATED ORAL NIGHTLY
Status: DISCONTINUED | OUTPATIENT
Start: 2025-07-13 | End: 2025-07-17 | Stop reason: HOSPADM

## 2025-07-13 RX ORDER — INSULIN LISPRO 100 [IU]/ML
0-10 INJECTION, SOLUTION INTRAVENOUS; SUBCUTANEOUS
Status: DISCONTINUED | OUTPATIENT
Start: 2025-07-13 | End: 2025-07-17 | Stop reason: HOSPADM

## 2025-07-13 RX ORDER — ACETAMINOPHEN 325 MG/1
650 TABLET ORAL EVERY 6 HOURS PRN
Status: DISCONTINUED | OUTPATIENT
Start: 2025-07-13 | End: 2025-07-17 | Stop reason: HOSPADM

## 2025-07-13 RX ORDER — DIGOXIN 125 MCG
125 TABLET ORAL DAILY
Status: DISCONTINUED | OUTPATIENT
Start: 2025-07-13 | End: 2025-07-17 | Stop reason: HOSPADM

## 2025-07-13 RX ORDER — INSULIN GLARGINE 100 [IU]/ML
30 INJECTION, SOLUTION SUBCUTANEOUS EVERY 24 HOURS
Status: DISCONTINUED | OUTPATIENT
Start: 2025-07-13 | End: 2025-07-14

## 2025-07-13 RX ORDER — PANTOPRAZOLE SODIUM 40 MG/1
40 TABLET, DELAYED RELEASE ORAL
Status: DISCONTINUED | OUTPATIENT
Start: 2025-07-13 | End: 2025-07-17 | Stop reason: HOSPADM

## 2025-07-13 RX ORDER — BUMETANIDE 0.25 MG/ML
1 INJECTION, SOLUTION INTRAMUSCULAR; INTRAVENOUS 2 TIMES DAILY
Status: DISCONTINUED | OUTPATIENT
Start: 2025-07-13 | End: 2025-07-15

## 2025-07-13 RX ORDER — INSULIN LISPRO 100 [IU]/ML
10 INJECTION, SOLUTION INTRAVENOUS; SUBCUTANEOUS
Status: DISCONTINUED | OUTPATIENT
Start: 2025-07-13 | End: 2025-07-17 | Stop reason: HOSPADM

## 2025-07-13 RX ORDER — ISOSORBIDE DINITRATE 10 MG/1
40 TABLET ORAL
Status: DISCONTINUED | OUTPATIENT
Start: 2025-07-13 | End: 2025-07-17 | Stop reason: HOSPADM

## 2025-07-13 RX ORDER — GABAPENTIN 100 MG/1
200 CAPSULE ORAL 2 TIMES DAILY
Status: DISCONTINUED | OUTPATIENT
Start: 2025-07-13 | End: 2025-07-17 | Stop reason: HOSPADM

## 2025-07-13 RX ORDER — POLYETHYLENE GLYCOL 3350 17 G/17G
17 POWDER, FOR SOLUTION ORAL DAILY
Status: DISCONTINUED | OUTPATIENT
Start: 2025-07-13 | End: 2025-07-17 | Stop reason: HOSPADM

## 2025-07-13 RX ORDER — ALBUTEROL SULFATE 0.83 MG/ML
5 SOLUTION RESPIRATORY (INHALATION) EVERY 4 HOURS PRN
Status: DISCONTINUED | OUTPATIENT
Start: 2025-07-13 | End: 2025-07-17 | Stop reason: HOSPADM

## 2025-07-13 RX ORDER — DEXTROSE 50 % IN WATER (D50W) INTRAVENOUS SYRINGE
25
Status: DISCONTINUED | OUTPATIENT
Start: 2025-07-13 | End: 2025-07-17 | Stop reason: HOSPADM

## 2025-07-13 RX ORDER — METOPROLOL SUCCINATE 50 MG/1
200 TABLET, EXTENDED RELEASE ORAL DAILY
Status: DISCONTINUED | OUTPATIENT
Start: 2025-07-13 | End: 2025-07-17 | Stop reason: HOSPADM

## 2025-07-13 RX ORDER — LEVOTHYROXINE SODIUM 75 UG/1
75 TABLET ORAL DAILY
Status: DISCONTINUED | OUTPATIENT
Start: 2025-07-13 | End: 2025-07-17 | Stop reason: HOSPADM

## 2025-07-13 RX ORDER — DEXTROSE 50 % IN WATER (D50W) INTRAVENOUS SYRINGE
12.5
Status: DISCONTINUED | OUTPATIENT
Start: 2025-07-13 | End: 2025-07-17 | Stop reason: HOSPADM

## 2025-07-13 RX ADMIN — INSULIN GLARGINE 30 UNITS: 100 INJECTION, SOLUTION SUBCUTANEOUS at 10:10

## 2025-07-13 RX ADMIN — INSULIN LISPRO 10 UNITS: 100 INJECTION, SOLUTION INTRAVENOUS; SUBCUTANEOUS at 17:51

## 2025-07-13 RX ADMIN — Medication 2 L/MIN: at 15:15

## 2025-07-13 RX ADMIN — APIXABAN 5 MG: 5 TABLET, FILM COATED ORAL at 08:19

## 2025-07-13 RX ADMIN — ISOSORBIDE DINITRATE 40 MG: 20 TABLET ORAL at 15:24

## 2025-07-13 RX ADMIN — DIGOXIN 125 MCG: 125 TABLET ORAL at 08:19

## 2025-07-13 RX ADMIN — PANTOPRAZOLE SODIUM 40 MG: 40 TABLET, DELAYED RELEASE ORAL at 08:19

## 2025-07-13 RX ADMIN — Medication 2 L/MIN: at 09:56

## 2025-07-13 RX ADMIN — INSULIN LISPRO 10 UNITS: 100 INJECTION, SOLUTION INTRAVENOUS; SUBCUTANEOUS at 13:09

## 2025-07-13 RX ADMIN — BUMETANIDE 1 MG: 0.25 INJECTION INTRAMUSCULAR; INTRAVENOUS at 10:09

## 2025-07-13 RX ADMIN — LEVOTHYROXINE SODIUM 75 MCG: 0.07 TABLET ORAL at 08:19

## 2025-07-13 RX ADMIN — GABAPENTIN 200 MG: 100 CAPSULE ORAL at 20:53

## 2025-07-13 RX ADMIN — ATORVASTATIN CALCIUM 80 MG: 80 TABLET, FILM COATED ORAL at 20:53

## 2025-07-13 RX ADMIN — ISOSORBIDE DINITRATE 40 MG: 20 TABLET ORAL at 20:54

## 2025-07-13 RX ADMIN — INSULIN LISPRO 10 UNITS: 100 INJECTION, SOLUTION INTRAVENOUS; SUBCUTANEOUS at 08:30

## 2025-07-13 RX ADMIN — ACETAMINOPHEN 650 MG: 325 TABLET ORAL at 21:10

## 2025-07-13 RX ADMIN — ISOSORBIDE DINITRATE 40 MG: 20 TABLET ORAL at 10:09

## 2025-07-13 RX ADMIN — HYDRALAZINE HYDROCHLORIDE 100 MG: 50 TABLET ORAL at 08:30

## 2025-07-13 RX ADMIN — GABAPENTIN 200 MG: 100 CAPSULE ORAL at 08:19

## 2025-07-13 RX ADMIN — QUETIAPINE FUMARATE 50 MG: 25 TABLET ORAL at 20:51

## 2025-07-13 RX ADMIN — APIXABAN 5 MG: 5 TABLET, FILM COATED ORAL at 20:53

## 2025-07-13 RX ADMIN — BUMETANIDE 1 MG: 0.25 INJECTION INTRAMUSCULAR; INTRAVENOUS at 16:26

## 2025-07-13 RX ADMIN — METOPROLOL SUCCINATE 200 MG: 50 TABLET, EXTENDED RELEASE ORAL at 08:19

## 2025-07-13 RX ADMIN — HYDRALAZINE HYDROCHLORIDE 100 MG: 50 TABLET ORAL at 15:24

## 2025-07-13 SDOH — ECONOMIC STABILITY: HOUSING INSECURITY: AT ANY TIME IN THE PAST 12 MONTHS, WERE YOU HOMELESS OR LIVING IN A SHELTER (INCLUDING NOW)?: PATIENT DECLINED

## 2025-07-13 SDOH — ECONOMIC STABILITY: HOUSING INSECURITY: IN THE PAST 12 MONTHS, HOW MANY TIMES HAVE YOU MOVED WHERE YOU WERE LIVING?: 0

## 2025-07-13 SDOH — ECONOMIC STABILITY: FOOD INSECURITY
WITHIN THE PAST 12 MONTHS, YOU WORRIED THAT YOUR FOOD WOULD RUN OUT BEFORE YOU GOT THE MONEY TO BUY MORE.: PATIENT DECLINED

## 2025-07-13 SDOH — ECONOMIC STABILITY: FOOD INSECURITY: WITHIN THE PAST 12 MONTHS, THE FOOD YOU BOUGHT JUST DIDN'T LAST AND YOU DIDN'T HAVE MONEY TO GET MORE.: PATIENT DECLINED

## 2025-07-13 SDOH — SOCIAL STABILITY: SOCIAL INSECURITY: WITHIN THE LAST YEAR, HAVE YOU BEEN AFRAID OF YOUR PARTNER OR EX-PARTNER?: NO

## 2025-07-13 SDOH — SOCIAL STABILITY: SOCIAL INSECURITY: DO YOU FEEL ANYONE HAS EXPLOITED OR TAKEN ADVANTAGE OF YOU FINANCIALLY OR OF YOUR PERSONAL PROPERTY?: NO

## 2025-07-13 SDOH — SOCIAL STABILITY: SOCIAL INSECURITY: WITHIN THE LAST YEAR, HAVE YOU BEEN HUMILIATED OR EMOTIONALLY ABUSED IN OTHER WAYS BY YOUR PARTNER OR EX-PARTNER?: NO

## 2025-07-13 SDOH — SOCIAL STABILITY: SOCIAL INSECURITY: WERE YOU ABLE TO COMPLETE ALL THE BEHAVIORAL HEALTH SCREENINGS?: YES

## 2025-07-13 SDOH — ECONOMIC STABILITY: INCOME INSECURITY
IN THE PAST 12 MONTHS HAS THE ELECTRIC, GAS, OIL, OR WATER COMPANY THREATENED TO SHUT OFF SERVICES IN YOUR HOME?: PATIENT DECLINED

## 2025-07-13 SDOH — ECONOMIC STABILITY: FOOD INSECURITY: HOW HARD IS IT FOR YOU TO PAY FOR THE VERY BASICS LIKE FOOD, HOUSING, MEDICAL CARE, AND HEATING?: PATIENT DECLINED

## 2025-07-13 SDOH — SOCIAL STABILITY: SOCIAL INSECURITY: ARE YOU OR HAVE YOU BEEN THREATENED OR ABUSED PHYSICALLY, EMOTIONALLY, OR SEXUALLY BY ANYONE?: NO

## 2025-07-13 SDOH — SOCIAL STABILITY: SOCIAL INSECURITY: HAVE YOU HAD THOUGHTS OF HARMING ANYONE ELSE?: UNABLE TO ASSESS

## 2025-07-13 SDOH — SOCIAL STABILITY: SOCIAL INSECURITY: ARE THERE ANY APPARENT SIGNS OF INJURIES/BEHAVIORS THAT COULD BE RELATED TO ABUSE/NEGLECT?: NO

## 2025-07-13 SDOH — SOCIAL STABILITY: SOCIAL INSECURITY: HAVE YOU HAD ANY THOUGHTS OF HARMING ANYONE ELSE?: NO

## 2025-07-13 SDOH — ECONOMIC STABILITY: HOUSING INSECURITY: IN THE LAST 12 MONTHS, WAS THERE A TIME WHEN YOU WERE NOT ABLE TO PAY THE MORTGAGE OR RENT ON TIME?: PATIENT DECLINED

## 2025-07-13 SDOH — SOCIAL STABILITY: SOCIAL INSECURITY: ABUSE: ADULT

## 2025-07-13 SDOH — SOCIAL STABILITY: SOCIAL INSECURITY: DO YOU FEEL UNSAFE GOING BACK TO THE PLACE WHERE YOU ARE LIVING?: NO

## 2025-07-13 SDOH — ECONOMIC STABILITY: TRANSPORTATION INSECURITY
IN THE PAST 12 MONTHS, HAS LACK OF TRANSPORTATION KEPT YOU FROM MEDICAL APPOINTMENTS OR FROM GETTING MEDICATIONS?: PATIENT DECLINED

## 2025-07-13 SDOH — SOCIAL STABILITY: SOCIAL INSECURITY: HAS ANYONE EVER THREATENED TO HURT YOUR FAMILY OR YOUR PETS?: NO

## 2025-07-13 SDOH — SOCIAL STABILITY: SOCIAL INSECURITY: DOES ANYONE TRY TO KEEP YOU FROM HAVING/CONTACTING OTHER FRIENDS OR DOING THINGS OUTSIDE YOUR HOME?: NO

## 2025-07-13 ASSESSMENT — ACTIVITIES OF DAILY LIVING (ADL)
HEARING - LEFT EAR: FUNCTIONAL
HEARING - RIGHT EAR: FUNCTIONAL
LACK_OF_TRANSPORTATION: PATIENT DECLINED
TOILETING: INDEPENDENT
ADEQUATE_TO_COMPLETE_ADL: YES
JUDGMENT_ADEQUATE_SAFELY_COMPLETE_DAILY_ACTIVITIES: YES
GROOMING: INDEPENDENT
LACK_OF_TRANSPORTATION: PATIENT DECLINED
DRESSING YOURSELF: INDEPENDENT
WALKS IN HOME: INDEPENDENT
FEEDING YOURSELF: INDEPENDENT
BATHING: INDEPENDENT
PATIENT'S MEMORY ADEQUATE TO SAFELY COMPLETE DAILY ACTIVITIES?: YES
ASSISTIVE_DEVICE: WALKER

## 2025-07-13 ASSESSMENT — PAIN SCALES - GENERAL
PAINLEVEL_OUTOF10: 0 - NO PAIN
PAINLEVEL_OUTOF10: 4
PAINLEVEL_OUTOF10: 0 - NO PAIN
PAINLEVEL_OUTOF10: 0 - NO PAIN

## 2025-07-13 ASSESSMENT — PAIN - FUNCTIONAL ASSESSMENT
PAIN_FUNCTIONAL_ASSESSMENT: 0-10

## 2025-07-13 ASSESSMENT — COGNITIVE AND FUNCTIONAL STATUS - GENERAL
DAILY ACTIVITIY SCORE: 23
TOILETING: A LITTLE
CLIMB 3 TO 5 STEPS WITH RAILING: A LITTLE
STANDING UP FROM CHAIR USING ARMS: A LITTLE
MOBILITY SCORE: 20
PATIENT BASELINE BEDBOUND: NO
MOVING TO AND FROM BED TO CHAIR: A LITTLE
WALKING IN HOSPITAL ROOM: A LITTLE

## 2025-07-13 ASSESSMENT — LIFESTYLE VARIABLES
HOW MANY STANDARD DRINKS CONTAINING ALCOHOL DO YOU HAVE ON A TYPICAL DAY: PATIENT DOES NOT DRINK
SKIP TO QUESTIONS 9-10: 1
AUDIT-C TOTAL SCORE: 0
HOW OFTEN DO YOU HAVE A DRINK CONTAINING ALCOHOL: NEVER
SUBSTANCE_ABUSE_PAST_12_MONTHS: NO
AUDIT-C TOTAL SCORE: 0
HOW OFTEN DO YOU HAVE 6 OR MORE DRINKS ON ONE OCCASION: NEVER

## 2025-07-13 ASSESSMENT — PATIENT HEALTH QUESTIONNAIRE - PHQ9
1. LITTLE INTEREST OR PLEASURE IN DOING THINGS: SEVERAL DAYS
2. FEELING DOWN, DEPRESSED OR HOPELESS: NEARLY EVERY DAY
SUM OF ALL RESPONSES TO PHQ9 QUESTIONS 1 & 2: 4

## 2025-07-13 ASSESSMENT — PAIN DESCRIPTION - LOCATION: LOCATION: HEAD

## 2025-07-13 NOTE — NURSING NOTE
"Heart Failure Nurse Navigator      Assessment    I met with Felice Law at the bedside    Patients Cardiologist(s): Dr. Duque    Patients Primary Care Provider:    1. Medical Domain  What is the patient's most recent LVEF? 15-20%  HFrEF (LVEF <= 40%) Quadruple therapy recommended  HFmrEF (LVEF 41-49%) Quadruple therapy recommended  HFpEF (LVEF >= 50%) Minimum recommendations: SGLT2i and MRA  Is the patient on OP GDMT for their condition?   ARNI/ACEI/ARB: No paused since 5/8/2025  BB: Yes Metoprolol succinate 200 mg daily  MRA: No paused since 5/8/2025  SGLT2i: Yes dapaglifozin 10 mg daily  Is the patient prescribed a diuretic? Yes  Bumetanide 1 mg daily  Does this patient have an implanted device (ie cardioMEMS, ICD, CRT-D)?  Device type: ICD  Could this patient have advanced heart failure (Stage D heart failure)?: Yes   If yes, the potential markers of advanced heart failure include: 1, 4    REFERENCE: Potential markers of advanced HF   Inotrope (dobutamine or milrinone) used during this admission?   LVEF<=25%?   2.   >=2 hospitalizations for ADHF in the last year?   3.   Severe symptoms of HF (fatigue, dyspnea, confusion, edema) despite medical therapy?   4.  Downtitration of GDMT as compared to home medications?   5.  Discontinuation of GDMT because of hypotension or renal intolerance?   6.  Recurrent arrhythmias (AF, VT with ICD shocks)?   7.  Cardiac cachexia (i.e., unintentional weight loss due to HF)?   8.  High-risk biomarker profile (e.g., hyponatremia [Na<135], very elevated BNP, worsening kidney function)   9.  Escalating doses of diuretics or persistent edema despite escalation     2. Mind and Emotion  Does this patient have possible cognitive impairment?: No (The Mini-Cog score )  Ask the patient to memorize these 3 words: banana, sunrise,   Ask the patient to draw a clock with hands pointing at \"20 minutes after 8\"  Ask the patient to recall the 3 words  Score: Add number of words " recalled + clock drawing (0 points for any errors, 2 points if correct)  Interpretation: A score of 0-2 suggests cognitive impairment is present, a score of 3-5 suggests cognitive impairment is absent  Does this patient have major depression?: No (PH-Q2 score )  Over the last 2 weeks: Little interest or pleasure in doing things? (Not at all +0, Several days +1, More than half the days +2, Nearly every day +3)  Over the last 2 weeks: Feeling down, depressed or hopeless? (Not at all +0, Several days +1, More than half the days +2, Nearly every day +3)  Score: Add points  Interpretation: A score of 3 or more suggests that major depression is likely.     3. Physical Function  Could this patient be frail?: No   Defined by presence of all of these: slowness, weakness, shrinking, inactivity, exhaustion  Is this patient at risk for falling?: No   Defined by having experienced a fall in the last 12 months.    I provided the following heart failure education:  - Heart Failure education packet provided.  - HF signs and symptoms, heart failure zones and when to call cardiologist.   - Controlling Heart Failure at Home: medication adherence, following up with cardiologist at least once yearly, staying healthy and active, limiting sodium and fluid intake as directed by cardiologist.  - Daily Weight Education  -Low Sodium Diet Education  -Fluid Restriction Education      *Discharge Appointment Follow-up Plan:        Additional Comments: Patient known to me from previous admission. Language barrier present. Patient speaks Nepali and Martti used. Patient sees Dr. Duque for his cardiac needs, but would like to remain more local and become established with the cardiology team at [place]. Follow-up information with cardiologist placed in discharge instructions. Discussed CHF signs and symptoms and when to call cardiologist. Discussed monitoring daily weight. Patient admits he does not weigh himself daily as instructed. Emphasized  the importance of monitoring daily weight to identify sudden weight gain that would indicate fluid retention in order to prevent the need for readmission. Discussed low sodium diet and foods to avoid. Discussed importance of following a fluid restriction. Patient admits because of the warmer temperatures, he was drinking large amounts of fluids recently. Patient appreciated education and verbalized understanding of teaching provided. Provided CHF materials in English and Barbadian for patient to review.

## 2025-07-13 NOTE — PROGRESS NOTES
Pharmacy Medication History Review    Felice Law is a 60 y.o. male admitted for Acute on chronic systolic congestive heart failure. Pharmacy reviewed the patient's [id number] medications and allergies for accuracy.    The list below reflectives the updated PTA list. Please review each medication in order reconciliation for additional clarification and justification.  Prior to Admission medications    Medication Sig Start Date End Date Authorizing Provider   Eliquis 5 mg tablet TOME 1 TABLETA POR LA BOCA DOS  VECES AL CUBA   Kyle Carlson MD   ipratropium-albuteroL (Duo-Neb) 0.5-2.5 mg/3 mL nebulizer solution Take 3 mL by nebulization 3 times a day as needed for wheezing.   Nick Cheney MD   albuterol 2.5 mg /3 mL (0.083 %) nebulizer solution Use every 4-6 hours as needed for shortness of breath   Monik Aponte MD MPH   atorvastatin (Lipitor) 80 mg tablet 80mg nightly   Diana Salazar MD   bumetanide (Bumex) 1 mg tablet Take 1 tablet (1 mg) by mouth once daily.   Jono Duque MD   digoxin (Lanoxin) 125 MCG tablet Take 1 tablet (125 mcg) by mouth once daily.   Catracho George DO   Farxiga 10 mg tablet Take 1 tablet (10 mg) by mouth once daily.   Diana Salazar MD   fenofibrate (Tricor) 145 mg tablet Take 1 tablet (145 mg) by mouth once daily.   Diana Salazar MD   gabapentin (Neurontin) 300 mg capsule TOME 1 CAPSULA POR LA BOCA NITIN  VEZ AL CUBA EN LA SUMAN Wooten MD   gabapentin (Neurontin) 600 mg tablet TOME 1 TABLETA POR LA BOCA NITIN  VEZ AL CUBA AL ACOSTARSE   Garret Rainey MD   hydrALAZINE (Apresoline) 100 mg tablet TOME 1 TABLETA POR LA BOCA 3  VECES AL CUBA   Vivien Hassan MD MPH   insulin glargine (Toujeo Max U-300 SoloStar) 300 unit/mL (3 mL) pen 40 units twice a day   Diana Salazar MD   insulin lispro (HumaLOG KwikPen Insulin) 100 unit/mL pen 18 units with meals plus sliding scale up to 80 units daily   Diana Salazar MD   isosorbide  dinitrate (Isordil) 40 mg tablet Take 1 tablet (40 mg) by mouth 3 times a day.   Nick Cheney MD   levothyroxine (Synthroid, Levoxyl) 75 mcg tablet TOME 1 TABLETA POR LA BOCA NITIN  VEZ AL CUBA   Smith Byers MD   metoprolol succinate XL (Toprol-XL) 100 mg 24 hr tablet Take 2 tablets (200 mg) by mouth once daily.   Historical Provider, MD   pantoprazole (ProtoNix) 40 mg EC tablet Take 1 tablet (40 mg) by mouth once daily in the morning. Take before meals. Do not crush, chew, or split.   Ryann Serna MD   QUEtiapine (SEROquel) 50 mg tablet Take 1 tablet (50 mg) by mouth once daily at bedtime.   Jono Duque MD   tirzepatide (Mounjaro) 5 mg/0.5 mL pen injector Inject 5 mg under the skin every 7 days. 5/14/25  Diana Salazar MD        The list below reflectives the updated allergy list. Please review each documented allergy for additional clarification and justification.  Allergies  Reviewed by Riky Sims MD on 7/12/2025        Severity Reactions Comments    Azithromycin Medium Other, Dizziness, Respiratory depression, Nausea/vomiting Cold, clammy, sweating, trouble breathing            Below are additional concerns with the patient's PTA list.      Debra Hernandez

## 2025-07-13 NOTE — PROGRESS NOTES
Patient Name: Felice Law   YOB: 1965    Subjective:  Pt sitting up in bed.  He primary language is Lao, can understand and speak some English.  He is urinating.  Complains of swelling in his legs up to his abdomen.     Objective:    Vitals:    07/13/25 0500 07/13/25 0600 07/13/25 0800 07/13/25 0952   BP: 117/80 126/81  125/86   Pulse: 79 77 82 74   Resp: (!) 21 15 (!) 24 16   Temp:       SpO2: 95% 94% 96% 98%   Weight:       Height:           Physical Exam:    GEN: Awake and alert. Not in acute distress.   HEENT: Normocephalic and atraumatic.  Mucous membranes moist.  Clear sclera, PERRL, EOMI.  CARDIO: Regular rate and rhythm.  No murmurs, rubs, or gallops. Bilateral pitting edema  RESP: Clear to auscultation b/l. No wheezes, rales or rhonchi. Good respiratory effort.   ABD: +BS x4, soft, non-tender. Not distended. Anasarca  MSK: Grossly normal inspection. No joint swelling or obvious deformities. ROM intact  NEURO: A&O X 3. CN II-XII are grossly intact. No focal deficits.   SKIN: Warm and dry, no lesions, no rashes.  PSYCH: Appropriate mood and affect, no hallucinations.     Scheduled medications  Scheduled Medications[1]  Continuous medications  Continuous Medications[2]  PRN medications  PRN Medications[3]     Assessment and Plan:  Felice Law is a 60 y.o. male   Assessment & Plan  Acute on chronic systolic congestive heart failure    # Acute on chronic systolic heart failure  # Anasarca  CAD s/p CABG  VT arrest s/p ICD  DVT on Eliquis  CKD3b  COPD  JAKE  HTN  HLD  DM  Hypothyroidism    Patient appears fluid overloaded with bilateral LE pitting edema up to his thighs, his abdomen also has pitting edema.  Diuresing with IV Bumex 1mg BID.  Monitor I/Os, renal function, electrolytes.  Low salt diet, fluid restriction.  Consulted cardiology.  Currently not requiring oxygen.  Anticoagulated on Eliquis.  Monitor blood glucose,  continue Lantus and meal time insulins.    I spent 25 minutes in the professional and overall care of this patient. More than 50% of this time was spent examining and counseling patient, reviewing plan of care, and coordinating medical care.    Levy Huitron DO  Senior Attending Physician  [facility]  Clinical Instructor             [1] apixaban, 5 mg, oral, BID  atorvastatin, 80 mg, oral, Nightly  bumetanide, 1 mg, intravenous, BID  [Held by provider] bumetanide, 1 mg, oral, Daily  [Held by provider] dapagliflozin propanediol, 10 mg, oral, Daily  digoxin, 125 mcg, oral, Daily  gabapentin, 200 mg, oral, BID  hydrALAZINE, 100 mg, oral, TID  insulin glargine, 30 Units, subcutaneous, q24h  insulin lispro, 0-10 Units, subcutaneous, TID AC  insulin lispro, 10 Units, subcutaneous, TID AC  isosorbide dinitrate, 40 mg, oral, TID  levothyroxine, 75 mcg, oral, Daily  metoprolol succinate XL, 200 mg, oral, Daily  oxygen, , inhalation, Continuous - 02/gases  pantoprazole, 40 mg, oral, Daily before breakfast  polyethylene glycol, 17 g, oral, Daily  QUEtiapine, 50 mg, oral, Nightly  [2]    [3] PRN medications: albuterol, dextrose, dextrose, glucagon, glucagon

## 2025-07-13 NOTE — H&P
History Of Present Illness  Felice Law is a 60 y.o. male with complicated cardiac history including HFrEF 15%, hx CABG, VT arrest s/p ICD, DVT on eliquis, COPD, JAKE, DM, HTN, HLD presented to ED with shortness of breath.  Patient states compliance with his home meds including bumex 1mg daily and Eliquis.  He returned from a trip to [place] today, but his shortness of breath started prior to the travel.         Past Medical History  Medical History[1]    Surgical History  Surgical History[2]     Social History  He reports that he quit smoking about 8 years ago. His smoking use included cigarettes. He started smoking about 32 years ago. He has a 24 pack-year smoking history. He has never used smokeless tobacco. He reports that he does not drink alcohol and does not use drugs.    Family History  Family History[3]     Allergies  Azithromycin    Review of Systems   Constitutional:  Negative for chills and fever.   HENT:  Negative for rhinorrhea and sore throat.    Respiratory:  Negative for cough and shortness of breath.    Cardiovascular:  Negative for chest pain and palpitations.   Gastrointestinal:  Negative for constipation, diarrhea, nausea and vomiting.   Genitourinary:  Negative for dysuria and hematuria.   Musculoskeletal:  Negative for arthralgias and myalgias.   Skin:  Negative for rash and wound.   Neurological:  Negative for dizziness and syncope.   Psychiatric/Behavioral:  Negative for confusion and hallucinations.         Physical Exam  Vitals reviewed.   Constitutional:       Appearance: Normal appearance.   HENT:      Head: Normocephalic and atraumatic.      Mouth/Throat:      Mouth: Mucous membranes are moist.   Eyes:      Conjunctiva/sclera: Conjunctivae normal.   Cardiovascular:      Rate and Rhythm: Normal rate.      Pulses: Normal pulses.   Pulmonary:      Effort: Pulmonary effort is normal.      Breath sounds: Normal breath sounds. No wheezing.   Abdominal:      General: Abdomen is  "flat. There is distension.      Palpations: Abdomen is soft.      Tenderness: There is no guarding.   Musculoskeletal:         General: No swelling. Normal range of motion.      Right lower leg: Edema present.      Left lower leg: Edema present.   Skin:     General: Skin is warm and dry.   Neurological:      General: No focal deficit present.      Mental Status: He is alert. Mental status is at baseline.   Psychiatric:         Mood and Affect: Mood normal.         Behavior: Behavior normal.          Last Recorded Vitals  Blood pressure 120/83, pulse 77, temperature 36.9 °C (98.4 °F), resp. rate (!) 22, height 1.803 m (5' 11\"), weight 107 kg (235 lb), SpO2 97%.         Assessment & Plan      Acute on chronic HFrEF 15%    CAD  Hx DVT  COPD  JAKE  HTN  HLD  DM    Patient does speak some English, but prefers brother in room to translate.  Patient placed on oxygen in ED, but did not appear to be truly hypoxic, nor tachycardic and on Eliquis so low suspicion of PE.  Aggressive diuresis, suspect acute exacerbation of HFrEF with travel and likely poor compliance to diet.  Strict I/Os, IV lasix bid, fluid/Na restrict. Small effusion noted on CXR, hold off on antibiotics, steroids.  Known to cardiology service, will place on consult  Heart failure education in English at discharge  Resume home meds as appropriate, once med rec complete.    Riky Sims MD         [1]   Past Medical History:  Diagnosis Date    Cataract     Chest pain 10/01/2023    CHF (congestive heart failure)     Clotting disorder (Multi)     COPD (chronic obstructive pulmonary disease) (Multi)     Coronary artery disease     Diabetes mellitus (Multi)     Disease of thyroid gland     Elevated troponin level not due myocardial infarction 08/14/2023    Hypertension     Ischemic cardiomyopathy     Pulmonary hypertension (Multi)     Venous ulcer of left leg (Multi)    [2]   Past Surgical History:  Procedure Laterality Date    CARDIAC CATHETERIZATION N/A " 6/10/2025    Procedure: Left & Right Heart Cath w Angiography & LV;  Surgeon: Carl B Gillombardo, MD;  Location: [facility] Cardiac Cath Lab;  Service: Cardiovascular;  Laterality: N/A;    CARDIAC DEFIBRILLATOR PLACEMENT      CATARACT EXTRACTION Right         CORONARY ANGIOPLASTY WITH STENT PLACEMENT      CORONARY ARTERY BYPASS GRAFT     [3]   Family History  Family history unknown: Yes

## 2025-07-13 NOTE — CARE PLAN
The patient's goals for the shift include      The clinical goals for the shift include remain HDS    Over the shift, the patient did not make progress toward the following goals. Barriers to progression include . Recommendations to address these barriers include .

## 2025-07-14 LAB
ANION GAP SERPL CALC-SCNC: 16 MMOL/L (ref 10–20)
ATRIAL RATE: 78 BPM
BUN SERPL-MCNC: 54 MG/DL (ref 6–23)
CALCIUM SERPL-MCNC: 8.6 MG/DL (ref 8.6–10.3)
CHLORIDE SERPL-SCNC: 98 MMOL/L (ref 98–107)
CO2 SERPL-SCNC: 29 MMOL/L (ref 21–32)
CREAT SERPL-MCNC: 2.14 MG/DL (ref 0.5–1.3)
EGFRCR SERPLBLD CKD-EPI 2021: 35 ML/MIN/1.73M*2
ERYTHROCYTE [DISTWIDTH] IN BLOOD BY AUTOMATED COUNT: 21.2 % (ref 11.5–14.5)
GLUCOSE BLD MANUAL STRIP-MCNC: 176 MG/DL (ref 74–99)
GLUCOSE BLD MANUAL STRIP-MCNC: 210 MG/DL (ref 74–99)
GLUCOSE BLD MANUAL STRIP-MCNC: 222 MG/DL (ref 74–99)
GLUCOSE BLD MANUAL STRIP-MCNC: 62 MG/DL (ref 74–99)
GLUCOSE BLD MANUAL STRIP-MCNC: 64 MG/DL (ref 74–99)
GLUCOSE SERPL-MCNC: 58 MG/DL (ref 74–99)
HCT VFR BLD AUTO: 39.5 % (ref 41–52)
HGB BLD-MCNC: 11.5 G/DL (ref 13.5–17.5)
MAGNESIUM SERPL-MCNC: 2.62 MG/DL (ref 1.6–2.4)
MCH RBC QN AUTO: 23.5 PG (ref 26–34)
MCHC RBC AUTO-ENTMCNC: 29.1 G/DL (ref 32–36)
MCV RBC AUTO: 81 FL (ref 80–100)
NRBC BLD-RTO: 0 /100 WBCS (ref 0–0)
P AXIS: 85 DEGREES
P OFFSET: 190 MS
P ONSET: 131 MS
PLATELET # BLD AUTO: 274 X10*3/UL (ref 150–450)
POTASSIUM SERPL-SCNC: 3.9 MMOL/L (ref 3.5–5.3)
PR INTERVAL: 168 MS
Q ONSET: 215 MS
QRS COUNT: 13 BEATS
QRS DURATION: 112 MS
QT INTERVAL: 390 MS
QTC CALCULATION(BAZETT): 444 MS
QTC FREDERICIA: 425 MS
R AXIS: 25 DEGREES
RBC # BLD AUTO: 4.89 X10*6/UL (ref 4.5–5.9)
SODIUM SERPL-SCNC: 139 MMOL/L (ref 136–145)
T AXIS: 101 DEGREES
T OFFSET: 410 MS
VENTRICULAR RATE: 78 BPM
WBC # BLD AUTO: 5.7 X10*3/UL (ref 4.4–11.3)

## 2025-07-14 PROCEDURE — 2500000004 HC RX 250 GENERAL PHARMACY W/ HCPCS (ALT 636 FOR OP/ED): Mod: JZ | Performed by: INTERNAL MEDICINE

## 2025-07-14 PROCEDURE — 80048 BASIC METABOLIC PNL TOTAL CA: CPT | Performed by: INTERNAL MEDICINE

## 2025-07-14 PROCEDURE — 83735 ASSAY OF MAGNESIUM: CPT | Performed by: INTERNAL MEDICINE

## 2025-07-14 PROCEDURE — 36415 COLL VENOUS BLD VENIPUNCTURE: CPT | Performed by: INTERNAL MEDICINE

## 2025-07-14 PROCEDURE — 82947 ASSAY GLUCOSE BLOOD QUANT: CPT

## 2025-07-14 PROCEDURE — 99232 SBSQ HOSP IP/OBS MODERATE 35: CPT | Performed by: STUDENT IN AN ORGANIZED HEALTH CARE EDUCATION/TRAINING PROGRAM

## 2025-07-14 PROCEDURE — 85027 COMPLETE CBC AUTOMATED: CPT | Performed by: INTERNAL MEDICINE

## 2025-07-14 PROCEDURE — 1200000002 HC GENERAL ROOM WITH TELEMETRY DAILY

## 2025-07-14 PROCEDURE — 2500000002 HC RX 250 W HCPCS SELF ADMINISTERED DRUGS (ALT 637 FOR MEDICARE OP, ALT 636 FOR OP/ED): Performed by: INTERNAL MEDICINE

## 2025-07-14 PROCEDURE — 2500000001 HC RX 250 WO HCPCS SELF ADMINISTERED DRUGS (ALT 637 FOR MEDICARE OP): Performed by: INTERNAL MEDICINE

## 2025-07-14 PROCEDURE — 99232 SBSQ HOSP IP/OBS MODERATE 35: CPT | Performed by: INTERNAL MEDICINE

## 2025-07-14 RX ORDER — INSULIN GLARGINE 100 [IU]/ML
10 INJECTION, SOLUTION SUBCUTANEOUS EVERY 24 HOURS
Status: DISCONTINUED | OUTPATIENT
Start: 2025-07-15 | End: 2025-07-17 | Stop reason: HOSPADM

## 2025-07-14 RX ADMIN — INSULIN LISPRO 4 UNITS: 100 INJECTION, SOLUTION INTRAVENOUS; SUBCUTANEOUS at 12:25

## 2025-07-14 RX ADMIN — QUETIAPINE FUMARATE 50 MG: 25 TABLET ORAL at 20:17

## 2025-07-14 RX ADMIN — ATORVASTATIN CALCIUM 80 MG: 80 TABLET, FILM COATED ORAL at 20:17

## 2025-07-14 RX ADMIN — GABAPENTIN 200 MG: 100 CAPSULE ORAL at 08:59

## 2025-07-14 RX ADMIN — METOPROLOL SUCCINATE 200 MG: 50 TABLET, EXTENDED RELEASE ORAL at 09:00

## 2025-07-14 RX ADMIN — ISOSORBIDE DINITRATE 40 MG: 20 TABLET ORAL at 14:45

## 2025-07-14 RX ADMIN — GABAPENTIN 200 MG: 100 CAPSULE ORAL at 20:17

## 2025-07-14 RX ADMIN — ACETAMINOPHEN 650 MG: 325 TABLET ORAL at 13:55

## 2025-07-14 RX ADMIN — DIGOXIN 125 MCG: 125 TABLET ORAL at 08:59

## 2025-07-14 RX ADMIN — APIXABAN 5 MG: 5 TABLET, FILM COATED ORAL at 08:59

## 2025-07-14 RX ADMIN — PANTOPRAZOLE SODIUM 40 MG: 40 TABLET, DELAYED RELEASE ORAL at 06:47

## 2025-07-14 RX ADMIN — ISOSORBIDE DINITRATE 40 MG: 20 TABLET ORAL at 09:04

## 2025-07-14 RX ADMIN — APIXABAN 5 MG: 5 TABLET, FILM COATED ORAL at 20:17

## 2025-07-14 RX ADMIN — ISOSORBIDE DINITRATE 40 MG: 20 TABLET ORAL at 20:44

## 2025-07-14 RX ADMIN — INSULIN LISPRO 4 UNITS: 100 INJECTION, SOLUTION INTRAVENOUS; SUBCUTANEOUS at 17:09

## 2025-07-14 RX ADMIN — LEVOTHYROXINE SODIUM 75 MCG: 0.07 TABLET ORAL at 06:47

## 2025-07-14 RX ADMIN — BUMETANIDE 1 MG: 0.25 INJECTION INTRAMUSCULAR; INTRAVENOUS at 10:40

## 2025-07-14 RX ADMIN — BUMETANIDE 1 MG: 0.25 INJECTION INTRAMUSCULAR; INTRAVENOUS at 17:09

## 2025-07-14 ASSESSMENT — COGNITIVE AND FUNCTIONAL STATUS - GENERAL
MOVING TO AND FROM BED TO CHAIR: A LITTLE
DRESSING REGULAR UPPER BODY CLOTHING: A LITTLE
DAILY ACTIVITIY SCORE: 20
HELP NEEDED FOR BATHING: A LITTLE
CLIMB 3 TO 5 STEPS WITH RAILING: A LOT
TOILETING: A LITTLE
WALKING IN HOSPITAL ROOM: A LITTLE
STANDING UP FROM CHAIR USING ARMS: A LITTLE
DRESSING REGULAR LOWER BODY CLOTHING: A LITTLE

## 2025-07-14 ASSESSMENT — PAIN SCALES - GENERAL
PAINLEVEL_OUTOF10: 0 - NO PAIN

## 2025-07-14 ASSESSMENT — PAIN - FUNCTIONAL ASSESSMENT
PAIN_FUNCTIONAL_ASSESSMENT: 0-10

## 2025-07-14 NOTE — CARE PLAN
The patient's goals for the shift include      The clinical goals for the shift include remain HDS    Over the shift, the patient did make progress toward the following goals. Barriers to progression include . Recommendations to address these barriers include .

## 2025-07-14 NOTE — PROGRESS NOTES
Subjective   Shortness of breath is improving.  No chest pain.  No acute issues overnight.  Patient is in sinus rhythm with rate of 70s.    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  He has seen multiple cardiologist in the past including evaluation with heart failure team for consideration of advanced options including LVAD and heart transplant.  He was found not to be candidate for the advanced heart failure options due to noncompliance with medications and very uncontrolled diabetes as well as concern with social and financial support.     For now we will continue with diuresis with IV Bumex.  Eventually will correct the patient back with heart failure team for further management and if his diabetes becomes better controlled he might be a candidate for reevaluation for advanced options.  Based on the coronary angiogram that was done recently, I do not believe further revascularization option is available to him and his overall chance of survival with this level of heart failure will be not long.  Had a long conversation with the patient and he expresses understanding and agreement with the plan.    July 14, 2025  Creatinine is stable around 2.  Will continue with IV Bumex and other medications of cardiac standpoint.  Will plan to have the patient follow-up post discharge in heart failure clinic            I appreciate the opportunity to participate in this patient's care.      Jeremiah Bhatia MD, PhD, FACC, [facility]  Interventional Cardiology, Senior Attending Physician,  [facility] Catheterization Laboratory Medical Director,  [facility]  Associate Professor of Medicine,   [facility]  Office: [phone]               Medical History:   Medical History[1]  Surgical History:    Surgical History[2]   Social History:   Social Drivers of Health with Concerns     Tobacco Use: Medium Risk (7/12/2025)    Patient History     Smoking Tobacco Use: Former     Smokeless Tobacco Use: Never     Passive Exposure: Not on file   Physical Activity: Unknown (5/7/2025)    Exercise Vital Sign     Days of Exercise per Week: 0 days     Minutes of Exercise per Session: Not on file   Social Connections: Unknown (1/20/2025)    OASIS : Social Isolation     Frequency of experiencing loneliness or isolation: Patient unable to respond   Depression: At risk (7/13/2025)    PHQ-2     PHQ-2 Score: 4   Digital Equity: Not on file     Family History:   Family History[3]       Objective   Physical Exam  Vitals:    07/14/25 0744 07/14/25 0859 07/14/25 0900 07/14/25 1218   BP: 107/69   125/73   BP Location: Right arm      Patient Position: Lying      Pulse: 55 75  68   Resp: 18      Temp: 35.7 °C (96.3 °F)   35.3 °C (95.5 °F)   TempSrc: Temporal      SpO2: 96%   98%   Weight:   108 kg (238 lb 1.6 oz)    Height:         Wt Readings from Last 3 Encounters:   07/14/25 108 kg (238 lb 1.6 oz)   06/10/25 97.1 kg (214 lb)   05/12/25 96.6 kg (213 lb)     Physical Exam  Constitutional:       Appearance: Normal appearance.   HENT:      Head: Normocephalic and atraumatic.      Nose: Nose normal.      Mouth/Throat:      Mouth: Mucous membranes are moist.   Eyes:      Extraocular Movements: Extraocular movements intact.      Pupils: Pupils are equal, round, and reactive to light.   Cardiovascular:      Rate and Rhythm: Normal rate and regular rhythm.      Heart sounds: No murmur heard.  Pulmonary:      Effort: Pulmonary effort is normal.      Breath sounds: Normal breath sounds.   Abdominal:      General: Abdomen is flat.      Palpations: Abdomen is soft.   Musculoskeletal:         General: Normal range of motion.      Cervical back: Normal range of motion and neck supple.      Right lower leg: No edema.      Left lower leg: No  "edema.   Skin:     General: Skin is warm.   Neurological:      General: No focal deficit present.      Mental Status: alert and oriented to person, place, and time.   Psychiatric:         Mood and Affect: Mood normal.         Behavior: Behavior normal.     Lab Results   Component Value Date    WBC 5.7 07/14/2025    HGB 11.5 (L) 07/14/2025    HCT 39.5 (L) 07/14/2025     07/14/2025    CHOL 82 06/10/2025    TRIG 154 (H) 06/10/2025    HDL 26.8 06/10/2025    ALT 10 07/12/2025    AST 12 07/12/2025     07/14/2025    K 3.9 07/14/2025    CL 98 07/14/2025    CREATININE 2.14 (H) 07/14/2025    BUN 54 (H) 07/14/2025    CO2 29 07/14/2025    TSH 3.26 11/11/2024    INR 1.2 (H) 10/05/2023    HGBA1C 8.9 (A) 05/12/2025     No results found for: \"CKTOTAL\", \"CKMB\", \"CKMBINDEX\", \"TROPONINI\"     Lab Results   Component Value Date    INR 1.2 (H) 10/05/2023    PROTIME 13.7 (H) 10/05/2023           Current Medications[4]          Macro dragon disclaimer: This note was dictated by speech recognition. Minor errors in transcription may be present.       [1]   Past Medical History:  Diagnosis Date    Cataract     Chest pain 10/01/2023    CHF (congestive heart failure)     Clotting disorder (Multi)     COPD (chronic obstructive pulmonary disease) (Multi)     Coronary artery disease     Diabetes mellitus (Multi)     Disease of thyroid gland     Elevated troponin level not due myocardial infarction 08/14/2023    Hypertension     Ischemic cardiomyopathy     Pulmonary hypertension (Multi)     Venous ulcer of left leg (Multi)    [2]   Past Surgical History:  Procedure Laterality Date    CARDIAC CATHETERIZATION N/A 6/10/2025    Procedure: Left & Right Heart Cath w Angiography & LV;  Surgeon: Carl B Gillombardo, MD;  Location: [facility] Cardiac Cath Lab;  Service: Cardiovascular;  Laterality: N/A;    CARDIAC DEFIBRILLATOR PLACEMENT      CATARACT EXTRACTION Right         CORONARY ANGIOPLASTY WITH STENT PLACEMENT      CORONARY ARTERY BYPASS " GRAFT     [3]   Family History  Family history unknown: Yes   [4]   Current Facility-Administered Medications   Medication Dose Route Frequency Provider Last Rate Last Admin    acetaminophen (Tylenol) tablet 650 mg  650 mg oral q6h PRN Riky Sims MD   650 mg at 07/13/25 2110    albuterol 2.5 mg /3 mL (0.083 %) nebulizer solution 5 mg  5 mg nebulization q4h PRN Riky Sims MD        apixaban (Eliquis) tablet 5 mg  5 mg oral BID Riky Sims MD   5 mg at 07/14/25 0859    atorvastatin (Lipitor) tablet 80 mg  80 mg oral Nightly Riky Sims MD   80 mg at 07/13/25 2053    bumetanide (Bumex) injection 1 mg  1 mg intravenous BID Riky Sims MD   1 mg at 07/14/25 1040    [Held by provider] bumetanide (Bumex) tablet 1 mg  1 mg oral Daily Riky Sims MD        [Held by provider] dapagliflozin propanediol (Farxiga) tablet 10 mg  10 mg oral Daily Riky Sims MD        dextrose 50 % injection 12.5 g  12.5 g intravenous q15 min PRN Riky Sims MD        dextrose 50 % injection 25 g  25 g intravenous q15 min PRN Riky Sims MD        digoxin (Lanoxin) tablet 125 mcg  125 mcg oral Daily Riky Sims MD   125 mcg at 07/14/25 0859    gabapentin (Neurontin) capsule 200 mg  200 mg oral BID Riky Sims MD   200 mg at 07/14/25 0859    glucagon (Glucagen) injection 1 mg  1 mg intramuscular q15 min PRN Riky Sims MD        glucagon (Glucagen) injection 1 mg  1 mg intramuscular q15 min PRN Riky Sims MD        [Held by provider] hydrALAZINE (Apresoline) tablet 100 mg  100 mg oral TID Riky Sims MD   100 mg at 07/13/25 1524    [START ON 7/15/2025] insulin glargine (Lantus) injection 10 Units  10 Units subcutaneous q24h Levy Huitron DO        insulin lispro injection 0-10 Units  0-10 Units subcutaneous TID AC Riky Sims MD   4 Units at 07/14/25 1225    [Held by provider] insulin lispro injection 10 Units  10  Units subcutaneous TID AC Riky Sims MD   10 Units at 07/13/25 1751    isosorbide dinitrate (Isordil) tablet 40 mg  40 mg oral TID Riky Sims MD   40 mg at 07/14/25 0904    levothyroxine (Synthroid, Levoxyl) tablet 75 mcg  75 mcg oral Daily Riky Sims MD   75 mcg at 07/14/25 0647    metoprolol succinate XL (Toprol-XL) 24 hr tablet 200 mg  200 mg oral Daily Riky Sims MD   200 mg at 07/14/25 0900    oxygen (O2) therapy   inhalation Continuous - 02/gases Riky Sims MD   2 L/min at 07/13/25 1515    pantoprazole (ProtoNix) EC tablet 40 mg  40 mg oral Daily before breakfast Riky Sims MD   40 mg at 07/14/25 0647    polyethylene glycol (Glycolax, Miralax) packet 17 g  17 g oral Daily Riky Sims MD        QUEtiapine (SEROquel) tablet 50 mg  50 mg oral Nightly Riky Sims MD   50 mg at 07/13/25 2051

## 2025-07-14 NOTE — CARE PLAN
The patient's goals for the shift include      The clinical goals for the shift include safety and comfort,  no low blood sugar    Over the shift, the patient did not make progress toward the following goals. Barriers to progression include . Recommendations to address these barriers include .

## 2025-07-14 NOTE — PROGRESS NOTES
Patient Name: Felice Law   YOB: 1965    Subjective:  Pt reports slight improvement in his breathing.  He is asking about home oxygen.     Objective:    Vitals:    07/14/25 0416 07/14/25 0744 07/14/25 0859 07/14/25 0900   BP: 110/68 107/69     BP Location: Right arm Right arm     Patient Position: Lying Lying     Pulse: 66 55 75    Resp: 20 18     Temp: 36.4 °C (97.5 °F) 35.7 °C (96.3 °F)     TempSrc: Temporal Temporal     SpO2: 94% 96%     Weight:    108 kg (238 lb 1.6 oz)   Height:           Physical Exam:    GEN: Awake and alert. Not in acute distress.   HEENT: Normocephalic and atraumatic.  Mucous membranes moist.  Clear sclera, PERRL, EOMI.  CARDIO: Regular rate and rhythm.  No murmurs, rubs, or gallops. Bilateral pitting edema  RESP: Clear to auscultation b/l. No wheezes, rales or rhonchi. Good respiratory effort.   ABD: +BS x4, soft, non-tender. Not distended. Anasarca  MSK: Grossly normal inspection. No joint swelling or obvious deformities. ROM intact  NEURO: A&O X 3. CN II-XII are grossly intact. No focal deficits.   SKIN: Warm and dry, no lesions, no rashes.  PSYCH: Appropriate mood and affect, no hallucinations.     Scheduled medications  Scheduled Medications[1]  Continuous medications  Continuous Medications[2]  PRN medications  PRN Medications[3]     Assessment and Plan:  Felice Law is a 60 y.o. male   Assessment & Plan  Acute on chronic systolic congestive heart failure    # Acute on chronic systolic heart failure  # Anasarca  CAD s/p CABG  VT arrest s/p ICD  DVT on Eliquis  CKD3b  COPD  JAKE  HTN  HLD  DM  Hypothyroidism    Continue diuresis with Bumex 1mg IV BID.  Net negative 950ml.  He still appears very volume overloaded.  Monitor I/Os, renal function, electrolytes.  Low salt diet, fluid restriction.  Consulted cardiology.  Currently not requiring oxygen.  Check walking pulse ox for desaturation.  Anticoagulated on  Eliquis.  Monitor blood glucose, continue Lantus and meal time insulins.  Not eating much, reduced Lantus dose.    I spent 25 minutes in the professional and overall care of this patient. More than 50% of this time was spent examining and counseling patient, reviewing plan of care, and coordinating medical care.    Levy Huitron DO  Senior Attending Physician  [facility] Medicine  Clinical Instructor             [1] apixaban, 5 mg, oral, BID  atorvastatin, 80 mg, oral, Nightly  bumetanide, 1 mg, intravenous, BID  [Held by provider] bumetanide, 1 mg, oral, Daily  [Held by provider] dapagliflozin propanediol, 10 mg, oral, Daily  digoxin, 125 mcg, oral, Daily  gabapentin, 200 mg, oral, BID  [Held by provider] hydrALAZINE, 100 mg, oral, TID  [START ON 7/15/2025] insulin glargine, 10 Units, subcutaneous, q24h  insulin lispro, 0-10 Units, subcutaneous, TID AC  [Held by provider] insulin lispro, 10 Units, subcutaneous, TID AC  isosorbide dinitrate, 40 mg, oral, TID  levothyroxine, 75 mcg, oral, Daily  metoprolol succinate XL, 200 mg, oral, Daily  oxygen, , inhalation, Continuous - 02/gases  pantoprazole, 40 mg, oral, Daily before breakfast  polyethylene glycol, 17 g, oral, Daily  QUEtiapine, 50 mg, oral, Nightly    [2]    [3] PRN medications: acetaminophen, albuterol, dextrose, dextrose, glucagon, glucagon

## 2025-07-15 LAB
ANION GAP SERPL CALC-SCNC: 15 MMOL/L (ref 10–20)
BUN SERPL-MCNC: 55 MG/DL (ref 6–23)
CALCIUM SERPL-MCNC: 8.8 MG/DL (ref 8.6–10.3)
CHLORIDE SERPL-SCNC: 97 MMOL/L (ref 98–107)
CO2 SERPL-SCNC: 29 MMOL/L (ref 21–32)
CREAT SERPL-MCNC: 2.02 MG/DL (ref 0.5–1.3)
EGFRCR SERPLBLD CKD-EPI 2021: 37 ML/MIN/1.73M*2
ERYTHROCYTE [DISTWIDTH] IN BLOOD BY AUTOMATED COUNT: 21.3 % (ref 11.5–14.5)
GLUCOSE BLD MANUAL STRIP-MCNC: 172 MG/DL (ref 74–99)
GLUCOSE BLD MANUAL STRIP-MCNC: 184 MG/DL (ref 74–99)
GLUCOSE BLD MANUAL STRIP-MCNC: 324 MG/DL (ref 74–99)
GLUCOSE BLD MANUAL STRIP-MCNC: 82 MG/DL (ref 74–99)
GLUCOSE SERPL-MCNC: 75 MG/DL (ref 74–99)
HCT VFR BLD AUTO: 42.3 % (ref 41–52)
HGB BLD-MCNC: 12.3 G/DL (ref 13.5–17.5)
MAGNESIUM SERPL-MCNC: 2.7 MG/DL (ref 1.6–2.4)
MCH RBC QN AUTO: 23.9 PG (ref 26–34)
MCHC RBC AUTO-ENTMCNC: 29.1 G/DL (ref 32–36)
MCV RBC AUTO: 82 FL (ref 80–100)
NRBC BLD-RTO: 0 /100 WBCS (ref 0–0)
PLATELET # BLD AUTO: 267 X10*3/UL (ref 150–450)
POTASSIUM SERPL-SCNC: 4.3 MMOL/L (ref 3.5–5.3)
RBC # BLD AUTO: 5.15 X10*6/UL (ref 4.5–5.9)
SODIUM SERPL-SCNC: 137 MMOL/L (ref 136–145)
WBC # BLD AUTO: 4.7 X10*3/UL (ref 4.4–11.3)

## 2025-07-15 PROCEDURE — 2500000002 HC RX 250 W HCPCS SELF ADMINISTERED DRUGS (ALT 637 FOR MEDICARE OP, ALT 636 FOR OP/ED): Performed by: INTERNAL MEDICINE

## 2025-07-15 PROCEDURE — 85027 COMPLETE CBC AUTOMATED: CPT | Performed by: INTERNAL MEDICINE

## 2025-07-15 PROCEDURE — 83735 ASSAY OF MAGNESIUM: CPT | Performed by: INTERNAL MEDICINE

## 2025-07-15 PROCEDURE — 99233 SBSQ HOSP IP/OBS HIGH 50: CPT | Performed by: STUDENT IN AN ORGANIZED HEALTH CARE EDUCATION/TRAINING PROGRAM

## 2025-07-15 PROCEDURE — 2500000005 HC RX 250 GENERAL PHARMACY W/O HCPCS: Performed by: INTERNAL MEDICINE

## 2025-07-15 PROCEDURE — 82947 ASSAY GLUCOSE BLOOD QUANT: CPT

## 2025-07-15 PROCEDURE — 2500000001 HC RX 250 WO HCPCS SELF ADMINISTERED DRUGS (ALT 637 FOR MEDICARE OP): Performed by: INTERNAL MEDICINE

## 2025-07-15 PROCEDURE — 99232 SBSQ HOSP IP/OBS MODERATE 35: CPT | Performed by: INTERNAL MEDICINE

## 2025-07-15 PROCEDURE — 1200000002 HC GENERAL ROOM WITH TELEMETRY DAILY

## 2025-07-15 PROCEDURE — 36415 COLL VENOUS BLD VENIPUNCTURE: CPT | Performed by: INTERNAL MEDICINE

## 2025-07-15 PROCEDURE — 80048 BASIC METABOLIC PNL TOTAL CA: CPT | Performed by: INTERNAL MEDICINE

## 2025-07-15 PROCEDURE — 2500000004 HC RX 250 GENERAL PHARMACY W/ HCPCS (ALT 636 FOR OP/ED): Mod: JZ | Performed by: INTERNAL MEDICINE

## 2025-07-15 RX ORDER — BUMETANIDE 0.25 MG/ML
2 INJECTION, SOLUTION INTRAMUSCULAR; INTRAVENOUS 2 TIMES DAILY
Status: DISCONTINUED | OUTPATIENT
Start: 2025-07-15 | End: 2025-07-15

## 2025-07-15 RX ORDER — BUMETANIDE 0.25 MG/ML
2 INJECTION, SOLUTION INTRAMUSCULAR; INTRAVENOUS 2 TIMES DAILY
Status: DISCONTINUED | OUTPATIENT
Start: 2025-07-15 | End: 2025-07-16

## 2025-07-15 RX ADMIN — ISOSORBIDE DINITRATE 40 MG: 20 TABLET ORAL at 16:01

## 2025-07-15 RX ADMIN — GABAPENTIN 200 MG: 100 CAPSULE ORAL at 09:34

## 2025-07-15 RX ADMIN — ISOSORBIDE DINITRATE 40 MG: 20 TABLET ORAL at 21:00

## 2025-07-15 RX ADMIN — PANTOPRAZOLE SODIUM 40 MG: 40 TABLET, DELAYED RELEASE ORAL at 06:40

## 2025-07-15 RX ADMIN — INSULIN LISPRO 2 UNITS: 100 INJECTION, SOLUTION INTRAVENOUS; SUBCUTANEOUS at 17:55

## 2025-07-15 RX ADMIN — METOPROLOL SUCCINATE 200 MG: 50 TABLET, EXTENDED RELEASE ORAL at 09:34

## 2025-07-15 RX ADMIN — INSULIN LISPRO 2 UNITS: 100 INJECTION, SOLUTION INTRAVENOUS; SUBCUTANEOUS at 09:31

## 2025-07-15 RX ADMIN — GABAPENTIN 200 MG: 100 CAPSULE ORAL at 21:00

## 2025-07-15 RX ADMIN — ACETAMINOPHEN 650 MG: 325 TABLET ORAL at 22:08

## 2025-07-15 RX ADMIN — QUETIAPINE FUMARATE 50 MG: 25 TABLET ORAL at 20:59

## 2025-07-15 RX ADMIN — BUMETANIDE 2 MG: 0.25 INJECTION INTRAMUSCULAR; INTRAVENOUS at 11:22

## 2025-07-15 RX ADMIN — INSULIN GLARGINE 10 UNITS: 100 INJECTION, SOLUTION SUBCUTANEOUS at 09:32

## 2025-07-15 RX ADMIN — ATORVASTATIN CALCIUM 80 MG: 80 TABLET, FILM COATED ORAL at 21:00

## 2025-07-15 RX ADMIN — INSULIN LISPRO 2 UNITS: 100 INJECTION, SOLUTION INTRAVENOUS; SUBCUTANEOUS at 12:32

## 2025-07-15 RX ADMIN — ISOSORBIDE DINITRATE 40 MG: 20 TABLET ORAL at 09:33

## 2025-07-15 RX ADMIN — APIXABAN 5 MG: 5 TABLET, FILM COATED ORAL at 09:34

## 2025-07-15 RX ADMIN — Medication 2 L/MIN: at 09:40

## 2025-07-15 RX ADMIN — APIXABAN 5 MG: 5 TABLET, FILM COATED ORAL at 21:00

## 2025-07-15 RX ADMIN — DIGOXIN 125 MCG: 125 TABLET ORAL at 09:34

## 2025-07-15 RX ADMIN — LEVOTHYROXINE SODIUM 75 MCG: 0.07 TABLET ORAL at 06:40

## 2025-07-15 ASSESSMENT — COGNITIVE AND FUNCTIONAL STATUS - GENERAL
HELP NEEDED FOR BATHING: A LITTLE
WALKING IN HOSPITAL ROOM: A LITTLE
STANDING UP FROM CHAIR USING ARMS: A LITTLE
DAILY ACTIVITIY SCORE: 20
DRESSING REGULAR LOWER BODY CLOTHING: A LITTLE
CLIMB 3 TO 5 STEPS WITH RAILING: A LOT
MOVING TO AND FROM BED TO CHAIR: A LITTLE
TOILETING: A LITTLE
DRESSING REGULAR UPPER BODY CLOTHING: A LITTLE
MOBILITY SCORE: 19

## 2025-07-15 ASSESSMENT — PAIN SCALES - GENERAL
PAINLEVEL_OUTOF10: 1
PAINLEVEL_OUTOF10: 0 - NO PAIN

## 2025-07-15 ASSESSMENT — PAIN - FUNCTIONAL ASSESSMENT: PAIN_FUNCTIONAL_ASSESSMENT: 0-10

## 2025-07-15 NOTE — PROGRESS NOTES
05/06/25 1714   Discharge Planning   Living Arrangements Parent;Family members   Support Systems Family members;Parent   Type of Residence Private residence   Expected Discharge Disposition Home H      I met with patient at the bedside, verified insurance and demographics.  Patient lives with family.  He ambulates with a cane or walker, also uses a wheelchair.  He denies falls, drives and is independent with ADLs, returned home from a trip to [place] the day prior to admission.  Patient has BLE wounds/blisters which are currently wrapped/bandaged.  Patient was active with [facility] home care for skilled nursing for BLE wound care.  Care Coordination Team following for assistance with discharge planning.  Bria MARIN TCC

## 2025-07-15 NOTE — PROGRESS NOTES
Patient Name: Felice Law   YOB: 1965    Subjective:  Patient still short of breath, legs are still swollen.  He is making urine.     Objective:    Vitals:    07/14/25 2346 07/15/25 0404 07/15/25 0554 07/15/25 0742   BP: 113/74 118/77  118/79   BP Location: Right arm Right arm  Right arm   Patient Position: Lying Lying  Lying   Pulse: 66 69  73   Resp: 16 17  18   Temp: 36 °C (96.8 °F) 36.1 °C (97 °F)  36.6 °C (97.9 °F)   TempSrc: Temporal Temporal  Temporal   SpO2: 97% 95%  97%   Weight:   109 kg (240 lb 8.4 oz) 113 kg (250 lb)   Height:           Physical Exam:    GEN: Awake and alert. Not in acute distress.   HEENT: Normocephalic and atraumatic.  Mucous membranes moist.  Clear sclera, PERRL, EOMI.  CARDIO: Regular rate and rhythm.  No murmurs, rubs, or gallops. Bilateral pitting edema  RESP: Clear to auscultation b/l. No wheezes, rales or rhonchi. Good respiratory effort.   ABD: +BS x4, soft, non-tender. Not distended. Anasarca  MSK: Grossly normal inspection. No joint swelling or obvious deformities. ROM intact  NEURO: A&O X 3. CN II-XII are grossly intact. No focal deficits.   SKIN: Warm and dry, no lesions, no rashes.  PSYCH: Appropriate mood and affect, no hallucinations.     Scheduled medications  Scheduled Medications[1]  Continuous medications  Continuous Medications[2]  PRN medications  PRN Medications[3]     Assessment and Plan:  Felice Law is a 60 y.o. male   Assessment & Plan  Acute on chronic systolic congestive heart failure    # Acute on chronic systolic heart failure  # Anasarca  CAD s/p CABG  VT arrest s/p ICD  DVT on Eliquis  CKD3b  COPD  JAKE  HTN  HLD  DM  Hypothyroidism    Patient still short of breath, legs are still markedly swollen limiting his ability to ambulate.  He is wearing oxygen as it is helping him breath easier.  Discussed with RN to perform walking pulse ox desaturation study to see if patient  qualifies for home O2.  Creatinine steady, will increase Bumex to 2mg IV BID.  BUN is slightly up.  Monitor I/Os, renal function, electrolytes.  Low salt diet, fluid restriction.  Consulted cardiology.  He will need follow up post discharge in heart failure clinic.  Anticoagulated on Eliquis.  Monitor blood glucose, continue Lantus and meal time insulins.    I spent 30 minutes in the professional and overall care of this patient. More than 50% of this time was spent examining and counseling patient, reviewing plan of care, and coordinating medical care.    Levy Huitron DO  Senior Attending Physician  [facility] Medicine  Clinical Instructor             [1] apixaban, 5 mg, oral, BID  atorvastatin, 80 mg, oral, Nightly  bumetanide, 2 mg, intravenous, BID  [Held by provider] bumetanide, 1 mg, oral, Daily  [Held by provider] dapagliflozin propanediol, 10 mg, oral, Daily  digoxin, 125 mcg, oral, Daily  gabapentin, 200 mg, oral, BID  [Held by provider] hydrALAZINE, 100 mg, oral, TID  insulin glargine, 10 Units, subcutaneous, q24h  insulin lispro, 0-10 Units, subcutaneous, TID AC  [Held by provider] insulin lispro, 10 Units, subcutaneous, TID AC  isosorbide dinitrate, 40 mg, oral, TID  levothyroxine, 75 mcg, oral, Daily  metoprolol succinate XL, 200 mg, oral, Daily  oxygen, , inhalation, Continuous - 02/gases  pantoprazole, 40 mg, oral, Daily before breakfast  polyethylene glycol, 17 g, oral, Daily  QUEtiapine, 50 mg, oral, Nightly    [2]    [3] PRN medications: acetaminophen, albuterol, dextrose, dextrose, glucagon, glucagon

## 2025-07-15 NOTE — CARE PLAN
OFFICE VISIT    David is a 7 y.o. 0 m.o. male    History given by mother     CC:   Chief Complaint   Patient presents with    Medication Follow-up        HPI: David presents with ADHD follow up    Last seen on 3/21 for ADHD follow up. Was previously on Adderall 5 mg in AM and 2.5 mg which was working well initially but started to not be as effective. Plan at that time was to try Focalin XR 5 mg daily. Since then, it seems to work some of the days. At school, mom got feedback that he was doing better in class. Today when mom picked him up the  said he was having class clown behavioral issues. At home, he seems to still be very hyper. There are some days that are great and he is able to focus and concentrate. He is not having any changes in appetite or sleep.      REVIEW OF SYSTEMS:  As documented in HPI. All other systems were reviewed and are negative.     PMH:   Past Medical History:   Diagnosis Date    Allergy      Allergies: Patient has no known allergies.  PSH: No past surgical history on file.  FHx:  No family history on file.  Soc:    Social History     Socioeconomic History    Marital status: Single     Spouse name: Not on file    Number of children: Not on file    Years of education: Not on file    Highest education level: Not on file   Occupational History    Not on file   Tobacco Use    Smoking status: Not on file    Smokeless tobacco: Not on file   Substance and Sexual Activity    Alcohol use: Not on file    Drug use: Not on file    Sexual activity: Not on file   Other Topics Concern    Not on file   Social History Narrative    Not on file     Social Drivers of Health     Financial Resource Strain: Not on file   Food Insecurity: Food Insecurity Present (12/16/2024)    Hunger Vital Sign     Worried About Running Out of Food in the Last Year: Sometimes true     Ran Out of Food in the Last Year: Sometimes true   Transportation Needs: Unmet Transportation Needs (12/16/2024)    PRAPARE -  The patient's goals for the shift include      The clinical goals for the shift include comfort/safety    Over the shift, the patient did  make progress toward the following goals. Barriers to progression include   . Recommendations to address these barriers include   .     "Transportation     Lack of Transportation (Medical): No     Lack of Transportation (Non-Medical): Yes   Physical Activity: Not on file   Housing Stability: High Risk (12/16/2024)    Housing Stability Vital Sign     Unable to Pay for Housing in the Last Year: Yes     Number of Times Moved in the Last Year: 3     Homeless in the Last Year: Yes         PHYSICAL EXAM:   Reviewed vital signs and growth parameters in EMR.   BP 90/58 (BP Location: Right arm, Patient Position: Sitting, BP Cuff Size: Small adult)   Pulse 120   Temp 36.9 °C (98.4 °F) (Temporal)   Resp 30   Ht 1.145 m (3' 9.08\")   Wt 17.7 kg (39 lb 0.3 oz)   SpO2 97%   BMI 13.50 kg/m²   Length - 40 %ile (Z= -0.26) based on CDC (Boys, 2-20 Years) Stature-for-age data based on Stature recorded on 4/17/2025.  Weight - 10 %ile (Z= -1.28) based on CDC (Boys, 2-20 Years) weight-for-age data using data from 4/17/2025.    General: This is an alert, active child in no distress.    EYES: PERRL, no conjunctival injection or discharge.   EARS: TM’s are transparent with good landmarks. Canals are patent.  NOSE: Nares are patent with no congestion  THROAT: Oropharynx has no lesions, moist mucus membranes. Pharynx without erythema, tonsils normal.  NECK: Supple, no lymphadenopathy, no masses.   HEART: Regular rate and rhythm without murmur. Peripheral pulses are 2+ and equal.   LUNGS: Clear bilaterally to auscultation, no wheezes or rhonchi. No retractions, nasal flaring, or distress noted.  ABDOMEN: Normal bowel sounds, soft and non-tender, no HSM or mass  MUSCULOSKELETAL: Extremities are without abnormalities.  SKIN: Warm, dry, without significant rash or birthmarks.       ASSESSMENT and PLAN:     1. ADHD (attention deficit hyperactivity disorder), combined type  Patient has been on Focalin 5 mg XR for the past month. There are some days where he does really well and other days where he is still very hyper and has trouble focusing. He is not having any issues with " sleep or appetite. Will trial an increased dose of Focalin 10 mg XR to see if this improves his symptoms.   - Dexmethylphenidate HCl 10 MG CAPSULE SR 24 HR; Take 1 Capsule by mouth every day for 30 days.    - Discussed return precautions extensively  - Follow up in 1 month for medication check      Karla Vale D.O.

## 2025-07-15 NOTE — PROGRESS NOTES
Subjective   Shortness of breath is slightly better.  No chest pain.  No acute issues overnight.  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  He has seen multiple cardiologist in the past including evaluation with heart failure team for consideration of advanced options including LVAD and heart transplant.  He was found not to be candidate for the advanced heart failure options due to noncompliance with medications and very uncontrolled diabetes as well as concern with social and financial support.     For now we will continue with diuresis with IV Bumex.  Eventually will correct the patient back with heart failure team for further management and if his diabetes becomes better controlled he might be a candidate for reevaluation for advanced options.  Based on the coronary angiogram that was done recently, I do not believe further revascularization option is available to him and his overall chance of survival with this level of heart failure will be not long.  Had a long conversation with the patient and he expresses understanding and agreement with the plan.    July 14, 2025  Creatinine is stable around 2.  Will continue with IV Bumex and other medications of cardiac standpoint.  Will plan to have the patient follow-up post discharge in heart failure clinic          July 15, 2025  Still patient is significantly volume overloaded.  Discussed with primary team.  Recommend IV Bumex drip.  Will monitor renal function and electrolytes closely.      I appreciate the opportunity to participate in this patient's care.      Jeremiah Bhatia MD, PhD, FAC, [facility]  Interventional Cardiology, Senior Attending Physician,  [facility] Catheterization Laboratory Medical Director,  [facility]  Associate Professor of Medicine,    [facility]  Office:                Medical History:   Medical History[1]  Surgical History:   Surgical History[2]   Social History:   Social Drivers of Health with Concerns     Tobacco Use: Medium Risk (7/12/2025)    Patient History     Smoking Tobacco Use: Former     Smokeless Tobacco Use: Never     Passive Exposure: Not on file   Physical Activity: Unknown (5/7/2025)    Exercise Vital Sign     Days of Exercise per Week: 0 days     Minutes of Exercise per Session: Not on file   Social Connections: Unknown (1/20/2025)    OASIS : Social Isolation     Frequency of experiencing loneliness or isolation: Patient unable to respond   Depression: At risk (7/13/2025)    PHQ-2     PHQ-2 Score: 4   Digital Equity: Not on file     Family History:   Family History[3]       Objective   Physical Exam  Vitals:    07/15/25 0404 07/15/25 0554 07/15/25 0742 07/15/25 1224   BP: 118/77  118/79 119/78   BP Location: Right arm  Right arm Right arm   Patient Position: Lying  Lying Sitting   Pulse: 69  73 71   Resp: 17  18 18   Temp: 36.1 °C (97 °F)  36.6 °C (97.9 °F) 35.9 °C (96.6 °F)   TempSrc: Temporal  Temporal Temporal   SpO2: 95%  97% 94%   Weight:  109 kg (240 lb 8.4 oz) 113 kg (250 lb)    Height:         Wt Readings from Last 3 Encounters:   07/15/25 113 kg (250 lb)   06/10/25 97.1 kg (214 lb)   05/12/25 96.6 kg (213 lb)     Physical Exam  Constitutional:       Appearance: Normal appearance.   HENT:      Head: Normocephalic and atraumatic.      Nose: Nose normal.      Mouth/Throat:      Mouth: Mucous membranes are moist.   Eyes:      Extraocular Movements: Extraocular movements intact.      Pupils: Pupils are equal, round, and reactive to light.   Cardiovascular:      Rate and Rhythm: Normal rate and regular rhythm.      Heart sounds: No murmur heard.  Pulmonary:      Effort: Pulmonary effort is normal.      Breath sounds: Normal breath sounds.   Abdominal:      General: Abdomen is flat.       "Palpations: Abdomen is soft.   Musculoskeletal:         General: Normal range of motion.      Cervical back: Normal range of motion and neck supple.      Right lower leg: No edema.      Left lower leg: No edema.   Skin:     General: Skin is warm.   Neurological:      General: No focal deficit present.      Mental Status: alert and oriented to person, place, and time.   Psychiatric:         Mood and Affect: Mood normal.         Behavior: Behavior normal.     Lab Results   Component Value Date    WBC 4.7 07/15/2025    HGB 12.3 (L) 07/15/2025    HCT 42.3 07/15/2025     07/15/2025    CHOL 82 06/10/2025    TRIG 154 (H) 06/10/2025    HDL 26.8 06/10/2025    ALT 10 07/12/2025    AST 12 07/12/2025     07/15/2025    K 4.3 07/15/2025    CL 97 (L) 07/15/2025    CREATININE 2.02 (H) 07/15/2025    BUN 55 (H) 07/15/2025    CO2 29 07/15/2025    TSH 3.26 11/11/2024    INR 1.2 (H) 10/05/2023    HGBA1C 8.9 (A) 05/12/2025     No results found for: \"CKTOTAL\", \"CKMB\", \"CKMBINDEX\", \"TROPONINI\"     Lab Results   Component Value Date    INR 1.2 (H) 10/05/2023    PROTIME 13.7 (H) 10/05/2023           Current Medications[4]          Macro dragon disclaimer: This note was dictated by speech recognition. Minor errors in transcription may be present.         [1]   Past Medical History:  Diagnosis Date    Cataract     Chest pain 10/01/2023    CHF (congestive heart failure)     Clotting disorder (Multi)     COPD (chronic obstructive pulmonary disease) (Multi)     Coronary artery disease     Diabetes mellitus (Multi)     Disease of thyroid gland     Elevated troponin level not due myocardial infarction 08/14/2023    Hypertension     Ischemic cardiomyopathy     Pulmonary hypertension (Multi)     Venous ulcer of left leg (Multi)    [2]   Past Surgical History:  Procedure Laterality Date    CARDIAC CATHETERIZATION N/A 6/10/2025    Procedure: Left & Right Heart Cath w Angiography & LV;  Surgeon: Carl B Gillombardo, MD;  Location: [facility]" Cardiac Cath Lab;  Service: Cardiovascular;  Laterality: N/A;    CARDIAC DEFIBRILLATOR PLACEMENT      CATARACT EXTRACTION Right         CORONARY ANGIOPLASTY WITH STENT PLACEMENT      CORONARY ARTERY BYPASS GRAFT     [3]   Family History  Family history unknown: Yes   [4]   Current Facility-Administered Medications   Medication Dose Route Frequency Provider Last Rate Last Admin    acetaminophen (Tylenol) tablet 650 mg  650 mg oral q6h PRN Riky Sims MD   650 mg at 07/14/25 1355    albuterol 2.5 mg /3 mL (0.083 %) nebulizer solution 5 mg  5 mg nebulization q4h PRN Riky Sims MD        apixaban (Eliquis) tablet 5 mg  5 mg oral BID Riky Sims MD   5 mg at 07/15/25 0934    atorvastatin (Lipitor) tablet 80 mg  80 mg oral Nightly Riky Sims MD   80 mg at 07/14/25 2017    bumetanide (Bumex) injection 2 mg  2 mg intravenous BID Levy Huitron, DO   2 mg at 07/15/25 1122    [Held by provider] bumetanide (Bumex) tablet 1 mg  1 mg oral Daily Riky Sims MD        [Held by provider] dapagliflozin propanediol (Farxiga) tablet 10 mg  10 mg oral Daily Riky Sims MD        dextrose 50 % injection 12.5 g  12.5 g intravenous q15 min PRN Riky Sims MD        dextrose 50 % injection 25 g  25 g intravenous q15 min PRN Riky Sims MD        digoxin (Lanoxin) tablet 125 mcg  125 mcg oral Daily Riky Sims MD   125 mcg at 07/15/25 0934    gabapentin (Neurontin) capsule 200 mg  200 mg oral BID Riky Sims MD   200 mg at 07/15/25 0934    glucagon (Glucagen) injection 1 mg  1 mg intramuscular q15 min PRN Riky Sims MD        glucagon (Glucagen) injection 1 mg  1 mg intramuscular q15 min PRN Riky Sims MD        [Held by provider] hydrALAZINE (Apresoline) tablet 100 mg  100 mg oral TID Riky Sims MD   100 mg at 07/13/25 1524    insulin glargine (Lantus) injection 10 Units  10 Units subcutaneous q24h Levy Huitron DO    10 Units at 07/15/25 0932    insulin lispro injection 0-10 Units  0-10 Units subcutaneous TID AC Riky Sims MD   2 Units at 07/15/25 1232    [Held by provider] insulin lispro injection 10 Units  10 Units subcutaneous TID AC Riky Sims MD   10 Units at 07/13/25 1751    isosorbide dinitrate (Isordil) tablet 40 mg  40 mg oral TID Riky Sims MD   40 mg at 07/15/25 0933    levothyroxine (Synthroid, Levoxyl) tablet 75 mcg  75 mcg oral Daily Riky Sims MD   75 mcg at 07/15/25 0640    metoprolol succinate XL (Toprol-XL) 24 hr tablet 200 mg  200 mg oral Daily Riky Sims MD   200 mg at 07/15/25 0934    oxygen (O2) therapy   inhalation Continuous - 02/gases Riky Sims MD   2 L/min at 07/15/25 0940    pantoprazole (ProtoNix) EC tablet 40 mg  40 mg oral Daily before breakfast Riky Sims MD   40 mg at 07/15/25 0640    polyethylene glycol (Glycolax, Miralax) packet 17 g  17 g oral Daily Riky Sims MD        QUEtiapine (SEROquel) tablet 50 mg  50 mg oral Nightly Riky Sims MD   50 mg at 07/14/25 2017

## 2025-07-16 LAB
ANION GAP SERPL CALC-SCNC: 13 MMOL/L (ref 10–20)
BUN SERPL-MCNC: 53 MG/DL (ref 6–23)
CALCIUM SERPL-MCNC: 8.9 MG/DL (ref 8.6–10.3)
CHLORIDE SERPL-SCNC: 98 MMOL/L (ref 98–107)
CO2 SERPL-SCNC: 28 MMOL/L (ref 21–32)
CREAT SERPL-MCNC: 1.86 MG/DL (ref 0.5–1.3)
EGFRCR SERPLBLD CKD-EPI 2021: 41 ML/MIN/1.73M*2
ERYTHROCYTE [DISTWIDTH] IN BLOOD BY AUTOMATED COUNT: 20.7 % (ref 11.5–14.5)
GLUCOSE BLD MANUAL STRIP-MCNC: 186 MG/DL (ref 74–99)
GLUCOSE BLD MANUAL STRIP-MCNC: 216 MG/DL (ref 74–99)
GLUCOSE BLD MANUAL STRIP-MCNC: 264 MG/DL (ref 74–99)
GLUCOSE BLD MANUAL STRIP-MCNC: 311 MG/DL (ref 74–99)
GLUCOSE SERPL-MCNC: 196 MG/DL (ref 74–99)
HCT VFR BLD AUTO: 41.6 % (ref 41–52)
HGB BLD-MCNC: 12.3 G/DL (ref 13.5–17.5)
HOLD SPECIMEN: NORMAL
MAGNESIUM SERPL-MCNC: 2.58 MG/DL (ref 1.6–2.4)
MCH RBC QN AUTO: 24 PG (ref 26–34)
MCHC RBC AUTO-ENTMCNC: 29.6 G/DL (ref 32–36)
MCV RBC AUTO: 81 FL (ref 80–100)
NRBC BLD-RTO: 0 /100 WBCS (ref 0–0)
PLATELET # BLD AUTO: 272 X10*3/UL (ref 150–450)
POTASSIUM SERPL-SCNC: 4.2 MMOL/L (ref 3.5–5.3)
RBC # BLD AUTO: 5.13 X10*6/UL (ref 4.5–5.9)
SODIUM SERPL-SCNC: 135 MMOL/L (ref 136–145)
WBC # BLD AUTO: 5.1 X10*3/UL (ref 4.4–11.3)

## 2025-07-16 PROCEDURE — 36415 COLL VENOUS BLD VENIPUNCTURE: CPT | Performed by: INTERNAL MEDICINE

## 2025-07-16 PROCEDURE — 2500000004 HC RX 250 GENERAL PHARMACY W/ HCPCS (ALT 636 FOR OP/ED): Performed by: STUDENT IN AN ORGANIZED HEALTH CARE EDUCATION/TRAINING PROGRAM

## 2025-07-16 PROCEDURE — 2500000002 HC RX 250 W HCPCS SELF ADMINISTERED DRUGS (ALT 637 FOR MEDICARE OP, ALT 636 FOR OP/ED): Performed by: INTERNAL MEDICINE

## 2025-07-16 PROCEDURE — 99233 SBSQ HOSP IP/OBS HIGH 50: CPT | Performed by: STUDENT IN AN ORGANIZED HEALTH CARE EDUCATION/TRAINING PROGRAM

## 2025-07-16 PROCEDURE — 85027 COMPLETE CBC AUTOMATED: CPT | Performed by: INTERNAL MEDICINE

## 2025-07-16 PROCEDURE — 1200000002 HC GENERAL ROOM WITH TELEMETRY DAILY

## 2025-07-16 PROCEDURE — 82947 ASSAY GLUCOSE BLOOD QUANT: CPT

## 2025-07-16 PROCEDURE — 80048 BASIC METABOLIC PNL TOTAL CA: CPT | Performed by: INTERNAL MEDICINE

## 2025-07-16 PROCEDURE — 99239 HOSP IP/OBS DSCHRG MGMT >30: CPT | Performed by: INTERNAL MEDICINE

## 2025-07-16 PROCEDURE — 83735 ASSAY OF MAGNESIUM: CPT | Performed by: INTERNAL MEDICINE

## 2025-07-16 PROCEDURE — 2500000001 HC RX 250 WO HCPCS SELF ADMINISTERED DRUGS (ALT 637 FOR MEDICARE OP): Performed by: INTERNAL MEDICINE

## 2025-07-16 RX ADMIN — METOPROLOL SUCCINATE 200 MG: 50 TABLET, EXTENDED RELEASE ORAL at 08:38

## 2025-07-16 RX ADMIN — PANTOPRAZOLE SODIUM 40 MG: 40 TABLET, DELAYED RELEASE ORAL at 06:09

## 2025-07-16 RX ADMIN — GABAPENTIN 200 MG: 100 CAPSULE ORAL at 20:36

## 2025-07-16 RX ADMIN — ISOSORBIDE DINITRATE 40 MG: 20 TABLET ORAL at 18:08

## 2025-07-16 RX ADMIN — INSULIN LISPRO 2 UNITS: 100 INJECTION, SOLUTION INTRAVENOUS; SUBCUTANEOUS at 08:36

## 2025-07-16 RX ADMIN — APIXABAN 5 MG: 5 TABLET, FILM COATED ORAL at 08:45

## 2025-07-16 RX ADMIN — FUROSEMIDE 10 MG/HR: 10 INJECTION, SOLUTION INTRAMUSCULAR; INTRAVENOUS at 10:44

## 2025-07-16 RX ADMIN — GABAPENTIN 200 MG: 100 CAPSULE ORAL at 08:39

## 2025-07-16 RX ADMIN — ISOSORBIDE DINITRATE 40 MG: 20 TABLET ORAL at 08:39

## 2025-07-16 RX ADMIN — QUETIAPINE FUMARATE 50 MG: 25 TABLET ORAL at 20:36

## 2025-07-16 RX ADMIN — APIXABAN 5 MG: 5 TABLET, FILM COATED ORAL at 20:36

## 2025-07-16 RX ADMIN — ACETAMINOPHEN 650 MG: 325 TABLET ORAL at 16:58

## 2025-07-16 RX ADMIN — INSULIN LISPRO 8 UNITS: 100 INJECTION, SOLUTION INTRAVENOUS; SUBCUTANEOUS at 11:55

## 2025-07-16 RX ADMIN — DIGOXIN 125 MCG: 125 TABLET ORAL at 08:46

## 2025-07-16 RX ADMIN — ATORVASTATIN CALCIUM 80 MG: 80 TABLET, FILM COATED ORAL at 20:36

## 2025-07-16 RX ADMIN — ISOSORBIDE DINITRATE 40 MG: 20 TABLET ORAL at 13:28

## 2025-07-16 RX ADMIN — INSULIN GLARGINE 10 UNITS: 100 INJECTION, SOLUTION SUBCUTANEOUS at 08:37

## 2025-07-16 RX ADMIN — INSULIN LISPRO 4 UNITS: 100 INJECTION, SOLUTION INTRAVENOUS; SUBCUTANEOUS at 16:55

## 2025-07-16 RX ADMIN — LEVOTHYROXINE SODIUM 75 MCG: 0.07 TABLET ORAL at 06:09

## 2025-07-16 ASSESSMENT — PAIN SCALES - GENERAL
PAINLEVEL_OUTOF10: 0 - NO PAIN
PAINLEVEL_OUTOF10: 6

## 2025-07-16 ASSESSMENT — COGNITIVE AND FUNCTIONAL STATUS - GENERAL
MOBILITY SCORE: 23
DAILY ACTIVITIY SCORE: 24
CLIMB 3 TO 5 STEPS WITH RAILING: A LITTLE

## 2025-07-16 ASSESSMENT — PAIN - FUNCTIONAL ASSESSMENT
PAIN_FUNCTIONAL_ASSESSMENT: 0-10
PAIN_FUNCTIONAL_ASSESSMENT: 0-10

## 2025-07-16 ASSESSMENT — PAIN DESCRIPTION - DESCRIPTORS: DESCRIPTORS: ACHING

## 2025-07-16 ASSESSMENT — PAIN DESCRIPTION - LOCATION: LOCATION: HEAD

## 2025-07-16 NOTE — DISCHARGE SUMMARY
Patient Name: Felice Law   YOB: 1965    Discharge Diagnosis: Acute on chronic systolic congestive heart failure   Discharge Date: 07/16/25  Discharge Location: Transfer to [facility]    Hospital Course:  60 year old male Pmx HFrEF 15%, h/o CABG, VT arrest s/p ICD, DVT on Eliquis, COPD, JAKE, HTN, HLD, DM presented to [facility] for shortness of breath.  Patient admitted for acute on chronic systolic heart failure.  Patient appears fluid overloaded with bilateral LE pitting edema up to his thighs.  He gets easily short of breath with little exertion.  We started diuresis with IV Bumex, this dose was increased and patient placed on Lasix infusion.  Patient still volume overloaded and cardiology reached out to the heart failure team, Dr. Nguyen.  It is recommended patient be transferred to [facility] heart failure ICU for higher level of care and explore advanced heart failure options.  This was discussed with patient who agrees with this plan.  Dr. Nguyen accepted patient, patient is ready for discharge pending bed availability.  Continue Lasix infusion while at [facility].    Time Spent: 40 minutes      Physical Exam:     Vitals:    07/16/25 0334 07/16/25 0755 07/16/25 1010 07/16/25 1147   BP: 128/78 134/80 133/81 125/82   BP Location: Right arm Right arm     Patient Position: Sitting Sitting     Pulse: 70 77 65 66   Resp:       Temp: 36.1 °C (97 °F) 35.6 °C (96.1 °F) 35.6 °C (96.1 °F) 36 °C (96.8 °F)   TempSrc: Temporal Temporal  Temporal   SpO2: 92% 95% 96% 97%   Weight:       Height:           GEN: Awake and alert. Not in acute distress.   HEENT: Normocephalic and atraumatic.  Mucous membranes moist.  Clear sclera, PERRL, EOMI.  CARDIO: Regular rate and rhythm.  No murmurs, rubs, or gallops. Bilateral pitting edema  RESP: Clear to auscultation b/l. No wheezes, rales or rhonchi. Good respiratory effort.   ABD: +BS x4, soft, non-tender. Not  distended. Anasarca  MSK: Grossly normal inspection. No joint swelling or obvious deformities. ROM intact  NEURO: A&O X 3. CN II-XII are grossly intact. No focal deficits.   SKIN: Warm and dry, no lesions, no rashes.  PSYCH: Appropriate mood and affect, no hallucinations.      Discharge Medications:     Your medication list        PAUSE taking these medications        Instructions Last Dose Given Next Dose Due   sacubitriL-valsartan  mg tablet  Wait to take this until your doctor or other care provider tells you to start again.  Commonly known as: Entresto      Take 1 tablet by mouth 2 times a day.       spironolactone 25 mg tablet  Wait to take this until your doctor or other care provider tells you to start again.  Commonly known as: Aldactone      TOME 2 TABLETAS POR LA BOCA NITIN  VEZ AL CUBA              CHANGE how you take these medications        Instructions Last Dose Given Next Dose Due   gabapentin 300 mg capsule  Commonly known as: Neurontin  What changed: Another medication with the same name was removed. Continue taking this medication, and follow the directions you see here.      TOME 1 CAPSULA POR LA BOCA NITIN  VEZ AL CUBA EN LA [facility]              CONTINUE taking these medications        Instructions Last Dose Given Next Dose Due   albuterol 2.5 mg /3 mL (0.083 %) nebulizer solution      Use every 4-6 hours as needed for shortness of breath       atorvastatin 80 mg tablet  Commonly known as: Lipitor      80mg nightly       bumetanide 1 mg tablet  Commonly known as: Bumex      Take 1 tablet (1 mg) by mouth once daily.       digoxin 125 MCG tablet  Commonly known as: Lanoxin      Take 1 tablet (125 mcg) by mouth once daily.       Eliquis 5 mg tablet  Generic drug: apixaban      TOME 1 TABLETA POR LA BOCA DOS  VECES AL CUBA       Farxiga 10 mg tablet  Generic drug: dapagliflozin propanediol      Take 1 tablet (10 mg) by mouth once daily.       fenofibrate 145 mg tablet  Commonly known as: Tricor       "Take 1 tablet (145 mg) by mouth once daily.       FreeStyle Franco 3 Plus Sensor device  Generic drug: blood-glucose sensor      Change every 15 days       FreeStyle Franco 3 [place] misc  Generic drug: blood-glucose,,cont      Use as instructed       hydrALAZINE 100 mg tablet  Commonly known as: Apresoline      TOME 1 TABLETA POR LA BOCA 3  VECES AL CUBA       insulin glargine 300 unit/mL (3 mL) pen  Commonly known as: Toujeo Max U-300 SoloStar      40 units twice a day       insulin lispro 100 unit/mL pen  Commonly known as: HumaLOG KwikPen Insulin      18 units with meals plus sliding scale up to 80 units daily       isosorbide dinitrate 40 mg tablet  Commonly known as: Isordil      Take 1 tablet (40 mg) by mouth 3 times a day.       lancets misc  Commonly known as: Lancets,Thin      4 times daily (before meals and at bedtime). use as directed to test blood sugar       levothyroxine 75 mcg tablet  Commonly known as: Synthroid, Levoxyl      TOME 1 TABLETA POR LA BOCA NITIN  VEZ AL CBUA       metoprolol succinate  mg 24 hr tablet  Commonly known as: Toprol-XL           Mounjaro 5 mg/0.5 mL pen injector  Generic drug: tirzepatide      Inject 5 mg under the skin every 7 days.       OneTouch Ultra Test  Generic drug: blood sugar diagnostic      REVISE EL NIVEL DE GLUCOSA EN LA PEARL CUATRO VECES AL CUBA       pantoprazole 40 mg EC tablet  Commonly known as: ProtoNix      Take 1 tablet (40 mg) by mouth once daily in the morning. Take before meals. Do not crush, chew, or split.      TOME 1 TABLETA POR LA BOCA NITIN  VEZ AL CUBA EN LA MANANA TOME  ANTES DE NITIN COMIDA (NO TRITURE, MASTIQUE NI DIVIDA)       pen needle, diabetic 31 gauge x 5/16\" needle      For insulin injection 5x/day       QUEtiapine 50 mg tablet  Commonly known as: SEROquel      Take 1 tablet (50 mg) by mouth once daily at bedtime.              STOP taking these medications      ipratropium-albuteroL 0.5-2.5 mg/3 mL nebulizer solution  Commonly known " as: Duo-Neb        ivabradine 5 mg tablet  Commonly known as: Marilu Huitron DO  Senior Attending Physician  [facility] Medicine  Clinical Instructor

## 2025-07-16 NOTE — PROGRESS NOTES
07/16/25 1546   Discharge Planning   Living Arrangements Family members   Support Systems Family members;Friends/neighbors   Type of Residence Private residence   Expected Discharge Disposition [facility]   Does the patient need discharge transport arranged? Yes   [employer] coordination needed? Yes   Intensity of Service   Intensity of Service 0-30 min     Patient to be transferred to [facility] for higher level of care pending bed availability.  Bria MARIN TCC

## 2025-07-16 NOTE — CARE PLAN
The clinical goals for the shift include pt will remain HDS during the night    Pt was stable during the shift and had an uneventful night.

## 2025-07-16 NOTE — PROGRESS NOTES
Subjective   No chest pain.  Discussed shortness of breath.    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  He has seen multiple cardiologist in the past including evaluation with heart failure team for consideration of advanced options including LVAD and heart transplant.  He was found not to be candidate for the advanced heart failure options due to noncompliance with medications and very uncontrolled diabetes as well as concern with social and financial support.     For now we will continue with diuresis with IV Bumex.  Eventually will correct the patient back with heart failure team for further management and if his diabetes becomes better controlled he might be a candidate for reevaluation for advanced options.  Based on the coronary angiogram that was done recently, I do not believe further revascularization option is available to him and his overall chance of survival with this level of heart failure will be not long.  Had a long conversation with the patient and he expresses understanding and agreement with the plan.    July 14, 2025  Creatinine is stable around 2.  Will continue with IV Bumex and other medications of cardiac standpoint.  Will plan to have the patient follow-up post discharge in heart failure clinic          July 15, 2025  Still patient is significantly volume overloaded.  Discussed with primary team.  Recommend IV Bumex drip.  Will monitor renal function and electrolytes closely.      July 16, 2025  I will going to start the patient on Lasix drip at 10 to intensify diuresis as he is still quite volume overloaded.  Will monitor renal function and electrolytes daily.  I will ask Dr. Nguyen to evaluate the patient as well to reestablish care with heart failure team.    Update  Discussed the case with Dr. Nguyen.   He is recommending to transfer the patient to [facility] heart failure ICU for further/higher level of care and exploring advanced heart failure options.  Primary team was notified.    I appreciate the opportunity to participate in this patient's care.      Jeremiah Bhatia MD, PhD, Merged with [facility], [facility]  Interventional Cardiology, Senior Attending Physician,  [facility] Catheterization Laboratory Medical Director,  [facility]  Associate Professor of Medicine,   [facility]  Office: [phone]               Medical History:   Medical History[1]  Surgical History:   Surgical History[2]   Social History:   Social Drivers of Health with Concerns     Tobacco Use: Medium Risk (7/12/2025)    Patient History     Smoking Tobacco Use: Former     Smokeless Tobacco Use: Never     Passive Exposure: Not on file   Physical Activity: Unknown (5/7/2025)    Exercise Vital Sign     Days of Exercise per Week: 0 days     Minutes of Exercise per Session: Not on file   Social Connections: Unknown (1/20/2025)    OASIS : Social Isolation     Frequency of experiencing loneliness or isolation: Patient unable to respond   Depression: At risk (7/13/2025)    PHQ-2     PHQ-2 Score: 4   Digital Equity: Not on file     Family History:   Family History[3]       Objective   Physical Exam  Vitals:    07/15/25 1603 07/15/25 2341 07/16/25 0334 07/16/25 0755   BP: 138/77 125/80 128/78 134/80   BP Location: Right arm Right arm Right arm Right arm   Patient Position: Sitting Sitting Sitting Sitting   Pulse: 69 72 70 77   Resp: 18      Temp: 35.9 °C (96.6 °F) 36 °C (96.8 °F) 36.1 °C (97 °F) 35.6 °C (96.1 °F)   TempSrc: Temporal Temporal Temporal Temporal   SpO2: 92% 93% 92% 95%   Weight:       Height:         Wt Readings from Last 3 Encounters:   07/15/25 113 kg (250 lb)   06/10/25 97.1 kg (214 lb)   05/12/25 96.6 kg (213 lb)     Physical Exam  Constitutional:       Appearance: Normal appearance.   HENT:      Head:  "Normocephalic and atraumatic.      Nose: Nose normal.      Mouth/Throat:      Mouth: Mucous membranes are moist.   Eyes:      Extraocular Movements: Extraocular movements intact.      Pupils: Pupils are equal, round, and reactive to light.   Cardiovascular:      Rate and Rhythm: Normal rate and regular rhythm.      Heart sounds: No murmur heard.  Pulmonary:      Effort: Pulmonary effort is normal.      Breath sounds: Normal breath sounds.   Abdominal:      General: Abdomen is flat.      Palpations: Abdomen is soft.   Musculoskeletal:         General: Normal range of motion.      Cervical back: Normal range of motion and neck supple.      Right lower leg: No edema.      Left lower leg: No edema.   Skin:     General: Skin is warm.   Neurological:      General: No focal deficit present.      Mental Status: alert and oriented to person, place, and time.   Psychiatric:         Mood and Affect: Mood normal.         Behavior: Behavior normal.     Lab Results   Component Value Date    WBC 5.1 07/16/2025    HGB 12.3 (L) 07/16/2025    HCT 41.6 07/16/2025     07/16/2025    CHOL 82 06/10/2025    TRIG 154 (H) 06/10/2025    HDL 26.8 06/10/2025    ALT 10 07/12/2025    AST 12 07/12/2025     (L) 07/16/2025    K 4.2 07/16/2025    CL 98 07/16/2025    CREATININE 1.86 (H) 07/16/2025    BUN 53 (H) 07/16/2025    CO2 28 07/16/2025    TSH 3.26 11/11/2024    INR 1.2 (H) 10/05/2023    HGBA1C 8.9 (A) 05/12/2025     No results found for: \"CKTOTAL\", \"CKMB\", \"CKMBINDEX\", \"TROPONINI\"     Lab Results   Component Value Date    INR 1.2 (H) 10/05/2023    PROTIME 13.7 (H) 10/05/2023           Current Medications[4]          Macro dragon disclaimer: This note was dictated by speech recognition. Minor errors in transcription may be present.           [1]   Past Medical History:  Diagnosis Date    Cataract     Chest pain 10/01/2023    CHF (congestive heart failure)     Clotting disorder (Multi)     COPD (chronic obstructive pulmonary disease) " (Multi)     Coronary artery disease     Diabetes mellitus (Multi)     Disease of thyroid gland     Elevated troponin level not due myocardial infarction 08/14/2023    Hypertension     Ischemic cardiomyopathy     Pulmonary hypertension (Multi)     Venous ulcer of left leg (Multi)    [2]   Past Surgical History:  Procedure Laterality Date    CARDIAC CATHETERIZATION N/A 6/10/2025    Procedure: Left & Right Heart Cath w Angiography & LV;  Surgeon: Carl B Gillombardo, MD;  Location: [facility] Cardiac Cath Lab;  Service: Cardiovascular;  Laterality: N/A;    CARDIAC DEFIBRILLATOR PLACEMENT      CATARACT EXTRACTION Right         CORONARY ANGIOPLASTY WITH STENT PLACEMENT      CORONARY ARTERY BYPASS GRAFT     [3]   Family History  Family history unknown: Yes   [4]   Current Facility-Administered Medications   Medication Dose Route Frequency Provider Last Rate Last Admin    acetaminophen (Tylenol) tablet 650 mg  650 mg oral q6h PRN Riky Sims MD   650 mg at 07/15/25 2208    albuterol 2.5 mg /3 mL (0.083 %) nebulizer solution 5 mg  5 mg nebulization q4h PRN Riky Sims MD        apixaban (Eliquis) tablet 5 mg  5 mg oral BID Riky Sims MD   5 mg at 07/16/25 0845    atorvastatin (Lipitor) tablet 80 mg  80 mg oral Nightly Riky Sims MD   80 mg at 07/15/25 2100    dextrose 50 % injection 12.5 g  12.5 g intravenous q15 min PRN Riky Sims MD        dextrose 50 % injection 25 g  25 g intravenous q15 min PRN Riky Sims MD        digoxin (Lanoxin) tablet 125 mcg  125 mcg oral Daily Riky Sims MD   125 mcg at 07/16/25 0846    furosemide (Lasix) 500 mg in 50 mL (10 mg/mL) infusion  10 mg/hr intravenous Continuous Jeremiah Bhatia MD PhD        gabapentin (Neurontin) capsule 200 mg  200 mg oral BID Riky Sims MD   200 mg at 07/16/25 0839    glucagon (Glucagen) injection 1 mg  1 mg intramuscular q15 min PRN Riky Sims MD        glucagon (Glucagen)  injection 1 mg  1 mg intramuscular q15 min PRN Riky Sims MD        [Held by provider] hydrALAZINE (Apresoline) tablet 100 mg  100 mg oral TID Riky Sims MD   100 mg at 07/13/25 1524    insulin glargine (Lantus) injection 10 Units  10 Units subcutaneous q24h Levy DAYANA DO Tomy   10 Units at 07/16/25 0837    insulin lispro injection 0-10 Units  0-10 Units subcutaneous TID AC Riky Sims MD   2 Units at 07/16/25 0836    [Held by provider] insulin lispro injection 10 Units  10 Units subcutaneous TID AC Riky Sims MD   10 Units at 07/13/25 1751    isosorbide dinitrate (Isordil) tablet 40 mg  40 mg oral TID Riky Sims MD   40 mg at 07/16/25 0839    levothyroxine (Synthroid, Levoxyl) tablet 75 mcg  75 mcg oral Daily Riky Sims MD   75 mcg at 07/16/25 0609    metoprolol succinate XL (Toprol-XL) 24 hr tablet 200 mg  200 mg oral Daily Riky Sims MD   200 mg at 07/16/25 0838    oxygen (O2) therapy   inhalation Continuous - 02/gases Riky Sims MD   2 L/min at 07/15/25 0940    pantoprazole (ProtoNix) EC tablet 40 mg  40 mg oral Daily before breakfast Riky Sims MD   40 mg at 07/16/25 0609    polyethylene glycol (Glycolax, Miralax) packet 17 g  17 g oral Daily Riky Sims MD        QUEtiapine (SEROquel) tablet 50 mg  50 mg oral Nightly Riky Sims MD   50 mg at 07/15/25 2059

## 2025-07-17 ENCOUNTER — HOSPITAL ENCOUNTER (INPATIENT)
Facility: HOSPITAL | Age: 60
End: 2025-07-17
Admitting: NURSE PRACTITIONER
Payer: MEDICARE

## 2025-07-17 ENCOUNTER — APPOINTMENT (OUTPATIENT)
Dept: RADIOLOGY | Facility: HOSPITAL | Age: 60
DRG: 291 | End: 2025-07-17
Payer: MEDICARE

## 2025-07-17 VITALS
SYSTOLIC BLOOD PRESSURE: 131 MMHG | RESPIRATION RATE: 16 BRPM | DIASTOLIC BLOOD PRESSURE: 82 MMHG | HEART RATE: 77 BPM | HEIGHT: 71 IN | BODY MASS INDEX: 34.04 KG/M2 | OXYGEN SATURATION: 91 % | TEMPERATURE: 96.4 F | WEIGHT: 243.17 LBS

## 2025-07-17 DIAGNOSIS — I50.9 ACUTE ON CHRONIC CONGESTIVE HEART FAILURE, UNSPECIFIED HEART FAILURE TYPE: ICD-10-CM

## 2025-07-17 DIAGNOSIS — I50.22 CHRONIC SYSTOLIC HEART FAILURE: ICD-10-CM

## 2025-07-17 DIAGNOSIS — Z79.4 TYPE 2 DIABETES MELLITUS WITH HYPERGLYCEMIA, WITH LONG-TERM CURRENT USE OF INSULIN: ICD-10-CM

## 2025-07-17 DIAGNOSIS — I50.23 ACUTE ON CHRONIC HFREF (HEART FAILURE WITH REDUCED EJECTION FRACTION): ICD-10-CM

## 2025-07-17 DIAGNOSIS — I50.23 ACUTE ON CHRONIC SYSTOLIC HEART FAILURE: Primary | ICD-10-CM

## 2025-07-17 DIAGNOSIS — Z95.810 ICD (IMPLANTABLE CARDIOVERTER-DEFIBRILLATOR) IN PLACE: ICD-10-CM

## 2025-07-17 DIAGNOSIS — Z01.810 ENCOUNTER FOR PREPROCEDURAL CARDIOVASCULAR EXAMINATION: ICD-10-CM

## 2025-07-17 DIAGNOSIS — I25.5 ISCHEMIC CARDIOMYOPATHY: ICD-10-CM

## 2025-07-17 DIAGNOSIS — I50.23 ACUTE ON CHRONIC SYSTOLIC CONGESTIVE HEART FAILURE: ICD-10-CM

## 2025-07-17 DIAGNOSIS — E11.65 TYPE 2 DIABETES MELLITUS WITH HYPERGLYCEMIA, WITH LONG-TERM CURRENT USE OF INSULIN: ICD-10-CM

## 2025-07-17 DIAGNOSIS — D50.9 IRON DEFICIENCY ANEMIA, UNSPECIFIED IRON DEFICIENCY ANEMIA TYPE: ICD-10-CM

## 2025-07-17 LAB
ALBUMIN SERPL BCP-MCNC: 3.7 G/DL (ref 3.4–5)
ALP SERPL-CCNC: 47 U/L (ref 33–120)
ALT SERPL W P-5'-P-CCNC: 8 U/L (ref 10–52)
ANION GAP SERPL CALC-SCNC: 11 MMOL/L (ref 10–20)
ANION GAP SERPL CALC-SCNC: 11 MMOL/L (ref 10–20)
APTT PPP: 35 SECONDS (ref 26–36)
AST SERPL W P-5'-P-CCNC: 15 U/L (ref 9–39)
BILIRUB DIRECT SERPL-MCNC: 1 MG/DL (ref 0–0.3)
BILIRUB SERPL-MCNC: 2 MG/DL (ref 0–1.2)
BNP SERPL-MCNC: 744 PG/ML (ref 0–99)
BUN SERPL-MCNC: 47 MG/DL (ref 6–23)
BUN SERPL-MCNC: 48 MG/DL (ref 6–23)
CA-I BLD-SCNC: 1.09 MMOL/L (ref 1.1–1.33)
CALCIUM SERPL-MCNC: 8.8 MG/DL (ref 8.6–10.3)
CALCIUM SERPL-MCNC: 8.9 MG/DL (ref 8.6–10.6)
CARDIAC TROPONIN I PNL SERPL HS: 37 NG/L (ref 0–53)
CHLORIDE SERPL-SCNC: 96 MMOL/L (ref 98–107)
CHLORIDE SERPL-SCNC: 98 MMOL/L (ref 98–107)
CO2 SERPL-SCNC: 31 MMOL/L (ref 21–32)
CO2 SERPL-SCNC: 32 MMOL/L (ref 21–32)
CREAT SERPL-MCNC: 1.62 MG/DL (ref 0.5–1.3)
CREAT SERPL-MCNC: 1.7 MG/DL (ref 0.5–1.3)
EGFRCR SERPLBLD CKD-EPI 2021: 46 ML/MIN/1.73M*2
EGFRCR SERPLBLD CKD-EPI 2021: 49 ML/MIN/1.73M*2
ERYTHROCYTE [DISTWIDTH] IN BLOOD BY AUTOMATED COUNT: 20.6 % (ref 11.5–14.5)
EST. AVERAGE GLUCOSE BLD GHB EST-MCNC: 243 MG/DL
FERRITIN SERPL-MCNC: 102 NG/ML (ref 20–300)
FOLATE SERPL-MCNC: 13.1 NG/ML
GLUCOSE BLD MANUAL STRIP-MCNC: 207 MG/DL (ref 74–99)
GLUCOSE BLD MANUAL STRIP-MCNC: 243 MG/DL (ref 74–99)
GLUCOSE BLD MANUAL STRIP-MCNC: 259 MG/DL (ref 74–99)
GLUCOSE BLD MANUAL STRIP-MCNC: 282 MG/DL (ref 74–99)
GLUCOSE SERPL-MCNC: 195 MG/DL (ref 74–99)
GLUCOSE SERPL-MCNC: 235 MG/DL (ref 74–99)
HBA1C MFR BLD: 10.1 % (ref ?–5.7)
HCT VFR BLD AUTO: 42.4 % (ref 41–52)
HGB BLD-MCNC: 12.1 G/DL (ref 13.5–17.5)
INR PPP: 2 (ref 0.9–1.1)
IRON SATN MFR SERPL: 16 % (ref 25–45)
IRON SERPL-MCNC: 61 UG/DL (ref 35–150)
LACTATE SERPL-SCNC: 1.8 MMOL/L (ref 0.4–2)
MAGNESIUM SERPL-MCNC: 2.39 MG/DL (ref 1.6–2.4)
MCH RBC QN AUTO: 23.3 PG (ref 26–34)
MCHC RBC AUTO-ENTMCNC: 28.5 G/DL (ref 32–36)
MCV RBC AUTO: 82 FL (ref 80–100)
NRBC BLD-RTO: 0 /100 WBCS (ref 0–0)
PHOSPHATE SERPL-MCNC: 2.9 MG/DL (ref 2.5–4.9)
PLATELET # BLD AUTO: 260 X10*3/UL (ref 150–450)
POTASSIUM SERPL-SCNC: 4 MMOL/L (ref 3.5–5.3)
POTASSIUM SERPL-SCNC: 4 MMOL/L (ref 3.5–5.3)
PROT SERPL-MCNC: 6.9 G/DL (ref 6.4–8.2)
PROTHROMBIN TIME: 21.6 SECONDS (ref 9.8–12.4)
RBC # BLD AUTO: 5.2 X10*6/UL (ref 4.5–5.9)
SODIUM SERPL-SCNC: 134 MMOL/L (ref 136–145)
SODIUM SERPL-SCNC: 137 MMOL/L (ref 136–145)
TIBC SERPL-MCNC: 377 UG/DL (ref 240–445)
TSH SERPL-ACNC: 3.97 MIU/L (ref 0.44–3.98)
UFH PPP CHRO-ACNC: >2 IU/ML (ref ?–1.1)
UIBC SERPL-MCNC: 316 UG/DL (ref 110–370)
VIT B12 SERPL-MCNC: 452 PG/ML (ref 211–911)
WBC # BLD AUTO: 4.7 X10*3/UL (ref 4.4–11.3)

## 2025-07-17 PROCEDURE — 2500000002 HC RX 250 W HCPCS SELF ADMINISTERED DRUGS (ALT 637 FOR MEDICARE OP, ALT 636 FOR OP/ED): Performed by: INTERNAL MEDICINE

## 2025-07-17 PROCEDURE — 82947 ASSAY GLUCOSE BLOOD QUANT: CPT

## 2025-07-17 PROCEDURE — 84443 ASSAY THYROID STIM HORMONE: CPT | Performed by: NURSE PRACTITIONER

## 2025-07-17 PROCEDURE — 2500000002 HC RX 250 W HCPCS SELF ADMINISTERED DRUGS (ALT 637 FOR MEDICARE OP, ALT 636 FOR OP/ED): Performed by: NURSE PRACTITIONER

## 2025-07-17 PROCEDURE — 82746 ASSAY OF FOLIC ACID SERUM: CPT | Performed by: NURSE PRACTITIONER

## 2025-07-17 PROCEDURE — 71045 X-RAY EXAM CHEST 1 VIEW: CPT | Performed by: RADIOLOGY

## 2025-07-17 PROCEDURE — 2500000001 HC RX 250 WO HCPCS SELF ADMINISTERED DRUGS (ALT 637 FOR MEDICARE OP): Performed by: NURSE PRACTITIONER

## 2025-07-17 PROCEDURE — 2500000001 HC RX 250 WO HCPCS SELF ADMINISTERED DRUGS (ALT 637 FOR MEDICARE OP): Performed by: INTERNAL MEDICINE

## 2025-07-17 PROCEDURE — 83605 ASSAY OF LACTIC ACID: CPT | Performed by: NURSE PRACTITIONER

## 2025-07-17 PROCEDURE — 2500000004 HC RX 250 GENERAL PHARMACY W/ HCPCS (ALT 636 FOR OP/ED): Performed by: STUDENT IN AN ORGANIZED HEALTH CARE EDUCATION/TRAINING PROGRAM

## 2025-07-17 PROCEDURE — 99233 SBSQ HOSP IP/OBS HIGH 50: CPT | Performed by: STUDENT IN AN ORGANIZED HEALTH CARE EDUCATION/TRAINING PROGRAM

## 2025-07-17 PROCEDURE — 80048 BASIC METABOLIC PNL TOTAL CA: CPT | Performed by: NURSE PRACTITIONER

## 2025-07-17 PROCEDURE — 36415 COLL VENOUS BLD VENIPUNCTURE: CPT | Performed by: INTERNAL MEDICINE

## 2025-07-17 PROCEDURE — 2500000004 HC RX 250 GENERAL PHARMACY W/ HCPCS (ALT 636 FOR OP/ED): Performed by: NURSE PRACTITIONER

## 2025-07-17 PROCEDURE — 83036 HEMOGLOBIN GLYCOSYLATED A1C: CPT | Performed by: NURSE PRACTITIONER

## 2025-07-17 PROCEDURE — 83735 ASSAY OF MAGNESIUM: CPT | Performed by: INTERNAL MEDICINE

## 2025-07-17 PROCEDURE — 82248 BILIRUBIN DIRECT: CPT | Performed by: NURSE PRACTITIONER

## 2025-07-17 PROCEDURE — 82728 ASSAY OF FERRITIN: CPT | Performed by: NURSE PRACTITIONER

## 2025-07-17 PROCEDURE — 83880 ASSAY OF NATRIURETIC PEPTIDE: CPT | Performed by: NURSE PRACTITIONER

## 2025-07-17 PROCEDURE — 85520 HEPARIN ASSAY: CPT | Performed by: NURSE PRACTITIONER

## 2025-07-17 PROCEDURE — 82330 ASSAY OF CALCIUM: CPT | Performed by: NURSE PRACTITIONER

## 2025-07-17 PROCEDURE — 36415 COLL VENOUS BLD VENIPUNCTURE: CPT | Performed by: NURSE PRACTITIONER

## 2025-07-17 PROCEDURE — 85610 PROTHROMBIN TIME: CPT | Performed by: NURSE PRACTITIONER

## 2025-07-17 PROCEDURE — 2500000004 HC RX 250 GENERAL PHARMACY W/ HCPCS (ALT 636 FOR OP/ED): Performed by: INTERNAL MEDICINE

## 2025-07-17 PROCEDURE — 99291 CRITICAL CARE FIRST HOUR: CPT | Performed by: INTERNAL MEDICINE

## 2025-07-17 PROCEDURE — 84100 ASSAY OF PHOSPHORUS: CPT | Performed by: NURSE PRACTITIONER

## 2025-07-17 PROCEDURE — 2020000001 HC ICU ROOM DAILY

## 2025-07-17 PROCEDURE — 83540 ASSAY OF IRON: CPT | Performed by: NURSE PRACTITIONER

## 2025-07-17 PROCEDURE — 85027 COMPLETE CBC AUTOMATED: CPT | Performed by: INTERNAL MEDICINE

## 2025-07-17 PROCEDURE — 99291 CRITICAL CARE FIRST HOUR: CPT | Performed by: NURSE PRACTITIONER

## 2025-07-17 PROCEDURE — 84484 ASSAY OF TROPONIN QUANT: CPT | Performed by: NURSE PRACTITIONER

## 2025-07-17 PROCEDURE — 82607 VITAMIN B-12: CPT | Performed by: NURSE PRACTITIONER

## 2025-07-17 PROCEDURE — 71045 X-RAY EXAM CHEST 1 VIEW: CPT

## 2025-07-17 PROCEDURE — 80053 COMPREHEN METABOLIC PANEL: CPT | Performed by: INTERNAL MEDICINE

## 2025-07-17 RX ORDER — DEXTROSE 50 % IN WATER (D50W) INTRAVENOUS SYRINGE
12.5
Status: DISCONTINUED | OUTPATIENT
Start: 2025-07-17 | End: 2025-08-11 | Stop reason: HOSPADM

## 2025-07-17 RX ORDER — GABAPENTIN 100 MG/1
200 CAPSULE ORAL EVERY 8 HOURS SCHEDULED
Status: DISCONTINUED | OUTPATIENT
Start: 2025-07-17 | End: 2025-08-06

## 2025-07-17 RX ORDER — PANTOPRAZOLE SODIUM 40 MG/1
40 TABLET, DELAYED RELEASE ORAL
Status: DISCONTINUED | OUTPATIENT
Start: 2025-07-18 | End: 2025-08-11 | Stop reason: HOSPADM

## 2025-07-17 RX ORDER — ATORVASTATIN CALCIUM 80 MG/1
80 TABLET, FILM COATED ORAL NIGHTLY
Status: DISCONTINUED | OUTPATIENT
Start: 2025-07-17 | End: 2025-08-11 | Stop reason: HOSPADM

## 2025-07-17 RX ORDER — METOPROLOL SUCCINATE 200 MG/1
200 TABLET, EXTENDED RELEASE ORAL DAILY
Status: DISCONTINUED | OUTPATIENT
Start: 2025-07-18 | End: 2025-07-18

## 2025-07-17 RX ORDER — SPIRONOLACTONE 25 MG/1
25 TABLET ORAL DAILY
Status: DISCONTINUED | OUTPATIENT
Start: 2025-07-17 | End: 2025-08-06

## 2025-07-17 RX ORDER — POTASSIUM CHLORIDE 20 MEQ/1
40 TABLET, EXTENDED RELEASE ORAL ONCE
Status: COMPLETED | OUTPATIENT
Start: 2025-07-17 | End: 2025-07-17

## 2025-07-17 RX ORDER — INSULIN LISPRO 100 [IU]/ML
0-10 INJECTION, SOLUTION INTRAVENOUS; SUBCUTANEOUS
Status: DISCONTINUED | OUTPATIENT
Start: 2025-07-17 | End: 2025-07-21

## 2025-07-17 RX ORDER — DIPHENHYDRAMINE HYDROCHLORIDE 50 MG/ML
50 INJECTION, SOLUTION INTRAMUSCULAR; INTRAVENOUS EVERY 5 MIN PRN
Status: DISCONTINUED | OUTPATIENT
Start: 2025-07-17 | End: 2025-07-26

## 2025-07-17 RX ORDER — CALCIUM CARBONATE 200(500)MG
500 TABLET,CHEWABLE ORAL 2 TIMES DAILY
Status: DISCONTINUED | OUTPATIENT
Start: 2025-07-17 | End: 2025-08-11 | Stop reason: HOSPADM

## 2025-07-17 RX ORDER — DIGOXIN 125 MCG
125 TABLET ORAL DAILY
Status: DISCONTINUED | OUTPATIENT
Start: 2025-07-18 | End: 2025-07-31

## 2025-07-17 RX ORDER — QUETIAPINE FUMARATE 25 MG/1
50 TABLET, FILM COATED ORAL NIGHTLY
Status: DISCONTINUED | OUTPATIENT
Start: 2025-07-17 | End: 2025-08-11 | Stop reason: HOSPADM

## 2025-07-17 RX ORDER — INSULIN GLARGINE 100 [IU]/ML
40 INJECTION, SOLUTION SUBCUTANEOUS NIGHTLY
Status: DISCONTINUED | OUTPATIENT
Start: 2025-07-17 | End: 2025-07-23

## 2025-07-17 RX ORDER — ISOSORBIDE DINITRATE 20 MG/1
40 TABLET ORAL
Status: DISCONTINUED | OUTPATIENT
Start: 2025-07-17 | End: 2025-07-19

## 2025-07-17 RX ORDER — FENOFIBRATE 145 MG/1
145 TABLET, FILM COATED ORAL DAILY
Status: DISCONTINUED | OUTPATIENT
Start: 2025-07-17 | End: 2025-08-01

## 2025-07-17 RX ORDER — DEXTROSE 50 % IN WATER (D50W) INTRAVENOUS SYRINGE
25
Status: DISCONTINUED | OUTPATIENT
Start: 2025-07-17 | End: 2025-08-11 | Stop reason: HOSPADM

## 2025-07-17 RX ORDER — HYDRALAZINE HYDROCHLORIDE 100 MG/1
100 TABLET, FILM COATED ORAL 3 TIMES DAILY
Status: DISCONTINUED | OUTPATIENT
Start: 2025-07-17 | End: 2025-07-19

## 2025-07-17 RX ORDER — INSULIN LISPRO 100 [IU]/ML
8 INJECTION, SOLUTION INTRAVENOUS; SUBCUTANEOUS
Status: DISCONTINUED | OUTPATIENT
Start: 2025-07-17 | End: 2025-07-18

## 2025-07-17 RX ORDER — BUMETANIDE 1 MG/1
1 TABLET ORAL DAILY
Status: DISCONTINUED | OUTPATIENT
Start: 2025-07-17 | End: 2025-07-29

## 2025-07-17 RX ORDER — ALBUTEROL SULFATE 0.83 MG/ML
2.5 SOLUTION RESPIRATORY (INHALATION) EVERY 6 HOURS PRN
Status: DISCONTINUED | OUTPATIENT
Start: 2025-07-17 | End: 2025-08-11 | Stop reason: HOSPADM

## 2025-07-17 RX ORDER — DAPAGLIFLOZIN 10 MG/1
10 TABLET, FILM COATED ORAL DAILY
Status: DISCONTINUED | OUTPATIENT
Start: 2025-07-17 | End: 2025-08-11 | Stop reason: HOSPADM

## 2025-07-17 RX ORDER — LEVOTHYROXINE SODIUM 75 UG/1
75 TABLET ORAL
Status: DISCONTINUED | OUTPATIENT
Start: 2025-07-17 | End: 2025-08-11 | Stop reason: HOSPADM

## 2025-07-17 RX ADMIN — DIGOXIN 125 MCG: 125 TABLET ORAL at 08:25

## 2025-07-17 RX ADMIN — QUETIAPINE FUMARATE 50 MG: 25 TABLET ORAL at 20:47

## 2025-07-17 RX ADMIN — PANTOPRAZOLE SODIUM 40 MG: 40 TABLET, DELAYED RELEASE ORAL at 06:36

## 2025-07-17 RX ADMIN — METOPROLOL SUCCINATE 200 MG: 50 TABLET, EXTENDED RELEASE ORAL at 08:25

## 2025-07-17 RX ADMIN — INSULIN GLARGINE 10 UNITS: 100 INJECTION, SOLUTION SUBCUTANEOUS at 09:03

## 2025-07-17 RX ADMIN — POTASSIUM CHLORIDE 40 MEQ: 1500 TABLET, EXTENDED RELEASE ORAL at 18:42

## 2025-07-17 RX ADMIN — FENOFIBRATE 145 MG: 145 TABLET, FILM COATED ORAL at 13:05

## 2025-07-17 RX ADMIN — GABAPENTIN 200 MG: 100 CAPSULE ORAL at 13:05

## 2025-07-17 RX ADMIN — SPIRONOLACTONE 25 MG: 25 TABLET, FILM COATED ORAL at 18:02

## 2025-07-17 RX ADMIN — HYDRALAZINE HYDROCHLORIDE 100 MG: 100 TABLET ORAL at 20:47

## 2025-07-17 RX ADMIN — INSULIN GLARGINE 40 UNITS: 100 INJECTION, SOLUTION SUBCUTANEOUS at 20:47

## 2025-07-17 RX ADMIN — IRON SUCROSE 200 MG: 20 INJECTION, SOLUTION INTRAVENOUS at 14:17

## 2025-07-17 RX ADMIN — INSULIN LISPRO 2 UNITS: 100 INJECTION, SOLUTION INTRAVENOUS; SUBCUTANEOUS at 08:24

## 2025-07-17 RX ADMIN — APIXABAN 5 MG: 5 TABLET, FILM COATED ORAL at 08:24

## 2025-07-17 RX ADMIN — INSULIN LISPRO 8 UNITS: 100 INJECTION, SOLUTION INTRAVENOUS; SUBCUTANEOUS at 17:52

## 2025-07-17 RX ADMIN — GABAPENTIN 200 MG: 100 CAPSULE ORAL at 21:33

## 2025-07-17 RX ADMIN — ISOSORBIDE DINITRATE 40 MG: 20 TABLET ORAL at 13:05

## 2025-07-17 RX ADMIN — FUROSEMIDE 10 MG/HR: 10 INJECTION, SOLUTION INTRAMUSCULAR; INTRAVENOUS at 09:02

## 2025-07-17 RX ADMIN — LEVOTHYROXINE SODIUM 75 MCG: 0.07 TABLET ORAL at 06:36

## 2025-07-17 RX ADMIN — POTASSIUM CHLORIDE 40 MEQ: 1500 TABLET, EXTENDED RELEASE ORAL at 14:18

## 2025-07-17 RX ADMIN — ISOSORBIDE DINITRATE 40 MG: 20 TABLET ORAL at 18:42

## 2025-07-17 RX ADMIN — ISOSORBIDE DINITRATE 40 MG: 20 TABLET ORAL at 09:02

## 2025-07-17 RX ADMIN — CALCIUM CARBONATE (ANTACID) CHEW TAB 500 MG 1 TABLET: 500 CHEW TAB at 14:18

## 2025-07-17 RX ADMIN — INSULIN LISPRO 8 UNITS: 100 INJECTION, SOLUTION INTRAVENOUS; SUBCUTANEOUS at 12:45

## 2025-07-17 RX ADMIN — ATORVASTATIN CALCIUM 80 MG: 80 TABLET, FILM COATED ORAL at 20:47

## 2025-07-17 RX ADMIN — INSULIN LISPRO 4 UNITS: 100 INJECTION, SOLUTION INTRAVENOUS; SUBCUTANEOUS at 17:52

## 2025-07-17 RX ADMIN — GABAPENTIN 200 MG: 100 CAPSULE ORAL at 08:25

## 2025-07-17 RX ADMIN — HYDRALAZINE HYDROCHLORIDE 100 MG: 100 TABLET ORAL at 14:17

## 2025-07-17 SDOH — SOCIAL STABILITY: SOCIAL INSECURITY: WITHIN THE LAST YEAR, HAVE YOU BEEN HUMILIATED OR EMOTIONALLY ABUSED IN OTHER WAYS BY YOUR PARTNER OR EX-PARTNER?: NO

## 2025-07-17 SDOH — ECONOMIC STABILITY: HOUSING INSECURITY: IN THE PAST 12 MONTHS, HOW MANY TIMES HAVE YOU MOVED WHERE YOU WERE LIVING?: 0

## 2025-07-17 SDOH — ECONOMIC STABILITY: TRANSPORTATION INSECURITY: IN THE PAST 12 MONTHS, HAS LACK OF TRANSPORTATION KEPT YOU FROM MEDICAL APPOINTMENTS OR FROM GETTING MEDICATIONS?: NO

## 2025-07-17 SDOH — SOCIAL STABILITY: SOCIAL INSECURITY: HAS ANYONE EVER THREATENED TO HURT YOUR FAMILY OR YOUR PETS?: NO

## 2025-07-17 SDOH — SOCIAL STABILITY: SOCIAL INSECURITY: HAVE YOU HAD THOUGHTS OF HARMING ANYONE ELSE?: NO

## 2025-07-17 SDOH — SOCIAL STABILITY: SOCIAL INSECURITY: WITHIN THE LAST YEAR, HAVE YOU BEEN AFRAID OF YOUR PARTNER OR EX-PARTNER?: NO

## 2025-07-17 SDOH — ECONOMIC STABILITY: FOOD INSECURITY: WITHIN THE PAST 12 MONTHS, YOU WORRIED THAT YOUR FOOD WOULD RUN OUT BEFORE YOU GOT THE MONEY TO BUY MORE.: NEVER TRUE

## 2025-07-17 SDOH — ECONOMIC STABILITY: INCOME INSECURITY: IN THE PAST 12 MONTHS HAS THE ELECTRIC, GAS, OIL, OR WATER COMPANY THREATENED TO SHUT OFF SERVICES IN YOUR HOME?: NO

## 2025-07-17 SDOH — HEALTH STABILITY: PHYSICAL HEALTH: ON AVERAGE, HOW MANY DAYS PER WEEK DO YOU ENGAGE IN MODERATE TO STRENUOUS EXERCISE (LIKE A BRISK WALK)?: 0 DAYS

## 2025-07-17 SDOH — ECONOMIC STABILITY: FOOD INSECURITY: WITHIN THE PAST 12 MONTHS, THE FOOD YOU BOUGHT JUST DIDN'T LAST AND YOU DIDN'T HAVE MONEY TO GET MORE.: NEVER TRUE

## 2025-07-17 SDOH — ECONOMIC STABILITY: FOOD INSECURITY: HOW HARD IS IT FOR YOU TO PAY FOR THE VERY BASICS LIKE FOOD, HOUSING, MEDICAL CARE, AND HEATING?: NOT VERY HARD

## 2025-07-17 SDOH — ECONOMIC STABILITY: HOUSING INSECURITY: AT ANY TIME IN THE PAST 12 MONTHS, WERE YOU HOMELESS OR LIVING IN A SHELTER (INCLUDING NOW)?: NO

## 2025-07-17 SDOH — SOCIAL STABILITY: SOCIAL INSECURITY: DOES ANYONE TRY TO KEEP YOU FROM HAVING/CONTACTING OTHER FRIENDS OR DOING THINGS OUTSIDE YOUR HOME?: NO

## 2025-07-17 SDOH — SOCIAL STABILITY: SOCIAL INSECURITY: WERE YOU ABLE TO COMPLETE ALL THE BEHAVIORAL HEALTH SCREENINGS?: YES

## 2025-07-17 SDOH — SOCIAL STABILITY: SOCIAL INSECURITY: DO YOU FEEL ANYONE HAS EXPLOITED OR TAKEN ADVANTAGE OF YOU FINANCIALLY OR OF YOUR PERSONAL PROPERTY?: NO

## 2025-07-17 SDOH — ECONOMIC STABILITY: HOUSING INSECURITY: IN THE LAST 12 MONTHS, WAS THERE A TIME WHEN YOU WERE NOT ABLE TO PAY THE MORTGAGE OR RENT ON TIME?: NO

## 2025-07-17 SDOH — SOCIAL STABILITY: SOCIAL INSECURITY: HAVE YOU HAD ANY THOUGHTS OF HARMING ANYONE ELSE?: NO

## 2025-07-17 SDOH — SOCIAL STABILITY: SOCIAL INSECURITY: ARE YOU OR HAVE YOU BEEN THREATENED OR ABUSED PHYSICALLY, EMOTIONALLY, OR SEXUALLY BY ANYONE?: NO

## 2025-07-17 SDOH — SOCIAL STABILITY: SOCIAL INSECURITY: DO YOU FEEL UNSAFE GOING BACK TO THE PLACE WHERE YOU ARE LIVING?: NO

## 2025-07-17 SDOH — SOCIAL STABILITY: SOCIAL INSECURITY: ARE THERE ANY APPARENT SIGNS OF INJURIES/BEHAVIORS THAT COULD BE RELATED TO ABUSE/NEGLECT?: NO

## 2025-07-17 SDOH — SOCIAL STABILITY: SOCIAL INSECURITY: ABUSE: ADULT

## 2025-07-17 ASSESSMENT — COGNITIVE AND FUNCTIONAL STATUS - GENERAL
MOBILITY SCORE: 23
CLIMB 3 TO 5 STEPS WITH RAILING: A LITTLE
PATIENT BASELINE BEDBOUND: NO
CLIMB 3 TO 5 STEPS WITH RAILING: A LITTLE
DAILY ACTIVITIY SCORE: 24
MOBILITY SCORE: 23

## 2025-07-17 ASSESSMENT — PATIENT HEALTH QUESTIONNAIRE - PHQ9
1. LITTLE INTEREST OR PLEASURE IN DOING THINGS: NOT AT ALL
SUM OF ALL RESPONSES TO PHQ9 QUESTIONS 1 & 2: 0
2. FEELING DOWN, DEPRESSED OR HOPELESS: NOT AT ALL

## 2025-07-17 ASSESSMENT — ACTIVITIES OF DAILY LIVING (ADL)
DRESSING YOURSELF: INDEPENDENT
PATIENT'S MEMORY ADEQUATE TO SAFELY COMPLETE DAILY ACTIVITIES?: YES
ADEQUATE_TO_COMPLETE_ADL: YES
JUDGMENT_ADEQUATE_SAFELY_COMPLETE_DAILY_ACTIVITIES: YES
HEARING - LEFT EAR: FUNCTIONAL
LACK_OF_TRANSPORTATION: NO
LACK_OF_TRANSPORTATION: PATIENT DECLINED
LACK_OF_TRANSPORTATION: NO
FEEDING YOURSELF: INDEPENDENT
GROOMING: INDEPENDENT
LACK_OF_TRANSPORTATION: NO
BATHING: INDEPENDENT
ASSISTIVE_DEVICE: WALKER
HEARING - RIGHT EAR: FUNCTIONAL
WALKS IN HOME: INDEPENDENT
TOILETING: INDEPENDENT

## 2025-07-17 ASSESSMENT — PAIN - FUNCTIONAL ASSESSMENT: PAIN_FUNCTIONAL_ASSESSMENT: 0-10

## 2025-07-17 ASSESSMENT — PAIN SCALES - GENERAL
PAINLEVEL_OUTOF10: 0 - NO PAIN
PAINLEVEL_OUTOF10: 0 - NO PAIN

## 2025-07-17 ASSESSMENT — ENCOUNTER SYMPTOMS
SHORTNESS OF BREATH: 1
NEUROLOGICAL NEGATIVE: 1
CONSTITUTIONAL NEGATIVE: 1
PSYCHIATRIC NEGATIVE: 1
MUSCULOSKELETAL NEGATIVE: 1

## 2025-07-17 ASSESSMENT — LIFESTYLE VARIABLES
AUDIT-C TOTAL SCORE: 0
AUDIT-C TOTAL SCORE: 0
HOW OFTEN DO YOU HAVE A DRINK CONTAINING ALCOHOL: NEVER
HOW MANY STANDARD DRINKS CONTAINING ALCOHOL DO YOU HAVE ON A TYPICAL DAY: PATIENT DOES NOT DRINK
HOW OFTEN DO YOU HAVE 6 OR MORE DRINKS ON ONE OCCASION: NEVER
SKIP TO QUESTIONS 9-10: 1

## 2025-07-17 NOTE — PROGRESS NOTES
HFICU Attending Note    Assessment & Plan  Acute on chronic systolic heart failure    Briefly this is a patient with longstanding systolic dysfunction who was previously evaluated for advanced therapies at that point in 2023 he was turned down for a combination of noncardiac health issues such as uncontrolled diabetes and foot drop poor mobility and limited social support he has demonstrated the ability to come to clinic but there remain concerns, he did recently undergo a right and left heart catheterization showing patent LIMA and patent stents but severely elevated biventricular filling pressures and marginal cardiac output with a low mixed venous he was then admitted with volume overload failure to respond to oral diuretics we will start on a furosemide infusion but may need to consider inotropes if he does not respond he is A1c remains uncontrolled but we still may need to consider him for LVAD during this admission following evaluation of his hemodynamics at this point he does not appear to be in overt shock      This critically ill patient continues to be at-risk for clinically significant deterioration / failure due to the above mentioned dysfunctional, unstable organ systems.  I have personally identified and managed all complex critical care issues to prevent aforementioned clinical deterioration.  Critical care time is spent at bedside and/or the immediate area and has included, but is not limited to, the review of diagnostic tests, labs, radiographs, serial assessments of hemodynamics, respiratory status, ventilatory management, and family updates.  Time spent in procedures and teaching are reported separately.    Critical care time: __41__ minutes

## 2025-07-17 NOTE — H&P
"History Of Present Illness  Felice Law is a 60 y.o. male presenting with ICM, HFrEF, here for R/LHC. PMH includes ICM, Decompensated HFrEF (15-20% 5/5/2025), CKD III, Chronic BLE venous stasis dermatitis, Insulin dependent Type 2 diabetes, CAD s/p CABG (LIMA- LAD in 2023) and PCI, VT arrest s/p VT ablation and ICD in 2017, Hypertension, Hyperlipidemia, Hypothyroidism, Left femoral DVT on Eliquis, COPD, JAKE,  Upon arrival from [facility], patient is alert and able to answer questions, limited English.  Patient reports he recently returned back from [facility].  During his stay there, patient reported his weight increased from 214 pounds to 243 pounds due to lifestyle.     PLAN:        Past Medical History  Medical History[1]    Surgical History  Surgical History[2]     Social History  He reports that he quit smoking about 8 years ago. His smoking use included cigarettes. He started smoking about 32 years ago. He has a 24 pack-year smoking history. He has never used smokeless tobacco. He reports that he does not drink alcohol and does not use drugs.    Family History  Family History[3]     Allergies  Azithromycin    Review of Systems     Physical Exam     Last Recorded Vitals  Blood pressure 131/90, pulse 67, temperature 36.3 °C (97.3 °F), temperature source Temporal, resp. rate 19, height 1.803 m (5' 10.98\"), weight 111 kg (243 lb 13.3 oz), SpO2 96%.    Relevant Results  {If you would like to pull in Medications, type .meds     If you would like to pull in Lab results for the last 24 hours, type .[id number]    If you would like to pull in Imaging results, type .[id number] :99}    Scheduled medications  Scheduled Medications[4]  Continuous medications  Continuous Medications[5]  PRN medications  PRN Medications[6]      Assessment & Plan  Acute on chronic systolic heart failure        Assessment & Plan:      Neuro:   # Anxiety. Depression, Acute pain    - Serial neuro and pain assessments    - PO Tylenol " PRN for pain   - PT/OT Consult, OOB to chair   - CAM ICU score every shift   - Sleep/wake cycle normalization      # Physical Status   -Overweight/Obesity/Morbid (severe) Obesity   -Age-related debility/bed confined? /Reduced Mobility       #Substance abuse   -Alcohol abuse/Alcohol dependence   -Tobacco use/Nicotine Dependence: Quit 8 years ago        # NICM or ICM (MUST INCLUDE SYSTOLIC/DIASTOLIC/COMBINED for billing purposes)   Acute on chronic systolic CHF  HFrEF 15-20%  - TTE -Left ventricular ejection fraction is severely decreased, by visual estimate at 15-20%.   - c/w atorvastin 80mg    - C/w Farxiga, Isordil 40mg, Hydralazine 100mg, metoprolol 100mg Bumex 1 mg on hold  -c/w Lasix gtt    Cardiovascular:  -Left femoral DVT (2017) on Eliquis  -AICD     #CAD    - s/p PCI and 1v CABG (LIMA-LAD 04/2023).   -[facility]   -Meds     Pulmonary:    COPD  Hx Pulmonary Hypertension   -Room Air  -JAKE      :   #CHARLOTTE/CKD III  -Admit  7-: 53/2.55 7- 48/1.62  -I/Os   -avoid hypotension and nephrotoxic agents      Heme:   #Anemia in the setting of   - Labs: CBC, TIBC, ferritin, serum Fe, folate, B12 - pending labs     - If %sat low, order venofer         Endo:   #DM 2  - hgbA1c :  5/12/2025 8.9%  -Lantus 40 units  -Thyroid-Levothyroxine 75mcg  -TSH, Free T4 -pending labs     ID:   -afebrile, nontoxic    -no s/s infx   -trend temps q4h        Feeding/fluids: 2g Na diet, 2L fluid restriction   Thromboprophylaxis: SCDs    VAP bundle: n/a   Ulcer prophylaxis: PPI   Glycemic control: n/a   Bowel care: Colace & miralax prn   Indwelling catheter: None      Lines:    PIVs    A line   [place]   Temporary MCS   Valenzuela-NA     PT/OT:      Code Status: Full Code      Family Primary Contact/Next of Kin:      Dispo: Admit to HF ICU      A. -GYinka reviewed       Dr. Booker aware of patient’s admission              Allie Valdovinos APRN-Student           [1]   Past Medical History:  Diagnosis Date    Cataract     Chest pain 10/01/2023     CHF (congestive heart failure)     Clotting disorder (Multi)     COPD (chronic obstructive pulmonary disease) (Multi)     Coronary artery disease     Diabetes mellitus (Multi)     Disease of thyroid gland     Elevated troponin level not due myocardial infarction 08/14/2023    Hypertension     Ischemic cardiomyopathy     Pulmonary hypertension (Multi)     Venous ulcer of left leg (Multi)    [2]   Past Surgical History:  Procedure Laterality Date    CARDIAC CATHETERIZATION N/A 6/10/2025    Procedure: Left & Right Heart Cath w Angiography & LV;  Surgeon: Carl B Gillombardo, MD;  Location: [facility] Cardiac Cath Lab;  Service: Cardiovascular;  Laterality: N/A;    CARDIAC DEFIBRILLATOR PLACEMENT      CATARACT EXTRACTION Right         CORONARY ANGIOPLASTY WITH STENT PLACEMENT      CORONARY ARTERY BYPASS GRAFT     [3]   Family History  Family history unknown: Yes   [4] [Held by provider] apixaban, 5 mg, oral, BID  atorvastatin, 80 mg, oral, Nightly  [Held by provider] bumetanide, 1 mg, oral, Daily  dapagliflozin propanediol, 10 mg, oral, Daily  [START ON 7/18/2025] digoxin, 125 mcg, oral, Daily  fenofibrate, 145 mg, oral, Daily  gabapentin, 200 mg, oral, q8h SUSI  hydrALAZINE, 100 mg, oral, TID  insulin glargine, 40 Units, subcutaneous, Nightly  insulin lispro, 8 Units, subcutaneous, TID AC  isosorbide dinitrate, 40 mg, oral, TID  levothyroxine, 75 mcg, oral, Daily before breakfast  [Held by provider] metoprolol succinate XL, 200 mg, oral, Daily  [START ON 7/18/2025] pantoprazole, 40 mg, oral, Daily before breakfast  QUEtiapine, 50 mg, oral, Nightly  [5] furosemide, 20 mg/hr, Last Rate: 20 mg/hr (07/17/25 1132)  [6] PRN medications: albuterol, dextrose, dextrose, glucagon, glucagon

## 2025-07-17 NOTE — PROGRESS NOTES
Subjective   No chest pain.  Discussed shortness of breath.  Urinating well.    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  He has seen multiple cardiologist in the past including evaluation with heart failure team for consideration of advanced options including LVAD and heart transplant.  He was found not to be candidate for the advanced heart failure options due to noncompliance with medications and very uncontrolled diabetes as well as concern with social and financial support.     For now we will continue with diuresis with IV Bumex.  Eventually will correct the patient back with heart failure team for further management and if his diabetes becomes better controlled he might be a candidate for reevaluation for advanced options.  Based on the coronary angiogram that was done recently, I do not believe further revascularization option is available to him and his overall chance of survival with this level of heart failure will be not long.  Had a long conversation with the patient and he expresses understanding and agreement with the plan.    July 14, 2025  Creatinine is stable around 2.  Will continue with IV Bumex and other medications of cardiac standpoint.  Will plan to have the patient follow-up post discharge in heart failure clinic          July 15, 2025  Still patient is significantly volume overloaded.  Discussed with primary team.  Recommend IV Bumex drip.  Will monitor renal function and electrolytes closely.      July 16, 2025  I will going to start the patient on Lasix drip at 10 to intensify diuresis as he is still quite volume overloaded.  Will monitor renal function and electrolytes daily.  I will ask Dr. Nguyen to evaluate the patient as well to reestablish care with heart failure team.    Update  Discussed the case  with Dr. Nguyen.  He is recommending to transfer the patient to [facility] heart failure ICU for further/higher level of care and exploring advanced heart failure options.  Primary team was notified.    July 17, 2025  Continues to have reasonable response to IV Lasix.  Still is volume overloaded.  Will continue with the current plan for the diuresis.  Patient has been accepted in balance of bed at [facility] heart failure ICU under the care of Dr. Nguyen.  Transfer will be done today.  Discussed with patient and he agrees and understands the rationale behind transferring him and further evaluation by advanced heart failure team.    I appreciate the opportunity to participate in this patient's care.      Jeremiah Bhatia MD, PhD, [facility], [facility]  Interventional Cardiology, Senior Attending Physician,  [facility] Catheterization Laboratory Medical Director,  [facility]  Associate Professor of Medicine,   [facility]  Office: [phone]               Medical History:   Medical History[1]  Surgical History:   Surgical History[2]   Social History:   Social Drivers of Health with Concerns     Tobacco Use: Medium Risk (7/12/2025)    Patient History     Smoking Tobacco Use: Former     Smokeless Tobacco Use: Never     Passive Exposure: Not on file   Physical Activity: Unknown (5/7/2025)    Exercise Vital Sign     Days of Exercise per Week: 0 days     Minutes of Exercise per Session: Not on file   Social Connections: Unknown (1/20/2025)    OASIS : Social Isolation     Frequency of experiencing loneliness or isolation: Patient unable to respond   Depression: At risk (7/13/2025)    PHQ-2     PHQ-2 Score: 4   Digital Equity: Not on file     Family History:   Family History[3]       Objective   Physical Exam  Vitals:    07/16/25 2318 07/17/25 0349 07/17/25 0520 07/17/25 0740   BP: 127/76 123/75  131/82   BP Location: Right arm Right arm  Right arm   Patient Position: Lying Lying   "Sitting   Pulse: 62 68  77   Resp:    16   Temp: 36.1 °C (97 °F) 36 °C (96.8 °F)  35.8 °C (96.4 °F)   TempSrc: Temporal Temporal  Temporal   SpO2: 95% 94%  91%   Weight:   110 kg (243 lb 2.7 oz)    Height:         Wt Readings from Last 3 Encounters:   07/17/25 110 kg (243 lb 2.7 oz)   06/10/25 97.1 kg (214 lb)   05/12/25 96.6 kg (213 lb)     Physical Exam  Constitutional:       Appearance: Normal appearance.   HENT:      Head: Normocephalic and atraumatic.      Nose: Nose normal.      Mouth/Throat:      Mouth: Mucous membranes are moist.   Eyes:      Extraocular Movements: Extraocular movements intact.      Pupils: Pupils are equal, round, and reactive to light.   Cardiovascular:      Rate and Rhythm: Normal rate and regular rhythm.      Heart sounds: No murmur heard.  Pulmonary:      Effort: Pulmonary effort is normal.      Breath sounds: Normal breath sounds.   Abdominal:      General: Abdomen is flat.      Palpations: Abdomen is soft.   Musculoskeletal:         General: Normal range of motion.      Cervical back: Normal range of motion and neck supple.      Right lower leg: No edema.      Left lower leg: No edema.   Skin:     General: Skin is warm.   Neurological:      General: No focal deficit present.      Mental Status: alert and oriented to person, place, and time.   Psychiatric:         Mood and Affect: Mood normal.         Behavior: Behavior normal.     Lab Results   Component Value Date    WBC 4.7 07/17/2025    HGB 12.1 (L) 07/17/2025    HCT 42.4 07/17/2025     07/17/2025    CHOL 82 06/10/2025    TRIG 154 (H) 06/10/2025    HDL 26.8 06/10/2025    ALT 10 07/12/2025    AST 12 07/12/2025     07/17/2025    K 4.0 07/17/2025    CL 98 07/17/2025    CREATININE 1.70 (H) 07/17/2025    BUN 47 (H) 07/17/2025    CO2 32 07/17/2025    TSH 3.26 11/11/2024    INR 1.2 (H) 10/05/2023    HGBA1C 8.9 (A) 05/12/2025     No results found for: \"CKTOTAL\", \"CKMB\", \"CKMBINDEX\", \"TROPONINI\"     Lab Results   Component " Value Date    INR 1.2 (H) 10/05/2023    PROTIME 13.7 (H) 10/05/2023           Current Medications[4]          Macro dragon disclaimer: This note was dictated by speech recognition. Minor errors in transcription may be present.             [1]   Past Medical History:  Diagnosis Date    Cataract     Chest pain 10/01/2023    CHF (congestive heart failure)     Clotting disorder (Multi)     COPD (chronic obstructive pulmonary disease) (Multi)     Coronary artery disease     Diabetes mellitus (Multi)     Disease of thyroid gland     Elevated troponin level not due myocardial infarction 08/14/2023    Hypertension     Ischemic cardiomyopathy     Pulmonary hypertension (Multi)     Venous ulcer of left leg (Multi)    [2]   Past Surgical History:  Procedure Laterality Date    CARDIAC CATHETERIZATION N/A 6/10/2025    Procedure: Left & Right Heart Cath w Angiography & LV;  Surgeon: Carl B Gillombardo, MD;  Location: [facility] Cardiac Cath Lab;  Service: Cardiovascular;  Laterality: N/A;    CARDIAC DEFIBRILLATOR PLACEMENT      CATARACT EXTRACTION Right         CORONARY ANGIOPLASTY WITH STENT PLACEMENT      CORONARY ARTERY BYPASS GRAFT     [3]   Family History  Family history unknown: Yes   [4]   Current Facility-Administered Medications   Medication Dose Route Frequency Provider Last Rate Last Admin    acetaminophen (Tylenol) tablet 650 mg  650 mg oral q6h PRN Riky Sims MD   650 mg at 07/16/25 1658    albuterol 2.5 mg /3 mL (0.083 %) nebulizer solution 5 mg  5 mg nebulization q4h PRN Riky Sims MD        apixaban (Eliquis) tablet 5 mg  5 mg oral BID Riky Sims MD   5 mg at 07/17/25 0824    atorvastatin (Lipitor) tablet 80 mg  80 mg oral Nightly Riky Sims MD   80 mg at 07/16/25 2036    dextrose 50 % injection 12.5 g  12.5 g intravenous q15 min PRN Riky Sims MD        dextrose 50 % injection 25 g  25 g intravenous q15 min PRN Riky Sims MD        digoxin (Lanoxin)  tablet 125 mcg  125 mcg oral Daily Riky Sims MD   125 mcg at 07/17/25 0825    furosemide (Lasix) 500 mg in 50 mL (10 mg/mL) infusion  10 mg/hr intravenous Continuous Jeremiah Bhatia MD PhD 1 mL/hr at 07/17/25 0902 10 mg/hr at 07/17/25 0902    gabapentin (Neurontin) capsule 200 mg  200 mg oral BID Riky Sims MD   200 mg at 07/17/25 0825    glucagon (Glucagen) injection 1 mg  1 mg intramuscular q15 min PRN Riky Sims MD        glucagon (Glucagen) injection 1 mg  1 mg intramuscular q15 min PRN Riky Sims MD        [Held by provider] hydrALAZINE (Apresoline) tablet 100 mg  100 mg oral TID Riky Sims MD   100 mg at 07/13/25 1524    insulin glargine (Lantus) injection 10 Units  10 Units subcutaneous q24h Levy Huitron DO   10 Units at 07/17/25 0903    insulin lispro injection 0-10 Units  0-10 Units subcutaneous TID AC Riky Sims MD   2 Units at 07/17/25 0824    insulin lispro injection 10 Units  10 Units subcutaneous TID  Levy Huitron DO   10 Units at 07/13/25 1751    isosorbide dinitrate (Isordil) tablet 40 mg  40 mg oral TID Riky Sims MD   40 mg at 07/17/25 0902    levothyroxine (Synthroid, Levoxyl) tablet 75 mcg  75 mcg oral Daily Riky Sims MD   75 mcg at 07/17/25 0636    metoprolol succinate XL (Toprol-XL) 24 hr tablet 200 mg  200 mg oral Daily Riky Sims MD   200 mg at 07/17/25 0825    oxygen (O2) therapy   inhalation Continuous - 02/gases Riky Sims MD   2 L/min at 07/15/25 0940    pantoprazole (ProtoNix) EC tablet 40 mg  40 mg oral Daily before breakfast Riky Sims MD   40 mg at 07/17/25 0636    polyethylene glycol (Glycolax, Miralax) packet 17 g  17 g oral Daily Riky Sims MD        QUEtiapine (SEROquel) tablet 50 mg  50 mg oral Nightly Riky Sims MD   50 mg at 07/16/25 2036

## 2025-07-17 NOTE — H&P
[facility]  HFICU HISTORY AND PHYSICAL     Felice Law/[id number]    Admit Date: 7/17/2025  Hospital Length of Stay: 0   ICU Length of Stay: 2h   Primary Service:   Primary HF Cardiologist:   Referring: Jeremiah Bhatia    HPI:     Felice Law is a 60 y.o. male with significant PMH of ICM, HFrEF, EF 15-20%, CABG (LIMA- LAD in 2023),  PCI, VT arrest s/p VT ablation and ICD in 2017, Hypertension, Hyperlipidemia, Hypothyroidism, Left femoral DVT on Eliquis, COPD, JAKE, CKD III, Chronic BLE venous stasis dermatitis, Insulin dependent Type 2 diabetes who was initially admitted 7/12 to [place] for acute decompensate heart failure. He was diuresed and recommended for transfer to [facility] HF ICU for further managemnet and possible re-consider of advanced therapy work up.     Per chart reviewed, patient was not a good candidate for advanced therapy due to medication non-compliance, uncontrolled diabetic and social support plus financial issues.     Upon arrival in the HF ICU,  patient is alert and able to answer questions, limited English with "[employer]  machine at bedside. Patient reports he recently returned back from [facility].  During his stay there, patient reported his weight increased from 214 pounds to 243 pounds due to uncontrolled eating habits and life style. He is warm and wet per physical exam. HR 60's, NSR, /83, mean 93. Last Eliquis taken this morning.  Plan to continue diurese with IV Lasix drip.       Cardiac Tests:    Echocardiogram (5/5/25):     CONCLUSIONS:   1. Left ventricular ejection fraction is severely decreased, by visual estimate at 15-20%.   2. There is global hypokinesis of the left ventricle with minor regional variations.   3. Spectral Doppler shows a Grade II (pseudonormal pattern) of left ventricular diastolic filling with an elevated left atrial pressure.   4. Left ventricular cavity size is moderately dilated.   5. Abnormal septal  motion consistent with RV pacemaker.   6. There is moderately reduced right ventricular systolic function.   7. Mildly enlarged right ventricle.   8. The left atrium is mildly dilated.   9. Mild to moderate mitral valve regurgitation.  10. Mild to moderate tricuspid regurgitation visualized.  11. Moderately elevated right ventricular systolic pressure.    RHC (6/10/25): RA: 25,RV: 63/9, PA: 72/34, PCWP: 32, PA-SAT: 36%, SVC-SAT: 41%, FA-SAT: 71%, CO: 4.84, CI: 2.24, SVR: 1041    LHC (6/10/25):   Right dominant coronary circulation with severe multivessel CAD, as described above. WILKS to LAD patent.    Stress PET (2025):   1. No clinical or electrocardiographic evidence for ischemia at a maximal infusion.   2. Nuclear image results are reported separately.    Past Medical History:  Medical History[1]    Past Surgical History:  Surgical History[2]    Family History:  Family History[3]    Social History:  Social History     Socioeconomic History    Marital status: Single     Spouse name: Not on file    Number of children: Not on file    Years of education: Not on file    Highest education level: Not on file   Occupational History    Not on file   Tobacco Use    Smoking status: Former     Current packs/day: 0.00     Average packs/day: 1 pack/day for 24.0 years (24.0 ttl pk-yrs)     Types: Cigarettes     Start date:      Quit date: 2017     Years since quittin.5    Smokeless tobacco: Never   Vaping Use    Vaping status: Never Used   Substance and Sexual Activity    Alcohol use: Never    Drug use: Never    Sexual activity: Defer   Other Topics Concern    Not on file   Social History Narrative    Not on file     Social Drivers of Health     Financial Resource Strain: Low Risk  (2025)    Overall Financial Resource Strain (CARDIA)     Difficulty of Paying Living Expenses: Not hard at all   Food Insecurity: Patient Declined (2025)    Hunger Vital Sign     Worried About Running Out of Food in the Last  Year: Patient declined     Ran Out of Food in the Last Year: Patient declined   Transportation Needs: No Transportation Needs (7/17/2025)    PRAPARE - Transportation     Lack of Transportation (Medical): No     Lack of Transportation (Non-Medical): No   Physical Activity: Unknown (5/7/2025)    Exercise Vital Sign     Days of Exercise per Week: 0 days     Minutes of Exercise per Session: Not on file   Stress: No Stress Concern Present (7/30/2021)    Received from [facility] - Occupational Stress Questionnaire     Feeling of Stress : Only a little   Social Connections: Unknown (1/20/2025)    OASIS : Social Isolation     Frequency of experiencing loneliness or isolation: Patient unable to respond   Intimate Partner Violence: Not At Risk (7/13/2025)    Humiliation, Afraid, Rape, and Kick questionnaire     Fear of Current or Ex-Partner: No     Emotionally Abused: No     Physically Abused: No     Sexually Abused: No   Housing Stability: Low Risk  (7/17/2025)    Housing Stability Vital Sign     Unable to Pay for Housing in the Last Year: No     Number of Times Moved in the Last Year: 0     Homeless in the Last Year: No       Allergies:  RX Allergies[4]    Prior to Admission Meds:  Prescriptions Prior to Admission[5]    Current Medications:  Infusions:  furosemide, Last Rate: 20 mg/hr (07/17/25 1132)      Scheduled:  [Held by provider] apixaban, 5 mg, BID  atorvastatin, 80 mg, Nightly  [Held by provider] bumetanide, 1 mg, Daily  dapagliflozin propanediol, 10 mg, Daily  [START ON 7/18/2025] digoxin, 125 mcg, Daily  fenofibrate, 145 mg, Daily  gabapentin, 200 mg, q8h SUSI  hydrALAZINE, 100 mg, TID  insulin glargine, 40 Units, Nightly  insulin lispro, 8 Units, TID AC  isosorbide dinitrate, 40 mg, TID  levothyroxine, 75 mcg, Daily before breakfast  [Held by provider] metoprolol succinate XL, 200 mg, Daily  [START ON 7/18/2025] pantoprazole, 40 mg, Daily before  "breakfast  QUEtiapine, 50 mg, Nightly      PRN:  albuterol, 2.5 mg, q6h PRN  dextrose, 12.5 g, q15 min PRN  dextrose, 25 g, q15 min PRN  glucagon, 1 mg, q15 min PRN  glucagon, 1 mg, q15 min PRN          I    PHYSICAL EXAM:   Visit Vitals  /90   Pulse 67   Temp 36.3 °C (97.3 °F) (Temporal)   Resp 19   Ht 1.803 m (5' 10.98\")   Wt 111 kg (243 lb 13.3 oz)   SpO2 96%   BMI 34.02 kg/m²   Smoking Status Former   BSA 2.36 m²       Wt Readings from Last 5 Encounters:   07/17/25 111 kg (243 lb 13.3 oz)   07/17/25 110 kg (243 lb 2.7 oz)   06/10/25 97.1 kg (214 lb)   05/12/25 96.6 kg (213 lb)   05/04/25 102 kg (224 lb)       INTAKE/OUTPUT:  No intake/output data recorded.     Physical Exam  Vitals reviewed.   HENT:      Head: Normocephalic.      Mouth/Throat:      Mouth: Mucous membranes are moist.     Cardiovascular:      Rate and Rhythm: Normal rate and regular rhythm.   Pulmonary:      Effort: Pulmonary effort is normal.      Comments: Diminished in bases  Abdominal:      Palpations: Abdomen is soft.     Musculoskeletal:         General: Swelling present.      Right lower leg: Edema present.      Left lower leg: Edema present.     Skin:     Comments: Blistering noted to DEANA LE, discoloration     Neurological:      Mental Status: He is alert and oriented to person, place, and time.     Psychiatric:         Mood and Affect: Mood normal.         Review of Systems   Constitutional: Negative.    HENT: Negative.     Respiratory:  Positive for shortness of breath.    Cardiovascular:  Positive for leg swelling.   Genitourinary: Negative.    Musculoskeletal: Negative.    Neurological: Negative.    Psychiatric/Behavioral: Negative.         DATA:  CMP:  Recent Labs     07/17/25  1119 07/17/25  0653 07/16/25  0655 07/15/25  0708 07/14/25  0709 07/13/25  0737 07/12/25  1922 06/10/25  0831 05/08/25  0630 05/07/25  0705 05/05/25  0651 05/04/25  1447 11/02/23  1700 10/17/23  0356   * 137 135* 137 139 138 133* 136 136 137   < > " 132*   < > 137   K 4.0 4.0 4.2 4.3 3.9 3.8 4.0 4.4 3.9 4.2   < > 4.5   < > 4.6   CL 96* 98 98 97* 98 98 95* 99 94* 97*   < > 97*   < > 100   CO2 31 32 28 29 29 28 25 24 32 31   < > 27   < > 29   ANIONGAP 11 11 13 15 16 16 17 17 14 13   < > 13   < > 13   BUN 48* 47* 53* 55* 54* 52* 53* 33* 49* 48*   < > 48*   < > 69*   CREATININE 1.62* 1.70* 1.86* 2.02* 2.14* 2.13* 2.55* 2.07* 2.24* 2.19*   < > 2.15*   < > 1.48*   EGFR 49* 46* 41* 37* 35* 35* 28* 36* 33* 34*   < > 35*   < > 55*   MG  --  2.39 2.58* 2.70* 2.62* 2.61* 2.48*  --  2.49* 2.55*  --  2.43*  --  1.97    < > = values in this interval not displayed.     Recent Labs     07/17/25  1119 07/12/25  1922 05/04/25  1447 11/11/24  1558 11/05/24  1146 06/25/24  1139 12/19/23  1104 11/02/23  1700 10/06/23  0515 10/05/23  1647   ALBUMIN 3.7 3.8 4.1 4.0 4.4 4.3 4.1 4.4   < > 4.1   ALT 8* 10 26 12 13 15 13  --   --  11   AST 15 12 22 12 <3* 10 10  --   --  13   BILITOT 2.0* 1.6* 2.1* 0.5 0.5 1.3* 1.1  --   --  1.8*    < > = values in this interval not displayed.     CBC:  Recent Labs     07/17/25  0653 07/16/25  0654 07/15/25  0708 07/14/25  0709 07/13/25  0737 07/12/25  1922 06/10/25  0831 05/08/25  0630   WBC 4.7 5.1 4.7 5.7 5.6 5.7 4.9 5.1   HGB 12.1* 12.3* 12.3* 11.5* 12.9* 12.4* 12.5* 12.0*   HCT 42.4 41.6 42.3 39.5* 43.4 42.4 41.6 40.4*    272 267 274 285 267 242 289   MCV 82 81 82 81 81 81 80 80     COAG:   Recent Labs     07/17/25  1119 10/05/23  1647   INR 2.0* 1.2*     ABO:   Recent Labs     10/05/23  1647   ABO A     HEME/ENDO:  Recent Labs     07/17/25  1125 07/17/25  1111 05/12/25  1656 11/11/24  1558 11/05/24  1146 06/25/24  1139   FERRITIN 102  --   --   --   --  332*   IRONSAT 16*  --   --   --  37 20*   TSH  --   --   --  3.26 4.05* 4.23*   HGBA1C  --  10.1* 8.9* 11.0* 10.8* 9.2*      CARDIAC:   Recent Labs     07/17/25  1119 07/13/25  0737 07/12/25  2038 07/12/25  1922 05/04/25  1540 05/04/25  1447 11/05/24  1146 06/25/24  1139 12/19/23  1104  "10/05/23  1647 10/04/23  1518   LDH  --   --   --   --   --   --   --   --   --  240  --    TROPHS 37 53* 53* 53* 40* 41*  --   --   --   --   --    *  --   --  1,299*  --  2,032* 57 743* 147* 157* 160*     Recent Labs     10/11/23  1632 10/11/23  1143 10/11/23  0527 10/10/23  2330 10/10/23  1815 10/09/23  1755 10/09/23  0645   LACMX 1.3 1.2 1.0 1.1  --   --  0.9   SO2MV 60 61 68 62 62   < > 69   O2CMX 58.7 58.9 66.3 60.4 60.2   < > 68.3    < > = values in this interval not displayed.     No results for input(s): \"TACROLIMUS\", \"SIROLIMUS\", \"CYCLOSPORINE\" in the last 93540 hours.      ASSESSMENT AND PLAN:     Felice Law is a 60 y.o. male with significant PMH of ICM, HFrEF, EF 15-20%, CABG (LIMA- LAD in 2023),  PCI, VT arrest s/p VT ablation and ICD in 2017, Hypertension, Hyperlipidemia, Hypothyroidism, Left femoral DVT on Eliquis, COPD, JAKE, CKD III, Chronic BLE venous stasis dermatitis, Insulin dependent Type 2 diabetes who was initially admitted 7/12 to [place] for acute decompensate heart failure. He was diuresed and recommended for transfer to [facility] HF ICU for further managemnet and possible re-consider of advanced therapy work up.     Per chart reviewed, patient was not a good candidate for advanced therapy due to medication non-compliance, uncontrolled diabetic and social support plus financial issues.     Neuro:   # Anxiety. Depression, Acute pain    - Serial neuro and pain assessments    - PO Tylenol PRN for pain   - PT/OT Consult, OOB to chair   - CAM ICU score every shift   - Sleep/wake cycle normalization      # Physical Status   -Obesity: BMI: 34  -Reduced Mobility due to ICU and CHF       #Substance abuse   -Alcohol abuse/Alcohol dependence: NO   -Tobacco use/Nicotine Dependence: Quit 8 years ago     Cardiovascular:   # acute on chronic systolic CHF, ICM, HFrEF 15-20%  -admit weight (7/17): 111 kg  -BNP (7/17): 744  -TTE (5/4/2025):  Left ventricular ejection fraction is severely " decreased, by visual estimate at 15-20%.   - RHC (6/10/25): RA: 25,RV: 63/9, PA: 72/34, PCWP: 32, PA-SAT: 36%, SVC-SAT: 41%, FA-SAT: 71%, CO: 4.84, CI: 2.24, SVR: 1041  - LHC (6/10/25):   Right dominant coronary circulation with severe multivessel CAD, as described above. LIMA to LAD patent.  - c/w atorvastin 80mg    - C/w Farxiga, Isordil 40mg, Hydralazine 100mg  - Hold metoprolol 100mg and home Bumex 1 mg PO  - Diurese with Lasix bolus and Lasix drip at 20 mg/h   - SG when euvolemic     #CAD    # S/P CAD  - s/p PCI and 1v CABG (LIMA-LAD 04/2023).   - C/w ASA and atorvastatin 80 mg    Pulmonary:    # H/o COPD  # Hx Pulmonary Hypertension   # JAKE  - On 2L via NC      :   #CHARLOTTE/CKD III  -Admit Bun/Creatinine (7/17):  48/1.62, creatinine peaked at 2.55 OSH  -I/Os   -avoid hypotension and nephrotoxic agents      Heme:   #Anemia in the setting of MAI and CKD III  - Labs (7/17):  TIBC: 377,  ferritin: 102, serum Fe: 61, folate: 13.1, B12: 452   - IV Venofer 200 mg x5 doses (7/17-7/21)     # Left femoral DVT (2017)  - Hold home Eliquis  - Initiate Heparin drip once Heparin assay < 0.2      Endo:   #DM 2  - hgbA1c(7/17):  10.1  - C/w Lantus 40 units daily    # Hypothyroidism  -Thyroid-Levothyroxine 75mcg  -TSH, Free T4 -pending labs     ID:   -afebrile, nontoxic    -no s/s infx   -trend temps q4 hr      PHYSICAL AND OCCUPATIONAL THERAPY:    LINES:  PIVs       DVT: SCD's, Heparin drip once Heparin assay <0.2  VAP BUNDLE: NA  CENTRAL LINE BUNDLE: NA   ULCER PPX: PPI  GLYCEMIC CONTROL: SSI, Lantus insulin   BOWEL CARE: Senna, Miralax   INDWELLING CATHETER: NA  NUTRITION: Adult diet Consistent Carb; CCD 90 gm/meal; 2000 mL fluid      EMERGENCY CONTACT: Extended Emergency Contact Information  Primary Emergency Contact: NAM LOO  Address: [address] [place] [address] [facility]  Home Phone: [phone]  Mobile Phone: [phone]  Relation: Mother  Secondary Emergency Contact:  Robinson Contreras  Mobile Phone: [phone]  Relation: Friend  FAMILY UPDATE:  CODE STATUS: Full Code  DISPO:     Patient seen and assessed with Dr. Nguyen  _________________________________________________  Allie Valdovinos APRN-SN    I personally spent 74  minutes of critical care time directly and personally managing the patient exclusive of separately billable procedures.  I agree with NP student's assessment/plan.    Shelly Villafuerte  CNP          [1]   Past Medical History:  Diagnosis Date    Cataract     Chest pain 10/01/2023    CHF (congestive heart failure)     Clotting disorder (Multi)     COPD (chronic obstructive pulmonary disease) (Multi)     Coronary artery disease     Diabetes mellitus (Multi)     Disease of thyroid gland     Elevated troponin level not due myocardial infarction 08/14/2023    Hypertension     Ischemic cardiomyopathy     Pulmonary hypertension (Multi)     Venous ulcer of left leg (Multi)    [2]   Past Surgical History:  Procedure Laterality Date    CARDIAC CATHETERIZATION N/A 6/10/2025    Procedure: Left & Right Heart Cath w Angiography & LV;  Surgeon: Carl B Gillombardo, MD;  Location: [facility] Cardiac Cath Lab;  Service: Cardiovascular;  Laterality: N/A;    CARDIAC DEFIBRILLATOR PLACEMENT      CATARACT EXTRACTION Right         CORONARY ANGIOPLASTY WITH STENT PLACEMENT      CORONARY ARTERY BYPASS GRAFT     [3]   Family History  Family history unknown: Yes   [4]   Allergies  Allergen Reactions    Azithromycin Other, Dizziness, Respiratory depression and Nausea/vomiting     Cold, clammy, sweating, trouble breathing   [5]   Medications Prior to Admission   Medication Sig Dispense Refill Last Dose/Taking    albuterol 2.5 mg /3 mL (0.083 %) nebulizer solution Use every 4-6 hours as needed for shortness of breath 75 mL 1     atorvastatin (Lipitor) 80 mg tablet 80mg nightly 90 tablet 2     blood-glucose sensor (FreeStyle Franco 3 Plus Sensor) device Change every 15 days 2 each 11     bumetanide  "(Bumex) 1 mg tablet Take 1 tablet (1 mg) by mouth once daily. 90 tablet 3     digoxin (Lanoxin) 125 MCG tablet Take 1 tablet (125 mcg) by mouth once daily. 90 tablet 3     Eliquis 5 mg tablet TOME 1 TABLETA POR LA BOCA DOS  VECES AL CUBA 200 tablet 2     Farxiga 10 mg tablet Take 1 tablet (10 mg) by mouth once daily. 90 tablet 3     fenofibrate (Tricor) 145 mg tablet Take 1 tablet (145 mg) by mouth once daily. 90 tablet 3     FreeStyle Franco 3 [place] misc Use as instructed 1 each 0     gabapentin (Neurontin) 300 mg capsule TOME 1 CAPSULA POR LA BOCA NITIN  VEZ AL CUBA EN LA MANANA 90 capsule 3     hydrALAZINE (Apresoline) 100 mg tablet TOME 1 TABLETA POR LA BOCA 3  VECES AL CUBA 300 tablet 2     insulin glargine (Toujeo Max U-300 SoloStar) 300 unit/mL (3 mL) pen 40 units twice a day 30 mL 3     insulin lispro (HumaLOG KwikPen Insulin) 100 unit/mL pen 18 units with meals plus sliding scale up to 80 units daily 45 mL 3     isosorbide dinitrate (Isordil) 40 mg tablet Take 1 tablet (40 mg) by mouth 3 times a day. 270 tablet 3     lancets (Lancets,Thin) misc 4 times daily (before meals and at bedtime). use as directed to test blood sugar 100 each 3     levothyroxine (Synthroid, Levoxyl) 75 mcg tablet TOME 1 TABLETA POR LA BOCA NITIN  VEZ AL CUBA 100 tablet 3     metoprolol succinate XL (Toprol-XL) 100 mg 24 hr tablet Take 2 tablets (200 mg) by mouth once daily.       OneTouch Ultra Test REVISE EL NIVEL DE GLUCOSA EN LA PEARL CUATRO VECES AL CUBA 400 strip 1     pantoprazole (ProtoNix) 40 mg EC tablet Take 1 tablet (40 mg) by mouth once daily in the morning. Take before meals. Do not crush, chew, or split.      TOME 1 TABLETA POR LA BOCA NITIN  VEZ AL CUBA EN LA MANANA TOME  ANTES DE NITIN COMIDA (NO TRITURE, MASTIQUE NI DIVIDA) 90 tablet 1     pen needle, diabetic 31 gauge x 5/16\" needle For insulin injection 5x/day 500 each 3     QUEtiapine (SEROquel) 50 mg tablet Take 1 tablet (50 mg) by mouth once daily at bedtime. 100 tablet 2  "    [Paused] sacubitriL-valsartan (Entresto)  mg tablet Take 1 tablet by mouth 2 times a day. (Patient not taking: Reported on 7/13/2025) 180 tablet 3     [Paused] spironolactone (Aldactone) 25 mg tablet TOME 2 TABLETAS POR LA BOCA NITIN  VEZ AL CUBA (Patient not taking: Reported on 7/13/2025) 200 tablet 2     tirzepatide (Mounjaro) 5 mg/0.5 mL pen injector Inject 5 mg under the skin every 7 days. 2 mL 11

## 2025-07-18 LAB
ALBUMIN SERPL BCP-MCNC: 3.6 G/DL (ref 3.4–5)
ALBUMIN SERPL BCP-MCNC: 3.7 G/DL (ref 3.4–5)
ANION GAP SERPL CALC-SCNC: 13 MMOL/L (ref 10–20)
ANION GAP SERPL CALC-SCNC: 14 MMOL/L (ref 10–20)
BUN SERPL-MCNC: 47 MG/DL (ref 6–23)
BUN SERPL-MCNC: 49 MG/DL (ref 6–23)
CALCIUM SERPL-MCNC: 8.7 MG/DL (ref 8.6–10.6)
CALCIUM SERPL-MCNC: 9.2 MG/DL (ref 8.6–10.6)
CHLORIDE SERPL-SCNC: 94 MMOL/L (ref 98–107)
CHLORIDE SERPL-SCNC: 99 MMOL/L (ref 98–107)
CO2 SERPL-SCNC: 29 MMOL/L (ref 21–32)
CO2 SERPL-SCNC: 31 MMOL/L (ref 21–32)
CREAT SERPL-MCNC: 1.64 MG/DL (ref 0.5–1.3)
CREAT SERPL-MCNC: 1.73 MG/DL (ref 0.5–1.3)
EGFRCR SERPLBLD CKD-EPI 2021: 45 ML/MIN/1.73M*2
EGFRCR SERPLBLD CKD-EPI 2021: 48 ML/MIN/1.73M*2
ERYTHROCYTE [DISTWIDTH] IN BLOOD BY AUTOMATED COUNT: 19.9 % (ref 11.5–14.5)
GLUCOSE BLD MANUAL STRIP-MCNC: 187 MG/DL (ref 74–99)
GLUCOSE BLD MANUAL STRIP-MCNC: 212 MG/DL (ref 74–99)
GLUCOSE BLD MANUAL STRIP-MCNC: 236 MG/DL (ref 74–99)
GLUCOSE BLD MANUAL STRIP-MCNC: 303 MG/DL (ref 74–99)
GLUCOSE SERPL-MCNC: 245 MG/DL (ref 74–99)
GLUCOSE SERPL-MCNC: 278 MG/DL (ref 74–99)
HCT VFR BLD AUTO: 38.3 % (ref 41–52)
HGB BLD-MCNC: 11.6 G/DL (ref 13.5–17.5)
MAGNESIUM SERPL-MCNC: 2.4 MG/DL (ref 1.6–2.4)
MCH RBC QN AUTO: 23.4 PG (ref 26–34)
MCHC RBC AUTO-ENTMCNC: 30.3 G/DL (ref 32–36)
MCV RBC AUTO: 77 FL (ref 80–100)
NRBC BLD-RTO: 0 /100 WBCS (ref 0–0)
PHOSPHATE SERPL-MCNC: 2.8 MG/DL (ref 2.5–4.9)
PHOSPHATE SERPL-MCNC: 2.9 MG/DL (ref 2.5–4.9)
PLATELET # BLD AUTO: 241 X10*3/UL (ref 150–450)
POTASSIUM SERPL-SCNC: 3.7 MMOL/L (ref 3.5–5.3)
POTASSIUM SERPL-SCNC: 4.7 MMOL/L (ref 3.5–5.3)
RBC # BLD AUTO: 4.95 X10*6/UL (ref 4.5–5.9)
SODIUM SERPL-SCNC: 135 MMOL/L (ref 136–145)
SODIUM SERPL-SCNC: 136 MMOL/L (ref 136–145)
UFH PPP CHRO-ACNC: 0.4 IU/ML (ref ?–1.1)
UFH PPP CHRO-ACNC: 1 IU/ML (ref ?–1.1)
WBC # BLD AUTO: 5.1 X10*3/UL (ref 4.4–11.3)

## 2025-07-18 PROCEDURE — 2500000001 HC RX 250 WO HCPCS SELF ADMINISTERED DRUGS (ALT 637 FOR MEDICARE OP): Performed by: NURSE PRACTITIONER

## 2025-07-18 PROCEDURE — 80069 RENAL FUNCTION PANEL: CPT | Performed by: NURSE PRACTITIONER

## 2025-07-18 PROCEDURE — 82947 ASSAY GLUCOSE BLOOD QUANT: CPT

## 2025-07-18 PROCEDURE — 83735 ASSAY OF MAGNESIUM: CPT | Performed by: NURSE PRACTITIONER

## 2025-07-18 PROCEDURE — 36415 COLL VENOUS BLD VENIPUNCTURE: CPT | Performed by: NURSE PRACTITIONER

## 2025-07-18 PROCEDURE — 99291 CRITICAL CARE FIRST HOUR: CPT | Performed by: NURSE PRACTITIONER

## 2025-07-18 PROCEDURE — 85520 HEPARIN ASSAY: CPT | Performed by: NURSE PRACTITIONER

## 2025-07-18 PROCEDURE — 99291 CRITICAL CARE FIRST HOUR: CPT | Performed by: INTERNAL MEDICINE

## 2025-07-18 PROCEDURE — 2020000001 HC ICU ROOM DAILY

## 2025-07-18 PROCEDURE — 85027 COMPLETE CBC AUTOMATED: CPT | Performed by: NURSE PRACTITIONER

## 2025-07-18 PROCEDURE — 2500000002 HC RX 250 W HCPCS SELF ADMINISTERED DRUGS (ALT 637 FOR MEDICARE OP, ALT 636 FOR OP/ED): Performed by: NURSE PRACTITIONER

## 2025-07-18 PROCEDURE — 2500000004 HC RX 250 GENERAL PHARMACY W/ HCPCS (ALT 636 FOR OP/ED): Performed by: NURSE PRACTITIONER

## 2025-07-18 RX ORDER — INSULIN GLARGINE 100 [IU]/ML
20 INJECTION, SOLUTION SUBCUTANEOUS DAILY
Status: DISCONTINUED | OUTPATIENT
Start: 2025-07-18 | End: 2025-07-19

## 2025-07-18 RX ORDER — HEPARIN SODIUM 10000 [USP'U]/100ML
0-4000 INJECTION, SOLUTION INTRAVENOUS CONTINUOUS
Status: DISCONTINUED | OUTPATIENT
Start: 2025-07-18 | End: 2025-08-04

## 2025-07-18 RX ORDER — INSULIN LISPRO 100 [IU]/ML
10 INJECTION, SOLUTION INTRAVENOUS; SUBCUTANEOUS
Status: DISCONTINUED | OUTPATIENT
Start: 2025-07-18 | End: 2025-07-19

## 2025-07-18 RX ORDER — METOPROLOL SUCCINATE 50 MG/1
50 TABLET, EXTENDED RELEASE ORAL DAILY
Status: DISCONTINUED | OUTPATIENT
Start: 2025-07-18 | End: 2025-07-20

## 2025-07-18 RX ORDER — POTASSIUM CHLORIDE 20 MEQ/1
40 TABLET, EXTENDED RELEASE ORAL EVERY 4 HOURS
Status: COMPLETED | OUTPATIENT
Start: 2025-07-18 | End: 2025-07-18

## 2025-07-18 RX ADMIN — POTASSIUM CHLORIDE 40 MEQ: 1500 TABLET, EXTENDED RELEASE ORAL at 23:07

## 2025-07-18 RX ADMIN — INSULIN LISPRO 8 UNITS: 100 INJECTION, SOLUTION INTRAVENOUS; SUBCUTANEOUS at 08:46

## 2025-07-18 RX ADMIN — INSULIN LISPRO 10 UNITS: 100 INJECTION, SOLUTION INTRAVENOUS; SUBCUTANEOUS at 16:52

## 2025-07-18 RX ADMIN — POTASSIUM CHLORIDE 40 MEQ: 1500 TABLET, EXTENDED RELEASE ORAL at 20:03

## 2025-07-18 RX ADMIN — ISOSORBIDE DINITRATE 40 MG: 20 TABLET ORAL at 18:44

## 2025-07-18 RX ADMIN — INSULIN LISPRO 10 UNITS: 100 INJECTION, SOLUTION INTRAVENOUS; SUBCUTANEOUS at 20:19

## 2025-07-18 RX ADMIN — PANTOPRAZOLE SODIUM 40 MG: 40 TABLET, DELAYED RELEASE ORAL at 07:04

## 2025-07-18 RX ADMIN — HYDRALAZINE HYDROCHLORIDE 100 MG: 100 TABLET ORAL at 14:57

## 2025-07-18 RX ADMIN — INSULIN LISPRO 4 UNITS: 100 INJECTION, SOLUTION INTRAVENOUS; SUBCUTANEOUS at 12:27

## 2025-07-18 RX ADMIN — GABAPENTIN 200 MG: 100 CAPSULE ORAL at 07:04

## 2025-07-18 RX ADMIN — CALCIUM CARBONATE (ANTACID) CHEW TAB 500 MG 1 TABLET: 500 CHEW TAB at 20:03

## 2025-07-18 RX ADMIN — DIGOXIN 125 MCG: 125 TABLET ORAL at 08:47

## 2025-07-18 RX ADMIN — INSULIN GLARGINE 20 UNITS: 100 INJECTION, SOLUTION SUBCUTANEOUS at 12:27

## 2025-07-18 RX ADMIN — FENOFIBRATE 145 MG: 145 TABLET, FILM COATED ORAL at 08:48

## 2025-07-18 RX ADMIN — INSULIN GLARGINE 40 UNITS: 100 INJECTION, SOLUTION SUBCUTANEOUS at 20:03

## 2025-07-18 RX ADMIN — ISOSORBIDE DINITRATE 40 MG: 20 TABLET ORAL at 08:47

## 2025-07-18 RX ADMIN — QUETIAPINE FUMARATE 50 MG: 25 TABLET ORAL at 20:03

## 2025-07-18 RX ADMIN — LEVOTHYROXINE SODIUM 75 MCG: 0.07 TABLET ORAL at 07:04

## 2025-07-18 RX ADMIN — SPIRONOLACTONE 25 MG: 25 TABLET, FILM COATED ORAL at 08:47

## 2025-07-18 RX ADMIN — FUROSEMIDE 20 MG/HR: 10 INJECTION, SOLUTION INTRAMUSCULAR; INTRAVENOUS at 07:04

## 2025-07-18 RX ADMIN — ISOSORBIDE DINITRATE 40 MG: 20 TABLET ORAL at 14:57

## 2025-07-18 RX ADMIN — HYDRALAZINE HYDROCHLORIDE 100 MG: 100 TABLET ORAL at 08:47

## 2025-07-18 RX ADMIN — GABAPENTIN 200 MG: 100 CAPSULE ORAL at 15:00

## 2025-07-18 RX ADMIN — HYDRALAZINE HYDROCHLORIDE 100 MG: 100 TABLET ORAL at 20:03

## 2025-07-18 RX ADMIN — ATORVASTATIN CALCIUM 80 MG: 80 TABLET, FILM COATED ORAL at 20:03

## 2025-07-18 RX ADMIN — INSULIN LISPRO 4 UNITS: 100 INJECTION, SOLUTION INTRAVENOUS; SUBCUTANEOUS at 16:52

## 2025-07-18 RX ADMIN — HEPARIN SODIUM 1000 UNITS/HR: 10000 INJECTION, SOLUTION INTRAVENOUS at 20:04

## 2025-07-18 RX ADMIN — IRON SUCROSE 200 MG: 20 INJECTION, SOLUTION INTRAVENOUS at 07:03

## 2025-07-18 RX ADMIN — INSULIN LISPRO 2 UNITS: 100 INJECTION, SOLUTION INTRAVENOUS; SUBCUTANEOUS at 08:45

## 2025-07-18 RX ADMIN — DAPAGLIFLOZIN 10 MG: 10 TABLET, FILM COATED ORAL at 08:47

## 2025-07-18 RX ADMIN — METOPROLOL SUCCINATE 50 MG: 50 TABLET, EXTENDED RELEASE ORAL at 12:26

## 2025-07-18 RX ADMIN — INSULIN LISPRO 8 UNITS: 100 INJECTION, SOLUTION INTRAVENOUS; SUBCUTANEOUS at 12:28

## 2025-07-18 RX ADMIN — GABAPENTIN 200 MG: 100 CAPSULE ORAL at 23:04

## 2025-07-18 ASSESSMENT — PAIN SCALES - GENERAL
PAINLEVEL_OUTOF10: 2
PAINLEVEL_OUTOF10: 0 - NO PAIN
PAINLEVEL_OUTOF10: 0 - NO PAIN
PAINLEVEL_OUTOF10: 5 - MODERATE PAIN
PAINLEVEL_OUTOF10: 5 - MODERATE PAIN

## 2025-07-18 ASSESSMENT — PAIN DESCRIPTION - DESCRIPTORS
DESCRIPTORS: DISCOMFORT

## 2025-07-18 ASSESSMENT — PAIN - FUNCTIONAL ASSESSMENT: PAIN_FUNCTIONAL_ASSESSMENT: 0-10

## 2025-07-18 NOTE — PROGRESS NOTES
[facility]  HFICU PROGRESS NOTE    Felice Law/[id number]    Admit Date: 7/17/2025  Hospital Length of Stay: 1   ICU Length of Stay: 1d 4h   Primary Service:   Primary HF Cardiologist:   Referring:    INTERVAL EVENTS / PERTINENT ROS:     Admitted from OSH yesterday for fluids overload and possible SG guided with Inotrope support, may need to consider him for LVAD during this admission following evaluation of his hemodynamics.    On Lasix drip with total 2.3 L urine output, no complaints of SOB or chest pain.     Rounded AM with attending by using Norma . Patient states  he feels better overall.     Plan:   - Goal -3L  - Diamox today if slows down   - Metop 50 mg PO daily  - SG when towards euvolemia.   - Will start heparin drip once heparin assay <0.2.     MEDICATIONS  Infusions:  furosemide, Last Rate: 20 mg/hr (07/18/25 1400)      Scheduled:  [Held by provider] apixaban, 5 mg, BID  atorvastatin, 80 mg, Nightly  [Held by provider] bumetanide, 1 mg, Daily  calcium carbonate, 500 mg of calcium carbonate, BID  dapagliflozin propanediol, 10 mg, Daily  digoxin, 125 mcg, Daily  fenofibrate, 145 mg, Daily  gabapentin, 200 mg, q8h SUSI  hydrALAZINE, 100 mg, TID  insulin glargine, 20 Units, Daily  insulin glargine, 40 Units, Nightly  insulin lispro, 0-10 Units, TID AC  insulin lispro, 10 Units, TID AC  iron sucrose, 200 mg, Daily  isosorbide dinitrate, 40 mg, TID  levothyroxine, 75 mcg, Daily before breakfast  metoprolol succinate XL, 50 mg, Daily  pantoprazole, 40 mg, Daily before breakfast  QUEtiapine, 50 mg, Nightly  spironolactone, 25 mg, Daily      PRN:  albuterol, 2.5 mg, q6h PRN  dextrose, 12.5 g, q15 min PRN  dextrose, 25 g, q15 min PRN  diphenhydrAMINE, 50 mg, q5 min PRN  glucagon, 1 mg, q15 min PRN  glucagon, 1 mg, q15 min PRN      Invasive Hemodynamics:    Most Recent Range Past 24hrs   BP (Art)   No data recorded   MAP(Art)   No data recorded   RA/CVP   No data  "recorded   PA   No data recorded   PA(mean)   No data recorded   PCWP   No data recorded   CO   No data recorded   CI   No data recorded   Mixed Venous   No data recorded   SVR    No data recorded   PVR   No data recorded         PHYSICAL EXAM:   Visit Vitals  /79   Pulse 83   Temp 36.1 °C (97 °F) (Temporal)   Resp 18   Ht 1.803 m (5' 10.98\")   Wt 111 kg (243 lb 13.3 oz)   SpO2 94%   BMI 34.02 kg/m²   Smoking Status Former   BSA 2.36 m²       Wt Readings from Last 5 Encounters:   07/17/25 111 kg (243 lb 13.3 oz)   07/17/25 110 kg (243 lb 2.7 oz)   06/10/25 97.1 kg (214 lb)   05/12/25 96.6 kg (213 lb)   05/04/25 102 kg (224 lb)       INTAKE/OUTPUT:  I/O last 3 completed shifts:  In: 520.9 (4.7 mL/kg) [P.O.:480; I.V.:40.9 (0.4 mL/kg)]  Out: 2300 (20.8 mL/kg) [Urine:2300 (0.6 mL/kg/hr)]  Weight: 110.6 kg      Physical Exam  Constitutional:       Appearance: Normal appearance.   HENT:      Head: Normocephalic and atraumatic.      Nose: Nose normal.      Mouth/Throat:      Mouth: Mucous membranes are moist.     Eyes:      Extraocular Movements: Extraocular movements intact.      Pupils: Pupils are equal, round, and reactive to light.       Cardiovascular:      Rate and Rhythm: Normal rate and regular rhythm.      Pulses: Normal pulses.      Heart sounds: Normal heart sounds.   Pulmonary:      Effort: Pulmonary effort is normal.      Breath sounds: Normal breath sounds.   Abdominal:      General: There is distension.     Musculoskeletal:         General: Swelling present.      Cervical back: Normal range of motion and neck supple.      Right lower leg: Edema present.      Left lower leg: Edema present.     Skin:     General: Skin is warm.      Capillary Refill: Capillary refill takes 2 to 3 seconds.     Neurological:      General: No focal deficit present.      Mental Status: He is alert and oriented to person, place, and time.     Psychiatric:         Mood and Affect: Mood normal.         Behavior: Behavior normal. " "        DATA:  CMP:  Results from last 7 days   Lab Units 07/18/25  0157 07/17/25  1119 07/17/25  0653 07/16/25  0655 07/15/25  0708 07/14/25  0709 07/13/25  0737 07/12/25  1922   SODIUM mmol/L 136 134* 137 135* 137 139 138 133*   POTASSIUM mmol/L 4.7 4.0 4.0 4.2 4.3 3.9 3.8 4.0   CHLORIDE mmol/L 99 96* 98 98 97* 98 98 95*   CO2 mmol/L 29 31 32 28 29 29 28 25   ANION GAP mmol/L 13 11 11 13 15 16 16 17   BUN mg/dL 49* 48* 47* 53* 55* 54* 52* 53*   CREATININE mg/dL 1.64* 1.62* 1.70* 1.86* 2.02* 2.14* 2.13* 2.55*   EGFR mL/min/1.73m*2 48* 49* 46* 41* 37* 35* 35* 28*   MAGNESIUM mg/dL 2.40  --  2.39 2.58* 2.70* 2.62* 2.61* 2.48*   ALBUMIN g/dL 3.6 3.7  --   --   --   --   --  3.8   ALT U/L  --  8*  --   --   --   --   --  10   AST U/L  --  15  --   --   --   --   --  12   BILIRUBIN TOTAL mg/dL  --  2.0*  --   --   --   --   --  1.6*     CBC:  Results from last 7 days   Lab Units 07/18/25  0157 07/17/25  0653 07/16/25  0654 07/15/25  0708 07/14/25  0709 07/13/25  0737 07/12/25  1922   WBC AUTO x10*3/uL 5.1 4.7 5.1 4.7 5.7 5.6 5.7   HEMOGLOBIN g/dL 11.6* 12.1* 12.3* 12.3* 11.5* 12.9* 12.4*   HEMATOCRIT % 38.3* 42.4 41.6 42.3 39.5* 43.4 42.4   PLATELETS AUTO x10*3/uL 241 260 272 267 274 285 267   MCV fL 77* 82 81 82 81 81 81     COAG:   Results from last 7 days   Lab Units 07/17/25  1119   INR  2.0*     ABO: No results found for: \"ABO\"  HEME/ENDO:  Results from last 7 days   Lab Units 07/17/25  1125 07/17/25  1111   FERRITIN ng/mL 102  --    IRON SATURATION % 16*  --    TSH mIU/L 3.97  --    HEMOGLOBIN A1C %  --  10.1*      CARDIAC:   Results from last 7 days   Lab Units 07/17/25  1119 07/13/25  0737 07/12/25 2038 07/12/25  1922   TROPHS ng/L  --  53* 53* 53*   TROPHSCMC ng/L 37  --   --   --    BNP pg/mL 744*  --   --  1,299*       ASSESSMENT AND PLAN:     Felice Law is a 60 y.o. male with significant PMH of ICM, HFrEF, EF 15-20%, CABG (LIMA- LAD in 2023),  PCI, VT arrest s/p VT ablation and ICD in 2017, " Hypertension, Hyperlipidemia, Hypothyroidism, Left femoral DVT on Eliquis, COPD, JAKE, CKD III, Chronic BLE venous stasis dermatitis, Insulin dependent Type 2 diabetes who was initially admitted 7/12 to [facility] for acute decompensate heart failure. He was diuresed and recommended for transfer to [facility] HF ICU for further managemnet and possible re-consider of advanced therapy work up.      Of notes, Patient was previously discussed for AT during [facility] October 2023 admission but declined with concerns for mobility/frailty (uses motorized scooter and has bilateral foot drop with neuropathy), CKD, poorly controlled diabetes as well as unclear social/financial support).     In the HF ICU, lasix bolus given and drip initiated. Will consider to SG when towards euvolemia. Will start heparin drip once heparin assay <0.2.       Neuro:   # Anxiety. Depression, Acute pain    # Bilateral foot drop with LE neuropathy: Able to walk two blocks. Uses motorized scoopter    - Serial neuro and pain assessments    - PO Tylenol PRN for pain   - PT/OT Consult, OOB to chair   - CAM ICU score every shift   - Sleep/wake cycle normalization      # Physical Status   -Obesity: BMI: 34  -Reduced Mobility due to ICU and CHF       #Substance abuse   -Alcohol abuse/Alcohol dependence: NO   -Tobacco use/Nicotine Dependence: Quit 8 years ago      Cardiovascular:   # acute on chronic systolic CHF, HFrEF 15-20%, dx'd 2017,   # Ischemic Cardiomyopathy, ACC/AHA Stage C-D, NYHA II, Warm,   -admit weight (7/17): 111 kg  -BNP (7/17): 744  -TTE (5/4/2025):  Left ventricular ejection fraction is severely decreased, by visual estimate at 15-20%.   - RHC (6/10/25): RA: 25,RV: 63/9, PA: 72/34, PCWP: 32, PA-SAT: 36%, SVC-SAT: 41%, FA-SAT: 71%, CO: 4.84, CI: 2.24, SVR: 1041  - LHC (6/10/25):   Right dominant coronary circulation with severe multivessel CAD, as described above. LIMA to LAD patent.  - c/w atorvastin 80mg    - C/w Hydralazine 100mg  - Resumed metoprolol  50 mg daily 7/18, home dose was 200 mg daily   - Hold Home PO Bumex   - Diurese with Lasix bolus and Lasix drip at 20 mg/h   - SG when when towards euvolemic, possible over the weekends  - Daily standing weights, 2gm sodium diet, 2L fluid restriction, strict I&Os     #CAD    - s/p PCI and 1v CABG (LIMA-LAD 04/2023).   - C/w ASA and atorvastatin      Pulmonary:    # H/o COPD  # Hx Pulmonary Hypertension   # Central sleep apnea.   # Prior tobacco use (24 pack year, quit 2017),   - On 2L via NC      :   #CHARLOTTE/CKD III  - Baseline (11/11/2024): BUN/Cr (GFR): 63/1.36 (60)  -Admit Bun/Creatinine (7/17):  48/1.62, creatinine peaked at 2.55 OSH  -I/Os   -avoid hypotension and nephrotoxic agents      Heme:   #Anemia in the setting of MAI and CKD III  - Labs (7/17):  TIBC: 377,  ferritin: 102, serum Fe: 61, folate: 13.1, B12: 452   - IV Venofer 200 mg x5 doses (7/17-7/21)      # Left femoral DVT (2017)  - Hold home Eliquis  - Initiate Heparin drip once Heparin assay < 0.2      Endo:   #DM 2  - hgbA1c(7/17):  10.1  - C/w Lantus 20 units Am, 40 units PM, SSI and Pradial insulin      # Hypothyroidism  -C/w Levothyroxine 75mcg  -TSH (7/18): 3.97      ID:   -afebrile, nontoxic    -no s/s infx   -trend temps q4 hr        PHYSICAL AND OCCUPATIONAL THERAPY:     LINES:  PIVs         DVT: SCD's, Heparin drip once Heparin assay <0.2  VAP BUNDLE: NA  CENTRAL LINE BUNDLE: NA   ULCER PPX: PPI  GLYCEMIC CONTROL: SSI, Lantus insulin   BOWEL CARE: Senna, Miralax   INDWELLING CATHETER: NA  NUTRITION: Adult diet Consistent Carb; CCD 90 gm/meal; 2000 mL fluid        EMERGENCY CONTACT: Extended Emergency Contact Information  Primary Emergency Contact: NAM LOO  Address: [id number] [place] [id number] [facility]  Home Phone: [phone]  Mobile Phone: [phone]  Relation: Mother  Secondary Emergency Contact: Friend,Robinson  Mobile Phone: [phone]  Relation: Friend  FAMILY UPDATE:  CODE STATUS: Full  Code  DISPO:  HF ICU      Patient seen and assessed with Dr. Nguyen     I personally spent 60  minutes of critical care time directly and personally managing the patient exclusive of separately billable procedures.       Shelly Villafuerte  CNP

## 2025-07-18 NOTE — PROGRESS NOTES
HFICU Attending Note    Assessment & Plan  Acute on chronic systolic heart failure      Patient admitted yesterday remains markedly volume overloaded but improving we will hold off on Cordell-Talita catheter as long as he has stable to improving renal function and responding to diuretics reviewed prior right heart catheterization suggesting significant right heart dysfunction seems to have a degree of cardiorenal syndrome as renal function is now improving with diuresis we may consider augmenting with metolazone versus acetazolamide, we reviewed with him that his diabetes remains poorly controlled but it does seem that he has a better adherence with medications and clinic follow-up as he is relatively warm on exam and have been taking his metoprolol we will add back at a reduced dose, we will continue digoxin dapagliflozin and spironolactone for now will transition from apixaban to heparin given impending planned right heart catheterization  I did also review his recent coronary angiogram and PET scan he seems to have primarily distal disease this could be the reason for his abnormal study given that his LIMA is patent and he has chronically occluded mid RCA not sure there is much role for further revascularization      This critically ill patient continues to be at-risk for clinically significant deterioration / failure due to the above mentioned dysfunctional, unstable organ systems.  I have personally identified and managed all complex critical care issues to prevent aforementioned clinical deterioration.  Critical care time is spent at bedside and/or the immediate area and has included, but is not limited to, the review of diagnostic tests, labs, radiographs, serial assessments of hemodynamics, respiratory status, ventilatory management, and family updates.  Time spent in procedures and teaching are reported separately.    Critical care time: 34____ minutes

## 2025-07-18 NOTE — CARE PLAN
Problem: Pain - Adult  Goal: Verbalizes/displays adequate comfort level or baseline comfort level  Outcome: Progressing     Problem: Safety - Adult  Goal: Free from fall injury  Outcome: Progressing     Problem: Discharge Planning  Goal: Discharge to home or other facility with appropriate resources  Outcome: Progressing     Problem: Chronic Conditions and Co-morbidities  Goal: Patient's chronic conditions and co-morbidity symptoms are monitored and maintained or improved  Outcome: Progressing     Problem: Nutrition  Goal: Nutrient intake appropriate for maintaining nutritional needs  Outcome: Progressing     Problem: Heart Failure  Goal: Improved gas exchange this shift  Outcome: Progressing  Goal: Improved urinary output this shift  Outcome: Progressing  Goal: Reduction in peripheral edema within 24 hours  Outcome: Progressing  Goal: Report improvement of dyspnea/breathlessness this shift  Outcome: Progressing  Goal: Weight from fluid excess reduced over 2-3 days, then stabilize  Outcome: Progressing  Goal: Increase self care and/or family involvement in 24 hours  Outcome: Progressing   The patient's goals for the shift include      The clinical goals for the shift include Hemodynamic stability throughout shift

## 2025-07-19 ENCOUNTER — APPOINTMENT (OUTPATIENT)
Dept: RADIOLOGY | Facility: HOSPITAL | Age: 60
End: 2025-07-19
Payer: MEDICARE

## 2025-07-19 LAB
ALBUMIN SERPL BCP-MCNC: 3.6 G/DL (ref 3.4–5)
ALBUMIN SERPL BCP-MCNC: 3.9 G/DL (ref 3.4–5)
ANION GAP BLDMV CALCULATED.4IONS-SCNC: 7 MMO/L (ref 10–25)
ANION GAP BLDMV CALCULATED.4IONS-SCNC: 8 MMO/L (ref 10–25)
ANION GAP SERPL CALC-SCNC: 12 MMOL/L (ref 10–20)
ANION GAP SERPL CALC-SCNC: 13 MMOL/L (ref 10–20)
BASE EXCESS BLDMV CALC-SCNC: 10.8 MMOL/L (ref -2–3)
BASE EXCESS BLDMV CALC-SCNC: 11.1 MMOL/L (ref -2–3)
BODY TEMPERATURE: 37 DEGREES CELSIUS
BODY TEMPERATURE: 37 DEGREES CELSIUS
BUN SERPL-MCNC: 39 MG/DL (ref 6–23)
BUN SERPL-MCNC: 43 MG/DL (ref 6–23)
CA-I BLDMV-SCNC: 1.17 MMOL/L (ref 1.1–1.33)
CA-I BLDMV-SCNC: 1.2 MMOL/L (ref 1.1–1.33)
CALCIUM SERPL-MCNC: 10 MG/DL (ref 8.6–10.6)
CALCIUM SERPL-MCNC: 9.3 MG/DL (ref 8.6–10.6)
CHLORIDE BLD-SCNC: 94 MMOL/L (ref 98–107)
CHLORIDE BLD-SCNC: 95 MMOL/L (ref 98–107)
CHLORIDE SERPL-SCNC: 91 MMOL/L (ref 98–107)
CHLORIDE SERPL-SCNC: 98 MMOL/L (ref 98–107)
CO2 SERPL-SCNC: 32 MMOL/L (ref 21–32)
CO2 SERPL-SCNC: 35 MMOL/L (ref 21–32)
CREAT SERPL-MCNC: 1.67 MG/DL (ref 0.5–1.3)
CREAT SERPL-MCNC: 1.81 MG/DL (ref 0.5–1.3)
EGFRCR SERPLBLD CKD-EPI 2021: 43 ML/MIN/1.73M*2
EGFRCR SERPLBLD CKD-EPI 2021: 47 ML/MIN/1.73M*2
ERYTHROCYTE [DISTWIDTH] IN BLOOD BY AUTOMATED COUNT: 20.2 % (ref 11.5–14.5)
GLUCOSE BLD MANUAL STRIP-MCNC: 130 MG/DL (ref 74–99)
GLUCOSE BLD MANUAL STRIP-MCNC: 163 MG/DL (ref 74–99)
GLUCOSE BLD MANUAL STRIP-MCNC: 200 MG/DL (ref 74–99)
GLUCOSE BLD MANUAL STRIP-MCNC: 268 MG/DL (ref 74–99)
GLUCOSE BLD-MCNC: 162 MG/DL (ref 74–99)
GLUCOSE BLD-MCNC: 238 MG/DL (ref 74–99)
GLUCOSE SERPL-MCNC: 196 MG/DL (ref 74–99)
GLUCOSE SERPL-MCNC: 207 MG/DL (ref 74–99)
HCO3 BLDMV-SCNC: 36.3 MMOL/L (ref 22–26)
HCO3 BLDMV-SCNC: 36.4 MMOL/L (ref 22–26)
HCT VFR BLD AUTO: 38.3 % (ref 41–52)
HCT VFR BLD EST: 36 % (ref 41–52)
HCT VFR BLD EST: 37 % (ref 41–52)
HGB BLD-MCNC: 11.2 G/DL (ref 13.5–17.5)
HGB BLDMV-MCNC: 12.1 G/DL (ref 13.5–17.5)
HGB BLDMV-MCNC: 12.3 G/DL (ref 13.5–17.5)
INHALED O2 CONCENTRATION: 28 %
INHALED O2 CONCENTRATION: 28 %
LACTATE BLDMV-SCNC: 1.1 MMOL/L (ref 0.4–2)
LACTATE BLDMV-SCNC: 1.9 MMOL/L (ref 0.4–2)
MAGNESIUM SERPL-MCNC: 2.47 MG/DL (ref 1.6–2.4)
MCH RBC QN AUTO: 23.6 PG (ref 26–34)
MCHC RBC AUTO-ENTMCNC: 29.2 G/DL (ref 32–36)
MCV RBC AUTO: 81 FL (ref 80–100)
NRBC BLD-RTO: 0 /100 WBCS (ref 0–0)
OXYHGB MFR BLDMV: 60.8 % (ref 45–75)
OXYHGB MFR BLDMV: 61.9 % (ref 45–75)
PCO2 BLDMV: 50 MM HG (ref 41–51)
PCO2 BLDMV: 51 MM HG (ref 41–51)
PH BLDMV: 7.46 PH (ref 7.33–7.43)
PH BLDMV: 7.47 PH (ref 7.33–7.43)
PHOSPHATE SERPL-MCNC: 2.7 MG/DL (ref 2.5–4.9)
PHOSPHATE SERPL-MCNC: 3.7 MG/DL (ref 2.5–4.9)
PLATELET # BLD AUTO: 254 X10*3/UL (ref 150–450)
PO2 BLDMV: 39 MM HG (ref 35–45)
PO2 BLDMV: 43 MM HG (ref 35–45)
POTASSIUM BLDMV-SCNC: 3.7 MMOL/L (ref 3.5–5.3)
POTASSIUM BLDMV-SCNC: 3.9 MMOL/L (ref 3.5–5.3)
POTASSIUM SERPL-SCNC: 3.7 MMOL/L (ref 3.5–5.3)
POTASSIUM SERPL-SCNC: 4.2 MMOL/L (ref 3.5–5.3)
RBC # BLD AUTO: 4.74 X10*6/UL (ref 4.5–5.9)
SAO2 % BLDMV: 63 % (ref 45–75)
SAO2 % BLDMV: 64 % (ref 45–75)
SODIUM BLDMV-SCNC: 134 MMOL/L (ref 136–145)
SODIUM BLDMV-SCNC: 135 MMOL/L (ref 136–145)
SODIUM SERPL-SCNC: 135 MMOL/L (ref 136–145)
SODIUM SERPL-SCNC: 138 MMOL/L (ref 136–145)
UFH PPP CHRO-ACNC: 0.2 IU/ML (ref ?–1.1)
UFH PPP CHRO-ACNC: 0.3 IU/ML (ref ?–1.1)
UFH PPP CHRO-ACNC: 0.4 IU/ML (ref ?–1.1)
WBC # BLD AUTO: 4.7 X10*3/UL (ref 4.4–11.3)

## 2025-07-19 PROCEDURE — 83735 ASSAY OF MAGNESIUM: CPT | Performed by: NURSE PRACTITIONER

## 2025-07-19 PROCEDURE — 84132 ASSAY OF SERUM POTASSIUM: CPT | Performed by: NURSE PRACTITIONER

## 2025-07-19 PROCEDURE — 2500000001 HC RX 250 WO HCPCS SELF ADMINISTERED DRUGS (ALT 637 FOR MEDICARE OP)

## 2025-07-19 PROCEDURE — 85027 COMPLETE CBC AUTOMATED: CPT | Performed by: NURSE PRACTITIONER

## 2025-07-19 PROCEDURE — 2020000001 HC ICU ROOM DAILY

## 2025-07-19 PROCEDURE — 99291 CRITICAL CARE FIRST HOUR: CPT | Performed by: INTERNAL MEDICINE

## 2025-07-19 PROCEDURE — 84100 ASSAY OF PHOSPHORUS: CPT | Performed by: NURSE PRACTITIONER

## 2025-07-19 PROCEDURE — 2500000004 HC RX 250 GENERAL PHARMACY W/ HCPCS (ALT 636 FOR OP/ED)

## 2025-07-19 PROCEDURE — 99291 CRITICAL CARE FIRST HOUR: CPT | Performed by: NURSE PRACTITIONER

## 2025-07-19 PROCEDURE — 36556 INSERT NON-TUNNEL CV CATH: CPT | Performed by: NURSE PRACTITIONER

## 2025-07-19 PROCEDURE — 2500000004 HC RX 250 GENERAL PHARMACY W/ HCPCS (ALT 636 FOR OP/ED): Performed by: NURSE PRACTITIONER

## 2025-07-19 PROCEDURE — 71045 X-RAY EXAM CHEST 1 VIEW: CPT

## 2025-07-19 PROCEDURE — 87081 CULTURE SCREEN ONLY: CPT | Performed by: NURSE PRACTITIONER

## 2025-07-19 PROCEDURE — 85520 HEPARIN ASSAY: CPT | Performed by: NURSE PRACTITIONER

## 2025-07-19 PROCEDURE — 02HQ32Z INSERTION OF MONITORING DEVICE INTO RIGHT PULMONARY ARTERY, PERCUTANEOUS APPROACH: ICD-10-PCS | Performed by: NURSE PRACTITIONER

## 2025-07-19 PROCEDURE — 71045 X-RAY EXAM CHEST 1 VIEW: CPT | Performed by: STUDENT IN AN ORGANIZED HEALTH CARE EDUCATION/TRAINING PROGRAM

## 2025-07-19 PROCEDURE — 36415 COLL VENOUS BLD VENIPUNCTURE: CPT | Performed by: NURSE PRACTITIONER

## 2025-07-19 PROCEDURE — 37799 UNLISTED PX VASCULAR SURGERY: CPT | Performed by: NURSE PRACTITIONER

## 2025-07-19 PROCEDURE — 2500000002 HC RX 250 W HCPCS SELF ADMINISTERED DRUGS (ALT 637 FOR MEDICARE OP, ALT 636 FOR OP/ED): Performed by: NURSE PRACTITIONER

## 2025-07-19 PROCEDURE — 82947 ASSAY GLUCOSE BLOOD QUANT: CPT

## 2025-07-19 PROCEDURE — 80069 RENAL FUNCTION PANEL: CPT | Performed by: NURSE PRACTITIONER

## 2025-07-19 PROCEDURE — 2500000001 HC RX 250 WO HCPCS SELF ADMINISTERED DRUGS (ALT 637 FOR MEDICARE OP): Performed by: NURSE PRACTITIONER

## 2025-07-19 RX ORDER — INSULIN GLARGINE 100 [IU]/ML
40 INJECTION, SOLUTION SUBCUTANEOUS DAILY
Status: DISCONTINUED | OUTPATIENT
Start: 2025-07-19 | End: 2025-07-23

## 2025-07-19 RX ORDER — HYDRALAZINE HYDROCHLORIDE 25 MG/1
TABLET, FILM COATED ORAL
Status: DISPENSED
Start: 2025-07-19 | End: 2025-07-20

## 2025-07-19 RX ORDER — HYDRALAZINE HYDROCHLORIDE 50 MG/1
TABLET, FILM COATED ORAL
Status: COMPLETED
Start: 2025-07-19 | End: 2025-07-19

## 2025-07-19 RX ORDER — FENTANYL CITRATE 50 UG/ML
25 INJECTION, SOLUTION INTRAMUSCULAR; INTRAVENOUS ONCE
Status: COMPLETED | OUTPATIENT
Start: 2025-07-19 | End: 2025-07-19

## 2025-07-19 RX ORDER — POTASSIUM CHLORIDE 20 MEQ/1
40 TABLET, EXTENDED RELEASE ORAL ONCE
Status: COMPLETED | OUTPATIENT
Start: 2025-07-19 | End: 2025-07-19

## 2025-07-19 RX ORDER — LIDOCAINE HYDROCHLORIDE 10 MG/ML
INJECTION, SOLUTION INFILTRATION; PERINEURAL
Status: COMPLETED
Start: 2025-07-19 | End: 2025-07-19

## 2025-07-19 RX ORDER — MIDAZOLAM HYDROCHLORIDE 2 MG/2ML
INJECTION, SOLUTION INTRAMUSCULAR; INTRAVENOUS
Status: COMPLETED
Start: 2025-07-19 | End: 2025-07-19

## 2025-07-19 RX ORDER — ISOSORBIDE DINITRATE 20 MG/1
20 TABLET ORAL
Status: DISCONTINUED | OUTPATIENT
Start: 2025-07-19 | End: 2025-07-22

## 2025-07-19 RX ORDER — LIDOCAINE HYDROCHLORIDE 10 MG/ML
10 INJECTION, SOLUTION INFILTRATION; PERINEURAL ONCE
Status: COMPLETED | OUTPATIENT
Start: 2025-07-19 | End: 2025-07-19

## 2025-07-19 RX ORDER — ACETAMINOPHEN 325 MG/1
650 TABLET ORAL EVERY 6 HOURS PRN
Status: DISCONTINUED | OUTPATIENT
Start: 2025-07-19 | End: 2025-07-24

## 2025-07-19 RX ORDER — HYDRALAZINE HYDROCHLORIDE 50 MG/1
50 TABLET, FILM COATED ORAL EVERY 8 HOURS SCHEDULED
Status: DISCONTINUED | OUTPATIENT
Start: 2025-07-19 | End: 2025-07-21

## 2025-07-19 RX ORDER — FENTANYL CITRATE 50 UG/ML
INJECTION, SOLUTION INTRAMUSCULAR; INTRAVENOUS
Status: COMPLETED
Start: 2025-07-19 | End: 2025-07-19

## 2025-07-19 RX ORDER — MIDAZOLAM HYDROCHLORIDE 2 MG/2ML
1 INJECTION, SOLUTION INTRAMUSCULAR; INTRAVENOUS ONCE
Status: COMPLETED | OUTPATIENT
Start: 2025-07-19 | End: 2025-07-19

## 2025-07-19 RX ORDER — INSULIN LISPRO 100 [IU]/ML
12 INJECTION, SOLUTION INTRAVENOUS; SUBCUTANEOUS
Status: DISCONTINUED | OUTPATIENT
Start: 2025-07-19 | End: 2025-07-19

## 2025-07-19 RX ORDER — INSULIN LISPRO 100 [IU]/ML
14 INJECTION, SOLUTION INTRAVENOUS; SUBCUTANEOUS
Status: DISCONTINUED | OUTPATIENT
Start: 2025-07-20 | End: 2025-07-21

## 2025-07-19 RX ADMIN — LEVOTHYROXINE SODIUM 75 MCG: 0.07 TABLET ORAL at 06:53

## 2025-07-19 RX ADMIN — SACUBITRIL AND VALSARTAN 1 TABLET: 24; 26 TABLET, FILM COATED ORAL at 16:20

## 2025-07-19 RX ADMIN — INSULIN LISPRO 10 UNITS: 100 INJECTION, SOLUTION INTRAVENOUS; SUBCUTANEOUS at 09:46

## 2025-07-19 RX ADMIN — LIDOCAINE HYDROCHLORIDE 10 ML: 10 INJECTION, SOLUTION INFILTRATION; PERINEURAL at 12:50

## 2025-07-19 RX ADMIN — HYDRALAZINE HYDROCHLORIDE 50 MG: 50 TABLET ORAL at 20:10

## 2025-07-19 RX ADMIN — INSULIN LISPRO 12 UNITS: 100 INJECTION, SOLUTION INTRAVENOUS; SUBCUTANEOUS at 16:22

## 2025-07-19 RX ADMIN — ATORVASTATIN CALCIUM 80 MG: 80 TABLET, FILM COATED ORAL at 20:03

## 2025-07-19 RX ADMIN — INSULIN LISPRO 6 UNITS: 100 INJECTION, SOLUTION INTRAVENOUS; SUBCUTANEOUS at 12:05

## 2025-07-19 RX ADMIN — HEPARIN SODIUM 1200 UNITS/HR: 10000 INJECTION, SOLUTION INTRAVENOUS at 20:27

## 2025-07-19 RX ADMIN — INSULIN LISPRO 12 UNITS: 100 INJECTION, SOLUTION INTRAVENOUS; SUBCUTANEOUS at 12:05

## 2025-07-19 RX ADMIN — INSULIN LISPRO 2 UNITS: 100 INJECTION, SOLUTION INTRAVENOUS; SUBCUTANEOUS at 09:46

## 2025-07-19 RX ADMIN — ISOSORBIDE DINITRATE 40 MG: 20 TABLET ORAL at 09:34

## 2025-07-19 RX ADMIN — CALCIUM CARBONATE (ANTACID) CHEW TAB 500 MG 1 TABLET: 500 CHEW TAB at 20:03

## 2025-07-19 RX ADMIN — GABAPENTIN 200 MG: 100 CAPSULE ORAL at 06:53

## 2025-07-19 RX ADMIN — QUETIAPINE FUMARATE 50 MG: 25 TABLET ORAL at 20:03

## 2025-07-19 RX ADMIN — IRON SUCROSE 200 MG: 20 INJECTION, SOLUTION INTRAVENOUS at 06:57

## 2025-07-19 RX ADMIN — ISOSORBIDE DINITRATE 20 MG: 20 TABLET ORAL at 18:26

## 2025-07-19 RX ADMIN — SPIRONOLACTONE 25 MG: 25 TABLET, FILM COATED ORAL at 09:37

## 2025-07-19 RX ADMIN — DIGOXIN 125 MCG: 125 TABLET ORAL at 09:37

## 2025-07-19 RX ADMIN — POTASSIUM CHLORIDE 40 MEQ: 1500 TABLET, EXTENDED RELEASE ORAL at 21:57

## 2025-07-19 RX ADMIN — DAPAGLIFLOZIN 10 MG: 10 TABLET, FILM COATED ORAL at 09:34

## 2025-07-19 RX ADMIN — MIDAZOLAM HYDROCHLORIDE 1 MG: 2 INJECTION, SOLUTION INTRAMUSCULAR; INTRAVENOUS at 12:35

## 2025-07-19 RX ADMIN — PANTOPRAZOLE SODIUM 40 MG: 40 TABLET, DELAYED RELEASE ORAL at 06:53

## 2025-07-19 RX ADMIN — METOPROLOL SUCCINATE 50 MG: 50 TABLET, EXTENDED RELEASE ORAL at 09:37

## 2025-07-19 RX ADMIN — FENOFIBRATE 145 MG: 145 TABLET, FILM COATED ORAL at 09:36

## 2025-07-19 RX ADMIN — FENTANYL CITRATE 25 MCG: 50 INJECTION INTRAMUSCULAR; INTRAVENOUS at 13:47

## 2025-07-19 RX ADMIN — HYDRALAZINE HYDROCHLORIDE 100 MG: 100 TABLET ORAL at 09:37

## 2025-07-19 RX ADMIN — INSULIN GLARGINE 40 UNITS: 100 INJECTION, SOLUTION SUBCUTANEOUS at 09:47

## 2025-07-19 RX ADMIN — FENTANYL CITRATE 25 MCG: 50 INJECTION INTRAMUSCULAR; INTRAVENOUS at 12:35

## 2025-07-19 RX ADMIN — FUROSEMIDE 10 MG/HR: 10 INJECTION, SOLUTION INTRAMUSCULAR; INTRAVENOUS at 20:03

## 2025-07-19 RX ADMIN — GABAPENTIN 200 MG: 100 CAPSULE ORAL at 16:20

## 2025-07-19 RX ADMIN — ACETAMINOPHEN 650 MG: 325 TABLET ORAL at 16:20

## 2025-07-19 RX ADMIN — POTASSIUM CHLORIDE 40 MEQ: 1500 TABLET, EXTENDED RELEASE ORAL at 09:36

## 2025-07-19 RX ADMIN — FENTANYL CITRATE 25 MCG: 50 INJECTION, SOLUTION INTRAMUSCULAR; INTRAVENOUS at 12:35

## 2025-07-19 RX ADMIN — INSULIN GLARGINE 40 UNITS: 100 INJECTION, SOLUTION SUBCUTANEOUS at 20:08

## 2025-07-19 ASSESSMENT — COGNITIVE AND FUNCTIONAL STATUS - GENERAL
MOVING TO AND FROM BED TO CHAIR: A LITTLE
MOBILITY SCORE: 20
STANDING UP FROM CHAIR USING ARMS: A LITTLE
CLIMB 3 TO 5 STEPS WITH RAILING: A LITTLE
TOILETING: A LITTLE
DAILY ACTIVITIY SCORE: 23
WALKING IN HOSPITAL ROOM: A LITTLE

## 2025-07-19 ASSESSMENT — PAIN SCALES - GENERAL
PAINLEVEL_OUTOF10: 0 - NO PAIN

## 2025-07-19 ASSESSMENT — PAIN - FUNCTIONAL ASSESSMENT
PAIN_FUNCTIONAL_ASSESSMENT: 0-10

## 2025-07-19 NOTE — PROCEDURES
"Thermodilution PA Catheter    Date/Time: 7/19/2025 1:28 PM    Performed by: BRAD Navarro  Authorized by: BRAD Navarro  Consent: Verbal consent obtained  Risks and benefits: risks, benefits and alternatives were discussed  Consent given by: patient  Patient understanding: patient states understanding of the procedure being performed  Patient consent: the patient's understanding of the procedure matches consent given  Procedure consent: procedure consent matches procedure scheduled  Relevant documents: relevant documents present and verified  Patient identity confirmed: verbally with patient, arm band and hospital-assigned identification number  Time out: Immediately prior to procedure a \"time out\" was called to verify the correct patient, procedure, equipment, support staff and site/side marked as required.  Preparation: Patient was prepped and draped in the usual sterile fashion.  Local anesthesia used: yes  Anesthesia: local infiltration    Anesthesia:  Local anesthesia used: yes  Local Anesthetic: lidocaine 1% without epinephrine    Sedation:  Patient sedated: yes  Sedatives: fentanyl and midazolam    Patient tolerance: patient tolerated the procedure well with no immediate complications  Comments: A time out was preformed. The patients right neck region was prepped and draped in a sterile fashion using chlorhexidine scrub. Anesthia was achieved with 1% lidocaine. The right internal jugular vein was accessed using a micropuncture needle and sheath. Venous blood was withdrawn and the sheath was advanced into the vein and the needle was withdrawn. A larger guidewire was advanced through the sheath. A small incision was made with a 10 blade scalpel and the sheath was exchanged for a dilator with overlying Andorran #8 catheter over the guidewire until appropriate dilation was obtained. Dilator was flushed appropriately, taking care to first withdraw any free air in system. We manipulated a Warren-Talita " catheter through the venous system, to the pulmonary capillary wedge position monitoring for appropriate RA and RV wave-forms. No ectopy was noted on the monitor. A Wedge pressure was obtained. We reviewed the data and felt that it was adequate. We cleaned and dressed the access site. Post procedure chest x-ray was ordered and will be reviewed for correct placement and signs of pneumothorax. Patient tolerated the procedure well.     Cofield was locked at: 57 cm    Opening swan numbers: BP: 133/74 (94),CVP 9, PA 74/29 (44), PCWP 24, CO/CI (Pallavi) 5.4/2.4, Thermo: 4.6/2.1, SVR 1263, SVO2 63% on hydral /Isordil 100/40 mg TID, Metop 50 mg daily, Dapa 10 mg daily, Dig 125 mcg PO daily, Lasix drip at 20 mg /h.

## 2025-07-19 NOTE — PROGRESS NOTES
HFICU Attending Note    Assessment & Plan  Acute on chronic systolic heart failure      Yesterday patient had excellent response to IV diuretics he feels much better today still has some peripheral edema CVP appears improved we will proceed with a right heart catheterization to evaluate his cardiac output and whether we need to consider an evaluation for advanced therapies his renal function is improving would plan to potentially switch him from hydralazine to Entresto later today, we continue to work on diabetes control, he remains in sinus rhythm but we continue to heparin infusion    This critically ill patient continues to be at-risk for clinically significant deterioration / failure due to the above mentioned dysfunctional, unstable organ systems.  I have personally identified and managed all complex critical care issues to prevent aforementioned clinical deterioration.  Critical care time is spent at bedside and/or the immediate area and has included, but is not limited to, the review of diagnostic tests, labs, radiographs, serial assessments of hemodynamics, respiratory status, ventilatory management, and family updates.  Time spent in procedures and teaching are reported separately.    Critical care time: __32__ minutes

## 2025-07-19 NOTE — PROGRESS NOTES
[facility]  HFICU PROGRESS NOTE    Felice Law/[id number]    Admit Date: 7/17/2025  Hospital Length of Stay: 2   ICU Length of Stay: 2d 4h   Primary Service:   Primary HF Cardiologist:   Referring:    INTERVAL EVENTS / PERTINENT ROS:     No acute overnight event.    On Lasix drip with total 8 L urine output, no complaints of SOB or chest pain.     Rounded AM with attending by using Norma . Patient states  he feels better overall.     Plan:     - SWAN cath placement today  - Re-introduce Entresto  - Reduce Hydral/isordil  dose    MEDICATIONS  Infusions:  furosemide, Last Rate: 10 mg/hr (07/19/25 1453)  heparin, Last Rate: 1,000 Units/hr (07/19/25 1408)      Scheduled:  [Held by provider] apixaban, 5 mg, BID  atorvastatin, 80 mg, Nightly  [Held by provider] bumetanide, 1 mg, Daily  calcium carbonate, 500 mg of calcium carbonate, BID  dapagliflozin propanediol, 10 mg, Daily  digoxin, 125 mcg, Daily  fenofibrate, 145 mg, Daily  gabapentin, 200 mg, q8h SUSI  hydrALAZINE, 50 mg, q8h SUSI  insulin glargine, 40 Units, Nightly  insulin glargine, 40 Units, Daily  insulin lispro, 0-10 Units, TID AC  insulin lispro, 12 Units, TID AC  iron sucrose, 200 mg, Daily  isosorbide dinitrate, 20 mg, TID  levothyroxine, 75 mcg, Daily before breakfast  metoprolol succinate XL, 50 mg, Daily  pantoprazole, 40 mg, Daily before breakfast  QUEtiapine, 50 mg, Nightly  sacubitriL-valsartan, 1 tablet, BID  spironolactone, 25 mg, Daily      PRN:  albuterol, 2.5 mg, q6h PRN  alteplase, 2 mg, PRN  dextrose, 12.5 g, q15 min PRN  dextrose, 25 g, q15 min PRN  diphenhydrAMINE, 50 mg, q5 min PRN  glucagon, 1 mg, q15 min PRN  glucagon, 1 mg, q15 min PRN  heparin, 2,000-4,000 Units, q4h PRN      Invasive Hemodynamics:    Most Recent Range Past 24hrs   BP (Art)   No data recorded   MAP(Art)   No data recorded   RA/CVP   No data recorded   PA 76/34 PAP  Min: 72/26  Max: 76/34   PA(mean) 48 mmHg PAP (Mean)  Min: 43  "mmHg  Max: 291 mmHg   PCWP 25 mmHg (per Jamin NP) PCWP (mmHg)  Min: 25 mmHg  Max: 25 mmHg   CO 5.4 L/min CO (L/min)  Min: 4.6 L/min  Max: 5.4 L/min   CI 2.4 L/min/m2 CI (L/min/m2)  Min: 2.1 L/min/m2  Max: 2.4 L/min/m2   Mixed Venous 63 % SVO2 (%)  Min: 63 %  Max: 63 %   SVR  1263 (dyne*sec)/cm5 SVR (dyne*sec)/cm5  Min: 1263 (dyne*sec)/cm5  Max: 1263 (dyne*sec)/cm5   PVR   No data recorded         PHYSICAL EXAM:   Visit Vitals  /84   Pulse 78   Temp 36 °C (96.8 °F) (Temporal)   Resp 18   Ht 1.803 m (5' 10.98\")   Wt 104 kg (229 lb 9.6 oz)   SpO2 96%   BMI 32.04 kg/m²   Smoking Status Former   BSA 2.28 m²       Wt Readings from Last 5 Encounters:   07/19/25 104 kg (229 lb 9.6 oz)   07/17/25 110 kg (243 lb 2.7 oz)   06/10/25 97.1 kg (214 lb)   05/12/25 96.6 kg (213 lb)   05/04/25 102 kg (224 lb)       INTAKE/OUTPUT:  I/O last 3 completed shifts:  In: 867.3 (7.8 mL/kg) [P.O.:720; I.V.:147.3 (1.3 mL/kg)]  Out: 9225 (83.4 mL/kg) [Urine:9225 (2.3 mL/kg/hr)]  Weight: 110.6 kg      Physical Exam  Constitutional:       Appearance: Normal appearance.   HENT:      Head: Normocephalic and atraumatic.      Nose: Nose normal.      Mouth/Throat:      Mouth: Mucous membranes are moist.     Eyes:      Extraocular Movements: Extraocular movements intact.      Pupils: Pupils are equal, round, and reactive to light.       Cardiovascular:      Rate and Rhythm: Normal rate and regular rhythm.      Pulses: Normal pulses.      Heart sounds: Normal heart sounds.   Pulmonary:      Effort: Pulmonary effort is normal.      Breath sounds: Normal breath sounds.   Abdominal:      General: There is distension.     Musculoskeletal:         General: Swelling present.      Cervical back: Normal range of motion and neck supple.      Right lower leg: Edema present.      Left lower leg: Edema present.     Skin:     General: Skin is warm.      Capillary Refill: Capillary refill takes 2 to 3 seconds.     Neurological:      General: No focal deficit " "present.      Mental Status: He is alert and oriented to person, place, and time.     Psychiatric:         Mood and Affect: Mood normal.         Behavior: Behavior normal.         DATA:  CMP:  Results from last 7 days   Lab Units 07/19/25  0530 07/18/25  1708 07/18/25  0157 07/17/25  1119 07/17/25  0653 07/16/25  0655 07/15/25  0708 07/14/25  0709 07/13/25  0737 07/12/25  1922   SODIUM mmol/L 138 135* 136 134* 137 135* 137 139 138 133*   POTASSIUM mmol/L 4.2 3.7 4.7 4.0 4.0 4.2 4.3 3.9 3.8 4.0   CHLORIDE mmol/L 98 94* 99 96* 98 98 97* 98 98 95*   CO2 mmol/L 32 31 29 31 32 28 29 29 28 25   ANION GAP mmol/L 12 14 13 11 11 13 15 16 16 17   BUN mg/dL 43* 47* 49* 48* 47* 53* 55* 54* 52* 53*   CREATININE mg/dL 1.67* 1.73* 1.64* 1.62* 1.70* 1.86* 2.02* 2.14* 2.13* 2.55*   EGFR mL/min/1.73m*2 47* 45* 48* 49* 46* 41* 37* 35* 35* 28*   MAGNESIUM mg/dL 2.47*  --  2.40  --  2.39 2.58* 2.70* 2.62* 2.61* 2.48*   ALBUMIN g/dL 3.6 3.7 3.6 3.7  --   --   --   --   --  3.8   ALT U/L  --   --   --  8*  --   --   --   --   --  10   AST U/L  --   --   --  15  --   --   --   --   --  12   BILIRUBIN TOTAL mg/dL  --   --   --  2.0*  --   --   --   --   --  1.6*     CBC:  Results from last 7 days   Lab Units 07/19/25  0530 07/18/25  0157 07/17/25  0653 07/16/25  0654 07/15/25  0708 07/14/25  0709 07/13/25  0737 07/12/25  1922   WBC AUTO x10*3/uL 4.7 5.1 4.7 5.1 4.7 5.7 5.6 5.7   HEMOGLOBIN g/dL 11.2* 11.6* 12.1* 12.3* 12.3* 11.5* 12.9* 12.4*   HEMATOCRIT % 38.3* 38.3* 42.4 41.6 42.3 39.5* 43.4 42.4   PLATELETS AUTO x10*3/uL 254 241 260 272 267 274 285 267   MCV fL 81 77* 82 81 82 81 81 81     COAG:   Results from last 7 days   Lab Units 07/17/25  1119   INR  2.0*     ABO: No results found for: \"ABO\"  HEME/ENDO:  Results from last 7 days   Lab Units 07/17/25  1125 07/17/25  1111   FERRITIN ng/mL 102  --    IRON SATURATION % 16*  --    TSH mIU/L 3.97  --    HEMOGLOBIN A1C %  --  10.1*      CARDIAC:   Results from last 7 days   Lab Units " 07/17/25  1119 07/13/25  0737 07/12/25 2038 07/12/25 1922   TROPHS ng/L  --  53* 53* 53*   TROPHSCMC ng/L 37  --   --   --    BNP pg/mL 744*  --   --  1,299*       ASSESSMENT AND PLAN:     Felice Law is a 60 y.o. male with significant PMH of ICM, HFrEF, EF 15-20%, CABG (LIMA- LAD in 2023),  PCI, VT arrest s/p VT ablation and ICD in 2017, Hypertension, Hyperlipidemia, Hypothyroidism, Left femoral DVT on Eliquis, COPD, JAKE, CKD III, Chronic BLE venous stasis dermatitis, Insulin dependent Type 2 diabetes who was initially admitted 7/12 to [place] for acute decompensate heart failure. He was diuresed and recommended for transfer to [facility] HF ICU for further managemnet and possible re-consider of advanced therapy work up.  Of notes, Patient was previously discussed for AT during [facility] October 2023 admission but declined with concerns for mobility/frailty (uses motorized scooter and has bilateral foot drop with neuropathy), CKD, poorly controlled diabetes as well as unclear social/financial support).     Transferred to HF ICU 7/17, he was warm and wet,  lasix IV bolus given and drip initiated. Eliquis on hold, heparin drip initiated. Bedside SG done 7/19, opening swan numbers: BP: 133/74 (94),CVP 9, PA 74/29 (44), PCWP 24, CO/CI (Pallavi) 5.4/2.4, Thermo: 4.6/2.1, SVR 1263, SVO2 63% on hydral /Isordil 100/40 mg TID, Metop 50 mg daily, Dapa 10 mg daily, Dig 125 mcg PO daily, Lasix drip at 20 mg /h. Entresto re-introduce, plan for SG guided optimize and then discharge home with Entresto.  Hg A1c 10.1 this admission, remains a barrier for consider for advanced therapy for this admission.     Neuro:   # Anxiety. Depression, Acute pain    # Bilateral foot drop with LE neuropathy: Able to walk two blocks. Uses motorized scoopter    - Serial neuro and pain assessments    - PO Tylenol PRN for pain   - PT/OT Consult, OOB to chair   - CAM ICU score every shift   - Sleep/wake cycle normalization      # Physical Status    -Obesity: BMI: 34  -Reduced Mobility due to ICU and CHF       #Substance abuse   -Alcohol abuse/Alcohol dependence: NO   -Tobacco use/Nicotine Dependence: Quit 8 years ago      Cardiovascular:   # acute on chronic systolic CHF, HFrEF 15-20%, dx'd 2017,   # Ischemic Cardiomyopathy, ACC/AHA Stage C-D, NYHA II, Warm,   -admit weight (7/17): 111 kg  -BNP (7/17): 744  -TTE (5/4/2025):  Left ventricular ejection fraction is severely decreased, by visual estimate at 15-20%.   - RHC (6/10/25): RA: 25,RV: 63/9, PA: 72/34, PCWP: 32, PA-SAT: 36%, SVC-SAT: 41%, FA-SAT: 71%, CO: 4.84, CI: 2.24, SVR: 1041  - LHC (6/10/25):   Right dominant coronary circulation with severe multivessel CAD, as described above. LIMA to LAD patent.  - c/w atorvastin 80mg    - Resumed metoprolol 50 mg daily 7/18, home dose was 200 mg daily   - Hold Home PO Bumex   - Diurese with Lasix bolus and Lasix drip at 20 mg/h   - Bedside SG with Opening swan numbers (7/19): BP: 133/74 (94),CVP 9, PA 74/29 (44), PCWP 24, CO/CI (Pallavi) 5.4/2.4, Thermo: 4.6/2.1, SVR 1263, SVO2 63% on hydral /Isordil 100/40 mg TID, Metop 50 mg daily, Dapa 10 mg daily, Dig 125 mcg PO daily, Lasix drip at 20 mg /h.   - Entresto 24/26 mg re-initiated 7/19, was on home Entresto prior, stopped, most possible due to insurance issue, please clarify and meds to home for discharge planning.   - Reduced home dose hydral/Isordil to 50/20 mg TID to allow Bp room for Entresto 7/19  - Daily standing weights, 2gm sodium diet, 2L fluid restriction, strict I&Os     #CAD    - s/p PCI and 1v CABG (LIMA-LAD 04/2023).   - C/w ASA and atorvastatin      Pulmonary:    # H/o COPD  # Hx Pulmonary Hypertension   # Central sleep apnea.   # Prior tobacco use (24 pack year, quit 2017),   - On 2L via NC      :   #CHARLOTTE/CKD III  - Baseline (11/11/2024): BUN/Cr (GFR): 63/1.36 (60)  -Admit Bun/Creatinine (7/17):  48/1.62, creatinine peaked at 2.55 OSH  -I/Os   -avoid hypotension and nephrotoxic agents      Heme:    #Anemia in the setting of MAI and CKD III  - Labs (7/17):  TIBC: 377,  ferritin: 102, serum Fe: 61, folate: 13.1, B12: 452   - IV Venofer 200 mg x5 doses (7/17-7/21)      # Left femoral DVT (2017)  - Hold home Eliquis  - Initiate Heparin drip once Heparin assay < 0.2      Endo:   #DM 2  - hgbA1c(7/17):  10.1  - C/w Lantus 20 units Am, 40 units PM, SSI and Pradial insulin      # Hypothyroidism  -C/w Levothyroxine 75mcg  -TSH (7/18): 3.97      ID:   -afebrile, nontoxic    -no s/s infx   -trend temps q4 hr        PHYSICAL AND OCCUPATIONAL THERAPY:     LINES:  PIVs         DVT: SCD's, Heparin drip once Heparin assay <0.2  VAP BUNDLE: NA  CENTRAL LINE BUNDLE: NA   ULCER PPX: PPI  GLYCEMIC CONTROL: SSI, Lantus insulin   BOWEL CARE: Senna, Miralax   INDWELLING CATHETER: NA  NUTRITION: Adult diet Consistent Carb; CCD 90 gm/meal; 2000 mL fluid        EMERGENCY CONTACT: Extended Emergency Contact Information  Primary Emergency Contact: NAM LOO  Address: [address] [place] [id number] [facility]  Home Phone: [phone]  Mobile Phone: [phone]  Relation: Mother  Secondary Emergency Contact: Robinson Contreras  Mobile Phone: [phone]  Relation: Friend  FAMILY UPDATE:  CODE STATUS: Full Code  DISPO:  HF ICU      Patient seen and assessed with Dr. Nguyen     I personally spent 60  minutes of critical care time directly and personally managing the patient exclusive of separately billable procedures.       Shelly Villafuerte  CNP

## 2025-07-20 ENCOUNTER — APPOINTMENT (OUTPATIENT)
Dept: RADIOLOGY | Facility: HOSPITAL | Age: 60
End: 2025-07-20
Payer: MEDICARE

## 2025-07-20 LAB
ALBUMIN SERPL BCP-MCNC: 3.9 G/DL (ref 3.4–5)
ALBUMIN SERPL BCP-MCNC: 4 G/DL (ref 3.4–5)
ANION GAP BLDMV CALCULATED.4IONS-SCNC: 2 MMO/L (ref 10–25)
ANION GAP BLDMV CALCULATED.4IONS-SCNC: 4 MMO/L (ref 10–25)
ANION GAP BLDMV CALCULATED.4IONS-SCNC: 6 MMO/L (ref 10–25)
ANION GAP BLDMV CALCULATED.4IONS-SCNC: 9 MMO/L (ref 10–25)
ANION GAP BLDV CALCULATED.4IONS-SCNC: 1 MMOL/L (ref 10–25)
ANION GAP BLDV CALCULATED.4IONS-SCNC: ABNORMAL MMOL/L
ANION GAP SERPL CALC-SCNC: 12 MMOL/L (ref 10–20)
ANION GAP SERPL CALC-SCNC: 13 MMOL/L (ref 10–20)
BASE EXCESS BLDMV CALC-SCNC: 10.8 MMOL/L (ref -2–3)
BASE EXCESS BLDMV CALC-SCNC: 11.8 MMOL/L (ref -2–3)
BASE EXCESS BLDMV CALC-SCNC: 13.1 MMOL/L (ref -2–3)
BASE EXCESS BLDMV CALC-SCNC: 15 MMOL/L (ref -2–3)
BASE EXCESS BLDMV CALC-SCNC: 16 MMOL/L (ref -2–3)
BASE EXCESS BLDV CALC-SCNC: 10.6 MMOL/L (ref -2–3)
BASE EXCESS BLDV CALC-SCNC: 15 MMOL/L (ref -2–3)
BODY TEMPERATURE: 37 DEGREES CELSIUS
BUN SERPL-MCNC: 37 MG/DL (ref 6–23)
BUN SERPL-MCNC: 38 MG/DL (ref 6–23)
CA-I BLDMV-SCNC: 1.2 MMOL/L (ref 1.1–1.33)
CA-I BLDMV-SCNC: 1.21 MMOL/L (ref 1.1–1.33)
CA-I BLDMV-SCNC: 1.22 MMOL/L (ref 1.1–1.33)
CA-I BLDMV-SCNC: 1.23 MMOL/L (ref 1.1–1.33)
CA-I BLDV-SCNC: 1.17 MMOL/L (ref 1.1–1.33)
CA-I BLDV-SCNC: 1.24 MMOL/L (ref 1.1–1.33)
CALCIUM SERPL-MCNC: 10.1 MG/DL (ref 8.6–10.6)
CALCIUM SERPL-MCNC: 10.1 MG/DL (ref 8.6–10.6)
CHLORIDE BLD-SCNC: 92 MMOL/L (ref 98–107)
CHLORIDE BLD-SCNC: 92 MMOL/L (ref 98–107)
CHLORIDE BLD-SCNC: 93 MMOL/L (ref 98–107)
CHLORIDE BLD-SCNC: 94 MMOL/L (ref 98–107)
CHLORIDE BLDV-SCNC: 94 MMOL/L (ref 98–107)
CHLORIDE BLDV-SCNC: 95 MMOL/L (ref 98–107)
CHLORIDE SERPL-SCNC: 90 MMOL/L (ref 98–107)
CHLORIDE SERPL-SCNC: 93 MMOL/L (ref 98–107)
CO2 SERPL-SCNC: 36 MMOL/L (ref 21–32)
CO2 SERPL-SCNC: 37 MMOL/L (ref 21–32)
CREAT SERPL-MCNC: 1.56 MG/DL (ref 0.5–1.3)
CREAT SERPL-MCNC: 1.6 MG/DL (ref 0.5–1.3)
DIGOXIN SERPL-MCNC: 0.8 NG/ML (ref 0.8–?)
EGFRCR SERPLBLD CKD-EPI 2021: 49 ML/MIN/1.73M*2
EGFRCR SERPLBLD CKD-EPI 2021: 51 ML/MIN/1.73M*2
ERYTHROCYTE [DISTWIDTH] IN BLOOD BY AUTOMATED COUNT: 20.4 % (ref 11.5–14.5)
GLUCOSE BLD MANUAL STRIP-MCNC: 169 MG/DL (ref 74–99)
GLUCOSE BLD MANUAL STRIP-MCNC: 189 MG/DL (ref 74–99)
GLUCOSE BLD MANUAL STRIP-MCNC: 204 MG/DL (ref 74–99)
GLUCOSE BLD MANUAL STRIP-MCNC: 264 MG/DL (ref 74–99)
GLUCOSE BLD-MCNC: 187 MG/DL (ref 74–99)
GLUCOSE BLD-MCNC: 244 MG/DL (ref 74–99)
GLUCOSE BLD-MCNC: 245 MG/DL (ref 74–99)
GLUCOSE BLD-MCNC: 255 MG/DL (ref 74–99)
GLUCOSE BLDV-MCNC: 155 MG/DL (ref 74–99)
GLUCOSE BLDV-MCNC: 179 MG/DL (ref 74–99)
GLUCOSE SERPL-MCNC: 168 MG/DL (ref 74–99)
GLUCOSE SERPL-MCNC: 219 MG/DL (ref 74–99)
HCO3 BLDMV-SCNC: 36.8 MMOL/L (ref 22–26)
HCO3 BLDMV-SCNC: 38 MMOL/L (ref 22–26)
HCO3 BLDMV-SCNC: 39.6 MMOL/L (ref 22–26)
HCO3 BLDMV-SCNC: 41 MMOL/L (ref 22–26)
HCO3 BLDMV-SCNC: 41.9 MMOL/L (ref 22–26)
HCO3 BLDV-SCNC: 36.3 MMOL/L (ref 22–26)
HCO3 BLDV-SCNC: 41 MMOL/L (ref 22–26)
HCT VFR BLD AUTO: 40.2 % (ref 41–52)
HCT VFR BLD EST: 37 % (ref 41–52)
HCT VFR BLD EST: 38 % (ref 41–52)
HCT VFR BLD EST: 39 % (ref 41–52)
HCT VFR BLD EST: 39 % (ref 41–52)
HCT VFR BLD EST: 40 % (ref 41–52)
HCT VFR BLD EST: 42 % (ref 41–52)
HGB BLD-MCNC: 12.2 G/DL (ref 13.5–17.5)
HGB BLDMV-MCNC: 12.3 G/DL (ref 13.5–17.5)
HGB BLDMV-MCNC: 12.7 G/DL (ref 13.5–17.5)
HGB BLDMV-MCNC: 12.9 G/DL (ref 13.5–17.5)
HGB BLDMV-MCNC: 13.4 G/DL (ref 13.5–17.5)
HGB BLDV-MCNC: 13 G/DL (ref 13.5–17.5)
HGB BLDV-MCNC: 13.9 G/DL (ref 13.5–17.5)
HOLD SPECIMEN: NORMAL
INHALED O2 CONCENTRATION: 28 %
INHALED O2 CONCENTRATION: 32 %
LACTATE BLDMV-SCNC: 1 MMOL/L (ref 0.4–2)
LACTATE BLDMV-SCNC: 1.3 MMOL/L (ref 0.4–2)
LACTATE BLDMV-SCNC: 1.4 MMOL/L (ref 0.4–2)
LACTATE BLDMV-SCNC: 1.6 MMOL/L (ref 0.4–2)
LACTATE BLDV-SCNC: 1.3 MMOL/L (ref 0.4–2)
LACTATE BLDV-SCNC: 1.6 MMOL/L (ref 0.4–2)
MAGNESIUM SERPL-MCNC: 2.55 MG/DL (ref 1.6–2.4)
MCH RBC QN AUTO: 23.6 PG (ref 26–34)
MCHC RBC AUTO-ENTMCNC: 30.3 G/DL (ref 32–36)
MCV RBC AUTO: 78 FL (ref 80–100)
NRBC BLD-RTO: 0 /100 WBCS (ref 0–0)
OXYHGB MFR BLDMV: 58.6 % (ref 45–75)
OXYHGB MFR BLDMV: 59.1 % (ref 45–75)
OXYHGB MFR BLDMV: 60.3 % (ref 45–75)
OXYHGB MFR BLDMV: 62.2 % (ref 45–75)
OXYHGB MFR BLDMV: 62.9 % (ref 45–75)
OXYHGB MFR BLDV: 47.3 % (ref 45–75)
OXYHGB MFR BLDV: 59.5 % (ref 45–75)
PCO2 BLDMV: 53 MM HG (ref 41–51)
PCO2 BLDMV: 55 MM HG (ref 41–51)
PCO2 BLDMV: 55 MM HG (ref 41–51)
PCO2 BLDMV: 56 MM HG (ref 41–51)
PCO2 BLDMV: 57 MM HG (ref 41–51)
PCO2 BLDV: 51 MM HG (ref 41–51)
PCO2 BLDV: 55 MM HG (ref 41–51)
PH BLDMV: 7.44 PH (ref 7.33–7.43)
PH BLDMV: 7.45 PH (ref 7.33–7.43)
PH BLDMV: 7.45 PH (ref 7.33–7.43)
PH BLDMV: 7.48 PH (ref 7.33–7.43)
PH BLDMV: 7.49 PH (ref 7.33–7.43)
PH BLDV: 7.46 PH (ref 7.33–7.43)
PH BLDV: 7.48 PH (ref 7.33–7.43)
PHOSPHATE SERPL-MCNC: 3.6 MG/DL (ref 2.5–4.9)
PHOSPHATE SERPL-MCNC: 3.8 MG/DL (ref 2.5–4.9)
PLATELET # BLD AUTO: 252 X10*3/UL (ref 150–450)
PO2 BLDMV: 35 MM HG (ref 35–45)
PO2 BLDMV: 37 MM HG (ref 35–45)
PO2 BLDMV: 39 MM HG (ref 35–45)
PO2 BLDMV: 39 MM HG (ref 35–45)
PO2 BLDMV: 40 MM HG (ref 35–45)
PO2 BLDV: 31 MM HG (ref 35–45)
PO2 BLDV: 39 MM HG (ref 35–45)
POTASSIUM BLDMV-SCNC: 3.8 MMOL/L (ref 3.5–5.3)
POTASSIUM BLDMV-SCNC: 3.9 MMOL/L (ref 3.5–5.3)
POTASSIUM BLDMV-SCNC: 4 MMOL/L (ref 3.5–5.3)
POTASSIUM BLDMV-SCNC: 4.3 MMOL/L (ref 3.5–5.3)
POTASSIUM BLDV-SCNC: 4.2 MMOL/L (ref 3.5–5.3)
POTASSIUM BLDV-SCNC: 5.3 MMOL/L (ref 3.5–5.3)
POTASSIUM SERPL-SCNC: 3.6 MMOL/L (ref 3.5–5.3)
POTASSIUM SERPL-SCNC: 3.8 MMOL/L (ref 3.5–5.3)
RBC # BLD AUTO: 5.17 X10*6/UL (ref 4.5–5.9)
SAO2 % BLDMV: 61 % (ref 45–75)
SAO2 % BLDMV: 61 % (ref 45–75)
SAO2 % BLDMV: 62 % (ref 45–75)
SAO2 % BLDMV: 64 % (ref 45–75)
SAO2 % BLDMV: 65 % (ref 45–75)
SAO2 % BLDV: 49 % (ref 45–75)
SAO2 % BLDV: 61 % (ref 45–75)
SODIUM BLDMV-SCNC: 133 MMOL/L (ref 136–145)
SODIUM BLDMV-SCNC: 133 MMOL/L (ref 136–145)
SODIUM BLDMV-SCNC: 134 MMOL/L (ref 136–145)
SODIUM BLDMV-SCNC: 134 MMOL/L (ref 136–145)
SODIUM BLDV-SCNC: 132 MMOL/L (ref 136–145)
SODIUM BLDV-SCNC: ABNORMAL MMOL/L
SODIUM SERPL-SCNC: 135 MMOL/L (ref 136–145)
SODIUM SERPL-SCNC: 138 MMOL/L (ref 136–145)
UFH PPP CHRO-ACNC: 0.2 IU/ML (ref ?–1.1)
UFH PPP CHRO-ACNC: 0.2 IU/ML (ref ?–1.1)
UFH PPP CHRO-ACNC: 0.3 IU/ML (ref ?–1.1)
UFH PPP CHRO-ACNC: 0.3 IU/ML (ref ?–1.1)
UFH PPP CHRO-ACNC: 0.4 IU/ML (ref ?–1.1)
WBC # BLD AUTO: 5.1 X10*3/UL (ref 4.4–11.3)

## 2025-07-20 PROCEDURE — 82435 ASSAY OF BLOOD CHLORIDE: CPT | Performed by: NURSE PRACTITIONER

## 2025-07-20 PROCEDURE — 37799 UNLISTED PX VASCULAR SURGERY: CPT | Performed by: NURSE PRACTITIONER

## 2025-07-20 PROCEDURE — 82947 ASSAY GLUCOSE BLOOD QUANT: CPT

## 2025-07-20 PROCEDURE — 84132 ASSAY OF SERUM POTASSIUM: CPT

## 2025-07-20 PROCEDURE — 85027 COMPLETE CBC AUTOMATED: CPT | Performed by: NURSE PRACTITIONER

## 2025-07-20 PROCEDURE — 83735 ASSAY OF MAGNESIUM: CPT | Performed by: NURSE PRACTITIONER

## 2025-07-20 PROCEDURE — 71045 X-RAY EXAM CHEST 1 VIEW: CPT

## 2025-07-20 PROCEDURE — 2500000002 HC RX 250 W HCPCS SELF ADMINISTERED DRUGS (ALT 637 FOR MEDICARE OP, ALT 636 FOR OP/ED): Performed by: NURSE PRACTITIONER

## 2025-07-20 PROCEDURE — 2500000001 HC RX 250 WO HCPCS SELF ADMINISTERED DRUGS (ALT 637 FOR MEDICARE OP): Performed by: NURSE PRACTITIONER

## 2025-07-20 PROCEDURE — 84132 ASSAY OF SERUM POTASSIUM: CPT | Performed by: NURSE PRACTITIONER

## 2025-07-20 PROCEDURE — 2500000004 HC RX 250 GENERAL PHARMACY W/ HCPCS (ALT 636 FOR OP/ED)

## 2025-07-20 PROCEDURE — 85520 HEPARIN ASSAY: CPT | Performed by: NURSE PRACTITIONER

## 2025-07-20 PROCEDURE — 99291 CRITICAL CARE FIRST HOUR: CPT | Performed by: INTERNAL MEDICINE

## 2025-07-20 PROCEDURE — 99291 CRITICAL CARE FIRST HOUR: CPT | Performed by: NURSE PRACTITIONER

## 2025-07-20 PROCEDURE — 2020000001 HC ICU ROOM DAILY

## 2025-07-20 PROCEDURE — 82805 BLOOD GASES W/O2 SATURATION: CPT

## 2025-07-20 PROCEDURE — 80162 ASSAY OF DIGOXIN TOTAL: CPT | Performed by: NURSE PRACTITIONER

## 2025-07-20 PROCEDURE — 71045 X-RAY EXAM CHEST 1 VIEW: CPT | Performed by: STUDENT IN AN ORGANIZED HEALTH CARE EDUCATION/TRAINING PROGRAM

## 2025-07-20 PROCEDURE — 2500000004 HC RX 250 GENERAL PHARMACY W/ HCPCS (ALT 636 FOR OP/ED): Performed by: NURSE PRACTITIONER

## 2025-07-20 RX ORDER — POTASSIUM CHLORIDE 20 MEQ/1
40 TABLET, EXTENDED RELEASE ORAL ONCE
Status: COMPLETED | OUTPATIENT
Start: 2025-07-20 | End: 2025-07-20

## 2025-07-20 RX ORDER — FUROSEMIDE 10 MG/ML
80 INJECTION INTRAMUSCULAR; INTRAVENOUS ONCE
Status: COMPLETED | OUTPATIENT
Start: 2025-07-20 | End: 2025-07-20

## 2025-07-20 RX ORDER — METOPROLOL SUCCINATE 50 MG/1
100 TABLET, EXTENDED RELEASE ORAL DAILY
Status: DISCONTINUED | OUTPATIENT
Start: 2025-07-21 | End: 2025-07-24

## 2025-07-20 RX ADMIN — DAPAGLIFLOZIN 10 MG: 10 TABLET, FILM COATED ORAL at 08:08

## 2025-07-20 RX ADMIN — INSULIN LISPRO 2 UNITS: 100 INJECTION, SOLUTION INTRAVENOUS; SUBCUTANEOUS at 11:53

## 2025-07-20 RX ADMIN — INSULIN LISPRO 14 UNITS: 100 INJECTION, SOLUTION INTRAVENOUS; SUBCUTANEOUS at 11:53

## 2025-07-20 RX ADMIN — POTASSIUM CHLORIDE 40 MEQ: 1500 TABLET, EXTENDED RELEASE ORAL at 17:20

## 2025-07-20 RX ADMIN — HYDRALAZINE HYDROCHLORIDE 50 MG: 50 TABLET ORAL at 21:01

## 2025-07-20 RX ADMIN — INSULIN LISPRO 2 UNITS: 100 INJECTION, SOLUTION INTRAVENOUS; SUBCUTANEOUS at 08:10

## 2025-07-20 RX ADMIN — INSULIN LISPRO 14 UNITS: 100 INJECTION, SOLUTION INTRAVENOUS; SUBCUTANEOUS at 15:52

## 2025-07-20 RX ADMIN — INSULIN LISPRO 6 UNITS: 100 INJECTION, SOLUTION INTRAVENOUS; SUBCUTANEOUS at 15:52

## 2025-07-20 RX ADMIN — SACUBITRIL AND VALSARTAN 1 TABLET: 49; 51 TABLET, FILM COATED ORAL at 08:08

## 2025-07-20 RX ADMIN — FENOFIBRATE 145 MG: 145 TABLET, FILM COATED ORAL at 08:08

## 2025-07-20 RX ADMIN — CALCIUM CARBONATE (ANTACID) CHEW TAB 500 MG 1 TABLET: 500 CHEW TAB at 20:58

## 2025-07-20 RX ADMIN — GABAPENTIN 200 MG: 100 CAPSULE ORAL at 13:51

## 2025-07-20 RX ADMIN — HYDRALAZINE HYDROCHLORIDE 50 MG: 50 TABLET ORAL at 05:33

## 2025-07-20 RX ADMIN — ISOSORBIDE DINITRATE 20 MG: 20 TABLET ORAL at 19:21

## 2025-07-20 RX ADMIN — FUROSEMIDE 80 MG: 10 INJECTION, SOLUTION INTRAVENOUS at 20:26

## 2025-07-20 RX ADMIN — METOPROLOL SUCCINATE 50 MG: 50 TABLET, EXTENDED RELEASE ORAL at 08:17

## 2025-07-20 RX ADMIN — GABAPENTIN 200 MG: 100 CAPSULE ORAL at 21:00

## 2025-07-20 RX ADMIN — DIGOXIN 125 MCG: 125 TABLET ORAL at 08:08

## 2025-07-20 RX ADMIN — IRON SUCROSE 200 MG: 20 INJECTION, SOLUTION INTRAVENOUS at 06:36

## 2025-07-20 RX ADMIN — INSULIN GLARGINE 40 UNITS: 100 INJECTION, SOLUTION SUBCUTANEOUS at 20:59

## 2025-07-20 RX ADMIN — ISOSORBIDE DINITRATE 20 MG: 20 TABLET ORAL at 08:07

## 2025-07-20 RX ADMIN — LEVOTHYROXINE SODIUM 75 MCG: 0.07 TABLET ORAL at 05:32

## 2025-07-20 RX ADMIN — HYDRALAZINE HYDROCHLORIDE 50 MG: 50 TABLET ORAL at 13:51

## 2025-07-20 RX ADMIN — INSULIN LISPRO 14 UNITS: 100 INJECTION, SOLUTION INTRAVENOUS; SUBCUTANEOUS at 08:10

## 2025-07-20 RX ADMIN — SACUBITRIL AND VALSARTAN 1 TABLET: 97; 103 TABLET, FILM COATED ORAL at 20:58

## 2025-07-20 RX ADMIN — ATORVASTATIN CALCIUM 80 MG: 80 TABLET, FILM COATED ORAL at 20:58

## 2025-07-20 RX ADMIN — HEPARIN SODIUM 1400 UNITS/HR: 10000 INJECTION, SOLUTION INTRAVENOUS at 15:19

## 2025-07-20 RX ADMIN — GABAPENTIN 200 MG: 100 CAPSULE ORAL at 05:33

## 2025-07-20 RX ADMIN — QUETIAPINE FUMARATE 50 MG: 25 TABLET ORAL at 20:58

## 2025-07-20 RX ADMIN — PANTOPRAZOLE SODIUM 40 MG: 40 TABLET, DELAYED RELEASE ORAL at 05:32

## 2025-07-20 RX ADMIN — SPIRONOLACTONE 25 MG: 25 TABLET, FILM COATED ORAL at 08:08

## 2025-07-20 RX ADMIN — POTASSIUM CHLORIDE 40 MEQ: 1500 TABLET, EXTENDED RELEASE ORAL at 11:49

## 2025-07-20 RX ADMIN — FUROSEMIDE 80 MG: 10 INJECTION, SOLUTION INTRAVENOUS at 10:37

## 2025-07-20 RX ADMIN — INSULIN GLARGINE 40 UNITS: 100 INJECTION, SOLUTION SUBCUTANEOUS at 08:08

## 2025-07-20 RX ADMIN — ISOSORBIDE DINITRATE 20 MG: 20 TABLET ORAL at 13:51

## 2025-07-20 ASSESSMENT — PAIN - FUNCTIONAL ASSESSMENT
PAIN_FUNCTIONAL_ASSESSMENT: 0-10

## 2025-07-20 ASSESSMENT — PAIN SCALES - GENERAL
PAINLEVEL_OUTOF10: 0 - NO PAIN
PAINLEVEL_OUTOF10: 3
PAINLEVEL_OUTOF10: 0 - NO PAIN

## 2025-07-20 ASSESSMENT — COGNITIVE AND FUNCTIONAL STATUS - GENERAL
DAILY ACTIVITIY SCORE: 24
MOBILITY SCORE: 24

## 2025-07-20 NOTE — SIGNIFICANT EVENT
07/19/25 1300   Pre-Procedure Checklist   Emergent Line Insertion No   Type of Line to be Placed Introducer  (PAC)   Consent Obtained Yes   Emergency Medication Necessary No   Patient Identified with 2 Independent Identifiers Yes   Review of Allergies, Anticoagulation, Relevant Labs, ECG/Telemetry Yes   Risks/Benefits/Alternatives Discussed with Patient/POA/Legal Representative Yes   Stop Sign on Door Yes   Time Out Performed Yes   Catheter Exchange No   Positioning and Preparation Checklist   All People, Including Patient, in the Room with Cap and Mask Yes   Fluoroscopy Used to Identify Vessel and Guide Insertion; Sterile Cover Used No   Ultrasound Used to Identify Vessel and Guide Insertion; Sterile Cover Used Yes   Full Barrier Precautions Followed (Mask, Cap, Gown, Gloves) Yes   Hands Washed Yes   Monitors Attached with Sound Alarms On Yes   Full Body Sterile Drape (Head-to-Toe) Used to Cover Patient Yes   [place] Position (For IJ and Subclavian) No   CHG Skin Prep Used and Allowed to Air Dry Prior to Skin Procedure Yes   Procedure Checklist   Blood Aspirated From All Lumens, All Ports Subsequently Flushed Yes   Catheter Caps Placed On All Lumens; Lumens Clamped Yes   Maintain Guidewire Control Throughout, Ensuring Guidewire Removal Yes   Maintain Sterile Field Throughout Insertion Yes   Catheter Secured Yes   Post-Procedure Checklist   Date and Time Written on Dressing Yes   Sharp and Wire Count and Safe Disposal of all Sharps/Wires Yes   Sterile Dressing Applied Per Protocol Yes   X-ray Ordered or ECG Image Yes

## 2025-07-20 NOTE — PROGRESS NOTES
HFICU Attending Note    Assessment & Plan  Acute on chronic systolic heart failure      He feels much better today, placed a [place] catheter yesterday hemodynamics were better than expected markedly improved from before his CVP is less than 10 and cardiac index appears adequate for now given his renal function improving we will transition him back to Entresto from his home hydralazine switch to bolus diuretics we restarted his low-dose beta-blocker will increase slightly he was on 200 mg of metoprolol increased to 100 mg today it does not appear that he will need urgent evaluation for advanced therapies but may be necessary to consider before discharge at this point diabetes remains uncontrolled would like to get a better sense of his functional status    This critically ill patient continues to be at-risk for clinically significant deterioration / failure due to the above mentioned dysfunctional, unstable organ systems.  I have personally identified and managed all complex critical care issues to prevent aforementioned clinical deterioration.  Critical care time is spent at bedside and/or the immediate area and has included, but is not limited to, the review of diagnostic tests, labs, radiographs, serial assessments of hemodynamics, respiratory status, ventilatory management, and family updates.  Time spent in procedures and teaching are reported separately.    Critical care time: __32__ minutes

## 2025-07-20 NOTE — PROGRESS NOTES
[facility]  HFICU PROGRESS NOTE    Felice Law/[id number]    Admit Date: 7/17/2025  Hospital Length of Stay: 3   ICU Length of Stay: 3d 2h   Primary Service:   Primary HF Cardiologist:   Referring:    INTERVAL EVENTS / PERTINENT ROS:     No acute overnight event. Lasix gtt 10mg/hr discontinued overnight, CVP 7, net -6.3L. SGC remains in place. Continues with spot diuresis and optimization of GDMT / afternload agents. Patient reports symptom improvement since arrival. He denies any c/o dyspnea, chest pain, or discomfort.      Plan:   - C/w SGC  - Increase entresto 97/103 BID  - Lasix 80mg IV x 1  - c/w Hydra/isordil and DC as able    MEDICATIONS  Infusions:  [Held by provider] furosemide, Last Rate: Stopped (07/20/25 0605)  heparin, Last Rate: 1,400 Units/hr (07/20/25 1200)      Scheduled:  [Held by provider] apixaban, 5 mg, BID  atorvastatin, 80 mg, Nightly  [Held by provider] bumetanide, 1 mg, Daily  calcium carbonate, 500 mg of calcium carbonate, BID  dapagliflozin propanediol, 10 mg, Daily  digoxin, 125 mcg, Daily  fenofibrate, 145 mg, Daily  gabapentin, 200 mg, q8h SUSI  hydrALAZINE, 50 mg, q8h SUSI  insulin glargine, 40 Units, Nightly  insulin glargine, 40 Units, Daily  insulin lispro, 0-10 Units, TID AC  insulin lispro, 14 Units, TID AC  iron sucrose, 200 mg, Daily  isosorbide dinitrate, 20 mg, TID  levothyroxine, 75 mcg, Daily before breakfast  [START ON 7/21/2025] metoprolol succinate XL, 100 mg, Daily  pantoprazole, 40 mg, Daily before breakfast  QUEtiapine, 50 mg, Nightly  sacubitriL-valsartan, 1 tablet, BID  spironolactone, 25 mg, Daily      PRN:  acetaminophen, 650 mg, q6h PRN  albuterol, 2.5 mg, q6h PRN  alteplase, 2 mg, PRN  dextrose, 12.5 g, q15 min PRN  dextrose, 25 g, q15 min PRN  diphenhydrAMINE, 50 mg, q5 min PRN  glucagon, 1 mg, q15 min PRN  glucagon, 1 mg, q15 min PRN  heparin, 2,000-4,000 Units, q4h PRN      Invasive Hemodynamics:    Most Recent Range Past  "24hrs   BP (Art)   No data recorded   MAP(Art)   No data recorded   RA/CVP   No data recorded   PA 80/41 PAP  Min: 61/25  Max: 80/41   PA(mean) 57 mmHg PAP (Mean)  Min: 38 mmHg  Max: 291 mmHg   PCWP 25 mmHg (per Jamin NP) PCWP (mmHg)  Min: 25 mmHg  Max: 25 mmHg   CO 5.2 L/min CO (L/min)  Min: 4.6 L/min  Max: 5.7 L/min   CI 2.4 L/min/m2 CI (L/min/m2)  Min: 2.1 L/min/m2  Max: 2.6 L/min/m2   Mixed Venous 62 % SVO2 (%)  Min: 62 %  Max: 64 %   SVR  1147 (dyne*sec)/cm5 SVR (dyne*sec)/cm5  Min: 1146 (dyne*sec)/cm5  Max: 1263 (dyne*sec)/cm5   PVR   No data recorded       PHYSICAL EXAM:   Visit Vitals  /82   Pulse 86   Temp 35.9 °C (96.6 °F) (Temporal)   Resp 18   Ht 1.803 m (5' 10.98\")   Wt 98.9 kg (218 lb 0.6 oz)   SpO2 97%   BMI 30.42 kg/m²   Smoking Status Former   BSA 2.23 m²       Wt Readings from Last 5 Encounters:   07/20/25 98.9 kg (218 lb 0.6 oz)   07/17/25 110 kg (243 lb 2.7 oz)   06/10/25 97.1 kg (214 lb)   05/12/25 96.6 kg (213 lb)   05/04/25 102 kg (224 lb)       INTAKE/OUTPUT:  I/O last 3 completed shifts:  In: 610.5 (6.2 mL/kg) [P.O.:240; I.V.:370.5 (3.7 mL/kg)]  Out: 93495 (106.7 mL/kg) [Urine:74382 (3 mL/kg/hr)]  Weight: 98.9 kg      Physical Exam  Constitutional:       Appearance: Normal appearance.   HENT:      Head: Normocephalic and atraumatic.      Nose: Nose normal.      Mouth/Throat:      Mouth: Mucous membranes are moist.     Eyes:      Extraocular Movements: Extraocular movements intact.      Pupils: Pupils are equal, round, and reactive to light.     Neck:      Comments: Rt IJ/ SGC  Cardiovascular:      Rate and Rhythm: Normal rate and regular rhythm.      Pulses: Normal pulses.      Heart sounds:      Gallop present.   Pulmonary:      Effort: Pulmonary effort is normal.      Breath sounds: Decreased breath sounds present.   Abdominal:      General: There is distension.     Musculoskeletal:         General: Swelling present.      Cervical back: Normal range of motion and neck supple.      " "Right lower leg: Edema (trace) present.      Left lower leg: Edema (trace) present.     Skin:     General: Skin is warm.      Capillary Refill: Capillary refill takes 2 to 3 seconds.     Neurological:      General: No focal deficit present.      Mental Status: He is alert and oriented to person, place, and time.     Psychiatric:         Mood and Affect: Mood normal.         Behavior: Behavior normal.         DATA:  CMP:  Results from last 7 days   Lab Units 07/20/25  0556 07/19/25  1925 07/19/25  0530 07/18/25  1708 07/18/25  0157 07/17/25  1119 07/17/25  0653 07/16/25  0655 07/15/25  0708 07/14/25  0709   SODIUM mmol/L 138 135* 138 135* 136 134* 137 135* 137 139   POTASSIUM mmol/L 3.6 3.7 4.2 3.7 4.7 4.0 4.0 4.2 4.3 3.9   CHLORIDE mmol/L 93* 91* 98 94* 99 96* 98 98 97* 98   CO2 mmol/L 37* 35* 32 31 29 31 32 28 29 29   ANION GAP mmol/L 12 13 12 14 13 11 11 13 15 16   BUN mg/dL 38* 39* 43* 47* 49* 48* 47* 53* 55* 54*   CREATININE mg/dL 1.56* 1.81* 1.67* 1.73* 1.64* 1.62* 1.70* 1.86* 2.02* 2.14*   EGFR mL/min/1.73m*2 51* 43* 47* 45* 48* 49* 46* 41* 37* 35*   MAGNESIUM mg/dL 2.55*  --  2.47*  --  2.40  --  2.39 2.58* 2.70* 2.62*   ALBUMIN g/dL 3.9 3.9 3.6 3.7 3.6 3.7  --   --   --   --    ALT U/L  --   --   --   --   --  8*  --   --   --   --    AST U/L  --   --   --   --   --  15  --   --   --   --    BILIRUBIN TOTAL mg/dL  --   --   --   --   --  2.0*  --   --   --   --      CBC:  Results from last 7 days   Lab Units 07/20/25  0556 07/19/25  0530 07/18/25  0157 07/17/25  0653 07/16/25  0654 07/15/25  0708 07/14/25  0709   WBC AUTO x10*3/uL 5.1 4.7 5.1 4.7 5.1 4.7 5.7   HEMOGLOBIN g/dL 12.2* 11.2* 11.6* 12.1* 12.3* 12.3* 11.5*   HEMATOCRIT % 40.2* 38.3* 38.3* 42.4 41.6 42.3 39.5*   PLATELETS AUTO x10*3/uL 252 254 241 260 272 267 274   MCV fL 78* 81 77* 82 81 82 81     COAG:   Results from last 7 days   Lab Units 07/17/25  1119   INR  2.0*     ABO: No results found for: \"ABO\"  HEME/ENDO:  Results from last 7 days   Lab " Units 07/17/25  1125 07/17/25  1111   FERRITIN ng/mL 102  --    IRON SATURATION % 16*  --    TSH mIU/L 3.97  --    HEMOGLOBIN A1C %  --  10.1*      CARDIAC:   Results from last 7 days   Lab Units 07/17/25  1119   [facility] ng/L 37   BNP pg/mL 744*       ASSESSMENT AND PLAN:     Felice Law is a 60 y.o. male with significant PMH of ICM, HFrEF, EF 15-20%, CABG (LIMA- LAD in 2023),  PCI, VT arrest s/p VT ablation and ICD in 2017, Hypertension, Hyperlipidemia, Hypothyroidism, Left femoral DVT on Eliquis, COPD, JAKE, CKD III, Chronic BLE venous stasis dermatitis, Insulin dependent Type 2 diabetes who was initially admitted 7/12 to [place] for acute decompensate heart failure. He was diuresed and recommended for transfer to [facility] HF ICU for further managemnet and possible re-consider of advanced therapy work up.  Of notes, Patient was previously discussed for AT during [facility] October 2023 admission but declined with concerns for mobility/frailty (uses motorized scooter and has bilateral foot drop with neuropathy), CKD, poorly controlled diabetes as well as unclear social/financial support).     Transferred to HF ICU 7/17, he was warm and wet,  lasix IV bolus given and drip initiated. Eliquis on hold, heparin drip initiated. Bedside SG done 7/19, opening swan numbers: BP: 133/74 (94),CVP 9, PA 74/29 (44), PCWP 24, CO/CI (Pallavi) 5.4/2.4, Thermo: 4.6/2.1, SVR 1263, SVO2 63% on hydral /Isordil 100/40 mg TID, Metop 50 mg daily, Dapa 10 mg daily, Dig 125 mcg PO daily, Lasix drip at 20 mg /h. Entresto re-introduce, plan for SG guided optimize and then discharge home with Entresto.  Hg A1c 10.1 this admission, remains a barrier for consider for advanced therapy for this admission.     Neuro:   # Anxiety. Depression, Acute pain    # Bilateral foot drop with LE neuropathy: Able to walk two blocks. Uses motorized scoopter    - Serial neuro and pain assessments    - PO Tylenol PRN for pain   - PT/OT Consult, OOB to chair   - CAM  ICU score every shift   - Sleep/wake cycle normalization      # Physical Status   -Obesity: BMI: 34 --- > 30  -Reduced Mobility due to ICU and CHF       #Substance abuse   -Alcohol abuse/Alcohol dependence: NO   -Tobacco use/Nicotine Dependence: Quit 8 years ago      Cardiovascular:   # acute on chronic systolic CHF, HFrEF 15-20%, dx'd 2017,   # Ischemic Cardiomyopathy, ACC/AHA Stage C-D, NYHA II, Warm,   -admit weight (7/17): 111 kg -- > 98 kg (7/20)  -BNP (7/17): 744  -TTE (5/4/2025):  Left ventricular ejection fraction is severely decreased, by visual estimate at 15-20%.   - RHC (6/10/25): RA: 25,RV: 63/9, PA: 72/34, PCWP: 32, PA-SAT: 36%, SVC-SAT: 41%, FA-SAT: 71%, CO: 4.84, CI: 2.24, SVR: 1041  - LHC (6/10/25):   Right dominant coronary circulation with severe multivessel CAD, as described above. LIMA to LAD patent.  - c/w atorvastin 80mg    - Increase metoprolol 100 mg daily 7/20 (home dose was 200 mg daily)  - Hold Home PO Bumex   - Lasix gtt discontinued overnight 7/19. C/w  Lasix IV push prn  - Bedside SG with Opening swan numbers (7/19): BP: 133/74 (94),CVP 9, PA 74/29 (44), PCWP 24, CO/CI (Pallavi) 5.4/2.4, Thermo: 4.6/2.1, SVR 1263, SVO2 63% on hydral /Isordil 100/40 mg TID, Metop 50 mg daily, Dapa 10 mg daily, Dig 125 mcg PO daily, Lasix drip at 20 mg /h.  - Daily SGC #'s (7/20):  /83 (98), CVP 7, PA 74/32 (47), CO/CI 5.2 / 2.3, SVR 1300, SVO 2 61% Hydra 50, isordil 20, toprol 50, dapa 10, dig 125, entresto 24/26, aldactone 25,   - Entresto 24/26 mg re-initiated 7/19, was on home Entresto prior, stopped, most likely due to insurance issue, please clarify and meds to home for discharge planning.   - Reduced home dose hydral/Isordil to 50/20 mg TID to allow BP room for Entresto 7/19  - Daily standing weights, 2gm sodium diet, 2L fluid restriction, strict I&Os     #CAD    - s/p PCI and 1v CABG (LIMA-LAD 04/2023).   - C/w ASA and atorvastatin      Pulmonary:    # H/o COPD  # Hx Pulmonary Hypertension   #  Central sleep apnea.   # Prior tobacco use (24 pack year, quit 2017),   - On 2L via NC      :   #CHARLOTTE/CKD III  - Baseline (11/11/2024): BUN/Cr (GFR): 63/1.36 (60)  -Admit Bun/Creatinine (7/17):  48/1.62, creatinine peaked at 2.55 OSH  -I/Os   -avoid hypotension and nephrotoxic agents      Heme:   #Anemia in the setting of MAI and CKD III  - Labs (7/17):  TIBC: 377,  ferritin: 102, serum Fe: 61, folate: 13.1, B12: 452   - IV Venofer 200 mg x5 doses (7/17-7/21)      # Left femoral DVT (2017)  - Hold home Eliquis  - C/w Heparin drip (7/18)      Endo:   #DM 2  - hgbA1c(7/17):  10.1  - C/w Lantus 20 units Am, 40 units PM, SSI and Pradial insulin      # Hypothyroidism  -C/w Levothyroxine 75mcg  -TSH (7/18): 3.97      ID:   -afebrile, nontoxic    -no s/s infx   -trend temps q4 hr        PHYSICAL AND OCCUPATIONAL THERAPY: PT/OT     LINES:  PIVs   SGC (7/19 - present)        DVT: SCD's, Heparin drip once Heparin assay <0.2  VAP BUNDLE: NA  CENTRAL LINE BUNDLE: Ordered  ULCER PPX: PPI  GLYCEMIC CONTROL: SSI, Lantus insulin   BOWEL CARE: Senna, Miralax   INDWELLING CATHETER: NA  NUTRITION: Adult diet Consistent Carb; CCD 90 gm/meal; 2000 mL fluid        EMERGENCY CONTACT: Extended Emergency Contact Information  Primary Emergency Contact: NAM LOO  Address: [address] [place] [id number] [facility]  Home Phone: [phone]  Mobile Phone: [phone]  Relation: Mother  Secondary Emergency Contact: Robinson Contreras  Mobile Phone: [phone]  Relation: Friend  FAMILY UPDATE:  CODE STATUS: Full Code  DISPO:  HF ICU      Patient seen and assessed with Dr. Nguyen     I personally spent 60  minutes of critical care time directly and personally managing the patient exclusive of separately billable procedures.       __________________________________________  Breann Aguilera CNP

## 2025-07-21 ENCOUNTER — APPOINTMENT (OUTPATIENT)
Dept: RADIOLOGY | Facility: HOSPITAL | Age: 60
End: 2025-07-21
Payer: MEDICARE

## 2025-07-21 VITALS
TEMPERATURE: 97.5 F | RESPIRATION RATE: 13 BRPM | DIASTOLIC BLOOD PRESSURE: 73 MMHG | HEIGHT: 71 IN | HEART RATE: 77 BPM | SYSTOLIC BLOOD PRESSURE: 134 MMHG | BODY MASS INDEX: 30.99 KG/M2 | OXYGEN SATURATION: 97 % | WEIGHT: 221.34 LBS

## 2025-07-21 LAB
ALBUMIN SERPL BCP-MCNC: 3.6 G/DL (ref 3.4–5)
ANION GAP BLDMV CALCULATED.4IONS-SCNC: 3 MMO/L (ref 10–25)
ANION GAP BLDMV CALCULATED.4IONS-SCNC: 5 MMO/L (ref 10–25)
ANION GAP BLDMV CALCULATED.4IONS-SCNC: 7 MMO/L (ref 10–25)
ANION GAP BLDMV CALCULATED.4IONS-SCNC: 8 MMO/L (ref 10–25)
ANION GAP BLDMV CALCULATED.4IONS-SCNC: 9 MMO/L (ref 10–25)
ANION GAP SERPL CALC-SCNC: 12 MMOL/L (ref 10–20)
BASE EXCESS BLDMV CALC-SCNC: 10 MMOL/L (ref -2–3)
BASE EXCESS BLDMV CALC-SCNC: 13 MMOL/L (ref -2–3)
BASE EXCESS BLDMV CALC-SCNC: 7.5 MMOL/L (ref -2–3)
BASE EXCESS BLDMV CALC-SCNC: 7.6 MMOL/L (ref -2–3)
BASE EXCESS BLDMV CALC-SCNC: 9.2 MMOL/L (ref -2–3)
BODY TEMPERATURE: 37 DEGREES CELSIUS
BUN SERPL-MCNC: 35 MG/DL (ref 6–23)
CA-I BLDMV-SCNC: 1.12 MMOL/L (ref 1.1–1.33)
CA-I BLDMV-SCNC: 1.13 MMOL/L (ref 1.1–1.33)
CA-I BLDMV-SCNC: 1.14 MMOL/L (ref 1.1–1.33)
CA-I BLDMV-SCNC: 1.17 MMOL/L (ref 1.1–1.33)
CA-I BLDMV-SCNC: 1.18 MMOL/L (ref 1.1–1.33)
CALCIUM SERPL-MCNC: 9.7 MG/DL (ref 8.6–10.6)
CHLORIDE BLD-SCNC: 90 MMOL/L (ref 98–107)
CHLORIDE BLD-SCNC: 93 MMOL/L (ref 98–107)
CHLORIDE BLD-SCNC: 95 MMOL/L (ref 98–107)
CHLORIDE BLD-SCNC: 95 MMOL/L (ref 98–107)
CHLORIDE BLD-SCNC: 96 MMOL/L (ref 98–107)
CHLORIDE SERPL-SCNC: 92 MMOL/L (ref 98–107)
CO2 SERPL-SCNC: 37 MMOL/L (ref 21–32)
CREAT SERPL-MCNC: 1.61 MG/DL (ref 0.5–1.3)
EGFRCR SERPLBLD CKD-EPI 2021: 49 ML/MIN/1.73M*2
ERYTHROCYTE [DISTWIDTH] IN BLOOD BY AUTOMATED COUNT: 20.3 % (ref 11.5–14.5)
GLUCOSE BLD MANUAL STRIP-MCNC: 177 MG/DL (ref 74–99)
GLUCOSE BLD MANUAL STRIP-MCNC: 212 MG/DL (ref 74–99)
GLUCOSE BLD MANUAL STRIP-MCNC: 221 MG/DL (ref 74–99)
GLUCOSE BLD MANUAL STRIP-MCNC: 223 MG/DL (ref 74–99)
GLUCOSE BLD-MCNC: 199 MG/DL (ref 74–99)
GLUCOSE BLD-MCNC: 202 MG/DL (ref 74–99)
GLUCOSE BLD-MCNC: 210 MG/DL (ref 74–99)
GLUCOSE BLD-MCNC: 227 MG/DL (ref 74–99)
GLUCOSE BLD-MCNC: 247 MG/DL (ref 74–99)
GLUCOSE SERPL-MCNC: 199 MG/DL (ref 74–99)
HCO3 BLDMV-SCNC: 33.2 MMOL/L (ref 22–26)
HCO3 BLDMV-SCNC: 33.2 MMOL/L (ref 22–26)
HCO3 BLDMV-SCNC: 35.2 MMOL/L (ref 22–26)
HCO3 BLDMV-SCNC: 36 MMOL/L (ref 22–26)
HCO3 BLDMV-SCNC: 39.8 MMOL/L (ref 22–26)
HCT VFR BLD AUTO: 40.4 % (ref 41–52)
HCT VFR BLD EST: 38 % (ref 41–52)
HCT VFR BLD EST: 38 % (ref 41–52)
HCT VFR BLD EST: 39 % (ref 41–52)
HCT VFR BLD EST: 39 % (ref 41–52)
HCT VFR BLD EST: 40 % (ref 41–52)
HGB BLD-MCNC: 12.4 G/DL (ref 13.5–17.5)
HGB BLDMV-MCNC: 12.6 G/DL (ref 13.5–17.5)
HGB BLDMV-MCNC: 12.8 G/DL (ref 13.5–17.5)
HGB BLDMV-MCNC: 12.9 G/DL (ref 13.5–17.5)
HGB BLDMV-MCNC: 13 G/DL (ref 13.5–17.5)
HGB BLDMV-MCNC: 13.2 G/DL (ref 13.5–17.5)
INHALED O2 CONCENTRATION: 21 %
INHALED O2 CONCENTRATION: 28 %
LACTATE BLDMV-SCNC: 0.8 MMOL/L (ref 0.4–2)
LACTATE BLDMV-SCNC: 0.9 MMOL/L (ref 0.4–2)
LACTATE BLDMV-SCNC: 1.2 MMOL/L (ref 0.4–2)
LACTATE BLDMV-SCNC: 1.3 MMOL/L (ref 0.4–2)
LACTATE BLDMV-SCNC: 1.3 MMOL/L (ref 0.4–2)
MAGNESIUM SERPL-MCNC: 2.39 MG/DL (ref 1.6–2.4)
MCH RBC QN AUTO: 23.8 PG (ref 26–34)
MCHC RBC AUTO-ENTMCNC: 30.7 G/DL (ref 32–36)
MCV RBC AUTO: 77 FL (ref 80–100)
NRBC BLD-RTO: 0 /100 WBCS (ref 0–0)
OXYHGB MFR BLDMV: 45.2 % (ref 45–75)
OXYHGB MFR BLDMV: 54.4 % (ref 45–75)
OXYHGB MFR BLDMV: 56.3 % (ref 45–75)
OXYHGB MFR BLDMV: 62.6 % (ref 45–75)
OXYHGB MFR BLDMV: 63 % (ref 45–75)
PCO2 BLDMV: 50 MM HG (ref 41–51)
PCO2 BLDMV: 50 MM HG (ref 41–51)
PCO2 BLDMV: 53 MM HG (ref 41–51)
PCO2 BLDMV: 53 MM HG (ref 41–51)
PCO2 BLDMV: 60 MM HG (ref 41–51)
PH BLDMV: 7.43 PH (ref 7.33–7.43)
PH BLDMV: 7.44 PH (ref 7.33–7.43)
PHOSPHATE SERPL-MCNC: 4 MG/DL (ref 2.5–4.9)
PLATELET # BLD AUTO: 234 X10*3/UL (ref 150–450)
PO2 BLDMV: 31 MM HG (ref 35–45)
PO2 BLDMV: 34 MM HG (ref 35–45)
PO2 BLDMV: 37 MM HG (ref 35–45)
PO2 BLDMV: 39 MM HG (ref 35–45)
PO2 BLDMV: 40 MM HG (ref 35–45)
POTASSIUM BLDMV-SCNC: 3.7 MMOL/L (ref 3.5–5.3)
POTASSIUM BLDMV-SCNC: 4 MMOL/L (ref 3.5–5.3)
POTASSIUM BLDMV-SCNC: 4.2 MMOL/L (ref 3.5–5.3)
POTASSIUM BLDMV-SCNC: 4.2 MMOL/L (ref 3.5–5.3)
POTASSIUM BLDMV-SCNC: 4.4 MMOL/L (ref 3.5–5.3)
POTASSIUM SERPL-SCNC: 3.7 MMOL/L (ref 3.5–5.3)
RBC # BLD AUTO: 5.22 X10*6/UL (ref 4.5–5.9)
SAO2 % BLDMV: 46 % (ref 45–75)
SAO2 % BLDMV: 56 % (ref 45–75)
SAO2 % BLDMV: 58 % (ref 45–75)
SAO2 % BLDMV: 65 % (ref 45–75)
SAO2 % BLDMV: 65 % (ref 45–75)
SODIUM BLDMV-SCNC: 129 MMOL/L (ref 136–145)
SODIUM BLDMV-SCNC: 132 MMOL/L (ref 136–145)
SODIUM BLDMV-SCNC: 133 MMOL/L (ref 136–145)
SODIUM SERPL-SCNC: 137 MMOL/L (ref 136–145)
STAPHYLOCOCCUS SPEC CULT: ABNORMAL
UFH PPP CHRO-ACNC: 0.3 IU/ML (ref ?–1.1)
WBC # BLD AUTO: 5.3 X10*3/UL (ref 4.4–11.3)

## 2025-07-21 PROCEDURE — 2500000001 HC RX 250 WO HCPCS SELF ADMINISTERED DRUGS (ALT 637 FOR MEDICARE OP): Performed by: NURSE PRACTITIONER

## 2025-07-21 PROCEDURE — 84132 ASSAY OF SERUM POTASSIUM: CPT | Performed by: NURSE PRACTITIONER

## 2025-07-21 PROCEDURE — 97161 PT EVAL LOW COMPLEX 20 MIN: CPT | Mod: GP

## 2025-07-21 PROCEDURE — 99222 1ST HOSP IP/OBS MODERATE 55: CPT | Performed by: STUDENT IN AN ORGANIZED HEALTH CARE EDUCATION/TRAINING PROGRAM

## 2025-07-21 PROCEDURE — 2500000002 HC RX 250 W HCPCS SELF ADMINISTERED DRUGS (ALT 637 FOR MEDICARE OP, ALT 636 FOR OP/ED)

## 2025-07-21 PROCEDURE — 82947 ASSAY GLUCOSE BLOOD QUANT: CPT

## 2025-07-21 PROCEDURE — 85027 COMPLETE CBC AUTOMATED: CPT | Performed by: NURSE PRACTITIONER

## 2025-07-21 PROCEDURE — 2500000004 HC RX 250 GENERAL PHARMACY W/ HCPCS (ALT 636 FOR OP/ED)

## 2025-07-21 PROCEDURE — 71045 X-RAY EXAM CHEST 1 VIEW: CPT

## 2025-07-21 PROCEDURE — 97530 THERAPEUTIC ACTIVITIES: CPT | Mod: GP

## 2025-07-21 PROCEDURE — 83735 ASSAY OF MAGNESIUM: CPT | Performed by: NURSE PRACTITIONER

## 2025-07-21 PROCEDURE — 2500000001 HC RX 250 WO HCPCS SELF ADMINISTERED DRUGS (ALT 637 FOR MEDICARE OP)

## 2025-07-21 PROCEDURE — 85520 HEPARIN ASSAY: CPT | Performed by: NURSE PRACTITIONER

## 2025-07-21 PROCEDURE — 99291 CRITICAL CARE FIRST HOUR: CPT | Performed by: NURSE PRACTITIONER

## 2025-07-21 PROCEDURE — 71045 X-RAY EXAM CHEST 1 VIEW: CPT | Performed by: RADIOLOGY

## 2025-07-21 PROCEDURE — 2020000001 HC ICU ROOM DAILY

## 2025-07-21 PROCEDURE — 99291 CRITICAL CARE FIRST HOUR: CPT | Performed by: STUDENT IN AN ORGANIZED HEALTH CARE EDUCATION/TRAINING PROGRAM

## 2025-07-21 PROCEDURE — 2500000002 HC RX 250 W HCPCS SELF ADMINISTERED DRUGS (ALT 637 FOR MEDICARE OP, ALT 636 FOR OP/ED): Performed by: NURSE PRACTITIONER

## 2025-07-21 PROCEDURE — 37799 UNLISTED PX VASCULAR SURGERY: CPT | Performed by: NURSE PRACTITIONER

## 2025-07-21 PROCEDURE — 97165 OT EVAL LOW COMPLEX 30 MIN: CPT | Mod: GO

## 2025-07-21 PROCEDURE — 97535 SELF CARE MNGMENT TRAINING: CPT | Mod: GO

## 2025-07-21 RX ORDER — INSULIN LISPRO 100 [IU]/ML
0-15 INJECTION, SOLUTION INTRAVENOUS; SUBCUTANEOUS
Status: DISCONTINUED | OUTPATIENT
Start: 2025-07-22 | End: 2025-08-11 | Stop reason: HOSPADM

## 2025-07-21 RX ORDER — INSULIN LISPRO 100 [IU]/ML
16 INJECTION, SOLUTION INTRAVENOUS; SUBCUTANEOUS
Status: DISCONTINUED | OUTPATIENT
Start: 2025-07-21 | End: 2025-07-21

## 2025-07-21 RX ORDER — POTASSIUM CHLORIDE 1.5 G/1.58G
40 POWDER, FOR SOLUTION ORAL ONCE
Status: COMPLETED | OUTPATIENT
Start: 2025-07-21 | End: 2025-07-21

## 2025-07-21 RX ORDER — HYDRALAZINE HYDROCHLORIDE 25 MG/1
25 TABLET, FILM COATED ORAL ONCE
Status: COMPLETED | OUTPATIENT
Start: 2025-07-22 | End: 2025-07-21

## 2025-07-21 RX ORDER — SODIUM NITROPRUSSIDE IN 0.9% SODIUM CHLORIDE 0.5 MG/ML
INJECTION INTRAVENOUS
Status: COMPLETED
Start: 2025-07-21 | End: 2025-07-21

## 2025-07-21 RX ORDER — POTASSIUM CHLORIDE 20 MEQ/1
40 TABLET, EXTENDED RELEASE ORAL ONCE
Status: COMPLETED | OUTPATIENT
Start: 2025-07-21 | End: 2025-07-21

## 2025-07-21 RX ORDER — FUROSEMIDE 10 MG/ML
80 INJECTION INTRAMUSCULAR; INTRAVENOUS ONCE
Status: COMPLETED | OUTPATIENT
Start: 2025-07-22 | End: 2025-07-21

## 2025-07-21 RX ORDER — INSULIN LISPRO 100 [IU]/ML
18 INJECTION, SOLUTION INTRAVENOUS; SUBCUTANEOUS
Status: DISCONTINUED | OUTPATIENT
Start: 2025-07-22 | End: 2025-07-23

## 2025-07-21 RX ORDER — FUROSEMIDE 10 MG/ML
80 INJECTION INTRAMUSCULAR; INTRAVENOUS ONCE
Status: COMPLETED | OUTPATIENT
Start: 2025-07-21 | End: 2025-07-21

## 2025-07-21 RX ORDER — SODIUM NITROPRUSSIDE IN 0.9% SODIUM CHLORIDE 0.5 MG/ML
.25-5 INJECTION INTRAVENOUS CONTINUOUS
Status: DISCONTINUED | OUTPATIENT
Start: 2025-07-22 | End: 2025-07-23

## 2025-07-21 RX ADMIN — INSULIN LISPRO 14 UNITS: 100 INJECTION, SOLUTION INTRAVENOUS; SUBCUTANEOUS at 07:56

## 2025-07-21 RX ADMIN — GABAPENTIN 200 MG: 100 CAPSULE ORAL at 06:25

## 2025-07-21 RX ADMIN — DIGOXIN 125 MCG: 125 TABLET ORAL at 09:12

## 2025-07-21 RX ADMIN — HYDRALAZINE HYDROCHLORIDE 50 MG: 50 TABLET ORAL at 13:47

## 2025-07-21 RX ADMIN — SACUBITRIL AND VALSARTAN 1 TABLET: 97; 103 TABLET, FILM COATED ORAL at 21:24

## 2025-07-21 RX ADMIN — ISOSORBIDE DINITRATE 20 MG: 20 TABLET ORAL at 18:10

## 2025-07-21 RX ADMIN — INSULIN LISPRO 2 UNITS: 100 INJECTION, SOLUTION INTRAVENOUS; SUBCUTANEOUS at 07:56

## 2025-07-21 RX ADMIN — METOPROLOL SUCCINATE 100 MG: 50 TABLET, EXTENDED RELEASE ORAL at 09:12

## 2025-07-21 RX ADMIN — POTASSIUM CHLORIDE 40 MEQ: 1.5 POWDER, FOR SOLUTION ORAL at 16:40

## 2025-07-21 RX ADMIN — CALCIUM CARBONATE (ANTACID) CHEW TAB 500 MG 1 TABLET: 500 CHEW TAB at 21:24

## 2025-07-21 RX ADMIN — DAPAGLIFLOZIN 10 MG: 10 TABLET, FILM COATED ORAL at 09:12

## 2025-07-21 RX ADMIN — INSULIN LISPRO 4 UNITS: 100 INJECTION, SOLUTION INTRAVENOUS; SUBCUTANEOUS at 16:40

## 2025-07-21 RX ADMIN — INSULIN LISPRO 16 UNITS: 100 INJECTION, SOLUTION INTRAVENOUS; SUBCUTANEOUS at 16:40

## 2025-07-21 RX ADMIN — INSULIN GLARGINE 40 UNITS: 100 INJECTION, SOLUTION SUBCUTANEOUS at 21:30

## 2025-07-21 RX ADMIN — INSULIN GLARGINE 40 UNITS: 100 INJECTION, SOLUTION SUBCUTANEOUS at 09:12

## 2025-07-21 RX ADMIN — PANTOPRAZOLE SODIUM 40 MG: 40 TABLET, DELAYED RELEASE ORAL at 06:25

## 2025-07-21 RX ADMIN — POTASSIUM CHLORIDE 40 MEQ: 1500 TABLET, EXTENDED RELEASE ORAL at 06:25

## 2025-07-21 RX ADMIN — SPIRONOLACTONE 25 MG: 25 TABLET, FILM COATED ORAL at 09:12

## 2025-07-21 RX ADMIN — HYDRALAZINE HYDROCHLORIDE 25 MG: 25 TABLET ORAL at 23:43

## 2025-07-21 RX ADMIN — QUETIAPINE FUMARATE 50 MG: 25 TABLET ORAL at 21:23

## 2025-07-21 RX ADMIN — FUROSEMIDE 80 MG: 10 INJECTION, SOLUTION INTRAVENOUS at 16:40

## 2025-07-21 RX ADMIN — FUROSEMIDE 80 MG: 10 INJECTION, SOLUTION INTRAVENOUS at 06:25

## 2025-07-21 RX ADMIN — GABAPENTIN 200 MG: 100 CAPSULE ORAL at 21:23

## 2025-07-21 RX ADMIN — SODIUM NITROPRUSSIDE IN 0.9% SODIUM CHLORIDE 0.25 MCG/KG/MIN: 0.5 INJECTION INTRAVENOUS at 23:45

## 2025-07-21 RX ADMIN — LEVOTHYROXINE SODIUM 75 MCG: 0.07 TABLET ORAL at 06:25

## 2025-07-21 RX ADMIN — INSULIN LISPRO 4 UNITS: 100 INJECTION, SOLUTION INTRAVENOUS; SUBCUTANEOUS at 11:57

## 2025-07-21 RX ADMIN — SACUBITRIL AND VALSARTAN 1 TABLET: 97; 103 TABLET, FILM COATED ORAL at 09:12

## 2025-07-21 RX ADMIN — HYDRALAZINE HYDROCHLORIDE 50 MG: 50 TABLET ORAL at 21:23

## 2025-07-21 RX ADMIN — GABAPENTIN 200 MG: 100 CAPSULE ORAL at 13:47

## 2025-07-21 RX ADMIN — INSULIN LISPRO 14 UNITS: 100 INJECTION, SOLUTION INTRAVENOUS; SUBCUTANEOUS at 11:57

## 2025-07-21 RX ADMIN — ISOSORBIDE DINITRATE 20 MG: 20 TABLET ORAL at 09:12

## 2025-07-21 RX ADMIN — CALCIUM CARBONATE (ANTACID) CHEW TAB 500 MG 1 TABLET: 500 CHEW TAB at 09:11

## 2025-07-21 RX ADMIN — HYDRALAZINE HYDROCHLORIDE 50 MG: 50 TABLET ORAL at 06:25

## 2025-07-21 RX ADMIN — FUROSEMIDE 80 MG: 10 INJECTION, SOLUTION INTRAMUSCULAR; INTRAVENOUS at 23:44

## 2025-07-21 RX ADMIN — FENOFIBRATE 145 MG: 145 TABLET, FILM COATED ORAL at 09:12

## 2025-07-21 RX ADMIN — ISOSORBIDE DINITRATE 20 MG: 20 TABLET ORAL at 13:47

## 2025-07-21 RX ADMIN — ATORVASTATIN CALCIUM 80 MG: 80 TABLET, FILM COATED ORAL at 21:24

## 2025-07-21 ASSESSMENT — PAIN SCALES - GENERAL
PAINLEVEL_OUTOF10: 0 - NO PAIN

## 2025-07-21 ASSESSMENT — COGNITIVE AND FUNCTIONAL STATUS - GENERAL
DRESSING REGULAR UPPER BODY CLOTHING: A LITTLE
STANDING UP FROM CHAIR USING ARMS: A LITTLE
WALKING IN HOSPITAL ROOM: A LITTLE
MOVING TO AND FROM BED TO CHAIR: A LITTLE
HELP NEEDED FOR BATHING: A LITTLE
MOBILITY SCORE: 24
CLIMB 3 TO 5 STEPS WITH RAILING: A LOT
TURNING FROM BACK TO SIDE WHILE IN FLAT BAD: A LITTLE
MOBILITY SCORE: 17
DAILY ACTIVITIY SCORE: 24
PERSONAL GROOMING: A LITTLE
TOILETING: A LITTLE
DAILY ACTIVITIY SCORE: 19
MOVING FROM LYING ON BACK TO SITTING ON SIDE OF FLAT BED WITH BEDRAILS: A LITTLE
DRESSING REGULAR LOWER BODY CLOTHING: A LITTLE

## 2025-07-21 ASSESSMENT — ACTIVITIES OF DAILY LIVING (ADL)
ADL_ASSISTANCE: INDEPENDENT
BATHING_ASSISTANCE: STAND BY
ADL_ASSISTANCE: INDEPENDENT
HOME_MANAGEMENT_TIME_ENTRY: 10

## 2025-07-21 ASSESSMENT — PAIN - FUNCTIONAL ASSESSMENT
PAIN_FUNCTIONAL_ASSESSMENT: 0-10

## 2025-07-21 NOTE — CONSULTS
Inpatient consult to Endocrinology  Consult performed by: Germán Yancey MD  Consult ordered by: Ofelia Lemus PA-C  Reason for consult: DM          Reason For Consult  Endocrinology consulted for management of DM    History Of Present Illness  Felice Law is a 60 y.o. male with significant PMH of ICM, HFrEF, EF 15-20%, CABG (LIMA- LAD in 2023),  PCI, VT arrest s/p VT ablation and ICD in 2017, Hypertension, Hyperlipidemia, Hypothyroidism, Left femoral DVT on Eliquis, COPD, JAKE, CKD III, Chronic BLE venous stasis dermatitis, Insulin dependent Type 2 diabetes who was initially admitted 7/12 to [place] for acute decompensate heart failure. He was diuresed and recommended for transfer to [facility] HF ICU for further managemnet and possible re-consider of advanced therapy work up.     Patient reports he recently returned back from [facility].  During his stay there, patient reported his weight increased from 214 pounds to 243 pounds due to uncontrolled eating habits and life style.     Diabetes History  DM diagnosed at 40 yo  Endocrinologist: Diana Salazar MD (last time seen 05/17/2025)  Last A1c: 10.1 (7/17), 8.9 (5/12)  Complications Micro and Macro-diabetes related foot ulcer, neuropathy, CVD, CKD, NPDR  Home regimen: Insulin galargine 40 units twice a day, insulin lispro 18 units with meals plus sliding scale up to 80 units daily, tirzepatide (Mounjaro) 5 mg/0.5 mL pen injector, Farxiga 10 mg tablet   Accuchecks: FreeStyle Franco 3 Plus Sensor) device   BG range: 168 to 278 over the 72 hrs  Cr,  GFR: 1.61, 49  Statin: lipitor 80 mg  ARB: Entresto      Scheduled Medications[1]   Continuous Medications[2]   PRN Medications[3]     BMP:  Results from last 7 days   Lab Units 07/21/25  0520 07/20/25  1232 07/20/25  0556   SODIUM mmol/L 137 135* 138   POTASSIUM mmol/L 3.7 3.8 3.6   CHLORIDE mmol/L 92* 90* 93*   CO2 mmol/L 37* 36* 37*   BUN mg/dL 35* 37* 38*   CREATININE mg/dL 1.61* 1.60* 1.56*   GLUCOSE mg/dL 199*  "219* 168*     Past Medical History  Medical History[4]     Past Surgical History  Surgical History[5]      Social History  He reports that he quit smoking about 8 years ago. His smoking use included cigarettes. He started smoking about 32 years ago. He has a 24 pack-year smoking history. He has never used smokeless tobacco. He reports that he does not drink alcohol and does not use drugs.    Family History  Family History[6]     Allergies  Azithromycin    Physical Exam  Constitutional: Well developed, awake/alert/oriented x3  Respiratory/Thorax: Patent airways, CTAB, normal breath sounds with good chest expansion  Cardiovascular: Regular, rate and rhythm, normal S 1and S 2  Gastrointestinal: Nondistended, soft, non-tender, no rebound tenderness or guarding    ROS, PMH, FH/SH, surgical history and allergies have been reviewed.    Last Recorded Vitals  Blood pressure 114/78, pulse 79, temperature 35.8 °C (96.4 °F), temperature source Temporal, resp. rate 15, height 1.803 m (5' 10.98\"), weight 96.4 kg (212 lb 8.4 oz), SpO2 96%.    Relevant Results  Results from last 7 days   Lab Units 07/21/25  1150 07/21/25  0746 07/21/25  0520 07/20/25  1930 07/20/25  1523 07/20/25  1232 07/20/25  1148 07/20/25  0742 07/20/25  0556 07/19/25  1943 07/19/25  1925 07/19/25  0824 07/19/25  0530   POCT GLUCOSE mg/dL 223* 177*  --  204* 264*  --  189*   < >  --    < >  --    < >  --    GLUCOSE mg/dL  --   --  199*  --   --  219*  --   --  168*  --  207*  --  196*    < > = values in this interval not displayed.       Assessment/Plan   Felice Law is a 60 y.o. male with significant PMH of ICM, HFrEF, EF 15-20%, CABG (LIMA- LAD in 2023),  PCI, VT arrest s/p VT ablation and ICD in 2017, Hypertension, Hyperlipidemia, Hypothyroidism, Left femoral DVT on Eliquis, COPD, JAKE, CKD III and Insulin dependent Type 2 diabetes. He was admitted on 7/12 for further HF management and advanced therapy work up.   Endocrine consulted for managing " uncontrolled T2DM.    #T2DM  :: Hyperglycemia 2/2 poorly controlled Type 2 diabetes, insufficient insulin administration, unhealthy life style and stress state    # Hypothyroid   :: Thyroid-Levothyroxine 75mcg  :: TSH 3.97, Free T4 pending  - currently on levothyroxine 75 mcg    Plan  - Continue Insulin glargine 40 units BID   - Increase  Insulin lispro to 18 units TID with meals  - SS#3 AC  - Accuchecks TIDAC and at bedtime  - 60 gm carb consistent diet  - Goal -180  - Hypoglycemia bundle  - Will continue to follow and titrate insulin accordingly    Recommendations communicated to primary team. Please reach out incase you have any questions or concerns.    The patient was seen and discussed with attending Dr. Dara Shine MD  Internal medicine, PGY1    Germán Yancey MD  Endocrinology fellow          [1] [Held by provider] apixaban, 5 mg, oral, BID  atorvastatin, 80 mg, oral, Nightly  [Held by provider] bumetanide, 1 mg, oral, Daily  calcium carbonate, 500 mg of calcium carbonate, oral, BID  dapagliflozin propanediol, 10 mg, oral, Daily  digoxin, 125 mcg, oral, Daily  fenofibrate, 145 mg, oral, Daily  gabapentin, 200 mg, oral, q8h SUSI  hydrALAZINE, 50 mg, oral, q8h SUSI  insulin glargine, 40 Units, subcutaneous, Nightly  insulin glargine, 40 Units, subcutaneous, Daily  insulin lispro, 0-10 Units, subcutaneous, TID AC  insulin lispro, 16 Units, subcutaneous, TID AC  iron sucrose, 200 mg, intravenous, Daily  isosorbide dinitrate, 20 mg, oral, TID  levothyroxine, 75 mcg, oral, Daily before breakfast  metoprolol succinate XL, 100 mg, oral, Daily  pantoprazole, 40 mg, oral, Daily before breakfast  QUEtiapine, 50 mg, oral, Nightly  sacubitriL-valsartan, 1 tablet, oral, BID  spironolactone, 25 mg, oral, Daily     [2] [Held by provider] furosemide, 10 mg/hr, Last Rate: Stopped (07/20/25 0605)  heparin, 0-4,000 Units/hr, Last Rate: 1,600 Units/hr (07/21/25 1300)     [3] PRN medications: acetaminophen,  albuterol, alteplase, dextrose, dextrose, diphenhydrAMINE, glucagon, glucagon, heparin  [4]   Past Medical History:  Diagnosis Date    Cataract     Chest pain 10/01/2023    CHF (congestive heart failure)     Clotting disorder (Multi)     COPD (chronic obstructive pulmonary disease) (Multi)     Coronary artery disease     Diabetes mellitus (Multi)     Disease of thyroid gland     Elevated troponin level not due myocardial infarction 08/14/2023    Hypertension     Ischemic cardiomyopathy     Pulmonary hypertension (Multi)     Venous ulcer of left leg (Multi)    [5]   Past Surgical History:  Procedure Laterality Date    CARDIAC CATHETERIZATION N/A 6/10/2025    Procedure: Left & Right Heart Cath w Angiography & LV;  Surgeon: Carl B Gillombardo, MD;  Location: [facility] Cardiac Cath Lab;  Service: Cardiovascular;  Laterality: N/A;    CARDIAC DEFIBRILLATOR PLACEMENT      CATARACT EXTRACTION Right         CORONARY ANGIOPLASTY WITH STENT PLACEMENT      CORONARY ARTERY BYPASS GRAFT     [6]   Family History  Family history unknown: Yes

## 2025-07-21 NOTE — PROGRESS NOTES
[facility]  HFICU PROGRESS NOTE    Felice Law/[id number]    Admit Date: 7/17/2025  Hospital Length of Stay: 4   ICU Length of Stay: 4d 4h   Primary Service:   Primary HF Cardiologist: Dr. Jono Duque MD   Referring:    INTERVAL EVENTS / PERTINENT ROS:     No acute overnight event.  Patient was diuresed with Lasix intermittently over the last 24 hours was net negative of 5.7L.    During conversation with patient, [employer] was used for Kyr[place]z interpretation.  Patient reports symptom improvement since arrival. He denies any c/o dyspnea, chest pain, or discomfort.      This SG # : /69 (82), CVP 14, PA 69/34 (46), CO/CI 5.02 / 2.28, SVR 1306, SVO 2 65% Hydra 50, isordil 20, toprol 50, dapa 10, dig 125, entresto 97/101, aldactone 25,     Plan:   - Lasix 80mg IV x 1  - C/w Hydra/isordil and DC as able  - Increase Metoprolol Succinate to 100 mg today  - PT/OT  - Consult Endocrine for diabetic management recommendations.     MEDICATIONS  Infusions:  [Held by provider] furosemide, Last Rate: Stopped (07/20/25 0605)  heparin, Last Rate: 1,600 Units/hr (07/21/25 1300)      Scheduled:  [Held by provider] apixaban, 5 mg, BID  atorvastatin, 80 mg, Nightly  [Held by provider] bumetanide, 1 mg, Daily  calcium carbonate, 500 mg of calcium carbonate, BID  dapagliflozin propanediol, 10 mg, Daily  digoxin, 125 mcg, Daily  fenofibrate, 145 mg, Daily  gabapentin, 200 mg, q8h SUSI  hydrALAZINE, 50 mg, q8h SUSI  insulin glargine, 40 Units, Nightly  insulin glargine, 40 Units, Daily  insulin lispro, 0-10 Units, TID AC  insulin lispro, 16 Units, TID AC  iron sucrose, 200 mg, Daily  isosorbide dinitrate, 20 mg, TID  levothyroxine, 75 mcg, Daily before breakfast  metoprolol succinate XL, 100 mg, Daily  pantoprazole, 40 mg, Daily before breakfast  QUEtiapine, 50 mg, Nightly  sacubitriL-valsartan, 1 tablet, BID  spironolactone, 25 mg, Daily      PRN:  acetaminophen, 650 mg, q6h  "PRN  albuterol, 2.5 mg, q6h PRN  alteplase, 2 mg, PRN  dextrose, 12.5 g, q15 min PRN  dextrose, 25 g, q15 min PRN  diphenhydrAMINE, 50 mg, q5 min PRN  glucagon, 1 mg, q15 min PRN  glucagon, 1 mg, q15 min PRN  heparin, 2,000-4,000 Units, q4h PRN      Invasive Hemodynamics:    Most Recent Range Past 24hrs   BP (Art)   No data recorded   MAP(Art)   No data recorded   RA/CVP   No data recorded   PA 60/18 PAP  Min: 60/18  Max: 92/50   PA(mean) 34 mmHg PAP (Mean)  Min: 34 mmHg  Max: 64 mmHg   PCWP 25 mmHg (per Jamin NP) No data recorded   CO 5.6 L/min CO (L/min)  Min: 4.8 L/min  Max: 5.6 L/min   CI 2.6 L/min/m2 CI (L/min/m2)  Min: 2.2 L/min/m2  Max: 2.6 L/min/m2   Mixed Venous 65 % SVO2 (%)  Min: 61 %  Max: 65 %   SVR  1527 (dyne*sec)/cm5 SVR (dyne*sec)/cm5  Min: 1527 (dyne*sec)/cm5  Max: 1596 (dyne*sec)/cm5   PVR   No data recorded       PHYSICAL EXAM:   Visit Vitals  /78   Pulse 81   Temp 35.8 °C (96.4 °F) (Temporal)   Resp 18   Ht 1.803 m (5' 10.98\")   Wt 96.4 kg (212 lb 8.4 oz)   SpO2 96%   BMI 29.65 kg/m²   Smoking Status Former   BSA 2.2 m²       Wt Readings from Last 5 Encounters:   07/21/25 96.4 kg (212 lb 8.4 oz)   07/17/25 110 kg (243 lb 2.7 oz)   06/10/25 97.1 kg (214 lb)   05/12/25 96.6 kg (213 lb)   05/04/25 102 kg (224 lb)       INTAKE/OUTPUT:  I/O last 3 completed shifts:  In: 1508 (15.6 mL/kg) [P.O.:960; I.V.:548 (5.7 mL/kg)]  Out: 9400 (97.5 mL/kg) [Urine:9400 (2.7 mL/kg/hr)]  Weight: 96.4 kg      Physical Exam  Constitutional:       Appearance: Normal appearance.   HENT:      Head: Normocephalic and atraumatic.      Nose: Nose normal.      Mouth/Throat:      Mouth: Mucous membranes are moist.     Eyes:      Extraocular Movements: Extraocular movements intact.      Pupils: Pupils are equal, round, and reactive to light.     Neck:      Comments: Rt IJ/ SGC  Cardiovascular:      Rate and Rhythm: Normal rate and regular rhythm.      Pulses: Normal pulses.      Heart sounds:      Gallop present. "   Pulmonary:      Effort: Pulmonary effort is normal.      Breath sounds: Decreased breath sounds present.   Abdominal:      General: There is distension.     Musculoskeletal:         General: Swelling present.      Cervical back: Normal range of motion and neck supple.      Right lower leg: Edema (trace) present.      Left lower leg: Edema (trace) present.      Comments: Chronic venous stasis      Skin:     General: Skin is warm.      Capillary Refill: Capillary refill takes 2 to 3 seconds.     Neurological:      General: No focal deficit present.      Mental Status: He is alert and oriented to person, place, and time.     Psychiatric:         Mood and Affect: Mood normal.         Behavior: Behavior normal.         DATA:  CMP:  Results from last 7 days   Lab Units 07/21/25  0520 07/20/25  1232 07/20/25  0556 07/19/25  1925 07/19/25  0530 07/18/25  1708 07/18/25  0157 07/17/25  1119 07/17/25  0653 07/16/25  0655 07/15/25  0708   SODIUM mmol/L 137 135* 138 135* 138 135* 136 134* 137 135* 137   POTASSIUM mmol/L 3.7 3.8 3.6 3.7 4.2 3.7 4.7 4.0 4.0 4.2 4.3   CHLORIDE mmol/L 92* 90* 93* 91* 98 94* 99 96* 98 98 97*   CO2 mmol/L 37* 36* 37* 35* 32 31 29 31 32 28 29   ANION GAP mmol/L 12 13 12 13 12 14 13 11 11 13 15   BUN mg/dL 35* 37* 38* 39* 43* 47* 49* 48* 47* 53* 55*   CREATININE mg/dL 1.61* 1.60* 1.56* 1.81* 1.67* 1.73* 1.64* 1.62* 1.70* 1.86* 2.02*   EGFR mL/min/1.73m*2 49* 49* 51* 43* 47* 45* 48* 49* 46* 41* 37*   MAGNESIUM mg/dL 2.39  --  2.55*  --  2.47*  --  2.40  --  2.39 2.58* 2.70*   ALBUMIN g/dL 3.6 4.0 3.9 3.9 3.6 3.7 3.6 3.7  --   --   --    ALT U/L  --   --   --   --   --   --   --  8*  --   --   --    AST U/L  --   --   --   --   --   --   --  15  --   --   --    BILIRUBIN TOTAL mg/dL  --   --   --   --   --   --   --  2.0*  --   --   --      CBC:  Results from last 7 days   Lab Units 07/21/25  0520 07/20/25  0556 07/19/25  0530 07/18/25  0157 07/17/25  0653 07/16/25  0654 07/15/25  0708   WBC AUTO  "x10*3/uL 5.3 5.1 4.7 5.1 4.7 5.1 4.7   HEMOGLOBIN g/dL 12.4* 12.2* 11.2* 11.6* 12.1* 12.3* 12.3*   HEMATOCRIT % 40.4* 40.2* 38.3* 38.3* 42.4 41.6 42.3   PLATELETS AUTO x10*3/uL 234 252 254 241 260 272 267   MCV fL 77* 78* 81 77* 82 81 82     COAG:   Results from last 7 days   Lab Units 07/17/25  1119   INR  2.0*     ABO: No results found for: \"ABO\"  HEME/ENDO:  Results from last 7 days   Lab Units 07/17/25  1125 07/17/25  1111   FERRITIN ng/mL 102  --    IRON SATURATION % 16*  --    TSH mIU/L 3.97  --    HEMOGLOBIN A1C %  --  10.1*      CARDIAC:   Results from last 7 days   Lab Units 07/17/25  1119   TROPHSCMC ng/L 37   BNP pg/mL 744*       ASSESSMENT AND PLAN:     Felice Law is a 60 y.o. male with significant PMH of ICM, HFrEF, EF 15-20%, CABG (LIMA- LAD in 2023),  PCI, VT arrest s/p VT ablation and ICD in 2017, Hypertension, Hyperlipidemia, Hypothyroidism, Left femoral DVT on Eliquis, COPD, JAKE, CKD III, Chronic BLE venous stasis dermatitis, Insulin dependent Type 2 diabetes who was initially admitted 7/12 to [facility] for acute decompensate heart failure. He was diuresed and recommended for transfer to [facility] HF ICU for further managemnet and possible re-consider of advanced therapy work up.  Of notes, Patient was [place] [facility] [place] but declined with concerns for mobility/frailty (uses motorized scooter and has bilateral foot drop with neuropathy), CKD, poorly controlled diabetes as well as unclear social/financial support).     Transferred to HF ICU 7/17, he was warm and wet,  lasix IV bolus given and drip initiated. Eliquis on hold, heparin drip initiated. Bedside SG done 7/19, opening swan numbers: BP: 133/74 (94),CVP 9, PA 74/29 (44), PCWP 24, CO/CI (Pallavi) 5.4/2.4, Thermo: 4.6/2.1, SVR 1263, SVO2 63% on hydral /Isordil 100/40 mg TID, Metop 50 mg daily, Dapa 10 mg daily, Dig 125 mcg PO daily, Lasix drip at 20 mg /hr. Entresto re-introduce and up titrated. Lasix " drip d/c'd currently on intermittent IVP.  Plan for SG guided optimize and then discharge home with Entresto. Hg A1c 10.1 this admission, remains a barrier for consider for advanced therapy for this admission.     Neuro:   # Anxiety. Depression, Acute pain    # Bilateral foot drop with LE neuropathy: Able to walk two blocks. Uses motorized scoopter    - Serial neuro and pain assessments    - PO Tylenol PRN for pain   - PT/OT Consult, OOB to chair   - CAM ICU score every shift   - Sleep/wake cycle normalization      # Physical Status   -Obesity: BMI: 34 --- > 30  -Reduced Mobility due to ICU and CHF       #Substance abuse   -Alcohol abuse/Alcohol dependence: NO   -Tobacco use/Nicotine Dependence: Quit 8 years ago      Cardiovascular:   # acute on chronic systolic CHF, HFrEF 15-20%, dx'd 2017,   # Ischemic Cardiomyopathy, ACC/AHA Stage C-D, NYHA II, Warm,   -admit weight (7/17): 111 kg -- > 98 kg (7/20)  -BNP (7/17): 744  -TTE (5/4/2025):  Left ventricular ejection fraction is severely decreased, by visual estimate at 15-20%.   - RHC (6/10/25): RA: 25,RV: 63/9, PA: 72/34, PCWP: 32, PA-SAT: 36%, SVC-SAT: 41%, FA-SAT: 71%, CO: 4.84, CI: 2.24, SVR: 1041  - LHC (6/10/25):   Right dominant coronary circulation with severe multivessel CAD, as described above. LIMA to LAD patent.  - c/w atorvastin 80mg    - Increase metoprolol 100 mg daily 7/21 (home dose was 200 mg daily)  - Hold Home PO Bumex   - Lasix gtt discontinued overnight 7/19.   - Bedside SG with Opening swan numbers (7/19): BP: 133/74 (94),CVP 9, PA 74/29 (44), PCWP 24, CO/CI (Pallavi) 5.4/2.4, Thermo: 4.6/2.1, SVR 1263, SVO2 63% on hydral /Isordil 100/40 mg TID, Metop 50 mg daily, Dapa 10 mg daily, Dig 125 mcg PO daily, Lasix drip at 20 mg /h.  - Daily SGC #'s (7/21):  /69 (82), CVP 14, PA 69/34 (46), CO/CI 5.02 / 2.28, SVR 1306, SVO 2 65% Hydra 50, isordil 20, toprol 50, dapa 10, dig 125, entresto 97/101, aldactone 25,  -Lasix 80 mg IV push x1 (7/21/2025)  -  Entresto 24/26 mg re-initiated 7/19, was on home Entresto prior, per patient this was stopped at previous hospital visit and was never restarted. Dosage increased to 97/103 on 7/20/2025  - Reduced home dose hydral/Isordil to 50/20 mg TID to allow BP room for Entresto 7/19  - Daily standing weights, 2gm sodium diet, 2L fluid restriction, strict I&Os     #CAD    - s/p PCI and 1v CABG (LIMA-LAD 04/2023).   - C/w ASA and atorvastatin      Pulmonary:    # H/o COPD  # Hx Pulmonary Hypertension   # Central sleep apnea.   # Prior tobacco use (24 pack year, quit 2017),   - On 2L via NC      :   #CHARLOTTE/CKD III  - Baseline (11/11/2024): BUN/Cr (GFR): 63/1.36 (60)  -Admit Bun/Creatinine (7/17):  48/1.62, creatinine peaked at 2.55 OSH  -I/Os   -avoid hypotension and nephrotoxic agents      Heme:   #Anemia in the setting of MAI and CKD III  - Labs (7/17):  TIBC: 377,  ferritin: 102, serum Fe: 61, folate: 13.1, B12: 452   - IV Venofer 200 mg x5 doses (7/17-7/21)      # Left femoral DVT (2017)  - Hold home Eliquis  - C/w Heparin drip (7/18)      Endo:   #DM 2  - hgbA1c(7/17):  10.1  - C/w Lantus 20 units Am, 40 units PM, SSI and Pradial insulin   - Endocrine consulted for diabetic management 7/21/2025     # Hypothyroidism  -C/w Levothyroxine 75mcg  -TSH (7/18): 3.97      ID:   -afebrile, nontoxic    -no s/s infx   -trend temps q4 hr        PHYSICAL AND OCCUPATIONAL THERAPY: PT/OT     LINES:  PIVs   [facility] [place] [place] (7/19 - present)        DVT: SCD's, Heparin drip once Heparin assay <0.2  VAP BUNDLE: NA  CENTRAL LINE BUNDLE: Ordered  ULCER PPX: PPI  GLYCEMIC CONTROL: SSI, Lantus insulin   BOWEL CARE: Senna, Miralax   INDWELLING CATHETER: NA  NUTRITION: Adult diet Consistent Carb; CCD 90 gm/meal; 2000 mL fluid        EMERGENCY CONTACT: Extended Emergency Contact Information  Primary Emergency Contact: NAM LOO  Address: [address] [place] [address] [facility]  Home Phone: [phone]  Mobile Phone:  [phone]  Relation: Mother  Secondary Emergency Contact: Robinson Contreras  Mobile Phone: [phone]  Relation: Friend  FAMILY UPDATE:  CODE STATUS: Full Code  DISPO:  HF ICU      Patient seen and assessed with Dr. Neto Lemus PA-C     I personally spent 60  minutes of critical care time directly and personally managing the patient exclusive of separately billable procedures.  I agree with the above assessment and plan.      __________________________________________  AL NavarroCNP

## 2025-07-21 NOTE — PROGRESS NOTES
Physical Therapy    Physical Therapy Evaluation & Treatment    Patient Name: Felice Law  MRN: [id number]  [facility]  [facility] 10/10[facility]A  Today's Date: 7/21/2025   Time Calculation  Start Time: 0924  Stop Time: 0956  Time Calculation (min): 32 min    Assessment/Plan   PT Assessment  PT Assessment Results: Decreased strength, Decreased range of motion, Decreased endurance, Impaired balance, Decreased mobility  Rehab Prognosis: Good  Barriers to Discharge Home: No anticipated barriers  Evaluation/Treatment Tolerance: Patient tolerated treatment well  Medical Staff Made Aware: Yes  Strengths: Ability to acquire knowledge, Access to adaptive/assistive products, Housing layout, Support and attitude of living partners  Barriers to Participation: Comorbidities, Premorbid level of function  End of Session Communication: Bedside nurse  Assessment Comment: Pt. is a 60 yom that presents with impairments including decreased functional strength, decreased activity tolerance/endurance, mildly impaired balance, and increased difficulty with functional mobility compared to baseline level of function. Pt. will benefit from skilled PT intervention while inpatient to address the above deficits and maximize return to PLOF. Anticipate pt is near baseline, would benefit from low intensity PT upon discharge.  End of Session Patient Position: Alarm off, not on at start of session (Pt sitting on couch in room per pt. request, call light in reach)     IP OR SWING BED PT PLAN  Inpatient or Swing Bed: Inpatient  PT Plan  Treatment/Interventions: Bed mobility, Transfer training, Gait training, Stair training, Balance training, Strengthening, Endurance training, Range of motion, Therapeutic exercise, Therapeutic activity, Home exercise program  PT Plan: Ongoing PT  PT Frequency: 3 times per week  PT Discharge Recommendations: Low intensity level of continued care  Equipment Recommended upon Discharge: Wheeled walker  PT  Recommended Transfer Status: Assistive device, Contact guard  PT - OK to Discharge: Yes      Subjective     PT Visit Info:  PT Received On: 07/21/25  General Visit Information:  General  Reason for Referral: Pt initially admitted 7/12 to [facility] for ADHF, pt was diuresed and transferred to [facility] HF ICU for further managemnet and possible re-consider of advanced therapy work up.  Past Medical History Relevant to Rehab: ICM, HFrEF, EF 15-20%, CABG (LIMA- LAD in 2023),  PCI, VT arrest s/p VT ablation and ICD in 2017, HTN, HLD, Hypothyroidism, Left femoral DVT on Eliquis, COPD, JAKE, CKD III, Chronic BLE venous stasis dermatitis, Insulin dependent T2DM  Family/Caregiver Present: No  Co-Treatment: OT  Co-Treatment Reason: To maximize pt safety and therapeutic potential. PER EMR, pt with limited activity tolerance  Prior to Session Communication: Bedside nurse  Patient Position Received: Bed, 2 rail up, Alarm off, not on at start of session  Preferred Learning Style: auditory, verbal, visual  General Comment: Pt received seated EOB, pleasant and agreeable to  participate in session. Andresti used for home set up and PLOF as pt is primarily Sammarinese speaking. (swan, heparin IV, 3L O2 NC)    Home Living:  Home Living  Type of Home: House  Lives With: Parent(s), Siblings, Friends  Home Adaptive Equipment: Cane (rollator)  Home Layout: One level  Home Access:  (1 step to enter house +3 steps to enter kitchen)  Bathroom Shower/Tub: Walk-in shower  Bathroom Toilet: Standard  Bathroom Equipment: Shower chair with back  Home Living Comments: Reports mother and sister able to assist upon discharge.    Prior Level of Function:  Prior Function Per Pt/Caregiver Report  Level of [place]: Independent with ADLs and functional transfers, Independent with homemaking with ambulation  Receives Help From: Family  ADL Assistance: Independent  Homemaking Assistance: Independent (shares with family)  Ambulatory Assistance: Independent (mod (I)  with cane vs rollator )  Vocational: Unemployed  Leisure: watching TV  Hand Dominance: Right  Prior Function Comments: +drives    Precautions:  Precautions  Hearing/Visual Limitations: +glasses, hearing WFL  Medical Precautions: Cardiac precautions, Fall precautions, Oxygen therapy device and L/min (3L O2 NC)     07/21/25 0924   Vital Signs   Vitals Session Pre PT   Heart Rate 88  (post: 91 bpm)   Heart Rate Source Monitor   Resp 16  (post: 13)   SpO2 91 %  (post: 97%)   BP (!) 123/93  (post: 99/63)   MAP (mmHg) 103  (post: 75)   BP Location Right arm   BP Method Automatic   Patient Position Sitting   Vital Signs Comment VSS during session       Objective   Pain:  Pain Assessment  Pain Assessment: 0-10  0-10 (Numeric) Pain Score: 0 - No pain    Cognition:  Cognition  Overall Cognitive Status: Within Functional Limits  Arousal/Alertness: Appropriate responses to stimuli  Orientation Level: Oriented X4  Following Commands: Follows one step commands consistently    General Assessments:  Activity Tolerance  Endurance: Tolerates 10 - 20 min exercise with multiple rests  Early Mobility/Exercise Safety Screen: Proceed with mobilization - No exclusion criteria met    Sensation  Light Touch: Not tested  Sensation Comment: neuropathy at baseline    Postural Control  Postural Control: Within Functional Limits    Static Sitting Balance  Static Sitting-Balance Support: Bilateral upper extremity supported, Feet supported  Static Sitting-Level of Assistance: Distant supervision  Dynamic Sitting Balance  Dynamic Sitting-Balance Support: Bilateral upper extremity supported, Feet supported  Dynamic Sitting-Level of Assistance: Distant supervision    Static Standing Balance  Static Standing-Balance Support: Bilateral upper extremity supported  Static Standing-Level of Assistance: Close supervision  Dynamic Standing Balance  Dynamic Standing-Balance Support: Bilateral upper extremity supported  Dynamic Standing-Level of Assistance:   (SBA initially > CGA with fatigue)    Functional Assessments:  Bed Mobility  Bed Mobility: No    Transfers  Transfer: Yes  Transfer 1  Transfer From 1: Bed to  Transfer to 1: Stand  Technique 1: Sit to stand  Transfer Device 1: Walker  Transfer Level of Assistance 1: Close supervision, Minimal verbal cues  Transfers 2  Transfer From 2: Stand to  Transfer to 2: Toilet  Technique 2: Stand to sit  Transfer Device 2:  (grab bar)  Transfer Level of Assistance 2: Close supervision  Transfers 3  Transfer From 3: Toilet to, Stand to  Transfer to 3: Stand, Chair with arms  Technique 3: Sit to stand, Stand to sit  Transfer Device 3:  (none)  Transfer Level of Assistance 3: Close supervision    Ambulation/Gait Training  Ambulation/Gait Training Performed: Yes  Ambulation/Gait Training 1  Surface 1: Level tile  Device 1: Rolling walker  Assistance 1:  (SBA initially though progressed to [id number] with fatigue)  Quality of Gait 1: Decreased step length, Wide base of support, Diminished heel strike (slow nick, jim drop foot)  Comments/Distance (ft) 1: 150 ft  Ambulation/Gait Training 2  Surface 2: Level tile  Device 2: IV Pole  Assistance 2: Contact guard  Quality of Gait 2: Decreased step length, Diminished heel strike, Wide base of support  Comments/Distance (ft) 2: 10 ft toilet>chair    Stairs  Stairs: No    Extremity/Trunk Assessments:  RLE   RLE : Exceptions to WFL  AROM RLE (degrees)  RLE AROM Comment: hip and knee WFL, no active DF  Strength RLE  RLE Overall Strength: Greater than or equal to 3/5 as evidenced by functional mobility  LLE   LLE : Exceptions to WFL  AROM LLE (degrees)  LLE AROM Comment: hip and knee WFL, no active DF  Strength LLE  LLE Overall Strength: Greater than or equal to 3/5 as evidenced by functional mobility    Treatments:  Therapeutic Activity  Therapeutic Activity Performed: Yes  Therapeutic Activity 1: Completed multiple transfers and ambulation trials as noted  Therapeutic Activity 2: Close  monitoring of vitals throughout session to ensure safety throughout session  Therapeutic Activity 3: Educated pt on importance of OOB mobility and mobilizing multiple times per day to prevent further deconditioning    Outcome Measures:  [facility] Basic Mobility  Turning from your back to your side while in a flat bed without using bedrails: A little  Moving from lying on your back to sitting on the side of a flat bed without using bedrails: A little  Moving to and from bed to chair (including a wheelchair): A little  Standing up from a chair using your arms (e.g. wheelchair or bedside chair): A little  To walk in hospital room: A little  Climbing 3-5 steps with railing: A lot  Basic Mobility - Total Score: 17    FSS-ICU  Ambulation: Walks >/ or equal to 150 feet with minimal assistance x1  Rolling: Supervision or set-up only  Sitting: Supervision or set-up only  Transfer Sit-to-Stand: Supervision or set-up only  Transfer Supine-to-Sit: Supervision or set-up only  Total Score: 24      Early Mobility/Exercise Safety Screen: Proceed with mobilization - No exclusion criteria met  ICU Mobility Scale: Walking with assistance of 1 person [8]  E = Exercise and Early Mobility  Early Mobility/Exercise Safety Screen: Proceed with mobilization - No exclusion criteria met  ICU Mobility Scale: Walking with assistance of 1 person    Encounter Problems       Encounter Problems (Active)       Balance       Patient to demonstrate Karthikeyan static and dynamic standing balance without LOB with change of directions, no hesitancy, appropriate NISREEN and sequencing to complete functional task.        Start:  07/21/25    Expected End:  08/04/25            Pt will complete TUG in </= 14 sec with LRAD and no acute LOB       Start:  07/21/25    Expected End:  08/04/25               Mobility       STG - Patient will ambulate >/= 250 ft Karthikeyan with LRAD and no acute LOB       Start:  07/21/25    Expected End:  08/04/25            Patient to ascend/descend  >/= 4 stairs with SBA and LRAD to demo ability to safely traverse SHEA/within home       Start:  07/21/25    Expected End:  08/04/25            Patient will tolerate >/=30 minutes continuous activity with stable vital signs, RPE </=13/20, RPD </=3/10        Start:  07/21/25    Expected End:  08/04/25               PT Transfers       STG - Patient will perform bed mobility IND       Start:  07/21/25    Expected End:  08/04/25            STG - Patient will transfer sit to and from stand Karthikeyan with LRAD       Start:  07/21/25    Expected End:  08/04/25                   Education Documentation  Precautions, taught by Vivienne May, PT at 7/21/2025 11:34 AM.  Learner: Patient  Readiness: Acceptance  Method: Explanation, Demonstration  Response: Verbalizes Understanding, Demonstrated Understanding  Comment: transfer techniques, activity pacing    Body Mechanics, taught by Vivienne May, PT at 7/21/2025 11:34 AM.  Learner: Patient  Readiness: Acceptance  Method: Explanation, Demonstration  Response: Verbalizes Understanding, Demonstrated Understanding  Comment: transfer techniques, activity pacing    Mobility Training, taught by Vivienne May, PT at 7/21/2025 11:34 AM.  Learner: Patient  Readiness: Acceptance  Method: Explanation, Demonstration  Response: Verbalizes Understanding, Demonstrated Understanding  Comment: transfer techniques, activity pacing    Education Comments  No comments found.        07/21/25 at 11:37 AM - Vivienne May, PT

## 2025-07-21 NOTE — PROGRESS NOTES
Occupational Therapy    Evaluation/Treatment    Patient Name: Felice Law  MRN: [id number]  Department: [facility]ICU  Room: 10/10-A  Today's Date: 07/21/25  Time Calculation  Start Time: 0925  Stop Time: 0957  Time Calculation (min): 32 min     Assessment:  OT Assessment: Patient admitted for ADHF and SG guided optimization; patient with deficits in ADLs/functional mobility, will benefit from OT services to address deficits.  Prognosis: Good  Barriers to Discharge Home: No anticipated barriers  End of Session Communication: Bedside nurse  End of Session Patient Position: Alarm off, not on at start of session (patient sitting on couch in room per patient request, call light in reach)  OT Assessment Results: Decreased ADL status, Decreased upper extremity strength, Decreased endurance, Decreased functional mobility  Prognosis: Good  Plan:  Treatment Interventions: ADL retraining, Functional transfer training, UE strengthening/ROM, Endurance training, Patient/family training, Equipment evaluation/education, Compensatory technique education  OT Frequency: 2 times per week  OT Discharge Recommendations: Low intensity level of continued care  Equipment Recommended upon Discharge:  (TBD)  OT Recommended Transfer Status: Stand by assist  OT - OK to Discharge: Yes  Treatment Interventions: ADL retraining, Functional transfer training, UE strengthening/ROM, Endurance training, Patient/family training, Equipment evaluation/education, Compensatory technique education    Subjective   OT Visit Info:  OT Received On: 07/21/25  General Visit Info:   General  Reason for Referral: ADHF, SG guided optimization  Past Medical History Relevant to Rehab: ICM, HFrEF, EF 15-20%, CABG (LIMA- LAD in 2023),  PCI, VT arrest s/p VT ablation and ICD in 2017, Hypertension, Hyperlipidemia, Hypothyroidism, Left femoral DVT on Eliquis, COPD, JAKE, CKD III, Chronic BLE venous stasis dermatitis, Insulin dependent Type 2  diabetes  Family/Caregiver Present: No  Co-Treatment: PT  Co-Treatment Reason: to maximize therapeutic potential and communication 2/2 use of DAJUAN  Prior to Session Communication: Bedside nurse  Patient Position Received: Alarm off, not on at start of session (patient sitting EOB on arrival)  General Comment: patient pleasant and agreeable to therapy; [place], heparin gtt, 3L NC   Precautions:  Hearing/Visual Limitations: hearing is WFL  Medical Precautions: Cardiac precautions, Fall precautions    Vital Signs     Vitals Session Pre OT During OT Post OT   /93  99/63   HR 87  91   SpO2 91  97   MAP (mmHg)   75     Pain:  Pain Assessment  Pain Assessment: 0-10  0-10 (Numeric) Pain Score: 0 - No pain    Objective   Cognition:  Overall Cognitive Status: Within Functional Limits  Arousal/Alertness: Appropriate responses to stimuli  Orientation Level:  (oriented x3)  Following Commands: Follows one step commands consistently        Nunez Agitation Sedation Scale  Nunez Agitation Sedation Scale (RASS): Alert and calm  Home Living:  Type of Home: House  Lives With:  (mother, sister and friend)  Home Adaptive Equipment:  (rollator and cane)  Home Layout: One level  Home Access:  (1+3 SHEA)  Bathroom Shower/Tub: Walk-in shower  Bathroom Equipment:  (shower chair)  Prior Function:  Level of [place]: Independent with ADLs and functional transfers  Receives Help From: Family  ADL Assistance: Independent  Homemaking Assistance:  (family does IADLs)  Ambulatory Assistance:  (mod (I) with cane vs rollator  Simultaneous filing. User may not have seen previous data.)  Vocational:  (not working)  Leisure:  (Simultaneous filing. User may not have seen previous data.)  Hand Dominance: Right  Prior Function Comments: (+) drive     ADL:  Eating Assistance: Independent  Eating Deficit: Setup  Grooming Assistance: Stand by  Grooming Deficit: Supervision/safety  Bathing Assistance: Stand by  Bathing Deficit:  (anticipated)  UE  Dressing Assistance: Stand by  UE Dressing Deficit: Supervision/safety  LE Dressing Assistance: Stand by  Toileting Assistance with Device: Stand by  Activities of Daily Living: Grooming  Grooming Comments: TREATMENT: SBA for washing hands at sink in bathroom in standing    Toileting  Toileting Comments: TREATMENT: Supervision for hygiene  Activity Tolerance:  Early Mobility/Exercise Safety Screen: Proceed with mobilization - No exclusion criteria met     Bed Mobility/Transfers: Bed Mobility  Bed Mobility: No    Transfers  Transfer: Yes  Transfer 1  Transfer From 1: Sit to, Stand to  Transfer to 1: Sit, Stand  Technique 1: Sit to stand, Stand to sit  Transfer Device 1: Walker  Transfer Level of Assistance 1: Close supervision, Minimal verbal cues  Trials/Comments 1: functional mobility with FWW initially with SBA, progressed to CGA with increased time/fatigue    Toilet Transfers  Toilet Transfers Comments: TREATMENT: Supervision for toilet transfer in bathroom using grab bar; CGA for functional mobility in/out of bathroom with patient pushing IV pole  Sitting Balance:  Static Sitting Balance  Static Sitting-Level of Assistance: Independent  Dynamic Sitting Balance  Dynamic Sitting-Level of Assistance: Distant supervision  Standing Balance:  Static Standing Balance  Static Standing-Level of Assistance: Close supervision  Dynamic Standing Balance  Dynamic Standing-Level of Assistance: Contact guard, Close supervision     Vision:Vision - Basic Assessment  Current Vision: Wears glasses all the time  Sensation:  Sensation Comment: patient has neuropathy at baseline  Strength:  Strength Comments: (B)  4-/5, (B) shoulders/elbows >/= 3/5     Coordination:  Movements are Fluid and Coordinated: Yes      Extremities: RUE   RUE : Within Functional Limits and LUE   LUE: Within Functional Limits    Outcome Measures: [facility] Daily Activity  Putting on and taking off regular lower body clothing: A little  Bathing (including  washing, rinsing, drying): A little  Putting on and taking off regular upper body clothing: A little  Toileting, which includes using toilet, bedpan or urinal: A little  Taking care of personal grooming such as brushing teeth: A little  Eating Meals: None  Daily Activity - Total Score: 19    , Confusion Assessment Method-ICU (CAM-ICU)  Feature 1: Acute Onset or Fluctuating Course: Negative  Feature 3: Altered Level of Consciousness: Negative  Overall CAM-ICU: Negative  , and E = Exercise and Early Mobility  Early Mobility/Exercise Safety Screen: Proceed with mobilization - No exclusion criteria met  ICU Mobility Scale: Walking with assistance of 1 person    Education Documentation  Body Mechanics, taught by Yumi Ram OT at 7/21/2025 11:31 AM.  Learner: Patient  Readiness: Acceptance  Method: Explanation, Demonstration,   Response: Verbalizes Understanding, Needs Reinforcement  Comment: OT POC    Precautions, taught by Yumi Ram OT at 7/21/2025 11:31 AM.  Learner: Patient  Readiness: Acceptance  Method: Explanation, Demonstration,   Response: Verbalizes Understanding, Needs Reinforcement  Comment: OT POC    ADL Training, taught by Yumi Ram OT at 7/21/2025 11:31 AM.  Learner: Patient  Readiness: Acceptance  Method: Explanation, Demonstration,   Response: Verbalizes Understanding, Needs Reinforcement  Comment: OT POC    Education Comments  No comments found.    Goals:  Encounter Problems       Encounter Problems (Active)       ADLs       Patient will complete daily grooming tasks with independent level of assistance and PRN adaptive equipment while standing. (Progressing)       Start:  07/21/25    Expected End:  08/04/25            Patient will complete toileting including hygiene clothing management/hygiene with independent level of assistance. (Progressing)       Start:  07/21/25    Expected End:  08/04/25            Patient will perform UB dressing with  Independent and PRN adaptive equipment. (Progressing)       Start:  07/21/25    Expected End:  08/04/25            Patient will perform LB dressing with Independent and PRN adaptive equipment. (Progressing)       Start:  07/21/25    Expected End:  08/04/25               EXERCISE/STRENGTHENING       Patient will be educated on BUE HEP for increased ADL performance. (Progressing)       Start:  07/21/25    Expected End:  08/04/25            Patient will participate in >15 minutes of activity with <2 rest breaks to increase ADL/functional task endurance. (Progressing)       Start:  07/21/25    Expected End:  08/04/25               MOBILITY       Patient will perform Functional mobility Household distances/Community Distances with modified independent level of assistance and least restrictive device in order to improve safety and functional mobility. (Progressing)       Start:  07/21/25    Expected End:  08/04/25               TRANSFERS       Patient will perform bed mobility independent level of assistance in order to improve safety and independence with mobility (Progressing)       Start:  07/21/25    Expected End:  08/04/25            Patient will complete functional transfers with least restrictive device with modified independent level of assistance. (Progressing)       Start:  07/21/25    Expected End:  08/04/25               Yumi Ram OTR/L  Inpatient Occupational Therapist   Rehab Office: [phone]

## 2025-07-21 NOTE — PROGRESS NOTES
HFICU Attending Note    60M with stage D ICM (EF 15-20%) and prior VT arrest s/p ICD, admitted 7/12 to [facility] with ADHF and transferred to [facility] HFICU on 7/17 for consideration of advanced therapies. Previously declined AT (10/2023) due to frailty, DM, CKD, and limited support. [place] placed 7/19 showed preserved CI (2.4) but elevated biventricular filling pressures. HD-guided optimization ongoing with rapid diuresis (>10L net negative), uptitration of GDMT including restart and up-titration of Entresto to 97/103 BID, and metoprolol to 100 mg. A1c 10.1%. Endocrine consulted 7/21. Patient has chronic bilateral foot drop with limited ambulation and is receiving PT/OT. He’s improving, up and walking with walker. Re-eval for LVAD a consideration based on trajectory.    This critically ill patient continues to be at-risk for clinically significant deterioration / failure due to the above mentioned dysfunctional, unstable organ systems.  I have personally identified and managed all complex critical care issues to prevent aforementioned clinical deterioration.  Critical care time is spent at bedside and/or the immediate area and has included, but is not limited to, the review of diagnostic tests, labs, radiographs, serial assessments of hemodynamics, respiratory status, ventilatory management, and family updates.  Time spent in procedures and teaching are reported separately.    Critical care time: _35___ minutes     Objective    Admit Date: 7/17/2025  Hospital Length of Stay: 4   ICU Length of Stay: 3d 21h   Home: [facility] [id number]    MEDICATIONS  Infusions:  [Held by provider] furosemide, Last Rate: Stopped (07/20/25 0605)  heparin, Last Rate: 1,600 Units/hr (07/21/25 0800)      Scheduled:  [Held by provider] apixaban, 5 mg, BID  atorvastatin, 80 mg, Nightly  [Held by provider] bumetanide, 1 mg, Daily  calcium carbonate, 500 mg of calcium carbonate, BID  dapagliflozin propanediol, 10 mg, Daily  digoxin, 125 mcg,  Daily  fenofibrate, 145 mg, Daily  gabapentin, 200 mg, q8h SUSI  hydrALAZINE, 50 mg, q8h SUSI  insulin glargine, 40 Units, Nightly  insulin glargine, 40 Units, Daily  insulin lispro, 0-10 Units, TID AC  insulin lispro, 14 Units, TID AC  iron sucrose, 200 mg, Daily  isosorbide dinitrate, 20 mg, TID  levothyroxine, 75 mcg, Daily before breakfast  metoprolol succinate XL, 100 mg, Daily  pantoprazole, 40 mg, Daily before breakfast  QUEtiapine, 50 mg, Nightly  sacubitriL-valsartan, 1 tablet, BID  spironolactone, 25 mg, Daily      PRN:  acetaminophen, 650 mg, q6h PRN  albuterol, 2.5 mg, q6h PRN  alteplase, 2 mg, PRN  dextrose, 12.5 g, q15 min PRN  dextrose, 25 g, q15 min PRN  diphenhydrAMINE, 50 mg, q5 min PRN  glucagon, 1 mg, q15 min PRN  glucagon, 1 mg, q15 min PRN  heparin, 2,000-4,000 Units, q4h PRN        Prior to Admission Meds:  Prescriptions Prior to Admission[1]    Invasive Hemodynamics:    Most Recent Range Past 24hrs   BP (Art)   No data recorded   MAP(Art)   No data recorded   RA/CVP   No data recorded   PA 66/31 PAP  Min: 62/31  Max: 92/50   PA(mean) 43 mmHg PAP (Mean)  Min: 38 mmHg  Max: 64 mmHg   PCWP 25 mmHg (per Jamin NP) No data recorded   CO 4.9 L/min CO (L/min)  Min: 4.8 L/min  Max: 4.9 L/min   CI 2.2 L/min/m2 CI (L/min/m2)  Min: 2.2 L/min/m2  Max: 2.2 L/min/m2   Mixed Venous 65 % SVO2 (%)  Min: 61 %  Max: 65 %   SVR  1527 (dyne*sec)/cm5 SVR (dyne*sec)/cm5  Min: 1527 (dyne*sec)/cm5  Max: 1596 (dyne*sec)/cm5   PVR   No data recorded     MCS:   Heart Mate III:     Most Recent Range Past 24hrs   Flow   No data recorded   Speed   No data recorded   Power   No data recorded   PI   No data recorded     ECMO:     Most Recent Range Past 24hrs   Flow   No data recorded   Speed   No data recorded   Sweep   No data recorded     Impella:      Most Recent Range Past 24hrs   Performance Level   No data recorded   Flow (L/min)   No data recorded   Motor Current   No data recorded   Placement Signal    Placement OK could  "not be evaluated. This SmartLink does not work with rows of the type: Custom List   Purge (mmHg)   No data recorded   Purge rate (mL/hr)   No data recorded     VENT:    Most Recent Range Past 24hrs   Mode      FiO2   No data recorded   Rate   No data recorded   Vt    No data recorded   PEEP   No data recorded         7/21/2025     8:00 AM 7/21/2025     7:47 AM 7/21/2025     7:00 AM 7/21/2025     6:00 AM 7/21/2025     5:00 AM 7/21/2025     4:00 AM 7/21/2025     3:00 AM   Vitals   Systolic 119  129 120 138 128 112   Diastolic 78  83 69 79 73 93   BP Location Right arm     Right arm    Heart Rate 82  83 81 87 84 85   Temp  35.5 °C (95.9 °F)        Resp 13  13 25 18 20 15   Weight (lb)    212.52      BMI    29.65 kg/m2      BSA (m2)    2.2 m2        Visit Vitals  /78 (BP Location: Right arm, Patient Position: Lying)   Pulse 82   Temp 35.5 °C (95.9 °F) (Temporal)   Resp 13   Ht 1.803 m (5' 10.98\")   Wt 96.4 kg (212 lb 8.4 oz)   SpO2 95%   BMI 29.65 kg/m²   Smoking Status Former   BSA 2.2 m²     Wt Readings from Last 5 Encounters:   07/21/25 96.4 kg (212 lb 8.4 oz)   07/17/25 110 kg (243 lb 2.7 oz)   06/10/25 97.1 kg (214 lb)   05/12/25 96.6 kg (213 lb)   05/04/25 102 kg (224 lb)       Intake/Output Summary (Last 24 hours) at 7/21/2025 0819  Last data filed at 7/21/2025 0800  Gross per 24 hour   Intake 1321.38 ml   Output 5825 ml   Net -4503.62 ml     CHEST: Unlabored, Diminished  CV:  Normal sinus rhythm  ABD:  Soft, Nondistended, Rounded No guarding, Nontender, No rebound Bowel sounds: All quadrants, Flatus:    EXT:   RLE:  ,   DP:    PT: Weak  LLE:  ,   DP:    PT: Weak  NEURO:   RASS: Alert and calm  CAM: Negative  LOC: Alert  Cognition: Follows commands  GCS: 15    DATA:  CMP:  Recent Labs     07/21/25  0520 07/20/25  1232 07/20/25  0556 07/19/25  1925 07/19/25  0530 07/18/25  1708 07/18/25  0157 07/17/25  1119 07/17/25  0653 07/16/25  0655 07/15/25  0708 07/14/25  0709 07/13/25  0737 07/12/25  1922    " 135* 138 135* 138 135* 136 134* 137 135* 137 139 138 133*   K 3.7 3.8 3.6 3.7 4.2 3.7 4.7 4.0 4.0 4.2 4.3 3.9 3.8 4.0   CL 92* 90* 93* 91* 98 94* 99 96* 98 98 97* 98 98 95*   CO2 37* 36* 37* 35* 32 31 29 31 32 28 29 29 28 25   ANIONGAP 12 13 12 13 12 14 13 11 11 13 15 16 16 17   BUN 35* 37* 38* 39* 43* 47* 49* 48* 47* 53* 55* 54* 52* 53*   CREATININE 1.61* 1.60* 1.56* 1.81* 1.67* 1.73* 1.64* 1.62* 1.70* 1.86* 2.02* 2.14* 2.13* 2.55*   EGFR 49* 49* 51* 43* 47* 45* 48* 49* 46* 41* 37* 35* 35* 28*   MG 2.39  --  2.55*  --  2.47*  --  2.40  --  2.39 2.58* 2.70* 2.62* 2.61* 2.48*     Recent Labs     07/21/25  0520 07/20/25  1232 07/20/25  0556 07/19/25  1925 07/19/25  0530 07/18/25  1708 07/18/25  0157 07/17/25  1119 07/12/25  1922 05/04/25  1447 11/11/24  1558 11/05/24  1146 06/25/24  1139 12/19/23  1104 10/06/23  0515 10/05/23  1647   ALBUMIN 3.6 4.0 3.9 3.9 3.6 3.7 3.6 3.7 3.8 4.1 4.0 4.4 4.3 4.1   < > 4.1   ALT  --   --   --   --   --   --   --  8* 10 26 12 13 15 13  --  11   AST  --   --   --   --   --   --   --  15 12 22 12 <3* 10 10  --  13   BILITOT  --   --   --   --   --   --   --  2.0* 1.6* 2.1* 0.5 0.5 1.3* 1.1  --  1.8*    < > = values in this interval not displayed.     CBC:  Recent Labs     07/21/25  0520 07/20/25  0556 07/19/25  0530 07/18/25  0157 07/17/25  0653 07/16/25  0654 07/15/25  0708 07/14/25  0709   WBC 5.3 5.1 4.7 5.1 4.7 5.1 4.7 5.7   HGB 12.4* 12.2* 11.2* 11.6* 12.1* 12.3* 12.3* 11.5*   HCT 40.4* 40.2* 38.3* 38.3* 42.4 41.6 42.3 39.5*    252 254 241 260 272 267 274   MCV 77* 78* 81 77* 82 81 82 81     COAG:   Recent Labs     07/21/25  0520 07/20/25  2329 07/20/25  1826 07/20/25  1354 07/20/25  0556 07/17/25  1423 07/17/25  1119 10/14/23  2208 10/05/23  1647   INR  --   --   --   --   --   --  2.0*  --  1.2*   HAUF 0.3 0.4 0.2 0.3 0.2   < >  --    < >  --     < > = values in this interval not displayed.     ABO:   Recent Labs     10/05/23  1647   ABO A     HEME/ENDO:   Recent Labs      "07/17/25  1125 07/17/25  1111 05/12/25  1656 11/11/24  1558 11/05/24  1146 06/25/24  1139 12/29/23  1427 10/04/23  1518   FERRITIN 102  --   --   --   --  332*  --  379*   IRONSAT 16*  --   --   --  37 20*  --  15*   TSH 3.97  --   --  3.26 4.05* 4.23*   < > 9.31*   HGBA1C  --  10.1* 8.9* 11.0* 10.8* 9.2*   < > 8.1*    < > = values in this interval not displayed.     CARDIAC:   Recent Labs     07/17/25  1119 07/13/25  0737 07/12/25  2038 07/12/25  1922 05/04/25  1540 05/04/25  1447 11/05/24  1146 06/25/24  1139 12/19/23  1104 10/05/23  1647 10/04/23  1518   LDH  --   --   --   --   --   --   --   --   --  240  --    TROPHS 37 53* 53* 53* 40* 41*  --   --   --   --   --    *  --   --  1,299*  --  2,032* 57 743* 147* 157* 160*     Recent Labs     07/21/25  0520 07/20/25  2329 07/20/25  1232 07/20/25  0556 07/20/25  0033   LACMX 0.9 1.4 1.6 1.0 1.3   SO2MV 65 65 61 61 62     No results for input(s): \"TACROLIMUS\", \"SIROLIMUS\", \"CYCLOSPORINE\" in the last 26079 hours.  Recent Labs     06/10/25  0831 11/11/24  1558 11/05/24  1146 10/05/23  0354   CHOL 82 630* 417* 106   LDLCALC 24  --   --  32*   HDL 26.8 29.2 36.9 24.5   TRIG 154* 2,663* 2,017* 247*     MICRO: No results for input(s): \"ESR\", \"CRP\", \"PROCAL\" in the last 18429 hours.  Susceptibility data from last 90 days.  Collected Specimen Info Organism   07/19/25 Swab from Anterior Nares Methicillin Susceptible Staphylococcus aureus (MSSA)     Assessment & Plan  Acute on chronic systolic heart failure        EKG:   Recent Labs     07/12/25  1834 05/22/25  0857   ATRRATE 78 86   VENTRATE 78 86   PRINT 168 194   QRSDUR 112 118   QTCFRED 425 440   QTCCALCB 444 466     Encounter Date: 07/12/25   ECG 12 lead   Result Value    Ventricular Rate 78    Atrial Rate 78    CO Interval 168    QRS Duration 112    QT Interval 390    QTC Calculation(Bazett) 444    P Axis 85    R Axis 25    T Axis 101    QRS Count 13    Q Onset 215    P Onset 131    P Offset 190    T Offset 410 "    QTC Fredericia 425    Narrative    Normal sinus rhythm  Possible Left atrial enlargement  Low voltage QRS  Anterolateral infarct (cited on or before 10-OCT-2023)  Abnormal ECG  When compared with ECG of 04-MAY-2025 13:32,  Questionable change in QRS axis     Echocardiogram:   Recent Labs     05/05/25  1223 06/25/24  1100   EF 18 30   LVIDD  --  6.65   RV 58.3 54.4   RVFRWALLPKSP 9.14 8.16   TAPSE 1.6 1.0   Transthoracic Echo (TTE) Limited With Doppler, Color And Contrast 05/05/2025    [facility] [place] [address]  Tel [phone] and Fax [phone]    TRANSTHORACIC ECHOCARDIOGRAM REPORT      Patient Name:       NEGRITA BERNARD    Gayla Physician:    [id number] Bird Askew MD  Study Date:         5/5/2025            Ordering Provider:    [id number] BENNY CARTWRIGHT  MRN/PID:            [id number]            Fellow:  Accession#:         [id number]        Nurse:                Tres Barros RN  Date of Birth/Age:  1965 / 60 years Sonographer:          Alyssa Monroy RDCS  Gender assigned at  M                   Additional Staff:  Birth:  Height:             180.34 cm           Admit Date:           5/4/2025  Weight:             101.61 kg           Admission Status:     Inpatient -  Routine  BSA / BMI:          2.21 m2 / 31.24     Encounter#:           [id number]  kg/m2  Blood Pressure:     116/75 mmHg         Department Location:  [facility]    Study Type:    TRANSTHORACIC ECHO (TTE) COMPLETE  Diagnosis/ICD: Unspecified systolic (congestive) heart failure (CHF)-I50.20;  Ischemic cardiomyopathy-I25.5  Indication:    SOB CP Edema  CPT Code:      Echo Limited-35571; Doppler Limited-86611; Color Doppler-57175    Patient History:  CABG:              CABG x 1.  Smoker:            Former.  Pacer/Defib:       AICD  Pertinent History: CAD, Cardiomyopathy, CHF, COPD, PVD, Previous DVT, HTN and  Hyperlipidemia. PCI-Stent, S/P CABG x1  2023, CKD III, JAKE.    Study Detail: The following Echo studies were performed: 2D, Doppler and color  flow. Technically challenging study due to body habitus. Definity  used as a contrast agent for endocardial border definition. Total  contrast used for this procedure was 2 mL via IV push.      PHYSICIAN INTERPRETATION:  Left Ventricle: Left ventricular ejection fraction is severely decreased, by visual estimate at 15-20%. There is global hypokinesis of the left ventricle with minor regional variations. The left ventricular cavity size is moderately dilated. Abnormal (paradoxical) septal motion, consistent with RV pacemaker. Spectral Doppler shows a Grade II (pseudonormal pattern) of left ventricular diastolic filling with an elevated left atrial pressure.  Left Atrium: The left atrium is mildly dilated.  Right Ventricle: The right ventricle is mildly enlarged. There is moderately reduced right ventricular systolic function.  Right Atrium: The right atrium is normal in size.  Aortic Valve: The aortic valve is trileaflet. There is minimal aortic valve cusp calcification. There is mild aortic valve thickening. There is no evidence of aortic valve regurgitation. The peak instantaneous gradient of the aortic valve is 6 mmHg.  Mitral Valve: The mitral valve is mildly thickened. There is mild to moderate mitral valve regurgitation.  Tricuspid Valve: The tricuspid valve is structurally normal. There is mild to moderate tricuspid regurgitation. The Doppler estimated RVSP is moderately elevated at 58.3 mmHg.  Pulmonic Valve: The pulmonic valve is structurally normal. There is physiologic pulmonic valve regurgitation.  Pericardium: Trivial pericardial effusion.  Aorta: The aortic root is normal.  Systemic Veins: The inferior vena cava appears dilated.  In comparison to the previous echocardiogram(s): Compared with study dated 6/25/2024, LVEF 15-20%.      CONCLUSIONS:  1. Left ventricular ejection fraction is severely  decreased, by visual estimate at 15-20%.  2. There is global hypokinesis of the left ventricle with minor regional variations.  3. Spectral Doppler shows a Grade II (pseudonormal pattern) of left ventricular diastolic filling with an elevated left atrial pressure.  4. Left ventricular cavity size is moderately dilated.  5. Abnormal septal motion consistent with RV pacemaker.  6. There is moderately reduced right ventricular systolic function.  7. Mildly enlarged right ventricle.  8. The left atrium is mildly dilated.  9. Mild to moderate mitral valve regurgitation.  10. Mild to moderate tricuspid regurgitation visualized.  11. Moderately elevated right ventricular systolic pressure.    QUANTITATIVE DATA SUMMARY:    2D MEASUREMENTS:           Normal Ranges:  LVEDV Index:     119 ml/m2      LV SYSTOLIC FUNCTION:  Normal Ranges:  EF-A4C View:    21 % (>=55%)  EF-A2C View:    8 %  EF-Biplane:     16 %  EF-Visual:      18 %  LV EF Reported: 18 %      LV DIASTOLIC FUNCTION:           Normal Ranges:  MV Peak A:             0.65 m/s  (0.42-0.7 m/s)  MV e'                  0.067 m/s (>8.0)  MV lateral e'          0.09 m/s  MV medial e'           0.04 m/s      MITRAL VALVE:          Normal Ranges:  MV DT:        198 msec (150-240msec)      MITRAL INSUFFICIENCY:             Normal Ranges:  MR Vmax:              342.00 cm/s      AORTIC VALVE:          Normal Ranges:  AoV Vmax:     1.19 m/s (<=1.7m/s)  AoV Peak P.7 mmHg (<20mmHg)  LVOT Max Dylan: 0.65 m/s (<=1.1m/s)  LVOT VTI:     6.72 cm      RIGHT VENTRICLE:  TAPSE: 15.5 mm  RV s'  0.09 m/s      TRICUSPID VALVE/RVSP:          Normal Ranges:  Peak TR Velocity:     3.29 m/s  RV Syst Pressure:     58 mmHg  (< 30mmHg)  IVC Diam:             2.40 cm      [id number] Bird Askew MD  Electronically signed on 2025 at 1:38:04 PM        ** Final **    Coronary Angiography:   Left And Right Heart Catheterization With Left Ventriculography 06/10/2025    [facility]  Center, Cath Lab, [address] [place] [address]    Cardiovascular Catheterization Report    Patient Name:      NEGRITA LOO Performing Physician:  [id number] Carl Gillombardo MD  Study Date:        6/10/2025             Verifying Physician:   [id number] Carl Gillombardo MD  MRN/PID:           [id number]              Cardiologist/Co-Scrub:  Accession#:        [id number]          Ordering Provider:     [id number] CARMELITA NAVA  Date of Birth/Age: 1965 / 60 years   Cardiologist:  Gender:            M                     Fellow:                [id number] Kyler Chow MD  Encounter#:        [id number]            Surgeon:      Study:            Left Heart Cath with Grafts  Additional Study: Right Heart Cath      Indications:  NEGRITA LOO is a 60 year old male who presents with prior coronary artery bypass graft surgery, dyslipidemia and hypertension. NEGRITA LOO is a 60 year old male who presents with dyslipidemia, hypertension, prior coronary artery bypass graft surgery and an anginal equivalent chest pain assessment (i.e. dyspnea on exertion believed to be from ischemia). Cardiomyopathy and left ventricular dysfunction. Cardiomyopathy and heart failure.    Procedure Description:  After infiltration with 2% Lidocaine, the right radial artery was cannulated with a modified Seldinger technique. Subsequently a 6 Swedish sheath was placed in the right radial artery. After infiltration of local anesthetic, the right brachial vein was cannulated with a percutaneous technique. A 5 Swedish sheath was placed in the vein. Selective coronary catheterization was performed using a 5 Fr catheter(s) exchanged over a guide wire to cannulate the coronary arteries. A JL 3.5 tip catheter was used for left coronary injections. A JR 4 tip catheter was used for right coronary injections.  Cardiac output was calculated via the Pallavi method. After completion of the procedure, the arterial sheath was pulled and a  TR Band Radial Compression Device was utilized to obtain patent hemostasis. Post-procedure, the venous sheath was pulled and pressure was applied to the site.    Coronary Angiography:  The coronary circulation is right dominant.    Left Main Coronary Artery:  The left main coronary artery is a normal caliber vessel. The left main arises normally from the left coronary sinus of Valsalva and bifurcates into the LAD and circumflex coronary arteries. The left main coronary artery showed no significant disease or stenosis greater than 30%.    Left Anterior Descending Coronary Artery Distribution:  The left anterior descending coronary artery is a normal caliber vessel. The LAD arises normally from the left main coronary artery. The proximal left anterior descending coronary artery showed 70% stenosis. The ostial and proximal 1st diagonal branch revealed 90% stenosis. The 1st septal  branch showed no significant disease or stenosis greater than 30%. The 2nd septal  branch showed no evidence of significant disease. The 3rd septal  branch demonstrated no significant disease or stenosis greater than 30%.    Circumflex Coronary Artery Distribution:  The circumflex coronary artery is a normal caliber vessel. The circumflex arises normally from the left main coronary artery and terminates in the AV groove. The mid circumflex coronary artery showed 50% stenosis. The ostial 1st obtuse marginal branch showed 90% stenosis.    Right Heart Catheterization:  Cardiac output was calculated via the Pallavi method. Elevated left sided filling pressures with normal cardiac output. Severely elevated ventricular filling pressure. Cardiac output is normal. Preserved cardiac output at rest. RHC:  RA: 25  RV: 63/9  PA: 72/34  PCWP: 32  PA-SAT: 36%  SVC-SAT: 41%  FA-SAT: 71%  CO: 4.84  CI: 2.24  SVR: 1041  Severely elevated right and left sided filling pressures with preserved CO/CI.    Right Coronary Artery  Distribution:    The right coronary artery is a normal caliber vessel. The RCA arises normally from the right sinus of Valsalva. The mid right coronary artery showed 100% stenosis. The acute marginal branch showed no significant disease or stenosis greater than 30%.    Coronary Grafts:    LIMA Graft:  Left internal mammary artery graft conduit, originating in situ and attached to the mid left anterior descending, is normal.    Coronary Lesion Summary:  Vessel         Stenosis      Vessel Segment  LAD          70% stenosis       proximal  1st Diagonal 90% stenosis  ostial and proximal  Circumflex   50% stenosis          mid  OM 1         90% stenosis        ostial  RCA          100% stenosis         mid      Graft Stenosis Summary:  Graft     Destination of Graft           % Stenosis  LIMA  mid left anterior descending no evidence of stenosis      Hemo Personnel:  +---------------------------+---------+  Name                       Duty       +---------------------------+---------+  Gillombardo, Carl Barton MDPROC MD 1  +---------------------------+---------+      Hemodynamic Pressures:    +----+----------+---------+-------------+-------------+---+----+-------+-------+  SiteDate Time   Phase  Systolic mmHg  Diastolic  ED MeanA-Wave V-Wave                   Name                    mmHg     mmHmmHg mmHg   mmHg                                                      g                     +----+----------+---------+-------------+-------------+---+----+-------+-------+    PA 6/10/2025 AIR REST           72           34     43                      11:05:46                                                                      AM                                                          +----+----------+---------+-------------+-------------+---+----+-------+-------+    PW 6/10/2025 AIR REST                               30     32     32        11:06:54                                                                       AM                                                          +----+----------+---------+-------------+-------------+---+----+-------+-------+    RV 6/10/2025 AIR REST           63            9 23                          11:07:45                                                                      AM                                                          +----+----------+---------+-------------+-------------+---+----+-------+-------+    RA 6/10/2025 AIR REST                               25     31     33        11:09:13                                                                      AM                                                          +----+----------+---------+-------------+-------------+---+----+-------+-------+    AO 6/10/2025 AIR REST          102           75     88                      11:15:08                                                                      AM                                                          +----+----------+---------+-------------+-------------+---+----+-------+-------+    LV 6/10/2025 AIR REST           99           26 33                          11:21:35                                                                      AM                                                          +----+----------+---------+-------------+-------------+---+----+-------+-------+    LV 6/10/2025 AIR REST          102           76 80                          11:21:47                                                                      AM                                                          +----+----------+---------+-------------+-------------+---+----+-------+-------+    AO 6/10/2025 AIR REST           91           70     09                       11:38:01                                                                      AM                                                          +----+----------+---------+-------------+-------------+---+----+-------+-------+        Oxygen Saturation %:  +-----------+----------+------------+  Sample SiteO2 Sat (%)HB (g/100ml)  +-----------+----------+------------+           FA        71        12.5  +-----------+----------+------------+           PA        36        12.5  +-----------+----------+------------+           FA        71        12.5  +-----------+----------+------------+           PA        36        12.5  +-----------+----------+------------+      Cardiac Outputs:  +---------------+------------------+-------+  BLANCA CO (l/min)BLANCA CI (l/min/m2)BLANCA SV  +---------------+------------------+-------+              4.8               2.2   67.2  +---------------+------------------+-------+      Vascular Resistance Calculated Values (Wood Units):  +-----+---+----+-------+----+----+---+----+----+----+-------+  PhasePVRPVRIPVR/SVRSVR SVRITPRTPRITVR TVRITPR/TVR  +-----+---+----+-------+----+----+---+----+----+----+-------+  0    2.75.8 0      [id number]        +-----+---+----+-------+----+----+---+----+----+----+-------+      Cardiac Cath Post Procedure Notes:  Post Procedure Diagnosis: Multivessel CAD.  Blood Loss:               Estimated blood loss during the procedure was 10 mls.  Specimens Removed:        Number of specimen(s) removed: none.    ____________________________________________________________________________________  CONCLUSIONS:  1. Severely elevated right and left sided filling pressures with preserved CO/CI.  RA: 25 -- RV: 72/9 -- PA: 72/34 -- PCWP: 32    PA-SAT: 36%  SVC-SAT: 41%  FA-SAT: 71%  CO: 4.84  CI: 2.24  2. Right dominant coronary circulation with severe multivessel CAD, as described above.  3. LIMA  to LAD patent.    ICD 10 Codes:  Cardiomyopathy, unspecified-I42.9    CPT Codes:  Left Heart Cath Bypass Graft w ventriculography and coronary angio(LHC)-16724; Right Heart Cath O2/Cardiac output without biopsy (RHC)-38689    [id number] Carl Gillombardo MD  Performing Physician  Electronically signed by [id number] Carl Gillombardo MD on 2025 at 3:38:51 PM          ** Final **    Right Heart Cath: No results found for this or any previous visit from the past 1800 days.    Cardiac Scoring: No results found for this or any previous visit from the past 1800 days.    Cardiac MRI:   MR cardiac morphology and function w and wo IV contrast 2023    Narrative  EXAMINATION: MR CARDIAC W/+W/O 2023 02:40 PM    Patient Name: NEGRITA LOO  MRN: [id number]  : 1965  Referring MD: VICK LEONARD  Interpreting MD: Dalton Solares  Resident/Fellow: None  Scan Date: 2023 02:40 PM  Height: ft in or 180cm  Weight: lbs or 112 kg  BSA: 2.3 m^2    Quality of study: Good  CLINICAL INDICATION: Pre-op cardiac assessment  Associated Diagnosis: Coronary artery disease  Technologist Note:  Patient has a pacemaker causing a lot of artifact.  MJI  Comparison:  Correlation: Transthoracic echocardiogram on Exercise stress-rest echocardiogram on Left heart catheterization and coronary angiograms on Nuclear medicine stress-rest perfusion imaging on    TECHNIQUE: Patient questionnaire was completed and was reviewed by MRI personnel prior to the patient entering the scanner. Multiplanar, multisequence MR imaging of the heart was performed without and with intravenous contrast. Cardiac velocity flow mapping was performed.  Siemens Christina 1.5 Effie MRI scanner.  INTRAPROCEDURE MEDS: Gadoterate Meglumine (DOTAREM) 10 MMOL/20ML solution 35 mL Route: Intravenous Push    FINDINGS:      CARDIAC MRI    CARDIAC CHAMBER MORPHOLOGY  Left ventricle: Dilated cavity size, mixed hypertrophy, no mass/thrombus  Right ventricle: Normal contractility,  normal cavity size, no mass/thrombus  Left atrium: Dilated cavity size, no mass/thrombus  Right atrium: Normal cavity size, no mass/thrombus    CARDIAC VALVE MORPHOLOGY  Mitral valve: Normal leaflets, fully mobile, no regurgitation, no stenosis  Tricuspid valve: Normal leaflets, fully mobile, no regurgitation, no stenosis  Aortic valve: Normal leaflets, fully mobile, no regurgitation, no stenosis  Pulmonic Valve: Normal leaflets, fully mobile, no regurgitation, no stenosis    MISCELLANEOUS MORPHOLOGY  Pericardium: Normal    CARDIAC MEASUREMENTS  Anterior left ventricular wall thickness: 1.2 cm (Normal < 1.2 cm)  Posterior left ventricular wall thickness: 1.2 cm (Normal < 1.2 cm)  Left ventricular end-diastolic dimension: 6.3 cm (Normal < 5.7 cm)  Left ventricular end-systolic dimension: 5.3 cm (Normal < 4 cm)  Right ventricular end-diastolic dimension: 3.6 cm (Normal < 4.3 cm)  Left atrial end-diastolic dimension: 4.6 cm (Normal < 3.9 cm)  Left atrial end-diastolic area: 29 cm^2 (Normal < 22 cm^2)  Right atrial end-diastolic dimension: 4.8 cm (Normal < 4.7 cm)  Right atrial end-diastolic area: 20 cm^2 (Normal < 20 cm^2)    Ascending aorta (at the level of the main pulmonary artery): 2.8 cm  Pulmonary Artery: 2.1 cm (Normal < 2.9 cm)      LEFT VENTRICULAR FUNCTION  The left ventricle is severely dilated with focally upon globally abnormal systolic function. Focal left ventricular systolic dysfunction consists of dyskinesia (aneurysm) of the apex  LV volumes absolute and normalized to body surface area with the age and gender matched normals in parenthesis are listed as follows.  LVEF: 22 % (62 +/- 6 (51-73)%)  SV: 77 ml  SVI: 33 ml/m2 (46 +/- 8 (30-62) ml/m2)  CO: 5.5 L/min  CI: 2.4 L/min/m2  EDV: 346 ml  EDVI: 149 ml/m2 (74 +/- 13 ()ml/m2)  ESV: 269 ml  ESVI: 116 ml/m2 (28 +/- 7 (14-43) ml/m2)  LVM: 218 g  LVMI: 96 g/m2 (62 +/- 10 (43-81) g/m2)    RIGHT VENTRICULAR FUNCTION  The right ventricle is normal in  size and systolic function.  Right ventricular volumes absolute and normalized to body surface area with the age and gender matched normals in parenthesis are listed as follows.  RVEF: 47 % (53 +/- 6 (41-66) %)  EDV: 154 ml  EDVI: 66 ml/m2 (86 +/- 17 () ml/m2)  ESV: 81 ml  ESVI: 35 ml/m2 (41 +/- 11 (19-63) ml/m2)    There is marked susceptibility artifact which limits delayed postgadolinium imaging/scar assessment. This is especially important. In the LAD distribution. Scar is seen at the left ventricular apex including the lateral and inferior walls and appears to be more pronounced in the mid to apical lateral segments. The LAD distribution is not adequately visualized because of marked susceptibility artifact from the implanted cardiac device.      Velocity flow mapping: Completed at the aortic valve. There is no evidence of aortic valve stenosis or regurgitation.    NONCARDIAC FINDINGS: Not significant      IMPRESSION:  1. Severe left ventricular dilatation with focally upon globally abnormal left ventricular systolic function with an ejection fraction of 22%. Infarctions seen in the left circumflex and RCA distribution and the apex. Full assessment of viability especially in the LAD distribution could not be performed because of marked susceptibility artifact from the implanted cardiac device which involves the entire anterior, anterolateral and anteroseptal regions. Consider PET scanning.  2. Dilated left atrium  3. Normal right ventricular size and systolic function    The pulse sequences used were designed for imaging cardiovascular structures and are suboptimal for imaging other structures and organs.    MACRO: None    Nuclear:  NM PET CT myocardial perfusion multiple 05/14/2025    Narrative  Interpreted By:  Ebony Sky,  STUDY:  NM PET CT MYOCARDIAL PERFUSION MULTIPLE;  5/14/2025 12:21 pm    INDICATION:  Signs/Symptoms:chest pain, HFrEF, ischemic cardiomyopathy.    ,I50.22 Chronic systolic  (congestive) heart failure,I25.5 Ischemic  cardiomyopathy,I50.23 Acute on chronic systolic (congestive) heart  failure,N18.30 Chronic kidney disease, stage 3 unspecified  (Multi),E11.65 Type 2 diabetes mellitus with hyperglycemia  (Multi),Z79.4 Long term (current) use of insulin (Multi),Z95.810  Presence of automatic (implantable) cardiac defibrillator,E08.22  Diabetes mellitus due to underlying condition with diabetic chronic  kidney disease,N18.30 Chronic kidney disease, stage 3 unspecified  (Multi),Z79.4 Long term (current) use of insulin (Multi)    COMPARISON:  None.    ACCESSION NUMBER(S):  [id number]    ORDERING CLINICIAN:  CARMELITA NAVA    TECHNIQUE:  DIVISION OF NUCLEAR MEDICINE  POSITRON EMISSION TOMOGRAPHY (PET) / CT  NITROGEN-13 AMMONIA MYOCARDIAL PERFUSION SCAN, WITH PHARMACOLOGIC  STRESS    The patient received an intravenous dose of 10.9mCi of N-13 ammonia  and resting positron emission tomographic (PET) images of the  myocardium were acquired. The patient then received an intravenous  infusion of 0.4 mg of Regadenoson followed by a second dose of 13.3  mCi of N-13 ammonia. Stress phase PET images of the myocardium were  then acquired. These included ECG-gated images to assess and quantify  ventricular function. Low-dose noncontrast CT of the thorax was  performed both before the resting NH3 and after the stress NH3 PET  image acquisitions for purposes of attenuation correction of the PET  data.    FINDINGS:  The left ventricular cavity is severely dilated in both rest and post  stress images. There is abnormal dilation and thickening and  visualization of right ventricular cavity in both rest and stress  images. The stress tomographic images demonstrate markedly abnormal  myocardial perfusion. There is a large moderate to severe perfusion  defect involving the apex, anterior, lateral, and inferolateral wall,  there is relatively preserved perfusion of the septum. On rest images  there is a  significantly reversibility noted involving the anterior  wall, lateral wall and inferoseptal wall, however, there is no  significant reversibility of the apex The left ventricular cavity is  dilated on gated images with severe global hypokinesis with severely  impaired left ventricular systolic function in both rest and stress  images, 17%    Impression  High-risk N13 PET-CT myocardial perfusion study  This finding consistent with severe ischemic cardiomyopathy with  significantly induced ischemia involving the anterior wall, lateral  wall and inferolateral wall with small apical infarction, a pattern  consistent with triple-vessel disease, the presence of significant  reversibility on rest images may favoring good response to coronary  revascularization The possibility of subendocardial infarction can  not be totally excluded, further evaluation of myocardial viability  by FDG PET or cardiac MRI could be considered, if this will change  patient management    I personally reviewed the image(s) / study and agree with the  findings and interpretation as stated. This study was interpreted at  [facility].    MACRO:    Critical Finding:  See findings. Notification was initiated on  5/14/2025 at 1:24 pm by  Ebony Sky.  (**-OCF-**) Instructions:    None    Signed by: Ebony Sky 5/14/2025 1:25 PM  Dictation workstation:   [id number]    Metabolic Stress: No results found for this or any previous visit from the past 1800 days.      Imaging  XR chest 1 view  Result Date: 7/20/2025  1. Right IJ approach [place] catheter has been slightly advanced, the tip of which now overlies right lower lobe pulmonary artery, approximately 2 cm distal to right hilum. Repositioning recommended. 2. No significant change in lung aeration with questionable interstitial edema and trace bilateral pleural effusions/bibasilar atelectasis. 3. Rest of medical devices as above.   Signed by: Claudio Yancey  7/20/2025 11:03 AM Dictation workstation:   [id number]    XR chest 1 view  Result Date: 7/19/2025  1. Medical devices as above. Of note, new right IJ approach [place] catheter with its tip projecting over right interlobar pulmonary artery, at the level of right hilum. 2. No significant change in mild interstitial edema and suspected trace bilateral pleural effusions/bibasilar atelectasis.   I personally reviewed the images/study and I agree with the findings as stated by resident physician Miky Gtz MD. This study was interpreted at [facility], [place], [place].   MACRO: None   Signed by: Claudio Yancey 7/19/2025 4:00 PM Dictation workstation:   [id number]    XR chest 1 view  Result Date: 7/17/2025  1.  Similar mild interstitial edema. No new focal consolidation or pneumothorax. 2. Trace bilateral pleural effusions. 3. Medical devices as above.   I personally reviewed the images/study and I agree with the findings as stated by resident physician Cam Sommers MD. This study was interpreted at [place], [place].   MACRO: None   Signed by: Kristi Buenrostro 7/17/2025 12:00 PM Dictation workstation:   [id number]      Cardiology, Vascular, and Other Imaging  No other imaging results found for the past 7 days           LDA:  Introducer 07/19/25 Internal jugular Right (Active)   Placement Date/Time: 07/19/25 1300   Hand Hygiene Completed: Yes  Location: Internal jugular  Orientation: Right  Placed by: Shelly ROLON and Jamin NP  Placement Verified: X-ray   Number of days: 1       Pulmonary Artery Catheter Internal jugular (Active)   Placement Date/Time: 07/19/25 1300   Hand Hygiene Performed Prior to CVC Insertion: Yes  Site Prep: Usual sterile procedure followed  Site Prep Agent has Completely Dried Before Insertion: Yes  All 5 Sterile Barriers Used (Gloves, Gown, Cap, Mask, Lar...   Number of days: 1     NUTRITION: Adult diet Consistent  Carb; CCD 90 gm/meal; 2000 mL fluid  EMERGENCY CONTACT: Extended Emergency Contact Information  Primary Emergency Contact: Aracelis Law  Address: [address] [place] [address] [facility]  Home Phone: [phone]  Mobile Phone: [phone]  Relation: Mother  Secondary Emergency Contact: Robinson Contreras  Mobile Phone: [phone]  Relation: Friend  CODE STATUS: Full Code  DISPO: Discharge Planning  Living Arrangements: Spouse/significant other, Children  Support Systems: Spouse/significant other, Children, Parent, Family members  Assistance Needed: ADLS w/ TAY  Type of Residence: Private residence  Home or Post Acute Services: None  Expected Discharge Disposition: Home  FOLLOWUP:   Future Appointments   Date Time Provider Department Center   7/29/2025  2:30 PM Desire Nails MD [id number] [facility]   8/12/2025  1:00 PM Sotero Giles MD [id number] [place]   9/2/2025  3:30 PM Jono Duque MD [id number] [facility]   9/17/2025 12:30 PM Diana Salazar MD [id number] [facility]   10/1/2025 10:30 AM Anna Rodriguez MD PhD [id number] [facility]   12/4/2025  9:20 AM Randy Brunson MD [id number] [facility]                [1]   Medications Prior to Admission   Medication Sig Dispense Refill Last Dose/Taking    albuterol 2.5 mg /3 mL (0.083 %) nebulizer solution Use every 4-6 hours as needed for shortness of breath 75 mL 1     atorvastatin (Lipitor) 80 mg tablet 80mg nightly 90 tablet 2     blood-glucose sensor (FreeStyle Franco 3 Plus Sensor) device Change every 15 days 2 each 11     bumetanide (Bumex) 1 mg tablet Take 1 tablet (1 mg) by mouth once daily. 90 tablet 3     digoxin (Lanoxin) 125 MCG tablet Take 1 tablet (125 mcg) by mouth once daily. 90 tablet 3     Eliquis 5 mg tablet TOME 1 TABLETA POR LA BOCA DOS  VECES AL CUBA 200 tablet 2     Farxiga 10 mg tablet Take 1 tablet (10 mg) by mouth once daily. 90 tablet 3     fenofibrate (Tricor) 145 mg tablet Take 1 tablet (145 mg) by  "mouth once daily. 90 tablet 3     FreeStyle Franco 3 Handley misc Use as instructed 1 each 0     gabapentin (Neurontin) 300 mg capsule TOME 1 CAPSULA POR LA BOCA NITIN  VEZ AL CUBA EN LA MANANA 90 capsule 3     hydrALAZINE (Apresoline) 100 mg tablet TOME 1 TABLETA POR LA BOCA 3  VECES AL CUBA 300 tablet 2     insulin glargine (Toujeo Max U-300 SoloStar) 300 unit/mL (3 mL) pen 40 units twice a day 30 mL 3     insulin lispro (HumaLOG KwikPen Insulin) 100 unit/mL pen 18 units with meals plus sliding scale up to 80 units daily 45 mL 3     isosorbide dinitrate (Isordil) 40 mg tablet Take 1 tablet (40 mg) by mouth 3 times a day. 270 tablet 3     lancets (Lancets,Thin) misc 4 times daily (before meals and at bedtime). use as directed to test blood sugar 100 each 3     levothyroxine (Synthroid, Levoxyl) 75 mcg tablet TOME 1 TABLETA POR LA BOCA NITIN  VEZ AL CUBA 100 tablet 3     metoprolol succinate XL (Toprol-XL) 100 mg 24 hr tablet Take 2 tablets (200 mg) by mouth once daily.       OneTouch Ultra Test REVISE EL NIVEL DE GLUCOSA EN LA PEARL CUATRO VECES AL CUBA 400 strip 1     pantoprazole (ProtoNix) 40 mg EC tablet Take 1 tablet (40 mg) by mouth once daily in the morning. Take before meals. Do not crush, chew, or split.      TOME 1 TABLETA POR LA BOCA NITIN  VEZ AL CUBA EN LA MANANA TOME  ANTES DE NITIN COMIDA (NO TRITURE, MASTIQUE NI DIVIDA) 90 tablet 1     pen needle, diabetic 31 gauge x 5/16\" needle For insulin injection 5x/day 500 each 3     QUEtiapine (SEROquel) 50 mg tablet Take 1 tablet (50 mg) by mouth once daily at bedtime. 100 tablet 2     [Paused] sacubitriL-valsartan (Entresto)  mg tablet Take 1 tablet by mouth 2 times a day. (Patient not taking: Reported on 7/13/2025) 180 tablet 3     [Paused] spironolactone (Aldactone) 25 mg tablet TOME 2 TABLETAS POR LA BOCA NITIN  VEZ AL CUBA (Patient not taking: Reported on 7/13/2025) 200 tablet 2     tirzepatide (Mounjaro) 5 mg/0.5 mL pen injector Inject 5 mg under the skin every 7 " days. 2 mL 11

## 2025-07-22 ENCOUNTER — APPOINTMENT (OUTPATIENT)
Dept: RADIOLOGY | Facility: HOSPITAL | Age: 60
End: 2025-07-22
Payer: MEDICARE

## 2025-07-22 ENCOUNTER — DOCUMENTATION (OUTPATIENT)
Dept: TRANSPLANT | Facility: HOSPITAL | Age: 60
End: 2025-07-22

## 2025-07-22 ENCOUNTER — APPOINTMENT (OUTPATIENT)
Dept: OPHTHALMOLOGY | Facility: CLINIC | Age: 60
End: 2025-07-22
Payer: COMMERCIAL

## 2025-07-22 LAB
ALBUMIN SERPL BCP-MCNC: 3.6 G/DL (ref 3.4–5)
ALBUMIN SERPL BCP-MCNC: 3.8 G/DL (ref 3.4–5)
ANION GAP BLDMV CALCULATED.4IONS-SCNC: 5 MMO/L (ref 10–25)
ANION GAP BLDMV CALCULATED.4IONS-SCNC: 6 MMO/L (ref 10–25)
ANION GAP BLDMV CALCULATED.4IONS-SCNC: 7 MMO/L (ref 10–25)
ANION GAP BLDMV CALCULATED.4IONS-SCNC: 7 MMO/L (ref 10–25)
ANION GAP SERPL CALC-SCNC: 12 MMOL/L (ref 10–20)
ANION GAP SERPL CALC-SCNC: 15 MMOL/L (ref 10–20)
BASE EXCESS BLDMV CALC-SCNC: 8.8 MMOL/L (ref -2–3)
BASE EXCESS BLDMV CALC-SCNC: 9.4 MMOL/L (ref -2–3)
BASE EXCESS BLDMV CALC-SCNC: 9.5 MMOL/L (ref -2–3)
BASE EXCESS BLDMV CALC-SCNC: 9.7 MMOL/L (ref -2–3)
BODY TEMPERATURE: 37 DEGREES CELSIUS
BUN SERPL-MCNC: 42 MG/DL (ref 6–23)
BUN SERPL-MCNC: 50 MG/DL (ref 6–23)
CA-I BLDMV-SCNC: 1.15 MMOL/L (ref 1.1–1.33)
CA-I BLDMV-SCNC: 1.19 MMOL/L (ref 1.1–1.33)
CA-I BLDMV-SCNC: 1.2 MMOL/L (ref 1.1–1.33)
CA-I BLDMV-SCNC: 1.21 MMOL/L (ref 1.1–1.33)
CALCIUM SERPL-MCNC: 9.7 MG/DL (ref 8.6–10.6)
CALCIUM SERPL-MCNC: 9.7 MG/DL (ref 8.6–10.6)
CHLORIDE BLD-SCNC: 91 MMOL/L (ref 98–107)
CHLORIDE BLD-SCNC: 92 MMOL/L (ref 98–107)
CHLORIDE SERPL-SCNC: 91 MMOL/L (ref 98–107)
CHLORIDE SERPL-SCNC: 91 MMOL/L (ref 98–107)
CO2 SERPL-SCNC: 32 MMOL/L (ref 21–32)
CO2 SERPL-SCNC: 36 MMOL/L (ref 21–32)
CREAT SERPL-MCNC: 1.87 MG/DL (ref 0.5–1.3)
CREAT SERPL-MCNC: 2.4 MG/DL (ref 0.5–1.3)
EGFRCR SERPLBLD CKD-EPI 2021: 30 ML/MIN/1.73M*2
EGFRCR SERPLBLD CKD-EPI 2021: 41 ML/MIN/1.73M*2
ERYTHROCYTE [DISTWIDTH] IN BLOOD BY AUTOMATED COUNT: 20.2 % (ref 11.5–14.5)
GLUCOSE BLD MANUAL STRIP-MCNC: 150 MG/DL (ref 74–99)
GLUCOSE BLD MANUAL STRIP-MCNC: 197 MG/DL (ref 74–99)
GLUCOSE BLD MANUAL STRIP-MCNC: 212 MG/DL (ref 74–99)
GLUCOSE BLD MANUAL STRIP-MCNC: 252 MG/DL (ref 74–99)
GLUCOSE BLD MANUAL STRIP-MCNC: 312 MG/DL (ref 74–99)
GLUCOSE BLD-MCNC: 182 MG/DL (ref 74–99)
GLUCOSE BLD-MCNC: 208 MG/DL (ref 74–99)
GLUCOSE BLD-MCNC: 260 MG/DL (ref 74–99)
GLUCOSE BLD-MCNC: 265 MG/DL (ref 74–99)
GLUCOSE SERPL-MCNC: 171 MG/DL (ref 74–99)
GLUCOSE SERPL-MCNC: 177 MG/DL (ref 74–99)
HCO3 BLDMV-SCNC: 35 MMOL/L (ref 22–26)
HCO3 BLDMV-SCNC: 35.3 MMOL/L (ref 22–26)
HCO3 BLDMV-SCNC: 35.7 MMOL/L (ref 22–26)
HCO3 BLDMV-SCNC: 35.8 MMOL/L (ref 22–26)
HCT VFR BLD AUTO: 38.7 % (ref 41–52)
HCT VFR BLD EST: 37 % (ref 41–52)
HCT VFR BLD EST: 38 % (ref 41–52)
HCT VFR BLD EST: 38 % (ref 41–52)
HCT VFR BLD EST: 39 % (ref 41–52)
HGB BLD-MCNC: 12 G/DL (ref 13.5–17.5)
HGB BLDMV-MCNC: 12.3 G/DL (ref 13.5–17.5)
HGB BLDMV-MCNC: 12.6 G/DL (ref 13.5–17.5)
HGB BLDMV-MCNC: 12.8 G/DL (ref 13.5–17.5)
HGB BLDMV-MCNC: 13.1 G/DL (ref 13.5–17.5)
INHALED O2 CONCENTRATION: 21 %
INHALED O2 CONCENTRATION: 21 %
INHALED O2 CONCENTRATION: 28 %
INHALED O2 CONCENTRATION: 28 %
LACTATE BLDMV-SCNC: 1 MMOL/L (ref 0.4–2)
LACTATE BLDMV-SCNC: 1 MMOL/L (ref 0.4–2)
LACTATE BLDMV-SCNC: 1.1 MMOL/L (ref 0.4–2)
LACTATE BLDMV-SCNC: 1.4 MMOL/L (ref 0.4–2)
MAGNESIUM SERPL-MCNC: 2.3 MG/DL (ref 1.6–2.4)
MCH RBC QN AUTO: 23.9 PG (ref 26–34)
MCHC RBC AUTO-ENTMCNC: 31 G/DL (ref 32–36)
MCV RBC AUTO: 77 FL (ref 80–100)
NRBC BLD-RTO: 0 /100 WBCS (ref 0–0)
OXYHGB MFR BLDMV: 53.3 % (ref 45–75)
OXYHGB MFR BLDMV: 55.7 % (ref 45–75)
OXYHGB MFR BLDMV: 64.3 % (ref 45–75)
OXYHGB MFR BLDMV: 64.6 % (ref 45–75)
PCO2 BLDMV: 52 MM HG (ref 41–51)
PCO2 BLDMV: 54 MM HG (ref 41–51)
PCO2 BLDMV: 54 MM HG (ref 41–51)
PCO2 BLDMV: 55 MM HG (ref 41–51)
PH BLDMV: 7.42 PH (ref 7.33–7.43)
PH BLDMV: 7.42 PH (ref 7.33–7.43)
PH BLDMV: 7.43 PH (ref 7.33–7.43)
PH BLDMV: 7.44 PH (ref 7.33–7.43)
PHOSPHATE SERPL-MCNC: 3.4 MG/DL (ref 2.5–4.9)
PHOSPHATE SERPL-MCNC: 3.6 MG/DL (ref 2.5–4.9)
PLATELET # BLD AUTO: 239 X10*3/UL (ref 150–450)
PO2 BLDMV: 35 MM HG (ref 35–45)
PO2 BLDMV: 36 MM HG (ref 35–45)
PO2 BLDMV: 42 MM HG (ref 35–45)
PO2 BLDMV: 43 MM HG (ref 35–45)
POTASSIUM BLDMV-SCNC: 3.9 MMOL/L (ref 3.5–5.3)
POTASSIUM BLDMV-SCNC: 3.9 MMOL/L (ref 3.5–5.3)
POTASSIUM BLDMV-SCNC: 4.2 MMOL/L (ref 3.5–5.3)
POTASSIUM BLDMV-SCNC: 4.3 MMOL/L (ref 3.5–5.3)
POTASSIUM SERPL-SCNC: 3.8 MMOL/L (ref 3.5–5.3)
POTASSIUM SERPL-SCNC: 4.3 MMOL/L (ref 3.5–5.3)
RBC # BLD AUTO: 5.02 X10*6/UL (ref 4.5–5.9)
SAO2 % BLDMV: 55 % (ref 45–75)
SAO2 % BLDMV: 57 % (ref 45–75)
SAO2 % BLDMV: 66 % (ref 45–75)
SAO2 % BLDMV: 66 % (ref 45–75)
SODIUM BLDMV-SCNC: 128 MMOL/L (ref 136–145)
SODIUM BLDMV-SCNC: 129 MMOL/L (ref 136–145)
SODIUM BLDMV-SCNC: 130 MMOL/L (ref 136–145)
SODIUM BLDMV-SCNC: 131 MMOL/L (ref 136–145)
SODIUM SERPL-SCNC: 134 MMOL/L (ref 136–145)
SODIUM SERPL-SCNC: 135 MMOL/L (ref 136–145)
UFH PPP CHRO-ACNC: 0.3 IU/ML (ref ?–1.1)
WBC # BLD AUTO: 5.3 X10*3/UL (ref 4.4–11.3)

## 2025-07-22 PROCEDURE — 85027 COMPLETE CBC AUTOMATED: CPT | Performed by: NURSE PRACTITIONER

## 2025-07-22 PROCEDURE — 37799 UNLISTED PX VASCULAR SURGERY: CPT | Performed by: NURSE PRACTITIONER

## 2025-07-22 PROCEDURE — 71045 X-RAY EXAM CHEST 1 VIEW: CPT

## 2025-07-22 PROCEDURE — 82947 ASSAY GLUCOSE BLOOD QUANT: CPT

## 2025-07-22 PROCEDURE — 84132 ASSAY OF SERUM POTASSIUM: CPT | Performed by: NURSE PRACTITIONER

## 2025-07-22 PROCEDURE — 2500000004 HC RX 250 GENERAL PHARMACY W/ HCPCS (ALT 636 FOR OP/ED)

## 2025-07-22 PROCEDURE — 2500000004 HC RX 250 GENERAL PHARMACY W/ HCPCS (ALT 636 FOR OP/ED): Mod: JZ,TB

## 2025-07-22 PROCEDURE — 97129 THER IVNTJ 1ST 15 MIN: CPT | Mod: GO

## 2025-07-22 PROCEDURE — 2500000001 HC RX 250 WO HCPCS SELF ADMINISTERED DRUGS (ALT 637 FOR MEDICARE OP)

## 2025-07-22 PROCEDURE — 2500000001 HC RX 250 WO HCPCS SELF ADMINISTERED DRUGS (ALT 637 FOR MEDICARE OP): Performed by: NURSE PRACTITIONER

## 2025-07-22 PROCEDURE — 97130 THER IVNTJ EA ADDL 15 MIN: CPT | Mod: GO

## 2025-07-22 PROCEDURE — 85018 HEMOGLOBIN: CPT | Performed by: NURSE PRACTITIONER

## 2025-07-22 PROCEDURE — 85520 HEPARIN ASSAY: CPT | Performed by: NURSE PRACTITIONER

## 2025-07-22 PROCEDURE — 71045 X-RAY EXAM CHEST 1 VIEW: CPT | Performed by: RADIOLOGY

## 2025-07-22 PROCEDURE — 2500000002 HC RX 250 W HCPCS SELF ADMINISTERED DRUGS (ALT 637 FOR MEDICARE OP, ALT 636 FOR OP/ED)

## 2025-07-22 PROCEDURE — 99291 CRITICAL CARE FIRST HOUR: CPT | Performed by: STUDENT IN AN ORGANIZED HEALTH CARE EDUCATION/TRAINING PROGRAM

## 2025-07-22 PROCEDURE — 83735 ASSAY OF MAGNESIUM: CPT | Performed by: NURSE PRACTITIONER

## 2025-07-22 PROCEDURE — 84132 ASSAY OF SERUM POTASSIUM: CPT

## 2025-07-22 PROCEDURE — 2500000004 HC RX 250 GENERAL PHARMACY W/ HCPCS (ALT 636 FOR OP/ED): Performed by: NURSE PRACTITIONER

## 2025-07-22 PROCEDURE — 99291 CRITICAL CARE FIRST HOUR: CPT

## 2025-07-22 PROCEDURE — 2500000002 HC RX 250 W HCPCS SELF ADMINISTERED DRUGS (ALT 637 FOR MEDICARE OP, ALT 636 FOR OP/ED): Performed by: NURSE PRACTITIONER

## 2025-07-22 PROCEDURE — 2020000001 HC ICU ROOM DAILY

## 2025-07-22 RX ORDER — HYDRALAZINE HYDROCHLORIDE 100 MG/1
100 TABLET, FILM COATED ORAL EVERY 8 HOURS SCHEDULED
Status: DISCONTINUED | OUTPATIENT
Start: 2025-07-22 | End: 2025-08-01

## 2025-07-22 RX ORDER — BUMETANIDE 0.25 MG/ML
2 INJECTION, SOLUTION INTRAMUSCULAR; INTRAVENOUS ONCE
Status: COMPLETED | OUTPATIENT
Start: 2025-07-22 | End: 2025-07-22

## 2025-07-22 RX ORDER — ACETAZOLAMIDE 500 MG/5ML
500 INJECTION, POWDER, LYOPHILIZED, FOR SOLUTION INTRAVENOUS ONCE
Status: COMPLETED | OUTPATIENT
Start: 2025-07-22 | End: 2025-07-22

## 2025-07-22 RX ORDER — HYDRALAZINE HYDROCHLORIDE 25 MG/1
25 TABLET, FILM COATED ORAL ONCE
Status: COMPLETED | OUTPATIENT
Start: 2025-07-22 | End: 2025-07-22

## 2025-07-22 RX ADMIN — HEPARIN SODIUM 1600 UNITS/HR: 10000 INJECTION, SOLUTION INTRAVENOUS at 13:46

## 2025-07-22 RX ADMIN — HYDRALAZINE HYDROCHLORIDE 100 MG: 100 TABLET ORAL at 13:29

## 2025-07-22 RX ADMIN — INSULIN GLARGINE 40 UNITS: 100 INJECTION, SOLUTION SUBCUTANEOUS at 21:19

## 2025-07-22 RX ADMIN — ISOSORBIDE DINITRATE 20 MG: 20 TABLET ORAL at 08:40

## 2025-07-22 RX ADMIN — INSULIN GLARGINE 40 UNITS: 100 INJECTION, SOLUTION SUBCUTANEOUS at 08:44

## 2025-07-22 RX ADMIN — GABAPENTIN 200 MG: 100 CAPSULE ORAL at 13:29

## 2025-07-22 RX ADMIN — HYDRALAZINE HYDROCHLORIDE 75 MG: 50 TABLET ORAL at 05:48

## 2025-07-22 RX ADMIN — SPIRONOLACTONE 25 MG: 25 TABLET, FILM COATED ORAL at 08:41

## 2025-07-22 RX ADMIN — CALCIUM CARBONATE (ANTACID) CHEW TAB 500 MG 1 TABLET: 500 CHEW TAB at 08:43

## 2025-07-22 RX ADMIN — LEVOTHYROXINE SODIUM 75 MCG: 0.07 TABLET ORAL at 05:48

## 2025-07-22 RX ADMIN — INSULIN LISPRO 9 UNITS: 100 INJECTION, SOLUTION INTRAVENOUS; SUBCUTANEOUS at 18:33

## 2025-07-22 RX ADMIN — ISOSORBIDE DINITRATE 30 MG: 20 TABLET ORAL at 18:28

## 2025-07-22 RX ADMIN — BUMETANIDE 2 MG: 0.25 INJECTION, SOLUTION INTRAMUSCULAR; INTRAVENOUS at 16:03

## 2025-07-22 RX ADMIN — HYDRALAZINE HYDROCHLORIDE 25 MG: 25 TABLET ORAL at 08:43

## 2025-07-22 RX ADMIN — QUETIAPINE FUMARATE 50 MG: 25 TABLET ORAL at 21:19

## 2025-07-22 RX ADMIN — HYDRALAZINE HYDROCHLORIDE 100 MG: 100 TABLET ORAL at 21:19

## 2025-07-22 RX ADMIN — FENOFIBRATE 145 MG: 145 TABLET, FILM COATED ORAL at 10:56

## 2025-07-22 RX ADMIN — SACUBITRIL AND VALSARTAN 1 TABLET: 97; 103 TABLET, FILM COATED ORAL at 08:42

## 2025-07-22 RX ADMIN — CALCIUM CARBONATE (ANTACID) CHEW TAB 500 MG 1 TABLET: 500 CHEW TAB at 21:19

## 2025-07-22 RX ADMIN — INSULIN LISPRO 3 UNITS: 100 INJECTION, SOLUTION INTRAVENOUS; SUBCUTANEOUS at 13:39

## 2025-07-22 RX ADMIN — INSULIN LISPRO 18 UNITS: 100 INJECTION, SOLUTION INTRAVENOUS; SUBCUTANEOUS at 18:34

## 2025-07-22 RX ADMIN — DAPAGLIFLOZIN 10 MG: 10 TABLET, FILM COATED ORAL at 08:41

## 2025-07-22 RX ADMIN — GABAPENTIN 200 MG: 100 CAPSULE ORAL at 21:19

## 2025-07-22 RX ADMIN — BUMETANIDE 2 MG: 0.25 INJECTION INTRAMUSCULAR; INTRAVENOUS at 10:56

## 2025-07-22 RX ADMIN — ACETAZOLAMIDE 500 MG: 500 INJECTION, POWDER, LYOPHILIZED, FOR SOLUTION INTRAVENOUS at 16:25

## 2025-07-22 RX ADMIN — ISOSORBIDE DINITRATE 20 MG: 20 TABLET ORAL at 13:29

## 2025-07-22 RX ADMIN — INSULIN LISPRO 18 UNITS: 100 INJECTION, SOLUTION INTRAVENOUS; SUBCUTANEOUS at 13:38

## 2025-07-22 RX ADMIN — SACUBITRIL AND VALSARTAN 1 TABLET: 97; 103 TABLET, FILM COATED ORAL at 21:19

## 2025-07-22 RX ADMIN — PANTOPRAZOLE SODIUM 40 MG: 40 TABLET, DELAYED RELEASE ORAL at 05:48

## 2025-07-22 RX ADMIN — BUMETANIDE 1 MG/HR: 0.25 INJECTION INTRAMUSCULAR; INTRAVENOUS at 11:38

## 2025-07-22 RX ADMIN — METOPROLOL SUCCINATE 100 MG: 50 TABLET, EXTENDED RELEASE ORAL at 08:42

## 2025-07-22 RX ADMIN — INSULIN LISPRO 18 UNITS: 100 INJECTION, SOLUTION INTRAVENOUS; SUBCUTANEOUS at 08:50

## 2025-07-22 RX ADMIN — ATORVASTATIN CALCIUM 80 MG: 80 TABLET, FILM COATED ORAL at 21:19

## 2025-07-22 RX ADMIN — DIGOXIN 125 MCG: 125 TABLET ORAL at 08:40

## 2025-07-22 RX ADMIN — GABAPENTIN 200 MG: 100 CAPSULE ORAL at 05:48

## 2025-07-22 ASSESSMENT — PAIN SCALES - GENERAL

## 2025-07-22 ASSESSMENT — COGNITIVE AND FUNCTIONAL STATUS - GENERAL
HELP NEEDED FOR BATHING: A LITTLE
TOILETING: A LITTLE
MOBILITY SCORE: 24
DRESSING REGULAR LOWER BODY CLOTHING: A LITTLE
DRESSING REGULAR UPPER BODY CLOTHING: A LITTLE
MOBILITY SCORE: 24
DAILY ACTIVITIY SCORE: 24
PERSONAL GROOMING: A LITTLE
MOBILITY SCORE: 24
DAILY ACTIVITIY SCORE: 24
DAILY ACTIVITIY SCORE: 19

## 2025-07-22 NOTE — PROGRESS NOTES
Occupational Therapy    Occupational Therapy Treatment    Name: Felice Law  MRN: [id number]  Department: [facility] HFICU  Room: 10/10-A  Date: 07/22/25  Time Calculation  Start Time: 1524  Stop Time: 1555  Time Calculation (min): 31 min    Assessment:  OT Assessment: Patient participated in cognitive testing this session; remains appropriate for low intensity post-acute OT.  Barriers to Discharge Home: No anticipated barriers  End of Session Communication: Bedside nurse (HF team)  End of Session Patient Position: Alarm off, not on at start of session (patient sitting on couch)  Plan:  Treatment Interventions: ADL retraining, Functional transfer training, UE strengthening/ROM, Endurance training, Patient/family training, Equipment evaluation/education, Compensatory technique education  OT Frequency: 2 times per week  OT Discharge Recommendations: Low intensity level of continued care  Equipment Recommended upon Discharge:  (TBD)  OT Recommended Transfer Status: Stand by assist  OT - OK to Discharge: Yes    Subjective   OT Visit Info:  OT Received On: 07/22/25  General:  General  Reason for Referral: Patient pending LVAD/transplant work-up, MOCA completed this session  Family/Caregiver Present: No  Prior to Session Communication: Bedside nurse (HF team)  Patient Position Received: Alarm off, not on at start of session (patient sitting on couch in room)  General Comment: patient pleasant and agreeable to OT; utilized DAJUAN for translation during session  Precautions:  Medical Precautions: Cardiac precautions, Fall precautions    Pain Assessment:  Pain Assessment  Pain Assessment: 0-10  0-10 (Numeric) Pain Score: 0 - No pain    Objective   Cognition:  Overall Cognitive Status: Within Functional Limits  Arousal/Alertness: Appropriate responses to stimuli  Orientation Level:  (oriented x3)  Following Commands: Follows one step commands consistently     Cognitive Skill Development:  Cognitive Skill  Development  Cognitive Skill Development Activity 1: Administered MOCA version 8.2 to patient with assistance of .  Domain breakdown:  Visuospatial/Executive: 3/5  Naming: 3/3  Attention: /6  Language: 2/3  Abstraction: 1/2  Delayed Recall: 3/5 (MIS=12/15)  Orientation: 6/6  Add 1 Point if </=12 yr Education: 0     Patient scored 22/30 on MoCA (Jass Cognitive Assessment) indicatin-25 considered mild cognitive impairment.    Outcome Measures:  [facility] Daily Activity  Putting on and taking off regular lower body clothing: A little  Bathing (including washing, rinsing, drying): A little  Putting on and taking off regular upper body clothing: A little  Toileting, which includes using toilet, bedpan or urinal: A little  Taking care of personal grooming such as brushing teeth: A little  Eating Meals: None  Daily Activity - Total Score: 19    , Confusion Assessment Method-ICU (CAM-ICU)  Feature 1: Acute Onset or Fluctuating Course: Negative  Overall CAM-ICU: Negative  , and E = Exercise and Early Mobility  ICU Mobility Scale: Sitting over edge of bed (sitting on couch)    Education Documentation  Precautions, taught by Yumi Ram OT at 2025  4:02 PM.  Learner: Patient  Readiness: Acceptance  Method: Explanation, Demonstration  Response: Verbalizes Understanding, Needs Reinforcement  Comment: OT POC    Education Comments  No comments found.    Goals:  Encounter Problems       Encounter Problems (Active)       ADLs       Patient will complete daily grooming tasks with independent level of assistance and PRN adaptive equipment while standing. (Not Progressing)       Start:  25    Expected End:  25            Patient will complete toileting including hygiene clothing management/hygiene with independent level of assistance. (Not Progressing)       Start:  25    Expected End:  25            Patient will perform UB dressing with Independent and PRN adaptive equipment. (Not  Progressing)       Start:  07/21/25    Expected End:  08/04/25            Patient will perform LB dressing with Independent and PRN adaptive equipment. (Not Progressing)       Start:  07/21/25    Expected End:  08/04/25               COGNITION/SAFETY       Patient will participate in cognitive activities to demonstrate WFL on cognitive assessment.  (Progressing)       Start:  07/22/25    Expected End:  08/04/25               EXERCISE/STRENGTHENING       Patient will be educated on BUE HEP for increased ADL performance. (Not Progressing)       Start:  07/21/25    Expected End:  08/04/25            Patient will participate in >15 minutes of activity with <2 rest breaks to increase ADL/functional task endurance. (Not Progressing)       Start:  07/21/25    Expected End:  08/04/25               MOBILITY       Patient will perform Functional mobility Household distances/Community Distances with modified independent level of assistance and least restrictive device in order to improve safety and functional mobility. (Not Progressing)       Start:  07/21/25    Expected End:  08/04/25               TRANSFERS       Patient will perform bed mobility independent level of assistance in order to improve safety and independence with mobility (Not Progressing)       Start:  07/21/25    Expected End:  08/04/25            Patient will complete functional transfers with least restrictive device with modified independent level of assistance. (Not Progressing)       Start:  07/21/25    Expected End:  08/04/25               Yumi Ram OTR/L  Inpatient Occupational Therapist   Rehab Office: [phone]

## 2025-07-22 NOTE — PROGRESS NOTES
HFICU Attending Note    60M with stage D ICM (EF 15-20%) and prior VT arrest s/p ICD, admitted 7/12 to [place] with ADHF and transferred to [facility] HFICU on 7/17 for consideration of advanced therapies. Previously declined AT (10/2023) due to frailty, DM, CKD, and limited support. [place] placed 7/19 showed preserved CI (2.4) but elevated biventricular filling pressures. HD-guided optimization ongoing with rapid diuresis (>10L net negative), uptitration of GDMT including restart and up-titration of Entresto to 97/103 BID, and metoprolol to 100 mg. A1c 10.1%. Endocrine consulted 7/21. Patient has chronic bilateral foot drop with limited ambulation and is receiving PT/OT. He’s improving, up and walking with walker. Re-eval for LVAD/OHT given recurent HF presentation    This critically ill patient continues to be at-risk for clinically significant deterioration / failure due to the above mentioned dysfunctional, unstable organ systems.  I have personally identified and managed all complex critical care issues to prevent aforementioned clinical deterioration.  Critical care time is spent at bedside and/or the immediate area and has included, but is not limited to, the review of diagnostic tests, labs, radiographs, serial assessments of hemodynamics, respiratory status, ventilatory management, and family updates.  Time spent in procedures and teaching are reported separately.    Critical care time: _35___ minutes     Objective    Admit Date: 7/17/2025  Hospital Length of Stay: 5   ICU Length of Stay: 4d 23h   Home: [facility] [id number]    MEDICATIONS  Infusions:  heparin, Last Rate: 1,600 Units/hr (07/22/25 1000)  nitroprusside, Last Rate: 0.25 mcg/kg/min (07/22/25 1000)      Scheduled:  [Held by provider] apixaban, 5 mg, BID  atorvastatin, 80 mg, Nightly  [Held by provider] bumetanide, 1 mg, Daily  calcium carbonate, 500 mg of calcium carbonate, BID  dapagliflozin propanediol, 10 mg, Daily  digoxin, 125 mcg,  Daily  fenofibrate, 145 mg, Daily  gabapentin, 200 mg, q8h SUSI  hydrALAZINE, 100 mg, q8h SUSI  insulin glargine, 40 Units, Nightly  insulin glargine, 40 Units, Daily  insulin lispro, 0-15 Units, TID AC  insulin lispro, 18 Units, TID AC  isosorbide dinitrate, 20 mg, TID  levothyroxine, 75 mcg, Daily before breakfast  metoprolol succinate XL, 100 mg, Daily  pantoprazole, 40 mg, Daily before breakfast  QUEtiapine, 50 mg, Nightly  sacubitriL-valsartan, 1 tablet, BID  spironolactone, 25 mg, Daily      PRN:  acetaminophen, 650 mg, q6h PRN  albuterol, 2.5 mg, q6h PRN  alteplase, 2 mg, PRN  dextrose, 12.5 g, q15 min PRN  dextrose, 25 g, q15 min PRN  diphenhydrAMINE, 50 mg, q5 min PRN  glucagon, 1 mg, q15 min PRN  glucagon, 1 mg, q15 min PRN  heparin, 2,000-4,000 Units, q4h PRN        Prior to Admission Meds:  Prescriptions Prior to Admission[1]    Invasive Hemodynamics:    Most Recent Range Past 24hrs   BP (Art)   No data recorded   MAP(Art)   No data recorded   RA/CVP   No data recorded   PA 61/27 PAP  Min: 51/21  Max: 73/33   PA(mean) 39 mmHg PAP (Mean)  Min: 32 mmHg  Max: 49 mmHg   PCWP 25 mmHg (per Jamin NP) No data recorded   CO 4.1 L/min CO (L/min)  Min: 4.1 L/min  Max: 5.6 L/min   CI 1.9 L/min/m2 CI (L/min/m2)  Min: 1.9 L/min/m2  Max: 2.6 L/min/m2   Mixed Venous 56 % SVO2 (%)  Min: 56 %  Max: 65 %   SVR  1548 (dyne*sec)/cm5 SVR (dyne*sec)/cm5  Min: 1548 (dyne*sec)/cm5  Max: 1548 (dyne*sec)/cm5   PVR   No data recorded     MCS:   Heart Mate III:     Most Recent Range Past 24hrs   Flow   No data recorded   Speed   No data recorded   Power   No data recorded   PI   No data recorded     ECMO:     Most Recent Range Past 24hrs   Flow   No data recorded   Speed   No data recorded   Sweep   No data recorded     Impella:      Most Recent Range Past 24hrs   Performance Level   No data recorded   Flow (L/min)   No data recorded   Motor Current   No data recorded   Placement Signal    Placement OK could not be evaluated. This  "SmartVault does not work with rows of the type: Custom List   Purge (mmHg)   No data recorded   Purge rate (mL/hr)   No data recorded     VENT:    Most Recent Range Past 24hrs   Mode      FiO2   No data recorded   Rate   No data recorded   Vt    No data recorded   PEEP   No data recorded         7/22/2025    10:00 AM 7/22/2025     9:00 AM 7/22/2025     8:00 AM 7/22/2025     7:50 AM 7/22/2025     7:00 AM 7/22/2025     6:00 AM 7/22/2025     5:00 AM   Vitals   Systolic 95 101 93  101 109 102   Diastolic 72 66 63  63 67 62   Heart Rate 79 89 84  83 88 82   Temp    35.8 °C (96.4 °F)      Resp 13 15 10  17 23 14     Visit Vitals  BP 95/72   Pulse 79   Temp 35.8 °C (96.4 °F) (Temporal)   Resp 13   Ht 1.803 m (5' 10.98\")   Wt 96.4 kg (212 lb 8.4 oz)   SpO2 92%   BMI 29.65 kg/m²   Smoking Status Former   BSA 2.2 m²     Wt Readings from Last 5 Encounters:   07/21/25 96.4 kg (212 lb 8.4 oz)   07/17/25 110 kg (243 lb 2.7 oz)   06/10/25 97.1 kg (214 lb)   05/12/25 96.6 kg (213 lb)   05/04/25 102 kg (224 lb)       Intake/Output Summary (Last 24 hours) at 7/22/2025 1004  Last data filed at 7/22/2025 1000  Gross per 24 hour   Intake 653.62 ml   Output 2480 ml   Net -1826.38 ml     CHEST: Unlabored, Diminished  CV:  Normal sinus rhythm  ABD:  Soft Nontender Bowel sounds: All quadrants, Flatus:    EXT:   RLE: Appropriate for ethnicity,Warm, Dry  DP:    PT: Weak  LLE: Appropriate for ethnicity,Warm, Dry  DP:    PT: Weak  NEURO:   RASS: Alert and calm  CAM: Negative  LOC: Alert  Cognition: Follows commands  GCS: 15    DATA:  CMP:  Recent Labs     07/22/25  0550 07/21/25  0520 07/20/25  1232 07/20/25  0556 07/19/25  1925 07/19/25  0530 07/18/25  1708 07/18/25  0157 07/17/25  1119 07/17/25  0653 07/16/25  0655 07/15/25  0708 07/14/25  0709 07/13/25  0737   * 137 135* 138 135* 138 135* 136 134* 137 135* 137 139 138   K 3.8 3.7 3.8 3.6 3.7 4.2 3.7 4.7 4.0 4.0 4.2 4.3 3.9 3.8   CL 91* 92* 90* 93* 91* 98 94* 99 96* 98 98 97* 98 98   CO2 " 36* 37* 36* 37* 35* 32 31 29 31 32 28 29 29 28   ANIONGAP 12 12 13 12 13 12 14 13 11 11 13 15 16 16   BUN 42* 35* 37* 38* 39* 43* 47* 49* 48* 47* 53* 55* 54* 52*   CREATININE 1.87* 1.61* 1.60* 1.56* 1.81* 1.67* 1.73* 1.64* 1.62* 1.70* 1.86* 2.02* 2.14* 2.13*   EGFR 41* 49* 49* 51* 43* 47* 45* 48* 49* 46* 41* 37* 35* 35*   MG 2.30 2.39  --  2.55*  --  2.47*  --  2.40  --  2.39 2.58* 2.70* 2.62* 2.61*     Recent Labs     07/22/25  0550 07/21/25  0520 07/20/25  1232 07/20/25  0556 07/19/25  1925 07/19/25  0530 07/18/25  1708 07/18/25  0157 07/17/25  1119 07/12/25  1922 05/04/25  1447 11/11/24  1558 11/05/24  1146 06/25/24  1139 12/19/23  1104 10/06/23  0515 10/05/23  1647   ALBUMIN 3.6 3.6 4.0 3.9 3.9 3.6 3.7 3.6 3.7 3.8 4.1 4.0 4.4 4.3 4.1   < > 4.1   ALT  --   --   --   --   --   --   --   --  8* 10 26 12 13 15 13  --  11   AST  --   --   --   --   --   --   --   --  15 12 22 12 <3* 10 10  --  13   BILITOT  --   --   --   --   --   --   --   --  2.0* 1.6* 2.1* 0.5 0.5 1.3* 1.1  --  1.8*    < > = values in this interval not displayed.     CBC:  Recent Labs     07/22/25  0550 07/21/25  0520 07/20/25  0556 07/19/25  0530 07/18/25  0157 07/17/25  0653 07/16/25  0654 07/15/25  0708   WBC 5.3 5.3 5.1 4.7 5.1 4.7 5.1 4.7   HGB 12.0* 12.4* 12.2* 11.2* 11.6* 12.1* 12.3* 12.3*   HCT 38.7* 40.4* 40.2* 38.3* 38.3* 42.4 41.6 42.3    234 252 254 241 260 272 267   MCV 77* 77* 78* 81 77* 82 81 82     COAG:   Recent Labs     07/22/25  0550 07/21/25  0520 07/20/25  2329 07/20/25  1826 07/20/25  1354 07/17/25  1423 07/17/25  1119 10/14/23  2208 10/05/23  1647   INR  --   --   --   --   --   --  2.0*  --  1.2*   HAUF 0.3 0.3 0.4 0.2 0.3   < >  --    < >  --     < > = values in this interval not displayed.     ABO:   Recent Labs     10/05/23  1647   ABO A     HEME/ENDO:   Recent Labs     07/17/25  1125 07/17/25  1111 05/12/25  1656 11/11/24  1558 11/05/24  1146 06/25/24  1139 12/29/23  1427 10/04/23  1518   FERRITIN 102  --   --    "--   --  332*  --  379*   IRONSAT 16*  --   --   --  37 20*  --  15*   TSH 3.97  --   --  3.26 4.05* 4.23*   < > 9.31*   HGBA1C  --  10.1* 8.9* 11.0* 10.8* 9.2*   < > 8.1*    < > = values in this interval not displayed.     CARDIAC:   Recent Labs     07/17/25  1119 07/13/25  0737 07/12/25  2038 07/12/25  1922 05/04/25  1540 05/04/25  1447 11/05/24  1146 06/25/24  1139 12/19/23  1104 10/05/23  1647 10/04/23  1518   LDH  --   --   --   --   --   --   --   --   --  240  --    TROPHS 37 53* 53* 53* 40* 41*  --   --   --   --   --    *  --   --  1,299*  --  2,032* 57 743* 147* 157* 160*     Recent Labs     07/22/25  0550 07/21/25  2310 07/21/25  1802 07/21/25  1357 07/21/25  1200   LACMX 1.0 0.8 1.3 1.2 1.3   SO2MV 66 56 58 65 46     No results for input(s): \"TACROLIMUS\", \"SIROLIMUS\", \"CYCLOSPORINE\" in the last 12497 hours.  Recent Labs     06/10/25  0831 11/11/24  1558 11/05/24  1146 10/05/23  0354   CHOL 82 630* 417* 106   LDLCALC 24  --   --  32*   HDL 26.8 29.2 36.9 24.5   TRIG 154* 2,663* 2,017* 247*     MICRO: No results for input(s): \"ESR\", \"CRP\", \"PROCAL\" in the last 36134 hours.  Susceptibility data from last 90 days.  Collected Specimen Info Organism   07/19/25 Swab from Anterior Nares Methicillin Susceptible Staphylococcus aureus (MSSA)     Assessment & Plan  Acute on chronic systolic heart failure        EKG:   Recent Labs     07/12/25  1834 05/22/25  0857   ATRRATE 78 86   VENTRATE 78 86   PRINT 168 194   QRSDUR 112 118   QTCFRED 425 440   QTCCALCB 444 466     Encounter Date: 07/12/25   ECG 12 lead   Result Value    Ventricular Rate 78    Atrial Rate 78    WA Interval 168    QRS Duration 112    QT Interval 390    QTC Calculation(Bazett) 444    P Axis 85    R Axis 25    T Axis 101    QRS Count 13    Q Onset 215    P Onset 131    P Offset 190    T Offset 410    QTC Fredericia 425    Narrative    Normal sinus rhythm  Possible Left atrial enlargement  Low voltage QRS  Anterolateral infarct (cited on or " before 10-OCT-2023)  Abnormal ECG  When compared with ECG of 04-MAY-2025 13:32,  Questionable change in QRS axis     Echocardiogram:   Recent Labs     05/05/25  1223 06/25/24  1100   EF 18 30   LVIDD  --  6.65   RV 58.3 54.4   RVFRWALLPKSP 9.14 8.16   TAPSE 1.6 1.0   Transthoracic Echo (TTE) Limited With Doppler, Color And Contrast 05/05/2025    [facility], [address] [place] [address]  Tel [phone] and Fax [phone]    TRANSTHORACIC ECHOCARDIOGRAM REPORT      Patient Name:       NEGRITA BERNARD    Gayla Physician:    [id number] Bird Askew MD  Study Date:         5/5/2025            Ordering Provider:    [id number] BENNY CARTWRIGHT  MRN/PID:            [id number]            Fellow:  Accession#:         [id number]        Nurse:                Tres Barros RN  Date of Birth/Age:  1965 / 60 years Sonographer:          Alyssa Monroy RDCS  Gender assigned at  M                   Additional Staff:  Birth:  Height:             180.34 cm           Admit Date:           5/4/2025  Weight:             101.61 kg           Admission Status:     Inpatient -  Routine  BSA / BMI:          2.21 m2 / 31.24     Encounter#:           [id number]  kg/m2  Blood Pressure:     116/75 mmHg         Department Location:  [facility]    Study Type:    TRANSTHORACIC ECHO (TTE) COMPLETE  Diagnosis/ICD: Unspecified systolic (congestive) heart failure (CHF)-I50.20;  Ischemic cardiomyopathy-I25.5  Indication:    SOB CP Edema  CPT Code:      Echo Limited-00706; Doppler Limited-58824; Color Doppler-67032    Patient History:  CABG:              CABG x 1.  Smoker:            Former.  Pacer/Defib:       AICD  Pertinent History: CAD, Cardiomyopathy, CHF, COPD, PVD, Previous DVT, HTN and  Hyperlipidemia. PCI-Stent, S/P CABG x1 2023, CKD III, JAKE.    Study Detail: The following Echo studies were performed: 2D, Doppler and color  flow. Technically challenging study due to  body habitus. Definity  used as a contrast agent for endocardial border definition. Total  contrast used for this procedure was 2 mL via IV push.      PHYSICIAN INTERPRETATION:  Left Ventricle: Left ventricular ejection fraction is severely decreased, by visual estimate at 15-20%. There is global hypokinesis of the left ventricle with minor regional variations. The left ventricular cavity size is moderately dilated. Abnormal (paradoxical) septal motion, consistent with RV pacemaker. Spectral Doppler shows a Grade II (pseudonormal pattern) of left ventricular diastolic filling with an elevated left atrial pressure.  Left Atrium: The left atrium is mildly dilated.  Right Ventricle: The right ventricle is mildly enlarged. There is moderately reduced right ventricular systolic function.  Right Atrium: The right atrium is normal in size.  Aortic Valve: The aortic valve is trileaflet. There is minimal aortic valve cusp calcification. There is mild aortic valve thickening. There is no evidence of aortic valve regurgitation. The peak instantaneous gradient of the aortic valve is 6 mmHg.  Mitral Valve: The mitral valve is mildly thickened. There is mild to moderate mitral valve regurgitation.  Tricuspid Valve: The tricuspid valve is structurally normal. There is mild to moderate tricuspid regurgitation. The Doppler estimated RVSP is moderately elevated at 58.3 mmHg.  Pulmonic Valve: The pulmonic valve is structurally normal. There is physiologic pulmonic valve regurgitation.  Pericardium: Trivial pericardial effusion.  Aorta: The aortic root is normal.  Systemic Veins: The inferior vena cava appears dilated.  In comparison to the previous echocardiogram(s): Compared with study dated 6/25/2024, LVEF 15-20%.      CONCLUSIONS:  1. Left ventricular ejection fraction is severely decreased, by visual estimate at 15-20%.  2. There is global hypokinesis of the left ventricle with minor regional variations.  3. Spectral Doppler shows  a Grade II (pseudonormal pattern) of left ventricular diastolic filling with an elevated left atrial pressure.  4. Left ventricular cavity size is moderately dilated.  5. Abnormal septal motion consistent with RV pacemaker.  6. There is moderately reduced right ventricular systolic function.  7. Mildly enlarged right ventricle.  8. The left atrium is mildly dilated.  9. Mild to moderate mitral valve regurgitation.  10. Mild to moderate tricuspid regurgitation visualized.  11. Moderately elevated right ventricular systolic pressure.    QUANTITATIVE DATA SUMMARY:    2D MEASUREMENTS:           Normal Ranges:  LVEDV Index:     119 ml/m2      LV SYSTOLIC FUNCTION:  Normal Ranges:  EF-A4C View:    21 % (>=55%)  EF-A2C View:    8 %  EF-Biplane:     16 %  EF-Visual:      18 %  LV EF Reported: 18 %      LV DIASTOLIC FUNCTION:           Normal Ranges:  MV Peak A:             0.65 m/s  (0.42-0.7 m/s)  MV e'                  0.067 m/s (>8.0)  MV lateral e'          0.09 m/s  MV medial e'           0.04 m/s      MITRAL VALVE:          Normal Ranges:  MV DT:        198 msec (150-240msec)      MITRAL INSUFFICIENCY:             Normal Ranges:  MR Vmax:              342.00 cm/s      AORTIC VALVE:          Normal Ranges:  AoV Vmax:     1.19 m/s (<=1.7m/s)  AoV Peak P.7 mmHg (<20mmHg)  LVOT Max Dylan: 0.65 m/s (<=1.1m/s)  LVOT VTI:     6.72 cm      RIGHT VENTRICLE:  TAPSE: 15.5 mm  RV s'  0.09 m/s      TRICUSPID VALVE/RVSP:          Normal Ranges:  Peak TR Velocity:     3.29 m/s  RV Syst Pressure:     58 mmHg  (< 30mmHg)  IVC Diam:             2.40 cm      [id number] Bird Askew MD  Electronically signed on 2025 at 1:38:04 PM        ** Final **    Coronary Angiography:   Left And Right Heart Catheterization With Left Ventriculography 06/10/2025    [facility], Cath Lab, [address] [place] [address]    Cardiovascular Catheterization Report    Patient Name:      NEGRITA LOO Maverick  Physician:  [id number] Carl Gillombardo MD  Study Date:        6/10/2025             Verifying Physician:   [id number] Carl Gillombardo MD  MRN/PID:           [id number]              Cardiologist/Co-Scrub:  Accession#:        [id number]          Ordering Provider:     [id number] CARMELITA FERRERY  Date of Birth/Age: 1965 / 60 years   Cardiologist:  Gender:            M                     Fellow:                [id number] Kyler Chow MD  Encounter#:        [id number]            Surgeon:      Study:            Left Heart Cath with Grafts  Additional Study: Right Heart Cath      Indications:  NEGRITA LOO is a 60 year old male who presents with prior coronary artery bypass graft surgery, dyslipidemia and hypertension. NEGRITA LOO is a 60 year old male who presents with dyslipidemia, hypertension, prior coronary artery bypass graft surgery and an anginal equivalent chest pain assessment (i.e. dyspnea on exertion believed to be from ischemia). Cardiomyopathy and left ventricular dysfunction. Cardiomyopathy and heart failure.    Procedure Description:  After infiltration with 2% Lidocaine, the right radial artery was cannulated with a modified Seldinger technique. Subsequently a 6 Brazilian sheath was placed in the right radial artery. After infiltration of local anesthetic, the right brachial vein was cannulated with a percutaneous technique. A 5 Brazilian sheath was placed in the vein. Selective coronary catheterization was performed using a 5 Fr catheter(s) exchanged over a guide wire to cannulate the coronary arteries. A JL 3.5 tip catheter was used for left coronary injections. A JR 4 tip catheter was used for right coronary injections.  Cardiac output was calculated via the Pallavi method. After completion of the procedure, the arterial sheath was pulled and a TR Band Radial Compression Device was utilized to obtain patent hemostasis. Post-procedure, the venous sheath was pulled and pressure was applied to the  site.    Coronary Angiography:  The coronary circulation is right dominant.    Left Main Coronary Artery:  The left main coronary artery is a normal caliber vessel. The left main arises normally from the left coronary sinus of Valsalva and bifurcates into the LAD and circumflex coronary arteries. The left main coronary artery showed no significant disease or stenosis greater than 30%.    Left Anterior Descending Coronary Artery Distribution:  The left anterior descending coronary artery is a normal caliber vessel. The LAD arises normally from the left main coronary artery. The proximal left anterior descending coronary artery showed 70% stenosis. The ostial and proximal 1st diagonal branch revealed 90% stenosis. The 1st septal  branch showed no significant disease or stenosis greater than 30%. The 2nd septal  branch showed no evidence of significant disease. The 3rd septal  branch demonstrated no significant disease or stenosis greater than 30%.    Circumflex Coronary Artery Distribution:  The circumflex coronary artery is a normal caliber vessel. The circumflex arises normally from the left main coronary artery and terminates in the AV groove. The mid circumflex coronary artery showed 50% stenosis. The ostial 1st obtuse marginal branch showed 90% stenosis.    Right Heart Catheterization:  Cardiac output was calculated via the Pallavi method. Elevated left sided filling pressures with normal cardiac output. Severely elevated ventricular filling pressure. Cardiac output is normal. Preserved cardiac output at rest. RHC:  RA: 25  RV: 63/9  PA: 72/34  PCWP: 32  PA-SAT: 36%  SVC-SAT: 41%  FA-SAT: 71%  CO: 4.84  CI: 2.24  SVR: 1041  Severely elevated right and left sided filling pressures with preserved CO/CI.    Right Coronary Artery Distribution:    The right coronary artery is a normal caliber vessel. The RCA arises normally from the right sinus of Valsalva. The mid right coronary artery  showed 100% stenosis. The acute marginal branch showed no significant disease or stenosis greater than 30%.    Coronary Grafts:    LIMA Graft:  Left internal mammary artery graft conduit, originating in situ and attached to the mid left anterior descending, is normal.    Coronary Lesion Summary:  Vessel         Stenosis      Vessel Segment  LAD          70% stenosis       proximal  1st Diagonal 90% stenosis  ostial and proximal  Circumflex   50% stenosis          mid  OM 1         90% stenosis        ostial  RCA          100% stenosis         mid      Graft Stenosis Summary:  Graft     Destination of Graft           % Stenosis  LIMA  mid left anterior descending no evidence of stenosis      Hemo Personnel:  +---------------------------+---------+  Name                       Duty       +---------------------------+---------+  Gillombardo, Carl Barton MDPROC MD 1  +---------------------------+---------+      Hemodynamic Pressures:    +----+----------+---------+-------------+-------------+---+----+-------+-------+  SiteDate Time   Phase  Systolic mmHg  Diastolic  ED MeanA-Wave V-Wave                   Name                    mmHg     mmHmmHg mmHg   mmHg                                                      g                     +----+----------+---------+-------------+-------------+---+----+-------+-------+    PA 6/10/2025 AIR REST           72           34     43                      11:05:46                                                                      AM                                                          +----+----------+---------+-------------+-------------+---+----+-------+-------+    PW 6/10/2025 AIR REST                               30     32     32        11:06:54                                                                      AM                                                           +----+----------+---------+-------------+-------------+---+----+-------+-------+    RV 6/10/2025 AIR REST           63            9 23                          11:07:45                                                                      AM                                                          +----+----------+---------+-------------+-------------+---+----+-------+-------+    RA 6/10/2025 AIR REST                               25     31     33        11:09:13                                                                      AM                                                          +----+----------+---------+-------------+-------------+---+----+-------+-------+    AO 6/10/2025 AIR REST          102           75     88                      11:15:08                                                                      AM                                                          +----+----------+---------+-------------+-------------+---+----+-------+-------+    LV 6/10/2025 AIR REST           99           26 33                          11:21:35                                                                      AM                                                          +----+----------+---------+-------------+-------------+---+----+-------+-------+    LV 6/10/2025 AIR REST          102           76 80                          11:21:47                                                                      AM                                                          +----+----------+---------+-------------+-------------+---+----+-------+-------+    AO 6/10/2025 AIR REST           91           70     80                      11:38:01                                                                      AM                                                           +----+----------+---------+-------------+-------------+---+----+-------+-------+        Oxygen Saturation %:  +-----------+----------+------------+  Sample SiteO2 Sat (%)HB (g/100ml)  +-----------+----------+------------+           FA        71        12.5  +-----------+----------+------------+           PA        36        12.5  +-----------+----------+------------+           FA        71        12.5  +-----------+----------+------------+           PA        36        12.5  +-----------+----------+------------+      Cardiac Outputs:  +---------------+------------------+-------+  BLANCA CO (l/min)BLANCA CI (l/min/m2)BLANCA SV  +---------------+------------------+-------+              4.8               2.2   67.2  +---------------+------------------+-------+      Vascular Resistance Calculated Values (Wood Units):  +-----+---+----+-------+----+----+---+----+----+----+-------+  PhasePVRPVRIPVR/SVRSVR SVRITPRTPRITVR TVRITPR/TVR  +-----+---+----+-------+----+----+---+----+----+----+-------+  0    2.75.8 0      [id number]        +-----+---+----+-------+----+----+---+----+----+----+-------+      Cardiac Cath Post Procedure Notes:  Post Procedure Diagnosis: Multivessel CAD.  Blood Loss:               Estimated blood loss during the procedure was 10 mls.  Specimens Removed:        Number of specimen(s) removed: none.    ____________________________________________________________________________________  CONCLUSIONS:  1. Severely elevated right and left sided filling pressures with preserved CO/CI.  RA: 25 -- RV: 72/9 -- PA: 72/34 -- PCWP: 32    PA-SAT: 36%  SVC-SAT: 41%  FA-SAT: 71%  CO: 4.84  CI: 2.24  2. Right dominant coronary circulation with severe multivessel CAD, as described above.  3. WILKS to LAD patent.    ICD 10 Codes:  Cardiomyopathy, unspecified-I42.9    CPT Codes:  Left Heart Cath Bypass Graft w ventriculography and coronary  angio([facility])-62858; Right Heart Cath O2/Cardiac output without biopsy ([facility])-61738    [id number] Carl Gillombardo MD  Performing Physician  Electronically signed by [id number] Carl Gillombardo MD on 2025 at 3:38:51 PM          ** Final **    Right Heart Cath: No results found for this or any previous visit from the past 1800 days.    Cardiac Scoring: No results found for this or any previous visit from the past 1800 days.    Cardiac MRI:   MR cardiac morphology and function w and wo IV contrast 2023    Narrative  EXAMINATION: MR CARDIAC W/+W/O 2023 02:40 PM    Patient Name: NEGRITA LOO  MRN: [id number]  : 1965  Referring MD: VICK LEONARD  Interpreting MD: Dalton Solares  Resident/Fellow: None  Scan Date: 2023 02:40 PM  Height: ft in or 180cm  Weight: lbs or 112 kg  BSA: 2.3 m^2    Quality of study: Good  CLINICAL INDICATION: Pre-op cardiac assessment  Associated Diagnosis: Coronary artery disease  Technologist Note:  Patient has a pacemaker causing a lot of artifact.  MJI  Comparison:  Correlation: Transthoracic echocardiogram on Exercise stress-rest echocardiogram on Left heart catheterization and coronary angiograms on Nuclear medicine stress-rest perfusion imaging on    TECHNIQUE: Patient questionnaire was completed and was reviewed by MRI personnel prior to the patient entering the scanner. Multiplanar, multisequence MR imaging of the heart was performed without and with intravenous contrast. Cardiac velocity flow mapping was performed.  Siemens Christina 1.5 Effie MRI scanner.  INTRAPROCEDURE MEDS: Gadoterate Meglumine (DOTAREM) 10 MMOL/20ML solution 35 mL Route: Intravenous Push    FINDINGS:      CARDIAC MRI    CARDIAC CHAMBER MORPHOLOGY  Left ventricle: Dilated cavity size, mixed hypertrophy, no mass/thrombus  Right ventricle: Normal contractility, normal cavity size, no mass/thrombus  Left atrium: Dilated cavity size, no mass/thrombus  Right atrium: Normal cavity size, no  mass/thrombus    CARDIAC VALVE MORPHOLOGY  Mitral valve: Normal leaflets, fully mobile, no regurgitation, no stenosis  Tricuspid valve: Normal leaflets, fully mobile, no regurgitation, no stenosis  Aortic valve: Normal leaflets, fully mobile, no regurgitation, no stenosis  Pulmonic Valve: Normal leaflets, fully mobile, no regurgitation, no stenosis    MISCELLANEOUS MORPHOLOGY  Pericardium: Normal    CARDIAC MEASUREMENTS  Anterior left ventricular wall thickness: 1.2 cm (Normal < 1.2 cm)  Posterior left ventricular wall thickness: 1.2 cm (Normal < 1.2 cm)  Left ventricular end-diastolic dimension: 6.3 cm (Normal < 5.7 cm)  Left ventricular end-systolic dimension: 5.3 cm (Normal < 4 cm)  Right ventricular end-diastolic dimension: 3.6 cm (Normal < 4.3 cm)  Left atrial end-diastolic dimension: 4.6 cm (Normal < 3.9 cm)  Left atrial end-diastolic area: 29 cm^2 (Normal < 22 cm^2)  Right atrial end-diastolic dimension: 4.8 cm (Normal < 4.7 cm)  Right atrial end-diastolic area: 20 cm^2 (Normal < 20 cm^2)    Ascending aorta (at the level of the main pulmonary artery): 2.8 cm  Pulmonary Artery: 2.1 cm (Normal < 2.9 cm)      LEFT VENTRICULAR FUNCTION  The left ventricle is severely dilated with focally upon globally abnormal systolic function. Focal left ventricular systolic dysfunction consists of dyskinesia (aneurysm) of the apex  LV volumes absolute and normalized to body surface area with the age and gender matched normals in parenthesis are listed as follows.  LVEF: 22 % (62 +/- 6 (51-73)%)  SV: 77 ml  SVI: 33 ml/m2 (46 +/- 8 (30-62) ml/m2)  CO: 5.5 L/min  CI: 2.4 L/min/m2  EDV: 346 ml  EDVI: 149 ml/m2 (74 +/- 13 ()ml/m2)  ESV: 269 ml  ESVI: 116 ml/m2 (28 +/- 7 (14-43) ml/m2)  LVM: 218 g  LVMI: 96 g/m2 (62 +/- 10 (43-81) g/m2)    RIGHT VENTRICULAR FUNCTION  The right ventricle is normal in size and systolic function.  Right ventricular volumes absolute and normalized to body surface area with the age and gender  matched normals in parenthesis are listed as follows.  RVEF: 47 % (53 +/- 6 (41-66) %)  EDV: 154 ml  EDVI: 66 ml/m2 (86 +/- 17 () ml/m2)  ESV: 81 ml  ESVI: 35 ml/m2 (41 +/- 11 (19-63) ml/m2)    There is marked susceptibility artifact which limits delayed postgadolinium imaging/scar assessment. This is especially important. In the LAD distribution. Scar is seen at the left ventricular apex including the lateral and inferior walls and appears to be more pronounced in the mid to apical lateral segments. The LAD distribution is not adequately visualized because of marked susceptibility artifact from the implanted cardiac device.      Velocity flow mapping: Completed at the aortic valve. There is no evidence of aortic valve stenosis or regurgitation.    NONCARDIAC FINDINGS: Not significant      IMPRESSION:  1. Severe left ventricular dilatation with focally upon globally abnormal left ventricular systolic function with an ejection fraction of 22%. Infarctions seen in the left circumflex and RCA distribution and the apex. Full assessment of viability especially in the LAD distribution could not be performed because of marked susceptibility artifact from the implanted cardiac device which involves the entire anterior, anterolateral and anteroseptal regions. Consider PET scanning.  2. Dilated left atrium  3. Normal right ventricular size and systolic function    The pulse sequences used were designed for imaging cardiovascular structures and are suboptimal for imaging other structures and organs.    MACRO: None    Nuclear:  NM PET CT myocardial perfusion multiple 05/14/2025    Narrative  Interpreted By:  Ebony Sky,  STUDY:  NM PET CT MYOCARDIAL PERFUSION MULTIPLE;  5/14/2025 12:21 pm    INDICATION:  Signs/Symptoms:chest pain, HFrEF, ischemic cardiomyopathy.    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    ACCESSION NUMBER(S):  [id number]    ORDERING CLINICIAN:  CARMELITA NAVA    TECHNIQUE:  DIVISION OF NUCLEAR MEDICINE  POSITRON EMISSION TOMOGRAPHY (PET) / CT  NITROGEN-13 AMMONIA MYOCARDIAL PERFUSION SCAN, WITH PHARMACOLOGIC  STRESS    The patient received an intravenous dose of 10.9mCi of N-13 ammonia  and resting positron emission tomographic (PET) images of the  myocardium were acquired. The patient then received an intravenous  infusion of 0.4 mg of Regadenoson followed by a second dose of 13.3  mCi of N-13 ammonia. Stress phase PET images of the myocardium were  then acquired. These included ECG-gated images to assess and quantify  ventricular function. Low-dose noncontrast CT of the thorax was  performed both before the resting NH3 and after the stress NH3 PET  image acquisitions for purposes of attenuation correction of the PET  data.    FINDINGS:  The left ventricular cavity is severely dilated in both rest and post  stress images. There is abnormal dilation and thickening and  visualization of right ventricular cavity in both rest and stress  images. The stress tomographic images demonstrate markedly abnormal  myocardial perfusion. There is a large moderate to severe perfusion  defect involving the apex, anterior, lateral, and inferolateral wall,  there is relatively preserved perfusion of the septum. On rest images  there is a significantly reversibility noted involving the anterior  wall, lateral wall and inferoseptal wall, however, there is  no  significant reversibility of the apex The left ventricular cavity is  dilated on gated images with severe global hypokinesis with severely  impaired left ventricular systolic function in both rest and stress  images, 17%    Impression  High-risk N13 PET-CT myocardial perfusion study  This finding consistent with severe ischemic cardiomyopathy with  significantly induced ischemia involving the anterior wall, lateral  wall and inferolateral wall with small apical infarction, a pattern  consistent with triple-vessel disease, the presence of significant  reversibility on rest images may favoring good response to coronary  revascularization The possibility of subendocardial infarction can  not be totally excluded, further evaluation of myocardial viability  by FDG PET or cardiac MRI could be considered, if this will change  patient management    I personally reviewed the image(s) / study and agree with the  findings and interpretation as stated. This study was interpreted at  [facility].    MACRO:    Critical Finding:  See findings. Notification was initiated on  5/14/2025 at 1:24 pm by  Ebony Sky.  (**-OCF-**) Instructions:    None    Signed by: Ebony Sky 5/14/2025 1:25 PM  Dictation workstation:   [id number]    Metabolic Stress: No results found for this or any previous visit from the past 1800 days.      Imaging  XR chest 1 view  Result Date: 7/21/2025  1. Right IJ [place] catheter with distal tip projecting over the distal right interlobar artery. 2. Similar interstitial edema and trace bilateral pleural effusions. 3. Additional medical devices as above.   I personally reviewed the images/study and I agree with the findings as stated by resident physician Cam Sommers MD. This study was interpreted at [facility], [place], [place].   MACRO: None   Signed by: Allyssa Pedraza 7/21/2025 11:54 AM Dictation workstation:    [id number]    XR chest 1 view  Result Date: 7/20/2025  1. Right IJ approach [place] catheter has been slightly advanced, the tip of which now overlies right lower lobe pulmonary artery, approximately 2 cm distal to right hilum. Repositioning recommended. 2. No significant change in lung aeration with questionable interstitial edema and trace bilateral pleural effusions/bibasilar atelectasis. 3. Rest of medical devices as above.   Signed by: Claudio Yancey 7/20/2025 11:03 AM Dictation workstation:   [id number]    XR chest 1 view  Result Date: 7/19/2025  1. Medical devices as above. Of note, new right IJ approach [place] catheter with its tip projecting over right interlobar pulmonary artery, at the level of right hilum. 2. No significant change in mild interstitial edema and suspected trace bilateral pleural effusions/bibasilar atelectasis.   I personally reviewed the images/study and I agree with the findings as stated by resident physician Miky Gtz MD. This study was interpreted at [place], [place].   MACRO: None   Signed by: Claudio Yancey 7/19/2025 4:00 PM Dictation workstation:   [id number]    XR chest 1 view  Result Date: 7/17/2025  1.  Similar mild interstitial edema. No new focal consolidation or pneumothorax. 2. Trace bilateral pleural effusions. 3. Medical devices as above.   I personally reviewed the images/study and I agree with the findings as stated by resident physician Cam Sommers MD. This study was interpreted at [place], [place].   MACRO: None   Signed by: Kristi Buenrostro 7/17/2025 12:00 PM Dictation workstation:   [id number]      Cardiology, Vascular, and Other Imaging  No other imaging results found for the past 7 days           LDA:  Introducer 07/19/25 Internal jugular Right (Active)   Placement Date/Time: 07/19/25 1300   Hand Hygiene Completed: Yes  Location: Internal jugular  Orientation:  Right  Placed by: Shelly NP and Jamin NP  Placement Verified: X-ray   Number of days: 2       Pulmonary Artery Catheter Internal jugular (Active)   Placement Date/Time: 07/19/25 1300   Hand Hygiene Performed Prior to CVC Insertion: Yes  Site Prep: Usual sterile procedure followed  Site Prep Agent has Completely Dried Before Insertion: Yes  All 5 Sterile Barriers Used (Gloves, Gown, Cap, Mask, Lar...   Number of days: 2     NUTRITION: Adult diet Consistent Carb; CCD 90 gm/meal; 2000 mL fluid  EMERGENCY CONTACT: Extended Emergency Contact Information  Primary Emergency Contact: Aracelis Law  Address: [id number] [place] [id number] [facility]  Home Phone: [phone]  Mobile Phone: [phone]  Relation: Mother  Secondary Emergency Contact: Robinson Contreras  Mobile Phone: [phone]  Relation: Friend  CODE STATUS: Full Code  DISPO: Discharge Planning  Living Arrangements: Spouse/significant other, Children  Support Systems: Spouse/significant other, Children, Parent, Family members  Assistance Needed: ADLS w/ TAY  Type of Residence: Private residence  Home or Post Acute Services: None  Expected Discharge Disposition: Home  FOLLOWUP:   Future Appointments   Date Time Provider Department Center   7/22/2025  2:30 PM Desire Nails MD [id number] [facility]   8/12/2025  1:00 PM Sotero Giles MD [id number] [facility]   9/2/2025  3:30 PM Jono Duque MD [id number] [facility]   9/17/2025 12:30 PM Diana Salazar MD [id number] [facility]   10/1/2025 10:30 AM Anna Rodriguez MD PhD [id number] [facility]   12/4/2025  9:20 AM Randy Brunson MD [id number] [facility]                  [1]   Medications Prior to Admission   Medication Sig Dispense Refill Last Dose/Taking    albuterol 2.5 mg /3 mL (0.083 %) nebulizer solution Use every 4-6 hours as needed for shortness of breath 75 mL 1     atorvastatin (Lipitor) 80 mg tablet 80mg nightly 90 tablet 2     blood-glucose sensor (FreeStyle Franco 3 Plus  "Sensor) device Change every 15 days 2 each 11     bumetanide (Bumex) 1 mg tablet Take 1 tablet (1 mg) by mouth once daily. 90 tablet 3     digoxin (Lanoxin) 125 MCG tablet Take 1 tablet (125 mcg) by mouth once daily. 90 tablet 3     Eliquis 5 mg tablet TOME 1 TABLETA POR LA BOCA DOS  VECES AL CUBA 200 tablet 2     Farxiga 10 mg tablet Take 1 tablet (10 mg) by mouth once daily. 90 tablet 3     fenofibrate (Tricor) 145 mg tablet Take 1 tablet (145 mg) by mouth once daily. 90 tablet 3     FreeStyle Franco 3 [facility] misc Use as instructed 1 each 0     gabapentin (Neurontin) 300 mg capsule TOME 1 CAPSULA POR LA BOCA NITIN  VEZ AL CUBA EN LA MANANA 90 capsule 3     hydrALAZINE (Apresoline) 100 mg tablet TOME 1 TABLETA POR LA BOCA 3  VECES AL CUBA 300 tablet 2     insulin glargine (Toujeo Max U-300 SoloStar) 300 unit/mL (3 mL) pen 40 units twice a day 30 mL 3     insulin lispro (HumaLOG KwikPen Insulin) 100 unit/mL pen 18 units with meals plus sliding scale up to 80 units daily 45 mL 3     isosorbide dinitrate (Isordil) 40 mg tablet Take 1 tablet (40 mg) by mouth 3 times a day. 270 tablet 3     lancets (Lancets,Thin) misc 4 times daily (before meals and at bedtime). use as directed to test blood sugar 100 each 3     levothyroxine (Synthroid, Levoxyl) 75 mcg tablet TOME 1 TABLETA POR LA BOCA NITIN  VEZ AL CUBA 100 tablet 3     metoprolol succinate XL (Toprol-XL) 100 mg 24 hr tablet Take 2 tablets (200 mg) by mouth once daily.       OneTouch Ultra Test REVISE EL NIVEL DE GLUCOSA EN LA PEARL CUATRO VECES AL CUBA 400 strip 1     pantoprazole (ProtoNix) 40 mg EC tablet Take 1 tablet (40 mg) by mouth once daily in the morning. Take before meals. Do not crush, chew, or split.      TOME 1 TABLETA POR LA BOCA NITIN  VEZ AL CUBA EN LA MANANA TOME  ANTES DE NITIN COMIDA (NO TRITURE, MASTIQUE NI DIVIDA) 90 tablet 1     pen needle, diabetic 31 gauge x 5/16\" needle For insulin injection 5x/day 500 each 3     QUEtiapine (SEROquel) 50 mg tablet Take 1 " tablet (50 mg) by mouth once daily at bedtime. 100 tablet 2     [Paused] sacubitriL-valsartan (Entresto)  mg tablet Take 1 tablet by mouth 2 times a day. (Patient not taking: Reported on 7/13/2025) 180 tablet 3     [Paused] spironolactone (Aldactone) 25 mg tablet TOME 2 TABLETAS POR LA BOCA NITIN  VEZ AL CUBA (Patient not taking: Reported on 7/13/2025) 200 tablet 2     tirzepatide (Mounjaro) 5 mg/0.5 mL pen injector Inject 5 mg under the skin every 7 days. 2 mL 11

## 2025-07-22 NOTE — PROGRESS NOTES
[facility]  HFICU PROGRESS NOTE    Felice Law/[id number]    Admit Date: 7/17/2025  Hospital Length of Stay: 5   ICU Length of Stay: 5d 3h   Primary Service:   Primary HF Cardiologist: Dr. Jono Duque MD   Referring:    INTERVAL EVENTS / PERTINENT ROS:     No acute overnight events. Patient reports symptom improvement since arrival, however continues to endorse lower extremity edema.  He denies any c/o dyspnea, chest pain, or discomfort.      Plan:   - Bumex 1mg drip with 2mg bolus  - Increase hydralazine and isosorbide as tolerated   - Wean off nipride  - PT/OT  - Diabetic management per endocrine recommendations    MEDICATIONS  Infusions:  bumetanide, Last Rate: 1 mg/hr (07/22/25 1138)  heparin, Last Rate: 1,600 Units/hr (07/22/25 1346)  nitroprusside, Last Rate: 0.25 mcg/kg/min (07/22/25 1100)      Scheduled:  [Held by provider] apixaban, 5 mg, BID  atorvastatin, 80 mg, Nightly  [Held by provider] bumetanide, 1 mg, Daily  calcium carbonate, 500 mg of calcium carbonate, BID  dapagliflozin propanediol, 10 mg, Daily  digoxin, 125 mcg, Daily  fenofibrate, 145 mg, Daily  gabapentin, 200 mg, q8h SUSI  hydrALAZINE, 100 mg, q8h SUSI  insulin glargine, 40 Units, Nightly  insulin glargine, 40 Units, Daily  insulin lispro, 0-15 Units, TID AC  insulin lispro, 18 Units, TID AC  isosorbide dinitrate, 20 mg, TID  levothyroxine, 75 mcg, Daily before breakfast  metoprolol succinate XL, 100 mg, Daily  pantoprazole, 40 mg, Daily before breakfast  QUEtiapine, 50 mg, Nightly  sacubitriL-valsartan, 1 tablet, BID  spironolactone, 25 mg, Daily      PRN:  acetaminophen, 650 mg, q6h PRN  albuterol, 2.5 mg, q6h PRN  alteplase, 2 mg, PRN  dextrose, 12.5 g, q15 min PRN  dextrose, 25 g, q15 min PRN  diphenhydrAMINE, 50 mg, q5 min PRN  glucagon, 1 mg, q15 min PRN  glucagon, 1 mg, q15 min PRN  heparin, 2,000-4,000 Units, q4h PRN      Invasive Hemodynamics:    Most Recent Range Past 24hrs   BP (Art)   No  "data recorded   MAP(Art)   No data recorded   RA/CVP   No data recorded   PA 74/33 PAP  Min: 51/21  Max: 74/33   PA(mean) 46 mmHg PAP (Mean)  Min: 32 mmHg  Max: 49 mmHg   PCWP 25 mmHg (per Jamin NP) No data recorded   CO 4.1 L/min CO (L/min)  Min: 4.1 L/min  Max: 4.2 L/min   CI 1.9 L/min/m2 CI (L/min/m2)  Min: 1.9 L/min/m2  Max: 1.9 L/min/m2   Mixed Venous 56 % SVO2 (%)  Min: 56 %  Max: 58 %   SVR  1548 (dyne*sec)/cm5 SVR (dyne*sec)/cm5  Min: 1548 (dyne*sec)/cm5  Max: 1548 (dyne*sec)/cm5   PVR   No data recorded       PHYSICAL EXAM:   Visit Vitals  /75   Pulse 81   Temp 35.8 °C (96.4 °F) (Temporal)   Resp 13   Ht 1.803 m (5' 10.98\")   Wt 96.4 kg (212 lb 8.4 oz)   SpO2 92%   BMI 29.65 kg/m²   Smoking Status Former   BSA 2.2 m²       Wt Readings from Last 5 Encounters:   07/21/25 96.4 kg (212 lb 8.4 oz)   07/17/25 110 kg (243 lb 2.7 oz)   06/10/25 97.1 kg (214 lb)   05/12/25 96.6 kg (213 lb)   05/04/25 102 kg (224 lb)       INTAKE/OUTPUT:  I/O last 3 completed shifts:  In: 605.4 (6.3 mL/kg) [I.V.:605.4 (6.3 mL/kg)]  Out: 5550 (57.6 mL/kg) [Urine:5550 (1.6 mL/kg/hr)]  Weight: 96.4 kg      Physical Exam  Constitutional:       Appearance: Normal appearance.   HENT:      Head: Normocephalic and atraumatic.      Nose: Nose normal.      Mouth/Throat:      Mouth: Mucous membranes are moist.     Eyes:      Extraocular Movements: Extraocular movements intact.      Pupils: Pupils are equal, round, and reactive to light.     Neck:      Comments: Rt IJ/ SGC  Cardiovascular:      Rate and Rhythm: Normal rate and regular rhythm.      Pulses: Normal pulses.      Heart sounds:      Gallop present.   Pulmonary:      Effort: Pulmonary effort is normal.      Breath sounds: Decreased breath sounds present.   Abdominal:      General: There is distension.     Musculoskeletal:         General: Swelling present.      Cervical back: Normal range of motion and neck supple.      Right lower leg: Edema (trace) present.      Left lower leg: " Edema (trace) present.      Comments: Chronic venous stasis      Skin:     General: Skin is warm.      Capillary Refill: Capillary refill takes 2 to 3 seconds.     Neurological:      General: No focal deficit present.      Mental Status: He is alert and oriented to person, place, and time.     Psychiatric:         Mood and Affect: Mood normal.         Behavior: Behavior normal.         DATA:  CMP:  Results from last 7 days   Lab Units 07/22/25  0550 07/21/25  0520 07/20/25  1232 07/20/25  0556 07/19/25  1925 07/19/25  0530 07/18/25  1708 07/18/25  0157 07/17/25  1119 07/17/25  1119 07/17/25  0653 07/16/25  0655   SODIUM mmol/L 135* 137 135* 138 135* 138 135* 136  --  134* 137 135*   POTASSIUM mmol/L 3.8 3.7 3.8 3.6 3.7 4.2 3.7 4.7  --  4.0 4.0 4.2   CHLORIDE mmol/L 91* 92* 90* 93* 91* 98 94* 99  --  96* 98 98   CO2 mmol/L 36* 37* 36* 37* 35* 32 31 29  --  31 32 28   ANION GAP mmol/L 12 12 13 12 13 12 14 13  --  11 11 13   BUN mg/dL 42* 35* 37* 38* 39* 43* 47* 49*  --  48* 47* 53*   CREATININE mg/dL 1.87* 1.61* 1.60* 1.56* 1.81* 1.67* 1.73* 1.64*  --  1.62* 1.70* 1.86*   EGFR mL/min/1.73m*2 41* 49* 49* 51* 43* 47* 45* 48*  --  49* 46* 41*   MAGNESIUM mg/dL 2.30 2.39  --  2.55*  --  2.47*  --  2.40  --   --  2.39 2.58*   ALBUMIN g/dL 3.6 3.6 4.0 3.9 3.9 3.6 3.7 3.6   < > 3.7  --   --    ALT U/L  --   --   --   --   --   --   --   --   --  8*  --   --    AST U/L  --   --   --   --   --   --   --   --   --  15  --   --    BILIRUBIN TOTAL mg/dL  --   --   --   --   --   --   --   --   --  2.0*  --   --     < > = values in this interval not displayed.     CBC:  Results from last 7 days   Lab Units 07/22/25  0550 07/21/25  0520 07/20/25  0556 07/19/25  0530 07/18/25  0157 07/17/25  0653 07/16/25  0654   WBC AUTO x10*3/uL 5.3 5.3 5.1 4.7 5.1 4.7 5.1   HEMOGLOBIN g/dL 12.0* 12.4* 12.2* 11.2* 11.6* 12.1* 12.3*   HEMATOCRIT % 38.7* 40.4* 40.2* 38.3* 38.3* 42.4 41.6   PLATELETS AUTO x10*3/uL 239 234 252 254 241 260 272   MCV fL  "77* 77* 78* 81 77* 82 81     COAG:   Results from last 7 days   Lab Units 07/17/25  1119   INR  2.0*     ABO: No results found for: \"ABO\"  HEME/ENDO:  Results from last 7 days   Lab Units 07/17/25  1125 07/17/25  1111   FERRITIN ng/mL 102  --    IRON SATURATION % 16*  --    TSH mIU/L 3.97  --    HEMOGLOBIN A1C %  --  10.1*      CARDIAC:   Results from last 7 days   Lab Units 07/17/25  1119   TROPHSCMC ng/L 37   BNP pg/mL 744*       ASSESSMENT AND PLAN:     Felice Law is a 60 y.o. male with significant PMH of ICM, HFrEF, EF 15-20%, CABG (LIMA- LAD in 2023),  PCI, VT arrest s/p VT ablation and ICD in 2017, Hypertension, Hyperlipidemia, Hypothyroidism, Left femoral DVT on Eliquis, COPD, JAKE, CKD III, Chronic BLE venous stasis dermatitis, Insulin dependent Type 2 diabetes who was initially admitted 7/12 to [place] for acute decompensate heart failure. He was diuresed and recommended for transfer to [facility] HF ICU for further managemnet and possible re-consider of advanced therapy work up.  Of notes, Patient was previously discussed for AT during [facility] October 2023 admission but declined with concerns for mobility/frailty (uses motorized scooter and has bilateral foot drop with neuropathy), CKD, poorly controlled diabetes as well as unclear social/financial support).     Transferred to HF ICU 7/17, he was warm and wet,  lasix IV bolus given and drip initiated. Eliquis on hold, heparin drip initiated. Bedside SG done 7/19, opening swan numbers: BP: 133/74 (94),CVP 9, PA 74/29 (44), PCWP 24, CO/CI (Pallavi) 5.4/2.4, Thermo: 4.6/2.1, SVR 1263, SVO2 63% on hydral /Isordil 100/40 mg TID, Metop 50 mg daily, Dapa 10 mg daily, Dig 125 mcg PO daily, Lasix drip at 20 mg /hr. Entresto re-introduce and up titrated. Lasix drip d/c'd currently on intermittent IVP.  Plan for SG guided optimize and then discharge home with Entresto. Hg A1c 10.1 this admission, remains a barrier for consider for advanced therapy for this admission. "     Neuro:   # Anxiety. Depression, Acute pain    # Bilateral foot drop with LE neuropathy: Able to walk two blocks. Uses motorized scoopter    - Serial neuro and pain assessments    - PO Tylenol PRN for pain   - PT/OT Consult, OOB to chair   - CAM ICU score every shift   - Sleep/wake cycle normalization      # Physical Status   -Obesity: BMI: 34 --- > 30  -Reduced Mobility due to ICU and CHF       #Substance abuse   -Alcohol abuse/Alcohol dependence: NO   -Tobacco use/Nicotine Dependence: Quit 8 years ago      Cardiovascular:   #Acute on chronic systolic CHF, HFrEF 15-20%, dx'd 2017,   #Ischemic Cardiomyopathy, ACC/AHA Stage C-D, NYHA II, Warm,   -admit weight (7/17): 111 kg -- > 98 kg (7/20)  -BNP (7/17): 744  -TTE (5/4/2025):  Left ventricular ejection fraction is severely decreased, by visual estimate at 15-20%.   - RHC (6/10/25): RA: 25,RV: 63/9, PA: 72/34, PCWP: 32, PA-SAT: 36%, SVC-SAT: 41%, FA-SAT: 71%, CO: 4.84, CI: 2.24, SVR: 1041  - LHC (6/10/25):   Right dominant coronary circulation with severe multivessel CAD, as described above. LIMA to LAD patent.  - C/w atorvastin 80mg    - Lasix gtt discontinued overnight 7/19.   - Bedside SG with Opening swan numbers (7/19): BP: 133/74 (94),CVP 9, PA 74/29 (44), PCWP 24, CO/CI (Pallavi) 5.4/2.4, Thermo: 4.6/2.1, SVR 1263, SVO2 63% on hydral /Isordil 100/40 mg TID, Metop 50 mg daily, Dapa 10 mg daily, Dig 125 mcg PO daily, Lasix drip at 20 mg /h.  - Daily SGC #'s (7/22):  BP 89/74 (80), CVP 8, PA 59/21 (33), CO/CI 5.10 / 2.20, SVR 1113, SVO2 66%  Hydra 75 , isordil 20, toprol 100, dapa 10, dig 125, entresto 97/101, aldactone 25, nipride 0.25  - Lasix 80 mg IV push x1 (7/21/2025)  - Begin Bumex drip 1mg/hr with a 2 mg bolus  - C/w Entresto 97/103 mg BID   - C/w Metoprolol 100 mg daily   - C/w Hydralazine 100mg/ Isordil 20mg daily, increase as tolerated   - C/w nipride gtt, wean as tolerated   - Daily standing weights, 2gm sodium diet, 2L fluid restriction, strict  I&Os     #CAD    - s/p PCI and 1v CABG (LIMA-LAD 04/2023).   - C/w ASA and atorvastatin      Pulmonary:    # H/o COPD  # Hx Pulmonary Hypertension   # Central sleep apnea.   # Prior tobacco use (24 pack year, quit 2017),   - On 2L via NC      :   #CHARLOTTE/CKD III  - Baseline (11/11/2024): BUN/Cr (GFR): 63/1.36 (60)  -Admit Bun/Creatinine (7/17):  48/1.62, creatinine peaked at 2.55 OSH  -I/Os   -avoid hypotension and nephrotoxic agents      Heme:   #Anemia in the setting of MAI and CKD III  - Labs (7/17):  TIBC: 377,  ferritin: 102, serum Fe: 61, folate: 13.1, B12: 452   - IV Venofer 200 mg x5 doses (7/17-7/21)      # Left femoral DVT (2017)  - Hold home Eliquis  - C/w Heparin drip (7/18)      Endo:   #DM 2  - hgbA1c(7/17):  10.1  - C/w Lantus 20 units Am, 40 units PM, SSI and Pradial insulin   - Diabetic management per endocrine     # Hypothyroidism  -C/w Levothyroxine 75mcg  -TSH (7/18): 3.97      ID:   -afebrile, nontoxic    -no s/s infx   -trend temps q4 hr        PHYSICAL AND OCCUPATIONAL THERAPY: PT/OT     LINES:  PIVs   SGC (7/19 - present)        DVT: SCD's, Heparin drip once Heparin assay <0.2  VAP BUNDLE: NA  CENTRAL LINE BUNDLE: Ordered  ULCER PPX: PPI  GLYCEMIC CONTROL: SSI, Lantus insulin   BOWEL CARE: Senna, Miralax   INDWELLING CATHETER: NA  NUTRITION: Adult diet Consistent Carb; CCD 90 gm/meal; 2000 mL fluid        EMERGENCY CONTACT: Extended Emergency Contact Information  Primary Emergency Contact: NAM LOO  Address: [address] [place] [address] [facility]  Home Phone: [phone]  Mobile Phone: [phone]  Relation: Mother  Secondary Emergency Contact: Robinson Contreras  Mobile Phone: [phone]  Relation: Friend  FAMILY UPDATE:  CODE STATUS: Full Code  DISPO:  HF ICU      Patient seen and assessed with Dr. Duque    I personally spent 60  minutes of critical care time directly and personally managing the patient exclusive of separately billable procedures.       __________________________________________  BRAD Delcid

## 2025-07-22 NOTE — PROGRESS NOTES
Per EV active [employer] Primary -- [place] [facility] DUAL COMPLETE HMOPOS. Open case for lvad eval with [facility].

## 2025-07-22 NOTE — PROGRESS NOTES
Case opened with [facility] transplant for heart and lvad eval. REF [id number]. Clinical faxed to [phone]

## 2025-07-22 NOTE — CARE PLAN
The patient's goals for the shift include      Problem: Safety - Adult  Goal: Free from fall injury  Outcome: Progressing  Flowsheets (Taken 7/22/2025 1957)  Free from fall injury:   Instruct family/caregiver on patient safety   Based on caregiver fall risk screen, instruct family/caregiver to ask for assistance with transferring infant if caregiver noted to have fall risk factors     Problem: Chronic Conditions and Co-morbidities  Goal: Patient's chronic conditions and co-morbidity symptoms are monitored and maintained or improved  Outcome: Progressing  Flowsheets (Taken 7/22/2025 1957)  Care Plan - Patient's Chronic Conditions and Co-Morbidity Symptoms are Monitored and Maintained or Improved:   Monitor and assess patient's chronic conditions and comorbid symptoms for stability, deterioration, or improvement   Collaborate with multidisciplinary team to address chronic and comorbid conditions and prevent exacerbation or deterioration   Update acute care plan with appropriate goals if chronic or comorbid symptoms are exacerbated and prevent overall improvement and discharge     Problem: Nutrition  Goal: Nutrient intake appropriate for maintaining nutritional needs  Outcome: Progressing     Problem: Heart Failure  Goal: Improved gas exchange this shift  Outcome: Progressing  Flowsheets (Taken 7/22/2025 1957)  Improved gas exchange this shift:   Assist with pulmonary hygiene and secretion clearance   Prepare for use of devices/procedures to correct dysrhythmia   Position to promote circulation/maximize ventilation     Problem: Skin  Goal: Participates in plan/prevention/treatment measures  Outcome: Progressing  Flowsheets (Taken 7/22/2025 1957)  Participates in plan/prevention/treatment measures:   Elevate heels   Increase activity/out of bed for meals     The clinical goals for the shift include Will remain hemodynamically stable

## 2025-07-23 ENCOUNTER — DOCUMENTATION (OUTPATIENT)
Dept: TRANSPLANT | Facility: HOSPITAL | Age: 60
End: 2025-07-23

## 2025-07-23 ENCOUNTER — APPOINTMENT (OUTPATIENT)
Dept: RADIOLOGY | Facility: HOSPITAL | Age: 60
DRG: 291 | End: 2025-07-23
Payer: MEDICARE

## 2025-07-23 ENCOUNTER — APPOINTMENT (OUTPATIENT)
Dept: CARDIOLOGY | Facility: HOSPITAL | Age: 60
DRG: 291 | End: 2025-07-23
Payer: MEDICARE

## 2025-07-23 ENCOUNTER — APPOINTMENT (OUTPATIENT)
Dept: OPHTHALMOLOGY | Age: 60
End: 2025-07-23
Payer: COMMERCIAL

## 2025-07-23 ENCOUNTER — APPOINTMENT (OUTPATIENT)
Dept: VASCULAR MEDICINE | Facility: HOSPITAL | Age: 60
DRG: 291 | End: 2025-07-23
Payer: MEDICARE

## 2025-07-23 ENCOUNTER — APPOINTMENT (OUTPATIENT)
Dept: VASCULAR MEDICINE | Facility: HOSPITAL | Age: 60
End: 2025-07-23
Payer: MEDICARE

## 2025-07-23 DIAGNOSIS — I25.5 ISCHEMIC CARDIOMYOPATHY: ICD-10-CM

## 2025-07-23 DIAGNOSIS — I50.23 NYHA CLASS 4 ACUTE ON CHRONIC SYSTOLIC HEART FAILURE: ICD-10-CM

## 2025-07-23 DIAGNOSIS — I50.23 ACUTE ON CHRONIC SYSTOLIC HEART FAILURE: ICD-10-CM

## 2025-07-23 LAB
ABO GROUP (TYPE) IN BLOOD: NORMAL
ALBUMIN SERPL BCP-MCNC: 3.4 G/DL (ref 3.4–5)
ALBUMIN SERPL BCP-MCNC: 3.8 G/DL (ref 3.4–5)
ANION GAP BLDMV CALCULATED.4IONS-SCNC: 5 MMO/L (ref 10–25)
ANION GAP BLDMV CALCULATED.4IONS-SCNC: 6 MMO/L (ref 10–25)
ANION GAP BLDMV CALCULATED.4IONS-SCNC: 7 MMO/L (ref 10–25)
ANION GAP BLDMV CALCULATED.4IONS-SCNC: 8 MMO/L (ref 10–25)
ANION GAP SERPL CALC-SCNC: 15 MMOL/L (ref 10–20)
ANION GAP SERPL CALC-SCNC: 15 MMOL/L (ref 10–20)
ANTIBODY SCREEN: NORMAL
APPARATUS: ABNORMAL
APPARATUS: ABNORMAL
APPEARANCE UR: CLEAR
APTT PPP: 54 SECONDS (ref 26–36)
BASE EXCESS BLDMV CALC-SCNC: 7.1 MMOL/L (ref -2–3)
BASE EXCESS BLDMV CALC-SCNC: 7.6 MMOL/L (ref -2–3)
BASE EXCESS BLDMV CALC-SCNC: 9 MMOL/L (ref -2–3)
BASE EXCESS BLDMV CALC-SCNC: 9.4 MMOL/L (ref -2–3)
BILIRUB UR STRIP.AUTO-MCNC: NEGATIVE MG/DL
BNP SERPL-MCNC: 380 PG/ML (ref 0–99)
BODY TEMPERATURE: 37 DEGREES CELSIUS
BUN SERPL-MCNC: 48 MG/DL (ref 6–23)
BUN SERPL-MCNC: 52 MG/DL (ref 6–23)
CA-I BLDMV-SCNC: 1.19 MMOL/L (ref 1.1–1.33)
CA-I BLDMV-SCNC: 1.2 MMOL/L (ref 1.1–1.33)
CA-I BLDMV-SCNC: 1.21 MMOL/L (ref 1.1–1.33)
CA-I BLDMV-SCNC: 1.23 MMOL/L (ref 1.1–1.33)
CALCIUM SERPL-MCNC: 9.4 MG/DL (ref 8.6–10.6)
CALCIUM SERPL-MCNC: 9.5 MG/DL (ref 8.6–10.6)
CARDIOLIPIN IGA SERPL-ACNC: 0.6 APL U/ML
CARDIOLIPIN IGG SER IA-ACNC: <1.6 GPL U/ML
CARDIOLIPIN IGM SER IA-ACNC: 2.2 MPL U/ML
CHLORIDE BLD-SCNC: 93 MMOL/L (ref 98–107)
CHLORIDE BLD-SCNC: 94 MMOL/L (ref 98–107)
CHLORIDE BLD-SCNC: 94 MMOL/L (ref 98–107)
CHLORIDE BLD-SCNC: 96 MMOL/L (ref 98–107)
CHLORIDE SERPL-SCNC: 93 MMOL/L (ref 98–107)
CHLORIDE SERPL-SCNC: 93 MMOL/L (ref 98–107)
CMV IGG AVIDITY SERPL IA-RTO: REACTIVE %
CO2 SERPL-SCNC: 30 MMOL/L (ref 21–32)
CO2 SERPL-SCNC: 32 MMOL/L (ref 21–32)
COLOR UR: ABNORMAL
CREAT SERPL-MCNC: 2.04 MG/DL (ref 0.5–1.3)
CREAT SERPL-MCNC: 2.17 MG/DL (ref 0.5–1.3)
EBV EA IGG SER QL: NEGATIVE
EBV NA AB SER QL: POSITIVE
EBV VCA IGG SER IA-ACNC: POSITIVE
EBV VCA IGM SER IA-ACNC: NEGATIVE
EGFRCR SERPLBLD CKD-EPI 2021: 34 ML/MIN/1.73M*2
EGFRCR SERPLBLD CKD-EPI 2021: 37 ML/MIN/1.73M*2
ERYTHROCYTE [DISTWIDTH] IN BLOOD BY AUTOMATED COUNT: 20.1 % (ref 11.5–14.5)
GLUCOSE BLD MANUAL STRIP-MCNC: 194 MG/DL (ref 74–99)
GLUCOSE BLD MANUAL STRIP-MCNC: 207 MG/DL (ref 74–99)
GLUCOSE BLD MANUAL STRIP-MCNC: 251 MG/DL (ref 74–99)
GLUCOSE BLD MANUAL STRIP-MCNC: 282 MG/DL (ref 74–99)
GLUCOSE BLD-MCNC: 197 MG/DL (ref 74–99)
GLUCOSE BLD-MCNC: 255 MG/DL (ref 74–99)
GLUCOSE BLD-MCNC: 271 MG/DL (ref 74–99)
GLUCOSE BLD-MCNC: 313 MG/DL (ref 74–99)
GLUCOSE SERPL-MCNC: 189 MG/DL (ref 74–99)
GLUCOSE SERPL-MCNC: 226 MG/DL (ref 74–99)
GLUCOSE UR STRIP.AUTO-MCNC: ABNORMAL MG/DL
HAV AB SER QL IA: REACTIVE
HBV CORE AB SER QL: NONREACTIVE
HBV SURFACE AB SER-ACNC: 7.9 MIU/ML
HBV SURFACE AG SERPL QL IA: NONREACTIVE
HCO3 BLDMV-SCNC: 33.4 MMOL/L (ref 22–26)
HCO3 BLDMV-SCNC: 34.1 MMOL/L (ref 22–26)
HCO3 BLDMV-SCNC: 35.5 MMOL/L (ref 22–26)
HCO3 BLDMV-SCNC: 35.7 MMOL/L (ref 22–26)
HCT VFR BLD AUTO: 38 % (ref 41–52)
HCT VFR BLD EST: 37 % (ref 41–52)
HCT VFR BLD EST: 38 % (ref 41–52)
HCT VFR BLD EST: 39 % (ref 41–52)
HCT VFR BLD EST: 39 % (ref 41–52)
HCV AB SER QL: NONREACTIVE
HERPES SIMPLEX VIRUS 1 IGG: >8 INDEX
HERPES SIMPLEX VIRUS 2 IGG: <0.2 INDEX
HGB BLD-MCNC: 11.8 G/DL (ref 13.5–17.5)
HGB BLDMV-MCNC: 12.4 G/DL (ref 13.5–17.5)
HGB BLDMV-MCNC: 12.8 G/DL (ref 13.5–17.5)
HGB BLDMV-MCNC: 13.1 G/DL (ref 13.5–17.5)
HGB BLDMV-MCNC: 13.1 G/DL (ref 13.5–17.5)
HIV 1+2 AB+HIV1 P24 AG SERPL QL IA: NONREACTIVE
IGA SERPL-MCNC: 247 MG/DL (ref 70–400)
IGG SERPL-MCNC: 1490 MG/DL (ref 700–1600)
IGG1 SER-MCNC: 1140 MG/DL (ref 490–1140)
IGG2 SER-MCNC: 342 MG/DL (ref 150–640)
IGG3 SER-MCNC: 69 MG/DL (ref 11–85)
IGG4 SER-MCNC: 166 MG/DL (ref 3–200)
IGM SERPL-MCNC: 118 MG/DL (ref 40–230)
INHALED O2 CONCENTRATION: 21 %
INHALED O2 CONCENTRATION: 28 %
INHALED O2 CONCENTRATION: 28 %
INHALED O2 CONCENTRATION: 32 %
INR PPP: 1.1 (ref 0.9–1.1)
KETONES UR STRIP.AUTO-MCNC: NEGATIVE MG/DL
LACTATE BLDMV-SCNC: 1.1 MMOL/L (ref 0.4–2)
LACTATE BLDMV-SCNC: 1.2 MMOL/L (ref 0.4–2)
LACTATE BLDMV-SCNC: 1.3 MMOL/L (ref 0.4–2)
LACTATE BLDMV-SCNC: 1.5 MMOL/L (ref 0.4–2)
LDH SERPL L TO P-CCNC: 231 U/L (ref 84–246)
LEUKOCYTE ESTERASE UR QL STRIP.AUTO: NEGATIVE
MAGNESIUM SERPL-MCNC: 2.35 MG/DL (ref 1.6–2.4)
MCH RBC QN AUTO: 23.6 PG (ref 26–34)
MCHC RBC AUTO-ENTMCNC: 31.1 G/DL (ref 32–36)
MCV RBC AUTO: 76 FL (ref 80–100)
MEV IGG SER QL IA: POSITIVE
MUMPS IGG ANTIBODY INDEX: 3.9 AI
MUV IGG SER IA-ACNC: POSITIVE
NITRITE UR QL STRIP.AUTO: NEGATIVE
NRBC BLD-RTO: 0 /100 WBCS (ref 0–0)
OXYHGB MFR BLDMV: 53.3 % (ref 45–75)
OXYHGB MFR BLDMV: 57.6 % (ref 45–75)
OXYHGB MFR BLDMV: 61.1 % (ref 45–75)
OXYHGB MFR BLDMV: 63.7 % (ref 45–75)
PCO2 BLDMV: 54 MM HG (ref 41–51)
PCO2 BLDMV: 55 MM HG (ref 41–51)
PCO2 BLDMV: 55 MM HG (ref 41–51)
PCO2 BLDMV: 56 MM HG (ref 41–51)
PH BLDMV: 7.4 PH (ref 7.33–7.43)
PH BLDMV: 7.4 PH (ref 7.33–7.43)
PH BLDMV: 7.41 PH (ref 7.33–7.43)
PH BLDMV: 7.42 PH (ref 7.33–7.43)
PH UR STRIP.AUTO: 7.5 [PH]
PHOSPHATE SERPL-MCNC: 3.8 MG/DL (ref 2.5–4.9)
PHOSPHATE SERPL-MCNC: 4.1 MG/DL (ref 2.5–4.9)
PLATELET # BLD AUTO: 213 X10*3/UL (ref 150–450)
PO2 BLDMV: 34 MM HG (ref 35–45)
PO2 BLDMV: 38 MM HG (ref 35–45)
PO2 BLDMV: 40 MM HG (ref 35–45)
PO2 BLDMV: 43 MM HG (ref 35–45)
POTASSIUM BLDMV-SCNC: 3.9 MMOL/L (ref 3.5–5.3)
POTASSIUM BLDMV-SCNC: 4.3 MMOL/L (ref 3.5–5.3)
POTASSIUM BLDMV-SCNC: 4.5 MMOL/L (ref 3.5–5.3)
POTASSIUM BLDMV-SCNC: 4.5 MMOL/L (ref 3.5–5.3)
POTASSIUM SERPL-SCNC: 3.7 MMOL/L (ref 3.5–5.3)
POTASSIUM SERPL-SCNC: 3.9 MMOL/L (ref 3.5–5.3)
PREALB SERPL-MCNC: 18.5 MG/DL (ref 18–40)
PROT UR STRIP.AUTO-MCNC: NEGATIVE MG/DL
PROTHROMBIN TIME: 12.2 SECONDS (ref 9.8–12.4)
PSA SERPL-MCNC: 0.17 NG/ML
RBC # BLD AUTO: 5 X10*6/UL (ref 4.5–5.9)
RBC # UR STRIP.AUTO: NEGATIVE MG/DL
RH FACTOR (ANTIGEN D): NORMAL
RUBEOLA IGG ANTIBODY INDEX: >8 AI
RUBV IGG SERPL IA-ACNC: 2.2 AI
RUBV IGG SERPL QL IA: POSITIVE
SAO2 % BLDMV: 55 % (ref 45–75)
SAO2 % BLDMV: 59 % (ref 45–75)
SAO2 % BLDMV: 63 % (ref 45–75)
SAO2 % BLDMV: 66 % (ref 45–75)
SODIUM BLDMV-SCNC: 129 MMOL/L (ref 136–145)
SODIUM BLDMV-SCNC: 131 MMOL/L (ref 136–145)
SODIUM BLDMV-SCNC: 132 MMOL/L (ref 136–145)
SODIUM BLDMV-SCNC: 133 MMOL/L (ref 136–145)
SODIUM SERPL-SCNC: 134 MMOL/L (ref 136–145)
SODIUM SERPL-SCNC: 136 MMOL/L (ref 136–145)
SP GR UR STRIP.AUTO: <1.005
T GONDII IGG SER-ACNC: REACTIVE
T3 SERPL-MCNC: 105 NG/DL (ref 60–200)
T4 SERPL-MCNC: 9.3 UG/DL (ref 4.5–11.1)
TREPONEMA PALLIDUM IGG+IGM AB [PRESENCE] IN SERUM OR PLASMA BY IMMUNOASSAY: REACTIVE
TSH SERPL-ACNC: 2.12 MIU/L (ref 0.44–3.98)
UFH PPP CHRO-ACNC: 0.3 IU/ML (ref ?–1.1)
UROBILINOGEN UR STRIP.AUTO-MCNC: NORMAL MG/DL
VARICELLA ZOSTER IGG INDEX: >8 AI
VZV IGG SER QL IA: POSITIVE
WBC # BLD AUTO: 4.4 X10*3/UL (ref 4.4–11.3)

## 2025-07-23 PROCEDURE — 86318 IA INFECTIOUS AGENT ANTIBODY: CPT

## 2025-07-23 PROCEDURE — 2500000002 HC RX 250 W HCPCS SELF ADMINISTERED DRUGS (ALT 637 FOR MEDICARE OP, ALT 636 FOR OP/ED)

## 2025-07-23 PROCEDURE — 86765 RUBEOLA ANTIBODY: CPT

## 2025-07-23 PROCEDURE — 82947 ASSAY GLUCOSE BLOOD QUANT: CPT

## 2025-07-23 PROCEDURE — 86706 HEP B SURFACE ANTIBODY: CPT

## 2025-07-23 PROCEDURE — 83735 ASSAY OF MAGNESIUM: CPT | Performed by: NURSE PRACTITIONER

## 2025-07-23 PROCEDURE — 84132 ASSAY OF SERUM POTASSIUM: CPT | Performed by: NURSE PRACTITIONER

## 2025-07-23 PROCEDURE — 99291 CRITICAL CARE FIRST HOUR: CPT | Performed by: STUDENT IN AN ORGANIZED HEALTH CARE EDUCATION/TRAINING PROGRAM

## 2025-07-23 PROCEDURE — 86481 TB AG RESPONSE T-CELL SUSP: CPT

## 2025-07-23 PROCEDURE — 81379 HLA I TYPING COMPLETE HR: CPT | Mod: OUT | Performed by: INTERNAL MEDICINE

## 2025-07-23 PROCEDURE — 71045 X-RAY EXAM CHEST 1 VIEW: CPT

## 2025-07-23 PROCEDURE — 87389 HIV-1 AG W/HIV-1&-2 AB AG IA: CPT

## 2025-07-23 PROCEDURE — 86762 RUBELLA ANTIBODY: CPT

## 2025-07-23 PROCEDURE — 86695 HERPES SIMPLEX TYPE 1 TEST: CPT

## 2025-07-23 PROCEDURE — 4B02XTZ MEASUREMENT OF CARDIAC DEFIBRILLATOR, EXTERNAL APPROACH: ICD-10-PCS | Performed by: STUDENT IN AN ORGANIZED HEALTH CARE EDUCATION/TRAINING PROGRAM

## 2025-07-23 PROCEDURE — 71046 X-RAY EXAM CHEST 2 VIEWS: CPT

## 2025-07-23 PROCEDURE — 85302 CLOT INHIBIT PROT C ANTIGEN: CPT

## 2025-07-23 PROCEDURE — 93880 EXTRACRANIAL BILAT STUDY: CPT | Performed by: INTERNAL MEDICINE

## 2025-07-23 PROCEDURE — 2500000004 HC RX 250 GENERAL PHARMACY W/ HCPCS (ALT 636 FOR OP/ED)

## 2025-07-23 PROCEDURE — 70486 CT MAXILLOFACIAL W/O DYE: CPT

## 2025-07-23 PROCEDURE — 84480 ASSAY TRIIODOTHYRONINE (T3): CPT

## 2025-07-23 PROCEDURE — 86644 CMV ANTIBODY: CPT

## 2025-07-23 PROCEDURE — 2020000001 HC ICU ROOM DAILY

## 2025-07-23 PROCEDURE — 83880 ASSAY OF NATRIURETIC PEPTIDE: CPT

## 2025-07-23 PROCEDURE — 2500000002 HC RX 250 W HCPCS SELF ADMINISTERED DRUGS (ALT 637 FOR MEDICARE OP, ALT 636 FOR OP/ED): Performed by: NURSE PRACTITIONER

## 2025-07-23 PROCEDURE — 2500000004 HC RX 250 GENERAL PHARMACY W/ HCPCS (ALT 636 FOR OP/ED): Mod: JZ,TB

## 2025-07-23 PROCEDURE — 84153 ASSAY OF PSA TOTAL: CPT

## 2025-07-23 PROCEDURE — 85305 CLOT INHIBIT PROT S TOTAL: CPT

## 2025-07-23 PROCEDURE — 87340 HEPATITIS B SURFACE AG IA: CPT

## 2025-07-23 PROCEDURE — 83615 LACTATE (LD) (LDH) ENZYME: CPT

## 2025-07-23 PROCEDURE — 86708 HEPATITIS A ANTIBODY: CPT

## 2025-07-23 PROCEDURE — 2500000001 HC RX 250 WO HCPCS SELF ADMINISTERED DRUGS (ALT 637 FOR MEDICARE OP)

## 2025-07-23 PROCEDURE — 74176 CT ABD & PELVIS W/O CONTRAST: CPT

## 2025-07-23 PROCEDURE — 93283 PRGRMG EVAL IMPLANTABLE DFB: CPT

## 2025-07-23 PROCEDURE — 37799 UNLISTED PX VASCULAR SURGERY: CPT | Performed by: NURSE PRACTITIONER

## 2025-07-23 PROCEDURE — 81003 URINALYSIS AUTO W/O SCOPE: CPT

## 2025-07-23 PROCEDURE — 86787 VARICELLA-ZOSTER ANTIBODY: CPT

## 2025-07-23 PROCEDURE — 85027 COMPLETE CBC AUTOMATED: CPT | Performed by: NURSE PRACTITIONER

## 2025-07-23 PROCEDURE — 84132 ASSAY OF SERUM POTASSIUM: CPT

## 2025-07-23 PROCEDURE — 82784 ASSAY IGA/IGD/IGG/IGM EACH: CPT

## 2025-07-23 PROCEDURE — 80323 ALKALOIDS NOS: CPT

## 2025-07-23 PROCEDURE — 85520 HEPARIN ASSAY: CPT | Performed by: NURSE PRACTITIONER

## 2025-07-23 PROCEDURE — 86778 TOXOPLASMA ANTIBODY IGM: CPT

## 2025-07-23 PROCEDURE — 84134 ASSAY OF PREALBUMIN: CPT

## 2025-07-23 PROCEDURE — 87522 HEPATITIS C REVRS TRNSCRPJ: CPT

## 2025-07-23 PROCEDURE — 2500000004 HC RX 250 GENERAL PHARMACY W/ HCPCS (ALT 636 FOR OP/ED): Performed by: NURSE PRACTITIONER

## 2025-07-23 PROCEDURE — 85610 PROTHROMBIN TIME: CPT

## 2025-07-23 PROCEDURE — 86777 TOXOPLASMA ANTIBODY: CPT

## 2025-07-23 PROCEDURE — 86645 CMV ANTIBODY IGM: CPT

## 2025-07-23 PROCEDURE — 86803 HEPATITIS C AB TEST: CPT

## 2025-07-23 PROCEDURE — 86704 HEP B CORE ANTIBODY TOTAL: CPT

## 2025-07-23 PROCEDURE — 86780 TREPONEMA PALLIDUM: CPT

## 2025-07-23 PROCEDURE — 2500000001 HC RX 250 WO HCPCS SELF ADMINISTERED DRUGS (ALT 637 FOR MEDICARE OP): Performed by: NURSE PRACTITIONER

## 2025-07-23 PROCEDURE — 86901 BLOOD TYPING SEROLOGIC RH(D): CPT

## 2025-07-23 PROCEDURE — 93880 EXTRACRANIAL BILAT STUDY: CPT

## 2025-07-23 PROCEDURE — 93922 UPR/L XTREMITY ART 2 LEVELS: CPT

## 2025-07-23 PROCEDURE — 86147 CARDIOLIPIN ANTIBODY EA IG: CPT

## 2025-07-23 PROCEDURE — 84436 ASSAY OF TOTAL THYROXINE: CPT

## 2025-07-23 PROCEDURE — 86735 MUMPS ANTIBODY: CPT

## 2025-07-23 PROCEDURE — 86663 EPSTEIN-BARR ANTIBODY: CPT

## 2025-07-23 PROCEDURE — 84443 ASSAY THYROID STIM HORMONE: CPT

## 2025-07-23 PROCEDURE — 80307 DRUG TEST PRSMV CHEM ANLYZR: CPT

## 2025-07-23 PROCEDURE — 93979 VASCULAR STUDY: CPT

## 2025-07-23 PROCEDURE — 99291 CRITICAL CARE FIRST HOUR: CPT

## 2025-07-23 RX ORDER — BUMETANIDE 0.25 MG/ML
3 INJECTION, SOLUTION INTRAMUSCULAR; INTRAVENOUS ONCE
Status: COMPLETED | OUTPATIENT
Start: 2025-07-23 | End: 2025-07-23

## 2025-07-23 RX ORDER — ACETAZOLAMIDE 500 MG/5ML
500 INJECTION, POWDER, LYOPHILIZED, FOR SOLUTION INTRAVENOUS ONCE
Status: COMPLETED | OUTPATIENT
Start: 2025-07-23 | End: 2025-07-23

## 2025-07-23 RX ORDER — INSULIN GLARGINE 100 [IU]/ML
42 INJECTION, SOLUTION SUBCUTANEOUS DAILY
Status: DISCONTINUED | OUTPATIENT
Start: 2025-07-24 | End: 2025-07-23

## 2025-07-23 RX ORDER — POTASSIUM CHLORIDE 20 MEQ/1
40 TABLET, EXTENDED RELEASE ORAL ONCE
Status: COMPLETED | OUTPATIENT
Start: 2025-07-23 | End: 2025-07-23

## 2025-07-23 RX ORDER — INSULIN GLARGINE 100 [IU]/ML
44 INJECTION, SOLUTION SUBCUTANEOUS DAILY
Status: DISCONTINUED | OUTPATIENT
Start: 2025-07-24 | End: 2025-07-26

## 2025-07-23 RX ORDER — INSULIN LISPRO 100 [IU]/ML
22 INJECTION, SOLUTION INTRAVENOUS; SUBCUTANEOUS
Status: DISCONTINUED | OUTPATIENT
Start: 2025-07-23 | End: 2025-07-25

## 2025-07-23 RX ORDER — BUMETANIDE 0.25 MG/ML
2 INJECTION, SOLUTION INTRAMUSCULAR; INTRAVENOUS ONCE
Status: COMPLETED | OUTPATIENT
Start: 2025-07-23 | End: 2025-07-23

## 2025-07-23 RX ORDER — INSULIN GLARGINE 100 [IU]/ML
42 INJECTION, SOLUTION SUBCUTANEOUS NIGHTLY
Status: DISCONTINUED | OUTPATIENT
Start: 2025-07-23 | End: 2025-07-23

## 2025-07-23 RX ORDER — INSULIN GLARGINE 100 [IU]/ML
44 INJECTION, SOLUTION SUBCUTANEOUS NIGHTLY
Status: DISCONTINUED | OUTPATIENT
Start: 2025-07-23 | End: 2025-07-26

## 2025-07-23 RX ADMIN — HYDRALAZINE HYDROCHLORIDE 100 MG: 100 TABLET ORAL at 06:36

## 2025-07-23 RX ADMIN — LEVOTHYROXINE SODIUM 75 MCG: 0.07 TABLET ORAL at 06:36

## 2025-07-23 RX ADMIN — GABAPENTIN 200 MG: 100 CAPSULE ORAL at 21:53

## 2025-07-23 RX ADMIN — ISOSORBIDE DINITRATE 30 MG: 20 TABLET ORAL at 08:10

## 2025-07-23 RX ADMIN — SPIRONOLACTONE 25 MG: 25 TABLET, FILM COATED ORAL at 08:11

## 2025-07-23 RX ADMIN — INSULIN GLARGINE 40 UNITS: 100 INJECTION, SOLUTION SUBCUTANEOUS at 08:17

## 2025-07-23 RX ADMIN — BUMETANIDE 2 MG: 0.25 INJECTION INTRAMUSCULAR; INTRAVENOUS at 06:36

## 2025-07-23 RX ADMIN — CALCIUM CARBONATE (ANTACID) CHEW TAB 500 MG 1 TABLET: 500 CHEW TAB at 08:10

## 2025-07-23 RX ADMIN — POTASSIUM CHLORIDE 40 MEQ: 1500 TABLET, EXTENDED RELEASE ORAL at 06:35

## 2025-07-23 RX ADMIN — INSULIN LISPRO 3 UNITS: 100 INJECTION, SOLUTION INTRAVENOUS; SUBCUTANEOUS at 18:26

## 2025-07-23 RX ADMIN — DAPAGLIFLOZIN 10 MG: 10 TABLET, FILM COATED ORAL at 08:10

## 2025-07-23 RX ADMIN — GABAPENTIN 200 MG: 100 CAPSULE ORAL at 14:39

## 2025-07-23 RX ADMIN — INSULIN LISPRO 9 UNITS: 100 INJECTION, SOLUTION INTRAVENOUS; SUBCUTANEOUS at 15:29

## 2025-07-23 RX ADMIN — INSULIN LISPRO 6 UNITS: 100 INJECTION, SOLUTION INTRAVENOUS; SUBCUTANEOUS at 10:27

## 2025-07-23 RX ADMIN — HEPARIN SODIUM 1600 UNITS/HR: 10000 INJECTION, SOLUTION INTRAVENOUS at 06:58

## 2025-07-23 RX ADMIN — BUMETANIDE 3 MG: 0.25 INJECTION INTRAMUSCULAR; INTRAVENOUS at 20:30

## 2025-07-23 RX ADMIN — ISOSORBIDE DINITRATE 30 MG: 20 TABLET ORAL at 18:24

## 2025-07-23 RX ADMIN — SACUBITRIL AND VALSARTAN 1 TABLET: 97; 103 TABLET, FILM COATED ORAL at 08:10

## 2025-07-23 RX ADMIN — CALCIUM CARBONATE (ANTACID) CHEW TAB 500 MG 1 TABLET: 500 CHEW TAB at 20:34

## 2025-07-23 RX ADMIN — ATORVASTATIN CALCIUM 80 MG: 80 TABLET, FILM COATED ORAL at 20:34

## 2025-07-23 RX ADMIN — SODIUM NITROPRUSSIDE IN 0.9% SODIUM CHLORIDE 0.25 MCG/KG/MIN: 0.5 INJECTION INTRAVENOUS at 00:15

## 2025-07-23 RX ADMIN — POTASSIUM CHLORIDE 40 MEQ: 1500 TABLET, EXTENDED RELEASE ORAL at 23:58

## 2025-07-23 RX ADMIN — HYDRALAZINE HYDROCHLORIDE 100 MG: 100 TABLET ORAL at 14:39

## 2025-07-23 RX ADMIN — FENOFIBRATE 145 MG: 145 TABLET, FILM COATED ORAL at 08:10

## 2025-07-23 RX ADMIN — QUETIAPINE FUMARATE 50 MG: 25 TABLET ORAL at 20:34

## 2025-07-23 RX ADMIN — ACETAZOLAMIDE 500 MG: 500 INJECTION, POWDER, LYOPHILIZED, FOR SOLUTION INTRAVENOUS at 17:10

## 2025-07-23 RX ADMIN — SACUBITRIL AND VALSARTAN 1 TABLET: 97; 103 TABLET, FILM COATED ORAL at 20:34

## 2025-07-23 RX ADMIN — METOPROLOL SUCCINATE 100 MG: 50 TABLET, EXTENDED RELEASE ORAL at 08:10

## 2025-07-23 RX ADMIN — PANTOPRAZOLE SODIUM 40 MG: 40 TABLET, DELAYED RELEASE ORAL at 06:36

## 2025-07-23 RX ADMIN — INSULIN LISPRO 18 UNITS: 100 INJECTION, SOLUTION INTRAVENOUS; SUBCUTANEOUS at 10:28

## 2025-07-23 RX ADMIN — INSULIN GLARGINE 44 UNITS: 100 INJECTION, SOLUTION SUBCUTANEOUS at 20:34

## 2025-07-23 RX ADMIN — ISOSORBIDE DINITRATE 30 MG: 20 TABLET ORAL at 14:39

## 2025-07-23 RX ADMIN — GABAPENTIN 200 MG: 100 CAPSULE ORAL at 06:36

## 2025-07-23 RX ADMIN — DIGOXIN 125 MCG: 125 TABLET ORAL at 08:10

## 2025-07-23 RX ADMIN — HYDRALAZINE HYDROCHLORIDE 100 MG: 100 TABLET ORAL at 21:53

## 2025-07-23 RX ADMIN — INSULIN LISPRO 22 UNITS: 100 INJECTION, SOLUTION INTRAVENOUS; SUBCUTANEOUS at 15:29

## 2025-07-23 SDOH — ECONOMIC STABILITY: INCOME INSECURITY: IN THE PAST 12 MONTHS HAS THE ELECTRIC, GAS, OIL, OR WATER COMPANY THREATENED TO SHUT OFF SERVICES IN YOUR HOME?: NO

## 2025-07-23 SDOH — ECONOMIC STABILITY: FOOD INSECURITY: HOW HARD IS IT FOR YOU TO PAY FOR THE VERY BASICS LIKE FOOD, HOUSING, MEDICAL CARE, AND HEATING?: NOT VERY HARD

## 2025-07-23 SDOH — ECONOMIC STABILITY: FOOD INSECURITY: WITHIN THE PAST 12 MONTHS, THE FOOD YOU BOUGHT JUST DIDN'T LAST AND YOU DIDN'T HAVE MONEY TO GET MORE.: NEVER TRUE

## 2025-07-23 SDOH — ECONOMIC STABILITY: FOOD INSECURITY: WITHIN THE PAST 12 MONTHS, YOU WORRIED THAT YOUR FOOD WOULD RUN OUT BEFORE YOU GOT THE MONEY TO BUY MORE.: NEVER TRUE

## 2025-07-23 SDOH — SOCIAL STABILITY: SOCIAL NETWORK: IN A TYPICAL WEEK, HOW MANY TIMES DO YOU TALK ON THE PHONE WITH FAMILY, FRIENDS, OR NEIGHBORS?: TWICE A WEEK

## 2025-07-23 SDOH — SOCIAL STABILITY: SOCIAL INSECURITY: WITHIN THE LAST YEAR, HAVE YOU BEEN AFRAID OF YOUR PARTNER OR EX-PARTNER?: NO

## 2025-07-23 SDOH — SOCIAL STABILITY: SOCIAL INSECURITY: WITHIN THE LAST YEAR, HAVE YOU BEEN HUMILIATED OR EMOTIONALLY ABUSED IN OTHER WAYS BY YOUR PARTNER OR EX-PARTNER?: NO

## 2025-07-23 SDOH — SOCIAL STABILITY: SOCIAL NETWORK: HOW OFTEN DO YOU ATTEND CHURCH OR RELIGIOUS SERVICES?: NEVER

## 2025-07-23 SDOH — ECONOMIC STABILITY: HOUSING INSECURITY: AT ANY TIME IN THE PAST 12 MONTHS, WERE YOU HOMELESS OR LIVING IN A SHELTER (INCLUDING NOW)?: NO

## 2025-07-23 SDOH — HEALTH STABILITY: MENTAL HEALTH
DO YOU FEEL STRESS - TENSE, RESTLESS, NERVOUS, OR ANXIOUS, OR UNABLE TO SLEEP AT NIGHT BECAUSE YOUR MIND IS TROUBLED ALL THE TIME - THESE DAYS?: NOT AT ALL

## 2025-07-23 SDOH — SOCIAL STABILITY: SOCIAL NETWORK: HOW OFTEN DO YOU ATTEND MEETINGS OF THE CLUBS OR ORGANIZATIONS YOU BELONG TO?: NEVER

## 2025-07-23 SDOH — ECONOMIC STABILITY: HOUSING INSECURITY: IN THE LAST 12 MONTHS, WAS THERE A TIME WHEN YOU WERE NOT ABLE TO PAY THE MORTGAGE OR RENT ON TIME?: NO

## 2025-07-23 SDOH — ECONOMIC STABILITY: TRANSPORTATION INSECURITY: IN THE PAST 12 MONTHS, HAS LACK OF TRANSPORTATION KEPT YOU FROM MEDICAL APPOINTMENTS OR FROM GETTING MEDICATIONS?: NO

## 2025-07-23 SDOH — ECONOMIC STABILITY: HOUSING INSECURITY: IN THE PAST 12 MONTHS, HOW MANY TIMES HAVE YOU MOVED WHERE YOU WERE LIVING?: 1

## 2025-07-23 SDOH — SOCIAL STABILITY: SOCIAL NETWORK
DO YOU BELONG TO ANY CLUBS OR ORGANIZATIONS SUCH AS CHURCH GROUPS, UNIONS, FRATERNAL OR ATHLETIC GROUPS, OR SCHOOL GROUPS?: NO

## 2025-07-23 SDOH — HEALTH STABILITY: PHYSICAL HEALTH
HOW OFTEN DO YOU NEED TO HAVE SOMEONE HELP YOU WHEN YOU READ INSTRUCTIONS, PAMPHLETS, OR OTHER WRITTEN MATERIAL FROM YOUR DOCTOR OR PHARMACY?: OFTEN

## 2025-07-23 SDOH — SOCIAL STABILITY: SOCIAL NETWORK: HOW OFTEN DO YOU GET TOGETHER WITH FRIENDS OR RELATIVES?: NEVER

## 2025-07-23 SDOH — HEALTH STABILITY: PHYSICAL HEALTH: ON AVERAGE, HOW MANY MINUTES DO YOU ENGAGE IN EXERCISE AT THIS LEVEL?: 0 MIN

## 2025-07-23 ASSESSMENT — COGNITIVE AND FUNCTIONAL STATUS - GENERAL
DAILY ACTIVITIY SCORE: 24
DAILY ACTIVITIY SCORE: 24
MOBILITY SCORE: 24
DAILY ACTIVITIY SCORE: 24
MOBILITY SCORE: 24
MOBILITY SCORE: 24

## 2025-07-23 ASSESSMENT — PAIN - FUNCTIONAL ASSESSMENT
PAIN_FUNCTIONAL_ASSESSMENT: 0-10

## 2025-07-23 ASSESSMENT — PAIN SCALES - GENERAL
PAINLEVEL_OUTOF10: 0 - NO PAIN

## 2025-07-23 ASSESSMENT — ACTIVITIES OF DAILY LIVING (ADL): LACK_OF_TRANSPORTATION: NO

## 2025-07-23 NOTE — PROGRESS NOTES
HFICU Attending Note    60M with stage D ICM (EF 15-20%) and prior VT arrest s/p ICD, admitted 7/12 to [place] with ADHF and transferred to [facility] HFICU on 7/17 for consideration of advanced therapies. Previously declined AT (10/2023) due to frailty, DM, CKD, and limited support. [place] placed 7/19 showed preserved CI (2.4) but elevated biventricular filling pressures. HD-guided optimization ongoing with rapid diuresis (>10L net negative), uptitration of GDMT including restart and up-titration of Entresto to 97/103 BID, and metoprolol to 100 mg. A1c 10.1%. Endocrine consulted 7/21. Patient has chronic bilateral foot drop with limited ambulation and is receiving PT/OT. He’s improving, up and walking with walker. Re-eval for LVAD/OHT given recurent HF presentation    This critically ill patient continues to be at-risk for clinically significant deterioration / failure due to the above mentioned dysfunctional, unstable organ systems.  I have personally identified and managed all complex critical care issues to prevent aforementioned clinical deterioration.  Critical care time is spent at bedside and/or the immediate area and has included, but is not limited to, the review of diagnostic tests, labs, radiographs, serial assessments of hemodynamics, respiratory status, ventilatory management, and family updates.  Time spent in procedures and teaching are reported separately.    Critical care time: _35___ minutes     Objective    Admit Date: 7/17/2025  Hospital Length of Stay: 6   ICU Length of Stay: 5d 22h   Home: [facility] [id number]    MEDICATIONS  Infusions:  [Held by provider] bumetanide, Last Rate: Stopped (07/22/25 6943)  heparin, Last Rate: 1,600 Units/hr (07/23/25 0658)  nitroprusside, Last Rate: Stopped (07/23/25 0141)      Scheduled:  [Held by provider] apixaban, 5 mg, BID  atorvastatin, 80 mg, Nightly  [Held by provider] bumetanide, 1 mg, Daily  calcium carbonate, 500 mg of calcium carbonate, BID  dapagliflozin  propanediol, 10 mg, Daily  digoxin, 125 mcg, Daily  fenofibrate, 145 mg, Daily  gabapentin, 200 mg, q8h SUSI  hydrALAZINE, 100 mg, q8h SUSI  insulin glargine, 40 Units, Nightly  insulin glargine, 40 Units, Daily  insulin lispro, 0-15 Units, TID AC  insulin lispro, 18 Units, TID AC  isosorbide dinitrate, 30 mg, TID  levothyroxine, 75 mcg, Daily before breakfast  metoprolol succinate XL, 100 mg, Daily  pantoprazole, 40 mg, Daily before breakfast  QUEtiapine, 50 mg, Nightly  sacubitriL-valsartan, 1 tablet, BID  spironolactone, 25 mg, Daily      PRN:  acetaminophen, 650 mg, q6h PRN  albuterol, 2.5 mg, q6h PRN  alteplase, 2 mg, PRN  dextrose, 12.5 g, q15 min PRN  dextrose, 25 g, q15 min PRN  diphenhydrAMINE, 50 mg, q5 min PRN  glucagon, 1 mg, q15 min PRN  glucagon, 1 mg, q15 min PRN  heparin, 2,000-4,000 Units, q4h PRN        Prior to Admission Meds:  Prescriptions Prior to Admission[1]    Invasive Hemodynamics:    Most Recent Range Past 24hrs   BP (Art)   No data recorded   MAP(Art)   No data recorded   RA/CVP   No data recorded   PA 52/29 PAP  Min: 48/22  Max: 80/39   PA(mean) 36 mmHg PAP (Mean)  Min: 32 mmHg  Max: 50 mmHg   PCWP 25 mmHg (per Jamin NP) No data recorded   CO 4.1 L/min No data recorded   CI 1.9 L/min/m2 No data recorded   Mixed Venous 56 % No data recorded   SVR  1548 (dyne*sec)/cm5 No data recorded   PVR   No data recorded     MCS:   Heart Mate III:     Most Recent Range Past 24hrs   Flow   No data recorded   Speed   No data recorded   Power   No data recorded   PI   No data recorded     ECMO:     Most Recent Range Past 24hrs   Flow   No data recorded   Speed   No data recorded   Sweep   No data recorded     Impella:      Most Recent Range Past 24hrs   Performance Level   No data recorded   Flow (L/min)   No data recorded   Motor Current   No data recorded   Placement Signal    Placement OK could not be evaluated. This SmartLink does not work with rows of the type: Custom List   Purge (mmHg)   No data  "recorded   Purge rate (mL/hr)   No data recorded     VENT:    Most Recent Range Past 24hrs   Mode      FiO2   No data recorded   Rate   No data recorded   Vt    No data recorded   PEEP   No data recorded         7/23/2025     9:00 AM 7/23/2025     8:27 AM 7/23/2025     8:00 AM 7/23/2025     7:00 AM 7/23/2025     6:00 AM 7/23/2025     5:00 AM 7/23/2025     4:00 AM   Vitals   Systolic 97  91 95 91 95 89   Diastolic 68  63 69 61 61 59   Heart Rate 77  80 86 76 75 76   Temp  35.7 °C (96.3 °F)        Resp 9  9 16 20 22 22     Visit Vitals  BP 97/68   Pulse 77   Temp 35.7 °C (96.3 °F)   Resp (!) 9   Ht 1.803 m (5' 10.98\")   Wt 96.4 kg (212 lb 8.4 oz)   SpO2 94%   BMI 29.65 kg/m²   Smoking Status Former   BSA 2.2 m²     Wt Readings from Last 5 Encounters:   07/21/25 96.4 kg (212 lb 8.4 oz)   07/17/25 110 kg (243 lb 2.7 oz)   06/10/25 97.1 kg (214 lb)   05/12/25 96.6 kg (213 lb)   05/04/25 102 kg (224 lb)       Intake/Output Summary (Last 24 hours) at 7/23/2025 0945  Last data filed at 7/23/2025 0700  Gross per 24 hour   Intake 893.21 ml   Output 3675 ml   Net -2781.79 ml     CHEST: Unlabored, Clear, Diminished  CV:  Normal sinus rhythm  ABD:  Soft, Rounded Nontender, No guarding Bowel sounds: All quadrants, Flatus:    EXT:   RLE: Ravinder,Warm, Dry  DP:    PT: Weak  LLE: Ravinder,Warm, Dry  DP:    PT: Weak  NEURO:   RASS: Alert and calm  CAM: Negative  LOC: Alert  Cognition: Follows commands  GCS: 15    DATA:  CMP:  Recent Labs     07/23/25  0450 07/22/25  1618 07/22/25  0550 07/21/25  0520 07/20/25  1232 07/20/25  0556 07/19/25  1925 07/19/25  0530 07/18/25  1708 07/18/25  0157 07/17/25  1119 07/17/25  0653 07/16/25  0655 07/15/25  0708 07/14/25  0709    134* 135* 137 135* 138 135* 138 135* 136   < > 137 135* 137 139   K 3.7 4.3 3.8 3.7 3.8 3.6 3.7 4.2 3.7 4.7   < > 4.0 4.2 4.3 3.9   CL 93* 91* 91* 92* 90* 93* 91* 98 94* 99   < > 98 98 97* 98   CO2 32 32 36* 37* 36* 37* 35* 32 31 29   < > 32 28 29 29   ANIONGAP 15 15 12 " 12 13 12 13 12 14 13   < > 11 13 15 16   BUN 48* 50* 42* 35* 37* 38* 39* 43* 47* 49*   < > 47* 53* 55* 54*   CREATININE 2.04* 2.40* 1.87* 1.61* 1.60* 1.56* 1.81* 1.67* 1.73* 1.64*   < > 1.70* 1.86* 2.02* 2.14*   EGFR 37* 30* 41* 49* 49* 51* 43* 47* 45* 48*   < > 46* 41* 37* 35*   MG 2.35  --  2.30 2.39  --  2.55*  --  2.47*  --  2.40  --  2.39 2.58* 2.70* 2.62*    < > = values in this interval not displayed.     Recent Labs     07/23/25  0450 07/22/25  1618 07/22/25  0550 07/21/25  0520 07/20/25  1232 07/20/25  0556 07/19/25  1925 07/19/25  0530 07/18/25  0157 07/17/25  1119 07/12/25  1922 05/04/25  1447 11/11/24  1558 11/05/24  1146 06/25/24  1139 12/19/23  1104 10/06/23  0515 10/05/23  1647   ALBUMIN 3.4 3.8 3.6 3.6 4.0 3.9 3.9 3.6   < > 3.7 3.8 4.1 4.0 4.4 4.3 4.1   < > 4.1   ALT  --   --   --   --   --   --   --   --   --  8* 10 26 12 13 15 13  --  11   AST  --   --   --   --   --   --   --   --   --  15 12 22 12 <3* 10 10  --  13   BILITOT  --   --   --   --   --   --   --   --   --  2.0* 1.6* 2.1* 0.5 0.5 1.3* 1.1  --  1.8*    < > = values in this interval not displayed.     CBC:  Recent Labs     07/23/25  0450 07/22/25  0550 07/21/25  0520 07/20/25  0556 07/19/25  0530 07/18/25  0157 07/17/25  0653 07/16/25  0654   WBC 4.4 5.3 5.3 5.1 4.7 5.1 4.7 5.1   HGB 11.8* 12.0* 12.4* 12.2* 11.2* 11.6* 12.1* 12.3*   HCT 38.0* 38.7* 40.4* 40.2* 38.3* 38.3* 42.4 41.6    239 234 252 254 241 260 272   MCV 76* 77* 77* 78* 81 77* 82 81     COAG:   Recent Labs     07/23/25  0658 07/22/25  0550 07/21/25  0520 07/20/25  2329 07/20/25  1826 07/17/25  1423 07/17/25  1119 10/14/23  2208 10/05/23  1647   INR  --   --   --   --   --   --  2.0*  --  1.2*   HAUF 0.3 0.3 0.3 0.4 0.2   < >  --    < >  --     < > = values in this interval not displayed.     ABO:   Recent Labs     10/05/23  1647   ABO A     HEME/ENDO:   Recent Labs     07/17/25  1125 07/17/25  1111 05/12/25  1656 11/11/24  1558 11/05/24  1146 06/25/24  1139  "12/29/23  1427 10/04/23  1518   FERRITIN 102  --   --   --   --  332*  --  379*   IRONSAT 16*  --   --   --  37 20*  --  15*   TSH 3.97  --   --  3.26 4.05* 4.23*   < > 9.31*   HGBA1C  --  10.1* 8.9* 11.0* 10.8* 9.2*   < > 8.1*    < > = values in this interval not displayed.     CARDIAC:   Recent Labs     07/17/25  1119 07/13/25  0737 07/12/25  2038 07/12/25  1922 05/04/25  1540 05/04/25  1447 11/05/24  1146 06/25/24  1139 12/19/23  1104 10/05/23  1647 10/04/23  1518   LDH  --   --   --   --   --   --   --   --   --  240  --    TROPHS 37 53* 53* 53* 40* 41*  --   --   --   --   --    *  --   --  1,299*  --  2,032* 57 743* 147* 157* 160*     Recent Labs     07/23/25  0450 07/22/25  2244 07/22/25  1759 07/22/25  1347 07/22/25  0550   LACMX 1.1 1.0 1.4 1.1 1.0   SO2MV 66 66 57 55 66     No results for input(s): \"TACROLIMUS\", \"SIROLIMUS\", \"CYCLOSPORINE\" in the last 68396 hours.  Recent Labs     06/10/25  0831 11/11/24  1558 11/05/24  1146 10/05/23  0354   CHOL 82 630* 417* 106   LDLCALC 24  --   --  32*   HDL 26.8 29.2 36.9 24.5   TRIG 154* 2,663* 2,017* 247*     MICRO: No results for input(s): \"ESR\", \"CRP\", \"PROCAL\" in the last 79333 hours.  Susceptibility data from last 90 days.  Collected Specimen Info Organism   07/19/25 Swab from Anterior Nares Methicillin Susceptible Staphylococcus aureus (MSSA)     Assessment & Plan  Acute on chronic systolic heart failure        EKG:   Recent Labs     07/12/25  1834 05/22/25  0857   ATRRATE 78 86   VENTRATE 78 86   PRINT 168 194   QRSDUR 112 118   QTCFRED 425 440   QTCCALCB 444 466     Encounter Date: 07/12/25   ECG 12 lead   Result Value    Ventricular Rate 78    Atrial Rate 78    SD Interval 168    QRS Duration 112    QT Interval 390    QTC Calculation(Bazett) 444    P Axis 85    R Axis 25    T Axis 101    QRS Count 13    Q Onset 215    P Onset 131    P Offset 190    T Offset 410    QTC Fredericia 425    Narrative    Normal sinus rhythm  Possible Left atrial " enlargement  Low voltage QRS  Anterolateral infarct (cited on or before 10-OCT-2023)  Abnormal ECG  When compared with ECG of 04-MAY-2025 13:32,  Questionable change in QRS axis     Echocardiogram:   Recent Labs     05/05/25  1223 06/25/24  1100   EF 18 30   LVIDD  --  6.65   RV 58.3 54.4   RVFRWALLPKSP 9.14 8.16   TAPSE 1.6 1.0   Transthoracic Echo (TTE) Limited With Doppler, Color And Contrast 05/05/2025    Narrative  [facility], [facility] [place] [address]  Tel [phone] and Fax [phone]    TRANSTHORACIC ECHOCARDIOGRAM REPORT      Patient Name:       NEGRITA Shukla Physician:    [id number] Bird Askew MD  Study Date:         5/5/2025            Ordering Provider:    [id number] BENNY CARTWRIGHT  MRN/PID:            [id number]            Fellow:  Accession#:         [id number]        Nurse:                Tres Barros RN  Date of Birth/Age:  1965 / 60 years Sonographer:          Alyssa Monroy RDCS  Gender assigned at                     Additional Staff:  Birth:  Height:             180.34 cm           Admit Date:           5/4/2025  Weight:             101.61 kg           Admission Status:     Inpatient -  Routine  BSA / BMI:          2.21 m2 / 31.24     Encounter#:           [id number]  kg/m2  Blood Pressure:     116/75 mmHg         Department Location:  [facility]    Study Type:    TRANSTHORACIC ECHO (TTE) COMPLETE  Diagnosis/ICD: Unspecified systolic (congestive) heart failure (CHF)-I50.20;  Ischemic cardiomyopathy-I25.5  Indication:    SOB CP Edema  CPT Code:      Echo Limited-94819; Doppler Limited-47757; Color Doppler-87324    Patient History:  CABG:              CABG x 1.  Smoker:            Former.  Pacer/Defib:       AICD  Pertinent History: CAD, Cardiomyopathy, CHF, COPD, PVD, Previous DVT, HTN and  Hyperlipidemia. PCI-Stent, S/P CABG x1 2023, CKD III, JAKE.    Study Detail: The following Echo studies were performed:  2D, Doppler and color  flow. Technically challenging study due to body habitus. Definity  used as a contrast agent for endocardial border definition. Total  contrast used for this procedure was 2 mL via IV push.      PHYSICIAN INTERPRETATION:  Left Ventricle: Left ventricular ejection fraction is severely decreased, by visual estimate at 15-20%. There is global hypokinesis of the left ventricle with minor regional variations. The left ventricular cavity size is moderately dilated. Abnormal (paradoxical) septal motion, consistent with RV pacemaker. Spectral Doppler shows a Grade II (pseudonormal pattern) of left ventricular diastolic filling with an elevated left atrial pressure.  Left Atrium: The left atrium is mildly dilated.  Right Ventricle: The right ventricle is mildly enlarged. There is moderately reduced right ventricular systolic function.  Right Atrium: The right atrium is normal in size.  Aortic Valve: The aortic valve is trileaflet. There is minimal aortic valve cusp calcification. There is mild aortic valve thickening. There is no evidence of aortic valve regurgitation. The peak instantaneous gradient of the aortic valve is 6 mmHg.  Mitral Valve: The mitral valve is mildly thickened. There is mild to moderate mitral valve regurgitation.  Tricuspid Valve: The tricuspid valve is structurally normal. There is mild to moderate tricuspid regurgitation. The Doppler estimated RVSP is moderately elevated at 58.3 mmHg.  Pulmonic Valve: The pulmonic valve is structurally normal. There is physiologic pulmonic valve regurgitation.  Pericardium: Trivial pericardial effusion.  Aorta: The aortic root is normal.  Systemic Veins: The inferior vena cava appears dilated.  In comparison to the previous echocardiogram(s): Compared with study dated 6/25/2024, LVEF 15-20%.      CONCLUSIONS:  1. Left ventricular ejection fraction is severely decreased, by visual estimate at 15-20%.  2. There is global hypokinesis of the left  ventricle with minor regional variations.  3. Spectral Doppler shows a Grade II (pseudonormal pattern) of left ventricular diastolic filling with an elevated left atrial pressure.  4. Left ventricular cavity size is moderately dilated.  5. Abnormal septal motion consistent with RV pacemaker.  6. There is moderately reduced right ventricular systolic function.  7. Mildly enlarged right ventricle.  8. The left atrium is mildly dilated.  9. Mild to moderate mitral valve regurgitation.  10. Mild to moderate tricuspid regurgitation visualized.  11. Moderately elevated right ventricular systolic pressure.    QUANTITATIVE DATA SUMMARY:    2D MEASUREMENTS:           Normal Ranges:  LVEDV Index:     119 ml/m2      LV SYSTOLIC FUNCTION:  Normal Ranges:  EF-A4C View:    21 % (>=55%)  EF-A2C View:    8 %  EF-Biplane:     16 %  EF-Visual:      18 %  LV EF Reported: 18 %      LV DIASTOLIC FUNCTION:           Normal Ranges:  MV Peak A:             0.65 m/s  (0.42-0.7 m/s)  MV e'                  0.067 m/s (>8.0)  MV lateral e'          0.09 m/s  MV medial e'           0.04 m/s      MITRAL VALVE:          Normal Ranges:  MV DT:        198 msec (150-240msec)      MITRAL INSUFFICIENCY:             Normal Ranges:  MR Vmax:              342.00 cm/s      AORTIC VALVE:          Normal Ranges:  AoV Vmax:     1.19 m/s (<=1.7m/s)  AoV Peak P.7 mmHg (<20mmHg)  LVOT Max Dylan: 0.65 m/s (<=1.1m/s)  LVOT VTI:     6.72 cm      RIGHT VENTRICLE:  TAPSE: 15.5 mm  RV s'  0.09 m/s      TRICUSPID VALVE/RVSP:          Normal Ranges:  Peak TR Velocity:     3.29 m/s  RV Syst Pressure:     58 mmHg  (< 30mmHg)  IVC Diam:             2.40 cm      [id number] Bird Askew MD  Electronically signed on 2025 at 1:38:04 PM        ** Final **    Coronary Angiography:   Left And Right Heart Catheterization With Left Ventriculography 06/10/2025    [facility], Cath Lab, [address] [place] [address]    Cardiovascular  Catheterization Report    Patient Name:      NEGRITA LOO Performing Physician:  [id number] Carl Gillombardo MD  Study Date:        6/10/2025             Verifying Physician:   [id number] Carl Gillombardo MD  MRN/PID:           [id number]              Cardiologist/Co-Scrub:  Accession#:        [id number]          Ordering Provider:     [id number] CARMELITA NAVA  Date of Birth/Age: 1965 / 60 years   Cardiologist:  Gender:            M                     Fellow:                [id number] Kyler Chow MD  Encounter#:        [id number]            Surgeon:      Study:            Left Heart Cath with Grafts  Additional Study: Right Heart Cath      Indications:  NEGRITA LOO is a 60 year old male who presents with prior coronary artery bypass graft surgery, dyslipidemia and hypertension. NEGRITA LOO is a 60 year old male who presents with dyslipidemia, hypertension, prior coronary artery bypass graft surgery and an anginal equivalent chest pain assessment (i.e. dyspnea on exertion believed to be from ischemia). Cardiomyopathy and left ventricular dysfunction. Cardiomyopathy and heart failure.    Procedure Description:  After infiltration with 2% Lidocaine, the right radial artery was cannulated with a modified Seldinger technique. Subsequently a 6 Lao sheath was placed in the right radial artery. After infiltration of local anesthetic, the right brachial vein was cannulated with a percutaneous technique. A 5 Lao sheath was placed in the vein. Selective coronary catheterization was performed using a 5 Fr catheter(s) exchanged over a guide wire to cannulate the coronary arteries. A JL 3.5 tip catheter was used for left coronary injections. A JR 4 tip catheter was used for right coronary injections.  Cardiac output was calculated via the Pallavi method. After completion of the procedure, the arterial sheath was pulled and a TR Band Radial Compression Device was utilized to obtain patent hemostasis.  Post-procedure, the venous sheath was pulled and pressure was applied to the site.    Coronary Angiography:  The coronary circulation is right dominant.    Left Main Coronary Artery:  The left main coronary artery is a normal caliber vessel. The left main arises normally from the left coronary sinus of Valsalva and bifurcates into the LAD and circumflex coronary arteries. The left main coronary artery showed no significant disease or stenosis greater than 30%.    Left Anterior Descending Coronary Artery Distribution:  The left anterior descending coronary artery is a normal caliber vessel. The LAD arises normally from the left main coronary artery. The proximal left anterior descending coronary artery showed 70% stenosis. The ostial and proximal 1st diagonal branch revealed 90% stenosis. The 1st septal  branch showed no significant disease or stenosis greater than 30%. The 2nd septal  branch showed no evidence of significant disease. The 3rd septal  branch demonstrated no significant disease or stenosis greater than 30%.    Circumflex Coronary Artery Distribution:  The circumflex coronary artery is a normal caliber vessel. The circumflex arises normally from the left main coronary artery and terminates in the AV groove. The mid circumflex coronary artery showed 50% stenosis. The ostial 1st obtuse marginal branch showed 90% stenosis.    Right Heart Catheterization:  Cardiac output was calculated via the Pallavi method. Elevated left sided filling pressures with normal cardiac output. Severely elevated ventricular filling pressure. Cardiac output is normal. Preserved cardiac output at rest. RHC:  RA: 25  RV: 63/9  PA: 72/34  PCWP: 32  PA-SAT: 36%  SVC-SAT: 41%  FA-SAT: 71%  CO: 4.84  CI: 2.24  SVR: 1041  Severely elevated right and left sided filling pressures with preserved CO/CI.    Right Coronary Artery Distribution:    The right coronary artery is a normal caliber vessel. The RCA arises  normally from the right sinus of Valsalva. The mid right coronary artery showed 100% stenosis. The acute marginal branch showed no significant disease or stenosis greater than 30%.    Coronary Grafts:    LIMA Graft:  Left internal mammary artery graft conduit, originating in situ and attached to the mid left anterior descending, is normal.    Coronary Lesion Summary:  Vessel         Stenosis      Vessel Segment  LAD          70% stenosis       proximal  1st Diagonal 90% stenosis  ostial and proximal  Circumflex   50% stenosis          mid  OM 1         90% stenosis        ostial  RCA          100% stenosis         mid      Graft Stenosis Summary:  Graft     Destination of Graft           % Stenosis  LIMA  mid left anterior descending no evidence of stenosis      Hemo Personnel:  +---------------------------+---------+  Name                       Duty       +---------------------------+---------+  Gillombardo, Carl Barton MDPROC MD 1  +---------------------------+---------+      Hemodynamic Pressures:    +----+----------+---------+-------------+-------------+---+----+-------+-------+  SiteDate Time   Phase  Systolic mmHg  Diastolic  ED MeanA-Wave V-Wave                   Name                    mmHg     mmHmmHg mmHg   mmHg                                                      g                     +----+----------+---------+-------------+-------------+---+----+-------+-------+    PA 6/10/2025 AIR REST           72           34     43                      11:05:46                                                                      AM                                                          +----+----------+---------+-------------+-------------+---+----+-------+-------+    PW 6/10/2025 AIR REST                               30     32     32        11:06:54                                                                      AM                                                           +----+----------+---------+-------------+-------------+---+----+-------+-------+    RV 6/10/2025 AIR REST           63            9 23                          11:07:45                                                                      AM                                                          +----+----------+---------+-------------+-------------+---+----+-------+-------+    RA 6/10/2025 AIR REST                               25     31     33        11:09:13                                                                      AM                                                          +----+----------+---------+-------------+-------------+---+----+-------+-------+    AO 6/10/2025 AIR REST          102           75     88                      11:15:08                                                                      AM                                                          +----+----------+---------+-------------+-------------+---+----+-------+-------+    LV 6/10/2025 AIR REST           99           26 33                          11:21:35                                                                      AM                                                          +----+----------+---------+-------------+-------------+---+----+-------+-------+    LV 6/10/2025 AIR REST          102           76 80                          11:21:47                                                                      AM                                                          +----+----------+---------+-------------+-------------+---+----+-------+-------+    AO 6/10/2025 AIR REST           91           70     80                      11:38:01                                                                       AM                                                          +----+----------+---------+-------------+-------------+---+----+-------+-------+        Oxygen Saturation %:  +-----------+----------+------------+  Sample SiteO2 Sat (%)HB (g/100ml)  +-----------+----------+------------+           FA        71        12.5  +-----------+----------+------------+           PA        36        12.5  +-----------+----------+------------+           FA        71        12.5  +-----------+----------+------------+           PA        36        12.5  +-----------+----------+------------+      Cardiac Outputs:  +---------------+------------------+-------+  BLANCA CO (l/min)BLANCA CI (l/min/m2)BLANCA SV  +---------------+------------------+-------+              4.8               2.2   67.2  +---------------+------------------+-------+      Vascular Resistance Calculated Values (Wood Units):  +-----+---+----+-------+----+----+---+----+----+----+-------+  PhasePVRPVRIPVR/SVRSVR SVRITPRTPRITVR TVRITPR/TVR  +-----+---+----+-------+----+----+---+----+----+----+-------+  0    2.75.8 0      [id number]        +-----+---+----+-------+----+----+---+----+----+----+-------+      Cardiac Cath Post Procedure Notes:  Post Procedure Diagnosis: Multivessel CAD.  Blood Loss:               Estimated blood loss during the procedure was 10 mls.  Specimens Removed:        Number of specimen(s) removed: none.    ____________________________________________________________________________________  CONCLUSIONS:  1. Severely elevated right and left sided filling pressures with preserved CO/CI.  RA: 25 -- RV: 72/9 -- PA: 72/34 -- PCWP: 32    PA-SAT: 36%  SVC-SAT: 41%  FA-SAT: 71%  CO: 4.84  CI: 2.24  2. Right dominant coronary circulation with severe multivessel CAD, as described above.  3. WILKS to LAD patent.    ICD 10 Codes:  Cardiomyopathy, unspecified-I42.9    CPT Codes:  Left  Heart Cath Bypass Graft w ventriculography and coronary angio([facility])-72755; Right Heart Cath O2/Cardiac output without biopsy ([facility])-69840    [id number] Carl Gillombardo MD  Performing Physician  Electronically signed by [id number] Carl Gillombardo MD on 2025 at 3:38:51 PM          ** Final **    Right Heart Cath: No results found for this or any previous visit from the past 1800 days.    Cardiac Scoring: No results found for this or any previous visit from the past 1800 days.    Cardiac MRI:   MR cardiac morphology and function w and wo IV contrast 2023    Narrative  EXAMINATION: MR CARDIAC W/+W/O 2023 02:40 PM    Patient Name: NEGRITA LOO  MRN: [id number]  : 1965  Referring MD: VICK LEONARD  Interpreting MD: Dalton Solares  Resident/Fellow: None  Scan Date: 2023 02:40 PM  Height: ft in or 180cm  Weight: lbs or 112 kg  BSA: 2.3 m^2    Quality of study: Good  CLINICAL INDICATION: Pre-op cardiac assessment  Associated Diagnosis: Coronary artery disease  Technologist Note:  Patient has a pacemaker causing a lot of artifact.  MJI  Comparison:  Correlation: Transthoracic echocardiogram on Exercise stress-rest echocardiogram on Left heart catheterization and coronary angiograms on Nuclear medicine stress-rest perfusion imaging on    TECHNIQUE: Patient questionnaire was completed and was reviewed by MRI personnel prior to the patient entering the scanner. Multiplanar, multisequence MR imaging of the heart was performed without and with intravenous contrast. Cardiac velocity flow mapping was performed.  Siemens Christina 1.5 Effie MRI scanner.  INTRAPROCEDURE MEDS: Gadoterate Meglumine (DOTAREM) 10 MMOL/20ML solution 35 mL Route: Intravenous Push    FINDINGS:      CARDIAC MRI    CARDIAC CHAMBER MORPHOLOGY  Left ventricle: Dilated cavity size, mixed hypertrophy, no mass/thrombus  Right ventricle: Normal contractility, normal cavity size, no mass/thrombus  Left atrium: Dilated cavity size, no  mass/thrombus  Right atrium: Normal cavity size, no mass/thrombus    CARDIAC VALVE MORPHOLOGY  Mitral valve: Normal leaflets, fully mobile, no regurgitation, no stenosis  Tricuspid valve: Normal leaflets, fully mobile, no regurgitation, no stenosis  Aortic valve: Normal leaflets, fully mobile, no regurgitation, no stenosis  Pulmonic Valve: Normal leaflets, fully mobile, no regurgitation, no stenosis    MISCELLANEOUS MORPHOLOGY  Pericardium: Normal    CARDIAC MEASUREMENTS  Anterior left ventricular wall thickness: 1.2 cm (Normal < 1.2 cm)  Posterior left ventricular wall thickness: 1.2 cm (Normal < 1.2 cm)  Left ventricular end-diastolic dimension: 6.3 cm (Normal < 5.7 cm)  Left ventricular end-systolic dimension: 5.3 cm (Normal < 4 cm)  Right ventricular end-diastolic dimension: 3.6 cm (Normal < 4.3 cm)  Left atrial end-diastolic dimension: 4.6 cm (Normal < 3.9 cm)  Left atrial end-diastolic area: 29 cm^2 (Normal < 22 cm^2)  Right atrial end-diastolic dimension: 4.8 cm (Normal < 4.7 cm)  Right atrial end-diastolic area: 20 cm^2 (Normal < 20 cm^2)    Ascending aorta (at the level of the main pulmonary artery): 2.8 cm  Pulmonary Artery: 2.1 cm (Normal < 2.9 cm)      LEFT VENTRICULAR FUNCTION  The left ventricle is severely dilated with focally upon globally abnormal systolic function. Focal left ventricular systolic dysfunction consists of dyskinesia (aneurysm) of the apex  LV volumes absolute and normalized to body surface area with the age and gender matched normals in parenthesis are listed as follows.  LVEF: 22 % (62 +/- 6 (51-73)%)  SV: 77 ml  SVI: 33 ml/m2 (46 +/- 8 (30-62) ml/m2)  CO: 5.5 L/min  CI: 2.4 L/min/m2  EDV: 346 ml  EDVI: 149 ml/m2 (74 +/- 13 ()ml/m2)  ESV: 269 ml  ESVI: 116 ml/m2 (28 +/- 7 (14-43) ml/m2)  LVM: 218 g  LVMI: 96 g/m2 (62 +/- 10 (43-81) g/m2)    RIGHT VENTRICULAR FUNCTION  The right ventricle is normal in size and systolic function.  Right ventricular volumes absolute and  normalized to body surface area with the age and gender matched normals in parenthesis are listed as follows.  RVEF: 47 % (53 +/- 6 (41-66) %)  EDV: 154 ml  EDVI: 66 ml/m2 (86 +/- 17 () ml/m2)  ESV: 81 ml  ESVI: 35 ml/m2 (41 +/- 11 (19-63) ml/m2)    There is marked susceptibility artifact which limits delayed postgadolinium imaging/scar assessment. This is especially important. In the LAD distribution. Scar is seen at the left ventricular apex including the lateral and inferior walls and appears to be more pronounced in the mid to apical lateral segments. The LAD distribution is not adequately visualized because of marked susceptibility artifact from the implanted cardiac device.      Velocity flow mapping: Completed at the aortic valve. There is no evidence of aortic valve stenosis or regurgitation.    NONCARDIAC FINDINGS: Not significant      IMPRESSION:  1. Severe left ventricular dilatation with focally upon globally abnormal left ventricular systolic function with an ejection fraction of 22%. Infarctions seen in the left circumflex and RCA distribution and the apex. Full assessment of viability especially in the LAD distribution could not be performed because of marked susceptibility artifact from the implanted cardiac device which involves the entire anterior, anterolateral and anteroseptal regions. Consider PET scanning.  2. Dilated left atrium  3. Normal right ventricular size and systolic function    The pulse sequences used were designed for imaging cardiovascular structures and are suboptimal for imaging other structures and organs.    MACRO: None    Nuclear:  NM PET CT myocardial perfusion multiple 05/14/2025    Narrative  Interpreted By:  Ebony Sky,  STUDY:  NM PET CT MYOCARDIAL PERFUSION MULTIPLE;  5/14/2025 12:21 pm    INDICATION:  Signs/Symptoms:chest pain, HFrEF, ischemic cardiomyopathy.    ,I50.22 Chronic systolic (congestive) heart failure,I25.5 Ischemic  cardiomyopathy,I50.23 Acute on  chronic systolic (congestive) heart  failure,N18.30 Chronic kidney disease, stage 3 unspecified  (Multi),E11.65 Type 2 diabetes mellitus with hyperglycemia  (Multi),Z79.4 Long term (current) use of insulin (Multi),Z95.810  Presence of automatic (implantable) cardiac defibrillator,E08.22  Diabetes mellitus due to underlying condition with diabetic chronic  kidney disease,N18.30 Chronic kidney disease, stage 3 unspecified  (Multi),Z79.4 Long term (current) use of insulin (Multi)    COMPARISON:  None.    ACCESSION NUMBER(S):  [id number]    ORDERING CLINICIAN:  CARMELITA NAVA    TECHNIQUE:  DIVISION OF NUCLEAR MEDICINE  POSITRON EMISSION TOMOGRAPHY (PET) / CT  NITROGEN-13 AMMONIA MYOCARDIAL PERFUSION SCAN, WITH PHARMACOLOGIC  STRESS    The patient received an intravenous dose of 10.9mCi of N-13 ammonia  and resting positron emission tomographic (PET) images of the  myocardium were acquired. The patient then received an intravenous  infusion of 0.4 mg of Regadenoson followed by a second dose of 13.3  mCi of N-13 ammonia. Stress phase PET images of the myocardium were  then acquired. These included ECG-gated images to assess and quantify  ventricular function. Low-dose noncontrast CT of the thorax was  performed both before the resting NH3 and after the stress NH3 PET  image acquisitions for purposes of attenuation correction of the PET  data.    FINDINGS:  The left ventricular cavity is severely dilated in both rest and post  stress images. There is abnormal dilation and thickening and  visualization of right ventricular cavity in both rest and stress  images. The stress tomographic images demonstrate markedly abnormal  myocardial perfusion. There is a large moderate to severe perfusion  defect involving the apex, anterior, lateral, and inferolateral wall,  there is relatively preserved perfusion of the septum. On rest images  there is a significantly reversibility noted involving the anterior  wall, lateral wall  and inferoseptal wall, however, there is no  significant reversibility of the apex The left ventricular cavity is  dilated on gated images with severe global hypokinesis with severely  impaired left ventricular systolic function in both rest and stress  images, 17%    Impression  High-risk N13 PET-CT myocardial perfusion study  This finding consistent with severe ischemic cardiomyopathy with  significantly induced ischemia involving the anterior wall, lateral  wall and inferolateral wall with small apical infarction, a pattern  consistent with triple-vessel disease, the presence of significant  reversibility on rest images may favoring good response to coronary  revascularization The possibility of subendocardial infarction can  not be totally excluded, further evaluation of myocardial viability  by FDG PET or cardiac MRI could be considered, if this will change  patient management    I personally reviewed the image(s) / study and agree with the  findings and interpretation as stated. This study was interpreted at  [facility].    MACRO:    Critical Finding:  See findings. Notification was initiated on  5/14/2025 at 1:24 pm by  Ebony Sky.  (**-OCF-**) Instructions:    None    Signed by: Ebony Sky 5/14/2025 1:25 PM  Dictation workstation:   [id number]    Metabolic Stress: No results found for this or any previous visit from the past 1800 days.      Imaging  XR chest 1 view  Result Date: 7/22/2025  1.  New fluid visualized within the minor fissure and similar appearance of bilateral interstitial and airspace opacities, favored to represent pulmonary edema. Additional considerations include ARDS and pulmonary hemorrhage. 2. Medical devices as above.   I personally reviewed the images/study and I agree with the findings as stated by resident physician Cam Sommers MD. This study was interpreted at [facility], [place], [place].    MACRO: None   Signed by: Allyssa Pedraza 7/22/2025 2:14 PM Dictation workstation:   [id number]    XR chest 1 view  Result Date: 7/21/2025  1. Right IJ [place] catheter with distal tip projecting over the distal right interlobar artery. 2. Similar interstitial edema and trace bilateral pleural effusions. 3. Additional medical devices as above.   I personally reviewed the images/study and I agree with the findings as stated by resident physician Cam Sommers MD. This study was interpreted at [place], [place].   MACRO: None   Signed by: Allyssa Pedraza 7/21/2025 11:54 AM Dictation workstation:   [id number]    XR chest 1 view  Result Date: 7/20/2025  1. Right IJ approach [place] catheter has been slightly advanced, the tip of which now overlies right lower lobe pulmonary artery, approximately 2 cm distal to right hilum. Repositioning recommended. 2. No significant change in lung aeration with questionable interstitial edema and trace bilateral pleural effusions/bibasilar atelectasis. 3. Rest of medical devices as above.   Signed by: Claudio Yancey 7/20/2025 11:03 AM Dictation workstation:   [id number]    XR chest 1 view  Result Date: 7/19/2025  1. Medical devices as above. Of note, new right IJ approach [place] catheter with its tip projecting over right interlobar pulmonary artery, at the level of right hilum. 2. No significant change in mild interstitial edema and suspected trace bilateral pleural effusions/bibasilar atelectasis.   I personally reviewed the images/study and I agree with the findings as stated by resident physician Miky Gtz MD. This study was interpreted at [facility], [place], [place].   MACRO: None   Signed by: Claudio Yancey 7/19/2025 4:00 PM Dictation workstation:   [id number]    XR chest 1 view  Result Date: 7/17/2025  1.  Similar mild interstitial edema. No new focal consolidation or pneumothorax. 2. Trace  bilateral pleural effusions. 3. Medical devices as above.   I personally reviewed the images/study and I agree with the findings as stated by resident physician Cam Sommers MD. This study was interpreted at [facility], [place], [place].   MACRO: None   Signed by: Kristi Buenrostro 7/17/2025 12:00 PM Dictation workstation:   [id number]      Cardiology, Vascular, and Other Imaging  No other imaging results found for the past 7 days           LDA:  Introducer 07/19/25 Internal jugular Right (Active)   Placement Date/Time: 07/19/25 1300   Hand Hygiene Completed: Yes  Location: Internal jugular  Orientation: Right  Placed by: Shelly NP and Jamin NP  Placement Verified: X-ray   Number of days: 3       Pulmonary Artery Catheter Internal jugular (Active)   Placement Date/Time: 07/19/25 1300   Hand Hygiene Performed Prior to CVC Insertion: Yes  Site Prep: Usual sterile procedure followed  Site Prep Agent has Completely Dried Before Insertion: Yes  All 5 Sterile Barriers Used (Gloves, Gown, Cap, Mask, Lar...   Number of days: 3     NUTRITION: Adult diet Consistent Carb; CCD 60 gm/meal; 2000 mL fluid  EMERGENCY CONTACT: Extended Emergency Contact Information  Primary Emergency Contact: Aracelis Law  Address: [address] [place] [id number] [facility]  Home Phone: [phone]  Mobile Phone: [phone]  Relation: Mother  Secondary Emergency Contact: Robinson Contreras  Mobile Phone: [phone]  Relation: Friend  CODE STATUS: Full Code  DISPO: Discharge Planning  Living Arrangements: Spouse/significant other, Children  Support Systems: Spouse/significant other, Children, Parent, Family members  Assistance Needed: ADLS w/ TAY  Type of Residence: Private residence  Home or Post Acute Services: None  Expected Discharge Disposition: Home  FOLLOWUP:   Future Appointments   Date Time Provider Department Center   8/12/2025  1:00 PM Sotero Giles MD [id number]  [facility]   9/2/2025  3:30 PM Jono Duque MD [id number] [facility]   9/17/2025 12:30 PM Diana Salazar MD [id number] [facility]   10/1/2025 10:30 AM Anna Rodriguez MD PhD [id number] [facility]   12/4/2025  9:20 AM Randy Brunson MD [id number] [facility]                      [1]   Medications Prior to Admission   Medication Sig Dispense Refill Last Dose/Taking    albuterol 2.5 mg /3 mL (0.083 %) nebulizer solution Use every 4-6 hours as needed for shortness of breath 75 mL 1     atorvastatin (Lipitor) 80 mg tablet 80mg nightly 90 tablet 2     blood-glucose sensor (FreeStyle Franco 3 Plus Sensor) device Change every 15 days 2 each 11     bumetanide (Bumex) 1 mg tablet Take 1 tablet (1 mg) by mouth once daily. 90 tablet 3     digoxin (Lanoxin) 125 MCG tablet Take 1 tablet (125 mcg) by mouth once daily. 90 tablet 3     Eliquis 5 mg tablet TOME 1 TABLETA POR LA BOCA DOS  VECES AL CUBA 200 tablet 2     Farxiga 10 mg tablet Take 1 tablet (10 mg) by mouth once daily. 90 tablet 3     fenofibrate (Tricor) 145 mg tablet Take 1 tablet (145 mg) by mouth once daily. 90 tablet 3     FreeStyle Franco 3 [place] misc Use as instructed 1 each 0     gabapentin (Neurontin) 300 mg capsule TOME 1 CAPSULA POR LA BOCA NITIN  VEZ AL CUBA EN LA MANANA 90 capsule 3     hydrALAZINE (Apresoline) 100 mg tablet TOME 1 TABLETA POR LA BOCA 3  VECES AL CUBA 300 tablet 2     insulin glargine (Toujeo Max U-300 SoloStar) 300 unit/mL (3 mL) pen 40 units twice a day 30 mL 3     insulin lispro (HumaLOG KwikPen Insulin) 100 unit/mL pen 18 units with meals plus sliding scale up to 80 units daily 45 mL 3     isosorbide dinitrate (Isordil) 40 mg tablet Take 1 tablet (40 mg) by mouth 3 times a day. 270 tablet 3     lancets (Lancets,Thin) misc 4 times daily (before meals and at bedtime). use as directed to test blood sugar 100 each 3     levothyroxine (Synthroid, Levoxyl) 75 mcg tablet TOME 1 TABLETA POR LA BOCA NITIN  VEZ AL CUBA 100 tablet 3     metoprolol succinate  "XL (Toprol-XL) 100 mg 24 hr tablet Take 2 tablets (200 mg) by mouth once daily.       OneTouch Ultra Test REVISE EL NIVEL DE GLUCOSA EN LA PEARL CUATRO VECES AL CUBA 400 strip 1     pantoprazole (ProtoNix) 40 mg EC tablet Take 1 tablet (40 mg) by mouth once daily in the morning. Take before meals. Do not crush, chew, or split.      TOME 1 TABLETA POR LA BOCA NITIN  VEZ AL CUBA EN LA MANANA TOME  ANTES DE NITIN COMIDA (NO TRITURE, MASTIQUE NI DIVIDA) 90 tablet 1     pen needle, diabetic 31 gauge x 5/16\" needle For insulin injection 5x/day 500 each 3     QUEtiapine (SEROquel) 50 mg tablet Take 1 tablet (50 mg) by mouth once daily at bedtime. 100 tablet 2     [Paused] sacubitriL-valsartan (Entresto)  mg tablet Take 1 tablet by mouth 2 times a day. (Patient not taking: Reported on 7/13/2025) 180 tablet 3     [Paused] spironolactone (Aldactone) 25 mg tablet TOME 2 TABLETAS POR LA BOCA NITIN  VEZ AL CUBA (Patient not taking: Reported on 7/13/2025) 200 tablet 2     tirzepatide (Mounjaro) 5 mg/0.5 mL pen injector Inject 5 mg under the skin every 7 days. 2 mL 11      "

## 2025-07-23 NOTE — PROGRESS NOTES
Social Work Note   -HF ICU Treatment Plan: patient was admitted on 7/17 from OSH for advanced therapies evaluation. Acute on chronic systolic CHF/ HF. Per the team, patient is a good candidate for LVAD due to multiple social issues, Lasix,   - Additional information: From [facility]    - Payer: [employer], [facility] dual complete   - Support: mother Aracelis is list a emergency contact  -Planned Disposition: Rehab recommendation - Low intensity   - Barriers to discharge/concern: None note at this time;   CELENA Lam, LSW   UPDATE:  Discharge assessment: Sw reviewed the SDOH and Social Work Discharge Assessment. I met with the patient. I used the Norma for [place] support. Per the assessment. I identified no barriers to discharge at this time. Sw will continue to follow for dc planning needs.    CELENA Lam

## 2025-07-23 NOTE — PROGRESS NOTES
[facility]  HFICU PROGRESS NOTE    Felice Law/[id number]    Admit Date: 7/17/2025  Hospital Length of Stay: 6   ICU Length of Stay: 6d 1h   Primary Service: HFICU  Primary HF Cardiologist: Dr. Jono Duque MD   Referring: Dr Duque     INTERVAL EVENTS / PERTINENT ROS:     No acute overnight events. Continues to endorse lower extremity edema.  He denies any c/o dyspnea, chest pain, or discomfort.      Plan:   - Restart Bumex 1mg drip with 2mg bolus  - Increase hydralazine and isosorbide as tolerated   - Nipride off  - PT/OT  - Diabetic management per endocrine recommendations    MEDICATIONS  Infusions:  [Held by provider] bumetanide, Last Rate: Stopped (07/22/25 1853)  heparin, Last Rate: 1,600 Units/hr (07/23/25 0658)  nitroprusside, Last Rate: Stopped (07/23/25 0141)      Scheduled:  [Held by provider] apixaban, 5 mg, BID  atorvastatin, 80 mg, Nightly  bumetanide, 1 mg, Daily  calcium carbonate, 500 mg of calcium carbonate, BID  dapagliflozin propanediol, 10 mg, Daily  digoxin, 125 mcg, Daily  fenofibrate, 145 mg, Daily  gabapentin, 200 mg, q8h SUSI  hydrALAZINE, 100 mg, q8h SUSI  insulin glargine, 42 Units, Nightly  [START ON 7/24/2025] insulin glargine, 42 Units, Daily  insulin lispro, 0-15 Units, TID AC  insulin lispro, 22 Units, TID AC  isosorbide dinitrate, 30 mg, TID  levothyroxine, 75 mcg, Daily before breakfast  metoprolol succinate XL, 100 mg, Daily  pantoprazole, 40 mg, Daily before breakfast  QUEtiapine, 50 mg, Nightly  sacubitriL-valsartan, 1 tablet, BID  spironolactone, 25 mg, Daily      PRN:  acetaminophen, 650 mg, q6h PRN  albuterol, 2.5 mg, q6h PRN  alteplase, 2 mg, PRN  dextrose, 12.5 g, q15 min PRN  dextrose, 25 g, q15 min PRN  diphenhydrAMINE, 50 mg, q5 min PRN  glucagon, 1 mg, q15 min PRN  glucagon, 1 mg, q15 min PRN  heparin, 2,000-4,000 Units, q4h PRN      Invasive Hemodynamics:    Most Recent Range Past 24hrs   BP (Art)   No data recorded  "  MAP(Art)   No data recorded   RA/CVP   No data recorded   PA 64/36 PAP  Min: 48/22  Max: 80/39   PA(mean) 45 mmHg PAP (Mean)  Min: 32 mmHg  Max: 50 mmHg   PCWP 25 mmHg (per Jamin NP) No data recorded   CO 4.1 L/min No data recorded   CI 1.9 L/min/m2 No data recorded   Mixed Venous 56 % No data recorded   SVR  1548 (dyne*sec)/cm5 No data recorded   PVR   No data recorded       PHYSICAL EXAM:   Visit Vitals  /81   Pulse 77   Temp 36 °C (96.8 °F)   Resp 15   Ht 1.803 m (5' 10.98\")   Wt 96.4 kg (212 lb 8.4 oz)   SpO2 91%   BMI 29.65 kg/m²   Smoking Status Former   BSA 2.2 m²       Wt Readings from Last 5 Encounters:   07/21/25 96.4 kg (212 lb 8.4 oz)   07/17/25 110 kg (243 lb 2.7 oz)   06/10/25 97.1 kg (214 lb)   05/12/25 96.6 kg (213 lb)   05/04/25 102 kg (224 lb)       INTAKE/OUTPUT:  I/O last 3 completed shifts:  In: 1399.9 (14.5 mL/kg) [P.O.:720; I.V.:679.9 (7.1 mL/kg)]  Out: 5385 (55.9 mL/kg) [Urine:5385 (1.6 mL/kg/hr)]  Weight: 96.4 kg      Physical Exam  Constitutional:       Appearance: Normal appearance.   HENT:      Head: Normocephalic and atraumatic.      Nose: Nose normal.      Mouth/Throat:      Mouth: Mucous membranes are moist.     Eyes:      Extraocular Movements: Extraocular movements intact.      Pupils: Pupils are equal, round, and reactive to light.     Neck:      Comments: Rt IJ/ SGC  Cardiovascular:      Rate and Rhythm: Normal rate and regular rhythm.      Pulses: Normal pulses.      Heart sounds:      Gallop present.   Pulmonary:      Effort: Pulmonary effort is normal.      Breath sounds: Decreased breath sounds present.   Abdominal:      General: There is distension.     Musculoskeletal:         General: Swelling present.      Cervical back: Normal range of motion and neck supple.      Right lower leg: Edema (trace) present.      Left lower leg: Edema (trace) present.      Comments: Chronic venous stasis      Skin:     General: Skin is warm.      Capillary Refill: Capillary refill takes " 2 to 3 seconds.     Neurological:      General: No focal deficit present.      Mental Status: He is alert and oriented to person, place, and time.     Psychiatric:         Mood and Affect: Mood normal.         Behavior: Behavior normal.         DATA:  CMP:  Results from last 7 days   Lab Units 07/23/25  0450 07/22/25  1618 07/22/25  0550 07/21/25  0520 07/20/25  1232 07/20/25  0556 07/19/25  1925 07/19/25  0530 07/18/25  1708 07/18/25  0157 07/17/25  1119 07/17/25  0653 07/17/25  0653   SODIUM mmol/L 136 134* 135* 137 135* 138 135* 138 135* 136 134*  --  137   POTASSIUM mmol/L 3.7 4.3 3.8 3.7 3.8 3.6 3.7 4.2 3.7 4.7 4.0  --  4.0   CHLORIDE mmol/L 93* 91* 91* 92* 90* 93* 91* 98 94* 99 96*  --  98   CO2 mmol/L 32 32 36* 37* 36* 37* 35* 32 31 29 31  --  32   ANION GAP mmol/L 15 15 12 12 13 12 13 12 14 13 11  --  11   BUN mg/dL 48* 50* 42* 35* 37* 38* 39* 43* 47* 49* 48*  --  47*   CREATININE mg/dL 2.04* 2.40* 1.87* 1.61* 1.60* 1.56* 1.81* 1.67* 1.73* 1.64* 1.62*  --  1.70*   EGFR mL/min/1.73m*2 37* 30* 41* 49* 49* 51* 43* 47* 45* 48* 49*  --  46*   MAGNESIUM mg/dL 2.35  --  2.30 2.39  --  2.55*  --  2.47*  --  2.40  --   --  2.39   ALBUMIN g/dL 3.4 3.8 3.6 3.6 4.0 3.9 3.9 3.6 3.7 3.6 3.7   < >  --    ALT U/L  --   --   --   --   --   --   --   --   --   --  8*  --   --    AST U/L  --   --   --   --   --   --   --   --   --   --  15  --   --    BILIRUBIN TOTAL mg/dL  --   --   --   --   --   --   --   --   --   --  2.0*  --   --     < > = values in this interval not displayed.     CBC:  Results from last 7 days   Lab Units 07/23/25  0450 07/22/25  0550 07/21/25  0520 07/20/25  0556 07/19/25  0530 07/18/25  0157 07/17/25  0653   WBC AUTO x10*3/uL 4.4 5.3 5.3 5.1 4.7 5.1 4.7   HEMOGLOBIN g/dL 11.8* 12.0* 12.4* 12.2* 11.2* 11.6* 12.1*   HEMATOCRIT % 38.0* 38.7* 40.4* 40.2* 38.3* 38.3* 42.4   PLATELETS AUTO x10*3/uL 213 239 234 252 254 241 260   MCV fL 76* 77* 77* 78* 81 77* 82     COAG:   Results from last 7 days   Lab  "Units 07/17/25  1119   INR  2.0*     ABO: No results found for: \"ABO\"  HEME/ENDO:  Results from last 7 days   Lab Units 07/17/25  1125 07/17/25  1111   FERRITIN ng/mL 102  --    IRON SATURATION % 16*  --    TSH mIU/L 3.97  --    HEMOGLOBIN A1C %  --  10.1*      CARDIAC:   Results from last 7 days   Lab Units 07/17/25  1119   TROPHSCMC ng/L 37   BNP pg/mL 744*       ASSESSMENT AND PLAN:     Felice Law is a 60 y.o. male with significant PMH of ICM, HFrEF, EF 15-20%, CABG (LIMA- LAD in 2023),  PCI, VT arrest s/p VT ablation and ICD in 2017, Hypertension, Hyperlipidemia, Hypothyroidism, Left femoral DVT on Eliquis, COPD, JAKE, CKD III, Chronic BLE venous stasis dermatitis, Insulin dependent Type 2 diabetes who was initially admitted 7/12 to [facility] for acute decompensate heart failure. He was diuresed and recommended for transfer to [facility] HF ICU for further managemnet and possible re-consider of advanced therapy work up.  Of notes, Patient was previously discussed for AT during [facility] [place] but declined with concerns for mobility/frailty (uses motorized scooter and has bilateral foot drop with neuropathy), CKD, poorly controlled diabetes as well as unclear social/financial support).     Transferred to HF ICU 7/17, he was warm and wet,  lasix IV bolus given and drip initiated. Eliquis on hold, heparin drip initiated. Bedside SG done 7/19, opening swan numbers: BP: 133/74 (94),CVP 9, PA 74/29 (44), PCWP 24, CO/CI (Pallavi) 5.4/2.4, Thermo: 4.6/2.1, SVR 1263, SVO2 63% on hydral /Isordil 100/40 mg TID, Metop 50 mg daily, Dapa 10 mg daily, Dig 125 mcg PO daily, Lasix drip at 20 mg /hr. Entresto re-introduce and up titrated. Lasix drip d/c'd currently on intermittent IVP.  Hg A1c 10.1 this admission, now considering advanced therapy evaluation. Advanced therapies evaluation initiated 7/23.     Neuro:   # Anxiety. Depression, Acute pain    # Bilateral foot drop with LE neuropathy: Able to walk two blocks. " Uses motorized scoopter    - Serial neuro and pain assessments    - PO Tylenol PRN for pain   - PT/OT Consult, OOB to chair   - CAM ICU score every shift   - Sleep/wake cycle normalization      # Physical Status   -Obesity: BMI: 34 --- > 30  -Reduced Mobility due to ICU and CHF       #Substance abuse   -Alcohol abuse/Alcohol dependence: NO   -Tobacco use/Nicotine Dependence: Quit 8 years ago      Cardiovascular:   #Acute on chronic systolic CHF, HFrEF 15-20%, dx'd 2017,   #Ischemic Cardiomyopathy, ACC/AHA Stage C-D, NYHA II, Warm,   -admit weight (7/17): 111 kg -- > 98 kg (7/20)  -BNP (7/17): 744  -TTE (5/4/2025):  Left ventricular ejection fraction is severely decreased, by visual estimate at 15-20%.   - RHC (6/10/25): RA: 25,RV: 63/9, PA: 72/34, PCWP: 32, PA-SAT: 36%, SVC-SAT: 41%, FA-SAT: 71%, CO: 4.84, CI: 2.24, SVR: 1041  - LHC (6/10/25):   Right dominant coronary circulation with severe multivessel CAD, as described above. LIMA to LAD patent.  - C/w atorvastin 80mg    - Lasix gtt discontinued overnight 7/19.   - Bedside SG with Opening swan numbers (7/19): BP: 133/74 (94),CVP 9, PA 74/29 (44), PCWP 24, CO/CI (Pallavi) 5.4/2.4, Thermo: 4.6/2.1, SVR 1263, SVO2 63% on hydral /Isordil 100/40 mg TID, Metop 50 mg daily, Dapa 10 mg daily, Dig 125 mcg PO daily, Lasix drip at 20 mg /h.  - Daily SGC #'s (7/23):  BP 98/60 (72), CVP 11, PA 49/22 (31), CO/CI 5.18 / 2.35, , SVO2 66%  Hydra 75 , isordil 20, toprol 100, dapa 10, dig 125, entresto 97/101, aldactone 25, nipride 0.25  - Lasix 80 mg IV push x1 (7/21/2025)  - Restart Bumex drip 1mg/hr with a 2 mg bolus  - C/w Entresto 97/103 mg BID   - C/w Metoprolol 100 mg daily   - C/w Hydralazine 100mg/ Isordil 30mg daily, increase as tolerated   - Nipride off 7/23  - Daily standing weights, 2gm sodium diet, 2L fluid restriction, strict I&Os     #CAD    - s/p PCI and 1v CABG (LIMA-LAD 04/2023).   - C/w ASA and atorvastatin      Pulmonary:    # H/o COPD  # Hx Pulmonary  Hypertension   # Central sleep apnea.   # Prior tobacco use (24 pack year, quit 2017),   - On 2L via NC      :   #CHARLOTTE/CKD III  - Baseline (11/11/2024): BUN/Cr (GFR): 63/1.36 (60)  -Admit Bun/Creatinine (7/17):  48/1.62, creatinine peaked at 2.55 OSH  -I/Os   -avoid hypotension and nephrotoxic agents      Heme:   #Anemia in the setting of MAI and CKD III  - Labs (7/17):  TIBC: 377,  ferritin: 102, serum Fe: 61, folate: 13.1, B12: 452   - IV Venofer 200 mg x5 doses (7/17-7/21)      # Left femoral DVT (2017)  - Hold home Eliquis  - C/w Heparin drip (7/18)      Endo:   #DM 2  - hgbA1c(7/17):  10.1  - C/w Lantus 20 units Am, 40 units PM, SSI and Pradial insulin   - Diabetic management per endocrine     # Hypothyroidism  -C/w Levothyroxine 75mcg  -TSH (7/18): 3.97      ID:   -afebrile, nontoxic    -no s/s infx   -trend temps q4 hr        PHYSICAL AND OCCUPATIONAL THERAPY: PT/OT     LINES:  PIVs   SGC (7/19 - present)        DVT: SCD's, Heparin drip once Heparin assay <0.2  VAP BUNDLE: NA  CENTRAL LINE BUNDLE: Ordered  ULCER PPX: PPI  GLYCEMIC CONTROL: SSI, Lantus insulin   BOWEL CARE: Senna, Miralax   INDWELLING CATHETER: NA  NUTRITION: Adult diet Consistent Carb; CCD 90 gm/meal; 2000 mL fluid        EMERGENCY CONTACT: Extended Emergency Contact Information  Primary Emergency Contact: NAM LOO  Address: [address] [place] [address] [facility]  Home Phone: [phone]  Mobile Phone: [phone]  Relation: Mother  Secondary Emergency Contact: Robinson Contreras  Mobile Phone: [phone]  Relation: Friend  FAMILY UPDATE: no family at bedside   CODE STATUS: Full Code  DISPO:  HF ICU      Patient seen and assessed with Dr. Duque    I personally spent 60  minutes of critical care time directly and personally managing the patient exclusive of separately billable procedures.      __________________________________________  BRAD Delcid

## 2025-07-23 NOTE — PROGRESS NOTES
Physical Therapy                 Therapy Communication Note    Patient Name: Felice Law  MRN: [id number]  [place] [facility] [id number] NONV1  Room: 10/10-A  Today's Date: 7/23/2025     Discipline: Physical Therapy    Missed Visit: PT Missed Visit: Yes     Missed Visit Reason: Missed Visit Reason:  (Patient currently off the floor. Will re-attempt later in day if schedule allows.)    Missed Time: Attempt    Comment:

## 2025-07-23 NOTE — PROGRESS NOTES
Transplant/LVAD Education Consent:   Topic: Pre Advanced Therapy Patient Education   Attendees: Maria G Choudhary (VAD Coordinator), Felice Law ( Patient), Norma Abbott ()     Patient given the [facility] Pre Advanced Therapy Education binder. Patient received education regarding the following topics for their pre Heart Transplant and pre Left Ventricular Assist Device evaluation:    The evaluation process including advanced therapy team members and roles, required testing and consults, selection criteria and suitability for OHT and LVAD, OHT listing process and organ offer, hands on LVAD equipment review, psychological and financial considerations for a successful outcome with advanced therapies, and patient responsibilities including adherence to a strict medical regimen.    An overview of the surgical procedure for OHT and LVAD.    The potential for certain risk factors including infection, pneumonia, blood clot formation, organ rejection, failure, and possibility of re transplantation, lifetime immunosuppression and associated risks with OHT, arrhythmias and cardiovascular collapse, multi-organ system failure, depression, post- traumatic stress disorder, anxiety, issues of dependence or feelings of guilt, right ventricular failure with LVAD, and death.   National and Transplant [place] outcomes for one year patient and graft survival from the most recent SRTR program specific report.    Donor risk factors that could affect the success of the transplant and health of the patient including donor age, donor medical and social history, condition of the organ, and the risk of oswaldo cancer, HIV, Hepatitis B or C, and/or malaria if the infection is not detectable at the time of donation.    Transplants not performed in a Medicare-approved transplant center could affect the patient's ability to have immunosuppression medication paid for under Medicare part B.    STS Intermacs  and  LVAD implant outcomes for one year patient survival.    The medical necessity of electricity and a working telephone with implantation of the LVAD.    Available alternatives to OHT and LVAD.    The patient's right to withdraw consent for the evaluations at any time during the process.      Patient was given the opportunity to have questions answered.   Consent signed for Heart Transplant and LVAD? : Yes  If no, which consent was signed? : OHT/VAD  Current barriers: Ha1c >10  Consent obtained by: Maria G Choudhary RN

## 2025-07-23 NOTE — PROGRESS NOTES
"Felice Law is a 60 y.o. male on day 6 of admission presenting with Acute on chronic systolic heart failure.    Subjective   He has no events overnight. He has polyuria (on bumetide) other wise he denies polydipsia, blurred vision, fatigue,nausea, vomiting, abdominal pain, chest pain, or sob.      Objective   Review of Systems   All other systems reviewed and are negative.    Physical Exam  Constitutional:       Appearance: Normal appearance.     Cardiovascular:      Rate and Rhythm: Normal rate and regular rhythm.      Heart sounds: Normal heart sounds.     Neurological:      Mental Status: He is alert.       Last Recorded Vitals  Blood pressure 108/78, pulse 75, temperature 36 °C (96.8 °F), resp. rate 13, height 1.803 m (5' 10.98\"), weight 96.4 kg (212 lb 8.4 oz), SpO2 98%.  Intake/Output last 3 Shifts:  I/O last 3 completed shifts:  In: 1399.9 (14.5 mL/kg) [P.O.:720; I.V.:679.9 (7.1 mL/kg)]  Out: 5385 (55.9 mL/kg) [Urine:5385 (1.6 mL/kg/hr)]  Weight: 96.4 kg     Relevant Results  Results from last 7 days   Lab Units 07/23/25  1142 07/23/25  0826 07/23/25  0450 07/22/25  2339 07/22/25  2003 07/22/25  1744 07/22/25  1618 07/22/25  0737 07/22/25  0550 07/21/25  0746 07/21/25  0520 07/20/25  1523 07/20/25  1232   POCT GLUCOSE mg/dL 282* 207*  --  212* 312* 252*  --    < >  --    < >  --    < >  --    GLUCOSE mg/dL  --   --  189*  --   --   --  177*  --  171*  --  199*  --  219*    < > = values in this interval not displayed.     Assessment  Felice Law is a 60 y.o. male with significant PMH of ICM, HFrEF, EF 15-20%, CABG (LIMA- LAD in 2023),  PCI, VT arrest s/p VT ablation and ICD in 2017, Hypertension, Hyperlipidemia, Hypothyroidism, Left femoral DVT on Eliquis, COPD, JAKE, CKD III and Insulin dependent Type 2 diabetes. He was admitted on 7/12 for further HF management and advanced therapy work up.   Endocrine consulted for managing uncontrolled T2DM.     #T2DM  :: Hyperglycemia 2/2 poorly " controlled Type 2 diabetes, insufficient insulin administration, unhealthy life style and stress state     # Hypothyroid   :: Thyroid-Levothyroxine 75mcg  :: TSH 3.97, Free T4 not done  - currently on levothyroxine 75 mcg     Plan  - increase insulin glargine to 44 units BID   - Increase  Insulin lispro to 22 units TID with meals  - SS#3 AC  - Accuchecks TIDAC and at bedtime  - 60 gm carb consistent diet  - Goal -180  - Hypoglycemia bundle  - Will continue to follow and titrate insulin accordingly     Recommendations communicated to primary team. Please reach out incase you have any questions or concerns.     The patient was seen and discussed with attending Dr. Dara Shine MD  Internal medicine, PGY1

## 2025-07-23 NOTE — PROGRESS NOTES
Per TAN Patterson at [facility]; approved heart transplant and lvad eval; ref # [id number] eff 07/22/2025. If approved for transplant call [phone] to req listing. [facility] CM is Leonardo ph [phone] ext [phone]. If approved for LVAD call [facility] lvad unit at [phone] for vad auth. Received VAD eval form from [facility] vad unit; May Mccormick is VAD EUN. Vad  ph [phone] ext [phone]. [facility] VAD Fax [phone]. VAD form is attached.

## 2025-07-24 ENCOUNTER — DOCUMENTATION (OUTPATIENT)
Dept: TRANSPLANT | Facility: HOSPITAL | Age: 60
End: 2025-07-24
Payer: COMMERCIAL

## 2025-07-24 ENCOUNTER — APPOINTMENT (OUTPATIENT)
Dept: RADIOLOGY | Facility: HOSPITAL | Age: 60
DRG: 291 | End: 2025-07-24
Payer: MEDICARE

## 2025-07-24 ENCOUNTER — DOCUMENTATION (OUTPATIENT)
Dept: TRANSPLANT | Facility: HOSPITAL | Age: 60
End: 2025-07-24

## 2025-07-24 ENCOUNTER — LAB REQUISITION (OUTPATIENT)
Dept: LAB | Facility: CLINIC | Age: 60
DRG: 291 | End: 2025-07-24
Payer: MEDICARE

## 2025-07-24 ENCOUNTER — APPOINTMENT (OUTPATIENT)
Dept: RESPIRATORY THERAPY | Facility: HOSPITAL | Age: 60
End: 2025-07-24
Payer: MEDICARE

## 2025-07-24 DIAGNOSIS — I50.9 HEART FAILURE, UNSPECIFIED: ICD-10-CM

## 2025-07-24 DIAGNOSIS — I50.22 CHRONIC SYSTOLIC HEART FAILURE: ICD-10-CM

## 2025-07-24 DIAGNOSIS — I50.23 ACUTE ON CHRONIC HFREF (HEART FAILURE WITH REDUCED EJECTION FRACTION): ICD-10-CM

## 2025-07-24 DIAGNOSIS — I25.5 ISCHEMIC CARDIOMYOPATHY: ICD-10-CM

## 2025-07-24 LAB
ALBUMIN SERPL BCP-MCNC: 3.5 G/DL (ref 3.4–5)
ALBUMIN SERPL BCP-MCNC: 3.5 G/DL (ref 3.4–5)
ANION GAP BLDMV CALCULATED.4IONS-SCNC: 4 MMO/L (ref 10–25)
ANION GAP BLDMV CALCULATED.4IONS-SCNC: 5 MMO/L (ref 10–25)
ANION GAP BLDV CALCULATED.4IONS-SCNC: 6 MMOL/L (ref 10–25)
ANION GAP SERPL CALC-SCNC: 14 MMOL/L (ref 10–20)
ANION GAP SERPL CALC-SCNC: 16 MMOL/L (ref 10–20)
APPARATUS: ABNORMAL
APPARATUS: ABNORMAL
BASE EXCESS BLDMV CALC-SCNC: 6.7 MMOL/L (ref -2–3)
BASE EXCESS BLDMV CALC-SCNC: 7.2 MMOL/L (ref -2–3)
BASE EXCESS BLDV CALC-SCNC: 10.5 MMOL/L (ref -2–3)
BODY TEMPERATURE: 37 DEGREES CELSIUS
BUN SERPL-MCNC: 52 MG/DL (ref 6–23)
BUN SERPL-MCNC: 59 MG/DL (ref 6–23)
CA-I BLD-SCNC: 1.16 MMOL/L (ref 1.1–1.33)
CA-I BLDMV-SCNC: 1.19 MMOL/L (ref 1.1–1.33)
CA-I BLDMV-SCNC: 1.25 MMOL/L (ref 1.1–1.33)
CA-I BLDV-SCNC: 1.23 MMOL/L (ref 1.1–1.33)
CALCIUM SERPL-MCNC: 9.2 MG/DL (ref 8.6–10.6)
CALCIUM SERPL-MCNC: 9.4 MG/DL (ref 8.6–10.6)
CHLORIDE BLD-SCNC: 95 MMOL/L (ref 98–107)
CHLORIDE BLD-SCNC: 96 MMOL/L (ref 98–107)
CHLORIDE BLDV-SCNC: 95 MMOL/L (ref 98–107)
CHLORIDE SERPL-SCNC: 91 MMOL/L (ref 98–107)
CHLORIDE SERPL-SCNC: 92 MMOL/L (ref 98–107)
CO2 SERPL-SCNC: 29 MMOL/L (ref 21–32)
CO2 SERPL-SCNC: 32 MMOL/L (ref 21–32)
CREAT SERPL-MCNC: 2.62 MG/DL (ref 0.5–1.3)
CREAT SERPL-MCNC: 2.65 MG/DL (ref 0.5–1.3)
EGFRCR SERPLBLD CKD-EPI 2021: 27 ML/MIN/1.73M*2
EGFRCR SERPLBLD CKD-EPI 2021: 27 ML/MIN/1.73M*2
ERYTHROCYTE [DISTWIDTH] IN BLOOD BY AUTOMATED COUNT: 19.9 % (ref 11.5–14.5)
GLUCOSE BLD MANUAL STRIP-MCNC: 216 MG/DL (ref 74–99)
GLUCOSE BLD MANUAL STRIP-MCNC: 235 MG/DL (ref 74–99)
GLUCOSE BLD MANUAL STRIP-MCNC: 314 MG/DL (ref 74–99)
GLUCOSE BLD MANUAL STRIP-MCNC: 355 MG/DL (ref 74–99)
GLUCOSE BLD-MCNC: 358 MG/DL (ref 74–99)
GLUCOSE BLD-MCNC: 359 MG/DL (ref 74–99)
GLUCOSE BLDV-MCNC: 300 MG/DL (ref 74–99)
GLUCOSE SERPL-MCNC: 285 MG/DL (ref 74–99)
GLUCOSE SERPL-MCNC: 332 MG/DL (ref 74–99)
HCO3 BLDMV-SCNC: 32.4 MMOL/L (ref 22–26)
HCO3 BLDMV-SCNC: 33.9 MMOL/L (ref 22–26)
HCO3 BLDV-SCNC: 37 MMOL/L (ref 22–26)
HCT VFR BLD AUTO: 37.8 % (ref 41–52)
HCT VFR BLD EST: 37 % (ref 41–52)
HCT VFR BLD EST: 38 % (ref 41–52)
HCT VFR BLD EST: 39 % (ref 41–52)
HCV RNA SERPL NAA+PROBE-ACNC: NOT DETECTED K[IU]/ML
HCV RNA SERPL NAA+PROBE-LOG IU: NORMAL {LOG_IU}/ML
HGB BLD-MCNC: 11.2 G/DL (ref 13.5–17.5)
HGB BLDMV-MCNC: 12.6 G/DL (ref 13.5–17.5)
HGB BLDMV-MCNC: 13.1 G/DL (ref 13.5–17.5)
HGB BLDV-MCNC: 12.2 G/DL (ref 13.5–17.5)
HLA CLS I TYP PNL BLD/T DONR HIGH RES: NORMAL
HLA RESULTS: NORMAL
HLA-A+B+C AB NFR SER: NORMAL %
HLA-DP+DQ+DR AB NFR SER: NORMAL %
HLA-DP2 QL: NORMAL
HLA-DQB1 HIGH RES: NORMAL
HLA-DRB1 HIGH RES: NORMAL
HOLD SPECIMEN: NORMAL
INHALED O2 CONCENTRATION: 21 %
INHALED O2 CONCENTRATION: 21 %
INHALED O2 CONCENTRATION: 28 %
LACTATE BLDMV-SCNC: 1.4 MMOL/L (ref 0.4–2)
LACTATE BLDMV-SCNC: 1.9 MMOL/L (ref 0.4–2)
LACTATE BLDV-SCNC: 1.1 MMOL/L (ref 0.4–2)
MAGNESIUM SERPL-MCNC: 2.2 MG/DL (ref 1.6–2.4)
MAGNESIUM SERPL-MCNC: 2.35 MG/DL (ref 1.6–2.4)
MCH RBC QN AUTO: 23.6 PG (ref 26–34)
MCHC RBC AUTO-ENTMCNC: 29.6 G/DL (ref 32–36)
MCV RBC AUTO: 80 FL (ref 80–100)
NRBC BLD-RTO: 0 /100 WBCS (ref 0–0)
OXYHGB MFR BLDMV: 53.5 % (ref 45–75)
OXYHGB MFR BLDMV: 64.8 % (ref 45–75)
OXYHGB MFR BLDV: 72.5 % (ref 45–75)
PCO2 BLDMV: 50 MM HG (ref 41–51)
PCO2 BLDMV: 56 MM HG (ref 41–51)
PCO2 BLDV: 57 MM HG (ref 41–51)
PH BLDMV: 7.39 PH (ref 7.33–7.43)
PH BLDMV: 7.42 PH (ref 7.33–7.43)
PH BLDV: 7.42 PH (ref 7.33–7.43)
PHOSPHATE SERPL-MCNC: 4 MG/DL (ref 2.5–4.9)
PHOSPHATE SERPL-MCNC: 4.5 MG/DL (ref 2.5–4.9)
PLATELET # BLD AUTO: 234 X10*3/UL (ref 150–450)
PO2 BLDMV: 37 MM HG (ref 35–45)
PO2 BLDMV: 43 MM HG (ref 35–45)
PO2 BLDV: 48 MM HG (ref 35–45)
POTASSIUM BLDMV-SCNC: 4.3 MMOL/L (ref 3.5–5.3)
POTASSIUM BLDMV-SCNC: 4.3 MMOL/L (ref 3.5–5.3)
POTASSIUM BLDV-SCNC: 4.1 MMOL/L (ref 3.5–5.3)
POTASSIUM SERPL-SCNC: 4.1 MMOL/L (ref 3.5–5.3)
POTASSIUM SERPL-SCNC: 4.2 MMOL/L (ref 3.5–5.3)
RBC # BLD AUTO: 4.75 X10*6/UL (ref 4.5–5.9)
RPR SER QL: REACTIVE
RPR SER-TITR: ABNORMAL {TITER}
SAO2 % BLDMV: 55 % (ref 45–75)
SAO2 % BLDMV: 67 % (ref 45–75)
SAO2 % BLDV: 74 % (ref 45–75)
SODIUM BLDMV-SCNC: 128 MMOL/L (ref 136–145)
SODIUM BLDMV-SCNC: 130 MMOL/L (ref 136–145)
SODIUM BLDV-SCNC: 134 MMOL/L (ref 136–145)
SODIUM SERPL-SCNC: 132 MMOL/L (ref 136–145)
SODIUM SERPL-SCNC: 134 MMOL/L (ref 136–145)
UFH PPP CHRO-ACNC: 0.3 IU/ML (ref ?–1.1)
WBC # BLD AUTO: 5.9 X10*3/UL (ref 4.4–11.3)

## 2025-07-24 PROCEDURE — 99232 SBSQ HOSP IP/OBS MODERATE 35: CPT

## 2025-07-24 PROCEDURE — 84132 ASSAY OF SERUM POTASSIUM: CPT

## 2025-07-24 PROCEDURE — 2500000001 HC RX 250 WO HCPCS SELF ADMINISTERED DRUGS (ALT 637 FOR MEDICARE OP)

## 2025-07-24 PROCEDURE — 94010 BREATHING CAPACITY TEST: CPT

## 2025-07-24 PROCEDURE — 85027 COMPLETE CBC AUTOMATED: CPT | Performed by: NURSE PRACTITIONER

## 2025-07-24 PROCEDURE — 2500000001 HC RX 250 WO HCPCS SELF ADMINISTERED DRUGS (ALT 637 FOR MEDICARE OP): Performed by: NURSE PRACTITIONER

## 2025-07-24 PROCEDURE — 71045 X-RAY EXAM CHEST 1 VIEW: CPT

## 2025-07-24 PROCEDURE — 94729 DIFFUSING CAPACITY: CPT

## 2025-07-24 PROCEDURE — 82947 ASSAY GLUCOSE BLOOD QUANT: CPT

## 2025-07-24 PROCEDURE — 94010 BREATHING CAPACITY TEST: CPT | Performed by: INTERNAL MEDICINE

## 2025-07-24 PROCEDURE — 94729 DIFFUSING CAPACITY: CPT | Performed by: INTERNAL MEDICINE

## 2025-07-24 PROCEDURE — 2500000002 HC RX 250 W HCPCS SELF ADMINISTERED DRUGS (ALT 637 FOR MEDICARE OP, ALT 636 FOR OP/ED)

## 2025-07-24 PROCEDURE — 2500000004 HC RX 250 GENERAL PHARMACY W/ HCPCS (ALT 636 FOR OP/ED)

## 2025-07-24 PROCEDURE — 2500000002 HC RX 250 W HCPCS SELF ADMINISTERED DRUGS (ALT 637 FOR MEDICARE OP, ALT 636 FOR OP/ED): Performed by: NURSE PRACTITIONER

## 2025-07-24 PROCEDURE — 94727 GAS DIL/WSHOT DETER LNG VOL: CPT | Performed by: INTERNAL MEDICINE

## 2025-07-24 PROCEDURE — 37799 UNLISTED PX VASCULAR SURGERY: CPT | Performed by: NURSE PRACTITIONER

## 2025-07-24 PROCEDURE — 83735 ASSAY OF MAGNESIUM: CPT

## 2025-07-24 PROCEDURE — 84132 ASSAY OF SERUM POTASSIUM: CPT | Performed by: NURSE PRACTITIONER

## 2025-07-24 PROCEDURE — 83735 ASSAY OF MAGNESIUM: CPT | Performed by: NURSE PRACTITIONER

## 2025-07-24 PROCEDURE — 80069 RENAL FUNCTION PANEL: CPT | Performed by: NURSE PRACTITIONER

## 2025-07-24 PROCEDURE — 2500000004 HC RX 250 GENERAL PHARMACY W/ HCPCS (ALT 636 FOR OP/ED): Performed by: NURSE PRACTITIONER

## 2025-07-24 PROCEDURE — 2020000001 HC ICU ROOM DAILY

## 2025-07-24 PROCEDURE — 70450 CT HEAD/BRAIN W/O DYE: CPT

## 2025-07-24 PROCEDURE — 85520 HEPARIN ASSAY: CPT | Performed by: NURSE PRACTITIONER

## 2025-07-24 PROCEDURE — 82330 ASSAY OF CALCIUM: CPT

## 2025-07-24 RX ORDER — ACETAMINOPHEN 325 MG/1
975 TABLET ORAL EVERY 6 HOURS PRN
Status: DISCONTINUED | OUTPATIENT
Start: 2025-07-24 | End: 2025-08-11 | Stop reason: HOSPADM

## 2025-07-24 RX ORDER — MILRINONE LACTATE 0.2 MG/ML
0.25 INJECTION, SOLUTION INTRAVENOUS CONTINUOUS
Status: DISCONTINUED | OUTPATIENT
Start: 2025-07-24 | End: 2025-08-06

## 2025-07-24 RX ADMIN — INSULIN GLARGINE 44 UNITS: 100 INJECTION, SOLUTION SUBCUTANEOUS at 08:15

## 2025-07-24 RX ADMIN — INSULIN LISPRO 22 UNITS: 100 INJECTION, SOLUTION INTRAVENOUS; SUBCUTANEOUS at 11:42

## 2025-07-24 RX ADMIN — HYDRALAZINE HYDROCHLORIDE 100 MG: 100 TABLET ORAL at 21:49

## 2025-07-24 RX ADMIN — HEPARIN SODIUM 1500 UNITS/HR: 10000 INJECTION, SOLUTION INTRAVENOUS at 20:26

## 2025-07-24 RX ADMIN — ISOSORBIDE DINITRATE 30 MG: 20 TABLET ORAL at 13:59

## 2025-07-24 RX ADMIN — ISOSORBIDE DINITRATE 30 MG: 20 TABLET ORAL at 18:15

## 2025-07-24 RX ADMIN — INSULIN GLARGINE 44 UNITS: 100 INJECTION, SOLUTION SUBCUTANEOUS at 20:25

## 2025-07-24 RX ADMIN — DIGOXIN 125 MCG: 125 TABLET ORAL at 08:14

## 2025-07-24 RX ADMIN — MILRINONE LACTATE IN DEXTROSE 0.25 MCG/KG/MIN: 0.2 INJECTION, SOLUTION INTRAVENOUS at 21:52

## 2025-07-24 RX ADMIN — LEVOTHYROXINE SODIUM 75 MCG: 0.07 TABLET ORAL at 06:02

## 2025-07-24 RX ADMIN — ATORVASTATIN CALCIUM 80 MG: 80 TABLET, FILM COATED ORAL at 20:25

## 2025-07-24 RX ADMIN — FENOFIBRATE 145 MG: 145 TABLET, FILM COATED ORAL at 08:27

## 2025-07-24 RX ADMIN — GABAPENTIN 200 MG: 100 CAPSULE ORAL at 21:49

## 2025-07-24 RX ADMIN — INSULIN LISPRO 6 UNITS: 100 INJECTION, SOLUTION INTRAVENOUS; SUBCUTANEOUS at 11:41

## 2025-07-24 RX ADMIN — SPIRONOLACTONE 25 MG: 25 TABLET, FILM COATED ORAL at 08:14

## 2025-07-24 RX ADMIN — METOPROLOL SUCCINATE 100 MG: 50 TABLET, EXTENDED RELEASE ORAL at 08:15

## 2025-07-24 RX ADMIN — QUETIAPINE FUMARATE 50 MG: 25 TABLET ORAL at 20:25

## 2025-07-24 RX ADMIN — ACETAMINOPHEN 975 MG: 325 TABLET ORAL at 18:18

## 2025-07-24 RX ADMIN — PANTOPRAZOLE SODIUM 40 MG: 40 TABLET, DELAYED RELEASE ORAL at 06:02

## 2025-07-24 RX ADMIN — GABAPENTIN 200 MG: 100 CAPSULE ORAL at 13:59

## 2025-07-24 RX ADMIN — HYDRALAZINE HYDROCHLORIDE 100 MG: 100 TABLET ORAL at 06:02

## 2025-07-24 RX ADMIN — INSULIN LISPRO 22 UNITS: 100 INJECTION, SOLUTION INTRAVENOUS; SUBCUTANEOUS at 18:15

## 2025-07-24 RX ADMIN — ISOSORBIDE DINITRATE 30 MG: 20 TABLET ORAL at 08:15

## 2025-07-24 RX ADMIN — GABAPENTIN 200 MG: 100 CAPSULE ORAL at 06:02

## 2025-07-24 RX ADMIN — INSULIN LISPRO 15 UNITS: 100 INJECTION, SOLUTION INTRAVENOUS; SUBCUTANEOUS at 18:15

## 2025-07-24 RX ADMIN — SACUBITRIL AND VALSARTAN 1 TABLET: 97; 103 TABLET, FILM COATED ORAL at 20:25

## 2025-07-24 RX ADMIN — HEPARIN SODIUM 1600 UNITS/HR: 10000 INJECTION, SOLUTION INTRAVENOUS at 01:46

## 2025-07-24 RX ADMIN — HYDRALAZINE HYDROCHLORIDE 100 MG: 100 TABLET ORAL at 13:58

## 2025-07-24 RX ADMIN — CALCIUM CARBONATE (ANTACID) CHEW TAB 500 MG 1 TABLET: 500 CHEW TAB at 20:25

## 2025-07-24 RX ADMIN — DAPAGLIFLOZIN 10 MG: 10 TABLET, FILM COATED ORAL at 08:15

## 2025-07-24 RX ADMIN — MILRINONE LACTATE IN DEXTROSE 0.25 MCG/KG/MIN: 0.2 INJECTION, SOLUTION INTRAVENOUS at 11:49

## 2025-07-24 RX ADMIN — SACUBITRIL AND VALSARTAN 1 TABLET: 97; 103 TABLET, FILM COATED ORAL at 08:15

## 2025-07-24 RX ADMIN — CALCIUM CARBONATE (ANTACID) CHEW TAB 500 MG 1 TABLET: 500 CHEW TAB at 08:15

## 2025-07-24 ASSESSMENT — COGNITIVE AND FUNCTIONAL STATUS - GENERAL
DAILY ACTIVITIY SCORE: 24
MOBILITY SCORE: 24
MOBILITY SCORE: 24
DAILY ACTIVITIY SCORE: 24
MOBILITY SCORE: 24
DAILY ACTIVITIY SCORE: 24

## 2025-07-24 ASSESSMENT — PAIN - FUNCTIONAL ASSESSMENT
PAIN_FUNCTIONAL_ASSESSMENT: 0-10

## 2025-07-24 ASSESSMENT — PAIN SCALES - GENERAL
PAINLEVEL_OUTOF10: 0 - NO PAIN

## 2025-07-24 NOTE — PROGRESS NOTES
Felice Law is a 60 y.o. male on day 7 of admission presenting with Acute on chronic systolic heart failure.  SW met with pt along with Blanca Gan CNP from Palliative care for advanced therapy evaluation.  Pt speaks Syriac and the Norma aided in the assessment.  Patient's current clinical condition, including diagnosis, prognosis, and management plan were discussed. Pt is not , has not children and is not currently working.  His worries concern the care of his mother at home.  He lives with his mother, sister and a friend.  Currently his sister is caring for mother. 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  He states he lived in [place] as a child.  His goals moving forward are to get through the surgery fine and have a better quality of life.  Pt  would like to discuss the risks of the procedures first before he proceeds with any surgeries.  He was agreeable to continue the assessment with Blanca.  He was willing discuss further and consent to the risks for the LVAD and OHT although he would like to discuss the risks further with his physician.   Palliative care team will continue to follow and assist with resources, referrals and support as needed.   KEL BARKER

## 2025-07-24 NOTE — PROGRESS NOTES
[facility]  HFICU PROGRESS NOTE    Felice Law/[id number]    Admit Date: 7/17/2025  Hospital Length of Stay: 7   ICU Length of Stay: 6d 21h   Primary Service: HFICU  Primary HF Cardiologist: Dr. Jono Duque MD   Referring: Dr Duque     INTERVAL EVENTS / PERTINENT ROS:     No acute overnight events. Bumex held overnight as Cr increased from 2 > 2.6.  total neg 5.74L yestrday. [place] shows low cardiac output. He denies any c/o dyspnea, chest pain, or discomfort.      Plan:   - Start Milrinone 0.25  - Hold Toprol 100 mg  - Increase hydralazine and isosorbide as tolerated   - PT/OT  - Diabetic management per endocrine recommendations  - Continue OHT/VAD evaluation    MEDICATIONS  Infusions:  [Held by provider] bumetanide, Last Rate: Stopped (07/24/25 0545)  heparin, Last Rate: 1,600 Units/hr (07/24/25 0500)      Scheduled:  [Held by provider] apixaban, 5 mg, BID  atorvastatin, 80 mg, Nightly  [Held by provider] bumetanide, 1 mg, Daily  calcium carbonate, 500 mg of calcium carbonate, BID  dapagliflozin propanediol, 10 mg, Daily  digoxin, 125 mcg, Daily  fenofibrate, 145 mg, Daily  gabapentin, 200 mg, q8h SUSI  hydrALAZINE, 100 mg, q8h SUSI  insulin glargine, 44 Units, Nightly  insulin glargine, 44 Units, Daily  insulin lispro, 0-15 Units, TID AC  insulin lispro, 22 Units, TID AC  isosorbide dinitrate, 30 mg, TID  levothyroxine, 75 mcg, Daily before breakfast  metoprolol succinate XL, 100 mg, Daily  pantoprazole, 40 mg, Daily before breakfast  QUEtiapine, 50 mg, Nightly  sacubitriL-valsartan, 1 tablet, BID  spironolactone, 25 mg, Daily      PRN:  acetaminophen, 650 mg, q6h PRN  albuterol, 2.5 mg, q6h PRN  alteplase, 2 mg, PRN  dextrose, 12.5 g, q15 min PRN  dextrose, 25 g, q15 min PRN  diphenhydrAMINE, 50 mg, q5 min PRN  glucagon, 1 mg, q15 min PRN  glucagon, 1 mg, q15 min PRN  heparin, 2,000-4,000 Units, q4h PRN      Invasive Hemodynamics:    Most Recent Range Past 24hrs   BP  "(Art)   No data recorded   MAP(Art)   No data recorded   RA/CVP   No data recorded   PA 56/26 PAP  Min: 42/14  Max: 67/28   PA(mean) 38 mmHg PAP (Mean)  Min: 24 mmHg  Max: 48 mmHg   PCWP 16 mmHg PCWP (mmHg)  Min: 14 mmHg  Max: 19 mmHg   CO 4.9 L/min CO (L/min)  Min: 3.99 L/min  Max: 4.92 L/min   CI 2.2 L/min/m2 CI (L/min/m2)  Min: 1.81 L/min/m2  Max: 2.24 L/min/m2   Mixed Venous 59 % SVO2 (%)  Min: 59 %  Max: 63 %   SVR  1105 (dyne*sec)/cm5 SVR (dyne*sec)/cm5  Min: 1105 (dyne*sec)/cm5  Max: 1623 (dyne*sec)/cm5    (dyne*sec)/cm5 PVR (dyne*sec)/cm5  Min: 270 (dyne*sec)/cm5  Max: 561 (dyne*sec)/cm5       PHYSICAL EXAM:   Visit Vitals  /66   Pulse 81   Temp 36.1 °C (97 °F)   Resp 13   Ht 1.803 m (5' 10.98\")   Wt 96.4 kg (212 lb 8.4 oz)   SpO2 94%   BMI 29.65 kg/m²   Smoking Status Former   BSA 2.2 m²       Wt Readings from Last 5 Encounters:   07/21/25 96.4 kg (212 lb 8.4 oz)   07/17/25 110 kg (243 lb 2.7 oz)   06/10/25 97.1 kg (214 lb)   05/12/25 96.6 kg (213 lb)   05/04/25 102 kg (224 lb)       INTAKE/OUTPUT:  I/O last 3 completed shifts:  In: 3533.1 (36.7 mL/kg) [P.O.:2900; I.V.:633.1 (6.6 mL/kg)]  Out: 7775 (80.7 mL/kg) [Urine:7775 (2.2 mL/kg/hr)]  Weight: 96.4 kg      Physical Exam  Constitutional:       Appearance: Normal appearance.   HENT:      Head: Normocephalic and atraumatic.      Nose: Nose normal.      Mouth/Throat:      Mouth: Mucous membranes are moist.     Eyes:      Extraocular Movements: Extraocular movements intact.      Pupils: Pupils are equal, round, and reactive to light.     Neck:      Comments: Rt IJ/ SGC  Cardiovascular:      Rate and Rhythm: Normal rate and regular rhythm.      Pulses: Normal pulses.      Heart sounds:      Gallop present.   Pulmonary:      Effort: Pulmonary effort is normal.      Breath sounds: Decreased breath sounds present.   Abdominal:      General: There is distension.     Musculoskeletal:         General: Swelling present.      Cervical back: Normal range " of motion and neck supple.      Right lower leg: Edema (trace) present.      Left lower leg: Edema (trace) present.      Comments: Chronic venous stasis      Skin:     General: Skin is warm.      Capillary Refill: Capillary refill takes 2 to 3 seconds.     Neurological:      General: No focal deficit present.      Mental Status: He is alert and oriented to person, place, and time.     Psychiatric:         Mood and Affect: Mood normal.         Behavior: Behavior normal.         DATA:  CMP:  Results from last 7 days   Lab Units 07/24/25  0314 07/23/25  1750 07/23/25  0450 07/22/25  1618 07/22/25  0550 07/21/25  0520 07/20/25  1232 07/20/25  0556 07/19/25  1925 07/19/25  0530 07/18/25  1708 07/18/25  0157 07/17/25  1119   SODIUM mmol/L 134* 134* 136 134* 135* 137 135* 138 135* 138   < > 136 134*   POTASSIUM mmol/L 4.1 3.9 3.7 4.3 3.8 3.7 3.8 3.6 3.7 4.2   < > 4.7 4.0   CHLORIDE mmol/L 92* 93* 93* 91* 91* 92* 90* 93* 91* 98   < > 99 96*   CO2 mmol/L 32 30 32 32 36* 37* 36* 37* 35* 32   < > 29 31   ANION GAP mmol/L 14 15 15 15 12 12 13 12 13 12   < > 13 11   BUN mg/dL 52* 52* 48* 50* 42* 35* 37* 38* 39* 43*   < > 49* 48*   CREATININE mg/dL 2.62* 2.17* 2.04* 2.40* 1.87* 1.61* 1.60* 1.56* 1.81* 1.67*   < > 1.64* 1.62*   EGFR mL/min/1.73m*2 27* 34* 37* 30* 41* 49* 49* 51* 43* 47*   < > 48* 49*   MAGNESIUM mg/dL 2.35  --  2.35  --  2.30 2.39  --  2.55*  --  2.47*  --  2.40  --    ALBUMIN g/dL 3.5 3.8 3.4 3.8 3.6 3.6 4.0 3.9 3.9 3.6   < > 3.6 3.7   ALT U/L  --   --   --   --   --   --   --   --   --   --   --   --  8*   AST U/L  --   --   --   --   --   --   --   --   --   --   --   --  15   BILIRUBIN TOTAL mg/dL  --   --   --   --   --   --   --   --   --   --   --   --  2.0*    < > = values in this interval not displayed.     CBC:  Results from last 7 days   Lab Units 07/24/25  0314 07/23/25  0450 07/22/25  0550 07/21/25  0520 07/20/25  0556 07/19/25  0530 07/18/25  0157   WBC AUTO x10*3/uL 5.9 4.4 5.3 5.3 5.1 4.7 5.1    HEMOGLOBIN g/dL 11.2* 11.8* 12.0* 12.4* 12.2* 11.2* 11.6*   HEMATOCRIT % 37.8* 38.0* 38.7* 40.4* 40.2* 38.3* 38.3*   PLATELETS AUTO x10*3/uL 234 213 239 234 252 254 241   MCV fL 80 76* 77* 77* 78* 81 77*     COAG:   Results from last 7 days   Lab Units 07/23/25  1230 07/17/25  1119   INR  1.1 2.0*     ABO:   ABO TYPE   Date Value Ref Range Status   07/23/2025 A  Final     HEME/ENDO:  Results from last 7 days   Lab Units 07/23/25  1230 07/17/25  1125 07/17/25  1111   FERRITIN ng/mL  --  102  --    IRON SATURATION %  --  16*  --    TSH mIU/L 2.12 3.97  --    HEMOGLOBIN A1C %  --   --  10.1*      CARDIAC:   Results from last 7 days   Lab Units 07/23/25  1230 07/17/25  1119   LD U/L 231  --    TROPHSCMC ng/L  --  37   BNP pg/mL 380* 744*       ASSESSMENT AND PLAN:     Felice Law is a 60 y.o. male with significant PMH of ICM, HFrEF, EF 15-20%, CABG (LIMA- LAD in 2023),  PCI, VT arrest s/p VT ablation and ICD in 2017, Hypertension, Hyperlipidemia, Hypothyroidism, Left femoral DVT on Eliquis, COPD, JAKE, CKD III, Chronic BLE venous stasis dermatitis, Insulin dependent Type 2 diabetes who was initially admitted 7/12 to [facility] for acute decompensate heart failure. He was diuresed and recommended for transfer to [facility] HF ICU for further managemnet and possible re-consider of advanced therapy work up.  Of notes, Patient was previously discussed for AT during [facility] October 2023 admission but declined with concerns for mobility/frailty (uses motorized scooter and has bilateral foot drop with neuropathy), CKD, poorly controlled diabetes as well as unclear social/financial support).     Transferred to HF ICU 7/17, he was warm and wet,  lasix IV bolus given and drip initiated. Eliquis on hold, heparin drip initiated. Bedside SG done 7/19, opening swan numbers: BP: 133/74 (94),CVP 9, PA 74/29 (44), PCWP 24, CO/CI (Pallavi) 5.4/2.4, Thermo: 4.6/2.1, SVR 1263, SVO2 63% on hydral /Isordil 100/40 mg TID, Metop 50 mg daily, Dapa  10 mg daily, Dig 125 mcg PO daily, Lasix drip at 20 mg /hr. Entresto re-introduce and up titrated. Lasix drip d/c'd currently on intermittent IVP.  Hg A1c 10.1 this admission, now considering advanced therapy evaluation. Advanced therapies evaluation initiated 7/23.     Neuro:   # Anxiety. Depression, Acute pain    # Bilateral foot drop with LE neuropathy: Able to walk two blocks. Uses motorized scoopter    - Serial neuro and pain assessments    - PO Tylenol PRN for pain   - PT/OT Consult, OOB to chair   - CAM ICU score every shift   - Sleep/wake cycle normalization      # Physical Status   -Obesity: BMI: 34 --- > 30  -Reduced Mobility due to ICU and CHF       #Substance abuse   -Alcohol abuse/Alcohol dependence: NO   -Tobacco use/Nicotine Dependence: Quit 8 years ago      Cardiovascular:   #Acute on chronic systolic CHF, HFrEF 15-20%, dx'd 2017,   #Ischemic Cardiomyopathy, ACC/AHA Stage C-D, NYHA II, Warm,   -admit weight (7/17): 111 kg -- > 98 kg (7/20)  -BNP (7/17): 744  -TTE (5/4/2025):  Left ventricular ejection fraction is severely decreased, by visual estimate at 15-20%.   - RHC (6/10/25): RA: 25,RV: 63/9, PA: 72/34, PCWP: 32, PA-SAT: 36%, SVC-SAT: 41%, FA-SAT: 71%, CO: 4.84, CI: 2.24, SVR: 1041  - LHC (6/10/25):   Right dominant coronary circulation with severe multivessel CAD, as described above. LIMA to LAD patent.  - C/w atorvastin 80mg    - Lasix gtt discontinued overnight 7/19.   - Bedside SG with Opening swan numbers (7/19): BP: 133/74 (94),CVP 9, PA 74/29 (44), PCWP 24, CO/CI (Pallavi) 5.4/2.4, Thermo: 4.6/2.1, SVR 1263, SVO2 63% on hydral /Isordil 100/40 mg TID, Metop 50 mg daily, Dapa 10 mg daily, Dig 125 mcg PO daily, Lasix drip at 20 mg /h.  - Daily SGC #'s (7/23):  /63 (73); CVP: 8; PA 50/22 (33); PCWP: 16; SVR: 1105; CO/CI 4.9/2.2; on Hydra 75 , isordil 20, toprol 100, dapa 10, dig 125, entresto 97/101, aldactone 25, nipride 0.25  - Lasix 80 mg IV push x1 (7/21/2025)  - Hold Bumex drip  7/24  - Discontinue Toprol 100  - Restart Milrinone 0.125 mcg/kg/min  - C/w Entresto 97/103 mg BID   - C/w Hydralazine 100mg/ Isordil 30mg daily, increase as tolerated   - Nipride off 7/23  - Daily standing weights, 2gm sodium diet, 2L fluid restriction, strict I&Os     #CAD    - s/p PCI and 1v CABG (LIMA-LAD 04/2023).   - C/w ASA and atorvastatin      Pulmonary:    # H/o COPD  # Hx Pulmonary Hypertension   # Central sleep apnea.   # Prior tobacco use (24 pack year, quit 2017),   - On 2L via NC      :   #CHARLOTTE/CKD III  - Baseline (11/11/2024): BUN/Cr (GFR): 63/1.36 (60)  -Admit Bun/Creatinine (7/17):  48/1.62, creatinine peaked at 2.55 OSH  -I/Os   -avoid hypotension and nephrotoxic agents      Heme:   #Anemia in the setting of MAI and CKD III  - Labs (7/17):  TIBC: 377,  ferritin: 102, serum Fe: 61, folate: 13.1, B12: 452   - IV Venofer 200 mg x5 doses (7/17-7/21)      # Left femoral DVT (2017)  - Hold home Eliquis  - C/w Heparin drip (7/18)      Endo:   #DM 2  - hgbA1c(7/17):  10.1  - C/w Lantus 20 units Am, 40 units PM, SSI and Pradial insulin   - Diabetic management per endocrine     # Hypothyroidism  -C/w Levothyroxine 75mcg  -TSH (7/18): 3.97      ID:   -afebrile, nontoxic    -no s/s infx   -trend temps q4 hr        PHYSICAL AND OCCUPATIONAL THERAPY: PT/OT     LINES:  PIVs   SGC (7/19 - present)        DVT: SCD's, Heparin drip once Heparin assay <0.2  VAP BUNDLE: NA  CENTRAL LINE BUNDLE: Ordered  ULCER PPX: PPI  GLYCEMIC CONTROL: SSI, Lantus insulin   BOWEL CARE: Senna, Miralax   INDWELLING CATHETER: NA  NUTRITION: Adult diet Consistent Carb; CCD 90 gm/meal; 2000 mL fluid        EMERGENCY CONTACT: Extended Emergency Contact Information  Primary Emergency Contact: NAM LOO  Address: [address] [place] [address] [facility]  Home Phone: [phone]  Mobile Phone: [phone]  Relation: Mother  Secondary Emergency Contact: FriendRobinson  Mobile Phone:  [phone]  Relation: Friend  FAMILY UPDATE: no family at bedside   CODE STATUS: Full Code  DISPO:  HF ICU      Patient seen and assessed with Dr. Neto ZARAGOZA personally spent 60  minutes of critical care time directly and personally managing the patient exclusive of separately billable procedures.      __________________________________________  Philomena Worrell MD

## 2025-07-24 NOTE — PROGRESS NOTES
Patient was provided with LVAD and heart transplant consent forms in [place]. Both consents were initially translated using an online translation tool and then reviewed for accuracy by a certified medical  and doctor (Tim Kitchen MD)   certifications are from [facility] and [facility] (number [id number]).     The patient and family were given copies of the documents and offered time to review and ask questions. Will follow up tomorrow to address any additional questions or concerns.     Please see media tab for documents given to patient.

## 2025-07-24 NOTE — PROGRESS NOTES
"Endocrine Progress Note   Felice Law is a 60 y.o. male on day 7 of admission presenting with Acute on chronic systolic heart failure.  Endocrine consulted for T2DM management.    Subjective   No events overnight. He still has high blood sugar reading at night, patient reported that he snacks at night other wise he denies chest pain, abdominal pain, sob, nausea, vomiting, or change in bowel habit.    Objective   Review of Systems   All other systems reviewed and are negative.    Physical Exam    Cardiovascular:      Pulses: Normal pulses.      Heart sounds: Normal heart sounds.   Pulmonary:      Effort: Pulmonary effort is normal.      Breath sounds: Normal breath sounds. No rhonchi.   Abdominal:      General: There is distension.     Musculoskeletal:         General: No swelling.      Right lower leg: Edema present.      Left lower leg: Edema present.     Neurological:      Mental Status: He is alert.       Last Recorded Vitals  Blood pressure 99/63, pulse 86, temperature 36.1 °C (97 °F), resp. rate 14, height 1.803 m (5' 10.98\"), weight 96.4 kg (212 lb 8.4 oz), SpO2 94%.  Intake/Output last 3 Shifts:  I/O last 3 completed shifts:  In: 3533.1 (36.7 mL/kg) [P.O.:2900; I.V.:633.1 (6.6 mL/kg)]  Out: 7775 (80.7 mL/kg) [Urine:7775 (2.2 mL/kg/hr)]  Weight: 96.4 kg     Scheduled medications  Scheduled Medications[1]  Continuous medications  Continuous Medications[2]  PRN medications  PRN Medications[3]     Relevant Results  Results from last 7 days   Lab Units 07/24/25  1248 07/24/25  0757 07/24/25  0314 07/23/25  1931 07/23/25  1819 07/23/25  1750 07/23/25  1142 07/23/25  0826 07/23/25  0450 07/22/25  1744 07/22/25  1618 07/22/25  0737 07/22/25  0550   POCT GLUCOSE mg/dL 216* 235*  --  251* 194*  --  282*   < >  --    < >  --    < >  --    GLUCOSE mg/dL  --   --  285*  --   --  226*  --   --  189*  --  177*  --  171*    < > = values in this interval not displayed.     BMP:  Results from last 7 days   Lab Units " Pt. is now asking for a call in regards to asking for an order for Endoscopy. Please call pt. to advise.             07/24/25  0314 07/23/25  1750 07/23/25  0450   SODIUM mmol/L 134* 134* 136   POTASSIUM mmol/L 4.1 3.9 3.7   CHLORIDE mmol/L 92* 93* 93*   CO2 mmol/L 32 30 32   BUN mg/dL 52* 52* 48*   CREATININE mg/dL 2.62* 2.17* 2.04*   GLUCOSE mg/dL 285* 226* 189*     Assessment  Felice Law is a 60 y.o. male with significant PMH of ICM, HFrEF, EF 15-20%, CABG (LIMA- LAD in 2023),  PCI, VT arrest s/p VT ablation and ICD in 2017, Hypertension, Hyperlipidemia, Hypothyroidism, Left femoral DVT on Eliquis, COPD, JAKE, CKD III and Insulin dependent Type 2 diabetes. He was admitted on 7/12 for further HF management and advanced therapy work up.   Endocrine consulted for managing uncontrolled T2DM. Blood sugar above recommended ranges we will continue to follow and titrate insulin accordingly.     #T2DM  :: Hyperglycemia 2/2 poorly controlled Type 2 diabetes >>> hyperglycemia overnight b/c of insufficient insulin administration.  :: last HbA1c 10.1 (7/17)    # Hypothyroid   :: Thyroid on home dose > Levothyroxine 75mcg  :: TSH 2.12, T4 9.3, T3 105 (7/23) all fall within reference ranges consistent with euthyroidism.  - continue on same home dose for thyroxine.     Plan  - Continue insulin glargine to 44 units BID   - Continue Insulin lispro to 22 units TID with meals  - SS#3 AC and HS  - Accuchecks TIDAC and at bedtime  - 60 gm carb consistent diet  - Goal -180  - Hypoglycemia bundle       Recommendations communicated to primary team. Please reach out incase you have any questions or concerns.     The patient was seen and discussed with attending Dr. Dara Shine MD  Internal medicine, PGY1         [1] [Held by provider] apixaban, 5 mg, oral, BID  atorvastatin, 80 mg, oral, Nightly  [Held by provider] bumetanide, 1 mg, oral, Daily  calcium carbonate, 500 mg of calcium carbonate, oral, BID  dapagliflozin propanediol, 10 mg, oral, Daily  digoxin, 125 mcg, oral, Daily  fenofibrate, 145 mg, oral,  Daily  gabapentin, 200 mg, oral, q8h SUSI  hydrALAZINE, 100 mg, oral, q8h SUSI  insulin glargine, 44 Units, subcutaneous, Nightly  insulin glargine, 44 Units, subcutaneous, Daily  insulin lispro, 0-15 Units, subcutaneous, TID AC  insulin lispro, 22 Units, subcutaneous, TID AC  isosorbide dinitrate, 30 mg, oral, TID  levothyroxine, 75 mcg, oral, Daily before breakfast  metoprolol succinate XL, 100 mg, oral, Daily  pantoprazole, 40 mg, oral, Daily before breakfast  QUEtiapine, 50 mg, oral, Nightly  sacubitriL-valsartan, 1 tablet, oral, BID  spironolactone, 25 mg, oral, Daily  [2] [Held by provider] bumetanide, 1 mg/hr, Last Rate: Stopped (07/24/25 0545)  heparin, 0-4,000 Units/hr, Last Rate: 1,600 Units/hr (07/24/25 0500)  [3] PRN medications: acetaminophen, albuterol, alteplase, dextrose, dextrose, diphenhydrAMINE, glucagon, glucagon, heparin

## 2025-07-24 NOTE — PROGRESS NOTES
HFICU Attending Note    60M with stage D ICM (EF 15-20%) and prior VT arrest s/p ICD, admitted 7/12 to [place] with ADHF and transferred to [facility] HFICU on 7/17 for consideration of advanced therapies. Previously declined AT (10/2023) due to frailty, DM, CKD, and limited support. Victoria placed 7/19 showed preserved CI (2.4) but elevated biventricular filling pressures. HD-guided optimization ongoing with rapid diuresis (>10L net negative), uptitration of GDMT including restart and up-titration of Entresto to 97/103 BID, and metoprolol to 100 mg. A1c 10.1%. Endocrine consulted 7/21. Patient has chronic bilateral foot drop with limited ambulation and is receiving PT/OT. He’s improving, up and walking with walker. Re-eval for LVAD/OHT given recurent HF presentation.    Victoria with low output, start milrinone. Hold diuresis    This critically ill patient continues to be at-risk for clinically significant deterioration / failure due to the above mentioned dysfunctional, unstable organ systems.  I have personally identified and managed all complex critical care issues to prevent aforementioned clinical deterioration.  Critical care time is spent at bedside and/or the immediate area and has included, but is not limited to, the review of diagnostic tests, labs, radiographs, serial assessments of hemodynamics, respiratory status, ventilatory management, and family updates.  Time spent in procedures and teaching are reported separately.    Critical care time: _35___ minutes     Objective    Admit Date: 7/17/2025  Hospital Length of Stay: 7   ICU Length of Stay: 6d 20h   Home: [facility] [id number]    MEDICATIONS  Infusions:  [Held by provider] bumetanide, Last Rate: Stopped (07/24/25 0545)  heparin, Last Rate: 1,600 Units/hr (07/24/25 0500)      Scheduled:  [Held by provider] apixaban, 5 mg, BID  atorvastatin, 80 mg, Nightly  [Held by provider] bumetanide, 1 mg, Daily  calcium carbonate, 500 mg of calcium carbonate,  BID  dapagliflozin propanediol, 10 mg, Daily  digoxin, 125 mcg, Daily  fenofibrate, 145 mg, Daily  gabapentin, 200 mg, q8h SUSI  hydrALAZINE, 100 mg, q8h SUSI  insulin glargine, 44 Units, Nightly  insulin glargine, 44 Units, Daily  insulin lispro, 0-15 Units, TID AC  insulin lispro, 22 Units, TID AC  isosorbide dinitrate, 30 mg, TID  levothyroxine, 75 mcg, Daily before breakfast  metoprolol succinate XL, 100 mg, Daily  pantoprazole, 40 mg, Daily before breakfast  QUEtiapine, 50 mg, Nightly  sacubitriL-valsartan, 1 tablet, BID  spironolactone, 25 mg, Daily      PRN:  acetaminophen, 650 mg, q6h PRN  albuterol, 2.5 mg, q6h PRN  alteplase, 2 mg, PRN  dextrose, 12.5 g, q15 min PRN  dextrose, 25 g, q15 min PRN  diphenhydrAMINE, 50 mg, q5 min PRN  glucagon, 1 mg, q15 min PRN  glucagon, 1 mg, q15 min PRN  heparin, 2,000-4,000 Units, q4h PRN        Prior to Admission Meds:  Prescriptions Prior to Admission[1]    Invasive Hemodynamics:    Most Recent Range Past 24hrs   BP (Art)   No data recorded   MAP(Art)   No data recorded   RA/CVP   No data recorded   PA 57/27 PAP  Min: 42/14  Max: 67/28   PA(mean) 39 mmHg PAP (Mean)  Min: 24 mmHg  Max: 48 mmHg   PCWP 16 mmHg PCWP (mmHg)  Min: 14 mmHg  Max: 19 mmHg   CO 4.9 L/min CO (L/min)  Min: 3.99 L/min  Max: 4.92 L/min   CI 2.2 L/min/m2 CI (L/min/m2)  Min: 1.81 L/min/m2  Max: 2.24 L/min/m2   Mixed Venous 59 % SVO2 (%)  Min: 59 %  Max: 63 %   SVR  1105 (dyne*sec)/cm5 SVR (dyne*sec)/cm5  Min: 1105 (dyne*sec)/cm5  Max: 1623 (dyne*sec)/cm5    (dyne*sec)/cm5 PVR (dyne*sec)/cm5  Min: 270 (dyne*sec)/cm5  Max: 561 (dyne*sec)/cm5     MCS:   Heart Mate III:     Most Recent Range Past 24hrs   Flow   No data recorded   Speed   No data recorded   Power   No data recorded   PI   No data recorded     ECMO:     Most Recent Range Past 24hrs   Flow   No data recorded   Speed   No data recorded   Sweep   No data recorded     Impella:      Most Recent Range Past 24hrs   Performance Level   No data  "recorded   Flow (L/min)   No data recorded   Motor Current   No data recorded   Placement Signal    Placement OK could not be evaluated. This SmartLink does not work with rows of the type: Custom List   Purge (mmHg)   No data recorded   Purge rate (mL/hr)   No data recorded     VENT:    Most Recent Range Past 24hrs   Mode      FiO2   No data recorded   Rate   No data recorded   Vt    No data recorded   PEEP   No data recorded         7/24/2025     7:00 AM 7/24/2025     6:00 AM 7/24/2025     5:00 AM 7/24/2025     4:45 AM 7/24/2025     4:38 AM 7/24/2025     4:00 AM 7/24/2025     3:00 AM   Vitals   Systolic 95 103 101   93 93   Diastolic 63 58 63   57 53   Heart Rate 81 79 79 78 77 79 82   Temp       36.3 °C (97.3 °F)   Resp 13 11 12 11 10 13 12     Visit Vitals  BP 95/63   Pulse 81   Temp 36.3 °C (97.3 °F) (Temporal)   Resp 13   Ht 1.803 m (5' 10.98\")   Wt 96.4 kg (212 lb 8.4 oz)   SpO2 91%   BMI 29.65 kg/m²   Smoking Status Former   BSA 2.2 m²     Wt Readings from Last 5 Encounters:   07/21/25 96.4 kg (212 lb 8.4 oz)   07/17/25 110 kg (243 lb 2.7 oz)   06/10/25 97.1 kg (214 lb)   05/12/25 96.6 kg (213 lb)   05/04/25 102 kg (224 lb)       Intake/Output Summary (Last 24 hours) at 7/24/2025 0740  Last data filed at 7/24/2025 0500  Gross per 24 hour   Intake 2835.33 ml   Output 4950 ml   Net -2114.67 ml     CHEST: Unlabored, Clear, Diminished  CV:  Normal sinus rhythm  ABD:  Soft, Rounded Nontender, No guarding Bowel sounds: All quadrants, Flatus:    EXT:   RLE: Ravinder,Warm, Dry  DP:    PT: Weak  LLE: Ravinder,Warm, Dry  DP:    PT: Weak  NEURO:   RASS: Alert and calm  CAM: Negative  LOC: Alert  Cognition: Follows commands  GCS: 15    DATA:  CMP:  Recent Labs     07/24/25  0314 07/23/25  1750 07/23/25  0450 07/22/25  1618 07/22/25  0550 07/21/25  0520 07/20/25  1232 07/20/25  0556 07/19/25  1925 07/19/25  0530 07/18/25  1708 07/18/25  0157 07/17/25  1119 07/17/25  0653 07/16/25  0655 07/15/25  0708   * 134* 136 134* " 135* 137 135* 138 135* 138   < > 136   < > 137 135* 137   K 4.1 3.9 3.7 4.3 3.8 3.7 3.8 3.6 3.7 4.2   < > 4.7   < > 4.0 4.2 4.3   CL 92* 93* 93* 91* 91* 92* 90* 93* 91* 98   < > 99   < > 98 98 97*   CO2 32 30 32 32 36* 37* 36* 37* 35* 32   < > 29   < > 32 28 29   ANIONGAP 14 15 15 15 12 12 13 12 13 12   < > 13   < > 11 13 15   BUN 52* 52* 48* 50* 42* 35* 37* 38* 39* 43*   < > 49*   < > 47* 53* 55*   CREATININE 2.62* 2.17* 2.04* 2.40* 1.87* 1.61* 1.60* 1.56* 1.81* 1.67*   < > 1.64*   < > 1.70* 1.86* 2.02*   EGFR 27* 34* 37* 30* 41* 49* 49* 51* 43* 47*   < > 48*   < > 46* 41* 37*   MG 2.35  --  2.35  --  2.30 2.39  --  2.55*  --  2.47*  --  2.40  --  2.39 2.58* 2.70*    < > = values in this interval not displayed.     Recent Labs     07/24/25  0314 07/23/25  1750 07/23/25  0450 07/22/25  1618 07/22/25  0550 07/21/25  0520 07/20/25  1232 07/20/25  0556 07/18/25  0157 07/17/25  1119 07/12/25  1922 05/04/25  1447 11/11/24  1558 11/05/24  1146 06/25/24  1139 12/19/23  1104 10/06/23  0515 10/05/23  1647   ALBUMIN 3.5 3.8 3.4 3.8 3.6 3.6 4.0 3.9   < > 3.7 3.8 4.1 4.0 4.4 4.3 4.1   < > 4.1   ALT  --   --   --   --   --   --   --   --   --  8* 10 26 12 13 15 13  --  11   AST  --   --   --   --   --   --   --   --   --  15 12 22 12 <3* 10 10  --  13   BILITOT  --   --   --   --   --   --   --   --   --  2.0* 1.6* 2.1* 0.5 0.5 1.3* 1.1  --  1.8*    < > = values in this interval not displayed.     CBC:  Recent Labs     07/24/25  0314 07/23/25  0450 07/22/25  0550 07/21/25  0520 07/20/25  0556 07/19/25  0530 07/18/25  0157 07/17/25  0653   WBC 5.9 4.4 5.3 5.3 5.1 4.7 5.1 4.7   HGB 11.2* 11.8* 12.0* 12.4* 12.2* 11.2* 11.6* 12.1*   HCT 37.8* 38.0* 38.7* 40.4* 40.2* 38.3* 38.3* 42.4    213 239 234 252 254 241 260   MCV 80 76* 77* 77* 78* 81 77* 82     COAG:   Recent Labs     07/24/25  0314 07/23/25  1230 07/23/25  0658 07/22/25  0550 07/21/25  0520 07/20/25  2329 07/17/25  1423 07/17/25  1119 10/14/23  2208 10/05/23  1647  "  INR  --  1.1  --   --   --   --   --  2.0*  --  1.2*   HAUF 0.3  --  0.3 0.3 0.3 0.4   < >  --    < >  --     < > = values in this interval not displayed.     ABO:   Recent Labs     07/23/25  1230   ABO A     HEME/ENDO:   Recent Labs     07/23/25  1230 07/17/25  1125 07/17/25  1111 05/12/25  1656 11/11/24  1558 11/05/24  1146 06/25/24  1139 12/29/23  1427 10/04/23  1518   FERRITIN  --  102  --   --   --   --  332*  --  379*   IRONSAT  --  16*  --   --   --  37 20*  --  15*   TSH 2.12 3.97  --   --  3.26 4.05* 4.23*   < > 9.31*   HGBA1C  --   --  10.1* 8.9* 11.0* 10.8* 9.2*   < > 8.1*    < > = values in this interval not displayed.     CARDIAC:   Recent Labs     07/23/25  1230 07/17/25  1119 07/13/25  0737 07/12/25  2038 07/12/25  1922 05/04/25  1540 05/04/25  1447 11/05/24  1146 06/25/24  1139 12/19/23  1104 10/05/23  1647     --   --   --   --   --   --   --   --   --  240   TROPHS  --  37 53* 53* 53* 40* 41*  --   --   --   --    * 744*  --   --  1,299*  --  2,032* 57 743* 147* 157*     Recent Labs     07/23/25  2256 07/23/25  1750 07/23/25  1230 07/23/25  0450 07/22/25  2244   LACMX 1.5 1.2 1.3 1.1 1.0   SO2MV 59 55 63 66 66     No results for input(s): \"TACROLIMUS\", \"SIROLIMUS\", \"CYCLOSPORINE\" in the last 24837 hours.  Recent Labs     06/10/25  0831 11/11/24  1558 11/05/24  1146 10/05/23  0354   CHOL 82 630* 417* 106   LDLCALC 24  --   --  32*   HDL 26.8 29.2 36.9 24.5   TRIG 154* 2,663* 2,017* 247*     MICRO: No results for input(s): \"ESR\", \"CRP\", \"PROCAL\" in the last 20653 hours.  Susceptibility data from last 90 days.  Collected Specimen Info Organism   07/19/25 Swab from Anterior Nares Methicillin Susceptible Staphylococcus aureus (MSSA)     Assessment & Plan  Acute on chronic systolic heart failure        EKG:   Recent Labs     07/12/25  1834 05/22/25  0857   ATRRATE 78 86   VENTRATE 78 86   PRINT 168 194   QRSDUR 112 118   QTCFRED 425 440   QTCCALCB 444 466     Encounter Date: 07/12/25 "   ECG 12 lead   Result Value    Ventricular Rate 78    Atrial Rate 78    WI Interval 168    QRS Duration 112    QT Interval 390    QTC Calculation(Bazett) 444    P Axis 85    R Axis 25    T Axis 101    QRS Count 13    Q Onset 215    P Onset 131    P Offset 190    T Offset 410    QTC Fredericia 425    Narrative    Normal sinus rhythm  Possible Left atrial enlargement  Low voltage QRS  Anterolateral infarct (cited on or before 10-OCT-2023)  Abnormal ECG  When compared with ECG of 04-MAY-2025 13:32,  Questionable change in QRS axis     Echocardiogram:   Recent Labs     05/05/25  1223 06/25/24  1100   EF 18 30   LVIDD  --  6.65   RV 58.3 54.4   RVFRWALLPKSP 9.14 8.16   TAPSE 1.6 1.0   Transthoracic Echo (TTE) Limited With Doppler, Color And Contrast 05/05/2025    [facility] acute hospital, [address] [place] [address]  Tel [phone] and Fax [phone]    TRANSTHORACIC ECHOCARDIOGRAM REPORT      Patient Name:       NEGRITA Shukla Physician:    [id number] Bird Askew MD  Study Date:         5/5/2025            Ordering Provider:    [id number] BENNY CARTWRIGHT  MRN/PID:            [id number]            Fellow:  Accession#:         [id number]        Nurse:                Tres Barros RN  Date of Birth/Age:  1965 / 60 years Sonographer:          Alyssa Monroy RDCS  Gender assigned at  M                   Additional Staff:  Birth:  Height:             180.34 cm           Admit Date:           5/4/2025  Weight:             101.61 kg           Admission Status:     Inpatient -  Routine  BSA / BMI:          2.21 m2 / 31.24     Encounter#:           [id number]  kg/m2  Blood Pressure:     116/75 mmHg         Department Location:  [facility]    Study Type:    TRANSTHORACIC ECHO (TTE) COMPLETE  Diagnosis/ICD: Unspecified systolic (congestive) heart failure (CHF)-I50.20;  Ischemic cardiomyopathy-I25.5  Indication:    SOB CP Edema  CPT Code:      Echo  Limited-76129; Doppler Limited-32322; Color Doppler-10327    Patient History:  CABG:              CABG x 1.  Smoker:            Former.  Pacer/Defib:       AICD  Pertinent History: CAD, Cardiomyopathy, CHF, COPD, PVD, Previous DVT, HTN and  Hyperlipidemia. PCI-Stent, S/P CABG x1 2023, CKD III, JAKE.    Study Detail: The following Echo studies were performed: 2D, Doppler and color  flow. Technically challenging study due to body habitus. Definity  used as a contrast agent for endocardial border definition. Total  contrast used for this procedure was 2 mL via IV push.      PHYSICIAN INTERPRETATION:  Left Ventricle: Left ventricular ejection fraction is severely decreased, by visual estimate at 15-20%. There is global hypokinesis of the left ventricle with minor regional variations. The left ventricular cavity size is moderately dilated. Abnormal (paradoxical) septal motion, consistent with RV pacemaker. Spectral Doppler shows a Grade II (pseudonormal pattern) of left ventricular diastolic filling with an elevated left atrial pressure.  Left Atrium: The left atrium is mildly dilated.  Right Ventricle: The right ventricle is mildly enlarged. There is moderately reduced right ventricular systolic function.  Right Atrium: The right atrium is normal in size.  Aortic Valve: The aortic valve is trileaflet. There is minimal aortic valve cusp calcification. There is mild aortic valve thickening. There is no evidence of aortic valve regurgitation. The peak instantaneous gradient of the aortic valve is 6 mmHg.  Mitral Valve: The mitral valve is mildly thickened. There is mild to moderate mitral valve regurgitation.  Tricuspid Valve: The tricuspid valve is structurally normal. There is mild to moderate tricuspid regurgitation. The Doppler estimated RVSP is moderately elevated at 58.3 mmHg.  Pulmonic Valve: The pulmonic valve is structurally normal. There is physiologic pulmonic valve regurgitation.  Pericardium: Trivial  pericardial effusion.  Aorta: The aortic root is normal.  Systemic Veins: The inferior vena cava appears dilated.  In comparison to the previous echocardiogram(s): Compared with study dated 2024, LVEF 15-20%.      CONCLUSIONS:  1. Left ventricular ejection fraction is severely decreased, by visual estimate at 15-20%.  2. There is global hypokinesis of the left ventricle with minor regional variations.  3. Spectral Doppler shows a Grade II (pseudonormal pattern) of left ventricular diastolic filling with an elevated left atrial pressure.  4. Left ventricular cavity size is moderately dilated.  5. Abnormal septal motion consistent with RV pacemaker.  6. There is moderately reduced right ventricular systolic function.  7. Mildly enlarged right ventricle.  8. The left atrium is mildly dilated.  9. Mild to moderate mitral valve regurgitation.  10. Mild to moderate tricuspid regurgitation visualized.  11. Moderately elevated right ventricular systolic pressure.    QUANTITATIVE DATA SUMMARY:    2D MEASUREMENTS:           Normal Ranges:  LVEDV Index:     119 ml/m2      LV SYSTOLIC FUNCTION:  Normal Ranges:  EF-A4C View:    21 % (>=55%)  EF-A2C View:    8 %  EF-Biplane:     16 %  EF-Visual:      18 %  LV EF Reported: 18 %      LV DIASTOLIC FUNCTION:           Normal Ranges:  MV Peak A:             0.65 m/s  (0.42-0.7 m/s)  MV e'                  0.067 m/s (>8.0)  MV lateral e'          0.09 m/s  MV medial e'           0.04 m/s      MITRAL VALVE:          Normal Ranges:  MV DT:        198 msec (150-240msec)      MITRAL INSUFFICIENCY:             Normal Ranges:  MR Vmax:              342.00 cm/s      AORTIC VALVE:          Normal Ranges:  AoV Vmax:     1.19 m/s (<=1.7m/s)  AoV Peak P.7 mmHg (<20mmHg)  LVOT Max Dylan: 0.65 m/s (<=1.1m/s)  LVOT VTI:     6.72 cm      RIGHT VENTRICLE:  TAPSE: 15.5 mm  RV s'  0.09 m/s      TRICUSPID VALVE/RVSP:          Normal Ranges:  Peak TR Velocity:     3.29 m/s  RV Syst Pressure:      58 mmHg  (< 30mmHg)  IVC Diam:             2.40 cm      [id number] Bird Askew MD  Electronically signed on 5/5/2025 at 1:38:04 PM        ** Final **    Coronary Angiography:   Left And Right Heart Catheterization With Left Ventriculography 06/10/2025    [facility], Cath Lab, [address] [place] [address]    Cardiovascular Catheterization Report    Patient Name:      NEGRITA LOO Performing Physician:  [id number] Carl Gillombardo MD  Study Date:        6/10/2025             Verifying Physician:   [id number] Carl Gillombardo MD  MRN/PID:           [id number]              Cardiologist/Co-Scrub:  Accession#:        [id number]          Ordering Provider:     [id number] CARMELITA NAVA  Date of Birth/Age: 1965 / 60 years   Cardiologist:  Gender:            M                     Fellow:                [id number] Kyler Chow MD  Encounter#:        [id number]            Surgeon:      Study:            Left Heart Cath with Grafts  Additional Study: Right Heart Cath      Indications:  NEGRITA LOO is a 60 year old male who presents with prior coronary artery bypass graft surgery, dyslipidemia and hypertension. NEGRITA LOO is a 60 year old male who presents with dyslipidemia, hypertension, prior coronary artery bypass graft surgery and an anginal equivalent chest pain assessment (i.e. dyspnea on exertion believed to be from ischemia). Cardiomyopathy and left ventricular dysfunction. Cardiomyopathy and heart failure.    Procedure Description:  After infiltration with 2% Lidocaine, the right radial artery was cannulated with a modified Seldinger technique. Subsequently a 6 American sheath was placed in the right radial artery. After infiltration of local anesthetic, the right brachial vein was cannulated with a percutaneous technique. A 5 American sheath was placed in the vein. Selective coronary catheterization was performed using a 5 Fr catheter(s) exchanged over a guide wire  to cannulate the coronary arteries. A JL 3.5 tip catheter was used for left coronary injections. A JR 4 tip catheter was used for right coronary injections.  Cardiac output was calculated via the Pallavi method. After completion of the procedure, the arterial sheath was pulled and a TR Band Radial Compression Device was utilized to obtain patent hemostasis. Post-procedure, the venous sheath was pulled and pressure was applied to the site.    Coronary Angiography:  The coronary circulation is right dominant.    Left Main Coronary Artery:  The left main coronary artery is a normal caliber vessel. The left main arises normally from the left coronary sinus of Valsalva and bifurcates into the LAD and circumflex coronary arteries. The left main coronary artery showed no significant disease or stenosis greater than 30%.    Left Anterior Descending Coronary Artery Distribution:  The left anterior descending coronary artery is a normal caliber vessel. The LAD arises normally from the left main coronary artery. The proximal left anterior descending coronary artery showed 70% stenosis. The ostial and proximal 1st diagonal branch revealed 90% stenosis. The 1st septal  branch showed no significant disease or stenosis greater than 30%. The 2nd septal  branch showed no evidence of significant disease. The 3rd septal  branch demonstrated no significant disease or stenosis greater than 30%.    Circumflex Coronary Artery Distribution:  The circumflex coronary artery is a normal caliber vessel. The circumflex arises normally from the left main coronary artery and terminates in the AV groove. The mid circumflex coronary artery showed 50% stenosis. The ostial 1st obtuse marginal branch showed 90% stenosis.    Right Heart Catheterization:  Cardiac output was calculated via the Pallavi method. Elevated left sided filling pressures with normal cardiac output. Severely elevated ventricular filling pressure. Cardiac  output is normal. Preserved cardiac output at rest. RHC:  RA: 25  RV: 63/9  PA: 72/34  PCWP: 32  PA-SAT: 36%  SVC-SAT: 41%  FA-SAT: 71%  CO: 4.84  CI: 2.24  SVR: 1041  Severely elevated right and left sided filling pressures with preserved CO/CI.    Right Coronary Artery Distribution:    The right coronary artery is a normal caliber vessel. The RCA arises normally from the right sinus of Valsalva. The mid right coronary artery showed 100% stenosis. The acute marginal branch showed no significant disease or stenosis greater than 30%.    Coronary Grafts:    LIMA Graft:  Left internal mammary artery graft conduit, originating in situ and attached to the mid left anterior descending, is normal.    Coronary Lesion Summary:  Vessel         Stenosis      Vessel Segment  LAD          70% stenosis       proximal  1st Diagonal 90% stenosis  ostial and proximal  Circumflex   50% stenosis          mid  OM 1         90% stenosis        ostial  RCA          100% stenosis         mid      Graft Stenosis Summary:  Graft     Destination of Graft           % Stenosis  LIMA  mid left anterior descending no evidence of stenosis      Hemo Personnel:  +---------------------------+---------+  Name                       Duty       +---------------------------+---------+  Gillombardo, Carl Barton [id number] MD 1  +---------------------------+---------+      Hemodynamic Pressures:    +----+----------+---------+-------------+-------------+---+----+-------+-------+  SiteDate Time   Phase  Systolic mmHg  Diastolic  ED MeanA-Wave V-Wave                   Name                    mmHg     mmHmmHg mmHg   mmHg                                                      g                     +----+----------+---------+-------------+-------------+---+----+-------+-------+    PA 6/10/2025 AIR REST           72           34     43                      11:05:46                                                                       AM                                                          +----+----------+---------+-------------+-------------+---+----+-------+-------+    PW 6/10/2025 AIR REST                               30     32     32        11:06:54                                                                      AM                                                          +----+----------+---------+-------------+-------------+---+----+-------+-------+    RV 6/10/2025 AIR REST           63            9 23                          11:07:45                                                                      AM                                                          +----+----------+---------+-------------+-------------+---+----+-------+-------+    RA 6/10/2025 AIR REST                               25     31     33        11:09:13                                                                      AM                                                          +----+----------+---------+-------------+-------------+---+----+-------+-------+    AO 6/10/2025 AIR REST          102           75     88                      11:15:08                                                                      AM                                                          +----+----------+---------+-------------+-------------+---+----+-------+-------+    LV 6/10/2025 AIR REST           99           26 33                          11:21:35                                                                      AM                                                          +----+----------+---------+-------------+-------------+---+----+-------+-------+    LV 6/10/2025 AIR REST          102           76 80                          11:21:47                                                                       AM                                                          +----+----------+---------+-------------+-------------+---+----+-------+-------+    AO 6/10/2025 AIR REST           91           70     80                      11:38:01                                                                      AM                                                          +----+----------+---------+-------------+-------------+---+----+-------+-------+        Oxygen Saturation %:  +-----------+----------+------------+  Sample SiteO2 Sat (%)HB (g/100ml)  +-----------+----------+------------+           FA        71        12.5  +-----------+----------+------------+           PA        36        12.5  +-----------+----------+------------+           FA        71        12.5  +-----------+----------+------------+           PA        36        12.5  +-----------+----------+------------+      Cardiac Outputs:  +---------------+------------------+-------+  BLANCA CO (l/min)BLANCA CI (l/min/m2)BLANCA SV  +---------------+------------------+-------+              4.8               2.2   67.2  +---------------+------------------+-------+      Vascular Resistance Calculated Values (Wood Units):  +-----+---+----+-------+----+----+---+----+----+----+-------+  PhasePVRPVRIPVR/SVRSVR SVRITPRTPRITVR TVRITPR/TVR  +-----+---+----+-------+----+----+---+----+----+----+-------+  0    2.75.8 0      [id number]        +-----+---+----+-------+----+----+---+----+----+----+-------+      Cardiac Cath Post Procedure Notes:  Post Procedure Diagnosis: Multivessel CAD.  Blood Loss:               Estimated blood loss during the procedure was 10 mls.  Specimens Removed:        Number of specimen(s) removed: none.    ____________________________________________________________________________________  CONCLUSIONS:  1. Severely elevated  right and left sided filling pressures with preserved CO/CI.  RA: 25 -- RV: 72/9 -- PA: 72/34 -- PCWP: 32    PA-SAT: 36%  SVC-SAT: 41%  FA-SAT: 71%  CO: 4.84  CI: 2.24  2. Right dominant coronary circulation with severe multivessel CAD, as described above.  3. WILKS to LAD patent.    ICD 10 Codes:  Cardiomyopathy, unspecified-I42.9    CPT Codes:  Left Heart Cath Bypass Graft w ventriculography and coronary angio(LHC)-11030; Right Heart Cath O2/Cardiac output without biopsy (RHC)-43545    [id number] Carl Gillombardo MD  Performing Physician  Electronically signed by [id number] Carl Gillombardo MD on 2025 at 3:38:51 PM          ** Final **    Right Heart Cath: No results found for this or any previous visit from the past 1800 days.    Cardiac Scoring: No results found for this or any previous visit from the past 1800 days.    Cardiac MRI:   MR cardiac morphology and function w and wo IV contrast 2023    Narrative  EXAMINATION: MR CARDIAC W/+W/O 2023 02:40 PM    Patient Name: NEGRITA LOO  MRN: [id number]  : 1965  Referring MD: VICK LEONARD  Interpreting MD: Dalton Solares  Resident/Fellow: None  Scan Date: 2023 02:40 PM  Height: ft in or 180cm  Weight: lbs or 112 kg  BSA: 2.3 m^2    Quality of study: Good  CLINICAL INDICATION: Pre-op cardiac assessment  Associated Diagnosis: Coronary artery disease  Technologist Note:  Patient has a pacemaker causing a lot of artifact.  MJI  Comparison:  Correlation: Transthoracic echocardiogram on Exercise stress-rest echocardiogram on Left heart catheterization and coronary angiograms on Nuclear medicine stress-rest perfusion imaging on    TECHNIQUE: Patient questionnaire was completed and was reviewed by MRI personnel prior to the patient entering the scanner. Multiplanar, multisequence MR imaging of the heart was performed without and with intravenous contrast. Cardiac velocity flow mapping was performed.  Siemens Christina 1.5 Effie MRI scanner.  INTRAPROCEDURE  MEDS: Gadoterate Meglumine (DOTAREM) 10 MMOL/20ML solution 35 mL Route: Intravenous Push    FINDINGS:      CARDIAC MRI    CARDIAC CHAMBER MORPHOLOGY  Left ventricle: Dilated cavity size, mixed hypertrophy, no mass/thrombus  Right ventricle: Normal contractility, normal cavity size, no mass/thrombus  Left atrium: Dilated cavity size, no mass/thrombus  Right atrium: Normal cavity size, no mass/thrombus    CARDIAC VALVE MORPHOLOGY  Mitral valve: Normal leaflets, fully mobile, no regurgitation, no stenosis  Tricuspid valve: Normal leaflets, fully mobile, no regurgitation, no stenosis  Aortic valve: Normal leaflets, fully mobile, no regurgitation, no stenosis  Pulmonic Valve: Normal leaflets, fully mobile, no regurgitation, no stenosis    MISCELLANEOUS MORPHOLOGY  Pericardium: Normal    CARDIAC MEASUREMENTS  Anterior left ventricular wall thickness: 1.2 cm (Normal < 1.2 cm)  Posterior left ventricular wall thickness: 1.2 cm (Normal < 1.2 cm)  Left ventricular end-diastolic dimension: 6.3 cm (Normal < 5.7 cm)  Left ventricular end-systolic dimension: 5.3 cm (Normal < 4 cm)  Right ventricular end-diastolic dimension: 3.6 cm (Normal < 4.3 cm)  Left atrial end-diastolic dimension: 4.6 cm (Normal < 3.9 cm)  Left atrial end-diastolic area: 29 cm^2 (Normal < 22 cm^2)  Right atrial end-diastolic dimension: 4.8 cm (Normal < 4.7 cm)  Right atrial end-diastolic area: 20 cm^2 (Normal < 20 cm^2)    Ascending aorta (at the level of the main pulmonary artery): 2.8 cm  Pulmonary Artery: 2.1 cm (Normal < 2.9 cm)      LEFT VENTRICULAR FUNCTION  The left ventricle is severely dilated with focally upon globally abnormal systolic function. Focal left ventricular systolic dysfunction consists of dyskinesia (aneurysm) of the apex  LV volumes absolute and normalized to body surface area with the age and gender matched normals in parenthesis are listed as follows.  LVEF: 22 % (62 +/- 6 (51-73)%)  SV: 77 ml  SVI: 33 ml/m2 (46 +/- 8 (30-62)  ml/m2)  CO: 5.5 L/min  CI: 2.4 L/min/m2  EDV: 346 ml  EDVI: 149 ml/m2 (74 +/- 13 ()ml/m2)  ESV: 269 ml  ESVI: 116 ml/m2 (28 +/- 7 (14-43) ml/m2)  LVM: 218 g  LVMI: 96 g/m2 (62 +/- 10 (43-81) g/m2)    RIGHT VENTRICULAR FUNCTION  The right ventricle is normal in size and systolic function.  Right ventricular volumes absolute and normalized to body surface area with the age and gender matched normals in parenthesis are listed as follows.  RVEF: 47 % (53 +/- 6 (41-66) %)  EDV: 154 ml  EDVI: 66 ml/m2 (86 +/- 17 () ml/m2)  ESV: 81 ml  ESVI: 35 ml/m2 (41 +/- 11 (19-63) ml/m2)    There is marked susceptibility artifact which limits delayed postgadolinium imaging/scar assessment. This is especially important. In the LAD distribution. Scar is seen at the left ventricular apex including the lateral and inferior walls and appears to be more pronounced in the mid to apical lateral segments. The LAD distribution is not adequately visualized because of marked susceptibility artifact from the implanted cardiac device.      Velocity flow mapping: Completed at the aortic valve. There is no evidence of aortic valve stenosis or regurgitation.    NONCARDIAC FINDINGS: Not significant      IMPRESSION:  1. Severe left ventricular dilatation with focally upon globally abnormal left ventricular systolic function with an ejection fraction of 22%. Infarctions seen in the left circumflex and RCA distribution and the apex. Full assessment of viability especially in the LAD distribution could not be performed because of marked susceptibility artifact from the implanted cardiac device which involves the entire anterior, anterolateral and anteroseptal regions. Consider PET scanning.  2. Dilated left atrium  3. Normal right ventricular size and systolic function    The pulse sequences used were designed for imaging cardiovascular structures and are suboptimal for imaging other structures and organs.    MACRO: None    Nuclear:  NM PET CT  myocardial perfusion multiple 05/14/2025    Narrative  Interpreted By:  Ebony Sky,  STUDY:  NM PET CT MYOCARDIAL PERFUSION MULTIPLE;  5/14/2025 12:21 pm    INDICATION:  Signs/Symptoms:chest pain, HFrEF, ischemic cardiomyopathy.    ,I50.22 Chronic systolic (congestive) heart failure,I25.5 Ischemic  cardiomyopathy,I50.23 Acute on chronic systolic (congestive) heart  failure,N18.30 Chronic kidney disease, stage 3 unspecified  (Multi),E11.65 Type 2 diabetes mellitus with hyperglycemia  (Multi),Z79.4 Long term (current) use of insulin (Multi),Z95.810  Presence of automatic (implantable) cardiac defibrillator,E08.22  Diabetes mellitus due to underlying condition with diabetic chronic  kidney disease,N18.30 Chronic kidney disease, stage 3 unspecified  (Multi),Z79.4 Long term (current) use of insulin (Multi)    COMPARISON:  None.    ACCESSION NUMBER(S):  [id number]    ORDERING CLINICIAN:  CARMELITA NAVA    TECHNIQUE:  DIVISION OF NUCLEAR MEDICINE  POSITRON EMISSION TOMOGRAPHY (PET) / CT  NITROGEN-13 AMMONIA MYOCARDIAL PERFUSION SCAN, WITH PHARMACOLOGIC  STRESS    The patient received an intravenous dose of 10.9mCi of N-13 ammonia  and resting positron emission tomographic (PET) images of the  myocardium were acquired. The patient then received an intravenous  infusion of 0.4 mg of Regadenoson followed by a second dose of 13.3  mCi of N-13 ammonia. Stress phase PET images of the myocardium were  then acquired. These included ECG-gated images to assess and quantify  ventricular function. Low-dose noncontrast CT of the thorax was  performed both before the resting NH3 and after the stress NH3 PET  image acquisitions for purposes of attenuation correction of the PET  data.    FINDINGS:  The left ventricular cavity is severely dilated in both rest and post  stress images. There is abnormal dilation and thickening and  visualization of right ventricular cavity in both rest and stress  images. The stress tomographic  images demonstrate markedly abnormal  myocardial perfusion. There is a large moderate to severe perfusion  defect involving the apex, anterior, lateral, and inferolateral wall,  there is relatively preserved perfusion of the septum. On rest images  there is a significantly reversibility noted involving the anterior  wall, lateral wall and inferoseptal wall, however, there is no  significant reversibility of the apex The left ventricular cavity is  dilated on gated images with severe global hypokinesis with severely  impaired left ventricular systolic function in both rest and stress  images, 17%    Impression  High-risk N13 PET-CT myocardial perfusion study  This finding consistent with severe ischemic cardiomyopathy with  significantly induced ischemia involving the anterior wall, lateral  wall and inferolateral wall with small apical infarction, a pattern  consistent with triple-vessel disease, the presence of significant  reversibility on rest images may favoring good response to coronary  revascularization The possibility of subendocardial infarction can  not be totally excluded, further evaluation of myocardial viability  by FDG PET or cardiac MRI could be considered, if this will change  patient management    I personally reviewed the image(s) / study and agree with the  findings and interpretation as stated. This study was interpreted at  [facility].    MACRO:    Critical Finding:  See findings. Notification was initiated on  5/14/2025 at 1:24 pm by  Ebony Sky.  (**-OCF-**) Instructions:    None    Signed by: Ebony Sky 5/14/2025 1:25 PM  Dictation workstation:   [id number]    Metabolic Stress: No results found for this or any previous visit from the past 1800 days.      Imaging  CT chest abdomen pelvis wo IV contrast  Result Date: 7/23/2025  Chest 1.  Mild diffuse interlobular interstitial thickening in both lungs likely representing pulmonary edema. 2.  Cardiomegaly. 3. Support devices as described above.   Abdomen-Pelvis 1.  No acute process in the abdomen and pelvis. 2. Hepatosplenomegaly. 3. Cholelithiasis. Mild pericholecystic fluid is likely related to fluid overload. 4. Left renal calculus without hydronephrosis.     MACRO: None     Dictation workstation:   [id number]    XR chest 2 views  Result Date: 7/23/2025  1.  Interval retraction of a right IJ [place] catheter with tip now projecting over the right lower lobe segmental branches. 2. Mild pulmonary edema. 3. Additional medical devices as above.   I personally reviewed the images/study and I agree with the findings as stated by resident physician Cam Sommers MD. This study was interpreted at [place], [place].   MACRO: None   Signed by: Allyssa Pedraza 7/23/2025 4:45 PM Dictation workstation:   [id number]    XR chest 1 view  Result Date: 7/23/2025  Minimal improvement of aeration of the both lungs. Stable AICD, placement. [place] catheter with the tip overlies the right lower lobe pulmonary artery branch, unchanged. Cardiac silhouette is mildly enlarged. Pulmonary vessels are congested Mild pulmonary edema/fluid overload of the both lungs. Slightly improved since last exam. Small bilateral pleural effusion. No pneumothorax seen. Bony thorax unremarkable.   MACRO: None   Signed by: Allsysa Pedraza 7/23/2025 12:21 PM Dictation workstation:   [id number]    XR chest 1 view  Result Date: 7/22/2025  1.  New fluid visualized within the minor fissure and similar appearance of bilateral interstitial and airspace opacities, favored to represent pulmonary edema. Additional considerations include ARDS and pulmonary hemorrhage. 2. Medical devices as above.   I personally reviewed the images/study and I agree with the findings as stated by resident physician Cam Sommers MD. This study was interpreted at [facility],  [place], [place].   MACRO: None   Signed by: Allyssa Pedraza 7/22/2025 2:14 PM Dictation workstation:   [id number]    XR chest 1 view  Result Date: 7/21/2025  1. Right IJ [place] catheter with distal tip projecting over the distal right interlobar artery. 2. Similar interstitial edema and trace bilateral pleural effusions. 3. Additional medical devices as above.   I personally reviewed the images/study and I agree with the findings as stated by resident physician Cam Sommers MD. This study was interpreted at [place], [place].   MACRO: None   Signed by: Allyssa Pedraza 7/21/2025 11:54 AM Dictation workstation:   [id number]    XR chest 1 view  Result Date: 7/20/2025  1. Right IJ approach [place] catheter has been slightly advanced, the tip of which now overlies right lower lobe pulmonary artery, approximately 2 cm distal to right hilum. Repositioning recommended. 2. No significant change in lung aeration with questionable interstitial edema and trace bilateral pleural effusions/bibasilar atelectasis. 3. Rest of medical devices as above.   Signed by: Claudio Yancey 7/20/2025 11:03 AM Dictation workstation:   [id number]    XR chest 1 view  Result Date: 7/19/2025  1. Medical devices as above. Of note, new right IJ approach [place]-Talita catheter with its tip projecting over right interlobar pulmonary artery, at the level of right hilum. 2. No significant change in mild interstitial edema and suspected trace bilateral pleural effusions/bibasilar atelectasis.   I personally reviewed the images/study and I agree with the findings as stated by resident physician Miky Gtz MD. This study was interpreted at [facility], [place], [place].   MACRO: None   Signed by: Claudio Yancey 7/19/2025 4:00 PM Dictation workstation:   [id number]    XR chest 1 view  Result Date: 7/17/2025  1.  Similar mild interstitial edema. No new focal consolidation or  pneumothorax. 2. Trace bilateral pleural effusions. 3. Medical devices as above.   I personally reviewed the images/study and I agree with the findings as stated by resident physician Cam Sommers MD. This study was interpreted at [facility], [place], [place].   MACRO: None   Signed by: Kristi Buenrostro 7/17/2025 12:00 PM Dictation workstation:   [id number]      Cardiology, Vascular, and Other Imaging  Vascular US carotid artery duplex bilateral  Result Date: 7/23/2025            [place] [address]   Tel [phone] and Fax [phone]  Vascular Lab Report VASC US CAROTID ARTERY DUPLEX BILATERAL  Patient Name:     NEGRITA Shukla           [id number] [address]OO                 Physician:        MD Study Date:       7/23/2025            Ordering          [id number]                                        Physician: MRN/PID:          [id number]             Technologist:     Rajani Varela S Accession#:       [id number]         Technologist 2: Date of           1965 / 60 years  Encounter#:       [id number] Birth/Age: Gender:           M Admission Status: Inpatient            Location          [facility]                                        Performed:  Diagnosis/ICD: Encounter for preprocedural cardiovascular examination-Z01.810 Indication:    Pre-Op OHT/LVAD CPT Codes:     82857 Cerebrovascular Carotid Duplex scan complete  Patient History CAD, HTN and Hyperlipidemia. 10-9-2023 Both ICA <50% stenosis.  CONCLUSIONS: Left Carotid: Findings are consistent with less than 50% stenosis of the left proximal internal carotid artery. Left external carotid artery appears patent with no evidence of stenosis. The left vertebral artery is patent with antegrade flow. No evidence of hemodynamically significant stenosis in the left subclavian artery.  Comparison: Compared with  study from 10/9/2023, no significant change.The right CCA and ICA not scaned due to IV lines in right side of neck.  Additional Findings: Unable to scan right CCA and ICA due to IV lines in right side of neck.  Imaging & Doppler Findings: Right Plaque Morph: The proximal right internal carotid artery demonstrates heterogenous plaque. The proximal right common carotid artery demonstrates heterogenous and calcified plaque. The mid right common carotid artery demonstrates heterogenous and calcified plaque. The distal right common carotid artery demonstrates heterogenous and calcified plaque.  Right                  Left  PSV  EDV              PSV      EDV             CCA P    79 cm/s             CCA D    51 cm/s             ICA P    52 cm/s  13 cm/s             ICA M    57 cm/s  17 cm/s             ICA D    64 cm/s  22 cm/s              ECA     107 cm/s           Vertebral  28 cm/s  7 cm/s           Subclavian 94 cm/s               Left ICA/CCA Ratio 1.0   [id number] Marquita Brown MD Electronically signed by [id number] Marquita Brown MD on 7/23/2025 at 6:27:44 PM  ** Final **     Vascular US Ankle Brachial Index (MARK) Without Exercise  Result Date: 7/23/2025  Preliminary Cardiology Report           [place] [address]   Tel [phone] and Fax [phone]            Preliminary Vascular Lab Report  [facility] US ANKLE BRACHIAL INDEX (MARK) WITHOUT EXERCISE  Patient Name:     NEGRITA BERNARD     Reading           [address] [address]OO                 Physician:        MD Study Date:       7/23/2025            Ordering          [id number]                                        Physician: MRN/PID:          [id number]             Technologist:     Rajani JAFFE Accession#:       [id number]         Technologist 2: Date of           1965             Encounter#:       [id number] Birth/Age: Gender:           M Admission Status: Inpatient             Location          [facility]                                        Performed:  Diagnosis/ICD: Encounter for preprocedural cardiovascular examination-Z01.810 Indication:    OHT/LVAD Procedure/CPT: 97377 Peripheral artery MARK Only  Patient History: Anticoagulation, HTN and Hyperlipidemia. 10-9-2023 MARK's no                  evidence of arterial disease in BLEA.  PRELIMINARY CONCLUSIONS: Right Lower PVR: Evidence of mild arterial occlusive disease in the right lower extremity at rest. Right pressures of >220 mmHg suggest no compressibility of vessels and may make absolute Segmental Limb Pressures (SLP) unreliable. Decreased digital perfusion noted. Multiphasic flow is noted in the right common femoral artery, right posterior tibial artery and right dorsalis pedis artery. Left Lower PVR: Evidence of mild arterial occlusive disease in the left lower extremity at rest. Left pressures of >220 mmHg suggest no compressibility of vessels and may make absolute Segmental Limb Pressures (SLP) unreliable. Multiphasic flow is noted in the left posterior tibial artery, left dorsalis pedis artery and left common femoral artery.  Imaging & Doppler Findings:  RIGHT Lower PVR                Pressures Ratios Right Posterior Tibial (Ankle) 220 mmHg  2.22 Right Dorsalis Pedis (Ankle)   204 mmHg  2.06 Right Digit (Great Toe)        58 mmHg   0.59                      Right Brachial Pressure 99 mmHg   VASCULAR PRELIMINARY REPORT completed by Rajani JAFFE on 7/23/2025 at 3:13:35 PM  ** Final **     Vascular US aorta iliac duplex limited  Result Date: 7/23/2025  Preliminary Cardiology Report           [place] [address]   Tel [phone] and Fax [phone]   Preliminary Vascular Lab Report  VASC US AORTA ILIAC DUPLEX LIMITED  Patient Name:     NEGRITA BERNARD     Gayla           [address] [address] LOO                 Physician:        MD Study Date:        7/23/2025            Ordering          [id number]                                        Physician: MRN/PID:          [id number]             Technologist:     Rajani JAFFE Accession#:       [id number]         Technologist 2: Date of           1965             Encounter#:       [id number] Birth/Age: Gender:           M Admission Status: Inpatient            Location          [facility]                                        Performed:  Diagnosis/ICD: Encounter for preprocedural cardiovascular examination-Z01.810 Indication:    Pre-Op OHT/LVAD Procedure/CPT: 61886 Duplex Aorta/IVC/Iliac/Bypass Graft  Patient History: CAD, HTN and Hyperlipidemia. 10- No AAA.  PRELIMINARY CONCLUSIONS: Aorta/Common Iliac Arteries/IVC: The abdominal aorta and bilateral common iliac arteries demonstrate no evidence of aneurysm.  Imaging & Doppler Findings:   AORTA     AP    Lateral    PSV Proximal 2.14 cm 1.96 cm 95.0 cm/s   Mid    1.86 cm 1.89 cm 72.0 cm/s  Distal  1.61 cm 1.70 cm 52.0 cm/s    RIGHT       AP    Lateral    PSV GIO Proximal 1.29 cm 1.10 cm 70.00 cm/s     LEFT       AP     Lateral     PSV GIO Proximal 1.01 cm 101.00 cm 70.30 cm/s  VASCULAR PRELIMINARY REPORT completed by Rajani JAFFE on 7/23/2025 at 3:35:02 PM  ** Final **              LDA:  Introducer 07/19/25 Internal jugular Right (Active)   Placement Date/Time: 07/19/25 1300   Hand Hygiene Completed: Yes  Location: Internal jugular  Orientation: Right  Placed by: Shelly ROLON and Jamin ROLON  Placement Verified: X-ray   Number of days: 4       Pulmonary Artery Catheter Internal jugular (Active)   Placement Date/Time: 07/19/25 1300   Hand Hygiene Performed Prior to CVC Insertion: Yes  Site Prep: Usual sterile procedure followed  Site Prep Agent has Completely Dried Before Insertion: Yes  All 5 Sterile Barriers Used (Gloves, Gown, Cap, Mask, Lar...   Number of days: 4     NUTRITION: Adult diet Consistent Carb; CCD 60 gm/meal; 2000 mL  fluid  EMERGENCY CONTACT: Extended Emergency Contact Information  Primary Emergency Contact: Aracelis Law  Address: [address] [place] [address] [facility]  Home Phone: [phone]  Mobile Phone: [phone]  Relation: Mother  Secondary Emergency Contact: Robinson Contreras  Mobile Phone: [phone]  Relation: Friend  CODE STATUS: Full Code  DISPO: Discharge Planning  Living Arrangements: Parent, Friends, Family members  Support Systems: Children, Parent, Family members  Assistance Needed: Patient uses a walker and cane  Type of Residence: Private residence  Do you have animals or pets at home?: Yes  Type of Animals or Pets: 2 cats  Who is requesting discharge planning?: Provider  Home or Post Acute Services: In home services  Expected Discharge Disposition: Home  Does the patient need discharge transport arranged?: Yes  FOLLOWUP:   Future Appointments   Date Time Provider Department Center   7/24/2025 10:00 AM [facility] [id number] PFT  2 [id number] [facility]   8/12/2025  1:00 PM Sotero Giles MD [id number] [place]   9/2/2025  3:30 PM Jono Duque MD [id number] [facility]   9/17/2025 12:30 PM Diana Salazar MD [id number] [facility]   10/1/2025 10:30 AM Anna Rodriguez MD PhD [id number] [facility]   12/4/2025  9:20 AM Randy Brunosn MD [id number] [facility]                        [1]   Medications Prior to Admission   Medication Sig Dispense Refill Last Dose/Taking    albuterol 2.5 mg /3 mL (0.083 %) nebulizer solution Use every 4-6 hours as needed for shortness of breath 75 mL 1     atorvastatin (Lipitor) 80 mg tablet 80mg nightly 90 tablet 2     blood-glucose sensor (FreeStyle Franco 3 Plus Sensor) device Change every 15 days 2 each 11     bumetanide (Bumex) 1 mg tablet Take 1 tablet (1 mg) by mouth once daily. 90 tablet 3     digoxin (Lanoxin) 125 MCG tablet Take 1 tablet (125 mcg) by mouth once daily. 90 tablet 3     Eliquis 5 mg tablet TOME 1 TABLETA POR LA BOCA DOS  VECES AL CUBA  "200 tablet 2     Farxiga 10 mg tablet Take 1 tablet (10 mg) by mouth once daily. 90 tablet 3     fenofibrate (Tricor) 145 mg tablet Take 1 tablet (145 mg) by mouth once daily. 90 tablet 3     FreeStyle Franco 3 [place] misc Use as instructed 1 each 0     gabapentin (Neurontin) 300 mg capsule TOME 1 CAPSULA POR LA BOCA NITIN  VEZ AL CUBA EN LA MANANA 90 capsule 3     hydrALAZINE (Apresoline) 100 mg tablet TOME 1 TABLETA POR LA BOCA 3  VECES AL CUBA 300 tablet 2     insulin glargine (Toujeo Max U-300 SoloStar) 300 unit/mL (3 mL) pen 40 units twice a day 30 mL 3     insulin lispro (HumaLOG KwikPen Insulin) 100 unit/mL pen 18 units with meals plus sliding scale up to 80 units daily 45 mL 3     isosorbide dinitrate (Isordil) 40 mg tablet Take 1 tablet (40 mg) by mouth 3 times a day. 270 tablet 3     lancets (Lancets,Thin) misc 4 times daily (before meals and at bedtime). use as directed to test blood sugar 100 each 3     levothyroxine (Synthroid, Levoxyl) 75 mcg tablet TOME 1 TABLETA POR LA BOCA NITIN  VEZ AL CUBA 100 tablet 3     metoprolol succinate XL (Toprol-XL) 100 mg 24 hr tablet Take 2 tablets (200 mg) by mouth once daily.       OneTouch Ultra Test REVISE EL NIVEL DE GLUCOSA EN LA PEARL CUATRO VECES AL CUBA 400 strip 1     pantoprazole (ProtoNix) 40 mg EC tablet Take 1 tablet (40 mg) by mouth once daily in the morning. Take before meals. Do not crush, chew, or split.      TOME 1 TABLETA POR LA BOCA NITIN  VEZ AL CUBA EN LA MANANA TOME  ANTES DE NITIN COMIDA (NO TRITURE, MASTIQUE NI DIVIDA) 90 tablet 1     pen needle, diabetic 31 gauge x 5/16\" needle For insulin injection 5x/day 500 each 3     QUEtiapine (SEROquel) 50 mg tablet Take 1 tablet (50 mg) by mouth once daily at bedtime. 100 tablet 2     [Paused] sacubitriL-valsartan (Entresto)  mg tablet Take 1 tablet by mouth 2 times a day. (Patient not taking: Reported on 7/13/2025) 180 tablet 3     [Paused] spironolactone (Aldactone) 25 mg tablet TOME 2 TABLETAS POR LA BOCA " NITIN DOYLE (Patient not taking: Reported on 7/13/2025) 200 tablet 2     tirzepatide (Mounjaro) 5 mg/0.5 mL pen injector Inject 5 mg under the skin every 7 days. 2 mL 11

## 2025-07-24 NOTE — CARE PLAN
The patient's goals for the shift include      Problem: Pain - Adult  Goal: Verbalizes/displays adequate comfort level or baseline comfort level  Outcome: Progressing  Flowsheets (Taken 7/24/2025 0011)  Verbalizes/displays adequate comfort level or baseline comfort level:   Encourage patient to monitor pain and request assistance   Assess pain using appropriate pain scale   Administer analgesics based on type and severity of pain and evaluate response   Implement non-pharmacological measures as appropriate and evaluate response   Consider cultural and social influences on pain and pain management   Notify Licensed Independent Practitioner if interventions unsuccessful or patient reports new pain     Problem: Safety - Adult  Goal: Free from fall injury  Outcome: Progressing  Flowsheets (Taken 7/22/2025 1957)  Free from fall injury:   Instruct family/caregiver on patient safety   Based on caregiver fall risk screen, instruct family/caregiver to ask for assistance with transferring infant if caregiver noted to have fall risk factors     Problem: Chronic Conditions and Co-morbidities  Goal: Patient's chronic conditions and co-morbidity symptoms are monitored and maintained or improved  Outcome: Progressing  Flowsheets (Taken 7/22/2025 1957)  Care Plan - Patient's Chronic Conditions and Co-Morbidity Symptoms are Monitored and Maintained or Improved:   Monitor and assess patient's chronic conditions and comorbid symptoms for stability, deterioration, or improvement   Collaborate with multidisciplinary team to address chronic and comorbid conditions and prevent exacerbation or deterioration   Update acute care plan with appropriate goals if chronic or comorbid symptoms are exacerbated and prevent overall improvement and discharge     Problem: Nutrition  Goal: Nutrient intake appropriate for maintaining nutritional needs  Outcome: Progressing     Problem: Heart Failure  Goal: Improved gas exchange this shift  Outcome:  Progressing  Flowsheets (Taken 7/22/2025 1957)  Improved gas exchange this shift:   Assist with pulmonary hygiene and secretion clearance   Prepare for use of devices/procedures to correct dysrhythmia   Position to promote circulation/maximize ventilation     Problem: Skin  Goal: Participates in plan/prevention/treatment measures  Outcome: Progressing  Flowsheets (Taken 7/22/2025 1957)  Participates in plan/prevention/treatment measures:   Elevate heels   Increase activity/out of bed for meals     The clinical goals for the shift include HDS.

## 2025-07-24 NOTE — SIGNIFICANT EVENT
Was called by RN to evaluate patient for uncontrollable shaking/ shivering.   Evaluation reveals AOO x 3 patient in no acute distress bilateral UE shaking/shivering is observed Patient endorses sudden onset headache.. No focal Neurological symptoms.   Temp is WNL    Ct head ordered.  Analgesia provided.  Will follow up.    Philomena Worrell MD

## 2025-07-24 NOTE — CONSULTS
Inpatient consult to Palliative Care  Consult performed by: MARLON Jc-CNP  Consult ordered by: Ofelia Lemus PA-C          Palliative Medicine Consult  Complex medical decision making, symptom management, patient/family support    History obtained from chart review including ED note, H&P, patient's daily progress notes, review of lab/test results, and discussion with primary team and bedside RN.    Subjective    History of Present Illness  Felice Law is a 60 y.o. male with significant PMH of ICM, HFrEF, EF 15-20%, CABG (LIMA- LAD in 2023),  PCI, VT arrest s/p VT ablation and ICD in 2017, Hypertension, Hyperlipidemia, Hypothyroidism, Left femoral DVT on Eliquis, COPD, JAKE, CKD III, Chronic BLE venous stasis dermatitis, Insulin dependent Type 2 diabetes who was initially admitted 7/12 to [facility] for acute decompensate heart failure. He was diuresed and recommended for transfer to [facility] HF ICU for further managemnet and possible re-consider of advanced therapy work up.  Of notes, Patient was previously discussed for AT during [facility] [place] admission but declined with concerns for mobility/frailty (uses motorized scooter and has bilateral foot drop with neuropathy), CKD, poorly controlled diabetes as well as unclear social/financial support).      Transferred to HF ICU 7/17, he was warm and wet,  lasix IV bolus given and drip initiated. Eliquis on hold, heparin drip initiated. Bedside SG done 7/19, opening swan numbers: BP: 133/74 (94),CVP 9, PA 74/29 (44), PCWP 24, CO/CI (Pallavi) 5.4/2.4, Thermo: 4.6/2.1, SVR 1263, SVO2 63% on hydral /Isordil 100/40 mg TID, Metop 50 mg daily, Dapa 10 mg daily, Dig 125 mcg PO daily, Lasix drip at 20 mg /hr. Entresto re-introduce and up titrated. Lasix drip d/c'd currently on intermittent IVP.  Hg A1c 10.1 this admission, now considering advanced therapy evaluation. Advanced therapies evaluation initiated 7/23.       Introduction to Palliative Medicine  Met with  "pt at bedside. NORMA INTERPRETATION/TRANSLATION TOOL USED DURING ENTIRETY OF CONSULTATION.  Patient alert and oriented, has capacity to make their own medical decisions at this time.   Staff present: Blanca Gan CNP, Judit PENDLETON.  Palliative Medicine was introduced as a specialty service for patients with serious illness to help with symptom management, improve quality of life, assist with goals of care conversations, navigate complex decision making, and provide support to patients and families. Support and empathy was provided throughout the encounter. Provided reflective listening and presence.     Symptoms  Pain: LLE, shin, intermittent. Started today. Dull.   Dyspnea: denies  Fatigue: denies  Insomnia: denies  Drowsiness: denies  Constipation: denies  Nausea: denies  Appetite: good  Anxiety: yes, being away from home  Depression: yes, being away from home    Palliative Medicine Social History: (Norma  used.)  The patient is not  and does not have any children. He lives with his mother (82), sister, and friend(s). 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 Pt has not been working now related to illness. He is a caregiver to his mother and is very close/bonded. For fun/enjoyment, pt likes to be with his family and watch tv, be on his phone/facebook, and sit in the yard. Pt does not follow a particular carl.     Objective    Last Recorded Vitals  /66   Pulse 81   Temp 36.1 °C (97 °F)   Resp 13   Ht 1.803 m (5' 10.98\")   Wt 96.4 kg (212 lb 8.4 oz)   SpO2 94%   BMI 29.65 kg/m²      Physical Exam  Constitutional:       Appearance: He is well-developed. He is ill-appearing.   HENT:      Mouth/Throat:      Pharynx: Oropharynx is clear.     Cardiovascular:      Rate and Rhythm: Normal rate. Rhythm irregular.   Pulmonary:      Effort: Pulmonary effort is normal.      Breath sounds: Decreased breath sounds present.   Abdominal:      Palpations: Abdomen is soft.     Musculoskeletal:    "      General: Normal range of motion.      Right lower leg: Edema present.      Left lower leg: Edema present.     Skin:     General: Skin is warm and dry.      Comments: BLE redness     Neurological:      General: No focal deficit present.      Mental Status: Mental status is at baseline.     Psychiatric:         Attention and Perception: Attention normal.         Mood and Affect: Mood is depressed. Affect is flat.         Speech: Speech normal.         Behavior: Behavior is cooperative.         Thought Content: Thought content normal.         Cognition and Memory: Cognition normal.         Judgment: Judgment normal.          Relevant Results  Results for orders placed or performed during the hospital encounter of 07/17/25 (from the past 24 hours)   POCT GLUCOSE   Result Value Ref Range    POCT Glucose 282 (H) 74 - 99 mg/dL   Blood Gas Mixed Venous Full Panel   Result Value Ref Range    POCT pH, Mixed 7.40 7.33 - 7.43 pH    POCT pCO2, Mixed 55 (H) 41 - 51 mm Hg    POCT pO2, Mixed 38 35 - 45 mm Hg    POCT SO2, Mixed 63 45 - 75 %    POCT Oxy Hemoglobin, Mixed 61.1 45.0 - 75.0 %    POCT Hematocrit Calculated, Mixed 39.0 (L) 41.0 - 52.0 %    POCT Sodium, Mixed 129 (L) 136 - 145 mmol/L    POCT Potassium, Mixed 4.5 3.5 - 5.3 mmol/L    POCT Chloride, Mixed 94 (L) 98 - 107 mmol/L    POCT Ionized Calcium, Mixed 1.20 1.10 - 1.33 mmol/L    POCT Glucose, Mixed 271 (H) 74 - 99 mg/dL    POCT Lactate, Mixed 1.3 0.4 - 2.0 mmol/L    POCT Base Excess, Mixed 7.6 (H) -2.0 - 3.0 mmol/L    POCT HCO3 Calculated, Mixed 34.1 (H) 22.0 - 26.0 mmol/L    POCT Hemoglobin, Mixed 13.1 (L) 13.5 - 17.5 g/dL    POCT Anion Gap, Mixed 5 (L) 10 - 25 mmo/L    Patient Temperature 37.0 degrees Celsius    FiO2 32 %    Apparatus CANNULA    Type And Screen   Result Value Ref Range    ABO TYPE A     Rh TYPE POS     ANTIBODY SCREEN NEG    Anti-Cardiolipin Antibody (IgA, IgG, and IgM)   Result Value Ref Range    Anticardiolipin IgA 0.6 <20.0 APL U/mL     Anticardiolipin IgG <1.6 <20.0 GPL U/mL    Anticardiolipin IgM 2.2 <20.0 MPL U/mL   B-Type Natriuretic Peptide   Result Value Ref Range     (H) 0 - 99 pg/mL   Coagulation Screen   Result Value Ref Range    Protime 12.2 9.8 - 12.4 seconds    INR 1.1 0.9 - 1.1    aPTT 54 (H) 26 - 36 seconds   Cytomegalovirus IgG   Result Value Ref Range    Cytomegalovirus IgG Reactive (A) Nonreactive   Zen-Barr Virus Antibody Panel   Result Value Ref Range    EBV VCA, IgG  Positive (A) Negative    EBV VCA, IgM  Negative Negative    EBV Early Antigen Antibody, IgG Negative Negative    EBV Nuclear Antigen Antibody, IgG Positive (A) Negative   Hepatitis A Antibody, Total   Result Value Ref Range    Hepatitis A  AB-Total Reactive (A) Nonreactive   Hepatitis B Core Antibody, Total   Result Value Ref Range    Hepatitis B Core AB- Total Nonreactive Nonreactive   Hepatitis B Surface Antibody   Result Value Ref Range    Hepatitis B Surface AB 7.9 <10.0 mIU/mL   Hepatitis B Surface Antigen   Result Value Ref Range    Hepatitis B Surface AG Nonreactive Nonreactive   Hepatitis C Antibody   Result Value Ref Range    Hepatitis C AB Nonreactive Nonreactive   HIV 1/2 Antigen/Antibody Screen with Reflex to Confirmation   Result Value Ref Range    HIV 1/2 Antigen/Antibody Screen with Reflex to Confirmation Nonreactive Nonreactive   HSV1 IgG and HSV2 IgG   Result Value Ref Range    HSV 1, IgG >8.0 (H) <0.9 INDEX    HSV 2, IgG <0.2 <0.9 INDEX   IgM   Result Value Ref Range    IgM 118 40 - 230 mg/dL   IgA   Result Value Ref Range    IgA 247 70 - 400 mg/dL   IgG Subclasses (1, 2, 3, and 4)   Result Value Ref Range    IgG 1 1,140 490 - 1,140 mg/dL    IgG 2 342 150 - 640 mg/dL    IgG 3 69 11 - 85 mg/dL    IgG 4 166 3 - 200 mg/dL    IgG 1,490 700 - 1,600 mg/dL   Lactate Dehydrogenase   Result Value Ref Range     84 - 246 U/L   Mumps Antibody, IgG   Result Value Ref Range    Mumps, IgG Positive (A) Negative    Mumps, IgG Index 3.9 (H) <=0.8  AI   Prealbumin   Result Value Ref Range    Prealbumin 18.5 18.0 - 40.0 mg/dL   Prostate Specific Antigen, Screen   Result Value Ref Range    Prostate Specific Antigen,Screen 0.17 <=4.00 ng/mL   Rubella Antibody, IgG   Result Value Ref Range    Rubella, IgG Positive Negative    Rubella, IgG Index 2.2 <=0.7 IA AI   Rubeola Antibody, IgG   Result Value Ref Range    Rubeola, IgG Positive     Rubeola, IgG Index >8.0 (H) <=0.8 AI   Syphilis Screen with Reflex   Result Value Ref Range    Syphilis Total Ab Reactive (A) Nonreactive   Thyroid Stimulating Hormone   Result Value Ref Range    Thyroid Stimulating Hormone 2.12 0.44 - 3.98 mIU/L   Triiodothyronine, Total   Result Value Ref Range    Triiodothyronine 105 60 - 200 ng/dL   Thyroxine, Total   Result Value Ref Range    Thyroxine 9.3 4.5 - 11.1 ug/dL   Toxoplasma IgG   Result Value Ref Range    Toxoplasma IgG Reactive (A) Nonreactive   Varicella Zoster Antibody, IgG   Result Value Ref Range    Varicella Zoster, IgG Positive (A) Negative    Varicella Zoster, IgG Index >8.0 (H) <=0.8 AI   Vascular US aorta iliac duplex limited   Result Value Ref Range    BSA 2.2 m2   Vascular US Ankle Brachial Index (MARK) Without Exercise   Result Value Ref Range    BSA 2.2 m2   Urinalysis with Reflex Culture and Microscopic   Result Value Ref Range    Color, Urine Light-Yellow Light-Yellow, Yellow, Dark-Yellow    Appearance, Urine Clear Clear    Specific Gravity, Urine <1.005 (A) 1.005 - 1.035    pH, Urine 7.5 5.0, 5.5, 6.0, 6.5, 7.0, 7.5, 8.0    Protein, Urine NEGATIVE NEGATIVE, 10 (TRACE), 20 (TRACE) mg/dL    Glucose, Urine OVER (4+) (A) Normal mg/dL    Blood, Urine NEGATIVE NEGATIVE mg/dL    Ketones, Urine NEGATIVE NEGATIVE mg/dL    Bilirubin, Urine NEGATIVE NEGATIVE mg/dL    Urobilinogen, Urine Normal Normal mg/dL    Nitrite, Urine NEGATIVE NEGATIVE    Leukocyte Esterase, Urine NEGATIVE NEGATIVE   Blood Gas Mixed Venous Full Panel   Result Value Ref Range    POCT pH, Mixed 7.41 7.33 -  7.43 pH    POCT pCO2, Mixed 56 (H) 41 - 51 mm Hg    POCT pO2, Mixed 34 (L) 35 - 45 mm Hg    POCT SO2, Mixed 55 45 - 75 %    POCT Oxy Hemoglobin, Mixed 53.3 45.0 - 75.0 %    POCT Hematocrit Calculated, Mixed 39.0 (L) 41.0 - 52.0 %    POCT Sodium, Mixed 132 (L) 136 - 145 mmol/L    POCT Potassium, Mixed 4.3 3.5 - 5.3 mmol/L    POCT Chloride, Mixed 94 (L) 98 - 107 mmol/L    POCT Ionized Calcium, Mixed 1.21 1.10 - 1.33 mmol/L    POCT Glucose, Mixed 255 (H) 74 - 99 mg/dL    POCT Lactate, Mixed 1.2 0.4 - 2.0 mmol/L    POCT Base Excess, Mixed 9.0 (H) -2.0 - 3.0 mmol/L    POCT HCO3 Calculated, Mixed 35.5 (H) 22.0 - 26.0 mmol/L    POCT Hemoglobin, Mixed 13.1 (L) 13.5 - 17.5 g/dL    POCT Anion Gap, Mixed 7 (L) 10 - 25 mmo/L    Patient Temperature 37.0 degrees Celsius    FiO2 21 %    Apparatus     Renal function panel   Result Value Ref Range    Glucose 226 (H) 74 - 99 mg/dL    Sodium 134 (L) 136 - 145 mmol/L    Potassium 3.9 3.5 - 5.3 mmol/L    Chloride 93 (L) 98 - 107 mmol/L    Bicarbonate 30 21 - 32 mmol/L    Anion Gap 15 10 - 20 mmol/L    Urea Nitrogen 52 (H) 6 - 23 mg/dL    Creatinine 2.17 (H) 0.50 - 1.30 mg/dL    eGFR 34 (L) >60 mL/min/1.73m*2    Calcium 9.5 8.6 - 10.6 mg/dL    Phosphorus 3.8 2.5 - 4.9 mg/dL    Albumin 3.8 3.4 - 5.0 g/dL   POCT GLUCOSE   Result Value Ref Range    POCT Glucose 194 (H) 74 - 99 mg/dL   POCT GLUCOSE   Result Value Ref Range    POCT Glucose 251 (H) 74 - 99 mg/dL   Blood Gas Mixed Venous Full Panel   Result Value Ref Range    POCT pH, Mixed 7.40 7.33 - 7.43 pH    POCT pCO2, Mixed 54 (H) 41 - 51 mm Hg    POCT pO2, Mixed 43 35 - 45 mm Hg    POCT SO2, Mixed 59 45 - 75 %    POCT Oxy Hemoglobin, Mixed 57.6 45.0 - 75.0 %    POCT Hematocrit Calculated, Mixed 38.0 (L) 41.0 - 52.0 %    POCT Sodium, Mixed 133 (L) 136 - 145 mmol/L    POCT Potassium, Mixed 4.5 3.5 - 5.3 mmol/L    POCT Chloride, Mixed 96 (L) 98 - 107 mmol/L    POCT Ionized Calcium, Mixed 1.23 1.10 - 1.33 mmol/L    POCT Glucose, Mixed 313  (H) 74 - 99 mg/dL    POCT Lactate, Mixed 1.5 0.4 - 2.0 mmol/L    POCT Base Excess, Mixed 7.1 (H) -2.0 - 3.0 mmol/L    POCT HCO3 Calculated, Mixed 33.4 (H) 22.0 - 26.0 mmol/L    POCT Hemoglobin, Mixed 12.8 (L) 13.5 - 17.5 g/dL    POCT Anion Gap, Mixed 8 (L) 10 - 25 mmo/L    Patient Temperature 37.0 degrees Celsius    FiO2 28 %   CBC   Result Value Ref Range    WBC 5.9 4.4 - 11.3 x10*3/uL    nRBC 0.0 0.0 - 0.0 /100 WBCs    RBC 4.75 4.50 - 5.90 x10*6/uL    Hemoglobin 11.2 (L) 13.5 - 17.5 g/dL    Hematocrit 37.8 (L) 41.0 - 52.0 %    MCV 80 80 - 100 fL    MCH 23.6 (L) 26.0 - 34.0 pg    MCHC 29.6 (L) 32.0 - 36.0 g/dL    RDW 19.9 (H) 11.5 - 14.5 %    Platelets 234 150 - 450 x10*3/uL   Renal function panel   Result Value Ref Range    Glucose 285 (H) 74 - 99 mg/dL    Sodium 134 (L) 136 - 145 mmol/L    Potassium 4.1 3.5 - 5.3 mmol/L    Chloride 92 (L) 98 - 107 mmol/L    Bicarbonate 32 21 - 32 mmol/L    Anion Gap 14 10 - 20 mmol/L    Urea Nitrogen 52 (H) 6 - 23 mg/dL    Creatinine 2.62 (H) 0.50 - 1.30 mg/dL    eGFR 27 (L) >60 mL/min/1.73m*2    Calcium 9.4 8.6 - 10.6 mg/dL    Phosphorus 4.5 2.5 - 4.9 mg/dL    Albumin 3.5 3.4 - 5.0 g/dL   Magnesium   Result Value Ref Range    Magnesium 2.35 1.60 - 2.40 mg/dL   Heparin Assay, UFH   Result Value Ref Range    Heparin Unfractionated 0.3 See Comment Below for Therapeutic Ranges IU/mL   Blood Gas Venous Full Panel   Result Value Ref Range    POCT pH, Venous 7.42 7.33 - 7.43 pH    POCT pCO2, Venous 57 (H) 41 - 51 mm Hg    POCT pO2, Venous 48 (H) 35 - 45 mm Hg    POCT SO2, Venous 74 45 - 75 %    POCT Oxy Hemoglobin, Venous 72.5 45.0 - 75.0 %    POCT Hematocrit Calculated, Venous 37.0 (L) 41.0 - 52.0 %    POCT Sodium, Venous 134 (L) 136 - 145 mmol/L    POCT Potassium, Venous 4.1 3.5 - 5.3 mmol/L    POCT Chloride, Venous 95 (L) 98 - 107 mmol/L    POCT Ionized Calicum, Venous 1.23 1.10 - 1.33 mmol/L    POCT Glucose, Venous 300 (H) 74 - 99 mg/dL    POCT Lactate, Venous 1.1 0.4 - 2.0 mmol/L     POCT Base Excess, Venous 10.5 (H) -2.0 - 3.0 mmol/L    POCT HCO3 Calculated, Venous 37.0 (H) 22.0 - 26.0 mmol/L    POCT Hemoglobin, Venous 12.2 (L) 13.5 - 17.5 g/dL    POCT Anion Gap, Venous 6.0 (L) 10.0 - 25.0 mmol/L    Patient Temperature 37.0 degrees Celsius    FiO2 28 %   POCT GLUCOSE   Result Value Ref Range    POCT Glucose 235 (H) 74 - 99 mg/dL      Vascular US carotid artery duplex bilateral              [place] [address]    Tel [phone] and Fax [phone]       Vascular Lab Report  VASC US CAROTID ARTERY DUPLEX BILATERAL       Patient Name:     NEGRITA Shukla           [id number] [address]                 Physician:        MD  Study Date:       7/23/2025            Ordering          [id number] YONATHAN WALLS                                         Physician:  MRN/PID:          [id number]             Technologist:     Rajani Varela S  Accession#:       [id number]         Technologist 2:  Date of           1965 / 60 years  Encounter#:       [id number]  Birth/Age:  Gender:           M  Admission Status: Inpatient            Location          [facility]                                         Performed:       Diagnosis/ICD: Encounter for preprocedural cardiovascular examination-Z01.810  Indication:    Pre-Op OHT/LVAD  CPT Codes:     05902 Cerebrovascular Carotid Duplex scan complete       Patient History CAD, HTN and Hyperlipidemia. 10-9-2023 Both ICA <50% stenosis.       CONCLUSIONS:  Left Carotid: Findings are consistent with less than 50% stenosis of the left proximal internal carotid artery. Left external carotid artery appears patent with no evidence of stenosis. The left vertebral artery is patent with antegrade flow. No evidence of hemodynamically significant stenosis in the left subclavian artery.     Comparison:  Compared with study from 10/9/2023, no significant change.The right CCA and ICA not  scaned due to IV lines in right side of neck.     Additional Findings:  Unable to scan right CCA and ICA due to IV lines in right side of neck.       Imaging & Doppler Findings:  Right Plaque Morph: The proximal right internal carotid artery demonstrates heterogenous plaque. The proximal right common carotid artery demonstrates heterogenous and calcified plaque. The mid right common carotid artery demonstrates heterogenous and calcified plaque. The distal right common carotid artery demonstrates heterogenous and calcified plaque.     Right                  Left   PSV  EDV              PSV      EDV              CCA P    79 cm/s              CCA D    51 cm/s              ICA P    52 cm/s  13 cm/s              ICA M    57 cm/s  17 cm/s              ICA D    64 cm/s  22 cm/s               ECA     107 cm/s            Vertebral  28 cm/s  7 cm/s            Subclavian 94 cm/s                  Left  ICA/CCA Ratio 1.0          [id number] Marquita Brown MD  Electronically signed by [id number] Marquita Brown MD on 7/23/2025 at 6:27:44 PM       ** Final **  CT chest abdomen pelvis wo IV contrast  Narrative: Interpreted By:  Volodymyr Pettit,   STUDY:  CT CHEST ABDOMEN PELVIS WO CONTRAST;  7/23/2025 4:31 pm      INDICATION:  Signs/Symptoms:OHT/VAD workup.      COMPARISON:  CT chest 10/06/2023. CT abdomen and pelvis 10/10/2023.      ACCESSION NUMBER(S):  [id number]      ORDERING CLINICIAN:  YONATHAN WALLS      TECHNIQUE:  CT of the chest, abdomen and pelvis was performed. Contiguous axial  images were obtained at 3 mm slice thickness through the chest,  abdomen and pelvis. Coronal and sagittal reconstructions at 3 mm  slice thickness were performed.  No intravenous or oral contrast  agents were administered.      FINDINGS:  Please note that the study is limited without intravenous contrast.      CHEST:          LUNG/PLEURA/LARGE AIRWAYS:  Linear atelectasis noted in the right upper lobe and bilateral lower  lobes. No pulmonary masses  or consolidation. Mild diffuse  interlobular septal thickening is noted in both lungs likely related  to pulmonary edema. No pleural effusion or pneumothorax. Trachea and  right and left main bronchi are patent.      VESSELS:  Aorta and main pulmonary artery are normal in caliber. A [place]-Talita  catheter is seen with its tip in the right pulmonary artery. Mild  atherosclerotic changes are noted of the aorta and branching vessels.  Severe coronary artery calcifications are present.      HEART:  Heart is mildly enlarged. Left-sided pacemaker leads are seen  terminating in the right cardiac chamber. No pericardial effusion.      MEDIASTINUM AND SHIVANI:  No significant lymphadenopathy in the chest. Esophagus is within  normal limits.      CHEST WALL AND LOWER NECK:  No soft tissue masses in the chest wall. Visualized thyroid gland is  within normal limits.      ABDOMEN:      LIVER:  Liver is enlarged measuring 22 cm craniocaudally. No focal liver  lesion within the limits of noncontrast CT.      BILE DUCTS:  No intra or extrahepatic bile duct dilatation.      GALLBLADDER:  Gallbladder is partially distended with the numerous gallstones.  There is a small amount of pericholecystic fluid.      PANCREAS:  The pancreas appears unremarkable.      SPLEEN:  Spleen is mildly enlarged measuring 14 cm anterior posteriorly.      ADRENAL GLANDS:  No adrenal nodule or thickening.      KIDNEYS AND URETERS:  Bilateral kidneys are symmetric in size. No hydroureteronephrosis.  There is a punctate 5 mm calculus in the lower pole of the left  kidney.      PELVIS:      BLADDER:  Urinary bladder is unremarkable.      REPRODUCTIVE ORGANS:  Prostate gland is within normal limits.      BOWEL:  Stomach and duodenum are within normal limits. Small and large bowel  loops are normal in caliber. Large amount of colonic stool burden is  seen. The appendix is not definitely visualized. There is however no  pericecal stranding or fluid.       VESSELS:  Severe atherosclerotic changes noted in the abdominal aorta. No  aortic aneurysm. IVC is within normal limits.      PERITONEUM/RETROPERITONEUM/LYMPH NODES:  No ascites or fluid collection in the abdomen and pelvis. No  retroperitoneal mass. There are few scattered subcentimeter in the  retroperitoneum which are nonenlarged by size criteria.      ABDOMINAL WALL:  No soft tissue masses in the abdominal wall. Mild subcutaneous  stranding noted in the left anterior abdominal wall likely  inflammation.      BONES:  No suspicious osseous lesions are present. Degenerative discogenic  disease is noted in the lower thoracic and lumbar spine.      Impression: Chest  1.  Mild diffuse interlobular interstitial thickening in both lungs  likely representing pulmonary edema.  2. Cardiomegaly.  3. Support devices as described above.      Abdomen-Pelvis  1.  No acute process in the abdomen and pelvis.  2. Hepatosplenomegaly.  3. Cholelithiasis. Mild pericholecystic fluid is likely related to  fluid overload.  4. Left renal calculus without hydronephrosis.          MACRO:  None          Dictation workstation:   [id number]  XR chest 2 views  Narrative: Interpreted By:  Allyssa Pedraza and Krasnoschlik Nicholas   STUDY:  XR CHEST 2 VIEWS;  7/23/2025 4:38 pm      INDICATION:  Signs/Symptoms:OHT/VAD workup.          COMPARISON:  Chest radiograph 07/23/2025      ACCESSION NUMBER(S):  [id number]      ORDERING CLINICIAN:  YONATHAN WALLS      FINDINGS:  PA and lateral radiographs of the chest were provided.  Additional PA  dual energy images were also provided.      Medical devices:  Left chest wall pacemaker defibrillator with leads in similar  position to prior. Similar positioning of a of a right IJ [place]-Talita  catheter with tip projecting over the right lower lobe segmental  branches. Patient is status post median sternotomy.      CARDIOMEDIASTINAL SILHOUETTE:  Cardiomediastinal silhouette is stable in size and  configuration.      LUNGS:  Mild interstitial markings with perihilar prominence. No pneumothorax.      ABDOMEN:  No remarkable upper abdominal findings.      BONES:  No acute osseous changes.      Impression: 1.  Interval retraction of a right IJ [place]-Talita catheter with tip now  projecting over the right lower lobe segmental branches.  2. Mild pulmonary edema.  3. Additional medical devices as above.      I personally reviewed the images/study and I agree with the findings  as stated by resident physician Cam Sommers MD. This study  was interpreted at [place], [place].      MACRO:  None      Signed by: Allyssa Pedraza 7/23/2025 4:45 PM  Dictation workstation:   [id number]  Vascular US Ankle Brachial Index (MARK) Without Exercise  Preliminary Cardiology Report              [place] [address]    Tel [phone] and Fax [phone]                 Preliminary Vascular Lab Report     [facility] US ANKLE BRACHIAL INDEX (MARK) WITHOUT EXERCISE       Patient Name:     NEGRITA BERNARD     Reading           [id number] [address]                 Physician:        MD  Study Date:       7/23/2025            Ordering          [id number] YONATHAN WALLS                                         Physician:  MRN/PID:          [id number]             Technologist:     Rajani Varela S  Accession#:       [id number]         Technologist 2:  Date of           1965             Encounter#:       [id number]  Birth/Age:  Gender:           M  Admission Status: Inpatient            Location          [facility]                                         Performed:       Diagnosis/ICD: Encounter for preprocedural cardiovascular examination-Z01.810  Indication:    OHT/LVAD  Procedure/CPT: 77699 Peripheral artery MARK Only       Patient History: Anticoagulation, HTN and Hyperlipidemia. 10-9-2023 MARK's no                    evidence of arterial disease in BLEA.       PRELIMINARY CONCLUSIONS:  Right Lower PVR: Evidence of mild arterial occlusive disease in the right lower extremity at rest. Right pressures of >220 mmHg suggest no compressibility of vessels and may make absolute Segmental Limb Pressures (SLP) unreliable. Decreased digital perfusion noted. Multiphasic flow is noted in the right common femoral artery, right posterior tibial artery and right dorsalis pedis artery.  Left Lower PVR: Evidence of mild arterial occlusive disease in the left lower extremity at rest. Left pressures of >220 mmHg suggest no compressibility of vessels and may make absolute Segmental Limb Pressures (SLP) unreliable. Multiphasic flow is noted in the left posterior tibial artery, left dorsalis pedis artery and left common femoral artery.     Imaging & Doppler Findings:     RIGHT Lower PVR                Pressures Ratios  Right Posterior Tibial (Ankle) 220 mmHg  2.22  Right Dorsalis Pedis (Ankle)   204 mmHg  2.06  Right Digit (Great Toe)        58 mmHg   0.59                             Right  Brachial Pressure 99 mmHg            VASCULAR PRELIMINARY REPORT  completed by Rajani JAFFE on 7/23/2025 at 3:13:35 PM       ** Final **  Vascular US aorta iliac duplex limited  Preliminary Cardiology Report              [place] [id number]    Tel [phone] and Fax [phone]        Preliminary Vascular Lab Report     [facility] US AORTA ILIAC DUPLEX LIMITED       Patient Name:     NEGRITA BERNARD     Gayla           [id number]                 Physician:        MD  Study Date:       7/23/2025            Ordering          [id number] [address]                                         Physician:  MRN/PID:          [id number]             Technologist:     Rajani JAFFE  Accession#:       [id number]         Technologist 2:  Date of           1965             Encounter#:        [id number]  Birth/Age:  Gender:           M  Admission Status: Inpatient            Location          [facility]                                         Performed:       Diagnosis/ICD: Encounter for preprocedural cardiovascular examination-Z01.810  Indication:    Pre-Op OHT/LVAD  Procedure/CPT: 86669 Duplex Aorta/IVC/Iliac/Bypass Graft       Patient History: CAD, HTN and Hyperlipidemia. 10- No AAA.       PRELIMINARY CONCLUSIONS:  Aorta/Common Iliac Arteries/IVC: The abdominal aorta and bilateral common iliac arteries demonstrate no evidence of aneurysm.     Imaging & Doppler Findings:      AORTA     AP    Lateral    PSV  Proximal 2.14 cm 1.96 cm 95.0 cm/s    Mid    1.86 cm 1.89 cm 72.0 cm/s   Distal  1.61 cm 1.70 cm 52.0 cm/s       RIGHT       AP    Lateral    PSV  GIO Proximal 1.29 cm 1.10 cm 70.00 cm/s        LEFT       AP     Lateral     PSV  GIO Proximal 1.01 cm 101.00 cm 70.30 cm/s         VASCULAR PRELIMINARY REPORT  completed by Rajani JAFFE on 7/23/2025 at 3:35:02 PM       ** Final **  XR chest 1 view  Narrative: Interpreted By:  Allyssa Pedraza,   STUDY:  XR CHEST 1 VIEW;  7/23/2025 8:14 am      INDICATION:  Signs/Symptoms:SGC am rounds.      COMPARISON:  07/22/2025.      ACCESSION NUMBER(S):  [id number]      ORDERING CLINICIAN:  MEL WATKINS      Impression: Minimal improvement of aeration of the both lungs.  Stable AICD, placement.  Murdo-Talita catheter with the tip overlies the right lower lobe  pulmonary artery branch, unchanged. Cardiac silhouette is mildly  enlarged. Pulmonary vessels are congested Mild pulmonary edema/fluid  overload of the both lungs. Slightly improved since last exam. Small  bilateral pleural effusion. No pneumothorax seen. Bony thorax  unremarkable.      MACRO:  None      Signed by: Allyssa Pedraza 7/23/2025 12:21 PM  Dictation workstation:   [id number]     Encounter Date: 07/12/25   ECG 12 lead   Result Value    Ventricular Rate 78    Atrial Rate 78     AL Interval 168    QRS Duration 112    QT Interval 390    QTC Calculation(Bazett) 444    P Axis 85    R Axis 25    T Axis 101    QRS Count 13    Q Onset 215    P Onset 131    P Offset 190    T Offset 410    QTC Fredericia 425    Narrative    Normal sinus rhythm  Possible Left atrial enlargement  Low voltage QRS  Anterolateral infarct (cited on or before 10-OCT-2023)  Abnormal ECG  When compared with ECG of 04-MAY-2025 13:32,  Questionable change in QRS axis        Allergies  Azithromycin    Scheduled medications  Scheduled Medications[1]  Continuous medications  Continuous Medications[2]  PRN medications  PRN Medications[3]     Assessment/Plan    Felice Law is a 60 y.o. male with significant PMH of ICM, HFrEF, EF 15-20%, CABG (LIMA- LAD in 2023),  PCI, VT arrest s/p VT ablation and ICD in 2017, Hypertension, Hyperlipidemia, Hypothyroidism, Left femoral DVT on Eliquis, COPD, JAKE, CKD III, Chronic BLE venous stasis dermatitis, Insulin dependent Type 2 diabetes who was initially admitted 7/12 to [place] for acute decompensate heart failure. He was diuresed and recommended for transfer to [facility] HF ICU for further managemnet and possible re-consider of advanced therapy work up.  Of notes, Patient was previously discussed for AT during [facility] October 2023 admission but declined with concerns for mobility/frailty (uses motorized scooter and has bilateral foot drop with neuropathy), CKD, poorly controlled diabetes as well as unclear social/financial support).      Transferred to HF ICU 7/17, he was warm and wet,  lasix IV bolus given and drip initiated. Eliquis on hold, heparin drip initiated. Bedside SG done 7/19, opening swan numbers: BP: 133/74 (94),CVP 9, PA 74/29 (44), PCWP 24, CO/CI (Pallavi) 5.4/2.4, Thermo: 4.6/2.1, SVR 1263, SVO2 63% on hydral /Isordil 100/40 mg TID, Metop 50 mg daily, Dapa 10 mg daily, Dig 125 mcg PO daily, Lasix drip at 20 mg /hr. Entresto re-introduce and up titrated. Lasix drip d/c'd  currently on intermittent IVP.  Hg A1c 10.1 this admission, now considering advanced therapy evaluation. Advanced therapies evaluation initiated 7/23.     7/24: Palliative consultation.     Palliative Performance Scale (PPS): 50-60    ----------------------------------------------------------------------------------------------------------------------------------------------------------------------------------------------------------------------------------------------------------------------------------------------------------------------------------------------------------------------  Advanced Care Planning  Patient and/or family consented to a voluntary Advanced Care Planning meeting.   Serious Illness Assessment and Counseling:  Life Limiting Disease: HFrEF, ADHF, posing threat to life or function.     Disease Specific Information Provided/Prognosis Discussed: Patient's current clinical condition, including diagnosis, prognosis, and management plan were discussed.   Counseling provided on AT and risk of OHT/lvad.    Understanding/Overall Impression: Patient expressing (clear, fair, limited) understanding of overall health status and severity of illness.     Goals/Hopes: Discussion ensued about patient's goals for their medical care going forward. Allowed patient time to talk about his/her current quality of life, disease course/progression, and symptom and treatment burden. Discussed     Fears/Worries/Concerns:    Patient's Perception of Functional Status:     Minimal Acceptable Quality of Life/Maximal [place] Tolerable for the Possibility of More Time: Counseling provided on the concept of MAO/Maximal [place]. Patient expressing that he would never want to be in a health state where he was without his mobility, to be there with and for his mother. Patient deems that this would not be an acceptable quality of life.     Advanced Directives:  Counseling provided on the importance of not crisis planning as disease  burden progresses. Pt encouraged to speak with his surrogated decision maker (mother) about his health preferences/wants/ do not wants so family is clear on pt's wishes and deemed QOL. Pt states he will think about it as he worries it would burden her and be too much. May potentially require electing a new surrogate.    Surrogate Health Care Decision Maker: Aracelis Law (Mother)  [phone]    HPOA: not on file  Living will: not on file    Code Status: Decision to keep code status FULL CODE at this time.       I spent 62 minutes in providing separately identifiable ACP services with the patient and/or surrogate decision maker in a voluntary conversation discussing the patient's wishes and goals as detailed in the above note.   ----------------------------------------------------------------------------------------------------------------------------------------------------------------------------------------------------------------------------------------------------------------------------------------------------------------------------------------------------------------------    #Complex Medical Decision Making  #Goals of Care  #Advanced Care Planning  - Code status: FULL CODE  - Surrogate decision maker: Aracelis Law (Mother)  [phone]    - Goals are mix of survival and time and improved quality of life  - 7/24: Met with pt at bedside. Able to glean more information r/t pt's life, wishes, goals, worries, and health preferences. See above/consult note for additional supportive detail.     Preparedness Plan:   This NP had an extensive conversation with the patient, DAJUAN used for clarity of interpretation/translation. Palliative care was introduced and it was explained what a preparedness plan entails. All questions were addressed and answered. Patient gave a verbal understanding of the answers provided.     Goals/expectations of OHT/ LVAD implantation: more time, to get through sx well.    Hemodialysis: Patient  verbalized understanding that preexisting renal disease or renal injury during/after OHT/VAD implantation puts patients at risk for dialysis dependent renal failure. PT EXPRESSED HE ACCEPTS THE RISK AND AGREEABLE TO HD IF NECESSARY.    ICH/stroke: Patient is aware AND SKEPTICAL of risk for bleeding or stroke. AT THIS TIME, PT STATES HE IS UNSURE ABOUT THESE RISKS AND MAY NOT WISH TO FURTHER PROCEED WITH ADVANCED THERAPIES WORK UP/EVAL. PT AGREEABLE FOR MORE INFORMATION R/T RISK (PERCENTAGE ECT) TO ASSIST IN GUIDING HIS DECISIONS.    LVAD failure: Patient verbalized he IS ACCEPTING OF heroic measures, compressions and intubation, in the event of LVAD failure.     Artifical nutrition and hydration: Patient would ACCEPT a long term feeding tube in the event complications would occur that would require placement.    Blood transfusions: Patient ACCEPTS receiving blood transfusions if needed.     Organ Donation: Patient verbalized HE WOULD PARTICIPATE IN organ donation in the event this would become a possibility.     Mechanical ventilation: Patient ACCEPTS TO undergo short term ventilation. Pt expressed he would ACCEPT long term mechanical ventilation/trach placement if this were required.    Postoperative rehab plan: PT IS AGREEABLE TO REHABILITATION PRIOR TO GOING HOME IF INDICATED.    Spiritual preferences: NONE    If device implantation over time does not help pt meet pt's goals of care and complications affect her/his view on quality of life, pt was asked about next steps. PT REPLIED HE WOULD BE HOPEFUL FOR OHT.    #ADHF  #AT evaluation  #Psychosocial Support  - Ongoing pall team pt/family support and care navigation  - Spiritual Care Support      Plan of Care discussed with: Updated primary and bedside RN on goals of care decision, medication adjustments, and code status     Medical Decision Making was high level due to high complexity of problems, extensive data review, and high risk of management/treatment.     -  ADHF, AT evaluation posing threat to life and function.  - Reviewed external notes from   - Reviewed results from  which were used in decision making for   - Recommended the following tests:   - Assessment required independent historian: primary  - Independent interpretation of test: labs and imaging  - Discussion of management with: primary  - Drug therapy requiring intensive monitoring for toxicity: insulin  - Decision regarding elective major surgery with identified patient or procedure risk factors: agreeable to AT pending risk of hd, cva.  - Decision regarding emergency major surgery:   - Decision regarding hospitalization or escalation of hospital-level care:   - Decision not to resuscitate or to de-escalate care because of poor prognosis:   - Parenteral controlled substances:     Thank you for allowing us to participate in the care of this patient. Palliative will continue to follow as needed. Palliative medicine is available Monday-Friday, 8a-6p. Please contact team with any questions or concerns.  Team pager [phone] (weekdays)  Monalisa Gan CNP (on EPIC secure chat)         [1] [Held by provider] apixaban, 5 mg, oral, BID  atorvastatin, 80 mg, oral, Nightly  [Held by provider] bumetanide, 1 mg, oral, Daily  calcium carbonate, 500 mg of calcium carbonate, oral, BID  dapagliflozin propanediol, 10 mg, oral, Daily  digoxin, 125 mcg, oral, Daily  fenofibrate, 145 mg, oral, Daily  gabapentin, 200 mg, oral, q8h SUSI  hydrALAZINE, 100 mg, oral, q8h SUSI  insulin glargine, 44 Units, subcutaneous, Nightly  insulin glargine, 44 Units, subcutaneous, Daily  insulin lispro, 0-15 Units, subcutaneous, TID AC  insulin lispro, 22 Units, subcutaneous, TID AC  isosorbide dinitrate, 30 mg, oral, TID  levothyroxine, 75 mcg, oral, Daily before breakfast  metoprolol succinate XL, 100 mg, oral, Daily  pantoprazole, 40 mg, oral, Daily before breakfast  QUEtiapine, 50 mg, oral, Nightly  sacubitriL-valsartan, 1 tablet, oral,  BID  spironolactone, 25 mg, oral, Daily    [2] [Held by provider] bumetanide, 1 mg/hr, Last Rate: Stopped (07/24/25 0545)  heparin, 0-4,000 Units/hr, Last Rate: 1,600 Units/hr (07/24/25 0500)    [3] PRN medications: acetaminophen, albuterol, alteplase, dextrose, dextrose, diphenhydrAMINE, glucagon, glucagon, heparin

## 2025-07-25 ENCOUNTER — APPOINTMENT (OUTPATIENT)
Dept: RADIOLOGY | Facility: HOSPITAL | Age: 60
DRG: 291 | End: 2025-07-25
Payer: MEDICARE

## 2025-07-25 ENCOUNTER — APPOINTMENT (OUTPATIENT)
Dept: RADIOLOGY | Facility: HOSPITAL | Age: 60
End: 2025-07-25
Payer: MEDICARE

## 2025-07-25 LAB
25(OH)D3 SERPL-MCNC: 25 NG/ML (ref 30–100)
ALBUMIN SERPL BCP-MCNC: 3.5 G/DL (ref 3.4–5)
ANION GAP BLDMV CALCULATED.4IONS-SCNC: 4 MMO/L (ref 10–25)
ANION GAP BLDMV CALCULATED.4IONS-SCNC: 6 MMO/L (ref 10–25)
ANION GAP BLDMV CALCULATED.4IONS-SCNC: 6 MMO/L (ref 10–25)
ANION GAP BLDMV CALCULATED.4IONS-SCNC: 7 MMO/L (ref 10–25)
ANION GAP BLDMV CALCULATED.4IONS-SCNC: 9 MMO/L (ref 10–25)
ANION GAP BLDMV CALCULATED.4IONS-SCNC: 9 MMO/L (ref 10–25)
ANION GAP SERPL CALC-SCNC: 15 MMOL/L (ref 10–20)
BASE EXCESS BLDMV CALC-SCNC: 3.3 MMOL/L (ref -2–3)
BASE EXCESS BLDMV CALC-SCNC: 4.9 MMOL/L (ref -2–3)
BASE EXCESS BLDMV CALC-SCNC: 5.7 MMOL/L (ref -2–3)
BASE EXCESS BLDMV CALC-SCNC: 6.5 MMOL/L (ref -2–3)
BASE EXCESS BLDMV CALC-SCNC: 7.3 MMOL/L (ref -2–3)
BASE EXCESS BLDMV CALC-SCNC: 7.9 MMOL/L (ref -2–3)
BODY TEMPERATURE: 37 DEGREES CELSIUS
BUN SERPL-MCNC: 60 MG/DL (ref 6–23)
CA-I BLDMV-SCNC: 1.15 MMOL/L (ref 1.1–1.33)
CA-I BLDMV-SCNC: 1.15 MMOL/L (ref 1.1–1.33)
CA-I BLDMV-SCNC: 1.16 MMOL/L (ref 1.1–1.33)
CA-I BLDMV-SCNC: 1.18 MMOL/L (ref 1.1–1.33)
CA-I BLDMV-SCNC: 1.19 MMOL/L (ref 1.1–1.33)
CA-I BLDMV-SCNC: 1.24 MMOL/L (ref 1.1–1.33)
CALCIUM SERPL-MCNC: 9.1 MG/DL (ref 8.6–10.6)
CHLORIDE BLD-SCNC: 95 MMOL/L (ref 98–107)
CHLORIDE BLD-SCNC: 95 MMOL/L (ref 98–107)
CHLORIDE BLD-SCNC: 96 MMOL/L (ref 98–107)
CHLORIDE BLD-SCNC: 97 MMOL/L (ref 98–107)
CHLORIDE BLD-SCNC: 98 MMOL/L (ref 98–107)
CHLORIDE BLD-SCNC: 98 MMOL/L (ref 98–107)
CHLORIDE SERPL-SCNC: 93 MMOL/L (ref 98–107)
CO2 SERPL-SCNC: 30 MMOL/L (ref 21–32)
CREAT SERPL-MCNC: 2.93 MG/DL (ref 0.5–1.3)
DIGOXIN SERPL-MCNC: 1.12 NG/ML (ref 0.8–?)
EGFRCR SERPLBLD CKD-EPI 2021: 24 ML/MIN/1.73M*2
ERYTHROCYTE [DISTWIDTH] IN BLOOD BY AUTOMATED COUNT: 20.1 % (ref 11.5–14.5)
GLUCOSE BLD MANUAL STRIP-MCNC: 164 MG/DL (ref 74–99)
GLUCOSE BLD MANUAL STRIP-MCNC: 207 MG/DL (ref 74–99)
GLUCOSE BLD MANUAL STRIP-MCNC: 213 MG/DL (ref 74–99)
GLUCOSE BLD MANUAL STRIP-MCNC: 271 MG/DL (ref 74–99)
GLUCOSE BLD-MCNC: 173 MG/DL (ref 74–99)
GLUCOSE BLD-MCNC: 174 MG/DL (ref 74–99)
GLUCOSE BLD-MCNC: 191 MG/DL (ref 74–99)
GLUCOSE BLD-MCNC: 227 MG/DL (ref 74–99)
GLUCOSE BLD-MCNC: 250 MG/DL (ref 74–99)
GLUCOSE BLD-MCNC: 262 MG/DL (ref 74–99)
GLUCOSE SERPL-MCNC: 170 MG/DL (ref 74–99)
HCO3 BLDMV-SCNC: 28.5 MMOL/L (ref 22–26)
HCO3 BLDMV-SCNC: 30.4 MMOL/L (ref 22–26)
HCO3 BLDMV-SCNC: 31.1 MMOL/L (ref 22–26)
HCO3 BLDMV-SCNC: 31.9 MMOL/L (ref 22–26)
HCO3 BLDMV-SCNC: 32.6 MMOL/L (ref 22–26)
HCO3 BLDMV-SCNC: 33.3 MMOL/L (ref 22–26)
HCT VFR BLD AUTO: 35.4 % (ref 41–52)
HCT VFR BLD EST: 35 % (ref 41–52)
HCT VFR BLD EST: 36 % (ref 41–52)
HCT VFR BLD EST: 36 % (ref 41–52)
HCT VFR BLD EST: 37 % (ref 41–52)
HGB BLD-MCNC: 11.2 G/DL (ref 13.5–17.5)
HGB BLDMV-MCNC: 11.6 G/DL (ref 13.5–17.5)
HGB BLDMV-MCNC: 11.8 G/DL (ref 13.5–17.5)
HGB BLDMV-MCNC: 11.8 G/DL (ref 13.5–17.5)
HGB BLDMV-MCNC: 12 G/DL (ref 13.5–17.5)
HGB BLDMV-MCNC: 12.1 G/DL (ref 13.5–17.5)
HGB BLDMV-MCNC: 12.2 G/DL (ref 13.5–17.5)
INHALED O2 CONCENTRATION: 21 %
INHALED O2 CONCENTRATION: 28 %
LACTATE BLDMV-SCNC: 0.9 MMOL/L (ref 0.4–2)
LACTATE BLDMV-SCNC: 1 MMOL/L (ref 0.4–2)
LACTATE BLDMV-SCNC: 1 MMOL/L (ref 0.4–2)
LACTATE BLDMV-SCNC: 1.1 MMOL/L (ref 0.4–2)
LACTATE BLDMV-SCNC: 1.3 MMOL/L (ref 0.4–2)
LACTATE BLDMV-SCNC: 1.4 MMOL/L (ref 0.4–2)
MAGNESIUM SERPL-MCNC: 2.21 MG/DL (ref 1.6–2.4)
MCH RBC QN AUTO: 24 PG (ref 26–34)
MCHC RBC AUTO-ENTMCNC: 31.6 G/DL (ref 32–36)
MCV RBC AUTO: 76 FL (ref 80–100)
MGC ASCENT PFT - FEV1 - PRE: 2.08
MGC ASCENT PFT - FEV1 - PREDICTED: 4.87
MGC ASCENT PFT - FVC - PRE: 2.67
MGC ASCENT PFT - FVC - PREDICTED: 6.5
NIL(NEG) CONTROL SPOT COUNT: NORMAL
NRBC BLD-RTO: 0 /100 WBCS (ref 0–0)
OXYHGB MFR BLDMV: 60.9 % (ref 45–75)
OXYHGB MFR BLDMV: 63.1 % (ref 45–75)
OXYHGB MFR BLDMV: 65.8 % (ref 45–75)
OXYHGB MFR BLDMV: 68.1 % (ref 45–75)
OXYHGB MFR BLDMV: 69.8 % (ref 45–75)
OXYHGB MFR BLDMV: 70.4 % (ref 45–75)
PANEL A SPOT COUNT: 0
PANEL B SPOT COUNT: 1
PCO2 BLDMV: 45 MM HG (ref 41–51)
PCO2 BLDMV: 48 MM HG (ref 41–51)
PCO2 BLDMV: 49 MM HG (ref 41–51)
PH BLDMV: 7.41 PH (ref 7.33–7.43)
PH BLDMV: 7.41 PH (ref 7.33–7.43)
PH BLDMV: 7.42 PH (ref 7.33–7.43)
PH BLDMV: 7.43 PH (ref 7.33–7.43)
PH BLDMV: 7.44 PH (ref 7.33–7.43)
PH BLDMV: 7.44 PH (ref 7.33–7.43)
PHOSPHATE SERPL-MCNC: 4.1 MG/DL (ref 2.5–4.9)
PLATELET # BLD AUTO: 238 X10*3/UL (ref 150–450)
PO2 BLDMV: 40 MM HG (ref 35–45)
PO2 BLDMV: 41 MM HG (ref 35–45)
PO2 BLDMV: 43 MM HG (ref 35–45)
PO2 BLDMV: 44 MM HG (ref 35–45)
PO2 BLDMV: 45 MM HG (ref 35–45)
PO2 BLDMV: 45 MM HG (ref 35–45)
POS CONTROL SPOT COUNT: NORMAL
POTASSIUM BLDMV-SCNC: 3.8 MMOL/L (ref 3.5–5.3)
POTASSIUM BLDMV-SCNC: 3.9 MMOL/L (ref 3.5–5.3)
POTASSIUM BLDMV-SCNC: 4 MMOL/L (ref 3.5–5.3)
POTASSIUM BLDMV-SCNC: 4.2 MMOL/L (ref 3.5–5.3)
POTASSIUM SERPL-SCNC: 4 MMOL/L (ref 3.5–5.3)
RBC # BLD AUTO: 4.67 X10*6/UL (ref 4.5–5.9)
SAO2 % BLDMV: 63 % (ref 45–75)
SAO2 % BLDMV: 65 % (ref 45–75)
SAO2 % BLDMV: 68 % (ref 45–75)
SAO2 % BLDMV: 70 % (ref 45–75)
SAO2 % BLDMV: 72 % (ref 45–75)
SAO2 % BLDMV: 73 % (ref 45–75)
SODIUM BLDMV-SCNC: 130 MMOL/L (ref 136–145)
SODIUM BLDMV-SCNC: 130 MMOL/L (ref 136–145)
SODIUM BLDMV-SCNC: 131 MMOL/L (ref 136–145)
SODIUM BLDMV-SCNC: 132 MMOL/L (ref 136–145)
SODIUM SERPL-SCNC: 134 MMOL/L (ref 136–145)
T-SPOT. TB INTERPRETATION: NEGATIVE
UFH PPP CHRO-ACNC: 0.4 IU/ML (ref ?–1.1)
WBC # BLD AUTO: 7.2 X10*3/UL (ref 4.4–11.3)

## 2025-07-25 PROCEDURE — 2500000002 HC RX 250 W HCPCS SELF ADMINISTERED DRUGS (ALT 637 FOR MEDICARE OP, ALT 636 FOR OP/ED): Performed by: NURSE PRACTITIONER

## 2025-07-25 PROCEDURE — 76700 US EXAM ABDOM COMPLETE: CPT

## 2025-07-25 PROCEDURE — 97116 GAIT TRAINING THERAPY: CPT | Mod: GP,CQ

## 2025-07-25 PROCEDURE — 84132 ASSAY OF SERUM POTASSIUM: CPT | Performed by: NURSE PRACTITIONER

## 2025-07-25 PROCEDURE — 2500000004 HC RX 250 GENERAL PHARMACY W/ HCPCS (ALT 636 FOR OP/ED): Performed by: NURSE PRACTITIONER

## 2025-07-25 PROCEDURE — 71045 X-RAY EXAM CHEST 1 VIEW: CPT | Performed by: RADIOLOGY

## 2025-07-25 PROCEDURE — 97110 THERAPEUTIC EXERCISES: CPT | Mod: GO | Performed by: OCCUPATIONAL THERAPIST

## 2025-07-25 PROCEDURE — 37799 UNLISTED PX VASCULAR SURGERY: CPT | Performed by: NURSE PRACTITIONER

## 2025-07-25 PROCEDURE — 2500000004 HC RX 250 GENERAL PHARMACY W/ HCPCS (ALT 636 FOR OP/ED): Performed by: STUDENT IN AN ORGANIZED HEALTH CARE EDUCATION/TRAINING PROGRAM

## 2025-07-25 PROCEDURE — 85520 HEPARIN ASSAY: CPT | Performed by: NURSE PRACTITIONER

## 2025-07-25 PROCEDURE — 2020000001 HC ICU ROOM DAILY

## 2025-07-25 PROCEDURE — 83735 ASSAY OF MAGNESIUM: CPT | Performed by: NURSE PRACTITIONER

## 2025-07-25 PROCEDURE — 2500000002 HC RX 250 W HCPCS SELF ADMINISTERED DRUGS (ALT 637 FOR MEDICARE OP, ALT 636 FOR OP/ED)

## 2025-07-25 PROCEDURE — 2500000004 HC RX 250 GENERAL PHARMACY W/ HCPCS (ALT 636 FOR OP/ED)

## 2025-07-25 PROCEDURE — 2500000001 HC RX 250 WO HCPCS SELF ADMINISTERED DRUGS (ALT 637 FOR MEDICARE OP)

## 2025-07-25 PROCEDURE — 82306 VITAMIN D 25 HYDROXY: CPT

## 2025-07-25 PROCEDURE — 85027 COMPLETE CBC AUTOMATED: CPT | Performed by: NURSE PRACTITIONER

## 2025-07-25 PROCEDURE — 97530 THERAPEUTIC ACTIVITIES: CPT | Mod: GP,CQ

## 2025-07-25 PROCEDURE — 82947 ASSAY GLUCOSE BLOOD QUANT: CPT

## 2025-07-25 PROCEDURE — 2500000001 HC RX 250 WO HCPCS SELF ADMINISTERED DRUGS (ALT 637 FOR MEDICARE OP): Performed by: NURSE PRACTITIONER

## 2025-07-25 PROCEDURE — 71045 X-RAY EXAM CHEST 1 VIEW: CPT

## 2025-07-25 PROCEDURE — 80162 ASSAY OF DIGOXIN TOTAL: CPT

## 2025-07-25 RX ORDER — SACUBITRIL AND VALSARTAN 97; 103 MG/1; MG/1
TABLET, FILM COATED ORAL
Qty: 120 TABLET | Refills: 5 | OUTPATIENT
Start: 2025-07-25

## 2025-07-25 RX ORDER — INSULIN LISPRO 100 [IU]/ML
28 INJECTION, SOLUTION INTRAVENOUS; SUBCUTANEOUS
Status: DISCONTINUED | OUTPATIENT
Start: 2025-07-25 | End: 2025-07-26

## 2025-07-25 RX ADMIN — GABAPENTIN 200 MG: 100 CAPSULE ORAL at 20:48

## 2025-07-25 RX ADMIN — SPIRONOLACTONE 25 MG: 25 TABLET, FILM COATED ORAL at 09:23

## 2025-07-25 RX ADMIN — INSULIN LISPRO 6 UNITS: 100 INJECTION, SOLUTION INTRAVENOUS; SUBCUTANEOUS at 09:22

## 2025-07-25 RX ADMIN — INSULIN LISPRO 22 UNITS: 100 INJECTION, SOLUTION INTRAVENOUS; SUBCUTANEOUS at 09:22

## 2025-07-25 RX ADMIN — HEPARIN SODIUM 1600 UNITS/HR: 10000 INJECTION, SOLUTION INTRAVENOUS at 11:07

## 2025-07-25 RX ADMIN — LEVOTHYROXINE SODIUM 75 MCG: 0.07 TABLET ORAL at 06:58

## 2025-07-25 RX ADMIN — INSULIN GLARGINE 44 UNITS: 100 INJECTION, SOLUTION SUBCUTANEOUS at 20:49

## 2025-07-25 RX ADMIN — ISOSORBIDE DINITRATE 30 MG: 20 TABLET ORAL at 14:23

## 2025-07-25 RX ADMIN — SACUBITRIL AND VALSARTAN 1 TABLET: 97; 103 TABLET, FILM COATED ORAL at 20:48

## 2025-07-25 RX ADMIN — ATORVASTATIN CALCIUM 80 MG: 80 TABLET, FILM COATED ORAL at 20:48

## 2025-07-25 RX ADMIN — MILRINONE LACTATE IN DEXTROSE 0.25 MCG/KG/MIN: 0.2 INJECTION, SOLUTION INTRAVENOUS at 11:07

## 2025-07-25 RX ADMIN — ISOSORBIDE DINITRATE 30 MG: 20 TABLET ORAL at 18:26

## 2025-07-25 RX ADMIN — CALCIUM CARBONATE (ANTACID) CHEW TAB 500 MG 1 TABLET: 500 CHEW TAB at 20:48

## 2025-07-25 RX ADMIN — INSULIN LISPRO 9 UNITS: 100 INJECTION, SOLUTION INTRAVENOUS; SUBCUTANEOUS at 18:23

## 2025-07-25 RX ADMIN — INSULIN LISPRO 28 UNITS: 100 INJECTION, SOLUTION INTRAVENOUS; SUBCUTANEOUS at 18:25

## 2025-07-25 RX ADMIN — PANTOPRAZOLE SODIUM 40 MG: 40 TABLET, DELAYED RELEASE ORAL at 06:58

## 2025-07-25 RX ADMIN — GABAPENTIN 200 MG: 100 CAPSULE ORAL at 14:23

## 2025-07-25 RX ADMIN — HYDRALAZINE HYDROCHLORIDE 100 MG: 100 TABLET ORAL at 06:58

## 2025-07-25 RX ADMIN — INSULIN LISPRO 3 UNITS: 100 INJECTION, SOLUTION INTRAVENOUS; SUBCUTANEOUS at 14:21

## 2025-07-25 RX ADMIN — HYDRALAZINE HYDROCHLORIDE 100 MG: 100 TABLET ORAL at 14:23

## 2025-07-25 RX ADMIN — CALCIUM CARBONATE (ANTACID) CHEW TAB 500 MG 1 TABLET: 500 CHEW TAB at 09:23

## 2025-07-25 RX ADMIN — HYDRALAZINE HYDROCHLORIDE 100 MG: 100 TABLET ORAL at 20:48

## 2025-07-25 RX ADMIN — MILRINONE LACTATE IN DEXTROSE 0.25 MCG/KG/MIN: 0.2 INJECTION, SOLUTION INTRAVENOUS at 23:15

## 2025-07-25 RX ADMIN — FENOFIBRATE 145 MG: 145 TABLET, FILM COATED ORAL at 09:23

## 2025-07-25 RX ADMIN — IRON SUCROSE 200 MG: 20 INJECTION, SOLUTION INTRAVENOUS at 11:00

## 2025-07-25 RX ADMIN — ISOSORBIDE DINITRATE 30 MG: 20 TABLET ORAL at 09:23

## 2025-07-25 RX ADMIN — SACUBITRIL AND VALSARTAN 1 TABLET: 97; 103 TABLET, FILM COATED ORAL at 09:23

## 2025-07-25 RX ADMIN — INSULIN GLARGINE 44 UNITS: 100 INJECTION, SOLUTION SUBCUTANEOUS at 09:23

## 2025-07-25 RX ADMIN — DAPAGLIFLOZIN 10 MG: 10 TABLET, FILM COATED ORAL at 09:23

## 2025-07-25 RX ADMIN — GABAPENTIN 200 MG: 100 CAPSULE ORAL at 06:58

## 2025-07-25 RX ADMIN — INSULIN LISPRO 22 UNITS: 100 INJECTION, SOLUTION INTRAVENOUS; SUBCUTANEOUS at 14:30

## 2025-07-25 RX ADMIN — DIGOXIN 125 MCG: 125 TABLET ORAL at 09:23

## 2025-07-25 RX ADMIN — QUETIAPINE FUMARATE 50 MG: 25 TABLET ORAL at 20:48

## 2025-07-25 ASSESSMENT — COGNITIVE AND FUNCTIONAL STATUS - GENERAL
DRESSING REGULAR UPPER BODY CLOTHING: A LITTLE
MOVING FROM LYING ON BACK TO SITTING ON SIDE OF FLAT BED WITH BEDRAILS: A LITTLE
MOVING TO AND FROM BED TO CHAIR: A LITTLE
MOVING TO AND FROM BED TO CHAIR: A LITTLE
DAILY ACTIVITIY SCORE: 17
STANDING UP FROM CHAIR USING ARMS: A LITTLE
DRESSING REGULAR UPPER BODY CLOTHING: A LITTLE
STANDING UP FROM CHAIR USING ARMS: A LOT
TOILETING: A LOT
CLIMB 3 TO 5 STEPS WITH RAILING: A LOT
TOILETING: A LITTLE
WALKING IN HOSPITAL ROOM: A LITTLE
CLIMB 3 TO 5 STEPS WITH RAILING: A LOT
PERSONAL GROOMING: A LITTLE
DAILY ACTIVITIY SCORE: 19
DRESSING REGULAR LOWER BODY CLOTHING: A LITTLE
HELP NEEDED FOR BATHING: A LITTLE
HELP NEEDED FOR BATHING: A LITTLE
EATING MEALS: A LITTLE
MOVING FROM LYING ON BACK TO SITTING ON SIDE OF FLAT BED WITH BEDRAILS: A LITTLE
DRESSING REGULAR LOWER BODY CLOTHING: A LITTLE
WALKING IN HOSPITAL ROOM: A LOT
MOBILITY SCORE: 17
MOBILITY SCORE: 15
PERSONAL GROOMING: A LITTLE
TURNING FROM BACK TO SIDE WHILE IN FLAT BAD: A LITTLE
TURNING FROM BACK TO SIDE WHILE IN FLAT BAD: A LITTLE

## 2025-07-25 ASSESSMENT — PAIN SCALES - GENERAL
PAINLEVEL_OUTOF10: 0 - NO PAIN

## 2025-07-25 ASSESSMENT — PAIN - FUNCTIONAL ASSESSMENT
PAIN_FUNCTIONAL_ASSESSMENT: 0-10

## 2025-07-25 NOTE — CARE PLAN
Problem: Pain - Adult  Goal: Verbalizes/displays adequate comfort level or baseline comfort level  Outcome: Progressing     Problem: Safety - Adult  Goal: Free from fall injury  Outcome: Progressing     Problem: Discharge Planning  Goal: Discharge to home or other facility with appropriate resources  Outcome: Progressing     Problem: Chronic Conditions and Co-morbidities  Goal: Patient's chronic conditions and co-morbidity symptoms are monitored and maintained or improved  Outcome: Progressing     Problem: Nutrition  Goal: Nutrient intake appropriate for maintaining nutritional needs  Outcome: Progressing     Problem: Heart Failure  Goal: Improved gas exchange this shift  Outcome: Progressing  Goal: Improved urinary output this shift  Outcome: Progressing  Goal: Reduction in peripheral edema within 24 hours  Outcome: Progressing  Goal: Report improvement of dyspnea/breathlessness this shift  Outcome: Progressing  Goal: Weight from fluid excess reduced over 2-3 days, then stabilize  Outcome: Progressing  Goal: Increase self care and/or family involvement in 24 hours  Outcome: Progressing     Problem: Skin  Goal: Decreased wound size/increased tissue granulation at next dressing change  Outcome: Progressing  Goal: Participates in plan/prevention/treatment measures  Outcome: Progressing  Goal: Prevent/manage excess moisture  Outcome: Progressing  Goal: Prevent/minimize sheer/friction injuries  Outcome: Progressing  Goal: Promote/optimize nutrition  Outcome: Progressing  Goal: Promote skin healing  Outcome: Progressing     Problem: Diabetes  Goal: Achieve decreasing blood glucose levels by end of shift  Outcome: Progressing  Goal: Increase stability of blood glucose readings by end of shift  Outcome: Progressing  Goal: Decrease in ketones present in urine by end of shift  Outcome: Progressing   The patient's goals for the shift include      The clinical goals for the shift include Pt. to remain free from hemodynamical   decline

## 2025-07-25 NOTE — PROGRESS NOTES
[facility]  HFICU PROGRESS NOTE    Felice Law/[id number]    Admit Date: 7/17/2025  Hospital Length of Stay: 8   ICU Length of Stay: 7d 20h   Primary Service: HFICU  Primary HF Cardiologist: Dr. Jono Duque MD   Referring: Dr Duque     INTERVAL EVENTS / PERTINENT ROS:     No acute overnight events. Patient had an episode of unilateral UE shaking/shivering with acute onset temporal headache. CT head obtained showed no acute abnormalities. Shaking resolved spontanously and headache resolved with tylenol.  Bumex restarted yesternight for elevated CVP and Low C.O on milrinone drip. Now held as CVP is 4 and improved CO on Milrinone 0.25. Net neg 2L yesterday. He denies any c/o dyspnea, chest pain, or discomfort today.      Plan:   - Continue Milrinone 0.25  - C/w to hold Toprol 100 mg 7/24  - C/ to hold Bumex  - Increase hydralazine and isosorbide as tolerated   - PT/OT for foot drop  - Diabetic management per endocrine recommendations. Increase premeals to 28 units  - Continue OHT/VAD evaluation    MEDICATIONS  Infusions:  heparin, Last Rate: 1,500 Units/hr (07/25/25 0700)  milrinone, Last Rate: 0.25 mcg/kg/min (07/25/25 0700)      Scheduled:  [Held by provider] apixaban, 5 mg, BID  atorvastatin, 80 mg, Nightly  [Held by provider] bumetanide, 1 mg, Daily  calcium carbonate, 500 mg of calcium carbonate, BID  dapagliflozin propanediol, 10 mg, Daily  digoxin, 125 mcg, Daily  fenofibrate, 145 mg, Daily  gabapentin, 200 mg, q8h SUSI  hydrALAZINE, 100 mg, q8h SUSI  insulin glargine, 44 Units, Nightly  insulin glargine, 44 Units, Daily  insulin lispro, 0-15 Units, TID AC  insulin lispro, 22 Units, TID AC  isosorbide dinitrate, 30 mg, TID  levothyroxine, 75 mcg, Daily before breakfast  pantoprazole, 40 mg, Daily before breakfast  QUEtiapine, 50 mg, Nightly  sacubitriL-valsartan, 1 tablet, BID  spironolactone, 25 mg, Daily      PRN:  acetaminophen, 975 mg, q6h PRN  albuterol, 2.5  "mg, q6h PRN  alteplase, 2 mg, PRN  dextrose, 12.5 g, q15 min PRN  dextrose, 25 g, q15 min PRN  diphenhydrAMINE, 50 mg, q5 min PRN  glucagon, 1 mg, q15 min PRN  glucagon, 1 mg, q15 min PRN  heparin, 2,000-4,000 Units, q4h PRN      Invasive Hemodynamics:    Most Recent Range Past 24hrs   BP (Art)   No data recorded   MAP(Art)   No data recorded   RA/CVP   No data recorded   PA 56/18 PAP  Min: 52/28  Max: 76/26   PA(mean) 31 mmHg PAP (Mean)  Min: 28 mmHg  Max: 45 mmHg   PCWP 19 mmHg PCWP (mmHg)  Min: 17 mmHg  Max: 19 mmHg   CO 6.23 L/min CO (L/min)  Min: 4.27 L/min  Max: 7.85 L/min   CI 2.83 L/min/m2 CI (L/min/m2)  Min: 1.94 L/min/m2  Max: 3.57 L/min/m2   Mixed Venous 70 % SVO2 (%)  Min: 55 %  Max: 71 %   SVR  783 (dyne*sec)/cm5 SVR (dyne*sec)/cm5  Min: 642 (dyne*sec)/cm5  Max: 1255 (dyne*sec)/cm5    (dyne*sec)/cm5 PVR (dyne*sec)/cm5  Min: 412 (dyne*sec)/cm5  Max: 412 (dyne*sec)/cm5       PHYSICAL EXAM:   Visit Vitals  /55   Pulse 96   Temp 36.2 °C (97.2 °F)   Resp 17   Ht 1.803 m (5' 10.98\")   Wt 96.6 kg (212 lb 15.4 oz)   SpO2 97%   BMI 29.72 kg/m²   Smoking Status Former   BSA 2.2 m²       Wt Readings from Last 5 Encounters:   07/25/25 96.6 kg (212 lb 15.4 oz)   07/17/25 110 kg (243 lb 2.7 oz)   06/10/25 97.1 kg (214 lb)   05/12/25 96.6 kg (213 lb)   05/04/25 102 kg (224 lb)       INTAKE/OUTPUT:  I/O last 3 completed shifts:  In: 3142.6 (32.5 mL/kg) [P.O.:2180; I.V.:962.6 (10 mL/kg)]  Out: 5550 (57.5 mL/kg) [Urine:5550 (1.6 mL/kg/hr)]  Weight: 96.6 kg      Physical Exam  Constitutional:       Appearance: Normal appearance.   HENT:      Head: Normocephalic and atraumatic.      Nose: Nose normal.      Mouth/Throat:      Mouth: Mucous membranes are moist.     Eyes:      Extraocular Movements: Extraocular movements intact.      Pupils: Pupils are equal, round, and reactive to light.     Neck:      Comments: Rt IJ/ SGC  Cardiovascular:      Rate and Rhythm: Normal rate and regular rhythm.      Pulses: Normal " pulses.      Heart sounds:      Gallop present.   Pulmonary:      Effort: Pulmonary effort is normal.      Breath sounds: Decreased breath sounds present.   Abdominal:      General: There is distension.     Musculoskeletal:         General: Swelling present.      Cervical back: Normal range of motion and neck supple.      Right lower leg: Edema (trace) present.      Left lower leg: Edema (trace) present.      Comments: Chronic venous stasis      Skin:     General: Skin is warm.      Capillary Refill: Capillary refill takes 2 to 3 seconds.     Neurological:      General: No focal deficit present.      Mental Status: He is alert and oriented to person, place, and time.     Psychiatric:         Mood and Affect: Mood normal.         Behavior: Behavior normal.         DATA:  CMP:  Results from last 7 days   Lab Units 07/24/25  1941 07/24/25  0314 07/23/25  1750 07/23/25  0450 07/22/25  1618 07/22/25  0550 07/21/25  0520 07/20/25  1232 07/20/25  0556 07/19/25  1925 07/19/25  0530   SODIUM mmol/L 132* 134* 134* 136 134* 135* 137 135* 138 135* 138   POTASSIUM mmol/L 4.2 4.1 3.9 3.7 4.3 3.8 3.7 3.8 3.6 3.7 4.2   CHLORIDE mmol/L 91* 92* 93* 93* 91* 91* 92* 90* 93* 91* 98   CO2 mmol/L 29 32 30 32 32 36* 37* 36* 37* 35* 32   ANION GAP mmol/L 16 14 15 15 15 12 12 13 12 13 12   BUN mg/dL 59* 52* 52* 48* 50* 42* 35* 37* 38* 39* 43*   CREATININE mg/dL 2.65* 2.62* 2.17* 2.04* 2.40* 1.87* 1.61* 1.60* 1.56* 1.81* 1.67*   EGFR mL/min/1.73m*2 27* 27* 34* 37* 30* 41* 49* 49* 51* 43* 47*   MAGNESIUM mg/dL 2.20 2.35  --  2.35  --  2.30 2.39  --  2.55*  --  2.47*   ALBUMIN g/dL 3.5 3.5 3.8 3.4 3.8 3.6 3.6 4.0 3.9 3.9 3.6     CBC:  Results from last 7 days   Lab Units 07/24/25  0314 07/23/25  0450 07/22/25  0550 07/21/25  0520 07/20/25  0556 07/19/25  0530   WBC AUTO x10*3/uL 5.9 4.4 5.3 5.3 5.1 4.7   HEMOGLOBIN g/dL 11.2* 11.8* 12.0* 12.4* 12.2* 11.2*   HEMATOCRIT % 37.8* 38.0* 38.7* 40.4* 40.2* 38.3*   PLATELETS AUTO x10*3/uL 234 213 239  234 252 254   MCV fL 80 76* 77* 77* 78* 81     COAG:   Results from last 7 days   Lab Units 07/23/25  1230   INR  1.1     ABO:   ABO TYPE   Date Value Ref Range Status   07/23/2025 A  Final     HEME/ENDO:  Results from last 7 days   Lab Units 07/23/25  1230   TSH mIU/L 2.12      CARDIAC:   Results from last 7 days   Lab Units 07/23/25  1230   LD U/L 231   BNP pg/mL 380*       ASSESSMENT AND PLAN:     Felice Law is a 60 y.o. male with significant PMH of ICM, HFrEF, EF 15-20%, CABG (LIMA- LAD in 2023),  PCI, VT arrest s/p VT ablation and ICD in 2017, Hypertension, Hyperlipidemia, Hypothyroidism, Left femoral DVT on Eliquis, COPD, JAKE, CKD III, Chronic BLE venous stasis dermatitis, Insulin dependent Type 2 diabetes who was initially admitted 7/12 to [place] for acute decompensate heart failure. He was diuresed and recommended for transfer to [facility] HF ICU for further managemnet and possible re-consider of advanced therapy work up.  Of notes, Patient was previously discussed for AT during [facility] October 2023 admission but declined with concerns for mobility/frailty (uses motorized scooter and has bilateral foot drop with neuropathy), CKD, poorly controlled diabetes as well as unclear social/financial support).     Transferred to HF ICU 7/17, he was warm and wet,  lasix IV bolus given and drip initiated. Eliquis on hold, heparin drip initiated. Bedside SG done 7/19, opening swan numbers: BP: 133/74 (94),CVP 9, PA 74/29 (44), PCWP 24, CO/CI (Pallavi) 5.4/2.4, Thermo: 4.6/2.1, SVR 1263, SVO2 63% on hydral /Isordil 100/40 mg TID, Metop 50 mg daily, Dapa 10 mg daily, Dig 125 mcg PO daily, Lasix drip at 20 mg /hr. Entresto re-introduce and up titrated. Lasix drip d/c'd currently on intermittent IVP.  Hg A1c 10.1 this admission, now considering advanced therapy evaluation. Advanced therapies evaluation initiated 7/23.     Neuro:   # Anxiety. Depression, Acute pain    # Bilateral foot drop with LE neuropathy: Able to walk  two blocks. Uses motorized scoopter    # Sudden UE shaking episode (resolved)  - Serial neuro and pain assessments    - PO Tylenol PRN for pain   - PT/OT Consult, OOB to chair   - CAM ICU score every shift   - Sleep/wake cycle normalization      # Physical Status   -Obesity: BMI: 34 --- > 30  -Reduced Mobility due to ICU and CHF       #Substance abuse   -Alcohol abuse/Alcohol dependence: NO   -Tobacco use/Nicotine Dependence: Quit 8 years ago      Cardiovascular:   #Acute on chronic systolic CHF, HFrEF 15-20%, dx'd 2017,   #Ischemic Cardiomyopathy, ACC/AHA Stage C-D, NYHA II, Warm,   -admit weight (7/17): 111 kg -- > 98 kg (7/20)  -BNP (7/17): 744  -TTE (5/4/2025):  Left ventricular ejection fraction is severely decreased, by visual estimate at 15-20%.   - RHC (6/10/25): RA: 25,RV: 63/9, PA: 72/34, PCWP: 32, PA-SAT: 36%, SVC-SAT: 41%, FA-SAT: 71%, CO: 4.84, CI: 2.24, SVR: 1041  - LHC (6/10/25):   Right dominant coronary circulation with severe multivessel CAD, as described above. LIMA to LAD patent.  - C/w atorvastin 80mg    - Lasix gtt discontinued overnight 7/19.   - Bedside SG with Opening swan numbers (7/19): BP: 133/74 (94),CVP 9, PA 74/29 (44), PCWP 24, CO/CI (Pallavi) 5.4/2.4, Thermo: 4.6/2.1, SVR 1263, SVO2 63% on hydral /Isordil 100/40 mg TID, Metop 50 mg daily, Dapa 10 mg daily, Dig 125 mcg PO daily, Lasix drip at 20 mg /h.  - Daily SGC #'s (7/25):  BP 89/53 (65); CVP: 4; PA 57/18 (38); PCWP: *; SVR: 783; CO/CI 6.2/2.83; on Hydra 75 , isordil 20, dapa 10, dig 125, entresto 97/101, aldactone 25,   - Lasix 80 mg IV push x1 (7/21/2025)  - Hold Bumex drip 7/24  - Discontinue Toprol 100 (7/24)  - C/w Milrinone 0.125 mcg/kg/min  - C/w Entresto 97/103 mg BID   - C/w Hydralazine 100mg/ Isordil 30mg daily, increase as tolerated   - Nipride off 7/23  - Daily standing weights, 2gm sodium diet, 2L fluid restriction, strict I&Os     #CAD    - s/p PCI and 1v CABG (LIMA-LAD 04/2023).   - C/w ASA and atorvastatin       Pulmonary:    # H/o COPD  # Hx Pulmonary Hypertension   # Central sleep apnea.   # Prior tobacco use (24 pack year, quit 2017),   - On 2L via NC      :   #CHARLOTTE/CKD III  - Baseline (11/11/2024): BUN/Cr (GFR): 60/2.93 (24)  -Admit Bun/Creatinine (7/17):  48/1.62, creatinine peaked at 2.55 OSH  -I/Os   -avoid hypotension and nephrotoxic agents      Heme:   #Anemia in the setting of MAI and CKD III  - Labs (7/17):  TIBC: 377,  ferritin: 102, serum Fe: 61, folate: 13.1, B12: 452   - IV Venofer 200 mg x5 doses (7/17-7/21)      # Left femoral DVT (2017)  - Hold home Eliquis  - C/w Heparin drip (7/18)      Endo:   #DM 2  - hgbA1c(7/17):  10.1  - C/w Lantus 20 units Am, 40 units PM, SSI and Pradial insulin   - Diabetic management per endocrine     # Hypothyroidism  -C/w Levothyroxine 75mcg  -TSH (7/18): 3.97      ID:   -afebrile, nontoxic    -no s/s infx   -trend temps q4 hr        PHYSICAL AND OCCUPATIONAL THERAPY: PT/OT     LINES:  PIVs   [facility] (7/19 - present)        DVT: SCD's, Heparin drip once Heparin assay <0.2  VAP BUNDLE: NA  CENTRAL LINE BUNDLE: Ordered  ULCER PPX: PPI  GLYCEMIC CONTROL: SSI, Lantus insulin   BOWEL CARE: Senna, Miralax   INDWELLING CATHETER: NA  NUTRITION: Adult diet Consistent Carb; CCD 90 gm/meal; 2000 mL fluid        EMERGENCY CONTACT: Extended Emergency Contact Information  Primary Emergency Contact: NAM LOO  Address: [address] [place] [address] [facility]  Home Phone: [phone]  Mobile Phone: [phone]  Relation: Mother  Secondary Emergency Contact: Robinson Contreras  Mobile Phone: [phone]  Relation: Friend  FAMILY UPDATE: no family at bedside   CODE STATUS: Full Code  DISPO:  HF ICU      Patient seen and assessed with Dr. Duque    I personally spent 60  minutes of critical care time directly and personally managing the patient exclusive of separately billable procedures.      __________________________________________  Philomena Worrell  MD

## 2025-07-25 NOTE — PROGRESS NOTES
Physical Therapy    Physical Therapy Treatment    Patient Name: Felice Law  MRN: [id number]  Department: [facility] HFU  Room: 10/10-A  Today's Date: 7/25/2025  Time Calculation  Start Time: 1025  Stop Time: 1050  Time Calculation (min): 25 min         Assessment/Plan   PT Assessment  End of Session Communication: Bedside nurse  Assessment Comment: pt demonstrates increased weakness since prior session. pt unable to ambulate previous distances and requires increased assist. pt fatigued from ambulation and returned to bed for abdominal US.  End of Session Patient Position: Alarm off, not on at start of session     PT Plan  Treatment/Interventions: Bed mobility, Transfer training, Gait training, Stair training, Balance training, Strengthening, Endurance training, Range of motion, Therapeutic exercise, Therapeutic activity, Home exercise program  PT Plan: Ongoing PT  PT Frequency: 3 times per week  PT Discharge Recommendations: Low intensity level of continued care  Equipment Recommended upon Discharge: Wheeled walker  PT Recommended Transfer Status: Assistive device, Contact guard  PT - OK to Discharge: Yes    PT Visit Info:  PT Received On: 07/25/25     General Visit Information:   General  Prior to Session Communication: Bedside nurse  Patient Position Received: Alarm off, not on at start of session  General Comment: patient pleasant and agreeable to PT; RN present, pt able to communicate with simple english commands    Subjective   Precautions:  Precautions  Hearing/Visual Limitations: hearing is WFL  Medical Precautions: Cardiac precautions, Fall precautions     Date/Time Vitals Session Patient Position Pulse Resp SpO2 BP MAP (mmHg)    07/25/25 1100 --  --  95  13  94 %  80/51  62     07/25/25 1200 --  --  89  14  94 %  84/55  64            Objective   Pain:  Pain Assessment  0-10 (Numeric) Pain Score: 0 - No pain  Cognition:  Cognition  Orientation Level: Oriented X4    Treatments:             Ambulation/Gait Training 1  Surface 1: Level tile  Device 1: Rolling walker  Assistance 1: Close supervision  Quality of Gait 1: Decreased step length, Wide base of support, Diminished heel strike  Comments/Distance (ft) 1: 2x25, cues for posture and steady pacing  Transfer 1  Technique 1: Sit to stand, Stand to sit  Transfer Device 1: Walker  Transfer Level of Assistance 1: Close supervision, Minimal verbal cues  Trials/Comments 1: cues for hand placement and weight shifitng         Outcome Measures:  [facility] Basic Mobility  Turning from your back to your side while in a flat bed without using bedrails: A little  Moving from lying on your back to sitting on the side of a flat bed without using bedrails: A little  Moving to and from bed to chair (including a wheelchair): A little  Standing up from a chair using your arms (e.g. wheelchair or bedside chair): A little  To walk in hospital room: A little  Climbing 3-5 steps with railing: A lot  Basic Mobility - Total Score: 17    Education Documentation  Home Exercise Program, taught by Riky Miller PTA at 7/25/2025 12:09 PM.  Learner: Patient  Readiness: Acceptance  Method: Explanation  Response: Verbalizes Understanding    Education Comments  No comments found.        OP EDUCATION:       Encounter Problems       Encounter Problems (Active)       Balance       Patient to demonstrate Karthikeyan static and dynamic standing balance without LOB with change of directions, no hesitancy, appropriate NISREEN and sequencing to complete functional task.        Start:  07/21/25    Expected End:  08/04/25            Pt will complete TUG in </= 14 sec with LRAD and no acute LOB       Start:  07/21/25    Expected End:  08/04/25               Mobility       STG - Patient will ambulate >/= 250 ft Karthikeyan with LRAD and no acute LOB       Start:  07/21/25    Expected End:  08/04/25            Patient to ascend/descend >/= 4 stairs with SBA and LRAD to demo ability to safely traverse  SHEA/within home       Start:  07/21/25    Expected End:  08/04/25            Patient will tolerate >/=30 minutes continuous activity with stable vital signs, RPE </=13/20, RPD </=3/10  (Progressing)       Start:  07/21/25    Expected End:  08/04/25               PT Transfers       STG - Patient will perform bed mobility IND (Progressing)       Start:  07/21/25    Expected End:  08/04/25            STG - Patient will transfer sit to and from stand Karthikeyan with LRAD (Progressing)       Start:  07/21/25    Expected End:  08/04/25

## 2025-07-25 NOTE — CONSULTS
"Nutrition Initial Assessment:   Nutrition Assessment    Reason for Assessment: Provider consult order    Patient is a 60 y.o. male presenting with acute decompensated HF and advanced therapy work-up.    Past medical history includes ICM, HFrEF, EF 15-20%, CABG (LIMA- LAD in 2023),  PCI, VT arrest s/p VT ablation and ICD in 2017, Hypertension, Hyperlipidemia, Hypothyroidism, Left femoral DVT on Eliquis, COPD, JAKE, CKD III, Chronic BLE venous stasis dermatitis, Insulin dependent Type 2 diabetes     Nutrition History:  Food and Nutrient History: Met with patient; his sister and mother were present as well.  Pt was able to communicate without  at visit.  He reports a good appetite and intake PTA.  Thinks his weight has been stable around current weight of 212#.  Does not have GI issues like N/V.  He says he had lunch today and ate all of it.  He is frustrated about his diet restrictions as he is told no to a lot of things he tries to order.  He is used to watching his sodium/salt intake.       Anthropometrics:  Height: 180.3 cm (5' 10.98\")   Weight: 96.6 kg (212 lb 15.4 oz)   BMI (Calculated): 29.72  IBW/kg (Dietitian Calculated): 78.2 kg  Percent of IBW: 124 %                    Weight History:     6/10/25: 97.1kg  5/12/25: 96.6kg  3/4/25: 102kg  12/16/24: 103kg  11/19/24: 102kg  8/9/25: 99.8kg  10/16/23: 110kg       Weight Change %:       Nutrition Focused Physical Exam Findings:    Subcutaneous Fat Loss:   Orbital Fat Pads: Well nourished (slightly bulging fat pads)  Buccal Fat Pads: Well nourished (full, rounded cheeks)  Triceps: Well nourished (ample fat tissue)  Muscle Wasting:  Temporalis: Well nourished (well-defined muscle)  Pectoralis (Clavicular Region): Well nourished (clavicle not visible)  Deltoid/Trapezius: Well nourished (rounded appearance at arm, shoulder, neck)  Quadriceps: Well nourished (well developed, well rounded)  Gastrocnemius: Well nourished (well developed bulbous " "muscle)  Edema:  Edema: +1 trace  Edema Location: non-pitting to B/L LE  Physical Findings:  Skin: Positive (B/L tibia wounds, PI sacrum)    Nutrition Significant Labs:  A1C:  Lab Results   Component Value Date    HGBA1C 10.1 (H) 07/17/2025   , BG POCT trend:   Results from last 7 days   Lab Units 07/25/25  1545 07/25/25  1227 07/25/25  0833 07/24/25  1955 07/24/25  1811   POCT GLUCOSE mg/dL 271* 164* 207* 314* 355*    , Renal Lab Trend:   Results from last 7 days   Lab Units 07/25/25  0618 07/24/25  1941 07/24/25  0314 07/23/25  1750   POTASSIUM mmol/L 4.0 4.2 4.1 3.9   PHOSPHORUS mg/dL 4.1 4.0 4.5 3.8   SODIUM mmol/L 134* 132* 134* 134*   MAGNESIUM mg/dL 2.21 2.20 2.35  --    EGFR mL/min/1.73m*2 24* 27* 27* 34*   BUN mg/dL 60* 59* 52* 52*   CREATININE mg/dL 2.93* 2.65* 2.62* 2.17*    , Vit D: No results found for: \"VITD25\"     Nutrition Specific Medications:  Scheduled medications  [Held by provider] apixaban, 5 mg, oral, BID  atorvastatin, 80 mg, oral, Nightly  [Held by provider] bumetanide, 1 mg, oral, Daily  calcium carbonate, 500 mg of calcium carbonate, oral, BID  dapagliflozin propanediol, 10 mg, oral, Daily  digoxin, 125 mcg, oral, Daily  fenofibrate, 145 mg, oral, Daily  gabapentin, 200 mg, oral, q8h SUSI  hydrALAZINE, 100 mg, oral, q8h SUSI  insulin glargine, 44 Units, subcutaneous, Nightly  insulin glargine, 44 Units, subcutaneous, Daily  insulin lispro, 0-15 Units, subcutaneous, TID AC  insulin lispro, 28 Units, subcutaneous, TID AC  iron sucrose, 200 mg, intravenous, Every other day  isosorbide dinitrate, 30 mg, oral, TID  levothyroxine, 75 mcg, oral, Daily before breakfast  pantoprazole, 40 mg, oral, Daily before breakfast  QUEtiapine, 50 mg, oral, Nightly  sacubitriL-valsartan, 1 tablet, oral, BID  spironolactone, 25 mg, oral, Daily      Continuous medications  heparin, 0-4,000 Units/hr, Last Rate: 1,600 Units/hr (07/25/25 1107)  milrinone, 0.25 mcg/kg/min, Last Rate: 0.25 mcg/kg/min (07/25/25 " 1107)      PRN medications  PRN Medications[1]      I/O:   Last BM Date: 07/25/25; Stool Appearance: Loose (07/25/25 0900)    Dietary Orders (From admission, onward)       Start     Ordered    07/22/25 1445  Adult diet Consistent Carb; CCD 60 gm/meal; 2000 mL fluid  Diet effective now        Question Answer Comment   Diet type Consistent Carb    Carb diet selection: CCD 60 gm/meal    Dietary fluid restriction / 24h: 2000 mL fluid        07/22/25 1444    07/17/25 1317  May Participate in Room Service  ( ROOM SERVICE MAY PARTICIPATE)  Once        Question:  .  Answer:  Yes    07/17/25 1316                     Estimated Needs:   Total Energy Estimated Needs in 24 hours (kCal):  (9589-4051)  Method for Estimating Needs: MSJ= 1807  Total Protein Estimated Needs in 24 Hours (g): 100 g  Method for Estimating 24 Hour Protein Needs: 1.3 x 78.2kg            Nutrition Diagnosis   Malnutrition Diagnosis  Patient has Malnutrition Diagnosis: No    Nutrition Diagnosis  Patient has Nutrition Diagnosis: No       Nutrition Interventions/Recommendations        Nutrition Recommendations:   1) Will keep pt's current diet for now.  Would like to be able to liberalize his diet but noted to have uncontrolled blood sugars and may benefit from a carb-controlled diet.     2) Check Vitamin D level    Nutrition Interventions/Goals:    None at this time      Education Documentation  No documentation found.            Nutrition Monitoring and Evaluation   Estimated Energy Intake: Energy intake greater or equal to 75% of estimated energy needs           Goal Status: New goal(s) identified    Time Spent (min): 60 minutes            [1] PRN medications: acetaminophen, albuterol, alteplase, dextrose, dextrose, diphenhydrAMINE, glucagon, glucagon, heparin

## 2025-07-25 NOTE — CARE PLAN
Problem: Pain - Adult  Goal: Verbalizes/displays adequate comfort level or baseline comfort level  Outcome: Progressing     Problem: Safety - Adult  Goal: Free from fall injury  Outcome: Progressing     Problem: Discharge Planning  Goal: Discharge to home or other facility with appropriate resources  Outcome: Progressing     Problem: Chronic Conditions and Co-morbidities  Goal: Patient's chronic conditions and co-morbidity symptoms are monitored and maintained or improved  Outcome: Progressing     Problem: Nutrition  Goal: Nutrient intake appropriate for maintaining nutritional needs  Outcome: Progressing     Problem: Heart Failure  Goal: Improved gas exchange this shift  Outcome: Progressing  Goal: Improved urinary output this shift  Outcome: Progressing  Goal: Reduction in peripheral edema within 24 hours  Outcome: Progressing  Goal: Report improvement of dyspnea/breathlessness this shift  Outcome: Progressing  Goal: Weight from fluid excess reduced over 2-3 days, then stabilize  Outcome: Progressing  Goal: Increase self care and/or family involvement in 24 hours  Outcome: Progressing     Problem: Skin  Goal: Decreased wound size/increased tissue granulation at next dressing change  Outcome: Progressing  Goal: Participates in plan/prevention/treatment measures  Outcome: Progressing  Goal: Prevent/manage excess moisture  Outcome: Progressing  Goal: Prevent/minimize sheer/friction injuries  Outcome: Progressing  Goal: Promote/optimize nutrition  Outcome: Progressing  Goal: Promote skin healing  Outcome: Progressing     Problem: Diabetes  Goal: Achieve decreasing blood glucose levels by end of shift  Outcome: Progressing  Goal: Increase stability of blood glucose readings by end of shift  Outcome: Progressing  Goal: Decrease in ketones present in urine by end of shift  Outcome: Progressing

## 2025-07-25 NOTE — PROGRESS NOTES
HFICU Attending Note    60M with stage D ICM (EF 15-20%) and prior VT arrest s/p ICD, admitted 7/12 to [facility] with ADHF and transferred to [facility] HFICU on 7/17 for consideration of advanced therapies. Previously declined AT (10/2023) due to frailty, DM, CKD, and limited support. Bellmore placed 7/19 showed preserved CI (2.4) but elevated biventricular filling pressures. HD-guided optimization ongoing with rapid diuresis (>10L net negative), uptitration of GDMT including restart and up-titration of Entresto to 97/103 BID, and metoprolol to 100 mg. A1c 10.1%. Endocrine consulted 7/21. Patient has chronic bilateral foot drop with limited ambulation and is receiving PT/OT. He’s improving, up and walking with walker. Re-eval for LVAD/OHT given recurent HF presentation.    Hold diuretics in setting of WRF. C/w inotropes    This critically ill patient continues to be at-risk for clinically significant deterioration / failure due to the above mentioned dysfunctional, unstable organ systems.  I have personally identified and managed all complex critical care issues to prevent aforementioned clinical deterioration.  Critical care time is spent at bedside and/or the immediate area and has included, but is not limited to, the review of diagnostic tests, labs, radiographs, serial assessments of hemodynamics, respiratory status, ventilatory management, and family updates.  Time spent in procedures and teaching are reported separately.    Critical care time: _35___ minutes     Objective    Admit Date: 7/17/2025  Hospital Length of Stay: 8   ICU Length of Stay: 7d 21h   Home: [facility] [id number]    MEDICATIONS  Infusions:  heparin, Last Rate: 1,500 Units/hr (07/25/25 0700)  milrinone, Last Rate: 0.25 mcg/kg/min (07/25/25 0700)      Scheduled:  [Held by provider] apixaban, 5 mg, BID  atorvastatin, 80 mg, Nightly  [Held by provider] bumetanide, 1 mg, Daily  calcium carbonate, 500 mg of calcium carbonate, BID  dapagliflozin  propanediol, 10 mg, Daily  digoxin, 125 mcg, Daily  fenofibrate, 145 mg, Daily  gabapentin, 200 mg, q8h SUSI  hydrALAZINE, 100 mg, q8h SUSI  insulin glargine, 44 Units, Nightly  insulin glargine, 44 Units, Daily  insulin lispro, 0-15 Units, TID AC  insulin lispro, 22 Units, TID AC  isosorbide dinitrate, 30 mg, TID  levothyroxine, 75 mcg, Daily before breakfast  pantoprazole, 40 mg, Daily before breakfast  QUEtiapine, 50 mg, Nightly  sacubitriL-valsartan, 1 tablet, BID  spironolactone, 25 mg, Daily      PRN:  acetaminophen, 975 mg, q6h PRN  albuterol, 2.5 mg, q6h PRN  alteplase, 2 mg, PRN  dextrose, 12.5 g, q15 min PRN  dextrose, 25 g, q15 min PRN  diphenhydrAMINE, 50 mg, q5 min PRN  glucagon, 1 mg, q15 min PRN  glucagon, 1 mg, q15 min PRN  heparin, 2,000-4,000 Units, q4h PRN        Prior to Admission Meds:  Prescriptions Prior to Admission[1]    Invasive Hemodynamics:    Most Recent Range Past 24hrs   BP (Art)   No data recorded   MAP(Art)   No data recorded   RA/CVP   No data recorded   PA 56/18 PAP  Min: 52/28  Max: 76/26   PA(mean) 31 mmHg PAP (Mean)  Min: 28 mmHg  Max: 45 mmHg   PCWP 19 mmHg PCWP (mmHg)  Min: 17 mmHg  Max: 19 mmHg   CO 6.23 L/min CO (L/min)  Min: 4.27 L/min  Max: 7.85 L/min   CI 2.83 L/min/m2 CI (L/min/m2)  Min: 1.94 L/min/m2  Max: 3.57 L/min/m2   Mixed Venous 70 % SVO2 (%)  Min: 55 %  Max: 71 %   SVR  783 (dyne*sec)/cm5 SVR (dyne*sec)/cm5  Min: 642 (dyne*sec)/cm5  Max: 1255 (dyne*sec)/cm5    (dyne*sec)/cm5 PVR (dyne*sec)/cm5  Min: 412 (dyne*sec)/cm5  Max: 412 (dyne*sec)/cm5     MCS:   Heart Mate III:     Most Recent Range Past 24hrs   Flow   No data recorded   Speed   No data recorded   Power   No data recorded   PI   No data recorded     ECMO:     Most Recent Range Past 24hrs   Flow   No data recorded   Speed   No data recorded   Sweep   No data recorded     Impella:      Most Recent Range Past 24hrs   Performance Level   No data recorded   Flow (L/min)   No data recorded   Motor Current   " No data recorded   Placement Signal    Placement OK could not be evaluated. This SmartLink does not work with rows of the type: Custom List   Purge (mmHg)   No data recorded   Purge rate (mL/hr)   No data recorded     VENT:    Most Recent Range Past 24hrs   Mode      FiO2   No data recorded   Rate   No data recorded   Vt    No data recorded   PEEP   No data recorded         7/25/2025     7:00 AM 7/25/2025     6:29 AM 7/25/2025     6:00 AM 7/25/2025     5:00 AM 7/25/2025     4:54 AM 7/25/2025     4:00 AM 7/25/2025     3:00 AM   Vitals   Systolic 101  89 88  86 81   Diastolic 55  53 53  54 54   Heart Rate 96 98 100 101  101 101   Temp     36.2 °C (97.2 °F)     Resp 17 14 16 16  16 17   Weight (lb)   212.96       BMI   29.72 kg/m2       BSA (m2)   2.2 m2         Visit Vitals  /55   Pulse 96   Temp 36.2 °C (97.2 °F)   Resp 17   Ht 1.803 m (5' 10.98\")   Wt 96.6 kg (212 lb 15.4 oz)   SpO2 97%   BMI 29.72 kg/m²   Smoking Status Former   BSA 2.2 m²     Wt Readings from Last 5 Encounters:   07/25/25 96.6 kg (212 lb 15.4 oz)   07/17/25 110 kg (243 lb 2.7 oz)   06/10/25 97.1 kg (214 lb)   05/12/25 96.6 kg (213 lb)   05/04/25 102 kg (224 lb)       Intake/Output Summary (Last 24 hours) at 7/25/2025 0800  Last data filed at 7/25/2025 0700  Gross per 24 hour   Intake 1533.26 ml   Output 1000 ml   Net 533.26 ml     CHEST: Unlabored, Clear, Diminished  CV:  Normal sinus rhythm  ABD:  Soft, Rounded Nontender, No guarding Bowel sounds: All quadrants, Flatus:    EXT:   RLE: Ravinder,Warm, Dry  DP: Moderate  PT: Moderate  LLE: Ravinder,Warm, Dry  DP: Moderate  PT: Moderate  NEURO:   RASS: Drowsy  CAM: Negative  LOC: Alert  Cognition: Follows commands  GCS: 15    DATA:  CMP:  Recent Labs     07/25/25  0618 07/24/25  1941 07/24/25  0314 07/23/25  1750 07/23/25  0450 07/22/25  1618 07/22/25  0550 07/21/25  0520 07/20/25  1232 07/20/25  0556 07/19/25  1925 07/19/25  0530 07/18/25  1708 07/18/25  0157 07/17/25  1119 07/17/25  0653   * " 132* 134* 134* 136 134* 135* 137 135* 138   < > 138   < > 136   < > 137   K 4.0 4.2 4.1 3.9 3.7 4.3 3.8 3.7 3.8 3.6   < > 4.2   < > 4.7   < > 4.0   CL 93* 91* 92* 93* 93* 91* 91* 92* 90* 93*   < > 98   < > 99   < > 98   CO2 30 29 32 30 32 32 36* 37* 36* 37*   < > 32   < > 29   < > 32   ANIONGAP 15 16 14 15 15 15 12 12 13 12   < > 12   < > 13   < > 11   BUN 60* 59* 52* 52* 48* 50* 42* 35* 37* 38*   < > 43*   < > 49*   < > 47*   CREATININE 2.93* 2.65* 2.62* 2.17* 2.04* 2.40* 1.87* 1.61* 1.60* 1.56*   < > 1.67*   < > 1.64*   < > 1.70*   EGFR 24* 27* 27* 34* 37* 30* 41* 49* 49* 51*   < > 47*   < > 48*   < > 46*   MG 2.21 2.20 2.35  --  2.35  --  2.30 2.39  --  2.55*  --  2.47*  --  2.40  --  2.39    < > = values in this interval not displayed.     Recent Labs     07/25/25  0618 07/24/25  1941 07/24/25  0314 07/23/25  1750 07/23/25  0450 07/22/25  1618 07/22/25  0550 07/21/25  0520 07/18/25  0157 07/17/25  1119 07/12/25  1922 05/04/25  1447 11/11/24  1558 11/05/24  1146 06/25/24  1139 12/19/23  1104 10/06/23  0515 10/05/23  1647   ALBUMIN 3.5 3.5 3.5 3.8 3.4 3.8 3.6 3.6   < > 3.7 3.8 4.1 4.0 4.4 4.3 4.1   < > 4.1   ALT  --   --   --   --   --   --   --   --   --  8* 10 26 12 13 15 13  --  11   AST  --   --   --   --   --   --   --   --   --  15 12 22 12 <3* 10 10  --  13   BILITOT  --   --   --   --   --   --   --   --   --  2.0* 1.6* 2.1* 0.5 0.5 1.3* 1.1  --  1.8*    < > = values in this interval not displayed.     CBC:  Recent Labs     07/25/25  0618 07/24/25  0314 07/23/25  0450 07/22/25  0550 07/21/25  0520 07/20/25  0556 07/19/25  0530 07/18/25  0157   WBC 7.2 5.9 4.4 5.3 5.3 5.1 4.7 5.1   HGB 11.2* 11.2* 11.8* 12.0* 12.4* 12.2* 11.2* 11.6*   HCT 35.4* 37.8* 38.0* 38.7* 40.4* 40.2* 38.3* 38.3*    234 213 239 234 252 254 241   MCV 76* 80 76* 77* 77* 78* 81 77*     COAG:   Recent Labs     07/25/25  0618 07/24/25  0314 07/23/25  1230 07/23/25  0658 07/22/25  0550 07/21/25  0520 07/17/25  1423 07/17/25  1119  "10/14/23  2208 10/05/23  1647   INR  --   --  1.1  --   --   --   --  2.0*  --  1.2*   HAUF 0.4 0.3  --  0.3 0.3 0.3   < >  --    < >  --     < > = values in this interval not displayed.     ABO:   Recent Labs     07/23/25  1230   ABO A     HEME/ENDO:   Recent Labs     07/23/25  1230 07/17/25  1125 07/17/25  1111 05/12/25  1656 11/11/24  1558 11/05/24  1146 06/25/24  1139 12/29/23  1427 10/04/23  1518   FERRITIN  --  102  --   --   --   --  332*  --  379*   IRONSAT  --  16*  --   --   --  37 20*  --  15*   TSH 2.12 3.97  --   --  3.26 4.05* 4.23*   < > 9.31*   HGBA1C  --   --  10.1* 8.9* 11.0* 10.8* 9.2*   < > 8.1*    < > = values in this interval not displayed.     CARDIAC:   Recent Labs     07/23/25  1230 07/17/25  1119 07/13/25  0737 07/12/25  2038 07/12/25  1922 05/04/25  1540 05/04/25  1447 11/05/24  1146 06/25/24  1139 12/19/23  1104 10/05/23  1647     --   --   --   --   --   --   --   --   --  240   TROPHS  --  37 53* 53* 53* 40* 41*  --   --   --   --    * 744*  --   --  1,299*  --  2,032* 57 743* 147* 157*     Recent Labs     07/25/25  0618 07/24/25  2355 07/24/25  2350 07/24/25  1750 07/24/25  1426   LACMX 0.9 1.1 1.3 1.4 1.9   SO2MV 70 72 68 55 67     No results for input(s): \"TACROLIMUS\", \"SIROLIMUS\", \"CYCLOSPORINE\" in the last 83879 hours.  Recent Labs     06/10/25  0831 11/11/24  1558 11/05/24  1146 10/05/23  0354   CHOL 82 630* 417* 106   LDLCALC 24  --   --  32*   HDL 26.8 29.2 36.9 24.5   TRIG 154* 2,663* 2,017* 247*     MICRO: No results for input(s): \"ESR\", \"CRP\", \"PROCAL\" in the last 69761 hours.  Susceptibility data from last 90 days.  Collected Specimen Info Organism   07/19/25 Swab from Anterior Nares Methicillin Susceptible Staphylococcus aureus (MSSA)     Assessment & Plan  Acute on chronic systolic heart failure        EKG:   Recent Labs     07/12/25  1834 05/22/25  0857   ATRRATE 78 86   VENTRATE 78 86   PRINT 168 194   QRSDUR 112 118   QTCFRED 425 440   QTCCALCB 444 466 "     Encounter Date: 07/12/25   ECG 12 lead   Result Value    Ventricular Rate 78    Atrial Rate 78    MS Interval 168    QRS Duration 112    QT Interval 390    QTC Calculation(Bazett) 444    P Axis 85    R Axis 25    T Axis 101    QRS Count 13    Q Onset 215    P Onset 131    P Offset 190    T Offset 410    QTC Fredericia 425    Narrative    Normal sinus rhythm  Possible Left atrial enlargement  Low voltage QRS  Anterolateral infarct (cited on or before 10-OCT-2023)  Abnormal ECG  When compared with ECG of 04-MAY-2025 13:32,  Questionable change in QRS axis     Echocardiogram:   Recent Labs     05/05/25  1223 06/25/24  1100   EF 18 30   LVIDD  --  6.65   RV 58.3 54.4   RVFRWALLPKSP 9.14 8.16   TAPSE 1.6 1.0   Transthoracic Echo (TTE) Limited With Doppler, Color And Contrast 05/05/2025    [facility], [address] [place] [address]  Tel [phone] and Fax [phone]    TRANSTHORACIC ECHOCARDIOGRAM REPORT      Patient Name:       NEGRITA BERNARD    Gayla Physician:    [id number] Bird Askew MD  Study Date:         5/5/2025            Ordering Provider:    [id number] BENNY CARTWRIGHT  MRN/PID:            [id number]            Fellow:  Accession#:         [id number]        Nurse:                Tres Barros RN  Date of Birth/Age:  1965 / 60 years Sonographer:          Alyssa Monroy RDCS  Gender assigned at  M                   Additional Staff:  Birth:  Height:             180.34 cm           Admit Date:           5/4/2025  Weight:             101.61 kg           Admission Status:     Inpatient -  Routine  BSA / BMI:          2.21 m2 / 31.24     Encounter#:           [id number]  kg/m2  Blood Pressure:     116/75 mmHg         Department Location:  [facility]    Study Type:    TRANSTHORACIC ECHO (TTE) COMPLETE  Diagnosis/ICD: Unspecified systolic (congestive) heart failure (CHF)-I50.20;  Ischemic cardiomyopathy-I25.5  Indication:    SOB CP  Edema  CPT Code:      Echo Limited-15424; Doppler Limited-05337; Color Doppler-18268    Patient History:  CABG:              CABG x 1.  Smoker:            Former.  Pacer/Defib:       AICD  Pertinent History: CAD, Cardiomyopathy, CHF, COPD, PVD, Previous DVT, HTN and  Hyperlipidemia. PCI-Stent, S/P CABG x1 2023, CKD III, JAKE.    Study Detail: The following Echo studies were performed: 2D, Doppler and color  flow. Technically challenging study due to body habitus. Definity  used as a contrast agent for endocardial border definition. Total  contrast used for this procedure was 2 mL via IV push.      PHYSICIAN INTERPRETATION:  Left Ventricle: Left ventricular ejection fraction is severely decreased, by visual estimate at 15-20%. There is global hypokinesis of the left ventricle with minor regional variations. The left ventricular cavity size is moderately dilated. Abnormal (paradoxical) septal motion, consistent with RV pacemaker. Spectral Doppler shows a Grade II (pseudonormal pattern) of left ventricular diastolic filling with an elevated left atrial pressure.  Left Atrium: The left atrium is mildly dilated.  Right Ventricle: The right ventricle is mildly enlarged. There is moderately reduced right ventricular systolic function.  Right Atrium: The right atrium is normal in size.  Aortic Valve: The aortic valve is trileaflet. There is minimal aortic valve cusp calcification. There is mild aortic valve thickening. There is no evidence of aortic valve regurgitation. The peak instantaneous gradient of the aortic valve is 6 mmHg.  Mitral Valve: The mitral valve is mildly thickened. There is mild to moderate mitral valve regurgitation.  Tricuspid Valve: The tricuspid valve is structurally normal. There is mild to moderate tricuspid regurgitation. The Doppler estimated RVSP is moderately elevated at 58.3 mmHg.  Pulmonic Valve: The pulmonic valve is structurally normal. There is physiologic pulmonic valve  regurgitation.  Pericardium: Trivial pericardial effusion.  Aorta: The aortic root is normal.  Systemic Veins: The inferior vena cava appears dilated.  In comparison to the previous echocardiogram(s): Compared with study dated 2024, LVEF 15-20%.      CONCLUSIONS:  1. Left ventricular ejection fraction is severely decreased, by visual estimate at 15-20%.  2. There is global hypokinesis of the left ventricle with minor regional variations.  3. Spectral Doppler shows a Grade II (pseudonormal pattern) of left ventricular diastolic filling with an elevated left atrial pressure.  4. Left ventricular cavity size is moderately dilated.  5. Abnormal septal motion consistent with RV pacemaker.  6. There is moderately reduced right ventricular systolic function.  7. Mildly enlarged right ventricle.  8. The left atrium is mildly dilated.  9. Mild to moderate mitral valve regurgitation.  10. Mild to moderate tricuspid regurgitation visualized.  11. Moderately elevated right ventricular systolic pressure.    QUANTITATIVE DATA SUMMARY:    2D MEASUREMENTS:           Normal Ranges:  LVEDV Index:     119 ml/m2      LV SYSTOLIC FUNCTION:  Normal Ranges:  EF-A4C View:    21 % (>=55%)  EF-A2C View:    8 %  EF-Biplane:     16 %  EF-Visual:      18 %  LV EF Reported: 18 %      LV DIASTOLIC FUNCTION:           Normal Ranges:  MV Peak A:             0.65 m/s  (0.42-0.7 m/s)  MV e'                  0.067 m/s (>8.0)  MV lateral e'          0.09 m/s  MV medial e'           0.04 m/s      MITRAL VALVE:          Normal Ranges:  MV DT:        198 msec (150-240msec)      MITRAL INSUFFICIENCY:             Normal Ranges:  MR Vmax:              342.00 cm/s      AORTIC VALVE:          Normal Ranges:  AoV Vmax:     1.19 m/s (<=1.7m/s)  AoV Peak P.7 mmHg (<20mmHg)  LVOT Max Dylan: 0.65 m/s (<=1.1m/s)  LVOT VTI:     6.72 cm      RIGHT VENTRICLE:  TAPSE: 15.5 mm  RV s'  0.09 m/s      TRICUSPID VALVE/RVSP:          Normal Ranges:  Peak TR Velocity:      3.29 m/s  RV Syst Pressure:     58 mmHg  (< 30mmHg)  IVC Diam:             2.40 cm      [id number] Bird Askew MD  Electronically signed on 5/5/2025 at 1:38:04 PM        ** Final **    Coronary Angiography:   Left And Right Heart Catheterization With Left Ventriculography 06/10/2025    [facility], Cath Lab, [address] [place] [address]    Cardiovascular Catheterization Report    Patient Name:      NEGRITA LOO Performing Physician:  [id number] Carl Gillombardo MD  Study Date:        6/10/2025             Verifying Physician:   [id number] Carl Gillombardo MD  MRN/PID:           [id number]              Cardiologist/Co-Scrub:  Accession#:        [id number]          Ordering Provider:     [id number] CARMELITA NAVA  Date of Birth/Age: 1965 / 60 years   Cardiologist:  Gender:            M                     Fellow:                [id number] Kyler Chow MD  Encounter#:        [id number]            Surgeon:      Study:            Left Heart Cath with Grafts  Additional Study: Right Heart Cath      Indications:  NEGRITA LOO is a 60 year old male who presents with prior coronary artery bypass graft surgery, dyslipidemia and hypertension. NEGRITA LOO is a 60 year old male who presents with dyslipidemia, hypertension, prior coronary artery bypass graft surgery and an anginal equivalent chest pain assessment (i.e. dyspnea on exertion believed to be from ischemia). Cardiomyopathy and left ventricular dysfunction. Cardiomyopathy and heart failure.    Procedure Description:  After infiltration with 2% Lidocaine, the right radial artery was cannulated with a modified Seldinger technique. Subsequently a 6 Mozambican sheath was placed in the right radial artery. After infiltration of local anesthetic, the right brachial vein was cannulated with a percutaneous technique. A 5 Mozambican sheath was placed in the vein. Selective coronary catheterization was performed using a 5 Fr  catheter(s) exchanged over a guide wire to cannulate the coronary arteries. A JL 3.5 tip catheter was used for left coronary injections. A JR 4 tip catheter was used for right coronary injections.  Cardiac output was calculated via the Pallavi method. After completion of the procedure, the arterial sheath was pulled and a TR Band Radial Compression Device was utilized to obtain patent hemostasis. Post-procedure, the venous sheath was pulled and pressure was applied to the site.    Coronary Angiography:  The coronary circulation is right dominant.    Left Main Coronary Artery:  The left main coronary artery is a normal caliber vessel. The left main arises normally from the left coronary sinus of Valsalva and bifurcates into the LAD and circumflex coronary arteries. The left main coronary artery showed no significant disease or stenosis greater than 30%.    Left Anterior Descending Coronary Artery Distribution:  The left anterior descending coronary artery is a normal caliber vessel. The LAD arises normally from the left main coronary artery. The proximal left anterior descending coronary artery showed 70% stenosis. The ostial and proximal 1st diagonal branch revealed 90% stenosis. The 1st septal  branch showed no significant disease or stenosis greater than 30%. The 2nd septal  branch showed no evidence of significant disease. The 3rd septal  branch demonstrated no significant disease or stenosis greater than 30%.    Circumflex Coronary Artery Distribution:  The circumflex coronary artery is a normal caliber vessel. The circumflex arises normally from the left main coronary artery and terminates in the AV groove. The mid circumflex coronary artery showed 50% stenosis. The ostial 1st obtuse marginal branch showed 90% stenosis.    Right Heart Catheterization:  Cardiac output was calculated via the Pallavi method. Elevated left sided filling pressures with normal cardiac output. Severely elevated  ventricular filling pressure. Cardiac output is normal. Preserved cardiac output at rest. RHC:  RA: 25  RV: 63/9  PA: 72/34  PCWP: 32  PA-SAT: 36%  SVC-SAT: 41%  FA-SAT: 71%  CO: 4.84  CI: 2.24  SVR: 1041  Severely elevated right and left sided filling pressures with preserved CO/CI.    Right Coronary Artery Distribution:    The right coronary artery is a normal caliber vessel. The RCA arises normally from the right sinus of Valsalva. The mid right coronary artery showed 100% stenosis. The acute marginal branch showed no significant disease or stenosis greater than 30%.    Coronary Grafts:    LIMA Graft:  Left internal mammary artery graft conduit, originating in situ and attached to the mid left anterior descending, is normal.    Coronary Lesion Summary:  Vessel         Stenosis      Vessel Segment  LAD          70% stenosis       proximal  1st Diagonal 90% stenosis  ostial and proximal  Circumflex   50% stenosis          mid  OM 1         90% stenosis        ostial  RCA          100% stenosis         mid      Graft Stenosis Summary:  Graft     Destination of Graft           % Stenosis  LIMA  mid left anterior descending no evidence of stenosis      Hemo Personnel:  +---------------------------+---------+  Name                       Duty       +---------------------------+---------+  Gillombardo, Carl Barton MDPKRIS MD 1  +---------------------------+---------+      Hemodynamic Pressures:    +----+----------+---------+-------------+-------------+---+----+-------+-------+  SiteDate Time   Phase  Systolic mmHg  Diastolic  ED MeanA-Wave V-Wave                   Name                    mmHg     mmHmmHg mmHg   mmHg                                                      g                     +----+----------+---------+-------------+-------------+---+----+-------+-------+    PA 6/10/2025 AIR REST           72           34     43                      11:05:46                                                                       AM                                                          +----+----------+---------+-------------+-------------+---+----+-------+-------+    PW 6/10/2025 AIR REST                               30     32     32        11:06:54                                                                      AM                                                          +----+----------+---------+-------------+-------------+---+----+-------+-------+    RV 6/10/2025 AIR REST           63            9 23                          11:07:45                                                                      AM                                                          +----+----------+---------+-------------+-------------+---+----+-------+-------+    RA 6/10/2025 AIR REST                               25     31     33        11:09:13                                                                      AM                                                          +----+----------+---------+-------------+-------------+---+----+-------+-------+    AO 6/10/2025 AIR REST          102           75     88                      11:15:08                                                                      AM                                                          +----+----------+---------+-------------+-------------+---+----+-------+-------+    LV 6/10/2025 AIR REST           99           26 33                          11:21:35                                                                      AM                                                          +----+----------+---------+-------------+-------------+---+----+-------+-------+    LV 6/10/2025 AIR REST          102           76 80                          11:21:47                                                                       AM                                                          +----+----------+---------+-------------+-------------+---+----+-------+-------+    AO 6/10/2025 AIR REST           91           70     80                      11:38:01                                                                      AM                                                          +----+----------+---------+-------------+-------------+---+----+-------+-------+        Oxygen Saturation %:  +-----------+----------+------------+  Sample SiteO2 Sat (%)HB (g/100ml)  +-----------+----------+------------+           FA        71        12.5  +-----------+----------+------------+           PA        36        12.5  +-----------+----------+------------+           FA        71        12.5  +-----------+----------+------------+           PA        36        12.5  +-----------+----------+------------+      Cardiac Outputs:  +---------------+------------------+-------+  BLANCA CO (l/min)BLANCA CI (l/min/m2)BLANCA SV  +---------------+------------------+-------+              4.8               2.2   67.2  +---------------+------------------+-------+      Vascular Resistance Calculated Values (Wood Units):  +-----+---+----+-------+----+----+---+----+----+----+-------+  PhasePVRPVRIPVR/SVRSVR SVRITPRTPRITVR TVRITPR/TVR  +-----+---+----+-------+----+----+---+----+----+----+-------+  0    2.75.8 0      [id number]        +-----+---+----+-------+----+----+---+----+----+----+-------+      Cardiac Cath Post Procedure Notes:  Post Procedure Diagnosis: Multivessel CAD.  Blood Loss:               Estimated blood loss during the procedure was 10 mls.  Specimens Removed:        Number of specimen(s) removed:  none.    ____________________________________________________________________________________  CONCLUSIONS:  1. Severely elevated right and left sided filling pressures with preserved CO/CI.  RA: 25 -- RV: 72/9 -- PA: 72/34 -- PCWP: 32    PA-SAT: 36%  SVC-SAT: 41%  FA-SAT: 71%  CO: 4.84  CI: 2.24  2. Right dominant coronary circulation with severe multivessel CAD, as described above.  3. WILKS to LAD patent.    ICD 10 Codes:  Cardiomyopathy, unspecified-I42.9    CPT Codes:  Left Heart Cath Bypass Graft w ventriculography and coronary angio(LHC)-24075; Right Heart Cath O2/Cardiac output without biopsy (RHC)-02980    [id number] Carl Gillombardo MD  Performing Physician  Electronically signed by [id number] Carl Gillombardo MD on 2025 at 3:38:51 PM          ** Final **    Right Heart Cath: No results found for this or any previous visit from the past 1800 days.    Cardiac Scoring: No results found for this or any previous visit from the past 1800 days.    Cardiac MRI:   MR cardiac morphology and function w and wo IV contrast 2023    Narrative  EXAMINATION: MR CARDIAC W/+W/O 2023 02:40 PM    Patient Name: NEGRITA LOO  MRN: [id number]  : 1965  Referring MD: VICK LEONARD  Interpreting MD: Dalton Solares  Resident/Fellow: None  Scan Date: 2023 02:40 PM  Height: ft in or 180cm  Weight: lbs or 112 kg  BSA: 2.3 m^2    Quality of study: Good  CLINICAL INDICATION: Pre-op cardiac assessment  Associated Diagnosis: Coronary artery disease  Technologist Note:  Patient has a pacemaker causing a lot of artifact.  MJI  Comparison:  Correlation: Transthoracic echocardiogram on Exercise stress-rest echocardiogram on Left heart catheterization and coronary angiograms on Nuclear medicine stress-rest perfusion imaging on    TECHNIQUE: Patient questionnaire was completed and was reviewed by MRI personnel prior to the patient entering the scanner. Multiplanar, multisequence MR imaging of the heart was performed  without and with intravenous contrast. Cardiac velocity flow mapping was performed.  Siemens Christina 1.5 Effie MRI scanner.  INTRAPROCEDURE MEDS: Gadoterate Meglumine (DOTAREM) 10 MMOL/20ML solution 35 mL Route: Intravenous Push    FINDINGS:      CARDIAC MRI    CARDIAC CHAMBER MORPHOLOGY  Left ventricle: Dilated cavity size, mixed hypertrophy, no mass/thrombus  Right ventricle: Normal contractility, normal cavity size, no mass/thrombus  Left atrium: Dilated cavity size, no mass/thrombus  Right atrium: Normal cavity size, no mass/thrombus    CARDIAC VALVE MORPHOLOGY  Mitral valve: Normal leaflets, fully mobile, no regurgitation, no stenosis  Tricuspid valve: Normal leaflets, fully mobile, no regurgitation, no stenosis  Aortic valve: Normal leaflets, fully mobile, no regurgitation, no stenosis  Pulmonic Valve: Normal leaflets, fully mobile, no regurgitation, no stenosis    MISCELLANEOUS MORPHOLOGY  Pericardium: Normal    CARDIAC MEASUREMENTS  Anterior left ventricular wall thickness: 1.2 cm (Normal < 1.2 cm)  Posterior left ventricular wall thickness: 1.2 cm (Normal < 1.2 cm)  Left ventricular end-diastolic dimension: 6.3 cm (Normal < 5.7 cm)  Left ventricular end-systolic dimension: 5.3 cm (Normal < 4 cm)  Right ventricular end-diastolic dimension: 3.6 cm (Normal < 4.3 cm)  Left atrial end-diastolic dimension: 4.6 cm (Normal < 3.9 cm)  Left atrial end-diastolic area: 29 cm^2 (Normal < 22 cm^2)  Right atrial end-diastolic dimension: 4.8 cm (Normal < 4.7 cm)  Right atrial end-diastolic area: 20 cm^2 (Normal < 20 cm^2)    Ascending aorta (at the level of the main pulmonary artery): 2.8 cm  Pulmonary Artery: 2.1 cm (Normal < 2.9 cm)      LEFT VENTRICULAR FUNCTION  The left ventricle is severely dilated with focally upon globally abnormal systolic function. Focal left ventricular systolic dysfunction consists of dyskinesia (aneurysm) of the apex  LV volumes absolute and normalized to body surface area with the age and gender  matched normals in parenthesis are listed as follows.  LVEF: 22 % (62 +/- 6 (51-73)%)  SV: 77 ml  SVI: 33 ml/m2 (46 +/- 8 (30-62) ml/m2)  CO: 5.5 L/min  CI: 2.4 L/min/m2  EDV: 346 ml  EDVI: 149 ml/m2 (74 +/- 13 ()ml/m2)  ESV: 269 ml  ESVI: 116 ml/m2 (28 +/- 7 (14-43) ml/m2)  LVM: 218 g  LVMI: 96 g/m2 (62 +/- 10 (43-81) g/m2)    RIGHT VENTRICULAR FUNCTION  The right ventricle is normal in size and systolic function.  Right ventricular volumes absolute and normalized to body surface area with the age and gender matched normals in parenthesis are listed as follows.  RVEF: 47 % (53 +/- 6 (41-66) %)  EDV: 154 ml  EDVI: 66 ml/m2 (86 +/- 17 () ml/m2)  ESV: 81 ml  ESVI: 35 ml/m2 (41 +/- 11 (19-63) ml/m2)    There is marked susceptibility artifact which limits delayed postgadolinium imaging/scar assessment. This is especially important. In the LAD distribution. Scar is seen at the left ventricular apex including the lateral and inferior walls and appears to be more pronounced in the mid to apical lateral segments. The LAD distribution is not adequately visualized because of marked susceptibility artifact from the implanted cardiac device.      Velocity flow mapping: Completed at the aortic valve. There is no evidence of aortic valve stenosis or regurgitation.    NONCARDIAC FINDINGS: Not significant      IMPRESSION:  1. Severe left ventricular dilatation with focally upon globally abnormal left ventricular systolic function with an ejection fraction of 22%. Infarctions seen in the left circumflex and RCA distribution and the apex. Full assessment of viability especially in the LAD distribution could not be performed because of marked susceptibility artifact from the implanted cardiac device which involves the entire anterior, anterolateral and anteroseptal regions. Consider PET scanning.  2. Dilated left atrium  3. Normal right ventricular size and systolic function    The pulse sequences used were designed for  imaging cardiovascular structures and are suboptimal for imaging other structures and organs.    MACRO: None    Nuclear:  NM PET CT myocardial perfusion multiple 05/14/2025    Narrative  Interpreted By:  Ebony Sky,  STUDY:  NM PET CT MYOCARDIAL PERFUSION MULTIPLE;  5/14/2025 12:21 pm    INDICATION:  Signs/Symptoms:chest pain, HFrEF, ischemic cardiomyopathy.    ,I50.22 Chronic systolic (congestive) heart failure,I25.5 Ischemic  cardiomyopathy,I50.23 Acute on chronic systolic (congestive) heart  failure,N18.30 Chronic kidney disease, stage 3 unspecified  (Multi),E11.65 Type 2 diabetes mellitus with hyperglycemia  (Multi),Z79.4 Long term (current) use of insulin (Multi),Z95.810  Presence of automatic (implantable) cardiac defibrillator,E08.22  Diabetes mellitus due to underlying condition with diabetic chronic  kidney disease,N18.30 Chronic kidney disease, stage 3 unspecified  (Multi),Z79.4 Long term (current) use of insulin (Multi)    COMPARISON:  None.    ACCESSION NUMBER(S):  [id number]    ORDERING CLINICIAN:  CARMELITA NAVA    TECHNIQUE:  DIVISION OF NUCLEAR MEDICINE  POSITRON EMISSION TOMOGRAPHY (PET) / CT  NITROGEN-13 AMMONIA MYOCARDIAL PERFUSION SCAN, WITH PHARMACOLOGIC  STRESS    The patient received an intravenous dose of 10.9mCi of N-13 ammonia  and resting positron emission tomographic (PET) images of the  myocardium were acquired. The patient then received an intravenous  infusion of 0.4 mg of Regadenoson followed by a second dose of 13.3  mCi of N-13 ammonia. Stress phase PET images of the myocardium were  then acquired. These included ECG-gated images to assess and quantify  ventricular function. Low-dose noncontrast CT of the thorax was  performed both before the resting NH3 and after the stress NH3 PET  image acquisitions for purposes of attenuation correction of the PET  data.    FINDINGS:  The left ventricular cavity is severely dilated in both rest and post  stress images. There is abnormal  dilation and thickening and  visualization of right ventricular cavity in both rest and stress  images. The stress tomographic images demonstrate markedly abnormal  myocardial perfusion. There is a large moderate to severe perfusion  defect involving the apex, anterior, lateral, and inferolateral wall,  there is relatively preserved perfusion of the septum. On rest images  there is a significantly reversibility noted involving the anterior  wall, lateral wall and inferoseptal wall, however, there is no  significant reversibility of the apex The left ventricular cavity is  dilated on gated images with severe global hypokinesis with severely  impaired left ventricular systolic function in both rest and stress  images, 17%    Impression  High-risk N13 PET-CT myocardial perfusion study  This finding consistent with severe ischemic cardiomyopathy with  significantly induced ischemia involving the anterior wall, lateral  wall and inferolateral wall with small apical infarction, a pattern  consistent with triple-vessel disease, the presence of significant  reversibility on rest images may favoring good response to coronary  revascularization The possibility of subendocardial infarction can  not be totally excluded, further evaluation of myocardial viability  by FDG PET or cardiac MRI could be considered, if this will change  patient management    I personally reviewed the image(s) / study and agree with the  findings and interpretation as stated. This study was interpreted at  [facility].    MACRO:    Critical Finding:  See findings. Notification was initiated on  5/14/2025 at 1:24 pm by  Ebony Sky.  (**-OCF-**) Instructions:    None    Signed by: Ebony Sky 5/14/2025 1:25 PM  Dictation workstation:   [id number]    Metabolic Stress: No results found for this or any previous visit from the past 1800 days.      Imaging  CT head wo IV contrast  Result Date: 7/25/2025  No acute  intracranial hemorrhage or mass effect.   Mild degree of nonspecific white matter hypodensity compatible with microangiopathy.   I personally reviewed the images/study and I agree with the findings as stated by resident physician Miky Gtz MD. This study was interpreted at [facility], [place], [place].   MACRO: None   Signed by: Kizzy Ambrocio 7/25/2025 7:05 AM Dictation workstation:   [id number]    CT maxillofacial bones wo IV contrast  Result Date: 7/24/2025  No acute abnormality of the facial bones.   I have reviewed the images/study and I agree with the findings as stated.   MACRO: None   Signed by: Shanell Rodriguez 7/24/2025 9:33 AM Dictation workstation:   [id number]    XR chest 1 view  Result Date: 7/24/2025  1. Unchanged appearance of the lungs with mild perihilar congestion/edema and no focal infiltrate. No sizable pneumothorax. 2. Medical devices and postsurgical changes as described above.       Signed by: Ke Lau 7/24/2025 8:32 AM Dictation workstation:   [id number]    CT chest abdomen pelvis wo IV contrast  Result Date: 7/23/2025  Chest 1.  Mild diffuse interlobular interstitial thickening in both lungs likely representing pulmonary edema. 2. Cardiomegaly. 3. Support devices as described above.   Abdomen-Pelvis 1.  No acute process in the abdomen and pelvis. 2. Hepatosplenomegaly. 3. Cholelithiasis. Mild pericholecystic fluid is likely related to fluid overload. 4. Left renal calculus without hydronephrosis.     MACRO: None     Dictation workstation:   [id number]    XR chest 2 views  Result Date: 7/23/2025  1.  Interval retraction of a right IJ [place]-Talita catheter with tip now projecting over the right lower lobe segmental branches. 2. Mild pulmonary edema. 3. Additional medical devices as above.   I personally reviewed the images/study and I agree with the findings as stated by resident physician Cam Sommers MD. This study was interpreted at [facility]  [place], [place].   MACRO: None   Signed by: Allyssa Pedraza 7/23/2025 4:45 PM Dictation workstation:   [id number]    XR chest 1 view  Result Date: 7/23/2025  Minimal improvement of aeration of the both lungs. Stable AICD, placement. [place]-Talita catheter with the tip overlies the right lower lobe pulmonary artery branch, unchanged. Cardiac silhouette is mildly enlarged. Pulmonary vessels are congested Mild pulmonary edema/fluid overload of the both lungs. Slightly improved since last exam. Small bilateral pleural effusion. No pneumothorax seen. Bony thorax unremarkable.   MACRO: None   Signed by: Allyssa Pedraza 7/23/2025 12:21 PM Dictation workstation:   [id number]    XR chest 1 view  Result Date: 7/22/2025  1.  New fluid visualized within the minor fissure and similar appearance of bilateral interstitial and airspace opacities, favored to represent pulmonary edema. Additional considerations include ARDS and pulmonary hemorrhage. 2. Medical devices as above.   I personally reviewed the images/study and I agree with the findings as stated by resident physician Cam Sommers MD. This study was interpreted at [place], [place].   MACRO: None   Signed by: Allyssa Pedraza 7/22/2025 2:14 PM Dictation workstation:   [id number]    XR chest 1 view  Result Date: 7/21/2025  1. Right IJ [place]-Talita catheter with distal tip projecting over the distal right interlobar artery. 2. Similar interstitial edema and trace bilateral pleural effusions. 3. Additional medical devices as above.   I personally reviewed the images/study and I agree with the findings as stated by resident physician Cam Sommers MD. This study was interpreted at [place], [place].   MACRO: None   Signed by: Allyssa Pedraza 7/21/2025 11:54 AM Dictation workstation:   [id number]    XR chest 1 view  Result Date: 7/20/2025  1. Right IJ  approach [place]-Talita catheter has been slightly advanced, the tip of which now overlies right lower lobe pulmonary artery, approximately 2 cm distal to right hilum. Repositioning recommended. 2. No significant change in lung aeration with questionable interstitial edema and trace bilateral pleural effusions/bibasilar atelectasis. 3. Rest of medical devices as above.   Signed by: Claudio Yancey 7/20/2025 11:03 AM Dictation workstation:   [id number]    XR chest 1 view  Result Date: 7/19/2025  1. Medical devices as above. Of note, new right IJ approach [place]-Talita catheter with its tip projecting over right interlobar pulmonary artery, at the level of right hilum. 2. No significant change in mild interstitial edema and suspected trace bilateral pleural effusions/bibasilar atelectasis.   I personally reviewed the images/study and I agree with the findings as stated by resident physician Miky Gtz MD. This study was interpreted at [facility], [place], [place].   MACRO: None   Signed by: Claudio Yancey 7/19/2025 4:00 PM Dictation workstation:   [id number]      Cardiology, Vascular, and Other Imaging  Vascular US carotid artery duplex bilateral  Result Date: 7/23/2025            [place] [address]   Tel [phone] and Fax [phone]  Vascular Lab Report [facility] US CAROTID ARTERY DUPLEX BILATERAL  Patient Name:     NEGRITA Shukla           [id number] [address]OO                 Physician:        MD Study Date:       7/23/2025            Ordering          [id number]                                        Physician: MRN/PID:          [id number]             Technologist:     Rajani JAFFE Accession#:       [id number]         Technologist 2: Date of           1965 / 60 years  Encounter#:       [id number] Birth/Age: Gender:           M Admission Status: Inpatient            Location          [facility]                                        Performed:  Diagnosis/ICD: Encounter for preprocedural cardiovascular examination-Z01.810 Indication:    Pre-Op OHT/LVAD CPT Codes:     28972 Cerebrovascular Carotid Duplex scan complete  Patient History CAD, HTN and Hyperlipidemia. 10-9-2023 Both ICA <50% stenosis.  CONCLUSIONS: Left Carotid: Findings are consistent with less than 50% stenosis of the left proximal internal carotid artery. Left external carotid artery appears patent with no evidence of stenosis. The left vertebral artery is patent with antegrade flow. No evidence of hemodynamically significant stenosis in the left subclavian artery.  Comparison: Compared with study from 10/9/2023, no significant change.The right CCA and ICA not scaned due to IV lines in right side of neck.  Additional Findings: Unable to scan right CCA and ICA due to IV lines in right side of neck.  Imaging & Doppler Findings: Right Plaque Morph: The proximal right internal carotid artery demonstrates heterogenous plaque. The proximal right common carotid artery demonstrates heterogenous and calcified plaque. The mid right common carotid artery demonstrates heterogenous and calcified plaque. The distal right common carotid artery demonstrates heterogenous and calcified plaque.  Right                  Left  PSV  EDV              PSV      EDV             CCA P    79 cm/s             CCA D    51 cm/s             ICA P    52 cm/s  13 cm/s             ICA M    57 cm/s  17 cm/s             ICA D    64 cm/s  22 cm/s              ECA     107 cm/s           Vertebral  28 cm/s  7 cm/s           Subclavian 94 cm/s               Left ICA/CCA Ratio 1.0   [id number] Marquita Brown MD Electronically signed by [id number] Marquita Brown MD on 7/23/2025 at 6:27:44 PM  ** Final **     Vascular US Ankle Brachial Index (MARK) Without Exercise  Result Date: 7/23/2025  Preliminary Cardiology Report           [address]  [place] [address]   Tel [phone] and Fax [phone]            Preliminary Vascular Lab Report  [facility] US ANKLE BRACHIAL INDEX (MARK) WITHOUT EXERCISE  Patient Name:     NEGRITA BERNARD     Reading           [id number] [address]                 Physician:        MD Study Date:       7/23/2025            Ordering          [id number]                                        Physician: MRN/PID:          [id number]             Technologist:     Rajani Varela [facility] Accession#:       [id number]         Technologist 2: Date of           1965             Encounter#:       [id number] Birth/Age: Gender:           M Admission Status: Inpatient            Location          [facility]                                        Performed:  Diagnosis/ICD: Encounter for preprocedural cardiovascular examination-Z01.810 Indication:    OHT/LVAD Procedure/CPT: 88248 Peripheral artery MARK Only  Patient History: Anticoagulation, HTN and Hyperlipidemia. 10-9-2023 MARK's no                  evidence of arterial disease in BLEA.  PRELIMINARY CONCLUSIONS: Right Lower PVR: Evidence of mild arterial occlusive disease in the right lower extremity at rest. Right pressures of >220 mmHg suggest no compressibility of vessels and may make absolute Segmental Limb Pressures (SLP) unreliable. Decreased digital perfusion noted. Multiphasic flow is noted in the right common femoral artery, right posterior tibial artery and right dorsalis pedis artery. Left Lower PVR: Evidence of mild arterial occlusive disease in the left lower extremity at rest. Left pressures of >220 mmHg suggest no compressibility of vessels and may make absolute Segmental Limb Pressures (SLP) unreliable. Multiphasic flow is noted in the left posterior tibial artery, left dorsalis pedis artery and left common femoral artery.  Imaging & Doppler Findings:  RIGHT Lower PVR                Pressures Ratios Right Posterior Tibial (Ankle) 220 mmHg  2.22  Right Dorsalis Pedis (Ankle)   204 mmHg  2.06 Right Digit (Great Toe)        58 mmHg   0.59                      Right Brachial Pressure 99 mmHg   VASCULAR PRELIMINARY REPORT completed by Rajani JAFFE on 7/23/2025 at 3:13:35 PM  ** Final **     Vascular US aorta iliac duplex limited  Result Date: 7/23/2025  Preliminary Cardiology Report           [place] [address]   Tel [phone] and Fax [phone]   Preliminary Vascular Lab Report  VASC US AORTA ILIAC DUPLEX LIMITED  Patient Name:     NEGRITA BERNARD     Reading           [id number] [address] LOO                 Physician:        MD Study Date:       7/23/2025            Ordering          [id number]                                        Physician: MRN/PID:          [id number]             Technologist:     Rajani JAFFE Accession#:       [id number]         Technologist 2: Date of           1965             Encounter#:       [id number] Birth/Age: Gender:           M Admission Status: Inpatient            Location          [facility] [place] [facility]                                        Performed:  Diagnosis/ICD: Encounter for preprocedural cardiovascular examination-Z01.810 Indication:    Pre-Op OHT/LVAD Procedure/CPT: 58206 Duplex Aorta/IVC/Iliac/Bypass Graft  Patient History: CAD, HTN and Hyperlipidemia. 10- No AAA.  PRELIMINARY CONCLUSIONS: Aorta/Common Iliac Arteries/IVC: The abdominal aorta and bilateral common iliac arteries demonstrate no evidence of aneurysm.  Imaging & Doppler Findings:   AORTA     AP    Lateral    PSV Proximal 2.14 cm 1.96 cm 95.0 cm/s   Mid    1.86 cm 1.89 cm 72.0 cm/s  Distal  1.61 cm 1.70 cm 52.0 cm/s    RIGHT       AP    Lateral    PSV GIO Proximal 1.29 cm 1.10 cm 70.00 cm/s     LEFT       AP     Lateral     PSV GIO Proximal 1.01 cm 101.00 cm 70.30 cm/s  VASCULAR PRELIMINARY REPORT completed by Rajani JAFFE on 7/23/2025 at  3:35:02 PM  ** Final **              LDA:  Introducer 07/19/25 Internal jugular Right (Active)   Placement Date/Time: 07/19/25 1300   Hand Hygiene Completed: Yes  Location: Internal jugular  Orientation: Right  Placed by: Shelly ROLON and Jamin ROLON  Placement Verified: X-ray   Number of days: 5       Pulmonary Artery Catheter Internal jugular (Active)   Placement Date/Time: 07/19/25 1300   Hand Hygiene Performed Prior to CVC Insertion: Yes  Site Prep: Usual sterile procedure followed  Site Prep Agent has Completely Dried Before Insertion: Yes  All 5 Sterile Barriers Used (Gloves, Gown, Cap, Mask, Lar...   Number of days: 5     NUTRITION: Adult diet Consistent Carb; CCD 60 gm/meal; 2000 mL fluid  EMERGENCY CONTACT: Extended Emergency Contact Information  Primary Emergency Contact: Aracelis Law  Address: [id number] [place] [id number] [facility]  Home Phone: [phone]  Mobile Phone: [phone]  Relation: Mother  Secondary Emergency Contact: BenRobinson  Mobile Phone: [phone]  Relation: Friend  CODE STATUS: Full Code  DISPO: Discharge Planning  Living Arrangements: Parent, Friends, Family members  Support Systems: Children, Parent, Family members  Assistance Needed: Patient uses a walker and cane  Type of Residence: Private residence  Do you have animals or pets at home?: Yes  Type of Animals or Pets: 2 cats  Who is requesting discharge planning?: Provider  Home or Post Acute Services: In home services  Expected Discharge Disposition: Home  Does the patient need discharge transport arranged?: Yes  FOLLOWUP:   Future Appointments   Date Time Provider Department Center   8/12/2025  1:00 PM Sotero Giles MD [id number] [facility]   9/2/2025  3:30 PM Jono Duque MD [id number] [facility]   9/17/2025 12:30 PM Diana Salazar MD [id number] [facility]   10/1/2025 10:30 AM Anna Rodriguez MD PhD [id number] [facility]   12/4/2025  9:20 AM Randy Brunson MD [id number] [facility]                           [1]   Medications Prior to Admission   Medication Sig Dispense Refill Last Dose/Taking    albuterol 2.5 mg /3 mL (0.083 %) nebulizer solution Use every 4-6 hours as needed for shortness of breath 75 mL 1     atorvastatin (Lipitor) 80 mg tablet 80mg nightly 90 tablet 2     blood-glucose sensor (FreeStyle Franco 3 Plus Sensor) device Change every 15 days 2 each 11     bumetanide (Bumex) 1 mg tablet Take 1 tablet (1 mg) by mouth once daily. 90 tablet 3     digoxin (Lanoxin) 125 MCG tablet Take 1 tablet (125 mcg) by mouth once daily. 90 tablet 3     Eliquis 5 mg tablet TOME 1 TABLETA POR LA BOCA DOS  VECES AL CUBA 200 tablet 2     Farxiga 10 mg tablet Take 1 tablet (10 mg) by mouth once daily. 90 tablet 3     fenofibrate (Tricor) 145 mg tablet Take 1 tablet (145 mg) by mouth once daily. 90 tablet 3     FreeStyle Franco 3 [place] misc Use as instructed 1 each 0     gabapentin (Neurontin) 300 mg capsule TOME 1 CAPSULA POR LA BOCA NITIN  VEZ AL CUBA EN LA MANANA 90 capsule 3     hydrALAZINE (Apresoline) 100 mg tablet TOME 1 TABLETA POR LA BOCA 3  VECES AL CUBA 300 tablet 2     insulin glargine (Toujeo Max U-300 SoloStar) 300 unit/mL (3 mL) pen 40 units twice a day 30 mL 3     insulin lispro (HumaLOG KwikPen Insulin) 100 unit/mL pen 18 units with meals plus sliding scale up to 80 units daily 45 mL 3     isosorbide dinitrate (Isordil) 40 mg tablet Take 1 tablet (40 mg) by mouth 3 times a day. 270 tablet 3     lancets (Lancets,Thin) misc 4 times daily (before meals and at bedtime). use as directed to test blood sugar 100 each 3     levothyroxine (Synthroid, Levoxyl) 75 mcg tablet TOME 1 TABLETA POR LA BOCA NITIN  VEZ AL CUBA 100 tablet 3     metoprolol succinate XL (Toprol-XL) 100 mg 24 hr tablet Take 2 tablets (200 mg) by mouth once daily.       OneTouch Ultra Test REVISE EL NIVEL DE GLUCOSA EN LA PEARL CUATRO VECES AL CUBA 400 strip 1     pantoprazole (ProtoNix) 40 mg EC tablet Take 1 tablet (40 mg) by mouth once  "daily in the morning. Take before meals. Do not crush, chew, or split.      TOME 1 TABLETA POR LA BOCA NITIN  VEZ AL CUBA EN LA MANANA TOME  ANTES DE NITIN COMIDA (NO TRITURE, MASTIQUE NI DIVIDA) 90 tablet 1     pen needle, diabetic 31 gauge x 5/16\" needle For insulin injection 5x/day 500 each 3     QUEtiapine (SEROquel) 50 mg tablet Take 1 tablet (50 mg) by mouth once daily at bedtime. 100 tablet 2     [Paused] sacubitriL-valsartan (Entresto)  mg tablet Take 1 tablet by mouth 2 times a day. (Patient not taking: Reported on 7/13/2025) 180 tablet 3     [Paused] spironolactone (Aldactone) 25 mg tablet TOME 2 TABLETAS POR LA BOCA NITIN  VEZ AL CUBA (Patient not taking: Reported on 7/13/2025) 200 tablet 2     tirzepatide (Mounjaro) 5 mg/0.5 mL pen injector Inject 5 mg under the skin every 7 days. 2 mL 11      "

## 2025-07-25 NOTE — TELEPHONE ENCOUNTER
Refusing refill for Entresto . Patient currently admitted to HFICU, medication regimen may change upon dc.

## 2025-07-25 NOTE — PROGRESS NOTES
Social Work Note   -HF ICU Treatment Plan: advance therapies evalutaion LVADvs OHT   - Additional information: Libyan speaking is able to understand understand and speak  English also - Norma is recommended.     - Payer: [employer], [facility] dual complete   - Support: mother Aracelis is list a emergency contact  -Planned Disposition: Rehab recommendation - Low intensity, SW will continue to follow for any changes in recommendation after procedure  - Barriers to discharge/concern: None note at this time;   - Discharge assessment: 7/23 completed with the patien using CELENA Hendrickson

## 2025-07-25 NOTE — PROGRESS NOTES
"Occupational Therapy    Occupational Therapy    Occupational Therapy Treatment    Name: Felice Law  MRN: [id number]  : 1965  Date: 25  Room: 10/10-      Time Calculation  Start Time:   Stop Time:   Time Calculation (min): 25 min    Assessment:  OT Assessment: Patient participated in bilateral UE strengthening exercises without weight or restistance. He tolerated exercises well.  End of Session Communication: Bedside nurse  End of Session Patient Position: Bed, 3 rail up, Alarm off, not on at start of session  Plan:  Treatment Interventions: ADL retraining, Functional transfer training, UE strengthening/ROM, Endurance training, Patient/family training, Equipment evaluation/education, Compensatory technique education  OT Frequency: 2 times per week  OT Discharge Recommendations: Low intensity level of continued care  Equipment Recommended upon Discharge:  (TBD)  OT Recommended Transfer Status: Stand by assist  OT - OK to Discharge: Yes    Subjective   General:  OT Last Visit  OT Received On: 25  Reason for Referral: Patient pending LVAD/transplant work-up  Past Medical History Relevant to Rehab: ICM, HFrEF, EF 15-20%, CABG (LIMA- LAD in ),  PCI, VT arrest s/p VT ablation and ICD in , Hypertension, Hyperlipidemia, Hypothyroidism, Left femoral DVT on Eliquis, COPD, JAKE, CKD III, Chronic BLE venous stasis dermatitis, Insulin dependent Type 2 diabetes  Prior to Session Communication: Bedside nurse  Patient Position Received: Bed, 3 rail up, Alarm off, not on at start of session  General Comment: Patient reports that his arms are, \"better\" after completing exercises with OT.   Precautions:  Medical Precautions: Cardiac precautions, Fall precautions  Vitals:   Date/Time Vitals Session Patient Position Pulse Resp SpO2 BP MAP (mmHg)    25 1700 --  --  96  17  92 %  109/65  74     25 1731 Pre OT  --  101  22  91 %  110/65  78     25 1754 Post OT  --  106  18  93 %  " 116/69  82     07/25/25 1800 --  --  104  19  92 %  112/67  82      Lines/Tubes/Drains:  Introducer 07/19/25 Internal jugular Right (Active)   Number of days: 6       Pulmonary Artery Catheter Internal jugular (Active)   Number of days: 6       Cognition:  Arousal/Alertness: Appropriate responses to stimuli  Following Commands: Follows one step commands without difficulty    Pain Assessment:  Pain Assessment  Pain Assessment: 0-10  0-10 (Numeric) Pain Score: 0 - No pain (Patient reports feeling stiff, but indicated no pain.)     Objective     Therapy/Activity:   Therapeutic Exercise  Therapeutic Exercise Performed: Yes (Patient participated in bilateral UE non-resistive exercises without weight. He completed 20 reps of each of the following:)  Therapeutic Exercise Activity 1: shoulder flexion  Therapeutic Exercise Activity 2: forward punches  Therapeutic Exercise Activity 3: curls  Therapeutic Exercise Activity 4: cross body curls  Therapeutic Exercise Activity 5: supination/pronation  Therapeutic Exercise Activity 6: composite digit flexion/extension  Therapeutic Exercise Activity 7: horizontabl abduction/adduction  Therapeutic Exercise Activity 8: shoulder circles           Outcome Measures:  [facility] Daily Activity  Putting on and taking off regular lower body clothing: A little  Bathing (including washing, rinsing, drying): A little  Putting on and taking off regular upper body clothing: A little  Toileting, which includes using toilet, bedpan or urinal: A little  Taking care of personal grooming such as brushing teeth: A little  Eating Meals: None  Daily Activity - Total Score: 19     Education Documentation  Home Exercise Program, taught by Brenda Cortes OT at 7/25/2025  6:17 PM.  Learner: Patient  Readiness: Acceptance  Method: Explanation, Demonstration, Teach-back  Response: Needs Reinforcement    Education Comments  No comments found.        Goals:  Encounter Problems       Encounter Problems (Active)        ADLs       Patient will complete daily grooming tasks with independent level of assistance and PRN adaptive equipment while standing. (Not Progressing)       Start:  07/21/25    Expected End:  08/04/25            Patient will complete toileting including hygiene clothing management/hygiene with independent level of assistance. (Not Progressing)       Start:  07/21/25    Expected End:  08/04/25            Patient will perform UB dressing with Independent and PRN adaptive equipment. (Not Progressing)       Start:  07/21/25    Expected End:  08/04/25            Patient will perform LB dressing with Independent and PRN adaptive equipment. (Not Progressing)       Start:  07/21/25    Expected End:  08/04/25               COGNITION/SAFETY       Patient will participate in cognitive activities to demonstrate WFL on cognitive assessment.  (Progressing)       Start:  07/22/25    Expected End:  08/04/25               EXERCISE/STRENGTHENING       Patient will be educated on BUE HEP for increased ADL performance. (Progressing)       Start:  07/21/25    Expected End:  08/04/25            Patient will participate in >15 minutes of activity with <2 rest breaks to increase ADL/functional task endurance. (Not Progressing)       Start:  07/21/25    Expected End:  08/04/25               MOBILITY       Patient will perform Functional mobility Household distances/Community Distances with modified independent level of assistance and least restrictive device in order to improve safety and functional mobility. (Not Progressing)       Start:  07/21/25    Expected End:  08/04/25               TRANSFERS       Patient will perform bed mobility independent level of assistance in order to improve safety and independence with mobility (Not Progressing)       Start:  07/21/25    Expected End:  08/04/25            Patient will complete functional transfers with least restrictive device with modified independent level of assistance. (Not Progressing)        Start:  07/21/25    Expected End:  08/04/25 07/25/25 at 6:18 PM   Brenda Cortes, OT   [phone]

## 2025-07-26 LAB
ALBUMIN SERPL BCP-MCNC: 3.6 G/DL (ref 3.4–5)
AMPHETAMINES SERPL QL SCN: NEGATIVE
ANION GAP BLDMV CALCULATED.4IONS-SCNC: 10 MMO/L (ref 10–25)
ANION GAP BLDMV CALCULATED.4IONS-SCNC: 6 MMO/L (ref 10–25)
ANION GAP BLDMV CALCULATED.4IONS-SCNC: 8 MMO/L (ref 10–25)
ANION GAP SERPL CALC-SCNC: 17 MMOL/L (ref 10–20)
ANNOTATION COMMENT IMP: NORMAL
BARBITURATES SERPL QL SCN: NEGATIVE
BASE EXCESS BLDMV CALC-SCNC: 2.5 MMOL/L (ref -2–3)
BASE EXCESS BLDMV CALC-SCNC: 4 MMOL/L (ref -2–3)
BASE EXCESS BLDMV CALC-SCNC: 4.1 MMOL/L (ref -2–3)
BASE EXCESS BLDMV CALC-SCNC: 5.1 MMOL/L (ref -2–3)
BASE EXCESS BLDMV CALC-SCNC: 5.1 MMOL/L (ref -2–3)
BENZODIAZ SERPL QL SCN: NEGATIVE
BNP SERPL-MCNC: 281 PG/ML (ref 0–99)
BODY TEMPERATURE: 37 DEGREES CELSIUS
BUN SERPL-MCNC: 66 MG/DL (ref 6–23)
BUPRENORPHINE SERPL-MCNC: NEGATIVE NG/ML
CA-I BLDMV-SCNC: 1.14 MMOL/L (ref 1.1–1.33)
CA-I BLDMV-SCNC: 1.15 MMOL/L (ref 1.1–1.33)
CA-I BLDMV-SCNC: 1.17 MMOL/L (ref 1.1–1.33)
CA-I BLDMV-SCNC: 1.17 MMOL/L (ref 1.1–1.33)
CA-I BLDMV-SCNC: 1.18 MMOL/L (ref 1.1–1.33)
CALCIUM SERPL-MCNC: 9.3 MG/DL (ref 8.6–10.6)
CANNABINOIDS SERPL QL SCN: NEGATIVE
CHLORIDE BLD-SCNC: 97 MMOL/L (ref 98–107)
CHLORIDE BLD-SCNC: 97 MMOL/L (ref 98–107)
CHLORIDE BLD-SCNC: 98 MMOL/L (ref 98–107)
CHLORIDE SERPL-SCNC: 94 MMOL/L (ref 98–107)
CMV IGM SERPL-ACNC: 8.3 AU/ML
CO2 SERPL-SCNC: 28 MMOL/L (ref 21–32)
COCAINE SERPL QL SCN: NEGATIVE
CREAT SERPL-MCNC: 3.24 MG/DL (ref 0.5–1.3)
EGFRCR SERPLBLD CKD-EPI 2021: 21 ML/MIN/1.73M*2
ERYTHROCYTE [DISTWIDTH] IN BLOOD BY AUTOMATED COUNT: 19.9 % (ref 11.5–14.5)
GLUCOSE BLD MANUAL STRIP-MCNC: 190 MG/DL (ref 74–99)
GLUCOSE BLD MANUAL STRIP-MCNC: 221 MG/DL (ref 74–99)
GLUCOSE BLD MANUAL STRIP-MCNC: 226 MG/DL (ref 74–99)
GLUCOSE BLD MANUAL STRIP-MCNC: 239 MG/DL (ref 74–99)
GLUCOSE BLD-MCNC: 196 MG/DL (ref 74–99)
GLUCOSE BLD-MCNC: 206 MG/DL (ref 74–99)
GLUCOSE BLD-MCNC: 246 MG/DL (ref 74–99)
GLUCOSE BLD-MCNC: 248 MG/DL (ref 74–99)
GLUCOSE BLD-MCNC: 255 MG/DL (ref 74–99)
GLUCOSE SERPL-MCNC: 234 MG/DL (ref 74–99)
HCO3 BLDMV-SCNC: 28.4 MMOL/L (ref 22–26)
HCO3 BLDMV-SCNC: 29.2 MMOL/L (ref 22–26)
HCO3 BLDMV-SCNC: 29.7 MMOL/L (ref 22–26)
HCO3 BLDMV-SCNC: 30.5 MMOL/L (ref 22–26)
HCO3 BLDMV-SCNC: 31 MMOL/L (ref 22–26)
HCT VFR BLD AUTO: 38.5 % (ref 41–52)
HCT VFR BLD EST: 37 % (ref 41–52)
HCT VFR BLD EST: 37 % (ref 41–52)
HCT VFR BLD EST: 38 % (ref 41–52)
HGB BLD-MCNC: 12.1 G/DL (ref 13.5–17.5)
HGB BLDMV-MCNC: 12.2 G/DL (ref 13.5–17.5)
HGB BLDMV-MCNC: 12.3 G/DL (ref 13.5–17.5)
HGB BLDMV-MCNC: 12.6 G/DL (ref 13.5–17.5)
HGB BLDMV-MCNC: 12.6 G/DL (ref 13.5–17.5)
HGB BLDMV-MCNC: 12.7 G/DL (ref 13.5–17.5)
INHALED O2 CONCENTRATION: 21 %
INHALED O2 CONCENTRATION: 28 %
INHALED O2 CONCENTRATION: 28 %
LACTATE BLDMV-SCNC: 0.7 MMOL/L (ref 0.4–2)
LACTATE BLDMV-SCNC: 1 MMOL/L (ref 0.4–2)
LACTATE BLDMV-SCNC: 1.1 MMOL/L (ref 0.4–2)
LACTATE BLDMV-SCNC: 1.1 MMOL/L (ref 0.4–2)
LACTATE BLDMV-SCNC: 1.4 MMOL/L (ref 0.4–2)
MAGNESIUM SERPL-MCNC: 2.34 MG/DL (ref 1.6–2.4)
MCH RBC QN AUTO: 24.2 PG (ref 26–34)
MCHC RBC AUTO-ENTMCNC: 31.4 G/DL (ref 32–36)
MCV RBC AUTO: 77 FL (ref 80–100)
METHADONE SERPL QL SCN: NEGATIVE
METHAMPHET SERPL QL: NEGATIVE
NRBC BLD-RTO: 0 /100 WBCS (ref 0–0)
OPIATES SERPL QL SCN: NEGATIVE
OXYCODONE SERPL QL: NEGATIVE
OXYHGB MFR BLDMV: 52.6 % (ref 45–75)
OXYHGB MFR BLDMV: 57.8 % (ref 45–75)
OXYHGB MFR BLDMV: 62.2 % (ref 45–75)
OXYHGB MFR BLDMV: 62.6 % (ref 45–75)
OXYHGB MFR BLDMV: 67 % (ref 45–75)
PCO2 BLDMV: 45 MM HG (ref 41–51)
PCO2 BLDMV: 47 MM HG (ref 41–51)
PCO2 BLDMV: 48 MM HG (ref 41–51)
PCO2 BLDMV: 48 MM HG (ref 41–51)
PCO2 BLDMV: 50 MM HG (ref 41–51)
PCP SERPL QL SCN: NEGATIVE
PH BLDMV: 7.38 PH (ref 7.33–7.43)
PH BLDMV: 7.4 PH (ref 7.33–7.43)
PH BLDMV: 7.4 PH (ref 7.33–7.43)
PH BLDMV: 7.42 PH (ref 7.33–7.43)
PH BLDMV: 7.42 PH (ref 7.33–7.43)
PHOSPHATE SERPL-MCNC: 4.6 MG/DL (ref 2.5–4.9)
PLATELET # BLD AUTO: 238 X10*3/UL (ref 150–450)
PO2 BLDMV: 36 MM HG (ref 35–45)
PO2 BLDMV: 39 MM HG (ref 35–45)
PO2 BLDMV: 42 MM HG (ref 35–45)
PO2 BLDMV: 42 MM HG (ref 35–45)
PO2 BLDMV: 45 MM HG (ref 35–45)
POTASSIUM BLDMV-SCNC: 3.7 MMOL/L (ref 3.5–5.3)
POTASSIUM BLDMV-SCNC: 3.7 MMOL/L (ref 3.5–5.3)
POTASSIUM BLDMV-SCNC: 3.9 MMOL/L (ref 3.5–5.3)
POTASSIUM BLDMV-SCNC: 4 MMOL/L (ref 3.5–5.3)
POTASSIUM BLDMV-SCNC: 4.3 MMOL/L (ref 3.5–5.3)
POTASSIUM SERPL-SCNC: 4 MMOL/L (ref 3.5–5.3)
PROT C AG ACT/NOR PPP IA: 88 % (ref 63–153)
PROT S AG ACT/NOR PPP IA: 140 % (ref 84–134)
RBC # BLD AUTO: 5 X10*6/UL (ref 4.5–5.9)
SAO2 % BLDMV: 54 % (ref 45–75)
SAO2 % BLDMV: 59 % (ref 45–75)
SAO2 % BLDMV: 64 % (ref 45–75)
SAO2 % BLDMV: 64 % (ref 45–75)
SAO2 % BLDMV: 69 % (ref 45–75)
SODIUM BLDMV-SCNC: 131 MMOL/L (ref 136–145)
SODIUM BLDMV-SCNC: 132 MMOL/L (ref 136–145)
SODIUM BLDMV-SCNC: 132 MMOL/L (ref 136–145)
SODIUM SERPL-SCNC: 135 MMOL/L (ref 136–145)
T GONDII IGM SER-ACNC: <3 AU/ML
UFH PPP CHRO-ACNC: 0.4 IU/ML (ref ?–1.1)
WBC # BLD AUTO: 6.2 X10*3/UL (ref 4.4–11.3)

## 2025-07-26 PROCEDURE — 84132 ASSAY OF SERUM POTASSIUM: CPT | Performed by: NURSE PRACTITIONER

## 2025-07-26 PROCEDURE — 2500000004 HC RX 250 GENERAL PHARMACY W/ HCPCS (ALT 636 FOR OP/ED): Performed by: NURSE PRACTITIONER

## 2025-07-26 PROCEDURE — 2500000004 HC RX 250 GENERAL PHARMACY W/ HCPCS (ALT 636 FOR OP/ED)

## 2025-07-26 PROCEDURE — 2500000001 HC RX 250 WO HCPCS SELF ADMINISTERED DRUGS (ALT 637 FOR MEDICARE OP)

## 2025-07-26 PROCEDURE — 2500000002 HC RX 250 W HCPCS SELF ADMINISTERED DRUGS (ALT 637 FOR MEDICARE OP, ALT 636 FOR OP/ED): Performed by: NURSE PRACTITIONER

## 2025-07-26 PROCEDURE — 99291 CRITICAL CARE FIRST HOUR: CPT | Performed by: NURSE PRACTITIONER

## 2025-07-26 PROCEDURE — 37799 UNLISTED PX VASCULAR SURGERY: CPT | Performed by: NURSE PRACTITIONER

## 2025-07-26 PROCEDURE — 2500000002 HC RX 250 W HCPCS SELF ADMINISTERED DRUGS (ALT 637 FOR MEDICARE OP, ALT 636 FOR OP/ED)

## 2025-07-26 PROCEDURE — 83880 ASSAY OF NATRIURETIC PEPTIDE: CPT | Performed by: STUDENT IN AN ORGANIZED HEALTH CARE EDUCATION/TRAINING PROGRAM

## 2025-07-26 PROCEDURE — 83735 ASSAY OF MAGNESIUM: CPT | Performed by: NURSE PRACTITIONER

## 2025-07-26 PROCEDURE — 85027 COMPLETE CBC AUTOMATED: CPT | Performed by: NURSE PRACTITIONER

## 2025-07-26 PROCEDURE — 85520 HEPARIN ASSAY: CPT | Performed by: NURSE PRACTITIONER

## 2025-07-26 PROCEDURE — 2500000001 HC RX 250 WO HCPCS SELF ADMINISTERED DRUGS (ALT 637 FOR MEDICARE OP): Performed by: NURSE PRACTITIONER

## 2025-07-26 PROCEDURE — 82947 ASSAY GLUCOSE BLOOD QUANT: CPT

## 2025-07-26 PROCEDURE — 99291 CRITICAL CARE FIRST HOUR: CPT | Performed by: STUDENT IN AN ORGANIZED HEALTH CARE EDUCATION/TRAINING PROGRAM

## 2025-07-26 PROCEDURE — 2020000001 HC ICU ROOM DAILY

## 2025-07-26 RX ORDER — INSULIN GLARGINE 100 [IU]/ML
48 INJECTION, SOLUTION SUBCUTANEOUS DAILY
Status: DISCONTINUED | OUTPATIENT
Start: 2025-07-27 | End: 2025-07-30

## 2025-07-26 RX ORDER — INSULIN LISPRO 100 [IU]/ML
34 INJECTION, SOLUTION INTRAVENOUS; SUBCUTANEOUS
Status: DISCONTINUED | OUTPATIENT
Start: 2025-07-26 | End: 2025-07-29

## 2025-07-26 RX ORDER — DIPHENHYDRAMINE HCL 25 MG
25 CAPSULE ORAL ONCE
Status: COMPLETED | OUTPATIENT
Start: 2025-07-26 | End: 2025-07-26

## 2025-07-26 RX ORDER — INSULIN GLARGINE 100 [IU]/ML
48 INJECTION, SOLUTION SUBCUTANEOUS NIGHTLY
Status: DISCONTINUED | OUTPATIENT
Start: 2025-07-26 | End: 2025-07-30

## 2025-07-26 RX ADMIN — INSULIN LISPRO 34 UNITS: 100 INJECTION, SOLUTION INTRAVENOUS; SUBCUTANEOUS at 16:51

## 2025-07-26 RX ADMIN — DIPHENHYDRAMINE HYDROCHLORIDE 25 MG: 25 CAPSULE ORAL at 22:23

## 2025-07-26 RX ADMIN — ISOSORBIDE DINITRATE 30 MG: 20 TABLET ORAL at 09:33

## 2025-07-26 RX ADMIN — CALCIUM CARBONATE (ANTACID) CHEW TAB 500 MG 1 TABLET: 500 CHEW TAB at 09:33

## 2025-07-26 RX ADMIN — DAPAGLIFLOZIN 10 MG: 10 TABLET, FILM COATED ORAL at 09:32

## 2025-07-26 RX ADMIN — ISOSORBIDE DINITRATE 30 MG: 20 TABLET ORAL at 18:05

## 2025-07-26 RX ADMIN — INSULIN LISPRO 28 UNITS: 100 INJECTION, SOLUTION INTRAVENOUS; SUBCUTANEOUS at 08:34

## 2025-07-26 RX ADMIN — ISOSORBIDE DINITRATE 30 MG: 20 TABLET ORAL at 13:46

## 2025-07-26 RX ADMIN — ATORVASTATIN CALCIUM 80 MG: 80 TABLET, FILM COATED ORAL at 21:00

## 2025-07-26 RX ADMIN — GABAPENTIN 200 MG: 100 CAPSULE ORAL at 06:14

## 2025-07-26 RX ADMIN — HYDRALAZINE HYDROCHLORIDE 100 MG: 100 TABLET ORAL at 13:46

## 2025-07-26 RX ADMIN — SPIRONOLACTONE 25 MG: 25 TABLET, FILM COATED ORAL at 09:32

## 2025-07-26 RX ADMIN — SACUBITRIL AND VALSARTAN 1 TABLET: 97; 103 TABLET, FILM COATED ORAL at 09:33

## 2025-07-26 RX ADMIN — HEPARIN SODIUM 1600 UNITS/HR: 10000 INJECTION, SOLUTION INTRAVENOUS at 05:21

## 2025-07-26 RX ADMIN — HEPARIN SODIUM 1600 UNITS/HR: 10000 INJECTION, SOLUTION INTRAVENOUS at 22:48

## 2025-07-26 RX ADMIN — FENOFIBRATE 145 MG: 145 TABLET, FILM COATED ORAL at 09:32

## 2025-07-26 RX ADMIN — INSULIN LISPRO 6 UNITS: 100 INJECTION, SOLUTION INTRAVENOUS; SUBCUTANEOUS at 11:18

## 2025-07-26 RX ADMIN — INSULIN LISPRO 6 UNITS: 100 INJECTION, SOLUTION INTRAVENOUS; SUBCUTANEOUS at 16:51

## 2025-07-26 RX ADMIN — INSULIN LISPRO 28 UNITS: 100 INJECTION, SOLUTION INTRAVENOUS; SUBCUTANEOUS at 11:18

## 2025-07-26 RX ADMIN — PANTOPRAZOLE SODIUM 40 MG: 40 TABLET, DELAYED RELEASE ORAL at 06:15

## 2025-07-26 RX ADMIN — HYDRALAZINE HYDROCHLORIDE 100 MG: 100 TABLET ORAL at 06:15

## 2025-07-26 RX ADMIN — GABAPENTIN 200 MG: 100 CAPSULE ORAL at 13:46

## 2025-07-26 RX ADMIN — DIGOXIN 125 MCG: 125 TABLET ORAL at 09:32

## 2025-07-26 RX ADMIN — MILRINONE LACTATE IN DEXTROSE 0.25 MCG/KG/MIN: 0.2 INJECTION, SOLUTION INTRAVENOUS at 12:43

## 2025-07-26 RX ADMIN — QUETIAPINE FUMARATE 50 MG: 25 TABLET ORAL at 21:00

## 2025-07-26 RX ADMIN — LEVOTHYROXINE SODIUM 75 MCG: 0.07 TABLET ORAL at 06:15

## 2025-07-26 RX ADMIN — HYDRALAZINE HYDROCHLORIDE 100 MG: 100 TABLET ORAL at 21:00

## 2025-07-26 RX ADMIN — INSULIN GLARGINE 48 UNITS: 100 INJECTION, SOLUTION SUBCUTANEOUS at 21:00

## 2025-07-26 RX ADMIN — INSULIN GLARGINE 44 UNITS: 100 INJECTION, SOLUTION SUBCUTANEOUS at 09:33

## 2025-07-26 RX ADMIN — GABAPENTIN 200 MG: 100 CAPSULE ORAL at 21:00

## 2025-07-26 RX ADMIN — CALCIUM CARBONATE (ANTACID) CHEW TAB 500 MG 1 TABLET: 500 CHEW TAB at 21:00

## 2025-07-26 RX ADMIN — INSULIN LISPRO 6 UNITS: 100 INJECTION, SOLUTION INTRAVENOUS; SUBCUTANEOUS at 08:33

## 2025-07-26 ASSESSMENT — PAIN - FUNCTIONAL ASSESSMENT
PAIN_FUNCTIONAL_ASSESSMENT: 0-10

## 2025-07-26 ASSESSMENT — COGNITIVE AND FUNCTIONAL STATUS - GENERAL
MOVING TO AND FROM BED TO CHAIR: A LITTLE
DRESSING REGULAR LOWER BODY CLOTHING: A LITTLE
DRESSING REGULAR UPPER BODY CLOTHING: A LITTLE
TOILETING: A LITTLE
WALKING IN HOSPITAL ROOM: A LITTLE
TURNING FROM BACK TO SIDE WHILE IN FLAT BAD: A LITTLE
CLIMB 3 TO 5 STEPS WITH RAILING: A LOT
DAILY ACTIVITIY SCORE: 18
MOVING FROM LYING ON BACK TO SITTING ON SIDE OF FLAT BED WITH BEDRAILS: A LITTLE
EATING MEALS: A LITTLE
MOBILITY SCORE: 17
PERSONAL GROOMING: A LITTLE
HELP NEEDED FOR BATHING: A LITTLE
STANDING UP FROM CHAIR USING ARMS: A LITTLE

## 2025-07-26 ASSESSMENT — PAIN SCALES - GENERAL
PAINLEVEL_OUTOF10: 0 - NO PAIN

## 2025-07-26 NOTE — PROGRESS NOTES
[facility]  HFICU PROGRESS NOTE    Felice Law/[id number]    Admit Date: 7/17/2025  Hospital Length of Stay: 9   ICU Length of Stay: 9d   Primary Service: HFICU  Primary HF Cardiologist: Dr. Jono Duque MD   Referring: Dr Duque     INTERVAL EVENTS / PERTINENT ROS:   No acute events today; patient feels better. Cr up to 3.24 from 2.93    Plan:   -hold spirolactone   -hold entresto   -trend RFP   -c/w Milrinone .25mcq/kg/min  -Continue OHT/VAD evaluation    MEDICATIONS  Infusions:  heparin, Last Rate: 1,600 Units/hr (07/26/25 1000)  milrinone, Last Rate: 0.25 mcg/kg/min (07/26/25 1000)      Scheduled:  [Held by provider] apixaban, 5 mg, BID  atorvastatin, 80 mg, Nightly  [Held by provider] bumetanide, 1 mg, Daily  calcium carbonate, 500 mg of calcium carbonate, BID  dapagliflozin propanediol, 10 mg, Daily  digoxin, 125 mcg, Daily  fenofibrate, 145 mg, Daily  gabapentin, 200 mg, q8h SUSI  hydrALAZINE, 100 mg, q8h SUSI  insulin glargine, 44 Units, Nightly  insulin glargine, 44 Units, Daily  insulin lispro, 0-15 Units, TID AC  insulin lispro, 28 Units, TID AC  iron sucrose, 200 mg, Every other day  isosorbide dinitrate, 30 mg, TID  levothyroxine, 75 mcg, Daily before breakfast  pantoprazole, 40 mg, Daily before breakfast  QUEtiapine, 50 mg, Nightly  [Held by provider] sacubitriL-valsartan, 1 tablet, BID  [Held by provider] spironolactone, 25 mg, Daily      PRN:  acetaminophen, 975 mg, q6h PRN  albuterol, 2.5 mg, q6h PRN  alteplase, 2 mg, PRN  dextrose, 12.5 g, q15 min PRN  dextrose, 25 g, q15 min PRN  diphenhydrAMINE, 50 mg, q5 min PRN  glucagon, 1 mg, q15 min PRN  glucagon, 1 mg, q15 min PRN  heparin, 2,000-4,000 Units, q4h PRN      Invasive Hemodynamics:    Most Recent Range Past 24hrs   BP (Art)   No data recorded   MAP(Art)   No data recorded   RA/CVP   No data recorded   PA 51/29 PAP  Min: 51/29  Max: 74/27   PA(mean) 36 mmHg PAP (Mean)  Min: 21 mmHg  Max: 44 mmHg  "  PCWP 19 mmHg No data recorded   CO 4.95 L/min CO (L/min)  Min: 4.95 L/min  Max: 7.7 L/min   CI 2.3 L/min/m2 CI (L/min/m2)  Min: 2.3 L/min/m2  Max: 3.56 L/min/m2   Mixed Venous 64 % SVO2 (%)  Min: 64 %  Max: 72 %   SVR  953 (dyne*sec)/cm5 SVR (dyne*sec)/cm5  Min: 644 (dyne*sec)/cm5  Max: 996 (dyne*sec)/cm5    (dyne*sec)/cm5 No data recorded       PHYSICAL EXAM:   Visit Vitals  BP 95/62   Pulse 104   Temp 36 °C (96.8 °F)   Resp 16   Ht 1.803 m (5' 10.98\")   Wt 95.7 kg (210 lb 15.7 oz)   SpO2 94%   BMI 29.44 kg/m²   Smoking Status Former   BSA 2.19 m²       Wt Readings from Last 5 Encounters:   07/26/25 95.7 kg (210 lb 15.7 oz)   07/17/25 110 kg (243 lb 2.7 oz)   06/10/25 97.1 kg (214 lb)   05/12/25 96.6 kg (213 lb)   05/04/25 102 kg (224 lb)       INTAKE/OUTPUT:  I/O last 3 completed shifts:  In: 1804.6 (18.9 mL/kg) [P.O.:960; I.V.:844.6 (8.8 mL/kg)]  Out: 2650 (27.7 mL/kg) [Urine:2650 (0.8 mL/kg/hr)]  Weight: 95.7 kg      Physical Exam  Constitutional:       Appearance: Normal appearance.   HENT:      Head: Normocephalic and atraumatic.      Nose: Nose normal.      Mouth/Throat:      Mouth: Mucous membranes are moist.     Eyes:      Extraocular Movements: Extraocular movements intact.      Pupils: Pupils are equal, round, and reactive to light.     Neck:      Comments: Rt IJ/ SGC  Cardiovascular:      Rate and Rhythm: Normal rate and regular rhythm.      Pulses: Normal pulses.      Heart sounds:      Gallop present.   Pulmonary:      Effort: Pulmonary effort is normal.      Breath sounds: Decreased breath sounds present.   Abdominal:      General: There is distension.     Musculoskeletal:         General: Swelling present.      Cervical back: Normal range of motion and neck supple.      Right lower leg: Edema (trace) present.      Left lower leg: Edema (trace) present.      Comments: Chronic venous stasis      Skin:     General: Skin is warm.      Capillary Refill: Capillary refill takes 2 to 3 seconds. "     Neurological:      General: No focal deficit present.      Mental Status: He is alert and oriented to person, place, and time.     Psychiatric:         Mood and Affect: Mood normal.         Behavior: Behavior normal.         DATA:  CMP:  Results from last 7 days   Lab Units 07/26/25 0446 07/25/25  0618 07/24/25  1941 07/24/25  0314 07/23/25  1750 07/23/25  0450 07/22/25  1618 07/22/25  0550 07/21/25  0520 07/20/25  1232 07/20/25  0556   SODIUM mmol/L 135* 134* 132* 134* 134* 136 134* 135* 137 135* 138   POTASSIUM mmol/L 4.0 4.0 4.2 4.1 3.9 3.7 4.3 3.8 3.7 3.8 3.6   CHLORIDE mmol/L 94* 93* 91* 92* 93* 93* 91* 91* 92* 90* 93*   CO2 mmol/L 28 30 29 32 30 32 32 36* 37* 36* 37*   ANION GAP mmol/L 17 15 16 14 15 15 15 12 12 13 12   BUN mg/dL 66* 60* 59* 52* 52* 48* 50* 42* 35* 37* 38*   CREATININE mg/dL 3.24* 2.93* 2.65* 2.62* 2.17* 2.04* 2.40* 1.87* 1.61* 1.60* 1.56*   EGFR mL/min/1.73m*2 21* 24* 27* 27* 34* 37* 30* 41* 49* 49* 51*   MAGNESIUM mg/dL 2.34 2.21 2.20 2.35  --  2.35  --  2.30 2.39  --  2.55*   ALBUMIN g/dL 3.6 3.5 3.5 3.5 3.8 3.4 3.8 3.6 3.6 4.0 3.9     CBC:  Results from last 7 days   Lab Units 07/26/25 0446 07/25/25 0618 07/24/25 0314 07/23/25  0450 07/22/25  0550 07/21/25  0520 07/20/25  0556   WBC AUTO x10*3/uL 6.2 7.2 5.9 4.4 5.3 5.3 5.1   HEMOGLOBIN g/dL 12.1* 11.2* 11.2* 11.8* 12.0* 12.4* 12.2*   HEMATOCRIT % 38.5* 35.4* 37.8* 38.0* 38.7* 40.4* 40.2*   PLATELETS AUTO x10*3/uL 238 238 234 213 239 234 252   MCV fL 77* 76* 80 76* 77* 77* 78*     COAG:   Results from last 7 days   Lab Units 07/23/25  1230   INR  1.1     ABO:   ABO TYPE   Date Value Ref Range Status   07/23/2025 A  Final     HEME/ENDO:  Results from last 7 days   Lab Units 07/23/25  1230   TSH mIU/L 2.12      CARDIAC:   Results from last 7 days   Lab Units 07/26/25  0446 07/23/25  1230   LD U/L  --  231   BNP pg/mL 281* 380*       ASSESSMENT AND PLAN:     Felice Law is a 60 y.o. male with significant PMH of ICM, HFrEF,  EF 15-20%, CABG (LIMA- LAD in 2023),  PCI, VT arrest s/p VT ablation and ICD in 2017, Hypertension, Hyperlipidemia, Hypothyroidism, Left femoral DVT on Eliquis, COPD, JAKE, CKD III, Chronic BLE venous stasis dermatitis, Insulin dependent Type 2 diabetes who was initially admitted 7/12 to [facility] for acute decompensate heart failure. He was diuresed and recommended for transfer to [facility] HF ICU for further managemnet and possible re-consider of advanced therapy work up.  Of notes, Patient was previously discussed for AT during [facility] October 2023 admission but declined with concerns for mobility/frailty (uses motorized scooter and has bilateral foot drop with neuropathy), CKD, poorly controlled diabetes as well as unclear social/financial support).     Transferred to HF ICU 7/17, he was warm and wet,  lasix IV bolus given and drip initiated. Eliquis on hold, heparin drip initiated. Bedside SG done 7/19, opening swan numbers: BP: 133/74 (94),CVP 9, PA 74/29 (44), PCWP 24, CO/CI (Pallavi) 5.4/2.4, Thermo: 4.6/2.1, SVR 1263, SVO2 63% on hydral /Isordil 100/40 mg TID, Metop 50 mg daily, Dapa 10 mg daily, Dig 125 mcg PO daily, Lasix drip at 20 mg /hr. Entresto re-introduce and up titrated. Lasix drip d/c'd currently on intermittent IVP.  Hg A1c 10.1 this admission, now considering advanced therapy evaluation. Advanced therapies evaluation initiated 7/23.     Neuro:   # Anxiety. Depression, Acute pain    # Bilateral foot drop with LE neuropathy: Able to walk two blocks. Uses motorized scoopter    # Sudden UE shaking episode (resolved)  - Serial neuro and pain assessments    - PO Tylenol PRN for pain   - PT/OT Consult, OOB to chair   - CAM ICU score every shift   - Sleep/wake cycle normalization      # Physical Status   -Obesity: BMI: 34 --- > 30  -Reduced Mobility due to ICU and CHF       #Substance abuse   -Alcohol abuse/Alcohol dependence: NO   -Tobacco use/Nicotine Dependence: Quit 8 years ago      Cardiovascular:   #Acute on  chronic systolic CHF, HFrEF 15-20%, dx'd 2017,   #Ischemic Cardiomyopathy, ACC/AHA Stage C-D, NYHA II, Warm,   -admit weight (7/17): 111 kg -- > 98 kg (7/20)  -BNP (7/17): 744  -TTE (5/4/2025):  Left ventricular ejection fraction is severely decreased, by visual estimate at 15-20%.   - RHC (6/10/25): RA: 25,RV: 63/9, PA: 72/34, PCWP: 32, PA-SAT: 36%, SVC-SAT: 41%, FA-SAT: 71%, CO: 4.84, CI: 2.24, SVR: 1041  - LHC (6/10/25):   Right dominant coronary circulation with severe multivessel CAD, as described above. LIMA to LAD patent.  - C/w atorvastin 80mg    - Lasix gtt discontinued overnight 7/19.   - Bedside SG with Opening swan numbers (7/19): BP: 133/74 (94),CVP 9, PA 74/29 (44), PCWP 24, CO/CI (Pallavi) 5.4/2.4, Thermo: 4.6/2.1, SVR 1263, SVO2 63% on hydral /Isordil 100/40 mg TID, Metop 50 mg daily, Dapa 10 mg daily, Dig 125 mcg PO daily, Lasix drip at 20 mg /h.  - Daily SGC #'s (7/26):  /60 (70); CVP: 8; PA 67/26 (40); PCWP: *; SVR: 953; CO/CI 4.95/2.3; on Hydra 100 , isordil 20, dapa 10, dig 125, entresto 97/103, aldactone 25   - Lasix 80 mg IV push x1 (7/21/2025)  - Hold Bumex drip 7/24  - Discontinue Toprol 100 (7/24)  - C/w Milrinone 0.25 mcg/kg/min  - Hold Entresto 97/103 mg BID and Lexx 7/26   - C/w Hydralazine 100mg/ Isordil 30mg daily, increase as tolerated   - Nipride off 7/23  - Daily standing weights, 2gm sodium diet, 2L fluid restriction, strict I&Os     #CAD    - s/p PCI and 1v CABG (LIMA-LAD 04/2023).   - C/w ASA and atorvastatin      Pulmonary:    # H/o COPD  # Hx Pulmonary Hypertension   # Central sleep apnea.   # Prior tobacco use (24 pack year, quit 2017),   - On 2L via NC      :   #CHARLOTTE/CKD III  - Baseline (11/11/2024): BUN/Cr (GFR): 60/2.93 (24)  -Admit Bun/Creatinine (7/17):  48/1.62, creatinine peaked at 2.55 OSH  -I/Os   -avoid hypotension and nephrotoxic agents      Heme:   #Anemia in the setting of MAI and CKD III  - Labs (7/17):  TIBC: 377,  ferritin: 102, serum Fe: 61, folate: 13.1,  B12: 452   - IV Venofer 200 mg x5 doses (7/17-7/21)      # Left femoral DVT (2017)  - Hold home Eliquis  - C/w Heparin drip (7/18)      Endo:   #DM 2  - hgbA1c(7/17):  10.1  - Diabetic management per endocrine see daily recs  - Insulin glargine to 48 units BID, Insulin lispro to 34 units TID with meals  - SS#3 AC and HS     # Hypothyroidism  -C/w Levothyroxine 75mcg  -TSH (7/18): 3.97      ID:   -afebrile, nontoxic    -no s/s infx   -trend temps q4 hr        PHYSICAL AND OCCUPATIONAL THERAPY: PT/OT     LINES:  PIVs   SGC (7/19 - present)        DVT: SCD's, Heparin drip once Heparin assay <0.2  VAP BUNDLE: NA  CENTRAL LINE BUNDLE: Ordered  ULCER PPX: PPI  GLYCEMIC CONTROL: SSI, Lantus insulin   BOWEL CARE: Senna, Miralax   INDWELLING CATHETER: NA  NUTRITION: Adult diet Consistent Carb; CCD 90 gm/meal; 2000 mL fluid        EMERGENCY CONTACT: Extended Emergency Contact Information  Primary Emergency Contact: NAM LOO  Address: [id number] [place] [id number] [facility]  Home Phone: [phone]  Mobile Phone: [phone]  Relation: Mother  Secondary Emergency Contact: Robinson Contreras  Mobile Phone: [phone]  Relation: Friend  FAMILY UPDATE: no family at bedside   CODE STATUS: Full Code  DISPO:  HF ICU      Patient seen and assessed with Dr. Duque    I personally spent 60  minutes of critical care time directly and personally managing the patient exclusive of separately billable procedures.      __________________________________________  MARLON Hatfield-CNP

## 2025-07-26 NOTE — PROGRESS NOTES
HFICU Attending Note    60M with stage D ICM (EF 15-20%) and prior VT arrest s/p ICD, admitted 7/12 to [place] with ADHF and transferred to [facility] HFICU on 7/17 for consideration of advanced therapies. Previously declined AT (10/2023) due to frailty, DM, CKD, and limited support. Raymond placed 7/19 showed preserved CI (2.4) but elevated biventricular filling pressures. HD-guided optimization ongoing with rapid diuresis (>10L net negative), uptitration of GDMT including restart and up-titration of Entresto to 97/103 BID, and metoprolol to 100 mg. A1c 10.1%. Endocrine consulted 7/21. Patient has chronic bilateral foot drop with limited ambulation and is receiving PT/OT. He’s improving, up and walking with walker. Re-eval for LVAD/OHT given recurent HF presentation.    Hold diuretics in setting of WRF. C/w inotropes    This critically ill patient continues to be at-risk for clinically significant deterioration / failure due to the above mentioned dysfunctional, unstable organ systems.  I have personally identified and managed all complex critical care issues to prevent aforementioned clinical deterioration.  Critical care time is spent at bedside and/or the immediate area and has included, but is not limited to, the review of diagnostic tests, labs, radiographs, serial assessments of hemodynamics, respiratory status, ventilatory management, and family updates.  Time spent in procedures and teaching are reported separately.    Critical care time: _35___ minutes     Objective    Admit Date: 7/17/2025  Hospital Length of Stay: 9   ICU Length of Stay: 8d 22h   Home: [facility] [id number]    MEDICATIONS  Infusions:  heparin, Last Rate: 1,600 Units/hr (07/26/25 0800)  milrinone, Last Rate: 0.25 mcg/kg/min (07/26/25 0800)      Scheduled:  [Held by provider] apixaban, 5 mg, BID  atorvastatin, 80 mg, Nightly  [Held by provider] bumetanide, 1 mg, Daily  calcium carbonate, 500 mg of calcium carbonate, BID  dapagliflozin  propanediol, 10 mg, Daily  digoxin, 125 mcg, Daily  fenofibrate, 145 mg, Daily  gabapentin, 200 mg, q8h SUSI  hydrALAZINE, 100 mg, q8h SUSI  insulin glargine, 44 Units, Nightly  insulin glargine, 44 Units, Daily  insulin lispro, 0-15 Units, TID AC  insulin lispro, 28 Units, TID AC  iron sucrose, 200 mg, Every other day  isosorbide dinitrate, 30 mg, TID  levothyroxine, 75 mcg, Daily before breakfast  pantoprazole, 40 mg, Daily before breakfast  QUEtiapine, 50 mg, Nightly  sacubitriL-valsartan, 1 tablet, BID  spironolactone, 25 mg, Daily      PRN:  acetaminophen, 975 mg, q6h PRN  albuterol, 2.5 mg, q6h PRN  alteplase, 2 mg, PRN  dextrose, 12.5 g, q15 min PRN  dextrose, 25 g, q15 min PRN  diphenhydrAMINE, 50 mg, q5 min PRN  glucagon, 1 mg, q15 min PRN  glucagon, 1 mg, q15 min PRN  heparin, 2,000-4,000 Units, q4h PRN        Prior to Admission Meds:  Prescriptions Prior to Admission[1]    Invasive Hemodynamics:    Most Recent Range Past 24hrs   BP (Art)   No data recorded   MAP(Art)   No data recorded   RA/CVP   No data recorded   PA 57/22 PAP  Min: 52/20  Max: 74/27   PA(mean) 35 mmHg PAP (Mean)  Min: 21 mmHg  Max: 44 mmHg   PCWP 19 mmHg No data recorded   CO 4.95 L/min CO (L/min)  Min: 4.95 L/min  Max: 7.7 L/min   CI 2.3 L/min/m2 CI (L/min/m2)  Min: 2.3 L/min/m2  Max: 3.56 L/min/m2   Mixed Venous 64 % SVO2 (%)  Min: 64 %  Max: 72 %   SVR  953 (dyne*sec)/cm5 SVR (dyne*sec)/cm5  Min: 644 (dyne*sec)/cm5  Max: 996 (dyne*sec)/cm5    (dyne*sec)/cm5 No data recorded     MCS:   Heart Mate III:     Most Recent Range Past 24hrs   Flow   No data recorded   Speed   No data recorded   Power   No data recorded   PI   No data recorded     ECMO:     Most Recent Range Past 24hrs   Flow   No data recorded   Speed   No data recorded   Sweep   No data recorded     Impella:      Most Recent Range Past 24hrs   Performance Level   No data recorded   Flow (L/min)   No data recorded   Motor Current   No data recorded   Placement Signal     "Placement OK could not be evaluated. This SmartLink does not work with rows of the type: Custom List   Purge (mmHg)   No data recorded   Purge rate (mL/hr)   No data recorded     VENT:    Most Recent Range Past 24hrs   Mode      FiO2   No data recorded   Rate   No data recorded   Vt    No data recorded   PEEP   No data recorded         7/26/2025     9:00 AM 7/26/2025     8:16 AM 7/26/2025     8:00 AM 7/26/2025     7:00 AM 7/26/2025     6:55 AM 7/26/2025     6:00 AM 7/26/2025     5:00 AM   Vitals   Systolic 92  99 107  98 91   Diastolic 63  68 60  66 58   BP Location   Right arm       Heart Rate 96 99 99 101 101 98 95   Temp  35.9 °C (96.6 °F)        Resp 14 11 21 18 15 16 15   Weight (lb)      210.98    BMI      29.44 kg/m2    BSA (m2)      2.19 m2      Visit Vitals  BP 92/63   Pulse 96   Temp 35.9 °C (96.6 °F)   Resp 14   Ht 1.803 m (5' 10.98\")   Wt 95.7 kg (210 lb 15.7 oz)   SpO2 92%   BMI 29.44 kg/m²   Smoking Status Former   BSA 2.19 m²     Wt Readings from Last 5 Encounters:   07/26/25 95.7 kg (210 lb 15.7 oz)   07/17/25 110 kg (243 lb 2.7 oz)   06/10/25 97.1 kg (214 lb)   05/12/25 96.6 kg (213 lb)   05/04/25 102 kg (224 lb)       Intake/Output Summary (Last 24 hours) at 7/26/2025 0935  Last data filed at 7/26/2025 0800  Gross per 24 hour   Intake 1274.4 ml   Output 2050 ml   Net -775.6 ml     CHEST: Unlabored, Clear, Diminished  CV:  Normal sinus rhythm  ABD:  Soft, Rounded Nontender Bowel sounds: All quadrants, Flatus:    EXT:   RLE: Ravinder,Warm, Dry  DP: Moderate  PT: Moderate  LLE: Ravinder,Warm, Dry  DP: Moderate  PT: Moderate  NEURO:   RASS: Alert and calm  CAM: Negative  LOC: Alert  Cognition: Follows commands  GCS: 15    DATA:  CMP:  Recent Labs     07/26/25  0446 07/25/25  0618 07/24/25  1941 07/24/25  0314 07/23/25  1750 07/23/25  0450 07/22/25  1618 07/22/25  0550 07/21/25  0520 07/20/25  1232 07/20/25  0556 07/19/25  1925 07/19/25  0530 07/18/25  1708 07/18/25  0157   * 134* 132* 134* 134* 136 " 134* 135* 137 135* 138   < > 138   < > 136   K 4.0 4.0 4.2 4.1 3.9 3.7 4.3 3.8 3.7 3.8 3.6   < > 4.2   < > 4.7   CL 94* 93* 91* 92* 93* 93* 91* 91* 92* 90* 93*   < > 98   < > 99   CO2 28 30 29 32 30 32 32 36* 37* 36* 37*   < > 32   < > 29   ANIONGAP 17 15 16 14 15 15 15 12 12 13 12   < > 12   < > 13   BUN 66* 60* 59* 52* 52* 48* 50* 42* 35* 37* 38*   < > 43*   < > 49*   CREATININE 3.24* 2.93* 2.65* 2.62* 2.17* 2.04* 2.40* 1.87* 1.61* 1.60* 1.56*   < > 1.67*   < > 1.64*   EGFR 21* 24* 27* 27* 34* 37* 30* 41* 49* 49* 51*   < > 47*   < > 48*   MG 2.34 2.21 2.20 2.35  --  2.35  --  2.30 2.39  --  2.55*  --  2.47*  --  2.40    < > = values in this interval not displayed.     Recent Labs     07/26/25  0446 07/25/25  0618 07/24/25  1941 07/24/25  0314 07/23/25  1750 07/23/25  0450 07/22/25  1618 07/22/25  0550 07/18/25  0157 07/17/25  1119 07/12/25  1922 05/04/25  1447 11/11/24  1558 11/05/24  1146 06/25/24  1139 12/19/23  1104 10/06/23  0515 10/05/23  1647   ALBUMIN 3.6 3.5 3.5 3.5 3.8 3.4 3.8 3.6   < > 3.7 3.8 4.1 4.0 4.4 4.3 4.1   < > 4.1   ALT  --   --   --   --   --   --   --   --   --  8* 10 26 12 13 15 13  --  11   AST  --   --   --   --   --   --   --   --   --  15 12 22 12 <3* 10 10  --  13   BILITOT  --   --   --   --   --   --   --   --   --  2.0* 1.6* 2.1* 0.5 0.5 1.3* 1.1  --  1.8*    < > = values in this interval not displayed.     CBC:  Recent Labs     07/26/25  0446 07/25/25  0618 07/24/25  0314 07/23/25  0450 07/22/25  0550 07/21/25  0520 07/20/25  0556 07/19/25  0530   WBC 6.2 7.2 5.9 4.4 5.3 5.3 5.1 4.7   HGB 12.1* 11.2* 11.2* 11.8* 12.0* 12.4* 12.2* 11.2*   HCT 38.5* 35.4* 37.8* 38.0* 38.7* 40.4* 40.2* 38.3*    238 234 213 239 234 252 254   MCV 77* 76* 80 76* 77* 77* 78* 81     COAG:   Recent Labs     07/26/25 0446 07/25/25  0618 07/24/25  0314 07/23/25  1230 07/23/25  0658 07/22/25  0550 07/17/25  1423 07/17/25  1119 10/14/23  2208 10/05/23  1647   INR  --   --   --  1.1  --   --   --  2.0*  --  " 1.2*   HAUF 0.4 0.4 0.3  --  0.3 0.3   < >  --    < >  --     < > = values in this interval not displayed.     ABO:   Recent Labs     07/23/25  1230   ABO A     HEME/ENDO:   Recent Labs     07/23/25  1230 07/17/25  1125 07/17/25  1111 05/12/25  1656 11/11/24  1558 11/05/24  1146 06/25/24  1139 12/29/23  1427 10/04/23  1518   FERRITIN  --  102  --   --   --   --  332*  --  379*   IRONSAT  --  16*  --   --   --  37 20*  --  15*   TSH 2.12 3.97  --   --  3.26 4.05* 4.23*   < > 9.31*   HGBA1C  --   --  10.1* 8.9* 11.0* 10.8* 9.2*   < > 8.1*    < > = values in this interval not displayed.     CARDIAC:   Recent Labs     07/23/25  1230 07/17/25  1119 07/13/25  0737 07/12/25  2038 07/12/25  1922 05/04/25  1540 05/04/25  1447 11/05/24  1146 06/25/24  1139 12/19/23  1104 10/05/23  1647     --   --   --   --   --   --   --   --   --  240   TROPHS  --  37 53* 53* 53* 40* 41*  --   --   --   --    * 744*  --   --  1,299*  --  2,032* 57 743* 147* 157*     Recent Labs     07/26/25  0620 07/26/25  0446 07/25/25  2314 07/25/25  1827 07/25/25  1223   LACMX 0.7 1.1 1.0 1.4 1.0   SO2MV 64 54 73 63 65     No results for input(s): \"TACROLIMUS\", \"SIROLIMUS\", \"CYCLOSPORINE\" in the last 23966 hours.  Recent Labs     06/10/25  0831 11/11/24  1558 11/05/24  1146 10/05/23  0354   CHOL 82 630* 417* 106   LDLCALC 24  --   --  32*   HDL 26.8 29.2 36.9 24.5   TRIG 154* 2,663* 2,017* 247*     MICRO: No results for input(s): \"ESR\", \"CRP\", \"PROCAL\" in the last 95897 hours.  Susceptibility data from last 90 days.  Collected Specimen Info Organism   07/19/25 Swab from Anterior Nares Methicillin Susceptible Staphylococcus aureus (MSSA)     Assessment & Plan  Acute on chronic systolic heart failure        EKG:   Recent Labs     07/12/25  1834 05/22/25  0857   ATRRATE 78 86   VENTRATE 78 86   PRINT 168 194   QRSDUR 112 118   QTCFRED 425 440   QTCCALCB 444 466     Encounter Date: 07/12/25   ECG 12 lead   Result Value    Ventricular Rate 78 "    Atrial Rate 78    NM Interval 168    QRS Duration 112    QT Interval 390    QTC Calculation(Bazett) 444    P Axis 85    R Axis 25    T Axis 101    QRS Count 13    Q Onset 215    P Onset 131    P Offset 190    T Offset 410    QTC Fredericia 425    Narrative    Normal sinus rhythm  Possible Left atrial enlargement  Low voltage QRS  Anterolateral infarct (cited on or before 10-OCT-2023)  Abnormal ECG  When compared with ECG of 04-MAY-2025 13:32,  Questionable change in QRS axis     Echocardiogram:   Recent Labs     05/05/25  1223 06/25/24  1100   EF 18 30   LVIDD  --  6.65   RV 58.3 54.4   RVFRWALLPKSP 9.14 8.16   TAPSE 1.6 1.0   Transthoracic Echo (TTE) Limited With Doppler, Color And Contrast 05/05/2025    [facility], [address] [place] [address]  Tel [phone] and Fax [phone]    TRANSTHORACIC ECHOCARDIOGRAM REPORT      Patient Name:       NEGRITA LIREGIS Shukla Physician:    [id number] Bird Askew MD  Study Date:         5/5/2025            Ordering Provider:    [id number] BENNY CARTWRIGHT  MRN/PID:            [id number]            Fellow:  Accession#:         [id number]        Nurse:                Tres Barros RN  Date of Birth/Age:  1965 / 60 years Sonographer:          Alyssa Monroy RDCS  Gender assigned at                     Additional Staff:  Birth:  Height:             180.34 cm           Admit Date:           5/4/2025  Weight:             101.61 kg           Admission Status:     Inpatient -  Routine  BSA / BMI:          2.21 m2 / 31.24     Encounter#:           [id number]  kg/m2  Blood Pressure:     116/75 mmHg         Department Location:  [facility]    Study Type:    TRANSTHORACIC ECHO (TTE) COMPLETE  Diagnosis/ICD: Unspecified systolic (congestive) heart failure (CHF)-I50.20;  Ischemic cardiomyopathy-I25.5  Indication:    SOB CP Edema  CPT Code:      Echo Limited-51300; Doppler Limited-56542; Color  Doppler-71062    Patient History:  CABG:              CABG x 1.  Smoker:            Former.  Pacer/Defib:       AICD  Pertinent History: CAD, Cardiomyopathy, CHF, COPD, PVD, Previous DVT, HTN and  Hyperlipidemia. PCI-Stent, S/P CABG x1 2023, CKD III, JAKE.    Study Detail: The following Echo studies were performed: 2D, Doppler and color  flow. Technically challenging study due to body habitus. Definity  used as a contrast agent for endocardial border definition. Total  contrast used for this procedure was 2 mL via IV push.      PHYSICIAN INTERPRETATION:  Left Ventricle: Left ventricular ejection fraction is severely decreased, by visual estimate at 15-20%. There is global hypokinesis of the left ventricle with minor regional variations. The left ventricular cavity size is moderately dilated. Abnormal (paradoxical) septal motion, consistent with RV pacemaker. Spectral Doppler shows a Grade II (pseudonormal pattern) of left ventricular diastolic filling with an elevated left atrial pressure.  Left Atrium: The left atrium is mildly dilated.  Right Ventricle: The right ventricle is mildly enlarged. There is moderately reduced right ventricular systolic function.  Right Atrium: The right atrium is normal in size.  Aortic Valve: The aortic valve is trileaflet. There is minimal aortic valve cusp calcification. There is mild aortic valve thickening. There is no evidence of aortic valve regurgitation. The peak instantaneous gradient of the aortic valve is 6 mmHg.  Mitral Valve: The mitral valve is mildly thickened. There is mild to moderate mitral valve regurgitation.  Tricuspid Valve: The tricuspid valve is structurally normal. There is mild to moderate tricuspid regurgitation. The Doppler estimated RVSP is moderately elevated at 58.3 mmHg.  Pulmonic Valve: The pulmonic valve is structurally normal. There is physiologic pulmonic valve regurgitation.  Pericardium: Trivial pericardial effusion.  Aorta: The aortic root is  normal.  Systemic Veins: The inferior vena cava appears dilated.  In comparison to the previous echocardiogram(s): Compared with study dated 2024, LVEF 15-20%.      CONCLUSIONS:  1. Left ventricular ejection fraction is severely decreased, by visual estimate at 15-20%.  2. There is global hypokinesis of the left ventricle with minor regional variations.  3. Spectral Doppler shows a Grade II (pseudonormal pattern) of left ventricular diastolic filling with an elevated left atrial pressure.  4. Left ventricular cavity size is moderately dilated.  5. Abnormal septal motion consistent with RV pacemaker.  6. There is moderately reduced right ventricular systolic function.  7. Mildly enlarged right ventricle.  8. The left atrium is mildly dilated.  9. Mild to moderate mitral valve regurgitation.  10. Mild to moderate tricuspid regurgitation visualized.  11. Moderately elevated right ventricular systolic pressure.    QUANTITATIVE DATA SUMMARY:    2D MEASUREMENTS:           Normal Ranges:  LVEDV Index:     119 ml/m2      LV SYSTOLIC FUNCTION:  Normal Ranges:  EF-A4C View:    21 % (>=55%)  EF-A2C View:    8 %  EF-Biplane:     16 %  EF-Visual:      18 %  LV EF Reported: 18 %      LV DIASTOLIC FUNCTION:           Normal Ranges:  MV Peak A:             0.65 m/s  (0.42-0.7 m/s)  MV e'                  0.067 m/s (>8.0)  MV lateral e'          0.09 m/s  MV medial e'           0.04 m/s      MITRAL VALVE:          Normal Ranges:  MV DT:        198 msec (150-240msec)      MITRAL INSUFFICIENCY:             Normal Ranges:  MR Vmax:              342.00 cm/s      AORTIC VALVE:          Normal Ranges:  AoV Vmax:     1.19 m/s (<=1.7m/s)  AoV Peak P.7 mmHg (<20mmHg)  LVOT Max Dylan: 0.65 m/s (<=1.1m/s)  LVOT VTI:     6.72 cm      RIGHT VENTRICLE:  TAPSE: 15.5 mm  RV s'  0.09 m/s      TRICUSPID VALVE/RVSP:          Normal Ranges:  Peak TR Velocity:     3.29 m/s  RV Syst Pressure:     58 mmHg  (< 30mmHg)  IVC Diam:             2.40        [id number] Bird Askew MD  Electronically signed on 5/5/2025 at 1:38:04 PM        ** Final **    Coronary Angiography:   Left And Right Heart Catheterization With Left Ventriculography 06/10/2025    [facility], Cath Lab, [address] [place] [address]    Cardiovascular Catheterization Report    Patient Name:      NEGRITA LOO Performing Physician:  [id number] Carl Gillombardo MD  Study Date:        6/10/2025             Verifying Physician:   [id number] Carl Gillombardo MD  MRN/PID:           [id number]              Cardiologist/Co-Scrub:  Accession#:        [id number]          Ordering Provider:     [id number] CARMELITA NAVA  Date of Birth/Age: 1965 / 60 years   Cardiologist:  Gender:            M                     Fellow:                [id number] Kyler Chow MD  Encounter#:        [id number]            Surgeon:      Study:            Left Heart Cath with Grafts  Additional Study: Right Heart Cath      Indications:  NEGRITA LOO is a 60 year old male who presents with prior coronary artery bypass graft surgery, dyslipidemia and hypertension. NEGRITA LOO is a 60 year old male who presents with dyslipidemia, hypertension, prior coronary artery bypass graft surgery and an anginal equivalent chest pain assessment (i.e. dyspnea on exertion believed to be from ischemia). Cardiomyopathy and left ventricular dysfunction. Cardiomyopathy and heart failure.    Procedure Description:  After infiltration with 2% Lidocaine, the right radial artery was cannulated with a modified Seldinger technique. Subsequently a 6 Filipino sheath was placed in the right radial artery. After infiltration of local anesthetic, the right brachial vein was cannulated with a percutaneous technique. A 5 Filipino sheath was placed in the vein. Selective coronary catheterization was performed using a 5 Fr catheter(s) exchanged over a guide wire to cannulate the coronary arteries. A JL 3.5 tip  catheter was used for left coronary injections. A JR 4 tip catheter was used for right coronary injections.  Cardiac output was calculated via the Pallavi method. After completion of the procedure, the arterial sheath was pulled and a TR Band Radial Compression Device was utilized to obtain patent hemostasis. Post-procedure, the venous sheath was pulled and pressure was applied to the site.    Coronary Angiography:  The coronary circulation is right dominant.    Left Main Coronary Artery:  The left main coronary artery is a normal caliber vessel. The left main arises normally from the left coronary sinus of Valsalva and bifurcates into the LAD and circumflex coronary arteries. The left main coronary artery showed no significant disease or stenosis greater than 30%.    Left Anterior Descending Coronary Artery Distribution:  The left anterior descending coronary artery is a normal caliber vessel. The LAD arises normally from the left main coronary artery. The proximal left anterior descending coronary artery showed 70% stenosis. The ostial and proximal 1st diagonal branch revealed 90% stenosis. The 1st septal  branch showed no significant disease or stenosis greater than 30%. The 2nd septal  branch showed no evidence of significant disease. The 3rd septal  branch demonstrated no significant disease or stenosis greater than 30%.    Circumflex Coronary Artery Distribution:  The circumflex coronary artery is a normal caliber vessel. The circumflex arises normally from the left main coronary artery and terminates in the AV groove. The mid circumflex coronary artery showed 50% stenosis. The ostial 1st obtuse marginal branch showed 90% stenosis.    Right Heart Catheterization:  Cardiac output was calculated via the Pallavi method. Elevated left sided filling pressures with normal cardiac output. Severely elevated ventricular filling pressure. Cardiac output is normal. Preserved cardiac output at  rest. RHC:  RA: 25  RV: 63/9  PA: 72/34  PCWP: 32  PA-SAT: 36%  SVC-SAT: 41%  FA-SAT: 71%  CO: 4.84  CI: 2.24  SVR: 1041  Severely elevated right and left sided filling pressures with preserved CO/CI.    Right Coronary Artery Distribution:    The right coronary artery is a normal caliber vessel. The RCA arises normally from the right sinus of Valsalva. The mid right coronary artery showed 100% stenosis. The acute marginal branch showed no significant disease or stenosis greater than 30%.    Coronary Grafts:    LIMA Graft:  Left internal mammary artery graft conduit, originating in situ and attached to the mid left anterior descending, is normal.    Coronary Lesion Summary:  Vessel         Stenosis      Vessel Segment  LAD          70% stenosis       proximal  1st Diagonal 90% stenosis  ostial and proximal  Circumflex   50% stenosis          mid  OM 1         90% stenosis        ostial  RCA          100% stenosis         mid      Graft Stenosis Summary:  Graft     Destination of Graft           % Stenosis  LIMA  mid left anterior descending no evidence of stenosis      Hemo Personnel:  +---------------------------+---------+  Name                       Duty       +---------------------------+---------+  Gillombardo, Carl Barton MDPKRIS ALMANZA 1  +---------------------------+---------+      Hemodynamic Pressures:    +----+----------+---------+-------------+-------------+---+----+-------+-------+  SiteDate Time   Phase  Systolic mmHg  Diastolic  ED MeanA-Wave V-Wave                   Name                    mmHg     mmHmmHg mmHg   mmHg                                                      g                     +----+----------+---------+-------------+-------------+---+----+-------+-------+    PA 6/10/2025 AIR REST           72           34     43                      11:05:46                                                                      AM                                                           +----+----------+---------+-------------+-------------+---+----+-------+-------+    PW 6/10/2025 AIR REST                               30     32     32        11:06:54                                                                      AM                                                          +----+----------+---------+-------------+-------------+---+----+-------+-------+    RV 6/10/2025 AIR REST           63            9 23                          11:07:45                                                                      AM                                                          +----+----------+---------+-------------+-------------+---+----+-------+-------+    RA 6/10/2025 AIR REST                               25     31     33        11:09:13                                                                      AM                                                          +----+----------+---------+-------------+-------------+---+----+-------+-------+    AO 6/10/2025 AIR REST          102           75     88                      11:15:08                                                                      AM                                                          +----+----------+---------+-------------+-------------+---+----+-------+-------+    LV 6/10/2025 AIR REST           99           26 33                          11:21:35                                                                      AM                                                          +----+----------+---------+-------------+-------------+---+----+-------+-------+    LV 6/10/2025 AIR REST          102           76 80                          11:21:47                                                                      AM                                                           +----+----------+---------+-------------+-------------+---+----+-------+-------+    AO 6/10/2025 AIR REST           91           70     80                      11:38:01                                                                      AM                                                          +----+----------+---------+-------------+-------------+---+----+-------+-------+        Oxygen Saturation %:  +-----------+----------+------------+  Sample SiteO2 Sat (%)HB (g/100ml)  +-----------+----------+------------+           FA        71        12.5  +-----------+----------+------------+           PA        36        12.5  +-----------+----------+------------+           FA        71        12.5  +-----------+----------+------------+           PA        36        12.5  +-----------+----------+------------+      Cardiac Outputs:  +---------------+------------------+-------+  BLANCA CO (l/min)BLANCA CI (l/min/m2)BLANCA SV  +---------------+------------------+-------+              4.8               2.2   67.2  +---------------+------------------+-------+      Vascular Resistance Calculated Values (Wood Units):  +-----+---+----+-------+----+----+---+----+----+----+-------+  PhasePVRPVRIPVR/SVRSVR SVRITPRTPRITVR TVRITPR/TVR  +-----+---+----+-------+----+----+---+----+----+----+-------+  0    2.75.8 0      [id number]        +-----+---+----+-------+----+----+---+----+----+----+-------+      Cardiac Cath Post Procedure Notes:  Post Procedure Diagnosis: Multivessel CAD.  Blood Loss:               Estimated blood loss during the procedure was 10 mls.  Specimens Removed:        Number of specimen(s) removed: none.    ____________________________________________________________________________________  CONCLUSIONS:  1. Severely elevated right and left sided filling pressures with  preserved CO/CI.  RA: 25 -- RV: 72/9 -- PA: 72/34 -- PCWP: 32    PA-SAT: 36%  SVC-SAT: 41%  FA-SAT: 71%  CO: 4.84  CI: 2.24  2. Right dominant coronary circulation with severe multivessel CAD, as described above.  3. WILKS to LAD patent.    ICD 10 Codes:  Cardiomyopathy, unspecified-I42.9    CPT Codes:  Left Heart Cath Bypass Graft w ventriculography and coronary angio(LHC)-45232; Right Heart Cath O2/Cardiac output without biopsy (RHC)-20741    [id number] Carl Gillombardo MD  Performing Physician  Electronically signed by [id number] Carl Gillombardo MD on 2025 at 3:38:51 PM          ** Final **    Right Heart Cath: No results found for this or any previous visit from the past 1800 days.    Cardiac Scoring: No results found for this or any previous visit from the past 1800 days.    Cardiac MRI:   MR cardiac morphology and function w and wo IV contrast 2023    Narrative  EXAMINATION: MR CARDIAC W/+W/O 2023 02:40 PM    Patient Name: NEGRITA LOO  MRN: [id number]  : 1965  Referring MD: VICK LEONARD  Interpreting MD: Dalton Solares  Resident/Fellow: None  Scan Date: 2023 02:40 PM  Height: ft in or 180cm  Weight: lbs or 112 kg  BSA: 2.3 m^2    Quality of study: Good  CLINICAL INDICATION: Pre-op cardiac assessment  Associated Diagnosis: Coronary artery disease  Technologist Note:  Patient has a pacemaker causing a lot of artifact.  MJI  Comparison:  Correlation: Transthoracic echocardiogram on Exercise stress-rest echocardiogram on Left heart catheterization and coronary angiograms on Nuclear medicine stress-rest perfusion imaging on    TECHNIQUE: Patient questionnaire was completed and was reviewed by MRI personnel prior to the patient entering the scanner. Multiplanar, multisequence MR imaging of the heart was performed without and with intravenous contrast. Cardiac velocity flow mapping was performed.  Siemens Christina 1.5 Effie MRI scanner.  INTRAPROCEDURE MEDS: Gadoterate Meglumine (DOTAREM) 10  MMOL/20ML solution 35 mL Route: Intravenous Push    FINDINGS:      CARDIAC MRI    CARDIAC CHAMBER MORPHOLOGY  Left ventricle: Dilated cavity size, mixed hypertrophy, no mass/thrombus  Right ventricle: Normal contractility, normal cavity size, no mass/thrombus  Left atrium: Dilated cavity size, no mass/thrombus  Right atrium: Normal cavity size, no mass/thrombus    CARDIAC VALVE MORPHOLOGY  Mitral valve: Normal leaflets, fully mobile, no regurgitation, no stenosis  Tricuspid valve: Normal leaflets, fully mobile, no regurgitation, no stenosis  Aortic valve: Normal leaflets, fully mobile, no regurgitation, no stenosis  Pulmonic Valve: Normal leaflets, fully mobile, no regurgitation, no stenosis    MISCELLANEOUS MORPHOLOGY  Pericardium: Normal    CARDIAC MEASUREMENTS  Anterior left ventricular wall thickness: 1.2 cm (Normal < 1.2 cm)  Posterior left ventricular wall thickness: 1.2 cm (Normal < 1.2 cm)  Left ventricular end-diastolic dimension: 6.3 cm (Normal < 5.7 cm)  Left ventricular end-systolic dimension: 5.3 cm (Normal < 4 cm)  Right ventricular end-diastolic dimension: 3.6 cm (Normal < 4.3 cm)  Left atrial end-diastolic dimension: 4.6 cm (Normal < 3.9 cm)  Left atrial end-diastolic area: 29 cm^2 (Normal < 22 cm^2)  Right atrial end-diastolic dimension: 4.8 cm (Normal < 4.7 cm)  Right atrial end-diastolic area: 20 cm^2 (Normal < 20 cm^2)    Ascending aorta (at the level of the main pulmonary artery): 2.8 cm  Pulmonary Artery: 2.1 cm (Normal < 2.9 cm)      LEFT VENTRICULAR FUNCTION  The left ventricle is severely dilated with focally upon globally abnormal systolic function. Focal left ventricular systolic dysfunction consists of dyskinesia (aneurysm) of the apex  LV volumes absolute and normalized to body surface area with the age and gender matched normals in parenthesis are listed as follows.  LVEF: 22 % (62 +/- 6 (51-73)%)  SV: 77 ml  SVI: 33 ml/m2 (46 +/- 8 (30-62) ml/m2)  CO: 5.5 L/min  CI: 2.4 L/min/m2  EDV:  346 ml  EDVI: 149 ml/m2 (74 +/- 13 ()ml/m2)  ESV: 269 ml  ESVI: 116 ml/m2 (28 +/- 7 (14-43) ml/m2)  LVM: 218 g  LVMI: 96 g/m2 (62 +/- 10 (43-81) g/m2)    RIGHT VENTRICULAR FUNCTION  The right ventricle is normal in size and systolic function.  Right ventricular volumes absolute and normalized to body surface area with the age and gender matched normals in parenthesis are listed as follows.  RVEF: 47 % (53 +/- 6 (41-66) %)  EDV: 154 ml  EDVI: 66 ml/m2 (86 +/- 17 () ml/m2)  ESV: 81 ml  ESVI: 35 ml/m2 (41 +/- 11 (19-63) ml/m2)    There is marked susceptibility artifact which limits delayed postgadolinium imaging/scar assessment. This is especially important. In the LAD distribution. Scar is seen at the left ventricular apex including the lateral and inferior walls and appears to be more pronounced in the mid to apical lateral segments. The LAD distribution is not adequately visualized because of marked susceptibility artifact from the implanted cardiac device.      Velocity flow mapping: Completed at the aortic valve. There is no evidence of aortic valve stenosis or regurgitation.    NONCARDIAC FINDINGS: Not significant      IMPRESSION:  1. Severe left ventricular dilatation with focally upon globally abnormal left ventricular systolic function with an ejection fraction of 22%. Infarctions seen in the left circumflex and RCA distribution and the apex. Full assessment of viability especially in the LAD distribution could not be performed because of marked susceptibility artifact from the implanted cardiac device which involves the entire anterior, anterolateral and anteroseptal regions. Consider PET scanning.  2. Dilated left atrium  3. Normal right ventricular size and systolic function    The pulse sequences used were designed for imaging cardiovascular structures and are suboptimal for imaging other structures and organs.    MACRO: None    Nuclear:  NM PET CT myocardial perfusion multiple  05/14/2025    Narrative  Interpreted By:  Ebony Sky,  STUDY:  NM PET CT MYOCARDIAL PERFUSION MULTIPLE;  5/14/2025 12:21 pm    INDICATION:  Signs/Symptoms:chest pain, HFrEF, ischemic cardiomyopathy.    ,I50.22 Chronic systolic (congestive) heart failure,I25.5 Ischemic  cardiomyopathy,I50.23 Acute on chronic systolic (congestive) heart  failure,N18.30 Chronic kidney disease, stage 3 unspecified  (Multi),E11.65 Type 2 diabetes mellitus with hyperglycemia  (Multi),Z79.4 Long term (current) use of insulin (Multi),Z95.810  Presence of automatic (implantable) cardiac defibrillator,E08.22  Diabetes mellitus due to underlying condition with diabetic chronic  kidney disease,N18.30 Chronic kidney disease, stage 3 unspecified  (Multi),Z79.4 Long term (current) use of insulin (Multi)    COMPARISON:  None.    ACCESSION NUMBER(S):  [id number]    ORDERING CLINICIAN:  CARMELITA NAVA    TECHNIQUE:  DIVISION OF NUCLEAR MEDICINE  POSITRON EMISSION TOMOGRAPHY (PET) / CT  NITROGEN-13 AMMONIA MYOCARDIAL PERFUSION SCAN, WITH PHARMACOLOGIC  STRESS    The patient received an intravenous dose of 10.9mCi of N-13 ammonia  and resting positron emission tomographic (PET) images of the  myocardium were acquired. The patient then received an intravenous  infusion of 0.4 mg of Regadenoson followed by a second dose of 13.3  mCi of N-13 ammonia. Stress phase PET images of the myocardium were  then acquired. These included ECG-gated images to assess and quantify  ventricular function. Low-dose noncontrast CT of the thorax was  performed both before the resting NH3 and after the stress NH3 PET  image acquisitions for purposes of attenuation correction of the PET  data.    FINDINGS:  The left ventricular cavity is severely dilated in both rest and post  stress images. There is abnormal dilation and thickening and  visualization of right ventricular cavity in both rest and stress  images. The stress tomographic images demonstrate markedly  abnormal  myocardial perfusion. There is a large moderate to severe perfusion  defect involving the apex, anterior, lateral, and inferolateral wall,  there is relatively preserved perfusion of the septum. On rest images  there is a significantly reversibility noted involving the anterior  wall, lateral wall and inferoseptal wall, however, there is no  significant reversibility of the apex The left ventricular cavity is  dilated on gated images with severe global hypokinesis with severely  impaired left ventricular systolic function in both rest and stress  images, 17%    Impression  High-risk N13 PET-CT myocardial perfusion study  This finding consistent with severe ischemic cardiomyopathy with  significantly induced ischemia involving the anterior wall, lateral  wall and inferolateral wall with small apical infarction, a pattern  consistent with triple-vessel disease, the presence of significant  reversibility on rest images may favoring good response to coronary  revascularization The possibility of subendocardial infarction can  not be totally excluded, further evaluation of myocardial viability  by FDG PET or cardiac MRI could be considered, if this will change  patient management    I personally reviewed the image(s) / study and agree with the  findings and interpretation as stated. This study was interpreted at  [facility].    MACRO:    Critical Finding:  See findings. Notification was initiated on  5/14/2025 at 1:24 pm by  Ebony Sky.  (**-OCF-**) Instructions:    None    Signed by: Ebony Sky 5/14/2025 1:25 PM  Dictation workstation:   [id number]    Metabolic Stress: No results found for this or any previous visit from the past 1800 days.      Imaging  XR chest 1 view  Result Date: 7/25/2025  1.  Right IJ [place] catheter with distal tip projecting over the right lower lobe segmental branches. Repositioning recommended. 2. Similar pulmonary edema. Correlate with  volume status. 3. Similar bibasilar hazy opacities may represent atelectasis/edema. Infectious etiology can not be excluded. 4. Additional medical devices as above.   I personally reviewed the images/study and I agree with the findings as stated by resident physician Cam Sommers MD. This study was interpreted at [facility], [place], [place].   MACRO: None   Signed by: Allyssa Pedraza 7/25/2025 2:52 PM Dictation workstation:   [id number]    CT chest abdomen pelvis wo IV contrast  Result Date: 7/25/2025  Chest 1.  Mild diffuse interlobular interstitial thickening in both lungs likely representing pulmonary edema. 2. Cardiomegaly. 3. Support devices as described above.   Abdomen-Pelvis 1.  No acute process in the abdomen and pelvis. 2. Hepatosplenomegaly. 3. Cholelithiasis. Mild pericholecystic fluid is likely related to fluid overload. 4. Left renal calculus without hydronephrosis.     MACRO: None   Signed by: Volodymyr Pettit 7/25/2025 9:39 AM Dictation workstation:   [id number]    CT head wo IV contrast  Result Date: 7/25/2025  No acute intracranial hemorrhage or mass effect.   Mild degree of nonspecific white matter hypodensity compatible with microangiopathy.   I personally reviewed the images/study and I agree with the findings as stated by resident physician Miky Gtz MD. This study was interpreted at [facility], [place], [place].   MACRO: None   Signed by: Kizzy Ambrocio 7/25/2025 7:05 AM Dictation workstation:   [id number]    CT maxillofacial bones wo IV contrast  Result Date: 7/24/2025  No acute abnormality of the facial bones.   I have reviewed the images/study and I agree with the findings as stated.   MACRO: None   Signed by: Shanell Rodriguez 7/24/2025 9:33 AM Dictation workstation:   [id number]    XR chest 1 view  Result Date: 7/24/2025  1. Unchanged appearance of the lungs with mild perihilar congestion/edema and no focal infiltrate.  No sizable pneumothorax. 2. Medical devices and postsurgical changes as described above.       Signed by: Ke Rosalvarudy Lau 7/24/2025 8:32 AM Dictation workstation:   [id number]    XR chest 2 views  Result Date: 7/23/2025  1.  Interval retraction of a right IJ [place] catheter with tip now projecting over the right lower lobe segmental branches. 2. Mild pulmonary edema. 3. Additional medical devices as above.   I personally reviewed the images/study and I agree with the findings as stated by resident physician Cam Sommers MD. This study was interpreted at [place], [place].   MACRO: None   Signed by: Allyssa Pedraza 7/23/2025 4:45 PM Dictation workstation:   [id number]    XR chest 1 view  Result Date: 7/23/2025  Minimal improvement of aeration of the both lungs. Stable AICD, placement. [place] catheter with the tip overlies the right lower lobe pulmonary artery branch, unchanged. Cardiac silhouette is mildly enlarged. Pulmonary vessels are congested Mild pulmonary edema/fluid overload of the both lungs. Slightly improved since last exam. Small bilateral pleural effusion. No pneumothorax seen. Bony thorax unremarkable.   MACRO: None   Signed by: Allyssa Pedraza 7/23/2025 12:21 PM Dictation workstation:   [id number]    XR chest 1 view  Result Date: 7/22/2025  1.  New fluid visualized within the minor fissure and similar appearance of bilateral interstitial and airspace opacities, favored to represent pulmonary edema. Additional considerations include ARDS and pulmonary hemorrhage. 2. Medical devices as above.   I personally reviewed the images/study and I agree with the findings as stated by resident physician Cam Sommers MD. This study was interpreted at [place], [place].   MACRO: None   Signed by: Allyssa Pedraza 7/22/2025 2:14 PM Dictation workstation:   [id number]    XR chest 1 view  Result Date:  7/21/2025  1. Right IJ [place]-Talita catheter with distal tip projecting over the distal right interlobar artery. 2. Similar interstitial edema and trace bilateral pleural effusions. 3. Additional medical devices as above.   I personally reviewed the images/study and I agree with the findings as stated by resident physician Cam Sommers MD. This study was interpreted at [place], [place].   MACRO: None   Signed by: Allyssa Pedraza 7/21/2025 11:54 AM Dictation workstation:   [id number]    XR chest 1 view  Result Date: 7/20/2025  1. Right IJ approach [place] catheter has been slightly advanced, the tip of which now overlies right lower lobe pulmonary artery, approximately 2 cm distal to right hilum. Repositioning recommended. 2. No significant change in lung aeration with questionable interstitial edema and trace bilateral pleural effusions/bibasilar atelectasis. 3. Rest of medical devices as above.   Signed by: Claudio Yancey 7/20/2025 11:03 AM Dictation workstation:   [id number]    XR chest 1 view  Result Date: 7/19/2025  1. Medical devices as above. Of note, new right IJ approach [place] catheter with its tip projecting over right interlobar pulmonary artery, at the level of right hilum. 2. No significant change in mild interstitial edema and suspected trace bilateral pleural effusions/bibasilar atelectasis.   I personally reviewed the images/study and I agree with the findings as stated by resident physician Miky Gtz MD. This study was interpreted at [facility], [place], [place].   MACRO: None   Signed by: Claudio Yancey 7/19/2025 4:00 PM Dictation workstation:   [id number]      Cardiology, Vascular, and Other Imaging  Vascular US Ankle Brachial Index (MARK) Without Exercise  Result Date: 7/25/2025            [place] [address]   Tel [phone] and Fax [phone]  Vascular Lab  Report [facility] US ANKLE BRACHIAL INDEX (MARK) WITHOUT EXERCISE  Patient Name:     NEGRITA BERNARD     Reading           [id number] Marquita LOO                 Physician:        MD Study Date:       7/23/2025            Ordering          [id number] YONATHAN WALLS                                        Physician: MRN/PID:          [id number]             Technologist:     Rajani JAFFE Accession#:       [id number]         Technologist 2: Date of           1965 / 60 years  Encounter#:       [id number] Birth/Age: Gender:           M Admission Status: Inpatient            Location          [facility]                                        Performed:  Diagnosis/ICD: Encounter for preprocedural cardiovascular examination-Z01.810 Indication:    OHT/LVAD CPT Codes:     74433 Peripheral artery MARK Only  Patient History Anticoagulation, HTN and Hyperlipidemia. 10-9-2023 MARK's no                 evidence of arterial disease in BLEA.  CONCLUSIONS: Right Lower PVR: Evidence of mild arterial occlusive disease in the right lower extremity at rest. Right pressures of >220 mmHg suggest no compressibility of vessels and may make absolute Segmental Limb Pressures (SLP) unreliable. Decreased digital perfusion noted. Multiphasic flow is noted in the right common femoral artery, right posterior tibial artery and right dorsalis pedis artery. Level of disease called by waveforms. Left Lower PVR: Evidence of mild arterial occlusive disease in the left lower extremity at rest. Left pressures of >220 mmHg suggest no compressibility of vessels and may make absolute Segmental Limb Pressures (SLP) unreliable. Decreased digital perfusion noted. Multiphasic flow is noted in the left posterior tibial artery, left dorsalis pedis artery and left common femoral artery. Level of disease called by waveforms.  Comparison: Compared with study from 10/9/2023, no significant change.Today's exam there is mild arterial disease in  bilateral lower extremities.  Imaging & Doppler Findings:  RIGHT Lower PVR                Pressures Ratios Right Posterior Tibial (Ankle) 220 mmHg  2.22 Right Dorsalis Pedis (Ankle)   204 mmHg  2.06 Right Digit (Great Toe)        58 mmHg   0.59   LEFT Lower PVR                Pressures Ratios Left Posterior Tibial (Ankle) 220 mmHg  2.22 Left Dorsalis Pedis (Ankle)   220 mmHg  2.22 Left Digit (Great Toe)        71 mmHg   0.72                     Right Brachial Pressure 99 mmHg   [id number] Marquita Brown MD Electronically signed by [id number] Marquita Brown MD on 7/25/2025 at 1:49:42 PM  ** Final **     Vascular US aorta iliac duplex limited  Result Date: 7/25/2025            [place] [address]   Tel [phone] and Fax [phone]  Vascular Lab Report VASC US AORTA ILIAC DUPLEX LIMITED  Patient Name:     NEGRITA BERNARD     Reading           [id number] Marquita LOO                 Physician:        MD Study Date:       7/23/2025            Ordering          [id number] YONATHAN WALLS                                        Physician: MRN/PID:          [id number]             Technologist:     Rajani Varela S Accession#:       [id number]         Technologist 2: Date of           1965 / 60 years  Encounter#:       [id number] Birth/Age: Gender:           M Admission Status: Inpatient            Location          [facility]                                        Performed:  Diagnosis/ICD: Encounter for preprocedural cardiovascular examination-Z01.810 Indication:    Pre-Op OHT/LVAD CPT Codes:     43262 Duplex Aorta/IVC/Iliac/Bypass Graft  Patient History CAD, HTN and Hyperlipidemia. 10- No AAA.  CONCLUSIONS: Aorta/Common Iliac Arteries/IVC: The abdominal aorta and bilateral common iliac arteries demonstrate no evidence of aneurysm.  Comparison: Compared with study from 10/10/2023, no significant change.  Imaging & Doppler Findings:   AORTA      AP    Lateral    PSV Proximal 2.14 cm 1.96 cm 95.0 cm/s   Mid    1.86 cm 1.89 cm 72.0 cm/s  Distal  1.61 cm 1.70 cm 52.0 cm/s    RIGHT       AP    Lateral    PSV GIO Proximal 1.29 cm 1.10 cm 70.00 cm/s     LEFT       AP    Lateral    PSV GIO Proximal 1.01 cm 1.01 cm 70.30 cm/s  [id number] Marquita Brown MD Electronically signed by [id number] Marquita Brown MD on 7/25/2025 at 1:30:07 PM  ** Final **     Vascular US carotid artery duplex bilateral  Result Date: 7/23/2025            [place] [address]   Tel [phone] and Fax [phone]  Vascular Lab Report [facility] US CAROTID ARTERY DUPLEX BILATERAL  Patient Name:     NEGRITA BERNARD     Reading           [id number] Marquita LOO                 Physician:        MD Study Date:       7/23/2025            Ordering          [id number] YONATHAN WALLS                                        Physician: MRN/PID:          [id number]             Technologist:     Rajani JAFFE Accession#:       [id number]         Technologist 2: Date of           1965 / 60 years  Encounter#:       [id number] Birth/Age: Gender:           M Admission Status: Inpatient            Location          [facility]                                        Performed:  Diagnosis/ICD: Encounter for preprocedural cardiovascular examination-Z01.810 Indication:    Pre-Op OHT/LVAD CPT Codes:     36977 Cerebrovascular Carotid Duplex scan complete  Patient History CAD, HTN and Hyperlipidemia. 10-9-2023 Both ICA <50% stenosis.  CONCLUSIONS: Left Carotid: Findings are consistent with less than 50% stenosis of the left proximal internal carotid artery. Left external carotid artery appears patent with no evidence of stenosis. The left vertebral artery is patent with antegrade flow. No evidence of hemodynamically significant stenosis in the left subclavian artery.  Comparison: Compared with study from 10/9/2023, no significant change.The right  CCA and ICA not scaned due to IV lines in right side of neck.  Additional Findings: Unable to scan right CCA and ICA due to IV lines in right side of neck.  Imaging & Doppler Findings: Right Plaque Morph: The proximal right internal carotid artery demonstrates heterogenous plaque. The proximal right common carotid artery demonstrates heterogenous and calcified plaque. The mid right common carotid artery demonstrates heterogenous and calcified plaque. The distal right common carotid artery demonstrates heterogenous and calcified plaque.  Right                  Left  PSV  EDV              PSV      EDV             CCA P    79 cm/s             CCA D    51 cm/s             ICA P    52 cm/s  13 cm/s             ICA M    57 cm/s  17 cm/s             ICA D    64 cm/s  22 cm/s              ECA     107 cm/s           Vertebral  28 cm/s  7 cm/s           Subclavian 94 cm/s               Left ICA/CCA Ratio 1.0   [id number] Marquita Brown MD Electronically signed by [id number] Marquita Brown MD on 7/23/2025 at 6:27:44 PM  ** Final **              LDA:  Introducer 07/19/25 Internal jugular Right (Active)   Placement Date/Time: 07/19/25 1300   Hand Hygiene Completed: Yes  Location: Internal jugular  Orientation: Right  Placed by: Shelly ROLON and Jamin ROLON  Placement Verified: X-ray   Number of days: 6       Pulmonary Artery Catheter Internal jugular (Active)   Placement Date/Time: 07/19/25 1300   Hand Hygiene Performed Prior to CVC Insertion: Yes  Site Prep: Usual sterile procedure followed  Site Prep Agent has Completely Dried Before Insertion: Yes  All 5 Sterile Barriers Used (Gloves, Gown, Cap, Mask, Lar...   Number of days: 6     NUTRITION: Adult diet Consistent Carb; CCD 60 gm/meal; 2000 mL fluid  EMERGENCY CONTACT: Extended Emergency Contact Information  Primary Emergency Contact: Aracelis Law  Address: [address] [place] [address] [place]  Home Phone: [phone]  Mobile Phone:  [phone]  Relation: Mother  Secondary Emergency Contact: Robinson Contreras  Mobile Phone: [phone]  Relation: Friend  CODE STATUS: Full Code  DISPO: Discharge Planning  Living Arrangements: Friends, Parent, Family members  Support Systems: Children, Parent, Family members  Assistance Needed: Patient uses a walker and cane  Type of Residence: Private residence  Do you have animals or pets at home?: Yes  Type of Animals or Pets: 2 cats  Who is requesting discharge planning?: Provider  Home or Post Acute Services: In home services  Expected Discharge Disposition: Home  Does the patient need discharge transport arranged?: Yes  FOLLOWUP:   Future Appointments   Date Time Provider Department Center   8/12/2025  1:00 PM Sotero Giles MD [id number] [facility]   9/2/2025  3:30 PM Jono Duque MD [id number] [facility]   9/17/2025 12:30 PM Diana Salazar MD [id number] [facility]   10/1/2025 10:30 AM Anna Rodriguez MD PhD [id number] [facility]   12/4/2025  9:20 AM Randy Brunson MD [id number] [facility]                            [1]   Medications Prior to Admission   Medication Sig Dispense Refill Last Dose/Taking    albuterol 2.5 mg /3 mL (0.083 %) nebulizer solution Use every 4-6 hours as needed for shortness of breath 75 mL 1     atorvastatin (Lipitor) 80 mg tablet 80mg nightly 90 tablet 2     blood-glucose sensor (FreeStyle Franco 3 Plus Sensor) device Change every 15 days 2 each 11     bumetanide (Bumex) 1 mg tablet Take 1 tablet (1 mg) by mouth once daily. 90 tablet 3     digoxin (Lanoxin) 125 MCG tablet Take 1 tablet (125 mcg) by mouth once daily. 90 tablet 3     Eliquis 5 mg tablet TOME 1 TABLETA POR LA BOCA DOS  VECES AL CUBA 200 tablet 2     Farxiga 10 mg tablet Take 1 tablet (10 mg) by mouth once daily. 90 tablet 3     fenofibrate (Tricor) 145 mg tablet Take 1 tablet (145 mg) by mouth once daily. 90 tablet 3     FreeStyle Franco 3 [place] misc Use as instructed 1 each 0     gabapentin (Neurontin) 300 mg capsule  "TOME 1 CAPSULA POR LA BOCA NITIN  VEZ AL CUBA EN LA MANANA 90 capsule 3     hydrALAZINE (Apresoline) 100 mg tablet TOME 1 TABLETA POR LA BOCA 3  VECES AL CUBA 300 tablet 2     insulin glargine (Toujeo Max U-300 SoloStar) 300 unit/mL (3 mL) pen 40 units twice a day 30 mL 3     insulin lispro (HumaLOG KwikPen Insulin) 100 unit/mL pen 18 units with meals plus sliding scale up to 80 units daily 45 mL 3     isosorbide dinitrate (Isordil) 40 mg tablet Take 1 tablet (40 mg) by mouth 3 times a day. 270 tablet 3     lancets (Lancets,Thin) misc 4 times daily (before meals and at bedtime). use as directed to test blood sugar 100 each 3     levothyroxine (Synthroid, Levoxyl) 75 mcg tablet TOME 1 TABLETA POR LA BOCA NITIN  VEZ AL CUBA 100 tablet 3     metoprolol succinate XL (Toprol-XL) 100 mg 24 hr tablet Take 2 tablets (200 mg) by mouth once daily.       OneTouch Ultra Test REVISE EL NIVEL DE GLUCOSA EN LA PEARL CUATRO VECES AL CUBA 400 strip 1     pantoprazole (ProtoNix) 40 mg EC tablet Take 1 tablet (40 mg) by mouth once daily in the morning. Take before meals. Do not crush, chew, or split.      TOME 1 TABLETA POR LA BOCA NITIN  VEZ AL CUBA EN LA MANANA TOME  ANTES DE NITIN COMIDA (NO TRITURE, MASTIQUE NI DIVIDA) 90 tablet 1     pen needle, diabetic 31 gauge x 5/16\" needle For insulin injection 5x/day 500 each 3     QUEtiapine (SEROquel) 50 mg tablet Take 1 tablet (50 mg) by mouth once daily at bedtime. 100 tablet 2     [Paused] sacubitriL-valsartan (Entresto)  mg tablet Take 1 tablet by mouth 2 times a day. (Patient not taking: Reported on 7/13/2025) 180 tablet 3     [Paused] spironolactone (Aldactone) 25 mg tablet TOME 2 TABLETAS POR LA BOCA NITIN  VEZ AL CUBA (Patient not taking: Reported on 7/13/2025) 200 tablet 2     tirzepatide (Mounjaro) 5 mg/0.5 mL pen injector Inject 5 mg under the skin every 7 days. 2 mL 11      "

## 2025-07-26 NOTE — PROGRESS NOTES
"Endocrine Progress Note   Felice Law is a 60 y.o. male on day 9 of admission presenting with Acute on chronic systolic heart failure.  Endocrine consulted for T2DM management.    Subjective   No events overnight. Remains hyperglycemic.    Objective     ROS  Negative unless mentioned above    Exam:    General - male patient in NAD  HEENT - AT/NC  Resp - no extra wob noted  Msckl - + edema in B/L LE    Last Recorded Vitals  Blood pressure 92/63, pulse 96, temperature 35.9 °C (96.6 °F), resp. rate 14, height 1.803 m (5' 10.98\"), weight 95.7 kg (210 lb 15.7 oz), SpO2 92%.  Intake/Output last 3 Shifts:  I/O last 3 completed shifts:  In: 1804.6 (18.9 mL/kg) [P.O.:960; I.V.:844.6 (8.8 mL/kg)]  Out: 2650 (27.7 mL/kg) [Urine:2650 (0.8 mL/kg/hr)]  Weight: 95.7 kg     Scheduled medications  Scheduled Medications[1]  Continuous medications  Continuous Medications[2]  PRN medications  PRN Medications[3]     Relevant Results  Results from last 7 days   Lab Units 07/26/25  0815 07/26/25  0446 07/25/25  1951 07/25/25  1545 07/25/25  1227 07/25/25  0833 07/25/25  0618 07/24/25  1955 07/24/25  1941 07/24/25  0757 07/24/25  0314 07/23/25  1819 07/23/25  1750   POCT GLUCOSE mg/dL 226*  --  213* 271* 164* 207*  --    < >  --    < >  --    < >  --    GLUCOSE mg/dL  --  234*  --   --   --   --  170*  --  332*  --  285*  --  226*    < > = values in this interval not displayed.     BMP:  Results from last 7 days   Lab Units 07/26/25  0446 07/25/25  0618 07/24/25  1941   SODIUM mmol/L 135* 134* 132*   POTASSIUM mmol/L 4.0 4.0 4.2   CHLORIDE mmol/L 94* 93* 91*   CO2 mmol/L 28 30 29   BUN mg/dL 66* 60* 59*   CREATININE mg/dL 3.24* 2.93* 2.65*   GLUCOSE mg/dL 234* 170* 332*     Assessment  Felice Law is a 60 y.o. male with significant PMH of ICM, HFrEF, EF 15-20%, CABG (LIMA- LAD in 2023),  PCI, VT arrest s/p VT ablation and ICD in 2017, Hypertension, Hyperlipidemia, Hypothyroidism, Left femoral DVT on Eliquis, COPD, JAKE, " CKD III and Insulin dependent Type 2 diabetes. He was admitted on 7/12 for further HF management and advanced therapy work up.   Endocrine consulted for managing uncontrolled T2DM. Blood sugar above recommended ranges we will continue to follow and titrate insulin accordingly.     #T2DM  :: Hyperglycemia 2/2 poorly controlled Type 2 diabetes >>> hyperglycemia overnight b/c of insufficient insulin administration.  :: last HbA1c 10.1 (7/17)    # Hypothyroid   :: Thyroid on home dose > Levothyroxine 75mcg  :: TSH 2.12, T4 9.3, T3 105 (7/23) all fall within reference ranges consistent with euthyroidism.  - continue on same home dose for thyroxine.     Plan  - Insulin glargine to 48 units BID   - Insulin lispro to 34 units TID with meals  - SS#3 AC and HS  - Accuchecks TIDAC and at bedtime  - 60 gm carb consistent diet  - Goal -180  - Hypoglycemia bundle       Recommendations communicated to primary team. Please reach out incase you have any questions or concerns.     The patient was seen and discussed with attending Dr. Dara Santoro MD  PGY-5 Endocrinology Fellow           [1] [Held by provider] apixaban, 5 mg, oral, BID  atorvastatin, 80 mg, oral, Nightly  [Held by provider] bumetanide, 1 mg, oral, Daily  calcium carbonate, 500 mg of calcium carbonate, oral, BID  dapagliflozin propanediol, 10 mg, oral, Daily  digoxin, 125 mcg, oral, Daily  fenofibrate, 145 mg, oral, Daily  gabapentin, 200 mg, oral, q8h SUSI  hydrALAZINE, 100 mg, oral, q8h SUSI  insulin glargine, 44 Units, subcutaneous, Nightly  insulin glargine, 44 Units, subcutaneous, Daily  insulin lispro, 0-15 Units, subcutaneous, TID AC  insulin lispro, 28 Units, subcutaneous, TID AC  iron sucrose, 200 mg, intravenous, Every other day  isosorbide dinitrate, 30 mg, oral, TID  levothyroxine, 75 mcg, oral, Daily before breakfast  pantoprazole, 40 mg, oral, Daily before breakfast  QUEtiapine, 50 mg, oral, Nightly  sacubitriL-valsartan, 1 tablet,  oral, BID  spironolactone, 25 mg, oral, Daily     [2] heparin, 0-4,000 Units/hr, Last Rate: 1,600 Units/hr (07/26/25 0800)  milrinone, 0.25 mcg/kg/min, Last Rate: 0.25 mcg/kg/min (07/26/25 0800)     [3] PRN medications: acetaminophen, albuterol, alteplase, dextrose, dextrose, diphenhydrAMINE, glucagon, glucagon, heparin

## 2025-07-27 ENCOUNTER — APPOINTMENT (OUTPATIENT)
Dept: RADIOLOGY | Facility: HOSPITAL | Age: 60
End: 2025-07-27
Payer: COMMERCIAL

## 2025-07-27 LAB
ALBUMIN SERPL BCP-MCNC: 3.5 G/DL (ref 3.4–5)
ANABASINE UR-MCNC: <5 NG/ML
ANION GAP BLDMV CALCULATED.4IONS-SCNC: 11 MMO/L (ref 10–25)
ANION GAP BLDMV CALCULATED.4IONS-SCNC: 9 MMO/L (ref 10–25)
ANION GAP SERPL CALC-SCNC: 15 MMOL/L (ref 10–20)
BASE EXCESS BLDMV CALC-SCNC: -1.8 MMOL/L (ref -2–3)
BASE EXCESS BLDMV CALC-SCNC: 2.4 MMOL/L (ref -2–3)
BODY TEMPERATURE: 37 DEGREES CELSIUS
BODY TEMPERATURE: 37 DEGREES CELSIUS
BUN SERPL-MCNC: 59 MG/DL (ref 6–23)
CA-I BLDMV-SCNC: 1.08 MMOL/L (ref 1.1–1.33)
CA-I BLDMV-SCNC: 1.16 MMOL/L (ref 1.1–1.33)
CALCIUM SERPL-MCNC: 9.2 MG/DL (ref 8.6–10.6)
CHLORIDE BLD-SCNC: 105 MMOL/L (ref 98–107)
CHLORIDE BLD-SCNC: 99 MMOL/L (ref 98–107)
CHLORIDE SERPL-SCNC: 95 MMOL/L (ref 98–107)
CO2 SERPL-SCNC: 27 MMOL/L (ref 21–32)
COTININE UR-MCNC: <15 NG/ML
CREAT SERPL-MCNC: 2.39 MG/DL (ref 0.5–1.3)
EGFRCR SERPLBLD CKD-EPI 2021: 30 ML/MIN/1.73M*2
ERYTHROCYTE [DISTWIDTH] IN BLOOD BY AUTOMATED COUNT: 19.9 % (ref 11.5–14.5)
GLUCOSE BLD MANUAL STRIP-MCNC: 168 MG/DL (ref 74–99)
GLUCOSE BLD MANUAL STRIP-MCNC: 178 MG/DL (ref 74–99)
GLUCOSE BLD MANUAL STRIP-MCNC: 186 MG/DL (ref 74–99)
GLUCOSE BLD-MCNC: 148 MG/DL (ref 74–99)
GLUCOSE BLD-MCNC: 192 MG/DL (ref 74–99)
GLUCOSE SERPL-MCNC: 182 MG/DL (ref 74–99)
HCO3 BLDMV-SCNC: 23.1 MMOL/L (ref 22–26)
HCO3 BLDMV-SCNC: 27.2 MMOL/L (ref 22–26)
HCT VFR BLD AUTO: 38.1 % (ref 41–52)
HCT VFR BLD EST: 36 % (ref 41–52)
HCT VFR BLD EST: 36 % (ref 41–52)
HGB BLD-MCNC: 11.2 G/DL (ref 13.5–17.5)
HGB BLDMV-MCNC: 12 G/DL (ref 13.5–17.5)
HGB BLDMV-MCNC: 12 G/DL (ref 13.5–17.5)
INHALED O2 CONCENTRATION: 21 %
INHALED O2 CONCENTRATION: 21 %
LACTATE BLDMV-SCNC: 0.8 MMOL/L (ref 0.4–2)
LACTATE BLDMV-SCNC: 0.8 MMOL/L (ref 0.4–2)
MAGNESIUM SERPL-MCNC: 2.28 MG/DL (ref 1.6–2.4)
MCH RBC QN AUTO: 23.7 PG (ref 26–34)
MCHC RBC AUTO-ENTMCNC: 29.4 G/DL (ref 32–36)
MCV RBC AUTO: 81 FL (ref 80–100)
NICOTINE UR-MCNC: <15 NG/ML
NRBC BLD-RTO: 0 /100 WBCS (ref 0–0)
OXYHGB MFR BLDMV: 58.7 % (ref 45–75)
OXYHGB MFR BLDMV: 65.8 % (ref 45–75)
PCO2 BLDMV: 39 MM HG (ref 41–51)
PCO2 BLDMV: 42 MM HG (ref 41–51)
PH BLDMV: 7.38 PH (ref 7.33–7.43)
PH BLDMV: 7.42 PH (ref 7.33–7.43)
PHOSPHATE SERPL-MCNC: 3.5 MG/DL (ref 2.5–4.9)
PLATELET # BLD AUTO: 252 X10*3/UL (ref 150–450)
PO2 BLDMV: 39 MM HG (ref 35–45)
PO2 BLDMV: 44 MM HG (ref 35–45)
POTASSIUM BLDMV-SCNC: 3.6 MMOL/L (ref 3.5–5.3)
POTASSIUM BLDMV-SCNC: 4 MMOL/L (ref 3.5–5.3)
POTASSIUM SERPL-SCNC: 3.5 MMOL/L (ref 3.5–5.3)
RBC # BLD AUTO: 4.73 X10*6/UL (ref 4.5–5.9)
SAO2 % BLDMV: 60 % (ref 45–75)
SAO2 % BLDMV: 68 % (ref 45–75)
SODIUM BLDMV-SCNC: 132 MMOL/L (ref 136–145)
SODIUM BLDMV-SCNC: 135 MMOL/L (ref 136–145)
SODIUM SERPL-SCNC: 133 MMOL/L (ref 136–145)
TRANS-3-OH-COTININE UR-MCNC: <50 NG/ML
UFH PPP CHRO-ACNC: 0.3 IU/ML (ref ?–1.1)
WBC # BLD AUTO: 5.7 X10*3/UL (ref 4.4–11.3)

## 2025-07-27 PROCEDURE — 2500000004 HC RX 250 GENERAL PHARMACY W/ HCPCS (ALT 636 FOR OP/ED)

## 2025-07-27 PROCEDURE — 71045 X-RAY EXAM CHEST 1 VIEW: CPT | Performed by: RADIOLOGY

## 2025-07-27 PROCEDURE — 84132 ASSAY OF SERUM POTASSIUM: CPT | Performed by: NURSE PRACTITIONER

## 2025-07-27 PROCEDURE — 2500000004 HC RX 250 GENERAL PHARMACY W/ HCPCS (ALT 636 FOR OP/ED): Performed by: STUDENT IN AN ORGANIZED HEALTH CARE EDUCATION/TRAINING PROGRAM

## 2025-07-27 PROCEDURE — 2500000004 HC RX 250 GENERAL PHARMACY W/ HCPCS (ALT 636 FOR OP/ED): Performed by: NURSE PRACTITIONER

## 2025-07-27 PROCEDURE — 99291 CRITICAL CARE FIRST HOUR: CPT | Performed by: STUDENT IN AN ORGANIZED HEALTH CARE EDUCATION/TRAINING PROGRAM

## 2025-07-27 PROCEDURE — 85027 COMPLETE CBC AUTOMATED: CPT | Performed by: NURSE PRACTITIONER

## 2025-07-27 PROCEDURE — 83735 ASSAY OF MAGNESIUM: CPT | Performed by: NURSE PRACTITIONER

## 2025-07-27 PROCEDURE — 2500000002 HC RX 250 W HCPCS SELF ADMINISTERED DRUGS (ALT 637 FOR MEDICARE OP, ALT 636 FOR OP/ED)

## 2025-07-27 PROCEDURE — 2500000001 HC RX 250 WO HCPCS SELF ADMINISTERED DRUGS (ALT 637 FOR MEDICARE OP)

## 2025-07-27 PROCEDURE — 71045 X-RAY EXAM CHEST 1 VIEW: CPT

## 2025-07-27 PROCEDURE — 2500000001 HC RX 250 WO HCPCS SELF ADMINISTERED DRUGS (ALT 637 FOR MEDICARE OP): Performed by: NURSE PRACTITIONER

## 2025-07-27 PROCEDURE — 85520 HEPARIN ASSAY: CPT | Performed by: NURSE PRACTITIONER

## 2025-07-27 PROCEDURE — 37799 UNLISTED PX VASCULAR SURGERY: CPT | Performed by: NURSE PRACTITIONER

## 2025-07-27 PROCEDURE — 2020000001 HC ICU ROOM DAILY

## 2025-07-27 PROCEDURE — 99291 CRITICAL CARE FIRST HOUR: CPT

## 2025-07-27 PROCEDURE — 99232 SBSQ HOSP IP/OBS MODERATE 35: CPT | Performed by: STUDENT IN AN ORGANIZED HEALTH CARE EDUCATION/TRAINING PROGRAM

## 2025-07-27 PROCEDURE — 2500000002 HC RX 250 W HCPCS SELF ADMINISTERED DRUGS (ALT 637 FOR MEDICARE OP, ALT 636 FOR OP/ED): Performed by: NURSE PRACTITIONER

## 2025-07-27 PROCEDURE — 82947 ASSAY GLUCOSE BLOOD QUANT: CPT

## 2025-07-27 RX ORDER — POTASSIUM CHLORIDE 20 MEQ/1
40 TABLET, EXTENDED RELEASE ORAL ONCE
Status: DISCONTINUED | OUTPATIENT
Start: 2025-07-27 | End: 2025-07-27

## 2025-07-27 RX ORDER — ISOSORBIDE DINITRATE 40 MG/1
40 TABLET ORAL
Status: DISCONTINUED | OUTPATIENT
Start: 2025-07-27 | End: 2025-08-11 | Stop reason: HOSPADM

## 2025-07-27 RX ORDER — POTASSIUM CHLORIDE 20 MEQ/1
40 TABLET, EXTENDED RELEASE ORAL ONCE
Status: DISCONTINUED | OUTPATIENT
Start: 2025-07-27 | End: 2025-08-01

## 2025-07-27 RX ORDER — SODIUM NITROPRUSSIDE IN 0.9% SODIUM CHLORIDE 0.5 MG/ML
.25-5 INJECTION INTRAVENOUS CONTINUOUS
Status: DISCONTINUED | OUTPATIENT
Start: 2025-07-27 | End: 2025-07-29

## 2025-07-27 RX ADMIN — HEPARIN SODIUM 1600 UNITS/HR: 10000 INJECTION, SOLUTION INTRAVENOUS at 16:13

## 2025-07-27 RX ADMIN — ISOSORBIDE DINITRATE 40 MG: 40 TABLET ORAL at 15:59

## 2025-07-27 RX ADMIN — INSULIN LISPRO 3 UNITS: 100 INJECTION, SOLUTION INTRAVENOUS; SUBCUTANEOUS at 13:34

## 2025-07-27 RX ADMIN — INSULIN LISPRO 34 UNITS: 100 INJECTION, SOLUTION INTRAVENOUS; SUBCUTANEOUS at 08:44

## 2025-07-27 RX ADMIN — ISOSORBIDE DINITRATE 40 MG: 40 TABLET ORAL at 20:38

## 2025-07-27 RX ADMIN — INSULIN GLARGINE 48 UNITS: 100 INJECTION, SOLUTION SUBCUTANEOUS at 08:47

## 2025-07-27 RX ADMIN — INSULIN LISPRO 3 UNITS: 100 INJECTION, SOLUTION INTRAVENOUS; SUBCUTANEOUS at 08:43

## 2025-07-27 RX ADMIN — DIGOXIN 125 MCG: 125 TABLET ORAL at 08:36

## 2025-07-27 RX ADMIN — HYDRALAZINE HYDROCHLORIDE 100 MG: 100 TABLET ORAL at 22:08

## 2025-07-27 RX ADMIN — LEVOTHYROXINE SODIUM 75 MCG: 0.07 TABLET ORAL at 05:48

## 2025-07-27 RX ADMIN — SODIUM NITROPRUSSIDE IN 0.9% SODIUM CHLORIDE 0.25 MCG/KG/MIN: 0.5 INJECTION INTRAVENOUS at 10:42

## 2025-07-27 RX ADMIN — INSULIN LISPRO 34 UNITS: 100 INJECTION, SOLUTION INTRAVENOUS; SUBCUTANEOUS at 18:35

## 2025-07-27 RX ADMIN — IRON SUCROSE 200 MG: 20 INJECTION, SOLUTION INTRAVENOUS at 08:45

## 2025-07-27 RX ADMIN — QUETIAPINE FUMARATE 50 MG: 25 TABLET ORAL at 20:38

## 2025-07-27 RX ADMIN — DAPAGLIFLOZIN 10 MG: 10 TABLET, FILM COATED ORAL at 08:45

## 2025-07-27 RX ADMIN — INSULIN LISPRO 3 UNITS: 100 INJECTION, SOLUTION INTRAVENOUS; SUBCUTANEOUS at 18:34

## 2025-07-27 RX ADMIN — ATORVASTATIN CALCIUM 80 MG: 80 TABLET, FILM COATED ORAL at 20:38

## 2025-07-27 RX ADMIN — HYDRALAZINE HYDROCHLORIDE 100 MG: 100 TABLET ORAL at 05:48

## 2025-07-27 RX ADMIN — FENOFIBRATE 145 MG: 145 TABLET, FILM COATED ORAL at 08:37

## 2025-07-27 RX ADMIN — ISOSORBIDE DINITRATE 30 MG: 20 TABLET ORAL at 08:37

## 2025-07-27 RX ADMIN — GABAPENTIN 200 MG: 100 CAPSULE ORAL at 22:08

## 2025-07-27 RX ADMIN — MILRINONE LACTATE IN DEXTROSE 0.25 MCG/KG/MIN: 0.2 INJECTION, SOLUTION INTRAVENOUS at 18:22

## 2025-07-27 RX ADMIN — CALCIUM CARBONATE (ANTACID) CHEW TAB 500 MG 1 TABLET: 500 CHEW TAB at 20:38

## 2025-07-27 RX ADMIN — CALCIUM CARBONATE (ANTACID) CHEW TAB 500 MG 1 TABLET: 500 CHEW TAB at 08:45

## 2025-07-27 RX ADMIN — INSULIN LISPRO 34 UNITS: 100 INJECTION, SOLUTION INTRAVENOUS; SUBCUTANEOUS at 13:33

## 2025-07-27 RX ADMIN — INSULIN GLARGINE 48 UNITS: 100 INJECTION, SOLUTION SUBCUTANEOUS at 20:38

## 2025-07-27 RX ADMIN — GABAPENTIN 200 MG: 100 CAPSULE ORAL at 05:48

## 2025-07-27 RX ADMIN — MILRINONE LACTATE IN DEXTROSE 0.25 MCG/KG/MIN: 0.2 INJECTION, SOLUTION INTRAVENOUS at 04:44

## 2025-07-27 RX ADMIN — PANTOPRAZOLE SODIUM 40 MG: 40 TABLET, DELAYED RELEASE ORAL at 05:48

## 2025-07-27 RX ADMIN — GABAPENTIN 200 MG: 100 CAPSULE ORAL at 15:59

## 2025-07-27 RX ADMIN — HYDRALAZINE HYDROCHLORIDE 100 MG: 100 TABLET ORAL at 16:00

## 2025-07-27 ASSESSMENT — PAIN - FUNCTIONAL ASSESSMENT
PAIN_FUNCTIONAL_ASSESSMENT: 0-10

## 2025-07-27 ASSESSMENT — PAIN SCALES - GENERAL
PAINLEVEL_OUTOF10: 0 - NO PAIN

## 2025-07-27 NOTE — PROGRESS NOTES
"Endocrine Progress Note   Felice Law is a 60 y.o. male on day 10 of admission presenting with Acute on chronic systolic heart failure.  Endocrine consulted for T2DM management.    Subjective   No events overnight.     Objective     ROS  Negative unless mentioned above    Exam:    General - male patient in NAD  HEENT - AT/NC  Resp - no extra wob noted  Msckl - + edema in B/L LE    Last Recorded Vitals  Blood pressure 100/59, pulse 107, temperature 36.1 °C (97 °F), temperature source Temporal, resp. rate 11, height 1.803 m (5' 10.98\"), weight 95.7 kg (210 lb 15.7 oz), SpO2 90%.  Intake/Output last 3 Shifts:  I/O last 3 completed shifts:  In: 1426.3 (14.9 mL/kg) [P.O.:590; I.V.:836.3 (8.7 mL/kg)]  Out: 2300 (24 mL/kg) [Urine:2300 (0.7 mL/kg/hr)]  Weight: 95.7 kg     Scheduled medications  Scheduled Medications[1]  Continuous medications  Continuous Medications[2]  PRN medications  PRN Medications[3]     Relevant Results  Results from last 7 days   Lab Units 07/27/25  0727 07/27/25  0543 07/26/25 2010 07/26/25  1646 07/26/25  1041 07/26/25  0815 07/26/25  0446 07/25/25  0833 07/25/25  0618 07/24/25  1955 07/24/25  1941 07/24/25  0757 07/24/25  0314   POCT GLUCOSE mg/dL 186*  --  190* 221* 239* 226*  --    < >  --    < >  --    < >  --    GLUCOSE mg/dL  --  182*  --   --   --   --  234*  --  170*  --  332*  --  285*    < > = values in this interval not displayed.     BMP:  Results from last 7 days   Lab Units 07/27/25  0543 07/26/25  0446 07/25/25  0618   SODIUM mmol/L 133* 135* 134*   POTASSIUM mmol/L 3.5 4.0 4.0   CHLORIDE mmol/L 95* 94* 93*   CO2 mmol/L 27 28 30   BUN mg/dL 59* 66* 60*   CREATININE mg/dL 2.39* 3.24* 2.93*   GLUCOSE mg/dL 182* 234* 170*     Assessment  Felice Law is a 60 y.o. male with significant PMH of ICM, HFrEF, EF 15-20%, CABG (LIMA- LAD in 2023),  PCI, VT arrest s/p VT ablation and ICD in 2017, Hypertension, Hyperlipidemia, Hypothyroidism, Left femoral DVT on Eliquis, COPD, " JAKE, CKD III and Insulin dependent Type 2 diabetes. He was admitted on 7/12 for further HF management and advanced therapy work up.   Endocrine consulted for managing uncontrolled T2DM. Blood sugar above recommended ranges we will continue to follow and titrate insulin accordingly.     #T2DM  :: Hyperglycemia 2/2 poorly controlled Type 2 diabetes >>> hyperglycemia overnight b/c of insufficient insulin administration.  :: last HbA1c 10.1 (7/17)    # Hypothyroid   :: Thyroid on home dose > Levothyroxine 75mcg  :: TSH 2.12, T4 9.3, T3 105 (7/23) all fall within reference ranges consistent with euthyroidism.  - continue on same home dose for thyroxine.     Plan  - Insulin glargine to 48 units BID   - Insulin lispro to 34 units TID with meals  - SS#3 AC and HS  - Accuchecks TIDAC and at bedtime  - 60 gm carb consistent diet  - Goal -180  - Hypoglycemia bundle       Recommendations communicated to primary team. Please reach out incase you have any questions or concerns.     The patient was seen and discussed with attending Dr. Dara Santoro MD  PGY-5 Endocrinology Fellow             [1] [Held by provider] apixaban, 5 mg, oral, BID  atorvastatin, 80 mg, oral, Nightly  [Held by provider] bumetanide, 1 mg, oral, Daily  calcium carbonate, 500 mg of calcium carbonate, oral, BID  dapagliflozin propanediol, 10 mg, oral, Daily  digoxin, 125 mcg, oral, Daily  fenofibrate, 145 mg, oral, Daily  gabapentin, 200 mg, oral, q8h SUSI  hydrALAZINE, 100 mg, oral, q8h SUSI  insulin glargine, 48 Units, subcutaneous, Nightly  insulin glargine, 48 Units, subcutaneous, Daily  insulin lispro, 0-15 Units, subcutaneous, TID AC  insulin lispro, 34 Units, subcutaneous, TID AC  iron sucrose, 200 mg, intravenous, Every other day  isosorbide dinitrate, 40 mg, oral, TID  levothyroxine, 75 mcg, oral, Daily before breakfast  pantoprazole, 40 mg, oral, Daily before breakfast  potassium chloride CR, 40 mEq, oral, Once  QUEtiapine, 50  mg, oral, Nightly  [Held by provider] sacubitriL-valsartan, 1 tablet, oral, BID  [Held by provider] spironolactone, 25 mg, oral, Daily     [2] heparin, 0-4,000 Units/hr, Last Rate: 1,600 Units/hr (07/27/25 0600)  milrinone, 0.25 mcg/kg/min, Last Rate: 0.25 mcg/kg/min (07/27/25 0600)  nitroprusside, 0.25-5 mcg/kg/min     [3] PRN medications: acetaminophen, albuterol, alteplase, dextrose, dextrose, glucagon, glucagon, heparin

## 2025-07-27 NOTE — PROGRESS NOTES
[facility]  HFICU PROGRESS NOTE    Felice Law/[id number]    Admit Date: 7/17/2025  Hospital Length of Stay: 10   ICU Length of Stay: 10d 1h   Primary Service: HFICU  Primary HF Cardiologist: Dr. Jono Duque MD   Referring: Dr Duque     INTERVAL EVENTS / PERTINENT ROS:     No acute events overnight, patient continues to be tachycardiac off metoprolol but CO/CI are preserved.     Plan:   - Increase oral afterload, add nitroprusside low dose   - C/w Milrinone .25mcq/kg/min  - Continue OHT/VAD evaluation    MEDICATIONS  Infusions:  heparin, Last Rate: 1,600 Units/hr (07/27/25 0600)  milrinone, Last Rate: 0.25 mcg/kg/min (07/27/25 0600)  nitroprusside, Last Rate: 0.25 mcg/kg/min (07/27/25 1042)      Scheduled:  [Held by provider] apixaban, 5 mg, BID  atorvastatin, 80 mg, Nightly  [Held by provider] bumetanide, 1 mg, Daily  calcium carbonate, 500 mg of calcium carbonate, BID  dapagliflozin propanediol, 10 mg, Daily  digoxin, 125 mcg, Daily  fenofibrate, 145 mg, Daily  gabapentin, 200 mg, q8h SUSI  hydrALAZINE, 100 mg, q8h SUSI  insulin glargine, 48 Units, Nightly  insulin glargine, 48 Units, Daily  insulin lispro, 0-15 Units, TID AC  insulin lispro, 34 Units, TID AC  iron sucrose, 200 mg, Every other day  isosorbide dinitrate, 40 mg, TID  levothyroxine, 75 mcg, Daily before breakfast  pantoprazole, 40 mg, Daily before breakfast  potassium chloride CR, 40 mEq, Once  QUEtiapine, 50 mg, Nightly  [Held by provider] sacubitriL-valsartan, 1 tablet, BID  [Held by provider] spironolactone, 25 mg, Daily      PRN:  acetaminophen, 975 mg, q6h PRN  albuterol, 2.5 mg, q6h PRN  alteplase, 2 mg, PRN  dextrose, 12.5 g, q15 min PRN  dextrose, 25 g, q15 min PRN  glucagon, 1 mg, q15 min PRN  glucagon, 1 mg, q15 min PRN  heparin, 2,000-4,000 Units, q4h PRN      Invasive Hemodynamics:    Most Recent Range Past 24hrs   BP (Art)   No data recorded   MAP(Art)   No data recorded   RA/CVP   No data  "recorded   PA 62/27 PAP  Min: 45/22  Max: 68/38   PA(mean) 34 mmHg PAP (Mean)  Min: 29 mmHg  Max: 50 mmHg   PCWP 19 mmHg No data recorded   CO 5.5 L/min CO (L/min)  Min: 4 L/min  Max: 5.9 L/min   CI 2.5 L/min/m2 CI (L/min/m2)  Min: 1.9 L/min/m2  Max: 2.7 L/min/m2   Mixed Venous 60 % SVO2 (%)  Min: 59 %  Max: 69 %   SVR  1082 (dyne*sec)/cm5 SVR (dyne*sec)/cm5  Min: 996 (dyne*sec)/cm5  Max: 1406 (dyne*sec)/cm5    (dyne*sec)/cm5 No data recorded       PHYSICAL EXAM:   Visit Vitals  BP 88/60   Pulse 104   Temp 36 °C (96.8 °F) (Temporal)   Resp 17   Ht 1.803 m (5' 10.98\")   Wt 95.7 kg (210 lb 15.7 oz)   SpO2 96%   BMI 29.44 kg/m²   Smoking Status Former   BSA 2.19 m²       Wt Readings from Last 5 Encounters:   07/26/25 95.7 kg (210 lb 15.7 oz)   07/17/25 110 kg (243 lb 2.7 oz)   06/10/25 97.1 kg (214 lb)   05/12/25 96.6 kg (213 lb)   05/04/25 102 kg (224 lb)       INTAKE/OUTPUT:  I/O last 3 completed shifts:  In: 1426.3 (14.9 mL/kg) [P.O.:590; I.V.:836.3 (8.7 mL/kg)]  Out: 2300 (24 mL/kg) [Urine:2300 (0.7 mL/kg/hr)]  Weight: 95.7 kg      Physical Exam  Constitutional:       Appearance: Normal appearance.   HENT:      Head: Normocephalic and atraumatic.      Nose: Nose normal.      Mouth/Throat:      Mouth: Mucous membranes are moist.     Eyes:      Extraocular Movements: Extraocular movements intact.      Pupils: Pupils are equal, round, and reactive to light.     Neck:      Comments: Rt IJ/ SGC  Cardiovascular:      Rate and Rhythm: Normal rate and regular rhythm.      Pulses: Normal pulses.      Heart sounds:      Gallop present.   Pulmonary:      Effort: Pulmonary effort is normal.      Breath sounds: Decreased breath sounds present.   Abdominal:      General: There is distension.     Musculoskeletal:         General: Swelling present.      Cervical back: Normal range of motion and neck supple.      Right lower leg: Edema (trace) present.      Left lower leg: Edema (trace) present.      Comments: Chronic venous " "stasis      Skin:     General: Skin is warm.      Capillary Refill: Capillary refill takes 2 to 3 seconds.     Neurological:      General: No focal deficit present.      Mental Status: He is alert and oriented to person, place, and time.     Psychiatric:         Mood and Affect: Mood normal.         Behavior: Behavior normal.         DATA:  CMP:  Results from last 7 days   Lab Units 07/27/25  0543 07/26/25  0446 07/25/25  0618 07/24/25  1941 07/24/25  0314 07/23/25  1750 07/23/25  0450 07/22/25  1618 07/22/25  0550 07/21/25  0520   SODIUM mmol/L 133* 135* 134* 132* 134* 134* 136 134* 135* 137   POTASSIUM mmol/L 3.5 4.0 4.0 4.2 4.1 3.9 3.7 4.3 3.8 3.7   CHLORIDE mmol/L 95* 94* 93* 91* 92* 93* 93* 91* 91* 92*   CO2 mmol/L 27 28 30 29 32 30 32 32 36* 37*   ANION GAP mmol/L 15 17 15 16 14 15 15 15 12 12   BUN mg/dL 59* 66* 60* 59* 52* 52* 48* 50* 42* 35*   CREATININE mg/dL 2.39* 3.24* 2.93* 2.65* 2.62* 2.17* 2.04* 2.40* 1.87* 1.61*   EGFR mL/min/1.73m*2 30* 21* 24* 27* 27* 34* 37* 30* 41* 49*   MAGNESIUM mg/dL 2.28 2.34 2.21 2.20 2.35  --  2.35  --  2.30 2.39   ALBUMIN g/dL 3.5 3.6 3.5 3.5 3.5 3.8 3.4 3.8 3.6 3.6     CBC:  Results from last 7 days   Lab Units 07/27/25  0543 07/26/25  0446 07/25/25  0618 07/24/25  0314 07/23/25  0450 07/22/25  0550 07/21/25  0520   WBC AUTO x10*3/uL 5.7 6.2 7.2 5.9 4.4 5.3 5.3   HEMOGLOBIN g/dL 11.2* 12.1* 11.2* 11.2* 11.8* 12.0* 12.4*   HEMATOCRIT % 38.1* 38.5* 35.4* 37.8* 38.0* 38.7* 40.4*   PLATELETS AUTO x10*3/uL 252 238 238 234 213 239 234   MCV fL 81 77* 76* 80 76* 77* 77*     COAG:   Results from last 7 days   Lab Units 07/23/25  1230   INR  1.1     ABO:   No results found for: \"ABO\"    HEME/ENDO:  Results from last 7 days   Lab Units 07/23/25  1230   TSH mIU/L 2.12      CARDIAC:   Results from last 7 days   Lab Units 07/26/25  0446 07/23/25  1230   LD U/L  --  231   BNP pg/mL 281* 380*       ASSESSMENT AND PLAN:     Felice Law is a 60 y.o. male with significant PMH of " ICM, HFrEF, EF 15-20%, CABG (LIMA- LAD in 2023),  PCI, VT arrest s/p VT ablation and ICD in 2017, Hypertension, Hyperlipidemia, Hypothyroidism, Left femoral DVT on Eliquis, COPD, JAKE, CKD III, Chronic BLE venous stasis dermatitis, Insulin dependent Type 2 diabetes who was initially admitted 7/12 to [facility] for acute decompensate heart failure. He was diuresed and recommended for transfer to [facility] HF ICU for further managemnet and possible re-consider of advanced therapy work up.  Of notes, Patient was previously discussed for AT during [facility] October 2023 admission but declined with concerns for mobility/frailty (uses motorized scooter and has bilateral foot drop with neuropathy), CKD, poorly controlled diabetes as well as unclear social/financial support).     Transferred to HF ICU 7/17, he was warm and wet,  lasix IV bolus given and drip initiated. Eliquis on hold, heparin drip initiated. Bedside SG done 7/19, opening swan numbers: BP: 133/74 (94),CVP 9, PA 74/29 (44), PCWP 24, CO/CI (Pallavi) 5.4/2.4, Thermo: 4.6/2.1, SVR 1263, SVO2 63% on hydral /Isordil 100/40 mg TID, Metop 50 mg daily, Dapa 10 mg daily, Dig 125 mcg PO daily, Lasix drip at 20 mg /hr. Entresto re-introduce and up titrated. Lasix drip d/c'd currently on intermittent IVP.  Hg A1c 10.1 this admission, now considering advanced therapy evaluation. Advanced therapies evaluation initiated 7/23.     Neuro:   # Anxiety. Depression, Acute pain    # Bilateral foot drop with LE neuropathy: Able to walk two blocks. Uses motorized scoopter    # Sudden UE shaking episode (resolved)  - Serial neuro and pain assessments    - PO Tylenol PRN for pain   - PT/OT Consult, OOB to chair   - CAM ICU score every shift   - Sleep/wake cycle normalization      # Physical Status   -Obesity: BMI: 34 --- > 30  -Reduced Mobility due to ICU and CHF       #Substance abuse   -Alcohol abuse/Alcohol dependence: NO   -Tobacco use/Nicotine Dependence: Quit 8 years ago      Cardiovascular:    #Acute on chronic systolic CHF, HFrEF 15-20%, dx'd 2017,   #Ischemic Cardiomyopathy, ACC/AHA Stage C-D, NYHA II, Warm,   -admit weight (7/17): 111 kg -- > 98 kg (7/20)  -BNP (7/17): 744  -TTE (5/4/2025):  Left ventricular ejection fraction is severely decreased, by visual estimate at 15-20%.   - RHC (6/10/25): RA: 25,RV: 63/9, PA: 72/34, PCWP: 32, PA-SAT: 36%, SVC-SAT: 41%, FA-SAT: 71%, CO: 4.84, CI: 2.24, SVR: 1041  - LHC (6/10/25):   Right dominant coronary circulation with severe multivessel CAD, as described above. LIMA to LAD patent.  - C/w atorvastin 80mg    - Lasix gtt discontinued overnight 7/19.   - Bedside SG with Opening swan numbers (7/19): BP: 133/74 (94),CVP 9, PA 74/29 (44), PCWP 24, CO/CI (Pallavi) 5.4/2.4, Thermo: 4.6/2.1, SVR 1263, SVO2 63% on hydral /Isordil 100/40 mg TID, Metop 50 mg daily, Dapa 10 mg daily, Dig 125 mcg PO daily, Lasix drip at 20 mg /h.  - Daily SGC #'s (7/27):  /69 (80); CVP: 6; PA 61/27 (41); SVR: 1082; CO/CI 5.47/2.54; on Hydra 100, isordil 30, dapa 10, dig 125,   - Lasix 80 mg IV push x1 (7/21/2025)  - Hold Bumex drip 7/24  - Discontinue Toprol 100 (7/24)  - C/w Milrinone 0.25 mcg/kg/min  - Hold Entresto 97/103 mg BID and [place] 7/26   - C/w Hydralazine 100mg/ Isordil 40mg daily, increase as tolerated   - Restart nipride gtt low dose 7/27  - Daily standing weights, 2gm sodium diet, 2L fluid restriction, strict I&Os     #CAD    - s/p PCI and 1v CABG (LIMA-LAD 04/2023).   - C/w ASA and atorvastatin      Pulmonary:    # H/o COPD  # Hx Pulmonary Hypertension   # Central sleep apnea.   # Prior tobacco use (24 pack year, quit 2017),   - On 2L via NC      :   #CHARLOTTE/CKD III  - Baseline (11/11/2024): BUN/Cr (GFR): 60/2.93 (24)  -Admit Bun/Creatinine (7/17):  48/1.62, creatinine peaked at 2.55 OSH  -I/Os   -avoid hypotension and nephrotoxic agents      Heme:   #Anemia in the setting of MAI and CKD III  - Labs (7/17):  TIBC: 377,  ferritin: 102, serum Fe: 61, folate: 13.1, B12: 452    - IV Venofer 200 mg x5 doses (7/17-7/21)      # Left femoral DVT (2017)  - Hold home Eliquis  - C/w Heparin drip (7/18)      Endo:   #DM 2  - hgbA1c(7/17):  10.1  - Diabetic management per endocrine see daily recs  - Insulin glargine to 48 units BID, Insulin lispro to 34 units TID with meals  - SS#3 AC and HS     # Hypothyroidism  -C/w Levothyroxine 75mcg  -TSH (7/18): 3.97      ID:   -afebrile, nontoxic    -no s/s infx   -trend temps q4 hr        PHYSICAL AND OCCUPATIONAL THERAPY: PT/OT     LINES:  PIVs   SGC (7/19 - present) -> plan to remove this evening         DVT: SCD's, Heparin drip once Heparin assay <0.2  VAP BUNDLE: NA  CENTRAL LINE BUNDLE: Ordered  ULCER PPX: PPI  GLYCEMIC CONTROL: SSI, Lantus insulin   BOWEL CARE: Senna, Miralax   INDWELLING CATHETER: NA  NUTRITION: Adult diet Consistent Carb; CCD 90 gm/meal; 2000 mL fluid        EMERGENCY CONTACT: Extended Emergency Contact Information  Primary Emergency Contact: NAM LOO  Address: [id number] [place] [id number] [facility]  Home Phone: [phone]  Mobile Phone: [phone]  Relation: Mother  Secondary Emergency Contact: Robinson Contreras  Mobile Phone: [phone]  Relation: Friend  FAMILY UPDATE: no family at bedside   CODE STATUS: Full Code  DISPO:  HF ICU      Patient seen and assessed with Dr. Duque    I personally spent 60  minutes of critical care time directly and personally managing the patient exclusive of separately billable procedures.      __________________________________________  MARLON Delcid-CNP

## 2025-07-27 NOTE — CARE PLAN
The patient's goals for the shift include      The clinical goals for the shift include pt. to remain free fro cardiac decline throughout shift      Problem: Pain - Adult  Goal: Verbalizes/displays adequate comfort level or baseline comfort level  Outcome: Progressing     Problem: Safety - Adult  Goal: Free from fall injury  Outcome: Progressing     Problem: Discharge Planning  Goal: Discharge to home or other facility with appropriate resources  Outcome: Progressing     Problem: Chronic Conditions and Co-morbidities  Goal: Patient's chronic conditions and co-morbidity symptoms are monitored and maintained or improved  Outcome: Progressing     Problem: Nutrition  Goal: Nutrient intake appropriate for maintaining nutritional needs  Outcome: Progressing     Problem: Heart Failure  Goal: Improved gas exchange this shift  Outcome: Progressing  Goal: Improved urinary output this shift  Outcome: Progressing  Goal: Reduction in peripheral edema within 24 hours  Outcome: Progressing  Goal: Report improvement of dyspnea/breathlessness this shift  Outcome: Progressing  Goal: Weight from fluid excess reduced over 2-3 days, then stabilize  Outcome: Progressing  Goal: Increase self care and/or family involvement in 24 hours  Outcome: Progressing     Problem: Skin  Goal: Decreased wound size/increased tissue granulation at next dressing change  Outcome: Progressing  Goal: Participates in plan/prevention/treatment measures  Outcome: Progressing  Goal: Prevent/manage excess moisture  Outcome: Progressing  Goal: Prevent/minimize sheer/friction injuries  Outcome: Progressing  Goal: Promote/optimize nutrition  Outcome: Progressing  Goal: Promote skin healing  Outcome: Progressing     Problem: Diabetes  Goal: Achieve decreasing blood glucose levels by end of shift  Outcome: Progressing  Goal: Increase stability of blood glucose readings by end of shift  Outcome: Progressing  Goal: Decrease in ketones present in urine by end of  shift  Outcome: Progressing

## 2025-07-27 NOTE — PROGRESS NOTES
HFICU Attending Note    60M with stage D ICM (EF 15-20%), CABG/PCI, VT arrest s/p ICD, COPD, CKD III, T2DM with neuopathy, admitted 7/12 to [place] with ADHF and transferred to [facility] HFICU on 7/17 for consideration of advanced therapies. Previously declined AT (10/2023) due to frailty, DM, CKD, and limited support. CI remains borderline with persistent congestion and cardiorenal syndrome in the setting aggressive diuresis. A1c 10.1%. Endocrine following. Patient has chronic bilateral foot drop with limited ambulation and is receiving PT/OT. He’s improving, up and walking with walker. Re-eval for LVAD/OHT given recurent HF presentation. (OHT not advisable in setting of uncontrolled DM2)    - Complete advanced therapies eval.   - Monitor renal function, filling pressures    This critically ill patient continues to be at-risk for clinically significant deterioration / failure due to the above mentioned dysfunctional, unstable organ systems.  I have personally identified and managed all complex critical care issues to prevent aforementioned clinical deterioration.  Critical care time is spent at bedside and/or the immediate area and has included, but is not limited to, the review of diagnostic tests, labs, radiographs, serial assessments of hemodynamics, respiratory status, ventilatory management, and family updates.  Time spent in procedures and teaching are reported separately.    Critical care time: _35___ minutes     Objective    Admit Date: 7/17/2025  Hospital Length of Stay: 10   ICU Length of Stay: 9d 20h   Home: [facility] [id number]    MEDICATIONS  Infusions:  heparin, Last Rate: 1,600 Units/hr (07/27/25 0600)  milrinone, Last Rate: 0.25 mcg/kg/min (07/27/25 0600)      Scheduled:  [Held by provider] apixaban, 5 mg, BID  atorvastatin, 80 mg, Nightly  [Held by provider] bumetanide, 1 mg, Daily  calcium carbonate, 500 mg of calcium carbonate, BID  dapagliflozin propanediol, 10 mg, Daily  digoxin, 125 mcg,  Daily  fenofibrate, 145 mg, Daily  gabapentin, 200 mg, q8h SUSI  hydrALAZINE, 100 mg, q8h SUSI  insulin glargine, 48 Units, Nightly  insulin glargine, 48 Units, Daily  insulin lispro, 0-15 Units, TID AC  insulin lispro, 34 Units, TID AC  iron sucrose, 200 mg, Every other day  isosorbide dinitrate, 30 mg, TID  levothyroxine, 75 mcg, Daily before breakfast  pantoprazole, 40 mg, Daily before breakfast  potassium chloride CR, 40 mEq, Once  QUEtiapine, 50 mg, Nightly  [Held by provider] sacubitriL-valsartan, 1 tablet, BID  [Held by provider] spironolactone, 25 mg, Daily      PRN:  acetaminophen, 975 mg, q6h PRN  albuterol, 2.5 mg, q6h PRN  alteplase, 2 mg, PRN  dextrose, 12.5 g, q15 min PRN  dextrose, 25 g, q15 min PRN  glucagon, 1 mg, q15 min PRN  glucagon, 1 mg, q15 min PRN  heparin, 2,000-4,000 Units, q4h PRN        Prior to Admission Meds:  Prescriptions Prior to Admission[1]    Invasive Hemodynamics:    Most Recent Range Past 24hrs   BP (Art)   No data recorded   MAP(Art)   No data recorded   RA/CVP   No data recorded   PA 56/25 PAP  Min: 45/22  Max: 65/26   PA(mean) 38 mmHg PAP (Mean)  Min: 29 mmHg  Max: 44 mmHg   PCWP 19 mmHg No data recorded   CO 5.5 L/min CO (L/min)  Min: 4 L/min  Max: 5.9 L/min   CI 2.5 L/min/m2 CI (L/min/m2)  Min: 1.9 L/min/m2  Max: 2.7 L/min/m2   Mixed Venous 60 % SVO2 (%)  Min: 59 %  Max: 69 %   SVR  1082 (dyne*sec)/cm5 SVR (dyne*sec)/cm5  Min: 996 (dyne*sec)/cm5  Max: 1406 (dyne*sec)/cm5    (dyne*sec)/cm5 No data recorded     MCS:   Heart Mate III:     Most Recent Range Past 24hrs   Flow   No data recorded   Speed   No data recorded   Power   No data recorded   PI   No data recorded     ECMO:     Most Recent Range Past 24hrs   Flow   No data recorded   Speed   No data recorded   Sweep   No data recorded     Impella:      Most Recent Range Past 24hrs   Performance Level   No data recorded   Flow (L/min)   No data recorded   Motor Current   No data recorded   Placement Signal     "Placement OK could not be evaluated. This SmartLink does not work with rows of the type: Custom List   Purge (mmHg)   No data recorded   Purge rate (mL/hr)   No data recorded     VENT:    Most Recent Range Past 24hrs   Mode      FiO2   No data recorded   Rate   No data recorded   Vt    No data recorded   PEEP   No data recorded         7/27/2025     7:00 AM 7/27/2025     6:15 AM 7/27/2025     6:00 AM 7/27/2025     5:00 AM 7/27/2025     4:00 AM 7/27/2025     3:00 AM 7/27/2025     2:00 AM   Vitals   Systolic 105  102 96 95 98 105   Diastolic 68  69 64 69 66 63   BP Location      Right arm    Heart Rate 115 114 115 114 114 114 113   Temp      36.2 °C (97.2 °F)    Resp 19 17 18 18 17 18 18     Visit Vitals  /68   Pulse (!) 115   Temp 36.2 °C (97.2 °F) (Temporal)   Resp 19   Ht 1.803 m (5' 10.98\")   Wt 95.7 kg (210 lb 15.7 oz)   SpO2 92%   BMI 29.44 kg/m²   Smoking Status Former   BSA 2.19 m²     Wt Readings from Last 5 Encounters:   07/26/25 95.7 kg (210 lb 15.7 oz)   07/17/25 110 kg (243 lb 2.7 oz)   06/10/25 97.1 kg (214 lb)   05/12/25 96.6 kg (213 lb)   05/04/25 102 kg (224 lb)       Intake/Output Summary (Last 24 hours) at 7/27/2025 0737  Last data filed at 7/27/2025 0600  Gross per 24 hour   Intake 1147.52 ml   Output 900 ml   Net 247.52 ml     CHEST: Unlabored, Diminished, Clear  CV:  Normal sinus rhythm  ABD:  Soft Nontender Bowel sounds: All quadrants, Flatus:    EXT:   RLE: Ravinder,Warm, Dry  DP: Weak  PT: Weak  LLE: Ravinder,Warm, Dry  DP: Weak  PT: Weak  NEURO:   RASS: Alert and calm  CAM: Negative  LOC: Alert  Cognition: Follows commands  GCS: 15    DATA:  CMP:  Recent Labs     07/27/25  0543 07/26/25  0446 07/25/25  0618 07/24/25  1941 07/24/25  0314 07/23/25  1750 07/23/25  0450 07/22/25  1618 07/22/25  0550 07/21/25  0520 07/20/25  1232 07/20/25  0556 07/19/25  1925 07/19/25  0530   * 135* 134* 132* 134* 134* 136 134* 135* 137   < > 138   < > 138   K 3.5 4.0 4.0 4.2 4.1 3.9 3.7 4.3 3.8 3.7   < > " 3.6   < > 4.2   CL 95* 94* 93* 91* 92* 93* 93* 91* 91* 92*   < > 93*   < > 98   CO2 27 28 30 29 32 30 32 32 36* 37*   < > 37*   < > 32   ANIONGAP 15 17 15 16 14 15 15 15 12 12   < > 12   < > 12   BUN 59* 66* 60* 59* 52* 52* 48* 50* 42* 35*   < > 38*   < > 43*   CREATININE 2.39* 3.24* 2.93* 2.65* 2.62* 2.17* 2.04* 2.40* 1.87* 1.61*   < > 1.56*   < > 1.67*   EGFR 30* 21* 24* 27* 27* 34* 37* 30* 41* 49*   < > 51*   < > 47*   MG 2.28 2.34 2.21 2.20 2.35  --  2.35  --  2.30 2.39  --  2.55*  --  2.47*    < > = values in this interval not displayed.     Recent Labs     07/27/25  0543 07/26/25  0446 07/25/25  0618 07/24/25  1941 07/24/25  0314 07/23/25  1750 07/23/25  0450 07/22/25  1618 07/18/25  0157 07/17/25  1119 07/12/25  1922 05/04/25  1447 11/11/24  1558 11/05/24  1146 06/25/24  1139 12/19/23  1104 10/06/23  0515 10/05/23  1647   ALBUMIN 3.5 3.6 3.5 3.5 3.5 3.8 3.4 3.8   < > 3.7 3.8 4.1 4.0 4.4 4.3 4.1   < > 4.1   ALT  --   --   --   --   --   --   --   --   --  8* 10 26 12 13 15 13  --  11   AST  --   --   --   --   --   --   --   --   --  15 12 22 12 <3* 10 10  --  13   BILITOT  --   --   --   --   --   --   --   --   --  2.0* 1.6* 2.1* 0.5 0.5 1.3* 1.1  --  1.8*    < > = values in this interval not displayed.     CBC:  Recent Labs     07/27/25  0543 07/26/25  0446 07/25/25  0618 07/24/25  0314 07/23/25  0450 07/22/25  0550 07/21/25  0520 07/20/25  0556   WBC 5.7 6.2 7.2 5.9 4.4 5.3 5.3 5.1   HGB 11.2* 12.1* 11.2* 11.2* 11.8* 12.0* 12.4* 12.2*   HCT 38.1* 38.5* 35.4* 37.8* 38.0* 38.7* 40.4* 40.2*    238 238 234 213 239 234 252   MCV 81 77* 76* 80 76* 77* 77* 78*     COAG:   Recent Labs     07/27/25  0543 07/26/25  0446 07/25/25  0618 07/24/25  0314 07/23/25  1230 07/23/25  0658 07/17/25  1423 07/17/25  1119 10/14/23  2208 10/05/23  1647   INR  --   --   --   --  1.1  --   --  2.0*  --  1.2*   HAUF 0.3 0.4 0.4 0.3  --  0.3   < >  --    < >  --     < > = values in this interval not displayed.     ABO:   Recent  "Labs     07/23/25  1230   ABO A     HEME/ENDO:   Recent Labs     07/23/25  1230 07/17/25  1125 07/17/25  1111 05/12/25  1656 11/11/24  1558 11/05/24  1146 06/25/24  1139 12/29/23  1427 10/04/23  1518   FERRITIN  --  102  --   --   --   --  332*  --  379*   IRONSAT  --  16*  --   --   --  37 20*  --  15*   TSH 2.12 3.97  --   --  3.26 4.05* 4.23*   < > 9.31*   HGBA1C  --   --  10.1* 8.9* 11.0* 10.8* 9.2*   < > 8.1*    < > = values in this interval not displayed.     CARDIAC:   Recent Labs     07/26/25  0446 07/23/25  1230 07/17/25  1119 07/13/25  0737 07/12/25  2038 07/12/25  1922 05/04/25  1540 05/04/25  1447 11/05/24  1146 06/25/24  1139 12/19/23  1104 10/05/23  1647   LDH  --  231  --   --   --   --   --   --   --   --   --  240   TROPHS  --   --  37 53* 53* 53* 40* 41*  --   --   --   --    * 380* 744*  --   --  1,299*  --  2,032* 57 743* 147* 157*     Recent Labs     07/27/25  0543 07/26/25  2237 07/26/25  1745 07/26/25  1229 07/26/25  0620   LACMX 0.8 1.1 1.4 1.0 0.7   SO2MV 60 64 59 69 64     No results for input(s): \"TACROLIMUS\", \"SIROLIMUS\", \"CYCLOSPORINE\" in the last 60031 hours.  Recent Labs     06/10/25  0831 11/11/24  1558 11/05/24  1146 10/05/23  0354   CHOL 82 630* 417* 106   LDLCALC 24  --   --  32*   HDL 26.8 29.2 36.9 24.5   TRIG 154* 2,663* 2,017* 247*     MICRO: No results for input(s): \"ESR\", \"CRP\", \"PROCAL\" in the last 43075 hours.  Susceptibility data from last 90 days.  Collected Specimen Info Organism   07/19/25 Swab from Anterior Nares Methicillin Susceptible Staphylococcus aureus (MSSA)     Assessment & Plan  Acute on chronic systolic heart failure        EKG:   Recent Labs     07/12/25  1834 05/22/25  0857   ATRRATE 78 86   VENTRATE 78 86   PRINT 168 194   QRSDUR 112 118   QTCFRED 425 440   QTCCALCB 444 466     Encounter Date: 07/12/25   ECG 12 lead   Result Value    Ventricular Rate 78    Atrial Rate 78    CT Interval 168    QRS Duration 112    QT Interval 390    QTC " Calculation(Bazett) 444    P Axis 85    R Axis 25    T Axis 101    QRS Count 13    Q Onset 215    P Onset 131    P Offset 190    T Offset 410    QTC Fredericia 425    Narrative    Normal sinus rhythm  Possible Left atrial enlargement  Low voltage QRS  Anterolateral infarct (cited on or before 10-OCT-2023)  Abnormal ECG  When compared with ECG of 04-MAY-2025 13:32,  Questionable change in QRS axis     Echocardiogram:   Recent Labs     05/05/25  1223 06/25/24  1100   EF 18 30   LVIDD  --  6.65   RV 58.3 54.4   RVFRWALLPKSP 9.14 8.16   TAPSE 1.6 1.0   Transthoracic Echo (TTE) Limited With Doppler, Color And Contrast 05/05/2025    [facility], [address] [place] [address]  Tel [phone] and Fax [phone]    TRANSTHORACIC ECHOCARDIOGRAM REPORT      Patient Name:       NEGRITA BERNARD    Gayla Physician:    [id number] Bird Askew MD  Study Date:         5/5/2025            Ordering Provider:    [id number] BENNY CARTWRIGHT  MRN/PID:            [id number]            Fellow:  Accession#:         [id number]        Nurse:                Tres Barros RN  Date of Birth/Age:  1965 / 60 years Sonographer:          Alyssa Monroy RDCS  Gender assigned at  M                   Additional Staff:  Birth:  Height:             180.34 cm           Admit Date:           5/4/2025  Weight:             101.61 kg           Admission Status:     Inpatient -  Routine  BSA / BMI:          2.21 m2 / 31.24     Encounter#:           [id number]  kg/m2  Blood Pressure:     116/75 mmHg         Department Location:  [facility]    Study Type:    TRANSTHORACIC ECHO (TTE) COMPLETE  Diagnosis/ICD: Unspecified systolic (congestive) heart failure (CHF)-I50.20;  Ischemic cardiomyopathy-I25.5  Indication:    SOB CP Edema  CPT Code:      Echo Limited-34538; Doppler Limited-18954; Color Doppler-98185    Patient History:  CABG:              CABG x 1.  Smoker:             Former.  Pacer/Defib:       AICD  Pertinent History: CAD, Cardiomyopathy, CHF, COPD, PVD, Previous DVT, HTN and  Hyperlipidemia. PCI-Stent, S/P CABG x1 2023, CKD III, JAKE.    Study Detail: The following Echo studies were performed: 2D, Doppler and color  flow. Technically challenging study due to body habitus. Definity  used as a contrast agent for endocardial border definition. Total  contrast used for this procedure was 2 mL via IV push.      PHYSICIAN INTERPRETATION:  Left Ventricle: Left ventricular ejection fraction is severely decreased, by visual estimate at 15-20%. There is global hypokinesis of the left ventricle with minor regional variations. The left ventricular cavity size is moderately dilated. Abnormal (paradoxical) septal motion, consistent with RV pacemaker. Spectral Doppler shows a Grade II (pseudonormal pattern) of left ventricular diastolic filling with an elevated left atrial pressure.  Left Atrium: The left atrium is mildly dilated.  Right Ventricle: The right ventricle is mildly enlarged. There is moderately reduced right ventricular systolic function.  Right Atrium: The right atrium is normal in size.  Aortic Valve: The aortic valve is trileaflet. There is minimal aortic valve cusp calcification. There is mild aortic valve thickening. There is no evidence of aortic valve regurgitation. The peak instantaneous gradient of the aortic valve is 6 mmHg.  Mitral Valve: The mitral valve is mildly thickened. There is mild to moderate mitral valve regurgitation.  Tricuspid Valve: The tricuspid valve is structurally normal. There is mild to moderate tricuspid regurgitation. The Doppler estimated RVSP is moderately elevated at 58.3 mmHg.  Pulmonic Valve: The pulmonic valve is structurally normal. There is physiologic pulmonic valve regurgitation.  Pericardium: Trivial pericardial effusion.  Aorta: The aortic root is normal.  Systemic Veins: The inferior vena cava appears dilated.  In comparison to the  previous echocardiogram(s): Compared with study dated 2024, LVEF 15-20%.      CONCLUSIONS:  1. Left ventricular ejection fraction is severely decreased, by visual estimate at 15-20%.  2. There is global hypokinesis of the left ventricle with minor regional variations.  3. Spectral Doppler shows a Grade II (pseudonormal pattern) of left ventricular diastolic filling with an elevated left atrial pressure.  4. Left ventricular cavity size is moderately dilated.  5. Abnormal septal motion consistent with RV pacemaker.  6. There is moderately reduced right ventricular systolic function.  7. Mildly enlarged right ventricle.  8. The left atrium is mildly dilated.  9. Mild to moderate mitral valve regurgitation.  10. Mild to moderate tricuspid regurgitation visualized.  11. Moderately elevated right ventricular systolic pressure.    QUANTITATIVE DATA SUMMARY:    2D MEASUREMENTS:           Normal Ranges:  LVEDV Index:     119 ml/m2      LV SYSTOLIC FUNCTION:  Normal Ranges:  EF-A4C View:    21 % (>=55%)  EF-A2C View:    8 %  EF-Biplane:     16 %  EF-Visual:      18 %  LV EF Reported: 18 %      LV DIASTOLIC FUNCTION:           Normal Ranges:  MV Peak A:             0.65 m/s  (0.42-0.7 m/s)  MV e'                  0.067 m/s (>8.0)  MV lateral e'          0.09 m/s  MV medial e'           0.04 m/s      MITRAL VALVE:          Normal Ranges:  MV DT:        198 msec (150-240msec)      MITRAL INSUFFICIENCY:             Normal Ranges:  MR Vmax:              342.00 cm/s      AORTIC VALVE:          Normal Ranges:  AoV Vmax:     1.19 m/s (<=1.7m/s)  AoV Peak P.7 mmHg (<20mmHg)  LVOT Max Dylan: 0.65 m/s (<=1.1m/s)  LVOT VTI:     6.72 cm      RIGHT VENTRICLE:  TAPSE: 15.5 mm  RV s'  0.09 m/s      TRICUSPID VALVE/RVSP:          Normal Ranges:  Peak TR Velocity:     3.29 m/s  RV Syst Pressure:     58 mmHg  (< 30mmHg)  IVC Diam:             2.40 cm      [id number] Bird Askew MD  Electronically signed on 2025 at 1:38:04  PM        ** Final **    Coronary Angiography:   Left And Right Heart Catheterization With Left Ventriculography 06/10/2025    [facility], Cath Lab, [address] [place] [address]    Cardiovascular Catheterization Report    Patient Name:      NEGRITA LOO Performing Physician:  [id number] Carl Gillombardo MD  Study Date:        6/10/2025             Verifying Physician:   [id number] Carl Gillombardo MD  MRN/PID:           [id number]              Cardiologist/Co-Scrub:  Accession#:        [id number]          Ordering Provider:     [id number] CARMELITA NAVA  Date of Birth/Age: 1965 / 60 years   Cardiologist:  Gender:            M                     Fellow:                [id number] Kyler Chow MD  Encounter#:        [id number]            Surgeon:      Study:            Left Heart Cath with Grafts  Additional Study: Right Heart Cath      Indications:  NEGRITA LOO is a 60 year old male who presents with prior coronary artery bypass graft surgery, dyslipidemia and hypertension. NEGRITA LOO is a 60 year old male who presents with dyslipidemia, hypertension, prior coronary artery bypass graft surgery and an anginal equivalent chest pain assessment (i.e. dyspnea on exertion believed to be from ischemia). Cardiomyopathy and left ventricular dysfunction. Cardiomyopathy and heart failure.    Procedure Description:  After infiltration with 2% Lidocaine, the right radial artery was cannulated with a modified Seldinger technique. Subsequently a 6 Trinidadian sheath was placed in the right radial artery. After infiltration of local anesthetic, the right brachial vein was cannulated with a percutaneous technique. A 5 Trinidadian sheath was placed in the vein. Selective coronary catheterization was performed using a 5 Fr catheter(s) exchanged over a guide wire to cannulate the coronary arteries. A JL 3.5 tip catheter was used for left coronary injections. A JR 4 tip catheter was used for  right coronary injections.  Cardiac output was calculated via the Pallavi method. After completion of the procedure, the arterial sheath was pulled and a TR Band Radial Compression Device was utilized to obtain patent hemostasis. Post-procedure, the venous sheath was pulled and pressure was applied to the site.    Coronary Angiography:  The coronary circulation is right dominant.    Left Main Coronary Artery:  The left main coronary artery is a normal caliber vessel. The left main arises normally from the left coronary sinus of Valsalva and bifurcates into the LAD and circumflex coronary arteries. The left main coronary artery showed no significant disease or stenosis greater than 30%.    Left Anterior Descending Coronary Artery Distribution:  The left anterior descending coronary artery is a normal caliber vessel. The LAD arises normally from the left main coronary artery. The proximal left anterior descending coronary artery showed 70% stenosis. The ostial and proximal 1st diagonal branch revealed 90% stenosis. The 1st septal  branch showed no significant disease or stenosis greater than 30%. The 2nd septal  branch showed no evidence of significant disease. The 3rd septal  branch demonstrated no significant disease or stenosis greater than 30%.    Circumflex Coronary Artery Distribution:  The circumflex coronary artery is a normal caliber vessel. The circumflex arises normally from the left main coronary artery and terminates in the AV groove. The mid circumflex coronary artery showed 50% stenosis. The ostial 1st obtuse marginal branch showed 90% stenosis.    Right Heart Catheterization:  Cardiac output was calculated via the Pallavi method. Elevated left sided filling pressures with normal cardiac output. Severely elevated ventricular filling pressure. Cardiac output is normal. Preserved cardiac output at rest. RHC:  RA: 25  RV: 63/9  PA: 72/34  PCWP: 32  PA-SAT: 36%  SVC-SAT: 41%  FA-SAT:  71%  CO: 4.84  CI: 2.24  SVR: 1041  Severely elevated right and left sided filling pressures with preserved CO/CI.    Right Coronary Artery Distribution:    The right coronary artery is a normal caliber vessel. The RCA arises normally from the right sinus of Valsalva. The mid right coronary artery showed 100% stenosis. The acute marginal branch showed no significant disease or stenosis greater than 30%.    Coronary Grafts:    LIMA Graft:  Left internal mammary artery graft conduit, originating in situ and attached to the mid left anterior descending, is normal.    Coronary Lesion Summary:  Vessel         Stenosis      Vessel Segment  LAD          70% stenosis       proximal  1st Diagonal 90% stenosis  ostial and proximal  Circumflex   50% stenosis          mid  OM 1         90% stenosis        ostial  RCA          100% stenosis         mid      Graft Stenosis Summary:  Graft     Destination of Graft           % Stenosis  LIMA  mid left anterior descending no evidence of stenosis      Hemo Personnel:  +---------------------------+---------+  Name                       Duty       +---------------------------+---------+  Gillombardo, Carl Barton MDPKRIS MD 1  +---------------------------+---------+      Hemodynamic Pressures:    +----+----------+---------+-------------+-------------+---+----+-------+-------+  SiteDate Time   Phase  Systolic mmHg  Diastolic  ED MeanA-Wave V-Wave                   Name                    mmHg     mmHmmHg mmHg   mmHg                                                      g                     +----+----------+---------+-------------+-------------+---+----+-------+-------+    PA 6/10/2025 AIR REST           72           34     43                      11:05:46                                                                      AM                                                           +----+----------+---------+-------------+-------------+---+----+-------+-------+    PW 6/10/2025 AIR REST                               30     32     32        11:06:54                                                                      AM                                                          +----+----------+---------+-------------+-------------+---+----+-------+-------+    RV 6/10/2025 AIR REST           63            9 23                          11:07:45                                                                      AM                                                          +----+----------+---------+-------------+-------------+---+----+-------+-------+    RA 6/10/2025 AIR REST                               25     31     33        11:09:13                                                                      AM                                                          +----+----------+---------+-------------+-------------+---+----+-------+-------+    AO 6/10/2025 AIR REST          102           75     88                      11:15:08                                                                      AM                                                          +----+----------+---------+-------------+-------------+---+----+-------+-------+    LV 6/10/2025 AIR REST           99           26 33                          11:21:35                                                                      AM                                                          +----+----------+---------+-------------+-------------+---+----+-------+-------+    LV 6/10/2025 AIR REST          102           76 80                          11:21:47                                                                      AM                                                           +----+----------+---------+-------------+-------------+---+----+-------+-------+    AO 6/10/2025 AIR REST           91           70     80                      11:38:01                                                                      AM                                                          +----+----------+---------+-------------+-------------+---+----+-------+-------+        Oxygen Saturation %:  +-----------+----------+------------+  Sample SiteO2 Sat (%)HB (g/100ml)  +-----------+----------+------------+           FA        71        12.5  +-----------+----------+------------+           PA        36        12.5  +-----------+----------+------------+           FA        71        12.5  +-----------+----------+------------+           PA        36        12.5  +-----------+----------+------------+      Cardiac Outputs:  +---------------+------------------+-------+  BLANCA CO (l/min)BLANCA CI (l/min/m2)BLANCA SV  +---------------+------------------+-------+              4.8               2.2   67.2  +---------------+------------------+-------+      Vascular Resistance Calculated Values (Wood Units):  +-----+---+----+-------+----+----+---+----+----+----+-------+  PhasePVRPVRIPVR/SVRSVR SVRITPRTPRITVR TVRITPR/TVR  +-----+---+----+-------+----+----+---+----+----+----+-------+  0    2.75.8 0      [id number]        +-----+---+----+-------+----+----+---+----+----+----+-------+      Cardiac Cath Post Procedure Notes:  Post Procedure Diagnosis: Multivessel CAD.  Blood Loss:               Estimated blood loss during the procedure was 10 mls.  Specimens Removed:        Number of specimen(s) removed: none.    ____________________________________________________________________________________  CONCLUSIONS:  1. Severely elevated right and left sided filling pressures with preserved CO/CI.  RA: 25 -- RV: 72/9 --  PA: 72/34 -- PCWP: 32    PA-SAT: 36%  SVC-SAT: 41%  FA-SAT: 71%  CO: 4.84  CI: 2.24  2. Right dominant coronary circulation with severe multivessel CAD, as described above.  3. WILKS to LAD patent.    ICD 10 Codes:  Cardiomyopathy, unspecified-I42.9    CPT Codes:  Left Heart Cath Bypass Graft w ventriculography and coronary angio(LHC)-86375; Right Heart Cath O2/Cardiac output without biopsy (RHC)-42504    [id number] Carl Gillombardo MD  Performing Physician  Electronically signed by [id number] Carl Gillombardo MD on 2025 at 3:38:51 PM          ** Final **    Right Heart Cath: No results found for this or any previous visit from the past 1800 days.    Cardiac Scoring: No results found for this or any previous visit from the past 1800 days.    Cardiac MRI:   MR cardiac morphology and function w and wo IV contrast 2023    Narrative  EXAMINATION: MR CARDIAC W/+W/O 2023 02:40 PM    Patient Name: NEGRITA LOO  MRN: [id number]  : 1965  Referring MD: VICK LEONARD  Interpreting MD: Dalton Solares  Resident/Fellow: None  Scan Date: 2023 02:40 PM  Height: ft in or 180cm  Weight: lbs or 112 kg  BSA: 2.3 m^2    Quality of study: Good  CLINICAL INDICATION: Pre-op cardiac assessment  Associated Diagnosis: Coronary artery disease  Technologist Note:  Patient has a pacemaker causing a lot of artifact.  MJI  Comparison:  Correlation: Transthoracic echocardiogram on Exercise stress-rest echocardiogram on Left heart catheterization and coronary angiograms on Nuclear medicine stress-rest perfusion imaging on    TECHNIQUE: Patient questionnaire was completed and was reviewed by MRI personnel prior to the patient entering the scanner. Multiplanar, multisequence MR imaging of the heart was performed without and with intravenous contrast. Cardiac velocity flow mapping was performed.  Siemens Christina 1.5 Effie MRI scanner.  INTRAPROCEDURE MEDS: Gadoterate Meglumine (DOTAREM) 10 MMOL/20ML solution 35 mL Route: Intravenous  Push    FINDINGS:      CARDIAC MRI    CARDIAC CHAMBER MORPHOLOGY  Left ventricle: Dilated cavity size, mixed hypertrophy, no mass/thrombus  Right ventricle: Normal contractility, normal cavity size, no mass/thrombus  Left atrium: Dilated cavity size, no mass/thrombus  Right atrium: Normal cavity size, no mass/thrombus    CARDIAC VALVE MORPHOLOGY  Mitral valve: Normal leaflets, fully mobile, no regurgitation, no stenosis  Tricuspid valve: Normal leaflets, fully mobile, no regurgitation, no stenosis  Aortic valve: Normal leaflets, fully mobile, no regurgitation, no stenosis  Pulmonic Valve: Normal leaflets, fully mobile, no regurgitation, no stenosis    MISCELLANEOUS MORPHOLOGY  Pericardium: Normal    CARDIAC MEASUREMENTS  Anterior left ventricular wall thickness: 1.2 cm (Normal < 1.2 cm)  Posterior left ventricular wall thickness: 1.2 cm (Normal < 1.2 cm)  Left ventricular end-diastolic dimension: 6.3 cm (Normal < 5.7 cm)  Left ventricular end-systolic dimension: 5.3 cm (Normal < 4 cm)  Right ventricular end-diastolic dimension: 3.6 cm (Normal < 4.3 cm)  Left atrial end-diastolic dimension: 4.6 cm (Normal < 3.9 cm)  Left atrial end-diastolic area: 29 cm^2 (Normal < 22 cm^2)  Right atrial end-diastolic dimension: 4.8 cm (Normal < 4.7 cm)  Right atrial end-diastolic area: 20 cm^2 (Normal < 20 cm^2)    Ascending aorta (at the level of the main pulmonary artery): 2.8 cm  Pulmonary Artery: 2.1 cm (Normal < 2.9 cm)      LEFT VENTRICULAR FUNCTION  The left ventricle is severely dilated with focally upon globally abnormal systolic function. Focal left ventricular systolic dysfunction consists of dyskinesia (aneurysm) of the apex  LV volumes absolute and normalized to body surface area with the age and gender matched normals in parenthesis are listed as follows.  LVEF: 22 % (62 +/- 6 (51-73)%)  SV: 77 ml  SVI: 33 ml/m2 (46 +/- 8 (30-62) ml/m2)  CO: 5.5 L/min  CI: 2.4 L/min/m2  EDV: 346 ml  EDVI: 149 ml/m2 (74 +/- 13  ()ml/m2)  ESV: 269 ml  ESVI: 116 ml/m2 (28 +/- 7 (14-43) ml/m2)  LVM: 218 g  LVMI: 96 g/m2 (62 +/- 10 (43-81) g/m2)    RIGHT VENTRICULAR FUNCTION  The right ventricle is normal in size and systolic function.  Right ventricular volumes absolute and normalized to body surface area with the age and gender matched normals in parenthesis are listed as follows.  RVEF: 47 % (53 +/- 6 (41-66) %)  EDV: 154 ml  EDVI: 66 ml/m2 (86 +/- 17 () ml/m2)  ESV: 81 ml  ESVI: 35 ml/m2 (41 +/- 11 (19-63) ml/m2)    There is marked susceptibility artifact which limits delayed postgadolinium imaging/scar assessment. This is especially important. In the LAD distribution. Scar is seen at the left ventricular apex including the lateral and inferior walls and appears to be more pronounced in the mid to apical lateral segments. The LAD distribution is not adequately visualized because of marked susceptibility artifact from the implanted cardiac device.      Velocity flow mapping: Completed at the aortic valve. There is no evidence of aortic valve stenosis or regurgitation.    NONCARDIAC FINDINGS: Not significant      IMPRESSION:  1. Severe left ventricular dilatation with focally upon globally abnormal left ventricular systolic function with an ejection fraction of 22%. Infarctions seen in the left circumflex and RCA distribution and the apex. Full assessment of viability especially in the LAD distribution could not be performed because of marked susceptibility artifact from the implanted cardiac device which involves the entire anterior, anterolateral and anteroseptal regions. Consider PET scanning.  2. Dilated left atrium  3. Normal right ventricular size and systolic function    The pulse sequences used were designed for imaging cardiovascular structures and are suboptimal for imaging other structures and organs.    MACRO: None    Nuclear:  NM PET CT myocardial perfusion multiple 05/14/2025    Narrative  Interpreted By:  Asya  Ebony,  STUDY:  NM PET CT MYOCARDIAL PERFUSION MULTIPLE;  5/14/2025 12:21 pm    INDICATION:  Signs/Symptoms:chest pain, HFrEF, ischemic cardiomyopathy.    ,I50.22 Chronic systolic (congestive) heart failure,I25.5 Ischemic  cardiomyopathy,I50.23 Acute on chronic systolic (congestive) heart  failure,N18.30 Chronic kidney disease, stage 3 unspecified  (Multi),E11.65 Type 2 diabetes mellitus with hyperglycemia  (Multi),Z79.4 Long term (current) use of insulin (Multi),Z95.810  Presence of automatic (implantable) cardiac defibrillator,E08.22  Diabetes mellitus due to underlying condition with diabetic chronic  kidney disease,N18.30 Chronic kidney disease, stage 3 unspecified  (Multi),Z79.4 Long term (current) use of insulin (Multi)    COMPARISON:  None.    ACCESSION NUMBER(S):  [id number]    ORDERING CLINICIAN:  CARMELITA NAVA    TECHNIQUE:  DIVISION OF NUCLEAR MEDICINE  POSITRON EMISSION TOMOGRAPHY (PET) / CT  NITROGEN-13 AMMONIA MYOCARDIAL PERFUSION SCAN, WITH PHARMACOLOGIC  STRESS    The patient received an intravenous dose of 10.9mCi of N-13 ammonia  and resting positron emission tomographic (PET) images of the  myocardium were acquired. The patient then received an intravenous  infusion of 0.4 mg of Regadenoson followed by a second dose of 13.3  mCi of N-13 ammonia. Stress phase PET images of the myocardium were  then acquired. These included ECG-gated images to assess and quantify  ventricular function. Low-dose noncontrast CT of the thorax was  performed both before the resting NH3 and after the stress NH3 PET  image acquisitions for purposes of attenuation correction of the PET  data.    FINDINGS:  The left ventricular cavity is severely dilated in both rest and post  stress images. There is abnormal dilation and thickening and  visualization of right ventricular cavity in both rest and stress  images. The stress tomographic images demonstrate markedly abnormal  myocardial perfusion. There is a large moderate  to severe perfusion  defect involving the apex, anterior, lateral, and inferolateral wall,  there is relatively preserved perfusion of the septum. On rest images  there is a significantly reversibility noted involving the anterior  wall, lateral wall and inferoseptal wall, however, there is no  significant reversibility of the apex The left ventricular cavity is  dilated on gated images with severe global hypokinesis with severely  impaired left ventricular systolic function in both rest and stress  images, 17%    Impression  High-risk N13 PET-CT myocardial perfusion study  This finding consistent with severe ischemic cardiomyopathy with  significantly induced ischemia involving the anterior wall, lateral  wall and inferolateral wall with small apical infarction, a pattern  consistent with triple-vessel disease, the presence of significant  reversibility on rest images may favoring good response to coronary  revascularization The possibility of subendocardial infarction can  not be totally excluded, further evaluation of myocardial viability  by FDG PET or cardiac MRI could be considered, if this will change  patient management    I personally reviewed the image(s) / study and agree with the  findings and interpretation as stated. This study was interpreted at  [facility].    MACRO:    Critical Finding:  See findings. Notification was initiated on  5/14/2025 at 1:24 pm by  Ebony Sky.  (**-OCF-**) Instructions:    None    Signed by: Ebony Sky 5/14/2025 1:25 PM  Dictation workstation:   [id number]    Metabolic Stress: No results found for this or any previous visit from the past 1800 days.      Imaging  US abdomen complete  Result Date: 7/26/2025  1. Cholelithiasis without evidence of cholecystitis. Mild pericholecystic fluid is likely related to patient's fluid status. 2. Hepatomegaly without sonographic evidence of lesions.   I have reviewed the images/study and I agree  with the findings as stated by Rohan Greene MD (PGY-3).   MACRO: None   Signed by: Curry Pedraza 7/26/2025 6:05 PM Dictation workstation:   [id number]    XR chest 1 view  Result Date: 7/25/2025  1.  Right IJ [place] catheter with distal tip projecting over the right lower lobe segmental branches. Repositioning recommended. 2. Similar pulmonary edema. Correlate with volume status. 3. Similar bibasilar hazy opacities may represent atelectasis/edema. Infectious etiology can not be excluded. 4. Additional medical devices as above.   I personally reviewed the images/study and I agree with the findings as stated by resident physician Cam Sommers MD. This study was interpreted at [place], [place].   MACRO: None   Signed by: Allyssa Pedraza 7/25/2025 2:52 PM Dictation workstation:   [id number]    CT chest abdomen pelvis wo IV contrast  Result Date: 7/25/2025  Chest 1.  Mild diffuse interlobular interstitial thickening in both lungs likely representing pulmonary edema. 2. Cardiomegaly. 3. Support devices as described above.   Abdomen-Pelvis 1.  No acute process in the abdomen and pelvis. 2. Hepatosplenomegaly. 3. Cholelithiasis. Mild pericholecystic fluid is likely related to fluid overload. 4. Left renal calculus without hydronephrosis.     MACRO: None   Signed by: Volodymyr Pettit 7/25/2025 9:39 AM Dictation workstation:   [id number]    CT head wo IV contrast  Result Date: 7/25/2025  No acute intracranial hemorrhage or mass effect.   Mild degree of nonspecific white matter hypodensity compatible with microangiopathy.   I personally reviewed the images/study and I agree with the findings as stated by resident physician Miky Gtz MD. This study was interpreted at [place], [place].   MACRO: None   Signed by: Kizzy Ambrocio 7/25/2025 7:05 AM Dictation workstation:   [id number]    CT maxillofacial bones wo IV  contrast  Result Date: 7/24/2025  No acute abnormality of the facial bones.   I have reviewed the images/study and I agree with the findings as stated.   MACRO: None   Signed by: Shanell Rodriguez 7/24/2025 9:33 AM Dictation workstation:   [id number]    XR chest 1 view  Result Date: 7/24/2025  1. Unchanged appearance of the lungs with mild perihilar congestion/edema and no focal infiltrate. No sizable pneumothorax. 2. Medical devices and postsurgical changes as described above.       Signed by: Ke Lau 7/24/2025 8:32 AM Dictation workstation:   [id number]    XR chest 2 views  Result Date: 7/23/2025  1.  Interval retraction of a right IJ [place]-Talita catheter with tip now projecting over the right lower lobe segmental branches. 2. Mild pulmonary edema. 3. Additional medical devices as above.   I personally reviewed the images/study and I agree with the findings as stated by resident physician Cam Sommers MD. This study was interpreted at [place], [place].   MACRO: None   Signed by: Allyssa Pedraza 7/23/2025 4:45 PM Dictation workstation:   [id number]    XR chest 1 view  Result Date: 7/23/2025  Minimal improvement of aeration of the both lungs. Stable AICD, placement. [place] catheter with the tip overlies the right lower lobe pulmonary artery branch, unchanged. Cardiac silhouette is mildly enlarged. Pulmonary vessels are congested Mild pulmonary edema/fluid overload of the both lungs. Slightly improved since last exam. Small bilateral pleural effusion. No pneumothorax seen. Bony thorax unremarkable.   MACRO: None   Signed by: Allyssa Pedraza 7/23/2025 12:21 PM Dictation workstation:   [id number]    XR chest 1 view  Result Date: 7/22/2025  1.  New fluid visualized within the minor fissure and similar appearance of bilateral interstitial and airspace opacities, favored to represent pulmonary edema. Additional considerations include ARDS and pulmonary hemorrhage.  2. Medical devices as above.   I personally reviewed the images/study and I agree with the findings as stated by resident physician Cam Sommers MD. This study was interpreted at [place], [place].   MACRO: None   Signed by: Allyssa Pedraza 7/22/2025 2:14 PM Dictation workstation:   [id number]    XR chest 1 view  Result Date: 7/21/2025  1. Right IJ [place] catheter with distal tip projecting over the distal right interlobar artery. 2. Similar interstitial edema and trace bilateral pleural effusions. 3. Additional medical devices as above.   I personally reviewed the images/study and I agree with the findings as stated by resident physician Cam Sommers MD. This study was interpreted at [place], [place].   MACRO: None   Signed by: Allyssa Pedraza 7/21/2025 11:54 AM Dictation workstation:   [id number]    XR chest 1 view  Result Date: 7/20/2025  1. Right IJ approach [place] catheter has been slightly advanced, the tip of which now overlies right lower lobe pulmonary artery, approximately 2 cm distal to right hilum. Repositioning recommended. 2. No significant change in lung aeration with questionable interstitial edema and trace bilateral pleural effusions/bibasilar atelectasis. 3. Rest of medical devices as above.   Signed by: Claudio Yancey 7/20/2025 11:03 AM Dictation workstation:   [id number]      Cardiology, Vascular, and Other Imaging  Vascular US Ankle Brachial Index (MARK) Without Exercise  Result Date: 7/25/2025            [place] [address]   Tel [phone] and Fax [phone]  Vascular Lab Report [facility] US ANKLE BRACHIAL INDEX (MARK) WITHOUT EXERCISE  Patient Name:     NEGRITA LIREGIS Shukla           [id number] Marqu[address] LOO                 Physician:        MD Study Date:       7/23/2025            Ordering          [id number]  YONATHAN WALLS                                        Physician: MRN/PID:          [id number]             Technologist:     Rajani Varela S Accession#:       [id number]         Technologist 2: Date of           1965 / 60 years  Encounter#:       [id number] Birth/Age: Gender:           M Admission Status: Inpatient            Location          [facility]                                        Performed:  Diagnosis/ICD: Encounter for preprocedural cardiovascular examination-Z01.810 Indication:    OHT/LVAD CPT Codes:     01210 Peripheral artery MARK Only  Patient History Anticoagulation, HTN and Hyperlipidemia. 10-9-2023 MARK's no                 evidence of arterial disease in BLEA.  CONCLUSIONS: Right Lower PVR: Evidence of mild arterial occlusive disease in the right lower extremity at rest. Right pressures of >220 mmHg suggest no compressibility of vessels and may make absolute Segmental Limb Pressures (SLP) unreliable. Decreased digital perfusion noted. Multiphasic flow is noted in the right common femoral artery, right posterior tibial artery and right dorsalis pedis artery. Level of disease called by waveforms. Left Lower PVR: Evidence of mild arterial occlusive disease in the left lower extremity at rest. Left pressures of >220 mmHg suggest no compressibility of vessels and may make absolute Segmental Limb Pressures (SLP) unreliable. Decreased digital perfusion noted. Multiphasic flow is noted in the left posterior tibial artery, left dorsalis pedis artery and left common femoral artery. Level of disease called by waveforms.  Comparison: Compared with study from 10/9/2023, no significant change.Today's exam there is mild arterial disease in bilateral lower extremities.  Imaging & Doppler Findings:  RIGHT Lower PVR                Pressures Ratios Right Posterior Tibial (Ankle) 220 mmHg  2.22 Right Dorsalis Pedis (Ankle)   204 mmHg  2.06 Right Digit (Great Toe)        58 mmHg   0.59   LEFT Lower PVR                 Pressures Ratios Left Posterior Tibial (Ankle) 220 mmHg  2.22 Left Dorsalis Pedis (Ankle)   220 mmHg  2.22 Left Digit (Great Toe)        71 mmHg   0.72                     Right Brachial Pressure 99 mmHg   [id number] Marquita Brown MD Electronically signed by [id number] Marquita Brown MD on 7/25/2025 at 1:49:42 PM  ** Final **     Vascular US aorta iliac duplex limited  Result Date: 7/25/2025            [place] [address]   Tel [phone] and Fax [phone]  Vascular Lab Report [facility] US AORTA ILIAC DUPLEX LIMITED  Patient Name:     NEGRITA BERNARD     Reading           [id number] Marqu[address] LOO                 Physician:        MD Study Date:       7/23/2025            Ordering          [id number] YONATHAN WALLS                                        Physician: MRN/PID:          [id number]             Technologist:     Rajani JAFFE Accession#:       [id number]         Technologist 2: Date of           1965 / 60 years  Encounter#:       [id number] Birth/Age: Gender:           M Admission Status: Inpatient            Location          [facility] [place]                                        Performed:  Diagnosis/ICD: Encounter for preprocedural cardiovascular examination-Z01.810 Indication:    Pre-Op OHT/LVAD CPT Codes:     80354 Duplex Aorta/IVC/Iliac/Bypass Graft  Patient History CAD, HTN and Hyperlipidemia. 10- No AAA.  CONCLUSIONS: Aorta/Common Iliac Arteries/IVC: The abdominal aorta and bilateral common iliac arteries demonstrate no evidence of aneurysm.  Comparison: Compared with study from 10/10/2023, no significant change.  Imaging & Doppler Findings:   AORTA     AP    Lateral    PSV Proximal 2.14 cm 1.96 cm 95.0 cm/s   Mid    1.86 cm 1.89 cm 72.0 cm/s  Distal  1.61 cm 1.70 cm 52.0 cm/s    RIGHT       AP    Lateral    PSV GIO Proximal 1.29 cm 1.10 cm 70.00 cm/s     LEFT       AP    Lateral    PSV GIO Proximal 1.01 cm  1.01 cm 70.30 cm/s  [id number] Marquita Brown MD Electronically signed by [id number] Marquita Brown MD on 7/25/2025 at 1:30:07 PM  ** Final **     Vascular US carotid artery duplex bilateral  Result Date: 7/23/2025            [place] [address]   Tel [phone] and Fax [phone]  Vascular Lab Report [facility] US CAROTID ARTERY DUPLEX BILATERAL  Patient Name:     NEGRITA BERNARD     [place]           [id number] Marquita LOO                 Physician:        MD Study Date:       7/23/2025            Ordering          [id number] YONATHAN WALLS                                        Physician: MRN/PID:          [id number]             Technologist:     Rajani JAFFE Accession#:       [id number]         Technologist 2: Date of           1965 / 60 years  Encounter#:       [id number] Birth/Age: Gender:           M Admission Status: Inpatient            Location          [facility]                                        Performed:  Diagnosis/ICD: Encounter for preprocedural cardiovascular examination-Z01.810 Indication:    Pre-Op OHT/LVAD CPT Codes:     76238 Cerebrovascular Carotid Duplex scan complete  Patient History CAD, HTN and Hyperlipidemia. 10-9-2023 Both ICA <50% stenosis.  CONCLUSIONS: Left Carotid: Findings are consistent with less than 50% stenosis of the left proximal internal carotid artery. Left external carotid artery appears patent with no evidence of stenosis. The left vertebral artery is patent with antegrade flow. No evidence of hemodynamically significant stenosis in the left subclavian artery.  Comparison: Compared with study from 10/9/2023, no significant change.The right CCA and ICA not scaned due to IV lines in right side of neck.  Additional Findings: Unable to scan right CCA and ICA due to IV lines in right side of neck.  Imaging & Doppler Findings: Right Plaque Morph: The proximal right internal carotid artery demonstrates  heterogenous plaque. The proximal right common carotid artery demonstrates heterogenous and calcified plaque. The mid right common carotid artery demonstrates heterogenous and calcified plaque. The distal right common carotid artery demonstrates heterogenous and calcified plaque.  Right                  Left  PSV  EDV              PSV      EDV             CCA P    79 cm/s             CCA D    51 cm/s             ICA P    52 cm/s  13 cm/s             ICA M    57 cm/s  17 cm/s             ICA D    64 cm/s  22 cm/s              ECA     107 cm/s           Vertebral  28 cm/s  7 cm/s           Subclavian 94 cm/s               Left ICA/CCA Ratio 1.0   [id number] Marquita Brown MD Electronically signed by [id number] Marquita Brown MD on 7/23/2025 at 6:27:44 PM  ** Final **              LDA:  Introducer 07/19/25 Internal jugular Right (Active)   Placement Date/Time: 07/19/25 1300   Hand Hygiene Completed: Yes  Location: Internal jugular  Orientation: Right  Placed by: Shelly ROLON and Jmain ROLON  Placement Verified: X-ray   Number of days: 7       Pulmonary Artery Catheter Internal jugular (Active)   Placement Date/Time: 07/19/25 1300   Hand Hygiene Performed Prior to CVC Insertion: Yes  Site Prep: Usual sterile procedure followed  Site Prep Agent has Completely Dried Before Insertion: Yes  All 5 Sterile Barriers Used (Gloves, Gown, Cap, Mask, Lar...   Number of days: 7     NUTRITION: Adult diet Consistent Carb; CCD 60 gm/meal; 2000 mL fluid  EMERGENCY CONTACT: Extended Emergency Contact Information  Primary Emergency Contact: Aracelis Law  Address: [address] [place] [id number] [facility]  Home Phone: [phone]  Mobile Phone: [phone]  Relation: Mother  Secondary Emergency Contact: Robinson Contreras  Mobile Phone: [phone]  Relation: Friend  CODE STATUS: Full Code  DISPO: Discharge Planning  Living Arrangements: Friends, Parent, Family members  Support Systems: Children, Parent, Family  members  Assistance Needed: Patient uses a walker and cane  Type of Residence: Private residence  Do you have animals or pets at home?: Yes  Type of Animals or Pets: 2 cats  Who is requesting discharge planning?: Provider  Home or Post Acute Services: In home services  Expected Discharge Disposition: Home  Does the patient need discharge transport arranged?: Yes  FOLLOWUP:   Future Appointments   Date Time Provider Department Center   8/12/2025  1:00 PM Sotero Giles MD [id number] [place]   9/2/2025  3:30 PM Jono Duque MD [id number] [facility]   9/17/2025 12:30 PM Diana Salazar MD [id number] [facility]   10/1/2025 10:30 AM Anna Rodriguez MD PhD [id number] [facility]   12/4/2025  9:20 AM Randy Brunson MD [id number] [facility]                              [1]   Medications Prior to Admission   Medication Sig Dispense Refill Last Dose/Taking    albuterol 2.5 mg /3 mL (0.083 %) nebulizer solution Use every 4-6 hours as needed for shortness of breath 75 mL 1     atorvastatin (Lipitor) 80 mg tablet 80mg nightly 90 tablet 2     blood-glucose sensor (FreeStyle Franco 3 Plus Sensor) device Change every 15 days 2 each 11     bumetanide (Bumex) 1 mg tablet Take 1 tablet (1 mg) by mouth once daily. 90 tablet 3     digoxin (Lanoxin) 125 MCG tablet Take 1 tablet (125 mcg) by mouth once daily. 90 tablet 3     Eliquis 5 mg tablet TOME 1 TABLETA POR LA BOCA DOS  VECES AL CUBA 200 tablet 2     Farxiga 10 mg tablet Take 1 tablet (10 mg) by mouth once daily. 90 tablet 3     fenofibrate (Tricor) 145 mg tablet Take 1 tablet (145 mg) by mouth once daily. 90 tablet 3     FreeStyle Franco 3 [place] misc Use as instructed 1 each 0     gabapentin (Neurontin) 300 mg capsule TOME 1 CAPSULA POR LA BOCA NITIN  VEZ AL CUBA EN LA MANANA 90 capsule 3     hydrALAZINE (Apresoline) 100 mg tablet TOME 1 TABLETA POR LA BOCA 3  VECES AL CUBA 300 tablet 2     insulin glargine (Toujeo Max U-300 SoloStar) 300 unit/mL (3 mL) pen 40 units twice a day 30  "mL 3     insulin lispro (HumaLOG KwikPen Insulin) 100 unit/mL pen 18 units with meals plus sliding scale up to 80 units daily 45 mL 3     isosorbide dinitrate (Isordil) 40 mg tablet Take 1 tablet (40 mg) by mouth 3 times a day. 270 tablet 3     lancets (Lancets,Thin) misc 4 times daily (before meals and at bedtime). use as directed to test blood sugar 100 each 3     levothyroxine (Synthroid, Levoxyl) 75 mcg tablet TOME 1 TABLETA POR LA BOCA NITIN  VEZ AL CUBA 100 tablet 3     metoprolol succinate XL (Toprol-XL) 100 mg 24 hr tablet Take 2 tablets (200 mg) by mouth once daily.       OneTouch Ultra Test REVISE EL NIVEL DE GLUCOSA EN LA PEARL CUATRO VECES AL CUBA 400 strip 1     pantoprazole (ProtoNix) 40 mg EC tablet Take 1 tablet (40 mg) by mouth once daily in the morning. Take before meals. Do not crush, chew, or split.      TOME 1 TABLETA POR LA BOCA NITIN  VEZ AL CUBA EN LA MANANA TOME  ANTES DE NITIN COMIDA (NO TRITURE, MASTIQUE NI DIVIDA) 90 tablet 1     pen needle, diabetic 31 gauge x 5/16\" needle For insulin injection 5x/day 500 each 3     QUEtiapine (SEROquel) 50 mg tablet Take 1 tablet (50 mg) by mouth once daily at bedtime. 100 tablet 2     [Paused] sacubitriL-valsartan (Entresto)  mg tablet Take 1 tablet by mouth 2 times a day. (Patient not taking: Reported on 7/13/2025) 180 tablet 3     [Paused] spironolactone (Aldactone) 25 mg tablet TOME 2 TABLETAS POR LA BOCA NITIN  VEZ AL CUBA (Patient not taking: Reported on 7/13/2025) 200 tablet 2     tirzepatide (Mounjaro) 5 mg/0.5 mL pen injector Inject 5 mg under the skin every 7 days. 2 mL 11      "

## 2025-07-27 NOTE — SIGNIFICANT EVENT
Closing swan numbers:    BP 89/62 (70), CVP 9, PAP 58/24 (37), PAWP 18, CO/CI 7.5/3.5, , SVO2 68%     On hydralazine 100 mg, isosorbide 40 mg, digoxin 125 mcg, dapa 10 mg, milrinone 0.25 mcg/kg/min, nipride 0.25 mcg/kg/min

## 2025-07-28 VITALS
SYSTOLIC BLOOD PRESSURE: 100 MMHG | TEMPERATURE: 96.3 F | DIASTOLIC BLOOD PRESSURE: 59 MMHG | RESPIRATION RATE: 19 BRPM | BODY MASS INDEX: 30.12 KG/M2 | OXYGEN SATURATION: 92 % | HEART RATE: 108 BPM | WEIGHT: 215.17 LBS | HEIGHT: 71 IN

## 2025-07-28 LAB
ALBUMIN SERPL BCP-MCNC: 3.6 G/DL (ref 3.4–5)
ANION GAP SERPL CALC-SCNC: 17 MMOL/L (ref 10–20)
BUN SERPL-MCNC: 52 MG/DL (ref 6–23)
CALCIUM SERPL-MCNC: 9 MG/DL (ref 8.6–10.6)
CHLORIDE SERPL-SCNC: 100 MMOL/L (ref 98–107)
CO2 SERPL-SCNC: 22 MMOL/L (ref 21–32)
CREAT SERPL-MCNC: 2.04 MG/DL (ref 0.5–1.3)
EGFRCR SERPLBLD CKD-EPI 2021: 37 ML/MIN/1.73M*2
ERYTHROCYTE [DISTWIDTH] IN BLOOD BY AUTOMATED COUNT: 19.9 % (ref 11.5–14.5)
GLUCOSE BLD MANUAL STRIP-MCNC: 121 MG/DL (ref 74–99)
GLUCOSE BLD MANUAL STRIP-MCNC: 172 MG/DL (ref 74–99)
GLUCOSE BLD MANUAL STRIP-MCNC: 175 MG/DL (ref 74–99)
GLUCOSE BLD MANUAL STRIP-MCNC: 178 MG/DL (ref 74–99)
GLUCOSE BLD MANUAL STRIP-MCNC: 193 MG/DL (ref 74–99)
GLUCOSE SERPL-MCNC: 200 MG/DL (ref 74–99)
HCT VFR BLD AUTO: 38.5 % (ref 41–52)
HGB BLD-MCNC: 11.1 G/DL (ref 13.5–17.5)
LIPASE SERPL-CCNC: 60 U/L (ref 9–82)
MAGNESIUM SERPL-MCNC: 2.2 MG/DL (ref 1.6–2.4)
MCH RBC QN AUTO: 23.4 PG (ref 26–34)
MCHC RBC AUTO-ENTMCNC: 28.8 G/DL (ref 32–36)
MCV RBC AUTO: 81 FL (ref 80–100)
NRBC BLD-RTO: 0 /100 WBCS (ref 0–0)
PHOSPHATE SERPL-MCNC: 3.1 MG/DL (ref 2.5–4.9)
PLATELET # BLD AUTO: 233 X10*3/UL (ref 150–450)
POTASSIUM SERPL-SCNC: 4.1 MMOL/L (ref 3.5–5.3)
RBC # BLD AUTO: 4.74 X10*6/UL (ref 4.5–5.9)
SODIUM SERPL-SCNC: 135 MMOL/L (ref 136–145)
UFH PPP CHRO-ACNC: 0.3 IU/ML (ref ?–1.1)
WBC # BLD AUTO: 5.9 X10*3/UL (ref 4.4–11.3)

## 2025-07-28 PROCEDURE — 2500000001 HC RX 250 WO HCPCS SELF ADMINISTERED DRUGS (ALT 637 FOR MEDICARE OP): Performed by: NURSE PRACTITIONER

## 2025-07-28 PROCEDURE — 2500000001 HC RX 250 WO HCPCS SELF ADMINISTERED DRUGS (ALT 637 FOR MEDICARE OP)

## 2025-07-28 PROCEDURE — 2500000004 HC RX 250 GENERAL PHARMACY W/ HCPCS (ALT 636 FOR OP/ED): Performed by: NURSE PRACTITIONER

## 2025-07-28 PROCEDURE — 2500000004 HC RX 250 GENERAL PHARMACY W/ HCPCS (ALT 636 FOR OP/ED)

## 2025-07-28 PROCEDURE — 37799 UNLISTED PX VASCULAR SURGERY: CPT | Performed by: NURSE PRACTITIONER

## 2025-07-28 PROCEDURE — 2500000002 HC RX 250 W HCPCS SELF ADMINISTERED DRUGS (ALT 637 FOR MEDICARE OP, ALT 636 FOR OP/ED)

## 2025-07-28 PROCEDURE — 1100000001 HC PRIVATE ROOM DAILY

## 2025-07-28 PROCEDURE — 80069 RENAL FUNCTION PANEL: CPT | Performed by: NURSE PRACTITIONER

## 2025-07-28 PROCEDURE — 85520 HEPARIN ASSAY: CPT | Performed by: NURSE PRACTITIONER

## 2025-07-28 PROCEDURE — 83735 ASSAY OF MAGNESIUM: CPT | Performed by: NURSE PRACTITIONER

## 2025-07-28 PROCEDURE — 83690 ASSAY OF LIPASE: CPT | Performed by: STUDENT IN AN ORGANIZED HEALTH CARE EDUCATION/TRAINING PROGRAM

## 2025-07-28 PROCEDURE — 99291 CRITICAL CARE FIRST HOUR: CPT

## 2025-07-28 PROCEDURE — 82947 ASSAY GLUCOSE BLOOD QUANT: CPT

## 2025-07-28 PROCEDURE — 85027 COMPLETE CBC AUTOMATED: CPT | Performed by: NURSE PRACTITIONER

## 2025-07-28 PROCEDURE — 2500000002 HC RX 250 W HCPCS SELF ADMINISTERED DRUGS (ALT 637 FOR MEDICARE OP, ALT 636 FOR OP/ED): Performed by: NURSE PRACTITIONER

## 2025-07-28 RX ADMIN — ATORVASTATIN CALCIUM 80 MG: 80 TABLET, FILM COATED ORAL at 21:47

## 2025-07-28 RX ADMIN — HYDRALAZINE HYDROCHLORIDE 100 MG: 100 TABLET ORAL at 21:47

## 2025-07-28 RX ADMIN — PANTOPRAZOLE SODIUM 40 MG: 40 TABLET, DELAYED RELEASE ORAL at 06:32

## 2025-07-28 RX ADMIN — SACUBITRIL AND VALSARTAN 1 TABLET: 24; 26 TABLET, FILM COATED ORAL at 21:47

## 2025-07-28 RX ADMIN — INSULIN LISPRO 34 UNITS: 100 INJECTION, SOLUTION INTRAVENOUS; SUBCUTANEOUS at 18:42

## 2025-07-28 RX ADMIN — HEPARIN SODIUM 1600 UNITS/HR: 10000 INJECTION, SOLUTION INTRAVENOUS at 08:07

## 2025-07-28 RX ADMIN — HYDRALAZINE HYDROCHLORIDE 100 MG: 100 TABLET ORAL at 06:32

## 2025-07-28 RX ADMIN — MILRINONE LACTATE IN DEXTROSE 0.25 MCG/KG/MIN: 0.2 INJECTION, SOLUTION INTRAVENOUS at 23:40

## 2025-07-28 RX ADMIN — INSULIN LISPRO 3 UNITS: 100 INJECTION, SOLUTION INTRAVENOUS; SUBCUTANEOUS at 18:42

## 2025-07-28 RX ADMIN — ISOSORBIDE DINITRATE 40 MG: 40 TABLET ORAL at 08:02

## 2025-07-28 RX ADMIN — GABAPENTIN 200 MG: 100 CAPSULE ORAL at 21:47

## 2025-07-28 RX ADMIN — FENOFIBRATE 145 MG: 145 TABLET, FILM COATED ORAL at 08:05

## 2025-07-28 RX ADMIN — HYDRALAZINE HYDROCHLORIDE 100 MG: 100 TABLET ORAL at 13:10

## 2025-07-28 RX ADMIN — ISOSORBIDE DINITRATE 40 MG: 40 TABLET ORAL at 13:10

## 2025-07-28 RX ADMIN — GABAPENTIN 200 MG: 100 CAPSULE ORAL at 06:32

## 2025-07-28 RX ADMIN — HEPARIN SODIUM 1600 UNITS/HR: 10000 INJECTION, SOLUTION INTRAVENOUS at 23:40

## 2025-07-28 RX ADMIN — INSULIN GLARGINE 48 UNITS: 100 INJECTION, SOLUTION SUBCUTANEOUS at 21:47

## 2025-07-28 RX ADMIN — CALCIUM CARBONATE (ANTACID) CHEW TAB 500 MG 1 TABLET: 500 CHEW TAB at 08:02

## 2025-07-28 RX ADMIN — ISOSORBIDE DINITRATE 40 MG: 40 TABLET ORAL at 19:44

## 2025-07-28 RX ADMIN — GABAPENTIN 200 MG: 100 CAPSULE ORAL at 13:10

## 2025-07-28 RX ADMIN — DIGOXIN 125 MCG: 125 TABLET ORAL at 08:04

## 2025-07-28 RX ADMIN — DAPAGLIFLOZIN 10 MG: 10 TABLET, FILM COATED ORAL at 08:04

## 2025-07-28 RX ADMIN — INSULIN LISPRO 34 UNITS: 100 INJECTION, SOLUTION INTRAVENOUS; SUBCUTANEOUS at 06:32

## 2025-07-28 RX ADMIN — LEVOTHYROXINE SODIUM 75 MCG: 0.07 TABLET ORAL at 06:32

## 2025-07-28 RX ADMIN — QUETIAPINE FUMARATE 50 MG: 25 TABLET ORAL at 21:47

## 2025-07-28 RX ADMIN — INSULIN GLARGINE 48 UNITS: 100 INJECTION, SOLUTION SUBCUTANEOUS at 08:16

## 2025-07-28 RX ADMIN — MILRINONE LACTATE IN DEXTROSE 0.25 MCG/KG/MIN: 0.2 INJECTION, SOLUTION INTRAVENOUS at 08:07

## 2025-07-28 RX ADMIN — INSULIN LISPRO 3 UNITS: 100 INJECTION, SOLUTION INTRAVENOUS; SUBCUTANEOUS at 06:32

## 2025-07-28 RX ADMIN — INSULIN LISPRO 34 UNITS: 100 INJECTION, SOLUTION INTRAVENOUS; SUBCUTANEOUS at 13:10

## 2025-07-28 ASSESSMENT — COGNITIVE AND FUNCTIONAL STATUS - GENERAL
CLIMB 3 TO 5 STEPS WITH RAILING: A LITTLE
DAILY ACTIVITIY SCORE: 18
DRESSING REGULAR LOWER BODY CLOTHING: A LITTLE
MOVING FROM LYING ON BACK TO SITTING ON SIDE OF FLAT BED WITH BEDRAILS: A LITTLE
DRESSING REGULAR UPPER BODY CLOTHING: A LITTLE
MOVING TO AND FROM BED TO CHAIR: A LITTLE
WALKING IN HOSPITAL ROOM: A LITTLE
HELP NEEDED FOR BATHING: A LITTLE
TURNING FROM BACK TO SIDE WHILE IN FLAT BAD: A LITTLE
STANDING UP FROM CHAIR USING ARMS: A LITTLE
EATING MEALS: A LITTLE
PERSONAL GROOMING: A LITTLE
TOILETING: A LITTLE
MOBILITY SCORE: 18

## 2025-07-28 ASSESSMENT — PAIN - FUNCTIONAL ASSESSMENT
PAIN_FUNCTIONAL_ASSESSMENT: 0-10

## 2025-07-28 ASSESSMENT — PAIN SCALES - GENERAL
PAINLEVEL_OUTOF10: 0 - NO PAIN

## 2025-07-28 NOTE — PROGRESS NOTES
[facility]  HFICU PROGRESS NOTE    Felice Law/[id number]    Admit Date: 7/17/2025  Hospital Length of Stay: 11   ICU Length of Stay: 11d   Primary Service: HFICU  Primary HF Cardiologist: Dr. Jono Duque MD   Referring: Dr Duque     INTERVAL EVENTS / PERTINENT ROS:     No acute events overnight, patient continues to be tachycardiac off metoprolol but CO/CI are preserved.   CHARLOTTE improving.    Plan:   - Wean off nitroprusside    - C/w Milrinone .25mcq/kg/min  - Continue OHT/VAD evaluation  - Consider transfer to the floor    MEDICATIONS  Infusions:  heparin, Last Rate: 1,600 Units/hr (07/28/25 0807)  milrinone, Last Rate: 0.25 mcg/kg/min (07/28/25 0807)  nitroprusside, Last Rate: 0.25 mcg/kg/min (07/28/25 0600)      Scheduled:  [Held by provider] apixaban, 5 mg, BID  atorvastatin, 80 mg, Nightly  [Held by provider] bumetanide, 1 mg, Daily  calcium carbonate, 500 mg of calcium carbonate, BID  dapagliflozin propanediol, 10 mg, Daily  digoxin, 125 mcg, Daily  fenofibrate, 145 mg, Daily  gabapentin, 200 mg, q8h SUSI  hydrALAZINE, 100 mg, q8h SUSI  insulin glargine, 48 Units, Nightly  insulin glargine, 48 Units, Daily  insulin lispro, 0-15 Units, TID AC  insulin lispro, 34 Units, TID AC  iron sucrose, 200 mg, Every other day  isosorbide dinitrate, 40 mg, TID  levothyroxine, 75 mcg, Daily before breakfast  pantoprazole, 40 mg, Daily before breakfast  potassium chloride CR, 40 mEq, Once  QUEtiapine, 50 mg, Nightly  [Held by provider] sacubitriL-valsartan, 1 tablet, BID  [Held by provider] spironolactone, 25 mg, Daily      PRN:  acetaminophen, 975 mg, q6h PRN  albuterol, 2.5 mg, q6h PRN  alteplase, 2 mg, PRN  dextrose, 12.5 g, q15 min PRN  dextrose, 25 g, q15 min PRN  glucagon, 1 mg, q15 min PRN  glucagon, 1 mg, q15 min PRN  heparin, 2,000-4,000 Units, q4h PRN      Invasive Hemodynamics:    Most Recent Range Past 24hrs   BP (Art)   No data recorded   MAP(Art)   No data recorded  "  RA/CVP   No data recorded   PA 49/27 PAP  Min: 49/27  Max: 67/28   PA(mean) 35 mmHg PAP (Mean)  Min: 34 mmHg  Max: 45 mmHg   PCWP 19 mmHg PCWP (mmHg)  Min: 19 mmHg  Max: 19 mmHg   CO 7.5 L/min CO (L/min)  Min: 7.5 L/min  Max: 7.5 L/min   CI 3.5 L/min/m2 CI (L/min/m2)  Min: 3.5 L/min/m2  Max: 3.5 L/min/m2   Mixed Venous 68 % SVO2 (%)  Min: 68 %  Max: 68 %   SVR  734 (dyne*sec)/cm5 SVR (dyne*sec)/cm5  Min: 734 (dyne*sec)/cm5  Max: 734 (dyne*sec)/cm5    (dyne*sec)/cm5 PVR (dyne*sec)/cm5  Min: 234 (dyne*sec)/cm5  Max: 234 (dyne*sec)/cm5       PHYSICAL EXAM:   Visit Vitals  /71   Pulse (!) 112   Temp 36.2 °C (97.2 °F) (Temporal)   Resp 12   Ht 1.803 m (5' 10.98\")   Wt 97.6 kg (215 lb 2.7 oz)   SpO2 98%   BMI 30.02 kg/m²   Smoking Status Former   BSA 2.21 m²       Wt Readings from Last 5 Encounters:   07/27/25 97.6 kg (215 lb 2.7 oz)   07/17/25 110 kg (243 lb 2.7 oz)   06/10/25 97.1 kg (214 lb)   05/12/25 96.6 kg (213 lb)   05/04/25 102 kg (224 lb)       INTAKE/OUTPUT:  I/O last 3 completed shifts:  In: 2077.3 (21.3 mL/kg) [P.O.:1200; I.V.:877.3 (9 mL/kg)]  Out: 2375 (24.3 mL/kg) [Urine:2375 (0.7 mL/kg/hr)]  Weight: 97.6 kg      Physical Exam  Constitutional:       Appearance: Normal appearance.   HENT:      Head: Normocephalic and atraumatic.      Nose: Nose normal.      Mouth/Throat:      Mouth: Mucous membranes are moist.     Eyes:      Extraocular Movements: Extraocular movements intact.      Pupils: Pupils are equal, round, and reactive to light.     Neck:      Comments: Rt IJ  Cardiovascular:      Rate and Rhythm: Normal rate and regular rhythm.      Pulses: Normal pulses.      Heart sounds:      Gallop present.   Pulmonary:      Effort: Pulmonary effort is normal.      Breath sounds: Decreased breath sounds present.   Abdominal:      General: There is distension.     Musculoskeletal:         General: Swelling present.      Cervical back: Normal range of motion and neck supple.      Right lower leg: " "Edema (trace) present.      Left lower leg: Edema (trace) present.      Comments: Chronic venous stasis      Skin:     General: Skin is warm.      Capillary Refill: Capillary refill takes 2 to 3 seconds.     Neurological:      General: No focal deficit present.      Mental Status: He is alert and oriented to person, place, and time.     Psychiatric:         Mood and Affect: Mood normal.         Behavior: Behavior normal.         DATA:  CMP:  Results from last 7 days   Lab Units 07/28/25  0418 07/27/25  0543 07/26/25  0446 07/25/25  0618 07/24/25  1941 07/24/25  0314 07/23/25  1750 07/23/25  0450 07/22/25  1618 07/22/25  0550   SODIUM mmol/L 135* 133* 135* 134* 132* 134* 134* 136 134* 135*   POTASSIUM mmol/L 4.1 3.5 4.0 4.0 4.2 4.1 3.9 3.7 4.3 3.8   CHLORIDE mmol/L 100 95* 94* 93* 91* 92* 93* 93* 91* 91*   CO2 mmol/L 22 27 28 30 29 32 30 32 32 36*   ANION GAP mmol/L 17 15 17 15 16 14 15 15 15 12   BUN mg/dL 52* 59* 66* 60* 59* 52* 52* 48* 50* 42*   CREATININE mg/dL 2.04* 2.39* 3.24* 2.93* 2.65* 2.62* 2.17* 2.04* 2.40* 1.87*   EGFR mL/min/1.73m*2 37* 30* 21* 24* 27* 27* 34* 37* 30* 41*   MAGNESIUM mg/dL 2.20 2.28 2.34 2.21 2.20 2.35  --  2.35  --  2.30   ALBUMIN g/dL 3.6 3.5 3.6 3.5 3.5 3.5 3.8 3.4 3.8 3.6     CBC:  Results from last 7 days   Lab Units 07/28/25  0418 07/27/25  0543 07/26/25  0446 07/25/25  0618 07/24/25  0314 07/23/25  0450 07/22/25  0550   WBC AUTO x10*3/uL 5.9 5.7 6.2 7.2 5.9 4.4 5.3   HEMOGLOBIN g/dL 11.1* 11.2* 12.1* 11.2* 11.2* 11.8* 12.0*   HEMATOCRIT % 38.5* 38.1* 38.5* 35.4* 37.8* 38.0* 38.7*   PLATELETS AUTO x10*3/uL 233 252 238 238 234 213 239   MCV fL 81 81 77* 76* 80 76* 77*     COAG:   Results from last 7 days   Lab Units 07/23/25  1230   INR  1.1     ABO:   No results found for: \"ABO\"    HEME/ENDO:  Results from last 7 days   Lab Units 07/23/25  1230   TSH mIU/L 2.12      CARDIAC:   Results from last 7 days   Lab Units 07/26/25  0446 07/23/25  1230   LD U/L  --  231   BNP pg/mL 281* 380* "       ASSESSMENT AND PLAN:     Felice Law is a 60 y.o. male with significant PMH of ICM, HFrEF, EF 15-20%, CABG (LIMA- LAD in 2023),  PCI, VT arrest s/p VT ablation and ICD in 2017, Hypertension, Hyperlipidemia, Hypothyroidism, Left femoral DVT on Eliquis, COPD, JAKE, CKD III, Chronic BLE venous stasis dermatitis, Insulin dependent Type 2 diabetes who was initially admitted 7/12 to [place] for acute decompensate heart failure. He was diuresed and recommended for transfer to [facility] HF ICU for further managemnet and possible re-consider of advanced therapy work up.  Of notes, Patient was previously discussed for AT during [facility] October 2023 admission but declined with concerns for mobility/frailty (uses motorized scooter and has bilateral foot drop with neuropathy), CKD, poorly controlled diabetes as well as unclear social/financial support).     Transferred to HF ICU 7/17, he was warm and wet,  lasix IV bolus given and drip initiated. Eliquis on hold, heparin drip initiated. Bedside SG done 7/19, opening swan numbers: BP: 133/74 (94),CVP 9, PA 74/29 (44), PCWP 24, CO/CI (Pallavi) 5.4/2.4, Thermo: 4.6/2.1, SVR 1263, SVO2 63% on hydral /Isordil 100/40 mg TID, Metop 50 mg daily, Dapa 10 mg daily, Dig 125 mcg PO daily, Lasix drip at 20 mg /hr. Entresto re-introduce and up titrated. Lasix drip d/c'd currently on intermittent IVP.  Hg A1c 10.1 this admission, now considering advanced therapy evaluation. Advanced therapies evaluation initiated 7/23.     Neuro:   # Anxiety. Depression, Acute pain    # Bilateral foot drop with LE neuropathy: Able to walk two blocks. Uses motorized scoopter    # Sudden UE shaking episode (resolved)  - Serial neuro and pain assessments    - PO Tylenol PRN for pain   - PT/OT Consult, OOB to chair   - CAM ICU score every shift   - Sleep/wake cycle normalization      # Physical Status   -Obesity: BMI: 34 --- > 30  -Reduced Mobility due to Foot drop, ICU and CHF       #Substance abuse   -Alcohol  abuse/Alcohol dependence: NO   -Tobacco use/Nicotine Dependence: Quit 8 years ago      Cardiovascular:   #Acute on chronic systolic CHF, HFrEF 15-20%, dx'd 2017,   #Ischemic Cardiomyopathy, ACC/AHA Stage C-D, NYHA II, Warm,   -admit weight (7/17): 111 kg -- > 98 kg (7/20)  -BNP (7/17): 744  -TTE (5/4/2025):  Left ventricular ejection fraction is severely decreased, by visual estimate at 15-20%.   - RHC (6/10/25): RA: 25,RV: 63/9, PA: 72/34, PCWP: 32, PA-SAT: 36%, SVC-SAT: 41%, FA-SAT: 71%, CO: 4.84, CI: 2.24, SVR: 1041  - LHC (6/10/25):   Right dominant coronary circulation with severe multivessel CAD, as described above. LIMA to LAD patent.  - C/w atorvastin 80mg    - Lasix gtt discontinued overnight 7/19.   - Bedside SG with Opening swan numbers (7/19): BP: 133/74 (94),CVP 9, PA 74/29 (44), PCWP 24, CO/CI (Pallavi) 5.4/2.4, Thermo: 4.6/2.1, SVR 1263, SVO2 63% on hydral /Isordil 100/40 mg TID, Metop 50 mg daily, Dapa 10 mg daily, Dig 125 mcg PO daily, Lasix drip at 20 mg /h.  - Daily SGC #'s (7/27):  BP 89/62 (70), CVP 9, PAP 58/24 (37), PAWP 18, CO/CI 7.5/3.5, , SVO2 68%    - Lasix 80 mg IV push x1 (7/21/2025)  - Hold Bumex drip 7/24  - Discontinue Toprol 100 (7/24)  - C/w Milrinone 0.25 mcg/kg/min  - Hold Entresto 97/103 mg BID and [place] 7/26 given CHARLOTTE  - C/w Hydralazine 100mg/ Isordil 40mg daily, increase as tolerated   - Wean off nipride gtt low dose 7/27  - Daily standing weights, 2gm sodium diet, 2L fluid restriction, strict I&Os     #CAD    - s/p PCI and 1v CABG (LIMA-LAD 04/2023).   - C/w ASA and atorvastatin      Pulmonary:    # H/o COPD  # Hx Pulmonary Hypertension   # Central sleep apnea.   # Prior tobacco use (24 pack year, quit 2017),   - On 2L via NC      :   #CHARLOTTE/CKD III  - Baseline (11/11/2024): BUN/Cr (GFR): 60/2.93 (24)  -Admit Bun/Creatinine (7/17):  48/1.62, creatinine peaked at 2.55 OSH now 2.04. 7/28  -I/Os   -avoid hypotension and nephrotoxic agents      Heme:   #Anemia in the setting  of MAI and CKD III  - Labs (7/17):  TIBC: 377,  ferritin: 102, serum Fe: 61, folate: 13.1, B12: 452   - IV Venofer 200 mg x5 doses (7/17-7/21)      # Left femoral DVT (2017)  - Hold home Eliquis  - C/w Heparin drip (7/18)      Endo:   #DM 2  - hgbA1c(7/17):  10.1  - Diabetic management per endocrine see daily recs  - Insulin glargine to 48 units BID, Insulin lispro to 34 units TID with meals  - SS#3 AC and HS     # Hypothyroidism  -C/w Levothyroxine 75mcg  -TSH (7/18): 3.97      ID:   -afebrile, nontoxic    -no s/s infx   -trend temps q4 hr        PHYSICAL AND OCCUPATIONAL THERAPY: PT/OT     LINES:  PIVs   SGC (7/19 - present) -> plan to remove this evening         DVT: SCD's, Heparin drip once Heparin assay <0.2  VAP BUNDLE: NA  CENTRAL LINE BUNDLE: Ordered  ULCER PPX: PPI  GLYCEMIC CONTROL: SSI, Lantus insulin   BOWEL CARE: Senna, Miralax   INDWELLING CATHETER: NA  NUTRITION: Adult diet Consistent Carb; CCD 90 gm/meal; 2000 mL fluid        EMERGENCY CONTACT: Extended Emergency Contact Information  Primary Emergency Contact: NAM LOO  Address: [address] [place] [id number] [facility]  Home Phone: [phone]  Mobile Phone: [phone]  Relation: Mother  Secondary Emergency Contact: BenRobinson  Mobile Phone: [phone]  Relation: Friend  FAMILY UPDATE: no family at bedside   CODE STATUS: Full Code  DISPO:  HF ICU      Patient seen and assessed with Dr. Neto ZARAGOZA personally spent 60  minutes of critical care time directly and personally managing the patient exclusive of separately billable procedures.      __________________________________________  Philomena Worrell MD

## 2025-07-28 NOTE — PROGRESS NOTES
Social Work Note   - HF ICU Treatment Plan: 7/23 initiated re-eval for advance therapies evalutaion LVAD vs OHT. (OHT not advisable in setting of uncontrolled DM2)   - Additional information: Wolof speaking is able to understand understand and speak  English also - Norma is recommended.     - Payer: [employer], [facility] dual complete   - Support: mother Aracelis is list a emergency contact  - Planned Disposition: 7/25 Rehab recommendation - Low intensity, SW will continue to follow for any changes in recommendation after procedure  - Barriers to discharge/concern: None note at this time;   - Discharge assessment: 7/23 completed with the patient using Norma Gsuman (LSW, MSW)

## 2025-07-29 ENCOUNTER — DOCUMENTATION (OUTPATIENT)
Dept: TRANSPLANT | Facility: HOSPITAL | Age: 60
End: 2025-07-29

## 2025-07-29 ENCOUNTER — APPOINTMENT (OUTPATIENT)
Dept: OPHTHALMOLOGY | Facility: CLINIC | Age: 60
End: 2025-07-29
Payer: COMMERCIAL

## 2025-07-29 ENCOUNTER — DOCUMENTATION (OUTPATIENT)
Facility: HOSPITAL | Age: 60
End: 2025-07-29

## 2025-07-29 ENCOUNTER — COMMITTEE REVIEW (OUTPATIENT)
Dept: TRANSPLANT | Facility: HOSPITAL | Age: 60
End: 2025-07-29

## 2025-07-29 LAB
ALBUMIN SERPL BCP-MCNC: 3.5 G/DL (ref 3.4–5)
ALBUMIN SERPL BCP-MCNC: 3.9 G/DL (ref 3.4–5)
ANION GAP SERPL CALC-SCNC: 13 MMOL/L (ref 10–20)
ANION GAP SERPL CALC-SCNC: 15 MMOL/L (ref 10–20)
BUN SERPL-MCNC: 49 MG/DL (ref 6–23)
BUN SERPL-MCNC: 52 MG/DL (ref 6–23)
CALCIUM SERPL-MCNC: 8.7 MG/DL (ref 8.6–10.6)
CALCIUM SERPL-MCNC: 9 MG/DL (ref 8.6–10.6)
CHLORIDE SERPL-SCNC: 101 MMOL/L (ref 98–107)
CHLORIDE SERPL-SCNC: 102 MMOL/L (ref 98–107)
CO2 SERPL-SCNC: 21 MMOL/L (ref 21–32)
CO2 SERPL-SCNC: 23 MMOL/L (ref 21–32)
CREAT SERPL-MCNC: 1.7 MG/DL (ref 0.5–1.3)
CREAT SERPL-MCNC: 2.1 MG/DL (ref 0.5–1.3)
DIGOXIN SERPL-MCNC: 0.98 NG/ML (ref 0.8–?)
EGFRCR SERPLBLD CKD-EPI 2021: 36 ML/MIN/1.73M*2
EGFRCR SERPLBLD CKD-EPI 2021: 46 ML/MIN/1.73M*2
ERYTHROCYTE [DISTWIDTH] IN BLOOD BY AUTOMATED COUNT: 19.7 % (ref 11.5–14.5)
ERYTHROCYTE [DISTWIDTH] IN BLOOD BY AUTOMATED COUNT: 20.1 % (ref 11.5–14.5)
GLUCOSE BLD MANUAL STRIP-MCNC: 114 MG/DL (ref 74–99)
GLUCOSE BLD MANUAL STRIP-MCNC: 121 MG/DL (ref 74–99)
GLUCOSE BLD MANUAL STRIP-MCNC: 153 MG/DL (ref 74–99)
GLUCOSE BLD MANUAL STRIP-MCNC: 160 MG/DL (ref 74–99)
GLUCOSE BLD MANUAL STRIP-MCNC: 207 MG/DL (ref 74–99)
GLUCOSE SERPL-MCNC: 164 MG/DL (ref 74–99)
GLUCOSE SERPL-MCNC: 185 MG/DL (ref 74–99)
HCT VFR BLD AUTO: 35 % (ref 41–52)
HCT VFR BLD AUTO: 38.6 % (ref 41–52)
HGB BLD-MCNC: 10.9 G/DL (ref 13.5–17.5)
HGB BLD-MCNC: 11.3 G/DL (ref 13.5–17.5)
MAGNESIUM SERPL-MCNC: 2.05 MG/DL (ref 1.6–2.4)
MAGNESIUM SERPL-MCNC: 2.11 MG/DL (ref 1.6–2.4)
MCH RBC QN AUTO: 23.9 PG (ref 26–34)
MCH RBC QN AUTO: 23.9 PG (ref 26–34)
MCHC RBC AUTO-ENTMCNC: 29.3 G/DL (ref 32–36)
MCHC RBC AUTO-ENTMCNC: 31.1 G/DL (ref 32–36)
MCV RBC AUTO: 77 FL (ref 80–100)
MCV RBC AUTO: 82 FL (ref 80–100)
NRBC BLD-RTO: 0 /100 WBCS (ref 0–0)
NRBC BLD-RTO: 0 /100 WBCS (ref 0–0)
PHOSPHATE SERPL-MCNC: 3.1 MG/DL (ref 2.5–4.9)
PHOSPHATE SERPL-MCNC: 3.2 MG/DL (ref 2.5–4.9)
PLATELET # BLD AUTO: 239 X10*3/UL (ref 150–450)
PLATELET # BLD AUTO: 245 X10*3/UL (ref 150–450)
POTASSIUM SERPL-SCNC: 4.1 MMOL/L (ref 3.5–5.3)
POTASSIUM SERPL-SCNC: 4.1 MMOL/L (ref 3.5–5.3)
RBC # BLD AUTO: 4.57 X10*6/UL (ref 4.5–5.9)
RBC # BLD AUTO: 4.73 X10*6/UL (ref 4.5–5.9)
SODIUM SERPL-SCNC: 133 MMOL/L (ref 136–145)
SODIUM SERPL-SCNC: 134 MMOL/L (ref 136–145)
UFH PPP CHRO-ACNC: 0.2 IU/ML (ref ?–1.1)
UFH PPP CHRO-ACNC: 0.3 IU/ML (ref ?–1.1)
UFH PPP CHRO-ACNC: 0.4 IU/ML (ref ?–1.1)
WBC # BLD AUTO: 5.4 X10*3/UL (ref 4.4–11.3)
WBC # BLD AUTO: 6.1 X10*3/UL (ref 4.4–11.3)

## 2025-07-29 PROCEDURE — 85520 HEPARIN ASSAY: CPT | Performed by: NURSE PRACTITIONER

## 2025-07-29 PROCEDURE — 2500000002 HC RX 250 W HCPCS SELF ADMINISTERED DRUGS (ALT 637 FOR MEDICARE OP, ALT 636 FOR OP/ED): Performed by: PHYSICIAN ASSISTANT

## 2025-07-29 PROCEDURE — 80162 ASSAY OF DIGOXIN TOTAL: CPT | Performed by: PHYSICIAN ASSISTANT

## 2025-07-29 PROCEDURE — 2500000004 HC RX 250 GENERAL PHARMACY W/ HCPCS (ALT 636 FOR OP/ED): Performed by: PHYSICIAN ASSISTANT

## 2025-07-29 PROCEDURE — 2500000001 HC RX 250 WO HCPCS SELF ADMINISTERED DRUGS (ALT 637 FOR MEDICARE OP): Performed by: PHYSICIAN ASSISTANT

## 2025-07-29 PROCEDURE — 86825 HLA X-MATH NON-CYTOTOXIC: CPT | Mod: OUT | Performed by: INTERNAL MEDICINE

## 2025-07-29 PROCEDURE — 2500000001 HC RX 250 WO HCPCS SELF ADMINISTERED DRUGS (ALT 637 FOR MEDICARE OP)

## 2025-07-29 PROCEDURE — 99233 SBSQ HOSP IP/OBS HIGH 50: CPT

## 2025-07-29 PROCEDURE — 97530 THERAPEUTIC ACTIVITIES: CPT | Mod: GP

## 2025-07-29 PROCEDURE — 2500000004 HC RX 250 GENERAL PHARMACY W/ HCPCS (ALT 636 FOR OP/ED): Performed by: NURSE PRACTITIONER

## 2025-07-29 PROCEDURE — 84100 ASSAY OF PHOSPHORUS: CPT | Performed by: NURSE PRACTITIONER

## 2025-07-29 PROCEDURE — 2500000004 HC RX 250 GENERAL PHARMACY W/ HCPCS (ALT 636 FOR OP/ED)

## 2025-07-29 PROCEDURE — 2500000004 HC RX 250 GENERAL PHARMACY W/ HCPCS (ALT 636 FOR OP/ED): Performed by: STUDENT IN AN ORGANIZED HEALTH CARE EDUCATION/TRAINING PROGRAM

## 2025-07-29 PROCEDURE — 36415 COLL VENOUS BLD VENIPUNCTURE: CPT | Performed by: NURSE PRACTITIONER

## 2025-07-29 PROCEDURE — 85027 COMPLETE CBC AUTOMATED: CPT | Performed by: NURSE PRACTITIONER

## 2025-07-29 PROCEDURE — 99231 SBSQ HOSP IP/OBS SF/LOW 25: CPT | Performed by: STUDENT IN AN ORGANIZED HEALTH CARE EDUCATION/TRAINING PROGRAM

## 2025-07-29 PROCEDURE — 2500000002 HC RX 250 W HCPCS SELF ADMINISTERED DRUGS (ALT 637 FOR MEDICARE OP, ALT 636 FOR OP/ED)

## 2025-07-29 PROCEDURE — 84520 ASSAY OF UREA NITROGEN: CPT | Performed by: PHYSICIAN ASSISTANT

## 2025-07-29 PROCEDURE — 83735 ASSAY OF MAGNESIUM: CPT | Performed by: PHYSICIAN ASSISTANT

## 2025-07-29 PROCEDURE — 99291 CRITICAL CARE FIRST HOUR: CPT

## 2025-07-29 PROCEDURE — 2500000001 HC RX 250 WO HCPCS SELF ADMINISTERED DRUGS (ALT 637 FOR MEDICARE OP): Performed by: NURSE PRACTITIONER

## 2025-07-29 PROCEDURE — 97116 GAIT TRAINING THERAPY: CPT | Mod: GP

## 2025-07-29 PROCEDURE — 85027 COMPLETE CBC AUTOMATED: CPT | Performed by: PHYSICIAN ASSISTANT

## 2025-07-29 PROCEDURE — 82947 ASSAY GLUCOSE BLOOD QUANT: CPT

## 2025-07-29 PROCEDURE — 83735 ASSAY OF MAGNESIUM: CPT | Performed by: NURSE PRACTITIONER

## 2025-07-29 PROCEDURE — 1100000001 HC PRIVATE ROOM DAILY

## 2025-07-29 PROCEDURE — 2500000002 HC RX 250 W HCPCS SELF ADMINISTERED DRUGS (ALT 637 FOR MEDICARE OP, ALT 636 FOR OP/ED): Performed by: NURSE PRACTITIONER

## 2025-07-29 PROCEDURE — 85520 HEPARIN ASSAY: CPT | Performed by: PHYSICIAN ASSISTANT

## 2025-07-29 RX ORDER — FUROSEMIDE 10 MG/ML
80 INJECTION INTRAMUSCULAR; INTRAVENOUS EVERY 12 HOURS
Status: DISCONTINUED | OUTPATIENT
Start: 2025-07-29 | End: 2025-07-30

## 2025-07-29 RX ORDER — INSULIN LISPRO 100 [IU]/ML
30 INJECTION, SOLUTION INTRAVENOUS; SUBCUTANEOUS
Status: DISCONTINUED | OUTPATIENT
Start: 2025-07-29 | End: 2025-07-30

## 2025-07-29 RX ORDER — METOPROLOL SUCCINATE 25 MG/1
25 TABLET, EXTENDED RELEASE ORAL DAILY
Status: DISCONTINUED | OUTPATIENT
Start: 2025-07-29 | End: 2025-07-29

## 2025-07-29 RX ADMIN — INSULIN LISPRO 34 UNITS: 100 INJECTION, SOLUTION INTRAVENOUS; SUBCUTANEOUS at 08:16

## 2025-07-29 RX ADMIN — HYDRALAZINE HYDROCHLORIDE 100 MG: 100 TABLET ORAL at 13:00

## 2025-07-29 RX ADMIN — HYDRALAZINE HYDROCHLORIDE 100 MG: 100 TABLET ORAL at 06:15

## 2025-07-29 RX ADMIN — PANTOPRAZOLE SODIUM 40 MG: 40 TABLET, DELAYED RELEASE ORAL at 06:15

## 2025-07-29 RX ADMIN — CALCIUM CARBONATE (ANTACID) CHEW TAB 500 MG 1 TABLET: 500 CHEW TAB at 20:41

## 2025-07-29 RX ADMIN — INSULIN LISPRO 6 UNITS: 100 INJECTION, SOLUTION INTRAVENOUS; SUBCUTANEOUS at 17:52

## 2025-07-29 RX ADMIN — INSULIN LISPRO 34 UNITS: 100 INJECTION, SOLUTION INTRAVENOUS; SUBCUTANEOUS at 12:45

## 2025-07-29 RX ADMIN — ISOSORBIDE DINITRATE 40 MG: 40 TABLET ORAL at 18:00

## 2025-07-29 RX ADMIN — INSULIN GLARGINE 48 UNITS: 100 INJECTION, SOLUTION SUBCUTANEOUS at 20:44

## 2025-07-29 RX ADMIN — MILRINONE LACTATE IN DEXTROSE 0.25 MCG/KG/MIN: 0.2 INJECTION, SOLUTION INTRAVENOUS at 21:39

## 2025-07-29 RX ADMIN — GABAPENTIN 200 MG: 100 CAPSULE ORAL at 20:42

## 2025-07-29 RX ADMIN — LEVOTHYROXINE SODIUM 75 MCG: 0.07 TABLET ORAL at 06:15

## 2025-07-29 RX ADMIN — SACUBITRIL AND VALSARTAN 1 TABLET: 24; 26 TABLET, FILM COATED ORAL at 20:41

## 2025-07-29 RX ADMIN — GABAPENTIN 200 MG: 100 CAPSULE ORAL at 06:15

## 2025-07-29 RX ADMIN — MILRINONE LACTATE IN DEXTROSE 0.25 MCG/KG/MIN: 0.2 INJECTION, SOLUTION INTRAVENOUS at 09:28

## 2025-07-29 RX ADMIN — DIGOXIN 125 MCG: 125 TABLET ORAL at 08:16

## 2025-07-29 RX ADMIN — SODIUM CHLORIDE, SODIUM LACTATE, POTASSIUM CHLORIDE, AND CALCIUM CHLORIDE 250 ML: .6; .31; .03; .02 INJECTION, SOLUTION INTRAVENOUS at 02:32

## 2025-07-29 RX ADMIN — FUROSEMIDE 80 MG: 10 INJECTION, SOLUTION INTRAVENOUS at 11:19

## 2025-07-29 RX ADMIN — DAPAGLIFLOZIN 10 MG: 10 TABLET, FILM COATED ORAL at 08:15

## 2025-07-29 RX ADMIN — GABAPENTIN 200 MG: 100 CAPSULE ORAL at 13:00

## 2025-07-29 RX ADMIN — ATORVASTATIN CALCIUM 80 MG: 80 TABLET, FILM COATED ORAL at 20:41

## 2025-07-29 RX ADMIN — INSULIN LISPRO 30 UNITS: 100 INJECTION, SOLUTION INTRAVENOUS; SUBCUTANEOUS at 17:53

## 2025-07-29 RX ADMIN — CALCIUM CARBONATE (ANTACID) CHEW TAB 500 MG 1 TABLET: 500 CHEW TAB at 08:16

## 2025-07-29 RX ADMIN — METOPROLOL SUCCINATE 25 MG: 25 TABLET, EXTENDED RELEASE ORAL at 08:16

## 2025-07-29 RX ADMIN — FUROSEMIDE 80 MG: 10 INJECTION, SOLUTION INTRAVENOUS at 20:41

## 2025-07-29 RX ADMIN — ISOSORBIDE DINITRATE 40 MG: 40 TABLET ORAL at 13:00

## 2025-07-29 RX ADMIN — INSULIN LISPRO 3 UNITS: 100 INJECTION, SOLUTION INTRAVENOUS; SUBCUTANEOUS at 08:17

## 2025-07-29 RX ADMIN — INSULIN GLARGINE 48 UNITS: 100 INJECTION, SOLUTION SUBCUTANEOUS at 08:18

## 2025-07-29 RX ADMIN — FENOFIBRATE 145 MG: 145 TABLET, FILM COATED ORAL at 08:16

## 2025-07-29 RX ADMIN — ISOSORBIDE DINITRATE 40 MG: 40 TABLET ORAL at 08:16

## 2025-07-29 RX ADMIN — HEPARIN SODIUM 1800 UNITS/HR: 10000 INJECTION, SOLUTION INTRAVENOUS at 12:44

## 2025-07-29 RX ADMIN — QUETIAPINE FUMARATE 50 MG: 25 TABLET ORAL at 20:41

## 2025-07-29 RX ADMIN — IRON SUCROSE 200 MG: 20 INJECTION, SOLUTION INTRAVENOUS at 08:16

## 2025-07-29 RX ADMIN — SACUBITRIL AND VALSARTAN 1 TABLET: 24; 26 TABLET, FILM COATED ORAL at 08:15

## 2025-07-29 ASSESSMENT — COGNITIVE AND FUNCTIONAL STATUS - GENERAL
MOBILITY SCORE: 22
DAILY ACTIVITIY SCORE: 24
STANDING UP FROM CHAIR USING ARMS: A LITTLE
TURNING FROM BACK TO SIDE WHILE IN FLAT BAD: A LITTLE
CLIMB 3 TO 5 STEPS WITH RAILING: A LITTLE
CLIMB 3 TO 5 STEPS WITH RAILING: A LITTLE
MOVING TO AND FROM BED TO CHAIR: A LITTLE
WALKING IN HOSPITAL ROOM: A LITTLE
MOVING FROM LYING ON BACK TO SITTING ON SIDE OF FLAT BED WITH BEDRAILS: A LITTLE
MOBILITY SCORE: 17
DAILY ACTIVITIY SCORE: 24
MOBILITY SCORE: 22
WALKING IN HOSPITAL ROOM: A LITTLE
CLIMB 3 TO 5 STEPS WITH RAILING: A LOT
WALKING IN HOSPITAL ROOM: A LITTLE

## 2025-07-29 ASSESSMENT — PAIN SCALES - GENERAL
PAINLEVEL_OUTOF10: 0 - NO PAIN

## 2025-07-29 ASSESSMENT — PAIN - FUNCTIONAL ASSESSMENT
PAIN_FUNCTIONAL_ASSESSMENT: 0-10

## 2025-07-29 NOTE — PROGRESS NOTES
Per committee 07/29/25; pt may be heart/kidney transplant evaluation. Will need pulmonary clearance also.

## 2025-07-29 NOTE — PROGRESS NOTES
Per [facility] Medicaid active for DOS. Medicaid # [id number]. Possible SRS Medicaid ref if listed.

## 2025-07-29 NOTE — SIGNIFICANT EVENT
Patient from HFICU transfer to the floor.    Exam  Respiration easy and unlabored  Room air   Alert and oriented x 3   Leg swelling noted  On milrinone drip and heparin drip     Family at bedside  No complaints, vitals stable  Orders reviewed and floor appropriate HVI aware of transfer to the floor

## 2025-07-29 NOTE — PROGRESS NOTES
Physical Therapy    Physical Therapy Treatment    Patient Name: Felice Law  MRN: [id number]  Department: [facility] HFICU  Room: 10/10-A  Today's Date: 7/29/2025  Time Calculation  Start Time: 1207  Stop Time: 1232  Time Calculation (min): 25 min         Assessment/Plan   PT Assessment  PT Assessment Results: Decreased strength, Decreased range of motion, Decreased endurance, Impaired balance, Decreased mobility  Rehab Prognosis: Good  Barriers to Discharge Home: No anticipated barriers  Evaluation/Treatment Tolerance: Patient tolerated treatment well  Medical Staff Made Aware: Yes  End of Session Communication: Bedside nurse  Assessment Comment: Pt performed bed mobility with SBA, functional transfers with CGA, and ambulated 200' with CGA with FWW. Pt performed Tinetti and scored <24/28 indicating that he is at a high risk for falls. Pt will continue to benefit from skilled PT to improve functional mobility.  End of Session Patient Position: Up in chair, Alarm off, not on at start of session  PT Plan  Inpatient/Swing Bed or Outpatient: Inpatient  PT Plan  Treatment/Interventions: Bed mobility, Transfer training, Gait training, Stair training, Balance training, Strengthening, Endurance training, Range of motion, Therapeutic exercise, Therapeutic activity, Home exercise program, Positioning  PT Plan: Ongoing PT  PT Frequency: 3 times per week  PT Discharge Recommendations: Low intensity level of continued care  Equipment Recommended upon Discharge: Wheeled walker  PT Recommended Transfer Status: Assist x1  PT - OK to Discharge: Yes    PT Visit Info:  PT Received On: 07/29/25  Response to Previous Treatment: Patient with no complaints from previous session.     General Visit Information:   General  Reason for Referral: Advanced therapies evaluation initiated 7/23  Past Medical History Relevant to Rehab: ICM, HFrEF, EF 15-20%, CABG (LIMA- LAD in 2023),  PCI, VT arrest s/p VT ablation and ICD in 2017,  Hypertension, Hyperlipidemia, Hypothyroidism, Left femoral DVT on Eliquis, COPD, JAKE, CKD III, Chronic BLE venous stasis dermatitis, Insulin dependent Type 2 diabetes  Prior to Session Communication: Bedside nurse  Patient Position Received: Bed, 3 rail up, Alarm off, not on at start of session  Preferred Learning Style: auditory, verbal, visual  General Comment: Pt awake and willing to participate in PT treatment session. (BP cuff, tele, on room air)    Subjective   Precautions:  Precautions  Hearing/Visual Limitations: hearing is WFL  Medical Precautions: Cardiac precautions, Fall precautions     Date/Time Vitals Session Patient Position Pulse Resp SpO2 BP MAP (mmHg)    07/29/25 1200 --  --  106  21  --  87/68  76     07/29/25 1207 Pre PT  Lying  109  --  --  87/68  76     07/29/25 1232 Post PT  Sitting  103  --  --  102/68  78     07/29/25 1300 --  --  111  27  93 %  105/72  83      Vital Signs Comment: VSS during session     Objective   Pain:  Pain Assessment  Pain Assessment: 0-10  0-10 (Numeric) Pain Score: 0 - No pain  Cognition:  Cognition  Overall Cognitive Status: Within Functional Limits  Arousal/Alertness: Appropriate responses to stimuli  Orientation Level: Oriented X4  Coordination:  Movements are Fluid and Coordinated: Yes    Activity Tolerance:  Activity Tolerance  Endurance: Tolerates 10 - 20 min exercise with multiple rests  Early Mobility/Exercise Safety Screen: Proceed with mobilization - No exclusion criteria met  Treatments:  Therapeutic Activity  Therapeutic Activity Performed: Yes  Therapeutic Activity 1: Pt took seated rest break after bout of ambulation    Bed Mobility  Bed Mobility: Yes  Bed Mobility 1  Bed Mobility 1: Supine to sitting  Level of Assistance 1: Close supervision  Bed Mobility Comments 1: SBA for safety and line management    Ambulation/Gait Training  Ambulation/Gait Training Performed: Yes  Ambulation/Gait Training 1  Surface 1: Level tile  Device 1: Rolling  walker  Assistance 1: Contact guard  Quality of Gait 1: Decreased step length, Forward flexed posture  Comments/Distance (ft) 1: x200' with CGA for safety and line management; observed BLE weakness that progressed during bout; increased weakness in R LE as compared to L LE  Transfers  Transfer: Yes  Transfer 1  Transfer From 1: Sit to, Stand to  Transfer to 1: Stand, Sit  Technique 1: Sit to stand, Stand to sit  Transfer Device 1: Walker  Transfer Level of Assistance 1: Contact guard  Trials/Comments 1: x3 reps during session; CGA for safety and line management. Increased time to complete    Outcome Measures:  [facility] Basic Mobility  Turning from your back to your side while in a flat bed without using bedrails: A little  Moving from lying on your back to sitting on the side of a flat bed without using bedrails: A little  Moving to and from bed to chair (including a wheelchair): A little  Standing up from a chair using your arms (e.g. wheelchair or bedside chair): A little  To walk in hospital room: A little  Climbing 3-5 steps with railing: A lot  Basic Mobility - Total Score: 17    FSS-ICU  Ambulation: Walks >/ or equal to 150 feet with minimal assistance x1  Rolling: Supervision or set-up only  Sitting: Supervision or set-up only  Transfer Sit-to-Stand: Minimal assistance (performs 75% or more of task)  Transfer Supine-to-Sit: Supervision or set-up only  Total Score: 23      Early Mobility/Exercise Safety Screen: Proceed with mobilization - No exclusion criteria met  ICU Mobility Scale: Walking with assistance of 1 person [8]  Tinetti  Sitting Balance: Steady, safe  Arises: Able, uses arms to help  Attempts to Arise: Able to arise, one attempt  Immediate Standing Balance (First 5 Seconds): Steady but uses walker or other support  Standing Balance: Steady but wide stance, uses cane or other support  Nudged: Staggers, grabs, catches self  Eyes Closed: Unsteady  Turned 360 Degrees: Steadiness: Unsteady (Danis  staggers)  Turned 360 Degrees: Continuity of Steps: Continuous  Sitting Down: Uses arms or not a smooth motion  Balance Score: 9  Initiation of Gait: No hesitancy  Step Height: R Swing Foot: Right foot complete clears floor  Step Length: R Swing Foot: Does not pass left stance foot with step  Step Height: L Swing Foot: Left foot complete clears floor  Step Length: L Swing Foot: Does not pass right stance foot with step  Step Symmetry: Right and left step appear equal  Step Continuity: Steps appear continuous  Path: Mild/moderate deviation or uses walking aid  Trunk: No sway but flexion of knees or back or spreads arms out while walking  Walking Time: Heels apart  Gait Score: 7  Total Score: 16    Education Documentation  Precautions, taught by Juana Stewart PT at 7/29/2025  1:07 PM.  Learner: Patient  Readiness: Acceptance  Method: Explanation  Response: Verbalizes Understanding    Body Mechanics, taught by Juana Stewart PT at 7/29/2025  1:07 PM.  Learner: Patient  Readiness: Acceptance  Method: Explanation  Response: Verbalizes Understanding    Home Exercise Program, taught by Juana Stewart PT at 7/29/2025  1:07 PM.  Learner: Patient  Readiness: Acceptance  Method: Explanation  Response: Verbalizes Understanding    Mobility Training, taught by Juana Stewart PT at 7/29/2025  1:07 PM.  Learner: Patient  Readiness: Acceptance  Method: Explanation  Response: Verbalizes Understanding    Education Comments  No comments found.             Encounter Problems       Encounter Problems (Active)       Balance       Patient to demonstrate Karthikeyan static and dynamic standing balance without LOB with change of directions, no hesitancy, appropriate NISREEN and sequencing to complete functional task.  (Progressing)       Start:  07/21/25    Expected End:  08/04/25            Pt will complete TUG in </= 14 sec with LRAD and no acute LOB (Progressing)       Start:  07/21/25    Expected End:  08/04/25               Mobility       STG  - Patient will ambulate >/= 250 ft Karthikeyan with LRAD and no acute LOB (Progressing)       Start:  07/21/25    Expected End:  08/04/25            Patient to ascend/descend >/= 4 stairs with SBA and LRAD to demo ability to safely traverse SHEA/within home (Progressing)       Start:  07/21/25    Expected End:  08/04/25            Patient will tolerate >/=30 minutes continuous activity with stable vital signs, RPE </=13/20, RPD </=3/10  (Progressing)       Start:  07/21/25    Expected End:  08/04/25               PT Transfers       STG - Patient will perform bed mobility IND (Progressing)       Start:  07/21/25    Expected End:  08/04/25            STG - Patient will transfer sit to and from stand Karthikeyan with LRAD (Progressing)       Start:  07/21/25    Expected End:  08/04/25                 JOHN JJ, PT

## 2025-07-29 NOTE — CARE PLAN
I reviewed him today. His case was discussed in detail and the advanced heart failure therapeutics committee.  Several barriers were highlighted with regards to his glycemic control as well as his poorly pulmonary function tests and impaired kidney function.    The questions where he raised:    Impaired pulmonary function - needs pulm review.  High creatinine   Unclear about the degree of myocardial viability    Plan is to complete work-up and defer for a re-discussion.    Brendan Manzano MD PhD

## 2025-07-29 NOTE — COMMITTEE REVIEW
Evaluation Date: 7/23/2025   Committee Review Date: 7/29/2025   Organ being evaluated for: Heart     Transplant Phase:  Evaluation   Transplant Status: Active     Referring Physician:   Dr. Duque/Tatyana  Transplant Physician:   Dr. Joe    Primary Diagnosis:  ICM    Committee Members:   Cardiology Wilbur Chun MD MPH; Jono Duque MD; Maria G Choudhary, TOMAS; Ruslan Nguyen MD; Omar Barlow MD; Maren Preston RN; Ladan Hewitt, APRN-CNP; Yareli Monahan, TOMAS; Kevin Joe DO   Cardiothoracic Surgery Brendan Manzano MD PhD; Caitlin Gamez MD; Amilcar Wade MD PhD   Financial Counseling and Assistance Services Ruslan Palmer   Lab Neal Valentine MD PhD   Nutrition Nichole Olvera RDN, LD   Pharmacy Cailin Lopez, PharmD   [facility] Cailin Sotomayor; Gabi Nogueira RN; Lenore Mercer RN    Sotero Cespedes LISW; Zina Cifuentes, LISIRINEO; Victoria Aguilar LISW-S   Transplant Neal Valentine MD PhD; Libertad Pace RN; Stephanie Batres, APRN-CNP; Edwige Franklin RN   Transplant Surgery Amilcar Wade MD PhD       Committee Review Decision:    The candidate's evaluation was presented and discussed at the Advanced Heart Failure Therapeutics Committee. After review of the candidate's diagnosis and the evaluations of the multidisciplinary team members, the committee made the following recommendations:     Committee Decision: Deferred for heart transplant and LVAD  The following information is needed prior to re-presenting the patient:   Patient is to repeat PFTs, Weigh in from pulmonary and nephrology. Additionally, patient will need to improve diabetes management to achieve better glycemic control; current HbA1c is 10.1%       The patient is to be updated by Dr. Barlow and the inpatient team at bedside.   The patient is to be brought to the Advanced Heart Failure Therapeutics Committee one all the above is     completed.      Eligibility  Stage D heart failure, NYHA class IV    Absolute Contraindications  None    Relative Contraindications  None    Patient attests to being fully vaccinated against Hepatitis B: Yes            Do we have records uploaded into Epic? No   If records are not uploaded into Epic:    Patient does not remember where they were vaccinated    Lab Results   Component Value Date    HEPBSAB 7.9 07/23/2025       Immunization History   Administered Date(s) Administered Comments    Hepatitis B vaccine, adult *Check Product/Dose* 11/13/2019 12/16/2019

## 2025-07-29 NOTE — NURSING NOTE
Patient is a transfer from HFICU for management of fluid. overload. Patient arrived in bed with nurse and safely transfer in room. VSS,Skin checked performed. Patient oriented to surrounding and safety education provided. Heparin and Milrinone gtt continue. Patient resting in bed, call light in reach and family at bedside. Plan to continue IV push Laxis and MRI tomorrow.

## 2025-07-29 NOTE — PROGRESS NOTES
Pharmacist Pre-Transplant Candidate Screening Note     Current Facility-Administered Medications on File Prior to Visit   Medication Dose Route Frequency Provider Last Rate Last Admin    acetaminophen (Tylenol) tablet 975 mg  975 mg oral q6h PRN Philomena Worrell MD   975 mg at 07/24/25 1818    albuterol 2.5 mg /3 mL (0.083 %) nebulizer solution 2.5 mg  2.5 mg nebulization q6h PRN Shelly Villafuerte APRN-CHANDA        alteplase (Cathflo Activase) injection 2 mg  2 mg intra-catheter PRN Shelly Villafuerte APRN-CNP        [Held by provider] apixaban (Eliquis) tablet 5 mg  5 mg oral BID Shelly Villafuerte APRN-CHANDA        atorvastatin (Lipitor) tablet 80 mg  80 mg oral Nightly Shelly Villafuerte APRN-CNP   80 mg at 07/28/25 2147    [Held by provider] bumetanide (Bumex) tablet 1 mg  1 mg oral Daily Philomena Worrell MD        calcium carbonate (Tums) 500 mg (200 mg elemental) chewable tablet 1 tablet  500 mg of calcium carbonate oral BID Shelly Villafuerte APRN-CNP   1 tablet at 07/28/25 0802    dapagliflozin propanediol (Farxiga) tablet 10 mg  10 mg oral Daily Shelly Villafuerte, APRN-CNP   10 mg at 07/28/25 0804    dextrose 50 % injection 12.5 g  12.5 g intravenous q15 min PRN Shelly Villafuerte APRN-CNP        dextrose 50 % injection 25 g  25 g intravenous q15 min PRN Shelly Villafuerte, APRN-CNP        digoxin (Lanoxin) tablet 125 mcg  125 mcg oral Daily Shelly Villafuerte, APRN-CNP   125 mcg at 07/28/25 0804    fenofibrate (Tricor) tablet 145 mg  145 mg oral Daily Shelly Villauferte, APRN-CNP   145 mg at 07/28/25 0805    gabapentin (Neurontin) capsule 200 mg  200 mg oral q8h SUSI Shelly Villafuerte, APRN-CNP   200 mg at 07/29/25 0615    glucagon (Glucagen) injection 1 mg  1 mg intramuscular q15 min PRN Shelly Villafuerte APRN-CNP        glucagon (Glucagen) injection 1 mg  1 mg intramuscular q15 min PRN Shelly L Christo, APRN-CNP        heparin 25,000 Units in dextrose 5% 250 mL (100 Units/mL) infusion (premix)  0-4,000 Units/hr intravenous Continuous Shelly BRAD Edmond 18 mL/hr at 07/29/25 0617 1,800  Units/hr at 07/29/25 0617    heparin bolus from bag 2,000-4,000 Units  2,000-4,000 Units intravenous q4h PRN BRAD Navarro   2,000 Units at 07/20/25 1922    hydrALAZINE (Apresoline) tablet 100 mg  100 mg oral q8h SUSI BRAD Smith   100 mg at 07/29/25 0615    insulin glargine (Lantus) injection 48 Units  48 Units subcutaneous Nightly Iftikhar Santoro MD   48 Units at 07/28/25 2147    insulin glargine (Lantus) injection 48 Units  48 Units subcutaneous Daily Iftikhar Santoro MD   48 Units at 07/28/25 0816    insulin lispro injection 0-15 Units  0-15 Units subcutaneous TID AC Ofelia Lemus PA-C   3 Units at 07/28/25 1842    insulin lispro injection 34 Units  34 Units subcutaneous TID AC Iftikhar Santoro MD   34 Units at 07/28/25 1842    iron sucrose (Venofer) injection 200 mg  200 mg intravenous Every other day Tim Kitchen MD   200 mg at 07/27/25 0845    isosorbide dinitrate (Isordil) tablet 40 mg  40 mg oral TID BRAD Delcid   40 mg at 07/28/25 1944    [COMPLETED] lactated Ringer's bolus 250 mL  250 mL intravenous Once BRAD Velazquez   Stopped at 07/29/25 0432    levothyroxine (Synthroid, Levoxyl) tablet 75 mcg  75 mcg oral Daily before breakfast BRAD Navarro   75 mcg at 07/29/25 0615    metoprolol succinate XL (Toprol-XL) 24 hr tablet 25 mg  25 mg oral Daily Philomena Worrell MD        milrinone (Primacor) 20 mg in dextrose 5% 100 mL (0.2 mg/mL) infusion (premix)  0.25 mcg/kg/min intravenous Continuous BRAD Muhammad 7.23 mL/hr at 07/29/25 0400 0.25 mcg/kg/min at 07/29/25 0400    nitroprusside (Nipride) infusion 500 mcg/mL (100 mL vial) (adult)  0.25-5 mcg/kg/min intravenous Continuous BRAD Delcid   Stopped at 07/28/25 1607    pantoprazole (ProtoNix) EC tablet 40 mg  40 mg oral Daily before breakfast MARLON Navarro-CNP   40 mg at 07/29/25 0615    potassium chloride CR (Klor-Con M20) ER tablet 40 mEq  40 mEq oral Once Vivienne Ferrell,  APRN-CNP        QUEtiapine (SEROquel) tablet 50 mg  50 mg oral Nightly ShellyBRAD Morales   50 mg at 07/28/25 2147    sacubitriL-valsartan (Entresto) 24-26 mg per tablet 1 tablet  1 tablet oral BID Philomena Worrell MD   1 tablet at 07/28/25 2147    [Held by provider] spironolactone (Aldactone) tablet 25 mg  25 mg oral Daily ShellyBRAD Morales   25 mg at 07/26/25 0932     Current Outpatient Medications on File Prior to Visit   Medication Sig Dispense Refill    albuterol 2.5 mg /3 mL (0.083 %) nebulizer solution Use every 4-6 hours as needed for shortness of breath 75 mL 1    atorvastatin (Lipitor) 80 mg tablet 80mg nightly 90 tablet 2    blood-glucose sensor (FreeStyle Franco 3 Plus Sensor) device Change every 15 days 2 each 11    bumetanide (Bumex) 1 mg tablet Take 1 tablet (1 mg) by mouth once daily. 90 tablet 3    digoxin (Lanoxin) 125 MCG tablet Take 1 tablet (125 mcg) by mouth once daily. 90 tablet 3    Eliquis 5 mg tablet TOME 1 TABLETA POR LA BOCA DOS  VECES AL CUBA 200 tablet 2    Farxiga 10 mg tablet Take 1 tablet (10 mg) by mouth once daily. 90 tablet 3    fenofibrate (Tricor) 145 mg tablet Take 1 tablet (145 mg) by mouth once daily. 90 tablet 3    FreeStyle Franco 3 Zenda misc Use as instructed 1 each 0    gabapentin (Neurontin) 300 mg capsule TOME 1 CAPSULA POR LA BOCA NITIN  VEZ AL CUBA EN LA MANANA 90 capsule 3    hydrALAZINE (Apresoline) 100 mg tablet TOME 1 TABLETA POR LA BOCA 3  VECES AL CUBA 300 tablet 2    insulin glargine (Toujeo Max U-300 SoloStar) 300 unit/mL (3 mL) pen 40 units twice a day 30 mL 3    insulin lispro (HumaLOG KwikPen Insulin) 100 unit/mL pen 18 units with meals plus sliding scale up to 80 units daily 45 mL 3    isosorbide dinitrate (Isordil) 40 mg tablet Take 1 tablet (40 mg) by mouth 3 times a day. 270 tablet 3    lancets (Lancets,Thin) misc 4 times daily (before meals and at bedtime). use as directed to test blood sugar 100 each 3    levothyroxine (Synthroid, Levoxyl) 75 mcg  "tablet TOME 1 TABLETA POR LA BOCA NITIN  VEZ AL CUBA 100 tablet 3    metoprolol succinate XL (Toprol-XL) 100 mg 24 hr tablet Take 2 tablets (200 mg) by mouth once daily.      OneTouch Ultra Test REVISE EL NIVEL DE GLUCOSA EN LA PEARL CUATRO VECES AL CUBA 400 strip 1    pantoprazole (ProtoNix) 40 mg EC tablet Take 1 tablet (40 mg) by mouth once daily in the morning. Take before meals. Do not crush, chew, or split.      TOME 1 TABLETA POR LA BOCA NITIN  VEZ AL CUBA EN LA MANANA TOME  ANTES DE NITIN COMIDA (NO TRITURE, MASTIQUE NI DIVIDA) 90 tablet 1    pen needle, diabetic 31 gauge x 5/16\" needle For insulin injection 5x/day 500 each 3    QUEtiapine (SEROquel) 50 mg tablet Take 1 tablet (50 mg) by mouth once daily at bedtime. 100 tablet 2    [Paused] sacubitriL-valsartan (Entresto)  mg tablet Take 1 tablet by mouth 2 times a day. (Patient not taking: Reported on 7/13/2025) 180 tablet 3    [Paused] spironolactone (Aldactone) 25 mg tablet TOME 2 TABLETAS POR LA BOCA NITIN  VEZ AL CUBA (Patient not taking: Reported on 7/13/2025) 200 tablet 2    tirzepatide (Mounjaro) 5 mg/0.5 mL pen injector Inject 5 mg under the skin every 7 days. 2 mL 11       The patient's reported medications have been reviewed. Based on the above medication list, there are no pharmacologic contraindications to heart or kidney transplantation.    Of note, he is on apixaban at home (heparin inpatient), which would increase his intra-operative bleeding risk. A pre-transplant anticoagulation plan needs to be established.    Cailin Lopez, PharmD     "

## 2025-07-29 NOTE — PROGRESS NOTES
Felice Law is a 60 y.o. male on day 12 of admission presenting with Acute on chronic systolic heart failure.      Palliative Medicine following for:  Complex medical decision making, symptom management, patient/family support    History obtained from chart review including ED note, H&P, patient's daily progress notes, review of lab/test results, and discussion with primary team and bedside RN.    Subjective    History of Present Illness  Felice Law is a 60 y.o. male with significant PMH of ICM, HFrEF, EF 15-20%, CABG (LIMA- LAD in 2023),  PCI, VT arrest s/p VT ablation and ICD in 2017, Hypertension, Hyperlipidemia, Hypothyroidism, Left femoral DVT on Eliquis, COPD, JAKE, CKD III, Chronic BLE venous stasis dermatitis, Insulin dependent Type 2 diabetes who was initially admitted 7/12 to [place] for acute decompensate heart failure. He was diuresed and recommended for transfer to [facility] HF ICU for further managemnet and possible re-consider of advanced therapy work up.  Of note, patient was previously discussed for AT during [facility] [place] October 2023 admission but declined with concerns for mobility/frailty (uses motorized scooter and has bilateral foot drop with neuropathy), CKD, poorly controlled diabetes as well as unclear social/financial support).      Transferred to HF ICU 7/17, he was warm and wet,  lasix IV bolus given and drip initiated. Eliquis on hold, heparin drip initiated. Bedside SG done 7/19. Hg A1c 10.1 this admission, now considering advanced therapy evaluation. Advanced therapies evaluation initiated 7/23.  Plan is to optimize DM (A1c 10) and defer listing for likely heart transplant.       Symptoms  Pain: ok today  Dyspnea: denies  Fatigue: denies  Insomnia: denies. States sleeps very well, all night.  Drowsiness: denies  Constipation: denies  Nausea: denies  Appetite: good  Anxiety: ok  Depression: ok    Objective    Last Recorded Vitals  /72   Pulse (!) 111   Temp 36.2 °C (97.2  "°F)   Resp (!) 27   Ht 1.803 m (5' 10.98\")   Wt 99.5 kg (219 lb 5.7 oz)   SpO2 93%   BMI 30.61 kg/m²      Physical Exam   Constitutional:       Appearance: He is well-developed, in bed.  HENT:      Mouth/Throat:      Pharynx: Oropharynx is clear.   Cardiovascular:      Rate and Rhythm: Normal rate. Rhythm regular, tachycardic 106.   Pulmonary:      Effort: Pulmonary effort is normal.      Breath sounds: Decreased breath sounds present.   Abdominal:      Palpations: Abdomen is soft.   Musculoskeletal:         General: Normal range of motion.      Right lower leg: +1 Edema present.      Left lower leg: +1 Edema present.    Skin:     General: Skin is warm and dry.      Comments: BLE redness    Neurological:      General: No focal deficit present.      Mental Status: Mental status is at baseline.    Psychiatric:         Attention and Perception: Attention normal.         Mood and Affect: Mood is depressed. Affect is flat.         Speech: Speech normal.         Behavior: Behavior is cooperative.         Thought Content: Thought content normal.         Cognition and Memory: Cognition normal.         Judgment: Judgment normal.       Relevant Results   Results for orders placed or performed during the hospital encounter of 07/17/25 (from the past 24 hours)   POCT GLUCOSE   Result Value Ref Range    POCT Glucose 175 (H) 74 - 99 mg/dL   POCT GLUCOSE   Result Value Ref Range    POCT Glucose 172 (H) 74 - 99 mg/dL   CBC   Result Value Ref Range    WBC 5.4 4.4 - 11.3 x10*3/uL    nRBC 0.0 0.0 - 0.0 /100 WBCs    RBC 4.57 4.50 - 5.90 x10*6/uL    Hemoglobin 10.9 (L) 13.5 - 17.5 g/dL    Hematocrit 35.0 (L) 41.0 - 52.0 %    MCV 77 (L) 80 - 100 fL    MCH 23.9 (L) 26.0 - 34.0 pg    MCHC 31.1 (L) 32.0 - 36.0 g/dL    RDW 19.7 (H) 11.5 - 14.5 %    Platelets 239 150 - 450 x10*3/uL   Renal function panel   Result Value Ref Range    Glucose 185 (H) 74 - 99 mg/dL    Sodium 134 (L) 136 - 145 mmol/L    Potassium 4.1 3.5 - 5.3 mmol/L    " Chloride 102 98 - 107 mmol/L    Bicarbonate 23 21 - 32 mmol/L    Anion Gap 13 10 - 20 mmol/L    Urea Nitrogen 49 (H) 6 - 23 mg/dL    Creatinine 1.70 (H) 0.50 - 1.30 mg/dL    eGFR 46 (L) >60 mL/min/1.73m*2    Calcium 8.7 8.6 - 10.6 mg/dL    Phosphorus 3.2 2.5 - 4.9 mg/dL    Albumin 3.5 3.4 - 5.0 g/dL   Magnesium   Result Value Ref Range    Magnesium 2.05 1.60 - 2.40 mg/dL   Heparin Assay, UFH   Result Value Ref Range    Heparin Unfractionated 0.2 See Comment Below for Therapeutic Ranges IU/mL   POCT GLUCOSE   Result Value Ref Range    POCT Glucose 160 (H) 74 - 99 mg/dL   Heparin Assay, UFH   Result Value Ref Range    Heparin Unfractionated 0.4 See Comment Below for Therapeutic Ranges IU/mL   POCT GLUCOSE   Result Value Ref Range    POCT Glucose 121 (H) 74 - 99 mg/dL   POCT GLUCOSE   Result Value Ref Range    POCT Glucose 114 (H) 74 - 99 mg/dL        Allergies  Azithromycin  Medications  Scheduled medications  Scheduled Medications[1]  Continuous medications  Continuous Medications[2]  PRN medications  PRN Medications[3]     Assessment/Plan    Felice Law is a 60 y.o. male with significant PMH of ICM, HFrEF, EF 15-20%, CABG (LIMA- LAD in 2023),  PCI, VT arrest s/p VT ablation and ICD in 2017, Hypertension, Hyperlipidemia, Hypothyroidism, Left femoral DVT on Eliquis, COPD, JAKE, CKD III, Chronic BLE venous stasis dermatitis, Insulin dependent Type 2 diabetes who was initially admitted 7/12 to [place] for acute decompensate heart failure. He was diuresed and recommended for transfer to [facility] HF ICU for further managemnet and possible re-consider of advanced therapy work up.  Of notes, Patient was previously discussed for AT during [facility] October 2023 admission but declined with concerns for mobility/frailty (uses motorized scooter and has bilateral foot drop with neuropathy), CKD, poorly controlled diabetes as well as unclear social/financial support).      Transferred to HF ICU 7/17, he was warm and wet,  lasix IV  bolus given and drip initiated. Eliquis on hold, heparin drip initiated. Bedside SG done 7/19. Hg A1c 10.1 this admission, now considering advanced therapy evaluation. Advanced therapies evaluation initiated 7/23.  Plan is to optimize DM (A1c 10) and defer listing for likely heart transplant.      7/24: Palliative consultation.    7/29: Pt is a good candidate for both lvad/oht. Plan is to get pt euvolemic, stable on GDMT, off milrinone, return home to optimize DMII management for follow up on AT (OHT?) in 3-4 months. Pt agreeable and happy with this tentative plan. (Norma sunshine.)    Palliative Performance Scale (PPS): 70    ----------------------------------------------------------------------------------------------------------------------------------------------------------------------------------------------------------------------------------------------------------------------------------------------------------------------------------------------------------------------      I spent 11 minutes in providing separately identifiable ACP services with the patient and/or surrogate decision maker in a voluntary conversation discussing the patient's wishes and goals as detailed in the above note.   ----------------------------------------------------------------------------------------------------------------------------------------------------------------------------------------------------------------------------------------------------------------------------------------------------------------------------------------------------------------------    #Complex Medical Decision Making  #Goals of Care  #Advanced Care Planning  - Code status: FULL CODE  - Surrogate decision maker: Aracelis Law (Mother)  [phone]    - Goals are mix of survival and time and improved quality of life  - 7/24: Met with pt at bedside. Able to glean more information r/t pt's life, wishes, goals, worries, and health preferences. See  above/consult note for additional supportive detail.      Preparedness Plan:   This NP had an extensive conversation with the patient, DAJUAN used for clarity of interpretation/translation. Palliative care was introduced and it was explained what a preparedness plan entails. All questions were addressed and answered. Patient gave a verbal understanding of the answers provided.      Goals/expectations of OHT/ LVAD implantation: more time, to get through sx well.     Hemodialysis: Patient verbalized understanding that preexisting renal disease or renal injury during/after OHT/VAD implantation puts patients at risk for dialysis dependent renal failure. PT EXPRESSED HE ACCEPTS THE RISK AND AGREEABLE TO HD IF NECESSARY.     ICH/stroke: Patient is aware AND SKEPTICAL of risk for bleeding or stroke. AT THIS TIME, PT STATES HE IS UNSURE ABOUT THESE RISKS AND MAY NOT WISH TO FURTHER PROCEED WITH ADVANCED THERAPIES WORK UP/EVAL. PT AGREEABLE FOR MORE INFORMATION R/T RISK (PERCENTAGE ECT) TO ASSIST IN GUIDING HIS DECISIONS.     LVAD failure: Patient verbalized he IS ACCEPTING OF heroic measures, compressions and intubation, in the event of LVAD failure.      Artifical nutrition and hydration: Patient would ACCEPT a long term feeding tube in the event complications would occur that would require placement.     Blood transfusions: Patient ACCEPTS receiving blood transfusions if needed.      Organ Donation: Patient verbalized HE WOULD PARTICIPATE IN organ donation in the event this would become a possibility.      Mechanical ventilation: Patient ACCEPTS TO undergo short term ventilation. Pt expressed he would ACCEPT long term mechanical ventilation/trach placement if this were required.     Postoperative rehab plan: PT IS AGREEABLE TO REHABILITATION PRIOR TO GOING HOME IF INDICATED.     Spiritual preferences: NONE     If device implantation over time does not help pt meet pt's goals of care and complications affect her/his view on  quality of life, pt was asked about next steps. PT REPLIED HE WOULD BE HOPEFUL FOR OHT.     #ADHF  #AT evaluation  #Psychosocial Support  - Ongoing pall team pt/family support and care navigation.  - 7/29 Pt is a good candidate for both lvad/oht. Plan is to get pt euvolemic, stable on GDMT, off milrinone, return home to optimize DMII management for follow up [facility]) in 3-4 months. Pt agreeable and happy with this tentative plan. (Norma used.)  - Spiritual Care Support        Plan of Care discussed with: Updated primary and bedside RN on goals of care decision, medication adjustments, and code status      Medical Decision Making was high level due to high complexity of problems, extensive data review, and high risk of management/treatment.     - ADHF, AT evaluation posing threat to life and function.  - Reviewed external notes from   - Reviewed results from  which were used in decision making for   - Recommended the following tests:   - Assessment required independent historian: primary  - Independent interpretation of test: labs and imaging  - Discussion of management with: primary  - Drug therapy requiring intensive monitoring for toxicity: insulin, heparin.  - Decision regarding elective major surgery with identified patient or procedure risk factors: agreeable to AT at later date if able.  - Decision regarding emergency major surgery:   - Decision regarding hospitalization or escalation of hospital-level care:   - Decision not to resuscitate or to de-escalate care because of poor prognosis:   - Parenteral controlled substances:     Thank you for allowing us to care for this patient. Palliative Team will continue to follow as needed. Please contact team with any questions or concerns.   Team pager [phone] (weekdays)      MARLON Jc-CNP           [1] [Held by provider] apixaban, 5 mg, oral, BID  atorvastatin, 80 mg, oral, Nightly  calcium carbonate, 500 mg of calcium carbonate, oral,  BID  dapagliflozin propanediol, 10 mg, oral, Daily  digoxin, 125 mcg, oral, Daily  fenofibrate, 145 mg, oral, Daily  furosemide, 80 mg, intravenous, q12h  gabapentin, 200 mg, oral, q8h SUSI  hydrALAZINE, 100 mg, oral, q8h SUSI  insulin glargine, 48 Units, subcutaneous, Nightly  insulin glargine, 48 Units, subcutaneous, Daily  insulin lispro, 0-15 Units, subcutaneous, TID AC  insulin lispro, 34 Units, subcutaneous, TID AC  iron sucrose, 200 mg, intravenous, Every other day  isosorbide dinitrate, 40 mg, oral, TID  levothyroxine, 75 mcg, oral, Daily before breakfast  pantoprazole, 40 mg, oral, Daily before breakfast  potassium chloride CR, 40 mEq, oral, Once  QUEtiapine, 50 mg, oral, Nightly  sacubitriL-valsartan, 1 tablet, oral, BID  [Held by provider] spironolactone, 25 mg, oral, Daily  [2] heparin, 0-4,000 Units/hr, Last Rate: 1,800 Units/hr (07/29/25 1244)  milrinone, 0.25 mcg/kg/min, Last Rate: 0.25 mcg/kg/min (07/29/25 1259)  [3] PRN medications: acetaminophen, albuterol, alteplase, dextrose, dextrose, glucagon, glucagon, heparin

## 2025-07-29 NOTE — PROGRESS NOTES
[facility]  HFICU PROGRESS NOTE    Felice Law/[id number]    Admit Date: 7/17/2025  Hospital Length of Stay: 12   ICU Length of Stay: 12d   Primary Service: HFICU  Primary HF Cardiologist: Dr. Jono Duque MD   Referring: Dr Duque     INTERVAL EVENTS / PERTINENT ROS:     No acute events overnight, patient continues to be tachycardiac off metoprolol but CO/CI are preserved.   CHARLOTTE improving. Short run of VT overnight. Looks wet on evaluation. Started low dose Entresto yesterday.    Plan:   - Start Lasix 80 mg BID  - C/w Milrinone .25mcq/kg/min, Entresto 24-26 mg  - Plan to wean off Milrinone  - Continue OHT/VAD evaluation  - Consider transfer to the floor    MEDICATIONS  Infusions:  heparin, Last Rate: 1,800 Units/hr (07/29/25 1101)  milrinone, Last Rate: 0.25 mcg/kg/min (07/29/25 1014)      Scheduled:  [Held by provider] apixaban, 5 mg, BID  atorvastatin, 80 mg, Nightly  calcium carbonate, 500 mg of calcium carbonate, BID  dapagliflozin propanediol, 10 mg, Daily  digoxin, 125 mcg, Daily  fenofibrate, 145 mg, Daily  furosemide, 80 mg, q12h  gabapentin, 200 mg, q8h SUSI  hydrALAZINE, 100 mg, q8h SUSI  insulin glargine, 48 Units, Nightly  insulin glargine, 48 Units, Daily  insulin lispro, 0-15 Units, TID AC  insulin lispro, 34 Units, TID AC  iron sucrose, 200 mg, Every other day  isosorbide dinitrate, 40 mg, TID  levothyroxine, 75 mcg, Daily before breakfast  pantoprazole, 40 mg, Daily before breakfast  potassium chloride CR, 40 mEq, Once  QUEtiapine, 50 mg, Nightly  sacubitriL-valsartan, 1 tablet, BID  [Held by provider] spironolactone, 25 mg, Daily      PRN:  acetaminophen, 975 mg, q6h PRN  albuterol, 2.5 mg, q6h PRN  alteplase, 2 mg, PRN  dextrose, 12.5 g, q15 min PRN  dextrose, 25 g, q15 min PRN  glucagon, 1 mg, q15 min PRN  glucagon, 1 mg, q15 min PRN  heparin, 2,000-4,000 Units, q4h PRN      Invasive Hemodynamics:    Most Recent Range Past 24hrs   BP (Art)   No data  "recorded   MAP(Art)   No data recorded   RA/CVP   No data recorded   PA 49/27 No data recorded   PA(mean) 35 mmHg No data recorded   PCWP 19 mmHg No data recorded   CO 7.5 L/min No data recorded   CI 3.5 L/min/m2 No data recorded   Mixed Venous 68 % No data recorded   SVR  734 (dyne*sec)/cm5 No data recorded    (dyne*sec)/cm5 No data recorded       PHYSICAL EXAM:   Visit Vitals  BP 94/63   Pulse 109   Temp 36.2 °C (97.2 °F)   Resp 20   Ht 1.803 m (5' 10.98\")   Wt 99.5 kg (219 lb 5.7 oz)   SpO2 96%   BMI 30.61 kg/m²   Smoking Status Former   BSA 2.23 m²       Wt Readings from Last 5 Encounters:   07/29/25 99.5 kg (219 lb 5.7 oz)   07/17/25 110 kg (243 lb 2.7 oz)   06/10/25 97.1 kg (214 lb)   05/12/25 96.6 kg (213 lb)   05/04/25 102 kg (224 lb)       INTAKE/OUTPUT:  I/O last 3 completed shifts:  In: 1954 (19.6 mL/kg) [P.O.:840; I.V.:864 (8.7 mL/kg); IV Piggyback:250]  Out: 1900 (19.1 mL/kg) [Urine:1900 (0.5 mL/kg/hr)]  Weight: 99.5 kg      Physical Exam  Constitutional:       Appearance: Normal appearance.   HENT:      Head: Normocephalic and atraumatic.      Nose: Nose normal.      Mouth/Throat:      Mouth: Mucous membranes are moist.     Eyes:      Extraocular Movements: Extraocular movements intact.      Pupils: Pupils are equal, round, and reactive to light.     Neck:      Comments: Rt IJ  JVD present  Cardiovascular:      Rate and Rhythm: Normal rate and regular rhythm.      Pulses: Normal pulses.      Heart sounds:      Gallop present.   Pulmonary:      Effort: Pulmonary effort is normal.      Breath sounds: Decreased breath sounds present.   Abdominal:      General: There is distension.     Musculoskeletal:         General: Swelling present.      Cervical back: Normal range of motion and neck supple.      Right lower leg: Edema (trace) present.      Left lower leg: Edema (trace) present.      Comments: Chronic venous stasis      Skin:     General: Skin is warm.      Capillary Refill: Capillary refill " "takes 2 to 3 seconds.     Neurological:      General: No focal deficit present.      Mental Status: He is alert and oriented to person, place, and time.     Psychiatric:         Mood and Affect: Mood normal.         Behavior: Behavior normal.         DATA:  CMP:  Results from last 7 days   Lab Units 07/29/25  0518 07/28/25  0418 07/27/25  0543 07/26/25  0446 07/25/25  0618 07/24/25  1941 07/24/25  0314 07/23/25  1750 07/23/25  0450 07/22/25  1618   SODIUM mmol/L 134* 135* 133* 135* 134* 132* 134* 134* 136 134*   POTASSIUM mmol/L 4.1 4.1 3.5 4.0 4.0 4.2 4.1 3.9 3.7 4.3   CHLORIDE mmol/L 102 100 95* 94* 93* 91* 92* 93* 93* 91*   CO2 mmol/L 23 22 27 28 30 29 32 30 32 32   ANION GAP mmol/L 13 17 15 17 15 16 14 15 15 15   BUN mg/dL 49* 52* 59* 66* 60* 59* 52* 52* 48* 50*   CREATININE mg/dL 1.70* 2.04* 2.39* 3.24* 2.93* 2.65* 2.62* 2.17* 2.04* 2.40*   EGFR mL/min/1.73m*2 46* 37* 30* 21* 24* 27* 27* 34* 37* 30*   MAGNESIUM mg/dL 2.05 2.20 2.28 2.34 2.21 2.20 2.35  --  2.35  --    ALBUMIN g/dL 3.5 3.6 3.5 3.6 3.5 3.5 3.5 3.8 3.4 3.8   LIPASE U/L  --  60  --   --   --   --   --   --   --   --      CBC:  Results from last 7 days   Lab Units 07/29/25  0518 07/28/25  0418 07/27/25  0543 07/26/25  0446 07/25/25  0618 07/24/25  0314 07/23/25  0450   WBC AUTO x10*3/uL 5.4 5.9 5.7 6.2 7.2 5.9 4.4   HEMOGLOBIN g/dL 10.9* 11.1* 11.2* 12.1* 11.2* 11.2* 11.8*   HEMATOCRIT % 35.0* 38.5* 38.1* 38.5* 35.4* 37.8* 38.0*   PLATELETS AUTO x10*3/uL 239 233 252 238 238 234 213   MCV fL 77* 81 81 77* 76* 80 76*     COAG:   Results from last 7 days   Lab Units 07/23/25  1230   INR  1.1     ABO:   No results found for: \"ABO\"    HEME/ENDO:  Results from last 7 days   Lab Units 07/23/25  1230   TSH mIU/L 2.12      CARDIAC:   Results from last 7 days   Lab Units 07/26/25  0446 07/23/25  1230   LD U/L  --  231   BNP pg/mL 281* 380*       ASSESSMENT AND PLAN:     Felice Law is a 60 y.o. male with significant PMH of ICM, HFrEF, EF 15-20%, " CABG (LIMA- LAD in 2023),  PCI, VT arrest s/p VT ablation and ICD in 2017, Hypertension, Hyperlipidemia, Hypothyroidism, Left femoral DVT on Eliquis, COPD, JAKE, CKD III, Chronic BLE venous stasis dermatitis, Insulin dependent Type 2 diabetes who was initially admitted 7/12 to [facility] for acute decompensate heart failure. He was diuresed and recommended for transfer to [facility] HF ICU for further managemnet and possible re-consider of advanced therapy work up.  Of notes, Patient was previously discussed for AT during [facility] October 2023 admission but declined with concerns for mobility/frailty (uses motorized scooter and has bilateral foot drop with neuropathy), CKD, poorly controlled diabetes as well as unclear social/financial support).     Transferred to HF ICU 7/17, he was warm and wet,  lasix IV bolus given and drip initiated. Eliquis on hold, heparin drip initiated. Bedside SG done 7/19, opening swan numbers: BP: 133/74 (94),CVP 9, PA 74/29 (44), PCWP 24, CO/CI (Pallavi) 5.4/2.4, Thermo: 4.6/2.1, SVR 1263, SVO2 63% on hydral /Isordil 100/40 mg TID, Metop 50 mg daily, Dapa 10 mg daily, Dig 125 mcg PO daily, Lasix drip at 20 mg /hr. Entresto re-introduce and up titrated. Lasix drip d/c'd currently on intermittent IVP.  Hg A1c 10.1 this admission, now considering advanced therapy evaluation. Advanced therapies evaluation initiated 7/23.  Plan is to optimize DM (A1c 10) and defer listing for likely heart transplant.     Neuro:   # Anxiety. Depression, Acute pain    # Bilateral foot drop with LE neuropathy: Able to walk two blocks. Uses motorized scoopter    # Sudden UE shaking episode (resolved)  - Serial neuro and pain assessments    - PO Tylenol PRN for pain   - PT/OT Consult, OOB to chair   - CAM ICU score every shift   - Sleep/wake cycle normalization      # Physical Status   -Obesity: BMI: 34 --- > 30  -Reduced Mobility due to Foot drop, ICU and CHF       #Substance abuse   -Alcohol abuse/Alcohol dependence: NO    -Tobacco use/Nicotine Dependence: Quit 8 years ago      Cardiovascular:   #Acute on chronic systolic CHF, HFrEF 15-20%, dx'd 2017,   #Ischemic Cardiomyopathy, ACC/AHA Stage C-D, NYHA II, Warm,   -admit weight (7/17): 111 kg -- > 98 kg (7/20)  -BNP (7/17): 744  -TTE (5/4/2025):  Left ventricular ejection fraction is severely decreased, by visual estimate at 15-20%.   - RHC (6/10/25): RA: 25,RV: 63/9, PA: 72/34, PCWP: 32, PA-SAT: 36%, SVC-SAT: 41%, FA-SAT: 71%, CO: 4.84, CI: 2.24, SVR: 1041  - LHC (6/10/25):   Right dominant coronary circulation with severe multivessel CAD, as described above. LIMA to LAD patent.  - C/w atorvastin 80mg    - Lasix gtt discontinued overnight 7/19.   - Bedside SG with Opening swan numbers (7/19): BP: 133/74 (94),CVP 9, PA 74/29 (44), PCWP 24, CO/CI (Pallavi) 5.4/2.4, Thermo: 4.6/2.1, SVR 1263, SVO2 63% on hydral /Isordil 100/40 mg TID, Metop 50 mg daily, Dapa 10 mg daily, Dig 125 mcg PO daily, Lasix drip at 20 mg /h.  - Closing SGC #'s (7/27):  BP 89/62 (70), CVP 9, PAP 58/24 (37), PAWP 18, CO/CI 7.5/3.5, , SVO2 68%    - Lasix 80 mg IV push x1 (7/21/2025)  - D/c Bumex drip 7/24  - Discontinue Toprol 100 (7/24)  - C/w Milrinone 0.25 mcg/kg/min  - Hold Entresto 97/103 mg BID and Lexx 7/26 given CHARLOTTE  - C/w Hydralazine 100mg/ Isordil 40mg daily, increase as tolerated   - Wean off nipride gtt low dose 7/27  - Daily standing weights, 2gm sodium diet, 2L fluid restriction, strict I&Os      7/29:  - Restart Lasix 80 mg BID.   - Restarted Low dose Entresto 24-26 mg  - C/w Milrinone 0.25 mcg/kg/min  - C/w Hydralazine 100mg/ Isordil 40mg daily, increase as tolerated   - C/w Farxiga 10 mg every day  - Following committee discussion, plan isoptimize DM (A1c 10) and defer listing for likely heart transplant / LVAd depending on acuity .         #CAD    - s/p PCI and 1v CABG (LIMA-LAD 04/2023).   - C/w ASA and atorvastatin      Pulmonary:    # H/o COPD  # Hx Pulmonary Hypertension   # Central  sleep apnea.   # Prior tobacco use (24 pack year, quit 2017),   - On 2L via NC      :   #CHARLOTTE/CKD III  - Baseline (11/11/2024): BUN/Cr (GFR): 60/2.93 (24)  -Admit Bun/Creatinine (7/17):  48/1.62, creatinine peaked at 2.55 OSH now 2.04. 7/28  -I/Os   -avoid hypotension and nephrotoxic agents      Heme:   #Anemia in the setting of MAI and CKD III  - Labs (7/17):  TIBC: 377,  ferritin: 102, serum Fe: 61, folate: 13.1, B12: 452   - IV Venofer 200 mg x5 doses (7/17-7/21)      # Left femoral DVT (2017)  - Hold home Eliquis  - C/w Heparin drip (7/18)      Endo:   #DM 2  - hgbA1c(7/17):  10.1  - Diabetic management per endocrine see daily recs  - Insulin glargine to 48 units BID, Insulin lispro to 34 units TID with meals  - SS#3 AC and HS     # Hypothyroidism  -C/w Levothyroxine 75mcg  -TSH (7/18): 3.97      ID:   -afebrile, nontoxic    -no s/s infx   -trend temps q4 hr        PHYSICAL AND OCCUPATIONAL THERAPY: PT/OT     LINES:  PIVs   SGC (7/19 - present) -> plan to remove this evening         DVT: SCD's, Heparin drip once Heparin assay <0.2  VAP BUNDLE: NA  CENTRAL LINE BUNDLE: Ordered  ULCER PPX: PPI  GLYCEMIC CONTROL: SSI, Lantus insulin   BOWEL CARE: Senna, Miralax   INDWELLING CATHETER: NA  NUTRITION: Adult diet Consistent Carb; CCD 90 gm/meal; 2000 mL fluid        EMERGENCY CONTACT: Extended Emergency Contact Information  Primary Emergency Contact: NAM LOO  Address: [address] [place] [address] [facility]  Home Phone: [phone]  Mobile Phone: [phone]  Relation: Mother  Secondary Emergency Contact: BenRobinson  Mobile Phone: [phone]  Relation: Friend  FAMILY UPDATE: no family at bedside   CODE STATUS: Full Code  DISPO:  HF ICU      Patient seen and assessed with Dr. Yen ZARAGOZA personally spent 60  minutes of critical care time directly and personally managing the patient exclusive of separately billable procedures.       __________________________________________  Philomena Worrell MD   Heart failure Fellow

## 2025-07-29 NOTE — PROGRESS NOTES
"Endocrine Progress Note   Felice Law is a 60 y.o. male on day 12 of admission presenting with Acute on chronic systolic heart failure.  Endocrine consulted for T2DM management.    Subjective   Patient seen and examined at bedside in no acute distress.  Patient denies any nausea, vomiting, difficulty breathing, abdominal pain, constipation or diarrhea.  He reports stable appetite.  Blood glucose trends reviewed.     Objective     ROS  Negative unless mentioned above    Exam:    General - male patient in NAD  HEENT - AT/NC  Resp - no extra wob noted  Msckl - + edema in B/L LE    Last Recorded Vitals  Blood pressure 106/69, pulse 108, temperature 36.5 °C (97.7 °F), temperature source Temporal, resp. rate 20, height 1.803 m (5' 10.98\"), weight 99.5 kg (219 lb 5.7 oz), SpO2 93%.  Intake/Output last 3 Shifts:  I/O last 3 completed shifts:  In: 1954 (19.6 mL/kg) [P.O.:840; I.V.:864 (8.7 mL/kg); IV Piggyback:250]  Out: 1900 (19.1 mL/kg) [Urine:1900 (0.5 mL/kg/hr)]  Weight: 99.5 kg     Scheduled medications  Scheduled Medications[1]  Continuous medications  Continuous Medications[2]  PRN medications  PRN Medications[3]     Relevant Results  Results from last 7 days   Lab Units 07/29/25  1139 07/29/25  1114 07/29/25  0743 07/29/25  0518 07/28/25  1917 07/28/25  1742 07/28/25  0631 07/28/25  0418 07/27/25  0727 07/27/25  0543 07/26/25  0815 07/26/25  0446 07/25/25  0833 07/25/25  0618   POCT GLUCOSE mg/dL 114* 121* 160*  --  172* 175*   < >  --    < >  --    < >  --    < >  --    GLUCOSE mg/dL  --   --   --  185*  --   --   --  200*  --  182*  --  234*  --  170*    < > = values in this interval not displayed.     BMP:  Results from last 7 days   Lab Units 07/29/25  0518 07/28/25  0418 07/27/25  0543   SODIUM mmol/L 134* 135* 133*   POTASSIUM mmol/L 4.1 4.1 3.5   CHLORIDE mmol/L 102 100 95*   CO2 mmol/L 23 22 27   BUN mg/dL 49* 52* 59*   CREATININE mg/dL 1.70* 2.04* 2.39*   GLUCOSE mg/dL 185* 200* 182* "     Assessment  Felice Law is a 60 y.o. male with significant PMH of ICM, HFrEF, EF 15-20%, CABG (LIMA- LAD in 2023),  PCI, VT arrest s/p VT ablation and ICD in 2017, Hypertension, Hyperlipidemia, Hypothyroidism, Left femoral DVT on Eliquis, COPD, JAKE, CKD III and Insulin dependent Type 2 diabetes. He was admitted on 7/12 for further HF management and advanced therapy work up.   Endocrine consulted for managing uncontrolled T2DM.      #T2DM  :: Hyperglycemia 2/2 poorly controlled Type 2 diabetes   :: last HbA1c 10.1 (7/17)  :: Home regimen: Insulin galargine 40 units twice a day, insulin lispro 18 units with meals plus sliding scale up to 80 units daily, tirzepatide (Mounjaro) 5 mg/0.5 mL pen injector, Farxiga 10 mg tablet, has freestyle abbi 3  Blood glucose trends reviewed, noted tighter blood glucose control    # Hypothyroid   :: Thyroid on home dose > Levothyroxine 75mcg  :: TSH 2.12, T4 9.3, T3 105 (7/23) all fall within reference ranges consistent with euthyroidism.  - continue on same home dose for thyroxine.     Plan  - Continue insulin glargine to 48 units BID   - Decrease insulin lispro to 30 units TID with meals  - SS#3 AC and HS  - Accuchecks TIDAC and at bedtime  - 60 gm carb consistent diet  - Goal -180  - Hypoglycemia bundle  Will consider starting metformin while inpatient     Recommendations communicated to primary team. Please reach out incase you have any questions or concerns.    The patient was discussed with attending Dr. Diana Yancey MD  Endocrinology fellow            [1] [Held by provider] apixaban, 5 mg, oral, BID  atorvastatin, 80 mg, oral, Nightly  calcium carbonate, 500 mg of calcium carbonate, oral, BID  dapagliflozin propanediol, 10 mg, oral, Daily  digoxin, 125 mcg, oral, Daily  fenofibrate, 145 mg, oral, Daily  furosemide, 80 mg, intravenous, q12h  gabapentin, 200 mg, oral, q8h SUSI  hydrALAZINE, 100 mg, oral, q8h SUSI  insulin glargine, 48  Units, subcutaneous, Nightly  insulin glargine, 48 Units, subcutaneous, Daily  insulin lispro, 0-15 Units, subcutaneous, TID AC  insulin lispro, 30 Units, subcutaneous, TID AC  iron sucrose, 200 mg, intravenous, Every other day  isosorbide dinitrate, 40 mg, oral, TID  levothyroxine, 75 mcg, oral, Daily before breakfast  pantoprazole, 40 mg, oral, Daily before breakfast  potassium chloride CR, 40 mEq, oral, Once  QUEtiapine, 50 mg, oral, Nightly  sacubitriL-valsartan, 1 tablet, oral, BID  [Held by provider] spironolactone, 25 mg, oral, Daily     [2] heparin, 0-4,000 Units/hr, Last Rate: 1,800 Units/hr (07/29/25 1608)  milrinone, 0.25 mcg/kg/min, Last Rate: 0.25 mcg/kg/min (07/29/25 1608)     [3] PRN medications: acetaminophen, albuterol, alteplase, dextrose, dextrose, glucagon, glucagon, heparin

## 2025-07-30 ENCOUNTER — APPOINTMENT (OUTPATIENT)
Dept: RADIOLOGY | Facility: HOSPITAL | Age: 60
End: 2025-07-30
Payer: COMMERCIAL

## 2025-07-30 ENCOUNTER — LAB REQUISITION (OUTPATIENT)
Dept: LAB | Facility: CLINIC | Age: 60
DRG: 291 | End: 2025-07-30
Payer: COMMERCIAL

## 2025-07-30 DIAGNOSIS — I50.9 HEART FAILURE, UNSPECIFIED: ICD-10-CM

## 2025-07-30 LAB
ALBUMIN SERPL BCP-MCNC: 4.3 G/DL (ref 3.4–5)
ANION GAP SERPL CALC-SCNC: 17 MMOL/L (ref 10–20)
BUN SERPL-MCNC: 55 MG/DL (ref 6–23)
C DIF TOX TCDA+TCDB STL QL NAA+PROBE: NOT DETECTED
CALCIUM SERPL-MCNC: 9.3 MG/DL (ref 8.6–10.6)
CHLORIDE SERPL-SCNC: 97 MMOL/L (ref 98–107)
CO2 SERPL-SCNC: 23 MMOL/L (ref 21–32)
CREAT SERPL-MCNC: 2.42 MG/DL (ref 0.5–1.3)
EGFRCR SERPLBLD CKD-EPI 2021: 30 ML/MIN/1.73M*2
ERYTHROCYTE [DISTWIDTH] IN BLOOD BY AUTOMATED COUNT: 20.5 % (ref 11.5–14.5)
FLOW ALLOCROSSMATCH: NORMAL
FLOW ALLOCROSSMATCH: NORMAL
GLUCOSE BLD MANUAL STRIP-MCNC: 104 MG/DL (ref 74–99)
GLUCOSE BLD MANUAL STRIP-MCNC: 113 MG/DL (ref 74–99)
GLUCOSE BLD MANUAL STRIP-MCNC: 143 MG/DL (ref 74–99)
GLUCOSE BLD MANUAL STRIP-MCNC: 243 MG/DL (ref 74–99)
GLUCOSE SERPL-MCNC: 139 MG/DL (ref 74–99)
HCT VFR BLD AUTO: 41.4 % (ref 41–52)
HGB BLD-MCNC: 11.8 G/DL (ref 13.5–17.5)
HLA RESULTS: NORMAL
HLA RESULTS: NORMAL
MAGNESIUM SERPL-MCNC: 2.24 MG/DL (ref 1.6–2.4)
MCH RBC QN AUTO: 23.6 PG (ref 26–34)
MCHC RBC AUTO-ENTMCNC: 28.5 G/DL (ref 32–36)
MCV RBC AUTO: 83 FL (ref 80–100)
NRBC BLD-RTO: 0 /100 WBCS (ref 0–0)
PHOSPHATE SERPL-MCNC: 3.7 MG/DL (ref 2.5–4.9)
PLATELET # BLD AUTO: 301 X10*3/UL (ref 150–450)
POTASSIUM SERPL-SCNC: 3.8 MMOL/L (ref 3.5–5.3)
RBC # BLD AUTO: 4.99 X10*6/UL (ref 4.5–5.9)
SODIUM SERPL-SCNC: 133 MMOL/L (ref 136–145)
UFH PPP CHRO-ACNC: 0.3 IU/ML (ref ?–1.1)
WBC # BLD AUTO: 6.3 X10*3/UL (ref 4.4–11.3)

## 2025-07-30 PROCEDURE — 2500000002 HC RX 250 W HCPCS SELF ADMINISTERED DRUGS (ALT 637 FOR MEDICARE OP, ALT 636 FOR OP/ED): Performed by: PHYSICIAN ASSISTANT

## 2025-07-30 PROCEDURE — 99233 SBSQ HOSP IP/OBS HIGH 50: CPT | Performed by: INTERNAL MEDICINE

## 2025-07-30 PROCEDURE — 2500000001 HC RX 250 WO HCPCS SELF ADMINISTERED DRUGS (ALT 637 FOR MEDICARE OP): Performed by: PHYSICIAN ASSISTANT

## 2025-07-30 PROCEDURE — 85520 HEPARIN ASSAY: CPT | Performed by: PHYSICIAN ASSISTANT

## 2025-07-30 PROCEDURE — 75561 CARDIAC MRI FOR MORPH W/DYE: CPT

## 2025-07-30 PROCEDURE — 2500000004 HC RX 250 GENERAL PHARMACY W/ HCPCS (ALT 636 FOR OP/ED): Performed by: PHYSICIAN ASSISTANT

## 2025-07-30 PROCEDURE — 85027 COMPLETE CBC AUTOMATED: CPT | Performed by: PHYSICIAN ASSISTANT

## 2025-07-30 PROCEDURE — 36415 COLL VENOUS BLD VENIPUNCTURE: CPT | Performed by: PHYSICIAN ASSISTANT

## 2025-07-30 PROCEDURE — 1200000002 HC GENERAL ROOM WITH TELEMETRY DAILY

## 2025-07-30 PROCEDURE — 75561 CARDIAC MRI FOR MORPH W/DYE: CPT | Performed by: INTERNAL MEDICINE

## 2025-07-30 PROCEDURE — 80069 RENAL FUNCTION PANEL: CPT | Performed by: PHYSICIAN ASSISTANT

## 2025-07-30 PROCEDURE — 99232 SBSQ HOSP IP/OBS MODERATE 35: CPT | Performed by: STUDENT IN AN ORGANIZED HEALTH CARE EDUCATION/TRAINING PROGRAM

## 2025-07-30 PROCEDURE — A9575 INJ GADOTERATE MEGLUMI 0.1ML: HCPCS | Performed by: STUDENT IN AN ORGANIZED HEALTH CARE EDUCATION/TRAINING PROGRAM

## 2025-07-30 PROCEDURE — 82947 ASSAY GLUCOSE BLOOD QUANT: CPT

## 2025-07-30 PROCEDURE — 2550000001 HC RX 255 CONTRASTS: Performed by: STUDENT IN AN ORGANIZED HEALTH CARE EDUCATION/TRAINING PROGRAM

## 2025-07-30 PROCEDURE — 83735 ASSAY OF MAGNESIUM: CPT | Performed by: PHYSICIAN ASSISTANT

## 2025-07-30 PROCEDURE — 87493 C DIFF AMPLIFIED PROBE: CPT | Performed by: PHYSICIAN ASSISTANT

## 2025-07-30 RX ORDER — FUROSEMIDE 10 MG/ML
80 INJECTION INTRAMUSCULAR; INTRAVENOUS
Status: DISCONTINUED | OUTPATIENT
Start: 2025-07-30 | End: 2025-08-05

## 2025-07-30 RX ORDER — INSULIN GLARGINE 100 [IU]/ML
46 INJECTION, SOLUTION SUBCUTANEOUS NIGHTLY
Status: DISCONTINUED | OUTPATIENT
Start: 2025-07-30 | End: 2025-07-31

## 2025-07-30 RX ORDER — INSULIN LISPRO 100 [IU]/ML
32 INJECTION, SOLUTION INTRAVENOUS; SUBCUTANEOUS
Status: DISCONTINUED | OUTPATIENT
Start: 2025-07-30 | End: 2025-08-01

## 2025-07-30 RX ORDER — GADOTERATE MEGLUMINE 376.9 MG/ML
40 INJECTION INTRAVENOUS
Status: COMPLETED | OUTPATIENT
Start: 2025-07-30 | End: 2025-07-30

## 2025-07-30 RX ORDER — INSULIN GLARGINE 100 [IU]/ML
46 INJECTION, SOLUTION SUBCUTANEOUS DAILY
Status: DISCONTINUED | OUTPATIENT
Start: 2025-07-31 | End: 2025-07-31

## 2025-07-30 RX ORDER — LOPERAMIDE HYDROCHLORIDE 2 MG/1
2 CAPSULE ORAL 3 TIMES DAILY PRN
Status: DISCONTINUED | OUTPATIENT
Start: 2025-07-30 | End: 2025-08-11 | Stop reason: HOSPADM

## 2025-07-30 RX ADMIN — INSULIN LISPRO 30 UNITS: 100 INJECTION, SOLUTION INTRAVENOUS; SUBCUTANEOUS at 09:30

## 2025-07-30 RX ADMIN — GABAPENTIN 200 MG: 100 CAPSULE ORAL at 06:38

## 2025-07-30 RX ADMIN — INSULIN LISPRO 30 UNITS: 100 INJECTION, SOLUTION INTRAVENOUS; SUBCUTANEOUS at 13:54

## 2025-07-30 RX ADMIN — GABAPENTIN 200 MG: 100 CAPSULE ORAL at 20:49

## 2025-07-30 RX ADMIN — GADOTERATE MEGLUMINE 40 ML: 376.9 INJECTION INTRAVENOUS at 12:20

## 2025-07-30 RX ADMIN — DIGOXIN 125 MCG: 125 TABLET ORAL at 10:06

## 2025-07-30 RX ADMIN — INSULIN GLARGINE 48 UNITS: 100 INJECTION, SOLUTION SUBCUTANEOUS at 09:30

## 2025-07-30 RX ADMIN — ISOSORBIDE DINITRATE 40 MG: 40 TABLET ORAL at 19:02

## 2025-07-30 RX ADMIN — HEPARIN SODIUM 1800 UNITS/HR: 10000 INJECTION, SOLUTION INTRAVENOUS at 02:54

## 2025-07-30 RX ADMIN — ATORVASTATIN CALCIUM 80 MG: 80 TABLET, FILM COATED ORAL at 20:49

## 2025-07-30 RX ADMIN — FUROSEMIDE 80 MG: 10 INJECTION, SOLUTION INTRAVENOUS at 10:08

## 2025-07-30 RX ADMIN — LEVOTHYROXINE SODIUM 75 MCG: 0.07 TABLET ORAL at 06:38

## 2025-07-30 RX ADMIN — PANTOPRAZOLE SODIUM 40 MG: 40 TABLET, DELAYED RELEASE ORAL at 06:38

## 2025-07-30 RX ADMIN — FUROSEMIDE 80 MG: 10 INJECTION, SOLUTION INTRAMUSCULAR; INTRAVENOUS at 17:56

## 2025-07-30 RX ADMIN — INSULIN LISPRO 6 UNITS: 100 INJECTION, SOLUTION INTRAVENOUS; SUBCUTANEOUS at 13:55

## 2025-07-30 RX ADMIN — INSULIN GLARGINE 46 UNITS: 100 INJECTION, SOLUTION SUBCUTANEOUS at 20:50

## 2025-07-30 RX ADMIN — CALCIUM CARBONATE (ANTACID) CHEW TAB 500 MG 1 TABLET: 500 CHEW TAB at 10:06

## 2025-07-30 RX ADMIN — CALCIUM CARBONATE (ANTACID) CHEW TAB 500 MG 1 TABLET: 500 CHEW TAB at 20:49

## 2025-07-30 RX ADMIN — FENOFIBRATE 145 MG: 145 TABLET, FILM COATED ORAL at 10:06

## 2025-07-30 RX ADMIN — ISOSORBIDE DINITRATE 40 MG: 40 TABLET ORAL at 14:56

## 2025-07-30 RX ADMIN — GABAPENTIN 200 MG: 100 CAPSULE ORAL at 14:56

## 2025-07-30 RX ADMIN — QUETIAPINE FUMARATE 50 MG: 25 TABLET ORAL at 20:49

## 2025-07-30 RX ADMIN — HEPARIN SODIUM 1800 UNITS/HR: 10000 INJECTION, SOLUTION INTRAVENOUS at 15:58

## 2025-07-30 RX ADMIN — SACUBITRIL AND VALSARTAN 1 TABLET: 24; 26 TABLET, FILM COATED ORAL at 20:49

## 2025-07-30 RX ADMIN — INSULIN LISPRO 32 UNITS: 100 INJECTION, SOLUTION INTRAVENOUS; SUBCUTANEOUS at 19:02

## 2025-07-30 RX ADMIN — HYDRALAZINE HYDROCHLORIDE 100 MG: 100 TABLET ORAL at 20:49

## 2025-07-30 RX ADMIN — HYDRALAZINE HYDROCHLORIDE 100 MG: 100 TABLET ORAL at 14:56

## 2025-07-30 RX ADMIN — DAPAGLIFLOZIN 10 MG: 10 TABLET, FILM COATED ORAL at 10:06

## 2025-07-30 ASSESSMENT — COGNITIVE AND FUNCTIONAL STATUS - GENERAL
WALKING IN HOSPITAL ROOM: A LITTLE
DAILY ACTIVITIY SCORE: 24
MOBILITY SCORE: 22
CLIMB 3 TO 5 STEPS WITH RAILING: A LITTLE
DAILY ACTIVITIY SCORE: 24
MOBILITY SCORE: 22
CLIMB 3 TO 5 STEPS WITH RAILING: A LITTLE
WALKING IN HOSPITAL ROOM: A LITTLE

## 2025-07-30 ASSESSMENT — PAIN - FUNCTIONAL ASSESSMENT
PAIN_FUNCTIONAL_ASSESSMENT: 0-10
PAIN_FUNCTIONAL_ASSESSMENT: 0-10

## 2025-07-30 ASSESSMENT — PAIN SCALES - GENERAL
PAINLEVEL_OUTOF10: 0 - NO PAIN
PAINLEVEL_OUTOF10: 0 - NO PAIN

## 2025-07-30 ASSESSMENT — ACTIVITIES OF DAILY LIVING (ADL): LACK_OF_TRANSPORTATION: NO

## 2025-07-30 NOTE — HOSPITAL COURSE
Felice Law is a 60 y.o. male with PMH of ischemic RPgIP23-28%, CABG (LIMA- LAD 2023),  VT arrest s/p VT ablation and ICD 2017, Hypertension, Hyperlipidemia, Hypothyroidism, Left femoral DVT on Eliquis, COPD, JAKE, CKD III, Chronic BLE venous stasis dermatitis, Insulin dependent Type 2 DM  who was initially admitted 7/12 to [place] for acute decompensate heart failure. Diuresed and  transferred to [facility] HFICU for further managemnet and possible re-consideration of advanced therapies.  Of note, Patient was previously discussed for advanced HF therapies during [facility] [place] admission but was declined with concerns for mobility/frailty (uses motorized scooter and has bilateral foot drop with neuropathy), CKD, poorly controlled diabetes, as well as unclear social/financial support.      HFICU 7/17. Warm and wet. [place] guided diuresis with lasix gtt and GDMT titration. Advanced therapies evaluation initiated 7/23.  Would need control of his diabetes before considering transplant     Transferred to floor 7/29    Cardiac MRI showed mixed ischemic and non-ischemic pattern. It revealed transmural scar rprimarily in apex/septum but there was  viable myocardium regions of inferolateral arambula.  ***    Milrinone weaned started 7/30 however urine production poor and patient not feleing well. Milrinone increased 8/2 and on 8/3 urine production picked up and patient reported feeling subjectively better. Discussed homegoing milrinone therapy and patient agreeable. [place] placed ***      Discharge weight: ### kg    After all labs and VS were reviewed the decision was made that the patient was medically stable for discharge.  The patient was discharged in satisfactory condition.    More than 60 minutes were spent in coordinating patient discharge.    scheduled.       Discharge weight: *** kg    Upon review of medications, lab values, and vital signs patient was deemed stable for discharge in satisfactory condition.     More than 60 minutes were spent in coordinating patient discharge.

## 2025-07-30 NOTE — NURSING NOTE
Patient has been pleasant throughout shift. Heart rate is still elevated (119) but declines feeling any pain or discomfort. Patient had diarrhea that started after eating breakfast, imodium given & stool sample collected for culture. C diff precautions in place until results are confirmed.

## 2025-07-30 NOTE — PROGRESS NOTES
"Endocrine Progress Note   Felice Law is a 60 y.o. male on day 13 of admission presenting with Acute on chronic systolic heart failure.  Endocrine consulted for T2DM management.    Subjective   Patient seen and examined at bedside in no acute distress.  Patient denies any nausea, vomiting, difficulty breathing, abdominal pain, constipation or diarrhea.  He reports stable appetite.  Blood glucose trends reviewed.     Objective     ROS  Negative unless mentioned above        Last Recorded Vitals  Blood pressure 102/70, pulse 108, temperature 36.3 °C (97.3 °F), temperature source Temporal, resp. rate 18, height 1.803 m (5' 10.98\"), weight 100 kg (220 lb 10.9 oz), SpO2 95%.  Intake/Output last 3 Shifts:  I/O last 3 completed shifts:  In: 2177.6 (21.8 mL/kg) [P.O.:960; I.V.:967.6 (9.7 mL/kg); IV Piggyback:250]  Out: 2615 (26.1 mL/kg) [Urine:2615 (0.7 mL/kg/hr)]  Weight: 100.1 kg   Exam:  General - male patient in NAD  HEENT - AT/NC  Resp - no extra wob noted  Msckl - + edema in B/L LE  Relevant Results  Results from last 7 days   Lab Units 07/30/25  1254 07/30/25  0647 07/29/25  2046 07/29/25  1826 07/29/25  1651 07/29/25  1139 07/29/25  0743 07/29/25  0518 07/28/25  0631 07/28/25  0418 07/27/25  0727 07/27/25  0543 07/26/25  0815 07/26/25  0446   POCT GLUCOSE mg/dL 243* 113* 153*  --  207* 114*   < >  --    < >  --    < >  --    < >  --    GLUCOSE mg/dL  --   --   --  164*  --   --   --  185*  --  200*  --  182*  --  234*    < > = values in this interval not displayed.     BMP:  Results from last 7 days   Lab Units 07/29/25  1826 07/29/25  0518 07/28/25  0418   SODIUM mmol/L 133* 134* 135*   POTASSIUM mmol/L 4.1 4.1 4.1   CHLORIDE mmol/L 101 102 100   CO2 mmol/L 21 23 22   BUN mg/dL 52* 49* 52*   CREATININE mg/dL 2.10* 1.70* 2.04*   GLUCOSE mg/dL 164* 185* 200*     Assessment  Felice Law is a 60 y.o. male with significant PMH of ICM, HFrEF, EF 15-20%, CABG (LIMA- LAD in 2023),  PCI, VT arrest s/p VT " ablation and ICD in 2017, Hypertension, Hyperlipidemia, Hypothyroidism, Left femoral DVT on Eliquis, COPD, JAKE, CKD III and Insulin dependent Type 2 diabetes. He was admitted on 7/12 for further HF management and advanced therapy work up.   Endocrine consulted for managing uncontrolled T2DM.      #T2DM  :: Hyperglycemia 2/2 poorly controlled Type 2 diabetes   :: last HbA1c 10.1 (7/17)  :: Home regimen: Insulin galargine 40 units twice a day, insulin lispro 18 units with meals plus sliding scale up to 80 units daily, tirzepatide (Mounjaro) 5 mg/0.5 mL pen injector, Farxiga 10 mg tablet, has freestyle abbi 3  Blood glucose trends reviewed, noted tighter blood glucose control    # Hypothyroid   :: Thyroid on home dose > Levothyroxine 75mcg  :: TSH 2.12, T4 9.3, T3 105 (7/23) all fall within reference ranges consistent with euthyroidism.  - continue on same home dose for thyroxine.     Plan  - Decrease insulin glargine to 46 units BID   - Increase insulin lispro to 32 units TID with meals  - Start tablet Januvia (sitagliptin) 50 mg daily  - Change sliding scale to SS#2 AC   - Accuchecks TIDAC and at bedtime  - 60 gm carb consistent diet  - Goal -180  - Hypoglycemia bundle  Will consider starting metformin while inpatient based on kidney function    Recommendations communicated to primary team. Please reach out incase you have any questions or concerns.    The patient was discussed with attending Dr. Diana Yancey MD  Endocrinology fellow

## 2025-07-30 NOTE — SIGNIFICANT EVENT
Rapid Response Nurse Note: RADAR alert: 7    Pager time: [phone]  Arrival time: 648  Event end time: 653  Location: T5-70  [x] Triage by phone or secure messaging    Rapid response initiated by:  [] Rapid response RN [] Family [] Nursing Supervisor [] Physician   [x] RADAR auto page [] Sepsis auto-page [] RN [] RT   [] NP/PA [] Other:     Primary reason for call:   [] BAT [] New CPAP/BiPAP [] Bleeding [] Change in mental status   [] Chest pain [] Code blue [] FiO2 >/= 50% [] HR </= 40 bpm   [] HR >/= 130 bpm [] Hyperglycemia [] Hypoglycemia [x] RADAR    [] RR </= 8 bpm [] RR >/= 30 bpm [] SBP </= 90 mmHg [] SpO2 < 90%   [] Seizure [] Sepsis [] Shortness of breath  [] Staff concern: see comments     Initial VS and/or RADAR VS: T 35.4 °C; ; RR 18; BP 98/66; SPO2 95%.    Providers present at bedside (if applicable): bedside RN    Name of ICU Provider contacted (if applicable): NA    Interventions:  [x] None [] ABG/VBG [] Assist w/ICU transfer [] BAT paged    [] Bag mask [] Blood [] Cardioversion [] Code Blue   [] Code blue for intubation [] Code status changed [] Chest x-ray [] EKG   [] IV fluid/bolus [] KUB x-ray [] Labs/cultures [] Medication   [] Nebulizer treatment [] NIPPV (CPAP/BiPAP) [] Oxygen [] Oral airway   [] Peripheral IV [] Palliative care consult [] CT/MRI [] Sepsis protocol    [] Suctioned [] Other:     Outcome:  [] Coded and  [] Code blue for intubation [] Coded and transferred to ICU []  on division   [x] Remained on division (no change) [] Remained on division + additional monitoring [] Remained in ED [] Transferred to ED   [] Transferred to ICU [] Transferred to inpatient status [] Transferred for interventions (procedure) [] Transferred to ICU stepdown    [] Transferred to surgery [] Transferred to telemetry [] Sepsis protocol [] STEMI protocol   [] Stroke protocol [x] Bedside nurse instructed to page rapid response for any concerns or acute change in condition/VS     Additional  Comments: Radar=7 paged 7/30@0158; called & spoke with bedside RN & reviewed pt's chart; pt's VS's @ baseline & RN without acute concerns @ this time

## 2025-07-30 NOTE — PROGRESS NOTES
07/30/25 0700   Discharge Planning   Living Arrangements Family members;Parent   Support Systems Family members;Parent;Friends/neighbors   Type of Residence Private residence   Do you have animals or pets at home? Yes   Type of Animals or Pets Cats   Who is requesting discharge planning? Provider   Home or Post Acute Services In home services   Type of Home Care Services Home PT;Home OT   Expected Discharge Disposition Home H   Does the patient need discharge transport arranged? No   Financial Resource Strain   How hard is it for you to pay for the very basics like food, housing, medical care, and heating? Not hard   Housing Stability   In the last 12 months, was there a time when you were not able to pay the mortgage or rent on time? N   At any time in the past 12 months, were you homeless or living in a shelter (including now)? N   Transportation Needs   In the past 12 months, has lack of transportation kept you from medical appointments or from getting medications? no   In the past 12 months, has lack of transportation kept you from meetings, work, or from getting things needed for daily living? No     Met with pt and introduced myself as care coordinator with Care Transitions Team for discharge planning. Pt reports being independent with ADL's for the most part. Pt uses Walker for assistance with ambulation. Pt Lives with Family. Pt reported no safety concerns at home, he stated no falls in last year. Pt's address, phone number, and contact information was verified.      PCP: Fadi,   DATE OF LAST VISIT: 6 Months Ago  PHARMACY: [facility]/ [facility]  MEDICATIONS AFFORDABLE:Yes  FEELS SAFE AT HOME: Yes  RECENT FALLS: n/a  EQUIPMENT USED IN HOME: Walker, Cane  HOME O2/CPAP/NEBS: n/a  DME SUPPLIER: Unknown  TRANSPORT HOME: No  DIABETIC/SUPPLIES NEEDED: n/a  HD SCHEDULE: n/a  .  Transitional Care Coordinator Note: Patient discussed with medical team, per medical team patient is not medically ready.   Discharge dispo:  HC.  ADOD 3-4 Days.  Kris Hood RN  Transitional Care Coordinator

## 2025-07-30 NOTE — NURSING NOTE
THIS CLIENT IS RESTING WELL, HE DID NOT RECEIVE SOME OF THE MORNING MEDICATIONS DUE TO SCHEDULED MRI AND BP NOT MEETING THE PERIMETERS (LASIX- ENTRESTO-ISORDIL). THE CLIENT HAD BREAKFAST AND IS WAITING FOR TRANSPORT. THE PATIENT IS REQUESTING MEDICINE FOR THE DIARRHEA THAT HAS STARTED TODAY. HAT PLACED SO SPECIMEN CAN BE COLLECTED AND TESTED FOR INFECTION

## 2025-07-30 NOTE — PROGRESS NOTES
Subjective:   utilized for visit. Speaks some English intermittently.  No chest pain or SOB    Today in brief:  - Work up for possible advanced HF therapies. Cardiac MRI to evaluate for coronary viability  - Attempt to wean milrinone starting today down to 0.125mcg/kg.min  - Reported loose stools. Trial immodium PRN. Check C.diff     Objective:  Vitals:    07/30/25 0636   BP: 98/66   Pulse: 108   Resp: 18   Temp: 35.4 °C (95.7 °F)   SpO2: 95%     Weight         7/25/2025  0600 7/26/2025  0600 7/27/2025  2200 7/29/2025  0600 7/30/2025  0431    Weight: 96.6 kg (212 lb 15.4 oz) 95.7 kg (210 lb 15.7 oz) 97.6 kg (215 lb 2.7 oz) 99.5 kg (219 lb 5.7 oz) 100 kg (220 lb 10.9 oz)            Intake/Output Summary (Last 24 hours) at 7/30/2025 0744  Last data filed at 7/30/2025 0600  Gross per 24 hour   Intake 1149.43 ml   Output 2015 ml   Net -865.57 ml     Recent Results (from the past 24 hours)   Heparin Assay, UFH    Collection Time: 07/29/25  9:40 AM   Result Value Ref Range    Heparin Unfractionated 0.4 See Comment Below for Therapeutic Ranges IU/mL   POCT GLUCOSE    Collection Time: 07/29/25 11:14 AM   Result Value Ref Range    POCT Glucose 121 (H) 74 - 99 mg/dL   POCT GLUCOSE    Collection Time: 07/29/25 11:39 AM   Result Value Ref Range    POCT Glucose 114 (H) 74 - 99 mg/dL   Heparin Assay, UFH    Collection Time: 07/29/25  2:17 PM   Result Value Ref Range    Heparin Unfractionated 0.3 See Comment Below for Therapeutic Ranges IU/mL   POCT GLUCOSE    Collection Time: 07/29/25  4:51 PM   Result Value Ref Range    POCT Glucose 207 (H) 74 - 99 mg/dL   CBC    Collection Time: 07/29/25  6:26 PM   Result Value Ref Range    WBC 6.1 4.4 - 11.3 x10*3/uL    nRBC 0.0 0.0 - 0.0 /100 WBCs    RBC 4.73 4.50 - 5.90 x10*6/uL    Hemoglobin 11.3 (L) 13.5 - 17.5 g/dL    Hematocrit 38.6 (L) 41.0 - 52.0 %    MCV 82 80 - 100 fL    MCH 23.9 (L) 26.0 - 34.0 pg    MCHC 29.3 (L) 32.0 - 36.0 g/dL    RDW 20.1 (H) 11.5 - 14.5 %     Platelets 245 150 - 450 x10*3/uL   Magnesium    Collection Time: 07/29/25  6:26 PM   Result Value Ref Range    Magnesium 2.11 1.60 - 2.40 mg/dL   Renal function panel    Collection Time: 07/29/25  6:26 PM   Result Value Ref Range    Glucose 164 (H) 74 - 99 mg/dL    Sodium 133 (L) 136 - 145 mmol/L    Potassium 4.1 3.5 - 5.3 mmol/L    Chloride 101 98 - 107 mmol/L    Bicarbonate 21 21 - 32 mmol/L    Anion Gap 15 10 - 20 mmol/L    Urea Nitrogen 52 (H) 6 - 23 mg/dL    Creatinine 2.10 (H) 0.50 - 1.30 mg/dL    eGFR 36 (L) >60 mL/min/1.73m*2    Calcium 9.0 8.6 - 10.6 mg/dL    Phosphorus 3.1 2.5 - 4.9 mg/dL    Albumin 3.9 3.4 - 5.0 g/dL   Digoxin    Collection Time: 07/29/25  6:26 PM   Result Value Ref Range    Digoxin  0.98 0.80 - <2.00 ng/mL   POCT GLUCOSE    Collection Time: 07/29/25  8:46 PM   Result Value Ref Range    POCT Glucose 153 (H) 74 - 99 mg/dL   Heparin Assay, UFH    Collection Time: 07/30/25  5:06 AM   Result Value Ref Range    Heparin Unfractionated 0.3 See Comment Below for Therapeutic Ranges IU/mL   POCT GLUCOSE    Collection Time: 07/30/25  6:47 AM   Result Value Ref Range    POCT Glucose 113 (H) 74 - 99 mg/dL     Inpatient Medications:  Scheduled Medications[1]  PRN Medications[2]  Continuous Medications[3]    Telemetry 7/30/2025 (personally reviewed): ST 100s,occasional PVCs    Physical exam:  General: NAD  Head/ neck: no JVD  Cardiac: RRR, regular S1 S2 , no murmur, no rub, no gallop  Pulm: CTA bilaterally, no wheezes, rales or rhonchi.    Vascular: Radial 2+ bilaterally  GI: Non distended  Extremities: 2+ bilateral LE edema   Neuro: no focal neuro deficits   Psych: appropriate mood and behavior   Skin: warm and dry       Assessment/Plan   Felice Law is a 60 y.o. male with PMH of ischemic [id number]%, CABG (LIMA- LAD 2023),  VT arrest s/p VT ablation and ICD 2017, Hypertension, Hyperlipidemia, Hypothyroidism, Left femoral DVT on Eliquis, COPD, JAKE, CKD III, Chronic BLE venous stasis  dermatitis, Insulin dependent Type 2 DM  who was initially admitted 7/12 to [place] for acute decompensate heart failure. Diuresed and  transferred to [facility] HFICU for further managemnet and possible re-consideration of advanced therapies.  Of note, Patient was previously discussed for advanced HF therapies during [facility] [place] but was declined with concerns for mobility/frailty (uses motorized scooter and has bilateral foot drop with neuropathy), CKD, poorly controlled diabetes, as well as unclear social/financial support.      HFICU 7/17. Warm and wet. Dalbo guided diuresis with lasix gtt and GDMT titration. Advanced therapies evaluation initiated 7/23.  Would need control of his diabetes before considering transplant    Transferred to floor 7/29     Acute on chronic systolic ischemic, HFrEF 15-20% (since 2017)  CAD  s/p PCI and 1v CABG (LIMA-LAD 4/2023).    s/p ICD 2017  - TTE 5/5/25: LV mod dilated, EF 15-20%, gHK of LV, G2DD, mildly enlarged RV with mildly reduced systolic fxn, mild LAE, mild to mod MR and TR  - LHC 6/10/25:   Right dom. LIMA to LAD patent. pLAD 70%, os and pD1 90%, mLCx50%,osOM1 90%, mRCA 100%  - Committee discussion: optimize DM (A1c 10) and defer listing for likely heart transplant / LVAD depending on acuity .    - Cardiac MRI 7/2025: EF 17%, mixed CM, apex thin and severely HK, mid apical septum HK, mod MELISA, mild to mod MR,  Transmural infarction (%) involves the mid anteroseptum,apical septum, apical inferior wall, and apex, all demonstrating nonviable myocardium. Subendocardial infarction (25-50%) affects the mid inferior and apical lateral wall, while subendocardial infarction (<25%) involves the mid inferolateral wall - these segments remain viable. Additionally, mid-myocardial enhancement in the basal septum represents a nonischemic, nonspecific pattern. RV function severely reduced.  -- await further review by advanced HF and CTs  - Milrinone wean from to  0.125mcg/kg/min today  - GDMT: enresto, isordil, hydralazine, farxiga,  no beta blocker, digoxin (level WNL 7/29)  - Secondary vascular prevention: aspirin, statin, fenofibrate  - Remains volume overloaded: continue lasix 80mg IV twice daily.  -  Replete electrolytes to keep K>4.0 and Mg >2.0    COPD  Obstructive sleep apnea  Prior tobacco use  -  (24 pack year, quit 2017)  - albuterol  PRN  - incentive spirometry      CHARLOTTE on CKD  - Cr 7/30: 2.10 (1.7, 2.04, 2.39)  -  As low as 1.35 in 11/2024    Anemia of chronic disease and iron deficiency  - (7/17):  iron sat L 16 but otherwise iron studies & ferritin WNL  - IV Venofer 200 mg x5  ordered by HFICU (end 8/4)     Left femoral DVT (2017)  - Holding home Eliquis  - on Heparin gtt      Uncontrolled type II DM c/b neuropathy  - HgbA1c (7/17):  10.1  - gabapentin TID  - Endocrine following  - Insulin glargine 46 units BID, 32u TID before meals, SS#2, start linagliptin 5mg daily     Hypothyroidism  - TSH WNL. Euthyroid  - Synthroid     Loose stool unspecified  - Try immodium PRN  - C. Diff pending    DVT ppx: heparin gtt  DISPO: Pending clinical improvement, further work up, and milrinone wean  Code status: Full Code    Patient seen and discussed with Dr. Ruslan Nguyen.  Luzmaria Nina PA-C    I conducted a shared care visit with the KARTHIK- visit conducted with , he will undergo MRI today seems to have a poor understanding of the overall plan, given his robust cardiac index will reduce milrione to 0.125, continue attempted diuresis, continue to improve diabetes control          [1]   Scheduled medications   Medication Dose Route Frequency    [Held by provider] apixaban  5 mg oral BID    atorvastatin  80 mg oral Nightly    calcium carbonate  500 mg of calcium carbonate oral BID    dapagliflozin propanediol  10 mg oral Daily    digoxin  125 mcg oral Daily    fenofibrate  145 mg oral Daily    furosemide  80 mg intravenous q12h    gabapentin  200 mg oral q8h  SUSI    hydrALAZINE  100 mg oral q8h USSI    insulin glargine  48 Units subcutaneous Nightly    insulin glargine  48 Units subcutaneous Daily    insulin lispro  0-15 Units subcutaneous TID AC    insulin lispro  30 Units subcutaneous TID AC    iron sucrose  200 mg intravenous Every other day    isosorbide dinitrate  40 mg oral TID    levothyroxine  75 mcg oral Daily before breakfast    pantoprazole  40 mg oral Daily before breakfast    potassium chloride CR  40 mEq oral Once    QUEtiapine  50 mg oral Nightly    sacubitriL-valsartan  1 tablet oral BID    [Held by provider] spironolactone  25 mg oral Daily   [2]   PRN medications   Medication    acetaminophen    albuterol    alteplase    dextrose    dextrose    glucagon    glucagon    heparin   [3]   Continuous Medications   Medication Dose Last Rate    heparin  0-4,000 Units/hr 1,800 Units/hr (07/30/25 0348)    milrinone  0.25 mcg/kg/min 0.25 mcg/kg/min (07/30/25 0348)

## 2025-07-30 NOTE — CARE PLAN
Transitional Care Coordinator Note: Patient discussed with medical team, per medical team patient is not medically ready.  Patient is receiving more diuresis still and will be on milrinone home gong.   Discharge dispo: HC Vrs Outpatient Therapy.  ADOD 3-4 Days.  Kris Hood RN  Transitional Care Coordinator

## 2025-07-30 NOTE — CARE PLAN
The clinical goals for the shift include Patient will remain therapeutic on heparin throughout shift.      Problem: Pain - Adult  Goal: Verbalizes/displays adequate comfort level or baseline comfort level  Outcome: Progressing     Problem: Safety - Adult  Goal: Free from fall injury  Outcome: Progressing     Problem: Discharge Planning  Goal: Discharge to home or other facility with appropriate resources  Outcome: Progressing     Problem: Chronic Conditions and Co-morbidities  Goal: Patient's chronic conditions and co-morbidity symptoms are monitored and maintained or improved  Outcome: Progressing     Problem: Nutrition  Goal: Nutrient intake appropriate for maintaining nutritional needs  Outcome: Progressing     Problem: Heart Failure  Goal: Improved gas exchange this shift  Outcome: Progressing  Goal: Improved urinary output this shift  Outcome: Progressing  Goal: Reduction in peripheral edema within 24 hours  Outcome: Progressing  Goal: Report improvement of dyspnea/breathlessness this shift  Outcome: Progressing  Goal: Weight from fluid excess reduced over 2-3 days, then stabilize  Outcome: Progressing  Goal: Increase self care and/or family involvement in 24 hours  Outcome: Progressing     Problem: Skin  Goal: Decreased wound size/increased tissue granulation at next dressing change  Outcome: Progressing  Goal: Participates in plan/prevention/treatment measures  Outcome: Progressing  Goal: Prevent/manage excess moisture  Outcome: Progressing  Goal: Prevent/minimize sheer/friction injuries  Outcome: Progressing  Goal: Promote/optimize nutrition  Outcome: Progressing  Goal: Promote skin healing  Outcome: Progressing     Problem: Diabetes  Goal: Achieve decreasing blood glucose levels by end of shift  Outcome: Progressing  Goal: Increase stability of blood glucose readings by end of shift  Outcome: Progressing  Goal: Decrease in ketones present in urine by end of shift  Outcome: Progressing

## 2025-07-30 NOTE — CARE PLAN
Problem: Pain - Adult  Goal: Verbalizes/displays adequate comfort level or baseline comfort level  Outcome: Progressing     Problem: Safety - Adult  Goal: Free from fall injury  Outcome: Progressing     Problem: Discharge Planning  Goal: Discharge to home or other facility with appropriate resources  Outcome: Progressing     Problem: Chronic Conditions and Co-morbidities  Goal: Patient's chronic conditions and co-morbidity symptoms are monitored and maintained or improved  Outcome: Progressing     Problem: Nutrition  Goal: Nutrient intake appropriate for maintaining nutritional needs  Outcome: Progressing     Problem: Heart Failure  Goal: Improved gas exchange this shift  Outcome: Progressing  Goal: Improved urinary output this shift  Outcome: Progressing  Goal: Reduction in peripheral edema within 24 hours  Outcome: Progressing  Goal: Report improvement of dyspnea/breathlessness this shift  Outcome: Progressing  Goal: Weight from fluid excess reduced over 2-3 days, then stabilize  Outcome: Progressing  Goal: Increase self care and/or family involvement in 24 hours  Outcome: Progressing     Problem: Skin  Goal: Decreased wound size/increased tissue granulation at next dressing change  Outcome: Progressing  Goal: Participates in plan/prevention/treatment measures  Outcome: Progressing  Goal: Prevent/manage excess moisture  Outcome: Progressing  Goal: Prevent/minimize sheer/friction injuries  Outcome: Progressing  Goal: Promote/optimize nutrition  Outcome: Progressing  Goal: Promote skin healing  Outcome: Progressing     Problem: Diabetes  Goal: Achieve decreasing blood glucose levels by end of shift  Outcome: Progressing  Goal: Increase stability of blood glucose readings by end of shift  Outcome: Progressing  Goal: Decrease in ketones present in urine by end of shift  Outcome: Progressing   The patient's goals for the shift include ambulate in the hallway; diurese >1L this shift    The clinical goals for the shift  include therapeutic on heparin    Over the shift, the patient remained therapeutic on heparin today; continue to diurese with lasix today; milrinone continued and med adjustments per cardiology team.

## 2025-07-31 LAB
ALBUMIN SERPL BCP-MCNC: 3.9 G/DL (ref 3.4–5)
ANION GAP SERPL CALC-SCNC: 16 MMOL/L (ref 10–20)
BUN SERPL-MCNC: 61 MG/DL (ref 6–23)
CALCIUM SERPL-MCNC: 9 MG/DL (ref 8.6–10.6)
CHLORIDE SERPL-SCNC: 99 MMOL/L (ref 98–107)
CO2 SERPL-SCNC: 22 MMOL/L (ref 21–32)
CREAT SERPL-MCNC: 2.28 MG/DL (ref 0.5–1.3)
EGFRCR SERPLBLD CKD-EPI 2021: 32 ML/MIN/1.73M*2
ERYTHROCYTE [DISTWIDTH] IN BLOOD BY AUTOMATED COUNT: 20 % (ref 11.5–14.5)
GLUCOSE BLD MANUAL STRIP-MCNC: 113 MG/DL (ref 74–99)
GLUCOSE BLD MANUAL STRIP-MCNC: 144 MG/DL (ref 74–99)
GLUCOSE BLD MANUAL STRIP-MCNC: 146 MG/DL (ref 74–99)
GLUCOSE BLD MANUAL STRIP-MCNC: 99 MG/DL (ref 74–99)
GLUCOSE SERPL-MCNC: 85 MG/DL (ref 74–99)
HCT VFR BLD AUTO: 36.2 % (ref 41–52)
HGB BLD-MCNC: 11.4 G/DL (ref 13.5–17.5)
MAGNESIUM SERPL-MCNC: 2.15 MG/DL (ref 1.6–2.4)
MCH RBC QN AUTO: 24.1 PG (ref 26–34)
MCHC RBC AUTO-ENTMCNC: 31.5 G/DL (ref 32–36)
MCV RBC AUTO: 76 FL (ref 80–100)
NRBC BLD-RTO: 0 /100 WBCS (ref 0–0)
PHOSPHATE SERPL-MCNC: 4 MG/DL (ref 2.5–4.9)
PLATELET # BLD AUTO: 306 X10*3/UL (ref 150–450)
POTASSIUM SERPL-SCNC: 4 MMOL/L (ref 3.5–5.3)
RBC # BLD AUTO: 4.74 X10*6/UL (ref 4.5–5.9)
SODIUM SERPL-SCNC: 133 MMOL/L (ref 136–145)
UFH PPP CHRO-ACNC: 0.2 IU/ML (ref ?–1.1)
WBC # BLD AUTO: 5.9 X10*3/UL (ref 4.4–11.3)

## 2025-07-31 PROCEDURE — 36415 COLL VENOUS BLD VENIPUNCTURE: CPT | Performed by: PHYSICIAN ASSISTANT

## 2025-07-31 PROCEDURE — 99233 SBSQ HOSP IP/OBS HIGH 50: CPT | Performed by: INTERNAL MEDICINE

## 2025-07-31 PROCEDURE — 83735 ASSAY OF MAGNESIUM: CPT | Performed by: PHYSICIAN ASSISTANT

## 2025-07-31 PROCEDURE — 80069 RENAL FUNCTION PANEL: CPT | Performed by: PHYSICIAN ASSISTANT

## 2025-07-31 PROCEDURE — 2500000001 HC RX 250 WO HCPCS SELF ADMINISTERED DRUGS (ALT 637 FOR MEDICARE OP): Performed by: PHYSICIAN ASSISTANT

## 2025-07-31 PROCEDURE — 1200000002 HC GENERAL ROOM WITH TELEMETRY DAILY

## 2025-07-31 PROCEDURE — 2500000004 HC RX 250 GENERAL PHARMACY W/ HCPCS (ALT 636 FOR OP/ED): Performed by: PHYSICIAN ASSISTANT

## 2025-07-31 PROCEDURE — 2500000002 HC RX 250 W HCPCS SELF ADMINISTERED DRUGS (ALT 637 FOR MEDICARE OP, ALT 636 FOR OP/ED): Performed by: PHYSICIAN ASSISTANT

## 2025-07-31 PROCEDURE — 2500000002 HC RX 250 W HCPCS SELF ADMINISTERED DRUGS (ALT 637 FOR MEDICARE OP, ALT 636 FOR OP/ED): Performed by: NURSE PRACTITIONER

## 2025-07-31 PROCEDURE — 99232 SBSQ HOSP IP/OBS MODERATE 35: CPT | Performed by: STUDENT IN AN ORGANIZED HEALTH CARE EDUCATION/TRAINING PROGRAM

## 2025-07-31 PROCEDURE — 83880 ASSAY OF NATRIURETIC PEPTIDE: CPT

## 2025-07-31 PROCEDURE — 82947 ASSAY GLUCOSE BLOOD QUANT: CPT

## 2025-07-31 PROCEDURE — 85520 HEPARIN ASSAY: CPT | Performed by: PHYSICIAN ASSISTANT

## 2025-07-31 PROCEDURE — 85027 COMPLETE CBC AUTOMATED: CPT | Performed by: PHYSICIAN ASSISTANT

## 2025-07-31 RX ORDER — INSULIN GLARGINE 100 [IU]/ML
44 INJECTION, SOLUTION SUBCUTANEOUS NIGHTLY
Status: DISCONTINUED | OUTPATIENT
Start: 2025-07-31 | End: 2025-08-02

## 2025-07-31 RX ORDER — INSULIN GLARGINE 100 [IU]/ML
44 INJECTION, SOLUTION SUBCUTANEOUS DAILY
Status: DISCONTINUED | OUTPATIENT
Start: 2025-08-01 | End: 2025-08-02

## 2025-07-31 RX ORDER — DIGOXIN 125 MCG
125 TABLET ORAL 3 TIMES WEEKLY
Status: DISCONTINUED | OUTPATIENT
Start: 2025-08-04 | End: 2025-08-01

## 2025-07-31 RX ADMIN — INSULIN LISPRO 32 UNITS: 100 INJECTION, SOLUTION INTRAVENOUS; SUBCUTANEOUS at 09:55

## 2025-07-31 RX ADMIN — FENOFIBRATE 145 MG: 145 TABLET, FILM COATED ORAL at 08:47

## 2025-07-31 RX ADMIN — HYDRALAZINE HYDROCHLORIDE 100 MG: 100 TABLET ORAL at 14:03

## 2025-07-31 RX ADMIN — HYDRALAZINE HYDROCHLORIDE 100 MG: 100 TABLET ORAL at 06:19

## 2025-07-31 RX ADMIN — GABAPENTIN 200 MG: 100 CAPSULE ORAL at 14:04

## 2025-07-31 RX ADMIN — PANTOPRAZOLE SODIUM 40 MG: 40 TABLET, DELAYED RELEASE ORAL at 06:19

## 2025-07-31 RX ADMIN — DIGOXIN 125 MCG: 125 TABLET ORAL at 08:46

## 2025-07-31 RX ADMIN — ISOSORBIDE DINITRATE 40 MG: 40 TABLET ORAL at 18:09

## 2025-07-31 RX ADMIN — GABAPENTIN 200 MG: 100 CAPSULE ORAL at 06:19

## 2025-07-31 RX ADMIN — INSULIN GLARGINE 44 UNITS: 100 INJECTION, SOLUTION SUBCUTANEOUS at 21:11

## 2025-07-31 RX ADMIN — ATORVASTATIN CALCIUM 80 MG: 80 TABLET, FILM COATED ORAL at 20:58

## 2025-07-31 RX ADMIN — HEPARIN SODIUM 2000 UNITS/HR: 10000 INJECTION, SOLUTION INTRAVENOUS at 21:03

## 2025-07-31 RX ADMIN — CALCIUM CARBONATE (ANTACID) CHEW TAB 500 MG 1 TABLET: 500 CHEW TAB at 08:46

## 2025-07-31 RX ADMIN — ISOSORBIDE DINITRATE 40 MG: 40 TABLET ORAL at 08:46

## 2025-07-31 RX ADMIN — CALCIUM CARBONATE (ANTACID) CHEW TAB 500 MG 1 TABLET: 500 CHEW TAB at 20:59

## 2025-07-31 RX ADMIN — INSULIN LISPRO 32 UNITS: 100 INJECTION, SOLUTION INTRAVENOUS; SUBCUTANEOUS at 13:12

## 2025-07-31 RX ADMIN — INSULIN GLARGINE 46 UNITS: 100 INJECTION, SOLUTION SUBCUTANEOUS at 08:47

## 2025-07-31 RX ADMIN — QUETIAPINE FUMARATE 50 MG: 25 TABLET ORAL at 20:59

## 2025-07-31 RX ADMIN — GABAPENTIN 200 MG: 100 CAPSULE ORAL at 21:01

## 2025-07-31 RX ADMIN — SITAGLIPTIN 50 MG: 50 TABLET, FILM COATED ORAL at 08:46

## 2025-07-31 RX ADMIN — ISOSORBIDE DINITRATE 40 MG: 40 TABLET ORAL at 14:03

## 2025-07-31 RX ADMIN — LEVOTHYROXINE SODIUM 75 MCG: 0.07 TABLET ORAL at 06:19

## 2025-07-31 RX ADMIN — HEPARIN SODIUM 1800 UNITS/HR: 10000 INJECTION, SOLUTION INTRAVENOUS at 06:24

## 2025-07-31 RX ADMIN — INSULIN LISPRO 32 UNITS: 100 INJECTION, SOLUTION INTRAVENOUS; SUBCUTANEOUS at 18:09

## 2025-07-31 RX ADMIN — IRON SUCROSE 200 MG: 20 INJECTION, SOLUTION INTRAVENOUS at 08:46

## 2025-07-31 RX ADMIN — DAPAGLIFLOZIN 10 MG: 10 TABLET, FILM COATED ORAL at 08:46

## 2025-07-31 RX ADMIN — HYDRALAZINE HYDROCHLORIDE 100 MG: 100 TABLET ORAL at 21:01

## 2025-07-31 RX ADMIN — MILRINONE LACTATE IN DEXTROSE 0.12 MCG/KG/MIN: 0.2 INJECTION, SOLUTION INTRAVENOUS at 06:24

## 2025-07-31 ASSESSMENT — COGNITIVE AND FUNCTIONAL STATUS - GENERAL
CLIMB 3 TO 5 STEPS WITH RAILING: A LITTLE
CLIMB 3 TO 5 STEPS WITH RAILING: A LITTLE
MOBILITY SCORE: 22
WALKING IN HOSPITAL ROOM: A LITTLE
WALKING IN HOSPITAL ROOM: A LITTLE
MOBILITY SCORE: 22
DAILY ACTIVITIY SCORE: 24

## 2025-07-31 ASSESSMENT — PAIN - FUNCTIONAL ASSESSMENT: PAIN_FUNCTIONAL_ASSESSMENT: 0-10

## 2025-07-31 ASSESSMENT — PAIN SCALES - GENERAL: PAINLEVEL_OUTOF10: 0 - NO PAIN

## 2025-07-31 NOTE — CARE PLAN
Problem: Pain - Adult  Goal: Verbalizes/displays adequate comfort level or baseline comfort level  Outcome: Progressing     Problem: Safety - Adult  Goal: Free from fall injury  Outcome: Progressing     Problem: Discharge Planning  Goal: Discharge to home or other facility with appropriate resources  Outcome: Progressing     Problem: Chronic Conditions and Co-morbidities  Goal: Patient's chronic conditions and co-morbidity symptoms are monitored and maintained or improved  Outcome: Progressing     Problem: Nutrition  Goal: Nutrient intake appropriate for maintaining nutritional needs  Outcome: Progressing     Problem: Heart Failure  Goal: Improved gas exchange this shift  Outcome: Progressing  Goal: Improved urinary output this shift  Outcome: Progressing  Goal: Reduction in peripheral edema within 24 hours  Outcome: Progressing  Goal: Report improvement of dyspnea/breathlessness this shift  Outcome: Progressing  Goal: Weight from fluid excess reduced over 2-3 days, then stabilize  Outcome: Progressing  Goal: Increase self care and/or family involvement in 24 hours  Outcome: Progressing     Problem: Skin  Goal: Decreased wound size/increased tissue granulation at next dressing change  Outcome: Progressing  Goal: Participates in plan/prevention/treatment measures  Outcome: Progressing  Goal: Prevent/manage excess moisture  Outcome: Progressing  Goal: Prevent/minimize sheer/friction injuries  Outcome: Progressing  Goal: Promote/optimize nutrition  Outcome: Progressing  Goal: Promote skin healing  Outcome: Progressing     Problem: Diabetes  Goal: Achieve decreasing blood glucose levels by end of shift  Outcome: Progressing  Goal: Increase stability of blood glucose readings by end of shift  Outcome: Progressing  Goal: Decrease in ketones present in urine by end of shift  Outcome: Progressing   The patient's goals for the shift include ambulate in the hallway    The clinical goals for the shift include therapeutic on  heparin    Over the shift, the patient remained hds; continue on heparin gtt and palliative milrinone per order; continue to optimize; continue to monitor.

## 2025-07-31 NOTE — PROGRESS NOTES
"Endocrine Progress Note   Felice Law is a 60 y.o. male on day 14 of admission presenting with Acute on chronic systolic heart failure.  Endocrine consulted for T2DM management.    Subjective   Patient seen and examined at bedside in no acute distress.  Patient denies any nausea, vomiting, difficulty breathing, abdominal pain, constipation or diarrhea.  He reports stable appetite.  Blood glucose trends reviewed.     Objective   Last Recorded Vitals  Blood pressure 97/63, pulse 106, temperature 35.9 °C (96.6 °F), temperature source Temporal, resp. rate 16, height 1.803 m (5' 10.98\"), weight 98.3 kg (216 lb 11.4 oz), SpO2 97%.  Intake/Output last 3 Shifts:  I/O last 3 completed shifts:  In: 1074.3 (10.9 mL/kg) [P.O.:780; I.V.:294.3 (3 mL/kg)]  Out: 3190 (32.5 mL/kg) [Urine:3190 (0.9 mL/kg/hr)]  Weight: 98.3 kg   Exam:  General - male patient in NAD  HEENT - AT/NC  Resp - no extra wob noted  Msckl  + edema in B/L LE  Relevant Results  Results from last 7 days   Lab Units 07/31/25  1201 07/31/25  0622 07/30/25  2120 07/30/25  1739 07/30/25  1727 07/30/25  1254 07/29/25  2046 07/29/25  1826 07/29/25  0743 07/29/25  0518 07/28/25  0631 07/28/25  0418 07/27/25  0727 07/27/25  0543   POCT GLUCOSE mg/dL 146* 113* 104* 143*  --  243*   < >  --    < >  --    < >  --    < >  --    GLUCOSE mg/dL  --   --   --   --  139*  --   --  164*  --  185*  --  200*  --  182*    < > = values in this interval not displayed.     BMP:  Results from last 7 days   Lab Units 07/30/25  1727 07/29/25  1826 07/29/25  0518   SODIUM mmol/L 133* 133* 134*   POTASSIUM mmol/L 3.8 4.1 4.1   CHLORIDE mmol/L 97* 101 102   CO2 mmol/L 23 21 23   BUN mg/dL 55* 52* 49*   CREATININE mg/dL 2.42* 2.10* 1.70*   GLUCOSE mg/dL 139* 164* 185*     Assessment  Felice Law is a 60 y.o. male with significant PMH of ICM, HFrEF, EF 15-20%, CABG (LIMA- LAD in 2023),  PCI, VT arrest s/p VT ablation and ICD in 2017, Hypertension, Hyperlipidemia, " Hypothyroidism, Left femoral DVT on Eliquis, COPD, JAKE, CKD III and Insulin dependent Type 2 diabetes. He was admitted on 7/12 for further HF management and advanced therapy work up.   Endocrine consulted for managing uncontrolled T2DM.      #T2DM  :: Hyperglycemia 2/2 poorly controlled Type 2 diabetes   :: last HbA1c 10.1 (7/17)  :: Home regimen: Insulin galargine 40 units twice a day, insulin lispro 18 units with meals plus sliding scale up to 80 units daily, tirzepatide (Mounjaro) 5 mg/0.5 mL pen injector, Farxiga 10 mg tablet, has freestyle abbi 3  Sitagliptin 50 mg started on 7/31    # Hypothyroid   :: Thyroid on home dose > Levothyroxine 75mcg  :: TSH 2.12, T4 9.3, T3 105 (7/23) all fall within reference ranges consistent with euthyroidism.  - continue on same home dose for thyroxine.     Plan  - Decrease insulin glargine to 44 units BID   - c/w insulin lispro to 32 units TID with meals  - c/w Tablet Januvia (sitagliptin) 50 mg daily  - c/w  sliding scale to SS#2 AC   - Accuchecks TIDAC and at bedtime  - 60 gm carb consistent diet  - Goal -180  - Hypoglycemia bundle  Will consider starting metformin while inpatient based on kidney function    Recommendations communicated to primary team. Please reach out incase you have any questions or concerns.    The patient was discussed with attending Dr. Diana Yancey MD  Endocrinology fellow

## 2025-07-31 NOTE — PROGRESS NOTES
Felice Law is a 60 y.o. male on day 14 of admission presenting with Acute on chronic systolic heart failure.  Met with pt today and completed ACP documents, he designated his mother Aracelis as his surrogate decision maker and his sister Zahraa Dunn [phone] as his alternate.  SW discussed outpatient palliative care follow up once he is discharged and he was agreeable for the extra support. SW used assistance of the DAJUAN for interpretation. Referral made to [facility] palliative navigator.   KEL BARKER

## 2025-07-31 NOTE — PROGRESS NOTES
Subjective:   utilized for visit. Speaks some English intermittently.  No chest pain or SOB, last night noted when he layed flat, felt sudden SOB that woke him up, but once HOB was raised, he was able to sleep. On room air, urinating and having BM regularly. Mild NP cough which is not new. Weight down, 1L neg.       Objective:  Vitals:    07/31/25 0734   BP: 104/60   Pulse: 107   Resp: 16   Temp: 35.8 °C (96.4 °F)   SpO2: 94%     Weight         7/26/2025  0600 7/27/2025  2200 7/29/2025  0600 7/30/2025  0431 7/31/2025  0555    Weight: 95.7 kg (210 lb 15.7 oz) 97.6 kg (215 lb 2.7 oz) 99.5 kg (219 lb 5.7 oz) 100 kg (220 lb 10.9 oz) 98.3 kg (216 lb 11.4 oz)            Intake/Output Summary (Last 24 hours) at 7/31/2025 1053  Last data filed at 7/31/2025 0555  Gross per 24 hour   Intake 480 ml   Output 1925 ml   Net -1445 ml     Recent Results (from the past 24 hours)   C. difficile, PCR    Collection Time: 07/30/25 12:14 PM    Specimen: Stool   Result Value Ref Range    C. difficile, PCR Not Detected Not Detected   POCT GLUCOSE    Collection Time: 07/30/25 12:54 PM   Result Value Ref Range    POCT Glucose 243 (H) 74 - 99 mg/dL   CBC    Collection Time: 07/30/25  5:27 PM   Result Value Ref Range    WBC 6.3 4.4 - 11.3 x10*3/uL    nRBC 0.0 0.0 - 0.0 /100 WBCs    RBC 4.99 4.50 - 5.90 x10*6/uL    Hemoglobin 11.8 (L) 13.5 - 17.5 g/dL    Hematocrit 41.4 41.0 - 52.0 %    MCV 83 80 - 100 fL    MCH 23.6 (L) 26.0 - 34.0 pg    MCHC 28.5 (L) 32.0 - 36.0 g/dL    RDW 20.5 (H) 11.5 - 14.5 %    Platelets 301 150 - 450 x10*3/uL   Magnesium    Collection Time: 07/30/25  5:27 PM   Result Value Ref Range    Magnesium 2.24 1.60 - 2.40 mg/dL   Renal function panel    Collection Time: 07/30/25  5:27 PM   Result Value Ref Range    Glucose 139 (H) 74 - 99 mg/dL    Sodium 133 (L) 136 - 145 mmol/L    Potassium 3.8 3.5 - 5.3 mmol/L    Chloride 97 (L) 98 - 107 mmol/L    Bicarbonate 23 21 - 32 mmol/L    Anion Gap 17 10 - 20 mmol/L     Urea Nitrogen 55 (H) 6 - 23 mg/dL    Creatinine 2.42 (H) 0.50 - 1.30 mg/dL    eGFR 30 (L) >60 mL/min/1.73m*2    Calcium 9.3 8.6 - 10.6 mg/dL    Phosphorus 3.7 2.5 - 4.9 mg/dL    Albumin 4.3 3.4 - 5.0 g/dL   POCT GLUCOSE    Collection Time: 07/30/25  5:39 PM   Result Value Ref Range    POCT Glucose 143 (H) 74 - 99 mg/dL   POCT GLUCOSE    Collection Time: 07/30/25  9:20 PM   Result Value Ref Range    POCT Glucose 104 (H) 74 - 99 mg/dL   POCT GLUCOSE    Collection Time: 07/31/25  6:22 AM   Result Value Ref Range    POCT Glucose 113 (H) 74 - 99 mg/dL     Inpatient Medications:  Scheduled Medications[1]  PRN Medications[2]  Continuous Medications[3]    Telemetry 7/31/2025 SR    Physical exam:  General: NAD  Head/ neck: + JVD  Cardiac: RRR, regular S1 S2 , no murmur, no rub, no gallop  Pulm: Crackles bilaterally  Vascular: Radial 2+ bilaterally  GI: Non distended  Extremities: 2-3+ bilateral LE edema   Neuro: no focal neuro deficits   Psych: appropriate mood and behavior   Skin: warm and dry       Assessment/Plan   Felice Law is a 60 y.o. male with PMH of ischemic [id number]%, CABG (LIMA- LAD 2023),  VT arrest s/p VT ablation and ICD 2017, Hypertension, Hyperlipidemia, Hypothyroidism, Left femoral DVT on Eliquis, COPD, JAKE, CKD III, Chronic BLE venous stasis dermatitis, Insulin dependent Type 2 DM  who was initially admitted 7/12 to [facility] for acute decompensate heart failure. Diuresed and  transferred to [facility] HFICU for further managemnet and possible re-consideration of advanced therapies.  Of note, Patient was previously discussed for advanced HF therapies during [facility] October 2023 admission but was declined with concerns for mobility/frailty (uses motorized scooter and has bilateral foot drop with neuropathy), CKD, poorly controlled diabetes, as well as unclear social/financial support.      HFICU 7/17. Warm and wet. Cerulean guided diuresis with lasix gtt and GDMT titration. Advanced therapies evaluation  initiated 7/23.  Would need control of his diabetes before considering transplant    Transferred to floor 7/29     Acute on chronic systolic ischemic, HFrEF 15-20% (since 2017)  CAD  s/p PCI and 1v CABG (LIMA-LAD 4/2023).    s/p ICD 2017  - TTE 5/5/25: LV mod dilated, EF 15-20%, gHK of LV, G2DD, mildly enlarged RV with mildly reduced systolic fxn, mild LAE, mild to mod MR and TR  - LHC 6/10/25:   Right dom. LIMA to LAD patent. pLAD 70%, os and pD1 90%, mLCx50%,osOM1 90%, mRCA 100%  - Committee discussion: optimize DM (A1c 10) and defer listing for likely heart transplant / LVAD depending on acuity .    - Cardiac MRI 7/2025: EF 17%, mixed CM, apex thin and severely HK, mid apical septum HK, mod MELISA, mild to mod MR,  Transmural infarction (%) involves the mid anteroseptum,apical septum, apical inferior wall, and apex, all demonstrating nonviable myocardium. Subendocardial infarction (25-50%) affects the mid inferior and apical lateral wall, while subendocardial infarction (<25%) involves the mid inferolateral wall - these segments remain viable. Additionally, mid-myocardial enhancement in the basal septum represents a nonischemic, nonspecific pattern. RV function severely reduced. LHC/stent at some point, likely outpatient  -- await further review by advanced HF and CTs  - 7/30 Milrinone wean from to 0.125mcg/kg/min-->continue present dose   - GDMT: enresto, isordil, hydralazine, farxiga,  no beta blocker, digoxin (level WNL 7/29). 7/31: hold entresto and decrease dig to MWF due to CHARLOTTE on CKD  - Secondary vascular prevention: aspirin, statin, fenofibrate  - Remains volume overloaded:  lasix 80mg IV twice daily. 7/31 Hold lasix due to CHARLOTTE on CKD. Although needs more fluid removal, based on kidney function, will need to approach slowly.  -  Replete electrolytes to keep K>4.0 and Mg >2.0    COPD  Obstructive sleep apnea  Prior tobacco use  -  (24 pack year, quit 2017)  - albuterol  PRN  - incentive spirometry       CHARLOTTE on CKD  - Cr 7/30: 2.42 (2.1,1.7, 2.04, 2.39)  -  As low as 1.35 in 11/2024    Anemia of chronic disease and iron deficiency  - (7/17):  iron sat L 16 but otherwise iron studies & ferritin WNL  - IV Venofer 200 mg x5  ordered by HFICU (end 8/4)     Left femoral DVT (2017)  - Holding home Eliquis  - on Heparin gtt      Uncontrolled type II DM c/b neuropathy  - HgbA1c (7/17):  10.1  - gabapentin TID  - Endocrine following  - Insulin glargine 46 units BID, 32u TID before meals, SS#2, start linagliptin 5mg daily     Hypothyroidism  - TSH WNL. Euthyroid  - Synthroid     Loose stool unspecified  - Try immodium PRN  - C. Diff neg    DVT ppx: heparin gtt  DISPO: Pending clinical improvement, further work up, and milrinone wean  Code status: Full Code    Patient seen and discussed with Dr. Ruslan Nguyen.  David Serrano, APRN-CNP    I conducted a shared care visit with the KARTHIK, no change in symptoms with reduction in milrinone unfortunately renal function has worsened need to back of diuretics for a bit and give him a bit more BP, he underwent MRI with viability suggesting there may be value in revascularizing OM and diag but will work on stabilizing renal function and milrinone weaning first, discussed with other providers         [1]   Scheduled medications   Medication Dose Route Frequency    [Held by provider] apixaban  5 mg oral BID    atorvastatin  80 mg oral Nightly    calcium carbonate  500 mg of calcium carbonate oral BID    dapagliflozin propanediol  10 mg oral Daily    digoxin  125 mcg oral Daily    fenofibrate  145 mg oral Daily    [Held by provider] furosemide  80 mg intravenous BID    gabapentin  200 mg oral q8h SUSI    hydrALAZINE  100 mg oral q8h SUSI    insulin glargine  46 Units subcutaneous Nightly    insulin glargine  46 Units subcutaneous Daily    insulin lispro  0-15 Units subcutaneous TID AC    insulin lispro  32 Units subcutaneous TID AC    iron sucrose  200 mg intravenous Every other day     isosorbide dinitrate  40 mg oral TID    levothyroxine  75 mcg oral Daily before breakfast    pantoprazole  40 mg oral Daily before breakfast    potassium chloride CR  40 mEq oral Once    QUEtiapine  50 mg oral Nightly    [Held by provider] sacubitriL-valsartan  1 tablet oral BID    SITagliptin phosphate  50 mg oral Daily    [Held by provider] spironolactone  25 mg oral Daily   [2]   PRN medications   Medication    acetaminophen    albuterol    alteplase    dextrose    dextrose    glucagon    glucagon    heparin    loperamide   [3]   Continuous Medications   Medication Dose Last Rate    heparin  0-4,000 Units/hr 1,800 Units/hr (07/31/25 0624)    milrinone  0.125 mcg/kg/min 0.125 mcg/kg/min (07/31/25 0624)

## 2025-08-01 LAB
ALBUMIN SERPL BCP-MCNC: 4.4 G/DL (ref 3.4–5)
ANION GAP SERPL CALC-SCNC: 16 MMOL/L (ref 10–20)
BNP SERPL-MCNC: 373 PG/ML (ref 0–99)
BUN SERPL-MCNC: 66 MG/DL (ref 6–23)
CALCIUM SERPL-MCNC: 9.2 MG/DL (ref 8.6–10.6)
CHLORIDE SERPL-SCNC: 97 MMOL/L (ref 98–107)
CO2 SERPL-SCNC: 21 MMOL/L (ref 21–32)
CREAT SERPL-MCNC: 2.85 MG/DL (ref 0.5–1.3)
EGFRCR SERPLBLD CKD-EPI 2021: 25 ML/MIN/1.73M*2
ERYTHROCYTE [DISTWIDTH] IN BLOOD BY AUTOMATED COUNT: 20.5 % (ref 11.5–14.5)
GLUCOSE BLD MANUAL STRIP-MCNC: 107 MG/DL (ref 74–99)
GLUCOSE BLD MANUAL STRIP-MCNC: 111 MG/DL (ref 74–99)
GLUCOSE BLD MANUAL STRIP-MCNC: 117 MG/DL (ref 74–99)
GLUCOSE BLD MANUAL STRIP-MCNC: 60 MG/DL (ref 74–99)
GLUCOSE BLD MANUAL STRIP-MCNC: 67 MG/DL (ref 74–99)
GLUCOSE BLD MANUAL STRIP-MCNC: 90 MG/DL (ref 74–99)
GLUCOSE SERPL-MCNC: 91 MG/DL (ref 74–99)
HCT VFR BLD AUTO: 40.2 % (ref 41–52)
HGB BLD-MCNC: 11.7 G/DL (ref 13.5–17.5)
MAGNESIUM SERPL-MCNC: 2.23 MG/DL (ref 1.6–2.4)
MCH RBC QN AUTO: 23.8 PG (ref 26–34)
MCHC RBC AUTO-ENTMCNC: 29.1 G/DL (ref 32–36)
MCV RBC AUTO: 82 FL (ref 80–100)
NRBC BLD-RTO: 0 /100 WBCS (ref 0–0)
PHOSPHATE SERPL-MCNC: 3.9 MG/DL (ref 2.5–4.9)
PLATELET # BLD AUTO: 344 X10*3/UL (ref 150–450)
POTASSIUM SERPL-SCNC: 4.4 MMOL/L (ref 3.5–5.3)
RBC # BLD AUTO: 4.91 X10*6/UL (ref 4.5–5.9)
SODIUM SERPL-SCNC: 130 MMOL/L (ref 136–145)
UFH PPP CHRO-ACNC: 0.3 IU/ML (ref ?–1.1)
UFH PPP CHRO-ACNC: 0.4 IU/ML (ref ?–1.1)
UFH PPP CHRO-ACNC: 0.6 IU/ML (ref ?–1.1)
WBC # BLD AUTO: 5.5 X10*3/UL (ref 4.4–11.3)

## 2025-08-01 PROCEDURE — 80069 RENAL FUNCTION PANEL: CPT | Performed by: PHYSICIAN ASSISTANT

## 2025-08-01 PROCEDURE — 36415 COLL VENOUS BLD VENIPUNCTURE: CPT | Performed by: PHYSICIAN ASSISTANT

## 2025-08-01 PROCEDURE — 2500000004 HC RX 250 GENERAL PHARMACY W/ HCPCS (ALT 636 FOR OP/ED): Performed by: INTERNAL MEDICINE

## 2025-08-01 PROCEDURE — 99231 SBSQ HOSP IP/OBS SF/LOW 25: CPT | Performed by: STUDENT IN AN ORGANIZED HEALTH CARE EDUCATION/TRAINING PROGRAM

## 2025-08-01 PROCEDURE — 85027 COMPLETE CBC AUTOMATED: CPT | Performed by: PHYSICIAN ASSISTANT

## 2025-08-01 PROCEDURE — 36415 COLL VENOUS BLD VENIPUNCTURE: CPT | Performed by: INTERNAL MEDICINE

## 2025-08-01 PROCEDURE — 2500000001 HC RX 250 WO HCPCS SELF ADMINISTERED DRUGS (ALT 637 FOR MEDICARE OP): Performed by: PHYSICIAN ASSISTANT

## 2025-08-01 PROCEDURE — 2500000004 HC RX 250 GENERAL PHARMACY W/ HCPCS (ALT 636 FOR OP/ED): Performed by: PHYSICIAN ASSISTANT

## 2025-08-01 PROCEDURE — 82947 ASSAY GLUCOSE BLOOD QUANT: CPT

## 2025-08-01 PROCEDURE — 2500000002 HC RX 250 W HCPCS SELF ADMINISTERED DRUGS (ALT 637 FOR MEDICARE OP, ALT 636 FOR OP/ED): Performed by: NURSE PRACTITIONER

## 2025-08-01 PROCEDURE — 83735 ASSAY OF MAGNESIUM: CPT | Performed by: PHYSICIAN ASSISTANT

## 2025-08-01 PROCEDURE — 1200000002 HC GENERAL ROOM WITH TELEMETRY DAILY

## 2025-08-01 PROCEDURE — 2500000005 HC RX 250 GENERAL PHARMACY W/O HCPCS: Performed by: PHYSICIAN ASSISTANT

## 2025-08-01 PROCEDURE — 2500000002 HC RX 250 W HCPCS SELF ADMINISTERED DRUGS (ALT 637 FOR MEDICARE OP, ALT 636 FOR OP/ED): Performed by: PHYSICIAN ASSISTANT

## 2025-08-01 PROCEDURE — 2500000002 HC RX 250 W HCPCS SELF ADMINISTERED DRUGS (ALT 637 FOR MEDICARE OP, ALT 636 FOR OP/ED)

## 2025-08-01 PROCEDURE — 99233 SBSQ HOSP IP/OBS HIGH 50: CPT | Performed by: INTERNAL MEDICINE

## 2025-08-01 PROCEDURE — 85520 HEPARIN ASSAY: CPT | Performed by: INTERNAL MEDICINE

## 2025-08-01 PROCEDURE — 85520 HEPARIN ASSAY: CPT | Performed by: PHYSICIAN ASSISTANT

## 2025-08-01 PROCEDURE — 2500000001 HC RX 250 WO HCPCS SELF ADMINISTERED DRUGS (ALT 637 FOR MEDICARE OP)

## 2025-08-01 RX ORDER — DIGOXIN 125 MCG
125 TABLET ORAL 3 TIMES WEEKLY
Status: DISCONTINUED | OUTPATIENT
Start: 2025-08-01 | End: 2025-08-11 | Stop reason: HOSPADM

## 2025-08-01 RX ORDER — HYDRALAZINE HYDROCHLORIDE 25 MG/1
25 TABLET, FILM COATED ORAL EVERY 8 HOURS
Status: DISCONTINUED | OUTPATIENT
Start: 2025-08-01 | End: 2025-08-06

## 2025-08-01 RX ORDER — INSULIN LISPRO 100 [IU]/ML
30 INJECTION, SOLUTION INTRAVENOUS; SUBCUTANEOUS
Status: DISCONTINUED | OUTPATIENT
Start: 2025-08-01 | End: 2025-08-02

## 2025-08-01 RX ORDER — HYDRALAZINE HYDROCHLORIDE 50 MG/1
50 TABLET, FILM COATED ORAL EVERY 8 HOURS SCHEDULED
Status: DISCONTINUED | OUTPATIENT
Start: 2025-08-01 | End: 2025-08-01

## 2025-08-01 RX ADMIN — HEPARIN SODIUM 2000 UNITS/HR: 10000 INJECTION, SOLUTION INTRAVENOUS at 09:30

## 2025-08-01 RX ADMIN — ISOSORBIDE DINITRATE 40 MG: 40 TABLET ORAL at 18:34

## 2025-08-01 RX ADMIN — CALCIUM CARBONATE (ANTACID) CHEW TAB 500 MG 1 TABLET: 500 CHEW TAB at 21:05

## 2025-08-01 RX ADMIN — DEXTROSE MONOHYDRATE 12.5 G: 25 INJECTION, SOLUTION INTRAVENOUS at 21:13

## 2025-08-01 RX ADMIN — ISOSORBIDE DINITRATE 40 MG: 40 TABLET ORAL at 15:54

## 2025-08-01 RX ADMIN — HYDRALAZINE HYDROCHLORIDE 25 MG: 50 TABLET ORAL at 15:54

## 2025-08-01 RX ADMIN — DAPAGLIFLOZIN 10 MG: 10 TABLET, FILM COATED ORAL at 08:52

## 2025-08-01 RX ADMIN — ISOSORBIDE DINITRATE 40 MG: 40 TABLET ORAL at 08:52

## 2025-08-01 RX ADMIN — CALCIUM CARBONATE (ANTACID) CHEW TAB 500 MG 1 TABLET: 500 CHEW TAB at 08:52

## 2025-08-01 RX ADMIN — FUROSEMIDE 80 MG: 10 INJECTION, SOLUTION INTRAMUSCULAR; INTRAVENOUS at 08:56

## 2025-08-01 RX ADMIN — GABAPENTIN 200 MG: 100 CAPSULE ORAL at 21:05

## 2025-08-01 RX ADMIN — MILRINONE LACTATE IN DEXTROSE 0.12 MCG/KG/MIN: 0.2 INJECTION, SOLUTION INTRAVENOUS at 08:53

## 2025-08-01 RX ADMIN — INSULIN GLARGINE 44 UNITS: 100 INJECTION, SOLUTION SUBCUTANEOUS at 08:54

## 2025-08-01 RX ADMIN — GABAPENTIN 200 MG: 100 CAPSULE ORAL at 06:25

## 2025-08-01 RX ADMIN — INSULIN LISPRO 30 UNITS: 100 INJECTION, SOLUTION INTRAVENOUS; SUBCUTANEOUS at 18:33

## 2025-08-01 RX ADMIN — GABAPENTIN 200 MG: 100 CAPSULE ORAL at 15:54

## 2025-08-01 RX ADMIN — INSULIN LISPRO 32 UNITS: 100 INJECTION, SOLUTION INTRAVENOUS; SUBCUTANEOUS at 08:52

## 2025-08-01 RX ADMIN — QUETIAPINE FUMARATE 50 MG: 25 TABLET ORAL at 21:05

## 2025-08-01 RX ADMIN — LEVOTHYROXINE SODIUM 75 MCG: 0.07 TABLET ORAL at 07:42

## 2025-08-01 RX ADMIN — DIGOXIN 125 MCG: 125 TABLET ORAL at 11:23

## 2025-08-01 RX ADMIN — FENOFIBRATE 145 MG: 145 TABLET, FILM COATED ORAL at 08:53

## 2025-08-01 RX ADMIN — INSULIN LISPRO 32 UNITS: 100 INJECTION, SOLUTION INTRAVENOUS; SUBCUTANEOUS at 12:48

## 2025-08-01 RX ADMIN — HYDRALAZINE HYDROCHLORIDE 100 MG: 100 TABLET ORAL at 06:27

## 2025-08-01 RX ADMIN — PANTOPRAZOLE SODIUM 40 MG: 40 TABLET, DELAYED RELEASE ORAL at 06:25

## 2025-08-01 RX ADMIN — SITAGLIPTIN 50 MG: 50 TABLET, FILM COATED ORAL at 08:53

## 2025-08-01 RX ADMIN — ATORVASTATIN CALCIUM 80 MG: 80 TABLET, FILM COATED ORAL at 21:05

## 2025-08-01 RX ADMIN — HEPARIN SODIUM 2000 UNITS/HR: 10000 INJECTION, SOLUTION INTRAVENOUS at 21:44

## 2025-08-01 RX ADMIN — FUROSEMIDE 80 MG: 10 INJECTION, SOLUTION INTRAMUSCULAR; INTRAVENOUS at 17:49

## 2025-08-01 ASSESSMENT — COGNITIVE AND FUNCTIONAL STATUS - GENERAL
CLIMB 3 TO 5 STEPS WITH RAILING: A LITTLE
CLIMB 3 TO 5 STEPS WITH RAILING: A LITTLE
WALKING IN HOSPITAL ROOM: A LITTLE
WALKING IN HOSPITAL ROOM: A LITTLE
DAILY ACTIVITIY SCORE: 24
MOBILITY SCORE: 22
MOBILITY SCORE: 22
DAILY ACTIVITIY SCORE: 24

## 2025-08-01 ASSESSMENT — PAIN - FUNCTIONAL ASSESSMENT
PAIN_FUNCTIONAL_ASSESSMENT: 0-10
PAIN_FUNCTIONAL_ASSESSMENT: 0-10

## 2025-08-01 ASSESSMENT — PAIN SCALES - GENERAL
PAINLEVEL_OUTOF10: 0 - NO PAIN

## 2025-08-01 NOTE — CARE PLAN
Problem: Pain - Adult  Goal: Verbalizes/displays adequate comfort level or baseline comfort level  Outcome: Progressing     Problem: Safety - Adult  Goal: Free from fall injury  Outcome: Progressing     Problem: Discharge Planning  Goal: Discharge to home or other facility with appropriate resources  Outcome: Progressing     Problem: Chronic Conditions and Co-morbidities  Goal: Patient's chronic conditions and co-morbidity symptoms are monitored and maintained or improved  Outcome: Progressing     Problem: Nutrition  Goal: Nutrient intake appropriate for maintaining nutritional needs  Outcome: Progressing     Problem: Heart Failure  Goal: Improved gas exchange this shift  Outcome: Progressing  Goal: Improved urinary output this shift  Outcome: Progressing  Goal: Reduction in peripheral edema within 24 hours  Outcome: Progressing  Goal: Report improvement of dyspnea/breathlessness this shift  Outcome: Progressing  Goal: Weight from fluid excess reduced over 2-3 days, then stabilize  Outcome: Progressing  Goal: Increase self care and/or family involvement in 24 hours  Outcome: Progressing     Problem: Skin  Goal: Decreased wound size/increased tissue granulation at next dressing change  Outcome: Progressing  Goal: Participates in plan/prevention/treatment measures  Outcome: Progressing  Goal: Prevent/manage excess moisture  Outcome: Progressing  Goal: Prevent/minimize sheer/friction injuries  Outcome: Progressing  Goal: Promote/optimize nutrition  Outcome: Progressing  Goal: Promote skin healing  Outcome: Progressing     Problem: Diabetes  Goal: Achieve decreasing blood glucose levels by end of shift  Outcome: Progressing  Goal: Increase stability of blood glucose readings by end of shift  Outcome: Progressing  Goal: Decrease in ketones present in urine by end of shift  Outcome: Progressing   The patient's goals for the shift include ambulate in the hallway    The clinical goals for the shift include therapeutic on  heparin    Over the shift, the patient was therapeutic on heparin; continue to diurese today.

## 2025-08-01 NOTE — PROGRESS NOTES
Felice Law is a 60 y.o. male on day 15 of admission presenting with Acute on chronic systolic heart failure.  SW made referral to [facility], outpatient navigator program to follow him upon discharge for continuity of care.  SW to continue to follow for support.     KEL BARKER

## 2025-08-01 NOTE — PROGRESS NOTES
"Endocrine Progress Note   Felice Law is a 60 y.o. male on day 15 of admission presenting with Acute on chronic systolic heart failure.  Endocrine consulted for T2DM management.    Subjective   No acute overnight events. BG trends reviewed.    Objective   Last Recorded Vitals  Blood pressure 97/65, pulse (!) 113, temperature 36.8 °C (98.2 °F), temperature source Temporal, resp. rate 20, height 1.803 m (5' 10.98\"), weight 101 kg (222 lb 7 oz), SpO2 94%.  Intake/Output last 3 Shifts:  I/O last 3 completed shifts:  In: 240 (2.4 mL/kg) [P.O.:240]  Out: 1425 (14.1 mL/kg) [Urine:1425 (0.4 mL/kg/hr)]  Weight: 100.9 kg   Exam:  General - male patient in [facility]  HEENT - AT/NC  Resp - no extra wob noted  Msckl  + edema in B/L LE  Relevant Results  Results from last 7 days   Lab Units 08/01/25  0835 07/31/25  2106 07/31/25  1945 07/31/25  1510 07/31/25  1201 07/31/25  0622 07/30/25  1739 07/30/25  1727 07/29/25  2046 07/29/25  1826 07/29/25  0743 07/29/25  0518 07/28/25  0631 07/28/25  0418   POCT GLUCOSE mg/dL 111* 99  --  144* 146* 113*   < >  --    < >  --    < >  --    < >  --    GLUCOSE mg/dL  --   --  85  --   --   --   --  139*  --  164*  --  185*  --  200*    < > = values in this interval not displayed.     BMP:  Results from last 7 days   Lab Units 07/31/25  1945 07/30/25  1727 07/29/25  1826   SODIUM mmol/L 133* 133* 133*   POTASSIUM mmol/L 4.0 3.8 4.1   CHLORIDE mmol/L 99 97* 101   CO2 mmol/L 22 23 21   BUN mg/dL 61* 55* 52*   CREATININE mg/dL 2.28* 2.42* 2.10*   GLUCOSE mg/dL 85 139* 164*     Assessment  Felice Law is a 60 y.o. male with significant PMH of ICM, HFrEF, EF 15-20%, CABG (LIMA- LAD in 2023),  PCI, VT arrest s/p VT ablation and ICD in 2017, Hypertension, Hyperlipidemia, Hypothyroidism, Left femoral DVT on Eliquis, COPD, JAKE, CKD III and Insulin dependent Type 2 diabetes. He was admitted on 7/12 for further HF management and advanced therapy work up.   Endocrine consulted for managing " uncontrolled T2DM.      #T2DM  :: Hyperglycemia 2/2 poorly controlled Type 2 diabetes   :: last HbA1c 10.1 (7/17)  :: Home regimen: Insulin galargine 40 units twice a day, insulin lispro 18 units with meals plus sliding scale up to 80 units daily, tirzepatide (Mounjaro) 5 mg/0.5 mL pen injector, Farxiga 10 mg tablet, has freestyle abbi 3  Sitagliptin 50 mg started on 7/31    # Hypothyroid   :: Thyroid on home dose > Levothyroxine 75mcg  :: TSH 2.12, T4 9.3, T3 105 (7/23) all fall within reference ranges consistent with euthyroidism.  - continue on same home dose for thyroxine.     Plan  - Insulin glargine to 44 units BID   - Decrease Insulin lispro to 30 units TID with meals  - c/w Tablet Januvia (sitagliptin) 50 mg daily  - c/w  sliding scale to SS#2 AC   - Accuchecks TIDAC and at bedtime  - 60 gm carb consistent diet  - Goal -180  - Hypoglycemia bundle  Will consider starting metformin while inpatient based on kidney function    Recommendations communicated to primary team. Please reach out incase you have any questions or concerns.    The patient was discussed with attending Dr. Diana Yancey MD  Endocrinology fellow

## 2025-08-01 NOTE — CARE PLAN
Problem: Pain - Adult  Goal: Verbalizes/displays adequate comfort level or baseline comfort level  Outcome: Progressing     Problem: Safety - Adult  Goal: Free from fall injury  Outcome: Progressing     Problem: Discharge Planning  Goal: Discharge to home or other facility with appropriate resources  Outcome: Progressing     Problem: Nutrition  Goal: Nutrient intake appropriate for maintaining nutritional needs  Outcome: Progressing     Problem: Heart Failure  Goal: Improved gas exchange this shift  Outcome: Progressing  Goal: Improved urinary output this shift  Outcome: Progressing  Goal: Reduction in peripheral edema within 24 hours  Outcome: Progressing  Goal: Report improvement of dyspnea/breathlessness this shift  Outcome: Progressing  Goal: Weight from fluid excess reduced over 2-3 days, then stabilize  Outcome: Progressing  Goal: Increase self care and/or family involvement in 24 hours  Outcome: Progressing     Problem: Skin  Goal: Decreased wound size/increased tissue granulation at next dressing change  Outcome: Progressing  Goal: Participates in plan/prevention/treatment measures  Outcome: Progressing  Goal: Prevent/manage excess moisture  Outcome: Progressing  Goal: Prevent/minimize sheer/friction injuries  Outcome: Progressing  Goal: Promote/optimize nutrition  Outcome: Progressing  Goal: Promote skin healing  Outcome: Progressing     Problem: Diabetes  Goal: Achieve decreasing blood glucose levels by end of shift  Outcome: Progressing  Goal: Increase stability of blood glucose readings by end of shift  Outcome: Progressing  Goal: Decrease in ketones present in urine by end of shift  Outcome: Progressing   The patient's goals for the shift include ambulate in the hallway    The clinical goals for the shift include Patient's heparin gtt will be therapeutic x 2 this shift.

## 2025-08-01 NOTE — PROGRESS NOTES
Subjective:  Denies any current complaints. No chest pain, shortness of breath, abdominal pain. States he only had a little bit of urine output.  unable to be used for this encounter as Norma malfunctioning. Patient able to understand and communicate during visit with KARTHIK/attending.     Interval Events:  JOHN OVN    Today in Brief  - Cr slightly down, will resume lasix today and follow BP/renal function/UOP.  - Will wean hydralazine to provide better BP.   - Restart digoxin at 3 times weekly.   - Discontinue fenofibrate given renal dysfunction.   - Maintain milrinone at same dose today.   - per endo, wean insulin lispro to 30 units TID before meals.       Objective:  Vitals:    08/01/25 0625   BP: 109/71   Pulse: (!) 114   Resp:    Temp:    SpO2:      Weight         7/27/2025  2200 7/29/2025  0600 7/30/2025  0431 7/31/2025  0555 8/1/2025  0417    Weight: 97.6 kg (215 lb 2.7 oz) 99.5 kg (219 lb 5.7 oz) 100 kg (220 lb 10.9 oz) 98.3 kg (216 lb 11.4 oz) 101 kg (222 lb 7 oz)            Intake/Output Summary (Last 24 hours) at 8/1/2025 0723  Last data filed at 7/31/2025 1755  Gross per 24 hour   Intake --   Output 400 ml   Net -400 ml     Recent Results (from the past 24 hours)   POCT GLUCOSE    Collection Time: 07/31/25 12:01 PM   Result Value Ref Range    POCT Glucose 146 (H) 74 - 99 mg/dL   POCT GLUCOSE    Collection Time: 07/31/25  3:10 PM   Result Value Ref Range    POCT Glucose 144 (H) 74 - 99 mg/dL   Heparin Assay, [facility]    Collection Time: 07/31/25  7:45 PM   Result Value Ref Range    Heparin Unfractionated 0.2 See Comment Below for Therapeutic Ranges IU/mL   CBC    Collection Time: 07/31/25  7:45 PM   Result Value Ref Range    WBC 5.9 4.4 - 11.3 x10*3/uL    nRBC 0.0 0.0 - 0.0 /100 WBCs    RBC 4.74 4.50 - 5.90 x10*6/uL    Hemoglobin 11.4 (L) 13.5 - 17.5 g/dL    Hematocrit 36.2 (L) 41.0 - 52.0 %    MCV 76 (L) 80 - 100 fL    MCH 24.1 (L) 26.0 - 34.0 pg    MCHC 31.5 (L) 32.0 - 36.0 g/dL    RDW 20.0 (H) 11.5 -  14.5 %    Platelets 306 150 - 450 x10*3/uL   Magnesium    Collection Time: 07/31/25  7:45 PM   Result Value Ref Range    Magnesium 2.15 1.60 - 2.40 mg/dL   Renal function panel    Collection Time: 07/31/25  7:45 PM   Result Value Ref Range    Glucose 85 74 - 99 mg/dL    Sodium 133 (L) 136 - 145 mmol/L    Potassium 4.0 3.5 - 5.3 mmol/L    Chloride 99 98 - 107 mmol/L    Bicarbonate 22 21 - 32 mmol/L    Anion Gap 16 10 - 20 mmol/L    Urea Nitrogen 61 (H) 6 - 23 mg/dL    Creatinine 2.28 (H) 0.50 - 1.30 mg/dL    eGFR 32 (L) >60 mL/min/1.73m*2    Calcium 9.0 8.6 - 10.6 mg/dL    Phosphorus 4.0 2.5 - 4.9 mg/dL    Albumin 3.9 3.4 - 5.0 g/dL   POCT GLUCOSE    Collection Time: 07/31/25  9:06 PM   Result Value Ref Range    POCT Glucose 99 74 - 99 mg/dL   Heparin Assay, [facility]    Collection Time: 08/01/25 12:40 AM   Result Value Ref Range    Heparin Unfractionated 0.6 See Comment Below for Therapeutic Ranges IU/mL   Heparin Assay, [facility]    Collection Time: 08/01/25  5:41 AM   Result Value Ref Range    Heparin Unfractionated 0.4 See Comment Below for Therapeutic Ranges IU/mL     Inpatient Medications:  Scheduled Medications[1]  PRN Medications[2]  Continuous Medications[3]    Telemetry 8/1/2025 NSR with intermittent sinus tachycardia    Physical exam:  General: NAD  Head/ neck: + JVD  Cardiac: RRR, regular S1 S2 , no murmur, no rub, no gallop  Pulm: CTA b/l, no WOB.   Vascular: Radial pulse 2+   GI: Non distended  Extremities: Pitting edema present to lower extremities.  Neuro: no focal neuro deficits   Psych: appropriate mood and behavior   Skin: warm and dry       Assessment/Plan   Felice Law is a 60 y.o. 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 Diuresed and  transferred to [facility]ICU for further managemnet and possible re-consideration of advanced therapies.  Of note, Patient was previously discussed for advanced HF therapies [place] [facility] but was declined with concerns for mobility/frailty (uses motorized scooter and has bilateral foot drop with neuropathy), CKD, poorly controlled diabetes, as well as unclear social/financial support.      HFICU 7/17. Warm and wet. [place] guided diuresis with lasix gtt and GDMT titration. Advanced therapies evaluation initiated 7/23.  Would need control of his diabetes before considering transplant    Transferred to floor 7/29     Acute on chronic systolic ischemic, HFrEF 15-20% (since 2017)  CAD  s/p PCI and 1v CABG (LIMA-LAD 4/2023).    s/p ICD 2017  - TTE 5/5/25: LV mod dilated, EF 15-20%, gHK of LV, G2DD, mildly enlarged RV with mildly reduced systolic fxn, mild LAE, mild to mod MR and TR  - LHC 6/10/25:   Right dom. LIMA to LAD patent. pLAD 70%, os and pD1 90%, mLCx50%,osOM1 90%, mRCA 100%  - Committee discussion: optimize DM (A1c 10) and defer listing for likely heart transplant / LVAD depending on acuity .    - Cardiac MRI 7/2025: EF 17%, mixed CM, apex thin and severely HK, mid apical septum HK, mod MELISA, mild to mod MR,  Transmural infarction (%) involves the mid anteroseptum,apical septum, apical inferior wall, and apex, all demonstrating nonviable myocardium. Subendocardial infarction (25-50%) affects the mid inferior and apical lateral wall, while subendocardial infarction (<25%) involves the mid inferolateral wall - these segments remain viable. Additionally, mid-myocardial enhancement in the basal septum represents a nonischemic, nonspecific pattern. RV function severely reduced. LHC/stent at some point, likely outpatient  -- await further review by advanced HF and CTs  - 7/30 Milrinone wean to 0.125mcg/kg/min, will continue today and reevaluate daily.    - GDMT: enresto, isordil, hydralazine (wean today from 100mg TID to 50mg TID), farxiga,  no beta blocker, digoxin (level WNL 7/29). 7/31: continue to hold entresto/Aldactone and decrease dig to MWF due to CHARLOTTE on CKD  - Secondary vascular prevention: aspirin, statin (Discontinue fenofibrate given renal dysfunction)   - Remains volume overloaded:  yesterday held lasix 80mg IV twice daily. Will resume today with slight improvement in Cr and only minimal UOP noted in previous 24 hours. Likely is diuretic dependent for significant UOP. Will need close monitoring of BP and RFP (repeat this evening).   - BNP slihgtly elevated today to 373 (281 previously).   -  Replete electrolytes to keep K>4.0 and Mg >2.0    COPD  Obstructive sleep apnea  Prior tobacco use  -  (24 pack year, quit 2017)  - albuterol PRN  - incentive spirometry      CHARLOTTE on CKD  - Cr trend: 2.28 (2.42, 2.1,1.7, 2.04, 2.39)  -  As low as 1.35 in 11/2024    Anemia of chronic disease and iron deficiency  - (7/17):  iron sat L 16 but otherwise iron studies & ferritin WNL  - IV Venofer 200 mg x5 ordered by HFICU (end 8/4)     Left femoral DVT (2017)  - Holding home Eliquis  - on Heparin gtt      Uncontrolled type II DM c/b neuropathy  - HgbA1c (7/17):  10.1  - gabapentin TID  - Endocrine following  - Insulin glargine 46 units BID, insulin lispro 30u TID before meals (weaned from 32u to 30u today per endocrine), SS#2, start linagliptin 5mg daily  - Overall BG has been well-controlled. Appreciate endocrine recs.      Hypothyroidism  - TSH WNL. Euthyroid  - Synthroid     Loose stool unspecified  - Try immodium PRN  - C. Diff neg    DVT ppx: heparin gtt  DISPO: Pending clinical improvement, further work up, diuresis, diabetes management, and milrinone titration  Code status: Full Code    Patient seen and discussed with Dr. Ruslan Nguyen.  Dio Uribe PA-C    I conducted a shared care visit with the KARTHIK, he feels about the same today renal function slightly  improved we will cut back hydralazine to allow more renal perfusion and continue IV diuretics repeat BNP with the next lab work potential discontinuation of milrinone tomorrow depending on lab work we again discussed the plan via  although I have some concerns that he understands the overall trajectory         [1]   Scheduled medications   Medication Dose Route Frequency    [Held by provider] apixaban  5 mg oral BID    atorvastatin  80 mg oral Nightly    calcium carbonate  500 mg of calcium carbonate oral BID    dapagliflozin propanediol  10 mg oral Daily    [Held by provider] digoxin  125 mcg oral Once per day on Monday Wednesday Friday    fenofibrate  145 mg oral Daily    [Held by provider] furosemide  80 mg intravenous BID    gabapentin  200 mg oral q8h SUSI    hydrALAZINE  100 mg oral q8h SUSI    insulin glargine  44 Units subcutaneous Nightly    insulin glargine  44 Units subcutaneous Daily    insulin lispro  0-15 Units subcutaneous TID AC    insulin lispro  32 Units subcutaneous TID AC    iron sucrose  200 mg intravenous Every other day    isosorbide dinitrate  40 mg oral TID    levothyroxine  75 mcg oral Daily before breakfast    pantoprazole  40 mg oral Daily before breakfast    potassium chloride CR  40 mEq oral Once    QUEtiapine  50 mg oral Nightly    [Held by provider] sacubitriL-valsartan  1 tablet oral BID    SITagliptin phosphate  50 mg oral Daily    [Held by provider] spironolactone  25 mg oral Daily   [2]   PRN medications   Medication    acetaminophen    albuterol    alteplase    dextrose    dextrose    glucagon    glucagon    heparin    loperamide   [3]   Continuous Medications   Medication Dose Last Rate    heparin  0-4,000 Units/hr 2,000 Units/hr (08/01/25 0711)    milrinone  0.125 mcg/kg/min 0.125 mcg/kg/min (07/31/25 0624)

## 2025-08-02 LAB
ALBUMIN SERPL BCP-MCNC: 3.8 G/DL (ref 3.4–5)
ANION GAP SERPL CALC-SCNC: 15 MMOL/L (ref 10–20)
BUN SERPL-MCNC: 68 MG/DL (ref 6–23)
CALCIUM SERPL-MCNC: 8.7 MG/DL (ref 8.6–10.6)
CHLORIDE SERPL-SCNC: 96 MMOL/L (ref 98–107)
CO2 SERPL-SCNC: 25 MMOL/L (ref 21–32)
CREAT SERPL-MCNC: 2.86 MG/DL (ref 0.5–1.3)
EGFRCR SERPLBLD CKD-EPI 2021: 25 ML/MIN/1.73M*2
ERYTHROCYTE [DISTWIDTH] IN BLOOD BY AUTOMATED COUNT: 20.2 % (ref 11.5–14.5)
GLUCOSE BLD MANUAL STRIP-MCNC: 118 MG/DL (ref 74–99)
GLUCOSE BLD MANUAL STRIP-MCNC: 127 MG/DL (ref 74–99)
GLUCOSE BLD MANUAL STRIP-MCNC: 139 MG/DL (ref 74–99)
GLUCOSE BLD MANUAL STRIP-MCNC: 163 MG/DL (ref 74–99)
GLUCOSE BLD MANUAL STRIP-MCNC: 164 MG/DL (ref 74–99)
GLUCOSE BLD MANUAL STRIP-MCNC: 172 MG/DL (ref 74–99)
GLUCOSE SERPL-MCNC: 252 MG/DL (ref 74–99)
HCT VFR BLD AUTO: 34 % (ref 41–52)
HGB BLD-MCNC: 10.1 G/DL (ref 13.5–17.5)
LACTATE SERPL-SCNC: 1 MMOL/L (ref 0.4–2)
MAGNESIUM SERPL-MCNC: 2.09 MG/DL (ref 1.6–2.4)
MCH RBC QN AUTO: 23.9 PG (ref 26–34)
MCHC RBC AUTO-ENTMCNC: 29.7 G/DL (ref 32–36)
MCV RBC AUTO: 81 FL (ref 80–100)
NRBC BLD-RTO: 0 /100 WBCS (ref 0–0)
PHOSPHATE SERPL-MCNC: 3.4 MG/DL (ref 2.5–4.9)
PLATELET # BLD AUTO: 324 X10*3/UL (ref 150–450)
POTASSIUM SERPL-SCNC: 4.7 MMOL/L (ref 3.5–5.3)
RBC # BLD AUTO: 4.22 X10*6/UL (ref 4.5–5.9)
SODIUM SERPL-SCNC: 131 MMOL/L (ref 136–145)
UFH PPP CHRO-ACNC: 0.5 IU/ML (ref ?–1.1)
WBC # BLD AUTO: 5.6 X10*3/UL (ref 4.4–11.3)

## 2025-08-02 PROCEDURE — 85520 HEPARIN ASSAY: CPT | Performed by: PHYSICIAN ASSISTANT

## 2025-08-02 PROCEDURE — 2500000002 HC RX 250 W HCPCS SELF ADMINISTERED DRUGS (ALT 637 FOR MEDICARE OP, ALT 636 FOR OP/ED): Performed by: PHYSICIAN ASSISTANT

## 2025-08-02 PROCEDURE — 2500000004 HC RX 250 GENERAL PHARMACY W/ HCPCS (ALT 636 FOR OP/ED): Performed by: NURSE PRACTITIONER

## 2025-08-02 PROCEDURE — 82947 ASSAY GLUCOSE BLOOD QUANT: CPT

## 2025-08-02 PROCEDURE — 2500000001 HC RX 250 WO HCPCS SELF ADMINISTERED DRUGS (ALT 637 FOR MEDICARE OP)

## 2025-08-02 PROCEDURE — 1200000002 HC GENERAL ROOM WITH TELEMETRY DAILY

## 2025-08-02 PROCEDURE — 36415 COLL VENOUS BLD VENIPUNCTURE: CPT | Performed by: PHYSICIAN ASSISTANT

## 2025-08-02 PROCEDURE — 80069 RENAL FUNCTION PANEL: CPT | Performed by: PHYSICIAN ASSISTANT

## 2025-08-02 PROCEDURE — 2500000002 HC RX 250 W HCPCS SELF ADMINISTERED DRUGS (ALT 637 FOR MEDICARE OP, ALT 636 FOR OP/ED): Performed by: NURSE PRACTITIONER

## 2025-08-02 PROCEDURE — 2500000004 HC RX 250 GENERAL PHARMACY W/ HCPCS (ALT 636 FOR OP/ED): Performed by: PHYSICIAN ASSISTANT

## 2025-08-02 PROCEDURE — 99231 SBSQ HOSP IP/OBS SF/LOW 25: CPT | Performed by: STUDENT IN AN ORGANIZED HEALTH CARE EDUCATION/TRAINING PROGRAM

## 2025-08-02 PROCEDURE — 83605 ASSAY OF LACTIC ACID: CPT | Performed by: NURSE PRACTITIONER

## 2025-08-02 PROCEDURE — 2500000001 HC RX 250 WO HCPCS SELF ADMINISTERED DRUGS (ALT 637 FOR MEDICARE OP): Performed by: STUDENT IN AN ORGANIZED HEALTH CARE EDUCATION/TRAINING PROGRAM

## 2025-08-02 PROCEDURE — 99233 SBSQ HOSP IP/OBS HIGH 50: CPT | Performed by: INTERNAL MEDICINE

## 2025-08-02 PROCEDURE — 85027 COMPLETE CBC AUTOMATED: CPT | Performed by: PHYSICIAN ASSISTANT

## 2025-08-02 PROCEDURE — 2500000001 HC RX 250 WO HCPCS SELF ADMINISTERED DRUGS (ALT 637 FOR MEDICARE OP): Performed by: PHYSICIAN ASSISTANT

## 2025-08-02 PROCEDURE — 36415 COLL VENOUS BLD VENIPUNCTURE: CPT | Performed by: NURSE PRACTITIONER

## 2025-08-02 PROCEDURE — 83735 ASSAY OF MAGNESIUM: CPT | Performed by: PHYSICIAN ASSISTANT

## 2025-08-02 RX ORDER — GABAPENTIN 100 MG/1
200 CAPSULE ORAL ONCE
Status: COMPLETED | OUTPATIENT
Start: 2025-08-02 | End: 2025-08-02

## 2025-08-02 RX ORDER — INSULIN LISPRO 100 [IU]/ML
15 INJECTION, SOLUTION INTRAVENOUS; SUBCUTANEOUS
Status: DISCONTINUED | OUTPATIENT
Start: 2025-08-02 | End: 2025-08-02

## 2025-08-02 RX ORDER — INSULIN LISPRO 100 [IU]/ML
25 INJECTION, SOLUTION INTRAVENOUS; SUBCUTANEOUS
Status: DISCONTINUED | OUTPATIENT
Start: 2025-08-02 | End: 2025-08-05

## 2025-08-02 RX ORDER — INSULIN GLARGINE 100 [IU]/ML
40 INJECTION, SOLUTION SUBCUTANEOUS DAILY
Status: DISCONTINUED | OUTPATIENT
Start: 2025-08-03 | End: 2025-08-02

## 2025-08-02 RX ORDER — INSULIN LISPRO 100 [IU]/ML
15 INJECTION, SOLUTION INTRAVENOUS; SUBCUTANEOUS ONCE
Status: COMPLETED | OUTPATIENT
Start: 2025-08-02 | End: 2025-08-02

## 2025-08-02 RX ORDER — INSULIN GLARGINE 100 [IU]/ML
40 INJECTION, SOLUTION SUBCUTANEOUS DAILY
Status: DISCONTINUED | OUTPATIENT
Start: 2025-08-02 | End: 2025-08-11 | Stop reason: HOSPADM

## 2025-08-02 RX ORDER — INSULIN GLARGINE 100 [IU]/ML
40 INJECTION, SOLUTION SUBCUTANEOUS NIGHTLY
Status: DISCONTINUED | OUTPATIENT
Start: 2025-08-02 | End: 2025-08-11 | Stop reason: HOSPADM

## 2025-08-02 RX ADMIN — FUROSEMIDE 80 MG: 10 INJECTION, SOLUTION INTRAMUSCULAR; INTRAVENOUS at 17:14

## 2025-08-02 RX ADMIN — FUROSEMIDE 80 MG: 10 INJECTION, SOLUTION INTRAMUSCULAR; INTRAVENOUS at 12:15

## 2025-08-02 RX ADMIN — INSULIN GLARGINE 40 UNITS: 100 INJECTION, SOLUTION SUBCUTANEOUS at 20:43

## 2025-08-02 RX ADMIN — HYDRALAZINE HYDROCHLORIDE 25 MG: 50 TABLET ORAL at 23:20

## 2025-08-02 RX ADMIN — GABAPENTIN 200 MG: 100 CAPSULE ORAL at 22:22

## 2025-08-02 RX ADMIN — PANTOPRAZOLE SODIUM 40 MG: 40 TABLET, DELAYED RELEASE ORAL at 06:29

## 2025-08-02 RX ADMIN — HEPARIN SODIUM 2000 UNITS/HR: 10000 INJECTION, SOLUTION INTRAVENOUS at 10:58

## 2025-08-02 RX ADMIN — DAPAGLIFLOZIN 10 MG: 10 TABLET, FILM COATED ORAL at 08:48

## 2025-08-02 RX ADMIN — ISOSORBIDE DINITRATE 40 MG: 40 TABLET ORAL at 14:21

## 2025-08-02 RX ADMIN — QUETIAPINE FUMARATE 50 MG: 25 TABLET ORAL at 20:44

## 2025-08-02 RX ADMIN — ACETAMINOPHEN 975 MG: 325 TABLET ORAL at 20:48

## 2025-08-02 RX ADMIN — GABAPENTIN 200 MG: 100 CAPSULE ORAL at 06:30

## 2025-08-02 RX ADMIN — INSULIN LISPRO 25 UNITS: 100 INJECTION, SOLUTION INTRAVENOUS; SUBCUTANEOUS at 19:03

## 2025-08-02 RX ADMIN — CALCIUM CARBONATE (ANTACID) CHEW TAB 500 MG 1 TABLET: 500 CHEW TAB at 20:43

## 2025-08-02 RX ADMIN — HYDRALAZINE HYDROCHLORIDE 25 MG: 50 TABLET ORAL at 08:48

## 2025-08-02 RX ADMIN — LEVOTHYROXINE SODIUM 75 MCG: 0.07 TABLET ORAL at 06:29

## 2025-08-02 RX ADMIN — ISOSORBIDE DINITRATE 40 MG: 40 TABLET ORAL at 08:48

## 2025-08-02 RX ADMIN — IRON SUCROSE 200 MG: 20 INJECTION, SOLUTION INTRAVENOUS at 08:48

## 2025-08-02 RX ADMIN — CALCIUM CARBONATE (ANTACID) CHEW TAB 500 MG 1 TABLET: 500 CHEW TAB at 08:48

## 2025-08-02 RX ADMIN — ATORVASTATIN CALCIUM 80 MG: 80 TABLET, FILM COATED ORAL at 20:44

## 2025-08-02 RX ADMIN — MILRINONE LACTATE IN DEXTROSE 0.25 MCG/KG/MIN: 0.2 INJECTION, SOLUTION INTRAVENOUS at 10:59

## 2025-08-02 RX ADMIN — GABAPENTIN 200 MG: 100 CAPSULE ORAL at 14:21

## 2025-08-02 RX ADMIN — INSULIN GLARGINE 40 UNITS: 100 INJECTION, SOLUTION SUBCUTANEOUS at 12:15

## 2025-08-02 RX ADMIN — INSULIN LISPRO 2 UNITS: 100 INJECTION, SOLUTION INTRAVENOUS; SUBCUTANEOUS at 14:21

## 2025-08-02 RX ADMIN — SITAGLIPTIN 50 MG: 50 TABLET, FILM COATED ORAL at 08:48

## 2025-08-02 RX ADMIN — INSULIN LISPRO 15 UNITS: 100 INJECTION, SOLUTION INTRAVENOUS; SUBCUTANEOUS at 10:53

## 2025-08-02 RX ADMIN — ISOSORBIDE DINITRATE 40 MG: 40 TABLET ORAL at 19:02

## 2025-08-02 RX ADMIN — HYDRALAZINE HYDROCHLORIDE 25 MG: 50 TABLET ORAL at 16:57

## 2025-08-02 RX ADMIN — INSULIN LISPRO 2 UNITS: 100 INJECTION, SOLUTION INTRAVENOUS; SUBCUTANEOUS at 19:03

## 2025-08-02 RX ADMIN — HYDRALAZINE HYDROCHLORIDE 25 MG: 50 TABLET ORAL at 01:05

## 2025-08-02 ASSESSMENT — COGNITIVE AND FUNCTIONAL STATUS - GENERAL
CLIMB 3 TO 5 STEPS WITH RAILING: A LITTLE
WALKING IN HOSPITAL ROOM: A LITTLE
CLIMB 3 TO 5 STEPS WITH RAILING: A LITTLE
DAILY ACTIVITIY SCORE: 24
MOBILITY SCORE: 22
MOBILITY SCORE: 22
DAILY ACTIVITIY SCORE: 24
WALKING IN HOSPITAL ROOM: A LITTLE

## 2025-08-02 ASSESSMENT — PAIN SCALES - GENERAL
PAINLEVEL_OUTOF10: 3
PAINLEVEL_OUTOF10: 0 - NO PAIN

## 2025-08-02 ASSESSMENT — PAIN DESCRIPTION - DESCRIPTORS: DESCRIPTORS: THROBBING

## 2025-08-02 ASSESSMENT — PAIN - FUNCTIONAL ASSESSMENT
PAIN_FUNCTIONAL_ASSESSMENT: 0-10
PAIN_FUNCTIONAL_ASSESSMENT: 0-10

## 2025-08-02 NOTE — CARE PLAN
Problem: Pain - Adult  Goal: Verbalizes/displays adequate comfort level or baseline comfort level  Outcome: Progressing     Problem: Safety - Adult  Goal: Free from fall injury  Outcome: Progressing     Problem: Discharge Planning  Goal: Discharge to home or other facility with appropriate resources  Outcome: Progressing     Problem: Chronic Conditions and Co-morbidities  Goal: Patient's chronic conditions and co-morbidity symptoms are monitored and maintained or improved  Outcome: Progressing     Problem: Nutrition  Goal: Nutrient intake appropriate for maintaining nutritional needs  Outcome: Progressing     Problem: Heart Failure  Goal: Improved gas exchange this shift  Outcome: Progressing  Goal: Improved urinary output this shift  Outcome: Progressing  Goal: Reduction in peripheral edema within 24 hours  Outcome: Progressing  Goal: Report improvement of dyspnea/breathlessness this shift  Outcome: Progressing  Goal: Weight from fluid excess reduced over 2-3 days, then stabilize  Outcome: Progressing  Goal: Increase self care and/or family involvement in 24 hours  Outcome: Progressing     Problem: Skin  Goal: Decreased wound size/increased tissue granulation at next dressing change  Outcome: Progressing  Goal: Participates in plan/prevention/treatment measures  Outcome: Progressing  Goal: Prevent/manage excess moisture  Outcome: Progressing  Goal: Prevent/minimize sheer/friction injuries  Outcome: Progressing  Goal: Promote/optimize nutrition  Outcome: Progressing  Goal: Promote skin healing  Outcome: Progressing     Problem: Diabetes  Goal: Achieve decreasing blood glucose levels by end of shift  Outcome: Progressing  Goal: Increase stability of blood glucose readings by end of shift  Outcome: Progressing  Goal: Decrease in ketones present in urine by end of shift  Outcome: Progressing   The patient's goals for the shift include continue independence with ADLs    The clinical goals for the shift include  therapeutic on heparin, no s/s of bleeding    Over the shift, the patient remained hds today; continue on heparin gtt and therapeutic; increase rate of milrinone and continue to diurese; continue to monitor pt response to medication mgmt.

## 2025-08-02 NOTE — CARE PLAN
The patient's goals for the shift include ambulate in the hallway    The clinical goals for the shift include Pt will have controlled gluclose levels

## 2025-08-02 NOTE — PROGRESS NOTES
"Endocrine Progress Note   Felice Law is a 60 y.o. male on day 16 of admission presenting with Acute on chronic systolic heart failure.  Endocrine consulted for T2DM management.    Subjective   Patient seen and examined at bedside in no acute distress.  Patient denies nausea, vomiting, abdominal pain.  Noted hypoglycemic episode overnight at around 9 PM with BG 60 mg %.  Patient was symptomatic at that time.  Patient reports that he did not eat much of his dinner because he did not like it    Objective   Last Recorded Vitals  Blood pressure 112/73, pulse 110, temperature 36.5 °C (97.7 °F), temperature source Temporal, resp. rate 20, height 1.803 m (5' 10.98\"), weight 102 kg (224 lb 10.4 oz), SpO2 93%.  Intake/Output last 3 Shifts:  I/O last 3 completed shifts:  In: - (0 mL/kg)   Out: 2000 (19.6 mL/kg) [Urine:2000 (0.5 mL/kg/hr)]  Weight: 101.9 kg   Exam:  General - male patient in NAD  HEENT - AT/NC  Resp - no extra wob noted  Msckl  + edema in B/L LE  Relevant Results  Results from last 7 days   Lab Units 08/02/25  0938 08/02/25  0634 08/01/25  2142 08/01/25 2059 08/01/25 2058 08/01/25 1856 07/31/25 2106 07/31/25 1945 07/30/25  1739 07/30/25  1727 07/29/25  2046 07/29/25  1826 07/29/25  0743 07/29/25  0518   POCT GLUCOSE mg/dL 127* 118* 90 67* 60*  --    < >  --    < >  --    < >  --    < >  --    GLUCOSE mg/dL  --   --   --   --   --  91  --  85  --  139*  --  164*  --  185*    < > = values in this interval not displayed.     BMP:  Results from last 7 days   Lab Units 08/01/25  1856 07/31/25 1945 07/30/25  1727   SODIUM mmol/L 130* 133* 133*   POTASSIUM mmol/L 4.4 4.0 3.8   CHLORIDE mmol/L 97* 99 97*   CO2 mmol/L 21 22 23   BUN mg/dL 66* 61* 55*   CREATININE mg/dL 2.85* 2.28* 2.42*   GLUCOSE mg/dL 91 85 139*     Assessment  Felice Law is a 60 y.o. male with significant PMH of ICM, HFrEF, EF 15-20%, CABG (LIMA- LAD in 2023),  PCI, VT arrest s/p VT ablation and ICD in 2017, Hypertension, " Hyperlipidemia, Hypothyroidism, Left femoral DVT on Eliquis, COPD, JAKE, CKD III and Insulin dependent Type 2 diabetes. He was admitted on 7/12 for further HF management and advanced therapy work up.   Endocrine consulted for managing uncontrolled T2DM.      #T2DM  :: Hyperglycemia 2/2 poorly controlled Type 2 diabetes   :: last HbA1c 10.1 (7/17)  :: Home regimen: Insulin galargine 40 units twice a day, insulin lispro 18 units with meals plus sliding scale up to 80 units daily, tirzepatide (Mounjaro) 5 mg/0.5 mL pen injector, Farxiga 10 mg tablet, has freestyle abbi 3  Sitagliptin 50 mg started on 7/31  Overnight, hypoglycemic with BG 60 mg %.  Patient reports not eating his dinner well as he did not like it.    # Hypothyroid   :: Thyroid on home dose > Levothyroxine 75mcg  :: TSH 2.12, T4 9.3, T3 105 (7/23) all fall within reference ranges consistent with euthyroidism.  - continue on same home dose for thyroxine.     Plan  - Decrease insulin glargine to 40 units BID   - Decrease Insulin lispro to 25 units TID with meals (to be given if patient plans to eat >50% of his meals), hold if NPO  - c/w Tablet Januvia (sitagliptin) 50 mg daily  - c/w  sliding scale to SS#2 AC   - Accuchecks TIDAC and at bedtime  - 60 gm carb consistent diet  - Goal -180  - Hypoglycemia bundle  Will consider starting metformin while inpatient based on kidney function    Recommendations communicated to primary team. Please reach out incase you have any questions or concerns.    The patient was discussed with attending Dr. Diana Yancey MD  Endocrinology fellow

## 2025-08-02 NOTE — PROGRESS NOTES
Subjective:  Interview done via . Feels well, denies SOB. Ambulates in room and also walked in halls some too. Denies CP, on room air. SR--110's on tele no ectopy or arrhythmias.     Interval Events:  JOHN OVN          Objective:  Vitals:    08/02/25 1521   BP: 105/67   Pulse: (!) 114   Resp: 20   Temp: 36.4 °C (97.5 °F)   SpO2: 91%     Weight         7/30/2025  0431 7/31/2025  0555 8/1/2025  0417 8/2/2025  0423 8/2/2025  0600    Weight: 100 kg (220 lb 10.9 oz) 98.3 kg (216 lb 11.4 oz) 101 kg (222 lb 7 oz) 102 kg (224 lb 10.4 oz) 102 kg (224 lb 10.4 oz)            Intake/Output Summary (Last 24 hours) at 8/2/2025 1729  Last data filed at 8/2/2025 1718  Gross per 24 hour   Intake 590 ml   Output 3600 ml   Net -3010 ml     Recent Results (from the past 24 hours)   CBC    Collection Time: 08/01/25  6:56 PM   Result Value Ref Range    WBC 5.5 4.4 - 11.3 x10*3/uL    nRBC 0.0 0.0 - 0.0 /100 WBCs    RBC 4.91 4.50 - 5.90 x10*6/uL    Hemoglobin 11.7 (L) 13.5 - 17.5 g/dL    Hematocrit 40.2 (L) 41.0 - 52.0 %    MCV 82 80 - 100 fL    MCH 23.8 (L) 26.0 - 34.0 pg    MCHC 29.1 (L) 32.0 - 36.0 g/dL    RDW 20.5 (H) 11.5 - 14.5 %    Platelets 344 150 - 450 x10*3/uL   Magnesium    Collection Time: 08/01/25  6:56 PM   Result Value Ref Range    Magnesium 2.23 1.60 - 2.40 mg/dL   Renal function panel    Collection Time: 08/01/25  6:56 PM   Result Value Ref Range    Glucose 91 74 - 99 mg/dL    Sodium 130 (L) 136 - 145 mmol/L    Potassium 4.4 3.5 - 5.3 mmol/L    Chloride 97 (L) 98 - 107 mmol/L    Bicarbonate 21 21 - 32 mmol/L    Anion Gap 16 10 - 20 mmol/L    Urea Nitrogen 66 (H) 6 - 23 mg/dL    Creatinine 2.85 (H) 0.50 - 1.30 mg/dL    eGFR 25 (L) >60 mL/min/1.73m*2    Calcium 9.2 8.6 - 10.6 mg/dL    Phosphorus 3.9 2.5 - 4.9 mg/dL    Albumin 4.4 3.4 - 5.0 g/dL   Heparin Assay, UFH    Collection Time: 08/01/25  6:56 PM   Result Value Ref Range    Heparin Unfractionated 0.3 See Comment Below for Therapeutic Ranges IU/mL    POCT GLUCOSE    Collection Time: 08/01/25  8:58 PM   Result Value Ref Range    POCT Glucose 60 (L) 74 - 99 mg/dL   POCT GLUCOSE    Collection Time: 08/01/25  8:59 PM   Result Value Ref Range    POCT Glucose 67 (L) 74 - 99 mg/dL   POCT GLUCOSE    Collection Time: 08/01/25  9:42 PM   Result Value Ref Range    POCT Glucose 90 74 - 99 mg/dL   Heparin Assay, UFH    Collection Time: 08/02/25  6:28 AM   Result Value Ref Range    Heparin Unfractionated 0.5 See Comment Below for Therapeutic Ranges IU/mL   POCT GLUCOSE    Collection Time: 08/02/25  6:34 AM   Result Value Ref Range    POCT Glucose 118 (H) 74 - 99 mg/dL   POCT GLUCOSE    Collection Time: 08/02/25  9:38 AM   Result Value Ref Range    POCT Glucose 127 (H) 74 - 99 mg/dL   Lactate    Collection Time: 08/02/25 12:10 PM   Result Value Ref Range    Lactate 1.0 0.4 - 2.0 mmol/L   POCT GLUCOSE    Collection Time: 08/02/25  2:01 PM   Result Value Ref Range    POCT Glucose 164 (H) 74 - 99 mg/dL   CBC    Collection Time: 08/02/25  4:26 PM   Result Value Ref Range    WBC 5.6 4.4 - 11.3 x10*3/uL    nRBC 0.0 0.0 - 0.0 /100 WBCs    RBC 4.22 (L) 4.50 - 5.90 x10*6/uL    Hemoglobin 10.1 (L) 13.5 - 17.5 g/dL    Hematocrit 34.0 (L) 41.0 - 52.0 %    MCV 81 80 - 100 fL    MCH 23.9 (L) 26.0 - 34.0 pg    MCHC 29.7 (L) 32.0 - 36.0 g/dL    RDW 20.2 (H) 11.5 - 14.5 %    Platelets 324 150 - 450 x10*3/uL     Inpatient Medications:  Scheduled Medications[1]  PRN Medications[2]  Continuous Medications[3]    Telemetry 8/2/2025 NSR with intermittent sinus tachycardia    Physical exam:  General: NAD  Head/ neck: + JVD  Cardiac: RRR, regular S1 S2 , no murmur, no rub, no gallop  Pulm: CTA b/l, no WOB.   Vascular: Radial pulse 2+   GI: Non distended  Extremities: Pitting edema present to lower extremities.  Neuro: no focal neuro deficits   Psych: appropriate mood and behavior   Skin: warm and dry       Assessment/Plan   Felice Law is a 60 y.o. male with PMH of ischemic [id number]%,  CABG (LIMA- LAD 2023),  VT arrest s/p VT ablation and ICD 2017, Hypertension, Hyperlipidemia, Hypothyroidism, Left femoral DVT on Eliquis, COPD, JAKE, CKD III, Chronic BLE venous stasis dermatitis, Insulin dependent Type 2 DM  who was initially admitted 7/12 to [place] for acute decompensate heart failure. Diuresed and  transferred to [facility] HFICU for further managemnet and possible re-consideration of advanced therapies.  Of note, Patient was previously discussed for advanced HF therapies during [facility] [place] admission but was declined with concerns for mobility/frailty (uses motorized scooter and has bilateral foot drop with neuropathy), CKD, poorly controlled diabetes, as well as unclear social/financial support.      HFICU 7/17. Warm and wet. [place] guided diuresis with lasix gtt and GDMT titration. Advanced therapies evaluation initiated 7/23.  Would need control of his diabetes before considering transplant    Transferred to floor 7/29     Acute on chronic systolic ischemic, HFrEF 15-20% (since 2017)  CAD  s/p PCI and 1v CABG (LIMA-LAD 4/2023).    s/p ICD 2017  - TTE 5/5/25: LV mod dilated, EF 15-20%, gHK of LV, G2DD, mildly enlarged RV with mildly reduced systolic fxn, mild LAE, mild to mod MR and TR  - LHC 6/10/25:   Right dom. LIMA to LAD patent. pLAD 70%, os and pD1 90%, mLCx50%,osOM1 90%, mRCA 100%  - Committee discussion: optimize DM (A1c 10) and defer listing for likely heart transplant / LVAD depending on acuity .    - Cardiac MRI 7/2025: EF 17%, mixed CM, apex thin and severely HK, mid apical septum HK, mod MELISA, mild to mod MR,  Transmural infarction (%) involves the mid anteroseptum,apical septum, apical inferior wall, and apex, all demonstrating nonviable myocardium. Subendocardial infarction (25-50%) affects the mid inferior and apical lateral wall, while subendocardial infarction (<25%) involves the mid inferolateral wall - these segments remain viable. Additionally, mid-myocardial  enhancement in the basal septum represents a nonischemic, nonspecific pattern. RV function severely reduced. LHC/stent at some point, likely outpatient  -- await further review by advanced HF and CTs  - 7/30 Milrinone wean to 0.125mcg/kg/min  - 8/2 increase milrinone to 0.25 due to suboptimal diuresis, continue lasix 80mg IV BID  - GDMT: enresto, isordil, farxiga,  no beta blocker, digoxin (level WNL 7/29).   - 7/31: continue to hold entresto/Aldactone and decrease dig to MWF due to CHARLOTTE on CKD  - 8/1 decrease hydralazine to 25mg TID to facilitate BP   - Secondary vascular prevention: aspirin, statin (Discontinue fenofibrate given renal dysfunction)   - Remains volume overloaded:  yesterday held lasix 80mg IV twice daily. Will resume  8/1 with slight improvement in Cr and only minimal UOP noted in previous 24 hours. Likely is diuretic dependent for significant UOP. Will need close monitoring of BP and RFP (repeat this evening).   - BNP slihgtly elevated today to 373 (281 previously).   -  Replete electrolytes to keep K>4.0 and Mg >2.0  - 8/2 lactate 1      COPD  Obstructive sleep apnea  Prior tobacco use  -  (24 pack year, quit 2017)  - albuterol PRN  - incentive spirometry      CHARLOTTE on CKD  - Cr trend: 2.86 (2.85, 2.28, 2.42, 2.1,1.7, 2.04, 2.39)  -  As low as 1.35 in 11/2024    Anemia of chronic disease and iron deficiency  - (7/17):  iron sat L 16 but otherwise iron studies & ferritin WNL  - IV Venofer 200 mg x5 ordered by HFICU (end 8/4)     Left femoral DVT (2017)  - Holding home Eliquis  - on Heparin gtt      Uncontrolled type II DM c/b neuropathy  - HgbA1c (7/17):  10.1  - gabapentin TID  - Endocrine following  - Insulin glargine 46 units BID, insulin lispro 30u TID before meals (weaned from 32u to 30u today per endocrine), SS#2, start linagliptin 5mg daily  - 8/2 due to hypogycemia, decrease lantus to 40u BID, SS#2, 15u lispro TID.       Hypothyroidism  - TSH WNL. Euthyroid  - Synthroid     Loose stool  unspecified  - Try immodium PRN  - C. Diff neg    DVT ppx: heparin gtt  DISPO: Pending clinical improvement, further work up, diuresis, diabetes management, and milrinone titration  Code status: Full Code    Patient seen and discussed with Dr. Ruslan Nguyen.  David Serrano, APRN-CNP      I conducted a shared care visit with the KARTHIK, unfortunately he has worsening creatinine weight is up trended and BNP is higher than last week suggesting he is failing to wean off the milrinone although he denies any worsening symptoms he does appear a bit more short of breath at rest we will increase back to prior dose of milrinone he may need transfer to the ICU for further optimization         [1]   Scheduled medications   Medication Dose Route Frequency    [Held by provider] apixaban  5 mg oral BID    atorvastatin  80 mg oral Nightly    calcium carbonate  500 mg of calcium carbonate oral BID    dapagliflozin propanediol  10 mg oral Daily    digoxin  125 mcg oral Once per day on Monday Wednesday Friday    furosemide  80 mg intravenous BID    gabapentin  200 mg oral q8h SUSI    hydrALAZINE  25 mg oral q8h    insulin glargine  40 Units subcutaneous Nightly    insulin glargine  40 Units subcutaneous Daily    insulin lispro  0-15 Units subcutaneous TID AC    insulin lispro  25 Units subcutaneous TID AC    isosorbide dinitrate  40 mg oral TID    levothyroxine  75 mcg oral Daily before breakfast    pantoprazole  40 mg oral Daily before breakfast    QUEtiapine  50 mg oral Nightly    [Held by provider] sacubitriL-valsartan  1 tablet oral BID    SITagliptin phosphate  50 mg oral Daily    [Held by provider] spironolactone  25 mg oral Daily   [2]   PRN medications   Medication    acetaminophen    albuterol    alteplase    dextrose    dextrose    glucagon    glucagon    heparin    loperamide   [3]   Continuous Medications   Medication Dose Last Rate    heparin  0-4,000 Units/hr 2,000 Units/hr (08/02/25 1058)    milrinone  0.25 mcg/kg/min  0.25 mcg/kg/min (08/02/25 1059)

## 2025-08-03 VITALS
RESPIRATION RATE: 18 BRPM | WEIGHT: 224.43 LBS | DIASTOLIC BLOOD PRESSURE: 71 MMHG | TEMPERATURE: 97.5 F | SYSTOLIC BLOOD PRESSURE: 115 MMHG | OXYGEN SATURATION: 93 % | HEIGHT: 71 IN | HEART RATE: 108 BPM | BODY MASS INDEX: 31.42 KG/M2

## 2025-08-03 LAB
ALBUMIN SERPL BCP-MCNC: 4.1 G/DL (ref 3.4–5)
ANION GAP SERPL CALC-SCNC: 13 MMOL/L (ref 10–20)
BUN SERPL-MCNC: 69 MG/DL (ref 6–23)
CALCIUM SERPL-MCNC: 9.2 MG/DL (ref 8.6–10.6)
CHLORIDE SERPL-SCNC: 95 MMOL/L (ref 98–107)
CO2 SERPL-SCNC: 26 MMOL/L (ref 21–32)
CREAT SERPL-MCNC: 2.78 MG/DL (ref 0.5–1.3)
EGFRCR SERPLBLD CKD-EPI 2021: 25 ML/MIN/1.73M*2
ERYTHROCYTE [DISTWIDTH] IN BLOOD BY AUTOMATED COUNT: 20.3 % (ref 11.5–14.5)
GLUCOSE BLD MANUAL STRIP-MCNC: 110 MG/DL (ref 74–99)
GLUCOSE BLD MANUAL STRIP-MCNC: 137 MG/DL (ref 74–99)
GLUCOSE BLD MANUAL STRIP-MCNC: 174 MG/DL (ref 74–99)
GLUCOSE BLD MANUAL STRIP-MCNC: 188 MG/DL (ref 74–99)
GLUCOSE SERPL-MCNC: 128 MG/DL (ref 74–99)
HCT VFR BLD AUTO: 37 % (ref 41–52)
HGB BLD-MCNC: 10.8 G/DL (ref 13.5–17.5)
HLA CLS I TYP PNL BLD/T DONR HIGH RES: NORMAL
HLA RESULTS: NORMAL
HLA-DP2 QL: NORMAL
HLA-DQB1 HIGH RES: NORMAL
HLA-DRB1 HIGH RES: NORMAL
HOLD SPECIMEN: NORMAL
MAGNESIUM SERPL-MCNC: 2.23 MG/DL (ref 1.6–2.4)
MCH RBC QN AUTO: 24.1 PG (ref 26–34)
MCHC RBC AUTO-ENTMCNC: 29.2 G/DL (ref 32–36)
MCV RBC AUTO: 83 FL (ref 80–100)
NRBC BLD-RTO: 0 /100 WBCS (ref 0–0)
PHOSPHATE SERPL-MCNC: 3.7 MG/DL (ref 2.5–4.9)
PLATELET # BLD AUTO: 358 X10*3/UL (ref 150–450)
POTASSIUM SERPL-SCNC: 4.4 MMOL/L (ref 3.5–5.3)
RBC # BLD AUTO: 4.48 X10*6/UL (ref 4.5–5.9)
SODIUM SERPL-SCNC: 130 MMOL/L (ref 136–145)
UFH PPP CHRO-ACNC: 0.3 IU/ML (ref ?–1.1)
WBC # BLD AUTO: 5.6 X10*3/UL (ref 4.4–11.3)

## 2025-08-03 PROCEDURE — 36415 COLL VENOUS BLD VENIPUNCTURE: CPT | Performed by: PHYSICIAN ASSISTANT

## 2025-08-03 PROCEDURE — 2500000001 HC RX 250 WO HCPCS SELF ADMINISTERED DRUGS (ALT 637 FOR MEDICARE OP)

## 2025-08-03 PROCEDURE — 2500000001 HC RX 250 WO HCPCS SELF ADMINISTERED DRUGS (ALT 637 FOR MEDICARE OP): Performed by: PHYSICIAN ASSISTANT

## 2025-08-03 PROCEDURE — 2500000004 HC RX 250 GENERAL PHARMACY W/ HCPCS (ALT 636 FOR OP/ED): Performed by: NURSE PRACTITIONER

## 2025-08-03 PROCEDURE — 2500000002 HC RX 250 W HCPCS SELF ADMINISTERED DRUGS (ALT 637 FOR MEDICARE OP, ALT 636 FOR OP/ED): Performed by: NURSE PRACTITIONER

## 2025-08-03 PROCEDURE — 2500000004 HC RX 250 GENERAL PHARMACY W/ HCPCS (ALT 636 FOR OP/ED): Performed by: PHYSICIAN ASSISTANT

## 2025-08-03 PROCEDURE — 2500000002 HC RX 250 W HCPCS SELF ADMINISTERED DRUGS (ALT 637 FOR MEDICARE OP, ALT 636 FOR OP/ED): Performed by: PHYSICIAN ASSISTANT

## 2025-08-03 PROCEDURE — 1200000002 HC GENERAL ROOM WITH TELEMETRY DAILY

## 2025-08-03 PROCEDURE — 82947 ASSAY GLUCOSE BLOOD QUANT: CPT

## 2025-08-03 PROCEDURE — 85027 COMPLETE CBC AUTOMATED: CPT | Performed by: PHYSICIAN ASSISTANT

## 2025-08-03 PROCEDURE — 83735 ASSAY OF MAGNESIUM: CPT | Performed by: PHYSICIAN ASSISTANT

## 2025-08-03 PROCEDURE — 80069 RENAL FUNCTION PANEL: CPT | Performed by: PHYSICIAN ASSISTANT

## 2025-08-03 PROCEDURE — 85520 HEPARIN ASSAY: CPT | Performed by: PHYSICIAN ASSISTANT

## 2025-08-03 PROCEDURE — 99232 SBSQ HOSP IP/OBS MODERATE 35: CPT | Performed by: INTERNAL MEDICINE

## 2025-08-03 RX ADMIN — QUETIAPINE FUMARATE 50 MG: 25 TABLET ORAL at 21:27

## 2025-08-03 RX ADMIN — HYDRALAZINE HYDROCHLORIDE 25 MG: 50 TABLET ORAL at 17:23

## 2025-08-03 RX ADMIN — HEPARIN SODIUM 2000 UNITS/HR: 10000 INJECTION, SOLUTION INTRAVENOUS at 01:22

## 2025-08-03 RX ADMIN — INSULIN GLARGINE 40 UNITS: 100 INJECTION, SOLUTION SUBCUTANEOUS at 21:33

## 2025-08-03 RX ADMIN — FUROSEMIDE 80 MG: 10 INJECTION, SOLUTION INTRAMUSCULAR; INTRAVENOUS at 17:23

## 2025-08-03 RX ADMIN — INSULIN GLARGINE 40 UNITS: 100 INJECTION, SOLUTION SUBCUTANEOUS at 08:13

## 2025-08-03 RX ADMIN — INSULIN LISPRO 25 UNITS: 100 INJECTION, SOLUTION INTRAVENOUS; SUBCUTANEOUS at 13:34

## 2025-08-03 RX ADMIN — DAPAGLIFLOZIN 10 MG: 10 TABLET, FILM COATED ORAL at 08:13

## 2025-08-03 RX ADMIN — MILRINONE LACTATE IN DEXTROSE 0.25 MCG/KG/MIN: 0.2 INJECTION, SOLUTION INTRAVENOUS at 01:20

## 2025-08-03 RX ADMIN — CALCIUM CARBONATE (ANTACID) CHEW TAB 500 MG 1 TABLET: 500 CHEW TAB at 08:13

## 2025-08-03 RX ADMIN — GABAPENTIN 200 MG: 100 CAPSULE ORAL at 21:27

## 2025-08-03 RX ADMIN — INSULIN LISPRO 25 UNITS: 100 INJECTION, SOLUTION INTRAVENOUS; SUBCUTANEOUS at 09:11

## 2025-08-03 RX ADMIN — INSULIN LISPRO 25 UNITS: 100 INJECTION, SOLUTION INTRAVENOUS; SUBCUTANEOUS at 18:11

## 2025-08-03 RX ADMIN — ISOSORBIDE DINITRATE 40 MG: 40 TABLET ORAL at 21:43

## 2025-08-03 RX ADMIN — CALCIUM CARBONATE (ANTACID) CHEW TAB 500 MG 1 TABLET: 500 CHEW TAB at 21:27

## 2025-08-03 RX ADMIN — ISOSORBIDE DINITRATE 40 MG: 40 TABLET ORAL at 13:37

## 2025-08-03 RX ADMIN — HEPARIN SODIUM 2000 UNITS/HR: 10000 INJECTION, SOLUTION INTRAVENOUS at 14:58

## 2025-08-03 RX ADMIN — INSULIN LISPRO 2 UNITS: 100 INJECTION, SOLUTION INTRAVENOUS; SUBCUTANEOUS at 09:11

## 2025-08-03 RX ADMIN — ATORVASTATIN CALCIUM 80 MG: 80 TABLET, FILM COATED ORAL at 21:27

## 2025-08-03 RX ADMIN — GABAPENTIN 200 MG: 100 CAPSULE ORAL at 06:35

## 2025-08-03 RX ADMIN — INSULIN LISPRO 2 UNITS: 100 INJECTION, SOLUTION INTRAVENOUS; SUBCUTANEOUS at 13:34

## 2025-08-03 RX ADMIN — ISOSORBIDE DINITRATE 40 MG: 40 TABLET ORAL at 08:13

## 2025-08-03 RX ADMIN — FUROSEMIDE 80 MG: 10 INJECTION, SOLUTION INTRAMUSCULAR; INTRAVENOUS at 08:14

## 2025-08-03 RX ADMIN — HYDRALAZINE HYDROCHLORIDE 25 MG: 50 TABLET ORAL at 08:13

## 2025-08-03 RX ADMIN — GABAPENTIN 200 MG: 100 CAPSULE ORAL at 13:37

## 2025-08-03 RX ADMIN — PANTOPRAZOLE SODIUM 40 MG: 40 TABLET, DELAYED RELEASE ORAL at 06:33

## 2025-08-03 RX ADMIN — LEVOTHYROXINE SODIUM 75 MCG: 0.07 TABLET ORAL at 06:35

## 2025-08-03 RX ADMIN — SITAGLIPTIN 50 MG: 50 TABLET, FILM COATED ORAL at 08:13

## 2025-08-03 RX ADMIN — MILRINONE LACTATE IN DEXTROSE 0.25 MCG/KG/MIN: 0.2 INJECTION, SOLUTION INTRAVENOUS at 15:30

## 2025-08-03 ASSESSMENT — COGNITIVE AND FUNCTIONAL STATUS - GENERAL
WALKING IN HOSPITAL ROOM: A LITTLE
MOBILITY SCORE: 22
DAILY ACTIVITIY SCORE: 24
CLIMB 3 TO 5 STEPS WITH RAILING: A LITTLE

## 2025-08-03 ASSESSMENT — PAIN SCALES - GENERAL
PAINLEVEL_OUTOF10: 0 - NO PAIN
PAINLEVEL_OUTOF10: 0 - NO PAIN

## 2025-08-03 ASSESSMENT — PAIN - FUNCTIONAL ASSESSMENT: PAIN_FUNCTIONAL_ASSESSMENT: 0-10

## 2025-08-03 NOTE — CARE PLAN
Problem: Pain - Adult  Goal: Verbalizes/displays adequate comfort level or baseline comfort level  Outcome: Progressing     Problem: Safety - Adult  Goal: Free from fall injury  Outcome: Progressing     Problem: Discharge Planning  Goal: Discharge to home or other facility with appropriate resources  Outcome: Progressing     Problem: Chronic Conditions and Co-morbidities  Goal: Patient's chronic conditions and co-morbidity symptoms are monitored and maintained or improved  Outcome: Progressing     Problem: Nutrition  Goal: Nutrient intake appropriate for maintaining nutritional needs  Outcome: Progressing     Problem: Heart Failure  Goal: Improved gas exchange this shift  Outcome: Progressing  Goal: Improved urinary output this shift  Outcome: Progressing  Goal: Reduction in peripheral edema within 24 hours  Outcome: Progressing  Goal: Report improvement of dyspnea/breathlessness this shift  Outcome: Progressing  Goal: Weight from fluid excess reduced over 2-3 days, then stabilize  Outcome: Progressing  Goal: Increase self care and/or family involvement in 24 hours  Outcome: Progressing     Problem: Skin  Goal: Decreased wound size/increased tissue granulation at next dressing change  Outcome: Progressing  Goal: Participates in plan/prevention/treatment measures  Outcome: Progressing  Goal: Prevent/manage excess moisture  Outcome: Progressing  Goal: Prevent/minimize sheer/friction injuries  Outcome: Progressing  Goal: Promote/optimize nutrition  Outcome: Progressing  Goal: Promote skin healing  Outcome: Progressing     Problem: Diabetes  Goal: Achieve decreasing blood glucose levels by end of shift  Outcome: Progressing  Goal: Increase stability of blood glucose readings by end of shift  Outcome: Progressing  Goal: Decrease in ketones present in urine by end of shift  Outcome: Progressing   The patient's goals for the shift include continue with independence with ADLs    The clinical goals for the shift include  therapeutic on heparin    Over the shift, the patient remained hds and continue on heparin gtt and milrinone; diurese with iv lasix bid and med mgmt; plan for central line placement tomorrow for milrinone.

## 2025-08-03 NOTE — SIGNIFICANT EVENT
Rapid Response Nurse Note: RADAR alert: 9    Pager time: [phone]  Arrival time: 1800  Event end time:   Location: [place] [id number]  [x] Triage by phone or secure messaging    Rapid response initiated by:  [] Rapid response RN [] Family [] Nursing Supervisor [] Physician   [x] RADAR auto page [] Sepsis auto-page [] RN [] RT   [] NP/PA [] Other:     Primary reason for call:   [] BAT [] New CPAP/BiPAP [] Bleeding [] Change in mental status   [] Chest pain [] Code blue [] FiO2 >/= 50% [] HR </= 40 bpm   [] HR >/= 130 bpm [] Hyperglycemia [] Hypoglycemia [x] RADAR    [] RR </= 8 bpm [] RR >/= 30 bpm [] SBP </= 90 mmHg [] SpO2 < 90%   [] Seizure [] Sepsis [] Shortness of breath  [] Staff concern: see comments     Initial VS and/or RADAR VS: T 36.0 °C; ; RR 18; BP 90/60; SPO2 91%.    Providers present at bedside (if applicable):     Name of ICU Provider contacted (if applicable):     Interventions:  [x] None [] ABG/VBG [] Assist w/ICU transfer [] BAT paged    [] Bag mask [] Blood [] Cardioversion [] Code Blue   [] Code blue for intubation [] Code status changed [] Chest x-ray [] EKG   [] IV fluid/bolus [] KUB x-ray [] Labs/cultures [] Medication   [] Nebulizer treatment [] NIPPV (CPAP/BiPAP) [] Oxygen [] Oral airway   [] Peripheral IV [] Palliative care consult [] CT/MRI [] Sepsis protocol    [] Suctioned [] Other:     Outcome:  [] Coded and  [] Code blue for intubation [] Coded and transferred to ICU []  on division   [x] Remained on division (no change) [] Remained on division + additional monitoring [] Remained in ED [] Transferred to ED   [] Transferred to ICU [] Transferred to inpatient status [] Transferred for interventions (procedure) [] Transferred to ICU stepdown    [] Transferred to surgery [] Transferred to telemetry [] Sepsis protocol [] STEMI protocol   [] Stroke protocol [x] Bedside nurse instructed to page rapid response for any concerns or acute change in condition/VS     Additional Comments:      Radar auto-page received for a radar score of 9 with the above listed vital signs.  Vital signs were confirmed and reviewed with the primary RN.  He is at his current baseline and the primary RN has no present concerns.  There are no indications for interventions by Rapid Response at this time.  RN to contact Rapid Response with any future concerns or signs of clinical decompensation.

## 2025-08-03 NOTE — CARE PLAN
The patient's goals for the shift include continue independence with ADLs    The clinical goals for the shift include Pt willl remain therapuetic on heparin gtt;pain will be managed this shift [id number]

## 2025-08-04 ENCOUNTER — APPOINTMENT (OUTPATIENT)
Dept: RADIOLOGY | Facility: HOSPITAL | Age: 60
End: 2025-08-04
Payer: COMMERCIAL

## 2025-08-04 LAB
ALBUMIN SERPL BCP-MCNC: 4.2 G/DL (ref 3.4–5)
ANION GAP SERPL CALC-SCNC: 15 MMOL/L (ref 10–20)
BUN SERPL-MCNC: 71 MG/DL (ref 6–23)
CALCIUM SERPL-MCNC: 9.4 MG/DL (ref 8.6–10.6)
CHLORIDE SERPL-SCNC: 94 MMOL/L (ref 98–107)
CO2 SERPL-SCNC: 26 MMOL/L (ref 21–32)
CREAT SERPL-MCNC: 2.72 MG/DL (ref 0.5–1.3)
EGFRCR SERPLBLD CKD-EPI 2021: 26 ML/MIN/1.73M*2
ERYTHROCYTE [DISTWIDTH] IN BLOOD BY AUTOMATED COUNT: 20.5 % (ref 11.5–14.5)
GLUCOSE BLD MANUAL STRIP-MCNC: 102 MG/DL (ref 74–99)
GLUCOSE BLD MANUAL STRIP-MCNC: 120 MG/DL (ref 74–99)
GLUCOSE BLD MANUAL STRIP-MCNC: 137 MG/DL (ref 74–99)
GLUCOSE BLD MANUAL STRIP-MCNC: 198 MG/DL (ref 74–99)
GLUCOSE SERPL-MCNC: 229 MG/DL (ref 74–99)
HCT VFR BLD AUTO: 35.4 % (ref 41–52)
HGB BLD-MCNC: 10.5 G/DL (ref 13.5–17.5)
HOLD SPECIMEN: NORMAL
MAGNESIUM SERPL-MCNC: 2.22 MG/DL (ref 1.6–2.4)
MCH RBC QN AUTO: 24.1 PG (ref 26–34)
MCHC RBC AUTO-ENTMCNC: 29.7 G/DL (ref 32–36)
MCV RBC AUTO: 81 FL (ref 80–100)
NRBC BLD-RTO: 0 /100 WBCS (ref 0–0)
PHOSPHATE SERPL-MCNC: 4 MG/DL (ref 2.5–4.9)
PLATELET # BLD AUTO: 351 X10*3/UL (ref 150–450)
POTASSIUM SERPL-SCNC: 4.4 MMOL/L (ref 3.5–5.3)
RBC # BLD AUTO: 4.35 X10*6/UL (ref 4.5–5.9)
SODIUM SERPL-SCNC: 131 MMOL/L (ref 136–145)
UFH PPP CHRO-ACNC: 0.2 IU/ML (ref ?–1.1)
UFH PPP CHRO-ACNC: 0.3 IU/ML (ref ?–1.1)
WBC # BLD AUTO: 5.7 X10*3/UL (ref 4.4–11.3)

## 2025-08-04 PROCEDURE — 2500000001 HC RX 250 WO HCPCS SELF ADMINISTERED DRUGS (ALT 637 FOR MEDICARE OP): Performed by: PHYSICIAN ASSISTANT

## 2025-08-04 PROCEDURE — C1751 CATH, INF, PER/CENT/MIDLINE: HCPCS

## 2025-08-04 PROCEDURE — 99233 SBSQ HOSP IP/OBS HIGH 50: CPT | Performed by: STUDENT IN AN ORGANIZED HEALTH CARE EDUCATION/TRAINING PROGRAM

## 2025-08-04 PROCEDURE — 2500000004 HC RX 250 GENERAL PHARMACY W/ HCPCS (ALT 636 FOR OP/ED): Performed by: PHYSICIAN ASSISTANT

## 2025-08-04 PROCEDURE — 77001 FLUOROGUIDE FOR VEIN DEVICE: CPT | Performed by: RADIOLOGY

## 2025-08-04 PROCEDURE — 2500000002 HC RX 250 W HCPCS SELF ADMINISTERED DRUGS (ALT 637 FOR MEDICARE OP, ALT 636 FOR OP/ED): Performed by: PHYSICIAN ASSISTANT

## 2025-08-04 PROCEDURE — 85027 COMPLETE CBC AUTOMATED: CPT | Performed by: PHYSICIAN ASSISTANT

## 2025-08-04 PROCEDURE — 82947 ASSAY GLUCOSE BLOOD QUANT: CPT

## 2025-08-04 PROCEDURE — 36415 COLL VENOUS BLD VENIPUNCTURE: CPT | Performed by: PHYSICIAN ASSISTANT

## 2025-08-04 PROCEDURE — C1769 GUIDE WIRE: HCPCS

## 2025-08-04 PROCEDURE — 7100000009 HC PHASE TWO TIME - INITIAL BASE CHARGE

## 2025-08-04 PROCEDURE — 2500000002 HC RX 250 W HCPCS SELF ADMINISTERED DRUGS (ALT 637 FOR MEDICARE OP, ALT 636 FOR OP/ED): Performed by: NURSE PRACTITIONER

## 2025-08-04 PROCEDURE — 02HV33Z INSERTION OF INFUSION DEVICE INTO SUPERIOR VENA CAVA, PERCUTANEOUS APPROACH: ICD-10-PCS | Performed by: RADIOLOGY

## 2025-08-04 PROCEDURE — 99152 MOD SED SAME PHYS/QHP 5/>YRS: CPT

## 2025-08-04 PROCEDURE — 7100000010 HC PHASE TWO TIME - EACH INCREMENTAL 1 MINUTE

## 2025-08-04 PROCEDURE — 2720000007 HC OR 272 NO HCPCS

## 2025-08-04 PROCEDURE — 2500000004 HC RX 250 GENERAL PHARMACY W/ HCPCS (ALT 636 FOR OP/ED): Performed by: NURSE PRACTITIONER

## 2025-08-04 PROCEDURE — 80069 RENAL FUNCTION PANEL: CPT | Performed by: PHYSICIAN ASSISTANT

## 2025-08-04 PROCEDURE — 85520 HEPARIN ASSAY: CPT | Performed by: PHYSICIAN ASSISTANT

## 2025-08-04 PROCEDURE — 36558 INSERT TUNNELED CV CATH: CPT | Performed by: RADIOLOGY

## 2025-08-04 PROCEDURE — 1200000002 HC GENERAL ROOM WITH TELEMETRY DAILY

## 2025-08-04 PROCEDURE — 76937 US GUIDE VASCULAR ACCESS: CPT | Performed by: RADIOLOGY

## 2025-08-04 PROCEDURE — 83735 ASSAY OF MAGNESIUM: CPT | Performed by: PHYSICIAN ASSISTANT

## 2025-08-04 PROCEDURE — 36561 INSERT TUNNELED CV CATH: CPT | Mod: RT

## 2025-08-04 PROCEDURE — 0JH63XZ INSERTION OF TUNNELED VASCULAR ACCESS DEVICE INTO CHEST SUBCUTANEOUS TISSUE AND FASCIA, PERCUTANEOUS APPROACH: ICD-10-PCS | Performed by: RADIOLOGY

## 2025-08-04 PROCEDURE — 2500000001 HC RX 250 WO HCPCS SELF ADMINISTERED DRUGS (ALT 637 FOR MEDICARE OP)

## 2025-08-04 RX ORDER — HEPARIN SODIUM 10000 [USP'U]/100ML
0-4000 INJECTION, SOLUTION INTRAVENOUS CONTINUOUS
Status: DISCONTINUED | OUTPATIENT
Start: 2025-08-04 | End: 2025-08-04

## 2025-08-04 RX ADMIN — INSULIN LISPRO 2 UNITS: 100 INJECTION, SOLUTION INTRAVENOUS; SUBCUTANEOUS at 08:55

## 2025-08-04 RX ADMIN — DAPAGLIFLOZIN 10 MG: 10 TABLET, FILM COATED ORAL at 08:54

## 2025-08-04 RX ADMIN — GABAPENTIN 200 MG: 100 CAPSULE ORAL at 16:02

## 2025-08-04 RX ADMIN — MILRINONE LACTATE IN DEXTROSE 0.25 MCG/KG/MIN: 0.2 INJECTION, SOLUTION INTRAVENOUS at 06:34

## 2025-08-04 RX ADMIN — INSULIN GLARGINE 40 UNITS: 100 INJECTION, SOLUTION SUBCUTANEOUS at 08:54

## 2025-08-04 RX ADMIN — HYDRALAZINE HYDROCHLORIDE 25 MG: 50 TABLET ORAL at 23:19

## 2025-08-04 RX ADMIN — ATORVASTATIN CALCIUM 80 MG: 80 TABLET, FILM COATED ORAL at 20:46

## 2025-08-04 RX ADMIN — HYDRALAZINE HYDROCHLORIDE 25 MG: 50 TABLET ORAL at 00:23

## 2025-08-04 RX ADMIN — HEPARIN SODIUM 2000 UNITS/HR: 10000 INJECTION, SOLUTION INTRAVENOUS at 03:33

## 2025-08-04 RX ADMIN — INSULIN GLARGINE 40 UNITS: 100 INJECTION, SOLUTION SUBCUTANEOUS at 20:45

## 2025-08-04 RX ADMIN — QUETIAPINE FUMARATE 50 MG: 25 TABLET ORAL at 20:45

## 2025-08-04 RX ADMIN — DIGOXIN 125 MCG: 125 TABLET ORAL at 08:54

## 2025-08-04 RX ADMIN — FUROSEMIDE 80 MG: 10 INJECTION, SOLUTION INTRAMUSCULAR; INTRAVENOUS at 17:12

## 2025-08-04 RX ADMIN — MILRINONE LACTATE IN DEXTROSE 0.25 MCG/KG/MIN: 0.2 INJECTION, SOLUTION INTRAVENOUS at 20:46

## 2025-08-04 RX ADMIN — LEVOTHYROXINE SODIUM 75 MCG: 0.07 TABLET ORAL at 08:54

## 2025-08-04 RX ADMIN — ISOSORBIDE DINITRATE 40 MG: 40 TABLET ORAL at 08:54

## 2025-08-04 RX ADMIN — APIXABAN 5 MG: 5 TABLET, FILM COATED ORAL at 20:45

## 2025-08-04 RX ADMIN — FUROSEMIDE 80 MG: 10 INJECTION, SOLUTION INTRAMUSCULAR; INTRAVENOUS at 08:54

## 2025-08-04 RX ADMIN — PANTOPRAZOLE SODIUM 40 MG: 40 TABLET, DELAYED RELEASE ORAL at 06:34

## 2025-08-04 RX ADMIN — ISOSORBIDE DINITRATE 40 MG: 40 TABLET ORAL at 16:02

## 2025-08-04 RX ADMIN — ISOSORBIDE DINITRATE 40 MG: 40 TABLET ORAL at 20:45

## 2025-08-04 RX ADMIN — HYDRALAZINE HYDROCHLORIDE 25 MG: 50 TABLET ORAL at 16:34

## 2025-08-04 RX ADMIN — SITAGLIPTIN 50 MG: 50 TABLET, FILM COATED ORAL at 08:54

## 2025-08-04 RX ADMIN — GABAPENTIN 200 MG: 100 CAPSULE ORAL at 20:45

## 2025-08-04 RX ADMIN — GABAPENTIN 200 MG: 100 CAPSULE ORAL at 06:34

## 2025-08-04 RX ADMIN — HYDRALAZINE HYDROCHLORIDE 25 MG: 50 TABLET ORAL at 08:54

## 2025-08-04 ASSESSMENT — COGNITIVE AND FUNCTIONAL STATUS - GENERAL
STANDING UP FROM CHAIR USING ARMS: A LITTLE
MOBILITY SCORE: 19
MOBILITY SCORE: 24
MOBILITY SCORE: 22
MOVING TO AND FROM BED TO CHAIR: A LITTLE
DAILY ACTIVITIY SCORE: 24
CLIMB 3 TO 5 STEPS WITH RAILING: A LOT
WALKING IN HOSPITAL ROOM: A LITTLE
CLIMB 3 TO 5 STEPS WITH RAILING: A LITTLE
WALKING IN HOSPITAL ROOM: A LITTLE

## 2025-08-04 ASSESSMENT — PAIN SCALES - GENERAL
PAINLEVEL_OUTOF10: 0 - NO PAIN

## 2025-08-04 ASSESSMENT — PAIN - FUNCTIONAL ASSESSMENT
PAIN_FUNCTIONAL_ASSESSMENT: 0-10

## 2025-08-04 NOTE — CARE PLAN
Problem: Pain - Adult  Goal: Verbalizes/displays adequate comfort level or baseline comfort level  Outcome: Progressing     Problem: Safety - Adult  Goal: Free from fall injury  Outcome: Progressing     Problem: Discharge Planning  Goal: Discharge to home or other facility with appropriate resources  Outcome: Progressing     Problem: Chronic Conditions and Co-morbidities  Goal: Patient's chronic conditions and co-morbidity symptoms are monitored and maintained or improved  Outcome: Progressing     Problem: Nutrition  Goal: Nutrient intake appropriate for maintaining nutritional needs  Outcome: Progressing     Problem: Heart Failure  Goal: Improved gas exchange this shift  Outcome: Progressing  Goal: Improved urinary output this shift  Outcome: Progressing  Goal: Reduction in peripheral edema within 24 hours  Outcome: Progressing  Goal: Report improvement of dyspnea/breathlessness this shift  Outcome: Progressing  Goal: Weight from fluid excess reduced over 2-3 days, then stabilize  Outcome: Progressing  Goal: Increase self care and/or family involvement in 24 hours  Outcome: Progressing     Problem: Skin  Goal: Decreased wound size/increased tissue granulation at next dressing change  Outcome: Progressing  Goal: Participates in plan/prevention/treatment measures  Outcome: Progressing  Goal: Prevent/manage excess moisture  Outcome: Progressing  Goal: Prevent/minimize sheer/friction injuries  Outcome: Progressing  Goal: Promote/optimize nutrition  Outcome: Progressing  Goal: Promote skin healing  Outcome: Progressing     Problem: Diabetes  Goal: Achieve decreasing blood glucose levels by end of shift  Outcome: Progressing  Goal: Increase stability of blood glucose readings by end of shift  Outcome: Progressing  Goal: Decrease in ketones present in urine by end of shift  Outcome: Progressing   The patient's goals for the shift include continue with independence with ADLs    The clinical goals for the shift include  Patient's blood sugar will remain stable this shift,

## 2025-08-04 NOTE — PROGRESS NOTES
AFSubjective:  Breathing improving. Legs still swollen    Today in brief:  - Massey line implant today (glargine on hold, meal time insulin on on hold)  - Discontinue heparin. Start eliquis this afternoon  - Continue diuresis with IV lasix    Objective:  Vitals:    08/04/25 0427   BP: 108/67   Pulse: (!) 111   Resp: 18   Temp: 36.6 °C (97.9 °F)   SpO2: 94%     Weight         8/2/2025  0423 8/2/2025  0600 8/3/2025  0407 8/3/2025  0600 8/4/2025  0427    Weight: 102 kg (224 lb 10.4 oz) 102 kg (224 lb 10.4 oz) 102 kg (224 lb 6.9 oz) 102 kg (224 lb 6.9 oz) 101 kg (223 lb 8.7 oz)            Intake/Output Summary (Last 24 hours) at 8/4/2025 0735  Last data filed at 8/4/2025 0626  Gross per 24 hour   Intake 840 ml   Output 2750 ml   Net -1910 ml     Recent Results (from the past 24 hours)   POCT GLUCOSE    Collection Time: 08/03/25  1:17 PM   Result Value Ref Range    POCT Glucose 174 (H) 74 - 99 mg/dL   CBC    Collection Time: 08/03/25  5:54 PM   Result Value Ref Range    WBC 5.6 4.4 - 11.3 x10*3/uL    nRBC 0.0 0.0 - 0.0 /100 WBCs    RBC 4.48 (L) 4.50 - 5.90 x10*6/uL    Hemoglobin 10.8 (L) 13.5 - 17.5 g/dL    Hematocrit 37.0 (L) 41.0 - 52.0 %    MCV 83 80 - 100 fL    MCH 24.1 (L) 26.0 - 34.0 pg    MCHC 29.2 (L) 32.0 - 36.0 g/dL    RDW 20.3 (H) 11.5 - 14.5 %    Platelets 358 150 - 450 x10*3/uL   Magnesium    Collection Time: 08/03/25  5:54 PM   Result Value Ref Range    Magnesium 2.23 1.60 - 2.40 mg/dL   Renal function panel    Collection Time: 08/03/25  5:54 PM   Result Value Ref Range    Glucose 128 (H) 74 - 99 mg/dL    Sodium 130 (L) 136 - 145 mmol/L    Potassium 4.4 3.5 - 5.3 mmol/L    Chloride 95 (L) 98 - 107 mmol/L    Bicarbonate 26 21 - 32 mmol/L    Anion Gap 13 10 - 20 mmol/L    Urea Nitrogen 69 (H) 6 - 23 mg/dL    Creatinine 2.78 (H) 0.50 - 1.30 mg/dL    eGFR 25 (L) >60 mL/min/1.73m*2    Calcium 9.2 8.6 - 10.6 mg/dL    Phosphorus 3.7 2.5 - 4.9 mg/dL    Albumin 4.1 3.4 - 5.0 g/dL   POCT GLUCOSE    Collection Time:  08/03/25  6:09 PM   Result Value Ref Range    POCT Glucose 137 (H) 74 - 99 mg/dL   POCT GLUCOSE    Collection Time: 08/03/25  9:00 PM   Result Value Ref Range    POCT Glucose 110 (H) 74 - 99 mg/dL   POCT GLUCOSE    Collection Time: 08/04/25  6:25 AM   Result Value Ref Range    POCT Glucose 137 (H) 74 - 99 mg/dL     Inpatient Medications:  Scheduled Medications[1]  PRN Medications[2]  Continuous Medications[3]    Telemetry 8/4/2025 (personally reviewed): -110s    Physical exam:  General: NAD  Head/ neck: + JVD to jawline  Cardiac: RRR, regular S1 S2 , no murmur, no rub, no gallop  Pulm: CTA bilaterally, no wheezes, rales or rhonchi.    Vascular: Radial 2+ bilaterally  GI: Non distended  Extremities: no LE edema   Neuro: no focal neuro deficits   Psych: appropriate mood and behavior   Skin: warm and dry     Assessment/Plan   Felice Law is a 60 y.o. male with PMH of ischemic HFrEF 15-20%, CABG (LIMA- LAD 2023),  VT arrest s/p VT ablation and ICD 2017, Hypertension, Hyperlipidemia, Hypothyroidism, Left femoral DVT on Eliquis, COPD, JAKE, CKD III, Chronic BLE venous stasis dermatitis, Insulin dependent Type 2 DM who was initially admitted 7/12 to [place] for acute decompensate heart failure. Diuresed and transferred to [facility] HFICU for further managemnet and possible re-consideration of advanced therapies.  Of note, Patient was previously discussed for advanced HF therapies during [facility] October 2023 admission but was declined with concerns for mobility/frailty (uses motorized scooter and has bilateral foot drop with neuropathy), CKD, poorly controlled diabetes, as well as unclear social/financial support.      Acute on chronic systolic ischemic, HFrEF 15-20% (since 2017)  CAD  s/p PCI and 1v CABG (LIMA-LAD 4/2023).   VT s/p ICD 2017  - TTE 5/5/25: LV mod dilated, EF 15-20%, gHK of LV, G2DD, mildly enlarged RV with mildly reduced systolic fxn, mild LAE, mild to mod MR and TR  - LHC 6/10/25:  Right dom. LIMA to  LAD patent. pLAD 70%, os and pD1 90%, mLCx50%,osOM1 90%, mRCA 100%  - Committee discussion: optimize DM (A1c 10) and defer listing for likely heart transplant / LVAD depending on acuity  - Cardiac MRI 7/2025: EF 17%, mixed CM, mild to mod MR,  Transmural infarction (%) involves the mid anteroseptum,apical septum, apical inferior wall, and apex, all demonstrating nonviable myocardium. Subendocardial infarction (25-50%) affects the mid inferior and apical lateral wall, while subendocardial infarction (<25%) involves the mid inferolateral wall: these segments remain viable. Additionally, mid-myocardial enhancement in the basal septum represents a nonischemic, nonspecific pattern. RV function severely reduced.   -- There may be value in revascularizing OM and diag but will work on stabilizing renal function and milrinone weaning first. No intervention to be pursued this admission.  - Failed weaning of milrinone.   - Massey line implant today (glargine on hold, meal time insulin on on hold). NPO  - GDMT: enresto, isordil, farxiga, no beta blocker, digoxin 3/wkly level WNL 7/29).   - 7/31: continue to hold entresto/Aldactone and decrease dig to MWF due to CHARLOTTE on CKD  - Secondary vascular prevention: aspirin, statin (Discontinue fenofibrate given renal dysfunction)   - Continue diuresis with IV lasix. Still hypervolemic on NC O2. Defer escalation given adequate UOP and renal impairment  - Daily standing weights, strict I&O's, 2g sodium diet, 2L fluid restriction      COPD  Obstructive sleep apnea  Prior tobacco use  -  (24 pack year, quit 2017)  - albuterol PRN  - incentive spirometry      CHARLOTTE on CKD  - Cr trend: 2.78 H stable (2.86, 2.85, 2.28, 2.42, 2.1,1.7, 2.04, 2.39)  -  As low as 1.35 in 11/2024     Anemia of chronic disease and iron deficiency  - (7/17):  iron sat L 16 but otherwise iron studies & ferritin WNL  - IV Venofer 200 mg x5 ordered by HFICU (completed 8/2)     Left femoral DVT (2017)  - Discontinue  heparin pre Massey. Restart home eliquis this afternoon      Uncontrolled type II DM c/b neuropathy  - HgbA1c (7/17):  10.1  - gabapentin TID  - Endocrine following  - Lantus to 40u BID, SS#2, 15u lispro TID.  > insulin except SSI on hold while NPO     Hypothyroidism  - TSH WNL. Euthyroid  - Synthroid      Loose stool unspecified  - Try immodium PRN  - C. Diff neg     DVT ppx: heparin gtt transition to eliquis  DISPO: Pending home milrinone set up & diuresis   Code status: Full Code    Patient seen and discussed with Dr. Jean Claude Uwamungu Leighann M Giriunas, PA-C    I conducted a shared visit with Luzmaria Nina PA-C.   History, exam, and plan of care discussed as documented.  Briefly,  Patient interviewed using .   Breathing a little better this morning. Still has lower extremity swelling.   Exam:  He is lying in bed, in no acute distress  Elevated JVP, at angle of the jaw sitting up.  Heart: Normal S1S2, 2/6 systolic murmur.   Lungs basal rales  Abdomen soft, non-distended  Extremities trace LE edema  A/P:  As detailed in KARTHIK's note above, we will continue guideline directed medical therapy for heart failure as tolerated (entresto 24-26 mg bid, dapaglifozin 10 mg daily, spironolactone 25 mg) dogozin 0.125 mg 3x/week, milrinone infusion, and IV diuresis with furosemide 80 mg bid as He is still volume overloaded.   Evaluation for heart failure therapies (LVADvs transplant) once diabetes mellitus is well controlled (HbA1C is 10%) as per transplant committee recommendations. We also switched anticoagulation to direct oral anticoagulation with apixaban 5 mg bid for DVT.                [1]   Scheduled medications   Medication Dose Route Frequency    [Held by provider] apixaban  5 mg oral BID    atorvastatin  80 mg oral Nightly    calcium carbonate  500 mg of calcium carbonate oral BID    dapagliflozin propanediol  10 mg oral Daily    digoxin  125 mcg oral Once per day on Monday Wednesday  Friday    furosemide  80 mg intravenous BID    gabapentin  200 mg oral q8h SUSI    hydrALAZINE  25 mg oral q8h    insulin glargine  40 Units subcutaneous Nightly    insulin glargine  40 Units subcutaneous Daily    insulin lispro  0-15 Units subcutaneous TID AC    insulin lispro  25 Units subcutaneous TID AC    isosorbide dinitrate  40 mg oral TID    levothyroxine  75 mcg oral Daily before breakfast    pantoprazole  40 mg oral Daily before breakfast    QUEtiapine  50 mg oral Nightly    [Held by provider] sacubitriL-valsartan  1 tablet oral BID    SITagliptin phosphate  50 mg oral Daily    [Held by provider] spironolactone  25 mg oral Daily   [2]   PRN medications   Medication    acetaminophen    albuterol    alteplase    dextrose    dextrose    glucagon    glucagon    heparin    loperamide   [3]   Continuous Medications   Medication Dose Last Rate    heparin  0-4,000 Units/hr 2,000 Units/hr (08/04/25 0333)    milrinone  0.25 mcg/kg/min 0.25 mcg/kg/min (08/04/25 0630)

## 2025-08-04 NOTE — CARE PLAN
Problem: Nutrition  Goal: Nutrient intake appropriate for maintaining nutritional needs  Outcome: Progressing     Problem: Discharge Planning  Goal: Discharge to home or other facility with appropriate resources  Outcome: Progressing       Patient was safe and stable this shift. Massey placed and infusing milrinone. Transitioning to eliquis tonight. 80mg IVP lasix given x2 with 1.3L of output.

## 2025-08-05 DIAGNOSIS — I50.22 CHRONIC SYSTOLIC HEART FAILURE: ICD-10-CM

## 2025-08-05 LAB
ALBUMIN SERPL BCP-MCNC: 4 G/DL (ref 3.4–5)
ANION GAP SERPL CALC-SCNC: 16 MMOL/L (ref 10–20)
BUN SERPL-MCNC: 71 MG/DL (ref 6–23)
CALCIUM SERPL-MCNC: 9.6 MG/DL (ref 8.6–10.6)
CHLORIDE SERPL-SCNC: 96 MMOL/L (ref 98–107)
CO2 SERPL-SCNC: 26 MMOL/L (ref 21–32)
CREAT SERPL-MCNC: 2.64 MG/DL (ref 0.5–1.3)
EGFRCR SERPLBLD CKD-EPI 2021: 27 ML/MIN/1.73M*2
ERYTHROCYTE [DISTWIDTH] IN BLOOD BY AUTOMATED COUNT: 20.2 % (ref 11.5–14.5)
GLUCOSE BLD MANUAL STRIP-MCNC: 142 MG/DL (ref 74–99)
GLUCOSE BLD MANUAL STRIP-MCNC: 144 MG/DL (ref 74–99)
GLUCOSE BLD MANUAL STRIP-MCNC: 163 MG/DL (ref 74–99)
GLUCOSE BLD MANUAL STRIP-MCNC: 87 MG/DL (ref 74–99)
GLUCOSE SERPL-MCNC: 98 MG/DL (ref 74–99)
HCT VFR BLD AUTO: 35.9 % (ref 41–52)
HGB BLD-MCNC: 10.4 G/DL (ref 13.5–17.5)
MAGNESIUM SERPL-MCNC: 2.35 MG/DL (ref 1.6–2.4)
MCH RBC QN AUTO: 23.7 PG (ref 26–34)
MCHC RBC AUTO-ENTMCNC: 29 G/DL (ref 32–36)
MCV RBC AUTO: 82 FL (ref 80–100)
NRBC BLD-RTO: 0 /100 WBCS (ref 0–0)
PHOSPHATE SERPL-MCNC: 4 MG/DL (ref 2.5–4.9)
PLATELET # BLD AUTO: 358 X10*3/UL (ref 150–450)
POTASSIUM SERPL-SCNC: 4.2 MMOL/L (ref 3.5–5.3)
RBC # BLD AUTO: 4.38 X10*6/UL (ref 4.5–5.9)
SODIUM SERPL-SCNC: 134 MMOL/L (ref 136–145)
WBC # BLD AUTO: 6 X10*3/UL (ref 4.4–11.3)

## 2025-08-05 PROCEDURE — 2500000002 HC RX 250 W HCPCS SELF ADMINISTERED DRUGS (ALT 637 FOR MEDICARE OP, ALT 636 FOR OP/ED): Performed by: NURSE PRACTITIONER

## 2025-08-05 PROCEDURE — 82947 ASSAY GLUCOSE BLOOD QUANT: CPT

## 2025-08-05 PROCEDURE — 2500000002 HC RX 250 W HCPCS SELF ADMINISTERED DRUGS (ALT 637 FOR MEDICARE OP, ALT 636 FOR OP/ED): Performed by: PHYSICIAN ASSISTANT

## 2025-08-05 PROCEDURE — 85027 COMPLETE CBC AUTOMATED: CPT | Performed by: PHYSICIAN ASSISTANT

## 2025-08-05 PROCEDURE — 2500000004 HC RX 250 GENERAL PHARMACY W/ HCPCS (ALT 636 FOR OP/ED): Performed by: NURSE PRACTITIONER

## 2025-08-05 PROCEDURE — 99233 SBSQ HOSP IP/OBS HIGH 50: CPT | Performed by: STUDENT IN AN ORGANIZED HEALTH CARE EDUCATION/TRAINING PROGRAM

## 2025-08-05 PROCEDURE — 1200000002 HC GENERAL ROOM WITH TELEMETRY DAILY

## 2025-08-05 PROCEDURE — 2500000001 HC RX 250 WO HCPCS SELF ADMINISTERED DRUGS (ALT 637 FOR MEDICARE OP): Performed by: PHYSICIAN ASSISTANT

## 2025-08-05 PROCEDURE — 80069 RENAL FUNCTION PANEL: CPT | Performed by: PHYSICIAN ASSISTANT

## 2025-08-05 PROCEDURE — 83735 ASSAY OF MAGNESIUM: CPT | Performed by: PHYSICIAN ASSISTANT

## 2025-08-05 PROCEDURE — 36415 COLL VENOUS BLD VENIPUNCTURE: CPT | Performed by: PHYSICIAN ASSISTANT

## 2025-08-05 PROCEDURE — 2500000001 HC RX 250 WO HCPCS SELF ADMINISTERED DRUGS (ALT 637 FOR MEDICARE OP)

## 2025-08-05 RX ORDER — MILRINONE LACTATE 0.2 MG/ML
0.25 INJECTION, SOLUTION INTRAVENOUS CONTINUOUS
Qty: 6000 ML | Refills: 1 | Status: SHIPPED
Start: 2025-08-05

## 2025-08-05 RX ORDER — BUMETANIDE 1 MG/1
2 TABLET ORAL
Status: DISCONTINUED | OUTPATIENT
Start: 2025-08-05 | End: 2025-08-11 | Stop reason: HOSPADM

## 2025-08-05 RX ORDER — INSULIN LISPRO 100 [IU]/ML
25 INJECTION, SOLUTION INTRAVENOUS; SUBCUTANEOUS
Status: DISCONTINUED | OUTPATIENT
Start: 2025-08-05 | End: 2025-08-06

## 2025-08-05 RX ADMIN — LEVOTHYROXINE SODIUM 75 MCG: 0.07 TABLET ORAL at 06:25

## 2025-08-05 RX ADMIN — FUROSEMIDE 80 MG: 10 INJECTION, SOLUTION INTRAMUSCULAR; INTRAVENOUS at 09:08

## 2025-08-05 RX ADMIN — SITAGLIPTIN 50 MG: 50 TABLET, FILM COATED ORAL at 09:08

## 2025-08-05 RX ADMIN — GABAPENTIN 200 MG: 100 CAPSULE ORAL at 06:25

## 2025-08-05 RX ADMIN — INSULIN LISPRO 25 UNITS: 100 INJECTION, SOLUTION INTRAVENOUS; SUBCUTANEOUS at 13:04

## 2025-08-05 RX ADMIN — APIXABAN 5 MG: 5 TABLET, FILM COATED ORAL at 20:24

## 2025-08-05 RX ADMIN — GABAPENTIN 200 MG: 100 CAPSULE ORAL at 15:13

## 2025-08-05 RX ADMIN — MILRINONE LACTATE IN DEXTROSE 0.25 MCG/KG/MIN: 0.2 INJECTION, SOLUTION INTRAVENOUS at 09:10

## 2025-08-05 RX ADMIN — HYDRALAZINE HYDROCHLORIDE 25 MG: 50 TABLET ORAL at 17:38

## 2025-08-05 RX ADMIN — ATORVASTATIN CALCIUM 80 MG: 80 TABLET, FILM COATED ORAL at 20:24

## 2025-08-05 RX ADMIN — APIXABAN 5 MG: 5 TABLET, FILM COATED ORAL at 09:08

## 2025-08-05 RX ADMIN — INSULIN GLARGINE 40 UNITS: 100 INJECTION, SOLUTION SUBCUTANEOUS at 20:24

## 2025-08-05 RX ADMIN — HYDRALAZINE HYDROCHLORIDE 25 MG: 50 TABLET ORAL at 09:08

## 2025-08-05 RX ADMIN — ISOSORBIDE DINITRATE 40 MG: 40 TABLET ORAL at 20:24

## 2025-08-05 RX ADMIN — ISOSORBIDE DINITRATE 40 MG: 40 TABLET ORAL at 09:08

## 2025-08-05 RX ADMIN — ISOSORBIDE DINITRATE 40 MG: 40 TABLET ORAL at 15:13

## 2025-08-05 RX ADMIN — GABAPENTIN 200 MG: 100 CAPSULE ORAL at 20:24

## 2025-08-05 RX ADMIN — INSULIN LISPRO 2 UNITS: 100 INJECTION, SOLUTION INTRAVENOUS; SUBCUTANEOUS at 09:09

## 2025-08-05 RX ADMIN — BUMETANIDE 2 MG: 1 TABLET ORAL at 17:38

## 2025-08-05 RX ADMIN — HYDRALAZINE HYDROCHLORIDE 25 MG: 50 TABLET ORAL at 23:23

## 2025-08-05 RX ADMIN — DAPAGLIFLOZIN 10 MG: 10 TABLET, FILM COATED ORAL at 09:08

## 2025-08-05 RX ADMIN — QUETIAPINE FUMARATE 50 MG: 25 TABLET ORAL at 20:24

## 2025-08-05 RX ADMIN — INSULIN LISPRO 25 UNITS: 100 INJECTION, SOLUTION INTRAVENOUS; SUBCUTANEOUS at 09:08

## 2025-08-05 RX ADMIN — PANTOPRAZOLE SODIUM 40 MG: 40 TABLET, DELAYED RELEASE ORAL at 06:25

## 2025-08-05 RX ADMIN — INSULIN GLARGINE 40 UNITS: 100 INJECTION, SOLUTION SUBCUTANEOUS at 09:09

## 2025-08-05 ASSESSMENT — COGNITIVE AND FUNCTIONAL STATUS - GENERAL
MOVING TO AND FROM BED TO CHAIR: A LITTLE
MOBILITY SCORE: 19
MOBILITY SCORE: 22
WALKING IN HOSPITAL ROOM: A LITTLE
HELP NEEDED FOR BATHING: A LITTLE
STANDING UP FROM CHAIR USING ARMS: A LITTLE
DRESSING REGULAR LOWER BODY CLOTHING: A LITTLE
WALKING IN HOSPITAL ROOM: A LITTLE
CLIMB 3 TO 5 STEPS WITH RAILING: A LITTLE
DAILY ACTIVITIY SCORE: 22
DAILY ACTIVITIY SCORE: 24
CLIMB 3 TO 5 STEPS WITH RAILING: A LOT

## 2025-08-05 ASSESSMENT — PAIN - FUNCTIONAL ASSESSMENT: PAIN_FUNCTIONAL_ASSESSMENT: 0-10

## 2025-08-05 ASSESSMENT — PAIN SCALES - GENERAL: PAINLEVEL_OUTOF10: 0 - NO PAIN

## 2025-08-05 NOTE — PROGRESS NOTES
Subjective:   utilized for visit.  Swelling and breathing improved    Today in brief:  - Mirlinrone orders ( infusion Rx sent, and HHC orders placed)  - Volume much improved. Discontinue lasix IV to bumex 2mg PO BID this afternoon       Objective:  Vitals:    08/05/25 0731   BP: 110/70   Pulse: 103   Resp: 20   Temp: 36.1 °C (97 °F)   SpO2: 94%     Weight         8/2/2025  0600 8/3/2025  0407 8/3/2025  0600 8/4/2025  0427 8/5/2025  0425    Weight: 102 kg (224 lb 10.4 oz) 102 kg (224 lb 6.9 oz) 102 kg (224 lb 6.9 oz) 101 kg (223 lb 8.7 oz) 100 kg (220 lb 10.9 oz)            Intake/Output Summary (Last 24 hours) at 8/5/2025 0733  Last data filed at 8/5/2025 0625  Gross per 24 hour   Intake 960 ml   Output 3125 ml   Net -2165 ml     Recent Results (from the past 24 hours)   POCT GLUCOSE    Collection Time: 08/04/25 11:19 AM   Result Value Ref Range    POCT Glucose 120 (H) 74 - 99 mg/dL   Heparin Assay, UFH    Collection Time: 08/04/25 12:57 PM   Result Value Ref Range    Heparin Unfractionated 0.3 See Comment Below for Therapeutic Ranges IU/mL   POCT GLUCOSE    Collection Time: 08/04/25  3:34 PM   Result Value Ref Range    POCT Glucose 102 (H) 74 - 99 mg/dL   CBC    Collection Time: 08/04/25  7:00 PM   Result Value Ref Range    WBC 5.7 4.4 - 11.3 x10*3/uL    nRBC 0.0 0.0 - 0.0 /100 WBCs    RBC 4.35 (L) 4.50 - 5.90 x10*6/uL    Hemoglobin 10.5 (L) 13.5 - 17.5 g/dL    Hematocrit 35.4 (L) 41.0 - 52.0 %    MCV 81 80 - 100 fL    MCH 24.1 (L) 26.0 - 34.0 pg    MCHC 29.7 (L) 32.0 - 36.0 g/dL    RDW 20.5 (H) 11.5 - 14.5 %    Platelets 351 150 - 450 x10*3/uL   Magnesium    Collection Time: 08/04/25  7:00 PM   Result Value Ref Range    Magnesium 2.22 1.60 - 2.40 mg/dL   Renal function panel    Collection Time: 08/04/25  7:00 PM   Result Value Ref Range    Glucose 229 (H) 74 - 99 mg/dL    Sodium 131 (L) 136 - 145 mmol/L    Potassium 4.4 3.5 - 5.3 mmol/L    Chloride 94 (L) 98 - 107 mmol/L    Bicarbonate 26 21 -  32 mmol/L    Anion Gap 15 10 - 20 mmol/L    Urea Nitrogen 71 (H) 6 - 23 mg/dL    Creatinine 2.72 (H) 0.50 - 1.30 mg/dL    eGFR 26 (L) >60 mL/min/1.73m*2    Calcium 9.4 8.6 - 10.6 mg/dL    Phosphorus 4.0 2.5 - 4.9 mg/dL    Albumin 4.2 3.4 - 5.0 g/dL   POCT GLUCOSE    Collection Time: 08/04/25  8:56 PM   Result Value Ref Range    POCT Glucose 198 (H) 74 - 99 mg/dL   POCT GLUCOSE    Collection Time: 08/05/25  6:24 AM   Result Value Ref Range    POCT Glucose 163 (H) 74 - 99 mg/dL     Inpatient Medications:  Scheduled Medications[1]  PRN Medications[2]  Continuous Medications[3]    Telemetry 8/5/2025 (personally reviewed): SR 90-ST 110s, rare PVCS, x3 episodes of 3-4bt NSVT    Physical exam:  General: NAD  Head/ neck: + JVD   Cardiac: RRR, regular S1 S2 , no murmur, no rub, no gallop  Pulm: CTA bilaterally, no wheezes, rales or rhonchi.    Vascular: Radial 2+ bilaterally  GI: Non distended  Extremities: no LE edema   Neuro: no focal neuro deficits   Psych: appropriate mood and behavior   Skin: warm and dry     Assessment/Plan   Felice Law is a 60 y.o. male with PMH of ischemic HFrEF 15-20%, CABG (LIMA- LAD 2023),  VT arrest s/p VT ablation and ICD 2017, Hypertension, Hyperlipidemia, Hypothyroidism, Left femoral DVT on Eliquis, COPD, JAKE, CKD III, Chronic BLE venous stasis dermatitis, Insulin dependent Type 2 DM who was initially admitted 7/12 to [place] for acute decompensate heart failure. Diuresed and transferred to [facility] HFICU for further managemnet and possible re-consideration of advanced therapies.  Of note, Patient was previously discussed for advanced HF therapies during [facility] [place] [facility] October 2023 admission but was declined with concerns for mobility/frailty (uses motorized scooter and has bilateral foot drop with neuropathy), CKD, poorly controlled diabetes, as well as unclear social/financial support.      Acute on chronic systolic ischemic, HFrEF 15-20% (since 2017)  CAD  s/p PCI and 1v CABG (LIMA-LAD 4/2023).    VT s/p ICD 2017  - TTE 5/5/25: LV mod dilated, EF 15-20%, gHK of LV, G2DD, mildly enlarged RV with mildly reduced systolic fxn, mild LAE, mild to mod MR and TR  - LHC 6/10/25:  Right dom. LIMA to LAD patent. pLAD 70%, os and pD1 90%, mLCx50%,osOM1 90%, mRCA 100%  - Committee discussion: optimize DM (A1c 10) and defer listing for likely heart transplant / LVAD depending on acuity  - Cardiac MRI 7/2025: EF 17%, mixed CM, mild to mod MR,  Transmural infarction (%) involves the mid anteroseptum,apical septum, apical inferior wall, and apex, all demonstrating nonviable myocardium. Subendocardial infarction (25-50%) affects the mid inferior and apical lateral wall, while subendocardial infarction (<25%) involves the mid inferolateral wall: these segments remain viable. Additionally, mid-myocardial enhancement in the basal septum represents a nonischemic, nonspecific pattern. RV function severely reduced.   - There may be value in revascularizing OM and diag but will work on stabilizing renal function and heart failure. No intervention to be pursued this admission.  - Failed weaning of milrinone- will go home with IV.  - Massey line placed 8/5   - GDMT: enresto, isordil, farxiga, no beta blocker, digoxin 3/wkly (level WNL 7/29).   - 7/31: continue to hold entresto/Aldactone and decrease dig to MWF due to CHARLOTTE on CKD  - Secondary vascular prevention: aspirin, statin (Discontinue fenofibrate given renal dysfunction)   - Volume overall improved on laisx 80mg IV. Discontinue lasix. Start bumex 2mg PO BID this afternoon     COPD  Obstructive sleep apnea  Prior tobacco use  -  24 pack year, quit 2017  - albuterol PRN  - incentive spirometry      CHARLOTTE on CKD  - Cr trend: 2.72 (2.78, 2.86, 2.85, 2.28, 2.42, 2.1,1.7, 2.04, 2.39)  - As low as 1.35 in 11/2024     Anemia of chronic disease and iron deficiency  - (7/17):  iron sat L 16 but otherwise iron studies & ferritin WNL  - IV Venofer 200 mg x5 ordered by ICU (completed  8/2)     Left femoral DVT (2017)  - Eliquis 5mg BID      Uncontrolled type II DM c/b neuropathy  - HgbA1c (7/17):  10.1  - gabapentin TID  - Endocrine following  - Lantus 40u BID, SS#2, 25u lispro TID. Januvia.  - POCTs within acceptable limits (100-190s)      Hypothyroidism  - TSH 7/23/25: WNL. Euthyroid  - Synthroid      Loose stool unspecified  - Immodium PRN  - C. Diff neg     DVT ppx: eliquis  DISPO: Pending response of transition to PO diuretic and home milrinone set up   Code status: Full Code    Patient seen and discussed with Dr. Jean Claude Uwamungu Leighann M Giriunas, PA-C    I conducted a shared visit with Luzmaria Nina PA-C.   Interview conducted using a [employer] [place]  [place], [place] [id number]  Patient is a 59 yo M with ischemic cardiomyopathy (severely reduced EF), unable to tolerate GDMT and weaning off milrinone. He remains volume overloaded but improved.   Physical Exam:  Patient is no acute distress  Elevated JVP  Normal S1S2, S3 gallop, soft 2/6 systolic murmur on lsb  Lungs clear  Abdomen soft, non-tender  Extremities 2+ bilateral pitting edema  A/Plan:  As detailed above, will discharge patient on tolerated gdmt, diuretics, home milrinone infusion and follow-up in heart failure clinic for re-evaluation of advanced therapies in outpatient setting and meanwhile titrate diabetes regimen to achieve optimal glycemic control (A1c is 10% at this time).           [1]   Scheduled medications   Medication Dose Route Frequency    apixaban  5 mg oral BID    atorvastatin  80 mg oral Nightly    calcium carbonate  500 mg of calcium carbonate oral BID    dapagliflozin propanediol  10 mg oral Daily    digoxin  125 mcg oral Once per day on Monday Wednesday Friday    furosemide  80 mg intravenous BID    gabapentin  200 mg oral q8h SUSI    hydrALAZINE  25 mg oral q8h    insulin glargine  40 Units subcutaneous Nightly    [Held by provider] insulin glargine  40 Units subcutaneous Daily    insulin lispro  0-15 Units  subcutaneous TID AC    [Held by provider] insulin lispro  25 Units subcutaneous TID AC    isosorbide dinitrate  40 mg oral TID    levothyroxine  75 mcg oral Daily before breakfast    pantoprazole  40 mg oral Daily before breakfast    QUEtiapine  50 mg oral Nightly    [Held by provider] sacubitriL-valsartan  1 tablet oral BID    SITagliptin phosphate  50 mg oral Daily    [Held by provider] spironolactone  25 mg oral Daily   [2]   PRN medications   Medication    acetaminophen    albuterol    alteplase    dextrose    dextrose    glucagon    glucagon    loperamide   [3]   Continuous Medications   Medication Dose Last Rate    milrinone  0.25 mcg/kg/min 0.25 mcg/kg/min (08/04/25 2046)

## 2025-08-05 NOTE — CARE PLAN
The patient's goals for the shift include continue with independence with ADLs    The clinical goals for the shift include pt to have no pain    Over the shift, the patient did make progress toward the following goals.      Problem: Pain - Adult  Goal: Verbalizes/displays adequate comfort level or baseline comfort level  Outcome: Progressing     Problem: Safety - Adult  Goal: Free from fall injury  Outcome: Progressing     Problem: Discharge Planning  Goal: Discharge to home or other facility with appropriate resources  Outcome: Progressing     Problem: Chronic Conditions and Co-morbidities  Goal: Patient's chronic conditions and co-morbidity symptoms are monitored and maintained or improved  Outcome: Progressing     Problem: Nutrition  Goal: Nutrient intake appropriate for maintaining nutritional needs  Outcome: Progressing     Problem: Heart Failure  Goal: Improved gas exchange this shift  Outcome: Progressing  Goal: Improved urinary output this shift  Outcome: Progressing  Goal: Reduction in peripheral edema within 24 hours  Outcome: Progressing  Goal: Report improvement of dyspnea/breathlessness this shift  Outcome: Progressing  Goal: Weight from fluid excess reduced over 2-3 days, then stabilize  Outcome: Progressing  Goal: Increase self care and/or family involvement in 24 hours  Outcome: Progressing     Problem: Skin  Goal: Decreased wound size/increased tissue granulation at next dressing change  Outcome: Progressing  Goal: Participates in plan/prevention/treatment measures  Outcome: Progressing  Goal: Prevent/manage excess moisture  Outcome: Progressing  Goal: Prevent/minimize sheer/friction injuries  Outcome: Progressing  Goal: Promote/optimize nutrition  Outcome: Progressing  Goal: Promote skin healing  Outcome: Progressing     Problem: Diabetes  Goal: Achieve decreasing blood glucose levels by end of shift  Outcome: Progressing  Goal: Increase stability of blood glucose readings by end of shift  Outcome:  Progressing  Goal: Decrease in ketones present in urine by end of shift  Outcome: Progressing

## 2025-08-05 NOTE — PROGRESS NOTES
Report received from nightshift RN. Patient awake and alert in bed, oriented x4, with no complaints of pain or discomfort. Vital signs reviewed. Labs and orders reviewed. Will assume care of patient.

## 2025-08-06 LAB
ALBUMIN SERPL BCP-MCNC: 4.1 G/DL (ref 3.4–5)
ANION GAP SERPL CALC-SCNC: 16 MMOL/L (ref 10–20)
BUN SERPL-MCNC: 71 MG/DL (ref 6–23)
CALCIUM SERPL-MCNC: 9.7 MG/DL (ref 8.6–10.6)
CHLORIDE SERPL-SCNC: 95 MMOL/L (ref 98–107)
CO2 SERPL-SCNC: 26 MMOL/L (ref 21–32)
CREAT SERPL-MCNC: 2.75 MG/DL (ref 0.5–1.3)
EGFRCR SERPLBLD CKD-EPI 2021: 26 ML/MIN/1.73M*2
ERYTHROCYTE [DISTWIDTH] IN BLOOD BY AUTOMATED COUNT: 20.1 % (ref 11.5–14.5)
GLUCOSE BLD MANUAL STRIP-MCNC: 116 MG/DL (ref 74–99)
GLUCOSE BLD MANUAL STRIP-MCNC: 151 MG/DL (ref 74–99)
GLUCOSE BLD MANUAL STRIP-MCNC: 161 MG/DL (ref 74–99)
GLUCOSE SERPL-MCNC: 83 MG/DL (ref 74–99)
HCT VFR BLD AUTO: 34.7 % (ref 41–52)
HGB BLD-MCNC: 10.5 G/DL (ref 13.5–17.5)
MAGNESIUM SERPL-MCNC: 2.26 MG/DL (ref 1.6–2.4)
MCH RBC QN AUTO: 24.7 PG (ref 26–34)
MCHC RBC AUTO-ENTMCNC: 30.3 G/DL (ref 32–36)
MCV RBC AUTO: 82 FL (ref 80–100)
NRBC BLD-RTO: 0 /100 WBCS (ref 0–0)
PHOSPHATE SERPL-MCNC: 4.1 MG/DL (ref 2.5–4.9)
PLATELET # BLD AUTO: 349 X10*3/UL (ref 150–450)
POTASSIUM SERPL-SCNC: 4 MMOL/L (ref 3.5–5.3)
RBC # BLD AUTO: 4.25 X10*6/UL (ref 4.5–5.9)
SODIUM SERPL-SCNC: 133 MMOL/L (ref 136–145)
WBC # BLD AUTO: 5.7 X10*3/UL (ref 4.4–11.3)

## 2025-08-06 PROCEDURE — 2500000001 HC RX 250 WO HCPCS SELF ADMINISTERED DRUGS (ALT 637 FOR MEDICARE OP): Performed by: PHYSICIAN ASSISTANT

## 2025-08-06 PROCEDURE — 85027 COMPLETE CBC AUTOMATED: CPT | Performed by: PHYSICIAN ASSISTANT

## 2025-08-06 PROCEDURE — 36415 COLL VENOUS BLD VENIPUNCTURE: CPT | Performed by: PHYSICIAN ASSISTANT

## 2025-08-06 PROCEDURE — 82565 ASSAY OF CREATININE: CPT | Performed by: PHYSICIAN ASSISTANT

## 2025-08-06 PROCEDURE — 2500000001 HC RX 250 WO HCPCS SELF ADMINISTERED DRUGS (ALT 637 FOR MEDICARE OP)

## 2025-08-06 PROCEDURE — 99233 SBSQ HOSP IP/OBS HIGH 50: CPT | Performed by: STUDENT IN AN ORGANIZED HEALTH CARE EDUCATION/TRAINING PROGRAM

## 2025-08-06 PROCEDURE — 2500000002 HC RX 250 W HCPCS SELF ADMINISTERED DRUGS (ALT 637 FOR MEDICARE OP, ALT 636 FOR OP/ED): Performed by: PHYSICIAN ASSISTANT

## 2025-08-06 PROCEDURE — 2500000002 HC RX 250 W HCPCS SELF ADMINISTERED DRUGS (ALT 637 FOR MEDICARE OP, ALT 636 FOR OP/ED)

## 2025-08-06 PROCEDURE — 82947 ASSAY GLUCOSE BLOOD QUANT: CPT

## 2025-08-06 PROCEDURE — 2500000002 HC RX 250 W HCPCS SELF ADMINISTERED DRUGS (ALT 637 FOR MEDICARE OP, ALT 636 FOR OP/ED): Performed by: NURSE PRACTITIONER

## 2025-08-06 PROCEDURE — 1200000002 HC GENERAL ROOM WITH TELEMETRY DAILY

## 2025-08-06 PROCEDURE — 2500000004 HC RX 250 GENERAL PHARMACY W/ HCPCS (ALT 636 FOR OP/ED)

## 2025-08-06 PROCEDURE — 2500000004 HC RX 250 GENERAL PHARMACY W/ HCPCS (ALT 636 FOR OP/ED): Performed by: NURSE PRACTITIONER

## 2025-08-06 PROCEDURE — 83735 ASSAY OF MAGNESIUM: CPT | Performed by: PHYSICIAN ASSISTANT

## 2025-08-06 RX ORDER — INSULIN LISPRO 100 [IU]/ML
22 INJECTION, SOLUTION INTRAVENOUS; SUBCUTANEOUS
Status: DISCONTINUED | OUTPATIENT
Start: 2025-08-07 | End: 2025-08-06

## 2025-08-06 RX ORDER — DOBUTAMINE HYDROCHLORIDE 400 MG/100ML
5 INJECTION INTRAVENOUS CONTINUOUS
Status: DISCONTINUED | OUTPATIENT
Start: 2025-08-06 | End: 2025-08-07

## 2025-08-06 RX ORDER — INSULIN LISPRO 100 [IU]/ML
22 INJECTION, SOLUTION INTRAVENOUS; SUBCUTANEOUS
Status: DISCONTINUED | OUTPATIENT
Start: 2025-08-06 | End: 2025-08-10

## 2025-08-06 RX ORDER — MILRINONE LACTATE 0.2 MG/ML
0.25 INJECTION, SOLUTION INTRAVENOUS CONTINUOUS
Status: DISCONTINUED | OUTPATIENT
Start: 2025-08-06 | End: 2025-08-11 | Stop reason: HOSPADM

## 2025-08-06 RX ORDER — DOBUTAMINE HYDROCHLORIDE 400 MG/100ML
5 INJECTION INTRAVENOUS CONTINUOUS
Qty: 250 ML | Refills: 11 | Status: SHIPPED
Start: 2025-08-06 | End: 2025-08-07 | Stop reason: HOSPADM

## 2025-08-06 RX ORDER — GABAPENTIN 100 MG/1
200 CAPSULE ORAL 3 TIMES DAILY
Status: DISCONTINUED | OUTPATIENT
Start: 2025-08-06 | End: 2025-08-11 | Stop reason: HOSPADM

## 2025-08-06 RX ORDER — HYDRALAZINE HYDROCHLORIDE 25 MG/1
25 TABLET, FILM COATED ORAL 3 TIMES DAILY
Status: DISCONTINUED | OUTPATIENT
Start: 2025-08-06 | End: 2025-08-11 | Stop reason: HOSPADM

## 2025-08-06 RX ORDER — IRON POLYSACCHARIDE COMPLEX 150 MG
150 CAPSULE ORAL DAILY
Status: DISCONTINUED | OUTPATIENT
Start: 2025-08-07 | End: 2025-08-11 | Stop reason: HOSPADM

## 2025-08-06 RX ADMIN — INSULIN LISPRO 2 UNITS: 100 INJECTION, SOLUTION INTRAVENOUS; SUBCUTANEOUS at 10:03

## 2025-08-06 RX ADMIN — GABAPENTIN 200 MG: 100 CAPSULE ORAL at 20:43

## 2025-08-06 RX ADMIN — APIXABAN 5 MG: 5 TABLET, FILM COATED ORAL at 20:43

## 2025-08-06 RX ADMIN — INSULIN LISPRO 25 UNITS: 100 INJECTION, SOLUTION INTRAVENOUS; SUBCUTANEOUS at 14:41

## 2025-08-06 RX ADMIN — INSULIN GLARGINE 40 UNITS: 100 INJECTION, SOLUTION SUBCUTANEOUS at 20:43

## 2025-08-06 RX ADMIN — BUMETANIDE 2 MG: 1 TABLET ORAL at 17:29

## 2025-08-06 RX ADMIN — ISOSORBIDE DINITRATE 40 MG: 40 TABLET ORAL at 20:02

## 2025-08-06 RX ADMIN — APIXABAN 5 MG: 5 TABLET, FILM COATED ORAL at 10:06

## 2025-08-06 RX ADMIN — INSULIN LISPRO 22 UNITS: 100 INJECTION, SOLUTION INTRAVENOUS; SUBCUTANEOUS at 20:02

## 2025-08-06 RX ADMIN — HYDRALAZINE HYDROCHLORIDE 25 MG: 50 TABLET ORAL at 17:29

## 2025-08-06 RX ADMIN — BUMETANIDE 2 MG: 1 TABLET ORAL at 10:04

## 2025-08-06 RX ADMIN — ISOSORBIDE DINITRATE 40 MG: 40 TABLET ORAL at 10:04

## 2025-08-06 RX ADMIN — ATORVASTATIN CALCIUM 80 MG: 80 TABLET, FILM COATED ORAL at 20:43

## 2025-08-06 RX ADMIN — INSULIN LISPRO 25 UNITS: 100 INJECTION, SOLUTION INTRAVENOUS; SUBCUTANEOUS at 10:06

## 2025-08-06 RX ADMIN — DIGOXIN 125 MCG: 125 TABLET ORAL at 10:04

## 2025-08-06 RX ADMIN — MILRINONE LACTATE IN DEXTROSE 0.25 MCG/KG/MIN: 0.2 INJECTION, SOLUTION INTRAVENOUS at 00:22

## 2025-08-06 RX ADMIN — LEVOTHYROXINE SODIUM 75 MCG: 0.07 TABLET ORAL at 06:14

## 2025-08-06 RX ADMIN — QUETIAPINE FUMARATE 50 MG: 25 TABLET ORAL at 20:43

## 2025-08-06 RX ADMIN — HYDRALAZINE HYDROCHLORIDE 25 MG: 50 TABLET ORAL at 10:04

## 2025-08-06 RX ADMIN — INSULIN GLARGINE 40 UNITS: 100 INJECTION, SOLUTION SUBCUTANEOUS at 10:03

## 2025-08-06 RX ADMIN — MILRINONE LACTATE IN DEXTROSE 0.25 MCG/KG/MIN: 0.2 INJECTION, SOLUTION INTRAVENOUS at 13:10

## 2025-08-06 RX ADMIN — INSULIN LISPRO 2 UNITS: 100 INJECTION, SOLUTION INTRAVENOUS; SUBCUTANEOUS at 14:41

## 2025-08-06 RX ADMIN — HYDRALAZINE HYDROCHLORIDE 25 MG: 25 TABLET ORAL at 20:43

## 2025-08-06 RX ADMIN — PANTOPRAZOLE SODIUM 40 MG: 40 TABLET, DELAYED RELEASE ORAL at 06:14

## 2025-08-06 RX ADMIN — DAPAGLIFLOZIN 10 MG: 10 TABLET, FILM COATED ORAL at 10:04

## 2025-08-06 RX ADMIN — GABAPENTIN 200 MG: 100 CAPSULE ORAL at 14:41

## 2025-08-06 RX ADMIN — SITAGLIPTIN 50 MG: 50 TABLET, FILM COATED ORAL at 10:04

## 2025-08-06 RX ADMIN — ISOSORBIDE DINITRATE 40 MG: 40 TABLET ORAL at 14:41

## 2025-08-06 RX ADMIN — GABAPENTIN 200 MG: 100 CAPSULE ORAL at 06:14

## 2025-08-06 ASSESSMENT — COGNITIVE AND FUNCTIONAL STATUS - GENERAL
MOBILITY SCORE: 22
WALKING IN HOSPITAL ROOM: A LITTLE
MOBILITY SCORE: 22
CLIMB 3 TO 5 STEPS WITH RAILING: A LITTLE
CLIMB 3 TO 5 STEPS WITH RAILING: A LITTLE
DAILY ACTIVITIY SCORE: 24
DAILY ACTIVITIY SCORE: 24
WALKING IN HOSPITAL ROOM: A LITTLE

## 2025-08-06 ASSESSMENT — PAIN - FUNCTIONAL ASSESSMENT: PAIN_FUNCTIONAL_ASSESSMENT: 0-10

## 2025-08-06 ASSESSMENT — PAIN SCALES - GENERAL
PAINLEVEL_OUTOF10: 0 - NO PAIN
PAINLEVEL_OUTOF10: 0 - NO PAIN

## 2025-08-06 NOTE — SIGNIFICANT EVENT
Rapid Response Nurse Note: RADAR alert: 7    Pager time:   Arrival time:   Event end time:   Location: [place] [id number]  [] Triage by phone or secure messaging    Rapid response initiated by:  [] Rapid response RN [] Family [] Nursing Supervisor [] Physician   [x] RADAR auto page [] Sepsis auto-page [] RN [] RT   [] NP/PA [] Other:     Primary reason for call:   [] BAT [] New CPAP/BiPAP [] Bleeding [] Change in mental status   [] Chest pain [] Code blue [] FiO2 >/= 50% [] HR </= 40 bpm   [] HR >/= 130 bpm [] Hyperglycemia [] Hypoglycemia [x] RADAR    [] RR </= 8 bpm [] RR >/= 30 bpm [] SBP </= 90 mmHg [] SpO2 < 90%   [] Seizure [] Sepsis [] Shortness of breath  [] Staff concern: see comments     RADAR VS: T 36.5 °C; ; RR 20; /66; SPO2 90%.    Interventions:  [] None [] ABG/VBG [] Assist w/ICU transfer [] BAT paged    [] Bag mask [] Blood [] Cardioversion [] Code Blue   [] Code blue for intubation [] Code status changed [] Chest x-ray [] EKG   [] IV fluid/bolus [] KUB x-ray [] Labs/cultures [] Medication   [] Nebulizer treatment [] NIPPV (CPAP/BiPAP) [] Oxygen [] Oral airway   [] Peripheral IV [] Palliative care consult [] CT/MRI [] Sepsis protocol    [] Suctioned [x] Other: see below     Outcome:  [] Coded and  [] Code blue for intubation [] Coded and transferred to ICU []  on division   [x] Remained on division (no change) [] Remained on division + additional monitoring [] Remained in ED [] Transferred to ED   [] Transferred to ICU [] Transferred to inpatient status [] Transferred for interventions (procedure) [] Transferred to ICU stepdown    [] Transferred to surgery [] Transferred to telemetry [] Sepsis protocol [] STEMI protocol   [] Stroke protocol [x] Bedside nurse instructed to page rapid response for any concerns or acute change in condition/VS     Additional Comments: At bedside for above RADAR vital signs.  Per report, SPO2 87% on room air.  Oxygen placed at 2 L/m NC.  SPO2  96% on supplemental oxygen.  Bedside nurse to update primary Cardiology service.  Staff to page rapid response for any concerns or acute change in condition/VS.

## 2025-08-06 NOTE — SIGNIFICANT EVENT
Pt's symptoms are well controlled and Palliative outpt referral sent to monitor and support pt. Discharge pending. Pall care team will sign off at this time. Please re-consult as needed. Thank you for allowing us to take part in pt's care.   -Blanca Gan CNP.

## 2025-08-06 NOTE — CARE PLAN
Problem: Safety - Adult  Goal: Free from fall injury  Outcome: Progressing     Problem: Chronic Conditions and Co-morbidities  Goal: Patient's chronic conditions and co-morbidity symptoms are monitored and maintained or improved  Outcome: Progressing     Patient was safe and stable this shift. Pending discharge planning with milrinone infusion.

## 2025-08-06 NOTE — PROGRESS NOTES
"Endocrine Progress Note   Felice Law is a 60 y.o. male on day 20 of admission presenting with Acute on chronic systolic heart failure.  Endocrine consulted for T2DM management.    Subjective   Patient seen and examined at bedside in no acute distress.  Patient denies any nausea, vomiting, difficulty breathing, abdominal pain, constipation or diarrhea.  He reports stable appetite.  Blood glucose trends reviewed. Reports snacking at night around 9 pm: pudding + crackers    Objective   Last Recorded Vitals  Blood pressure 104/69, pulse (!) 111, temperature 36.5 °C (97.7 °F), temperature source Temporal, resp. rate 18, height 1.803 m (5' 10.98\"), weight 94.3 kg (207 lb 14.3 oz), SpO2 93%.  Intake/Output last 3 Shifts:  I/O last 3 completed shifts:  In: 1360 (14.4 mL/kg) [P.O.:1360]  Out: 3750 (39.8 mL/kg) [Urine:3750 (1.1 mL/kg/hr)]  Weight: 94.3 kg   Exam:  General - male patient in NAD  HEENT - AT/NC  Resp - no extra wob noted  Msckl  + edema in B/L LE  Relevant Results  Results from last 7 days   Lab Units 08/06/25  0612 08/05/25 2051 08/05/25 1859 08/05/25  1513 08/05/25  1128 08/05/25  0624 08/04/25 2056 08/04/25  1900 08/03/25  1809 08/03/25  1754 08/02/25  1811 08/02/25  1626 08/01/25 2058 08/01/25  1856   POCT GLUCOSE mg/dL 161* 144*  --  87 142* 163*   < >  --    < >  --    < >  --    < >  --    GLUCOSE mg/dL  --   --  98  --   --   --   --  229*  --  128*  --  252*  --  91    < > = values in this interval not displayed.     BMP:  Results from last 7 days   Lab Units 08/05/25 1859 08/04/25 1900 08/03/25  1754   SODIUM mmol/L 134* 131* 130*   POTASSIUM mmol/L 4.2 4.4 4.4   CHLORIDE mmol/L 96* 94* 95*   CO2 mmol/L 26 26 26   BUN mg/dL 71* 71* 69*   CREATININE mg/dL 2.64* 2.72* 2.78*   GLUCOSE mg/dL 98 229* 128*     Assessment  Felice Law is a 60 y.o. male with significant PMH of ICM, HFrEF, EF 15-20%, CABG (LIMA- LAD in 2023),  PCI, VT arrest s/p VT ablation and ICD in 2017, " Hypertension, Hyperlipidemia, Hypothyroidism, Left femoral DVT on Eliquis, COPD, JAKE, CKD III and Insulin dependent Type 2 diabetes. He was admitted on 7/12 for further HF management and advanced therapy work up.   Endocrine consulted for managing uncontrolled T2DM.      #T2DM  :: Hyperglycemia 2/2 poorly controlled Type 2 diabetes   :: last HbA1c 10.1 (7/17)  :: Home regimen: Insulin galargine 40 units twice a day, insulin lispro 18 units with meals plus sliding scale up to 80 units daily, tirzepatide (Mounjaro) 5 mg/0.5 mL pen injector, Farxiga 10 mg tablet, has freestyle abbi 3  Sitagliptin 50 mg started on 7/31  Reports snacking at night around 9 pm: pudding + crackers    # Hypothyroid   :: Thyroid on home dose > Levothyroxine 75mcg  :: TSH 2.12, T4 9.3, T3 105 (7/23) all fall within reference ranges consistent with euthyroidism.  - continue on same home dose for thyroxine.     Plan  - c/w Insulin glargine to 40 units BID   - Decrease Insulin lispro to 22 units TID with meals (to be given if patient plans to eat >50% of his meals), hold if NPO  - c/w Tablet Januvia (sitagliptin) 50 mg daily  - c/w  sliding scale to SS#2 AC   - Accuchecks TIDAC and at bedtime  - 60 gm carb consistent diet  - Goal -180  - Hypoglycemia bundle    Discharge planning:   [] patient may expect to discharge home on MDI, final doses TBD by titration  []primary team to please order glucometer supplies, insulin and pen needles on day of discharge pending final doses   []follow up with Dr. Salazar, has appointment 9/17/25      Recommendations communicated to primary team. Please reach out incase you have any questions or concerns.    The patient was discussed with attending Dr. Fran Yancey MD  Endocrinology fellow

## 2025-08-06 NOTE — PROGRESS NOTES
Subjective:  Patient seen and assessed this morning, sitting up in chair, NAD. Denies any CP or SOB, remains comfortable on RA. Reports good response to PO diuretics. Eager to get home. Aware of co-pay with home infusion, pt likely unable to afford. SW, pharmacy, and TCC aware, if no further options will have to wean infusion.     utilized for communication.    Overnight Events:  No acute events overnight.     Today's Plan/Updates:   - good response to diuresis yesterday, cont Bumex 2 mg PO BID  - per TCC discussion with pt, Milrinone copay and [facility] nurse visit copay likely cost prohibitive  - per Adv HF team, Dobutamine 5 mcg/kg/min submitted for cost analysis, also cost prohibitive  - Palliative medicine notified, pending further discussion and discharge planning    Objective:  Vitals:    08/06/25 1156   BP: 106/67   Pulse: 108   Resp:    Temp: 36.6 °C (97.9 °F)   SpO2: 95%     Weight         8/3/2025  0600 8/4/2025  0427 8/5/2025  0425 8/6/2025  0537 8/6/2025  1100    Weight: 102 kg (224 lb 6.9 oz) 101 kg (223 lb 8.7 oz) 100 kg (220 lb 10.9 oz) 94.3 kg (207 lb 14.3 oz) 98.7 kg (217 lb 9.5 oz)            Intake/Output Summary (Last 24 hours) at 8/6/2025 1606  Last data filed at 8/6/2025 1000  Gross per 24 hour   Intake 1000 ml   Output 1525 ml   Net -525 ml     Recent Results (from the past 24 hours)   CBC    Collection Time: 08/05/25  6:59 PM   Result Value Ref Range    WBC 6.0 4.4 - 11.3 x10*3/uL    nRBC 0.0 0.0 - 0.0 /100 WBCs    RBC 4.38 (L) 4.50 - 5.90 x10*6/uL    Hemoglobin 10.4 (L) 13.5 - 17.5 g/dL    Hematocrit 35.9 (L) 41.0 - 52.0 %    MCV 82 80 - 100 fL    MCH 23.7 (L) 26.0 - 34.0 pg    MCHC 29.0 (L) 32.0 - 36.0 g/dL    RDW 20.2 (H) 11.5 - 14.5 %    Platelets 358 150 - 450 x10*3/uL   Magnesium    Collection Time: 08/05/25  6:59 PM   Result Value Ref Range    Magnesium 2.35 1.60 - 2.40 mg/dL   Renal function panel    Collection Time: 08/05/25  6:59 PM   Result Value Ref Range    Glucose  98 74 - 99 mg/dL    Sodium 134 (L) 136 - 145 mmol/L    Potassium 4.2 3.5 - 5.3 mmol/L    Chloride 96 (L) 98 - 107 mmol/L    Bicarbonate 26 21 - 32 mmol/L    Anion Gap 16 10 - 20 mmol/L    Urea Nitrogen 71 (H) 6 - 23 mg/dL    Creatinine 2.64 (H) 0.50 - 1.30 mg/dL    eGFR 27 (L) >60 mL/min/1.73m*2    Calcium 9.6 8.6 - 10.6 mg/dL    Phosphorus 4.0 2.5 - 4.9 mg/dL    Albumin 4.0 3.4 - 5.0 g/dL   POCT GLUCOSE    Collection Time: 08/05/25  8:51 PM   Result Value Ref Range    POCT Glucose 144 (H) 74 - 99 mg/dL   POCT GLUCOSE    Collection Time: 08/06/25  6:12 AM   Result Value Ref Range    POCT Glucose 161 (H) 74 - 99 mg/dL   POCT GLUCOSE    Collection Time: 08/06/25 11:59 AM   Result Value Ref Range    POCT Glucose 151 (H) 74 - 99 mg/dL     IR CVC tunneled  Result Date: 8/5/2025  Interpreted By:  Wilian Sahu, STUDY: IR CVC TUNNELED;  8/4/2025 3:00 pm   INDICATION: Signs/Symptoms:dobutamine IV therapy. Needs Massey.   COMPARISON: None.   ACCESSION NUMBER(S): [id number]   ORDERING CLINICIAN: RADHA BREAUX   TECHNIQUE: INTERVENTIONALIST(S): Wilian Sahu MD   CONSENT: The patient/patient's POA/next of kin was informed of the nature of the proposed procedure. The purposes, alternatives, risks, and benefits were explained and discussed. All questions were answered and consent was obtained.   RADIATION EXPOSURE: Fluoroscopy time: 0.7 min   SEDATION: Moderate conscious IV sedation services (supervision of administration, induction, and maintenance) were provided by the physician performing the procedure with intravenous fentanyl 50 mcgmcg and versed 1mg. The physician was assisted by an independent trained observer, an interventional radiology nurse, in the continuous monitoring of patient level of consciousness and physiologic status.   MEDICATION: None.   TIME OUT: A time out was performed immediately prior to procedure start with the interventional team, correctly identifying the patient name, date of birth, MRN,  procedure, anatomy (including marking of site and side), patient position, procedure consent form, relevant laboratory and imaging test results, antibiotic administration, safety precautions, and procedure-specific equipment needs.   COMPLICATIONS: No immediate adverse events identified.   FINDINGS: In the recumbent position, the patient was positioned on the angiography table. The right supraclavicular and infraclavicular cutaneous tissues were prepared and draped in usual sterile manner.   The supraclavicular access site was screened with gray-scale ultrasound with subsequent subcutaneous instillation of Lidocaine 1% local anesthesia. Ultrasound images demonstrate a patent right internal jugular vein. Under direct ultrasound guidance and Seldinger/micropuncture technique, the right internal jugular vein was accessed. An ultrasound digital spot image was acquired and stored on the  PACS.   After confirmation of location, a 018 Newman-Mandril guidewire was inserted to secure location. The guidewire was advanced into the inferior vena cava utilizing intermittent fluoroscopy. The micro-access needle was removed over the guidewire. Utilizing a 5-on-4 coaxial dilator sheath system, upsize to a 035 guidewire was performed. Subsequent access tract dilation was performed to an eventual 10-Iranian peel-away sheath dilator system.   After Lidocaine 1% local anesthesia, a subcutaneous tunnel tract was created from the  right infraclavicular chest to the venous access site. After continuity of the tunnel tract and venous access site was obtained, a 9.6 Iranian single lumen Massey catheter was then placed with tract continuity and its central catheter tip(s) to reside at the cavoatrial junction. A fluoroscopic spot image of the chest was acquired in the AP projection to confirm optimal location.   The catheter ports were aspirated and flushed without resistance with normal saline. The catheter ports were then charged with  high-concentration heparin. The venous access site was closed and sterilely dressed. The external portions of the catheter were secured with a purse-string 2-0 polypropylene suture and sterile dressings.   The patient tolerated the procedure without complication.       1. Uncomplicated placement of an indwelling right internal jugular infraclavicular 9.6 Fr single lumen Massey catheter. The catheter is ready for use.   I was present for and/or performed the critical portions of the procedure and immediately available throughout the entire procedure.   I personally reviewed the image(s) / study and resident interpretation. I agree with the findings as stated.   Performed and dictated at [facility].   MACRO: None.   Signed by: Wilian Sahu 8/5/2025 6:23 AM Dictation workstation:   [id number]    MR cardiac morphology and function w and wo IV contrast  Result Date: 7/30/2025  Interpreted By:  Leonardo Nathan and Amoah Joseph STUDY: MR CARDIAC MORPHOLOGY AND FUNCTION W AND WO IV CONTRAST;  7/30/2025 12:20 pm   INDICATION: Signs/Symptoms:Viability assessment in ischemic cardiomyopathy.   COMPARISON: Echocardiogram done on 05/05/2025.   ACCESSION NUMBER(S): [id number]   ORDERING CLINICIAN: RADHA BREAUX   TECHNIQUE: Siemens 1.5  Effie MRI scanner. Turbo spin echo and balanced steady state free precession (bSSFP) imaging for anatomic definition. Dynamic cine bSSFP for cardiac chamber and wall-motion analysis, and valvular analysis. Flow quantification sequences for hemodynamics. Delayed gadolinium enhancement analysis after injection of gadolinium-chelate (mL of Dotarem, 0.2 mmol/kg).   HT- ; WT-; BSA-  m2   FINDINGS: CARDIAC CHAMBERS: Normal atrioventricular and ventriculoarterial concordance   LEFT ATRIUM: Moderately dilated (NIKKI - 67.94 ml/m2).   RIGHT ATRIUM: Moderately dilated (RA area max 4ch - 27.40 cm2)   INTERATRIAL SEPTUM: Intact.   LEFT VENTRICLE: 1. Severely  dilated LV size (EDVi 156 ml/m2) and severely reduced systolic function. Quantitative LVEF 17 %.. 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 2. Multiple regional wall motion abnormalities are noted. 3. Normal LV wall thickness and normal LV indexed mass (LVMi 92 g/m2). 4. T2 mapping was not performed. 5. ECV unable to be calculated as T1 mapping was not performed. 6. No evidence of LV thrombus. 7. Delayed-enhancement imaging was performed. There is transmural infarct involving the mid anteroseptum, apical septum, apical inferior and apex. Subendocardial infarct involving the mid inferolateral/inferior wall and apicolateral wall. In addition there is mid myocardial enhancement of the basal septum.   Quantitative left ventricular functional values are as follows: EDV = 350.02 cc; EDVi = 156.40 cc/m2 ESV = 289.00 cc; ESVi = 129.14 cc/m2 Absolute Cardiac Output = 6.96 l/min.; COi = 3.11 l/min/m2 LV mass = 206.94 gm; LVMi = 92.47 gm/m2 Stroke volume = 61.01 cc; SVi = 129.14 cc/m2 LVEF = 17 %   LV septal wall thickness (anterobasal): 0.6 cm LV postero-inferior wall thickness: 0.9 cm LVEDD: 7.5 cm LVESD: 6 cm   RIGHT VENTRICLE: 1. Normal RV size (EDVi 86 ml/m2) and severely reduced systolic function. Quantitative RVEF 27 %. 2. No regional wall motion abnormalities. 3. No abnormal delayed enhancement in the myocardium.   Quantitative right ventricular functional values are as follows: RVEDV = 192.98 ml; RVEDVi = 86.23 ml/m2 RVESV = 141.30 ml; RVESVi = 63.14 ml/m2 RVSV = 51.69 ml; RVSVi = 23.10 ml/m2 RVEF = 27.00 % RVCO = 5.89 l/min; RVCI = 2.63 l/min/m2   INTERVENTRICULAR SEPTUM: Intact.   AORTIC VALVE: The aortic valve is trileaflet. There is quantitatively trivial aortic regurgitation. Flow quantification through the ascending aorta: Forward volume = 46.97 cc/beat Reverse volume = -2.25 cc/beat Net forward volume = 44.72 cc/beat Aortic regurgitant fraction = 5 %   MITRAL VALVE: The mitral valve leaflets appear normal. There  is MILD-TO-MODERATE mitral regurgitation. Integrating LV volumetric and aortic flow quantification data reveals: Quantitative mitral regurgitant volume = 14 cc/beat Quantitative mitral regurgitant fraction = 23 %   TRICUSPID VALVE: There is qualitatively trivial tricuspid regurgitation.   PULMONARY VALVE: Not assessed.   PERICARDIUM: The pericardium is normal. There is no pericardial effusion.   THORACIC AORTA: The thoracic aorta appears normal in course and contour. The aortic root is normal in size. There is no evidence for acute aortic pathology. The arch vessel branching pattern is  normal.   All the arch branch vessels appear widely patent in their proximal portions.   AORTIC ROOT DIMENSIONS: Annulus: 2.6 cm Aortic root(sinus of valsalva): 3 cm Sinotubular junction: 3 cm   PULMONARY ARTERIES: The central pulmonary arteries appear normal (MPA-2.5 cm, RPA-1.8 cm, LPA-2.3 cm).   SYSTEMIC AND PULMONARY VEINS: Normal systemic venous and pulmonary venous return. The SVC is of normal caliber. IVC appears normal Normal pulmonary venous anatomy.   CHEST: The chest wall is normal. Limited imaging through the lungs reveals no gross abnormalities. Small right pleural effusion.   UPPER ABDOMEN: Limited imaging through the upper abdomen reveals no abnormalities of the visualized organs.       1. Mixed cardiomyopathy. Technically limited study due to ICD. Within limitation: Severely dilated LV size (EDVi 156 ml/m2) and severely reduced systolic function. Quantitative LVEF 17 % 2. 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 3. Moderate biatrial dilatation. 4. Mild-to-moderate mitral valve regurgitation. RF 23% 5. Transmural infarction (%) involves the mid anteroseptum, apical septum, apical inferior wall, and apex, all demonstrating nonviable myocardium. Subendocardial infarction (25-50%) affects the mid inferior and apical lateral wall, while subendocardial infarction (<25%) involves the mid inferolateral wall -  these segments remain viable. Additionally, mid-myocardial enhancement in the basal septum represents a nonischemic, nonspecific pattern 6. Normal RV size (EDVi 86 ml/m2) and severely reduced systolic function. Quantitative RVEF 27 %. 7. Small right pleural effusion.   This cardiac MRI demonstrates mixed dilated cardiomyopathy with both ischemic and nonischemic components. Nonviable myocardium is present in the mid anteroseptum, apical septum, apical inferior wall, and apex. All remaining myocardial segments demonstrate preserved viability   MACRO: None   Signed by: Leonardo Nathan 7/30/2025 4:47 PM Dictation workstation:   [id number]    XR chest 1 view  Result Date: 7/27/2025  Interpreted By:  Ke Arechiga, STUDY: XR CHEST 1 VIEW; 7/27/2025 8:22 am   INDICATION: Signs/Symptoms:swan.   COMPARISON: Radiograph dated 07/25/2025   ACCESSION NUMBER(S): [id number]   ORDERING CLINICIAN: YURI FARRELL   FINDINGS: Right IJ [place]-Talita catheter is in place with the tip projecting over distal right main pulmonary artery, retracted from prior study. Left subclavian approach pacer/defibrillator is unchanged in positioning.   The cardiac silhouette size is enlarged, unchanged. Status post median sternotomy.   Mild perihilar congestion. There is no focal consolidation, or pneumothorax. No sizeable pleural effusion. No acute osseous abnormality.       1. Unchanged mild perihilar congestion. Unchanged cardiomegaly. 2. No focal infiltrate, sizeable pleural effusion or       Signed by: Ke Lau 7/27/2025 12:25 PM Dictation workstation:   [id number]    US abdomen complete  Result Date: 7/26/2025  Interpreted By:  Curry Pedraza and Mercado Amiel STUDY: US ABDOMEN COMPLETE;  7/25/2025 11:57 am   INDICATION: Signs/Symptoms:OHT/VAD workup.     COMPARISON: None.   ACCESSION NUMBER(S): [id number]   ORDERING CLINICIAN: YONATHAN WALLS   TECHNIQUE: Multiple images of the abdomen were obtained.   FINDINGS: LIVER: The  liver measures 21.3 cm and is grossly unremarkable and free of any focal lesions.     GALLBLADDER: The gallbladder is nondistended and contains multiple layering echogenic gallstones (which measure up to 1.8 cm) with associated posterior acoustic shadowing. There is also mild pericholecystic fluid, likely related to patient's fluid status. The gallbladder wall thickness is 0.2 cm. Sonographic Zimmerman's sign is negative.     BILE DUCTS: No evidence of intra or extrahepatic biliary dilatation is identified; the common bile duct measures 0.3 cm.   PANCREAS: The pancreas is poorly visualized due to overlying bowel gas.   RIGHT KIDNEY: The right kidney measures 10.4 cm in length. The renal cortical echogenicity and thickness are within normal limit.  No hydronephrosis or renal calculi are seen.   LEFT KIDNEY: The left kidney measures 11.6 cm in length. The renal cortical echogenicity and thickness are within normal limits. No hydronephrosis or renal calculi are seen.     SPLEEN: The spleen is borderline enlarged measuring 12.4 cm and is grossly unremarkable.   URINARY BLADDER: The urinary bladder is within normal limits.   PERITONEUM: There is no free or loculated fluid seen in the abdomen.   ABDOMINAL AORTA AND IVC: The visualized portions of the aorta and IVC are unremarkable.       1. Cholelithiasis without evidence of cholecystitis. Mild pericholecystic fluid is likely related to patient's fluid status. 2. Hepatomegaly without sonographic evidence of lesions.   I have reviewed the images/study and I agree with the findings as stated by Rohan Greene MD (PGY-3).   MACRO: None   Signed by: Curry Pedraza 7/26/2025 6:05 PM Dictation workstation:   [id number]    XR chest 1 view  Result Date: 7/25/2025  Interpreted By:  Allyssa Pedraza and Krasnoschlik Nicholas STUDY: XR CHEST 1 VIEW;  7/25/2025 8:46 am   INDICATION: Signs/Symptoms:PA catheter / Pulmonary congestion.     COMPARISON: Chest radiograph 07/24/2025   ACCESSION  NUMBER(S): [id number]   ORDERING CLINICIAN: MATTIE NOEL   FINDINGS: AP radiograph of the chest was provided.   Medical devices: Right IJ [place]-Talita catheter with distal tip projecting over the right lower lobe segmental branches. Left chest wall pacemaker/defibrillator with leads in similar position to prior. Patient is status post median sternotomy.   CARDIOMEDIASTINAL SILHOUETTE: Cardiomediastinal silhouette is stable in size and configuration.   LUNGS: Similar bilateral interstitial markings. Similar bibasilar hazy opacities. No pneumothorax.   ABDOMEN: No remarkable upper abdominal findings.   BONES: No acute osseous changes.       1.  Right IJ [place]-Talita catheter with distal tip projecting over the right lower lobe segmental branches. Repositioning recommended. 2. Similar pulmonary edema. Correlate with volume status. 3. Similar bibasilar hazy opacities may represent atelectasis/edema. Infectious etiology can not be excluded. 4. Additional medical devices as above.   I personally reviewed the images/study and I agree with the findings as stated by resident physician Cam Sommers MD. This study was interpreted at [facility], [place], [place].   MACRO: None   Signed by: Allyssa Pedraza 7/25/2025 2:52 PM Dictation workstation:   [id number]    Vascular US Ankle Brachial Index (MARK) Without Exercise  Result Date: 7/25/2025            [place] [address]   Tel [phone] and Fax [phone]  Vascular Lab Report [facility] US ANKLE BRACHIAL INDEX (MARK) WITHOUT EXERCISE  Patient Name:     NEGRITA BERNARD     Reading           [id number] Marquita LOO                 Physician:        MD Study Date:       7/23/2025            Ordering          [id number] YONATHAN WALLS                                        Physician: MRN/PID:          [id number]             Technologist:     Rajani JAFFE Accession#:        [id number]         Technologist 2: Date of           1965 / 60 years  Encounter#:       [id number] Birth/Age: Gender:           M Admission Status: Inpatient            Location          [facility]                                        Performed:  Diagnosis/ICD: Encounter for preprocedural cardiovascular examination-Z01.810 Indication:    OHT/LVAD CPT Codes:     40960 Peripheral artery MARK Only  Patient History Anticoagulation, HTN and Hyperlipidemia. 10-9-2023 MARK's no                 evidence of arterial disease in BLEA.  CONCLUSIONS: Right Lower PVR: Evidence of mild arterial occlusive disease in the right lower extremity at rest. Right pressures of >220 mmHg suggest no compressibility of vessels and may make absolute Segmental Limb Pressures (SLP) unreliable. Decreased digital perfusion noted. Multiphasic flow is noted in the right common femoral artery, right posterior tibial artery and right dorsalis pedis artery. Level of disease called by waveforms. Left Lower PVR: Evidence of mild arterial occlusive disease in the left lower extremity at rest. Left pressures of >220 mmHg suggest no compressibility of vessels and may make absolute Segmental Limb Pressures (SLP) unreliable. Decreased digital perfusion noted. Multiphasic flow is noted in the left posterior tibial artery, left dorsalis pedis artery and left common femoral artery. Level of disease called by waveforms.  Comparison: Compared with study from 10/9/2023, no significant change.Today's exam there is mild arterial disease in bilateral lower extremities.  Imaging & Doppler Findings:  RIGHT Lower PVR                Pressures Ratios Right Posterior Tibial (Ankle) 220 mmHg  2.22 Right Dorsalis Pedis (Ankle)   204 mmHg  2.06 Right Digit (Great Toe)        58 mmHg   0.59   LEFT Lower PVR                Pressures Ratios Left Posterior Tibial (Ankle) 220 mmHg  2.22 Left Dorsalis Pedis (Ankle)   220 mmHg  2.22 Left Digit (Great Toe)        71  mmHg   0.72                     Right Brachial Pressure 99 mmHg   [id number] Marquita Brown MD Electronically signed by [id number] Marquita Brown MD on 7/25/2025 at 1:49:42 PM  ** Final **     Vascular US aorta iliac duplex limited  Result Date: 7/25/2025            [place] [address]   Tel [phone] and Fax [phone]  Vascular Lab Report VASC US AORTA ILIAC DUPLEX LIMITED  Patient Name:     NEGRITA BERNARD     Reading           [id number] Marquita LOO                 Physician:        MD Study Date:       7/23/2025            Ordering          [id number] YONATHAN WALLS                                        Physician: MRN/PID:          [id number]             Technologist:     Rajani JAFFE Accession#:       [id number]         Technologist 2: Date of           1965 / 60 years  Encounter#:       [id number] Birth/Age: Gender:           M Admission Status: Inpatient            Location          [facility]                                        Performed:  Diagnosis/ICD: Encounter for preprocedural cardiovascular examination-Z01.810 Indication:    Pre-Op OHT/LVAD CPT Codes:     69176 Duplex Aorta/IVC/Iliac/Bypass Graft  Patient History CAD, HTN and Hyperlipidemia. 10- No AAA.  CONCLUSIONS: Aorta/Common Iliac Arteries/IVC: The abdominal aorta and bilateral common iliac arteries demonstrate no evidence of aneurysm.  Comparison: Compared with study from 10/10/2023, no significant change.  Imaging & Doppler Findings:   AORTA     AP    Lateral    PSV Proximal 2.14 cm 1.96 cm 95.0 cm/s   Mid    1.86 cm 1.89 cm 72.0 cm/s  Distal  1.61 cm 1.70 cm 52.0 cm/s    RIGHT       AP    Lateral    PSV GIO Proximal 1.29 cm 1.10 cm 70.00 cm/s     LEFT       AP    Lateral    PSV GIO Proximal 1.01 cm 1.01 cm 70.30 cm/s  [id number] Marquita Brown MD Electronically signed by [id number] Marquita Brown MD on 7/25/2025 at 1:30:07 PM  ** Final **     CT chest abdomen  pelvis wo IV contrast  Result Date: 7/25/2025  Interpreted By:  Volodymyr Pettit, STUDY: CT CHEST ABDOMEN PELVIS WO CONTRAST;  7/23/2025 4:31 pm   INDICATION: Signs/Symptoms:OHT/VAD workup.   COMPARISON: CT chest 10/06/2023. CT abdomen and pelvis 10/10/2023.   ACCESSION NUMBER(S): [id number]   ORDERING CLINICIAN: YONATHAN WALLS   TECHNIQUE: CT of the chest, abdomen and pelvis was performed. Contiguous axial images were obtained at 3 mm slice thickness through the chest, abdomen and pelvis. Coronal and sagittal reconstructions at 3 mm slice thickness were performed.  No intravenous or oral contrast agents were administered.   FINDINGS: Please note that the study is limited without intravenous contrast.   CHEST:     LUNG/PLEURA/LARGE AIRWAYS: Linear atelectasis noted in the right upper lobe and bilateral lower lobes. No pulmonary masses or consolidation. Mild diffuse interlobular septal thickening is noted in both lungs likely related to pulmonary edema. No pleural effusion or pneumothorax. Trachea and right and left main bronchi are patent.   VESSELS: Aorta and main pulmonary artery are normal in caliber. A J[place]ph[place]-Talita catheter is seen with its tip in the right pulmonary artery. Mild atherosclerotic changes are noted of the aorta and branching vessels. Severe coronary artery calcifications are present.   HEART: Heart is mildly enlarged. Left-sided pacemaker leads are seen terminating in the right cardiac chamber. No pericardial effusion.   MEDIASTINUM AND SHIVANI: No significant lymphadenopathy in the chest. Esophagus is within normal limits.   CHEST WALL AND LOWER NECK: No soft tissue masses in the chest wall. Visualized thyroid gland is within normal limits.   ABDOMEN:   LIVER: Liver is enlarged measuring 22 cm craniocaudally. No focal liver lesion within the limits of noncontrast CT.   BILE DUCTS: No intra or extrahepatic bile duct dilatation.   GALLBLADDER: Gallbladder is partially distended with numerous  gallstones. There is a small amount of pericholecystic fluid.   PANCREAS: The pancreas appears unremarkable.   SPLEEN: Spleen is mildly enlarged measuring 14 cm anterior posteriorly.   ADRENAL GLANDS: No adrenal nodule or thickening.   KIDNEYS AND URETERS: Bilateral kidneys are symmetric in size. No hydroureteronephrosis. There is a punctate 5 mm calculus in the lower pole of the left kidney.   PELVIS:   BLADDER: Urinary bladder is unremarkable.   REPRODUCTIVE ORGANS: Prostate gland is within normal limits.   BOWEL: Stomach and duodenum are within normal limits. Small and large bowel loops are normal in caliber. Large amount of colonic stool burden is seen. The appendix is not definitely visualized. There is however no pericecal stranding or fluid.   VESSELS: Severe atherosclerotic changes noted in the abdominal aorta. No aortic aneurysm. IVC is within normal limits.   PERITONEUM/RETROPERITONEUM/LYMPH NODES: No ascites or fluid collection in the abdomen and pelvis. No retroperitoneal mass. There are few scattered subcentimeter in the retroperitoneum which are nonenlarged by size criteria.   ABDOMINAL WALL: No soft tissue masses in the abdominal wall. Mild subcutaneous stranding noted in the left anterior abdominal wall likely inflammation.   BONES: No suspicious osseous lesions are present. Degenerative discogenic disease is noted in the lower thoracic and lumbar spine.       Chest 1.  Mild diffuse interlobular interstitial thickening in both lungs likely representing pulmonary edema. 2. Cardiomegaly. 3. Support devices as described above.   Abdomen-Pelvis 1.  No acute process in the abdomen and pelvis. 2. Hepatosplenomegaly. 3. Cholelithiasis. Mild pericholecystic fluid is likely related to fluid overload. 4. Left renal calculus without hydronephrosis.     MACRO: None   Signed by: Volodymyr Pettit 7/25/2025 9:39 AM Dictation workstation:   [id number]    CT head wo IV contrast  Result Date:  7/25/2025  Interpreted By:  Kizzy Ambrocio and Liu Scott STUDY: CT HEAD WO IV CONTRAST;  7/24/2025 7:07 pm   INDICATION: Signs/Symptoms:change in neuro status.     COMPARISON: CT facial bones 07/23/2025   ACCESSION NUMBER(S): [id number]   ORDERING CLINICIAN: MATTIE NOEL   TECHNIQUE: Noncontrast axial CT scan of head was performed. Angled reformats in brain and bone windows were generated. The images were reviewed in bone, brain, blood and soft tissue windows.   FINDINGS: CSF Spaces: The ventricles, sulci and basal cisterns are within normal limits. There is no extraaxial fluid collection.   Parenchyma: Mild patchy white matter hypodensity is nonspecific and compatible with microangiopathy. The grey-white differentiation is intact. There is no mass effect or midline shift.  There is no intracranial hemorrhage.   Calvarium: The calvarium is unremarkable.   Paranasal sinuses and mastoids: Visualized paranasal sinuses and mastoids are clear. Prior lens surgery on the left.       No acute intracranial hemorrhage or mass effect.   Mild degree of nonspecific white matter hypodensity compatible with microangiopathy.   I personally reviewed the images/study and I agree with the findings as stated by resident physician Miky Gtz MD. This study was interpreted at [facility], [place], [place].   MACRO: None   Signed by: Kizzy Ambrocio 7/25/2025 7:05 AM Dictation workstation:   [id number]    CT maxillofacial bones wo IV contrast  Result Date: 7/24/2025  Interpreted By:  Shanell Rodriguez, STUDY: CT FACIAL BONES WO IV CONTRAST   INDICATION: Signs/Symptoms:OHT/VAD workup   COMPARISON: None.   ACCESSION NUMBER(S): [id number]   ORDERING CLINICIAN: YONATHAN WALLS   TECHNIQUE: CT of the face was performed without the administration of intravenous contrast.   FINDINGS: SKIN AND SUBCUTANEOUS SOFT TISSUES: No asymmetrical soft tissue swelling or hematoma.   OSSEOUS STRUCTURES: No fracture, dislocation or  destructive lesion.   ORBITS: The globes, retrobulbar fat, extraocular muscles, and optic nerve sheath complexes are intact and normal in morphology.   PARANASAL SINUSES: Visualized paranasal sinuses and mastoid air cells are clear.   OTHER: None.       No acute abnormality of the facial bones.   I have reviewed the images/study and I agree with the findings as stated.   MACRO: None   Signed by: Shanell Rodriguez 7/24/2025 9:33 AM Dictation workstation:   [id number]    XR chest 1 view  Result Date: 7/24/2025  Interpreted By:  Ke Arechiga, STUDY: XR CHEST 1 VIEW; 7/24/2025 7:40 am   INDICATION: Signs/Symptoms:ADHF.   COMPARISON: 07/23/2025   ACCESSION NUMBER(S): [id number]   ORDERING CLINICIAN: CHAYO JOHNSON   FINDINGS: Right IJ [place]-Talita catheter is in place with the tip projecting over distal right main pulmonary artery.   Left subclavian approach pacer/defibrillator is unchanged in positioning.   The cardiac silhouette size is unchanged. Status post median sternotomy.   Low lung volumes and bronchovascular crowding. Mild perihilar congestion. No sizable pleural effusion or pneumothorax.   No acute osseous abnormality.       1. Unchanged appearance of the lungs with mild perihilar congestion/edema and no focal infiltrate. No sizable pneumothorax. 2. Medical devices and postsurgical changes as described above.       Signed by: Ke Lau 7/24/2025 8:32 AM Dictation workstation:   [id number]    Vascular US carotid artery duplex bilateral  Result Date: 7/23/2025            [place] [address]   Tel [phone] and Fax [phone]  Vascular Lab Report VASC US CAROTID ARTERY DUPLEX BILATERAL  Patient Name:     NEGRITA BERNARD     Reading           [id number] Marquita LOO                 Physician:        MD Study Date:       7/23/2025            Ordering          [id number] YONATHAN WALLS                                        Physician:  MRN/PID:          [id number]             Technologist:     Rajani JAFFE Accession#:       [id number]         Technologist 2: Date of           1965 / 60 years  Encounter#:       [id number] Birth/Age: Gender:           M Admission Status: Inpatient            Location          [facility]                                        Performed:  Diagnosis/ICD: Encounter for preprocedural cardiovascular examination-Z01.810 Indication:    Pre-Op OHT/LVAD CPT Codes:     37561 Cerebrovascular Carotid Duplex scan complete  Patient History CAD, HTN and Hyperlipidemia. 10-9-2023 Both ICA <50% stenosis.  CONCLUSIONS: Left Carotid: Findings are consistent with less than 50% stenosis of the left proximal internal carotid artery. Left external carotid artery appears patent with no evidence of stenosis. The left vertebral artery is patent with antegrade flow. No evidence of hemodynamically significant stenosis in the left subclavian artery.  Comparison: Compared with study from 10/9/2023, no significant change.The right CCA and ICA not scaned due to IV lines in right side of neck.  Additional Findings: Unable to scan right CCA and ICA due to IV lines in right side of neck.  Imaging & Doppler Findings: Right Plaque Morph: The proximal right internal carotid artery demonstrates heterogenous plaque. The proximal right common carotid artery demonstrates heterogenous and calcified plaque. The mid right common carotid artery demonstrates heterogenous and calcified plaque. The distal right common carotid artery demonstrates heterogenous and calcified plaque.  Right                  Left  PSV  EDV              PSV      EDV             CCA P    79 cm/s             CCA D    51 cm/s             ICA P    52 cm/s  13 cm/s             ICA M    57 cm/s  17 cm/s             ICA D    64 cm/s  22 cm/s              ECA     107 cm/s           Vertebral  28 cm/s  7 cm/s           Subclavian 94 cm/s               Left ICA/CCA Ratio 1.0    [id number] Marquita Brown MD Electronically signed by [id number] Marquita Brown MD on 7/23/2025 at 6:27:44 PM  ** Final **     XR chest 2 views  Result Date: 7/23/2025  Interpreted By:  Allyssa Pedraza and Krasnoschlik Nicholas STUDY: XR CHEST 2 VIEWS;  7/23/2025 4:38 pm   INDICATION: Signs/Symptoms:OHT/VAD workup.     COMPARISON: Chest radiograph 07/23/2025   ACCESSION NUMBER(S): [id number]   ORDERING CLINICIAN: YONATHAN WALLS   FINDINGS: PA and lateral radiographs of the chest were provided.  Additional PA dual energy images were also provided.   Medical devices: Left chest wall pacemaker defibrillator with leads in similar position to prior. Similar positioning of a of a right IJ [place]-Talita catheter with tip projecting over the right lower lobe segmental branches. Patient is status post median sternotomy.   CARDIOMEDIASTINAL SILHOUETTE: Cardiomediastinal silhouette is stable in size and configuration.   LUNGS: Mild interstitial markings with perihilar prominence. No pneumothorax.   ABDOMEN: No remarkable upper abdominal findings.   BONES: No acute osseous changes.       1.  Interval retraction of a right IJ [place]-Talita catheter with tip now projecting over the right lower lobe segmental branches. 2. Mild pulmonary edema. 3. Additional medical devices as above.   I personally reviewed the images/study and I agree with the findings as stated by resident physician Cam Sommers MD. This study was interpreted at [facility], [place], [place].   MACRO: None   Signed by: Allyssa Pedraza 7/23/2025 4:45 PM Dictation workstation:   [id number]    XR chest 1 view  Result Date: 7/23/2025  Interpreted By:  Allyssa Pedraza, STUDY: XR CHEST 1 VIEW;  7/23/2025 8:14 am   INDICATION: Signs/Symptoms:SGC am rounds.   COMPARISON: 07/22/2025.   ACCESSION NUMBER(S): [id number]   ORDERING CLINICIAN: MEL WATKINS       Minimal improvement of aeration of the both lungs. Stable AICD, placement. [place]-Talita  catheter with the tip overlies the right lower lobe pulmonary artery branch, unchanged. Cardiac silhouette is mildly enlarged. Pulmonary vessels are congested Mild pulmonary edema/fluid overload of the both lungs. Slightly improved since last exam. Small bilateral pleural effusion. No pneumothorax seen. Bony thorax unremarkable.   MACRO: None   Signed by: Allyssa Pedraza 7/23/2025 12:21 PM Dictation workstation:   [id number]    XR chest 1 view  Result Date: 7/22/2025  Interpreted By:  Allyssa Pedraza and Krasnoschlik Nicholas STUDY: XR CHEST 1 VIEW;  7/22/2025 7:31 am   INDICATION: Signs/Symptoms:adhf.     COMPARISON: Chest radiograph 07/21/2025   ACCESSION NUMBER(S): [id number]   ORDERING CLINICIAN: YONATHAN WALLS   FINDINGS: AP radiograph of the chest was provided.   Medical devices: Right IJ [place]-Talita catheter with distal tip projecting over the proximal right interlobar artery. Left chest wall implanted pacemaker/defibrillator with leads in similar position to prior. Patient is status post median sternotomy.   CARDIOMEDIASTINAL SILHOUETTE: Cardiomediastinal silhouette is stable in size and configuration.   LUNGS: Similar appearance of bilateral interstitial and airspace opacities. There is fluid within the minor fissure. No pneumothorax.   ABDOMEN: No remarkable upper abdominal findings.   BONES: No acute osseous changes.       1.  New fluid visualized within the minor fissure and similar appearance of bilateral interstitial and airspace opacities, favored to represent pulmonary edema. Additional considerations include ARDS and pulmonary hemorrhage. 2. Medical devices as above.   I personally reviewed the images/study and I agree with the findings as stated by resident physician Cam Sommers MD. This study was interpreted at [place], [place].   MACRO: None   Signed by: Allyssa Pedraza 7/22/2025 2:14 PM Dictation workstation:   [id number]    XR chest 1  view  Result Date: 7/21/2025  Interpreted By:  Allyssa Pedraza and Krasnoschlik Nicholas STUDY: XR CHEST 1 VIEW;  7/21/2025 7:46 am   INDICATION: Signs/Symptoms:SGC Placement.     COMPARISON: Chest radiograph 07/20/2025   ACCESSION NUMBER(S): [id number]   ORDERING CLINICIAN: KORINA BERKOWITZ   FINDINGS: AP radiograph of the chest was provided.   Medical devices: Right IJ [place]-Talita catheter with distal tip projecting over the distal right interlobar artery. Left chest wall pacemaker/defibrillator with leads in similar position to prior. Patient is status post median sternotomy.   CARDIOMEDIASTINAL SILHOUETTE: Cardiomediastinal silhouette is enlarged and stable in size and configuration.   LUNGS: Low lung volumes with bronchovascular crowding. Similar bilateral interstitial markings. Trace blunting of the bilateral costophrenic angles. No pneumothorax.   ABDOMEN: No remarkable upper abdominal findings.   BONES: No acute osseous changes.       1. Right IJ [place]-Talita catheter with distal tip projecting over the distal right interlobar artery. 2. Similar interstitial edema and trace bilateral pleural effusions. 3. Additional medical devices as above.   I personally reviewed the images/study and I agree with the findings as stated by resident physician Cam Sommers MD. This study was interpreted at [facility], [place], [place].   MACRO: None   Signed by: Allyssa Pedraza 7/21/2025 11:54 AM Dictation workstation:   [id number]    XR chest 1 view  Result Date: 7/20/2025  Interpreted By:  Claudio Yancey, STUDY: XR CHEST 1 VIEW;  7/20/2025 8:38 am   INDICATION: Signs/Symptoms:ADHF.   COMPARISON: Chest radiograph 07/19/2025 and abdominopelvic CT scan 10/10/2023.   ACCESSION NUMBER(S): [id number]   ORDERING CLINICIAN: CHAYO JOHNSON   FINDINGS: AP radiograph of the chest. Patient is now rotated towards right, somewhat limiting evaluation and comparison. Right IJ approach [place]-Talita catheter has  been slightly advanced, the tip of which now overlies right lower lobe pulmonary artery, approximately 2 cm distal to right hilum. Repositioning recommended Stable positioning of left subclavian approach dual chamber cardiac pacemaker/AICD. Status post prior median sternotomy with intact sternal cerclage wires.   CARDIOMEDIASTINAL SILHOUETTE: Cardiomediastinal silhouette is persistently enlarged, grossly stable in size and configuration.   LUNGS: Low lung volumes with crowding of bronchovascular markings. Similar mild bilateral interstitial prominence. Trace blunting of bilateral costophrenic angles. Overall lung aeration is grossly unchanged from prior. No pneumothorax.   ABDOMEN: No remarkable upper abdominal findings.   BONES: No acute osseous changes.       1. Right IJ approach [place]-Talita catheter has been slightly advanced, the tip of which now overlies right lower lobe pulmonary artery, approximately 2 cm distal to right hilum. Repositioning recommended. 2. No significant change in lung aeration with questionable interstitial edema and trace bilateral pleural effusions/bibasilar atelectasis. 3. Rest of medical devices as above.   Signed by: Claudio Yancey 7/20/2025 11:03 AM Dictation workstation:   [id number]    XR chest 1 view  Result Date: 7/19/2025  Interpreted By:  Claudio Yancey  and Ulisses Bolaños STUDY: XR CHEST 1 VIEW;  7/19/2025 1:41 pm   INDICATION: Signs/Symptoms:New SG position.   COMPARISON: Chest radiograph 07/17/2025 and abdominopelvic CT scan 10/10/2023.   ACCESSION NUMBER(S): [id number]   ORDERING CLINICIAN: CHAYO JOHNSON   FINDINGS: AP radiograph of the chest was provided.   Interval insertion of right IJ approach [place]-Talita catheter, the tip of which overlies right interlobar pulmonary artery, at the level of right hilum. Stable positioning of left subclavian approach dual chamber cardiac pacemaker/AICD. Status post prior median sternotomy with intact sternal cerclage wires.   CARDIOMEDIASTINAL  SILHOUETTE: Cardiomediastinal silhouette is stable in size and configuration, mildly enlarged.   LUNGS: Low lung volumes with crowding of bronchovascular markings. Mildly prominent interstitial opacities.. Trace blunting of bilateral costophrenic angles. Overall lung aeration is grossly unchanged from prior. No pneumothorax.   ABDOMEN: No remarkable upper abdominal findings.   BONES: No acute osseous changes.       1. Medical devices as above. Of note, new right IJ approach [place]-Talita catheter with its tip projecting over right interlobar pulmonary artery, at the level of right hilum. 2. No significant change in mild interstitial edema and suspected trace bilateral pleural effusions/bibasilar atelectasis.   I personally reviewed the images/study and I agree with the findings as stated by resident physician Miky Gtz MD. This study was interpreted at [facility], [place], [place].   MACRO: None   Signed by: Claudio Yancey 7/19/2025 4:00 PM Dictation workstation:   [id number]    XR chest 1 view  Result Date: 7/17/2025  Interpreted By:  Kristi Buenrostro and Krasnoschlik Nicholas STUDY: XR CHEST 1 VIEW;  7/17/2025 11:40 am   INDICATION: Signs/Symptoms:ADHF.     COMPARISON: Chest radiograph 07/12/2025   ACCESSION NUMBER(S): [id number]   ORDERING CLINICIAN: CHAYO JOHNSON   FINDINGS: AP radiograph of the chest was provided.   Medical devices: Left chest wall implanted pacemaker/defibrillator with leads in similar position. Patient is status post median sternotomy.   CARDIOMEDIASTINAL SILHOUETTE: Cardiomediastinal silhouette is stable in size and configuration.   LUNGS: Similar mild interstitial edema. Trace blunting of the bilateral costophrenic angles. No new focal consolidation or pneumothorax..   ABDOMEN: No remarkable upper abdominal findings.   BONES: No acute osseous changes.       1.  Similar mild interstitial edema. No new focal consolidation or pneumothorax. 2. Trace bilateral pleural  effusions. 3. Medical devices as above.   I personally reviewed the images/study and I agree with the findings as stated by resident physician Cam Sommers MD. This study was interpreted at [facility], [place], [place].   MACRO: None   Signed by: Kristi Buenrostro 7/17/2025 12:00 PM Dictation workstation:   [id number]    ECG 12 lead  Result Date: 7/14/2025  Normal sinus rhythm Possible Left atrial enlargement Low voltage QRS Anterolateral infarct (cited on or before 10-OCT-2023) Abnormal ECG When compared with ECG of 04-MAY-2025 13:32, Questionable change in QRS axis    XR chest 2 views  Result Date: 7/12/2025  Interpreted By:  Elis Benoit, STUDY: Chest, 2 views.   INDICATION: Signs/Symptoms:pulmonary edema.   COMPARISON: XR CHEST 2 VIEWS 5/4/2025.   ACCESSION NUMBER(S): [id number]   ORDERING CLINICIAN: MOLINA YU   FINDINGS: Median sternotomy. Left subclavian AICD/pacemaker device noted. Chronic elevated right hemidiaphragm noted. The cardiomediastinal silhouette size is again noted to be enlarged.   There is no focal consolidation, edema or pneumothorax. Small right-sided pleural effusion with blunting of the costophrenic angle similar to prior.       1. Redemonstration of small right pleural effusion similar to prior. 2. Cardiomegaly and/or pericardial effusion.   MACRO: None.   Signed by: Elis Benoit 7/12/2025 7:20 PM Dictation workstation:   [id number]       Inpatient Medications:  Scheduled Medications[1]  PRN Medications[2]  Continuous Medications[3]    Telemetry 8/6/2025 (personally reviewed): SR/ST 90-110s, rare PVCS, short runs, 3-4 beats, NSVT    Physical exam:  General: NAD  Head/ neck: + JVD   Cardiac: RRR, regular S1 S2 , no murmur, no rub, no gallop  Pulm: CTA bilaterally, no wheezes, rales or rhonchi.    Vascular: Radial 2+ bilaterally  GI: Non distended  Extremities: 2+/1+ pitting BLE edema, improving  Neuro: no focal neuro deficits    Psych: appropriate mood and behavior   Skin: warm and dry     Assessment/Plan   Felice Law is a 60 y.o. male with PMH of ischemic HFrEF 15-20%, CABG (LIMA- LAD 2023),  VT arrest s/p VT ablation and ICD 2017, Hypertension, Hyperlipidemia, Hypothyroidism, Left femoral DVT on Eliquis, COPD, JAKE, CKD III, Chronic BLE venous stasis dermatitis, Insulin dependent Type 2 DM who was initially admitted 7/12 to [place] for acute decompensate heart failure. Diuresed and transferred to [facility] for further managemnet and possible re-consideration of advanced therapies.  Of note, [place] was [place] discussed [place] therapies [place] [facility] but was declined with concerns for mobility/frailty (uses motorized scooter and has bilateral foot drop with neuropathy), CKD, poorly controlled diabetes, as well as unclear social/financial support.      Acute on chronic systolic ischemic, HFrEF 15-20% (since 2017)  CAD s/p PCI and 1v CABG (LIMA-LAD 4/2023).   VT s/p ICD 2017  - TTE 5/5/25: LV mod dilated, EF 15-20%, gHK of LV, G2DD, mildly enlarged RV with mildly reduced systolic fxn, mild LAE, mild to mod MR and TR  - LHC 6/10/25:  Right dom. LIMA to LAD patent. pLAD 70%, os and pD1 90%, mLCx50%,osOM1 90%, mRCA 100%  - Committee discussion: optimize DM (A1c 10) and defer listing for likely heart transplant / LVAD depending on acuity  - Cardiac MRI 7/2025: EF 17%, mixed CM, mild to mod MR, Transmural infarction (%) involves the mid anteroseptum,apical septum, apical inferior wall, and apex, all demonstrating nonviable myocardium. Subendocardial infarction (25-50%) affects the mid inferior and apical lateral wall, while subendocardial infarction (<25%) involves the mid inferolateral wall: these segments remain viable. Additionally, mid-myocardial enhancement in the basal septum represents a nonischemic, nonspecific pattern. RV function severely reduced.   - There may be value in  revascularizing OM and diag but will work on stabilizing renal function and heart failure. No intervention to be pursued this admission.  - Failed weaning of milrinone, inotrope dependent, Massey line placed 8/5   - GDMT: Hydral/Isordil, farxiga, no beta blocker (inotrope), digoxin 3/wkly (level WNL 7/29).   - stop Entresto/Aldactone due to CHARLOTTE on CKD  - good response to diuresis yesterday, cont Bumex 2 mg PO BID  - Palliative medicine notified of discharge difficulties, pending further discussion and discharge planning, will possibly have to wean off infusion   - cont secondary vascular prevention: aspirin, statin (Discontinue fenofibrate given renal dysfunction)     COPD  Obstructive sleep apnea  Prior tobacco use  - 24 pack year, quit 2017, no current inhalers or O2 need  - encourage incentive spirometry  - cont albuterol nebulizer PRN      CHARLOTTE on CKD, stable/elevated  - b/l Cr ~1.4-1.8  - admit (7/17) Cr 1.62, was 2.55 on (7/12) at [facility], peak 3.24 on (7/26)  - Cr trend: 2.64 (2.72, 2.78, 2.86, 2.85, 2.28, 2.42)  - Avoid nephrotoxins and hypotension, trend daily RFP      Anemia of chronic disease and iron deficiency  - (7/17):  iron sat L 16 but otherwise iron studies & ferritin WNL  - s/p IV Venofer 200 mg x5 (completed 8/2)  - cont PO iron supplementation     Left femoral DVT (2017)  - cont Eliquis 5mg BID      Uncontrolled type II DM c/b neuropathy  - HgbA1c (7/17): 10.1%  - Endocrine following, appreciate recs:  -- cont Glargine 40u BID, Lispro SSI scale #3 TIDAC, decrease prandial lispro to 22u TIDAC  -- cont Januvia 50 daily, Farxiga 10 daily  - cont gabapentin 200 mg TID  - CCD, Accu-Cheks AC/HS, hypoglycemic protocol      Hypothyroidism  - TSH 7/23/25: WNL. Euthyroid  - cont Synthroid      Loose stool unspecified  - Immodium PRN  - C. Diff neg     DVT ppx: eliquis    DISPO:   - per TCC discussion with pt, Milrinone copay and HHC nurse visit copay likely cost prohibitive  - per Adv HF team, Dobutamine  5 mcg/kg/min submitted for cost analysis, also cost prohibitive    Code status: Full Code  NOK: Aracelis Law, mother: [phone]    Patient seen and discussed with Dr. Jean Claude Uwamungu Jenoy M Bolden, MARLON-CNP    I conducted a shared visit with Cathy Venegas PA-C.     Interview conducted using a [employer]  [place], [place] [id number]    Patient is a 59 yo M with ischemic cardiomyopathy (severely reduced EF), unable to tolerate GDMT and weaning off milrinone which has now been resumed with plan to discharge him on milrinone. He is still volume overloaded. We were informed that milrinone copay is not affordable for the patient. We checked if dobutamine infusion would be affordable but it was amalia cost-prohibitive.     Physical Exam:  Patient is no acute distress  Elevated JVP  Normal S1S2, S3 gallop, soft 2/6 systolic murmur on lsb  Lungs clear  Abdomen soft, non-tender  Extremities are warm with 2+ bilateral pitting edema  A/Plan:  As detailed above, patient has ischemic cardiomyopathy with severe reduced left ventricular ejection fraction.  At this time stage D heart failure.  He was deemed to be a good candidate for advanced heart failure therapies due to uncontrolled diabetes mellitus (A1c 10%). He aas planned to be discharged on milrinone infusion but this is not affordable for the patient the other alternative which is dobutamine infusion is also cost prohibitive.  We would reengage palliative care and endocrinology as uncontrolled diabetes was one of the limiting factors for advanced heart failure therapies.  Will will again attempt to wean patient off milrinone.            [1]   Scheduled medications   Medication Dose Route Frequency    apixaban  5 mg oral BID    atorvastatin  80 mg oral Nightly    bumetanide  2 mg oral BID    calcium carbonate  500 mg of calcium carbonate oral BID    dapagliflozin propanediol  10 mg oral Daily    digoxin  125 mcg oral Once per day on Monday Wednesday Friday    gabapentin   200 mg oral q8h SUSI    hydrALAZINE  25 mg oral q8h    insulin glargine  40 Units subcutaneous Nightly    insulin glargine  40 Units subcutaneous Daily    insulin lispro  0-15 Units subcutaneous TID AC    insulin lispro  22 Units subcutaneous TID AC    isosorbide dinitrate  40 mg oral TID    levothyroxine  75 mcg oral Daily before breakfast    pantoprazole  40 mg oral Daily before breakfast    QUEtiapine  50 mg oral Nightly    SITagliptin phosphate  50 mg oral Daily   [2]   PRN medications   Medication    acetaminophen    albuterol    alteplase    dextrose    dextrose    glucagon    glucagon    loperamide   [3]   Continuous Medications   Medication Dose Last Rate    [Held by provider] DOBUTamine  5 mcg/kg/min      milrinone  0.25 mcg/kg/min 0.25 mcg/kg/min (08/06/25 1310)

## 2025-08-07 LAB
ALBUMIN SERPL BCP-MCNC: 4.1 G/DL (ref 3.4–5)
ANION GAP SERPL CALC-SCNC: 14 MMOL/L (ref 10–20)
ATRIAL RATE: 78 BPM
BUN SERPL-MCNC: 72 MG/DL (ref 6–23)
CALCIUM SERPL-MCNC: 9.8 MG/DL (ref 8.6–10.6)
CHLORIDE SERPL-SCNC: 95 MMOL/L (ref 98–107)
CO2 SERPL-SCNC: 29 MMOL/L (ref 21–32)
CREAT SERPL-MCNC: 2.61 MG/DL (ref 0.5–1.3)
EGFRCR SERPLBLD CKD-EPI 2021: 27 ML/MIN/1.73M*2
ERYTHROCYTE [DISTWIDTH] IN BLOOD BY AUTOMATED COUNT: 19.9 % (ref 11.5–14.5)
EST. AVERAGE GLUCOSE BLD GHB EST-MCNC: 189 MG/DL
GLUCOSE BLD MANUAL STRIP-MCNC: 102 MG/DL (ref 74–99)
GLUCOSE BLD MANUAL STRIP-MCNC: 124 MG/DL (ref 74–99)
GLUCOSE BLD MANUAL STRIP-MCNC: 130 MG/DL (ref 74–99)
GLUCOSE BLD MANUAL STRIP-MCNC: 162 MG/DL (ref 74–99)
GLUCOSE SERPL-MCNC: 141 MG/DL (ref 74–99)
HBA1C MFR BLD: 8.2 % (ref ?–5.7)
HCT VFR BLD AUTO: 36.8 % (ref 41–52)
HGB BLD-MCNC: 10.7 G/DL (ref 13.5–17.5)
MAGNESIUM SERPL-MCNC: 2.31 MG/DL (ref 1.6–2.4)
MCH RBC QN AUTO: 23.9 PG (ref 26–34)
MCHC RBC AUTO-ENTMCNC: 29.1 G/DL (ref 32–36)
MCV RBC AUTO: 82 FL (ref 80–100)
NRBC BLD-RTO: 0 /100 WBCS (ref 0–0)
P AXIS: 85 DEGREES
P OFFSET: 190 MS
P ONSET: 131 MS
PHOSPHATE SERPL-MCNC: 4.6 MG/DL (ref 2.5–4.9)
PLATELET # BLD AUTO: 360 X10*3/UL (ref 150–450)
POTASSIUM SERPL-SCNC: 3.9 MMOL/L (ref 3.5–5.3)
PR INTERVAL: 168 MS
Q ONSET: 215 MS
QRS COUNT: 13 BEATS
QRS DURATION: 112 MS
QT INTERVAL: 390 MS
QTC CALCULATION(BAZETT): 444 MS
QTC FREDERICIA: 425 MS
R AXIS: 25 DEGREES
RBC # BLD AUTO: 4.48 X10*6/UL (ref 4.5–5.9)
SODIUM SERPL-SCNC: 134 MMOL/L (ref 136–145)
T AXIS: 101 DEGREES
T OFFSET: 410 MS
VENTRICULAR RATE: 78 BPM
WBC # BLD AUTO: 5.3 X10*3/UL (ref 4.4–11.3)

## 2025-08-07 PROCEDURE — 2500000002 HC RX 250 W HCPCS SELF ADMINISTERED DRUGS (ALT 637 FOR MEDICARE OP, ALT 636 FOR OP/ED): Performed by: PHYSICIAN ASSISTANT

## 2025-08-07 PROCEDURE — 80069 RENAL FUNCTION PANEL: CPT | Performed by: PHYSICIAN ASSISTANT

## 2025-08-07 PROCEDURE — 2500000001 HC RX 250 WO HCPCS SELF ADMINISTERED DRUGS (ALT 637 FOR MEDICARE OP): Performed by: PHYSICIAN ASSISTANT

## 2025-08-07 PROCEDURE — 99233 SBSQ HOSP IP/OBS HIGH 50: CPT

## 2025-08-07 PROCEDURE — 2500000002 HC RX 250 W HCPCS SELF ADMINISTERED DRUGS (ALT 637 FOR MEDICARE OP, ALT 636 FOR OP/ED): Performed by: NURSE PRACTITIONER

## 2025-08-07 PROCEDURE — 2500000001 HC RX 250 WO HCPCS SELF ADMINISTERED DRUGS (ALT 637 FOR MEDICARE OP)

## 2025-08-07 PROCEDURE — 82947 ASSAY GLUCOSE BLOOD QUANT: CPT

## 2025-08-07 PROCEDURE — 1200000002 HC GENERAL ROOM WITH TELEMETRY DAILY

## 2025-08-07 PROCEDURE — 83036 HEMOGLOBIN GLYCOSYLATED A1C: CPT | Performed by: STUDENT IN AN ORGANIZED HEALTH CARE EDUCATION/TRAINING PROGRAM

## 2025-08-07 PROCEDURE — 2500000004 HC RX 250 GENERAL PHARMACY W/ HCPCS (ALT 636 FOR OP/ED)

## 2025-08-07 PROCEDURE — 99233 SBSQ HOSP IP/OBS HIGH 50: CPT | Performed by: STUDENT IN AN ORGANIZED HEALTH CARE EDUCATION/TRAINING PROGRAM

## 2025-08-07 PROCEDURE — 85027 COMPLETE CBC AUTOMATED: CPT | Performed by: PHYSICIAN ASSISTANT

## 2025-08-07 PROCEDURE — 83735 ASSAY OF MAGNESIUM: CPT | Performed by: PHYSICIAN ASSISTANT

## 2025-08-07 PROCEDURE — 36415 COLL VENOUS BLD VENIPUNCTURE: CPT | Performed by: PHYSICIAN ASSISTANT

## 2025-08-07 PROCEDURE — 2500000002 HC RX 250 W HCPCS SELF ADMINISTERED DRUGS (ALT 637 FOR MEDICARE OP, ALT 636 FOR OP/ED)

## 2025-08-07 PROCEDURE — 36415 COLL VENOUS BLD VENIPUNCTURE: CPT | Performed by: STUDENT IN AN ORGANIZED HEALTH CARE EDUCATION/TRAINING PROGRAM

## 2025-08-07 RX ADMIN — ISOSORBIDE DINITRATE 40 MG: 40 TABLET ORAL at 20:37

## 2025-08-07 RX ADMIN — ISOSORBIDE DINITRATE 40 MG: 40 TABLET ORAL at 08:22

## 2025-08-07 RX ADMIN — BUMETANIDE 2 MG: 1 TABLET ORAL at 08:20

## 2025-08-07 RX ADMIN — ATORVASTATIN CALCIUM 80 MG: 80 TABLET, FILM COATED ORAL at 20:34

## 2025-08-07 RX ADMIN — POLYSACCHARIDE-IRON COMPLEX 150 MG: 150 CAPSULE ORAL at 08:22

## 2025-08-07 RX ADMIN — HYDRALAZINE HYDROCHLORIDE 25 MG: 25 TABLET ORAL at 23:57

## 2025-08-07 RX ADMIN — MILRINONE LACTATE IN DEXTROSE 0.25 MCG/KG/MIN: 0.2 INJECTION, SOLUTION INTRAVENOUS at 03:22

## 2025-08-07 RX ADMIN — ISOSORBIDE DINITRATE 40 MG: 40 TABLET ORAL at 15:36

## 2025-08-07 RX ADMIN — INSULIN LISPRO 22 UNITS: 100 INJECTION, SOLUTION INTRAVENOUS; SUBCUTANEOUS at 08:18

## 2025-08-07 RX ADMIN — QUETIAPINE FUMARATE 50 MG: 25 TABLET ORAL at 20:35

## 2025-08-07 RX ADMIN — BUMETANIDE 2 MG: 1 TABLET ORAL at 16:51

## 2025-08-07 RX ADMIN — MILRINONE LACTATE IN DEXTROSE 0.25 MCG/KG/MIN: 0.2 INJECTION, SOLUTION INTRAVENOUS at 18:00

## 2025-08-07 RX ADMIN — HYDRALAZINE HYDROCHLORIDE 25 MG: 25 TABLET ORAL at 08:20

## 2025-08-07 RX ADMIN — INSULIN GLARGINE 40 UNITS: 100 INJECTION, SOLUTION SUBCUTANEOUS at 20:36

## 2025-08-07 RX ADMIN — GABAPENTIN 200 MG: 100 CAPSULE ORAL at 08:22

## 2025-08-07 RX ADMIN — GABAPENTIN 200 MG: 100 CAPSULE ORAL at 16:51

## 2025-08-07 RX ADMIN — LEVOTHYROXINE SODIUM 75 MCG: 0.07 TABLET ORAL at 06:45

## 2025-08-07 RX ADMIN — GABAPENTIN 200 MG: 100 CAPSULE ORAL at 20:34

## 2025-08-07 RX ADMIN — INSULIN LISPRO 22 UNITS: 100 INJECTION, SOLUTION INTRAVENOUS; SUBCUTANEOUS at 18:10

## 2025-08-07 RX ADMIN — HYDRALAZINE HYDROCHLORIDE 25 MG: 25 TABLET ORAL at 16:51

## 2025-08-07 RX ADMIN — INSULIN GLARGINE 40 UNITS: 100 INJECTION, SOLUTION SUBCUTANEOUS at 08:20

## 2025-08-07 RX ADMIN — APIXABAN 5 MG: 5 TABLET, FILM COATED ORAL at 20:35

## 2025-08-07 RX ADMIN — APIXABAN 5 MG: 5 TABLET, FILM COATED ORAL at 08:22

## 2025-08-07 RX ADMIN — DAPAGLIFLOZIN 10 MG: 10 TABLET, FILM COATED ORAL at 08:21

## 2025-08-07 RX ADMIN — INSULIN LISPRO 2 UNITS: 100 INJECTION, SOLUTION INTRAVENOUS; SUBCUTANEOUS at 18:10

## 2025-08-07 RX ADMIN — SITAGLIPTIN 50 MG: 50 TABLET, FILM COATED ORAL at 08:20

## 2025-08-07 RX ADMIN — INSULIN LISPRO 22 UNITS: 100 INJECTION, SOLUTION INTRAVENOUS; SUBCUTANEOUS at 12:55

## 2025-08-07 RX ADMIN — PANTOPRAZOLE SODIUM 40 MG: 40 TABLET, DELAYED RELEASE ORAL at 06:45

## 2025-08-07 ASSESSMENT — COGNITIVE AND FUNCTIONAL STATUS - GENERAL
MOBILITY SCORE: 22
DAILY ACTIVITIY SCORE: 24
MOBILITY SCORE: 22
WALKING IN HOSPITAL ROOM: A LITTLE
DAILY ACTIVITIY SCORE: 24
WALKING IN HOSPITAL ROOM: A LITTLE
WALKING IN HOSPITAL ROOM: A LITTLE
CLIMB 3 TO 5 STEPS WITH RAILING: A LITTLE

## 2025-08-07 ASSESSMENT — PAIN SCALES - GENERAL
PAINLEVEL_OUTOF10: 0 - NO PAIN
PAINLEVEL_OUTOF10: 0 - NO PAIN

## 2025-08-07 ASSESSMENT — PAIN - FUNCTIONAL ASSESSMENT
PAIN_FUNCTIONAL_ASSESSMENT: 0-10
PAIN_FUNCTIONAL_ASSESSMENT: 0-10

## 2025-08-07 NOTE — PROGRESS NOTES
Subjective:  Patient seen and assessed this morning, sitting up in chair, NAD. Denies any CP or SOB, remains comfortable on RA. Frustrated by delay in discharge and difficulty with determining discharge plan, but understanding. Informed patient we are continuing to pursue options to discharge patient with infusion, but will potentially have to transfer patient back to ICU to wean if unable to find solution.      utilized for communication.    Overnight Events:  No acute events overnight.     Today's Plan/Updates:   - Hemodynamically stable, continue current regimen  - ongoing discharge planning with [facility], [facility], and pharmacy  - pending plan for home Milrinone infusion will need to possibly transfer to HFICU for weaning    Objective:  Vitals:    08/07/25 1111   BP: 110/73   Pulse: 105   Resp: 16   Temp: 36.3 °C (97.3 °F)   SpO2: 95%     Weight         8/4/2025  0427 8/5/2025  0425 8/6/2025  0537 8/6/2025  1100 8/7/2025  0500    Weight: 101 kg (223 lb 8.7 oz) 100 kg (220 lb 10.9 oz) 94.3 kg (207 lb 14.3 oz) 98.7 kg (217 lb 9.5 oz) 98.3 kg (216 lb 11.4 oz)            Intake/Output Summary (Last 24 hours) at 8/7/2025 1300  Last data filed at 8/7/2025 1111  Gross per 24 hour   Intake 960 ml   Output 2050 ml   Net -1090 ml     Recent Results (from the past 24 hours)   POCT GLUCOSE    Collection Time: 08/06/25  4:45 PM   Result Value Ref Range    POCT Glucose 116 (H) 74 - 99 mg/dL   CBC    Collection Time: 08/06/25  7:42 PM   Result Value Ref Range    WBC 5.7 4.4 - 11.3 x10*3/uL    nRBC 0.0 0.0 - 0.0 /100 WBCs    RBC 4.25 (L) 4.50 - 5.90 x10*6/uL    Hemoglobin 10.5 (L) 13.5 - 17.5 g/dL    Hematocrit 34.7 (L) 41.0 - 52.0 %    MCV 82 80 - 100 fL    MCH 24.7 (L) 26.0 - 34.0 pg    MCHC 30.3 (L) 32.0 - 36.0 g/dL    RDW 20.1 (H) 11.5 - 14.5 %    Platelets 349 150 - 450 x10*3/uL   Magnesium    Collection Time: 08/06/25  7:42 PM   Result Value Ref Range    Magnesium 2.26 1.60 - 2.40 mg/dL   Renal function panel     Collection Time: 08/06/25  7:42 PM   Result Value Ref Range    Glucose 83 74 - 99 mg/dL    Sodium 133 (L) 136 - 145 mmol/L    Potassium 4.0 3.5 - 5.3 mmol/L    Chloride 95 (L) 98 - 107 mmol/L    Bicarbonate 26 21 - 32 mmol/L    Anion Gap 16 10 - 20 mmol/L    Urea Nitrogen 71 (H) 6 - 23 mg/dL    Creatinine 2.75 (H) 0.50 - 1.30 mg/dL    eGFR 26 (L) >60 mL/min/1.73m*2    Calcium 9.7 8.6 - 10.6 mg/dL    Phosphorus 4.1 2.5 - 4.9 mg/dL    Albumin 4.1 3.4 - 5.0 g/dL   POCT GLUCOSE    Collection Time: 08/07/25  6:45 AM   Result Value Ref Range    POCT Glucose 124 (H) 74 - 99 mg/dL   POCT GLUCOSE    Collection Time: 08/07/25 11:12 AM   Result Value Ref Range    POCT Glucose 130 (H) 74 - 99 mg/dL     IR CVC tunneled  Result Date: 8/5/2025  Interpreted By:  Wilian Sahu, STUDY: IR CVC TUNNELED;  8/4/2025 3:00 pm   INDICATION: Signs/Symptoms:dobutamine IV therapy. Needs Massey.   COMPARISON: None.   ACCESSION NUMBER(S): [id number]   ORDERING CLINICIAN: RADHA BREAUX   TECHNIQUE: INTERVENTIONALIST(S): Wilian Sahu MD   CONSENT: The patient/patient's POA/next of kin was informed of the nature of the proposed procedure. The purposes, alternatives, risks, and benefits were explained and discussed. All questions were answered and consent was obtained.   RADIATION EXPOSURE: Fluoroscopy time: 0.7 min   SEDATION: Moderate conscious IV sedation services (supervision of administration, induction, and maintenance) were provided by the physician performing the procedure with intravenous fentanyl 50 mcgmcg and versed 1mg. The physician was assisted by an independent trained observer, an interventional radiology nurse, in the continuous monitoring of patient level of consciousness and physiologic status.   MEDICATION: None.   TIME OUT: A time out was performed immediately prior to procedure start with the interventional team, correctly identifying the patient name, date of birth, MRN, procedure, anatomy (including marking of site  and side), patient position, procedure consent form, relevant laboratory and imaging test results, antibiotic administration, safety precautions, and procedure-specific equipment needs.   COMPLICATIONS: No immediate adverse events identified.   FINDINGS: In the recumbent position, the patient was positioned on the angiography table. The right supraclavicular and infraclavicular cutaneous tissues were prepared and draped in usual sterile manner.   The supraclavicular access site was screened with gray-scale ultrasound with subsequent subcutaneous instillation of Lidocaine 1% local anesthesia. Ultrasound images demonstrate a patent right internal jugular vein. Under direct ultrasound guidance and Seldinger/micropuncture technique, the right internal jugular vein was accessed. An ultrasound digital spot image was acquired and stored on the  PACS.   After confirmation of location, a 018 [place]-Mandril guidewire was inserted to secure location. The guidewire was advanced into the inferior vena cava utilizing intermittent fluoroscopy. The micro-access needle was removed over the guidewire. Utilizing a 5-on-4 coaxial dilator sheath system, upsize to a 035 guidewire was performed. Subsequent access tract dilation was performed to an eventual 10-Kinyarwanda peel-away sheath dilator system.   After Lidocaine 1% local anesthesia, a subcutaneous tunnel tract was created from the  right infraclavicular chest to the venous access site. After continuity of the tunnel tract and venous access site was obtained, a 9.6 Kinyarwanda single lumen Massey catheter was then placed with tract continuity and its central catheter tip(s) to reside at the cavoatrial junction. A fluoroscopic spot image of the chest was acquired in the AP projection to confirm optimal location.   The catheter ports were aspirated and flushed without resistance with normal saline. The catheter ports were then charged with high-concentration heparin. The venous access site  was closed and sterilely dressed. The external portions of the catheter were secured with a purse-string 2-0 polypropylene suture and sterile dressings.   The patient tolerated the procedure without complication.       1. Uncomplicated placement of an indwelling right internal jugular infraclavicular 9.6 Fr single lumen Massey catheter. The catheter is ready for use.   I was present for and/or performed the critical portions of the procedure and immediately available throughout the entire procedure.   I personally reviewed the image(s) / study and resident interpretation. I agree with the findings as stated.   Performed and dictated at [facility].   MACRO: None.   Signed by: Wilian Sahu 8/5/2025 6:23 AM Dictation workstation:   [id number]    MR cardiac morphology and function w and wo IV contrast  Result Date: 7/30/2025  Interpreted By:  Leonardo Nathan and Amoah Joseph STUDY: MR CARDIAC MORPHOLOGY AND FUNCTION W AND WO IV CONTRAST;  7/30/2025 12:20 pm   INDICATION: Signs/Symptoms:Viability assessment in ischemic cardiomyopathy.   COMPARISON: Echocardiogram done on 05/05/2025.   ACCESSION NUMBER(S): [id number]   ORDERING CLINICIAN: RADHA BREAUX   TECHNIQUE: Siemens 1.5  Effie MRI scanner. Turbo spin echo and balanced steady state free precession (bSSFP) imaging for anatomic definition. Dynamic cine bSSFP for cardiac chamber and wall-motion analysis, and valvular analysis. Flow quantification sequences for hemodynamics. Delayed gadolinium enhancement analysis after injection of gadolinium-chelate (mL of Dotarem, 0.2 mmol/kg).   HT- ; WT-; BSA-  m2   FINDINGS: CARDIAC CHAMBERS: Normal atrioventricular and ventriculoarterial concordance   LEFT ATRIUM: Moderately dilated (NIKKI - 67.94 ml/m2).   RIGHT ATRIUM: Moderately dilated (RA area max 4ch - 27.40 cm2)   INTERATRIAL SEPTUM: Intact.   LEFT VENTRICLE: 1. Severely dilated LV size (EDVi 156 ml/m2) and severely  reduced systolic function. Quantitative LVEF 17 %.. 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 2. Multiple regional wall motion abnormalities are noted. 3. Normal LV wall thickness and normal LV indexed mass (LVMi 92 g/m2). 4. T2 mapping was not performed. 5. ECV unable to be calculated as T1 mapping was not performed. 6. No evidence of LV thrombus. 7. Delayed-enhancement imaging was performed. There is transmural infarct involving the mid anteroseptum, apical septum, apical inferior and apex. Subendocardial infarct involving the mid inferolateral/inferior wall and apicolateral wall. In addition there is mid myocardial enhancement of the basal septum.   Quantitative left ventricular functional values are as follows: EDV = 350.02 cc; EDVi = 156.40 cc/m2 ESV = 289.00 cc; ESVi = 129.14 cc/m2 Absolute Cardiac Output = 6.96 l/min.; COi = 3.11 l/min/m2 LV mass = 206.94 gm; LVMi = 92.47 gm/m2 Stroke volume = 61.01 cc; SVi = 129.14 cc/m2 LVEF = 17 %   LV septal wall thickness (anterobasal): 0.6 cm LV postero-inferior wall thickness: 0.9 cm LVEDD: 7.5 cm LVESD: 6 cm   RIGHT VENTRICLE: 1. Normal RV size (EDVi 86 ml/m2) and severely reduced systolic function. Quantitative RVEF 27 %. 2. No regional wall motion abnormalities. 3. No abnormal delayed enhancement in the myocardium.   Quantitative right ventricular functional values are as follows: RVEDV = 192.98 ml; RVEDVi = 86.23 ml/m2 RVESV = 141.30 ml; RVESVi = 63.14 ml/m2 RVSV = 51.69 ml; RVSVi = 23.10 ml/m2 RVEF = 27.00 % RVCO = 5.89 l/min; RVCI = 2.63 l/min/m2   INTERVENTRICULAR SEPTUM: Intact.   AORTIC VALVE: The aortic valve is trileaflet. There is quantitatively trivial aortic regurgitation. Flow quantification through the ascending aorta: Forward volume = 46.97 cc/beat Reverse volume = -2.25 cc/beat Net forward volume = 44.72 cc/beat Aortic regurgitant fraction = 5 %   MITRAL VALVE: The mitral valve leaflets appear normal. There is MILD-TO-MODERATE mitral regurgitation.  Integrating LV volumetric and aortic flow quantification data reveals: Quantitative mitral regurgitant volume = 14 cc/beat Quantitative mitral regurgitant fraction = 23 %   TRICUSPID VALVE: There is qualitatively trivial tricuspid regurgitation.   PULMONARY VALVE: Not assessed.   PERICARDIUM: The pericardium is normal. There is no pericardial effusion.   THORACIC AORTA: The thoracic aorta appears normal in course and contour. The aortic root is normal in size. There is no evidence for acute aortic pathology. The arch vessel branching pattern is  normal.   All the arch branch vessels appear widely patent in their proximal portions.   AORTIC ROOT DIMENSIONS: Annulus: 2.6 cm Aortic root(sinus of valsalva): 3 cm Sinotubular junction: 3 cm   PULMONARY ARTERIES: The central pulmonary arteries appear normal (MPA-2.5 cm, RPA-1.8 cm, LPA-2.3 cm).   SYSTEMIC AND PULMONARY VEINS: Normal systemic venous and pulmonary venous return. The SVC is of normal caliber. IVC appears normal Normal pulmonary venous anatomy.   CHEST: The chest wall is normal. Limited imaging through the lungs reveals no gross abnormalities. Small right pleural effusion.   UPPER ABDOMEN: Limited imaging through the upper abdomen reveals no abnormalities of the visualized organs.       1. Mixed cardiomyopathy. Technically limited study due to ICD. Within limitation: Severely dilated LV size (EDVi 156 ml/m2) and severely reduced systolic function. Quantitative LVEF 17 % 2. 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 3. Moderate biatrial dilatation. 4. Mild-to-moderate mitral valve regurgitation. RF 23% 5. Transmural infarction (%) involves the mid anteroseptum, apical septum, apical inferior wall, and apex, all demonstrating nonviable myocardium. Subendocardial infarction (25-50%) affects the mid inferior and apical lateral wall, while subendocardial infarction (<25%) involves the mid inferolateral wall - these segments remain viable.  Additionally, mid-myocardial enhancement in the basal septum represents a nonischemic, nonspecific pattern 6. Normal RV size (EDVi 86 ml/m2) and severely reduced systolic function. Quantitative RVEF 27 %. 7. Small right pleural effusion.   This cardiac MRI demonstrates mixed dilated cardiomyopathy with both ischemic and nonischemic components. Nonviable myocardium is present in the mid anteroseptum, apical septum, apical inferior wall, and apex. All remaining myocardial segments demonstrate preserved viability   MACRO: None   Signed by: Leonardo Nathan 7/30/2025 4:47 PM Dictation workstation:   [id number]    XR chest 1 view  Result Date: 7/27/2025  Interpreted By:  Ke Arechiga, STUDY: XR CHEST 1 VIEW; 7/27/2025 8:22 am   INDICATION: Signs/Symptoms:swan.   COMPARISON: Radiograph dated 07/25/2025   ACCESSION NUMBER(S): [id number]   ORDERING CLINICIAN: YURI FARRELL   FINDINGS: Right IJ [place]-Talita catheter is in place with the tip projecting over distal right main pulmonary artery, retracted from prior study. Left subclavian approach pacer/defibrillator is unchanged in positioning.   The cardiac silhouette size is enlarged, unchanged. Status post median sternotomy.   Mild perihilar congestion. There is no focal consolidation, or pneumothorax. No sizeable pleural effusion. No acute osseous abnormality.       1. Unchanged mild perihilar congestion. Unchanged cardiomegaly. 2. No focal infiltrate, sizeable pleural effusion or       Signed by: Ke Lau 7/27/2025 12:25 PM Dictation workstation:   [id number]    US abdomen complete  Result Date: 7/26/2025  Interpreted By:  Curry Pedraza and Mercado Amiel STUDY: US ABDOMEN COMPLETE;  7/25/2025 11:57 am   INDICATION: Signs/Symptoms:OHT/VAD workup.     COMPARISON: None.   ACCESSION NUMBER(S): [id number]   ORDERING CLINICIAN: YONATHAN WALLS   TECHNIQUE: Multiple images of the abdomen were obtained.   FINDINGS: LIVER: The liver measures 21.3 cm and is  grossly unremarkable and free of any focal lesions.     GALLBLADDER: The gallbladder is nondistended and contains multiple layering echogenic gallstones (which measure up to 1.8 cm) with associated posterior acoustic shadowing. There is also mild pericholecystic fluid, likely related to patient's fluid status. The gallbladder wall thickness is 0.2 cm. Sonographic Zimmerman's sign is negative.     BILE DUCTS: No evidence of intra or extrahepatic biliary dilatation is identified; the common bile duct measures 0.3 cm.   PANCREAS: The pancreas is poorly visualized due to overlying bowel gas.   RIGHT KIDNEY: The right kidney measures 10.4 cm in length. The renal cortical echogenicity and thickness are within normal limit.  No hydronephrosis or renal calculi are seen.   LEFT KIDNEY: The left kidney measures 11.6 cm in length. The renal cortical echogenicity and thickness are within normal limits. No hydronephrosis or renal calculi are seen.     SPLEEN: The spleen is borderline enlarged measuring 12.4 cm and is grossly unremarkable.   URINARY BLADDER: The urinary bladder is within normal limits.   PERITONEUM: There is no free or loculated fluid seen in the abdomen.   ABDOMINAL AORTA AND IVC: The visualized portions of the aorta and IVC are unremarkable.       1. Cholelithiasis without evidence of cholecystitis. Mild pericholecystic fluid is likely related to patient's fluid status. 2. Hepatomegaly without sonographic evidence of lesions.   I have reviewed the images/study and I agree with the findings as stated by Rohan Greene MD (PGY-3).   MACRO: None   Signed by: Curry Pedraza 7/26/2025 6:05 PM Dictation workstation:   [id number]    XR chest 1 view  Result Date: 7/25/2025  Interpreted By:  Allyssa Pedraza and Krasnoschlik Nicholas STUDY: XR CHEST 1 VIEW;  7/25/2025 8:46 am   INDICATION: Signs/Symptoms:PA catheter / Pulmonary congestion.     COMPARISON: Chest radiograph 07/24/2025   ACCESSION NUMBER(S): [id number]    ORDERING CLINICIAN: MATTIE NOEL   FINDINGS: AP radiograph of the chest was provided.   Medical devices: Right IJ [place]-Talita catheter with distal tip projecting over the right lower lobe segmental branches. Left chest wall pacemaker/defibrillator with leads in similar position to prior. Patient is status post median sternotomy.   CARDIOMEDIASTINAL SILHOUETTE: Cardiomediastinal silhouette is stable in size and configuration.   LUNGS: Similar bilateral interstitial markings. Similar bibasilar hazy opacities. No pneumothorax.   ABDOMEN: No remarkable upper abdominal findings.   BONES: No acute osseous changes.       1.  Right IJ [place]-Talita catheter with distal tip projecting over the right lower lobe segmental branches. Repositioning recommended. 2. Similar pulmonary edema. Correlate with volume status. 3. Similar bibasilar hazy opacities may represent atelectasis/edema. Infectious etiology can not be excluded. 4. Additional medical devices as above.   I personally reviewed the images/study and I agree with the findings as stated by resident physician Cam Sommers MD. This study was interpreted at [facility], [place], [place].   MACRO: None   Signed by: Allyssa Pedraza 7/25/2025 2:52 PM Dictation workstation:   [id number]    Vascular US Ankle Brachial Index (MARK) Without Exercise  Result Date: 7/25/2025            [place] [address]   Tel [phone] and Fax [phone]  Vascular Lab Report VASC US ANKLE BRACHIAL INDEX (MARK) WITHOUT EXERCISE  Patient Name:     NEGRITA BERNARD     Gayla           [id number]                 Physician:        MD Study Date:       7/23/2025            Ordering          [id number] YONATHAN WALLS                                        Physician: MRN/PID:          [id number]             Technologist:     Rajani Varela S Accession#:       [id number]          Technologist 2: Date of           1965 / 60 years  Encounter#:       [id number] Birth/Age: Gender:           M Admission Status: Inpatient            Location          [facility]                                        Performed:  Diagnosis/ICD: Encounter for preprocedural cardiovascular examination-Z01.810 Indication:    OHT/LVAD CPT Codes:     04219 Peripheral artery MARK Only  Patient History Anticoagulation, HTN and Hyperlipidemia. 10-9-2023 MARK's no                 evidence of arterial disease in BLEA.  CONCLUSIONS: Right Lower PVR: Evidence of mild arterial occlusive disease in the right lower extremity at rest. Right pressures of >220 mmHg suggest no compressibility of vessels and may make absolute Segmental Limb Pressures (SLP) unreliable. Decreased digital perfusion noted. Multiphasic flow is noted in the right common femoral artery, right posterior tibial artery and right dorsalis pedis artery. Level of disease called by waveforms. Left Lower PVR: Evidence of mild arterial occlusive disease in the left lower extremity at rest. Left pressures of >220 mmHg suggest no compressibility of vessels and may make absolute Segmental Limb Pressures (SLP) unreliable. Decreased digital perfusion noted. Multiphasic flow is noted in the left posterior tibial artery, left dorsalis pedis artery and left common femoral artery. Level of disease called by waveforms.  Comparison: Compared with study from 10/9/2023, no significant change.Today's exam there is mild arterial disease in bilateral lower extremities.  Imaging & Doppler Findings:  RIGHT Lower PVR                Pressures Ratios Right Posterior Tibial (Ankle) 220 mmHg  2.22 Right Dorsalis Pedis (Ankle)   204 mmHg  2.06 Right Digit (Great Toe)        58 mmHg   0.59   LEFT Lower PVR                Pressures Ratios Left Posterior Tibial (Ankle) 220 mmHg  2.22 Left Dorsalis Pedis (Ankle)   220 mmHg  2.22 Left Digit (Great Toe)        71 mmHg   0.72                      Right Brachial Pressure 99 mmHg   [id number] Marquita Brown MD Electronically signed by [id number] Marquita Brown MD on 7/25/2025 at 1:49:42 PM  ** Final **     Vascular US aorta iliac duplex limited  Result Date: 7/25/2025            [place] [address]   Tel [phone] and Fax [phone]  Vascular Lab Report VASC US AORTA ILIAC DUPLEX LIMITED  Patient Name:     NEGRITA BERNARD     [place]           [id number] [address]                 Physician:        MD Study Date:       7/23/2025            Ordering          [id number] YONATHAN WALLS                                        Physician: MRN/PID:          [id number]             Technologist:     Rajani JAFFE Accession#:       [id number]         Technologist 2: Date of           1965 / 60 years  Encounter#:       [id number] Birth/Age: Gender:           M Admission Status: Inpatient            Location          [facility]                                        Performed:  Diagnosis/ICD: Encounter for preprocedural cardiovascular examination-Z01.810 Indication:    Pre-Op OHT/LVAD CPT Codes:     51482 Duplex Aorta/IVC/Iliac/Bypass Graft  Patient History CAD, HTN and Hyperlipidemia. 10- No AAA.  CONCLUSIONS: Aorta/Common Iliac Arteries/IVC: The abdominal aorta and bilateral common iliac arteries demonstrate no evidence of aneurysm.  Comparison: Compared with study from 10/10/2023, no significant change.  Imaging & Doppler Findings:   AORTA     AP    Lateral    PSV Proximal 2.14 cm 1.96 cm 95.0 cm/s   Mid    1.86 cm 1.89 cm 72.0 cm/s  Distal  1.61 cm 1.70 cm 52.0 cm/s    RIGHT       AP    Lateral    PSV GIO Proximal 1.29 cm 1.10 cm 70.00 cm/s     LEFT       AP    Lateral    PSV GIO Proximal 1.01 cm 1.01 cm 70.30 cm/s  [id number] Marquita Brown MD Electronically signed by [id number] Marquita Brown MD on 7/25/2025 at 1:30:07 PM  ** Final **     CT chest abdomen pelvis wo IV  contrast  Result Date: 7/25/2025  Interpreted By:  Volodymyr Pettit, STUDY: CT CHEST ABDOMEN PELVIS WO CONTRAST;  7/23/2025 4:31 pm   INDICATION: Signs/Symptoms:OHT/VAD workup.   COMPARISON: CT chest 10/06/2023. CT abdomen and pelvis 10/10/2023.   ACCESSION NUMBER(S): [id number]   ORDERING CLINICIAN: YONATHAN WALLS   TECHNIQUE: CT of the chest, abdomen and pelvis was performed. Contiguous axial images were obtained at 3 mm slice thickness through the chest, abdomen and pelvis. Coronal and sagittal reconstructions at 3 mm slice thickness were performed.  No intravenous or oral contrast agents were administered.   FINDINGS: Please note that the study is limited without intravenous contrast.   CHEST:     LUNG/PLEURA/LARGE AIRWAYS: Linear atelectasis noted in the right upper lobe and bilateral lower lobes. No pulmonary masses or consolidation. Mild diffuse interlobular septal thickening is noted in both lungs likely related to pulmonary edema. No pleural effusion or pneumothorax. Trachea and right and left main bronchi are patent.   VESSELS: Aorta and main pulmonary artery are normal in caliber. A [place]-Talita catheter is seen with its tip in the right pulmonary artery. Mild atherosclerotic changes are noted of the aorta and branching vessels. Severe coronary artery calcifications are present.   HEART: Heart is mildly enlarged. Left-sided pacemaker leads are seen terminating in the right cardiac chamber. No pericardial effusion.   MEDIASTINUM AND SHIVANI: No significant lymphadenopathy in the chest. Esophagus is within normal limits.   CHEST WALL AND LOWER NECK: No soft tissue masses in the chest wall. Visualized thyroid gland is within normal limits.   ABDOMEN:   LIVER: Liver is enlarged measuring 22 cm craniocaudally. No focal liver lesion within the limits of noncontrast CT.   BILE DUCTS: No intra or extrahepatic bile duct dilatation.   GALLBLADDER: Gallbladder is partially distended with numerous gallstones. There is  a small amount of pericholecystic fluid.   PANCREAS: The pancreas appears unremarkable.   SPLEEN: Spleen is mildly enlarged measuring 14 cm anterior posteriorly.   ADRENAL GLANDS: No adrenal nodule or thickening.   KIDNEYS AND URETERS: Bilateral kidneys are symmetric in size. No hydroureteronephrosis. There is a punctate 5 mm calculus in the lower pole of the left kidney.   PELVIS:   BLADDER: Urinary bladder is unremarkable.   REPRODUCTIVE ORGANS: Prostate gland is within normal limits.   BOWEL: Stomach and duodenum are within normal limits. Small and large bowel loops are normal in caliber. Large amount of colonic stool burden is seen. The appendix is not definitely visualized. There is however no pericecal stranding or fluid.   VESSELS: Severe atherosclerotic changes noted in the abdominal aorta. No aortic aneurysm. IVC is within normal limits.   PERITONEUM/RETROPERITONEUM/LYMPH NODES: No ascites or fluid collection in the abdomen and pelvis. No retroperitoneal mass. There are few scattered subcentimeter in the retroperitoneum which are nonenlarged by size criteria.   ABDOMINAL WALL: No soft tissue masses in the abdominal wall. Mild subcutaneous stranding noted in the left anterior abdominal wall likely inflammation.   BONES: No suspicious osseous lesions are present. Degenerative discogenic disease is noted in the lower thoracic and lumbar spine.       Chest 1.  Mild diffuse interlobular interstitial thickening in both lungs likely representing pulmonary edema. 2. Cardiomegaly. 3. Support devices as described above.   Abdomen-Pelvis 1.  No acute process in the abdomen and pelvis. 2. Hepatosplenomegaly. 3. Cholelithiasis. Mild pericholecystic fluid is likely related to fluid overload. 4. Left renal calculus without hydronephrosis.     MACRO: None   Signed by: Volodymyr Pettit 7/25/2025 9:39 AM Dictation workstation:   [id number]    CT head wo IV contrast  Result Date: 7/25/2025  Interpreted By:  Cristóbal  Ludmila Durand STUDY: CT HEAD WO IV CONTRAST;  7/24/2025 7:07 pm   INDICATION: Signs/Symptoms:change in neuro status.     COMPARISON: CT facial bones 07/23/2025   ACCESSION NUMBER(S): [id number]   ORDERING CLINICIAN: MATTIE NOEL   TECHNIQUE: Noncontrast axial CT scan of head was performed. Angled reformats in brain and bone windows were generated. The images were reviewed in bone, brain, blood and soft tissue windows.   FINDINGS: CSF Spaces: The ventricles, sulci and basal cisterns are within normal limits. There is no extraaxial fluid collection.   Parenchyma: Mild patchy white matter hypodensity is nonspecific and compatible with microangiopathy. The grey-white differentiation is intact. There is no mass effect or midline shift.  There is no intracranial hemorrhage.   Calvarium: The calvarium is unremarkable.   Paranasal sinuses and mastoids: Visualized paranasal sinuses and mastoids are clear. Prior lens surgery on the left.       No acute intracranial hemorrhage or mass effect.   Mild degree of nonspecific white matter hypodensity compatible with microangiopathy.   I personally reviewed the images/study and I agree with the findings as stated by resident physician Miky Gtz MD. This study was interpreted at [facility], [place], [place].   MACRO: None   Signed by: Kizzy Ambrocio 7/25/2025 7:05 AM Dictation workstation:   [id number]    CT maxillofacial bones wo IV contrast  Result Date: 7/24/2025  Interpreted By:  Shanell Rodriguez, STUDY: CT FACIAL BONES WO IV CONTRAST   INDICATION: Signs/Symptoms:OHT/VAD workup   COMPARISON: None.   ACCESSION NUMBER(S): [id number]   ORDERING CLINICIAN: YONATHAN WALLS   TECHNIQUE: CT of the face was performed without the administration of intravenous contrast.   FINDINGS: SKIN AND SUBCUTANEOUS SOFT TISSUES: No asymmetrical soft tissue swelling or hematoma.   OSSEOUS STRUCTURES: No fracture, dislocation or destructive lesion.   ORBITS: The globes,  retrobulbar fat, extraocular muscles, and optic nerve sheath complexes are intact and normal in morphology.   PARANASAL SINUSES: Visualized paranasal sinuses and mastoid air cells are clear.   OTHER: None.       No acute abnormality of the facial bones.   I have reviewed the images/study and I agree with the findings as stated.   MACRO: None   Signed by: Shanell Rodriguez 7/24/2025 9:33 AM Dictation workstation:   [id number]    XR chest 1 view  Result Date: 7/24/2025  Interpreted By:  Ke Arechiga, STUDY: XR CHEST 1 VIEW; 7/24/2025 7:40 am   INDICATION: Signs/Symptoms:ADHF.   COMPARISON: 07/23/2025   ACCESSION NUMBER(S): [id number]   ORDERING CLINICIAN: CHAYO JOHNSON   FINDINGS: Right IJ [place]-Talita catheter is in place with the tip projecting over distal right main pulmonary artery.   Left subclavian approach pacer/defibrillator is unchanged in positioning.   The cardiac silhouette size is unchanged. Status post median sternotomy.   Low lung volumes and bronchovascular crowding. Mild perihilar congestion. No sizable pleural effusion or pneumothorax.   No acute osseous abnormality.       1. Unchanged appearance of the lungs with mild perihilar congestion/edema and no focal infiltrate. No sizable pneumothorax. 2. Medical devices and postsurgical changes as described above.       Signed by: Ke Lau 7/24/2025 8:32 AM Dictation workstation:   [id number]    Vascular US carotid artery duplex bilateral  Result Date: 7/23/2025            [place] [id number]   Tel [phone] and Fax [phone]  Vascular Lab Report [facility] US CAROTID ARTERY DUPLEX BILATERAL  Patient Name:     NEGRITA BERNARD     Reading           [id number] Marquita LOO                 Physician:        MD Study Date:       7/23/2025            Ordering          [id number] YONATHAN WALLS                                        Physician: MRN/PID:          [id number]              Technologist:     Rajani Varela S Accession#:       [id number]         Technologist 2: Date of           1965 / 60 years  Encounter#:       [id number] Birth/Age: Gender:           M Admission Status: Inpatient            Location          [facility]                                        Performed:  Diagnosis/ICD: Encounter for preprocedural cardiovascular examination-Z01.810 Indication:    Pre-Op OHT/LVAD CPT Codes:     23896 Cerebrovascular Carotid Duplex scan complete  Patient History CAD, HTN and Hyperlipidemia. 10-9-2023 Both ICA <50% stenosis.  CONCLUSIONS: Left Carotid: Findings are consistent with less than 50% stenosis of the left proximal internal carotid artery. Left external carotid artery appears patent with no evidence of stenosis. The left vertebral artery is patent with antegrade flow. No evidence of hemodynamically significant stenosis in the left subclavian artery.  Comparison: Compared with study from 10/9/2023, no significant change.The right CCA and ICA not scaned due to IV lines in right side of neck.  Additional Findings: Unable to scan right CCA and ICA due to IV lines in right side of neck.  Imaging & Doppler Findings: Right Plaque Morph: The proximal right internal carotid artery demonstrates heterogenous plaque. The proximal right common carotid artery demonstrates heterogenous and calcified plaque. The mid right common carotid artery demonstrates heterogenous and calcified plaque. The distal right common carotid artery demonstrates heterogenous and calcified plaque.  Right                  Left  PSV  EDV              PSV      EDV             CCA P    79 cm/s             CCA D    51 cm/s             ICA P    52 cm/s  13 cm/s             ICA M    57 cm/s  17 cm/s             ICA D    64 cm/s  22 cm/s              ECA     107 cm/s           Vertebral  28 cm/s  7 cm/s           Subclavian 94 cm/s               Left ICA/CCA Ratio 1.0   [id number] Marquita Brown MD Electronically  signed by [id number] Marquita Brown MD on 7/23/2025 at 6:27:44 PM  ** Final **     XR chest 2 views  Result Date: 7/23/2025  Interpreted By:  Allyssa Pedraza and Krasnoschlik Nicholas STUDY: XR CHEST 2 VIEWS;  7/23/2025 4:38 pm   INDICATION: Signs/Symptoms:OHT/VAD workup.     COMPARISON: Chest radiograph 07/23/2025   ACCESSION NUMBER(S): [id number]   ORDERING CLINICIAN: YONATHAN WALLS   FINDINGS: PA and lateral radiographs of the chest were provided.  Additional PA dual energy images were also provided.   Medical devices: Left chest wall pacemaker defibrillator with leads in similar position to prior. Similar positioning of a of a right IJ [place]-Talita catheter with tip projecting over the right lower lobe segmental branches. Patient is status post median sternotomy.   CARDIOMEDIASTINAL SILHOUETTE: Cardiomediastinal silhouette is stable in size and configuration.   LUNGS: Mild interstitial markings with perihilar prominence. No pneumothorax.   ABDOMEN: No remarkable upper abdominal findings.   BONES: No acute osseous changes.       1.  Interval retraction of a right IJ [place]-Talita catheter with tip now projecting over the right lower lobe segmental branches. 2. Mild pulmonary edema. 3. Additional medical devices as above.   I personally reviewed the images/study and I agree with the findings as stated by resident physician Cam Sommers MD. This study was interpreted at [place], [place].   MACRO: None   Signed by: Allyssa Pedraza 7/23/2025 4:45 PM Dictation workstation:   [id number]    XR chest 1 view  Result Date: 7/23/2025  Interpreted By:  Allyssa Pedraza, STUDY: XR CHEST 1 VIEW;  7/23/2025 8:14 am   INDICATION: Signs/Symptoms:SGC am rounds.   COMPARISON: 07/22/2025.   ACCESSION NUMBER(S): [id number]   ORDERING CLINICIAN: MEL WATKINS       Minimal improvement of aeration of the both lungs. Stable AICD, placement. [place]-Talita catheter with the tip overlies the right  lower lobe pulmonary artery branch, unchanged. Cardiac silhouette is mildly enlarged. Pulmonary vessels are congested Mild pulmonary edema/fluid overload of the both lungs. Slightly improved since last exam. Small bilateral pleural effusion. No pneumothorax seen. Bony thorax unremarkable.   MACRO: None   Signed by: Allyssa Pedraza 7/23/2025 12:21 PM Dictation workstation:   [id number]    XR chest 1 view  Result Date: 7/22/2025  Interpreted By:  Allyssa Pedraza and Krasnoschlik Nicholas STUDY: XR CHEST 1 VIEW;  7/22/2025 7:31 am   INDICATION: Signs/Symptoms:adhf.     COMPARISON: Chest radiograph 07/21/2025   ACCESSION NUMBER(S): [id number]   ORDERING CLINICIAN: YONATHAN WALLS   FINDINGS: AP radiograph of the chest was provided.   Medical devices: Right IJ [place]-Talita catheter with distal tip projecting over the proximal right interlobar artery. Left chest wall implanted pacemaker/defibrillator with leads in similar position to prior. Patient is status post median sternotomy.   CARDIOMEDIASTINAL SILHOUETTE: Cardiomediastinal silhouette is stable in size and configuration.   LUNGS: Similar appearance of bilateral interstitial and airspace opacities. There is fluid within the minor fissure. No pneumothorax.   ABDOMEN: No remarkable upper abdominal findings.   BONES: No acute osseous changes.       1.  New fluid visualized within the minor fissure and similar appearance of bilateral interstitial and airspace opacities, favored to represent pulmonary edema. Additional considerations include ARDS and pulmonary hemorrhage. 2. Medical devices as above.   I personally reviewed the images/study and I agree with the findings as stated by resident physician Cam Sommers MD. This study was interpreted at [place], [place].   MACRO: None   Signed by: Allyssa Pedraza 7/22/2025 2:14 PM Dictation workstation:   [id number]    XR chest 1 view  Result Date: 7/21/2025  Interpreted By:  Milo  Jaqueline Spivey STUDY: XR CHEST 1 VIEW;  7/21/2025 7:46 am   INDICATION: Signs/Symptoms:SGC Placement.     COMPARISON: Chest radiograph 07/20/2025   ACCESSION NUMBER(S): [id number]   ORDERING CLINICIAN: KORINA BERKOWITZ   FINDINGS: AP radiograph of the chest was provided.   Medical devices: Right IJ [place]-Talita catheter with distal tip projecting over the distal right interlobar artery. Left chest wall pacemaker/defibrillator with leads in similar position to prior. Patient is status post median sternotomy.   CARDIOMEDIASTINAL SILHOUETTE: Cardiomediastinal silhouette is enlarged and stable in size and configuration.   LUNGS: Low lung volumes with bronchovascular crowding. Similar bilateral interstitial markings. Trace blunting of the bilateral costophrenic angles. No pneumothorax.   ABDOMEN: No remarkable upper abdominal findings.   BONES: No acute osseous changes.       1. Right IJ [place]-Talita catheter with distal tip projecting over the distal right interlobar artery. 2. Similar interstitial edema and trace bilateral pleural effusions. 3. Additional medical devices as above.   I personally reviewed the images/study and I agree with the findings as stated by resident physician Cam Sommers MD. This study was interpreted at [place], [place].   MACRO: None   Signed by: Allyssa Pedraza 7/21/2025 11:54 AM Dictation workstation:   [id number]    XR chest 1 view  Result Date: 7/20/2025  Interpreted By:  Claudio Yancey, STUDY: XR CHEST 1 VIEW;  7/20/2025 8:38 am   INDICATION: Signs/Symptoms:ADHF.   COMPARISON: Chest radiograph 07/19/2025 and abdominopelvic CT scan 10/10/2023.   ACCESSION NUMBER(S): [id number]   ORDERING CLINICIAN: CHAYO JOHNSON   FINDINGS: AP radiograph of the chest. Patient is now rotated towards right, somewhat limiting evaluation and comparison. Right IJ approach [place]-Talita catheter has been slightly advanced, the tip of which now overlies  right lower lobe pulmonary artery, approximately 2 cm distal to right hilum. Repositioning recommended Stable positioning of left subclavian approach dual chamber cardiac pacemaker/AICD. Status post prior median sternotomy with intact sternal cerclage wires.   CARDIOMEDIASTINAL SILHOUETTE: Cardiomediastinal silhouette is persistently enlarged, grossly stable in size and configuration.   LUNGS: Low lung volumes with crowding of bronchovascular markings. Similar mild bilateral interstitial prominence. Trace blunting of bilateral costophrenic angles. Overall lung aeration is grossly unchanged from prior. No pneumothorax.   ABDOMEN: No remarkable upper abdominal findings.   BONES: No acute osseous changes.       1. Right IJ approach [place]-Talita catheter has been slightly advanced, the tip of which now overlies right lower lobe pulmonary artery, approximately 2 cm distal to right hilum. Repositioning recommended. 2. No significant change in lung aeration with questionable interstitial edema and trace bilateral pleural effusions/bibasilar atelectasis. 3. Rest of medical devices as above.   Signed by: Claudio Yancey 7/20/2025 11:03 AM Dictation workstation:   [id number]    XR chest 1 view  Result Date: 7/19/2025  Interpreted By:  Claudio Yancey and Liu Scott STUDY: XR CHEST 1 VIEW;  7/19/2025 1:41 pm   INDICATION: Signs/Symptoms:New SG position.   COMPARISON: Chest radiograph 07/17/2025 and abdominopelvic CT scan 10/10/2023.   ACCESSION NUMBER(S): [id number]   ORDERING CLINICIAN: CHAYO JOHNSON   FINDINGS: AP radiograph of the chest was provided.   Interval insertion of right IJ approach [place]-Talita catheter, the tip of which overlies right interlobar pulmonary artery, at the level of right hilum. Stable positioning of left subclavian approach dual chamber cardiac pacemaker/AICD. Status post prior median sternotomy with intact sternal cerclage wires.   CARDIOMEDIASTINAL SILHOUETTE: Cardiomediastinal silhouette is stable in size and  configuration, mildly enlarged.   LUNGS: Low lung volumes with crowding of bronchovascular markings. Mildly prominent interstitial opacities.. Trace blunting of bilateral costophrenic angles. Overall lung aeration is grossly unchanged from prior. No pneumothorax.   ABDOMEN: No remarkable upper abdominal findings.   BONES: No acute osseous changes.       1. Medical devices as above. Of note, new right IJ approach [place]-Talita catheter with its tip projecting over right interlobar pulmonary artery, at the level of right hilum. 2. No significant change in mild interstitial edema and suspected trace bilateral pleural effusions/bibasilar atelectasis.   I personally reviewed the images/study and I agree with the findings as stated by resident physician Miky Gtz MD. This study was interpreted at [facility], [place], [place].   MACRO: None   Signed by: Claudio Yancey 7/19/2025 4:00 PM Dictation workstation:   [id number]    XR chest 1 view  Result Date: 7/17/2025  Interpreted By:  Kristi Buenrostro and Krasnoschlik Nicholas STUDY: XR CHEST 1 VIEW;  7/17/2025 11:40 am   INDICATION: Signs/Symptoms:ADHF.     COMPARISON: Chest radiograph 07/12/2025   ACCESSION NUMBER(S): [id number]   ORDERING CLINICIAN: CHAYO JOHNSON   FINDINGS: AP radiograph of the chest was provided.   Medical devices: Left chest wall implanted pacemaker/defibrillator with leads in similar position. Patient is status post median sternotomy.   CARDIOMEDIASTINAL SILHOUETTE: Cardiomediastinal silhouette is stable in size and configuration.   LUNGS: Similar mild interstitial edema. Trace blunting of the bilateral costophrenic angles. No new focal consolidation or pneumothorax..   ABDOMEN: No remarkable upper abdominal findings.   BONES: No acute osseous changes.       1.  Similar mild interstitial edema. No new focal consolidation or pneumothorax. 2. Trace bilateral pleural effusions. 3. Medical devices as above.   I personally reviewed  the images/study and I agree with the findings as stated by resident physician Cam Sommers MD. This study was interpreted at [facility], [place], [place].   MACRO: None   Signed by: Kristi Buenrostro 7/17/2025 12:00 PM Dictation workstation:   [id number]    ECG 12 lead  Result Date: 7/14/2025  Normal sinus rhythm Possible Left atrial enlargement Low voltage QRS Anterolateral infarct (cited on or before 10-OCT-2023) Abnormal ECG When compared with ECG of 04-MAY-2025 13:32, Questionable change in QRS axis    XR chest 2 views  Result Date: 7/12/2025  Interpreted By:  Elis Benoit, STUDY: Chest, 2 views.   INDICATION: Signs/Symptoms:pulmonary edema.   COMPARISON: XR CHEST 2 VIEWS 5/4/2025.   ACCESSION NUMBER(S): [id number]   ORDERING CLINICIAN: MOLINA YU   FINDINGS: Median sternotomy. Left subclavian AICD/pacemaker device noted. Chronic elevated right hemidiaphragm noted. The cardiomediastinal silhouette size is again noted to be enlarged.   There is no focal consolidation, edema or pneumothorax. Small right-sided pleural effusion with blunting of the costophrenic angle similar to prior.       1. Redemonstration of small right pleural effusion similar to prior. 2. Cardiomegaly and/or pericardial effusion.   MACRO: None.   Signed by: Elis Benoit 7/12/2025 7:20 PM Dictation workstation:   [id number]       Inpatient Medications:  Scheduled Medications[1]  PRN Medications[2]  Continuous Medications[3]    Telemetry 8/7/2025 (personally reviewed): SR/ST 90-110s, rare PVCS, short runs, 3-4 beats, NSVT    Physical exam:  General: NAD  Head/ neck: + JVD   Cardiac: RRR, regular S1 S2 , no murmur, no rub, no gallop  Pulm: CTA bilaterally, no wheezes, rales or rhonchi.    Vascular: Radial 2+ bilaterally  GI: Non distended  Extremities: 2+/1+ pitting BLE edema, improving  Neuro: no focal neuro deficits   Psych: appropriate mood and behavior   Skin: warm and dry      Assessment/Plan   Felice Law is a 60 y.o. male with PMH of ischemic HFrEF 15-20%, CABG (LIMA- LAD 2023),  VT arrest s/p VT ablation and ICD 2017, Hypertension, Hyperlipidemia, Hypothyroidism, Left femoral DVT on Eliquis, COPD, JAKE, CKD III, Chronic BLE venous stasis dermatitis, Insulin dependent Type 2 DM who was initially admitted 7/12 to [facility] for acute decompensate heart failure. Diuresed and transferred to [facility] HFICU for further managemnet and possible re-consideration of advanced therapies.  Of note, Patient was previously discussed for advanced HF therapies [place] [facility] [place] but was declined with concerns for mobility/frailty (uses motorized scooter and has bilateral foot drop with neuropathy), CKD, poorly controlled diabetes, as well as unclear social/financial support.      Acute on chronic systolic ischemic, HFrEF 15-20% (since 2017)  CAD s/p PCI and 1v CABG (LIMA-LAD 4/2023).   VT s/p ICD 2017  - TTE 5/5/25: LV mod dilated, EF 15-20%, gHK of LV, G2DD, mildly enlarged RV with mildly reduced systolic fxn, mild LAE, mild to mod MR and TR  - LHC 6/10/25:  Right dom. LIMA to LAD patent. pLAD 70%, os and pD1 90%, mLCx50%,osOM1 90%, mRCA 100%  - Committee discussion: optimize DM (A1c 10) and defer listing for likely heart transplant / LVAD depending on acuity  - Cardiac MRI 7/2025: EF 17%, mixed CM, mild to mod MR, Transmural infarction (%) involves the mid anteroseptum,apical septum, apical inferior wall, and apex, all demonstrating nonviable myocardium. Subendocardial infarction (25-50%) affects the mid inferior and apical lateral wall, while subendocardial infarction (<25%) involves the mid inferolateral wall: these segments remain viable. Additionally, mid-myocardial enhancement in the basal septum represents a nonischemic, nonspecific pattern. RV function severely reduced.   - There may be value in revascularizing OM and diag but will work on stabilizing renal function  and heart failure. No intervention to be pursued this admission.  - Failed weaning of milrinone, inotrope dependent, Massey line placed 8/5   - GDMT: Hydral/Isordil, farxiga, no beta blocker (inotrope), digoxin 3/wkly (level WNL 7/29).   - stop Entresto/Aldactone due to CHARLOTTE on CKD  - cont Bumex 2 mg PO BID  - Palliative medicine notified of discharge difficulties, pending further discussion and discharge planning  - pending plan for home Milrinone infusion will need to possibly transfer to HFICU for weaning  - Hemodynamically stable, continue current regimen  - cont secondary vascular prevention: aspirin, statin (Discontinue fenofibrate given renal dysfunction)     COPD  Obstructive sleep apnea  Prior tobacco use  - 24 pack year, quit 2017, no current inhalers or O2 need  - encourage incentive spirometry  - cont albuterol nebulizer PRN      CHARLOTTE on CKD, stable/elevated  - b/l Cr ~1.4-1.8  - admit (7/17) Cr 1.62, was 2.55 on (7/12) at [facility], peak 3.24 on (7/26)  - Cr trend: 2.75 (2.64, 2.72, 2.78, 2.86, 2.85, 2.28, 2.42)  - Avoid nephrotoxins and hypotension, trend daily RFP      Anemia of chronic disease and iron deficiency  - (7/17):  iron sat L 16 but otherwise iron studies & ferritin WNL  - s/p IV Venofer 200 mg x5 (completed 8/2)  - cont PO iron supplementation     Left femoral DVT (2017)  - cont Eliquis 5mg BID      Uncontrolled type II DM c/b neuropathy  - HgbA1c (7/17): 10.1%  - Endocrine following, appreciate recs:  -- cont Glargine 40u BID, Lispro SSI scale #3 TIDAC, prandial lispro 22u TIDAC  -- cont Januvia 50 daily, Farxiga 10 daily  - cont gabapentin 200 mg TID  - CCD, Accu-Cheks AC/HS, hypoglycemic protocol      Hypothyroidism  - TSH 7/23/25: WNL. Euthyroid  - cont Synthroid      Loose stool unspecified  - Immodium PRN  - C. Diff neg     DVT ppx: eliquis    DISPO:   - per TCC discussion with pt, Milrinone copay and HHC nurse visit copay likely cost prohibitive  - per Adv HF team, Dobutamine 5  mcg/kg/min submitted for cost analysis, also cost prohibitive  - ongoing discharge planning with [facility], , [facility], and pharmacy    Code status: Full Code  NOK: Aracelis Law, mother: [phone]    Patient seen and discussed with Dr. Jean Claude Uwamungu Jenoy M Bolden, APRN-CNP    Interview conducted using a [place] Interpreter[place]ilio, [place] [id number]     Patient is a 59 yo M with ischemic cardiomyopathy (severely reduced EF), unable to tolerate GDMT and weaning off milrinone which has now been resumed with plan to discharge him on milrinone. He is still volume overloaded. We were informed that milrinone copay is not affordable for the patient. We checked if dobutamine infusion would be affordable but it was amalia cost-prohibitive. We are still working on exploring options for coverage to be discharge on milrinone.     Physical Exam:  Patient is no acute distress  Elevated JVP ~11 mmHg  Normal S1S2, S3 gallop, soft 2/6 systolic murmur on lsb  Lungs clear  Abdomen soft, non-tender  Extremities are warm with 2+ bilateral pitting edema    A/Plan:  As detailed above, patient has ischemic cardiomyopathy with severe reduced left ventricular ejection fraction.  At this time stage D heart failure.  He was deemed to be a good candidate for advanced heart failure therapies due to uncontrolled diabetes mellitus (A1c 10%). He was planned to be discharged on milrinone infusion but this is not affordable for the patient the other alternative which is dobutamine infusion is also cost prohibitive.  We would reengage palliative care and endocrinology as uncontrolled diabetes was one of the limiting factors for advanced heart failure therapies.  We are exploring different options for home inotrope coverage. If unable to be able, will have heart failure try weaning him off milrinone again.         [1]   Scheduled medications   Medication Dose Route Frequency    apixaban  5 mg oral BID    atorvastatin  80 mg oral Nightly    bumetanide  2 mg  oral BID    calcium carbonate  500 mg of calcium carbonate oral BID    dapagliflozin propanediol  10 mg oral Daily    digoxin  125 mcg oral Once per day on Monday Wednesday Friday    gabapentin  200 mg oral TID    [Held by provider] hydrALAZINE  25 mg oral TID    insulin glargine  40 Units subcutaneous Nightly    insulin glargine  40 Units subcutaneous Daily    insulin lispro  0-15 Units subcutaneous TID AC    insulin lispro  22 Units subcutaneous TID AC    iron polysaccharides  150 mg oral Daily    [Held by provider] isosorbide dinitrate  40 mg oral TID    levothyroxine  75 mcg oral Daily before breakfast    pantoprazole  40 mg oral Daily before breakfast    QUEtiapine  50 mg oral Nightly    SITagliptin phosphate  50 mg oral Daily   [2]   PRN medications   Medication    acetaminophen    albuterol    alteplase    dextrose    dextrose    glucagon    glucagon    loperamide   [3]   Continuous Medications   Medication Dose Last Rate    milrinone  0.25 mcg/kg/min 0.25 mcg/kg/min (08/07/25 0800)

## 2025-08-07 NOTE — PROGRESS NOTES
Advanced Heart Failure Progress Note   Consulting Team:  Primary Cardiologist:  Reason for Consult:    Subjective   Negrita Law is a 60 y.o. male   Sitting up in chair, NAD. Denies any CP or SOB, remains comfortable on RA.   Aware of co-pay with home infusion, pt likely unable to afford. SW, pharmacy, and TCC aware.  On Milrinone drip..      Plan  Confer with    Plan to transfer to HFICU for swan guided wean of Inotrope  Repeat HBA1c  Obtain Transplant nephrology for evaluation      Objective   Physical Exam  Vitals:    08/07/25 1111   BP: 110/73   Pulse: 105   Resp: 16   Temp: 36.3 °C (97.3 °F)   SpO2: 95%       GEN: Healthy appearing, well-developed, NAD.  CV: RRR, no m/r/g. JVP   LUNGS: CTAB, no w/r/c.  ABD: Soft, NT/ND, NBS, no masses or organomegaly.  SKIN: Warm, well perfused. No skin rashes or abnormal lesions. No LE edema, Bi LE skin changes  MSK: Normal gait. No deformities.  EXT: No clubbing, cyanosis, or edema.  NEURO: Ambulating with no limitations. No focal deficits.    I have personally reviewed the following images and laboratory findings:      Results for orders placed during the hospital encounter of 05/04/25    Transthoracic Echo Complete    Narrative  [facility], [address] [place] [address]  Tel [phone] and Fax [phone]    TRANSTHORACIC ECHOCARDIOGRAM REPORT      Patient Name:       NEGRITA Shukla Physician:    [id number] Bird Askew MD  Study Date:         5/5/2025            Ordering Provider:    [id number] BENNY CARTWRIGHT  MRN/PID:            [id number]            Fellow:  Accession#:         [id number]        Nurse:                Tres Barros RN  Date of Birth/Age:  1965 / 60 years Sonographer:          Alyssa Monroy RDCS  Gender assigned at                     Additional Staff:  Birth:  Height:             180.34 cm           Admit Date:           5/4/2025  Weight:             101.61  kg           Admission Status:     Inpatient -  Routine  BSA / BMI:          2.21 m2 / 31.24     Encounter#:           [id number]  kg/m2  Blood Pressure:     116/75 mmHg         Department Location:  [facility]    Study Type:    TRANSTHORACIC ECHO (TTE) COMPLETE  Diagnosis/ICD: Unspecified systolic (congestive) heart failure (CHF)-I50.20;  Ischemic cardiomyopathy-I25.5  Indication:    SOB CP Edema  CPT Code:      Echo Limited-27245; Doppler Limited-22146; Color Doppler-87159    Patient History:  CABG:              CABG x 1.  Smoker:            Former.  Pacer/Defib:       AICD  Pertinent History: CAD, Cardiomyopathy, CHF, COPD, PVD, Previous DVT, HTN and  Hyperlipidemia. PCI-Stent, S/P CABG x1 2023, CKD III, JAKE.    Study Detail: The following Echo studies were performed: 2D, Doppler and color  flow. Technically challenging study due to body habitus. Definity  used as a contrast agent for endocardial border definition. Total  contrast used for this procedure was 2 mL via IV push.      PHYSICIAN INTERPRETATION:  Left Ventricle: Left ventricular ejection fraction is severely decreased, by visual estimate at 15-20%. There is global hypokinesis of the left ventricle with minor regional variations. The left ventricular cavity size is moderately dilated. Abnormal (paradoxical) septal motion, consistent with RV pacemaker. Spectral Doppler shows a Grade II (pseudonormal pattern) of left ventricular diastolic filling with an elevated left atrial pressure.  Left Atrium: The left atrium is mildly dilated.  Right Ventricle: The right ventricle is mildly enlarged. There is moderately reduced right ventricular systolic function.  Right Atrium: The right atrium is normal in size.  Aortic Valve: The aortic valve is trileaflet. There is minimal aortic valve cusp calcification. There is mild aortic valve thickening. There is no evidence of aortic valve regurgitation. The peak instantaneous gradient of the aortic valve is 6  mmHg.  Mitral Valve: The mitral valve is mildly thickened. There is mild to moderate mitral valve regurgitation.  Tricuspid Valve: The tricuspid valve is structurally normal. There is mild to moderate tricuspid regurgitation. The Doppler estimated RVSP is moderately elevated at 58.3 mmHg.  Pulmonic Valve: The pulmonic valve is structurally normal. There is physiologic pulmonic valve regurgitation.  Pericardium: Trivial pericardial effusion.  Aorta: The aortic root is normal.  Systemic Veins: The inferior vena cava appears dilated.  In comparison to the previous echocardiogram(s): Compared with study dated 6/25/2024, LVEF 15-20%.      CONCLUSIONS:  1. Left ventricular ejection fraction is severely decreased, by visual estimate at 15-20%.  2. There is global hypokinesis of the left ventricle with minor regional variations.  3. Spectral Doppler shows a Grade II (pseudonormal pattern) of left ventricular diastolic filling with an elevated left atrial pressure.  4. Left ventricular cavity size is moderately dilated.  5. Abnormal septal motion consistent with RV pacemaker.  6. There is moderately reduced right ventricular systolic function.  7. Mildly enlarged right ventricle.  8. The left atrium is mildly dilated.  9. Mild to moderate mitral valve regurgitation.  10. Mild to moderate tricuspid regurgitation visualized.  11. Moderately elevated right ventricular systolic pressure.    QUANTITATIVE DATA SUMMARY:    2D MEASUREMENTS:           Normal Ranges:  LVEDV Index:     119 ml/m2      LV SYSTOLIC FUNCTION:  Normal Ranges:  EF-A4C View:    21 % (>=55%)  EF-A2C View:    8 %  EF-Biplane:     16 %  EF-Visual:      18 %  LV EF Reported: 18 %      LV DIASTOLIC FUNCTION:           Normal Ranges:  MV Peak A:             0.65 m/s  (0.42-0.7 m/s)  MV e'                  0.067 m/s (>8.0)  MV lateral e'          0.09 m/s  MV medial e'           0.04 m/s      MITRAL VALVE:          Normal Ranges:  MV DT:        198 msec  "(150-240msec)      MITRAL INSUFFICIENCY:             Normal Ranges:  MR Vmax:              342.00 cm/s      AORTIC VALVE:          Normal Ranges:  AoV Vmax:     1.19 m/s (<=1.7m/s)  AoV Peak P.7 mmHg (<20mmHg)  LVOT Max Dylan: 0.65 m/s (<=1.1m/s)  LVOT VTI:     6.72 cm      RIGHT VENTRICLE:  TAPSE: 15.5 mm  RV s'  0.09 m/s      TRICUSPID VALVE/RVSP:          Normal Ranges:  Peak TR Velocity:     3.29 m/s  RV Syst Pressure:     58 mmHg  (< 30mmHg)  IVC Diam:             2.40 cm      [id number] Bird Askew MD  Electronically signed on 2025 at 1:38:04 PM        ** Final **         Lab Review   Lab Results   Component Value Date     (L) 2025     (L) 2025     (L) 2025    K 4.0 2025    K 4.2 2025    K 4.4 2025    CO2 26 2025    CO2 26 2025    CO2 26 2025    BUN 71 (H) 2025    BUN 71 (H) 2025    BUN 71 (H) 2025    CREATININE 2.75 (H) 2025    CREATININE 2.64 (H) 2025    CREATININE 2.72 (H) 2025    GLUCOSE 83 2025    GLUCOSE 98 2025    GLUCOSE 229 (H) 2025    CALCIUM 9.7 2025    CALCIUM 9.6 2025    CALCIUM 9.4 2025     No results found for: \"CKTOTAL\", \"CKMB\", \"CKMBINDEX\", \"TROPONINI\"  Lab Results   Component Value Date    WBC 5.7 2025    WBC 6.0 2025    WBC 5.7 2025    HGB 10.5 (L) 2025    HGB 10.4 (L) 2025    HGB 10.5 (L) 2025    HCT 34.7 (L) 2025    HCT 35.9 (L) 2025    HCT 35.4 (L) 2025    MCV 82 2025    MCV 82 2025    MCV 81 2025     2025     2025     2025       Troponin I, High Sensitivity   Date/Time Value Ref Range Status   2025 07:37 AM 53 () 0 - 20 ng/L Final     Comment:     Previous result verified on 2025 on specimen/case [id number] called with component [facility] for procedure Troponin I, High Sensitivity, Initial with value 53 ng/L. "   07/12/2025 08:38 PM 53 (HH) 0 - 20 ng/L Final     Comment:     Previous result verified on 7/12/2025 2001 on specimen/case [id number] called with component [facility] for procedure Troponin I, High Sensitivity, Initial with value 53 ng/L.   07/12/2025 07:22 PM 53 (HH) 0 - 20 ng/L Final     Troponin I, High Sensitivity ([facility])   Date/Time Value Ref Range Status   07/17/2025 11:19 AM 37 0 - 53 ng/L Final     BNP   Date/Time Value Ref Range Status   07/31/2025 07:45  (H) 0 - 99 pg/mL Final   07/26/2025 04:46  (H) 0 - 99 pg/mL Final   07/23/2025 12:30  (H) 0 - 99 pg/mL Final     Triglycerides   Date/Time Value Ref Range Status   06/10/2025 08:31  (H) 0 - 149 mg/dL Final     Comment:     Age              Desirable        Borderline         High        Very High  SEX:B           mg/dL             mg/dL               mg/dL      mg/dL  <=14D                       ----               ----        ----  15D-365D                    ----               ----        ----  1Y-9Y           0-74               75-99             >=100       ----  10Y-19Y        0-89                            >=130       ----  20Y-24Y        0-114             115-149             >=150      ----  >= 25Y         0-149             150-199             200-499    >=500      Venipuncture immediately after or during the administration of Metamizole may lead to falsely low results. Testing should be performed immediately prior to Metamizole dosing.   11/11/2024 03:58 PM 2,663 (H) 0 - 149 mg/dL Final        Assessment and Plan     Felice Law is a 60 y.o. male male with a complex medical history including ischemic heart failure with reduced ejection fraction (HFrEF, 15-20%), coronary artery bypass grafting (CABG) in 2023 utilizing the left internal mammary artery (LIMA) to the left anterior descending artery (LAD), ventricular tachycardia (VT) arrest treated with VT ablation and an implantable cardioverter  defibrillator (ICD) placed in 2017. His comorbidities include hypertension, hyperlipidemia, hypothyroidism, left femoral deep vein thrombosis managed with Eliquis, chronic obstructive pulmonary disease (COPD), obstructive sleep apnea (JAKE), stage III chronic kidney disease (CKD), chronic bilateral lower extremity venous stasis dermatitis, and insulin-dependent type 2 diabetes mellitus.    He was initially admitted on July 12 to [place] for acute decompensated heart failure and was subsequently diuresed and transferred to the [facility] ([facility] Heart Failure Intensive Care Unit (HFICU) for advanced management. Given his prior evaluation for advanced heart failure therapies during a hospital admission in October 2023--which was deferred due to concerns about frailty, mobility limitations (uses a motorized scooter and has bilateral foot drop with neuropathy), CKD, poorly controlled diabetes, and unclear social and financial support--his case was re-assessed starting July 23.    The advanced therapies team and the heart transplant/ventricular assist device (Gavi/VAD) committee reviewed his case. The primary barrier to listing remains inadequate diabetes control. Given his chronically low glomerular filtration rate (GFR), consideration for a combined heart-kidney transplant was discussed. He was transitioned to the general floor on milrinone with discharge planning underway. However, discharge has been complicated by the prohibitive cost of milrinone copayments and challenges securing home health care nursing visits    #Acute on chronic systolic CHF, HFrEF 15-20%, dx'd 2017,   #Ischemic Cardiomyopathy, ACC/AHA Stage C-D, NYHA II  Patient has severe ischemic cardiomyopathy with an EF of 15-20%, extensive nonviable myocardium on MRI, and is inotrope-dependent with failed milrinone weaning. Cardiac catheterization shows significant coronary disease but no immediate intervention planned;  focus is on optimizing diabetes and stabilizing renal and heart failure status. Advanced therapies listing deferred due to poorly controlled diabetes, with ongoing discharge challenges   Compensated HF  On Milrinone 0.25, Hydralazine 25 mg TID, Isordil 40 mg, Bumex 2 mg every day  Discharge has been complicated by the prohibitive cost of milrinone copayments and challenges securing home health care nursing visits.  CKD 3b>4 with EGFR 26    Recommendations  Confer with  for possible financial solutions  Plan to transfer to HFICU for swan guided wean off Inotrope  Repeat HBA1c  Obtain Transplant nephrology for evaluation      Thank you for the opportunity to involve us in the care of this fine individual. This plan was discussed with Dr. Chun, HF attending. For any questions or concerns, feel free to reach out via [employer] chat or page at [phone].    Philomena Worrell MD   Heart Failure Fellow  PGY-4

## 2025-08-07 NOTE — CARE PLAN
The patient's goals for the shift include continue with independence with ADLs    The clinical goals for the shift include Patient will remain safe and free of injury this shift    Problem: Pain - Adult  Goal: Verbalizes/displays adequate comfort level or baseline comfort level  Outcome: Progressing     Problem: Safety - Adult  Goal: Free from fall injury  Outcome: Progressing     Problem: Heart Failure  Goal: Improved gas exchange this shift  Outcome: Progressing  Goal: Improved urinary output this shift  Outcome: Progressing  Goal: Reduction in peripheral edema within 24 hours  Outcome: Progressing     Patient remained HDS throughout shift

## 2025-08-07 NOTE — CARE PLAN
Problem: Safety - Adult  Goal: Free from fall injury  Outcome: Progressing     Problem: Nutrition  Goal: Nutrient intake appropriate for maintaining nutritional needs  Outcome: Progressing     Patient was safe and stable this shift. No complaints of CP or SOB. Milrinone gtt continued.

## 2025-08-07 NOTE — PROGRESS NOTES
Social work Note  This SW was contacted by the team to assist with discharge planning. After speaking with /[facility] I had a better understanding of the patients medical plan. My understanding: the patient has a [facility] dual plan, his [facility] Medicare component for home infusion has an OOP deductible balance of $9350. He has met $257.00. His cost is 20% of $9350, which comes to  approximately $33.30 per day for Medication/pharmacy and $52.00 per nurse visit.     I spoke with the patient with the doctor present to go over the discharge plan. I then met with the patient alone. I called /home infusion and with the assistance of Norma  [id number], we went over his insurance coverage and his potential OOP/cost. He is agreeable, and hopeful that he will only need to be on the medication for 3 months.     Though out my time with him I asked if he had any questions, and if he was clear about his potential responsibility.  He understands, that he will not be billed until his insurance is billed. He understands the medication cost is $33.90 per day and his nurse cost is $52.00 per visit.   Pt understand that he can also work with our financial counselors. Unfortunately, he stated has tried to work with them in the past and has had some problems. He happened to have his financial assistance applications that he completed and mailed to /[facility] they were sent back to him. The envelope was addressed to [facility]. I told him that I would send them to the  financial office. I have the originals and gave him the copies.     Important information that can benefit him.   Retroactive Medicaid is meant to provide a safety net for financially needy persons who have an unexpected illness or injury. It provides a way for medical bills to get paid for up to three months prior to Medicaid application for care recipients who would have been Medicaid-eligible, had they applied at that time     I have updated the SW supervisor and I updated  the team and the pharmacy.   CELENA Lam

## 2025-08-07 NOTE — PROGRESS NOTES
Physical Therapy                 Therapy Communication Note    Patient Name: Felice Law  MRN: [id number]  Department: [place] [address]  Room: [address] [place], [place] [id number]  Today's Date: 8/7/2025     Discipline: Physical Therapy    Missed Visit: PT Missed Visit: Yes     Missed Visit Reason: Missed Visit Reason:  (Pt meeting with , reports significant fatigue. Requesting this PTA return tomorrow.)    Missed Time: Attempt 1522    Comment:

## 2025-08-08 ENCOUNTER — DOCUMENTATION (OUTPATIENT)
Dept: CARE COORDINATION | Facility: CLINIC | Age: 60
End: 2025-08-08
Payer: COMMERCIAL

## 2025-08-08 LAB
ALBUMIN SERPL BCP-MCNC: 4 G/DL (ref 3.4–5)
ANION GAP SERPL CALC-SCNC: 15 MMOL/L (ref 10–20)
BUN SERPL-MCNC: 74 MG/DL (ref 6–23)
CALCIUM SERPL-MCNC: 9.8 MG/DL (ref 8.6–10.6)
CHLORIDE SERPL-SCNC: 94 MMOL/L (ref 98–107)
CO2 SERPL-SCNC: 28 MMOL/L (ref 21–32)
CREAT SERPL-MCNC: 2.59 MG/DL (ref 0.5–1.3)
EGFRCR SERPLBLD CKD-EPI 2021: 28 ML/MIN/1.73M*2
ERYTHROCYTE [DISTWIDTH] IN BLOOD BY AUTOMATED COUNT: 19.9 % (ref 11.5–14.5)
GLUCOSE BLD MANUAL STRIP-MCNC: 143 MG/DL (ref 74–99)
GLUCOSE BLD MANUAL STRIP-MCNC: 149 MG/DL (ref 74–99)
GLUCOSE BLD MANUAL STRIP-MCNC: 161 MG/DL (ref 74–99)
GLUCOSE BLD MANUAL STRIP-MCNC: 230 MG/DL (ref 74–99)
GLUCOSE SERPL-MCNC: 121 MG/DL (ref 74–99)
HCT VFR BLD AUTO: 35.8 % (ref 41–52)
HGB BLD-MCNC: 10.7 G/DL (ref 13.5–17.5)
MAGNESIUM SERPL-MCNC: 2.4 MG/DL (ref 1.6–2.4)
MCH RBC QN AUTO: 24 PG (ref 26–34)
MCHC RBC AUTO-ENTMCNC: 29.9 G/DL (ref 32–36)
MCV RBC AUTO: 80 FL (ref 80–100)
NRBC BLD-RTO: 0 /100 WBCS (ref 0–0)
PHOSPHATE SERPL-MCNC: 4.1 MG/DL (ref 2.5–4.9)
PLATELET # BLD AUTO: 337 X10*3/UL (ref 150–450)
POTASSIUM SERPL-SCNC: 4.2 MMOL/L (ref 3.5–5.3)
RBC # BLD AUTO: 4.45 X10*6/UL (ref 4.5–5.9)
SODIUM SERPL-SCNC: 133 MMOL/L (ref 136–145)
WBC # BLD AUTO: 5.8 X10*3/UL (ref 4.4–11.3)

## 2025-08-08 PROCEDURE — 80069 RENAL FUNCTION PANEL: CPT | Performed by: PHYSICIAN ASSISTANT

## 2025-08-08 PROCEDURE — 2500000002 HC RX 250 W HCPCS SELF ADMINISTERED DRUGS (ALT 637 FOR MEDICARE OP, ALT 636 FOR OP/ED): Performed by: PHYSICIAN ASSISTANT

## 2025-08-08 PROCEDURE — 1100000001 HC PRIVATE ROOM DAILY

## 2025-08-08 PROCEDURE — 2500000001 HC RX 250 WO HCPCS SELF ADMINISTERED DRUGS (ALT 637 FOR MEDICARE OP)

## 2025-08-08 PROCEDURE — 99233 SBSQ HOSP IP/OBS HIGH 50: CPT

## 2025-08-08 PROCEDURE — 99233 SBSQ HOSP IP/OBS HIGH 50: CPT | Performed by: STUDENT IN AN ORGANIZED HEALTH CARE EDUCATION/TRAINING PROGRAM

## 2025-08-08 PROCEDURE — 2500000001 HC RX 250 WO HCPCS SELF ADMINISTERED DRUGS (ALT 637 FOR MEDICARE OP): Performed by: PHYSICIAN ASSISTANT

## 2025-08-08 PROCEDURE — 2500000004 HC RX 250 GENERAL PHARMACY W/ HCPCS (ALT 636 FOR OP/ED)

## 2025-08-08 PROCEDURE — 97116 GAIT TRAINING THERAPY: CPT | Mod: GP,CQ

## 2025-08-08 PROCEDURE — 83735 ASSAY OF MAGNESIUM: CPT | Performed by: PHYSICIAN ASSISTANT

## 2025-08-08 PROCEDURE — 36415 COLL VENOUS BLD VENIPUNCTURE: CPT | Performed by: PHYSICIAN ASSISTANT

## 2025-08-08 PROCEDURE — 82947 ASSAY GLUCOSE BLOOD QUANT: CPT

## 2025-08-08 PROCEDURE — 2500000002 HC RX 250 W HCPCS SELF ADMINISTERED DRUGS (ALT 637 FOR MEDICARE OP, ALT 636 FOR OP/ED)

## 2025-08-08 PROCEDURE — 85027 COMPLETE CBC AUTOMATED: CPT | Performed by: PHYSICIAN ASSISTANT

## 2025-08-08 PROCEDURE — 2500000002 HC RX 250 W HCPCS SELF ADMINISTERED DRUGS (ALT 637 FOR MEDICARE OP, ALT 636 FOR OP/ED): Performed by: NURSE PRACTITIONER

## 2025-08-08 RX ADMIN — BUMETANIDE 2 MG: 1 TABLET ORAL at 18:40

## 2025-08-08 RX ADMIN — LEVOTHYROXINE SODIUM 75 MCG: 0.07 TABLET ORAL at 06:25

## 2025-08-08 RX ADMIN — MILRINONE LACTATE IN DEXTROSE 0.25 MCG/KG/MIN: 0.2 INJECTION, SOLUTION INTRAVENOUS at 10:04

## 2025-08-08 RX ADMIN — INSULIN LISPRO 22 UNITS: 100 INJECTION, SOLUTION INTRAVENOUS; SUBCUTANEOUS at 18:40

## 2025-08-08 RX ADMIN — ISOSORBIDE DINITRATE 40 MG: 40 TABLET ORAL at 18:40

## 2025-08-08 RX ADMIN — PANTOPRAZOLE SODIUM 40 MG: 40 TABLET, DELAYED RELEASE ORAL at 06:25

## 2025-08-08 RX ADMIN — HYDRALAZINE HYDROCHLORIDE 25 MG: 25 TABLET ORAL at 20:24

## 2025-08-08 RX ADMIN — MILRINONE LACTATE IN DEXTROSE 0.25 MCG/KG/MIN: 0.2 INJECTION, SOLUTION INTRAVENOUS at 20:25

## 2025-08-08 RX ADMIN — INSULIN GLARGINE 40 UNITS: 100 INJECTION, SOLUTION SUBCUTANEOUS at 09:18

## 2025-08-08 RX ADMIN — DIGOXIN 125 MCG: 125 TABLET ORAL at 09:20

## 2025-08-08 RX ADMIN — INSULIN GLARGINE 40 UNITS: 100 INJECTION, SOLUTION SUBCUTANEOUS at 20:24

## 2025-08-08 RX ADMIN — APIXABAN 5 MG: 5 TABLET, FILM COATED ORAL at 09:19

## 2025-08-08 RX ADMIN — INSULIN LISPRO 22 UNITS: 100 INJECTION, SOLUTION INTRAVENOUS; SUBCUTANEOUS at 09:17

## 2025-08-08 RX ADMIN — INSULIN LISPRO 22 UNITS: 100 INJECTION, SOLUTION INTRAVENOUS; SUBCUTANEOUS at 13:51

## 2025-08-08 RX ADMIN — INSULIN LISPRO 4 UNITS: 100 INJECTION, SOLUTION INTRAVENOUS; SUBCUTANEOUS at 13:51

## 2025-08-08 RX ADMIN — DAPAGLIFLOZIN 10 MG: 10 TABLET, FILM COATED ORAL at 09:19

## 2025-08-08 RX ADMIN — APIXABAN 5 MG: 5 TABLET, FILM COATED ORAL at 20:24

## 2025-08-08 RX ADMIN — ISOSORBIDE DINITRATE 40 MG: 40 TABLET ORAL at 13:52

## 2025-08-08 RX ADMIN — QUETIAPINE FUMARATE 50 MG: 25 TABLET ORAL at 20:25

## 2025-08-08 RX ADMIN — HYDRALAZINE HYDROCHLORIDE 25 MG: 25 TABLET ORAL at 14:00

## 2025-08-08 RX ADMIN — GABAPENTIN 200 MG: 100 CAPSULE ORAL at 09:19

## 2025-08-08 RX ADMIN — SITAGLIPTIN 50 MG: 50 TABLET, FILM COATED ORAL at 09:19

## 2025-08-08 RX ADMIN — BUMETANIDE 2 MG: 1 TABLET ORAL at 09:19

## 2025-08-08 RX ADMIN — INSULIN LISPRO 2 UNITS: 100 INJECTION, SOLUTION INTRAVENOUS; SUBCUTANEOUS at 09:17

## 2025-08-08 RX ADMIN — ATORVASTATIN CALCIUM 80 MG: 80 TABLET, FILM COATED ORAL at 20:25

## 2025-08-08 RX ADMIN — ISOSORBIDE DINITRATE 40 MG: 40 TABLET ORAL at 09:19

## 2025-08-08 RX ADMIN — POLYSACCHARIDE-IRON COMPLEX 150 MG: 150 CAPSULE ORAL at 09:19

## 2025-08-08 RX ADMIN — GABAPENTIN 200 MG: 100 CAPSULE ORAL at 20:25

## 2025-08-08 RX ADMIN — GABAPENTIN 200 MG: 100 CAPSULE ORAL at 14:00

## 2025-08-08 RX ADMIN — HYDRALAZINE HYDROCHLORIDE 25 MG: 25 TABLET ORAL at 09:19

## 2025-08-08 ASSESSMENT — COGNITIVE AND FUNCTIONAL STATUS - GENERAL
DAILY ACTIVITIY SCORE: 24
MOBILITY SCORE: 17
WALKING IN HOSPITAL ROOM: A LITTLE
WALKING IN HOSPITAL ROOM: A LITTLE
TURNING FROM BACK TO SIDE WHILE IN FLAT BAD: A LITTLE
MOVING TO AND FROM BED TO CHAIR: A LITTLE
STANDING UP FROM CHAIR USING ARMS: A LITTLE
MOVING FROM LYING ON BACK TO SITTING ON SIDE OF FLAT BED WITH BEDRAILS: A LITTLE
MOBILITY SCORE: 24
CLIMB 3 TO 5 STEPS WITH RAILING: A LOT
CLIMB 3 TO 5 STEPS WITH RAILING: A LITTLE

## 2025-08-08 ASSESSMENT — PAIN - FUNCTIONAL ASSESSMENT
PAIN_FUNCTIONAL_ASSESSMENT: 0-10

## 2025-08-08 ASSESSMENT — PAIN SCALES - GENERAL
PAINLEVEL_OUTOF10: 0 - NO PAIN

## 2025-08-08 NOTE — NURSING NOTE
Patient met lying in bed. Patient received CHG bath and bathed with wash clothes and water basin at bedside. Assistive equipment provided. Patient has personal belongings and call light within reach. Patient expresses no further needs at this time.

## 2025-08-08 NOTE — PROGRESS NOTES
"\"Spoke with the patient at bedside using the [facility] . Patient confirmed that his friend, Robinson ([phone]), can serve as the key learner for home-going infusion. Discussed the cost of home infusion therapy with the patient: medication is $33.90 per day, and nursing visits are $52.00 per visit. Patient stated to this Transitional Care Coordinator (TCC) that he could not afford the associated costs. The medical team has been notified. Phone call attempts to Robinson by this TCC have been unsuccessful--only receiving a busy signal.  "

## 2025-08-08 NOTE — PROGRESS NOTES
Subjective:  Patient seen and assessed this morning, sitting up in chair, NAD. /DAJUAN used during visit. Denies any CP or SOB, remains comfortable on RA. Patient updated for plan to DC Monday with IV infusion pump, cost barriers assisted through social work.  -120 on tele. Weight down by 2kg.      utilized for communication.    Overnight Events:  No acute events overnight.         Objective:  Vitals:    08/08/25 1516   BP: 104/68   Pulse: 104   Resp: 18   Temp: 36.3 °C (97.3 °F)   SpO2: 95%     Weight         8/5/2025  0425 8/6/2025  0537 8/6/2025  1100 8/7/2025  0500 8/8/2025  0615    Weight: 100 kg (220 lb 10.9 oz) 94.3 kg (207 lb 14.3 oz) 98.7 kg (217 lb 9.5 oz) 98.3 kg (216 lb 11.4 oz) 96.7 kg (213 lb 3 oz)            Intake/Output Summary (Last 24 hours) at 8/8/2025 1923  Last data filed at 8/8/2025 1800  Gross per 24 hour   Intake 1320 ml   Output 3675 ml   Net -2355 ml     Recent Results (from the past 24 hours)   POCT GLUCOSE    Collection Time: 08/07/25  8:57 PM   Result Value Ref Range    POCT Glucose 102 (H) 74 - 99 mg/dL   POCT GLUCOSE    Collection Time: 08/08/25  6:14 AM   Result Value Ref Range    POCT Glucose 161 (H) 74 - 99 mg/dL   POCT GLUCOSE    Collection Time: 08/08/25 11:37 AM   Result Value Ref Range    POCT Glucose 230 (H) 74 - 99 mg/dL   CBC    Collection Time: 08/08/25  6:16 PM   Result Value Ref Range    WBC 5.8 4.4 - 11.3 x10*3/uL    nRBC 0.0 0.0 - 0.0 /100 WBCs    RBC 4.45 (L) 4.50 - 5.90 x10*6/uL    Hemoglobin 10.7 (L) 13.5 - 17.5 g/dL    Hematocrit 35.8 (L) 41.0 - 52.0 %    MCV 80 80 - 100 fL    MCH 24.0 (L) 26.0 - 34.0 pg    MCHC 29.9 (L) 32.0 - 36.0 g/dL    RDW 19.9 (H) 11.5 - 14.5 %    Platelets 337 150 - 450 x10*3/uL   POCT GLUCOSE    Collection Time: 08/08/25  6:21 PM   Result Value Ref Range    POCT Glucose 143 (H) 74 - 99 mg/dL     IR CVC tunneled  Result Date: 8/5/2025  Interpreted By:  Wilian Sahu, STUDY: IR CVC TUNNELED;  8/4/2025 3:00 pm    INDICATION: Signs/Symptoms:dobutamine IV therapy. Needs Massey.   COMPARISON: None.   ACCESSION NUMBER(S): [id number]   ORDERING CLINICIAN: RADHA BREAUX   TECHNIQUE: INTERVENTIONALIST(S): Wilian Sahu MD   CONSENT: The patient/patient's POA/next of kin was informed of the nature of the proposed procedure. The purposes, alternatives, risks, and benefits were explained and discussed. All questions were answered and consent was obtained.   RADIATION EXPOSURE: Fluoroscopy time: 0.7 min   SEDATION: Moderate conscious IV sedation services (supervision of administration, induction, and maintenance) were provided by the physician performing the procedure with intravenous fentanyl 50 mcgmcg and versed 1mg. The physician was assisted by an independent trained observer, an interventional radiology nurse, in the continuous monitoring of patient level of consciousness and physiologic status.   MEDICATION: None.   TIME OUT: A time out was performed immediately prior to procedure start with the interventional team, correctly identifying the patient name, date of birth, MRN, procedure, anatomy (including marking of site and side), patient position, procedure consent form, relevant laboratory and imaging test results, antibiotic administration, safety precautions, and procedure-specific equipment needs.   COMPLICATIONS: No immediate adverse events identified.   FINDINGS: In the recumbent position, the patient was positioned on the angiography table. The right supraclavicular and infraclavicular cutaneous tissues were prepared and draped in usual sterile manner.   The supraclavicular access site was screened with gray-scale ultrasound with subsequent subcutaneous instillation of Lidocaine 1% local anesthesia. Ultrasound images demonstrate a patent right internal jugular vein. Under direct ultrasound guidance and Seldinger/micropuncture technique, the right internal jugular vein was accessed. An ultrasound digital spot image  was acquired and stored on the  PACS.   After confirmation of location, a 018 [place]-Mandril guidewire was inserted to secure location. The guidewire was advanced into the inferior vena cava utilizing intermittent fluoroscopy. The micro-access needle was removed over the guidewire. Utilizing a 5-on-4 coaxial dilator sheath system, upsize to a 035 guidewire was performed. Subsequent access tract dilation was performed to an eventual 10-[place] peel-away sheath dilator system.   After Lidocaine 1% local anesthesia, a subcutaneous tunnel tract was created from the  right infraclavicular chest to the venous access site. After continuity of the tunnel tract and venous access site was obtained, a 9.6 [place] single lumen Massey catheter was then placed with tract continuity and its central catheter tip(s) to reside at the cavoatrial junction. A fluoroscopic spot image of the chest was acquired in the AP projection to confirm optimal location.   The catheter ports were aspirated and flushed without resistance with normal saline. The catheter ports were then charged with high-concentration heparin. The venous access site was closed and sterilely dressed. The external portions of the catheter were secured with a purse-string 2-0 polypropylene suture and sterile dressings.   The patient tolerated the procedure without complication.       1. Uncomplicated placement of an indwelling right internal jugular infraclavicular 9.6 Fr single lumen Massey catheter. The catheter is ready for use.   I was present for and/or performed the critical portions of the procedure and immediately available throughout the entire procedure.   I personally reviewed the image(s) / study and resident interpretation. I agree with the findings as stated.   Performed and dictated at [facility].   MACRO: None.   Signed by: Wilian Sahu 8/5/2025 6:23 AM Dictation workstation:   [id number]    MR cardiac morphology and  function w and wo IV contrast  Result Date: 7/30/2025  Interpreted By:  Leonardo Nathan and Amoah Joseph STUDY: MR CARDIAC MORPHOLOGY AND FUNCTION W AND WO IV CONTRAST;  7/30/2025 12:20 pm   INDICATION: Signs/Symptoms:Viability assessment in ischemic cardiomyopathy.   COMPARISON: Echocardiogram done on 05/05/2025.   ACCESSION NUMBER(S): [id number]   ORDERING CLINICIAN: RADHA BREAUX   TECHNIQUE: Siemens 1.5  Effie MRI scanner. Turbo spin echo and balanced steady state free precession (bSSFP) imaging for anatomic definition. Dynamic cine bSSFP for cardiac chamber and wall-motion analysis, and valvular analysis. Flow quantification sequences for hemodynamics. Delayed gadolinium enhancement analysis after injection of gadolinium-chelate (mL of Dotarem, 0.2 mmol/kg).   HT- ; WT-; BSA-  m2   FINDINGS: CARDIAC CHAMBERS: Normal atrioventricular and ventriculoarterial concordance   LEFT ATRIUM: Moderately dilated (NIKKI - 67.94 ml/m2).   RIGHT ATRIUM: Moderately dilated (RA area max 4ch - 27.40 cm2)   INTERATRIAL SEPTUM: Intact.   LEFT VENTRICLE: 1. Severely dilated LV size (EDVi 156 ml/m2) and severely reduced systolic function. Quantitative LVEF 17 %.. 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 2. Multiple regional wall motion abnormalities are noted. 3. Normal LV wall thickness and normal LV indexed mass (LVMi 92 g/m2). 4. T2 mapping was not performed. 5. ECV unable to be calculated as T1 mapping was not performed. 6. No evidence of LV thrombus. 7. Delayed-enhancement imaging was performed. There is transmural infarct involving the mid anteroseptum, apical septum, apical inferior and apex. Subendocardial infarct involving the mid inferolateral/inferior wall and apicolateral wall. In addition there is mid myocardial enhancement of the basal septum.   Quantitative left ventricular functional values are as follows: EDV = 350.02 cc; EDVi = 156.40 cc/m2 ESV = 289.00 cc; ESVi = 129.14 cc/m2 Absolute Cardiac Output =  6.96 l/min.; COi = 3.11 l/min/m2 LV mass = 206.94 gm; LVMi = 92.47 gm/m2 Stroke volume = 61.01 cc; SVi = 129.14 cc/m2 LVEF = 17 %   LV septal wall thickness (anterobasal): 0.6 cm LV postero-inferior wall thickness: 0.9 cm LVEDD: 7.5 cm LVESD: 6 cm   RIGHT VENTRICLE: 1. Normal RV size (EDVi 86 ml/m2) and severely reduced systolic function. Quantitative RVEF 27 %. 2. No regional wall motion abnormalities. 3. No abnormal delayed enhancement in the myocardium.   Quantitative right ventricular functional values are as follows: RVEDV = 192.98 ml; RVEDVi = 86.23 ml/m2 RVESV = 141.30 ml; RVESVi = 63.14 ml/m2 RVSV = 51.69 ml; RVSVi = 23.10 ml/m2 RVEF = 27.00 % RVCO = 5.89 l/min; RVCI = 2.63 l/min/m2   INTERVENTRICULAR SEPTUM: Intact.   AORTIC VALVE: The aortic valve is trileaflet. There is quantitatively trivial aortic regurgitation. Flow quantification through the ascending aorta: Forward volume = 46.97 cc/beat Reverse volume = -2.25 cc/beat Net forward volume = 44.72 cc/beat Aortic regurgitant fraction = 5 %   MITRAL VALVE: The mitral valve leaflets appear normal. There is MILD-TO-MODERATE mitral regurgitation. Integrating LV volumetric and aortic flow quantification data reveals: Quantitative mitral regurgitant volume = 14 cc/beat Quantitative mitral regurgitant fraction = 23 %   TRICUSPID VALVE: There is qualitatively trivial tricuspid regurgitation.   PULMONARY VALVE: Not assessed.   PERICARDIUM: The pericardium is normal. There is no pericardial effusion.   THORACIC AORTA: The thoracic aorta appears normal in course and contour. The aortic root is normal in size. There is no evidence for acute aortic pathology. The arch vessel branching pattern is  normal.   All the arch branch vessels appear widely patent in their proximal portions.   AORTIC ROOT DIMENSIONS: Annulus: 2.6 cm Aortic root(sinus of valsalva): 3 cm Sinotubular junction: 3 cm   PULMONARY ARTERIES: The central pulmonary arteries appear normal (MPA-2.5 cm,  RPA-1.8 cm, LPA-2.3 cm).   SYSTEMIC AND PULMONARY VEINS: Normal systemic venous and pulmonary venous return. The SVC is of normal caliber. IVC appears normal Normal pulmonary venous anatomy.   CHEST: The chest wall is normal. Limited imaging through the lungs reveals no gross abnormalities. Small right pleural effusion.   UPPER ABDOMEN: Limited imaging through the upper abdomen reveals no abnormalities of the visualized organs.       1. Mixed cardiomyopathy. Technically limited study due to ICD. Within limitation: Severely dilated LV size (EDVi 156 ml/m2) and severely reduced systolic function. Quantitative LVEF 17 % 2. 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 3. Moderate biatrial dilatation. 4. Mild-to-moderate mitral valve regurgitation. RF 23% 5. Transmural infarction (%) involves the mid anteroseptum, apical septum, apical inferior wall, and apex, all demonstrating nonviable myocardium. Subendocardial infarction (25-50%) affects the mid inferior and apical lateral wall, while subendocardial infarction (<25%) involves the mid inferolateral wall - these segments remain viable. Additionally, mid-myocardial enhancement in the basal septum represents a nonischemic, nonspecific pattern 6. Normal RV size (EDVi 86 ml/m2) and severely reduced systolic function. Quantitative RVEF 27 %. 7. Small right pleural effusion.   This cardiac MRI demonstrates mixed dilated cardiomyopathy with both ischemic and nonischemic components. Nonviable myocardium is present in the mid anteroseptum, apical septum, apical inferior wall, and apex. All remaining myocardial segments demonstrate preserved viability   MACRO: None   Signed by: Leonardo Nathan 7/30/2025 4:47 PM Dictation workstation:   [id number]    XR chest 1 view  Result Date: 7/27/2025  Interpreted By:  Ke Arechiga, STUDY: XR CHEST 1 VIEW; 7/27/2025 8:22 am   INDICATION: Signs/Symptoms:swan.   COMPARISON: Radiograph dated 07/25/2025   ACCESSION  NUMBER(S): [id number]   ORDERING CLINICIAN: YURI FARRELL   FINDINGS: Right IJ [place] catheter is in place with the tip projecting over distal right main pulmonary artery, retracted from prior study. Left subclavian approach pacer/defibrillator is unchanged in positioning.   The cardiac silhouette size is enlarged, unchanged. Status post median sternotomy.   Mild perihilar congestion. There is no focal consolidation, or pneumothorax. No sizeable pleural effusion. No acute osseous abnormality.       1. Unchanged mild perihilar congestion. Unchanged cardiomegaly. 2. No focal infiltrate, sizeable pleural effusion or       Signed by: Ke Lau 7/27/2025 12:25 PM Dictation workstation:   [id number]    US abdomen complete  Result Date: 7/26/2025  Interpreted By:  Curry Pedraza and Mercado Amiel STUDY: US ABDOMEN COMPLETE;  7/25/2025 11:57 am   INDICATION: Signs/Symptoms:OHT/VAD workup.     COMPARISON: None.   ACCESSION NUMBER(S): [id number]   ORDERING CLINICIAN: YONATHAN WALLS   TECHNIQUE: Multiple images of the abdomen were obtained.   FINDINGS: LIVER: The liver measures 21.3 cm and is grossly unremarkable and free of any focal lesions.     GALLBLADDER: The gallbladder is nondistended and contains multiple layering echogenic gallstones (which measure up to 1.8 cm) with associated posterior acoustic shadowing. There is also mild pericholecystic fluid, likely related to patient's fluid status. The gallbladder wall thickness is 0.2 cm. Sonographic Zimmerman's sign is negative.     BILE DUCTS: No evidence of intra or extrahepatic biliary dilatation is identified; the common bile duct measures 0.3 cm.   PANCREAS: The pancreas is poorly visualized due to overlying bowel gas.   RIGHT KIDNEY: The right kidney measures 10.4 cm in length. The renal cortical echogenicity and thickness are within normal limit.  No hydronephrosis or renal calculi are seen.   LEFT KIDNEY: The left kidney measures 11.6 cm in length. The  renal cortical echogenicity and thickness are within normal limits. No hydronephrosis or renal calculi are seen.     SPLEEN: The spleen is borderline enlarged measuring 12.4 cm and is grossly unremarkable.   URINARY BLADDER: The urinary bladder is within normal limits.   PERITONEUM: There is no free or loculated fluid seen in the abdomen.   ABDOMINAL AORTA AND IVC: The visualized portions of the aorta and IVC are unremarkable.       1. Cholelithiasis without evidence of cholecystitis. Mild pericholecystic fluid is likely related to patient's fluid status. 2. Hepatomegaly without sonographic evidence of lesions.   I have reviewed the images/study and I agree with the findings as stated by Rohan Greene MD (PGY-3).   MACRO: None   Signed by: Curry Pedraza 7/26/2025 6:05 PM Dictation workstation:   [id number]    XR chest 1 view  Result Date: 7/25/2025  Interpreted By:  Allyssa Pedraza and Krasnoschlik Nicholas STUDY: XR CHEST 1 VIEW;  7/25/2025 8:46 am   INDICATION: Signs/Symptoms:PA catheter / Pulmonary congestion.     COMPARISON: Chest radiograph 07/24/2025   ACCESSION NUMBER(S): [id number]   ORDERING CLINICIAN: MATTIE NOEL   FINDINGS: AP radiograph of the chest was provided.   Medical devices: Right IJ [place]-Talita catheter with distal tip projecting over the right lower lobe segmental branches. Left chest wall pacemaker/defibrillator with leads in similar position to prior. Patient is status post median sternotomy.   CARDIOMEDIASTINAL SILHOUETTE: Cardiomediastinal silhouette is stable in size and configuration.   LUNGS: Similar bilateral interstitial markings. Similar bibasilar hazy opacities. No pneumothorax.   ABDOMEN: No remarkable upper abdominal findings.   BONES: No acute osseous changes.       1.  Right IJ [place]-Talita catheter with distal tip projecting over the right lower lobe segmental branches. Repositioning recommended. 2. Similar pulmonary edema. Correlate with volume status. 3. Similar bibasilar hazy  opacities may represent atelectasis/edema. Infectious etiology can not be excluded. 4. Additional medical devices as above.   I personally reviewed the images/study and I agree with the findings as stated by resident physician Cam Sommers MD. This study was interpreted at [facility], [place], [place].   MACRO: None   Signed by: Allyssa Pedraza 7/25/2025 2:52 PM Dictation workstation:   [id number]    Vascular US Ankle Brachial Index (MARK) Without Exercise  Result Date: 7/25/2025            [place] [address]   Tel [phone] and Fax [phone]  Vascular Lab Report VASC US ANKLE BRACHIAL INDEX (MARK) WITHOUT EXERCISE  Patient Name:     NEGRITA BERNARD     Reading           [id number] [address]                 Physician:        MD Study Date:       7/23/2025            Ordering          [id number] YONATHAN WALLS                                        Physician: MRN/PID:          [id number]             Technologist:     Rajani Varela S Accession#:       [id number]         Technologist 2: Date of           1965 / 60 years  Encounter#:       [id number] Birth/Age: Gender:           M Admission Status: Inpatient            Location          [facility]                                        Performed:  Diagnosis/ICD: Encounter for preprocedural cardiovascular examination-Z01.810 Indication:    OHT/LVAD CPT Codes:     40794 Peripheral artery MARK Only  Patient History Anticoagulation, HTN and Hyperlipidemia. 10-9-2023 MARK's no                 evidence of arterial disease in BLEA.  CONCLUSIONS: Right Lower PVR: Evidence of mild arterial occlusive disease in the right lower extremity at rest. Right pressures of >220 mmHg suggest no compressibility of vessels and may make absolute Segmental Limb Pressures (SLP) unreliable. Decreased digital perfusion noted. Multiphasic flow is noted in the right  common femoral artery, right posterior tibial artery and right dorsalis pedis artery. Level of disease called by waveforms. Left Lower PVR: Evidence of mild arterial occlusive disease in the left lower extremity at rest. Left pressures of >220 mmHg suggest no compressibility of vessels and may make absolute Segmental Limb Pressures (SLP) unreliable. Decreased digital perfusion noted. Multiphasic flow is noted in the left posterior tibial artery, left dorsalis pedis artery and left common femoral artery. Level of disease called by waveforms.  Comparison: Compared with study from 10/9/2023, no significant change.Today's exam there is mild arterial disease in bilateral lower extremities.  Imaging & Doppler Findings:  RIGHT Lower PVR                Pressures Ratios Right Posterior Tibial (Ankle) 220 mmHg  2.22 Right Dorsalis Pedis (Ankle)   204 mmHg  2.06 Right Digit (Great Toe)        58 mmHg   0.59   LEFT Lower PVR                Pressures Ratios Left Posterior Tibial (Ankle) 220 mmHg  2.22 Left Dorsalis Pedis (Ankle)   220 mmHg  2.22 Left Digit (Great Toe)        71 mmHg   0.72                     Right Brachial Pressure 99 mmHg   [id number] Marquita Brown MD Electronically signed by [id number] Marquita Brown MD on 7/25/2025 at 1:49:42 PM  ** Final **     Vascular US aorta iliac duplex limited  Result Date: 7/25/2025            [place] [address]   Tel [phone] and Fax [phone]  [facility]  Patient Name:     NEGRITA BERNARD     Gayla           [id number] [address]                 Physician:        MD Study Date:       7/23/2025            Ordering          [id number] YONATHAN WALLS                                        Physician: MRN/PID:          [id number]             Technologist:     Rajani JAFFE Accession#:       [id number]         Technologist 2: Date of           1965 / 60 years   Encounter#:       [id number] Birth/Age: Gender:           M Admission Status: Inpatient            Location          [facility]                                        Performed:  Diagnosis/ICD: Encounter for preprocedural cardiovascular examination-Z01.810 Indication:    Pre-Op OHT/LVAD CPT Codes:     27053 Duplex Aorta/IVC/Iliac/Bypass Graft  Patient History CAD, HTN and Hyperlipidemia. 10- No AAA.  CONCLUSIONS: Aorta/Common Iliac Arteries/IVC: The abdominal aorta and bilateral common iliac arteries demonstrate no evidence of aneurysm.  Comparison: Compared with study from 10/10/2023, no significant change.  Imaging & Doppler Findings:   AORTA     AP    Lateral    PSV Proximal 2.14 cm 1.96 cm 95.0 cm/s   Mid    1.86 cm 1.89 cm 72.0 cm/s  Distal  1.61 cm 1.70 cm 52.0 cm/s    RIGHT       AP    Lateral    PSV GIO Proximal 1.29 cm 1.10 cm 70.00 cm/s     LEFT       AP    Lateral    PSV GIO Proximal 1.01 cm 1.01 cm 70.30 cm/s  [id number] Marquita Brown MD Electronically signed by [id number] Marquita Brown MD on 7/25/2025 at 1:30:07 PM  ** Final **     CT chest abdomen pelvis wo IV contrast  Result Date: 7/25/2025  Interpreted By:  Volodymyr Pettit, STUDY: CT CHEST ABDOMEN PELVIS WO CONTRAST;  7/23/2025 4:31 pm   INDICATION: Signs/Symptoms:OHT/VAD workup.   COMPARISON: CT chest 10/06/2023. CT abdomen and pelvis 10/10/2023.   ACCESSION NUMBER(S): [id number]   ORDERING CLINICIAN: YONATHAN WALLS   TECHNIQUE: CT of the chest, abdomen and pelvis was performed. Contiguous axial images were obtained at 3 mm slice thickness through the chest, abdomen and pelvis. Coronal and sagittal reconstructions at 3 mm slice thickness were performed.  No intravenous or oral contrast agents were administered.   FINDINGS: Please note that the study is limited without intravenous contrast.   CHEST:     LUNG/PLEURA/LARGE AIRWAYS: Linear atelectasis noted in the right upper lobe and bilateral lower lobes. No pulmonary masses or  consolidation. Mild diffuse interlobular septal thickening is noted in both lungs likely related to pulmonary edema. No pleural effusion or pneumothorax. Trachea and right and left main bronchi are patent.   VESSELS: Aorta and main pulmonary artery are normal in caliber. A [place] catheter is seen with its tip in the right pulmonary artery. Mild atherosclerotic changes are noted of the aorta and branching vessels. Severe coronary artery calcifications are present.   HEART: Heart is mildly enlarged. Left-sided pacemaker leads are seen terminating in the right cardiac chamber. No pericardial effusion.   MEDIASTINUM AND SHIVANI: No significant lymphadenopathy in the chest. Esophagus is within normal limits.   CHEST WALL AND LOWER NECK: No soft tissue masses in the chest wall. Visualized thyroid gland is within normal limits.   ABDOMEN:   LIVER: Liver is enlarged measuring 22 cm craniocaudally. No focal liver lesion within the limits of noncontrast CT.   BILE DUCTS: No intra or extrahepatic bile duct dilatation.   GALLBLADDER: Gallbladder is partially distended with numerous gallstones. There is a small amount of pericholecystic fluid.   PANCREAS: The pancreas appears unremarkable.   SPLEEN: Spleen is mildly enlarged measuring 14 cm anterior posteriorly.   ADRENAL GLANDS: No adrenal nodule or thickening.   KIDNEYS AND URETERS: Bilateral kidneys are symmetric in size. No hydroureteronephrosis. There is a punctate 5 mm calculus in the lower pole of the left kidney.   PELVIS:   BLADDER: Urinary bladder is unremarkable.   REPRODUCTIVE ORGANS: Prostate gland is within normal limits.   BOWEL: Stomach and duodenum are within normal limits. Small and large bowel loops are normal in caliber. Large amount of colonic stool burden is seen. The appendix is not definitely visualized. There is however no pericecal stranding or fluid.   VESSELS: Severe atherosclerotic changes noted in the abdominal aorta. No aortic aneurysm. IVC is  within normal limits.   PERITONEUM/RETROPERITONEUM/LYMPH NODES: No ascites or fluid collection in the abdomen and pelvis. No retroperitoneal mass. There are few scattered subcentimeter in the retroperitoneum which are nonenlarged by size criteria.   ABDOMINAL WALL: No soft tissue masses in the abdominal wall. Mild subcutaneous stranding noted in the left anterior abdominal wall likely inflammation.   BONES: No suspicious osseous lesions are present. Degenerative discogenic disease is noted in the lower thoracic and lumbar spine.       Chest 1.  Mild diffuse interlobular interstitial thickening in both lungs likely representing pulmonary edema. 2. Cardiomegaly. 3. Support devices as described above.   Abdomen-Pelvis 1.  No acute process in the abdomen and pelvis. 2. Hepatosplenomegaly. 3. Cholelithiasis. Mild pericholecystic fluid is likely related to fluid overload. 4. Left renal calculus without hydronephrosis.     MACRO: None   Signed by: Volodymyr Pettit 7/25/2025 9:39 AM Dictation workstation:   [id number]    CT head wo IV contrast  Result Date: 7/25/2025  Interpreted By:  Kizzy Ambrocio and Liu Scott STUDY: CT HEAD WO IV CONTRAST;  7/24/2025 7:07 pm   INDICATION: Signs/Symptoms:change in neuro status.     COMPARISON: CT facial bones 07/23/2025   ACCESSION NUMBER(S): [id number]   ORDERING CLINICIAN: MATTIE NOEL   TECHNIQUE: Noncontrast axial CT scan of head was performed. Angled reformats in brain and bone windows were generated. The images were reviewed in bone, brain, blood and soft tissue windows.   FINDINGS: CSF Spaces: The ventricles, sulci and basal cisterns are within normal limits. There is no extraaxial fluid collection.   Parenchyma: Mild patchy white matter hypodensity is nonspecific and compatible with microangiopathy. The grey-white differentiation is intact. There is no mass effect or midline shift.  There is no intracranial hemorrhage.   Calvarium: The calvarium is unremarkable.    Paranasal sinuses and mastoids: Visualized paranasal sinuses and mastoids are clear. Prior lens surgery on the left.       No acute intracranial hemorrhage or mass effect.   Mild degree of nonspecific white matter hypodensity compatible with microangiopathy.   I personally reviewed the images/study and I agree with the findings as stated by resident physician Miky Gtz MD. This study was interpreted at [facility], [place], [place].   MACRO: None   Signed by: Kizzy Ambrocio 7/25/2025 7:05 AM Dictation workstation:   [id number]    CT maxillofacial bones wo IV contrast  Result Date: 7/24/2025  Interpreted By:  Shanell Rodriguez, STUDY: CT FACIAL BONES WO IV CONTRAST   INDICATION: Signs/Symptoms:OHT/VAD workup   COMPARISON: None.   ACCESSION NUMBER(S): [id number]   ORDERING CLINICIAN: YONATHAN WALLS   TECHNIQUE: CT of the face was performed without the administration of intravenous contrast.   FINDINGS: SKIN AND SUBCUTANEOUS SOFT TISSUES: No asymmetrical soft tissue swelling or hematoma.   OSSEOUS STRUCTURES: No fracture, dislocation or destructive lesion.   ORBITS: The globes, retrobulbar fat, extraocular muscles, and optic nerve sheath complexes are intact and normal in morphology.   PARANASAL SINUSES: Visualized paranasal sinuses and mastoid air cells are clear.   OTHER: None.       No acute abnormality of the facial bones.   I have reviewed the images/study and I agree with the findings as stated.   MACRO: None   Signed by: Shanell Rodriguez 7/24/2025 9:33 AM Dictation workstation:   [id number]    XR chest 1 view  Result Date: 7/24/2025  Interpreted By:  Ke Arechiga, STUDY: XR CHEST 1 VIEW; 7/24/2025 7:40 am   INDICATION: Signs/Symptoms:ADHF.   COMPARISON: 07/23/2025   ACCESSION NUMBER(S): [id number]   ORDERING CLINICIAN: CHAYO JOHNSON   FINDINGS: Right IJ [place]-Talita catheter is in place with the tip projecting over distal right main pulmonary artery.   Left subclavian approach  pacer/defibrillator is unchanged in positioning.   The cardiac silhouette size is unchanged. Status post median sternotomy.   Low lung volumes and bronchovascular crowding. Mild perihilar congestion. No sizable pleural effusion or pneumothorax.   No acute osseous abnormality.       1. Unchanged appearance of the lungs with mild perihilar congestion/edema and no focal infiltrate. No sizable pneumothorax. 2. Medical devices and postsurgical changes as described above.       Signed by: Ke Lau 7/24/2025 8:32 AM Dictation workstation:   [id number]    Vascular US carotid artery duplex bilateral  Result Date: 7/23/2025            [place] [id number]   Tel [phone] and Fax [phone]  Vascular Lab Report [facility] US CAROTID ARTERY DUPLEX BILATERAL  Patient Name:     NEGRITA Shukla           [id number]                 Physician:        MD Study Date:       7/23/2025            Ordering          [id number] YONATHAN WALLS                                        Physician: MRN/PID:          [id number]             Technologist:     Rajani JAFFE Accession#:       [id number]         Technologist 2: Date of           1965 / 60 years  Encounter#:       [id number] Birth/Age: Gender:           M Admission Status: Inpatient            Location          [facility]                                        Performed:  Diagnosis/ICD: Encounter for preprocedural cardiovascular examination-Z01.810 Indication:    Pre-Op OHT/LVAD CPT Codes:     87505 Cerebrovascular Carotid Duplex scan complete  Patient History CAD, HTN and Hyperlipidemia. 10-9-2023 Both ICA <50% stenosis.  CONCLUSIONS: Left Carotid: Findings are consistent with less than 50% stenosis of the left proximal internal carotid artery. Left external carotid artery appears patent with no evidence of stenosis. The left vertebral artery is patent with antegrade  flow. No evidence of hemodynamically significant stenosis in the left subclavian artery.  Comparison: Compared with study from 10/9/2023, no significant change.The right CCA and ICA not scaned due to IV lines in right side of neck.  Additional Findings: Unable to scan right CCA and ICA due to IV lines in right side of neck.  Imaging & Doppler Findings: Right Plaque Morph: The proximal right internal carotid artery demonstrates heterogenous plaque. The proximal right common carotid artery demonstrates heterogenous and calcified plaque. The mid right common carotid artery demonstrates heterogenous and calcified plaque. The distal right common carotid artery demonstrates heterogenous and calcified plaque.  Right                  Left  PSV  EDV              PSV      EDV             CCA P    79 cm/s             CCA D    51 cm/s             ICA P    52 cm/s  13 cm/s             ICA M    57 cm/s  17 cm/s             ICA D    64 cm/s  22 cm/s              ECA     107 cm/s           Vertebral  28 cm/s  7 cm/s           Subclavian 94 cm/s               Left ICA/CCA Ratio 1.0   [id number] Marquita Brown MD Electronically signed by [id number] Marquita Brown MD on 7/23/2025 at 6:27:44 PM  ** Final **     XR chest 2 views  Result Date: 7/23/2025  Interpreted By:  Allyssa Pedraza and Krasnoschlik Nicholas STUDY: XR CHEST 2 VIEWS;  7/23/2025 4:38 pm   INDICATION: Signs/Symptoms:OHT/VAD workup.     COMPARISON: Chest radiograph 07/23/2025   ACCESSION NUMBER(S): [id number]   ORDERING CLINICIAN: YONATHAN WALLS   FINDINGS: PA and lateral radiographs of the chest were provided.  Additional PA dual energy images were also provided.   Medical devices: Left chest wall pacemaker defibrillator with leads in similar position to prior. Similar positioning of a of a right IJ [place]-Talita catheter with tip projecting over the right lower lobe segmental branches. Patient is status post median sternotomy.   CARDIOMEDIASTINAL SILHOUETTE: Cardiomediastinal  silhouette is stable in size and configuration.   LUNGS: Mild interstitial markings with perihilar prominence. No pneumothorax.   ABDOMEN: No remarkable upper abdominal findings.   BONES: No acute osseous changes.       1.  Interval retraction of a right IJ [place]-Talita catheter with tip now projecting over the right lower lobe segmental branches. 2. Mild pulmonary edema. 3. Additional medical devices as above.   I personally reviewed the images/study and I agree with the findings as stated by resident physician Cam Sommers MD. This study was interpreted at [place], [place].   MACRO: None   Signed by: Allyssa Pedraza 7/23/2025 4:45 PM Dictation workstation:   [id number]    XR chest 1 view  Result Date: 7/23/2025  Interpreted By:  Allyssa Pedraza, STUDY: XR CHEST 1 VIEW;  7/23/2025 8:14 am   INDICATION: Signs/Symptoms:SGC am rounds.   COMPARISON: 07/22/2025.   ACCESSION NUMBER(S): [id number]   ORDERING CLINICIAN: MEL WATKINS       Minimal improvement of aeration of the both lungs. Stable AICD, placement. [place]-Talita catheter with the tip overlies the right lower lobe pulmonary artery branch, unchanged. Cardiac silhouette is mildly enlarged. Pulmonary vessels are congested Mild pulmonary edema/fluid overload of the both lungs. Slightly improved since last exam. Small bilateral pleural effusion. No pneumothorax seen. Bony thorax unremarkable.   MACRO: None   Signed by: Allyssa Pedraza 7/23/2025 12:21 PM Dictation workstation:   [id number]    XR chest 1 view  Result Date: 7/22/2025  Interpreted By:  Allyssa Pedraza and Krasnoschlik Nicholas STUDY: XR CHEST 1 VIEW;  7/22/2025 7:31 am   INDICATION: Signs/Symptoms:adhf.     COMPARISON: Chest radiograph 07/21/2025   ACCESSION NUMBER(S): [id number]   ORDERING CLINICIAN: YONATHAN WALLS   FINDINGS: AP radiograph of the chest was provided.   Medical devices: Right IJ [place]-Talita catheter with distal tip projecting over the  proximal right interlobar artery. Left chest wall implanted pacemaker/defibrillator with leads in similar position to prior. Patient is status post median sternotomy.   CARDIOMEDIASTINAL SILHOUETTE: Cardiomediastinal silhouette is stable in size and configuration.   LUNGS: Similar appearance of bilateral interstitial and airspace opacities. There is fluid within the minor fissure. No pneumothorax.   ABDOMEN: No remarkable upper abdominal findings.   BONES: No acute osseous changes.       1.  New fluid visualized within the minor fissure and similar appearance of bilateral interstitial and airspace opacities, favored to represent pulmonary edema. Additional considerations include ARDS and pulmonary hemorrhage. 2. Medical devices as above.   I personally reviewed the images/study and I agree with the findings as stated by resident physician Cam Sommers MD. This study was interpreted at [place], [place].   MACRO: None   Signed by: Allyssa Pedraza 7/22/2025 2:14 PM Dictation workstation:   [id number]    XR chest 1 view  Result Date: 7/21/2025  Interpreted By:  Allyssa Pedraza and Krasnoschlik Nicholas STUDY: XR CHEST 1 VIEW;  7/21/2025 7:46 am   INDICATION: Signs/Symptoms:SGC Placement.     COMPARISON: Chest radiograph 07/20/2025   ACCESSION NUMBER(S): [id number]   ORDERING CLINICIAN: KORINA BERKOWITZ   FINDINGS: AP radiograph of the chest was provided.   Medical devices: Right IJ [place]-Talita catheter with distal tip projecting over the distal right interlobar artery. Left chest wall pacemaker/defibrillator with leads in similar position to prior. Patient is status post median sternotomy.   CARDIOMEDIASTINAL SILHOUETTE: Cardiomediastinal silhouette is enlarged and stable in size and configuration.   LUNGS: Low lung volumes with bronchovascular crowding. Similar bilateral interstitial markings. Trace blunting of the bilateral costophrenic angles. No pneumothorax.    ABDOMEN: No remarkable upper abdominal findings.   BONES: No acute osseous changes.       1. Right IJ [place]-Talita catheter with distal tip projecting over the distal right interlobar artery. 2. Similar interstitial edema and trace bilateral pleural effusions. 3. Additional medical devices as above.   I personally reviewed the images/study and I agree with the findings as stated by resident physician Cam Sommers MD. This study was interpreted at [facility], [place], [place].   MACRO: None   Signed by: Allyssa Pedraza 7/21/2025 11:54 AM Dictation workstation:   [id number]    XR chest 1 view  Result Date: 7/20/2025  Interpreted By:  Claudio Yancey, STUDY: XR CHEST 1 VIEW;  7/20/2025 8:38 am   INDICATION: Signs/Symptoms:ADHF.   COMPARISON: Chest radiograph 07/19/2025 and abdominopelvic CT scan 10/10/2023.   ACCESSION NUMBER(S): [id number]   ORDERING CLINICIAN: CHAYO JOHNSON   FINDINGS: AP radiograph of the chest. Patient is now rotated towards right, somewhat limiting evaluation and comparison. Right IJ approach [place]-Taliat catheter has been slightly advanced, the tip of which now overlies right lower lobe pulmonary artery, approximately 2 cm distal to right hilum. Repositioning recommended Stable positioning of left subclavian approach dual chamber cardiac pacemaker/AICD. Status post prior median sternotomy with intact sternal cerclage wires.   CARDIOMEDIASTINAL SILHOUETTE: Cardiomediastinal silhouette is persistently enlarged, grossly stable in size and configuration.   LUNGS: Low lung volumes with crowding of bronchovascular markings. Similar mild bilateral interstitial prominence. Trace blunting of bilateral costophrenic angles. Overall lung aeration is grossly unchanged from prior. No pneumothorax.   ABDOMEN: No remarkable upper abdominal findings.   BONES: No acute osseous changes.       1. Right IJ approach [place]-Talita catheter has been slightly advanced, the tip of which now  overlies right lower lobe pulmonary artery, approximately 2 cm distal to right hilum. Repositioning recommended. 2. No significant change in lung aeration with questionable interstitial edema and trace bilateral pleural effusions/bibasilar atelectasis. 3. Rest of medical devices as above.   Signed by: Claudio Yancey 7/20/2025 11:03 AM Dictation workstation:   [id number]    XR chest 1 view  Result Date: 7/19/2025  Interpreted By:  Claudio Yancey,  and Ulisses Bolaños STUDY: XR CHEST 1 VIEW;  7/19/2025 1:41 pm   INDICATION: Signs/Symptoms:New SG position.   COMPARISON: Chest radiograph 07/17/2025 and abdominopelvic CT scan 10/10/2023.   ACCESSION NUMBER(S): [id number]   ORDERING CLINICIAN: CHAYO JOHNSON   FINDINGS: AP radiograph of the chest was provided.   Interval insertion of right IJ approach [place]-Talita catheter, the tip of which overlies right interlobar pulmonary artery, at the level of right hilum. Stable positioning of left subclavian approach dual chamber cardiac pacemaker/AICD. Status post prior median sternotomy with intact sternal cerclage wires.   CARDIOMEDIASTINAL SILHOUETTE: Cardiomediastinal silhouette is stable in size and configuration, mildly enlarged.   LUNGS: Low lung volumes with crowding of bronchovascular markings. Mildly prominent interstitial opacities.. Trace blunting of bilateral costophrenic angles. Overall lung aeration is grossly unchanged from prior. No pneumothorax.   ABDOMEN: No remarkable upper abdominal findings.   BONES: No acute osseous changes.       1. Medical devices as above. Of note, new right IJ approach [place]-Talita catheter with its tip projecting over right interlobar pulmonary artery, at the level of right hilum. 2. No significant change in mild interstitial edema and suspected trace bilateral pleural effusions/bibasilar atelectasis.   I personally reviewed the images/study and I agree with the findings as stated by resident physician Miky Gtz MD. This study was interpreted at  [facility], [place], [place].   MACRO: None   Signed by: Claudio Yancey 7/19/2025 4:00 PM Dictation workstation:   [id number]    XR chest 1 view  Result Date: 7/17/2025  Interpreted By:  Kristi Buenrostro and Krasnoschlik Nicholas STUDY: XR CHEST 1 VIEW;  7/17/2025 11:40 am   INDICATION: Signs/Symptoms:ADHF.     COMPARISON: Chest radiograph 07/12/2025   ACCESSION NUMBER(S): [id number]   ORDERING CLINICIAN: CHAYO JOHNSON   FINDINGS: AP radiograph of the chest was provided.   Medical devices: Left chest wall implanted pacemaker/defibrillator with leads in similar position. Patient is status post median sternotomy.   CARDIOMEDIASTINAL SILHOUETTE: Cardiomediastinal silhouette is stable in size and configuration.   LUNGS: Similar mild interstitial edema. Trace blunting of the bilateral costophrenic angles. No new focal consolidation or pneumothorax..   ABDOMEN: No remarkable upper abdominal findings.   BONES: No acute osseous changes.       1.  Similar mild interstitial edema. No new focal consolidation or pneumothorax. 2. Trace bilateral pleural effusions. 3. Medical devices as above.   I personally reviewed the images/study and I agree with the findings as stated by resident physician Cam Sommers MD. This study was interpreted at [facility], [place], [place].   MACRO: None   Signed by: Kristi Buenrostro 7/17/2025 12:00 PM Dictation workstation:   [id number]    ECG 12 lead  Result Date: 7/14/2025  Normal sinus rhythm Possible Left atrial enlargement Low voltage QRS Anterolateral infarct (cited on or before 10-OCT-2023) Abnormal ECG When compared with ECG of 04-MAY-2025 13:32, Questionable change in QRS axis    XR chest 2 views  Result Date: 7/12/2025  Interpreted By:  Elis Benoit, STUDY: Chest, 2 views.   INDICATION: Signs/Symptoms:pulmonary edema.   COMPARISON: XR CHEST 2 VIEWS 5/4/2025.   ACCESSION NUMBER(S): [id number]   ORDERING CLINICIAN:  MOLINA YU   FINDINGS: Median sternotomy. Left subclavian AICD/pacemaker device noted. Chronic elevated right hemidiaphragm noted. The cardiomediastinal silhouette size is again noted to be enlarged.   There is no focal consolidation, edema or pneumothorax. Small right-sided pleural effusion with blunting of the costophrenic angle similar to prior.       1. Redemonstration of small right pleural effusion similar to prior. 2. Cardiomegaly and/or pericardial effusion.   MACRO: None.   Signed by: Elis Benoit 7/12/2025 7:20 PM Dictation workstation:   [id number]       Inpatient Medications:  Scheduled Medications[1]  PRN Medications[2]  Continuous Medications[3]    Telemetry 8/8/2025 (personally reviewed): SR/ST 90-110s, rare PVCS, short runs, 3-4 beats, NSVT    Physical exam:  General: NAD  Head/ neck: + JVD   Cardiac: RRR, regular S1 S2 , no murmur, no rub, no gallop  Pulm: CTA bilaterally, no wheezes, rales or rhonchi.    Vascular: Radial 2+ bilaterally  GI: Non distended  Extremities: 2+/1+ pitting BLE edema, improving  Neuro: no focal neuro deficits   Psych: appropriate mood and behavior   Skin: warm and dry     Assessment/Plan   Felice Law is a 60 y.o. male with PMH of ischemic HFrEF 15-20%, CABG (LIMA- LAD 2023),  VT arrest s/p VT ablation and ICD 2017, Hypertension, Hyperlipidemia, Hypothyroidism, Left femoral DVT on Eliquis, COPD, JAKE, CKD III, Chronic BLE venous stasis dermatitis, Insulin dependent Type 2 DM who was initially admitted 7/12 to [facility] for acute decompensate heart failure. Diuresed and transferred to [facility] HFICU for further managemnet and possible re-consideration of advanced therapies.  Of note, Patient was previously discussed for advanced HF therapies [place] [facility] [place] but was declined with concerns for mobility/frailty (uses motorized scooter and has bilateral foot drop with neuropathy), CKD, poorly controlled diabetes, as well as unclear  social/financial support.      Acute on chronic systolic ischemic, HFrEF 15-20% (since 2017)  CAD s/p PCI and 1v CABG (LIMA-LAD 4/2023).   VT s/p ICD 2017  - TTE 5/5/25: LV mod dilated, EF 15-20%, gHK of LV, G2DD, mildly enlarged RV with mildly reduced systolic fxn, mild LAE, mild to mod MR and TR  - LHC 6/10/25:  Right dom. LIMA to LAD patent. pLAD 70%, os and pD1 90%, mLCx50%,osOM1 90%, mRCA 100%  - Committee discussion: optimize DM (A1c 10) and defer listing for likely heart transplant / LVAD depending on acuity  - Cardiac MRI 7/2025: EF 17%, mixed CM, mild to mod MR, Transmural infarction (%) involves the mid anteroseptum,apical septum, apical inferior wall, and apex, all demonstrating nonviable myocardium. Subendocardial infarction (25-50%) affects the mid inferior and apical lateral wall, while subendocardial infarction (<25%) involves the mid inferolateral wall: these segments remain viable. Additionally, mid-myocardial enhancement in the basal septum represents a nonischemic, nonspecific pattern. RV function severely reduced.   - There may be value in revascularizing OM and diag but will work on stabilizing renal function and heart failure. No intervention to be pursued this admission.  - Failed weaning of milrinone, inotrope dependent, Massey line placed 8/5   - GDMT: Hydral/Isordil, farxiga, no beta blocker (inotrope), digoxin 3/wkly (level WNL 7/29).   - stop Entresto/Aldactone due to CHARLOTTE on CKD  - cont Bumex 2 mg PO BID  - Palliative medicine notified of discharge difficulties, pending further discussion and discharge planning  - pending plan for home Milrinone infusion will need to possibly transfer to HFICU for weaning. Home care IV hook up Monday at 8am. SOC Wednesday 8/13. Sister Zahraa will be his learner.   - 8/8 Hemodynamically stable, continue current regimen  - cont secondary vascular prevention: aspirin, statin (Discontinue fenofibrate given renal dysfunction)     COPD  Obstructive sleep  apnea  Prior tobacco use  - 24 pack year, quit 2017, no current inhalers or O2 need  - encourage incentive spirometry  - cont albuterol nebulizer PRN      CHARLOTTE on CKD, stable/elevated  - b/l Cr ~1.4-1.8  - admit (7/17) Cr 1.62, was 2.55 on (7/12) at [facility], peak 3.24 on (7/26)  - Cr trend: 2.61 (2.75, 2.64, 2.72, 2.78, 2.86, 2.85, 2.28, 2.42)  - Avoid nephrotoxins and hypotension, trend daily RFP      Anemia of chronic disease and iron deficiency  - (7/17):  iron sat L 16 but otherwise iron studies & ferritin WNL  - s/p IV Venofer 200 mg x5 (completed 8/2)  - cont PO iron supplementation     Left femoral DVT (2017)  - cont Eliquis 5mg BID      Uncontrolled type II DM c/b neuropathy  - HgbA1c (7/17): 10.1%  - Endocrine following, appreciate recs:  -- cont Glargine 40u BID, Lispro SSI scale #3 TIDAC, prandial lispro 22u TIDAC  -- cont Januvia 50 daily, Farxiga 10 daily  - cont gabapentin 200 mg TID  - CCD, Accu-Cheks AC/HS, hypoglycemic protocol      Hypothyroidism  - TSH 7/23/25: WNL. Euthyroid  - cont Synthroid      Loose stool unspecified  - Immodium PRN  - C. Diff neg     DVT ppx: eliquis    DISPO:   - per TCC discussion with pt, Milrinone copay and [facility] nurse visit copay likely cost prohibitive, cost assisted and now reasonable for patient. See note 8/7.   - per Adv HF team, Dobutamine 5 mcg/kg/min submitted for cost analysis, also cost prohibitive.   - Management and TCC aware patient needs ride home at [place]  - ongoing discharge planning with TCC, , [facility], and pharmacy    Code status: Full Code  NOK: Aracelis Law, mother: [phone]    Patient seen and discussed with Dr. Jean Claude Uwamungu Wida M Miller, APRN-CNP    I conducted a shared visit with BRAD Cardenas.    Interview conducted using a .     Patient is a 59 yo M with ischemic cardiomyopathy (severely reduced EF) who is on milrinone and He is planned to be discharged on milrinone. There were cost barriers which were sorted  out but the team. He is planned to be discharged on Monday next week with home milrinone infusion.  He is diuresing well with oral bumetanide     Physical Exam:  Patient is no acute distress  Elevated JVP ~10 mmHg  Normal S1S2, S3 gallop, soft 2/6 systolic murmur on lsb  Lungs clear  Abdomen soft, non-tender  Extremities are warm with 2+ bilateral pitting edema     A/Plan:  As detailed above, patient has ischemic cardiomyopathy with severe reduced left ventricular ejection fraction.  At this time stage D heart failure.  He was deemed to be a good candidate for advanced heart failure therapies due to uncontrolled diabetes mellitus (A1c 10%). He was not deemed a candidate for heart failure therapies, mostly because of uncontrolled diabetes mellitus.  He is diuresing well on oral bumetanide.  He is set up for home discharge with milrinone infusion on Monday next week.                [1]   Scheduled medications   Medication Dose Route Frequency    apixaban  5 mg oral BID    atorvastatin  80 mg oral Nightly    bumetanide  2 mg oral BID    calcium carbonate  500 mg of calcium carbonate oral BID    dapagliflozin propanediol  10 mg oral Daily    digoxin  125 mcg oral Once per day on Monday Wednesday Friday    gabapentin  200 mg oral TID    hydrALAZINE  25 mg oral TID    insulin glargine  40 Units subcutaneous Nightly    insulin glargine  40 Units subcutaneous Daily    insulin lispro  0-15 Units subcutaneous TID AC    insulin lispro  22 Units subcutaneous TID AC    iron polysaccharides  150 mg oral Daily    isosorbide dinitrate  40 mg oral TID    levothyroxine  75 mcg oral Daily before breakfast    pantoprazole  40 mg oral Daily before breakfast    QUEtiapine  50 mg oral Nightly    SITagliptin phosphate  50 mg oral Daily   [2]   PRN medications   Medication    acetaminophen    albuterol    alteplase    dextrose    dextrose    glucagon    glucagon    loperamide   [3]   Continuous Medications   Medication Dose Last Rate     milrinone  0.25 mcg/kg/min 0.25 mcg/kg/min (08/08/25 1004)

## 2025-08-08 NOTE — PROGRESS NOTES
Advanced Heart Failure Progress Note   Consulting Team:  Primary Cardiologist:  Reason for Consult:    Subjective   Negrita Law is a 60 y.o. male   Sitting up in chair, NAD. Denies any CP or SOB, remains comfortable on RA.   Aware of co-pay with home infusion, pt likely unable to afford. SW, pharmacy, and TCC aware.  On Milrinone drip..   evaluation ongoing  Transplant nephrology to evaluate for transplant on Urban 8/10  A1c improved to 8.2      Plan  Follow up with  evaluation for discharge planning  If no headway, will plan to transfer to HFICU for swan guided wean of Inotrope        Objective   Physical Exam  Vitals:    08/08/25 1211   BP: 100/67   Pulse: 109   Resp: 18   Temp: 36.4 °C (97.5 °F)   SpO2: 94%       GEN: Healthy appearing, well-developed, NAD.  CV: RRR, no m/r/g. JVP   LUNGS: CTAB, no w/r/c.  ABD: Soft, NT/ND, NBS, no masses or organomegaly.  SKIN: Warm, well perfused. No skin rashes or abnormal lesions. No LE edema, Bi LE skin changes  MSK: Normal gait. No deformities.  EXT: No clubbing, cyanosis, or edema.  NEURO: Ambulating with no limitations. No focal deficits.    I have personally reviewed the following images and laboratory findings:      Results for orders placed during the hospital encounter of 05/04/25    Transthoracic Echo Complete    Narrative  [facility], [address] [place] [address]  Tel [phone] and Fax [phone]    TRANSTHORACIC ECHOCARDIOGRAM REPORT      Patient Name:       NEGRITA BERNARD    Gayla Physician:    [id number] Bird Askew MD  Study Date:         5/5/2025            Ordering Provider:    [id number] BENNY CARTWRIGHT  MRN/PID:            [id number]            Fellow:  Accession#:         [id number]        Nurse:                Tres Barros RN  Date of Birth/Age:  1965 / 60 years Sonographer:          Alyssa Monroy RDCS  Gender assigned at  M                    Additional Staff:  Birth:  Height:             180.34 cm           Admit Date:           5/4/2025  Weight:             101.61 kg           Admission Status:     Inpatient -  Routine  BSA / BMI:          2.21 m2 / 31.24     Encounter#:           [id number]  kg/m2  Blood Pressure:     116/75 mmHg         Department Location:  [facility]    Study Type:    TRANSTHORACIC ECHO (TTE) COMPLETE  Diagnosis/ICD: Unspecified systolic (congestive) heart failure (CHF)-I50.20;  Ischemic cardiomyopathy-I25.5  Indication:    SOB CP Edema  CPT Code:      Echo Limited-70967; Doppler Limited-24488; Color Doppler-97731    Patient History:  CABG:              CABG x 1.  Smoker:            Former.  Pacer/Defib:       AICD  Pertinent History: CAD, Cardiomyopathy, CHF, COPD, PVD, Previous DVT, HTN and  Hyperlipidemia. PCI-Stent, S/P CABG x1 2023, CKD III, JAKE.    Study Detail: The following Echo studies were performed: 2D, Doppler and color  flow. Technically challenging study due to body habitus. Definity  used as a contrast agent for endocardial border definition. Total  contrast used for this procedure was 2 mL via IV push.      PHYSICIAN INTERPRETATION:  Left Ventricle: Left ventricular ejection fraction is severely decreased, by visual estimate at 15-20%. There is global hypokinesis of the left ventricle with minor regional variations. The left ventricular cavity size is moderately dilated. Abnormal (paradoxical) septal motion, consistent with RV pacemaker. Spectral Doppler shows a Grade II (pseudonormal pattern) of left ventricular diastolic filling with an elevated left atrial pressure.  Left Atrium: The left atrium is mildly dilated.  Right Ventricle: The right ventricle is mildly enlarged. There is moderately reduced right ventricular systolic function.  Right Atrium: The right atrium is normal in size.  Aortic Valve: The aortic valve is trileaflet. There is minimal aortic valve cusp calcification. There is mild aortic valve  thickening. There is no evidence of aortic valve regurgitation. The peak instantaneous gradient of the aortic valve is 6 mmHg.  Mitral Valve: The mitral valve is mildly thickened. There is mild to moderate mitral valve regurgitation.  Tricuspid Valve: The tricuspid valve is structurally normal. There is mild to moderate tricuspid regurgitation. The Doppler estimated RVSP is moderately elevated at 58.3 mmHg.  Pulmonic Valve: The pulmonic valve is structurally normal. There is physiologic pulmonic valve regurgitation.  Pericardium: Trivial pericardial effusion.  Aorta: The aortic root is normal.  Systemic Veins: The inferior vena cava appears dilated.  In comparison to the previous echocardiogram(s): Compared with study dated 6/25/2024, LVEF 15-20%.      CONCLUSIONS:  1. Left ventricular ejection fraction is severely decreased, by visual estimate at 15-20%.  2. There is global hypokinesis of the left ventricle with minor regional variations.  3. Spectral Doppler shows a Grade II (pseudonormal pattern) of left ventricular diastolic filling with an elevated left atrial pressure.  4. Left ventricular cavity size is moderately dilated.  5. Abnormal septal motion consistent with RV pacemaker.  6. There is moderately reduced right ventricular systolic function.  7. Mildly enlarged right ventricle.  8. The left atrium is mildly dilated.  9. Mild to moderate mitral valve regurgitation.  10. Mild to moderate tricuspid regurgitation visualized.  11. Moderately elevated right ventricular systolic pressure.    QUANTITATIVE DATA SUMMARY:    2D MEASUREMENTS:           Normal Ranges:  LVEDV Index:     119 ml/m2      LV SYSTOLIC FUNCTION:  Normal Ranges:  EF-A4C View:    21 % (>=55%)  EF-A2C View:    8 %  EF-Biplane:     16 %  EF-Visual:      18 %  LV EF Reported: 18 %      LV DIASTOLIC FUNCTION:           Normal Ranges:  MV Peak A:             0.65 m/s  (0.42-0.7 m/s)  MV e'                  0.067 m/s (>8.0)  MV lateral e'           "0.09 m/s  MV medial e'           0.04 m/s      MITRAL VALVE:          Normal Ranges:  MV DT:        198 msec (150-240msec)      MITRAL INSUFFICIENCY:             Normal Ranges:  MR Vmax:              342.00 cm/s      AORTIC VALVE:          Normal Ranges:  AoV Vmax:     1.19 m/s (<=1.7m/s)  AoV Peak P.7 mmHg (<20mmHg)  LVOT Max Dylan: 0.65 m/s (<=1.1m/s)  LVOT VTI:     6.72 cm      RIGHT VENTRICLE:  TAPSE: 15.5 mm  RV s'  0.09 m/s      TRICUSPID VALVE/RVSP:          Normal Ranges:  Peak TR Velocity:     3.29 m/s  RV Syst Pressure:     58 mmHg  (< 30mmHg)  IVC Diam:             2.40 cm      [id number] Bird Askew MD  Electronically signed on 2025 at 1:38:04 PM        ** Final **         Lab Review   Lab Results   Component Value Date     (L) 2025     (L) 2025     (L) 2025    K 3.9 2025    K 4.0 2025    K 4.2 2025    CO2 29 2025    CO2 26 2025    CO2 26 2025    BUN 72 (H) 2025    BUN 71 (H) 2025    BUN 71 (H) 2025    CREATININE 2.61 (H) 2025    CREATININE 2.75 (H) 2025    CREATININE 2.64 (H) 2025    GLUCOSE 141 (H) 2025    GLUCOSE 83 2025    GLUCOSE 98 2025    CALCIUM 9.8 2025    CALCIUM 9.7 2025    CALCIUM 9.6 2025     No results found for: \"CKTOTAL\", \"CKMB\", \"CKMBINDEX\", \"TROPONINI\"  Lab Results   Component Value Date    WBC 5.3 2025    WBC 5.7 2025    WBC 6.0 2025    HGB 10.7 (L) 2025    HGB 10.5 (L) 2025    HGB 10.4 (L) 2025    HCT 36.8 (L) 2025    HCT 34.7 (L) 2025    HCT 35.9 (L) 2025    MCV 82 2025    MCV 82 2025    MCV 82 2025     2025     2025     2025       Troponin I, High Sensitivity   Date/Time Value Ref Range Status   2025 07:37 AM 53 (HH) 0 - 20 ng/L Final     Comment:     Previous result verified on 2025 on specimen/case " [id number] called with component [facility] for procedure Troponin I, High Sensitivity, Initial with value 53 ng/L.   07/12/2025 08:38 PM 53 (HH) 0 - 20 ng/L Final     Comment:     Previous result verified on 7/12/2025 2001 on specimen/case [id number] called with component [facility] for procedure Troponin I, High Sensitivity, Initial with value 53 ng/L.   07/12/2025 07:22 PM 53 (HH) 0 - 20 ng/L Final     Troponin I, High Sensitivity ([facility])   Date/Time Value Ref Range Status   07/17/2025 11:19 AM 37 0 - 53 ng/L Final     BNP   Date/Time Value Ref Range Status   07/31/2025 07:45  (H) 0 - 99 pg/mL Final   07/26/2025 04:46  (H) 0 - 99 pg/mL Final   07/23/2025 12:30  (H) 0 - 99 pg/mL Final     Triglycerides   Date/Time Value Ref Range Status   06/10/2025 08:31  (H) 0 - 149 mg/dL Final     Comment:     Age              Desirable        Borderline         High        Very High  SEX:B           mg/dL             mg/dL               mg/dL      mg/dL  <=14D                       ----               ----        ----  15D-365D                    ----               ----        ----  1Y-9Y           0-74               75-99             >=100       ----  10Y-19Y        0-89                            >=130       ----  20Y-24Y        0-114             115-149             >=150      ----  >= 25Y         0-149             150-199             200-499    >=500      Venipuncture immediately after or during the administration of Metamizole may lead to falsely low results. Testing should be performed immediately prior to Metamizole dosing.   11/11/2024 03:58 PM 2,663 (H) 0 - 149 mg/dL Final        Assessment and Plan     Felice Law is a 60 y.o. male male with a complex medical history including ischemic heart failure with reduced ejection fraction (HFrEF, 15-20%), coronary artery bypass grafting (CABG) in 2023 utilizing the left internal mammary artery (LIMA) to the left anterior  descending artery (LAD), ventricular tachycardia (VT) arrest treated with VT ablation and an implantable cardioverter defibrillator (ICD) placed in 2017. His comorbidities include hypertension, hyperlipidemia, hypothyroidism, left femoral deep vein thrombosis managed with Eliquis, chronic obstructive pulmonary disease (COPD), obstructive sleep apnea (JAKE), stage III chronic kidney disease (CKD), chronic bilateral lower extremity venous stasis dermatitis, and insulin-dependent type 2 diabetes mellitus.    He was initially admitted on July 12 to [place] for acute decompensated heart failure and was subsequently diuresed and transferred to the [facility] ([facility] Heart Failure Intensive Care Unit (HFICU) for advanced management. Given his prior evaluation for advanced heart failure therapies during a hospital admission in October 2023--which was deferred due to concerns about frailty, mobility limitations (uses a motorized scooter and has bilateral foot drop with neuropathy), CKD, poorly controlled diabetes, and unclear social and financial support--his case was re-assessed starting July 23.    The advanced therapies team and the heart transplant/ventricular assist device (Gavi/VAD) committee reviewed his case. The primary barrier to listing remains inadequate diabetes control. Given his chronically low glomerular filtration rate (GFR), consideration for a combined heart-kidney transplant was discussed. He was transitioned to the general floor on milrinone with discharge planning underway. However, discharge has been complicated by the prohibitive cost of milrinone copayments and challenges securing home health care nursing visits    #Acute on chronic systolic CHF, HFrEF 15-20%, dx'd 2017,   #Ischemic Cardiomyopathy, ACC/AHA Stage C-D, NYHA II  Patient has severe ischemic cardiomyopathy with an EF of 15-20%, extensive nonviable myocardium on MRI, and is inotrope-dependent with failed  milrinone weaning. Cardiac catheterization shows significant coronary disease but no immediate intervention planned; focus is on optimizing diabetes and stabilizing renal and heart failure status. Advanced therapies listing deferred due to poorly controlled diabetes, with ongoing discharge challenges   Compensated HF  On Milrinone 0.25, Hydralazine 25 mg TID, Isordil 40 mg, Bumex 2 mg every day  Discharge has been complicated by the prohibitive cost of milrinone copayments and challenges securing home health care nursing visits.  CKD 3b>4 with EGFR 26  Transplant nephrology to evaluate for transplant on [place] 8/10  A1c improved to 8.2    Recommendations  Follow up with  evaluation for discharge planning  If no headway, will plan to transfer to HFICU for swan guided wean of Inotrope      Thank you for the opportunity to involve us in the care of this fine individual. This plan was discussed with Dr. Chun, HF attending. For any questions or concerns, feel free to reach out via [employer] chat or page at [phone].    Philomena Worrell MD   Heart Failure Fellow  PGY-4

## 2025-08-08 NOTE — PROGRESS NOTES
Physical Therapy    Physical Therapy Treatment    Patient Name: Felice Law  MRN: [id number]  Department: [place] [address]  Room: [address] [place], [place] [id number]  Today's Date: 8/8/2025  Time Calculation  Start Time: 1451  Stop Time: 1507  Time Calculation (min): 16 min         Assessment/Plan   PT Assessment  PT Assessment Results: Decreased strength, Decreased range of motion, Decreased endurance, Impaired balance, Decreased mobility  Rehab Prognosis: Good  Barriers to Discharge Home: No anticipated barriers  Evaluation/Treatment Tolerance: Patient limited by fatigue  Medical Staff Made Aware: Yes  End of Session Communication: Bedside nurse  Assessment Comment: Pt able to amb 180 with brief break to adjust footware. Pt unsteady with IV pole, but no gross LOB, reports family at home can assist with mobility at needed. Pt declining further PT/therex after completing gait training this date. Remains appropriate for low intensity therapy with family support  End of Session Patient Position: Up in chair, Alarm off, not on at start of session  PT Plan  Inpatient/Swing Bed or Outpatient: Inpatient  PT Plan  Treatment/Interventions: Bed mobility, Transfer training, Gait training, Stair training, Balance training, Strengthening, Endurance training, Range of motion, Therapeutic exercise, Therapeutic activity, Home exercise program, Positioning  PT Plan: Ongoing PT  PT Frequency: 3 times per week  PT Discharge Recommendations: Low intensity level of continued care  Equipment Recommended upon Discharge: Wheeled walker  PT Recommended Transfer Status: Assist x1  PT - OK to Discharge: Yes    PT Visit Info:  PT Received On: 08/08/25  Response to Previous Treatment: Patient with no complaints from previous session.     General Visit Information:   General  Prior to Session Communication: Bedside nurse  Patient Position Received: Up in chair, Alarm off, not on at start of session  General Comment: Pt up in chair, pleasant and agreeable to  therapy  Per handoff with RN, pt is appropriate for therapy, vitals are stable and pain is controlled. Other concerns prior to tx are: none    Subjective   Precautions:  Precautions  Medical Precautions: Cardiac precautions, Fall precautions    Objective   Pain:  Pain Assessment  Pain Assessment: 0-10  0-10 (Numeric) Pain Score: 0 - No pain  Cognition:  Cognition  Overall Cognitive Status: Within Functional Limits  Orientation Level: Oriented X4    Treatments:  Therapeutic Exercise  Therapeutic Exercise Performed: No  Therapeutic Exercise Activity 1: Pt declined    Therapeutic Activity  Therapeutic Activity Performed: Yes  Therapeutic Activity 1: Sitting balance with cues to ensure socks fully on feet    Bed Mobility  Bed Mobility: No    Ambulation/Gait Training  Ambulation/Gait Training Performed: Yes  Ambulation/Gait Training 1  Surface 1: Level tile  Device 1: IV Pole (Pt encouraged to use 2WW but declined)  Assistance 1: Contact guard, Minimum assistance  Quality of Gait 1: Wide base of support, Foot slap, Inconsistent stride length, Listing (B foot drop, lateral instability with +LOB x2 with Dasia to recover)  Transfers  Transfer: Yes  Transfer 1  Transfer From 1: Sit to, Stand to  Transfer to 1: Sit  Technique 1: Sit to stand, Stand to sit  Transfer Level of Assistance 1: Close supervision    Outcome Measures:     [facility] Basic Mobility  Turning from your back to your side while in a flat bed without using bedrails: A little  Moving from lying on your back to sitting on the side of a flat bed without using bedrails: A little  Moving to and from bed to chair (including a wheelchair): A little  Standing up from a chair using your arms (e.g. wheelchair or bedside chair): A little  To walk in hospital room: A little  Climbing 3-5 steps with railing: A lot  Basic Mobility - Total Score: 17    Education Documentation  Precautions, taught by Carmen Loaiza PTA at 8/8/2025  3:43 PM.  Learner: Patient  Readiness:  Acceptance  Method: Explanation, Demonstration  Response: Needs Reinforcement  Comment: precautions, safe mobility    Body Mechanics, taught by Carmen Loaiza PTA at 8/8/2025  3:43 PM.  Learner: Patient  Readiness: Acceptance  Method: Explanation, Demonstration  Response: Needs Reinforcement  Comment: precautions, safe mobility    Mobility Training, taught by Carmen Loaiza PTA at 8/8/2025  3:43 PM.  Learner: Patient  Readiness: Acceptance  Method: Explanation, Demonstration  Response: Needs Reinforcement  Comment: precautions, safe mobility    Education Comments  No comments found.        OP EDUCATION:       Encounter Problems       Encounter Problems (Active)       Balance       Patient to demonstrate Karthikeyan static and dynamic standing balance without LOB with change of directions, no hesitancy, appropriate NISREEN and sequencing to complete functional task.  (Progressing)       Start:  07/21/25    Expected End:  08/18/25            Pt will complete TUG in </= 14 sec with LRAD and no acute LOB (Progressing)       Start:  07/21/25    Expected End:  08/18/25               Mobility       STG - Patient will ambulate >/= 250 ft Karthikeyan with LRAD and no acute LOB (Progressing)       Start:  07/21/25    Expected End:  08/18/25            Patient to ascend/descend >/= 4 stairs with SBA and LRAD to demo ability to safely traverse SHEA/within home (Progressing)       Start:  07/21/25    Expected End:  08/18/25            Patient will tolerate >/=30 minutes continuous activity with stable vital signs, RPE </=13/20, RPD </=3/10  (Progressing)       Start:  07/21/25    Expected End:  08/18/25               PT Transfers       STG - Patient will perform bed mobility IND (Progressing)       Start:  07/21/25    Expected End:  08/18/25            STG - Patient will transfer sit to and from stand Karthikeyan with LRAD (Progressing)       Start:  07/21/25    Expected End:  08/18/25                 JONAS Dunne

## 2025-08-08 NOTE — CONSULTS
INPATIENT INITIAL TRANSPLANT NEPHROLOGY CONSULT          SERVICE DATE: 8/10/2025   SERVICE TIME:  7:20 PM    REASON FOR CONSULT:  Immunosuppressive medication management and nephrology related issues.    REQUESTING PHYSICIAN: Jean Claude Uwamungu, MD  PRIMARY CARE PHYSICIAN: No primary care provider on file.    ADMISSION DIAGNOSIS:   1. Acute on chronic systolic heart failure    2. ICD (implantable cardioverter-defibrillator) in place    3. Encounter for preprocedural cardiovascular examination    4. Acute on chronic congestive heart failure, unspecified heart failure type    5. Acute on chronic systolic congestive heart failure    6. Chronic systolic heart failure        TRANSPLANT DATE: N/A    BLOOD TYPE: A    HPI:    Mr. Mona Law is a 60 y.o. male with past medical history significant for  ischemic HFrEF 15-20%, CABG (LIMA- LAD 2023),  VT arrest s/p VT ablation and ICD 2017, Hypertension, Hyperlipidemia, Hypothyroidism, Left femoral DVT on Eliquis, COPD, JAKE, CKD III, Chronic BLE venous stasis dermatitis, Insulin dependent Type 2 DM who was initially admitted 7/12 to [facility] for acute decompensate heart failure. Diuresed and transferred to [facility] HFICU for further managemnet and possible re-consideration of advanced therapies.  Of note, Patient was previously discussed for advanced HF therapies during [facility] October 2023 admission but was declined with concerns for mobility/frailty (uses motorized scooter and has bilateral foot drop with neuropathy), CKD, poorly controlled diabetes, as well as unclear social/financial support.      Acute on chronic systolic ischemic, HFrEF 15-20% (since 2017)  CAD s/p PCI and 1v CABG (LIMA-LAD 4/2023).   VT s/p ICD 2017    CKD  stage 3b, transplant nephrology was consulted for [facility] evaluation.    REVIEW OF SYSTEM:  Review of system was done system by system (10/14). Apart from HPI, other symptoms were negative.    PAST MEDICAL HISTORY:  Medical History[1]     PAST SURGICAL  HISTORY:  Surgical History[2]     SOCIAL HISTORY:  Social History     Socioeconomic History    Marital status: Single     Spouse name: Not on file    Number of children: Not on file    Years of education: Not on file    Highest education level: Not on file   Occupational History    Not on file   Tobacco Use    Smoking status: Former     Current packs/day: 0.00     Average packs/day: 1 pack/day for 24.0 years (24.0 ttl pk-yrs)     Types: Cigarettes     Start date:      Quit date: 2017     Years since quittin.6    Smokeless tobacco: Never   Vaping Use    Vaping status: Never Used   Substance and Sexual Activity    Alcohol use: Never    Drug use: Never    Sexual activity: Defer   Other Topics Concern    Not on file   Social History Narrative    Not on file     Social Drivers of Health     Financial Resource Strain: Low Risk  (2025)    Overall Financial Resource Strain (CARDIA)     Difficulty of Paying Living Expenses: Not hard at all   Food Insecurity: No Food Insecurity (2025)    Hunger Vital Sign     Worried About Running Out of Food in the Last Year: Never true     Ran Out of Food in the Last Year: Never true   Transportation Needs: No Transportation Needs (2025)    PRAPARE - Transportation     Lack of Transportation (Medical): No     Lack of Transportation (Non-Medical): No   Physical Activity: Inactive (2025)    Exercise Vital Sign     Days of Exercise per Week: Not on file     Minutes of Exercise per Session: 0 min   Stress: No Stress Concern Present (2025)    [employer] [place] [employer] - Occupational Stress Questionnaire     Feeling of Stress: Not at all   Social Connections: Unknown (2025)    Social Connection and Isolation Panel     Frequency of Communication with Friends and Family: Twice a week     Frequency of Social Gatherings with Friends and Family: Never     Attends Anabaptist Services: Never     Active Member of Clubs or Organizations: No     Attends  "Club or Organization Meetings: Never     Marital Status: Not on file   Intimate Partner Violence: Not At Risk (7/23/2025)    Humiliation, Afraid, Rape, and Kick questionnaire     Fear of Current or Ex-Partner: No     Emotionally Abused: No     Physically Abused: No     Sexually Abused: No   Housing Stability: Unknown (7/30/2025)    Housing Stability Vital Sign     Unable to Pay for Housing in the Last Year: No     Number of Times Moved in the Last Year: Not on file     Homeless in the Last Year: No       FAMILY HISTORY:  Family History[3]    MEDICATION LIST:  apixaban, 5 mg, BID  atorvastatin, 80 mg, Nightly  bumetanide, 2 mg, BID  calcium carbonate, 500 mg of calcium carbonate, BID  dapagliflozin propanediol, 10 mg, Daily  digoxin, 125 mcg, Once per day on Monday Wednesday Friday  gabapentin, 200 mg, TID  hydrALAZINE, 25 mg, TID  insulin glargine, 40 Units, Nightly  insulin glargine, 40 Units, Daily  insulin lispro, 0-15 Units, TID AC  insulin lispro, 18 Units, TID AC  iron polysaccharides, 150 mg, Daily  isosorbide dinitrate, 40 mg, TID  levothyroxine, 75 mcg, Daily before breakfast  pantoprazole, 40 mg, Daily before breakfast  QUEtiapine, 50 mg, Nightly  SITagliptin phosphate, 50 mg, Daily      milrinone, Last Rate: 0.25 mcg/kg/min (08/10/25 1247)      acetaminophen, 975 mg, q6h PRN  albuterol, 2.5 mg, q6h PRN  alteplase, 2 mg, PRN  dextrose, 12.5 g, q15 min PRN  dextrose, 25 g, q15 min PRN  glucagon, 1 mg, q15 min PRN  glucagon, 1 mg, q15 min PRN  loperamide, 2 mg, TID PRN        ALLERGY:  Allergies[4]    PHYCISCAL EXAMINATION:  Visit Vitals  /75 (BP Location: Right arm, Patient Position: Lying)   Pulse 104   Temp 36 °C (96.8 °F) (Temporal)   Resp 18   Ht 1.803 m (5' 10.98\")   Wt 96.6 kg (212 lb 15.4 oz)   SpO2 93%   BMI 29.72 kg/m²   Smoking Status Former   BSA 2.2 m²        08/09 0700 - 08/10 1859  In: 2278.7 [P.O.:2200; I.V.:78.7]  Out: 3850 [Urine:3850]     General: NAD  Head/ neck: + JVD   Cardiac: " RRR, regular S1 S2 , no murmur, no rub, no gallop  Pulm: CTA bilaterally, no wheezes, rales or rhonchi.    Vascular: Radial 2+ bilaterally  GI: Non distended  Extremities: 2+/1+ pitting BLE edema, improving  Neuro: no focal neuro deficits   Psych: appropriate mood and behavior   Skin: warm and dry     LABS:  Results for orders placed or performed during the hospital encounter of 07/17/25 (from the past 24 hours)   POCT GLUCOSE   Result Value Ref Range    POCT Glucose 149 (H) 74 - 99 mg/dL   POCT GLUCOSE   Result Value Ref Range    POCT Glucose 104 (H) 74 - 99 mg/dL   POCT GLUCOSE   Result Value Ref Range    POCT Glucose 145 (H) 74 - 99 mg/dL   POCT GLUCOSE   Result Value Ref Range    POCT Glucose 196 (H) 74 - 99 mg/dL   CBC   Result Value Ref Range    WBC 5.8 4.4 - 11.3 x10*3/uL    nRBC 0.0 0.0 - 0.0 /100 WBCs    RBC 4.61 4.50 - 5.90 x10*6/uL    Hemoglobin 11.1 (L) 13.5 - 17.5 g/dL    Hematocrit 37.2 (L) 41.0 - 52.0 %    MCV 81 80 - 100 fL    MCH 24.1 (L) 26.0 - 34.0 pg    MCHC 29.8 (L) 32.0 - 36.0 g/dL    RDW 19.8 (H) 11.5 - 14.5 %    Platelets 316 150 - 450 x10*3/uL        ASSESSMENT AND PLAN:    Patient meets the eligibility for heart and kidney transplant.  I discussed risks/benefits with patient. I also informed him that he needs to qualify for heart transplant first to be able to get kidey transplant. He will also need an evaluation by transplant surgery team. I have notified the pre kidney team.    SHK eligibility criteria :  The patient will be a candidate for SLK only if he/she is a candidate for liver transplant AND has met the following criterias for CKD/ or Sustained CHARLOTTE  listed below :     1. Chronic kidney disease (CKD) with a measured or calculated glomerular filtration rate (GFR) less than or equal to 60 mL/min for greater than 90 consecutive days  At least one of the following:    That the candidate has begun regularly administered dialysis as an end-stage renal disease (ESRD) patient in a hospital  based, independent non-hospital based, or home setting.    At the time of registration on the kidney waiting list, that the candidate's most recent measured or calculated creatinine clearance (CrCl) or GFR is less than or equal to 30 mL/min.    On a date after registration on the kidney waiting list, that the candidate's measured or calculated CrCl or GFR is less than or equal to 30 mL/min.     2. Sustained acute kidney injury : At least one of the following, or a combination of both of the following, for the last 6 weeks:    That the candidate has been on dialysis at least once every 7 days.    That the candidate has a measured or calculated CrCl or GFR less than or equal to 25 mL/min at least once every 7 days.     If the candidate's eligibility is not confirmed at least once every seven days for the last 6 weeks, the candidate is not eligible to receive a heart and a kidney from the same donor.           * Case was discussed with primary team.  For questions, please contact transplant nephrology page x [phone].    Vaishali Hope MD    Transplant Nephrologist           [1]   Past Medical History:  Diagnosis Date    Cataract     Chest pain 10/01/2023    CHF (congestive heart failure)     Clotting disorder (Multi)     COPD (chronic obstructive pulmonary disease) (Multi)     Coronary artery disease     Diabetes mellitus (Multi)     Disease of thyroid gland     Elevated troponin level not due myocardial infarction 08/14/2023    Hypertension     Ischemic cardiomyopathy     Pulmonary hypertension (Multi)     Venous ulcer of left leg (Multi)    [2]   Past Surgical History:  Procedure Laterality Date    CARDIAC CATHETERIZATION N/A 6/10/2025    Procedure: Left & Right Heart Cath w Angiography & LV;  Surgeon: Carl B Gillombardo, MD;  Location: [facility] Cardiac Cath Lab;  Service: Cardiovascular;  Laterality: N/A;    CARDIAC DEFIBRILLATOR PLACEMENT      CATARACT EXTRACTION Right         CORONARY ANGIOPLASTY WITH STENT  PLACEMENT      CORONARY ARTERY BYPASS GRAFT     [3]   Family History  Family history unknown: Yes   [4]   Allergies  Allergen Reactions    Azithromycin Other, Dizziness, Respiratory depression and Nausea/vomiting     Cold, clammy, sweating, trouble breathing

## 2025-08-08 NOTE — CARE PLAN
Problem: Discharge Planning  Goal: Discharge to home or other facility with appropriate resources  Outcome: Progressing     Problem: Heart Failure  Goal: Improved gas exchange this shift  Outcome: Progressing     Patient was safe and stable this shift. No complaints of CP or SOB. Walked phan with PT. Possible discharge Monday for discharge home with milrinone.

## 2025-08-08 NOTE — PROGRESS NOTES
8/8/2025     I attempted to reach the patient using both phone numbers listed in their EPIC account, but unfortunately, neither number is valid. I will follow up with the previous CHW (Nichole Gandhi) to see if there’s an alternative way to get in touch with the patient.     Signed  [facility]   HIV/HCV FOCUS Program  Certified Community Health Worker - Research  Please contact me via email or MyChart with questions

## 2025-08-09 LAB
ALBUMIN SERPL BCP-MCNC: 4.1 G/DL (ref 3.4–5)
ANION GAP SERPL CALC-SCNC: 17 MMOL/L (ref 10–20)
BUN SERPL-MCNC: 74 MG/DL (ref 6–23)
CALCIUM SERPL-MCNC: 9.9 MG/DL (ref 8.6–10.6)
CHLORIDE SERPL-SCNC: 94 MMOL/L (ref 98–107)
CO2 SERPL-SCNC: 26 MMOL/L (ref 21–32)
CREAT SERPL-MCNC: 2.55 MG/DL (ref 0.5–1.3)
EGFRCR SERPLBLD CKD-EPI 2021: 28 ML/MIN/1.73M*2
ERYTHROCYTE [DISTWIDTH] IN BLOOD BY AUTOMATED COUNT: 19.6 % (ref 11.5–14.5)
GLUCOSE BLD MANUAL STRIP-MCNC: 149 MG/DL (ref 74–99)
GLUCOSE BLD MANUAL STRIP-MCNC: 151 MG/DL (ref 74–99)
GLUCOSE BLD MANUAL STRIP-MCNC: 156 MG/DL (ref 74–99)
GLUCOSE BLD MANUAL STRIP-MCNC: 183 MG/DL (ref 74–99)
GLUCOSE SERPL-MCNC: 124 MG/DL (ref 74–99)
HCT VFR BLD AUTO: 35.8 % (ref 41–52)
HGB BLD-MCNC: 10.5 G/DL (ref 13.5–17.5)
MAGNESIUM SERPL-MCNC: 2.48 MG/DL (ref 1.6–2.4)
MCH RBC QN AUTO: 24.1 PG (ref 26–34)
MCHC RBC AUTO-ENTMCNC: 29.3 G/DL (ref 32–36)
MCV RBC AUTO: 82 FL (ref 80–100)
NRBC BLD-RTO: 0 /100 WBCS (ref 0–0)
PHOSPHATE SERPL-MCNC: 3.8 MG/DL (ref 2.5–4.9)
PLATELET # BLD AUTO: 313 X10*3/UL (ref 150–450)
POTASSIUM SERPL-SCNC: 4 MMOL/L (ref 3.5–5.3)
RBC # BLD AUTO: 4.36 X10*6/UL (ref 4.5–5.9)
SODIUM SERPL-SCNC: 133 MMOL/L (ref 136–145)
WBC # BLD AUTO: 6.3 X10*3/UL (ref 4.4–11.3)

## 2025-08-09 PROCEDURE — 2500000001 HC RX 250 WO HCPCS SELF ADMINISTERED DRUGS (ALT 637 FOR MEDICARE OP): Performed by: PHYSICIAN ASSISTANT

## 2025-08-09 PROCEDURE — 83735 ASSAY OF MAGNESIUM: CPT | Performed by: PHYSICIAN ASSISTANT

## 2025-08-09 PROCEDURE — 1100000001 HC PRIVATE ROOM DAILY

## 2025-08-09 PROCEDURE — 2500000002 HC RX 250 W HCPCS SELF ADMINISTERED DRUGS (ALT 637 FOR MEDICARE OP, ALT 636 FOR OP/ED)

## 2025-08-09 PROCEDURE — 2500000004 HC RX 250 GENERAL PHARMACY W/ HCPCS (ALT 636 FOR OP/ED)

## 2025-08-09 PROCEDURE — 82947 ASSAY GLUCOSE BLOOD QUANT: CPT

## 2025-08-09 PROCEDURE — 2500000002 HC RX 250 W HCPCS SELF ADMINISTERED DRUGS (ALT 637 FOR MEDICARE OP, ALT 636 FOR OP/ED): Performed by: PHYSICIAN ASSISTANT

## 2025-08-09 PROCEDURE — 85027 COMPLETE CBC AUTOMATED: CPT | Performed by: PHYSICIAN ASSISTANT

## 2025-08-09 PROCEDURE — 2500000002 HC RX 250 W HCPCS SELF ADMINISTERED DRUGS (ALT 637 FOR MEDICARE OP, ALT 636 FOR OP/ED): Performed by: NURSE PRACTITIONER

## 2025-08-09 PROCEDURE — 2500000001 HC RX 250 WO HCPCS SELF ADMINISTERED DRUGS (ALT 637 FOR MEDICARE OP)

## 2025-08-09 PROCEDURE — 80069 RENAL FUNCTION PANEL: CPT | Performed by: PHYSICIAN ASSISTANT

## 2025-08-09 PROCEDURE — 99222 1ST HOSP IP/OBS MODERATE 55: CPT | Performed by: INTERNAL MEDICINE

## 2025-08-09 PROCEDURE — 36415 COLL VENOUS BLD VENIPUNCTURE: CPT | Performed by: PHYSICIAN ASSISTANT

## 2025-08-09 PROCEDURE — 99233 SBSQ HOSP IP/OBS HIGH 50: CPT | Performed by: STUDENT IN AN ORGANIZED HEALTH CARE EDUCATION/TRAINING PROGRAM

## 2025-08-09 RX ADMIN — HYDRALAZINE HYDROCHLORIDE 25 MG: 25 TABLET ORAL at 09:20

## 2025-08-09 RX ADMIN — LEVOTHYROXINE SODIUM 75 MCG: 0.07 TABLET ORAL at 06:03

## 2025-08-09 RX ADMIN — INSULIN LISPRO 2 UNITS: 100 INJECTION, SOLUTION INTRAVENOUS; SUBCUTANEOUS at 18:45

## 2025-08-09 RX ADMIN — ISOSORBIDE DINITRATE 40 MG: 40 TABLET ORAL at 15:35

## 2025-08-09 RX ADMIN — BUMETANIDE 2 MG: 1 TABLET ORAL at 09:22

## 2025-08-09 RX ADMIN — HYDRALAZINE HYDROCHLORIDE 25 MG: 25 TABLET ORAL at 20:59

## 2025-08-09 RX ADMIN — APIXABAN 5 MG: 5 TABLET, FILM COATED ORAL at 20:59

## 2025-08-09 RX ADMIN — MILRINONE LACTATE IN DEXTROSE 0.25 MCG/KG/MIN: 0.2 INJECTION, SOLUTION INTRAVENOUS at 10:18

## 2025-08-09 RX ADMIN — SITAGLIPTIN 50 MG: 50 TABLET, FILM COATED ORAL at 09:23

## 2025-08-09 RX ADMIN — INSULIN LISPRO 22 UNITS: 100 INJECTION, SOLUTION INTRAVENOUS; SUBCUTANEOUS at 13:03

## 2025-08-09 RX ADMIN — HYDRALAZINE HYDROCHLORIDE 25 MG: 25 TABLET ORAL at 15:35

## 2025-08-09 RX ADMIN — GABAPENTIN 200 MG: 100 CAPSULE ORAL at 09:21

## 2025-08-09 RX ADMIN — INSULIN LISPRO 22 UNITS: 100 INJECTION, SOLUTION INTRAVENOUS; SUBCUTANEOUS at 09:25

## 2025-08-09 RX ADMIN — INSULIN GLARGINE 40 UNITS: 100 INJECTION, SOLUTION SUBCUTANEOUS at 20:58

## 2025-08-09 RX ADMIN — GABAPENTIN 200 MG: 100 CAPSULE ORAL at 15:35

## 2025-08-09 RX ADMIN — PANTOPRAZOLE SODIUM 40 MG: 40 TABLET, DELAYED RELEASE ORAL at 06:03

## 2025-08-09 RX ADMIN — QUETIAPINE FUMARATE 50 MG: 25 TABLET ORAL at 20:59

## 2025-08-09 RX ADMIN — INSULIN LISPRO 2 UNITS: 100 INJECTION, SOLUTION INTRAVENOUS; SUBCUTANEOUS at 09:24

## 2025-08-09 RX ADMIN — POLYSACCHARIDE-IRON COMPLEX 150 MG: 150 CAPSULE ORAL at 09:22

## 2025-08-09 RX ADMIN — INSULIN LISPRO 22 UNITS: 100 INJECTION, SOLUTION INTRAVENOUS; SUBCUTANEOUS at 18:45

## 2025-08-09 RX ADMIN — ATORVASTATIN CALCIUM 80 MG: 80 TABLET, FILM COATED ORAL at 20:57

## 2025-08-09 RX ADMIN — INSULIN GLARGINE 40 UNITS: 100 INJECTION, SOLUTION SUBCUTANEOUS at 09:20

## 2025-08-09 RX ADMIN — ISOSORBIDE DINITRATE 40 MG: 40 TABLET ORAL at 09:21

## 2025-08-09 RX ADMIN — DAPAGLIFLOZIN 10 MG: 10 TABLET, FILM COATED ORAL at 09:22

## 2025-08-09 RX ADMIN — GABAPENTIN 200 MG: 100 CAPSULE ORAL at 20:57

## 2025-08-09 RX ADMIN — ISOSORBIDE DINITRATE 40 MG: 40 TABLET ORAL at 18:44

## 2025-08-09 RX ADMIN — APIXABAN 5 MG: 5 TABLET, FILM COATED ORAL at 09:21

## 2025-08-09 RX ADMIN — INSULIN LISPRO 2 UNITS: 100 INJECTION, SOLUTION INTRAVENOUS; SUBCUTANEOUS at 13:03

## 2025-08-09 RX ADMIN — BUMETANIDE 2 MG: 1 TABLET ORAL at 17:50

## 2025-08-09 ASSESSMENT — PAIN SCALES - GENERAL
PAINLEVEL_OUTOF10: 0 - NO PAIN
PAINLEVEL_OUTOF10: 0 - NO PAIN

## 2025-08-09 ASSESSMENT — COGNITIVE AND FUNCTIONAL STATUS - GENERAL
DAILY ACTIVITIY SCORE: 24
MOBILITY SCORE: 22
WALKING IN HOSPITAL ROOM: A LITTLE
DAILY ACTIVITIY SCORE: 24
CLIMB 3 TO 5 STEPS WITH RAILING: A LITTLE

## 2025-08-09 ASSESSMENT — PAIN - FUNCTIONAL ASSESSMENT
PAIN_FUNCTIONAL_ASSESSMENT: 0-10
PAIN_FUNCTIONAL_ASSESSMENT: 0-10

## 2025-08-09 NOTE — PROGRESS NOTES
Subjective:  Patient washing up this morning, feels well. He says welling in his legs are better. He is keeping his feet up.      utilized for communication.    Overnight Events:  No acute events overnight.         Objective:  Vitals:    08/09/25 1140   BP: 104/68   Pulse: 106   Resp: 18   Temp: 36.1 °C (97 °F)   SpO2: 95%     Weight         8/6/2025  1100 8/7/2025  0500 8/8/2025  0615 8/9/2025  0519 8/9/2025  0600    Weight: 98.7 kg (217 lb 9.5 oz) 98.3 kg (216 lb 11.4 oz) 96.7 kg (213 lb 3 oz) 96.4 kg (212 lb 8.4 oz) 96.4 kg (212 lb 8.4 oz)            Intake/Output Summary (Last 24 hours) at 8/9/2025 1432  Last data filed at 8/9/2025 1306  Gross per 24 hour   Intake 1720 ml   Output 2100 ml   Net -380 ml     Recent Results (from the past 24 hours)   CBC    Collection Time: 08/08/25  6:16 PM   Result Value Ref Range    WBC 5.8 4.4 - 11.3 x10*3/uL    nRBC 0.0 0.0 - 0.0 /100 WBCs    RBC 4.45 (L) 4.50 - 5.90 x10*6/uL    Hemoglobin 10.7 (L) 13.5 - 17.5 g/dL    Hematocrit 35.8 (L) 41.0 - 52.0 %    MCV 80 80 - 100 fL    MCH 24.0 (L) 26.0 - 34.0 pg    MCHC 29.9 (L) 32.0 - 36.0 g/dL    RDW 19.9 (H) 11.5 - 14.5 %    Platelets 337 150 - 450 x10*3/uL   Magnesium    Collection Time: 08/08/25  6:16 PM   Result Value Ref Range    Magnesium 2.40 1.60 - 2.40 mg/dL   Renal function panel    Collection Time: 08/08/25  6:16 PM   Result Value Ref Range    Glucose 121 (H) 74 - 99 mg/dL    Sodium 133 (L) 136 - 145 mmol/L    Potassium 4.2 3.5 - 5.3 mmol/L    Chloride 94 (L) 98 - 107 mmol/L    Bicarbonate 28 21 - 32 mmol/L    Anion Gap 15 10 - 20 mmol/L    Urea Nitrogen 74 (H) 6 - 23 mg/dL    Creatinine 2.59 (H) 0.50 - 1.30 mg/dL    eGFR 28 (L) >60 mL/min/1.73m*2    Calcium 9.8 8.6 - 10.6 mg/dL    Phosphorus 4.1 2.5 - 4.9 mg/dL    Albumin 4.0 3.4 - 5.0 g/dL   POCT GLUCOSE    Collection Time: 08/08/25  6:21 PM   Result Value Ref Range    POCT Glucose 143 (H) 74 - 99 mg/dL   POCT GLUCOSE    Collection Time: 08/08/25  9:11  PM   Result Value Ref Range    POCT Glucose 149 (H) 74 - 99 mg/dL   POCT GLUCOSE    Collection Time: 08/09/25  6:42 AM   Result Value Ref Range    POCT Glucose 183 (H) 74 - 99 mg/dL   POCT GLUCOSE    Collection Time: 08/09/25 11:46 AM   Result Value Ref Range    POCT Glucose 156 (H) 74 - 99 mg/dL     IR CVC tunneled  Result Date: 8/5/2025  Interpreted By:  Wilian Sahu, STUDY: IR CVC TUNNELED;  8/4/2025 3:00 pm   INDICATION: Signs/Symptoms:dobutamine IV therapy. Needs Massey.   COMPARISON: None.   ACCESSION NUMBER(S): [id number]   ORDERING CLINICIAN: RADHA BREAUX   TECHNIQUE: INTERVENTIONALIST(S): Wilian Sahu MD   CONSENT: The patient/patient's POA/next of kin was informed of the nature of the proposed procedure. The purposes, alternatives, risks, and benefits were explained and discussed. All questions were answered and consent was obtained.   RADIATION EXPOSURE: Fluoroscopy time: 0.7 min   SEDATION: Moderate conscious IV sedation services (supervision of administration, induction, and maintenance) were provided by the physician performing the procedure with intravenous fentanyl 50 mcgmcg and versed 1mg. The physician was assisted by an independent trained observer, an interventional radiology nurse, in the continuous monitoring of patient level of consciousness and physiologic status.   MEDICATION: None.   TIME OUT: A time out was performed immediately prior to procedure start with the interventional team, correctly identifying the patient name, date of birth, MRN, procedure, anatomy (including marking of site and side), patient position, procedure consent form, relevant laboratory and imaging test results, antibiotic administration, safety precautions, and procedure-specific equipment needs.   COMPLICATIONS: No immediate adverse events identified.   FINDINGS: In the recumbent position, the patient was positioned on the angiography table. The right supraclavicular and infraclavicular cutaneous tissues  were prepared and draped in usual sterile manner.   The supraclavicular access site was screened with gray-scale ultrasound with subsequent subcutaneous instillation of Lidocaine 1% local anesthesia. Ultrasound images demonstrate a patent right internal jugular vein. Under direct ultrasound guidance and Seldinger/micropuncture technique, the right internal jugular vein was accessed. An ultrasound digital spot image was acquired and stored on the  PACS.   After confirmation of location, a 018 [place]-Mandril guidewire was inserted to secure location. The guidewire was advanced into the inferior vena cava utilizing intermittent fluoroscopy. The micro-access needle was removed over the guidewire. Utilizing a 5-on-4 coaxial dilator sheath system, upsize to a 035 guidewire was performed. Subsequent access tract dilation was performed to an eventual 10-Russian peel-away sheath dilator system.   After Lidocaine 1% local anesthesia, a subcutaneous tunnel tract was created from the  right infraclavicular chest to the venous access site. After continuity of the tunnel tract and venous access site was obtained, a 9.6 Russian single lumen Massey catheter was then placed with tract continuity and its central catheter tip(s) to reside at the cavoatrial junction. A fluoroscopic spot image of the chest was acquired in the AP projection to confirm optimal location.   The catheter ports were aspirated and flushed without resistance with normal saline. The catheter ports were then charged with high-concentration heparin. The venous access site was closed and sterilely dressed. The external portions of the catheter were secured with a purse-string 2-0 polypropylene suture and sterile dressings.   The patient tolerated the procedure without complication.       1. Uncomplicated placement of an indwelling right internal jugular infraclavicular 9.6 Fr single lumen Massey catheter. The catheter is ready for use.   I was present for and/or  performed the critical portions of the procedure and immediately available throughout the entire procedure.   I personally reviewed the image(s) / study and resident interpretation. I agree with the findings as stated.   Performed and dictated at [facility].   MACRO: None.   Signed by: Wilian Sahu 8/5/2025 6:23 AM Dictation workstation:   [id number]    MR cardiac morphology and function w and wo IV contrast  Result Date: 7/30/2025  Interpreted By:  Leonardo Nathan and Amoah Joseph STUDY: MR CARDIAC MORPHOLOGY AND FUNCTION W AND WO IV CONTRAST;  7/30/2025 12:20 pm   INDICATION: Signs/Symptoms:Viability assessment in ischemic cardiomyopathy.   COMPARISON: Echocardiogram done on 05/05/2025.   ACCESSION NUMBER(S): [id number]   ORDERING CLINICIAN: RADHA BREAUX   TECHNIQUE: Siemens 1.5  Effie MRI scanner. Turbo spin echo and balanced steady state free precession (bSSFP) imaging for anatomic definition. Dynamic cine bSSFP for cardiac chamber and wall-motion analysis, and valvular analysis. Flow quantification sequences for hemodynamics. Delayed gadolinium enhancement analysis after injection of gadolinium-chelate (mL of Dotarem, 0.2 mmol/kg).   HT- ; WT-; BSA-  m2   FINDINGS: CARDIAC CHAMBERS: Normal atrioventricular and ventriculoarterial concordance   LEFT ATRIUM: Moderately dilated (NIKKI - 67.94 ml/m2).   RIGHT ATRIUM: Moderately dilated (RA area max 4ch - 27.40 cm2)   INTERATRIAL SEPTUM: Intact.   LEFT VENTRICLE: 1. Severely dilated LV size (EDVi 156 ml/m2) and severely reduced systolic function. Quantitative LVEF 17 %.. 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 2. Multiple regional wall motion abnormalities are noted. 3. Normal LV wall thickness and normal LV indexed mass (LVMi 92 g/m2). 4. T2 mapping was not performed. 5. ECV unable to be calculated as T1 mapping was not performed. 6. No evidence of LV thrombus. 7. Delayed-enhancement imaging was performed. There is  transmural infarct involving the mid anteroseptum, apical septum, apical inferior and apex. Subendocardial infarct involving the mid inferolateral/inferior wall and apicolateral wall. In addition there is mid myocardial enhancement of the basal septum.   Quantitative left ventricular functional values are as follows: EDV = 350.02 cc; EDVi = 156.40 cc/m2 ESV = 289.00 cc; ESVi = 129.14 cc/m2 Absolute Cardiac Output = 6.96 l/min.; COi = 3.11 l/min/m2 LV mass = 206.94 gm; LVMi = 92.47 gm/m2 Stroke volume = 61.01 cc; SVi = 129.14 cc/m2 LVEF = 17 %   LV septal wall thickness (anterobasal): 0.6 cm LV postero-inferior wall thickness: 0.9 cm LVEDD: 7.5 cm LVESD: 6 cm   RIGHT VENTRICLE: 1. Normal RV size (EDVi 86 ml/m2) and severely reduced systolic function. Quantitative RVEF 27 %. 2. No regional wall motion abnormalities. 3. No abnormal delayed enhancement in the myocardium.   Quantitative right ventricular functional values are as follows: RVEDV = 192.98 ml; RVEDVi = 86.23 ml/m2 RVESV = 141.30 ml; RVESVi = 63.14 ml/m2 RVSV = 51.69 ml; RVSVi = 23.10 ml/m2 RVEF = 27.00 % RVCO = 5.89 l/min; RVCI = 2.63 l/min/m2   INTERVENTRICULAR SEPTUM: Intact.   AORTIC VALVE: The aortic valve is trileaflet. There is quantitatively trivial aortic regurgitation. Flow quantification through the ascending aorta: Forward volume = 46.97 cc/beat Reverse volume = -2.25 cc/beat Net forward volume = 44.72 cc/beat Aortic regurgitant fraction = 5 %   MITRAL VALVE: The mitral valve leaflets appear normal. There is MILD-TO-MODERATE mitral regurgitation. Integrating LV volumetric and aortic flow quantification data reveals: Quantitative mitral regurgitant volume = 14 cc/beat Quantitative mitral regurgitant fraction = 23 %   TRICUSPID VALVE: There is qualitatively trivial tricuspid regurgitation.   PULMONARY VALVE: Not assessed.   PERICARDIUM: The pericardium is normal. There is no pericardial effusion.   THORACIC AORTA: The thoracic aorta appears normal  in course and contour. The aortic root is normal in size. There is no evidence for acute aortic pathology. The arch vessel branching pattern is  normal.   All the arch branch vessels appear widely patent in their proximal portions.   AORTIC ROOT DIMENSIONS: Annulus: 2.6 cm Aortic root(sinus of valsalva): 3 cm Sinotubular junction: 3 cm   PULMONARY ARTERIES: The central pulmonary arteries appear normal (MPA-2.5 cm, RPA-1.8 cm, LPA-2.3 cm).   SYSTEMIC AND PULMONARY VEINS: Normal systemic venous and pulmonary venous return. The SVC is of normal caliber. IVC appears normal Normal pulmonary venous anatomy.   CHEST: The chest wall is normal. Limited imaging through the lungs reveals no gross abnormalities. Small right pleural effusion.   UPPER ABDOMEN: Limited imaging through the upper abdomen reveals no abnormalities of the visualized organs.       1. Mixed cardiomyopathy. Technically limited study due to ICD. Within limitation: Severely dilated LV size (EDVi 156 ml/m2) and severely reduced systolic function. Quantitative LVEF 17 % 2. 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 3. Moderate biatrial dilatation. 4. Mild-to-moderate mitral valve regurgitation. RF 23% 5. Transmural infarction (%) involves the mid anteroseptum, apical septum, apical inferior wall, and apex, all demonstrating nonviable myocardium. Subendocardial infarction (25-50%) affects the mid inferior and apical lateral wall, while subendocardial infarction (<25%) involves the mid inferolateral wall - these segments remain viable. Additionally, mid-myocardial enhancement in the basal septum represents a nonischemic, nonspecific pattern 6. Normal RV size (EDVi 86 ml/m2) and severely reduced systolic function. Quantitative RVEF 27 %. 7. Small right pleural effusion.   This cardiac MRI demonstrates mixed dilated cardiomyopathy with both ischemic and nonischemic components. Nonviable myocardium is present in the mid anteroseptum, apical septum,  apical inferior wall, and apex. All remaining myocardial segments demonstrate preserved viability   MACRO: None   Signed by: Leonardo Nathan 7/30/2025 4:47 PM Dictation workstation:   [id number]    XR chest 1 view  Result Date: 7/27/2025  Interpreted By:  Ke Arechiga, STUDY: XR CHEST 1 VIEW; 7/27/2025 8:22 am   INDICATION: Signs/Symptoms:swan.   COMPARISON: Radiograph dated 07/25/2025   ACCESSION NUMBER(S): [id number]   ORDERING CLINICIAN: YURI FARRELL   FINDINGS: Right IJ [place]-Talita catheter is in place with the tip projecting over distal right main pulmonary artery, retracted from prior study. Left subclavian approach pacer/defibrillator is unchanged in positioning.   The cardiac silhouette size is enlarged, unchanged. Status post median sternotomy.   Mild perihilar congestion. There is no focal consolidation, or pneumothorax. No sizeable pleural effusion. No acute osseous abnormality.       1. Unchanged mild perihilar congestion. Unchanged cardiomegaly. 2. No focal infiltrate, sizeable pleural effusion or       Signed by: Ke Lau 7/27/2025 12:25 PM Dictation workstation:   [id number]    US abdomen complete  Result Date: 7/26/2025  Interpreted By:  Curry Pedraza and Mercado Amiel STUDY: US ABDOMEN COMPLETE;  7/25/2025 11:57 am   INDICATION: Signs/Symptoms:OHT/VAD workup.     COMPARISON: None.   ACCESSION NUMBER(S): [id number]   ORDERING CLINICIAN: YONATHAN WALLS   TECHNIQUE: Multiple images of the abdomen were obtained.   FINDINGS: LIVER: The liver measures 21.3 cm and is grossly unremarkable and free of any focal lesions.     GALLBLADDER: The gallbladder is nondistended and contains multiple layering echogenic gallstones (which measure up to 1.8 cm) with associated posterior acoustic shadowing. There is also mild pericholecystic fluid, likely related to patient's fluid status. The gallbladder wall thickness is 0.2 cm. Sonographic Zimmerman's sign is negative.     BILE DUCTS: No  evidence of intra or extrahepatic biliary dilatation is identified; the common bile duct measures 0.3 cm.   PANCREAS: The pancreas is poorly visualized due to overlying bowel gas.   RIGHT KIDNEY: The right kidney measures 10.4 cm in length. The renal cortical echogenicity and thickness are within normal limit.  No hydronephrosis or renal calculi are seen.   LEFT KIDNEY: The left kidney measures 11.6 cm in length. The renal cortical echogenicity and thickness are within normal limits. No hydronephrosis or renal calculi are seen.     SPLEEN: The spleen is borderline enlarged measuring 12.4 cm and is grossly unremarkable.   URINARY BLADDER: The urinary bladder is within normal limits.   PERITONEUM: There is no free or loculated fluid seen in the abdomen.   ABDOMINAL AORTA AND IVC: The visualized portions of the aorta and IVC are unremarkable.       1. Cholelithiasis without evidence of cholecystitis. Mild pericholecystic fluid is likely related to patient's fluid status. 2. Hepatomegaly without sonographic evidence of lesions.   I have reviewed the images/study and I agree with the findings as stated by Rohan Greene MD (PGY-3).   MACRO: None   Signed by: Curry Pedraza 7/26/2025 6:05 PM Dictation workstation:   [id number]    XR chest 1 view  Result Date: 7/25/2025  Interpreted By:  Allyssa Pedraza and Krasnoschlik Nicholas STUDY: XR CHEST 1 VIEW;  7/25/2025 8:46 am   INDICATION: Signs/Symptoms:PA catheter / Pulmonary congestion.     COMPARISON: Chest radiograph 07/24/2025   ACCESSION NUMBER(S): [id number]   ORDERING CLINICIAN: MATTIE NOEL   FINDINGS: AP radiograph of the chest was provided.   Medical devices: Right IJ [place]-Talita catheter with distal tip projecting over the right lower lobe segmental branches. Left chest wall pacemaker/defibrillator with leads in similar position to prior. Patient is status post median sternotomy.   CARDIOMEDIASTINAL SILHOUETTE: Cardiomediastinal silhouette is stable in size and  configuration.   LUNGS: Similar bilateral interstitial markings. Similar bibasilar hazy opacities. No pneumothorax.   ABDOMEN: No remarkable upper abdominal findings.   BONES: No acute osseous changes.       1.  Right IJ [place]-Talita catheter with distal tip projecting over the right lower lobe segmental branches. Repositioning recommended. 2. Similar pulmonary edema. Correlate with volume status. 3. Similar bibasilar hazy opacities may represent atelectasis/edema. Infectious etiology can not be excluded. 4. Additional medical devices as above.   I personally reviewed the images/study and I agree with the findings as stated by resident physician Cam Sommers MD. This study was interpreted at [place], [place].   MACRO: None   Signed by: Allyssa Pedraza 7/25/2025 2:52 PM Dictation workstation:   [id number]    Vascular US Ankle Brachial Index (MARK) Without Exercise  Result Date: 7/25/2025            [place] [address]   Tel [phone] and Fax [phone]  Vascular Lab Report [facility] US ANKLE BRACHIAL INDEX (MARK) WITHOUT EXERCISE  Patient Name:     NEGRITA BERNARD     Reading           [id number] [address]OO                 Physician:        MD Study Date:       7/23/2025            Ordering          [id number] YONATHAN WALLS                                        Physician: MRN/PID:          [id number]             Technologist:     Rajani JAFFE Accession#:       [id number]         Technologist 2: Date of           1965 / 60 years  Encounter#:       [id number] Birth/Age: Gender:           M Admission Status: Inpatient            Location          [facility] [place] [facility]                                        Performed:  Diagnosis/ICD: Encounter for preprocedural cardiovascular examination-Z01.810 Indication:    OHT/LVAD CPT Codes:     70153 Peripheral artery MARK Only  Patient History  Anticoagulation, HTN and Hyperlipidemia. 10-9-2023 MARK's no                 evidence of arterial disease in BLEA.  CONCLUSIONS: Right Lower PVR: Evidence of mild arterial occlusive disease in the right lower extremity at rest. Right pressures of >220 mmHg suggest no compressibility of vessels and may make absolute Segmental Limb Pressures (SLP) unreliable. Decreased digital perfusion noted. Multiphasic flow is noted in the right common femoral artery, right posterior tibial artery and right dorsalis pedis artery. Level of disease called by waveforms. Left Lower PVR: Evidence of mild arterial occlusive disease in the left lower extremity at rest. Left pressures of >220 mmHg suggest no compressibility of vessels and may make absolute Segmental Limb Pressures (SLP) unreliable. Decreased digital perfusion noted. Multiphasic flow is noted in the left posterior tibial artery, left dorsalis pedis artery and left common femoral artery. Level of disease called by waveforms.  Comparison: Compared with study from 10/9/2023, no significant change.Today's exam there is mild arterial disease in bilateral lower extremities.  Imaging & Doppler Findings:  RIGHT Lower PVR                Pressures Ratios Right Posterior Tibial (Ankle) 220 mmHg  2.22 Right Dorsalis Pedis (Ankle)   204 mmHg  2.06 Right Digit (Great Toe)        58 mmHg   0.59   LEFT Lower PVR                Pressures Ratios Left Posterior Tibial (Ankle) 220 mmHg  2.22 Left Dorsalis Pedis (Ankle)   220 mmHg  2.22 Left Digit (Great Toe)        71 mmHg   0.72                     Right Brachial Pressure 99 mmHg   [id number] Marquita Brown MD Electronically signed by [id number] Marquita Brown MD on 7/25/2025 at 1:49:42 PM  ** Final **     Vascular US aorta iliac duplex limited  Result Date: 7/25/2025            [place] [address]   Tel [phone] and Fax [phone]  Vascular Lab Report [facility] US AORTA ILIAC DUPLEX LIMITED  Patient  Name:     NEGRITA BERNARD     Reading           [id number] [address]OO                 Physician:        MD Study Date:       7/23/2025            Ordering          [id number] YONATHAN WALLS                                        Physician: MRN/PID:          [id number]             Technologist:     Rajani JAFFE Accession#:       [id number]         Technologist 2: Date of           1965 / 60 years  Encounter#:       [id number] Birth/Age: Gender:           M Admission Status: Inpatient            Location          [facility]                                        Performed:  Diagnosis/ICD: Encounter for preprocedural cardiovascular examination-Z01.810 Indication:    Pre-Op OHT/LVAD CPT Codes:     28454 Duplex Aorta/IVC/Iliac/Bypass Graft  Patient History CAD, HTN and Hyperlipidemia. 10- No AAA.  CONCLUSIONS: Aorta/Common Iliac Arteries/IVC: The abdominal aorta and bilateral common iliac arteries demonstrate no evidence of aneurysm.  Comparison: Compared with study from 10/10/2023, no significant change.  Imaging & Doppler Findings:   AORTA     AP    Lateral    PSV Proximal 2.14 cm 1.96 cm 95.0 cm/s   Mid    1.86 cm 1.89 cm 72.0 cm/s  Distal  1.61 cm 1.70 cm 52.0 cm/s    RIGHT       AP    Lateral    PSV GIO Proximal 1.29 cm 1.10 cm 70.00 cm/s     LEFT       AP    Lateral    PSV GIO Proximal 1.01 cm 1.01 cm 70.30 cm/s  [id number] Marquita Brown MD Electronically signed by [id number] Marquita Brown MD on 7/25/2025 at 1:30:07 PM  ** Final **     CT chest abdomen pelvis wo IV contrast  Result Date: 7/25/2025  Interpreted By:  Volodymyr Pettit, STUDY: CT CHEST ABDOMEN PELVIS WO CONTRAST;  7/23/2025 4:31 pm   INDICATION: Signs/Symptoms:OHT/VAD workup.   COMPARISON: CT chest 10/06/2023. CT abdomen and pelvis 10/10/2023.   ACCESSION NUMBER(S): [id number]   ORDERING CLINICIAN: YONATHAN WALLS   TECHNIQUE: CT of the chest, abdomen and pelvis was performed. Contiguous axial images were  obtained at 3 mm slice thickness through the chest, abdomen and pelvis. Coronal and sagittal reconstructions at 3 mm slice thickness were performed.  No intravenous or oral contrast agents were administered.   FINDINGS: Please note that the study is limited without intravenous contrast.   CHEST:     LUNG/PLEURA/LARGE AIRWAYS: Linear atelectasis noted in the right upper lobe and bilateral lower lobes. No pulmonary masses or consolidation. Mild diffuse interlobular septal thickening is noted in both lungs likely related to pulmonary edema. No pleural effusion or pneumothorax. Trachea and right and left main bronchi are patent.   VESSELS: Aorta and main pulmonary artery are normal in caliber. A [place]-Talita catheter is seen with its tip in the right pulmonary artery. Mild atherosclerotic changes are noted of the aorta and branching vessels. Severe coronary artery calcifications are present.   HEART: Heart is mildly enlarged. Left-sided pacemaker leads are seen terminating in the right cardiac chamber. No pericardial effusion.   MEDIASTINUM AND SHIVANI: No significant lymphadenopathy in the chest. Esophagus is within normal limits.   CHEST WALL AND LOWER NECK: No soft tissue masses in the chest wall. Visualized thyroid gland is within normal limits.   ABDOMEN:   LIVER: Liver is enlarged measuring 22 cm craniocaudally. No focal liver lesion within the limits of noncontrast CT.   BILE DUCTS: No intra or extrahepatic bile duct dilatation.   GALLBLADDER: Gallbladder is partially distended with numerous gallstones. There is a small amount of pericholecystic fluid.   PANCREAS: The pancreas appears unremarkable.   SPLEEN: Spleen is mildly enlarged measuring 14 cm anterior posteriorly.   ADRENAL GLANDS: No adrenal nodule or thickening.   KIDNEYS AND URETERS: Bilateral kidneys are symmetric in size. No hydroureteronephrosis. There is a punctate 5 mm calculus in the lower pole of the left kidney.   PELVIS:   BLADDER: Urinary bladder is  unremarkable.   REPRODUCTIVE ORGANS: Prostate gland is within normal limits.   BOWEL: Stomach and duodenum are within normal limits. Small and large bowel loops are normal in caliber. Large amount of colonic stool burden is seen. The appendix is not definitely visualized. There is however no pericecal stranding or fluid.   VESSELS: Severe atherosclerotic changes noted in the abdominal aorta. No aortic aneurysm. IVC is within normal limits.   PERITONEUM/RETROPERITONEUM/LYMPH NODES: No ascites or fluid collection in the abdomen and pelvis. No retroperitoneal mass. There are few scattered subcentimeter in the retroperitoneum which are nonenlarged by size criteria.   ABDOMINAL WALL: No soft tissue masses in the abdominal wall. Mild subcutaneous stranding noted in the left anterior abdominal wall likely inflammation.   BONES: No suspicious osseous lesions are present. Degenerative discogenic disease is noted in the lower thoracic and lumbar spine.       Chest 1.  Mild diffuse interlobular interstitial thickening in both lungs likely representing pulmonary edema. 2. Cardiomegaly. 3. Support devices as described above.   Abdomen-Pelvis 1.  No acute process in the abdomen and pelvis. 2. Hepatosplenomegaly. 3. Cholelithiasis. Mild pericholecystic fluid is likely related to fluid overload. 4. Left renal calculus without hydronephrosis.     MACRO: None   Signed by: Volodymyr Pettit 7/25/2025 9:39 AM Dictation workstation:   [id number]    CT head wo IV contrast  Result Date: 7/25/2025  Interpreted By:  Kizzy Ambrocio and Liu Scott STUDY: CT HEAD WO IV CONTRAST;  7/24/2025 7:07 pm   INDICATION: Signs/Symptoms:change in neuro status.     COMPARISON: CT facial bones 07/23/2025   ACCESSION NUMBER(S): [id number]   ORDERING CLINICIAN: MATTIE NOEL   TECHNIQUE: Noncontrast axial CT scan of head was performed. Angled reformats in brain and bone windows were generated. The images were reviewed in bone, brain, blood and soft  tissue windows.   FINDINGS: CSF Spaces: The ventricles, sulci and basal cisterns are within normal limits. There is no extraaxial fluid collection.   Parenchyma: Mild patchy white matter hypodensity is nonspecific and compatible with microangiopathy. The grey-white differentiation is intact. There is no mass effect or midline shift.  There is no intracranial hemorrhage.   Calvarium: The calvarium is unremarkable.   Paranasal sinuses and mastoids: Visualized paranasal sinuses and mastoids are clear. Prior lens surgery on the left.       No acute intracranial hemorrhage or mass effect.   Mild degree of nonspecific white matter hypodensity compatible with microangiopathy.   I personally reviewed the images/study and I agree with the findings as stated by resident physician Miky Gtz MD. This study was interpreted at [facility], [place], [place].   MACRO: None   Signed by: Kizzy Ambrocio 7/25/2025 7:05 AM Dictation workstation:   [id number]    CT maxillofacial bones wo IV contrast  Result Date: 7/24/2025  Interpreted By:  Shanell Rodriguez, STUDY: CT FACIAL BONES WO IV CONTRAST   INDICATION: Signs/Symptoms:OHT/VAD workup   COMPARISON: None.   ACCESSION NUMBER(S): [id number]   ORDERING CLINICIAN: YONATHAN WALLS   TECHNIQUE: CT of the face was performed without the administration of intravenous contrast.   FINDINGS: SKIN AND SUBCUTANEOUS SOFT TISSUES: No asymmetrical soft tissue swelling or hematoma.   OSSEOUS STRUCTURES: No fracture, dislocation or destructive lesion.   ORBITS: The globes, retrobulbar fat, extraocular muscles, and optic nerve sheath complexes are intact and normal in morphology.   PARANASAL SINUSES: Visualized paranasal sinuses and mastoid air cells are clear.   OTHER: None.       No acute abnormality of the facial bones.   I have reviewed the images/study and I agree with the findings as stated.   MACRO: None   Signed by: Shanell Rodriguez 7/24/2025 9:33 AM Dictation workstation:    [id number]    XR chest 1 view  Result Date: 7/24/2025  Interpreted By:  Ke Arechiga, STUDY: XR CHEST 1 VIEW; 7/24/2025 7:40 am   INDICATION: Signs/Symptoms:ADHF.   COMPARISON: 07/23/2025   ACCESSION NUMBER(S): [id number]   ORDERING CLINICIAN: CHAYO JOHNSON   FINDINGS: Right IJ [place]-Talita catheter is in place with the tip projecting over distal right main pulmonary artery.   Left subclavian approach pacer/defibrillator is unchanged in positioning.   The cardiac silhouette size is unchanged. Status post median sternotomy.   Low lung volumes and bronchovascular crowding. Mild perihilar congestion. No sizable pleural effusion or pneumothorax.   No acute osseous abnormality.       1. Unchanged appearance of the lungs with mild perihilar congestion/edema and no focal infiltrate. No sizable pneumothorax. 2. Medical devices and postsurgical changes as described above.       Signed by: Ke Lau 7/24/2025 8:32 AM Dictation workstation:   [id number]    Vascular US carotid artery duplex bilateral  Result Date: 7/23/2025            [place] [id number]   Tel [phone] and Fax [phone]  Vascular Lab Report VASC US CAROTID ARTERY DUPLEX BILATERAL  Patient Name:     NEGRITA Shukla           [id number] [address]                 Physician:        MD Study Date:       7/23/2025            Ordering          [id number] YONATHAN WALLS                                        Physician: MRN/PID:          [id number]             Technologist:     Raajni JAFFE Accession#:       [id number]         Technologist 2: Date of           1965 / 60 years  Encounter#:       [id number] Birth/Age: Gender:           M Admission Status: Inpatient            Location          [facility]                                        Performed:  Diagnosis/ICD: Encounter for preprocedural cardiovascular examination-Z01.810 Indication:     Pre-Op OHT/LVAD CPT Codes:     14582 Cerebrovascular Carotid Duplex scan complete  Patient History CAD, HTN and Hyperlipidemia. 10-9-2023 Both ICA <50% stenosis.  CONCLUSIONS: Left Carotid: Findings are consistent with less than 50% stenosis of the left proximal internal carotid artery. Left external carotid artery appears patent with no evidence of stenosis. The left vertebral artery is patent with antegrade flow. No evidence of hemodynamically significant stenosis in the left subclavian artery.  Comparison: Compared with study from 10/9/2023, no significant change.The right CCA and ICA not scaned due to IV lines in right side of neck.  Additional Findings: Unable to scan right CCA and ICA due to IV lines in right side of neck.  Imaging & Doppler Findings: Right Plaque Morph: The proximal right internal carotid artery demonstrates heterogenous plaque. The proximal right common carotid artery demonstrates heterogenous and calcified plaque. The mid right common carotid artery demonstrates heterogenous and calcified plaque. The distal right common carotid artery demonstrates heterogenous and calcified plaque.  Right                  Left  PSV  EDV              PSV      EDV             CCA P    79 cm/s             CCA D    51 cm/s             ICA P    52 cm/s  13 cm/s             ICA M    57 cm/s  17 cm/s             ICA D    64 cm/s  22 cm/s              ECA     107 cm/s           Vertebral  28 cm/s  7 cm/s           Subclavian 94 cm/s               Left ICA/CCA Ratio 1.0   [id number] Marquita Brown MD Electronically signed by [id number] Marquita Brown MD on 7/23/2025 at 6:27:44 PM  ** Final **     XR chest 2 views  Result Date: 7/23/2025  Interpreted By:  Allyssa Pedraza and Krasnoschlik Nicholas STUDY: XR CHEST 2 VIEWS;  7/23/2025 4:38 pm   INDICATION: Signs/Symptoms:OHT/VAD workup.     COMPARISON: Chest radiograph 07/23/2025   ACCESSION NUMBER(S): [id number]   ORDERING CLINICIAN: YONATHAN WALLS   FINDINGS: PA and lateral  radiographs of the chest were provided.  Additional PA dual energy images were also provided.   Medical devices: Left chest wall pacemaker defibrillator with leads in similar position to prior. Similar positioning of a of a right IJ Lester-Talita catheter with tip projecting over the right lower lobe segmental branches. Patient is status post median sternotomy.   CARDIOMEDIASTINAL SILHOUETTE: Cardiomediastinal silhouette is stable in size and configuration.   LUNGS: Mild interstitial markings with perihilar prominence. No pneumothorax.   ABDOMEN: No remarkable upper abdominal findings.   BONES: No acute osseous changes.       1.  Interval retraction of a right IJ Lester-Talita catheter with tip now projecting over the right lower lobe segmental branches. 2. Mild pulmonary edema. 3. Additional medical devices as above.   I personally reviewed the images/study and I agree with the findings as stated by resident physician Cam Sommers MD. This study was interpreted at [place], [place].   MACRO: None   Signed by: Allyssa Pedraza 7/23/2025 4:45 PM Dictation workstation:   [id number]    XR chest 1 view  Result Date: 7/23/2025  Interpreted By:  Allyssa Pedraza, STUDY: XR CHEST 1 VIEW;  7/23/2025 8:14 am   INDICATION: Signs/Symptoms:SGC am rounds.   COMPARISON: 07/22/2025.   ACCESSION NUMBER(S): [id number]   ORDERING CLINICIAN: MEL WATKINS       Minimal improvement of aeration of the both lungs. Stable AICD, placement. Lester-Talita catheter with the tip overlies the right lower lobe pulmonary artery branch, unchanged. Cardiac silhouette is mildly enlarged. Pulmonary vessels are congested Mild pulmonary edema/fluid overload of the both lungs. Slightly improved since last exam. Small bilateral pleural effusion. No pneumothorax seen. Bony thorax unremarkable.   MACRO: None   Signed by: Allyssa Pedraza 7/23/2025 12:21 PM Dictation workstation:   [id number]    XR chest 1 view  Result  Date: 7/22/2025  Interpreted By:  Allyssa Pedraza and Krasnoschlik Nicholas STUDY: XR CHEST 1 VIEW;  7/22/2025 7:31 am   INDICATION: Signs/Symptoms:adhf.     COMPARISON: Chest radiograph 07/21/2025   ACCESSION NUMBER(S): [id number]   ORDERING CLINICIAN: YONATHAN WALLS   FINDINGS: AP radiograph of the chest was provided.   Medical devices: Right IJ [place]-Talita catheter with distal tip projecting over the proximal right interlobar artery. Left chest wall implanted pacemaker/defibrillator with leads in similar position to prior. Patient is status post median sternotomy.   CARDIOMEDIASTINAL SILHOUETTE: Cardiomediastinal silhouette is stable in size and configuration.   LUNGS: Similar appearance of bilateral interstitial and airspace opacities. There is fluid within the minor fissure. No pneumothorax.   ABDOMEN: No remarkable upper abdominal findings.   BONES: No acute osseous changes.       1.  New fluid visualized within the minor fissure and similar appearance of bilateral interstitial and airspace opacities, favored to represent pulmonary edema. Additional considerations include ARDS and pulmonary hemorrhage. 2. Medical devices as above.   I personally reviewed the images/study and I agree with the findings as stated by resident physician Cam Sommers MD. This study was interpreted at [facility], [place], [place].   MACRO: None   Signed by: Allyssa Pedraza 7/22/2025 2:14 PM Dictation workstation:   [id number]    XR chest 1 view  Result Date: 7/21/2025  Interpreted By:  Allyssa Pedraza and Krasnoschlik Nicholas STUDY: XR CHEST 1 VIEW;  7/21/2025 7:46 am   INDICATION: Signs/Symptoms:SGC Placement.     COMPARISON: Chest radiograph 07/20/2025   ACCESSION NUMBER(S): [id number]   ORDERING CLINICIAN: KORINA BERKOWITZ   FINDINGS: AP radiograph of the chest was provided.   Medical devices: Right IJ [place]-Talita catheter with distal tip projecting over the distal right interlobar artery.  Left chest wall pacemaker/defibrillator with leads in similar position to prior. Patient is status post median sternotomy.   CARDIOMEDIASTINAL SILHOUETTE: Cardiomediastinal silhouette is enlarged and stable in size and configuration.   LUNGS: Low lung volumes with bronchovascular crowding. Similar bilateral interstitial markings. Trace blunting of the bilateral costophrenic angles. No pneumothorax.   ABDOMEN: No remarkable upper abdominal findings.   BONES: No acute osseous changes.       1. Right IJ [place]-Talita catheter with distal tip projecting over the distal right interlobar artery. 2. Similar interstitial edema and trace bilateral pleural effusions. 3. Additional medical devices as above.   I personally reviewed the images/study and I agree with the findings as stated by resident physician Cam Sommers MD. This study was interpreted at [place], [place].   MACRO: None   Signed by: Allyssa Pedraza 7/21/2025 11:54 AM Dictation workstation:   [id number]    XR chest 1 view  Result Date: 7/20/2025  Interpreted By:  Claudio Yancey, STUDY: XR CHEST 1 VIEW;  7/20/2025 8:38 am   INDICATION: Signs/Symptoms:ADHF.   COMPARISON: Chest radiograph 07/19/2025 and abdominopelvic CT scan 10/10/2023.   ACCESSION NUMBER(S): [id number]   ORDERING CLINICIAN: CHAYO JOHNSON   FINDINGS: AP radiograph of the chest. Patient is now rotated towards right, somewhat limiting evaluation and comparison. Right IJ approach [place]-Talita catheter has been slightly advanced, the tip of which now overlies right lower lobe pulmonary artery, approximately 2 cm distal to right hilum. Repositioning recommended Stable positioning of left subclavian approach dual chamber cardiac pacemaker/AICD. Status post prior median sternotomy with intact sternal cerclage wires.   CARDIOMEDIASTINAL SILHOUETTE: Cardiomediastinal silhouette is persistently enlarged, grossly stable in size and configuration.   LUNGS: Low lung  volumes with crowding of bronchovascular markings. Similar mild bilateral interstitial prominence. Trace blunting of bilateral costophrenic angles. Overall lung aeration is grossly unchanged from prior. No pneumothorax.   ABDOMEN: No remarkable upper abdominal findings.   BONES: No acute osseous changes.       1. Right IJ approach [place]-Talita catheter has been slightly advanced, the tip of which now overlies right lower lobe pulmonary artery, approximately 2 cm distal to right hilum. Repositioning recommended. 2. No significant change in lung aeration with questionable interstitial edema and trace bilateral pleural effusions/bibasilar atelectasis. 3. Rest of medical devices as above.   Signed by: Claudio Yancey 7/20/2025 11:03 AM Dictation workstation:   [id number]    XR chest 1 view  Result Date: 7/19/2025  Interpreted By:  Claudio Yancey  and Ulisses Bolaños STUDY: XR CHEST 1 VIEW;  7/19/2025 1:41 pm   INDICATION: Signs/Symptoms:New SG position.   COMPARISON: Chest radiograph 07/17/2025 and abdominopelvic CT scan 10/10/2023.   ACCESSION NUMBER(S): [id number]   ORDERING CLINICIAN: CHAYO JOHNSON   FINDINGS: AP radiograph of the chest was provided.   Interval insertion of right IJ approach [place]pex-Talita catheter, the tip of which overlies right interlobar pulmonary artery, at the level of right hilum. Stable positioning of left subclavian approach dual chamber cardiac pacemaker/AICD. Status post prior median sternotomy with intact sternal cerclage wires.   CARDIOMEDIASTINAL SILHOUETTE: Cardiomediastinal silhouette is stable in size and configuration, mildly enlarged.   LUNGS: Low lung volumes with crowding of bronchovascular markings. Mildly prominent interstitial opacities.. Trace blunting of bilateral costophrenic angles. Overall lung aeration is grossly unchanged from prior. No pneumothorax.   ABDOMEN: No remarkable upper abdominal findings.   BONES: No acute osseous changes.       1. Medical devices as above. Of note, new right IJ  approach [place]-Talita catheter with its tip projecting over right interlobar pulmonary artery, at the level of right hilum. 2. No significant change in mild interstitial edema and suspected trace bilateral pleural effusions/bibasilar atelectasis.   I personally reviewed the images/study and I agree with the findings as stated by resident physician Miky Gtz MD. This study was interpreted at [facility], [place], [place].   MACRO: None   Signed by: Claudio Yancey 7/19/2025 4:00 PM Dictation workstation:   [id number]    XR chest 1 view  Result Date: 7/17/2025  Interpreted By:  Kristi Buenrostro and Krasnoschlik Nicholas STUDY: XR CHEST 1 VIEW;  7/17/2025 11:40 am   INDICATION: Signs/Symptoms:ADHF.     COMPARISON: Chest radiograph 07/12/2025   ACCESSION NUMBER(S): [id number]   ORDERING CLINICIAN: CHAYO JOHNSON   FINDINGS: AP radiograph of the chest was provided.   Medical devices: Left chest wall implanted pacemaker/defibrillator with leads in similar position. Patient is status post median sternotomy.   CARDIOMEDIASTINAL SILHOUETTE: Cardiomediastinal silhouette is stable in size and configuration.   LUNGS: Similar mild interstitial edema. Trace blunting of the bilateral costophrenic angles. No new focal consolidation or pneumothorax..   ABDOMEN: No remarkable upper abdominal findings.   BONES: No acute osseous changes.       1.  Similar mild interstitial edema. No new focal consolidation or pneumothorax. 2. Trace bilateral pleural effusions. 3. Medical devices as above.   I personally reviewed the images/study and I agree with the findings as stated by resident physician Cam Sommers MD. This study was interpreted at [facility], [place], [place].   MACRO: None   Signed by: Kristi Buenrostro 7/17/2025 12:00 PM Dictation workstation:   [id number]    ECG 12 lead  Result Date: 7/14/2025  Normal sinus rhythm Possible Left atrial enlargement Low voltage QRS  Anterolateral infarct (cited on or before 10-OCT-2023) Abnormal ECG When compared with ECG of 04-MAY-2025 13:32, Questionable change in QRS axis    XR chest 2 views  Result Date: 7/12/2025  Interpreted By:  Elis Benoit, STUDY: Chest, 2 views.   INDICATION: Signs/Symptoms:pulmonary edema.   COMPARISON: XR CHEST 2 VIEWS 5/4/2025.   ACCESSION NUMBER(S): [id number]   ORDERING CLINICIAN: MOLINA YU   FINDINGS: Median sternotomy. Left subclavian AICD/pacemaker device noted. Chronic elevated right hemidiaphragm noted. The cardiomediastinal silhouette size is again noted to be enlarged.   There is no focal consolidation, edema or pneumothorax. Small right-sided pleural effusion with blunting of the costophrenic angle similar to prior.       1. Redemonstration of small right pleural effusion similar to prior. 2. Cardiomegaly and/or pericardial effusion.   MACRO: None.   Signed by: Elis Benoit 7/12/2025 7:20 PM Dictation workstation:   [id number]       Inpatient Medications:  Scheduled Medications[1]  PRN Medications[2]  Continuous Medications[3]    Telemetry 8/9/2025 (personally reviewed): SR/ST 90-110s, rare PVCS, short runs, 3-4 beats, NSVT    Physical exam:  General: NAD  Head/ neck: + JVD   Cardiac: RRR, regular S1 S2 , no murmur, no rub, no gallop  Pulm: CTA bilaterally, no wheezes, rales or rhonchi.    Vascular: Radial 2+ bilaterally  GI: Non distended  Extremities: 2+/1+ pitting BLE edema, improving  Neuro: no focal neuro deficits   Psych: appropriate mood and behavior   Skin: warm and dry     Assessment/Plan   Felice Law is a 60 y.o. male with PMH of ischemic HFrEF 15-20%, CABG (LIMA- LAD 2023),  VT arrest s/p VT ablation and ICD 2017, Hypertension, Hyperlipidemia, Hypothyroidism, Left femoral DVT on Eliquis, COPD, JAKE, CKD III, Chronic BLE venous stasis dermatitis, Insulin dependent Type 2 DM who was initially admitted 7/12 to [place] for acute decompensate heart failure. Diuresed and  transferred to [facility]ICU for further managemnet and possible re-consideration of advanced therapies.  Of note, Patient was previously discussed for advanced HF therapies during [facility] [place] 2023 admission but was declined with concerns for mobility/frailty (uses motorized scooter and has bilateral foot drop with neuropathy), CKD, poorly controlled diabetes, as well as unclear social/financial support.      Acute on chronic systolic ischemic, HFrEF 15-20% (since 2017)  CAD s/p PCI and 1v CABG (LIMA-LAD 4/2023).   VT s/p ICD 2017  - TTE 5/5/25: LV mod dilated, EF 15-20%, gHK of LV, G2DD, mildly enlarged RV with mildly reduced systolic fxn, mild LAE, mild to mod MR and TR  - LHC 6/10/25:  Right dom. LIMA to LAD patent. pLAD 70%, os and pD1 90%, mLCx50%,osOM1 90%, mRCA 100%  - Committee discussion: optimize DM (A1c 10) and defer listing for likely heart transplant / LVAD depending on acuity  - Cardiac MRI 7/2025: EF 17%, mixed CM, mild to mod MR, Transmural infarction (%) involves the mid anteroseptum,apical septum, apical inferior wall, and apex, all demonstrating nonviable myocardium. Subendocardial infarction (25-50%) affects the mid inferior and apical lateral wall, while subendocardial infarction (<25%) involves the mid inferolateral wall: these segments remain viable. Additionally, mid-myocardial enhancement in the basal septum represents a nonischemic, nonspecific pattern. RV function severely reduced.   - There may be value in revascularizing OM and diag but will work on stabilizing renal function and heart failure. No intervention to be pursued this admission.  - Failed weaning of milrinone, inotrope dependent, Massey line placed 8/5   - GDMT: Hydral/Isordil, farxiga, no beta blocker (inotrope), digoxin 3/wkly (level WNL 7/29).   - stop Entresto/Aldactone due to CHARLOTTE on CKD  - cont Bumex 2 mg PO BID  - plan for home Milrinone infusion: Home care IV hook up Monday at 8am. SOC Wednesday 8/13. Sister  Zahraa will be his learner.   - 8/8 and 8/9 Hemodynamically stable, continue current regimen  - cont secondary vascular prevention: aspirin, statin (Discontinue fenofibrate given renal dysfunction)   - 8/9 30 mins spent discussing with patient (using [employer] ) regarding CHF S/S and management, plans for discharge and follow up. He is agreeable for sister to be called in when discharge instructions reviewed by RN. EFE hose to be placed on patient.     COPD  Obstructive sleep apnea  Prior tobacco use  - 24 pack year, quit 2017, no current inhalers or O2 need  - encourage incentive spirometry  - cont albuterol nebulizer PRN      CHARLOTTE on CKD, stable/elevated  - b/l Cr ~1.4-1.8  - admit (7/17) Cr 1.62, was 2.55 on (7/12) at [facility], peak 3.24 on (7/26)  - Cr trend: 2.59 (2.61, 2.75, 2.64, 2.72, 2.78, 2.86, 2.85, 2.28, 2.42)  - Avoid nephrotoxins and hypotension, trend daily RFP      Anemia of chronic disease and iron deficiency  - (7/17):  iron sat L 16 but otherwise iron studies & ferritin WNL  - s/p IV Venofer 200 mg x5 (completed 8/2)  - cont PO iron supplementation     Left femoral DVT (2017)  - cont Eliquis 5mg BID      Uncontrolled type II DM c/b neuropathy  - HgbA1c (7/17): 10.1%  - Endocrine following, appreciate recs:  -- cont Glargine 40u BID, Lispro SSI scale #3 TIDAC, prandial lispro 22u TIDAC  -- cont Januvia 50 daily, Farxiga 10 daily  - cont gabapentin 200 mg TID  - CCD, Accu-Cheks AC/HS, hypoglycemic protocol      Hypothyroidism  - TSH 7/23/25: WNL. Euthyroid  - cont Synthroid      Loose stool unspecified  - Immodium PRN  - C. Diff neg     DVT ppx: eliquis    DISPO:   - per TCC discussion with pt, Milrinone copay and [facility] nurse visit copay initially cost prohibitive, cost assisted and now reasonable for patient. See note 8/7.   - per Adv HF team, Dobutamine 5 mcg/kg/min submitted for cost analysis, also cost prohibitive.   - Management and TCC aware patient needs ride home at [place]  - ongoing  discharge planning with [facility], , [facility], and pharmacy  - PT/OT low intensity    Code status: Full Code  NOK: Aracelis Law, mother: [phone]    Patient seen and discussed with Dr. Jean Claude Uwamungu Wida M Miller, APRN-CNP    I conducted a shared visit with BRAD Cardenas.     Interview conducted using a .     Patient is a 61 yo M with ischemic cardiomyopathy (severely reduced EF) who is on milrinone and He is planned to be discharged on milrinone. There were cost barriers which were sorted out but the team. He is planned to be discharged on Monday next week with home milrinone infusion.  He is diuresing well with oral bumetanide. Planned for discharge home with milrinone on Monday, 8/11/25 at 8 am.     Physical Exam:  Patient is no acute distress  JVP is mildly elevated  Normal S1S2, S3 gallop, soft 2/6 systolic murmur on lsb  Lungs clear  Abdomen soft, non-tender  Extremities are warm with 1-2+ bilateral pitting edema (R >L)     A/Plan:  As detailed above, patient has ischemic cardiomyopathy with severe reduced left ventricular ejection fraction.  At this time stage D heart failure.  He was deemed to be a good candidate for advanced heart failure therapies due to uncontrolled diabetes mellitus (A1c 10%). He was not deemed a candidate for heart failure therapies, mostly because of uncontrolled diabetes mellitus.  He is diuresing well on oral bumetanide.  He is planned for home discharge with milrinone infusion on Monday next week.           [1]   Scheduled medications   Medication Dose Route Frequency    apixaban  5 mg oral BID    atorvastatin  80 mg oral Nightly    bumetanide  2 mg oral BID    calcium carbonate  500 mg of calcium carbonate oral BID    dapagliflozin propanediol  10 mg oral Daily    digoxin  125 mcg oral Once per day on Monday Wednesday Friday    gabapentin  200 mg oral TID    hydrALAZINE  25 mg oral TID    insulin glargine  40 Units subcutaneous Nightly    insulin  glargine  40 Units subcutaneous Daily    insulin lispro  0-15 Units subcutaneous TID AC    insulin lispro  22 Units subcutaneous TID AC    iron polysaccharides  150 mg oral Daily    isosorbide dinitrate  40 mg oral TID    levothyroxine  75 mcg oral Daily before breakfast    pantoprazole  40 mg oral Daily before breakfast    QUEtiapine  50 mg oral Nightly    SITagliptin phosphate  50 mg oral Daily   [2]   PRN medications   Medication    acetaminophen    albuterol    alteplase    dextrose    dextrose    glucagon    glucagon    loperamide   [3]   Continuous Medications   Medication Dose Last Rate    milrinone  0.25 mcg/kg/min 0.25 mcg/kg/min (08/09/25 1018)

## 2025-08-09 NOTE — CARE PLAN
Pt safe and stable. Continues on IV milrinone. Plan for discharge on Monday.      Problem: Pain - Adult  Goal: Verbalizes/displays adequate comfort level or baseline comfort level  Outcome: Progressing     Problem: Safety - Adult  Goal: Free from fall injury  Outcome: Progressing     Problem: Discharge Planning  Goal: Discharge to home or other facility with appropriate resources  Outcome: Progressing     Problem: Chronic Conditions and Co-morbidities  Goal: Patient's chronic conditions and co-morbidity symptoms are monitored and maintained or improved  Outcome: Progressing     Problem: Nutrition  Goal: Nutrient intake appropriate for maintaining nutritional needs  Outcome: Progressing     Problem: Heart Failure  Goal: Improved gas exchange this shift  Outcome: Progressing  Goal: Improved urinary output this shift  Outcome: Progressing  Goal: Reduction in peripheral edema within 24 hours  Outcome: Progressing  Goal: Report improvement of dyspnea/breathlessness this shift  Outcome: Progressing  Goal: Weight from fluid excess reduced over 2-3 days, then stabilize  Outcome: Progressing  Goal: Increase self care and/or family involvement in 24 hours  Outcome: Progressing     Problem: Skin  Goal: Decreased wound size/increased tissue granulation at next dressing change  Outcome: Progressing  Goal: Participates in plan/prevention/treatment measures  Outcome: Progressing  Goal: Prevent/manage excess moisture  Outcome: Progressing  Goal: Prevent/minimize sheer/friction injuries  Outcome: Progressing  Goal: Promote/optimize nutrition  Outcome: Progressing  Goal: Promote skin healing  Outcome: Progressing     Problem: Diabetes  Goal: Achieve decreasing blood glucose levels by end of shift  Outcome: Progressing  Goal: Increase stability of blood glucose readings by end of shift  Outcome: Progressing  Goal: Decrease in ketones present in urine by end of shift  Outcome: Progressing

## 2025-08-10 ENCOUNTER — HOME INFUSION (OUTPATIENT)
Dept: INFUSION THERAPY | Age: 60
End: 2025-08-10
Payer: COMMERCIAL

## 2025-08-10 VITALS
OXYGEN SATURATION: 92 % | WEIGHT: 212.96 LBS | HEIGHT: 71 IN | HEART RATE: 100 BPM | BODY MASS INDEX: 29.81 KG/M2 | TEMPERATURE: 97.2 F | DIASTOLIC BLOOD PRESSURE: 73 MMHG | SYSTOLIC BLOOD PRESSURE: 111 MMHG | RESPIRATION RATE: 18 BRPM

## 2025-08-10 LAB
ALBUMIN SERPL BCP-MCNC: 4.2 G/DL (ref 3.4–5)
ANION GAP SERPL CALC-SCNC: 15 MMOL/L (ref 10–20)
BUN SERPL-MCNC: 75 MG/DL (ref 6–23)
CALCIUM SERPL-MCNC: 9.9 MG/DL (ref 8.6–10.6)
CHLORIDE SERPL-SCNC: 94 MMOL/L (ref 98–107)
CO2 SERPL-SCNC: 29 MMOL/L (ref 21–32)
CREAT SERPL-MCNC: 2.43 MG/DL (ref 0.5–1.3)
EGFRCR SERPLBLD CKD-EPI 2021: 30 ML/MIN/1.73M*2
ERYTHROCYTE [DISTWIDTH] IN BLOOD BY AUTOMATED COUNT: 19.8 % (ref 11.5–14.5)
GLUCOSE BLD MANUAL STRIP-MCNC: 104 MG/DL (ref 74–99)
GLUCOSE BLD MANUAL STRIP-MCNC: 135 MG/DL (ref 74–99)
GLUCOSE BLD MANUAL STRIP-MCNC: 145 MG/DL (ref 74–99)
GLUCOSE BLD MANUAL STRIP-MCNC: 196 MG/DL (ref 74–99)
GLUCOSE SERPL-MCNC: 172 MG/DL (ref 74–99)
HCT VFR BLD AUTO: 37.2 % (ref 41–52)
HGB BLD-MCNC: 11.1 G/DL (ref 13.5–17.5)
MAGNESIUM SERPL-MCNC: 2.51 MG/DL (ref 1.6–2.4)
MCH RBC QN AUTO: 24.1 PG (ref 26–34)
MCHC RBC AUTO-ENTMCNC: 29.8 G/DL (ref 32–36)
MCV RBC AUTO: 81 FL (ref 80–100)
NRBC BLD-RTO: 0 /100 WBCS (ref 0–0)
PHOSPHATE SERPL-MCNC: 4 MG/DL (ref 2.5–4.9)
PLATELET # BLD AUTO: 316 X10*3/UL (ref 150–450)
POTASSIUM SERPL-SCNC: 3.8 MMOL/L (ref 3.5–5.3)
RBC # BLD AUTO: 4.61 X10*6/UL (ref 4.5–5.9)
SODIUM SERPL-SCNC: 134 MMOL/L (ref 136–145)
WBC # BLD AUTO: 5.8 X10*3/UL (ref 4.4–11.3)

## 2025-08-10 PROCEDURE — 2500000002 HC RX 250 W HCPCS SELF ADMINISTERED DRUGS (ALT 637 FOR MEDICARE OP, ALT 636 FOR OP/ED): Performed by: NURSE PRACTITIONER

## 2025-08-10 PROCEDURE — 99232 SBSQ HOSP IP/OBS MODERATE 35: CPT | Performed by: STUDENT IN AN ORGANIZED HEALTH CARE EDUCATION/TRAINING PROGRAM

## 2025-08-10 PROCEDURE — 2500000001 HC RX 250 WO HCPCS SELF ADMINISTERED DRUGS (ALT 637 FOR MEDICARE OP): Performed by: PHYSICIAN ASSISTANT

## 2025-08-10 PROCEDURE — 2500000002 HC RX 250 W HCPCS SELF ADMINISTERED DRUGS (ALT 637 FOR MEDICARE OP, ALT 636 FOR OP/ED): Performed by: PHYSICIAN ASSISTANT

## 2025-08-10 PROCEDURE — 80069 RENAL FUNCTION PANEL: CPT | Performed by: PHYSICIAN ASSISTANT

## 2025-08-10 PROCEDURE — 85027 COMPLETE CBC AUTOMATED: CPT | Performed by: PHYSICIAN ASSISTANT

## 2025-08-10 PROCEDURE — 2500000002 HC RX 250 W HCPCS SELF ADMINISTERED DRUGS (ALT 637 FOR MEDICARE OP, ALT 636 FOR OP/ED)

## 2025-08-10 PROCEDURE — 82947 ASSAY GLUCOSE BLOOD QUANT: CPT

## 2025-08-10 PROCEDURE — 36415 COLL VENOUS BLD VENIPUNCTURE: CPT | Performed by: PHYSICIAN ASSISTANT

## 2025-08-10 PROCEDURE — 99233 SBSQ HOSP IP/OBS HIGH 50: CPT | Performed by: STUDENT IN AN ORGANIZED HEALTH CARE EDUCATION/TRAINING PROGRAM

## 2025-08-10 PROCEDURE — 2500000001 HC RX 250 WO HCPCS SELF ADMINISTERED DRUGS (ALT 637 FOR MEDICARE OP)

## 2025-08-10 PROCEDURE — 2500000004 HC RX 250 GENERAL PHARMACY W/ HCPCS (ALT 636 FOR OP/ED)

## 2025-08-10 PROCEDURE — 83735 ASSAY OF MAGNESIUM: CPT | Performed by: PHYSICIAN ASSISTANT

## 2025-08-10 PROCEDURE — 1100000001 HC PRIVATE ROOM DAILY

## 2025-08-10 RX ORDER — INSULIN LISPRO 100 [IU]/ML
18 INJECTION, SOLUTION INTRAVENOUS; SUBCUTANEOUS
Status: DISCONTINUED | OUTPATIENT
Start: 2025-08-10 | End: 2025-08-11 | Stop reason: HOSPADM

## 2025-08-10 RX ADMIN — DAPAGLIFLOZIN 10 MG: 10 TABLET, FILM COATED ORAL at 08:57

## 2025-08-10 RX ADMIN — INSULIN LISPRO 18 UNITS: 100 INJECTION, SOLUTION INTRAVENOUS; SUBCUTANEOUS at 17:31

## 2025-08-10 RX ADMIN — POLYSACCHARIDE-IRON COMPLEX 150 MG: 150 CAPSULE ORAL at 08:57

## 2025-08-10 RX ADMIN — ATORVASTATIN CALCIUM 80 MG: 80 TABLET, FILM COATED ORAL at 21:24

## 2025-08-10 RX ADMIN — APIXABAN 5 MG: 5 TABLET, FILM COATED ORAL at 21:24

## 2025-08-10 RX ADMIN — ISOSORBIDE DINITRATE 40 MG: 40 TABLET ORAL at 08:57

## 2025-08-10 RX ADMIN — PANTOPRAZOLE SODIUM 40 MG: 40 TABLET, DELAYED RELEASE ORAL at 06:03

## 2025-08-10 RX ADMIN — GABAPENTIN 200 MG: 100 CAPSULE ORAL at 21:25

## 2025-08-10 RX ADMIN — ISOSORBIDE DINITRATE 40 MG: 40 TABLET ORAL at 18:25

## 2025-08-10 RX ADMIN — INSULIN LISPRO 2 UNITS: 100 INJECTION, SOLUTION INTRAVENOUS; SUBCUTANEOUS at 17:31

## 2025-08-10 RX ADMIN — HYDRALAZINE HYDROCHLORIDE 25 MG: 25 TABLET ORAL at 21:24

## 2025-08-10 RX ADMIN — MILRINONE LACTATE IN DEXTROSE 0.25 MCG/KG/MIN: 0.2 INJECTION, SOLUTION INTRAVENOUS at 00:39

## 2025-08-10 RX ADMIN — ACETAMINOPHEN 975 MG: 325 TABLET ORAL at 16:37

## 2025-08-10 RX ADMIN — HYDRALAZINE HYDROCHLORIDE 25 MG: 25 TABLET ORAL at 08:58

## 2025-08-10 RX ADMIN — APIXABAN 5 MG: 5 TABLET, FILM COATED ORAL at 08:56

## 2025-08-10 RX ADMIN — CALCIUM CARBONATE (ANTACID) CHEW TAB 500 MG 1 TABLET: 500 CHEW TAB at 08:56

## 2025-08-10 RX ADMIN — INSULIN GLARGINE 40 UNITS: 100 INJECTION, SOLUTION SUBCUTANEOUS at 21:24

## 2025-08-10 RX ADMIN — HYDRALAZINE HYDROCHLORIDE 25 MG: 25 TABLET ORAL at 15:13

## 2025-08-10 RX ADMIN — INSULIN LISPRO 18 UNITS: 100 INJECTION, SOLUTION INTRAVENOUS; SUBCUTANEOUS at 13:22

## 2025-08-10 RX ADMIN — QUETIAPINE FUMARATE 50 MG: 25 TABLET ORAL at 21:24

## 2025-08-10 RX ADMIN — BUMETANIDE 2 MG: 1 TABLET ORAL at 08:56

## 2025-08-10 RX ADMIN — INSULIN LISPRO 22 UNITS: 100 INJECTION, SOLUTION INTRAVENOUS; SUBCUTANEOUS at 08:58

## 2025-08-10 RX ADMIN — GABAPENTIN 200 MG: 100 CAPSULE ORAL at 08:57

## 2025-08-10 RX ADMIN — ISOSORBIDE DINITRATE 40 MG: 40 TABLET ORAL at 14:52

## 2025-08-10 RX ADMIN — GABAPENTIN 200 MG: 100 CAPSULE ORAL at 15:12

## 2025-08-10 RX ADMIN — MILRINONE LACTATE IN DEXTROSE 0.25 MCG/KG/MIN: 0.2 INJECTION, SOLUTION INTRAVENOUS at 12:47

## 2025-08-10 RX ADMIN — LEVOTHYROXINE SODIUM 75 MCG: 0.07 TABLET ORAL at 06:03

## 2025-08-10 RX ADMIN — INSULIN GLARGINE 40 UNITS: 100 INJECTION, SOLUTION SUBCUTANEOUS at 08:58

## 2025-08-10 RX ADMIN — BUMETANIDE 2 MG: 1 TABLET ORAL at 17:30

## 2025-08-10 RX ADMIN — CALCIUM CARBONATE (ANTACID) CHEW TAB 500 MG 1 TABLET: 500 CHEW TAB at 21:24

## 2025-08-10 RX ADMIN — SITAGLIPTIN 50 MG: 50 TABLET, FILM COATED ORAL at 08:58

## 2025-08-10 ASSESSMENT — COGNITIVE AND FUNCTIONAL STATUS - GENERAL
DAILY ACTIVITIY SCORE: 24
CLIMB 3 TO 5 STEPS WITH RAILING: A LITTLE
DAILY ACTIVITIY SCORE: 24
WALKING IN HOSPITAL ROOM: A LITTLE
CLIMB 3 TO 5 STEPS WITH RAILING: A LITTLE
WALKING IN HOSPITAL ROOM: A LITTLE
MOBILITY SCORE: 22
MOBILITY SCORE: 22

## 2025-08-10 ASSESSMENT — PAIN SCALES - GENERAL
PAINLEVEL_OUTOF10: 0 - NO PAIN
PAINLEVEL_OUTOF10: 0 - NO PAIN
PAINLEVEL_OUTOF10: 5 - MODERATE PAIN
PAINLEVEL_OUTOF10: 0 - NO PAIN

## 2025-08-10 ASSESSMENT — PAIN - FUNCTIONAL ASSESSMENT
PAIN_FUNCTIONAL_ASSESSMENT: 0-10

## 2025-08-10 ASSESSMENT — PAIN DESCRIPTION - ORIENTATION: ORIENTATION: LEFT

## 2025-08-10 ASSESSMENT — PAIN DESCRIPTION - LOCATION: LOCATION: FOOT

## 2025-08-10 NOTE — PROGRESS NOTES
REVIEWED PT INFO AS CORRECT.   DX...  HEART FAILURE  REVIEWED ALLERGIES... AZITHROMYCIN  PMH...CKD 3, HTN, HLD, HYPOTHYROIDISM, COPD, INSULIN DEPENDENT DM2  NO MEDICATION INTERACTIONS.  REVIEWED RELEVANT BASELINE VALUES  LAB ARE ... NONE ORDERED  PT HAS …..  SL R SUBCLAVIAN-TUNNELED ...  FLUSH PER [facility] PROTOCOL.  CONTINUE MED THRU TENTATIVE STOP DATE …. INDEFINITE  MED PLACED … CADD KHAN  CARE PLAN DONE TODAY    PATIENT IS A 59 YO MALE BEING ADMITTED TO [facility] FOR CONTINUOUS INFUSION OF MILRINONE FOR HEART FAILURE.  MILRINONE DOSE BASED ON 96.4KG WT LISTED ON RX. PATIENT IS BEING EVALUATED FOR HEART TRANSPLANT.  HE WILL BE FOLLOWED AT HOME BY CARDIOLOGIST DR. NAVA.  HE HAS MULTIPLE MEDICAL PROBLEMS IN ADDITION AS LISTED ABOVE.  FROM NOTES, LOOKS LIKE SISTER WILL BE LEARNER AND CAREGIVER/HELPER FOR HOME MILRINONE THERAPY.  CURRENT PLAN IS FOR HOSPITAL H/U BY [facility] RN ON MONDAY 8/11 BETWEEN 11-1P.   PHARMACY TO DELIVER TO HOSPITAL BY 11.  PAT[facility] IS IN [facility] [address].    REVIEW OF HOMEGOING MEDS AND NO INTERACTIONS OR DUPLICATIONS NOTED.   PROCESSED FILL FOR 3 48 HR MILRINONE BAGS FOR H/U ON 8/11, 8/13, 8/15.  NF FOR 8/14 FOR STRAIGHT DELIVERY.

## 2025-08-10 NOTE — PROGRESS NOTES
"Endocrine Progress Note   Felice Law is a 60 y.o. male on day 24 of admission presenting with Acute on chronic systolic heart failure.  Endocrine consulted for T2DM management.    Subjective   BG trends noted. Insulin titration discussed.    Objective   Last Recorded Vitals  Blood pressure 108/70, pulse 109, temperature 36 °C (96.8 °F), temperature source Temporal, resp. rate 18, height 1.803 m (5' 10.98\"), weight 96.6 kg (212 lb 15.4 oz), SpO2 93%.  Intake/Output last 3 Shifts:  I/O last 3 completed shifts:  In: 1480 (15.3 mL/kg) [P.O.:1480]  Out: 4275 (44.3 mL/kg) [Urine:4275 (1.2 mL/kg/hr)]  Weight: 96.6 kg     Exam:    General - male patient in [facility]  HEENT - AT/NC  Resp - no extra wob noted  Msckl -  + edema in B/L LE    Relevant Results  Results from last 7 days   Lab Units 08/10/25  0623 08/09/25  2146 08/09/25  1910 08/09/25  1622 08/09/25  1146 08/09/25  0642 08/08/25  1821 08/08/25  1816 08/07/25 2057 08/07/25  1902 08/07/25  0645 08/06/25  1942 08/05/25 2051 08/05/25  1859   POCT GLUCOSE mg/dL 104* 149*  --  151* 156* 183*   < >  --    < >  --    < >  --    < >  --    GLUCOSE mg/dL  --   --  124*  --   --   --   --  121*  --  141*  --  83  --  98    < > = values in this interval not displayed.     BMP:  Results from last 7 days   Lab Units 08/09/25  1910 08/08/25 1816 08/07/25  1902   SODIUM mmol/L 133* 133* 134*   POTASSIUM mmol/L 4.0 4.2 3.9   CHLORIDE mmol/L 94* 94* 95*   CO2 mmol/L 26 28 29   BUN mg/dL 74* 74* 72*   CREATININE mg/dL 2.55* 2.59* 2.61*   GLUCOSE mg/dL 124* 121* 141*     Assessment  Felice Law is a 60 y.o. male with significant PMH of ICM, HFrEF, EF 15-20%, CABG (LIMA- LAD in 2023),  PCI, VT arrest s/p VT ablation and ICD in 2017, Hypertension, Hyperlipidemia, Hypothyroidism, Left femoral DVT on Eliquis, COPD, JAKE, CKD III and Insulin dependent Type 2 diabetes. He was admitted on 7/12 for further HF management and advanced therapy work up.   Endocrine consulted for " managing uncontrolled T2DM.      #T2DM  :: Hyperglycemia 2/2 poorly controlled Type 2 diabetes   :: last HbA1c 10.1 (7/17)  :: Home regimen: Insulin glargine 40 units twice a day, insulin lispro 18 units with meals plus sliding scale up to 80 units daily, tirzepatide (Mounjaro) 5 mg/0.5 mL pen injector, Farxiga 10 mg tablet, has freestyle abbi 3  Sitagliptin 50 mg started on 7/31  Reports snacking at night around 9 pm: pudding + crackers    # Hypothyroid   :: Thyroid on home dose > Levothyroxine 75mcg  :: TSH 2.12, T4 9.3, T3 105 (7/23) all fall within reference ranges consistent with euthyroidism.  - continue on same home dose for thyroxine.     Inpatient Plan  - c/w Insulin glargine to 40 units BID   - Decrease Insulin lispro to 18 units TID with meals (to be given if patient plans to eat >50% of his meals), hold if NPO  - c/w Tablet Januvia (sitagliptin) 50 mg daily  - c/w  sliding scale to SS#2 AC   - Accuchecks TIDAC and at bedtime  - 60 gm carb consistent diet  - Goal -180  - Hypoglycemia bundle    Discharge planning:   [] Glargine 30 BID, Lispro 18 TID, Farxiga 10 mg every day, Mounjaro 5 mg Weekly. Stop the Januvia  []primary team to please order glucometer supplies, insulin and pen needles on day of discharge pending final doses   []follow up with Dr. Salazar, has appointment 9/17/25      Recommendations communicated to primary team. Please reach out incase you have any questions or concerns.    The patient was discussed with Dr. Salazar.    Iftikhar Santoro MD  PGY-5 Endocrinology Fellow

## 2025-08-10 NOTE — CARE PLAN
The patient's goals for the shift include continue with independence with ADLs    The clinical goals for the shift include Patient will not have any complaints of Shortness of breath this shift    Patient remained HDS throughout shift. Patient remained on milrinone gtt.    Problem: Pain - Adult  Goal: Verbalizes/displays adequate comfort level or baseline comfort level  Outcome: Progressing     Problem: Safety - Adult  Goal: Free from fall injury  Outcome: Progressing

## 2025-08-10 NOTE — CARE PLAN
Pt safe and stable throughout shift.  Pt on continuous milrinone drip. Mepilex placed on left heel. Expected discharge tomorrow.      Problem: Pain - Adult  Goal: Verbalizes/displays adequate comfort level or baseline comfort level  Outcome: Progressing     Problem: Safety - Adult  Goal: Free from fall injury  Outcome: Progressing     Problem: Discharge Planning  Goal: Discharge to home or other facility with appropriate resources  Outcome: Progressing     Problem: Chronic Conditions and Co-morbidities  Goal: Patient's chronic conditions and co-morbidity symptoms are monitored and maintained or improved  Outcome: Progressing     Problem: Nutrition  Goal: Nutrient intake appropriate for maintaining nutritional needs  Outcome: Progressing     Problem: Heart Failure  Goal: Improved gas exchange this shift  Outcome: Progressing  Goal: Improved urinary output this shift  Outcome: Progressing  Goal: Reduction in peripheral edema within 24 hours  Outcome: Progressing  Goal: Report improvement of dyspnea/breathlessness this shift  Outcome: Progressing  Goal: Weight from fluid excess reduced over 2-3 days, then stabilize  Outcome: Progressing  Goal: Increase self care and/or family involvement in 24 hours  Outcome: Progressing     Problem: Skin  Goal: Decreased wound size/increased tissue granulation at next dressing change  Outcome: Progressing  Goal: Participates in plan/prevention/treatment measures  Outcome: Progressing  Goal: Prevent/manage excess moisture  Outcome: Progressing  Goal: Prevent/minimize sheer/friction injuries  Outcome: Progressing  Goal: Promote/optimize nutrition  Outcome: Progressing  Goal: Promote skin healing  Outcome: Progressing     Problem: Diabetes  Goal: Achieve decreasing blood glucose levels by end of shift  Outcome: Progressing  Goal: Increase stability of blood glucose readings by end of shift  Outcome: Progressing  Goal: Decrease in ketones present in urine by end of shift  Outcome:  Progressing

## 2025-08-10 NOTE — PROGRESS NOTES
Subjective:     utilized for communication.    Patient seen this AM, sitting out of bed. Reported some self limiting chest discomfort last night, nothing seen on tele. Otherwise denies SOB, TAY, LH, dizziness.     Patient onboard with plan for DC home tomorrow with milrinone.     Overnight Events:  No acute events overnight.         Objective:  Vitals:    08/10/25 0759   BP: 108/70   Pulse: 109   Resp: 18   Temp: 36 °C (96.8 °F)   SpO2: 93%     Weight         8/7/2025  0500 8/8/2025  0615 8/9/2025  0519 8/9/2025  0600 8/10/2025  0310    Weight: 98.3 kg (216 lb 11.4 oz) 96.7 kg (213 lb 3 oz) 96.4 kg (212 lb 8.4 oz) 96.4 kg (212 lb 8.4 oz) 96.6 kg (212 lb 15.4 oz)            Intake/Output Summary (Last 24 hours) at 8/10/2025 0937  Last data filed at 8/10/2025 0634  Gross per 24 hour   Intake 580 ml   Output 2725 ml   Net -2145 ml     Recent Results (from the past 24 hours)   POCT GLUCOSE    Collection Time: 08/09/25 11:46 AM   Result Value Ref Range    POCT Glucose 156 (H) 74 - 99 mg/dL   POCT GLUCOSE    Collection Time: 08/09/25  4:22 PM   Result Value Ref Range    POCT Glucose 151 (H) 74 - 99 mg/dL   CBC    Collection Time: 08/09/25  7:10 PM   Result Value Ref Range    WBC 6.3 4.4 - 11.3 x10*3/uL    nRBC 0.0 0.0 - 0.0 /100 WBCs    RBC 4.36 (L) 4.50 - 5.90 x10*6/uL    Hemoglobin 10.5 (L) 13.5 - 17.5 g/dL    Hematocrit 35.8 (L) 41.0 - 52.0 %    MCV 82 80 - 100 fL    MCH 24.1 (L) 26.0 - 34.0 pg    MCHC 29.3 (L) 32.0 - 36.0 g/dL    RDW 19.6 (H) 11.5 - 14.5 %    Platelets 313 150 - 450 x10*3/uL   Magnesium    Collection Time: 08/09/25  7:10 PM   Result Value Ref Range    Magnesium 2.48 (H) 1.60 - 2.40 mg/dL   Renal function panel    Collection Time: 08/09/25  7:10 PM   Result Value Ref Range    Glucose 124 (H) 74 - 99 mg/dL    Sodium 133 (L) 136 - 145 mmol/L    Potassium 4.0 3.5 - 5.3 mmol/L    Chloride 94 (L) 98 - 107 mmol/L    Bicarbonate 26 21 - 32 mmol/L    Anion Gap 17 10 - 20 mmol/L    Urea  Nitrogen 74 (H) 6 - 23 mg/dL    Creatinine 2.55 (H) 0.50 - 1.30 mg/dL    eGFR 28 (L) >60 mL/min/1.73m*2    Calcium 9.9 8.6 - 10.6 mg/dL    Phosphorus 3.8 2.5 - 4.9 mg/dL    Albumin 4.1 3.4 - 5.0 g/dL   POCT GLUCOSE    Collection Time: 08/09/25  9:46 PM   Result Value Ref Range    POCT Glucose 149 (H) 74 - 99 mg/dL   POCT GLUCOSE    Collection Time: 08/10/25  6:23 AM   Result Value Ref Range    POCT Glucose 104 (H) 74 - 99 mg/dL     IR CVC tunneled  Result Date: 8/5/2025  Interpreted By:  Wilian Sahu, STUDY: IR CVC TUNNELED;  8/4/2025 3:00 pm   INDICATION: Signs/Symptoms:dobutamine IV therapy. Needs Massey.   COMPARISON: None.   ACCESSION NUMBER(S): [id number]   ORDERING CLINICIAN: RADHA BREAUX   TECHNIQUE: INTERVENTIONALIST(S): Wilian Sahu MD   CONSENT: The patient/patient's POA/next of kin was informed of the nature of the proposed procedure. The purposes, alternatives, risks, and benefits were explained and discussed. All questions were answered and consent was obtained.   RADIATION EXPOSURE: Fluoroscopy time: 0.7 min   SEDATION: Moderate conscious IV sedation services (supervision of administration, induction, and maintenance) were provided by the physician performing the procedure with intravenous fentanyl 50 mcgmcg and versed 1mg. The physician was assisted by an independent trained observer, an interventional radiology nurse, in the continuous monitoring of patient level of consciousness and physiologic status.   MEDICATION: None.   TIME OUT: A time out was performed immediately prior to procedure start with the interventional team, correctly identifying the patient name, date of birth, MRN, procedure, anatomy (including marking of site and side), patient position, procedure consent form, relevant laboratory and imaging test results, antibiotic administration, safety precautions, and procedure-specific equipment needs.   COMPLICATIONS: No immediate adverse events identified.   FINDINGS: In the  recumbent position, the patient was positioned on the angiography table. The right supraclavicular and infraclavicular cutaneous tissues were prepared and draped in usual sterile manner.   The supraclavicular access site was screened with gray-scale ultrasound with subsequent subcutaneous instillation of Lidocaine 1% local anesthesia. Ultrasound images demonstrate a patent right internal jugular vein. Under direct ultrasound guidance and Seldinger/micropuncture technique, the right internal jugular vein was accessed. An ultrasound digital spot image was acquired and stored on the  PACS.   After confirmation of location, a 018 [place]-Mandril guidewire was inserted to secure location. The guidewire was advanced into the inferior vena cava utilizing intermittent fluoroscopy. The micro-access needle was removed over the guidewire. Utilizing a 5-on-4 coaxial dilator sheath system, upsize to a 035 guidewire was performed. Subsequent access tract dilation was performed to an eventual 10-[place]rainian peel-away sheath dilator system.   After Lidocaine 1% local anesthesia, a subcutaneous tunnel tract was created from the  right infraclavicular chest to the venous access site. After continuity of the tunnel tract and venous access site was obtained, a 9.6 [place] single lumen Massey catheter was then placed with tract continuity and its central catheter tip(s) to reside at the cavoatrial junction. A fluoroscopic spot image of the chest was acquired in the AP projection to confirm optimal location.   The catheter ports were aspirated and flushed without resistance with normal saline. The catheter ports were then charged with high-concentration heparin. The venous access site was closed and sterilely dressed. The external portions of the catheter were secured with a purse-string 2-0 polypropylene suture and sterile dressings.   The patient tolerated the procedure without complication.       1. Uncomplicated placement of an indwelling  right internal jugular infraclavicular 9.6 Fr single lumen Massey catheter. The catheter is ready for use.   I was present for and/or performed the critical portions of the procedure and immediately available throughout the entire procedure.   I personally reviewed the image(s) / study and resident interpretation. I agree with the findings as stated.   Performed and dictated at [facility].   MACRO: None.   Signed by: Wilian Sahu 8/5/2025 6:23 AM Dictation workstation:   [id number]    MR cardiac morphology and function w and wo IV contrast  Result Date: 7/30/2025  Interpreted By:  Leonardo Nathan  and Gricelda Smith STUDY: MR CARDIAC MORPHOLOGY AND FUNCTION W AND WO IV CONTRAST;  7/30/2025 12:20 pm   INDICATION: Signs/Symptoms:Viability assessment in ischemic cardiomyopathy.   COMPARISON: Echocardiogram done on 05/05/2025.   ACCESSION NUMBER(S): [id number]   ORDERING CLINICIAN: RADHA BREAUX   TECHNIQUE: Siemens 1.5  Effie MRI scanner. Turbo spin echo and balanced steady state free precession (bSSFP) imaging for anatomic definition. Dynamic cine bSSFP for cardiac chamber and wall-motion analysis, and valvular analysis. Flow quantification sequences for hemodynamics. Delayed gadolinium enhancement analysis after injection of gadolinium-chelate (mL of Dotarem, 0.2 mmol/kg).   HT- ; WT-; BSA-  m2   FINDINGS: CARDIAC CHAMBERS: Normal atrioventricular and ventriculoarterial concordance   LEFT ATRIUM: Moderately dilated (NIKKI - 67.94 ml/m2).   RIGHT ATRIUM: Moderately dilated (RA area max 4ch - 27.40 cm2)   INTERATRIAL SEPTUM: Intact.   LEFT VENTRICLE: 1. Severely dilated LV size (EDVi 156 ml/m2) and severely reduced systolic function. Quantitative LVEF 17 %.. 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 2. Multiple regional wall motion abnormalities are noted. 3. Normal LV wall thickness and normal LV indexed mass (LVMi 92 g/m2). 4. T2 mapping was not performed. 5. ECV unable  to be calculated as T1 mapping was not performed. 6. No evidence of LV thrombus. 7. Delayed-enhancement imaging was performed. There is transmural infarct involving the mid anteroseptum, apical septum, apical inferior and apex. Subendocardial infarct involving the mid inferolateral/inferior wall and apicolateral wall. In addition there is mid myocardial enhancement of the basal septum.   Quantitative left ventricular functional values are as follows: EDV = 350.02 cc; EDVi = 156.40 cc/m2 ESV = 289.00 cc; ESVi = 129.14 cc/m2 Absolute Cardiac Output = 6.96 l/min.; COi = 3.11 l/min/m2 LV mass = 206.94 gm; LVMi = 92.47 gm/m2 Stroke volume = 61.01 cc; SVi = 129.14 cc/m2 LVEF = 17 %   LV septal wall thickness (anterobasal): 0.6 cm LV postero-inferior wall thickness: 0.9 cm LVEDD: 7.5 cm LVESD: 6 cm   RIGHT VENTRICLE: 1. Normal RV size (EDVi 86 ml/m2) and severely reduced systolic function. Quantitative RVEF 27 %. 2. No regional wall motion abnormalities. 3. No abnormal delayed enhancement in the myocardium.   Quantitative right ventricular functional values are as follows: RVEDV = 192.98 ml; RVEDVi = 86.23 ml/m2 RVESV = 141.30 ml; RVESVi = 63.14 ml/m2 RVSV = 51.69 ml; RVSVi = 23.10 ml/m2 RVEF = 27.00 % RVCO = 5.89 l/min; RVCI = 2.63 l/min/m2   INTERVENTRICULAR SEPTUM: Intact.   AORTIC VALVE: The aortic valve is trileaflet. There is quantitatively trivial aortic regurgitation. Flow quantification through the ascending aorta: Forward volume = 46.97 cc/beat Reverse volume = -2.25 cc/beat Net forward volume = 44.72 cc/beat Aortic regurgitant fraction = 5 %   MITRAL VALVE: The mitral valve leaflets appear normal. There is MILD-TO-MODERATE mitral regurgitation. Integrating LV volumetric and aortic flow quantification data reveals: Quantitative mitral regurgitant volume = 14 cc/beat Quantitative mitral regurgitant fraction = 23 %   TRICUSPID VALVE: There is qualitatively trivial tricuspid regurgitation.   PULMONARY VALVE: Not  assessed.   PERICARDIUM: The pericardium is normal. There is no pericardial effusion.   THORACIC AORTA: The thoracic aorta appears normal in course and contour. The aortic root is normal in size. There is no evidence for acute aortic pathology. The arch vessel branching pattern is  normal.   All the arch branch vessels appear widely patent in their proximal portions.   AORTIC ROOT DIMENSIONS: Annulus: 2.6 cm Aortic root(sinus of valsalva): 3 cm Sinotubular junction: 3 cm   PULMONARY ARTERIES: The central pulmonary arteries appear normal (MPA-2.5 cm, RPA-1.8 cm, LPA-2.3 cm).   SYSTEMIC AND PULMONARY VEINS: Normal systemic venous and pulmonary venous return. The SVC is of normal caliber. IVC appears normal Normal pulmonary venous anatomy.   CHEST: The chest wall is normal. Limited imaging through the lungs reveals no gross abnormalities. Small right pleural effusion.   UPPER ABDOMEN: Limited imaging through the upper abdomen reveals no abnormalities of the visualized organs.       1. Mixed cardiomyopathy. Technically limited study due to ICD. Within limitation: Severely dilated LV size (EDVi 156 ml/m2) and severely reduced systolic function. Quantitative LVEF 17 % 2. 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 3. Moderate biatrial dilatation. 4. Mild-to-moderate mitral valve regurgitation. RF 23% 5. Transmural infarction (%) involves the mid anteroseptum, apical septum, apical inferior wall, and apex, all demonstrating nonviable myocardium. Subendocardial infarction (25-50%) affects the mid inferior and apical lateral wall, while subendocardial infarction (<25%) involves the mid inferolateral wall - these segments remain viable. Additionally, mid-myocardial enhancement in the basal septum represents a nonischemic, nonspecific pattern 6. Normal RV size (EDVi 86 ml/m2) and severely reduced systolic function. Quantitative RVEF 27 %. 7. Small right pleural effusion.   This cardiac MRI demonstrates mixed  dilated cardiomyopathy with both ischemic and nonischemic components. Nonviable myocardium is present in the mid anteroseptum, apical septum, apical inferior wall, and apex. All remaining myocardial segments demonstrate preserved viability   MACRO: None   Signed by: Leonardo Nathan 7/30/2025 4:47 PM Dictation workstation:   [id number]    XR chest 1 view  Result Date: 7/27/2025  Interpreted By:  Ke Arechiga, STUDY: XR CHEST 1 VIEW; 7/27/2025 8:22 am   INDICATION: Signs/Symptoms:swan.   COMPARISON: Radiograph dated 07/25/2025   ACCESSION NUMBER(S): [id number]   ORDERING CLINICIAN: YURI FARRELL   FINDINGS: Right IJ [place]-Talita catheter is in place with the tip projecting over distal right main pulmonary artery, retracted from prior study. Left subclavian approach pacer/defibrillator is unchanged in positioning.   The cardiac silhouette size is enlarged, unchanged. Status post median sternotomy.   Mild perihilar congestion. There is no focal consolidation, or pneumothorax. No sizeable pleural effusion. No acute osseous abnormality.       1. Unchanged mild perihilar congestion. Unchanged cardiomegaly. 2. No focal infiltrate, sizeable pleural effusion or       Signed by: Ke Lau 7/27/2025 12:25 PM Dictation workstation:   [id number]    US abdomen complete  Result Date: 7/26/2025  Interpreted By:  Curry Pedraza and Mercado Amiel STUDY: US ABDOMEN COMPLETE;  7/25/2025 11:57 am   INDICATION: Signs/Symptoms:OHT/VAD workup.     COMPARISON: None.   ACCESSION NUMBER(S): [id number]   ORDERING CLINICIAN: YONATHAN WALLS   TECHNIQUE: Multiple images of the abdomen were obtained.   FINDINGS: LIVER: The liver measures 21.3 cm and is grossly unremarkable and free of any focal lesions.     GALLBLADDER: The gallbladder is nondistended and contains multiple layering echogenic gallstones (which measure up to 1.8 cm) with associated posterior acoustic shadowing. There is also mild pericholecystic fluid, likely  related to patient's fluid status. The gallbladder wall thickness is 0.2 cm. Sonographic Zimmerman's sign is negative.     BILE DUCTS: No evidence of intra or extrahepatic biliary dilatation is identified; the common bile duct measures 0.3 cm.   PANCREAS: The pancreas is poorly visualized due to overlying bowel gas.   RIGHT KIDNEY: The right kidney measures 10.4 cm in length. The renal cortical echogenicity and thickness are within normal limit.  No hydronephrosis or renal calculi are seen.   LEFT KIDNEY: The left kidney measures 11.6 cm in length. The renal cortical echogenicity and thickness are within normal limits. No hydronephrosis or renal calculi are seen.     SPLEEN: The spleen is borderline enlarged measuring 12.4 cm and is grossly unremarkable.   URINARY BLADDER: The urinary bladder is within normal limits.   PERITONEUM: There is no free or loculated fluid seen in the abdomen.   ABDOMINAL AORTA AND IVC: The visualized portions of the aorta and IVC are unremarkable.       1. Cholelithiasis without evidence of cholecystitis. Mild pericholecystic fluid is likely related to patient's fluid status. 2. Hepatomegaly without sonographic evidence of lesions.   I have reviewed the images/study and I agree with the findings as stated by Rohan Greene MD (PGY-3).   MACRO: None   Signed by: Curry Pedraza 7/26/2025 6:05 PM Dictation workstation:   [id number]    XR chest 1 view  Result Date: 7/25/2025  Interpreted By:  Allyssa Pedraza and Krasnoschlik Nicholas STUDY: XR CHEST 1 VIEW;  7/25/2025 8:46 am   INDICATION: Signs/Symptoms:PA catheter / Pulmonary congestion.     COMPARISON: Chest radiograph 07/24/2025   ACCESSION NUMBER(S): [id number]   ORDERING CLINICIAN: MATTIE NOEL   FINDINGS: AP radiograph of the chest was provided.   Medical devices: Right IJ [place]-Talita catheter with distal tip projecting over the right lower lobe segmental branches. Left chest wall pacemaker/defibrillator with leads in similar position to  prior. Patient is status post median sternotomy.   CARDIOMEDIASTINAL SILHOUETTE: Cardiomediastinal silhouette is stable in size and configuration.   LUNGS: Similar bilateral interstitial markings. Similar bibasilar hazy opacities. No pneumothorax.   ABDOMEN: No remarkable upper abdominal findings.   BONES: No acute osseous changes.       1.  Right IJ [place]-Talita catheter with distal tip projecting over the right lower lobe segmental branches. Repositioning recommended. 2. Similar pulmonary edema. Correlate with volume status. 3. Similar bibasilar hazy opacities may represent atelectasis/edema. Infectious etiology can not be excluded. 4. Additional medical devices as above.   I personally reviewed the images/study and I agree with the findings as stated by resident physician Cam Sommers MD. This study was interpreted at [place], [place].   MACRO: None   Signed by: Allyssa Pedraza 7/25/2025 2:52 PM Dictation workstation:   [id number]    Vascular US Ankle Brachial Index (MARK) Without Exercise  Result Date: 7/25/2025            [place] [address]   Tel [phone] and Fax [phone]  Vascular Lab Report VASC US ANKLE BRACHIAL INDEX (MARK) WITHOUT EXERCISE  Patient Name:     NEGRITA BERNARD     Gayla           [id number] [address]                 Physician:        MD Study Date:       7/23/2025            Ordering          [id number] YONATHAN WALLS                                        Physician: MRN/PID:          [id number]             Technologist:     Rajani Varela S Accession#:       [id number]         Technologist 2: Date of           1965 / 60 years  Encounter#:       [id number] Birth/Age: Gender:           M Admission Status: Inpatient            Location          [facility]                                        Performed:  Diagnosis/ICD: Encounter for preprocedural  cardiovascular examination-Z01.810 Indication:    OHT/LVAD CPT Codes:     12238 Peripheral artery MARK Only  Patient History Anticoagulation, HTN and Hyperlipidemia. 10-9-2023 MARK's no                 evidence of arterial disease in BLEA.  CONCLUSIONS: Right Lower PVR: Evidence of mild arterial occlusive disease in the right lower extremity at rest. Right pressures of >220 mmHg suggest no compressibility of vessels and may make absolute Segmental Limb Pressures (SLP) unreliable. Decreased digital perfusion noted. Multiphasic flow is noted in the right common femoral artery, right posterior tibial artery and right dorsalis pedis artery. Level of disease called by waveforms. Left Lower PVR: Evidence of mild arterial occlusive disease in the left lower extremity at rest. Left pressures of >220 mmHg suggest no compressibility of vessels and may make absolute Segmental Limb Pressures (SLP) unreliable. Decreased digital perfusion noted. Multiphasic flow is noted in the left posterior tibial artery, left dorsalis pedis artery and left common femoral artery. Level of disease called by waveforms.  Comparison: Compared with study from 10/9/2023, no significant change.Today's exam there is mild arterial disease in bilateral lower extremities.  Imaging & Doppler Findings:  RIGHT Lower PVR                Pressures Ratios Right Posterior Tibial (Ankle) 220 mmHg  2.22 Right Dorsalis Pedis (Ankle)   204 mmHg  2.06 Right Digit (Great Toe)        58 mmHg   0.59   LEFT Lower PVR                Pressures Ratios Left Posterior Tibial (Ankle) 220 mmHg  2.22 Left Dorsalis Pedis (Ankle)   220 mmHg  2.22 Left Digit (Great Toe)        71 mmHg   0.72                     Right Brachial Pressure 99 mmHg   [id number] Marquita Brown MD Electronically signed by [id number] Marquita Brown MD on 7/25/2025 at 1:49:42 PM  ** Final **     Vascular US aorta iliac duplex limited  Result Date: 7/25/2025            [address]  [place] [address]   Tel [phone] and Fax [phone]  Vascular Lab Report [facility] US AORTA ILIAC DUPLEX LIMITED  Patient Name:     NEGRITA BERNARD     [place]           [id number] [address]                 Physician:        MD Study Date:       7/23/2025            Ordering          [id number] YONATHAN WALLS                                        Physician: MRN/PID:          [id number]             Technologist:     Rajani JAFFE Accession#:       [id number]         Technologist 2: Date of           1965 / 60 years  Encounter#:       [id number] Birth/Age: Gender:           M Admission Status: Inpatient            Location          [facility]                                        Performed:  Diagnosis/ICD: Encounter for preprocedural cardiovascular examination-Z01.810 Indication:    Pre-Op OHT/LVAD CPT Codes:     79037 Duplex Aorta/IVC/Iliac/Bypass Graft  Patient History CAD, HTN and Hyperlipidemia. 10- No AAA.  CONCLUSIONS: Aorta/Common Iliac Arteries/IVC: The abdominal aorta and bilateral common iliac arteries demonstrate no evidence of aneurysm.  Comparison: Compared with study from 10/10/2023, no significant change.  Imaging & Doppler Findings:   AORTA     AP    Lateral    PSV Proximal 2.14 cm 1.96 cm 95.0 cm/s   Mid    1.86 cm 1.89 cm 72.0 cm/s  Distal  1.61 cm 1.70 cm 52.0 cm/s    RIGHT       AP    Lateral    PSV GIO Proximal 1.29 cm 1.10 cm 70.00 cm/s     LEFT       AP    Lateral    PSV GIO Proximal 1.01 cm 1.01 cm 70.30 cm/s  [id number] Marquita Brown MD Electronically signed by [id number] Marquita Brown MD on 7/25/2025 at 1:30:07 PM  ** Final **     CT chest abdomen pelvis wo IV contrast  Result Date: 7/25/2025  Interpreted By:  Volodymyr Pettit, STUDY: CT CHEST ABDOMEN PELVIS WO CONTRAST;  7/23/2025 4:31 pm   INDICATION: Signs/Symptoms:OHT/VAD workup.   COMPARISON: CT chest 10/06/2023. CT abdomen and pelvis 10/10/2023.   ACCESSION NUMBER(S): [id number]    ORDERING CLINICIAN: YONATHAN WALLS   TECHNIQUE: CT of the chest, abdomen and pelvis was performed. Contiguous axial images were obtained at 3 mm slice thickness through the chest, abdomen and pelvis. Coronal and sagittal reconstructions at 3 mm slice thickness were performed.  No intravenous or oral contrast agents were administered.   FINDINGS: Please note that the study is limited without intravenous contrast.   CHEST:     LUNG/PLEURA/LARGE AIRWAYS: Linear atelectasis noted in the right upper lobe and bilateral lower lobes. No pulmonary masses or consolidation. Mild diffuse interlobular septal thickening is noted in both lungs likely related to pulmonary edema. No pleural effusion or pneumothorax. Trachea and right and left main bronchi are patent.   VESSELS: Aorta and main pulmonary artery are normal in caliber. A [place]-Talita catheter is seen with its tip in the right pulmonary artery. Mild atherosclerotic changes are noted of the aorta and branching vessels. Severe coronary artery calcifications are present.   HEART: Heart is mildly enlarged. Left-sided pacemaker leads are seen terminating in the right cardiac chamber. No pericardial effusion.   MEDIASTINUM AND SHIVANI: No significant lymphadenopathy in the chest. Esophagus is within normal limits.   CHEST WALL AND LOWER NECK: No soft tissue masses in the chest wall. Visualized thyroid gland is within normal limits.   ABDOMEN:   LIVER: Liver is enlarged measuring 22 cm craniocaudally. No focal liver lesion within the limits of noncontrast CT.   BILE DUCTS: No intra or extrahepatic bile duct dilatation.   GALLBLADDER: Gallbladder is partially distended with numerous gallstones. There is a small amount of pericholecystic fluid.   PANCREAS: The pancreas appears unremarkable.   SPLEEN: Spleen is mildly enlarged measuring 14 cm anterior posteriorly.   ADRENAL GLANDS: No adrenal nodule or thickening.   KIDNEYS AND URETERS: Bilateral kidneys are symmetric in size. No  hydroureteronephrosis. There is a punctate 5 mm calculus in the lower pole of the left kidney.   PELVIS:   BLADDER: Urinary bladder is unremarkable.   REPRODUCTIVE ORGANS: Prostate gland is within normal limits.   BOWEL: Stomach and duodenum are within normal limits. Small and large bowel loops are normal in caliber. Large amount of colonic stool burden is seen. The appendix is not definitely visualized. There is however no pericecal stranding or fluid.   VESSELS: Severe atherosclerotic changes noted in the abdominal aorta. No aortic aneurysm. IVC is within normal limits.   PERITONEUM/RETROPERITONEUM/LYMPH NODES: No ascites or fluid collection in the abdomen and pelvis. No retroperitoneal mass. There are few scattered subcentimeter in the retroperitoneum which are nonenlarged by size criteria.   ABDOMINAL WALL: No soft tissue masses in the abdominal wall. Mild subcutaneous stranding noted in the left anterior abdominal wall likely inflammation.   BONES: No suspicious osseous lesions are present. Degenerative discogenic disease is noted in the lower thoracic and lumbar spine.       Chest 1.  Mild diffuse interlobular interstitial thickening in both lungs likely representing pulmonary edema. 2. Cardiomegaly. 3. Support devices as described above.   Abdomen-Pelvis 1.  No acute process in the abdomen and pelvis. 2. Hepatosplenomegaly. 3. Cholelithiasis. Mild pericholecystic fluid is likely related to fluid overload. 4. Left renal calculus without hydronephrosis.     MACRO: None   Signed by: Volodymyr Pettit 7/25/2025 9:39 AM Dictation workstation:   [id number]    CT head wo IV contrast  Result Date: 7/25/2025  Interpreted By:  Kizzy Ambrocio and Liu Scott STUDY: CT HEAD WO IV CONTRAST;  7/24/2025 7:07 pm   INDICATION: Signs/Symptoms:change in neuro status.     COMPARISON: CT facial bones 07/23/2025   ACCESSION NUMBER(S): [id number]   ORDERING CLINICIAN: MATTIE NOEL   TECHNIQUE: Noncontrast axial CT scan of  head was performed. Angled reformats in brain and bone windows were generated. The images were reviewed in bone, brain, blood and soft tissue windows.   FINDINGS: CSF Spaces: The ventricles, sulci and basal cisterns are within normal limits. There is no extraaxial fluid collection.   Parenchyma: Mild patchy white matter hypodensity is nonspecific and compatible with microangiopathy. The grey-white differentiation is intact. There is no mass effect or midline shift.  There is no intracranial hemorrhage.   Calvarium: The calvarium is unremarkable.   Paranasal sinuses and mastoids: Visualized paranasal sinuses and mastoids are clear. Prior lens surgery on the left.       No acute intracranial hemorrhage or mass effect.   Mild degree of nonspecific white matter hypodensity compatible with microangiopathy.   I personally reviewed the images/study and I agree with the findings as stated by resident physician Miky Gtz MD. This study was interpreted at [facility], [place], [place].   MACRO: None   Signed by: Kizzy Ambrocio 7/25/2025 7:05 AM Dictation workstation:   [id number]    CT maxillofacial bones wo IV contrast  Result Date: 7/24/2025  Interpreted By:  Shanell Rodriguez, STUDY: CT FACIAL BONES WO IV CONTRAST   INDICATION: Signs/Symptoms:OHT/VAD workup   COMPARISON: None.   ACCESSION NUMBER(S): [id number]   ORDERING CLINICIAN: YONATHAN WALLS   TECHNIQUE: CT of the face was performed without the administration of intravenous contrast.   FINDINGS: SKIN AND SUBCUTANEOUS SOFT TISSUES: No asymmetrical soft tissue swelling or hematoma.   OSSEOUS STRUCTURES: No fracture, dislocation or destructive lesion.   ORBITS: The globes, retrobulbar fat, extraocular muscles, and optic nerve sheath complexes are intact and normal in morphology.   PARANASAL SINUSES: Visualized paranasal sinuses and mastoid air cells are clear.   OTHER: None.       No acute abnormality of the facial bones.   I have reviewed the  images/study and I agree with the findings as stated.   MACRO: None   Signed by: Shanell Rodriguez 7/24/2025 9:33 AM Dictation workstation:   [id number]    XR chest 1 view  Result Date: 7/24/2025  Interpreted By:  Ke Arechiga, STUDY: XR CHEST 1 VIEW; 7/24/2025 7:40 am   INDICATION: Signs/Symptoms:ADHF.   COMPARISON: 07/23/2025   ACCESSION NUMBER(S): [id number]   ORDERING CLINICIAN: CHAYO JOHNSON   FINDINGS: Right IJ [place]-Talita catheter is in place with the tip projecting over distal right main pulmonary artery.   Left subclavian approach pacer/defibrillator is unchanged in positioning.   The cardiac silhouette size is unchanged. Status post median sternotomy.   Low lung volumes and bronchovascular crowding. Mild perihilar congestion. No sizable pleural effusion or pneumothorax.   No acute osseous abnormality.       1. Unchanged appearance of the lungs with mild perihilar congestion/edema and no focal infiltrate. No sizable pneumothorax. 2. Medical devices and postsurgical changes as described above.       Signed by: Ke Lau 7/24/2025 8:32 AM Dictation workstation:   [id number]    Vascular US carotid artery duplex bilateral  Result Date: 7/23/2025            [place] [address]   Tel [phone] and Fax [phone]  Vascular Lab Report VASC US CAROTID ARTERY DUPLEX BILATERAL  Patient Name:     NEGRITA BERNARD     Gayla           [id number] [address]                 Physician:        MD Study Date:       7/23/2025            Ordering          [id number] YONATHAN WALLS                                        Physician: MRN/PID:          [id number]             Technologist:     Rajain JAFFE Accession#:       [id number]         Technologist 2: Date of           1965 / 60 years  Encounter#:       [id number] Birth/Age: Gender:           M Admission Status: Inpatient            Location          [facility]                                         Performed:  Diagnosis/ICD: Encounter for preprocedural cardiovascular examination-Z01.810 Indication:    Pre-Op OHT/LVAD CPT Codes:     19340 Cerebrovascular Carotid Duplex scan complete  Patient History CAD, HTN and Hyperlipidemia. 10-9-2023 Both ICA <50% stenosis.  CONCLUSIONS: Left Carotid: Findings are consistent with less than 50% stenosis of the left proximal internal carotid artery. Left external carotid artery appears patent with no evidence of stenosis. The left vertebral artery is patent with antegrade flow. No evidence of hemodynamically significant stenosis in the left subclavian artery.  Comparison: Compared with study from 10/9/2023, no significant change.The right CCA and ICA not scaned due to IV lines in right side of neck.  Additional Findings: Unable to scan right CCA and ICA due to IV lines in right side of neck.  Imaging & Doppler Findings: Right Plaque Morph: The proximal right internal carotid artery demonstrates heterogenous plaque. The proximal right common carotid artery demonstrates heterogenous and calcified plaque. The mid right common carotid artery demonstrates heterogenous and calcified plaque. The distal right common carotid artery demonstrates heterogenous and calcified plaque.  Right                  Left  PSV  EDV              PSV      EDV             CCA P    79 cm/s             CCA D    51 cm/s             ICA P    52 cm/s  13 cm/s             ICA M    57 cm/s  17 cm/s             ICA D    64 cm/s  22 cm/s              ECA     107 cm/s           Vertebral  28 cm/s  7 cm/s           Subclavian 94 cm/s               Left ICA/CCA Ratio 1.0   [id number] Marquita Brown MD Electronically signed by [id number] Marquita Brown MD on 7/23/2025 at 6:27:44 PM  ** Final **     XR chest 2 views  Result Date: 7/23/2025  Interpreted By:  Allyssa Pedraza and Krasnoschlik Nicholas STUDY: XR CHEST 2 VIEWS;  7/23/2025 4:38 pm   INDICATION: Signs/Symptoms:OHT/VAD workup.     COMPARISON:  Chest radiograph 07/23/2025   ACCESSION NUMBER(S): [id number]   ORDERING CLINICIAN: YONATHAN WALLS   FINDINGS: PA and lateral radiographs of the chest were provided.  Additional PA dual energy images were also provided.   Medical devices: Left chest wall pacemaker defibrillator with leads in similar position to prior. Similar positioning of a of a right IJ [place]-Talita catheter with tip projecting over the right lower lobe segmental branches. Patient is status post median sternotomy.   CARDIOMEDIASTINAL SILHOUETTE: Cardiomediastinal silhouette is stable in size and configuration.   LUNGS: Mild interstitial markings with perihilar prominence. No pneumothorax.   ABDOMEN: No remarkable upper abdominal findings.   BONES: No acute osseous changes.       1.  Interval retraction of a right IJ [place]-Talita catheter with tip now projecting over the right lower lobe segmental branches. 2. Mild pulmonary edema. 3. Additional medical devices as above.   I personally reviewed the images/study and I agree with the findings as stated by resident physician Cam Sommers MD. This study was interpreted at [place], [place].   MACRO: None   Signed by: Allyssa Pedraza 7/23/2025 4:45 PM Dictation workstation:   [id number]    XR chest 1 view  Result Date: 7/23/2025  Interpreted By:  Allyssa Pedraza, STUDY: XR CHEST 1 VIEW;  7/23/2025 8:14 am   INDICATION: Signs/Symptoms:SGC am rounds.   COMPARISON: 07/22/2025.   ACCESSION NUMBER(S): [id number]   ORDERING CLINICIAN: MEL WATKINS       Minimal improvement of aeration of the both lungs. Stable AICD, placement. [place]-Talita catheter with the tip overlies the right lower lobe pulmonary artery branch, unchanged. Cardiac silhouette is mildly enlarged. Pulmonary vessels are congested Mild pulmonary edema/fluid overload of the both lungs. Slightly improved since last exam. Small bilateral pleural effusion. No pneumothorax seen. Bony thorax  unremarkable.   MACRO: None   Signed by: Allyssa Pedraza 7/23/2025 12:21 PM Dictation workstation:   [id number]    XR chest 1 view  Result Date: 7/22/2025  Interpreted By:  Allyssa Pedraza and Krasnoschlik Nicholas STUDY: XR CHEST 1 VIEW;  7/22/2025 7:31 am   INDICATION: Signs/Symptoms:adhf.     COMPARISON: Chest radiograph 07/21/2025   ACCESSION NUMBER(S): [id number]   ORDERING CLINICIAN: YONATHAN WALLS   FINDINGS: AP radiograph of the chest was provided.   Medical devices: Right IJ [place]-Talita catheter with distal tip projecting over the proximal right interlobar artery. Left chest wall implanted pacemaker/defibrillator with leads in similar position to prior. Patient is status post median sternotomy.   CARDIOMEDIASTINAL SILHOUETTE: Cardiomediastinal silhouette is stable in size and configuration.   LUNGS: Similar appearance of bilateral interstitial and airspace opacities. There is fluid within the minor fissure. No pneumothorax.   ABDOMEN: No remarkable upper abdominal findings.   BONES: No acute osseous changes.       1.  New fluid visualized within the minor fissure and similar appearance of bilateral interstitial and airspace opacities, favored to represent pulmonary edema. Additional considerations include ARDS and pulmonary hemorrhage. 2. Medical devices as above.   I personally reviewed the images/study and I agree with the findings as stated by resident physician Cam Sommers MD. This study was interpreted at [place], [place].   MACRO: None   Signed by: Allyssa Pedraza 7/22/2025 2:14 PM Dictation workstation:   [id number]    XR chest 1 view  Result Date: 7/21/2025  Interpreted By:  Allyssa Pedraza and Krasnoschlik Nicholas STUDY: XR CHEST 1 VIEW;  7/21/2025 7:46 am   INDICATION: Signs/Symptoms:SGC Placement.     COMPARISON: Chest radiograph 07/20/2025   ACCESSION NUMBER(S): [id number]   ORDERING CLINICIAN: KORINA BERKOWITZ   FINDINGS: AP radiograph of the  chest was provided.   Medical devices: Right IJ Henderson-Talita catheter with distal tip projecting over the distal right interlobar artery. Left chest wall pacemaker/defibrillator with leads in similar position to prior. Patient is status post median sternotomy.   CARDIOMEDIASTINAL SILHOUETTE: Cardiomediastinal silhouette is enlarged and stable in size and configuration.   LUNGS: Low lung volumes with bronchovascular crowding. Similar bilateral interstitial markings. Trace blunting of the bilateral costophrenic angles. No pneumothorax.   ABDOMEN: No remarkable upper abdominal findings.   BONES: No acute osseous changes.       1. Right IJ Henderson-Talita catheter with distal tip projecting over the distal right interlobar artery. 2. Similar interstitial edema and trace bilateral pleural effusions. 3. Additional medical devices as above.   I personally reviewed the images/study and I agree with the findings as stated by resident physician Cam Sommers MD. This study was interpreted at [place], [place].   MACRO: None   Signed by: Allyssa Pedraza 7/21/2025 11:54 AM Dictation workstation:   [id number]    XR chest 1 view  Result Date: 7/20/2025  Interpreted By:  Claudio Yancey, STUDY: XR CHEST 1 VIEW;  7/20/2025 8:38 am   INDICATION: Signs/Symptoms:ADHF.   COMPARISON: Chest radiograph 07/19/2025 and abdominopelvic CT scan 10/10/2023.   ACCESSION NUMBER(S): [id number]   ORDERING CLINICIAN: CHAYO JOHNSON   FINDINGS: AP radiograph of the chest. Patient is now rotated towards right, somewhat limiting evaluation and comparison. Right IJ approach Henderson-Talita catheter has been slightly advanced, the tip of which now overlies right lower lobe pulmonary artery, approximately 2 cm distal to right hilum. Repositioning recommended Stable positioning of left subclavian approach dual chamber cardiac pacemaker/AICD. Status post prior median sternotomy with intact sternal cerclage wires.    CARDIOMEDIASTINAL SILHOUETTE: Cardiomediastinal silhouette is persistently enlarged, grossly stable in size and configuration.   LUNGS: Low lung volumes with crowding of bronchovascular markings. Similar mild bilateral interstitial prominence. Trace blunting of bilateral costophrenic angles. Overall lung aeration is grossly unchanged from prior. No pneumothorax.   ABDOMEN: No remarkable upper abdominal findings.   BONES: No acute osseous changes.       1. Right IJ approach Herman-Talita catheter has been slightly advanced, the tip of which now overlies right lower lobe pulmonary artery, approximately 2 cm distal to right hilum. Repositioning recommended. 2. No significant change in lung aeration with questionable interstitial edema and trace bilateral pleural effusions/bibasilar atelectasis. 3. Rest of medical devices as above.   Signed by: Claudio Yancey 7/20/2025 11:03 AM Dictation workstation:   [id number]    XR chest 1 view  Result Date: 7/19/2025  Interpreted By:  Claudio Yancey  and Ulisses Bolaños STUDY: XR CHEST 1 VIEW;  7/19/2025 1:41 pm   INDICATION: Signs/Symptoms:New SG position.   COMPARISON: Chest radiograph 07/17/2025 and abdominopelvic CT scan 10/10/2023.   ACCESSION NUMBER(S): [id number]   ORDERING CLINICIAN: CHAYO JOHNSON   FINDINGS: AP radiograph of the chest was provided.   Interval insertion of right IJ approach Herman-Talita catheter, the tip of which overlies right interlobar pulmonary artery, at the level of right hilum. Stable positioning of left subclavian approach dual chamber cardiac pacemaker/AICD. Status post prior median sternotomy with intact sternal cerclage wires.   CARDIOMEDIASTINAL SILHOUETTE: Cardiomediastinal silhouette is stable in size and configuration, mildly enlarged.   LUNGS: Low lung volumes with crowding of bronchovascular markings. Mildly prominent interstitial opacities.. Trace blunting of bilateral costophrenic angles. Overall lung aeration is grossly unchanged from prior. No  pneumothorax.   ABDOMEN: No remarkable upper abdominal findings.   BONES: No acute osseous changes.       1. Medical devices as above. Of note, new right IJ approach Ransom-Talita catheter with its tip projecting over right interlobar pulmonary artery, at the level of right hilum. 2. No significant change in mild interstitial edema and suspected trace bilateral pleural effusions/bibasilar atelectasis.   I personally reviewed the images/study and I agree with the findings as stated by resident physician Miky Gtz MD. This study was interpreted at [place], [place].   MACRO: None   Signed by: Claudio Yancey 7/19/2025 4:00 PM Dictation workstation:   [id number]    XR chest 1 view  Result Date: 7/17/2025  Interpreted By:  Kristi Buenrostro and Krasnoschlik Nicholas STUDY: XR CHEST 1 VIEW;  7/17/2025 11:40 am   INDICATION: Signs/Symptoms:ADHF.     COMPARISON: Chest radiograph 07/12/2025   ACCESSION NUMBER(S): [id number]   ORDERING CLINICIAN: CHAYO JOHNSON   FINDINGS: AP radiograph of the chest was provided.   Medical devices: Left chest wall implanted pacemaker/defibrillator with leads in similar position. Patient is status post median sternotomy.   CARDIOMEDIASTINAL SILHOUETTE: Cardiomediastinal silhouette is stable in size and configuration.   LUNGS: Similar mild interstitial edema. Trace blunting of the bilateral costophrenic angles. No new focal consolidation or pneumothorax..   ABDOMEN: No remarkable upper abdominal findings.   BONES: No acute osseous changes.       1.  Similar mild interstitial edema. No new focal consolidation or pneumothorax. 2. Trace bilateral pleural effusions. 3. Medical devices as above.   I personally reviewed the images/study and I agree with the findings as stated by resident physician Cam Sommers MD. This study was interpreted at [place], [place].   MACRO: None   Signed by: Kristi Buenrostro 7/17/2025  12:00 PM Dictation workstation:   [id number]    ECG 12 lead  Result Date: 7/14/2025  Normal sinus rhythm Possible Left atrial enlargement Low voltage QRS Anterolateral infarct (cited on or before 10-OCT-2023) Abnormal ECG When compared with ECG of 04-MAY-2025 13:32, Questionable change in QRS axis    XR chest 2 views  Result Date: 7/12/2025  Interpreted By:  Elis Benoit, STUDY: Chest, 2 views.   INDICATION: Signs/Symptoms:pulmonary edema.   COMPARISON: XR CHEST 2 VIEWS 5/4/2025.   ACCESSION NUMBER(S): [id number]   ORDERING CLINICIAN: MOLINA YU   FINDINGS: Median sternotomy. Left subclavian AICD/pacemaker device noted. Chronic elevated right hemidiaphragm noted. The cardiomediastinal silhouette size is again noted to be enlarged.   There is no focal consolidation, edema or pneumothorax. Small right-sided pleural effusion with blunting of the costophrenic angle similar to prior.       1. Redemonstration of small right pleural effusion similar to prior. 2. Cardiomegaly and/or pericardial effusion.   MACRO: None.   Signed by: Elis Benoit 7/12/2025 7:20 PM Dictation workstation:   [id number]       Inpatient Medications:  Scheduled Medications[1]  PRN Medications[2]  Continuous Medications[3]    Telemetry 8/10/2025 (personally reviewed): SR/ST 90-110s, rare PVCS, short runs, 3-4 beats, NSVT    Physical exam:  General: NAD  Head/ neck: + JVD   Cardiac: RRR, regular S1 S2 , no murmur, no rub, no gallop  Pulm: CTA bilaterally, no wheezes, rales or rhonchi.    Vascular: Radial 2+ bilaterally  GI: Non distended  Extremities: 2+/1+ pitting BLE edema, improving  Neuro: no focal neuro deficits   Psych: appropriate mood and behavior   Skin: warm and dry     Assessment/Plan   Felice Law is a 60 y.o. male with PMH of ischemic HFrEF 15-20%, CABG (LIMA- LAD 2023),  VT arrest s/p VT ablation and ICD 2017, Hypertension, Hyperlipidemia, Hypothyroidism, Left femoral DVT on Eliquis, COPD, JAKE, CKD III,  Chronic BLE venous stasis dermatitis, Insulin dependent Type 2 DM who was initially admitted 7/12 to [facility] for acute decompensate heart failure. Diuresed and transferred to [facility] HFICU for further managemnet and possible re-consideration of advanced therapies.  Of note, Patient was previously discussed for advanced HF therapies during [facility] October 2023 admission but was declined with concerns for mobility/frailty (uses motorized scooter and has bilateral foot drop with neuropathy), CKD, poorly controlled diabetes, as well as unclear social/financial support.      Acute on chronic systolic ischemic, HFrEF 15-20% (since 2017)  CAD s/p PCI and 1v CABG (LIMA-LAD 4/2023).   VT s/p ICD 2017  - TTE 5/5/25: LV mod dilated, EF 15-20%, gHK of LV, G2DD, mildly enlarged RV with mildly reduced systolic fxn, mild LAE, mild to mod MR and TR  - LHC 6/10/25:  Right dom. LIMA to LAD patent. pLAD 70%, os and pD1 90%, mLCx50%,osOM1 90%, mRCA 100%  - Committee discussion: optimize DM (A1c 10) and defer listing for likely heart transplant / LVAD depending on acuity  - Cardiac MRI 7/2025: EF 17%, mixed CM, mild to mod MR, Transmural infarction (%) involves the mid anteroseptum,apical septum, apical inferior wall, and apex, all demonstrating nonviable myocardium. Subendocardial infarction (25-50%) affects the mid inferior and apical lateral wall, while subendocardial infarction (<25%) involves the mid inferolateral wall: these segments remain viable. Additionally, mid-myocardial enhancement in the basal septum represents a nonischemic, nonspecific pattern. RV function severely reduced.   - There may be value in revascularizing OM and diag but will work on stabilizing renal function and heart failure. No intervention to be pursued this admission.  - Failed weaning of milrinone, inotrope dependent, Massey line placed 8/5   - GDMT: Hydral/Isordil, farxiga, no beta blocker (inotrope), digoxin 3/wkly (level WNL 7/29).   - stop  Entresto/Aldactone due to CHARLOTTE on CKD  - cont Bumex 2 mg PO BID  - plan for home Milrinone infusion: Home care IV hook up Monday at 8am. SOC Wednesday 8/13. Sister Zahraa will be his learner.   - 8/8 and 8/9 Hemodynamically stable, continue current regimen  - cont secondary vascular prevention: aspirin, statin (Discontinue fenofibrate given renal dysfunction)   - 8/9 30 mins spent discussing with patient (using [employer] ) regarding CHF S/S and management, plans for discharge and follow up. He is agreeable for sister to be called in when discharge instructions reviewed by RNYinka WILKS hose to be placed on patient.     COPD  Obstructive sleep apnea  Prior tobacco use  - 24 pack year, quit 2017, no current inhalers or O2 need  - encourage incentive spirometry  - cont albuterol nebulizer PRN      CHARLOTTE on CKD, stable/elevated  - b/l Cr ~1.4-1.8  - admit (7/17) Cr 1.62, was 2.55 on (7/12) at [facility], peak 3.24 on (7/26)  - Cr trend: 2.55 (2.59, 2.61, 2.75, 2.64, 2.72, 2.78, 2.86, 2.85, 2.28, 2.42)  - Avoid nephrotoxins and hypotension, trend daily RFP      Anemia of chronic disease and iron deficiency  - (7/17):  iron sat L 16 but otherwise iron studies & ferritin WNL  - s/p IV Venofer 200 mg x5 (completed 8/2)  - cont PO iron supplementation     Left femoral DVT (2017)  - cont Eliquis 5mg BID      Uncontrolled type II DM c/b neuropathy  - HgbA1c (7/17): 10.1%  - Endocrine following, appreciate recs:  -- cont Glargine 40u BID, Lispro SSI scale #3 TIDAC, prandial lispro 22u TIDAC  -- cont Januvia 50 daily, Farxiga 10 daily  - cont gabapentin 200 mg TID  - CCD, Accu-Cheks AC/HS, hypoglycemic protocol      Hypothyroidism  - TSH 7/23/25: WNL. Euthyroid  - cont Synthroid      Loose stool unspecified  - Immodium PRN  - C. Diff neg     DVT ppx: eliquis    DISPO:   - per TCC discussion with pt, Milrinone copay and [facility] nurse visit copay initially cost prohibitive, cost assisted and now reasonable for patient. See note 8/7.   -  per Adv HF team, Dobutamine 5 mcg/kg/min submitted for cost analysis, also cost prohibitive.   - Management and TCC aware patient needs ride home at [place]  - ongoing discharge planning with TCC, , [facility], and pharmacy -> plan for home milrinone to be set up Monday 8/11 and discharge after  - PT/OT low intensity    Code status: Full Code  NOK: Aracelis Law, mother: [phone]    Patient seen and discussed with Dr. Jean Claude Uwamungu Alyssa E Welsh, APRN-CNP    I conducted a shared visit with Treva SALINAS.     Interview conducted using a .     Patient is a 59 yo M with ischemic cardiomyopathy (severely reduced EF) who is on milrinone and He is planned to be discharged on milrinone. There were cost barriers which were sorted out but the team. He is planned to be discharged on Monday next week with home milrinone infusion.  He is diuresing well with oral bumetanide. Planned for discharge home with milrinone on Monday, 8/11/25 at 8 am.     Physical Exam:  Patient is no acute distress  JVP is mildly elevated  Normal S1S2, S3 gallop, soft 2/6 systolic murmur on lsb  Lungs clear  Abdomen soft, non-tender  Extremities are warm with 1+ bilateral pitting edema (R >L)     A/Plan:  As detailed above, patient has ischemic cardiomyopathy with severe reduced left ventricular ejection fraction.  At this time stage D heart failure.  He was deemed to be a good candidate for advanced heart failure therapies due to uncontrolled diabetes mellitus (A1c 10%). He was not deemed a candidate for heart failure therapies, mostly because of uncontrolled diabetes mellitus.  He is diuresing well on oral bumetanide.  He is planned for home discharge with milrinone infusion tomorrow (8/11/25) morning.             [1]   Scheduled medications   Medication Dose Route Frequency    apixaban  5 mg oral BID    atorvastatin  80 mg oral Nightly    bumetanide  2 mg oral BID    calcium carbonate  500 mg of calcium carbonate oral  BID    dapagliflozin propanediol  10 mg oral Daily    digoxin  125 mcg oral Once per day on Monday Wednesday Friday    gabapentin  200 mg oral TID    hydrALAZINE  25 mg oral TID    insulin glargine  40 Units subcutaneous Nightly    insulin glargine  40 Units subcutaneous Daily    insulin lispro  0-15 Units subcutaneous TID AC    insulin lispro  22 Units subcutaneous TID AC    iron polysaccharides  150 mg oral Daily    isosorbide dinitrate  40 mg oral TID    levothyroxine  75 mcg oral Daily before breakfast    pantoprazole  40 mg oral Daily before breakfast    QUEtiapine  50 mg oral Nightly    SITagliptin phosphate  50 mg oral Daily   [2]   PRN medications   Medication    acetaminophen    albuterol    alteplase    dextrose    dextrose    glucagon    glucagon    loperamide   [3]   Continuous Medications   Medication Dose Last Rate    milrinone  0.25 mcg/kg/min 0.25 mcg/kg/min (08/10/25 0039)

## 2025-08-11 ENCOUNTER — PHARMACY VISIT (OUTPATIENT)
Dept: PHARMACY | Facility: CLINIC | Age: 60
End: 2025-08-11
Payer: COMMERCIAL

## 2025-08-11 ENCOUNTER — DOCUMENTATION (OUTPATIENT)
Dept: CARE COORDINATION | Facility: CLINIC | Age: 60
End: 2025-08-11
Payer: COMMERCIAL

## 2025-08-11 ENCOUNTER — DOCUMENTATION (OUTPATIENT)
Dept: INFUSION THERAPY | Age: 60
End: 2025-08-11
Payer: COMMERCIAL

## 2025-08-11 ENCOUNTER — HOME CARE VISIT (OUTPATIENT)
Dept: HOME HEALTH SERVICES | Facility: HOME HEALTH | Age: 60
End: 2025-08-11

## 2025-08-11 VITALS
OXYGEN SATURATION: 95 % | WEIGHT: 211.64 LBS | DIASTOLIC BLOOD PRESSURE: 77 MMHG | SYSTOLIC BLOOD PRESSURE: 111 MMHG | RESPIRATION RATE: 16 BRPM | HEART RATE: 113 BPM | HEIGHT: 71 IN | TEMPERATURE: 97.5 F | BODY MASS INDEX: 29.63 KG/M2

## 2025-08-11 DIAGNOSIS — I50.22 CHRONIC SYSTOLIC HEART FAILURE: ICD-10-CM

## 2025-08-11 DIAGNOSIS — I25.5 ISCHEMIC CARDIOMYOPATHY: ICD-10-CM

## 2025-08-11 DIAGNOSIS — I50.23 ACUTE ON CHRONIC HFREF (HEART FAILURE WITH REDUCED EJECTION FRACTION): ICD-10-CM

## 2025-08-11 PROBLEM — E11.40 TYPE 2 DIABETES MELLITUS WITH DIABETIC NEUROPATHY (MULTI): Status: ACTIVE | Noted: 2025-08-11

## 2025-08-11 PROBLEM — N18.32 CHRONIC KIDNEY DISEASE, STAGE 3B (MULTI): Status: ACTIVE | Noted: 2019-10-01

## 2025-08-11 PROBLEM — I10 HTN (HYPERTENSION): Status: ACTIVE | Noted: 2023-10-05

## 2025-08-11 PROBLEM — E11.65 UNCONTROLLED TYPE 2 DIABETES MELLITUS WITH HYPERGLYCEMIA: Status: ACTIVE | Noted: 2021-01-04

## 2025-08-11 PROBLEM — E03.9 HYPOTHYROID: Status: ACTIVE | Noted: 2023-10-05

## 2025-08-11 PROBLEM — J44.9 COPD (CHRONIC OBSTRUCTIVE PULMONARY DISEASE) (MULTI): Status: ACTIVE | Noted: 2023-10-05

## 2025-08-11 LAB
GLUCOSE BLD MANUAL STRIP-MCNC: 173 MG/DL (ref 74–99)
GLUCOSE BLD MANUAL STRIP-MCNC: 233 MG/DL (ref 74–99)

## 2025-08-11 PROCEDURE — 2500000001 HC RX 250 WO HCPCS SELF ADMINISTERED DRUGS (ALT 637 FOR MEDICARE OP): Performed by: PHYSICIAN ASSISTANT

## 2025-08-11 PROCEDURE — 2500000001 HC RX 250 WO HCPCS SELF ADMINISTERED DRUGS (ALT 637 FOR MEDICARE OP)

## 2025-08-11 PROCEDURE — 2500000002 HC RX 250 W HCPCS SELF ADMINISTERED DRUGS (ALT 637 FOR MEDICARE OP, ALT 636 FOR OP/ED): Performed by: PHYSICIAN ASSISTANT

## 2025-08-11 PROCEDURE — 99239 HOSP IP/OBS DSCHRG MGMT >30: CPT | Performed by: INTERNAL MEDICINE

## 2025-08-11 PROCEDURE — 82947 ASSAY GLUCOSE BLOOD QUANT: CPT

## 2025-08-11 PROCEDURE — 97116 GAIT TRAINING THERAPY: CPT | Mod: GP,CQ

## 2025-08-11 PROCEDURE — RXMED WILLOW AMBULATORY MEDICATION CHARGE

## 2025-08-11 PROCEDURE — 2500000004 HC RX 250 GENERAL PHARMACY W/ HCPCS (ALT 636 FOR OP/ED)

## 2025-08-11 PROCEDURE — 2500000002 HC RX 250 W HCPCS SELF ADMINISTERED DRUGS (ALT 637 FOR MEDICARE OP, ALT 636 FOR OP/ED): Performed by: NURSE PRACTITIONER

## 2025-08-11 RX ORDER — DAPAGLIFLOZIN 10 MG/1
10 TABLET, FILM COATED ORAL DAILY
Qty: 30 TABLET | Refills: 3 | Status: SHIPPED | OUTPATIENT
Start: 2025-08-12

## 2025-08-11 RX ORDER — BUMETANIDE 2 MG/1
2 TABLET ORAL
Qty: 60 TABLET | Refills: 3 | Status: SHIPPED | OUTPATIENT
Start: 2025-08-11

## 2025-08-11 RX ORDER — IRON POLYSACCHARIDE COMPLEX 150 MG
150 CAPSULE ORAL DAILY
Qty: 30 CAPSULE | Refills: 3 | Status: SHIPPED | OUTPATIENT
Start: 2025-08-12

## 2025-08-11 RX ORDER — INSULIN GLARGINE 100 [IU]/ML
30 INJECTION, SOLUTION SUBCUTANEOUS 2 TIMES DAILY
Qty: 15 ML | Refills: 12 | Status: SHIPPED | OUTPATIENT
Start: 2025-08-11

## 2025-08-11 RX ORDER — HYDRALAZINE HYDROCHLORIDE 25 MG/1
25 TABLET, FILM COATED ORAL 3 TIMES DAILY
Qty: 90 TABLET | Refills: 3 | Status: SHIPPED | OUTPATIENT
Start: 2025-08-11

## 2025-08-11 RX ORDER — GABAPENTIN 100 MG/1
200 CAPSULE ORAL 3 TIMES DAILY
Qty: 180 CAPSULE | Refills: 3 | Status: SHIPPED | OUTPATIENT
Start: 2025-08-11

## 2025-08-11 RX ORDER — DIGOXIN 125 MCG
125 TABLET ORAL 3 TIMES WEEKLY
Qty: 12 TABLET | Refills: 3 | Status: SHIPPED | OUTPATIENT
Start: 2025-08-13

## 2025-08-11 RX ORDER — INSULIN LISPRO 100 [IU]/ML
INJECTION, SOLUTION INTRAVENOUS; SUBCUTANEOUS
Qty: 45 ML | Refills: 3 | Status: SHIPPED | OUTPATIENT
Start: 2025-08-11

## 2025-08-11 RX ADMIN — INSULIN LISPRO 2 UNITS: 100 INJECTION, SOLUTION INTRAVENOUS; SUBCUTANEOUS at 09:15

## 2025-08-11 RX ADMIN — HYDRALAZINE HYDROCHLORIDE 25 MG: 25 TABLET ORAL at 09:54

## 2025-08-11 RX ADMIN — GABAPENTIN 200 MG: 100 CAPSULE ORAL at 15:04

## 2025-08-11 RX ADMIN — BUMETANIDE 2 MG: 1 TABLET ORAL at 10:34

## 2025-08-11 RX ADMIN — INSULIN LISPRO 4 UNITS: 100 INJECTION, SOLUTION INTRAVENOUS; SUBCUTANEOUS at 13:27

## 2025-08-11 RX ADMIN — DIGOXIN 125 MCG: 125 TABLET ORAL at 09:54

## 2025-08-11 RX ADMIN — DAPAGLIFLOZIN 10 MG: 10 TABLET, FILM COATED ORAL at 09:54

## 2025-08-11 RX ADMIN — LEVOTHYROXINE SODIUM 75 MCG: 0.07 TABLET ORAL at 06:12

## 2025-08-11 RX ADMIN — POLYSACCHARIDE-IRON COMPLEX 150 MG: 150 CAPSULE ORAL at 09:53

## 2025-08-11 RX ADMIN — SITAGLIPTIN 50 MG: 50 TABLET, FILM COATED ORAL at 09:53

## 2025-08-11 RX ADMIN — INSULIN GLARGINE 40 UNITS: 100 INJECTION, SOLUTION SUBCUTANEOUS at 09:14

## 2025-08-11 RX ADMIN — ISOSORBIDE DINITRATE 40 MG: 40 TABLET ORAL at 15:04

## 2025-08-11 RX ADMIN — CALCIUM CARBONATE (ANTACID) CHEW TAB 500 MG 1 TABLET: 500 CHEW TAB at 09:53

## 2025-08-11 RX ADMIN — ISOSORBIDE DINITRATE 40 MG: 40 TABLET ORAL at 09:54

## 2025-08-11 RX ADMIN — PANTOPRAZOLE SODIUM 40 MG: 40 TABLET, DELAYED RELEASE ORAL at 06:12

## 2025-08-11 RX ADMIN — INSULIN LISPRO 18 UNITS: 100 INJECTION, SOLUTION INTRAVENOUS; SUBCUTANEOUS at 09:14

## 2025-08-11 RX ADMIN — APIXABAN 5 MG: 5 TABLET, FILM COATED ORAL at 09:55

## 2025-08-11 RX ADMIN — GABAPENTIN 200 MG: 100 CAPSULE ORAL at 09:54

## 2025-08-11 RX ADMIN — HYDRALAZINE HYDROCHLORIDE 25 MG: 25 TABLET ORAL at 15:04

## 2025-08-11 RX ADMIN — MILRINONE LACTATE IN DEXTROSE 0.25 MCG/KG/MIN: 0.2 INJECTION, SOLUTION INTRAVENOUS at 03:26

## 2025-08-11 RX ADMIN — INSULIN LISPRO 18 UNITS: 100 INJECTION, SOLUTION INTRAVENOUS; SUBCUTANEOUS at 13:27

## 2025-08-11 ASSESSMENT — COGNITIVE AND FUNCTIONAL STATUS - GENERAL
CLIMB 3 TO 5 STEPS WITH RAILING: A LITTLE
TURNING FROM BACK TO SIDE WHILE IN FLAT BAD: A LITTLE
MOBILITY SCORE: 18
STANDING UP FROM CHAIR USING ARMS: A LITTLE
WALKING IN HOSPITAL ROOM: A LITTLE
MOVING TO AND FROM BED TO CHAIR: A LITTLE
MOVING FROM LYING ON BACK TO SITTING ON SIDE OF FLAT BED WITH BEDRAILS: A LITTLE

## 2025-08-11 ASSESSMENT — PAIN SCALES - GENERAL: PAINLEVEL_OUTOF10: 0 - NO PAIN

## 2025-08-11 ASSESSMENT — PAIN - FUNCTIONAL ASSESSMENT: PAIN_FUNCTIONAL_ASSESSMENT: 0-10

## 2025-08-11 NOTE — CARE PLAN
Problem: Pain - Adult  Goal: Verbalizes/displays adequate comfort level or baseline comfort level  Outcome: Progressing     Problem: Safety - Adult  Goal: Free from fall injury  Outcome: Progressing     Problem: Discharge Planning  Goal: Discharge to home or other facility with appropriate resources  Outcome: Progressing     Problem: Chronic Conditions and Co-morbidities  Goal: Patient's chronic conditions and co-morbidity symptoms are monitored and maintained or improved  Outcome: Progressing     Problem: Nutrition  Goal: Nutrient intake appropriate for maintaining nutritional needs  Outcome: Progressing     Problem: Heart Failure  Goal: Improved gas exchange this shift  Outcome: Progressing  Goal: Improved urinary output this shift  Outcome: Progressing  Goal: Reduction in peripheral edema within 24 hours  Outcome: Progressing  Goal: Report improvement of dyspnea/breathlessness this shift  Outcome: Progressing  Goal: Weight from fluid excess reduced over 2-3 days, then stabilize  Outcome: Progressing  Goal: Increase self care and/or family involvement in 24 hours  Outcome: Progressing     Problem: Skin  Goal: Decreased wound size/increased tissue granulation at next dressing change  Outcome: Progressing  Goal: Participates in plan/prevention/treatment measures  Outcome: Progressing  Goal: Prevent/manage excess moisture  Outcome: Progressing  Goal: Prevent/minimize sheer/friction injuries  Outcome: Progressing  Goal: Promote/optimize nutrition  Outcome: Progressing  Goal: Promote skin healing  Outcome: Progressing     Problem: Diabetes  Goal: Achieve decreasing blood glucose levels by end of shift  Outcome: Progressing  Goal: Increase stability of blood glucose readings by end of shift  Outcome: Progressing  Goal: Decrease in ketones present in urine by end of shift  Outcome: Progressing       The patient's goals for the shift include continue with independence with ADLs    The clinical goals for the shift  include pt will remain HDS throughout shift.      08/11/25 at 1:28 AM - ROBERTA PEREZ RN

## 2025-08-11 NOTE — PROGRESS NOTES
"Physical Therapy    Physical Therapy Treatment    Patient Name: Felice Law  MRN: [id number]  Department: [place] [address]  Room: [address] [place], [place] [address]  Today's Date: 8/11/2025  Time Calculation  Start Time: 0829  Stop Time: 0837  Time Calculation (min): 8 min         Assessment/Plan   PT Assessment  PT Assessment Results: Decreased strength, Decreased range of motion, Decreased endurance, Impaired balance, Decreased mobility  Rehab Prognosis: Good  Barriers to Discharge Home: No anticipated barriers  Evaluation/Treatment Tolerance: Patient tolerated treatment well  Medical Staff Made Aware: Yes  End of Session Communication: Bedside nurse  Assessment Comment: Pt demos improved mobility this date, no LOB with amb short distances in room, able to negotiating 4\" step with B UE support and no LOB, no difficulty noted. Pt remains appropriate for low intensity therapy  End of Session Patient Position: Bed, 2 rail up, Alarm off, not on at start of session  PT Plan  Inpatient/Swing Bed or Outpatient: Inpatient  PT Plan  Treatment/Interventions: Bed mobility, Transfer training, Gait training, Stair training, Balance training, Strengthening, Endurance training, Range of motion, Therapeutic exercise, Therapeutic activity, Home exercise program, Positioning  PT Plan: Ongoing PT  PT Frequency: 3 times per week  PT Discharge Recommendations: Low intensity level of continued care  Equipment Recommended upon Discharge: Wheeled walker  PT Recommended Transfer Status: Assist x1  PT - OK to Discharge: Yes    PT Visit Info:  PT Received On: 08/11/25  Response to Previous Treatment: Patient with no complaints from previous session.     General Visit Information:   General  Prior to Session Communication: Bedside nurse  Patient Position Received: Bed, 2 rail up, Alarm off, not on at start of session  General Comment: Pt sitting up in bed, pleasant and agreeable to stair training in room.  Per handoff with RN, pt is appropriate for therapy, vitals " "are stable and pain is controlled. Other concerns prior to tx are: none    Subjective   Precautions:  Precautions  Medical Precautions: Cardiac precautions, Fall precautions    Objective   Pain:  Pain Assessment  Pain Assessment: 0-10  0-10 (Numeric) Pain Score: 0 - No pain  Cognition:  Cognition  Overall Cognitive Status: Within Functional Limits  Orientation Level: Oriented X4    Treatments:     Ambulation/Gait Training  Ambulation/Gait Training Performed: Yes  Ambulation/Gait Training 1  Surface 1: Level tile  Device 1: No device  Assistance 1: Distant supervision  Comments/Distance (ft) 1: short distances in room  Transfers  Transfer: Yes  Transfer 1  Transfer From 1: Sit to, Stand to  Transfer to 1: Sit  Technique 1: Sit to stand, Stand to sit  Transfer Device 1:  (no AD)  Transfer Level of Assistance 1: Independent    Stairs  Stairs: Yes  Stairs  Comment/Number of Steps 1: step up to/down from 4\" aerobic step 5x with B UE support with CGA transitioning to SBA    Outcome Measures:     [facility] Basic Mobility  Turning from your back to your side while in a flat bed without using bedrails: A little  Moving from lying on your back to sitting on the side of a flat bed without using bedrails: A little  Moving to and from bed to chair (including a wheelchair): A little  Standing up from a chair using your arms (e.g. wheelchair or bedside chair): A little  To walk in hospital room: A little  Climbing 3-5 steps with railing: A little  Basic Mobility - Total Score: 18       Education Documentation  Precautions, taught by Carmen Loaiza PTA at 8/11/2025  9:24 AM.  Learner: Patient  Readiness: Acceptance  Method: Explanation, Demonstration  Response: Verbalizes Understanding, Demonstrated Understanding  Comment: precautions, safe mobility    Body Mechanics, taught by Carmen Loaiza PTA at 8/11/2025  9:24 AM.  Learner: Patient  Readiness: Acceptance  Method: Explanation, Demonstration  Response: Verbalizes Understanding, " Demonstrated Understanding  Comment: precautions, safe mobility    Mobility Training, taught by Carmen Loaiza PTA at 8/11/2025  9:24 AM.  Learner: Patient  Readiness: Acceptance  Method: Explanation, Demonstration  Response: Verbalizes Understanding, Demonstrated Understanding  Comment: precautions, safe mobility    Education Comments  No comments found.        OP EDUCATION:       Encounter Problems       Encounter Problems (Active)       Balance       Patient to demonstrate Karthikeyan static and dynamic standing balance without LOB with change of directions, no hesitancy, appropriate NISREEN and sequencing to complete functional task.  (Progressing)       Start:  07/21/25    Expected End:  08/18/25            Pt will complete TUG in </= 14 sec with LRAD and no acute LOB (Progressing)       Start:  07/21/25    Expected End:  08/18/25               Mobility       STG - Patient will ambulate >/= 250 ft Karthikeyan with LRAD and no acute LOB (Progressing)       Start:  07/21/25    Expected End:  08/18/25            Patient to ascend/descend >/= 4 stairs with SBA and LRAD to demo ability to safely traverse SHEA/within home (Progressing)       Start:  07/21/25    Expected End:  08/18/25            Patient will tolerate >/=30 minutes continuous activity with stable vital signs, RPE </=13/20, RPD </=3/10  (Progressing)       Start:  07/21/25    Expected End:  08/18/25               PT Transfers       STG - Patient will perform bed mobility IND (Progressing)       Start:  07/21/25    Expected End:  08/18/25            STG - Patient will transfer sit to and from stand Karthikeyan with LRAD (Progressing)       Start:  07/21/25    Expected End:  08/18/25                 JONAS Dunne

## 2025-08-11 NOTE — DISCHARGE INSTRUCTIONS
Hospital Summary:  Mr. Mona Law, you were treated for acute decompensated heart failure while you were admitted. Your were evaluated for advanced therapies (LVAD/transplant) but found to not be a candidate at this time. You have been found to be Milrinone dependent, and we have made arrangements for you to go home with this infusion through a tunneled line (Massey catheter). You are currently on maximum tolerated heart failure medications (as allowed by your kidney function) to prevent decompensation and slow progression of this disease. It is very important to follow-up with your Heart Failure Specialist, for continued outpatient management of your condition. Please have your labs drawn prior to your appointment with Dr. Duque. Continue to monitor your weight daily and limit your fluid and sodium intake.  Home Care has been arranged to help manage your condition while you are waiting for your outpatient follow-up appointments.     Follow-up Appointments:  - The appropriate follow-up appointments have already been arranged for you.  - If you have scheduling conflicts, please call [phone] to change any appointments or to reach the Heart Failure department if you have any heart failure specific questions or concerns.  - Please bring a photo ID, any copay and insurance card, a list of your current medications or discharge instructions, please arrive 15 minutes prior to your appointment.      Medications:  - Your medications have changed. Please take the medications listed on these instructions as prescribed until you are seen at a follow up appointment.    It was a pleasure to have met and cared for you!

## 2025-08-11 NOTE — DISCHARGE SUMMARY
Discharge Diagnosis  Acute on chronic systolic heart failure    Issues Requiring Follow-Up  DM, glucose control  Advanced therapies evaluation  Renal function  PFT's    Test Results Pending At Discharge  Pending Labs       Order Current Status    HLA Transplant Antibody Panel In process          Hospital Course  Felice Law is a 60 y.o. male with PMH of ischemic [id number]%, CABG (LIMA- LAD 2023), VT arrest s/p VT ablation and ICD 2017, HTN, HLD, Hypothyroidism, Left femoral DVT on Eliquis, COPD, JAKE, CKD III, Chronic BLE venous stasis dermatitis, Insulin dependent Type 2 DM who was initially admitted 7/12 to [facility] for acute decompensate heart failure. Diuresed and transferred to [facility] HFICU for further managemnet and possible re-consideration of advanced therapies.  Of note, Patient was previously discussed for advanced HF therapies during [facility] October 2023 admission but was declined with concerns for mobility/frailty (used motorized scooter and has bilateral foot drop with neuropathy), CKD, poorly controlled diabetes, as well as unclear social/financial support.      HFICU 7/17, warm and wet upon admission. [place] guided diuresis with lasix gtt and GDMT titration. Advanced therapies evaluation initiated 7/23. Per committee review decision on 7/29; Deferred for heart transplant and LVAD. Prior to re-presenting the patient: Would need to repeat PFTs, weigh in from pulmonary and nephrology. Additionally, patient will need to improve diabetes management to achieve better glycemic control.     Transferred to floor 7/29.    Cardiac MRI showed mixed ischemic and non-ischemic pattern. It revealed transmural scar rprimarily in apex/septum but there was viable myocardium regions of inferolateral arambula.    Milrinone weaned started 7/30 however urine production poor and patient not feleing well. Milrinone increased 8/2 and on 8/3 urine production picked up and patient reported feeling subjectively better. Discussed  homegoing milrinone therapy and patient agreeable. Massey placed 8/4.     Transition to PO diuresis on 8/5. Volume remained stable/improved with further diuresis while on PO Bumex.    TCC, SW, Pharmacy, and  HC involved in extensive discussion regarding discharge planning, home infusion, and medication affordability. Patient agreeable to potential out-of-pocket cost also agreeable to work with our financial counselors.     GDMT optimized as indicated, limited by renal function.     New medications delivered to bedside prior to discharge. Home Milrinone hookup completed by  HC prior to discharge, education provided to patient and secondary learner. Patient denied having any other home going needs. Follow up appointments scheduled.     Discharge weight: 96 kg    Upon review of medications, lab values, and vital signs patient was deemed stable for discharge in satisfactory condition.     More than 60 minutes were spent in coordinating patient discharge.      Visit Vitals  /77 (BP Location: Right arm, Patient Position: Lying)   Pulse (!) 113   Temp 36.4 °C (97.5 °F) (Temporal)   Resp 16     Vitals:    08/11/25 0347   Weight: 96 kg (211 lb 10.3 oz)       Immunization History   Administered Date(s) Administered    Flu vaccine (IIV4), preservative free *Check age/dose* 10/08/2019, 08/27/2020, 08/30/2021, 09/28/2022, 09/05/2023    Flu vaccine, trivalent, preservative free, no egg protein, age 6 months or greater (Flucelvax) 10/23/2024    Hepatitis A vaccine, age 19 years and greater (HAVRIX) 09/28/2022    Hepatitis B vaccine, adult *Check Product/Dose* 11/13/2019, 12/16/2019    Moderna COVID-19 vaccine, 12 years and older (50mcg/0.5mL)(Spikevax) 11/10/2023, 10/23/2024    Moderna SARS-CoV-2 Vaccination 03/13/2021, 04/10/2021, 10/28/2021    Pfizer COVID-19 vaccine, bivalent, age 12 years and older (30 mcg/0.3 mL) 09/28/2022    Pneumococcal conjugate vaccine, 20-valent (PREVNAR 20) 10/05/2022    Pneumococcal  polysaccharide vaccine, 23-valent, age 2 years and older (PNEUMOVAX 23) 07/18/2019    Tdap vaccine, age 7 year and older (BOOSTRIX, ADACEL) 07/21/2019    Zoster vaccine, recombinant, adult (SHINGRIX) 07/21/2019, 11/19/2019       Results      Physical Exam At Time of Discharge  General: NAD  Head/ neck: + JVD to low julian at 90 degrees  Cardiac: RRR, regular S1 S2 , no murmur, no rub, no gallop  Pulm: CTA bilaterally, no wheezes, rales or rhonchi.    Vascular: Radial 2+ bilaterally  GI: Non distended  Extremities: 1+ pitting BLE edema, improving  Neuro: no focal neuro deficits   Psych: appropriate mood and behavior   Skin: warm and dry     Home Medications     Medication List      START taking these medications     iron polysaccharides 150 mg iron capsule; Commonly known as:   Nu-Iron,Niferex; Take 1 capsule (150 mg) by mouth once daily.; Start   taking on: August 12, 2025   Lantus Solostar U-100 Insulin 100 unit/mL (3 mL) pen; Generic drug:   insulin glargine; Inject 30 Units under the skin 2 times a day. Take as   directed per insulin instructions.; Replaces: insulin glargine 300 unit/mL   (3 mL) pen   milrinone in 5 % dextrose 20 mg/100 mL (200 mcg/mL) infusion; Commonly   known as: Primacor; Infuse 24.1 mcg/min at 7.23 mL/hr into a venous   catheter continuously.     CHANGE how you take these medications     apixaban 5 mg tablet; Commonly known as: Eliquis; Take 1 tablet (5 mg)   by mouth 2 times a day.; What changed: See the new instructions.   bumetanide 2 mg tablet; Commonly known as: Bumex; Take 1 tablet (2 mg)   by mouth 2 times daily (morning and late afternoon).; What changed:   medication strength, how much to take, when to take this   digoxin 125 MCG tablet; Commonly known as: Lanoxin; Take 1 tablet (125   mcg) by mouth 3 times a week. (Monday, Wednesday, Friday); Start taking   on: August 13, 2025; What changed: when to take this, additional   instructions   gabapentin 100 mg capsule; Commonly known as:  Neurontin; Take 2 capsules   (200 mg) by mouth 3 times a day.; What changed: medication strength, See   the new instructions.   hydrALAZINE 25 mg tablet; Commonly known as: Apresoline; Take 1 tablet   (25 mg) by mouth 3 times a day.; What changed: medication strength, See   the new instructions.   insulin lispro 100 unit/mL pen; Commonly known as: HumaLOG KwikPen   Insulin; 18 units with meals 3 times daily. Do not take if no eating.;   What changed: additional instructions     CONTINUE taking these medications     albuterol 2.5 mg /3 mL (0.083 %) nebulizer solution; Use every 4-6 hours   as needed for shortness of breath   atorvastatin 80 mg tablet; Commonly known as: Lipitor; 80mg nightly   dapagliflozin propanediol 10 mg tablet; Commonly known as: Farxiga; Take   1 tablet (10 mg) by mouth once daily.; Start taking on: August 12, 2025   fenofibrate 145 mg tablet; Commonly known as: Tricor; Take 1 tablet (145   mg) by mouth once daily.   FreeStyle Franco 3 Plus Sensor device; Generic drug: blood-glucose   sensor; Change every 15 days   FreeStyle Franco 3 [place] misc; Generic drug:   blood-glucose,,cont; Use as instructed   isosorbide dinitrate 40 mg tablet; Commonly known as: Isordil; Take 1   tablet (40 mg) by mouth 3 times a day.   lancets misc; Commonly known as: Lancets,Thin; 4 times daily (before   meals and at bedtime). use as directed to test blood sugar   levothyroxine 75 mcg tablet; Commonly known as: Synthroid, Levoxyl; TOME   1 TABLETA POR LA BOCA NITIN  VEZ AL CUBA   Mounjaro 5 mg/0.5 mL pen injector; Generic drug: tirzepatide; Inject 5   mg under the skin every 7 days.   OneTouch Ultra Test; Generic drug: blood sugar diagnostic; REVISE EL   NIVEL DE GLUCOSA EN LA PEARL CUATRO VECES AL CUBA   pantoprazole 40 mg EC tablet; Commonly known as: ProtoNix; Take 1 tablet   (40 mg) by mouth once daily in the morning. Take before meals. Do not   crush, chew, or split.   TOME 1 TABLETA POR LA BOCA NITIN ?VEZ AL  "CUBA EN LA   MANANA TOME ?ANTES DE NITIN COMIDA (NO TRITURE, MASTIQUE NI DIVIDA)   pen needle, diabetic 31 gauge x 5/16\" needle; For insulin injection   5x/day   QUEtiapine 50 mg tablet; Commonly known as: SEROquel; Take 1 tablet (50   mg) by mouth once daily at bedtime.     STOP taking these medications     insulin glargine 300 unit/mL (3 mL) pen; Commonly known as: Toujeo Max   U-300 SoloStar; Replaced by: Lantus Solostar U-100 Insulin 100 unit/mL (3   mL) pen   metoprolol succinate  mg 24 hr tablet; Commonly known as:   Toprol-XL   sacubitriL-valsartan  mg tablet; Commonly known as: Entresto   spironolactone 25 mg tablet; Commonly known as: Aldactone       Outpatient Follow-Up  Future Appointments   Date Time Provider Department Center   8/11/2025  4:30 PM Alesha Chavarria RN [facility]   8/12/2025 To Be Determined Macy Cazares OT [facility]   8/13/2025 11:00 AM Aurora Holliday MD [id number] [facility]   8/13/2025 To Be Determined ROSE BlandW [facility]   8/14/2025 To Be Determined Selvin Mercer, PT [facility]   8/18/2025 11:45 AM Sotero Giles MD [id number] [facility]   9/2/2025  3:30 PM Jono Duque MD [id number] [facility]   9/17/2025 12:30 PM Diana Salazar MD [id number] [facility]   10/1/2025 10:30 AM Anna Rodriguez MD PhD [id number] [facility]   12/4/2025  9:20 AM Randy Brunson MD [id number] [facility]       Michael Venegas, APRN-CNP  "

## 2025-08-12 ENCOUNTER — APPOINTMENT (OUTPATIENT)
Dept: CARDIOLOGY | Facility: CLINIC | Age: 60
End: 2025-08-12
Payer: COMMERCIAL

## 2025-08-12 ENCOUNTER — DOCUMENTATION (OUTPATIENT)
Dept: TRANSPLANT | Facility: HOSPITAL | Age: 60
End: 2025-08-12

## 2025-08-12 RX ORDER — SACUBITRIL AND VALSARTAN 97; 103 MG/1; MG/1
TABLET, FILM COATED ORAL
Qty: 120 TABLET | Refills: 5 | Status: SHIPPED | OUTPATIENT
Start: 2025-08-12

## 2025-08-13 ENCOUNTER — HOME CARE VISIT (OUTPATIENT)
Dept: HOME HEALTH SERVICES | Facility: HOME HEALTH | Age: 60
End: 2025-08-13
Payer: COMMERCIAL

## 2025-08-13 ENCOUNTER — APPOINTMENT (OUTPATIENT)
Dept: PRIMARY CARE | Facility: CLINIC | Age: 60
End: 2025-08-13
Payer: COMMERCIAL

## 2025-08-13 VITALS
SYSTOLIC BLOOD PRESSURE: 102 MMHG | TEMPERATURE: 97.1 F | DIASTOLIC BLOOD PRESSURE: 58 MMHG | HEART RATE: 110 BPM | OXYGEN SATURATION: 93 % | RESPIRATION RATE: 16 BRPM

## 2025-08-13 VITALS
HEIGHT: 69 IN | SYSTOLIC BLOOD PRESSURE: 95 MMHG | OXYGEN SATURATION: 96 % | HEART RATE: 107 BPM | DIASTOLIC BLOOD PRESSURE: 63 MMHG | WEIGHT: 210 LBS | BODY MASS INDEX: 31.1 KG/M2

## 2025-08-13 DIAGNOSIS — Z13.1 DIABETES MELLITUS SCREENING: ICD-10-CM

## 2025-08-13 DIAGNOSIS — G47.33 OSA (OBSTRUCTIVE SLEEP APNEA): ICD-10-CM

## 2025-08-13 DIAGNOSIS — Z00.00 ROUTINE GENERAL MEDICAL EXAMINATION AT HEALTH CARE FACILITY: Primary | ICD-10-CM

## 2025-08-13 DIAGNOSIS — E03.9 HYPOTHYROIDISM, UNSPECIFIED TYPE: ICD-10-CM

## 2025-08-13 DIAGNOSIS — I25.10 CORONARY ARTERY DISEASE DUE TO CALCIFIED CORONARY LESION: ICD-10-CM

## 2025-08-13 DIAGNOSIS — I87.2 CHRONIC VENOUS STASIS DERMATITIS OF BOTH LOWER EXTREMITIES: ICD-10-CM

## 2025-08-13 DIAGNOSIS — E11.40 TYPE 2 DIABETES MELLITUS WITH DIABETIC NEUROPATHY, UNSPECIFIED WHETHER LONG TERM INSULIN USE (MULTI): ICD-10-CM

## 2025-08-13 DIAGNOSIS — I69.354 HEMIPLEGIA AND HEMIPARESIS FOLLOWING CEREBRAL INFARCTION AFFECTING LEFT NON-DOMINANT SIDE (MULTI): ICD-10-CM

## 2025-08-13 DIAGNOSIS — I21.A1 TYPE 2 MI (MYOCARDIAL INFARCTION) (MULTI): ICD-10-CM

## 2025-08-13 DIAGNOSIS — E78.2 MIXED HYPERLIPIDEMIA: ICD-10-CM

## 2025-08-13 DIAGNOSIS — I25.84 CORONARY ARTERY DISEASE DUE TO CALCIFIED CORONARY LESION: ICD-10-CM

## 2025-08-13 DIAGNOSIS — M21.371 FOOT DROP, BILATERAL: ICD-10-CM

## 2025-08-13 DIAGNOSIS — N18.4 CHRONIC KIDNEY DISEASE, STAGE 4 (SEVERE) (MULTI): ICD-10-CM

## 2025-08-13 DIAGNOSIS — J44.9 CHRONIC OBSTRUCTIVE PULMONARY DISEASE, UNSPECIFIED COPD TYPE (MULTI): ICD-10-CM

## 2025-08-13 DIAGNOSIS — E11.3213 BOTH EYES AFFECTED BY MILD NONPROLIFERATIVE DIABETIC RETINOPATHY WITH MACULAR EDEMA, ASSOCIATED WITH TYPE 2 DIABETES MELLITUS: ICD-10-CM

## 2025-08-13 DIAGNOSIS — E55.9 VITAMIN D DEFICIENCY: ICD-10-CM

## 2025-08-13 DIAGNOSIS — M21.372 FOOT DROP, BILATERAL: ICD-10-CM

## 2025-08-13 DIAGNOSIS — I27.20 PULMONARY HYPERTENSION (MULTI): ICD-10-CM

## 2025-08-13 DIAGNOSIS — D50.9 MICROCYTIC ANEMIA: ICD-10-CM

## 2025-08-13 DIAGNOSIS — Z95.1 S/P CABG X 1: ICD-10-CM

## 2025-08-13 DIAGNOSIS — I50.23 ACUTE ON CHRONIC SYSTOLIC HEART FAILURE: Chronic | ICD-10-CM

## 2025-08-13 DIAGNOSIS — I10 PRIMARY HYPERTENSION: ICD-10-CM

## 2025-08-13 DIAGNOSIS — E66.811 OBESITY, CLASS I, BMI 30-34.9: ICD-10-CM

## 2025-08-13 DIAGNOSIS — I82.412 DVT OF DEEP FEMORAL VEIN, LEFT: ICD-10-CM

## 2025-08-13 PROBLEM — E66.2 MORBID (SEVERE) OBESITY WITH ALVEOLAR HYPOVENTILATION (MULTI): Status: ACTIVE | Noted: 2025-08-13

## 2025-08-13 PROBLEM — E66.01 MORBID (SEVERE) OBESITY DUE TO EXCESS CALORIES (MULTI): Status: ACTIVE | Noted: 2025-08-13

## 2025-08-13 LAB — POC HEMOGLOBIN A1C: 8.4 % (ref 4.2–6.5)

## 2025-08-13 PROCEDURE — 3078F DIAST BP <80 MM HG: CPT | Performed by: STUDENT IN AN ORGANIZED HEALTH CARE EDUCATION/TRAINING PROGRAM

## 2025-08-13 PROCEDURE — 3052F HG A1C>EQUAL 8.0%<EQUAL 9.0%: CPT | Performed by: STUDENT IN AN ORGANIZED HEALTH CARE EDUCATION/TRAINING PROGRAM

## 2025-08-13 PROCEDURE — 83036 HEMOGLOBIN GLYCOSYLATED A1C: CPT | Performed by: STUDENT IN AN ORGANIZED HEALTH CARE EDUCATION/TRAINING PROGRAM

## 2025-08-13 PROCEDURE — 99205 OFFICE O/P NEW HI 60 MIN: CPT | Performed by: STUDENT IN AN ORGANIZED HEALTH CARE EDUCATION/TRAINING PROGRAM

## 2025-08-13 PROCEDURE — 3074F SYST BP LT 130 MM HG: CPT | Performed by: STUDENT IN AN ORGANIZED HEALTH CARE EDUCATION/TRAINING PROGRAM

## 2025-08-13 PROCEDURE — 3008F BODY MASS INDEX DOCD: CPT | Performed by: STUDENT IN AN ORGANIZED HEALTH CARE EDUCATION/TRAINING PROGRAM

## 2025-08-13 PROCEDURE — 99601 HOME NFS VISIT <2 HRS: CPT

## 2025-08-13 PROCEDURE — G0299 HHS/HOSPICE OF RN EA 15 MIN: HCPCS

## 2025-08-13 PROCEDURE — G0439 PPPS, SUBSEQ VISIT: HCPCS | Performed by: STUDENT IN AN ORGANIZED HEALTH CARE EDUCATION/TRAINING PROGRAM

## 2025-08-13 RX ORDER — ALBUTEROL SULFATE 90 UG/1
2 INHALANT RESPIRATORY (INHALATION) EVERY 4 HOURS PRN
Qty: 8 G | Refills: 5 | Status: ON HOLD | OUTPATIENT
Start: 2025-08-13 | End: 2026-08-13

## 2025-08-13 SDOH — ECONOMIC STABILITY: FOOD INSECURITY: MEALS PER DAY: 3

## 2025-08-13 ASSESSMENT — ENCOUNTER SYMPTOMS
DEPRESSION: 0
LOSS OF SENSATION IN FEET: 1
ANGER WITHIN DEFINED LIMITS: 1
CHANGE IN APPETITE: UNCHANGED
SLEEP QUALITY: ADEQUATE
APPETITE LEVEL: GOOD
OCCASIONAL FEELINGS OF UNSTEADINESS: 1
AGGRESSION WITHIN DEFINED LIMITS: 1
FATIGUES EASILY: 1
PERSON REPORTING PAIN: PATIENT
DENIES PAIN: 1

## 2025-08-13 ASSESSMENT — PATIENT HEALTH QUESTIONNAIRE - PHQ9
7. TROUBLE CONCENTRATING ON THINGS, SUCH AS READING THE NEWSPAPER OR WATCHING TELEVISION: NOT AT ALL
9. THOUGHTS THAT YOU WOULD BE BETTER OFF DEAD, OR OF HURTING YOURSELF: NOT AT ALL
2. FEELING DOWN, DEPRESSED OR HOPELESS: NEARLY EVERY DAY
1. LITTLE INTEREST OR PLEASURE IN DOING THINGS: SEVERAL DAYS
5. POOR APPETITE OR OVEREATING: NOT AT ALL
8. MOVING OR SPEAKING SO SLOWLY THAT OTHER PEOPLE COULD HAVE NOTICED. OR THE OPPOSITE, BEING SO FIGETY OR RESTLESS THAT YOU HAVE BEEN MOVING AROUND A LOT MORE THAN USUAL: NOT AT ALL
SUM OF ALL RESPONSES TO PHQ QUESTIONS 1-9: 9
2. FEELING DOWN, DEPRESSED OR HOPELESS: SEVERAL DAYS
SUM OF ALL RESPONSES TO PHQ9 QUESTIONS 1 AND 2: 2
3. TROUBLE FALLING OR STAYING ASLEEP OR SLEEPING TOO MUCH: NEARLY EVERY DAY
4. FEELING TIRED OR HAVING LITTLE ENERGY: NOT AT ALL
SUM OF ALL RESPONSES TO PHQ9 QUESTIONS 1 AND 2: 6
1. LITTLE INTEREST OR PLEASURE IN DOING THINGS: NEARLY EVERY DAY
6. FEELING BAD ABOUT YOURSELF - OR THAT YOU ARE A FAILURE OR HAVE LET YOURSELF OR YOUR FAMILY DOWN: NOT AT ALL

## 2025-08-13 ASSESSMENT — PAIN SCALES - PAIN ASSESSMENT IN ADVANCED DEMENTIA (PAINAD)
FACIALEXPRESSION: 0 - SMILING OR INEXPRESSIVE.
BODYLANGUAGE: 0 - RELAXED.
NEGVOCALIZATION: 0 - NONE.
CONSOLABILITY: 0
TOTALSCORE: 0
FACIALEXPRESSION: 0
BREATHING: 0
CONSOLABILITY: 0 - NO NEED TO CONSOLE.
BODYLANGUAGE: 0
NEGVOCALIZATION: 0

## 2025-08-13 ASSESSMENT — ACTIVITIES OF DAILY LIVING (ADL)
DRESSING: INDEPENDENT
OASIS_M1830: 02
TAKING_MEDICATION: INDEPENDENT
DOING_HOUSEWORK: NEEDS ASSISTANCE
ENTERING_EXITING_HOME: SUPERVISION
GROCERY_SHOPPING: INDEPENDENT
BATHING: NEEDS ASSISTANCE
MANAGING_FINANCES: INDEPENDENT

## 2025-08-13 ASSESSMENT — LIFESTYLE VARIABLES: SMOKING_STATUS: 0

## 2025-08-13 NOTE — PROGRESS NOTES
Patient scheduled for discharge today. [facility] arranged to provide hospital medication hook-up  Start of Care (SOC) visit to be completed within 24-48 hours post-discharge. All relevant parties notified and in agreement with the discharge plan.

## 2025-08-14 ENCOUNTER — DOCUMENTATION (OUTPATIENT)
Dept: TRANSPLANT | Facility: HOSPITAL | Age: 60
End: 2025-08-14
Payer: COMMERCIAL

## 2025-08-14 ENCOUNTER — DOCUMENTATION (OUTPATIENT)
Dept: INFUSION THERAPY | Age: 60
End: 2025-08-14
Payer: COMMERCIAL

## 2025-08-14 ENCOUNTER — HOME INFUSION (OUTPATIENT)
Dept: INFUSION THERAPY | Age: 60
End: 2025-08-14
Payer: COMMERCIAL

## 2025-08-14 ENCOUNTER — HOME CARE VISIT (OUTPATIENT)
Dept: HOME HEALTH SERVICES | Facility: HOME HEALTH | Age: 60
End: 2025-08-14
Payer: COMMERCIAL

## 2025-08-14 ENCOUNTER — DOCUMENTATION (OUTPATIENT)
Facility: HOSPITAL | Age: 60
End: 2025-08-14
Payer: COMMERCIAL

## 2025-08-14 VITALS — OXYGEN SATURATION: 96 % | HEART RATE: 112 BPM | DIASTOLIC BLOOD PRESSURE: 60 MMHG | SYSTOLIC BLOOD PRESSURE: 102 MMHG

## 2025-08-14 DIAGNOSIS — J44.9 CHRONIC OBSTRUCTIVE PULMONARY DISEASE, UNSPECIFIED COPD TYPE (MULTI): ICD-10-CM

## 2025-08-14 DIAGNOSIS — I50.22 CHRONIC SYSTOLIC HEART FAILURE: ICD-10-CM

## 2025-08-14 DIAGNOSIS — I25.5 ISCHEMIC CARDIOMYOPATHY: ICD-10-CM

## 2025-08-14 DIAGNOSIS — I50.23 ACUTE ON CHRONIC SYSTOLIC CONGESTIVE HEART FAILURE: ICD-10-CM

## 2025-08-14 DIAGNOSIS — I50.20: ICD-10-CM

## 2025-08-14 LAB
ALBUMIN/CREAT UR: 530 MG/G CREAT
CREAT UR-MCNC: 57 MG/DL (ref 20–320)
MICROALBUMIN UR-MCNC: 30.2 MG/DL

## 2025-08-14 PROCEDURE — G0152 HHCP-SERV OF OT,EA 15 MIN: HCPCS

## 2025-08-14 RX ORDER — ALBUTEROL SULFATE 90 UG/1
1 INHALANT RESPIRATORY (INHALATION) ONCE
OUTPATIENT
Start: 2025-08-14

## 2025-08-14 RX ORDER — ALBUTEROL SULFATE 0.83 MG/ML
3 SOLUTION RESPIRATORY (INHALATION) ONCE
OUTPATIENT
Start: 2025-08-14

## 2025-08-14 ASSESSMENT — ENCOUNTER SYMPTOMS
PERSON REPORTING PAIN: PATIENT
DENIES PAIN: 1

## 2025-08-14 ASSESSMENT — ACTIVITIES OF DAILY LIVING (ADL)
TRANSPORTATION ASSESSED: 1
SHOPPING: DEPENDENT
TOILETING: INDEPENDENT
LAUNDRY: DEPENDENT
TOILETING: SUPERVISION
AMBULATION ASSISTANCE: 1
SHOPPING ASSESSED: 1
BATHING_CURRENT_FUNCTION: SUPERVISION
PHYSICAL TRANSFERS ASSESSED: 1
TOILETING: 1
TRANSPORTATION: DEPENDENT
DRESSING_UB_CURRENT_FUNCTION: SUPERVISION
LIGHT HOUSEKEEPING: DEPENDENT
TOILETING: 1
ORAL_CARE_CURRENT_FUNCTION: INDEPENDENT
BATHING ASSESSED: 1
DRESSING_UB_CURRENT_FUNCTION: MINIMUM ASSIST
ORAL_CARE_ASSESSED: 1
DRESSING_LB_CURRENT_FUNCTION: SUPERVISION
USING THE TELPHONE: INDEPENDENT
DRESSING_LB_CURRENT_FUNCTION: MINIMUM ASSIST
GROOMING_CURRENT_FUNCTION: INDEPENDENT
CURRENT_FUNCTION: SUPERVISION
HOUSEKEEPING ASSESSED: 1
LAUNDRY ASSESSED: 1
BATHING_CURRENT_FUNCTION: MINIMUM ASSIST
GROOMING ASSESSED: 1
FEEDING ASSESSED: 1
BATHING ASSESSED: 1
FEEDING: INDEPENDENT
PREPARING MEALS: DEPENDENT
TELEPHONE USE ASSESSED: 1
AMBULATION ASSISTANCE: SUPERVISION

## 2025-08-15 ENCOUNTER — HOME CARE VISIT (OUTPATIENT)
Dept: HOME HEALTH SERVICES | Facility: HOME HEALTH | Age: 60
End: 2025-08-15
Payer: COMMERCIAL

## 2025-08-15 ENCOUNTER — COMMITTEE REVIEW (OUTPATIENT)
Facility: HOSPITAL | Age: 60
End: 2025-08-15
Payer: COMMERCIAL

## 2025-08-15 PROCEDURE — G0151 HHCP-SERV OF PT,EA 15 MIN: HCPCS

## 2025-08-15 ASSESSMENT — ACTIVITIES OF DAILY LIVING (ADL): AMBULATION ASSISTANCE ON FLAT SURFACES: 1

## 2025-08-15 ASSESSMENT — ENCOUNTER SYMPTOMS
DENIES PAIN: 1
OCCASIONAL FEELINGS OF UNSTEADINESS: 0
PERSON REPORTING PAIN: PATIENT

## 2025-08-16 PROBLEM — K80.20 CALCULUS OF GALLBLADDER WITHOUT CHOLECYSTITIS WITHOUT OBSTRUCTION: Status: RESOLVED | Noted: 2021-02-10 | Resolved: 2025-08-16

## 2025-08-16 PROBLEM — R31.29 MICROSCOPIC HEMATURIA: Status: RESOLVED | Noted: 2020-02-11 | Resolved: 2025-08-16

## 2025-08-16 PROBLEM — N20.0 NEPHROLITHIASIS: Status: RESOLVED | Noted: 2019-10-18 | Resolved: 2025-08-16

## 2025-08-16 PROBLEM — I50.9 ACUTE ON CHRONIC CONGESTIVE HEART FAILURE, UNSPECIFIED HEART FAILURE TYPE: Status: RESOLVED | Noted: 2025-05-04 | Resolved: 2025-08-16

## 2025-08-16 PROBLEM — E78.00 HIGH BLOOD CHOLESTEROL: Status: RESOLVED | Noted: 2020-11-19 | Resolved: 2025-08-16

## 2025-08-16 PROBLEM — I99.9 CIRCULATORY DISORDER: Status: RESOLVED | Noted: 2023-10-04 | Resolved: 2025-08-16

## 2025-08-16 PROBLEM — E66.9 OBESITY (BMI 30-39.9): Status: RESOLVED | Noted: 2019-10-01 | Resolved: 2025-08-16

## 2025-08-16 PROBLEM — I50.23 ACUTE ON CHRONIC SYSTOLIC HEART FAILURE: Chronic | Status: RESOLVED | Noted: 2025-07-17 | Resolved: 2025-08-16

## 2025-08-16 PROBLEM — D64.9 ANEMIA: Status: RESOLVED | Noted: 2019-10-18 | Resolved: 2025-08-16

## 2025-08-16 PROBLEM — N18.30 STAGE 3 CHRONIC KIDNEY DISEASE (MULTI): Status: RESOLVED | Noted: 2019-10-01 | Resolved: 2025-08-16

## 2025-08-16 PROBLEM — J44.9 CHRONIC OBSTRUCTIVE PULMONARY DISEASE (MULTI): Status: RESOLVED | Noted: 2023-10-05 | Resolved: 2025-08-16

## 2025-08-16 PROBLEM — M21.371 BILATERAL FOOT-DROP: Status: RESOLVED | Noted: 2020-02-18 | Resolved: 2025-08-16

## 2025-08-16 PROBLEM — N18.32 CHRONIC KIDNEY DISEASE, STAGE 3B (MULTI): Status: RESOLVED | Noted: 2019-10-01 | Resolved: 2025-08-16

## 2025-08-16 PROBLEM — M21.372 BILATERAL FOOT-DROP: Status: RESOLVED | Noted: 2020-02-18 | Resolved: 2025-08-16

## 2025-08-16 PROBLEM — E66.2 MORBID (SEVERE) OBESITY WITH ALVEOLAR HYPOVENTILATION (MULTI): Status: RESOLVED | Noted: 2025-08-13 | Resolved: 2025-08-16

## 2025-08-16 PROBLEM — K80.20 CHOLELITHIASIS WITHOUT OBSTRUCTION: Status: RESOLVED | Noted: 2021-02-10 | Resolved: 2025-08-16

## 2025-08-16 PROBLEM — D50.9 MICROCYTIC ANEMIA: Status: RESOLVED | Noted: 2019-10-18 | Resolved: 2025-08-16

## 2025-08-16 PROBLEM — N28.9 KIDNEY DISEASE: Status: RESOLVED | Noted: 2023-10-09 | Resolved: 2025-08-16

## 2025-08-16 PROBLEM — R60.9 EDEMA: Status: RESOLVED | Noted: 2023-10-04 | Resolved: 2025-08-16

## 2025-08-16 PROBLEM — E66.01 MORBID (SEVERE) OBESITY DUE TO EXCESS CALORIES (MULTI): Status: RESOLVED | Noted: 2025-08-13 | Resolved: 2025-08-16

## 2025-08-16 PROBLEM — E66.811 OBESITY, CLASS I, BMI 30-34.9: Status: RESOLVED | Noted: 2020-11-19 | Resolved: 2025-08-16

## 2025-08-16 PROBLEM — E66.9 OBESITY WITH BODY MASS INDEX 30 OR GREATER: Status: RESOLVED | Noted: 2019-10-01 | Resolved: 2025-08-16

## 2025-08-16 PROBLEM — N20.0 CALCULUS OF KIDNEY: Status: RESOLVED | Noted: 2019-10-18 | Resolved: 2025-08-16

## 2025-08-16 PROBLEM — I73.9 PERIPHERAL VASCULAR DISEASE: Status: RESOLVED | Noted: 2023-10-04 | Resolved: 2025-08-16

## 2025-08-18 ENCOUNTER — HOME CARE VISIT (OUTPATIENT)
Dept: HOME HEALTH SERVICES | Facility: HOME HEALTH | Age: 60
End: 2025-08-18
Payer: COMMERCIAL

## 2025-08-18 ENCOUNTER — APPOINTMENT (OUTPATIENT)
Dept: CARDIOLOGY | Facility: CLINIC | Age: 60
End: 2025-08-18
Payer: COMMERCIAL

## 2025-08-18 ENCOUNTER — TELEPHONE (OUTPATIENT)
Dept: TRANSPLANT | Facility: HOSPITAL | Age: 60
End: 2025-08-18

## 2025-08-18 DIAGNOSIS — E78.5 HYPERLIPIDEMIA, UNSPECIFIED HYPERLIPIDEMIA TYPE: ICD-10-CM

## 2025-08-18 DIAGNOSIS — E11.65 TYPE 2 DIABETES MELLITUS WITH HYPERGLYCEMIA, WITH LONG-TERM CURRENT USE OF INSULIN: ICD-10-CM

## 2025-08-18 DIAGNOSIS — E78.1 HYPERTRIGLYCERIDEMIA: ICD-10-CM

## 2025-08-18 DIAGNOSIS — Z79.4 TYPE 2 DIABETES MELLITUS WITH HYPERGLYCEMIA, WITH LONG-TERM CURRENT USE OF INSULIN: ICD-10-CM

## 2025-08-18 PROCEDURE — 99601 HOME NFS VISIT <2 HRS: CPT

## 2025-08-18 PROCEDURE — G0299 HHS/HOSPICE OF RN EA 15 MIN: HCPCS

## 2025-08-18 RX ADMIN — Medication 10 ML: at 18:30

## 2025-08-19 ENCOUNTER — DOCUMENTATION (OUTPATIENT)
Dept: TRANSPLANT | Facility: HOSPITAL | Age: 60
End: 2025-08-19
Payer: COMMERCIAL

## 2025-08-19 ENCOUNTER — HOSPITAL ENCOUNTER (OUTPATIENT)
Dept: RESPIRATORY THERAPY | Facility: HOSPITAL | Age: 60
Discharge: HOME | End: 2025-08-19
Payer: COMMERCIAL

## 2025-08-19 ENCOUNTER — HOME INFUSION (OUTPATIENT)
Dept: INFUSION THERAPY | Age: 60
End: 2025-08-19
Payer: COMMERCIAL

## 2025-08-19 VITALS
DIASTOLIC BLOOD PRESSURE: 66 MMHG | OXYGEN SATURATION: 95 % | TEMPERATURE: 97.4 F | SYSTOLIC BLOOD PRESSURE: 100 MMHG | HEART RATE: 115 BPM

## 2025-08-19 DIAGNOSIS — J44.9 CHRONIC OBSTRUCTIVE PULMONARY DISEASE, UNSPECIFIED COPD TYPE (MULTI): ICD-10-CM

## 2025-08-19 DIAGNOSIS — I50.23 ACUTE ON CHRONIC SYSTOLIC CONGESTIVE HEART FAILURE: ICD-10-CM

## 2025-08-19 DIAGNOSIS — I50.20: ICD-10-CM

## 2025-08-19 DIAGNOSIS — I25.5 ISCHEMIC CARDIOMYOPATHY: ICD-10-CM

## 2025-08-19 LAB
BASE EXCESS BLDA CALC-SCNC: 2.2 MMOL/L (ref -2–3)
BODY TEMPERATURE: 37 DEGREES CELSIUS
COHGB MFR BLDA: 2.4 %
DO-HGB MFR BLDA: 4.1 % (ref 0–5)
HCO3 BLDA-SCNC: 25.1 MMOL/L (ref 22–26)
HGB BLDA-MCNC: 12.6 G/DL (ref 13.5–17.5)
METHGB MFR BLDA: 0.4 % (ref 0–1.5)
MGC ASCENT PFT - FEV1 - PRE: 2
MGC ASCENT PFT - FEV1 - PRE: 2
MGC ASCENT PFT - FEV1 - PREDICTED: 3.28
MGC ASCENT PFT - FEV1 - PREDICTED: 3.28
MGC ASCENT PFT - FVC - PRE: 2.72
MGC ASCENT PFT - FVC - PRE: 2.72
MGC ASCENT PFT - FVC - PREDICTED: 4.2
MGC ASCENT PFT - FVC - PREDICTED: 4.2
OXYHGB MFR BLDA: 93.1 % (ref 94–98)
PCO2 BLDA: 33 MM HG (ref 38–42)
PH BLDA: 7.49 PH (ref 7.38–7.42)
PO2 BLDA: 70 MM HG (ref 85–95)
SAO2 % BLDA: 96 % (ref 94–100)

## 2025-08-19 PROCEDURE — 36600 WITHDRAWAL OF ARTERIAL BLOOD: CPT

## 2025-08-19 PROCEDURE — 94726 PLETHYSMOGRAPHY LUNG VOLUMES: CPT

## 2025-08-19 PROCEDURE — 85018 HEMOGLOBIN: CPT

## 2025-08-19 PROCEDURE — 94726 PLETHYSMOGRAPHY LUNG VOLUMES: CPT | Performed by: INTERNAL MEDICINE

## 2025-08-19 PROCEDURE — 94010 BREATHING CAPACITY TEST: CPT | Performed by: INTERNAL MEDICINE

## 2025-08-19 PROCEDURE — 94729 DIFFUSING CAPACITY: CPT | Performed by: INTERNAL MEDICINE

## 2025-08-19 SDOH — ECONOMIC STABILITY: FOOD INSECURITY: MEALS PER DAY: 3

## 2025-08-19 ASSESSMENT — PAIN SCALES - PAIN ASSESSMENT IN ADVANCED DEMENTIA (PAINAD)
CONSOLABILITY: 0 - NO NEED TO CONSOLE.
FACIALEXPRESSION: 0
NEGVOCALIZATION: 0
BODYLANGUAGE: 0
BREATHING: 0
FACIALEXPRESSION: 0 - SMILING OR INEXPRESSIVE.
BODYLANGUAGE: 0 - RELAXED.
TOTALSCORE: 0
CONSOLABILITY: 0
NEGVOCALIZATION: 0 - NONE.

## 2025-08-19 ASSESSMENT — ENCOUNTER SYMPTOMS
PAIN: 1
PAIN LOCATION - PAIN FREQUENCY: INTERMITTENT
CHANGE IN APPETITE: UNCHANGED
LOSS OF SENSATION IN FEET: 1
OCCASIONAL FEELINGS OF UNSTEADINESS: 0
PERSON REPORTING PAIN: PATIENT
PAIN LOCATION - PAIN SEVERITY: 2/10
DEPRESSION: 0
PAIN SEVERITY GOAL: 0/10
APPETITE LEVEL: GOOD
PAIN LOCATION: LEFT LEG
LOWEST PAIN SEVERITY IN PAST 24 HOURS: 1/10
HIGHEST PAIN SEVERITY IN PAST 24 HOURS: 4/10
SKIN LESIONS: 1
PAIN LOCATION - PAIN QUALITY: TENDER

## 2025-08-20 ENCOUNTER — HOME CARE VISIT (OUTPATIENT)
Dept: HOME HEALTH SERVICES | Facility: HOME HEALTH | Age: 60
End: 2025-08-20
Payer: COMMERCIAL

## 2025-08-20 LAB
MGC ASCENT PFT - FEV1 - PRE: 2.08
MGC ASCENT PFT - FEV1 - PREDICTED: 4.87
MGC ASCENT PFT - FVC - PRE: 2.67
MGC ASCENT PFT - FVC - PREDICTED: 6.5

## 2025-08-20 PROCEDURE — G0157 HHC PT ASSISTANT EA 15: HCPCS | Mod: CQ

## 2025-08-21 DIAGNOSIS — E11.65 TYPE 2 DIABETES MELLITUS WITH HYPERGLYCEMIA, WITH LONG-TERM CURRENT USE OF INSULIN: ICD-10-CM

## 2025-08-21 DIAGNOSIS — I50.23 ACUTE ON CHRONIC SYSTOLIC HEART FAILURE: ICD-10-CM

## 2025-08-21 DIAGNOSIS — D50.9 IRON DEFICIENCY ANEMIA, UNSPECIFIED IRON DEFICIENCY ANEMIA TYPE: ICD-10-CM

## 2025-08-21 DIAGNOSIS — Z79.4 TYPE 2 DIABETES MELLITUS WITH HYPERGLYCEMIA, WITH LONG-TERM CURRENT USE OF INSULIN: ICD-10-CM

## 2025-08-21 DIAGNOSIS — I25.5 ISCHEMIC CARDIOMYOPATHY: ICD-10-CM

## 2025-08-21 LAB
ALBUMIN SERPL-MCNC: 4.3 G/DL (ref 3.6–5.1)
ALBUMIN/CREAT UR: 797 MG/G CREAT
BUN SERPL-MCNC: 65 MG/DL (ref 7–25)
BUN/CREAT SERPL: 27 (CALC) (ref 6–22)
CALCIUM SERPL-MCNC: 9.2 MG/DL (ref 8.6–10.3)
CHLORIDE SERPL-SCNC: 96 MMOL/L (ref 98–110)
CHOLEST SERPL-MCNC: 90 MG/DL
CHOLEST/HDLC SERPL: 3.2 (CALC)
CO2 SERPL-SCNC: 26 MMOL/L (ref 20–32)
CREAT SERPL-MCNC: 2.44 MG/DL (ref 0.7–1.3)
CREAT UR-MCNC: 72 MG/DL (ref 20–320)
EGFRCR SERPLBLD CKD-EPI 2021: 30 ML/MIN/1.73M2
EST. AVERAGE GLUCOSE BLD GHB EST-MCNC: 180 MG/DL
EST. AVERAGE GLUCOSE BLD GHB EST-SCNC: 10 MMOL/L
GLUCOSE SERPL-MCNC: 58 MG/DL (ref 65–99)
HBA1C MFR BLD: 7.9 %
HDLC SERPL-MCNC: 28 MG/DL
LDLC SERPL CALC-MCNC: 37 MG/DL (CALC)
MICROALBUMIN UR-MCNC: 57.4 MG/DL
NONHDLC SERPL-MCNC: 62 MG/DL (CALC)
PHOSPHATE SERPL-MCNC: 4.6 MG/DL (ref 2.5–4.5)
POTASSIUM SERPL-SCNC: ABNORMAL MMOL/L
SODIUM SERPL-SCNC: 135 MMOL/L (ref 135–146)
TRIGL SERPL-MCNC: 186 MG/DL

## 2025-08-21 RX ORDER — DIGOXIN 125 MCG
125 TABLET ORAL 3 TIMES WEEKLY
Qty: 12 TABLET | Refills: 3 | Status: SHIPPED | OUTPATIENT
Start: 2025-08-22

## 2025-08-22 ENCOUNTER — HOME CARE VISIT (OUTPATIENT)
Dept: HOME HEALTH SERVICES | Facility: HOME HEALTH | Age: 60
End: 2025-08-22
Payer: COMMERCIAL

## 2025-08-22 PROCEDURE — G0157 HHC PT ASSISTANT EA 15: HCPCS | Mod: CQ

## 2025-08-22 PROCEDURE — G0155 HHCP-SVS OF CSW,EA 15 MIN: HCPCS

## 2025-08-22 SDOH — SOCIAL STABILITY: SOCIAL NETWORK: HELP FROM FAMILY/FRIENDS: FAMILY

## 2025-08-22 ASSESSMENT — ACTIVITIES OF DAILY LIVING (ADL)
LAUNDRY_REQUIRES_ASSISTANCE: 1
SHOPPING_REQUIRES_ASSISTANCE: 1
EATING_REQUIRES_ASSISTANCE: 1
AMBULATION_REQUIRES_ASSISTANCE: 1

## 2025-08-25 ENCOUNTER — HOME CARE VISIT (OUTPATIENT)
Dept: HOME HEALTH SERVICES | Facility: HOME HEALTH | Age: 60
End: 2025-08-25
Payer: COMMERCIAL

## 2025-08-25 ENCOUNTER — DOCUMENTATION (OUTPATIENT)
Dept: TRANSPLANT | Facility: HOSPITAL | Age: 60
End: 2025-08-25

## 2025-08-25 ENCOUNTER — HOME INFUSION (OUTPATIENT)
Dept: INFUSION THERAPY | Age: 60
End: 2025-08-25

## 2025-08-25 VITALS
RESPIRATION RATE: 18 BRPM | TEMPERATURE: 97.6 F | SYSTOLIC BLOOD PRESSURE: 100 MMHG | HEART RATE: 108 BPM | DIASTOLIC BLOOD PRESSURE: 64 MMHG | OXYGEN SATURATION: 93 %

## 2025-08-25 PROCEDURE — G0299 HHS/HOSPICE OF RN EA 15 MIN: HCPCS

## 2025-08-25 PROCEDURE — 99601 HOME NFS VISIT <2 HRS: CPT

## 2025-08-25 SDOH — ECONOMIC STABILITY: FOOD INSECURITY: MEALS PER DAY: 3

## 2025-08-25 ASSESSMENT — ENCOUNTER SYMPTOMS
OCCASIONAL FEELINGS OF UNSTEADINESS: 0
VOMITING: 0
CHILLS: 0
AGITATION: 0
APPETITE LEVEL: FAIR
PAIN LOCATION - PAIN FREQUENCY: CONSTANT
PAIN LOCATION: NECK
DIZZINESS: 0
WHEEZING: 0
SKIN LESIONS: 1
PAIN LOCATION - PAIN FREQUENCY: CONSTANT
EYE REDNESS: 0
PAIN LOCATION - PAIN SEVERITY: 5/10
CHANGE IN APPETITE: DECREASED
PAIN LOCATION - PAIN FREQUENCY: CONSTANT
NAUSEA: 0
PAIN SEVERITY GOAL: 0/10
DYSURIA: 0
ABDOMINAL PAIN: 0
PAIN: 1
PAIN LOCATION: LEFT LEG
EYE PAIN: 0
PAIN LOCATION - PAIN FREQUENCY: CONSTANT
PAIN LOCATION - PAIN QUALITY: HURTS
PAIN LOCATION - PAIN SEVERITY: 7/10
PAIN LOCATION - PAIN QUALITY: HURTS
DYSPNEA ACTIVITY LEVEL: AFTER AMBULATING 10 - 20 FT
LOWER EXTREMITY EDEMA: 1
PAIN LOCATION - PAIN QUALITY: HURTS
PAIN LOCATION - PAIN QUALITY: HURTS
SHORTNESS OF BREATH: 0
PAIN LOCATION: RIGHT SHOULDER
PAIN LOCATION: LEFT SHOULDER
DYSPNEA ON EXERTION: 1
MYALGIAS: 0
DIARRHEA: 0
FEVER: 0
PERSON REPORTING PAIN: PATIENT
DEPRESSION: 0
LOSS OF SENSATION IN FEET: 1
PAIN LOCATION - PAIN SEVERITY: 5/10
HIGHEST PAIN SEVERITY IN PAST 24 HOURS: 7/10
LOWEST PAIN SEVERITY IN PAST 24 HOURS: 4/10
SHORTNESS OF BREATH: 1
ARTHRALGIAS: 0
COUGH: 0
PAIN LOCATION - PAIN SEVERITY: 5/10
SUBJECTIVE PAIN PROGRESSION: GRADUALLY WORSENING

## 2025-08-25 ASSESSMENT — PAIN SCALES - PAIN ASSESSMENT IN ADVANCED DEMENTIA (PAINAD)
NEGVOCALIZATION: 0
CONSOLABILITY: 0 - NO NEED TO CONSOLE.
FACIALEXPRESSION: 0 - SMILING OR INEXPRESSIVE.
CONSOLABILITY: 0
BREATHING: 0
BODYLANGUAGE: 0 - RELAXED.
FACIALEXPRESSION: 0
NEGVOCALIZATION: 0 - NONE.
BODYLANGUAGE: 0
TOTALSCORE: 0

## 2025-08-26 ENCOUNTER — HOME CARE VISIT (OUTPATIENT)
Dept: HOME HEALTH SERVICES | Facility: HOME HEALTH | Age: 60
End: 2025-08-26
Payer: COMMERCIAL

## 2025-08-26 LAB
ALBUMIN SERPL-MCNC: 4.2 G/DL (ref 3.6–5.1)
BUN SERPL-MCNC: 60 MG/DL (ref 7–25)
BUN/CREAT SERPL: 25 (CALC) (ref 6–22)
CALCIUM SERPL-MCNC: 8.7 MG/DL (ref 8.6–10.3)
CHLORIDE SERPL-SCNC: 98 MMOL/L (ref 98–110)
CHOLEST SERPL-MCNC: 75 MG/DL
CHOLEST/HDLC SERPL: 2.6 (CALC)
CO2 SERPL-SCNC: 27 MMOL/L (ref 20–32)
CREAT SERPL-MCNC: 2.36 MG/DL (ref 0.7–1.3)
EGFRCR SERPLBLD CKD-EPI 2021: 31 ML/MIN/1.73M2
GLUCOSE SERPL-MCNC: 129 MG/DL (ref 65–99)
HDLC SERPL-MCNC: 29 MG/DL
LDLC SERPL CALC-MCNC: 23 MG/DL (CALC)
NONHDLC SERPL-MCNC: 46 MG/DL (CALC)
PHOSPHATE SERPL-MCNC: 4.2 MG/DL (ref 2.5–4.5)
POTASSIUM SERPL-SCNC: 3.7 MMOL/L (ref 3.5–5.3)
SODIUM SERPL-SCNC: 138 MMOL/L (ref 135–146)
TRIGL SERPL-MCNC: 148 MG/DL

## 2025-08-26 PROCEDURE — G0157 HHC PT ASSISTANT EA 15: HCPCS | Mod: CQ

## 2025-08-28 ENCOUNTER — HOME CARE VISIT (OUTPATIENT)
Dept: HOME HEALTH SERVICES | Facility: HOME HEALTH | Age: 60
End: 2025-08-28
Payer: COMMERCIAL

## 2025-08-28 PROCEDURE — G0157 HHC PT ASSISTANT EA 15: HCPCS | Mod: CQ

## 2025-08-29 ENCOUNTER — DOCUMENTATION (OUTPATIENT)
Dept: CARE COORDINATION | Facility: CLINIC | Age: 60
End: 2025-08-29
Payer: COMMERCIAL

## 2025-08-30 ENCOUNTER — HOSPITAL ENCOUNTER (INPATIENT)
Facility: HOSPITAL | Age: 60
End: 2025-08-30
Attending: EMERGENCY MEDICINE | Admitting: NURSE PRACTITIONER
Payer: COMMERCIAL

## 2025-08-30 ENCOUNTER — HOME CARE VISIT (OUTPATIENT)
Dept: HOME HEALTH SERVICES | Facility: HOME HEALTH | Age: 60
End: 2025-08-30
Payer: COMMERCIAL

## 2025-08-30 PROBLEM — I50.23 ACUTE ON CHRONIC HFREF (HEART FAILURE WITH REDUCED EJECTION FRACTION): Status: ACTIVE | Noted: 2025-08-30

## 2025-08-30 ASSESSMENT — COGNITIVE AND FUNCTIONAL STATUS - GENERAL
WALKING IN HOSPITAL ROOM: A LITTLE
CLIMB 3 TO 5 STEPS WITH RAILING: A LITTLE
TOILETING: A LITTLE
MOBILITY SCORE: 22
DAILY ACTIVITIY SCORE: 23

## 2025-08-30 ASSESSMENT — PAIN SCALES - GENERAL
PAINLEVEL_OUTOF10: 0 - NO PAIN
PAINLEVEL_OUTOF10: 10 - WORST POSSIBLE PAIN

## 2025-08-30 ASSESSMENT — ENCOUNTER SYMPTOMS
DYSURIA: 0
DYSPNEA ON EXERTION: 1
SORE THROAT: 1
CHILLS: 1
NAUSEA: 0
HEMATURIA: 0
ABDOMINAL PAIN: 0
DIARRHEA: 0
BLOATING: 1
NECK PAIN: 1
COLOR CHANGE: 1
COUGH: 1
WEAKNESS: 1
HEMATEMESIS: 0
CONSTIPATION: 0
HEMOPTYSIS: 0
VOMITING: 0
FEVER: 0
POOR WOUND HEALING: 1
ORTHOPNEA: 1
FLANK PAIN: 0
ALTERED MENTAL STATUS: 0
DIAPHORESIS: 0
BRUISES/BLEEDS EASILY: 0
HEMATOCHEZIA: 0
SHORTNESS OF BREATH: 0
SUSPICIOUS LESIONS: 1

## 2025-08-30 ASSESSMENT — PAIN - FUNCTIONAL ASSESSMENT
PAIN_FUNCTIONAL_ASSESSMENT: 0-10
PAIN_FUNCTIONAL_ASSESSMENT: 0-10

## 2025-08-30 ASSESSMENT — PAIN DESCRIPTION - LOCATION
LOCATION: HEAD
LOCATION: CHEST

## 2025-08-30 ASSESSMENT — PAIN DESCRIPTION - PAIN TYPE: TYPE: ACUTE PAIN

## 2025-08-31 ENCOUNTER — APPOINTMENT (OUTPATIENT)
Dept: CARDIOLOGY | Facility: HOSPITAL | Age: 60
End: 2025-08-31
Payer: COMMERCIAL

## 2025-08-31 PROCEDURE — 93005 ELECTROCARDIOGRAM TRACING: CPT

## 2025-08-31 SDOH — SOCIAL STABILITY: SOCIAL INSECURITY: WITHIN THE LAST YEAR, HAVE YOU BEEN AFRAID OF YOUR PARTNER OR EX-PARTNER?: NO

## 2025-08-31 SDOH — SOCIAL STABILITY: SOCIAL INSECURITY: DOES ANYONE TRY TO KEEP YOU FROM HAVING/CONTACTING OTHER FRIENDS OR DOING THINGS OUTSIDE YOUR HOME?: NO

## 2025-08-31 SDOH — SOCIAL STABILITY: SOCIAL INSECURITY: WITHIN THE LAST YEAR, HAVE YOU BEEN HUMILIATED OR EMOTIONALLY ABUSED IN OTHER WAYS BY YOUR PARTNER OR EX-PARTNER?: NO

## 2025-08-31 SDOH — ECONOMIC STABILITY: INCOME INSECURITY: IN THE PAST 12 MONTHS HAS THE ELECTRIC, GAS, OIL, OR WATER COMPANY THREATENED TO SHUT OFF SERVICES IN YOUR HOME?: NO

## 2025-08-31 SDOH — SOCIAL STABILITY: SOCIAL INSECURITY: DO YOU FEEL UNSAFE GOING BACK TO THE PLACE WHERE YOU ARE LIVING?: NO

## 2025-08-31 SDOH — SOCIAL STABILITY: SOCIAL INSECURITY: HAVE YOU HAD ANY THOUGHTS OF HARMING ANYONE ELSE?: NO

## 2025-08-31 SDOH — SOCIAL STABILITY: SOCIAL INSECURITY: HAS ANYONE EVER THREATENED TO HURT YOUR FAMILY OR YOUR PETS?: NO

## 2025-08-31 SDOH — SOCIAL STABILITY: SOCIAL INSECURITY: ARE THERE ANY APPARENT SIGNS OF INJURIES/BEHAVIORS THAT COULD BE RELATED TO ABUSE/NEGLECT?: NO

## 2025-08-31 SDOH — SOCIAL STABILITY: SOCIAL INSECURITY: WERE YOU ABLE TO COMPLETE ALL THE BEHAVIORAL HEALTH SCREENINGS?: YES

## 2025-08-31 SDOH — SOCIAL STABILITY: SOCIAL INSECURITY: ARE YOU OR HAVE YOU BEEN THREATENED OR ABUSED PHYSICALLY, EMOTIONALLY, OR SEXUALLY BY ANYONE?: NO

## 2025-08-31 SDOH — SOCIAL STABILITY: SOCIAL INSECURITY: HAVE YOU HAD THOUGHTS OF HARMING ANYONE ELSE?: NO

## 2025-08-31 SDOH — SOCIAL STABILITY: SOCIAL INSECURITY: ABUSE: ADULT

## 2025-08-31 SDOH — SOCIAL STABILITY: SOCIAL INSECURITY: DO YOU FEEL ANYONE HAS EXPLOITED OR TAKEN ADVANTAGE OF YOU FINANCIALLY OR OF YOUR PERSONAL PROPERTY?: NO

## 2025-08-31 ASSESSMENT — COGNITIVE AND FUNCTIONAL STATUS - GENERAL
STANDING UP FROM CHAIR USING ARMS: A LITTLE
WALKING IN HOSPITAL ROOM: A LITTLE
TOILETING: A LITTLE
PATIENT BASELINE BEDBOUND: NO
CLIMB 3 TO 5 STEPS WITH RAILING: A LITTLE
DAILY ACTIVITIY SCORE: 23
MOBILITY SCORE: 22
MOVING TO AND FROM BED TO CHAIR: A LITTLE
TOILETING: A LITTLE
CLIMB 3 TO 5 STEPS WITH RAILING: A LITTLE
WALKING IN HOSPITAL ROOM: A LITTLE
WALKING IN HOSPITAL ROOM: A LITTLE
TOILETING: A LITTLE
DAILY ACTIVITIY SCORE: 23
CLIMB 3 TO 5 STEPS WITH RAILING: A LOT
MOBILITY SCORE: 19

## 2025-08-31 ASSESSMENT — PAIN SCALES - GENERAL
PAINLEVEL_OUTOF10: 7
PAINLEVEL_OUTOF10: 10 - WORST POSSIBLE PAIN
PAINLEVEL_OUTOF10: 5 - MODERATE PAIN
PAINLEVEL_OUTOF10: 7
PAINLEVEL_OUTOF10: 6

## 2025-08-31 ASSESSMENT — ACTIVITIES OF DAILY LIVING (ADL)
PATIENT'S MEMORY ADEQUATE TO SAFELY COMPLETE DAILY ACTIVITIES?: YES
TOILETING: INDEPENDENT
HEARING - RIGHT EAR: FUNCTIONAL
LACK_OF_TRANSPORTATION: NO
WALKS IN HOME: INDEPENDENT
GROOMING: INDEPENDENT
DRESSING YOURSELF: INDEPENDENT
JUDGMENT_ADEQUATE_SAFELY_COMPLETE_DAILY_ACTIVITIES: YES
ASSISTIVE_DEVICE: WALKER
HEARING - LEFT EAR: FUNCTIONAL
FEEDING YOURSELF: INDEPENDENT
BATHING: INDEPENDENT
ADEQUATE_TO_COMPLETE_ADL: YES

## 2025-08-31 ASSESSMENT — LIFESTYLE VARIABLES
SKIP TO QUESTIONS 9-10: 1
AUDIT-C TOTAL SCORE: 0
AUDIT-C TOTAL SCORE: 0
HOW OFTEN DO YOU HAVE A DRINK CONTAINING ALCOHOL: NEVER
HOW MANY STANDARD DRINKS CONTAINING ALCOHOL DO YOU HAVE ON A TYPICAL DAY: PATIENT DOES NOT DRINK
HOW OFTEN DO YOU HAVE 6 OR MORE DRINKS ON ONE OCCASION: NEVER

## 2025-08-31 ASSESSMENT — PAIN - FUNCTIONAL ASSESSMENT
PAIN_FUNCTIONAL_ASSESSMENT: 0-10

## 2025-08-31 ASSESSMENT — PATIENT HEALTH QUESTIONNAIRE - PHQ9
SUM OF ALL RESPONSES TO PHQ9 QUESTIONS 1 & 2: 2
1. LITTLE INTEREST OR PLEASURE IN DOING THINGS: SEVERAL DAYS
2. FEELING DOWN, DEPRESSED OR HOPELESS: SEVERAL DAYS

## 2025-09-01 ENCOUNTER — APPOINTMENT (OUTPATIENT)
Dept: RADIOLOGY | Facility: HOSPITAL | Age: 60
End: 2025-09-01
Payer: COMMERCIAL

## 2025-09-01 PROCEDURE — 71045 X-RAY EXAM CHEST 1 VIEW: CPT

## 2025-09-01 ASSESSMENT — PAIN SCALES - GENERAL
PAINLEVEL_OUTOF10: 0 - NO PAIN
PAINLEVEL_OUTOF10: 0 - NO PAIN
PAINLEVEL_OUTOF10: 7

## 2025-09-01 ASSESSMENT — PAIN - FUNCTIONAL ASSESSMENT
PAIN_FUNCTIONAL_ASSESSMENT: 0-10
PAIN_FUNCTIONAL_ASSESSMENT: 0-10

## 2025-09-02 ENCOUNTER — HOME CARE VISIT (OUTPATIENT)
Dept: HOME HEALTH SERVICES | Facility: HOME HEALTH | Age: 60
End: 2025-09-02
Payer: COMMERCIAL

## 2025-09-02 ENCOUNTER — APPOINTMENT (OUTPATIENT)
Dept: CARDIOLOGY | Facility: CLINIC | Age: 60
End: 2025-09-02
Payer: COMMERCIAL

## 2025-09-02 ASSESSMENT — PAIN SCALES - GENERAL
PAINLEVEL_OUTOF10: 0 - NO PAIN
PAINLEVEL_OUTOF10: 0 - NO PAIN
PAINLEVEL_OUTOF10: 8
PAINLEVEL_OUTOF10: 0 - NO PAIN

## 2025-09-02 ASSESSMENT — COGNITIVE AND FUNCTIONAL STATUS - GENERAL
TURNING FROM BACK TO SIDE WHILE IN FLAT BAD: TOTAL
CLIMB 3 TO 5 STEPS WITH RAILING: TOTAL
MOVING FROM LYING ON BACK TO SITTING ON SIDE OF FLAT BED WITH BEDRAILS: A LOT
MOBILITY SCORE: 7
MOVING TO AND FROM BED TO CHAIR: TOTAL
STANDING UP FROM CHAIR USING ARMS: TOTAL
WALKING IN HOSPITAL ROOM: TOTAL

## 2025-09-02 ASSESSMENT — PAIN - FUNCTIONAL ASSESSMENT
PAIN_FUNCTIONAL_ASSESSMENT: 0-10

## 2025-09-02 ASSESSMENT — ACTIVITIES OF DAILY LIVING (ADL): ADL_ASSISTANCE: INDEPENDENT

## 2025-09-03 ENCOUNTER — APPOINTMENT (OUTPATIENT)
Dept: VASCULAR MEDICINE | Facility: HOSPITAL | Age: 60
End: 2025-09-03
Payer: COMMERCIAL

## 2025-09-03 PROCEDURE — 93970 EXTREMITY STUDY: CPT

## 2025-09-03 PROCEDURE — 93970 EXTREMITY STUDY: CPT | Performed by: SURGERY

## 2025-09-03 ASSESSMENT — PAIN SCALES - GENERAL
PAINLEVEL_OUTOF10: 0 - NO PAIN
PAINLEVEL_OUTOF10: 5 - MODERATE PAIN
PAINLEVEL_OUTOF10: 0 - NO PAIN
PAINLEVEL_OUTOF10: 8
PAINLEVEL_OUTOF10: 0 - NO PAIN

## 2025-09-03 ASSESSMENT — COGNITIVE AND FUNCTIONAL STATUS - GENERAL
TOILETING: A LOT
DRESSING REGULAR UPPER BODY CLOTHING: A LITTLE
HELP NEEDED FOR BATHING: A LOT
DRESSING REGULAR LOWER BODY CLOTHING: A LOT
DAILY ACTIVITIY SCORE: 16
PERSONAL GROOMING: A LITTLE

## 2025-09-03 ASSESSMENT — PAIN - FUNCTIONAL ASSESSMENT
PAIN_FUNCTIONAL_ASSESSMENT: 0-10
PAIN_FUNCTIONAL_ASSESSMENT: WONG-BAKER FACES
PAIN_FUNCTIONAL_ASSESSMENT: 0-10

## 2025-09-03 ASSESSMENT — ACTIVITIES OF DAILY LIVING (ADL)
ADL_ASSISTANCE: INDEPENDENT
BATHING_ASSISTANCE: MODERATE

## 2025-09-03 ASSESSMENT — PAIN SCALES - WONG BAKER: WONGBAKER_NUMERICALRESPONSE: HURTS EVEN MORE

## 2025-09-03 ASSESSMENT — PAIN DESCRIPTION - DESCRIPTORS
DESCRIPTORS: ACHING

## 2025-09-04 ENCOUNTER — DOCUMENTATION (OUTPATIENT)
Dept: CARE COORDINATION | Facility: CLINIC | Age: 60
End: 2025-09-04
Payer: COMMERCIAL

## 2025-09-04 LAB
HLA RESULTS: NORMAL
HLA RESULTS: NORMAL
HLA-A+B+C AB NFR SER: NORMAL %
HLA-A+B+C AB NFR SER: NORMAL %
HLA-DP+DQ+DR AB NFR SER: NORMAL %
HLA-DP+DQ+DR AB NFR SER: NORMAL %

## 2025-09-04 SDOH — ECONOMIC STABILITY: FOOD INSECURITY: HOW HARD IS IT FOR YOU TO PAY FOR THE VERY BASICS LIKE FOOD, HOUSING, MEDICAL CARE, AND HEATING?: SOMEWHAT HARD

## 2025-09-04 SDOH — ECONOMIC STABILITY: INCOME INSECURITY: IN THE PAST 12 MONTHS HAS THE ELECTRIC, GAS, OIL, OR WATER COMPANY THREATENED TO SHUT OFF SERVICES IN YOUR HOME?: NO

## 2025-09-04 SDOH — ECONOMIC STABILITY: FOOD INSECURITY: WITHIN THE PAST 12 MONTHS, THE FOOD YOU BOUGHT JUST DIDN'T LAST AND YOU DIDN'T HAVE MONEY TO GET MORE.: NEVER TRUE

## 2025-09-04 SDOH — ECONOMIC STABILITY: HOUSING INSECURITY: AT ANY TIME IN THE PAST 12 MONTHS, WERE YOU HOMELESS OR LIVING IN A SHELTER (INCLUDING NOW)?: NO

## 2025-09-04 SDOH — ECONOMIC STABILITY: HOUSING INSECURITY: IN THE LAST 12 MONTHS, WAS THERE A TIME WHEN YOU WERE NOT ABLE TO PAY THE MORTGAGE OR RENT ON TIME?: YES

## 2025-09-04 SDOH — ECONOMIC STABILITY: FOOD INSECURITY: WITHIN THE PAST 12 MONTHS, YOU WORRIED THAT YOUR FOOD WOULD RUN OUT BEFORE YOU GOT THE MONEY TO BUY MORE.: NEVER TRUE

## 2025-09-04 SDOH — SOCIAL STABILITY: SOCIAL INSECURITY: WITHIN THE LAST YEAR, HAVE YOU BEEN AFRAID OF YOUR PARTNER OR EX-PARTNER?: NO

## 2025-09-04 SDOH — SOCIAL STABILITY: SOCIAL INSECURITY: WITHIN THE LAST YEAR, HAVE YOU BEEN HUMILIATED OR EMOTIONALLY ABUSED IN OTHER WAYS BY YOUR PARTNER OR EX-PARTNER?: NO

## 2025-09-04 SDOH — ECONOMIC STABILITY: HOUSING INSECURITY: IN THE PAST 12 MONTHS, HOW MANY TIMES HAVE YOU MOVED WHERE YOU WERE LIVING?: 0

## 2025-09-04 SDOH — ECONOMIC STABILITY: TRANSPORTATION INSECURITY: IN THE PAST 12 MONTHS, HAS LACK OF TRANSPORTATION KEPT YOU FROM MEDICAL APPOINTMENTS OR FROM GETTING MEDICATIONS?: NO

## 2025-09-04 ASSESSMENT — COGNITIVE AND FUNCTIONAL STATUS - GENERAL
DRESSING REGULAR UPPER BODY CLOTHING: A LITTLE
MOVING TO AND FROM BED TO CHAIR: TOTAL
DAILY ACTIVITIY SCORE: 18
PERSONAL GROOMING: A LITTLE
TURNING FROM BACK TO SIDE WHILE IN FLAT BAD: A LOT
TOILETING: A LOT
DAILY ACTIVITIY SCORE: 16
MOBILITY SCORE: 8
CLIMB 3 TO 5 STEPS WITH RAILING: A LITTLE
STANDING UP FROM CHAIR USING ARMS: A LITTLE
HELP NEEDED FOR BATHING: A LITTLE
MOVING FROM LYING ON BACK TO SITTING ON SIDE OF FLAT BED WITH BEDRAILS: A LOT
MOBILITY SCORE: 18
CLIMB 3 TO 5 STEPS WITH RAILING: TOTAL
DRESSING REGULAR LOWER BODY CLOTHING: A LOT
WALKING IN HOSPITAL ROOM: A LITTLE
PERSONAL GROOMING: A LITTLE
TOILETING: A LITTLE
DRESSING REGULAR UPPER BODY CLOTHING: A LITTLE
DRESSING REGULAR LOWER BODY CLOTHING: A LITTLE
HELP NEEDED FOR BATHING: A LOT
STANDING UP FROM CHAIR USING ARMS: TOTAL
MOVING FROM LYING ON BACK TO SITTING ON SIDE OF FLAT BED WITH BEDRAILS: A LITTLE
MOVING TO AND FROM BED TO CHAIR: A LITTLE
TURNING FROM BACK TO SIDE WHILE IN FLAT BAD: A LITTLE
EATING MEALS: A LITTLE
WALKING IN HOSPITAL ROOM: TOTAL

## 2025-09-04 ASSESSMENT — PAIN - FUNCTIONAL ASSESSMENT
PAIN_FUNCTIONAL_ASSESSMENT: 0-10

## 2025-09-04 ASSESSMENT — ACTIVITIES OF DAILY LIVING (ADL)
LACK_OF_TRANSPORTATION: NO
LACK_OF_TRANSPORTATION: NO

## 2025-09-04 ASSESSMENT — PAIN DESCRIPTION - LOCATION: LOCATION: LEG

## 2025-09-04 ASSESSMENT — PAIN DESCRIPTION - ORIENTATION: ORIENTATION: RIGHT;LEFT

## 2025-09-05 ASSESSMENT — COGNITIVE AND FUNCTIONAL STATUS - GENERAL
CLIMB 3 TO 5 STEPS WITH RAILING: A LITTLE
TOILETING: A LITTLE
WALKING IN HOSPITAL ROOM: A LITTLE
DAILY ACTIVITIY SCORE: 23
MOBILITY SCORE: 22

## 2025-09-05 ASSESSMENT — PAIN - FUNCTIONAL ASSESSMENT
PAIN_FUNCTIONAL_ASSESSMENT: 0-10

## 2025-09-05 ASSESSMENT — PAIN SCALES - GENERAL
PAINLEVEL_OUTOF10: 0 - NO PAIN
PAINLEVEL_OUTOF10: 0 - NO PAIN
PAINLEVEL_OUTOF10: 7
PAINLEVEL_OUTOF10: 0 - NO PAIN
PAINLEVEL_OUTOF10: 3
PAINLEVEL_OUTOF10: 0 - NO PAIN
PAINLEVEL_OUTOF10: 0 - NO PAIN

## 2025-09-06 ASSESSMENT — PAIN SCALES - GENERAL
PAINLEVEL_OUTOF10: 7
PAINLEVEL_OUTOF10: 2
PAINLEVEL_OUTOF10: 0 - NO PAIN
PAINLEVEL_OUTOF10: 0 - NO PAIN
PAINLEVEL_OUTOF10: 7
PAINLEVEL_OUTOF10: 5 - MODERATE PAIN
PAINLEVEL_OUTOF10: 0 - NO PAIN
PAINLEVEL_OUTOF10: 0 - NO PAIN
PAINLEVEL_OUTOF10: 8
PAINLEVEL_OUTOF10: 0 - NO PAIN
PAINLEVEL_OUTOF10: 0 - NO PAIN
PAINLEVEL_OUTOF10: 3
PAINLEVEL_OUTOF10: 7

## 2025-09-06 ASSESSMENT — COGNITIVE AND FUNCTIONAL STATUS - GENERAL
DAILY ACTIVITIY SCORE: 23
CLIMB 3 TO 5 STEPS WITH RAILING: A LITTLE
TOILETING: A LITTLE
MOBILITY SCORE: 22
WALKING IN HOSPITAL ROOM: A LITTLE
TOILETING: A LITTLE
WALKING IN HOSPITAL ROOM: A LITTLE
WALKING IN HOSPITAL ROOM: A LITTLE
CLIMB 3 TO 5 STEPS WITH RAILING: A LITTLE
MOBILITY SCORE: 22
CLIMB 3 TO 5 STEPS WITH RAILING: A LITTLE
DAILY ACTIVITIY SCORE: 23
TOILETING: A LITTLE
MOBILITY SCORE: 22
DAILY ACTIVITIY SCORE: 23

## 2025-09-06 ASSESSMENT — PAIN DESCRIPTION - LOCATION
LOCATION: NECK
LOCATION: NECK
LOCATION: BACK

## 2025-09-06 ASSESSMENT — PAIN DESCRIPTION - DESCRIPTORS
DESCRIPTORS: DULL
DESCRIPTORS: STABBING

## 2025-09-06 ASSESSMENT — PAIN DESCRIPTION - ORIENTATION: ORIENTATION: MID

## 2025-09-07 ASSESSMENT — COGNITIVE AND FUNCTIONAL STATUS - GENERAL
WALKING IN HOSPITAL ROOM: A LITTLE
CLIMB 3 TO 5 STEPS WITH RAILING: A LITTLE
MOBILITY SCORE: 22
DAILY ACTIVITIY SCORE: 23
TOILETING: A LITTLE
DAILY ACTIVITIY SCORE: 23
CLIMB 3 TO 5 STEPS WITH RAILING: A LITTLE
DAILY ACTIVITIY SCORE: 23
TOILETING: A LITTLE
WALKING IN HOSPITAL ROOM: A LITTLE
TOILETING: A LITTLE
MOBILITY SCORE: 22
WALKING IN HOSPITAL ROOM: A LITTLE
MOBILITY SCORE: 22
CLIMB 3 TO 5 STEPS WITH RAILING: A LITTLE

## 2025-09-07 ASSESSMENT — PAIN - FUNCTIONAL ASSESSMENT
PAIN_FUNCTIONAL_ASSESSMENT: 0-10

## 2025-09-07 ASSESSMENT — PAIN SCALES - GENERAL
PAINLEVEL_OUTOF10: 0 - NO PAIN

## 2025-09-08 ENCOUNTER — APPOINTMENT (OUTPATIENT)
Dept: OPHTHALMOLOGY | Facility: CLINIC | Age: 60
End: 2025-09-08
Payer: COMMERCIAL

## 2025-09-17 ENCOUNTER — APPOINTMENT (OUTPATIENT)
Dept: ENDOCRINOLOGY | Facility: CLINIC | Age: 60
End: 2025-09-17

## 2025-10-01 ENCOUNTER — APPOINTMENT (OUTPATIENT)
Dept: OPHTHALMOLOGY | Facility: CLINIC | Age: 60
End: 2025-10-01
Payer: COMMERCIAL

## 2025-11-13 ENCOUNTER — APPOINTMENT (OUTPATIENT)
Dept: PRIMARY CARE | Facility: CLINIC | Age: 60
End: 2025-11-13
Payer: COMMERCIAL

## 2025-12-04 ENCOUNTER — APPOINTMENT (OUTPATIENT)
Dept: NEPHROLOGY | Facility: CLINIC | Age: 60
End: 2025-12-04
Payer: COMMERCIAL

## (undated) DEVICE — SOLUTION, OPHTHALMIC, TETRACAINE HCL 0.5%, 2 ML, VIAL

## (undated) DEVICE — CATHETER, ANGIO, IMPULSE, FL3.5, 5 FR X 100 CM

## (undated) DEVICE — INTRODUCER, GLIDESHEATH SLENDER A-KIT, 5FR 10CM

## (undated) DEVICE — CATHETER, ANGIO, IMPULSE, FR4, 5 FR X 100 CM

## (undated) DEVICE — SUTURE, VICRYL, 10-0, 12 IN, CS1606

## (undated) DEVICE — GUIDEWIRE, HI-TORQUE, VERSACORE, 145CM, FLOPPY

## (undated) DEVICE — GUIDEWIRE, INQWIRE, 3MM J, .035 X 210CM, FIXED

## (undated) DEVICE — CATHETER, DIAGNOSTIC, 4FR-IM

## (undated) DEVICE — APPLICATOR, COTTON TIP, 6 IN, 2PK, STERILE

## (undated) DEVICE — TR BAND, RADIAL COMPRESSION, STANDARD, 24CM

## (undated) DEVICE — MICRO KIT, 5FR X 10CM,  0.018IN, W/40CM NI/AU TIP, ECHO

## (undated) DEVICE — INTRODUCER, GLIDESHEATH SLENDER A-KIT, 6FR 10CM

## (undated) DEVICE — CATHETER, WEDGE PRESSURE, BALLOON, DOUBLE LUMEN, 5 FR, 110 CM